# Patient Record
Sex: FEMALE | Race: WHITE | Employment: OTHER | URBAN - METROPOLITAN AREA
[De-identification: names, ages, dates, MRNs, and addresses within clinical notes are randomized per-mention and may not be internally consistent; named-entity substitution may affect disease eponyms.]

---

## 2017-01-01 ENCOUNTER — HOSPITAL ENCOUNTER (OUTPATIENT)
Facility: CLINIC | Age: 77
Setting detail: SPECIMEN
Discharge: HOME | End: 2017-11-30
Attending: INTERNAL MEDICINE | Admitting: INTERNAL MEDICINE
Payer: MEDICARE

## 2017-01-01 ENCOUNTER — INFUSION THERAPY VISIT (OUTPATIENT)
Dept: INFUSION THERAPY | Facility: CLINIC | Age: 77
End: 2017-01-01
Attending: INTERNAL MEDICINE
Payer: MEDICARE

## 2017-01-01 ENCOUNTER — ONCOLOGY VISIT (OUTPATIENT)
Dept: ONCOLOGY | Facility: CLINIC | Age: 77
End: 2017-01-01
Attending: INTERNAL MEDICINE
Payer: MEDICARE

## 2017-01-01 ENCOUNTER — ONCOLOGY VISIT (OUTPATIENT)
Dept: ONCOLOGY | Facility: CLINIC | Age: 77
End: 2017-01-01
Attending: INTERNAL MEDICINE
Payer: COMMERCIAL

## 2017-01-01 ENCOUNTER — ASSISTED LIVING VISIT (OUTPATIENT)
Dept: GERIATRICS | Facility: CLINIC | Age: 77
End: 2017-01-01
Payer: COMMERCIAL

## 2017-01-01 ENCOUNTER — TELEPHONE (OUTPATIENT)
Dept: FAMILY MEDICINE | Facility: CLINIC | Age: 77
End: 2017-01-01

## 2017-01-01 ENCOUNTER — HOSPITAL ENCOUNTER (OUTPATIENT)
Facility: CLINIC | Age: 77
Setting detail: SPECIMEN
Discharge: HOME | End: 2017-11-02
Attending: INTERNAL MEDICINE | Admitting: INTERNAL MEDICINE
Payer: MEDICARE

## 2017-01-01 ENCOUNTER — TELEPHONE (OUTPATIENT)
Dept: GERIATRICS | Facility: CLINIC | Age: 77
End: 2017-01-01

## 2017-01-01 ENCOUNTER — HOSPITAL ENCOUNTER (OUTPATIENT)
Dept: CT IMAGING | Facility: CLINIC | Age: 77
End: 2017-09-13
Attending: INTERNAL MEDICINE | Admitting: INTERNAL MEDICINE
Payer: MEDICARE

## 2017-01-01 ENCOUNTER — OFFICE VISIT (OUTPATIENT)
Dept: FAMILY MEDICINE | Facility: CLINIC | Age: 77
End: 2017-01-01
Payer: COMMERCIAL

## 2017-01-01 ENCOUNTER — HOSPITAL ENCOUNTER (OUTPATIENT)
Facility: CLINIC | Age: 77
Setting detail: SPECIMEN
Discharge: HOME | End: 2017-12-14
Attending: INTERNAL MEDICINE | Admitting: INTERNAL MEDICINE
Payer: MEDICARE

## 2017-01-01 ENCOUNTER — HOSPITAL ENCOUNTER (OUTPATIENT)
Facility: CLINIC | Age: 77
Setting detail: SPECIMEN
Discharge: HOME | End: 2017-10-05
Attending: INTERNAL MEDICINE | Admitting: INTERNAL MEDICINE
Payer: MEDICARE

## 2017-01-01 ENCOUNTER — ALLIED HEALTH/NURSE VISIT (OUTPATIENT)
Dept: ONCOLOGY | Facility: CLINIC | Age: 77
End: 2017-01-01

## 2017-01-01 ENCOUNTER — APPOINTMENT (OUTPATIENT)
Dept: GENERAL RADIOLOGY | Facility: CLINIC | Age: 77
End: 2017-01-01
Attending: INTERNAL MEDICINE
Payer: MEDICARE

## 2017-01-01 ENCOUNTER — CARE COORDINATION (OUTPATIENT)
Dept: CARE COORDINATION | Facility: CLINIC | Age: 77
End: 2017-01-01

## 2017-01-01 ENCOUNTER — TELEPHONE (OUTPATIENT)
Dept: ONCOLOGY | Facility: CLINIC | Age: 77
End: 2017-01-01

## 2017-01-01 ENCOUNTER — HOSPITAL ENCOUNTER (OUTPATIENT)
Facility: CLINIC | Age: 77
Discharge: HOME | End: 2017-09-13
Attending: INTERNAL MEDICINE | Admitting: INTERNAL MEDICINE
Payer: MEDICARE

## 2017-01-01 ENCOUNTER — HOSPITAL ENCOUNTER (OUTPATIENT)
Dept: GENERAL RADIOLOGY | Facility: CLINIC | Age: 77
Discharge: HOME | End: 2017-10-21
Attending: INTERNAL MEDICINE | Admitting: INTERNAL MEDICINE
Payer: MEDICARE

## 2017-01-01 ENCOUNTER — HOSPITAL ENCOUNTER (OUTPATIENT)
Facility: CLINIC | Age: 77
Setting detail: OBSERVATION
Discharge: HOME | End: 2017-12-03
Attending: EMERGENCY MEDICINE | Admitting: INTERNAL MEDICINE
Payer: MEDICARE

## 2017-01-01 ENCOUNTER — NURSE TRIAGE (OUTPATIENT)
Dept: NURSING | Facility: CLINIC | Age: 77
End: 2017-01-01

## 2017-01-01 ENCOUNTER — HOSPITAL ENCOUNTER (OUTPATIENT)
Facility: CLINIC | Age: 77
Setting detail: SPECIMEN
Discharge: HOME | End: 2017-10-19
Attending: INTERNAL MEDICINE | Admitting: INTERNAL MEDICINE
Payer: MEDICARE

## 2017-01-01 ENCOUNTER — HOSPITAL ENCOUNTER (OUTPATIENT)
Dept: CT IMAGING | Facility: CLINIC | Age: 77
End: 2017-12-05
Attending: INTERNAL MEDICINE | Admitting: INTERNAL MEDICINE
Payer: MEDICARE

## 2017-01-01 ENCOUNTER — MEDICAL CORRESPONDENCE (OUTPATIENT)
Dept: HEALTH INFORMATION MANAGEMENT | Facility: CLINIC | Age: 77
End: 2017-01-01

## 2017-01-01 ENCOUNTER — HOSPITAL ENCOUNTER (OUTPATIENT)
Facility: CLINIC | Age: 77
Setting detail: SPECIMEN
Discharge: HOME | End: 2017-09-21
Attending: INTERNAL MEDICINE | Admitting: INTERNAL MEDICINE
Payer: MEDICARE

## 2017-01-01 ENCOUNTER — DOCUMENTATION ONLY (OUTPATIENT)
Dept: OTHER | Facility: CLINIC | Age: 77
End: 2017-01-01

## 2017-01-01 ENCOUNTER — HOSPITAL ENCOUNTER (OUTPATIENT)
Facility: CLINIC | Age: 77
Discharge: HOME | End: 2017-12-05
Attending: INTERNAL MEDICINE | Admitting: INTERNAL MEDICINE
Payer: MEDICARE

## 2017-01-01 ENCOUNTER — DISCHARGE SUMMARY NURSING HOME (OUTPATIENT)
Dept: GERIATRICS | Facility: CLINIC | Age: 77
End: 2017-01-01
Payer: COMMERCIAL

## 2017-01-01 ENCOUNTER — HOSPITAL ENCOUNTER (OUTPATIENT)
Facility: CLINIC | Age: 77
Setting detail: SPECIMEN
Discharge: HOME | End: 2017-11-16
Attending: INTERNAL MEDICINE | Admitting: INTERNAL MEDICINE
Payer: MEDICARE

## 2017-01-01 ENCOUNTER — HOSPITAL ENCOUNTER (OUTPATIENT)
Facility: CLINIC | Age: 77
Setting detail: SPECIMEN
Discharge: HOME | End: 2017-10-02
Attending: INTERNAL MEDICINE | Admitting: INTERNAL MEDICINE
Payer: MEDICARE

## 2017-01-01 ENCOUNTER — APPOINTMENT (OUTPATIENT)
Dept: CT IMAGING | Facility: CLINIC | Age: 77
End: 2017-01-01
Attending: EMERGENCY MEDICINE
Payer: MEDICARE

## 2017-01-01 VITALS
TEMPERATURE: 97.8 F | SYSTOLIC BLOOD PRESSURE: 154 MMHG | RESPIRATION RATE: 18 BRPM | TEMPERATURE: 97.7 F | HEART RATE: 76 BPM | BODY MASS INDEX: 21.07 KG/M2 | HEIGHT: 64 IN | OXYGEN SATURATION: 96 % | BODY MASS INDEX: 20.93 KG/M2 | WEIGHT: 122.8 LBS | DIASTOLIC BLOOD PRESSURE: 88 MMHG | SYSTOLIC BLOOD PRESSURE: 150 MMHG | WEIGHT: 122.6 LBS | HEART RATE: 68 BPM | RESPIRATION RATE: 22 BRPM | DIASTOLIC BLOOD PRESSURE: 92 MMHG

## 2017-01-01 VITALS
TEMPERATURE: 97 F | RESPIRATION RATE: 16 BRPM | SYSTOLIC BLOOD PRESSURE: 158 MMHG | WEIGHT: 120.6 LBS | BODY MASS INDEX: 19.38 KG/M2 | OXYGEN SATURATION: 99 % | DIASTOLIC BLOOD PRESSURE: 85 MMHG | HEIGHT: 66 IN

## 2017-01-01 VITALS
BODY MASS INDEX: 20.46 KG/M2 | TEMPERATURE: 98 F | SYSTOLIC BLOOD PRESSURE: 149 MMHG | HEART RATE: 71 BPM | WEIGHT: 119.2 LBS | RESPIRATION RATE: 18 BRPM | OXYGEN SATURATION: 95 % | DIASTOLIC BLOOD PRESSURE: 90 MMHG

## 2017-01-01 VITALS
WEIGHT: 119.8 LBS | RESPIRATION RATE: 20 BRPM | SYSTOLIC BLOOD PRESSURE: 122 MMHG | DIASTOLIC BLOOD PRESSURE: 107 MMHG | TEMPERATURE: 98.1 F | OXYGEN SATURATION: 98 % | BODY MASS INDEX: 20.56 KG/M2 | HEART RATE: 75 BPM

## 2017-01-01 VITALS
HEART RATE: 63 BPM | RESPIRATION RATE: 18 BRPM | SYSTOLIC BLOOD PRESSURE: 159 MMHG | DIASTOLIC BLOOD PRESSURE: 93 MMHG | TEMPERATURE: 98.3 F

## 2017-01-01 VITALS
TEMPERATURE: 98 F | DIASTOLIC BLOOD PRESSURE: 90 MMHG | HEART RATE: 71 BPM | OXYGEN SATURATION: 95 % | RESPIRATION RATE: 18 BRPM | HEIGHT: 64 IN | BODY MASS INDEX: 20.35 KG/M2 | SYSTOLIC BLOOD PRESSURE: 149 MMHG | WEIGHT: 119.2 LBS

## 2017-01-01 VITALS
DIASTOLIC BLOOD PRESSURE: 102 MMHG | TEMPERATURE: 97.4 F | RESPIRATION RATE: 16 BRPM | HEART RATE: 67 BPM | SYSTOLIC BLOOD PRESSURE: 174 MMHG

## 2017-01-01 VITALS
RESPIRATION RATE: 20 BRPM | HEART RATE: 74 BPM | OXYGEN SATURATION: 96 % | TEMPERATURE: 96.8 F | BODY MASS INDEX: 20.15 KG/M2 | DIASTOLIC BLOOD PRESSURE: 75 MMHG | WEIGHT: 117.4 LBS | SYSTOLIC BLOOD PRESSURE: 137 MMHG

## 2017-01-01 VITALS — WEIGHT: 121.8 LBS | BODY MASS INDEX: 19.66 KG/M2

## 2017-01-01 VITALS
HEART RATE: 77 BPM | WEIGHT: 126.1 LBS | TEMPERATURE: 98.6 F | DIASTOLIC BLOOD PRESSURE: 73 MMHG | BODY MASS INDEX: 21.53 KG/M2 | RESPIRATION RATE: 16 BRPM | SYSTOLIC BLOOD PRESSURE: 126 MMHG | HEIGHT: 64 IN

## 2017-01-01 VITALS
WEIGHT: 118.2 LBS | SYSTOLIC BLOOD PRESSURE: 131 MMHG | HEART RATE: 73 BPM | DIASTOLIC BLOOD PRESSURE: 79 MMHG | BODY MASS INDEX: 20.28 KG/M2 | TEMPERATURE: 98.5 F | OXYGEN SATURATION: 98 %

## 2017-01-01 VITALS
DIASTOLIC BLOOD PRESSURE: 75 MMHG | SYSTOLIC BLOOD PRESSURE: 137 MMHG | TEMPERATURE: 96.8 F | HEART RATE: 74 BPM | OXYGEN SATURATION: 96 % | BODY MASS INDEX: 20.62 KG/M2 | RESPIRATION RATE: 20 BRPM | WEIGHT: 120.12 LBS

## 2017-01-01 VITALS
RESPIRATION RATE: 20 BRPM | DIASTOLIC BLOOD PRESSURE: 92 MMHG | BODY MASS INDEX: 20.94 KG/M2 | SYSTOLIC BLOOD PRESSURE: 157 MMHG | WEIGHT: 122 LBS | HEART RATE: 67 BPM

## 2017-01-01 VITALS
HEART RATE: 66 BPM | BODY MASS INDEX: 20.92 KG/M2 | OXYGEN SATURATION: 95 % | DIASTOLIC BLOOD PRESSURE: 83 MMHG | RESPIRATION RATE: 18 BRPM | HEIGHT: 64 IN | TEMPERATURE: 97.6 F | WEIGHT: 122.5 LBS | SYSTOLIC BLOOD PRESSURE: 142 MMHG

## 2017-01-01 VITALS
HEART RATE: 70 BPM | SYSTOLIC BLOOD PRESSURE: 151 MMHG | TEMPERATURE: 97.8 F | RESPIRATION RATE: 14 BRPM | DIASTOLIC BLOOD PRESSURE: 98 MMHG

## 2017-01-01 VITALS
SYSTOLIC BLOOD PRESSURE: 152 MMHG | HEART RATE: 66 BPM | TEMPERATURE: 98.2 F | RESPIRATION RATE: 12 BRPM | DIASTOLIC BLOOD PRESSURE: 78 MMHG

## 2017-01-01 VITALS
DIASTOLIC BLOOD PRESSURE: 89 MMHG | OXYGEN SATURATION: 97 % | SYSTOLIC BLOOD PRESSURE: 150 MMHG | RESPIRATION RATE: 20 BRPM | HEART RATE: 73 BPM | TEMPERATURE: 98 F

## 2017-01-01 VITALS
TEMPERATURE: 98.6 F | SYSTOLIC BLOOD PRESSURE: 126 MMHG | DIASTOLIC BLOOD PRESSURE: 73 MMHG | RESPIRATION RATE: 16 BRPM | HEART RATE: 77 BPM

## 2017-01-01 VITALS
SYSTOLIC BLOOD PRESSURE: 168 MMHG | HEART RATE: 70 BPM | RESPIRATION RATE: 16 BRPM | DIASTOLIC BLOOD PRESSURE: 105 MMHG | TEMPERATURE: 97.4 F

## 2017-01-01 VITALS
DIASTOLIC BLOOD PRESSURE: 73 MMHG | SYSTOLIC BLOOD PRESSURE: 129 MMHG | OXYGEN SATURATION: 96 % | HEART RATE: 73 BPM | BODY MASS INDEX: 19.66 KG/M2 | RESPIRATION RATE: 18 BRPM | TEMPERATURE: 98.3 F | WEIGHT: 121.8 LBS

## 2017-01-01 VITALS
TEMPERATURE: 97.6 F | SYSTOLIC BLOOD PRESSURE: 158 MMHG | DIASTOLIC BLOOD PRESSURE: 92 MMHG | RESPIRATION RATE: 16 BRPM | HEART RATE: 75 BPM

## 2017-01-01 VITALS
WEIGHT: 123 LBS | DIASTOLIC BLOOD PRESSURE: 84 MMHG | HEART RATE: 70 BPM | OXYGEN SATURATION: 96 % | BODY MASS INDEX: 21 KG/M2 | HEIGHT: 64 IN | SYSTOLIC BLOOD PRESSURE: 169 MMHG | TEMPERATURE: 97.4 F

## 2017-01-01 VITALS
TEMPERATURE: 97.7 F | SYSTOLIC BLOOD PRESSURE: 129 MMHG | BODY MASS INDEX: 20.15 KG/M2 | DIASTOLIC BLOOD PRESSURE: 72 MMHG | WEIGHT: 117.4 LBS | RESPIRATION RATE: 16 BRPM

## 2017-01-01 VITALS
DIASTOLIC BLOOD PRESSURE: 87 MMHG | BODY MASS INDEX: 20.94 KG/M2 | OXYGEN SATURATION: 97 % | WEIGHT: 122 LBS | SYSTOLIC BLOOD PRESSURE: 146 MMHG | HEART RATE: 70 BPM | TEMPERATURE: 97.6 F

## 2017-01-01 VITALS
DIASTOLIC BLOOD PRESSURE: 78 MMHG | TEMPERATURE: 98.2 F | OXYGEN SATURATION: 98 % | HEART RATE: 66 BPM | RESPIRATION RATE: 12 BRPM | SYSTOLIC BLOOD PRESSURE: 152 MMHG

## 2017-01-01 DIAGNOSIS — C18.9 ADENOCARCINOMA OF COLON (H): Primary | ICD-10-CM

## 2017-01-01 DIAGNOSIS — Z90.49 S/P COLECTOMY: ICD-10-CM

## 2017-01-01 DIAGNOSIS — I10 HYPERTENSION, UNSPECIFIED TYPE: ICD-10-CM

## 2017-01-01 DIAGNOSIS — Z95.828 PORT-A-CATH IN PLACE: Primary | ICD-10-CM

## 2017-01-01 DIAGNOSIS — R19.7 DIARRHEA, UNSPECIFIED TYPE: ICD-10-CM

## 2017-01-01 DIAGNOSIS — C18.9 ADENOCARCINOMA OF COLON (H): ICD-10-CM

## 2017-01-01 DIAGNOSIS — Z71.89 ACP (ADVANCE CARE PLANNING): Chronic | ICD-10-CM

## 2017-01-01 DIAGNOSIS — D50.0 IRON DEFICIENCY ANEMIA DUE TO CHRONIC BLOOD LOSS: ICD-10-CM

## 2017-01-01 DIAGNOSIS — M15.0 PRIMARY OSTEOARTHRITIS INVOLVING MULTIPLE JOINTS: Primary | ICD-10-CM

## 2017-01-01 DIAGNOSIS — E03.9 HYPOTHYROIDISM, UNSPECIFIED TYPE: ICD-10-CM

## 2017-01-01 DIAGNOSIS — R80.9 PROTEINURIA, UNSPECIFIED TYPE: ICD-10-CM

## 2017-01-01 DIAGNOSIS — Z53.9 DIAGNOSIS NOT YET DEFINED: Primary | ICD-10-CM

## 2017-01-01 DIAGNOSIS — G14 POSTPOLIO SYNDROME (H): ICD-10-CM

## 2017-01-01 DIAGNOSIS — M15.0 PRIMARY OSTEOARTHRITIS INVOLVING MULTIPLE JOINTS: ICD-10-CM

## 2017-01-01 DIAGNOSIS — D50.9 IRON DEFICIENCY ANEMIA, UNSPECIFIED IRON DEFICIENCY ANEMIA TYPE: ICD-10-CM

## 2017-01-01 DIAGNOSIS — F29 PSYCHOSIS, UNSPECIFIED PSYCHOSIS TYPE (H): ICD-10-CM

## 2017-01-01 DIAGNOSIS — Z90.49 S/P RIGHT COLECTOMY: ICD-10-CM

## 2017-01-01 DIAGNOSIS — H40.1130 PRIMARY OPEN ANGLE GLAUCOMA OF BOTH EYES, UNSPECIFIED GLAUCOMA STAGE: ICD-10-CM

## 2017-01-01 DIAGNOSIS — Z85.038 HISTORY OF COLON CANCER: ICD-10-CM

## 2017-01-01 DIAGNOSIS — Z71.89 ADVANCE CARE PLANNING: ICD-10-CM

## 2017-01-01 DIAGNOSIS — Z95.828 PORT-A-CATH IN PLACE: ICD-10-CM

## 2017-01-01 DIAGNOSIS — E03.9 HYPOTHYROIDISM, UNSPECIFIED TYPE: Primary | ICD-10-CM

## 2017-01-01 DIAGNOSIS — M17.0 PRIMARY OSTEOARTHRITIS OF BOTH KNEES: ICD-10-CM

## 2017-01-01 DIAGNOSIS — H40.9 GLAUCOMA, UNSPECIFIED GLAUCOMA TYPE, UNSPECIFIED LATERALITY: ICD-10-CM

## 2017-01-01 DIAGNOSIS — R41.3 MEMORY LOSS: ICD-10-CM

## 2017-01-01 DIAGNOSIS — H40.9 UNSPECIFIED GLAUCOMA: Primary | ICD-10-CM

## 2017-01-01 DIAGNOSIS — K90.9 DIARRHEA DUE TO MALABSORPTION: ICD-10-CM

## 2017-01-01 DIAGNOSIS — R51.9 ACUTE INTRACTABLE HEADACHE, UNSPECIFIED HEADACHE TYPE: ICD-10-CM

## 2017-01-01 DIAGNOSIS — Z00.00 HEALTH CARE MAINTENANCE: ICD-10-CM

## 2017-01-01 DIAGNOSIS — R19.7 DIARRHEA DUE TO MALABSORPTION: ICD-10-CM

## 2017-01-01 DIAGNOSIS — R19.7 DIARRHEA: Primary | ICD-10-CM

## 2017-01-01 DIAGNOSIS — M47.26 OSTEOARTHRITIS OF SPINE WITH RADICULOPATHY, LUMBAR REGION: ICD-10-CM

## 2017-01-01 DIAGNOSIS — Z71.9 COUNSELING NOS(V65.40): Primary | ICD-10-CM

## 2017-01-01 DIAGNOSIS — C18.9 COLON CANCER (H): Primary | ICD-10-CM

## 2017-01-01 DIAGNOSIS — R80.9 PROTEINURIA, UNSPECIFIED TYPE: Primary | ICD-10-CM

## 2017-01-01 DIAGNOSIS — Z71.89 ACP (ADVANCE CARE PLANNING): Primary | Chronic | ICD-10-CM

## 2017-01-01 DIAGNOSIS — M25.20: ICD-10-CM

## 2017-01-01 DIAGNOSIS — G89.29 OTHER CHRONIC PAIN: Primary | ICD-10-CM

## 2017-01-01 LAB
ALBUMIN SERPL-MCNC: 3.6 G/DL (ref 3.4–5)
ALBUMIN UR-MCNC: 10 MG/DL
ALBUMIN UR-MCNC: 10 MG/DL
ALBUMIN UR-MCNC: 100 MG/DL
ALBUMIN UR-MCNC: 100 MG/DL
ALBUMIN UR-MCNC: 30 MG/DL
ALP SERPL-CCNC: 92 U/L (ref 40–150)
ALT SERPL W P-5'-P-CCNC: 46 U/L (ref 0–50)
ANION GAP SERPL CALCULATED.3IONS-SCNC: 5 MMOL/L (ref 3–14)
ANION GAP SERPL CALCULATED.3IONS-SCNC: 7 MMOL/L (ref 3–14)
ANION GAP SERPL CALCULATED.3IONS-SCNC: 7 MMOL/L (ref 3–14)
APPEARANCE UR: CLEAR
AST SERPL W P-5'-P-CCNC: 36 U/L (ref 0–45)
BACTERIA #/AREA URNS HPF: ABNORMAL /HPF
BASOPHILS # BLD AUTO: 0 10E9/L (ref 0–0.2)
BASOPHILS NFR BLD AUTO: 0.3 %
BASOPHILS NFR BLD AUTO: 0.4 %
BASOPHILS NFR BLD AUTO: 0.5 %
BASOPHILS NFR BLD AUTO: 0.5 %
BILIRUB SERPL-MCNC: 0.4 MG/DL (ref 0.2–1.3)
BILIRUB SERPL-MCNC: 0.5 MG/DL (ref 0.2–1.3)
BILIRUB SERPL-MCNC: 0.6 MG/DL (ref 0.2–1.3)
BILIRUB UR QL STRIP: NEGATIVE
BUN SERPL-MCNC: 14 MG/DL (ref 7–30)
BUN SERPL-MCNC: 15 MG/DL (ref 7–30)
BUN SERPL-MCNC: 24 MG/DL (ref 7–30)
CALCIUM SERPL-MCNC: 8.8 MG/DL (ref 8.5–10.1)
CALCIUM SERPL-MCNC: 8.8 MG/DL (ref 8.5–10.1)
CALCIUM SERPL-MCNC: 9.5 MG/DL (ref 8.5–10.1)
CHLORIDE SERPL-SCNC: 101 MMOL/L (ref 94–109)
CHLORIDE SERPL-SCNC: 105 MMOL/L (ref 94–109)
CHLORIDE SERPL-SCNC: 106 MMOL/L (ref 94–109)
CO2 SERPL-SCNC: 27 MMOL/L (ref 20–32)
CO2 SERPL-SCNC: 28 MMOL/L (ref 20–32)
CO2 SERPL-SCNC: 30 MMOL/L (ref 20–32)
COLLECT DURATION TIME UR: 24 H
COLOR UR AUTO: ABNORMAL
CREAT 24H UR-MRATE: 0.83 G/(24.H) (ref 0.8–1.8)
CREAT SERPL-MCNC: 0.65 MG/DL (ref 0.52–1.04)
CREAT SERPL-MCNC: 0.71 MG/DL (ref 0.52–1.04)
CREAT SERPL-MCNC: 0.92 MG/DL (ref 0.52–1.04)
CREAT UR-MCNC: 49 MG/DL
DIFFERENTIAL METHOD BLD: ABNORMAL
EOSINOPHIL # BLD AUTO: 0 10E9/L (ref 0–0.7)
EOSINOPHIL # BLD AUTO: 0.1 10E9/L (ref 0–0.7)
EOSINOPHIL NFR BLD AUTO: 0.3 %
EOSINOPHIL NFR BLD AUTO: 0.4 %
EOSINOPHIL NFR BLD AUTO: 0.6 %
EOSINOPHIL NFR BLD AUTO: 0.6 %
EOSINOPHIL NFR BLD AUTO: 0.8 %
EOSINOPHIL NFR BLD AUTO: 0.9 %
EOSINOPHIL NFR BLD AUTO: 1.1 %
EOSINOPHIL NFR BLD AUTO: 1.5 %
EOSINOPHIL NFR BLD AUTO: 1.5 %
ERYTHROCYTE [DISTWIDTH] IN BLOOD BY AUTOMATED COUNT: 15.4 % (ref 10–15)
ERYTHROCYTE [DISTWIDTH] IN BLOOD BY AUTOMATED COUNT: 15.6 % (ref 10–15)
ERYTHROCYTE [DISTWIDTH] IN BLOOD BY AUTOMATED COUNT: 15.7 % (ref 10–15)
ERYTHROCYTE [DISTWIDTH] IN BLOOD BY AUTOMATED COUNT: 16.1 % (ref 10–15)
ERYTHROCYTE [DISTWIDTH] IN BLOOD BY AUTOMATED COUNT: 16.6 % (ref 10–15)
ERYTHROCYTE [DISTWIDTH] IN BLOOD BY AUTOMATED COUNT: 16.9 % (ref 10–15)
ERYTHROCYTE [DISTWIDTH] IN BLOOD BY AUTOMATED COUNT: 18.5 % (ref 10–15)
ERYTHROCYTE [DISTWIDTH] IN BLOOD BY AUTOMATED COUNT: 18.7 % (ref 10–15)
ERYTHROCYTE [DISTWIDTH] IN BLOOD BY AUTOMATED COUNT: 19.3 % (ref 10–15)
GFR SERPL CREATININE-BSD FRML MDRD: 59 ML/MIN/1.7M2
GFR SERPL CREATININE-BSD FRML MDRD: 80 ML/MIN/1.7M2
GFR SERPL CREATININE-BSD FRML MDRD: 88 ML/MIN/1.7M2
GLUCOSE SERPL-MCNC: 93 MG/DL (ref 70–99)
GLUCOSE SERPL-MCNC: 97 MG/DL (ref 70–99)
GLUCOSE SERPL-MCNC: 97 MG/DL (ref 70–99)
GLUCOSE UR STRIP-MCNC: NEGATIVE MG/DL
HCT VFR BLD AUTO: 33.5 % (ref 35–47)
HCT VFR BLD AUTO: 35 % (ref 35–47)
HCT VFR BLD AUTO: 35.8 % (ref 35–47)
HCT VFR BLD AUTO: 38 % (ref 35–47)
HCT VFR BLD AUTO: 38.1 % (ref 35–47)
HCT VFR BLD AUTO: 39 % (ref 35–47)
HCT VFR BLD AUTO: 39.2 % (ref 35–47)
HCT VFR BLD AUTO: 39.2 % (ref 35–47)
HCT VFR BLD AUTO: 40.9 % (ref 35–47)
HGB BLD-MCNC: 10.6 G/DL (ref 11.7–15.7)
HGB BLD-MCNC: 11 G/DL (ref 11.7–15.7)
HGB BLD-MCNC: 11.4 G/DL (ref 11.7–15.7)
HGB BLD-MCNC: 12 G/DL (ref 11.7–15.7)
HGB BLD-MCNC: 12.1 G/DL (ref 11.7–15.7)
HGB BLD-MCNC: 12.3 G/DL (ref 11.7–15.7)
HGB BLD-MCNC: 12.6 G/DL (ref 11.7–15.7)
HGB BLD-MCNC: 12.7 G/DL (ref 11.7–15.7)
HGB BLD-MCNC: 13.4 G/DL (ref 11.7–15.7)
HGB UR QL STRIP: NEGATIVE
IMM GRANULOCYTES # BLD: 0 10E9/L (ref 0–0.4)
IMM GRANULOCYTES NFR BLD: 0 %
IMM GRANULOCYTES NFR BLD: 0.1 %
IMM GRANULOCYTES NFR BLD: 0.1 %
IMM GRANULOCYTES NFR BLD: 0.2 %
IMM GRANULOCYTES NFR BLD: 0.2 %
IMM GRANULOCYTES NFR BLD: 0.3 %
IMM GRANULOCYTES NFR BLD: 0.4 %
INTERPRETATION ECG - MUSE: NORMAL
KETONES UR STRIP-MCNC: NEGATIVE MG/DL
LEUKOCYTE ESTERASE UR QL STRIP: NEGATIVE
LYMPHOCYTES # BLD AUTO: 1.3 10E9/L (ref 0.8–5.3)
LYMPHOCYTES # BLD AUTO: 1.8 10E9/L (ref 0.8–5.3)
LYMPHOCYTES # BLD AUTO: 2 10E9/L (ref 0.8–5.3)
LYMPHOCYTES # BLD AUTO: 2.1 10E9/L (ref 0.8–5.3)
LYMPHOCYTES # BLD AUTO: 2.3 10E9/L (ref 0.8–5.3)
LYMPHOCYTES # BLD AUTO: 2.4 10E9/L (ref 0.8–5.3)
LYMPHOCYTES # BLD AUTO: 2.4 10E9/L (ref 0.8–5.3)
LYMPHOCYTES # BLD AUTO: 2.5 10E9/L (ref 0.8–5.3)
LYMPHOCYTES # BLD AUTO: 3.3 10E9/L (ref 0.8–5.3)
LYMPHOCYTES NFR BLD AUTO: 26.2 %
LYMPHOCYTES NFR BLD AUTO: 27.9 %
LYMPHOCYTES NFR BLD AUTO: 28.8 %
LYMPHOCYTES NFR BLD AUTO: 29.1 %
LYMPHOCYTES NFR BLD AUTO: 30.5 %
LYMPHOCYTES NFR BLD AUTO: 32.9 %
LYMPHOCYTES NFR BLD AUTO: 33.5 %
LYMPHOCYTES NFR BLD AUTO: 33.9 %
LYMPHOCYTES NFR BLD AUTO: 46 %
MCH RBC QN AUTO: 28.4 PG (ref 26.5–33)
MCH RBC QN AUTO: 28.8 PG (ref 26.5–33)
MCH RBC QN AUTO: 29.5 PG (ref 26.5–33)
MCH RBC QN AUTO: 29.8 PG (ref 26.5–33)
MCH RBC QN AUTO: 29.8 PG (ref 26.5–33)
MCH RBC QN AUTO: 30 PG (ref 26.5–33)
MCH RBC QN AUTO: 30.3 PG (ref 26.5–33)
MCH RBC QN AUTO: 30.8 PG (ref 26.5–33)
MCH RBC QN AUTO: 31 PG (ref 26.5–33)
MCHC RBC AUTO-ENTMCNC: 31.4 G/DL (ref 31.5–36.5)
MCHC RBC AUTO-ENTMCNC: 31.4 G/DL (ref 31.5–36.5)
MCHC RBC AUTO-ENTMCNC: 31.5 G/DL (ref 31.5–36.5)
MCHC RBC AUTO-ENTMCNC: 31.6 G/DL (ref 31.5–36.5)
MCHC RBC AUTO-ENTMCNC: 31.8 G/DL (ref 31.5–36.5)
MCHC RBC AUTO-ENTMCNC: 31.8 G/DL (ref 31.5–36.5)
MCHC RBC AUTO-ENTMCNC: 32.1 G/DL (ref 31.5–36.5)
MCHC RBC AUTO-ENTMCNC: 32.6 G/DL (ref 31.5–36.5)
MCHC RBC AUTO-ENTMCNC: 32.8 G/DL (ref 31.5–36.5)
MCV RBC AUTO: 90 FL (ref 78–100)
MCV RBC AUTO: 92 FL (ref 78–100)
MCV RBC AUTO: 93 FL (ref 78–100)
MCV RBC AUTO: 93 FL (ref 78–100)
MCV RBC AUTO: 94 FL (ref 78–100)
MCV RBC AUTO: 95 FL (ref 78–100)
MCV RBC AUTO: 96 FL (ref 78–100)
MONOCYTES # BLD AUTO: 0.4 10E9/L (ref 0–1.3)
MONOCYTES # BLD AUTO: 0.5 10E9/L (ref 0–1.3)
MONOCYTES # BLD AUTO: 0.5 10E9/L (ref 0–1.3)
MONOCYTES # BLD AUTO: 0.6 10E9/L (ref 0–1.3)
MONOCYTES # BLD AUTO: 0.8 10E9/L (ref 0–1.3)
MONOCYTES NFR BLD AUTO: 14 %
MONOCYTES NFR BLD AUTO: 5.6 %
MONOCYTES NFR BLD AUTO: 5.9 %
MONOCYTES NFR BLD AUTO: 6.4 %
MONOCYTES NFR BLD AUTO: 6.6 %
MONOCYTES NFR BLD AUTO: 7 %
MONOCYTES NFR BLD AUTO: 7.9 %
MONOCYTES NFR BLD AUTO: 8.3 %
MONOCYTES NFR BLD AUTO: 8.8 %
NEUTROPHILS # BLD AUTO: 2.1 10E9/L (ref 1.6–8.3)
NEUTROPHILS # BLD AUTO: 2.3 10E9/L (ref 1.6–8.3)
NEUTROPHILS # BLD AUTO: 4 10E9/L (ref 1.6–8.3)
NEUTROPHILS # BLD AUTO: 4.2 10E9/L (ref 1.6–8.3)
NEUTROPHILS # BLD AUTO: 4.2 10E9/L (ref 1.6–8.3)
NEUTROPHILS # BLD AUTO: 4.5 10E9/L (ref 1.6–8.3)
NEUTROPHILS # BLD AUTO: 4.8 10E9/L (ref 1.6–8.3)
NEUTROPHILS # BLD AUTO: 4.9 10E9/L (ref 1.6–8.3)
NEUTROPHILS # BLD AUTO: 7.1 10E9/L (ref 1.6–8.3)
NEUTROPHILS NFR BLD AUTO: 43.8 %
NEUTROPHILS NFR BLD AUTO: 51.2 %
NEUTROPHILS NFR BLD AUTO: 57.3 %
NEUTROPHILS NFR BLD AUTO: 59.5 %
NEUTROPHILS NFR BLD AUTO: 61.9 %
NEUTROPHILS NFR BLD AUTO: 62.5 %
NEUTROPHILS NFR BLD AUTO: 63.1 %
NEUTROPHILS NFR BLD AUTO: 64 %
NEUTROPHILS NFR BLD AUTO: 64.4 %
NITRATE UR QL: NEGATIVE
NRBC # BLD AUTO: 0 10*3/UL
NRBC BLD AUTO-RTO: 0 /100
NT-PROBNP SERPL-MCNC: 816 PG/ML (ref 0–1800)
PH UR STRIP: 6.5 PH (ref 5–7)
PLATELET # BLD AUTO: 110 10E9/L (ref 150–450)
PLATELET # BLD AUTO: 113 10E9/L (ref 150–450)
PLATELET # BLD AUTO: 117 10E9/L (ref 150–450)
PLATELET # BLD AUTO: 150 10E9/L (ref 150–450)
PLATELET # BLD AUTO: 155 10E9/L (ref 150–450)
PLATELET # BLD AUTO: 94 10E9/L (ref 150–450)
PLATELET # BLD AUTO: 96 10E9/L (ref 150–450)
POTASSIUM SERPL-SCNC: 3.5 MMOL/L (ref 3.4–5.3)
POTASSIUM SERPL-SCNC: 3.8 MMOL/L (ref 3.4–5.3)
POTASSIUM SERPL-SCNC: 4.7 MMOL/L (ref 3.4–5.3)
PROT 24H UR-MRATE: 0.44 G/(24.H) (ref 0.04–0.23)
PROT SERPL-MCNC: 6.7 G/DL (ref 6.8–8.8)
PROT UR-MCNC: 0.26 G/L
PROT/CREAT 24H UR: 0.54 G/G CR (ref 0–0.2)
RBC # BLD AUTO: 3.73 10E12/L (ref 3.8–5.2)
RBC # BLD AUTO: 3.82 10E12/L (ref 3.8–5.2)
RBC # BLD AUTO: 3.87 10E12/L (ref 3.8–5.2)
RBC # BLD AUTO: 3.99 10E12/L (ref 3.8–5.2)
RBC # BLD AUTO: 4.03 10E12/L (ref 3.8–5.2)
RBC # BLD AUTO: 4.1 10E12/L (ref 3.8–5.2)
RBC # BLD AUTO: 4.1 10E12/L (ref 3.8–5.2)
RBC # BLD AUTO: 4.23 10E12/L (ref 3.8–5.2)
RBC # BLD AUTO: 4.35 10E12/L (ref 3.8–5.2)
RBC #/AREA URNS AUTO: 0 /HPF (ref 0–2)
SODIUM SERPL-SCNC: 138 MMOL/L (ref 133–144)
SODIUM SERPL-SCNC: 138 MMOL/L (ref 133–144)
SODIUM SERPL-SCNC: 140 MMOL/L (ref 133–144)
SOURCE: ABNORMAL
SP GR UR STRIP: 1 (ref 1–1.03)
SPECIMEN VOL UR: 1700 ML
TROPONIN I SERPL-MCNC: <0.015 UG/L (ref 0–0.04)
UROBILINOGEN UR STRIP-MCNC: NORMAL MG/DL (ref 0–2)
WBC # BLD AUTO: 11.2 10E9/L (ref 4–11)
WBC # BLD AUTO: 4 10E9/L (ref 4–11)
WBC # BLD AUTO: 5.2 10E9/L (ref 4–11)
WBC # BLD AUTO: 6.6 10E9/L (ref 4–11)
WBC # BLD AUTO: 6.8 10E9/L (ref 4–11)
WBC # BLD AUTO: 6.9 10E9/L (ref 4–11)
WBC # BLD AUTO: 7.5 10E9/L (ref 4–11)
WBC # BLD AUTO: 7.5 10E9/L (ref 4–11)
WBC # BLD AUTO: 7.9 10E9/L (ref 4–11)
WBC #/AREA URNS AUTO: 1 /HPF (ref 0–2)

## 2017-01-01 PROCEDURE — 85025 COMPLETE CBC W/AUTO DIFF WBC: CPT | Performed by: INTERNAL MEDICINE

## 2017-01-01 PROCEDURE — 99214 OFFICE O/P EST MOD 30 MIN: CPT | Performed by: INTERNAL MEDICINE

## 2017-01-01 PROCEDURE — 25000125 ZZHC RX 250: Performed by: INTERNAL MEDICINE

## 2017-01-01 PROCEDURE — 40000863 ZZH STATISTIC RADIOLOGY XRAY, US, CT, MAR, NM

## 2017-01-01 PROCEDURE — 84156 ASSAY OF PROTEIN URINE: CPT | Performed by: NURSE PRACTITIONER

## 2017-01-01 PROCEDURE — 74177 CT ABD & PELVIS W/CONTRAST: CPT

## 2017-01-01 PROCEDURE — 81003 URINALYSIS AUTO W/O SCOPE: CPT | Performed by: INTERNAL MEDICINE

## 2017-01-01 PROCEDURE — 96413 CHEMO IV INFUSION 1 HR: CPT

## 2017-01-01 PROCEDURE — 96360 HYDRATION IV INFUSION INIT: CPT

## 2017-01-01 PROCEDURE — 82247 BILIRUBIN TOTAL: CPT | Performed by: INTERNAL MEDICINE

## 2017-01-01 PROCEDURE — A9270 NON-COVERED ITEM OR SERVICE: HCPCS | Mod: GY | Performed by: EMERGENCY MEDICINE

## 2017-01-01 PROCEDURE — 25000128 H RX IP 250 OP 636: Performed by: RADIOLOGY

## 2017-01-01 PROCEDURE — 80048 BASIC METABOLIC PNL TOTAL CA: CPT | Performed by: INTERNAL MEDICINE

## 2017-01-01 PROCEDURE — 96361 HYDRATE IV INFUSION ADD-ON: CPT

## 2017-01-01 PROCEDURE — 70450 CT HEAD/BRAIN W/O DYE: CPT

## 2017-01-01 PROCEDURE — 72100 X-RAY EXAM L-S SPINE 2/3 VWS: CPT

## 2017-01-01 PROCEDURE — 25000128 H RX IP 250 OP 636: Performed by: INTERNAL MEDICINE

## 2017-01-01 PROCEDURE — 96375 TX/PRO/DX INJ NEW DRUG ADDON: CPT

## 2017-01-01 PROCEDURE — 99212 OFFICE O/P EST SF 10 MIN: CPT

## 2017-01-01 PROCEDURE — G0180 MD CERTIFICATION HHA PATIENT: HCPCS | Performed by: INTERNAL MEDICINE

## 2017-01-01 PROCEDURE — 99214 OFFICE O/P EST MOD 30 MIN: CPT | Performed by: NURSE PRACTITIONER

## 2017-01-01 PROCEDURE — 96416 CHEMO PROLONG INFUSE W/PUMP: CPT

## 2017-01-01 PROCEDURE — 93005 ELECTROCARDIOGRAM TRACING: CPT

## 2017-01-01 PROCEDURE — 40000893 ZZH STATISTIC PT IP EVAL DEFER: Performed by: PHYSICAL THERAPIST

## 2017-01-01 PROCEDURE — 80053 COMPREHEN METABOLIC PANEL: CPT | Performed by: INTERNAL MEDICINE

## 2017-01-01 PROCEDURE — 99207 ZZC APP CREDIT; MD BILLING SHARED VISIT: CPT | Performed by: PHYSICIAN ASSISTANT

## 2017-01-01 PROCEDURE — 96367 TX/PROPH/DG ADDL SEQ IV INF: CPT

## 2017-01-01 PROCEDURE — 99285 EMERGENCY DEPT VISIT HI MDM: CPT | Mod: 25

## 2017-01-01 PROCEDURE — 40000809 ZZH STATISTIC NO DOCUMENTATION TO SUPPORT CHARGE

## 2017-01-01 PROCEDURE — 71260 CT THORAX DX C+: CPT

## 2017-01-01 PROCEDURE — 25000132 ZZH RX MED GY IP 250 OP 250 PS 637: Mod: GY | Performed by: INTERNAL MEDICINE

## 2017-01-01 PROCEDURE — 71020 XR CHEST 2 VW: CPT

## 2017-01-01 PROCEDURE — 83880 ASSAY OF NATRIURETIC PEPTIDE: CPT | Performed by: EMERGENCY MEDICINE

## 2017-01-01 PROCEDURE — 25000128 H RX IP 250 OP 636: Mod: JW | Performed by: INTERNAL MEDICINE

## 2017-01-01 PROCEDURE — 99211 OFF/OP EST MAY X REQ PHY/QHP: CPT

## 2017-01-01 PROCEDURE — G0378 HOSPITAL OBSERVATION PER HR: HCPCS

## 2017-01-01 PROCEDURE — 25000128 H RX IP 250 OP 636: Performed by: NURSE PRACTITIONER

## 2017-01-01 PROCEDURE — 80048 BASIC METABOLIC PNL TOTAL CA: CPT | Performed by: EMERGENCY MEDICINE

## 2017-01-01 PROCEDURE — 85025 COMPLETE CBC W/AUTO DIFF WBC: CPT | Performed by: EMERGENCY MEDICINE

## 2017-01-01 PROCEDURE — 25000128 H RX IP 250 OP 636: Performed by: EMERGENCY MEDICINE

## 2017-01-01 PROCEDURE — 81050 URINALYSIS VOLUME MEASURE: CPT | Performed by: NURSE PRACTITIONER

## 2017-01-01 PROCEDURE — 99207 ZZC CDG-CODE CATEGORY CHANGED: CPT | Performed by: INTERNAL MEDICINE

## 2017-01-01 PROCEDURE — 81001 URINALYSIS AUTO W/SCOPE: CPT | Performed by: EMERGENCY MEDICINE

## 2017-01-01 PROCEDURE — 25000132 ZZH RX MED GY IP 250 OP 250 PS 637: Mod: GY | Performed by: EMERGENCY MEDICINE

## 2017-01-01 PROCEDURE — 84484 ASSAY OF TROPONIN QUANT: CPT | Performed by: EMERGENCY MEDICINE

## 2017-01-01 PROCEDURE — 99211 OFF/OP EST MAY X REQ PHY/QHP: CPT | Mod: 25

## 2017-01-01 PROCEDURE — 99236 HOSP IP/OBS SAME DATE HI 85: CPT | Performed by: INTERNAL MEDICINE

## 2017-01-01 PROCEDURE — 96374 THER/PROPH/DIAG INJ IV PUSH: CPT

## 2017-01-01 PROCEDURE — A9270 NON-COVERED ITEM OR SERVICE: HCPCS | Mod: GY | Performed by: INTERNAL MEDICINE

## 2017-01-01 RX ORDER — LIDOCAINE 40 MG/G
CREAM TOPICAL
Status: DISCONTINUED | OUTPATIENT
Start: 2017-01-01 | End: 2017-01-01 | Stop reason: HOSPADM

## 2017-01-01 RX ORDER — LORAZEPAM 2 MG/ML
0.5 INJECTION INTRAMUSCULAR EVERY 4 HOURS PRN
Status: CANCELLED
Start: 2018-01-01

## 2017-01-01 RX ORDER — MEPERIDINE HYDROCHLORIDE 50 MG/ML
25 INJECTION INTRAMUSCULAR; INTRAVENOUS; SUBCUTANEOUS EVERY 30 MIN PRN
Status: CANCELLED | OUTPATIENT
Start: 2017-01-01

## 2017-01-01 RX ORDER — DIPHENHYDRAMINE HYDROCHLORIDE 50 MG/ML
50 INJECTION INTRAMUSCULAR; INTRAVENOUS
Status: CANCELLED
Start: 2017-01-01

## 2017-01-01 RX ORDER — EPINEPHRINE 1 MG/ML
0.3 INJECTION, SOLUTION INTRAMUSCULAR; SUBCUTANEOUS EVERY 5 MIN PRN
Status: CANCELLED | OUTPATIENT
Start: 2017-01-01

## 2017-01-01 RX ORDER — POLYETHYLENE GLYCOL 3350 17 G/17G
17 POWDER, FOR SOLUTION ORAL DAILY PRN
Status: DISCONTINUED | OUTPATIENT
Start: 2017-01-01 | End: 2017-01-01 | Stop reason: HOSPADM

## 2017-01-01 RX ORDER — HEPARIN SODIUM (PORCINE) LOCK FLUSH IV SOLN 100 UNIT/ML 100 UNIT/ML
500 SOLUTION INTRAVENOUS EVERY 8 HOURS
Status: CANCELLED
Start: 2017-01-01

## 2017-01-01 RX ORDER — HEPARIN SODIUM (PORCINE) LOCK FLUSH IV SOLN 100 UNIT/ML 100 UNIT/ML
500 SOLUTION INTRAVENOUS EVERY 8 HOURS
Status: DISCONTINUED | OUTPATIENT
Start: 2017-01-01 | End: 2017-01-01 | Stop reason: HOSPADM

## 2017-01-01 RX ORDER — EPINEPHRINE 1 MG/ML
0.3 INJECTION INTRAMUSCULAR; INTRAVENOUS; SUBCUTANEOUS EVERY 5 MIN PRN
Status: CANCELLED | OUTPATIENT
Start: 2017-01-01

## 2017-01-01 RX ORDER — METHYLPREDNISOLONE SODIUM SUCCINATE 125 MG/2ML
125 INJECTION, POWDER, LYOPHILIZED, FOR SOLUTION INTRAMUSCULAR; INTRAVENOUS
Status: CANCELLED
Start: 2017-01-01

## 2017-01-01 RX ORDER — PROCHLORPERAZINE MALEATE 5 MG
5 TABLET ORAL EVERY 6 HOURS PRN
Status: DISCONTINUED | OUTPATIENT
Start: 2017-01-01 | End: 2017-01-01 | Stop reason: HOSPADM

## 2017-01-01 RX ORDER — METHYLPREDNISOLONE SODIUM SUCCINATE 125 MG/2ML
125 INJECTION, POWDER, LYOPHILIZED, FOR SOLUTION INTRAMUSCULAR; INTRAVENOUS
Status: CANCELLED
Start: 2018-01-01

## 2017-01-01 RX ORDER — FERROUS SULFATE 325(65) MG
325 TABLET ORAL
COMMUNITY
End: 2018-01-01

## 2017-01-01 RX ORDER — SODIUM CHLORIDE 9 MG/ML
1000 INJECTION, SOLUTION INTRAVENOUS CONTINUOUS PRN
Status: CANCELLED
Start: 2017-01-01

## 2017-01-01 RX ORDER — IOPAMIDOL 755 MG/ML
61 INJECTION, SOLUTION INTRAVASCULAR ONCE
Status: COMPLETED | OUTPATIENT
Start: 2017-01-01 | End: 2017-01-01

## 2017-01-01 RX ORDER — IOPAMIDOL 755 MG/ML
58 INJECTION, SOLUTION INTRAVASCULAR ONCE
Status: COMPLETED | OUTPATIENT
Start: 2017-01-01 | End: 2017-01-01

## 2017-01-01 RX ORDER — SODIUM CHLORIDE 9 MG/ML
1000 INJECTION, SOLUTION INTRAVENOUS CONTINUOUS PRN
Status: CANCELLED
Start: 2018-01-01

## 2017-01-01 RX ORDER — EPINEPHRINE 0.3 MG/.3ML
0.3 INJECTION SUBCUTANEOUS EVERY 5 MIN PRN
Status: CANCELLED | OUTPATIENT
Start: 2017-01-01

## 2017-01-01 RX ORDER — ALBUTEROL SULFATE 90 UG/1
1-2 AEROSOL, METERED RESPIRATORY (INHALATION)
Status: CANCELLED
Start: 2017-01-01

## 2017-01-01 RX ORDER — SODIUM CHLORIDE 9 MG/ML
INJECTION, SOLUTION INTRAVENOUS CONTINUOUS
Status: DISCONTINUED | OUTPATIENT
Start: 2017-01-01 | End: 2017-01-01 | Stop reason: HOSPADM

## 2017-01-01 RX ORDER — KETOROLAC TROMETHAMINE 30 MG/ML
15 INJECTION, SOLUTION INTRAMUSCULAR; INTRAVENOUS ONCE
Status: COMPLETED | OUTPATIENT
Start: 2017-01-01 | End: 2017-01-01

## 2017-01-01 RX ORDER — MEPERIDINE HYDROCHLORIDE 25 MG/ML
25 INJECTION INTRAMUSCULAR; INTRAVENOUS; SUBCUTANEOUS EVERY 30 MIN PRN
Status: CANCELLED | OUTPATIENT
Start: 2018-01-01

## 2017-01-01 RX ORDER — AMOXICILLIN 250 MG
2 CAPSULE ORAL 2 TIMES DAILY PRN
Status: DISCONTINUED | OUTPATIENT
Start: 2017-01-01 | End: 2017-01-01 | Stop reason: HOSPADM

## 2017-01-01 RX ORDER — LORAZEPAM 2 MG/ML
0.5 INJECTION INTRAMUSCULAR EVERY 4 HOURS PRN
Status: CANCELLED
Start: 2017-01-01

## 2017-01-01 RX ORDER — ALBUTEROL SULFATE 90 UG/1
1-2 AEROSOL, METERED RESPIRATORY (INHALATION)
Status: CANCELLED
Start: 2018-01-01

## 2017-01-01 RX ORDER — NALOXONE HYDROCHLORIDE 0.4 MG/ML
.1-.4 INJECTION, SOLUTION INTRAMUSCULAR; INTRAVENOUS; SUBCUTANEOUS
Status: DISCONTINUED | OUTPATIENT
Start: 2017-01-01 | End: 2017-01-01 | Stop reason: HOSPADM

## 2017-01-01 RX ORDER — LORAZEPAM 0.5 MG/1
0.5 TABLET ORAL EVERY 4 HOURS PRN
Qty: 30 TABLET | Refills: 2 | Status: SHIPPED | OUTPATIENT
Start: 2017-01-01 | End: 2017-01-01

## 2017-01-01 RX ORDER — ALBUTEROL SULFATE 0.83 MG/ML
2.5 SOLUTION RESPIRATORY (INHALATION)
Status: CANCELLED | OUTPATIENT
Start: 2018-01-01

## 2017-01-01 RX ORDER — AMOXICILLIN 250 MG
1 CAPSULE ORAL 2 TIMES DAILY PRN
Status: DISCONTINUED | OUTPATIENT
Start: 2017-01-01 | End: 2017-01-01 | Stop reason: HOSPADM

## 2017-01-01 RX ORDER — LOPERAMIDE HCL 2 MG
CAPSULE ORAL
Qty: 155 CAPSULE | Refills: 3 | Status: SHIPPED | OUTPATIENT
Start: 2017-01-01 | End: 2017-01-01

## 2017-01-01 RX ORDER — DIPHENHYDRAMINE HYDROCHLORIDE 50 MG/ML
50 INJECTION INTRAMUSCULAR; INTRAVENOUS
Status: CANCELLED
Start: 2018-01-01

## 2017-01-01 RX ORDER — LORAZEPAM 0.5 MG/1
0.5 TABLET ORAL EVERY 4 HOURS PRN
Status: DISCONTINUED | OUTPATIENT
Start: 2017-01-01 | End: 2017-01-01 | Stop reason: HOSPADM

## 2017-01-01 RX ORDER — ALBUTEROL SULFATE 0.83 MG/ML
2.5 SOLUTION RESPIRATORY (INHALATION)
Status: CANCELLED | OUTPATIENT
Start: 2017-01-01

## 2017-01-01 RX ORDER — HYDROMORPHONE HYDROCHLORIDE 1 MG/ML
0.2 INJECTION, SOLUTION INTRAMUSCULAR; INTRAVENOUS; SUBCUTANEOUS
Status: DISCONTINUED | OUTPATIENT
Start: 2017-01-01 | End: 2017-01-01 | Stop reason: HOSPADM

## 2017-01-01 RX ORDER — METOCLOPRAMIDE HYDROCHLORIDE 5 MG/ML
10 INJECTION INTRAMUSCULAR; INTRAVENOUS ONCE
Status: COMPLETED | OUTPATIENT
Start: 2017-01-01 | End: 2017-01-01

## 2017-01-01 RX ORDER — ACETAMINOPHEN 325 MG/1
650 TABLET ORAL EVERY 4 HOURS PRN
Status: DISCONTINUED | OUTPATIENT
Start: 2017-01-01 | End: 2017-01-01 | Stop reason: HOSPADM

## 2017-01-01 RX ORDER — MAGNESIUM CARB/ALUMINUM HYDROX 105-160MG
148 TABLET,CHEWABLE ORAL
Status: DISCONTINUED | OUTPATIENT
Start: 2017-01-01 | End: 2017-01-01 | Stop reason: HOSPADM

## 2017-01-01 RX ORDER — EPINEPHRINE 0.3 MG/.3ML
0.3 INJECTION SUBCUTANEOUS EVERY 5 MIN PRN
Status: CANCELLED | OUTPATIENT
Start: 2018-01-01

## 2017-01-01 RX ORDER — LOPERAMIDE HCL 2 MG
CAPSULE ORAL
Qty: 155 CAPSULE | Refills: 3 | Status: SHIPPED | OUTPATIENT
Start: 2017-01-01 | End: 2018-01-01

## 2017-01-01 RX ORDER — ACETAMINOPHEN 650 MG/1
650 SUPPOSITORY RECTAL EVERY 4 HOURS PRN
Status: DISCONTINUED | OUTPATIENT
Start: 2017-01-01 | End: 2017-01-01 | Stop reason: HOSPADM

## 2017-01-01 RX ORDER — HEPARIN SODIUM,PORCINE 10 UNIT/ML
5-10 VIAL (ML) INTRAVENOUS
Status: DISCONTINUED | OUTPATIENT
Start: 2017-01-01 | End: 2017-01-01 | Stop reason: HOSPADM

## 2017-01-01 RX ORDER — LABETALOL HYDROCHLORIDE 5 MG/ML
20 INJECTION, SOLUTION INTRAVENOUS ONCE
Status: COMPLETED | OUTPATIENT
Start: 2017-01-01 | End: 2017-01-01

## 2017-01-01 RX ORDER — ACETAMINOPHEN 325 MG/1
650 TABLET ORAL ONCE
Status: COMPLETED | OUTPATIENT
Start: 2017-01-01 | End: 2017-01-01

## 2017-01-01 RX ORDER — HEPARIN SODIUM,PORCINE 10 UNIT/ML
5-10 VIAL (ML) INTRAVENOUS EVERY 24 HOURS
Status: DISCONTINUED | OUTPATIENT
Start: 2017-01-01 | End: 2017-01-01 | Stop reason: HOSPADM

## 2017-01-01 RX ORDER — TRAVOPROST OPHTHALMIC SOLUTION 0.04 MG/ML
1 SOLUTION OPHTHALMIC AT BEDTIME
Status: DISCONTINUED | OUTPATIENT
Start: 2017-01-01 | End: 2017-01-01 | Stop reason: HOSPADM

## 2017-01-01 RX ORDER — LABETALOL HYDROCHLORIDE 5 MG/ML
10 INJECTION, SOLUTION INTRAVENOUS
Status: DISCONTINUED | OUTPATIENT
Start: 2017-01-01 | End: 2017-01-01 | Stop reason: HOSPADM

## 2017-01-01 RX ORDER — HYDRALAZINE HYDROCHLORIDE 20 MG/ML
10 INJECTION INTRAMUSCULAR; INTRAVENOUS EVERY 4 HOURS PRN
Status: DISCONTINUED | OUTPATIENT
Start: 2017-01-01 | End: 2017-01-01 | Stop reason: HOSPADM

## 2017-01-01 RX ORDER — EPINEPHRINE 1 MG/ML
0.3 INJECTION, SOLUTION INTRAMUSCULAR; SUBCUTANEOUS EVERY 5 MIN PRN
Status: CANCELLED | OUTPATIENT
Start: 2018-01-01

## 2017-01-01 RX ORDER — ONDANSETRON 4 MG/1
4 TABLET, ORALLY DISINTEGRATING ORAL EVERY 6 HOURS PRN
Status: DISCONTINUED | OUTPATIENT
Start: 2017-01-01 | End: 2017-01-01 | Stop reason: HOSPADM

## 2017-01-01 RX ORDER — HYDROCODONE BITARTRATE AND ACETAMINOPHEN 5; 325 MG/1; MG/1
1 TABLET ORAL EVERY 6 HOURS PRN
Qty: 30 TABLET | Refills: 0 | Status: ON HOLD | OUTPATIENT
Start: 2017-01-01 | End: 2018-01-01

## 2017-01-01 RX ORDER — SUMATRIPTAN 25 MG/1
25 TABLET, FILM COATED ORAL EVERY 12 HOURS PRN
Status: DISCONTINUED | OUTPATIENT
Start: 2017-01-01 | End: 2017-01-01 | Stop reason: HOSPADM

## 2017-01-01 RX ORDER — HEPARIN SODIUM (PORCINE) LOCK FLUSH IV SOLN 100 UNIT/ML 100 UNIT/ML
5 SOLUTION INTRAVENOUS
Status: DISCONTINUED | OUTPATIENT
Start: 2017-01-01 | End: 2017-01-01 | Stop reason: HOSPADM

## 2017-01-01 RX ORDER — PROCHLORPERAZINE 25 MG
12.5 SUPPOSITORY, RECTAL RECTAL EVERY 12 HOURS PRN
Status: DISCONTINUED | OUTPATIENT
Start: 2017-01-01 | End: 2017-01-01 | Stop reason: HOSPADM

## 2017-01-01 RX ORDER — DIPHENHYDRAMINE HYDROCHLORIDE 50 MG/ML
25 INJECTION INTRAMUSCULAR; INTRAVENOUS ONCE
Status: COMPLETED | OUTPATIENT
Start: 2017-01-01 | End: 2017-01-01

## 2017-01-01 RX ORDER — PROCHLORPERAZINE MALEATE 10 MG
10 TABLET ORAL EVERY 6 HOURS PRN
Qty: 30 TABLET | Refills: 2 | Status: SHIPPED | OUTPATIENT
Start: 2017-01-01 | End: 2017-01-01

## 2017-01-01 RX ORDER — ONDANSETRON 2 MG/ML
4 INJECTION INTRAMUSCULAR; INTRAVENOUS EVERY 6 HOURS PRN
Status: DISCONTINUED | OUTPATIENT
Start: 2017-01-01 | End: 2017-01-01 | Stop reason: HOSPADM

## 2017-01-01 RX ORDER — LEVOTHYROXINE SODIUM 75 UG/1
75 TABLET ORAL DAILY
Status: DISCONTINUED | OUTPATIENT
Start: 2017-01-01 | End: 2017-01-01 | Stop reason: HOSPADM

## 2017-01-01 RX ORDER — HYDROCODONE BITARTRATE AND ACETAMINOPHEN 5; 325 MG/1; MG/1
1-2 TABLET ORAL EVERY 4 HOURS PRN
Status: DISCONTINUED | OUTPATIENT
Start: 2017-01-01 | End: 2017-01-01 | Stop reason: HOSPADM

## 2017-01-01 RX ORDER — VIT E ACET/GLY/DIMETH/WATER
LOTION (ML) TOPICAL 2 TIMES DAILY
Status: DISCONTINUED | OUTPATIENT
Start: 2017-01-01 | End: 2017-01-01 | Stop reason: CLARIF

## 2017-01-01 RX ORDER — TRAVOPROST OPHTHALMIC SOLUTION 0.04 MG/ML
1 SOLUTION OPHTHALMIC AT BEDTIME
Qty: 5 ML | Refills: 98 | Status: SHIPPED | OUTPATIENT
Start: 2017-01-01 | End: 2018-01-01

## 2017-01-01 RX ORDER — TRAVOPROST OPHTHALMIC SOLUTION 0.04 MG/ML
1 SOLUTION OPHTHALMIC AT BEDTIME
Qty: 5 ML | Refills: 98 | Status: SHIPPED | OUTPATIENT
Start: 2017-01-01 | End: 2017-01-01

## 2017-01-01 RX ADMIN — SODIUM CHLORIDE 60 ML: 9 INJECTION, SOLUTION INTRAVENOUS at 10:21

## 2017-01-01 RX ADMIN — BEVACIZUMAB 270 MG: 100 INJECTION, SOLUTION INTRAVENOUS at 12:51

## 2017-01-01 RX ADMIN — SODIUM CHLORIDE, PRESERVATIVE FREE 500 UNITS: 5 INJECTION INTRAVENOUS at 12:44

## 2017-01-01 RX ADMIN — SODIUM CHLORIDE 57 ML: 9 INJECTION, SOLUTION INTRAVENOUS at 09:38

## 2017-01-01 RX ADMIN — DEXAMETHASONE SODIUM PHOSPHATE 12 MG: 10 INJECTION, SOLUTION INTRAMUSCULAR; INTRAVENOUS at 12:11

## 2017-01-01 RX ADMIN — BEVACIZUMAB 270 MG: 100 INJECTION, SOLUTION INTRAVENOUS at 12:25

## 2017-01-01 RX ADMIN — METOCLOPRAMIDE 10 MG: 5 INJECTION, SOLUTION INTRAMUSCULAR; INTRAVENOUS at 20:57

## 2017-01-01 RX ADMIN — SODIUM CHLORIDE 250 ML: 9 INJECTION, SOLUTION INTRAVENOUS at 12:03

## 2017-01-01 RX ADMIN — DEXAMETHASONE SODIUM PHOSPHATE 12 MG: 10 INJECTION, SOLUTION INTRAMUSCULAR; INTRAVENOUS at 12:06

## 2017-01-01 RX ADMIN — SODIUM CHLORIDE, PRESERVATIVE FREE 500 UNITS: 5 INJECTION INTRAVENOUS at 12:21

## 2017-01-01 RX ADMIN — DEXAMETHASONE SODIUM PHOSPHATE 12 MG: 10 INJECTION, SOLUTION INTRAMUSCULAR; INTRAVENOUS at 12:40

## 2017-01-01 RX ADMIN — SODIUM CHLORIDE, PRESERVATIVE FREE 500 UNITS: 5 INJECTION INTRAVENOUS at 12:50

## 2017-01-01 RX ADMIN — ACETAMINOPHEN 650 MG: 325 TABLET, FILM COATED ORAL at 20:56

## 2017-01-01 RX ADMIN — SODIUM CHLORIDE 1000 ML: 9 INJECTION, SOLUTION INTRAVENOUS at 11:04

## 2017-01-01 RX ADMIN — IOPAMIDOL 61 ML: 755 INJECTION, SOLUTION INTRAVENOUS at 10:21

## 2017-01-01 RX ADMIN — KETOROLAC TROMETHAMINE: 30 INJECTION, SOLUTION INTRAMUSCULAR at 23:19

## 2017-01-01 RX ADMIN — SODIUM CHLORIDE 250 ML: 9 INJECTION, SOLUTION INTRAVENOUS at 11:55

## 2017-01-01 RX ADMIN — BEVACIZUMAB 270 MG: 100 INJECTION, SOLUTION INTRAVENOUS at 13:42

## 2017-01-01 RX ADMIN — B-COMPLEX W/ C & FOLIC ACID TAB 1 TABLET: TAB at 10:06

## 2017-01-01 RX ADMIN — DEXAMETHASONE SODIUM PHOSPHATE 12 MG: 10 INJECTION, SOLUTION INTRAMUSCULAR; INTRAVENOUS at 11:58

## 2017-01-01 RX ADMIN — DEXTROSE AND SODIUM CHLORIDE: 5; 900 INJECTION, SOLUTION INTRAVENOUS at 00:51

## 2017-01-01 RX ADMIN — SODIUM CHLORIDE 500 ML: 9 INJECTION, SOLUTION INTRAVENOUS at 20:54

## 2017-01-01 RX ADMIN — BEVACIZUMAB 270 MG: 400 INJECTION, SOLUTION INTRAVENOUS at 12:18

## 2017-01-01 RX ADMIN — HYDROCODONE BITARTRATE AND ACETAMINOPHEN 1 TABLET: 5; 325 TABLET ORAL at 10:06

## 2017-01-01 RX ADMIN — BEVACIZUMAB 270 MG: 100 INJECTION, SOLUTION INTRAVENOUS at 13:27

## 2017-01-01 RX ADMIN — SODIUM CHLORIDE, PRESERVATIVE FREE 500 UNITS: 5 INJECTION INTRAVENOUS at 11:36

## 2017-01-01 RX ADMIN — SODIUM CHLORIDE: 9 INJECTION, SOLUTION INTRAVENOUS at 13:19

## 2017-01-01 RX ADMIN — SODIUM CHLORIDE, PRESERVATIVE FREE 5 ML: 5 INJECTION INTRAVENOUS at 10:39

## 2017-01-01 RX ADMIN — SODIUM CHLORIDE, PRESERVATIVE FREE 500 UNITS: 5 INJECTION INTRAVENOUS at 13:17

## 2017-01-01 RX ADMIN — LABETALOL HYDROCHLORIDE 20 MG: 5 INJECTION, SOLUTION INTRAVENOUS at 23:20

## 2017-01-01 RX ADMIN — SODIUM CHLORIDE, PRESERVATIVE FREE 500 UNITS: 5 INJECTION INTRAVENOUS at 08:56

## 2017-01-01 RX ADMIN — BEVACIZUMAB 270 MG: 100 INJECTION, SOLUTION INTRAVENOUS at 12:35

## 2017-01-01 RX ADMIN — LEVOTHYROXINE SODIUM 75 MCG: 75 TABLET ORAL at 10:06

## 2017-01-01 RX ADMIN — SODIUM CHLORIDE: 9 INJECTION, SOLUTION INTRAVENOUS at 10:53

## 2017-01-01 RX ADMIN — SODIUM CHLORIDE 250 ML: 9 INJECTION, SOLUTION INTRAVENOUS at 12:41

## 2017-01-01 RX ADMIN — BEVACIZUMAB 300 MG: 100 INJECTION, SOLUTION INTRAVENOUS at 12:15

## 2017-01-01 RX ADMIN — SODIUM CHLORIDE, PRESERVATIVE FREE 500 UNITS: 5 INJECTION INTRAVENOUS at 12:42

## 2017-01-01 RX ADMIN — SODIUM CHLORIDE, PRESERVATIVE FREE 5 ML: 5 INJECTION INTRAVENOUS at 09:40

## 2017-01-01 RX ADMIN — DEXAMETHASONE SODIUM PHOSPHATE 12 MG: 10 INJECTION, SOLUTION INTRAMUSCULAR; INTRAVENOUS at 13:04

## 2017-01-01 RX ADMIN — SODIUM CHLORIDE 250 ML: 9 INJECTION, SOLUTION INTRAVENOUS at 12:05

## 2017-01-01 RX ADMIN — DEXAMETHASONE SODIUM PHOSPHATE 12 MG: 10 INJECTION, SOLUTION INTRAMUSCULAR; INTRAVENOUS at 13:19

## 2017-01-01 RX ADMIN — SODIUM CHLORIDE, PRESERVATIVE FREE 500 UNITS: 5 INJECTION INTRAVENOUS at 11:51

## 2017-01-01 RX ADMIN — DIPHENHYDRAMINE HYDROCHLORIDE 25 MG: 50 INJECTION, SOLUTION INTRAMUSCULAR; INTRAVENOUS at 20:57

## 2017-01-01 RX ADMIN — IOPAMIDOL 58 ML: 755 INJECTION, SOLUTION INTRAVENOUS at 09:38

## 2017-01-01 ASSESSMENT — PAIN SCALES - GENERAL
PAINLEVEL: NO PAIN (0)
PAINLEVEL: NO PAIN (0)
PAINLEVEL: MODERATE PAIN (4)
PAINLEVEL: MILD PAIN (2)
PAINLEVEL: NO PAIN (0)
PAINLEVEL: NO PAIN (1)
PAINLEVEL: NO PAIN (0)
PAINLEVEL: NO PAIN (0)
PAINLEVEL: MILD PAIN (2)
PAINLEVEL: NO PAIN (0)
PAINLEVEL: NO PAIN (0)
PAINLEVEL: MILD PAIN (3)
PAINLEVEL: NO PAIN (0)
PAINLEVEL: NO PAIN (0)

## 2017-01-01 ASSESSMENT — ENCOUNTER SYMPTOMS
FOCAL WEAKNESS: 0
HEADACHES: 0
SHORTNESS OF BREATH: 0
COUGH: 0
PALPITATIONS: 0
HEADACHES: 1
DIZZINESS: 0
BLOOD IN STOOL: 0
ABDOMINAL PAIN: 0
APPETITE CHANGE: 1
VOMITING: 0
DIARRHEA: 1
NAUSEA: 1
WEIGHT LOSS: 0
ABDOMINAL PAIN: 0
SENSORY CHANGE: 0
FALLS: 0

## 2017-02-15 ENCOUNTER — HOSPITAL ENCOUNTER (INPATIENT)
Facility: CLINIC | Age: 77
LOS: 22 days | Discharge: NURSING, REHAB OR LONG-TERM CARE | DRG: 329 | End: 2017-03-09
Attending: EMERGENCY MEDICINE | Admitting: INTERNAL MEDICINE
Payer: MEDICARE

## 2017-02-15 ENCOUNTER — APPOINTMENT (OUTPATIENT)
Dept: CT IMAGING | Facility: CLINIC | Age: 77
DRG: 329 | End: 2017-02-15
Attending: INTERNAL MEDICINE
Payer: MEDICARE

## 2017-02-15 ENCOUNTER — APPOINTMENT (OUTPATIENT)
Dept: GENERAL RADIOLOGY | Facility: CLINIC | Age: 77
DRG: 329 | End: 2017-02-15
Attending: EMERGENCY MEDICINE
Payer: MEDICARE

## 2017-02-15 ENCOUNTER — APPOINTMENT (OUTPATIENT)
Dept: CT IMAGING | Facility: CLINIC | Age: 77
DRG: 329 | End: 2017-02-15
Attending: EMERGENCY MEDICINE
Payer: MEDICARE

## 2017-02-15 DIAGNOSIS — N39.0 URINARY TRACT INFECTION WITHOUT HEMATURIA, SITE UNSPECIFIED: ICD-10-CM

## 2017-02-15 DIAGNOSIS — C18.2 MALIGNANT NEOPLASM OF RIGHT COLON (H): ICD-10-CM

## 2017-02-15 DIAGNOSIS — D64.9 ANEMIA, UNSPECIFIED TYPE: ICD-10-CM

## 2017-02-15 DIAGNOSIS — D72.829 LEUKOCYTOSIS, UNSPECIFIED TYPE: ICD-10-CM

## 2017-02-15 DIAGNOSIS — F22 PARANOIA (H): ICD-10-CM

## 2017-02-15 DIAGNOSIS — R19.04 LEFT LOWER QUADRANT ABDOMINAL MASS: Primary | ICD-10-CM

## 2017-02-15 DIAGNOSIS — D75.839 THROMBOCYTOSIS: ICD-10-CM

## 2017-02-15 DIAGNOSIS — E86.0 DEHYDRATION: ICD-10-CM

## 2017-02-15 DIAGNOSIS — M47.26 OSTEOARTHRITIS OF SPINE WITH RADICULOPATHY, LUMBAR REGION: ICD-10-CM

## 2017-02-15 DIAGNOSIS — R41.82 ALTERED MENTAL STATUS, UNSPECIFIED ALTERED MENTAL STATUS TYPE: ICD-10-CM

## 2017-02-15 DIAGNOSIS — Z15.89 MLH1 GENE MUTATION: ICD-10-CM

## 2017-02-15 DIAGNOSIS — R19.03 RIGHT LOWER QUADRANT ABDOMINAL MASS: ICD-10-CM

## 2017-02-15 PROBLEM — F29 PSYCHOSIS (H): Status: ACTIVE | Noted: 2017-02-15

## 2017-02-15 LAB
ALBUMIN SERPL-MCNC: 2.6 G/DL (ref 3.4–5)
ALBUMIN UR-MCNC: 10 MG/DL
ALP SERPL-CCNC: 117 U/L (ref 40–150)
ALT SERPL W P-5'-P-CCNC: 61 U/L (ref 0–50)
ANION GAP SERPL CALCULATED.3IONS-SCNC: 15 MMOL/L (ref 3–14)
APPEARANCE UR: CLEAR
AST SERPL W P-5'-P-CCNC: 56 U/L (ref 0–45)
BACTERIA #/AREA URNS HPF: ABNORMAL /HPF
BASOPHILS # BLD AUTO: 0 10E9/L (ref 0–0.2)
BASOPHILS NFR BLD AUTO: 0.1 %
BILIRUB SERPL-MCNC: 0.5 MG/DL (ref 0.2–1.3)
BILIRUB UR QL STRIP: NEGATIVE
BUN SERPL-MCNC: 41 MG/DL (ref 7–30)
CALCIUM SERPL-MCNC: 8.9 MG/DL (ref 8.5–10.1)
CHLORIDE SERPL-SCNC: 105 MMOL/L (ref 94–109)
CK SERPL-CCNC: 177 U/L (ref 30–225)
CO2 SERPL-SCNC: 22 MMOL/L (ref 20–32)
COLOR UR AUTO: YELLOW
COPATH REPORT: NORMAL
CREAT SERPL-MCNC: 0.88 MG/DL (ref 0.52–1.04)
DIFFERENTIAL METHOD BLD: ABNORMAL
ELLIPTOCYTES BLD QL SMEAR: SLIGHT
EOSINOPHIL # BLD AUTO: 0 10E9/L (ref 0–0.7)
EOSINOPHIL NFR BLD AUTO: 0 %
ERYTHROCYTE [DISTWIDTH] IN BLOOD BY AUTOMATED COUNT: 20.1 % (ref 10–15)
FERRITIN SERPL-MCNC: 82 NG/ML (ref 8–252)
FOLATE SERPL-MCNC: 15.6 NG/ML
GFR SERPL CREATININE-BSD FRML MDRD: 63 ML/MIN/1.7M2
GLUCOSE SERPL-MCNC: 106 MG/DL (ref 70–99)
GLUCOSE UR STRIP-MCNC: NEGATIVE MG/DL
GRAM STN SPEC: ABNORMAL
HCT VFR BLD AUTO: 27 % (ref 35–47)
HGB BLD-MCNC: 7.4 G/DL (ref 11.7–15.7)
HGB UR QL STRIP: NEGATIVE
HYPOCHROMIA BLD QL: PRESENT
IMM GRANULOCYTES # BLD: 0.2 10E9/L (ref 0–0.4)
IMM GRANULOCYTES NFR BLD: 0.7 %
IRON SATN MFR SERPL: 5 % (ref 15–46)
IRON SERPL-MCNC: 11 UG/DL (ref 35–180)
KETONES UR STRIP-MCNC: 10 MG/DL
LEUKOCYTE ESTERASE UR QL STRIP: NEGATIVE
LYMPHOCYTES # BLD AUTO: 1.1 10E9/L (ref 0.8–5.3)
LYMPHOCYTES NFR BLD AUTO: 4.9 %
MAGNESIUM SERPL-MCNC: 3.1 MG/DL (ref 1.6–2.3)
MCH RBC QN AUTO: 17.2 PG (ref 26.5–33)
MCHC RBC AUTO-ENTMCNC: 27.4 G/DL (ref 31.5–36.5)
MCV RBC AUTO: 63 FL (ref 78–100)
MICRO REPORT STATUS: ABNORMAL
MONOCYTES # BLD AUTO: 1 10E9/L (ref 0–1.3)
MONOCYTES NFR BLD AUTO: 4.5 %
MUCOUS THREADS #/AREA URNS LPF: PRESENT /LPF
NEUTROPHILS # BLD AUTO: 19.8 10E9/L (ref 1.6–8.3)
NEUTROPHILS NFR BLD AUTO: 89.8 %
NITRATE UR QL: POSITIVE
NRBC # BLD AUTO: 0 10*3/UL
NRBC BLD AUTO-RTO: 0 /100
PH UR STRIP: 5 PH (ref 5–7)
PHOSPHATE SERPL-MCNC: 4.1 MG/DL (ref 2.5–4.5)
PLATELET # BLD AUTO: 1204 10E9/L (ref 150–450)
POTASSIUM SERPL-SCNC: 3.2 MMOL/L (ref 3.4–5.3)
PROT SERPL-MCNC: 7.6 G/DL (ref 6.8–8.8)
RBC # BLD AUTO: 4.29 10E12/L (ref 3.8–5.2)
RBC #/AREA URNS AUTO: 0 /HPF (ref 0–2)
RBC INCLUSIONS BLD: SLIGHT
RETICS # AUTO: 66 10E9/L (ref 25–95)
RETICS/RBC NFR AUTO: 1.9 % (ref 0.5–2)
SODIUM SERPL-SCNC: 142 MMOL/L (ref 133–144)
SP GR UR STRIP: 1.01 (ref 1–1.03)
SPECIMEN SOURCE: ABNORMAL
T4 FREE SERPL-MCNC: 1.23 NG/DL (ref 0.76–1.46)
TIBC SERPL-MCNC: 232 UG/DL (ref 240–430)
TSH SERPL DL<=0.005 MIU/L-ACNC: 4.05 MU/L (ref 0.4–4)
URN SPEC COLLECT METH UR: ABNORMAL
UROBILINOGEN UR STRIP-MCNC: NORMAL MG/DL (ref 0–2)
VIT B12 SERPL-MCNC: 1567 PG/ML (ref 193–986)
WBC # BLD AUTO: 22 10E9/L (ref 4–11)
WBC #/AREA URNS AUTO: <1 /HPF (ref 0–2)

## 2017-02-15 PROCEDURE — 40000863 ZZH STATISTIC RADIOLOGY XRAY, US, CT, MAR, NM

## 2017-02-15 PROCEDURE — 84100 ASSAY OF PHOSPHORUS: CPT | Performed by: EMERGENCY MEDICINE

## 2017-02-15 PROCEDURE — 73502 X-RAY EXAM HIP UNI 2-3 VIEWS: CPT

## 2017-02-15 PROCEDURE — A9270 NON-COVERED ITEM OR SERVICE: HCPCS | Mod: GY | Performed by: INTERNAL MEDICINE

## 2017-02-15 PROCEDURE — 25000132 ZZH RX MED GY IP 250 OP 250 PS 637: Mod: GY | Performed by: PSYCHIATRY & NEUROLOGY

## 2017-02-15 PROCEDURE — 71020 XR CHEST 2 VW: CPT

## 2017-02-15 PROCEDURE — 87186 SC STD MICRODIL/AGAR DIL: CPT | Performed by: EMERGENCY MEDICINE

## 2017-02-15 PROCEDURE — 25000128 H RX IP 250 OP 636: Performed by: EMERGENCY MEDICINE

## 2017-02-15 PROCEDURE — 99223 1ST HOSP IP/OBS HIGH 75: CPT | Mod: AI | Performed by: INTERNAL MEDICINE

## 2017-02-15 PROCEDURE — 87205 SMEAR GRAM STAIN: CPT | Performed by: INTERNAL MEDICINE

## 2017-02-15 PROCEDURE — 85025 COMPLETE CBC W/AUTO DIFF WBC: CPT | Performed by: EMERGENCY MEDICINE

## 2017-02-15 PROCEDURE — 0K9N30Z DRAINAGE OF RIGHT HIP MUSCLE WITH DRAINAGE DEVICE, PERCUTANEOUS APPROACH: ICD-10-PCS | Performed by: RADIOLOGY

## 2017-02-15 PROCEDURE — 87181 SC STD AGAR DILUTION PER AGT: CPT | Performed by: INTERNAL MEDICINE

## 2017-02-15 PROCEDURE — 83550 IRON BINDING TEST: CPT | Performed by: INTERNAL MEDICINE

## 2017-02-15 PROCEDURE — 99285 EMERGENCY DEPT VISIT HI MDM: CPT | Mod: 25

## 2017-02-15 PROCEDURE — 25000128 H RX IP 250 OP 636: Performed by: INTERNAL MEDICINE

## 2017-02-15 PROCEDURE — A9270 NON-COVERED ITEM OR SERVICE: HCPCS | Mod: GY | Performed by: PSYCHIATRY & NEUROLOGY

## 2017-02-15 PROCEDURE — 82668 ASSAY OF ERYTHROPOIETIN: CPT | Performed by: INTERNAL MEDICINE

## 2017-02-15 PROCEDURE — 99221 1ST HOSP IP/OBS SF/LOW 40: CPT | Performed by: PSYCHIATRY & NEUROLOGY

## 2017-02-15 PROCEDURE — 25000132 ZZH RX MED GY IP 250 OP 250 PS 637: Mod: GY | Performed by: EMERGENCY MEDICINE

## 2017-02-15 PROCEDURE — 84443 ASSAY THYROID STIM HORMONE: CPT | Performed by: EMERGENCY MEDICINE

## 2017-02-15 PROCEDURE — 87077 CULTURE AEROBIC IDENTIFY: CPT | Performed by: INTERNAL MEDICINE

## 2017-02-15 PROCEDURE — 36415 COLL VENOUS BLD VENIPUNCTURE: CPT | Performed by: INTERNAL MEDICINE

## 2017-02-15 PROCEDURE — 82550 ASSAY OF CK (CPK): CPT | Performed by: EMERGENCY MEDICINE

## 2017-02-15 PROCEDURE — 85045 AUTOMATED RETICULOCYTE COUNT: CPT | Performed by: INTERNAL MEDICINE

## 2017-02-15 PROCEDURE — 70450 CT HEAD/BRAIN W/O DYE: CPT

## 2017-02-15 PROCEDURE — A9270 NON-COVERED ITEM OR SERVICE: HCPCS | Mod: GY | Performed by: EMERGENCY MEDICINE

## 2017-02-15 PROCEDURE — 82607 VITAMIN B-12: CPT | Performed by: INTERNAL MEDICINE

## 2017-02-15 PROCEDURE — 82728 ASSAY OF FERRITIN: CPT | Performed by: INTERNAL MEDICINE

## 2017-02-15 PROCEDURE — 25000128 H RX IP 250 OP 636: Performed by: RADIOLOGY

## 2017-02-15 PROCEDURE — 82746 ASSAY OF FOLIC ACID SERUM: CPT | Performed by: INTERNAL MEDICINE

## 2017-02-15 PROCEDURE — 99152 MOD SED SAME PHYS/QHP 5/>YRS: CPT

## 2017-02-15 PROCEDURE — 87070 CULTURE OTHR SPECIMN AEROBIC: CPT | Performed by: INTERNAL MEDICINE

## 2017-02-15 PROCEDURE — 80053 COMPREHEN METABOLIC PANEL: CPT | Performed by: EMERGENCY MEDICINE

## 2017-02-15 PROCEDURE — 87088 URINE BACTERIA CULTURE: CPT | Performed by: EMERGENCY MEDICINE

## 2017-02-15 PROCEDURE — 96375 TX/PRO/DX INJ NEW DRUG ADDON: CPT

## 2017-02-15 PROCEDURE — 87086 URINE CULTURE/COLONY COUNT: CPT | Performed by: EMERGENCY MEDICINE

## 2017-02-15 PROCEDURE — 83735 ASSAY OF MAGNESIUM: CPT | Performed by: EMERGENCY MEDICINE

## 2017-02-15 PROCEDURE — 99153 MOD SED SAME PHYS/QHP EA: CPT

## 2017-02-15 PROCEDURE — 83540 ASSAY OF IRON: CPT | Performed by: INTERNAL MEDICINE

## 2017-02-15 PROCEDURE — 25500064 ZZH RX 255 OP 636: Performed by: EMERGENCY MEDICINE

## 2017-02-15 PROCEDURE — 25000132 ZZH RX MED GY IP 250 OP 250 PS 637: Mod: GY | Performed by: INTERNAL MEDICINE

## 2017-02-15 PROCEDURE — 25000125 ZZHC RX 250: Performed by: RADIOLOGY

## 2017-02-15 PROCEDURE — 25000125 ZZHC RX 250: Performed by: EMERGENCY MEDICINE

## 2017-02-15 PROCEDURE — 12000000 ZZH R&B MED SURG/OB

## 2017-02-15 PROCEDURE — 25000125 ZZHC RX 250: Performed by: INTERNAL MEDICINE

## 2017-02-15 PROCEDURE — 96365 THER/PROPH/DIAG IV INF INIT: CPT

## 2017-02-15 PROCEDURE — 49406 IMAGE CATH FLUID PERI/RETRO: CPT

## 2017-02-15 PROCEDURE — 74177 CT ABD & PELVIS W/CONTRAST: CPT

## 2017-02-15 PROCEDURE — 84439 ASSAY OF FREE THYROXINE: CPT | Performed by: EMERGENCY MEDICINE

## 2017-02-15 PROCEDURE — 85060 BLOOD SMEAR INTERPRETATION: CPT | Performed by: INTERNAL MEDICINE

## 2017-02-15 PROCEDURE — 96361 HYDRATE IV INFUSION ADD-ON: CPT

## 2017-02-15 PROCEDURE — 81001 URINALYSIS AUTO W/SCOPE: CPT | Performed by: EMERGENCY MEDICINE

## 2017-02-15 PROCEDURE — 40000847 ZZHCL STATISTIC MORPHOLOGY W/INTERP HISTOLOGY TC 85060: Performed by: INTERNAL MEDICINE

## 2017-02-15 PROCEDURE — 87186 SC STD MICRODIL/AGAR DIL: CPT | Performed by: INTERNAL MEDICINE

## 2017-02-15 RX ORDER — NALOXONE HYDROCHLORIDE 0.4 MG/ML
.1-.4 INJECTION, SOLUTION INTRAMUSCULAR; INTRAVENOUS; SUBCUTANEOUS
Status: DISCONTINUED | OUTPATIENT
Start: 2017-02-15 | End: 2017-02-15

## 2017-02-15 RX ORDER — IOPAMIDOL 755 MG/ML
50 INJECTION, SOLUTION INTRAVASCULAR ONCE
Status: COMPLETED | OUTPATIENT
Start: 2017-02-15 | End: 2017-02-15

## 2017-02-15 RX ORDER — LIDOCAINE HYDROCHLORIDE 10 MG/ML
10 INJECTION, SOLUTION EPIDURAL; INFILTRATION; INTRACAUDAL; PERINEURAL ONCE
Status: COMPLETED | OUTPATIENT
Start: 2017-02-15 | End: 2017-02-15

## 2017-02-15 RX ORDER — OLANZAPINE 5 MG/1
5 TABLET, ORALLY DISINTEGRATING ORAL AT BEDTIME
Status: DISCONTINUED | OUTPATIENT
Start: 2017-02-15 | End: 2017-03-09 | Stop reason: HOSPADM

## 2017-02-15 RX ORDER — ONDANSETRON 2 MG/ML
4 INJECTION INTRAMUSCULAR; INTRAVENOUS EVERY 6 HOURS PRN
Status: DISCONTINUED | OUTPATIENT
Start: 2017-02-15 | End: 2017-03-09 | Stop reason: HOSPADM

## 2017-02-15 RX ORDER — PROCHLORPERAZINE MALEATE 5 MG
5 TABLET ORAL EVERY 6 HOURS PRN
Status: DISCONTINUED | OUTPATIENT
Start: 2017-02-15 | End: 2017-02-23

## 2017-02-15 RX ORDER — SODIUM CHLORIDE 9 MG/ML
1000 INJECTION, SOLUTION INTRAVENOUS CONTINUOUS
Status: DISCONTINUED | OUTPATIENT
Start: 2017-02-15 | End: 2017-02-15

## 2017-02-15 RX ORDER — CEFTRIAXONE 1 G/1
1 INJECTION, POWDER, FOR SOLUTION INTRAMUSCULAR; INTRAVENOUS EVERY 24 HOURS
Status: DISCONTINUED | OUTPATIENT
Start: 2017-02-15 | End: 2017-02-15

## 2017-02-15 RX ORDER — FENTANYL CITRATE 50 UG/ML
25-50 INJECTION, SOLUTION INTRAMUSCULAR; INTRAVENOUS EVERY 5 MIN PRN
Status: DISCONTINUED | OUTPATIENT
Start: 2017-02-15 | End: 2017-02-15

## 2017-02-15 RX ORDER — HYDROMORPHONE HYDROCHLORIDE 1 MG/ML
0.2 INJECTION, SOLUTION INTRAMUSCULAR; INTRAVENOUS; SUBCUTANEOUS
Status: DISCONTINUED | OUTPATIENT
Start: 2017-02-15 | End: 2017-03-09 | Stop reason: HOSPADM

## 2017-02-15 RX ORDER — FLUMAZENIL 0.1 MG/ML
0.2 INJECTION, SOLUTION INTRAVENOUS
Status: DISCONTINUED | OUTPATIENT
Start: 2017-02-15 | End: 2017-02-15

## 2017-02-15 RX ORDER — LIDOCAINE 40 MG/G
CREAM TOPICAL
Status: DISCONTINUED | OUTPATIENT
Start: 2017-02-15 | End: 2017-02-15

## 2017-02-15 RX ORDER — OXYCODONE HYDROCHLORIDE 5 MG/1
5 TABLET ORAL
Status: DISCONTINUED | OUTPATIENT
Start: 2017-02-15 | End: 2017-03-09 | Stop reason: HOSPADM

## 2017-02-15 RX ORDER — POTASSIUM CHLORIDE 29.8 MG/ML
20 INJECTION INTRAVENOUS
Status: DISCONTINUED | OUTPATIENT
Start: 2017-02-15 | End: 2017-03-09 | Stop reason: HOSPADM

## 2017-02-15 RX ORDER — ACETAMINOPHEN 650 MG/1
650 SUPPOSITORY RECTAL EVERY 4 HOURS PRN
Status: DISCONTINUED | OUTPATIENT
Start: 2017-02-15 | End: 2017-03-09 | Stop reason: HOSPADM

## 2017-02-15 RX ORDER — LIDOCAINE HYDROCHLORIDE 10 MG/ML
1-30 INJECTION, SOLUTION EPIDURAL; INFILTRATION; INTRACAUDAL; PERINEURAL
Status: DISCONTINUED | OUTPATIENT
Start: 2017-02-15 | End: 2017-02-15

## 2017-02-15 RX ORDER — ACETAMINOPHEN 325 MG/1
650 TABLET ORAL EVERY 4 HOURS PRN
Status: DISCONTINUED | OUTPATIENT
Start: 2017-02-15 | End: 2017-03-09 | Stop reason: HOSPADM

## 2017-02-15 RX ORDER — ONDANSETRON 4 MG/1
4 TABLET, ORALLY DISINTEGRATING ORAL EVERY 6 HOURS PRN
Status: DISCONTINUED | OUTPATIENT
Start: 2017-02-15 | End: 2017-03-09 | Stop reason: HOSPADM

## 2017-02-15 RX ORDER — MAGNESIUM SULFATE HEPTAHYDRATE 40 MG/ML
4 INJECTION, SOLUTION INTRAVENOUS EVERY 4 HOURS PRN
Status: DISCONTINUED | OUTPATIENT
Start: 2017-02-15 | End: 2017-03-09 | Stop reason: HOSPADM

## 2017-02-15 RX ORDER — PIPERACILLIN SODIUM, TAZOBACTAM SODIUM 3; .375 G/15ML; G/15ML
3.38 INJECTION, POWDER, LYOPHILIZED, FOR SOLUTION INTRAVENOUS EVERY 6 HOURS
Status: DISCONTINUED | OUTPATIENT
Start: 2017-02-15 | End: 2017-03-02

## 2017-02-15 RX ORDER — POTASSIUM CHLORIDE 1500 MG/1
40 TABLET, EXTENDED RELEASE ORAL ONCE
Status: COMPLETED | OUTPATIENT
Start: 2017-02-15 | End: 2017-02-15

## 2017-02-15 RX ORDER — POTASSIUM CHLORIDE 1500 MG/1
20-40 TABLET, EXTENDED RELEASE ORAL
Status: DISCONTINUED | OUTPATIENT
Start: 2017-02-15 | End: 2017-03-09 | Stop reason: HOSPADM

## 2017-02-15 RX ORDER — CYCLOBENZAPRINE HCL 10 MG
10 TABLET ORAL 3 TIMES DAILY
Status: DISCONTINUED | OUTPATIENT
Start: 2017-02-15 | End: 2017-03-09 | Stop reason: HOSPADM

## 2017-02-15 RX ORDER — LIDOCAINE 40 MG/G
CREAM TOPICAL
Status: DISCONTINUED | OUTPATIENT
Start: 2017-02-15 | End: 2017-03-02

## 2017-02-15 RX ORDER — CEFTRIAXONE 1 G/1
1 INJECTION, POWDER, FOR SOLUTION INTRAMUSCULAR; INTRAVENOUS EVERY 24 HOURS
Status: DISCONTINUED | OUTPATIENT
Start: 2017-02-16 | End: 2017-02-16

## 2017-02-15 RX ORDER — BISACODYL 10 MG
10 SUPPOSITORY, RECTAL RECTAL DAILY PRN
Status: DISCONTINUED | OUTPATIENT
Start: 2017-02-15 | End: 2017-02-23

## 2017-02-15 RX ORDER — AMOXICILLIN 250 MG
1-2 CAPSULE ORAL 2 TIMES DAILY PRN
Status: DISCONTINUED | OUTPATIENT
Start: 2017-02-15 | End: 2017-02-23

## 2017-02-15 RX ORDER — PROCHLORPERAZINE 25 MG
12.5 SUPPOSITORY, RECTAL RECTAL EVERY 12 HOURS PRN
Status: DISCONTINUED | OUTPATIENT
Start: 2017-02-15 | End: 2017-02-23

## 2017-02-15 RX ORDER — NALOXONE HYDROCHLORIDE 0.4 MG/ML
.1-.4 INJECTION, SOLUTION INTRAMUSCULAR; INTRAVENOUS; SUBCUTANEOUS
Status: DISCONTINUED | OUTPATIENT
Start: 2017-02-15 | End: 2017-03-09 | Stop reason: HOSPADM

## 2017-02-15 RX ORDER — POLYETHYLENE GLYCOL 3350 17 G/17G
17 POWDER, FOR SOLUTION ORAL DAILY PRN
Status: DISCONTINUED | OUTPATIENT
Start: 2017-02-15 | End: 2017-03-09 | Stop reason: HOSPADM

## 2017-02-15 RX ORDER — POTASSIUM CHLORIDE 7.45 MG/ML
10 INJECTION INTRAVENOUS
Status: DISCONTINUED | OUTPATIENT
Start: 2017-02-15 | End: 2017-03-09 | Stop reason: HOSPADM

## 2017-02-15 RX ORDER — POTASSIUM CHLORIDE 1.5 G/1.58G
20-40 POWDER, FOR SOLUTION ORAL
Status: DISCONTINUED | OUTPATIENT
Start: 2017-02-15 | End: 2017-03-09 | Stop reason: HOSPADM

## 2017-02-15 RX ADMIN — SODIUM CHLORIDE 1000 ML: 9 INJECTION, SOLUTION INTRAVENOUS at 02:39

## 2017-02-15 RX ADMIN — LIDOCAINE HYDROCHLORIDE 10 ML: 10 INJECTION, SOLUTION EPIDURAL; INFILTRATION; INTRACAUDAL; PERINEURAL at 16:45

## 2017-02-15 RX ADMIN — ACETAMINOPHEN 650 MG: 325 TABLET, FILM COATED ORAL at 17:43

## 2017-02-15 RX ADMIN — CYCLOBENZAPRINE HYDROCHLORIDE 10 MG: 10 TABLET, FILM COATED ORAL at 17:43

## 2017-02-15 RX ADMIN — OLANZAPINE 5 MG: 5 TABLET, ORALLY DISINTEGRATING ORAL at 22:57

## 2017-02-15 RX ADMIN — DEXTROSE AND SODIUM CHLORIDE: 5; 900 INJECTION, SOLUTION INTRAVENOUS at 20:53

## 2017-02-15 RX ADMIN — POTASSIUM CHLORIDE 40 MEQ: 1500 TABLET, EXTENDED RELEASE ORAL at 10:39

## 2017-02-15 RX ADMIN — POTASSIUM CHLORIDE 40 MEQ: 1500 TABLET, EXTENDED RELEASE ORAL at 03:26

## 2017-02-15 RX ADMIN — PIPERACILLIN SODIUM,TAZOBACTAM SODIUM 3.38 G: 3; .375 INJECTION, POWDER, FOR SOLUTION INTRAVENOUS at 06:15

## 2017-02-15 RX ADMIN — PIPERACILLIN SODIUM,TAZOBACTAM SODIUM 3.38 G: 3; .375 INJECTION, POWDER, FOR SOLUTION INTRAVENOUS at 22:51

## 2017-02-15 RX ADMIN — MIDAZOLAM HYDROCHLORIDE 1 MG: 1 INJECTION, SOLUTION INTRAMUSCULAR; INTRAVENOUS at 16:17

## 2017-02-15 RX ADMIN — PIPERACILLIN SODIUM,TAZOBACTAM SODIUM 3.38 G: 3; .375 INJECTION, POWDER, FOR SOLUTION INTRAVENOUS at 10:35

## 2017-02-15 RX ADMIN — SODIUM CHLORIDE 60 ML: 9 INJECTION, SOLUTION INTRAVENOUS at 05:00

## 2017-02-15 RX ADMIN — DEXTROSE AND SODIUM CHLORIDE: 5; 900 INJECTION, SOLUTION INTRAVENOUS at 05:40

## 2017-02-15 RX ADMIN — CYCLOBENZAPRINE HYDROCHLORIDE 10 MG: 10 TABLET, FILM COATED ORAL at 22:57

## 2017-02-15 RX ADMIN — FENTANYL CITRATE 50 MCG: 50 INJECTION, SOLUTION INTRAMUSCULAR; INTRAVENOUS at 16:17

## 2017-02-15 RX ADMIN — IOPAMIDOL 50 ML: 755 INJECTION, SOLUTION INTRAVENOUS at 05:00

## 2017-02-15 RX ADMIN — CEFTRIAXONE 1 G: 1 INJECTION, POWDER, FOR SOLUTION INTRAMUSCULAR; INTRAVENOUS at 03:34

## 2017-02-15 RX ADMIN — CYCLOBENZAPRINE HYDROCHLORIDE 10 MG: 10 TABLET, FILM COATED ORAL at 10:39

## 2017-02-15 RX ADMIN — PANTOPRAZOLE SODIUM 40 MG: 40 INJECTION, POWDER, FOR SOLUTION INTRAVENOUS at 03:31

## 2017-02-15 RX ADMIN — PIPERACILLIN SODIUM,TAZOBACTAM SODIUM 3.38 G: 3; .375 INJECTION, POWDER, FOR SOLUTION INTRAVENOUS at 17:37

## 2017-02-15 ASSESSMENT — ENCOUNTER SYMPTOMS
ACTIVITY CHANGE: 1
APPETITE CHANGE: 1

## 2017-02-15 NOTE — CONSULTS
[facility]  Colon and Rectal Surgery Consult Note  Name: Sherita Kenney    MRN: [id number]  YOB: 1940    Age: 76 year old  Date of admission: 2/15/2017  Primary care provider: Latanya Martinez     Requesting Physician:  Noble Lopez  Reason for consult:  Psoas abscess likely cecal mass           History of Present Illness:   Sherita Kenney is a 76 year old female who was brought to the ED after a her family could not get a hold of her for 6 days and welfare check was done where she was found crawling on the floor for fear of falling and breaking her hip. In the ED she was found to have psychosis, leukocytosis, thrombocytosis and a palatable RLQ mass. CT scan of the abdomen and pelvis showed a large right iliopsoas abscess which is likely arising from the adjacent right colon. There is also a mass-like wall thickening of the right colon worrisome for neoplasm. There is mild-moderate right hydronephrosis, sigmoid diverticulosis with some wall thickening and mild abdominal adenopathy.     The patient is unable to be give a clear history. She states that the mass has been there for at least 3 months. In other chart notes it sounds as if it might have been there for 3 years. The patient states that it will come and go with enlargement in size. She has noticed a change in her bowel habits. She is unable to tell me what a normal schedule of bowel movements is for her or what has changed. She denies blood in her stools, nausea or vomiting. She endorses some mucous in her stools. She had a colonoscopy in 2011 and a tubular adenoma in the ascending colon and a tubular adenoma in the sigmoid colon were removed. The patient states that she has 12 different doctors and her primary care and dentist have told her she needs a colonoscopy. She states that she's received three letters telling her she should use up her medical money to have the colonoscopy but thus far she's refused. The patient thinks  "that she has had an abdominal surgery before but is unable to remember what that surgery is or when it happened. The patient is adamant that she doesn't want her sister involved in her care.               Past Medical History:   History reviewed. No pertinent past medical history.          Past Surgical History:   History reviewed. No pertinent past surgical history.            Social History:     Social History   Substance Use Topics     Smoking status: Never Smoker     Smokeless tobacco: Not on file     Alcohol use No             Family History:   No family history on file.          Allergies:     Allergies   Allergen Reactions     Wool Fiber Unknown             Medications:       cyclobenzaprine  10 mg Oral TID     [START ON 2/16/2017] cefTRIAXone  1 g Intravenous Q24H     sodium chloride (PF)  3 mL Intracatheter Q8H     piperacillin-tazobactam  3.375 g Intravenous Q6H     [START ON 2/16/2017] pneumococcal vaccine  0.5 mL Intramuscular Prior to discharge     [START ON 2/16/2017] influenza Vac Split High-Dose  0.5 mL Intramuscular Prior to discharge             Review of Systems:   A comprehensive greater than 10 system review of systems was carried out.  Pertinent positives and negatives are noted above.  Otherwise negative for contributory info.            Physical Exam:   Blood pressure 104/57, pulse 90, temperature 98.2  F (36.8  C), temperature source Oral, resp. rate 16, height 1.676 m (5' 6\"), weight 52.8 kg (116 lb 6.5 oz), SpO2 100 %.    Intake/Output Summary (Last 24 hours) at 02/15/17 1124  Last data filed at 02/15/17 1030   Gross per 24 hour   Intake                0 ml   Output              750 ml   Net             -750 ml     Exam:  GENERAL: Awake alert and oriented to person, place and time.   LUNGS: Non labored breathing  ABD: Hard and tender mass palpated in the RLQ. All other quadrants soft, non tender, non distended. No peritoneal signs. Well healed supra-umbilical midline scar  EXTREMITIES: " warm without edema. Left arm flaccid paralysis.            Data:     Results for orders placed or performed during the hospital encounter of 02/15/17   XR Pelvis and Hip Right 1 View    Narrative    XR PELVIS AND HIP RIGHT 1 VIEW  2/15/2017 3:00 AM     INDICATION: Pain.    COMPARISON: None.      Impression    IMPRESSION: No acute fractures or dislocation. Moderate degenerative  changes right hip.    SOY BARGER MD   Head CT w/o contrast    Narrative    CT HEAD W/O CONTRAST  2/15/2017 3:08 AM     HISTORY: AMS.    TECHNIQUE: Axial images of the head and coronal reformations without  IV contrast material. Radiation dose for this scan was reduced using  automated exposure control, adjustment of the mA and/or kV according  to patient size, or iterative reconstruction technique.    COMPARISON: None.    FINDINGS: No intracranial hemorrhage, mass or mass effect. No acute  infarct identified. No shift of midline structures. The ventricles are  symmetric. Mild cortical atrophy. Visualized paranasal sinuses and  mastoid air cells are clear.      Impression    IMPRESSION: No acute intracranial findings.    SOY BARGER MD   Chest XR,  PA & LAT    Narrative    XR CHEST 2 VW  2/15/2017 3:00 AM     INDICATION: Weakness, elevated WBC.    COMPARISON: Chest CT 5/19/2004.      Impression    IMPRESSION: No infiltrates or other acute findings. Heart size within  normal limits. Tortuous thoracic aorta. Evidence of old granulomatous  infection.    SOY BARGER MD   CT Abdomen Pelvis w Contrast    Narrative    CT ABDOMEN PELVIS W CONTRAST  2/15/2017 5:01 AM     HISTORY: Right lower quadrant abdominal mass.    TECHNIQUE: Volumetric acquisition through abdomen and pelvis with IV  contrast. 50 mL Isovue-370. Radiation dose for this scan was reduced  using automated exposure control, adjustment of the mA and/or kV  according to patient size, or iterative reconstruction technique.    COMPARISON: None.    FINDINGS: Liver,  gallbladder, spleen, pancreas, adrenal glands, and  left kidney demonstrate no worrisome findings. Mild-moderate right  hydronephrosis to the level of the midureter.    Large gas and fluid collection with some rim enhancement in the right  lower quadrant abutting and involving the iliopsoas musculature and  also abutting the right hemicolon consistent with an abscess. This  measures approximately 8.2 x 9.3 x 12.8 cm. Just medial to this there  is an area of mass-like thickening involving what is likely a portion  of the ascending colon. This is seen, for example, on coronal image 11  where it measures approximately 7.7 cm craniocaudad. Findings  suspicious for neoplasm with associated perforation and abscess. An  inflammatory mass is considered less likely. There are a few mildly  prominent abdominal lymph nodes seen at the level of the midabdomen  adjacent to the aorta (series 3, image 43). One just anterior to the  aorta measures 1.2 x 1.7 cm.    Diverticulosis, most prominent in the sigmoid colon. A portion of the  sigmoid (series 3, image 59) appears slightly thick-walled without  adjacent inflammation. This may be muscular hypertrophy from  diverticular disease or minimal diverticulitis. Neoplasm at this site  is also not excluded. Advanced degenerative changes in the visualized  spine with scoliosis convex to the right in the upper lumbar spine.  Advanced degenerative changes right hip.      Impression    IMPRESSION:  1. Large right iliopsoas abscess. This likely arises from and  communicates with the adjacent right colon.  2. Adjacent mass-like wall thickening of what is likely a portion of  the right colon, worrisome for neoplasm. Perforated neoplasm is likely  the source for the abscess.  3. Mild-moderate right hydronephrosis likely secondary to the above  findings.  4. Sigmoid diverticulosis with a segment of sigmoid wall thickening.  No significant adjacent inflammation. This may be muscular  hypertrophy,  minimal diverticulitis or even neoplasm.  5. Mild abdominal adenopathy.    Findings discussed with the ER physician at 5:10 a.m.    SOY BARGER MD         Recent Labs  Lab 02/15/17  0146   WBC 22.0*   HGB 7.4*   HCT 27.0*   MCV 63*   PLT 1204*       Recent Labs  Lab 02/15/17  0146      POTASSIUM 3.2*   CHLORIDE 105   CO2 22   ANIONGAP 15*   *   BUN 41*   CR 0.88   GFRESTIMATED 63   GFRESTBLACK 76   SARITHA 8.9   MAG 3.1*   PHOS 4.1   PROTTOTAL 7.6   ALBUMIN 2.6*   BILITOTAL 0.5   ALKPHOS 117   AST 56*   ALT 61*     No results for input(s): INR in the last 168 hours.  No results for input(s): LACT in the last 168 hours.    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 The patient was started on IV zosyn and is currently afebrile and hemodynamically stable. She has an easily palpated right lower quadrant mass which is tender but no peritoneal signs at this time.     1. Reviewed the scans with Dr. Peter and would recommend IR drainage of the abscess first. Will connect with hospitalist about getting consent for this procedure given the patient's current psychosis. Might possibly need to connect with the patient's primary care physician about the patient and her baseline function status and family.   2. Continue IV abx  3. Await psychiatry evaluation. Hopefully with drainage of the abscess the patient's psychosis will resolve.   4. Will continue to follow along    Yessy Whalen PA-C  [facility]  [phone]

## 2017-02-15 NOTE — IP AVS SNAPSHOT
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 RD, MARE Registered Dietitian      AK1.3 2/15/2017  2:11 PM Rosa Brewster RD, MARE Registered Dietitian      AK1.1 2/15/2017  1:28 PM Rosa Brewster RD, MARE Registered Dietitian             Consults by Fercho Posada MD at 2/15/2017 12:04 PM     Author:  Fercho Posada MD Service:  Psychiatry Author Type:  Physician    Filed:  2/15/2017 12:04 PM Date of Service:  2/15/2017 12:04 PM Note Created:  2/15/2017 12:04 PM    Status:  Signed :  Fercho Posada MD (Physician)     Consult Orders:    1. Psychiatry IP Consult: psychosis; Consultant may enter orders: Yes; Patient to be seen: Routine; Call back #: mohan hospitalist [[id number]] ordered by Noble Lopez MD at 02/15/17 0445                Pt seen for initial psychiatric evaluation, please see my dictation for details and recommendations.[OA1.1]     Revision History        User Key Date/Time User Provider Type Action    > OA1.1 2/15/2017 12:04 PM Fercho Posada MD Physician Sign                      Progress Notes - Physician (Notes from 03/06/17 through 03/09/17)      Progress Notes by Yessy Whalen PA-C at 3/9/2017  7:45 AM     Author:  Yessy Whalen PA-C Service:  Colorectal Surgery Author Type:  Physician Assistant - GENA    Filed:  3/9/2017 11:54 AM Date of Service:  3/9/2017  7:45 AM Note Created:  3/9/2017  7:45 AM    Status:  Addendum :  Yessy Whalen PA-C (Physician Assistant - C)         COLON & RECTAL SURGERY  PROGRESS NOTE    March 9, 2017  Post-op Day # 8    SUBJECTIVE:[KB1.1]  The patient is doing well. She enjoyed a morning in the family lounge talking with her sister and friend. She states she is eating as best as she can. She finds that the ileostomy really smells. No pain. She states she has nothing to wear and is looking forward to going shopping with her mom today.[KB1.2]     OBJECTIVE:  Temp:  [97.5  F (36.4  C)-98.5  F (36.9  C)] (P) 98.1  F (36.7  C)  Pulse:  [94] 94  Heart Rate:  [84-91] (P) 87  Resp:  [16] (P) 16  BP: (110-156)/(60-85) (P) 119/78  SpO2:  [97 %-99 %] (P) 98 %    Intake/Output Summary (Last 24 hours) at 03/09/17 0745  Last data filed at 03/08/17 2300   Gross per 24 hour   Intake              725 ml   Output              425 ml   Net              300 ml[KB1.1]     Patient was seen in the [facility] this morning.[KB1.2]   GENERAL:  Awake, alert, no acute distress,   HEAD - Nomocephalic atraumatic  SCLERA anicteric  EXTREMITIES warm and well perfused        LABS:  Lab Results   Component Value Date    WBC 8.3 03/04/2017     Lab Results   Component Value Date    HGB 8.2 03/04/2017     Lab Results   Component Value Date    HCT 28.8 03/04/2017     Lab Results   Component Value Date     03/06/2017     Last Basic Metabolic Panel:  Lab Results   Component Value Date     03/06/2017      Lab Results   Component Value Date    POTASSIUM 4.1 03/06/2017     Lab Results   Component Value Date    CHLORIDE 106 03/06/2017     Lab  Results   Component Value Date    SARITHA 8.7 03/06/2017     Lab Results   Component Value Date    CO2 27 03/06/2017     Lab Results   Component Value Date    BUN 27 03/06/2017     Lab Results   Component Value Date    CR 0.58 03/06/2017     Lab Results   Component Value Date     03/06/2017       ASSESSMENT/PLAN:[KB1.1]POD#8 colonoscopy with biopsy and end ileostomy for adenomcarcinoma.   1. Low fiber with supplements  2. Lovenox for prophylaxis and will d/c with 30 days  3. Patient ready for d/c from surgery standpoint. Our orders have been placed. Will arrange surgery in the next few weeks with patient.[KB1.2]     Yessy Whalen PA-C  [facility]  Phone: [phone][KB1.1]       ADDENDUM:  Length of stay: 8 days  Indicate Y or N for the following:  UTI  No  C diff  No  PNA  No  SSI No  DVT No  PE  No  CVA No  MI No  Enterocutaneous fistula  No  Peripheral nerve injury  No  Abscess (not adjacent to anastomosis)  No  Leak No    Treated with:   Antibiotics N/A   Drain  N/A   Reoperation  N/A  Death within 30 days No  Reintubation  No  Reoperation  No   Procedure NA    FOR CANCER CASES:  FINAL DIAGNOSIS:   Cecum, mass, biopsy-   - Invasive poorly differentiated adenocarcinoma(Please see microscopic   description)    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[KB1.3]            Revision History        User Key Date/Time User Provider Type Action    > KB1.3 3/9/2017 11:54 AM Yessy Whalen PA-C Physician Assistant - C Addend     KB1.2 3/9/2017  8:00 AM Yessy Whalen PA-C Physician Assistant - C Sign     KB1.1 3/9/2017  7:45 AM Yessy Whalen PA-C Physician Assistant - C             Progress Notes by Janey Thomas LICSW at 3/9/2017  9:50 AM     Author:  Janey Thomas LICSW Service:  Social Work Author Type:      Filed:  3/9/2017 10:49 AM Date of Service:   "3/9/2017  9:50 AM Note Created:  3/9/2017  9:50 AM    Status:  Addendum :  Janey Thomas LICSW ()         SW:    I: NEVILLE following for discharge planning. Pt is medically appropriate for discharge today. NEVILLE spoke with admissions at [facility] and there is a double room that has become available for pt. SW update pt of this, she expressed that \"this is the best news she has heard\" and is excited for discharge. NEVILLE left message with pt maycol, Sherita, who has been assisting with placement. SW to also contact pt friend Dinora who is local and also involved.    P: SW to follow.[CL1.1]    ADDENDUM: NEVILLE faxed orders to [phone]. NEVILLE updated Dinora via voicemail of discharge location. NEVILLE also received call back from pt maycol to confirm message was received. NEVILLE contacted pt sister and brother-in-law, spoke to brother-in-law. Pt siblings plan to meet her over at [facility] and requested that wc transport be arranged. NEVILLE explained the fee for this service to which pt brother verbalized understanding. NEVILLE passed on pt brother's cell phone for admissions to contact once pt arrives. Pt to discharge to [facility] via [facility] wc transport at 1230.[CL1.2] [facility] has also been following and therefore left message for investigator Oly [phone] with update regarding discharge location.[CL1.3]    PAS-RR    D: Per DHS regulation, NEVILLE completed and submitted PAS-RR to [place] [employer] Direct Connect via the Senior LinkAge Line.  PAS-RR confirmation # is : [id number]    I: NEVILLE spoke with pt and they are aware a PAS-RR has been submitted.  NEVILLE reviewed with pt that they may be contacted for a follow up appointment within 10 days of hospital discharge if their SNF stay is < 30 days.  Contact information for [facility] LinkAge Line was also provided.    A: Pt verbalized understanding.    P: Further questions may be directed to [facility] [employer] Line at #[phone], option #4 for PAS-RR " "staff.[CL1.4]      MARAL Cruz, SHONDA  Daytime (8:00am-4:30pm): [phone]  After-Hours  Pager (4:30pm-11:30pm): [phone][CL1.1]        Revision History        User Key Date/Time User Provider Type Action    > CL1.4 3/9/2017 10:49 AM Janey Thomas LICSW  Addend     CL1.3 3/9/2017 10:38 AM Janey Thomas LICSW  Addend     CL1.2 3/9/2017 10:31 AM Janey Thomas LICSW  Addend     CL1.1 3/9/2017  9:52 AM Janey Thomas LICSW  Sign            Progress Notes by Lindsay Peter MD at 3/8/2017  6:48 PM     Author:  Lindsay Peter MD Service:  Colorectal Surgery Author Type:  Physician    Filed:  3/8/2017  6:49 PM Date of Service:  3/8/2017  6:48 PM Note Created:  3/8/2017  6:48 PM    Status:  Signed :  Lindsay Peter MD (Physician)         Colon and Rectal Surgery  No complaints. States that she is gradually eating more. Stoma functioning.  /70 (BP Location: Right leg)  Pulse 94  Temp 97.9  F (36.6  C) (Oral)  Resp 16  Ht 1.676 m (5' 6\")  Wt 52.3 kg (115 lb 4.8 oz)  SpO2 97%  BMI 18.61 kg/m2    Intake/Output Summary (Last 24 hours) at 03/08/17 1848  Last data filed at 03/08/17 1400   Gross per 24 hour   Intake              530 ml   Output              450 ml   Net               80 ml     Alert, oriented to person ? Date  No icterus  Abdomen: soft. Stoma healthy and functioning.   Legs: bilateral edema but slightly less.    Impression: Ready for discharge to TCU from surgery standpoint.  Will arrange follow up in the office to discuss and schedule surgery.     Lindsay Peter MD[AL1.1]     Revision History        User Key Date/Time User Provider Type Action    > AL1.1 3/8/2017  6:49 PM Lindsay Peter MD Physician Sign            Progress Notes by Janey Thomas LICSW at 3/8/2017  9:57 AM     Author:  Janey Thomas LICSW Service:  Social Work Author Type:      Filed:  3/8/2017  4:14 PM " Date of Service:  3/8/2017  9:57 AM Note Created:  3/8/2017  9:57 AM    Status:  Addendum :  Janey Thomas LICSW ()         SW:    I: NEVILLE following for discharge planning. Referrals out currently to [facility], [facility], and [facility]. NEVILLE contacted [facility] and [facility] to determine bed availability for today or tomorrow. [facility] continues to be a preference as well however per admissions they do not have bed availability. SW to update pt and niece as able.    P: SW to follow.[CL1.1]    ADDENDUM: [facility] would have a bed available for pt however it is a private room and therefore pt would be responsible for the private room fee of $36/day. [facility] assessing as well. NEVILLE met with pt to provide this update, unable to decide at this time whether she would be okay paying the private room fee. SW discussed that she could request to be moved to a double when one becomes available. Pt interested in seeing whether [facility] is an option prior to making a decision. NEVILLE talked with admissions coordinator who is currently reviewing.[CL1.2]    1615: NEVILLE updated pt maycol Magallon today regarding placement process. Pt and family okay with private room fee at [facility] if able to move into a double room when one becomes available. NEVILLE left message with [facility], hopeful for discharge tomorrow 3/9/17.[CL1.3]    MARAL Cruz LICSW  Daytime (8:00am-4:30pm): [phone]  After-Hours  Pager (4:30pm-11:30pm): [phone][CL1.1]        Revision History        User Key Date/Time User Provider Type Action    > CL1.3 3/8/2017  4:14 PM Janey Thomas LICSW  Addend     CL1.2 3/8/2017 10:55 AM Janey Thomas LICSW  Addend     CL1.1 3/8/2017  9:59 AM Janey Thomas LICSW  Sign            Progress Notes by Jaret Wylie MD at 3/8/2017  2:21 PM     Author:  Jaret Wylie MD Service:  Hospitalist Author Type:  Physician    Filed:  3/8/2017   2:31 PM Date of Service:  3/8/2017  2:21 PM Note Created:  3/8/2017  2:21 PM    Status:  Signed :  Jaret Wylie MD (Physician)         [facility]    Hospitalist Progress Note    Date of Service (when I saw the patient):[RJ1.1] 03/08/2017    Assessment & Plan[RJ1.2]   Sherita Kenney is a pleasant 76 year old woman with hypertension, dyslipidemia, postpolio syndrome, glaucoma and nephritis/nephropathy during childhood, who was admitted on 2/15/2017 with acute psychosis after a home welfare check. In addition, she was also found to have a right iliopsoas abscess for which she underwent IR drainage, as well as a cecal mass for which she underwent colonoscopy and ileostomy. The mass is found to be cancerous. She now awaits placement.     Acute Psychosis.  Ms. Kenney has no prior history of mental health diagnoses. She was found to be paranoid and agitated at home on the day of admission. Psychiatry was consulted and she was felt to have an unspecificed psychotic disorder. It was determined that she does not have the capacity to make her own decisions. Care coordinator has been assisting with efforts to establish a legal decision maker in her family (see Sintia Muñoz's note from 2/21 for further details): her niece Thalia (who is a family physician) and friend Dinora are now acting as her POA's.  -- She continues on Zyprexa 5mg HS      Right iliopsoas abscess, status post IR drainage: Resolved.   Abscess was noted on CT on admission. WBC initially 22. She was started on Zosyn and underwent drainage per IR on 2/15/17. Cultures grew enterobacter cloacae, ekinella corrodens and h parainfluenzae. She completed two weeks of Zosyn this stay. Drain subsequently removed. Leukocytosis has resolved.     Adenocarcinoma of the colon:  She developed abdominal distension on 2/23 and CT scan showed a distal small bowel obstruction. CEA elevated. Colorectal Surgery was consulted and eventually she  underwent colonoscopy and laparoscopic ilieostomy on 3/1. She was noted to have a large fungating mass in the cecum with fixation to the abdominal wall and 3 lesions on the surface of the right liver. Biopsy showed invasive poorly differentiated adenocarcinoma.  -- postoperative cares per surgery  -- pain control with Tylenol prn and heating pad, as she wants to avoid narcotics  -- TPN stopped 3/7; she continues low fiber diet  -- The plan is for definitive surgical intervention per colorectal surgery in the coming weeks  -- Oncology also consulted and she will follow up in clinic with Dr. Nieves to discuss plans for future treatment      Acute iron deficiency anemia, acute:   Likely secondary to colon mass. Hgb 5.6 on admission, and she was transfused 2U PRBCs on 2/16. Has also received IV iron infusion this stay per heme/onc.  -- hgb now remains stable around 8-9      Severe protein calorie malnutrition:   Secondary to underlying malignancy. Nutritionist consulted this stay. Had PICC line placed and began TPN on 2/24.   -- albumin remains low (2.2 per last check on 3/4)   -- nutritionist following and had been monitoring calorie counts  -- had been cycling TPN postop per surgery while awaiting consistent po intake. This is now stopped and it is recommended to continue supplements at this point     Hypothyroid:  She continues Synthroid 75mcg daily.      Postpolio syndrome:   Causing a flail left shoulder. Unable to use left arm/hand. Will use sling/brace for PT. Continues on Cyclobenzaprine 10mg TID.      Klebsiella UTI: Resolved.   Adequately treated with Zosyn this stay.     Episode of Unresponsiveness: Resolved.   Noted 2/23. RRT called and patient evaluated per house MD. Felt secondary to compazine, which has since been dc'd. 2/23/17. No recurrent episodes since.     DVT Prophylaxis: Enoxaprain (Lovenox) SQ  Code Status:[RJ1.1] Prior[RJ1.2]    Disposition: Expected discharge pending placement at  TCU.[RJ1.1]    Jaret Wylie[RJ1.2], MD[RJ1.1]    Interval History[RJ1.2]   No new complaints today; denies pain or nausea and tolerating food.    -Data reviewed today: I reviewed all new labs and imaging results over the last 24 hours. I personally reviewed no images or EKG's today.[RJ1.1]    Physical Exam   Temp: 98.5  F (36.9  C) Temp src: Oral BP: 110/60   Heart Rate: 88 Resp: 16 SpO2: 97 % O2 Device: None (Room air)    Vitals:    03/06/17 0722 03/07/17 0600 03/08/17 0609   Weight: 54.6 kg (120 lb 5.9 oz) 53.3 kg (117 lb 8.1 oz) 52.3 kg (115 lb 4.8 oz)[RJ1.2]     Vital Signs with Ranges[RJ1.1]  Temp:  [97.3  F (36.3  C)-98.5  F (36.9  C)] 98.5  F (36.9  C)  Heart Rate:  [84-98] 88  Resp:  [16] 16  BP: (102-140)/(56-97) 110/60  SpO2:  [97 %-100 %] 97 %  I/O last 3 completed shifts:  In: 180 [P.O.:180]  Out: 300 [Stool:300][RJ1.2]    Constitutional: Alert and oriented to person, place and time; no apparent distress  HEENT: normocephalic moist mucus membranes  Respiratory: lungs clear to auscultation bilaterally  Cardiovascular: regular S1 S2 no murmurs rubs or gallops  GI: abdomen soft non tender non distended bowel sounds positive; ostomy site c/d/i  Skin: no rash, good turgor  Musculoskeletal: no clubbing, cyanosis or edema  Neuro: EOMI; moves all four extremities  Psych: Appropriate affect, speech is occasionally tangential[RJ1.1]      Medications     - MEDICATION INSTRUCTIONS -       - MEDICATION INSTRUCTIONS -       IV fluid REPLACEMENT ONLY         insulin aspart  1-22 Units Subcutaneous TID AC     insulin aspart  1-16 Units Subcutaneous At Bedtime     enoxaparin  40 mg Subcutaneous Q24H     sodium chloride (PF)  10 mL Intracatheter Q8H     levothyroxine  75 mcg Oral Daily     cyclobenzaprine  10 mg Oral TID     OLANZapine zydis  5 mg Oral At Bedtime       Data     Recent Labs  Lab 03/06/17  1105 03/04/17  0625 03/03/17  0615 03/01/17  4996   WBC  --  8.3  --   --    HGB  --  8.2*  --  8.1*   MCV  --   79  --   --     169 147*  --    INR 0.97  --   --   --     141  --   --    POTASSIUM 4.1 4.3  --   --    CHLORIDE 106 106  --   --    CO2 27 28  --   --    BUN 27 24  --   --    CR 0.58 0.51*  --   --    ANIONGAP 7 7  --   --    SARITHA 8.7 8.3*  --   --    * 103*  --   --    ALBUMIN 2.5* 2.2*  --   --    PROTTOTAL 6.3*  --   --   --    BILITOTAL 0.3  --   --   --    ALKPHOS 114  --   --   --    ALT 19  --   --   --    AST 31  --   --   --        No results found for this or any previous visit (from the past 24 hour(s)).[RJ1.2]     Revision History        User Key Date/Time User Provider Type Action    > RJ1.2 3/8/2017  2:31 PM Jaret Wylie MD Physician Sign     RJ1.1 3/8/2017  2:21 PM Jaret Wylie MD Physician             Progress Notes by Yessy Whalen PA-C at 3/8/2017  7:30 AM     Author:  Yessy Whalen PA-C Service:  Colorectal Surgery Author Type:  Physician Assistant - C    Filed:  3/8/2017  7:33 AM Date of Service:  3/8/2017  7:30 AM Note Created:  3/8/2017  7:30 AM    Status:  Signed :  Yessy Whalen PA-C (Physician Assistant - C)         COLON & RECTAL SURGERY  PROGRESS NOTE    March 8, 2017  Post-op Day # 7    SUBJECTIVE:  The patient is doing well. No pain. Tolerating diet but sick of eating cherry yellow. Enjoying the ice. Having good output from ileostomy.     OBJECTIVE:  Temp:  [97.5  F (36.4  C)-98.2  F (36.8  C)] 97.8  F (36.6  C)  Heart Rate:  [91-98] 91  Resp:  [16] 16  BP: (113-140)/(62-97) 126/62  SpO2:  [98 %-100 %] 100 %    Intake/Output Summary (Last 24 hours) at 03/08/17 0730  Last data filed at 03/08/17 0607   Gross per 24 hour   Intake                0 ml   Output              300 ml   Net             -300 ml       GENERAL:  Awake, alert, no acute distress,   HEAD - Nomocephalic atraumatic  SCLERA anicteric  EXTREMITIES warm and well perfused  ABDOMEN:  Soft, appropriately tender, non-distended, ileostomy is pink and  viable. Thick brown stool and gas in bag.   INCISION:  C/d/i,     LABS:  Lab Results   Component Value Date    WBC 8.3 03/04/2017     Lab Results   Component Value Date    HGB 8.2 03/04/2017     Lab Results   Component Value Date    HCT 28.8 03/04/2017     Lab Results   Component Value Date     03/06/2017     Last Basic Metabolic Panel:  Lab Results   Component Value Date     03/06/2017      Lab Results   Component Value Date    POTASSIUM 4.1 03/06/2017     Lab Results   Component Value Date    CHLORIDE 106 03/06/2017     Lab Results   Component Value Date    SARITHA 8.7 03/06/2017     Lab Results   Component Value Date    CO2 27 03/06/2017     Lab Results   Component Value Date    BUN 27 03/06/2017     Lab Results   Component Value Date    CR 0.58 03/06/2017     Lab Results   Component Value Date     03/06/2017       ASSESSMENT/PLAN:POD#7 colonoscopy with biopsy and end ileostomy for poorly invasive adenocarcinoma. Patient getting adequate calories and TPN stopped yesterday.   1. Low fiber diet with supplements  2. Lovenox for prophylaxis and d/c with 30 days post op  3. Dispo: Okay to d/c to TCU once bed available. Hopefully today. Our orders have been placed. Plan for definitive surgery in several weeks with Dr. Peter. Our office will coordinate.     Yessy Whalen PA-C  [facility]  Phone: [phone][KB1.1]               Revision History        User Key Date/Time User Provider Type Action    > KB1.1 3/8/2017  7:33 AM Yessy Whalen PA-C Physician Assistant - C Sign            Progress Notes by Dinorah James DO at 3/7/2017  2:43 PM     Author:  Dinorah James DO Service:  Hospitalist Author Type:  Physician    Filed:  3/7/2017  3:06 PM Date of Service:  3/7/2017  2:43 PM Note Created:  3/7/2017  2:43 PM    Status:  Signed :  Dinorah James DO (Physician)         [facility]    Hospitalist Progress  Note    Assessment & Plan   Sherita Kenney is a 76 year old female with PMHx of hypertension, dyslipidemia, postpolio syndrome glaucoma and hx of nephritis/nephropathy during childhood who was admitted on 2/15/2017 with acute psychosis after a home welfare check. In addition, she was also found to have a right iliopsoas abscess for which she underwent IR drainage. Workup stay also revealed a cecal mass concerning for malignancy.    Acute Psychosis: Resolved.  No prior hx of mental health diagnoses. Was found to be paranoid and agitated at home on the day of admission. Has been seen by psych this stay, was felt to have an unspecificed psychotic disorder. They determined she does not have the capacity to make her own decisions. Care coordinator has been assisting with efforts to establish a legal decision maker in her family (see Sintia Muñoz's note from 2/21 for further details), her niece Thalia (who is a family physician) and friend Dinora are now acting as her POAs.  -- last seen by psych on 2/17  -- conts on Zyprexa 5mg HS     Right iliopsoas abscess, s/p IR drainage: Resolved.   Abscess was noted on CT on admission. WBC initially 22. Started on Zosyn and underwent drainage per IR on 2/15/17. Cultures grew enterobacter cloacae, ekinella corrodens and h parainfluenzae. Completed 2wk course of Zosyn this stay. Drain subsequently removed.    Cecal Mass, s/p colonoscopy and laparoscopic ileostomy on 3/1/17: POD #6  Developed abdominal distension on 2/23 and CT scan showed a distal small bowel obstruction. CEA elevated. Underwent colonoscopy and laparoscopic ilieostomy per colorectal surgery on 3/1. Noted to have a large fungating mass in the cecum with fixation to the abdominal wall and 3 lesions on the surface of the right liver. Biopsy showed invasive poorly differentiated adenocarcinoma.  -- postop cares per surgery  -- pain control with Tylenol prn and heating pad, patient wants to avoid narcotics  -- TPN dc'd 3/7,  cont low fiber diet  -- plan is for definitive surgical intervention per colorectal surgery in the coming weeks  -- also seen by oncology this stay, will follow up in clinic with Dr. Nieves to discuss plans for future treatment       Acute iron deficiency anemia:   Likely secondary to colon mass. Hgb 5.6 on admission, was transfused 2U PRBCs on 2/16. Has also received IV iron infusion this stay per heme/onc.  -- hgb now remains stable around 8-9     Severe protein calorie malnutrition:   Secondary to underlying malignancy. Nutritionist consulted this stay. Had PICC line placed and began TPN on 2/24.   -- albumin remains low (2.2 per last check on 3/4)   -- nutritionist following and had been monitoring calorie counts  -- had been cycling TPN postop per surgery while awaiting consistent po intake -> TPN dc'd 3/7, surgery recommended to continue supplements    Hypothyroid:  Chronic and stable on Synthroid 75mcg daily.      Postpolio syndrome:   Causing a flail left shoulder. Unable to use left arm/hand. Will use sling/brace for PT. Continues on Cyclobenzaprine 10mg TID.     Hypertension:   Not on any scheduled antihypertensive. BPs stable.    Klebsiella UTI: Resolved.   Adequately treated with Zosyn this stay.    Episode of Unresponsiveness: Resolved.   Noted 2/23. RRT called and patient evaluated per house MD. Felt secondary to compazine, which has since been dc'd. 2/23/17. No recurrent episodes since.    FEN: off IVFs, lytes stable, low fiber diet  DVT Prophylaxis: Lovenox, PCDs  Code Status: Full Code    Disposition: Colorectal surgery has cleared for discharge. Remains medically stable. Discharge to TCU in next 1-2d when bed found. SW following.     Dinorah James    Interval History[KW1.1]   Seen this afternoon. Conversing with friend at bedside. Feeling well. No complaints. Tolerating po - per calorie counts consuming 70% of energy needs and 90% of protein needs.[KW1.2]     -Data reviewed today: I  reviewed all new labs and imaging results over the last 24 hours. I personally reviewed no images or EKG's today.    Physical Exam   Temp: 97.6  F (36.4  C) Temp src: Oral BP: (!) 129/92 Pulse: 91 Heart Rate: 94 Resp: 16 SpO2: 98 % O2 Device: None (Room air)    Vitals:    03/05/17 0300 03/06/17 0722 03/07/17 0600   Weight: 54.3 kg (119 lb 11.4 oz) 54.6 kg (120 lb 5.9 oz) 53.3 kg (117 lb 8.1 oz)     Vital Signs with Ranges  Temp:  [97.6  F (36.4  C)-98.2  F (36.8  C)] 97.6  F (36.4  C)  Pulse:  [91-94] 91  Heart Rate:  [88-94] 94  Resp:  [16-18] 16  BP: ()/(65-92) 129/92  SpO2:  [97 %-100 %] 98 %  I/O last 3 completed shifts:  In: 2107 [P.O.:940]  Out: 355 [Urine:150; Stool:205]    Constitutional: Resting comfortably, alert and conversing appropriately, NAD  Respiratory: CTAB, no wheeze/rales/rhonchi  Cardiovascular: HRRR, no MGR  GI: S, NT, +BS, +stool[KW1.1]/gas[KW1.2] in ostomy  Skin/Integumen: warm/dry  Other: no LE edema    Medications     - MEDICATION INSTRUCTIONS -       - MEDICATION INSTRUCTIONS -       IV fluid REPLACEMENT ONLY         insulin aspart  1-12 Units Subcutaneous TID     enoxaparin  40 mg Subcutaneous Q24H     sodium chloride (PF)  10 mL Intracatheter Q8H     levothyroxine  75 mcg Oral Daily     cyclobenzaprine  10 mg Oral TID     OLANZapine zydis  5 mg Oral At Bedtime       Data     Recent Labs  Lab 03/06/17  1105 03/04/17  0625 03/03/17  0615 03/01/17  1727 03/01/17  0755   WBC  --  8.3  --   --   --    HGB  --  8.2*  --  8.1*  --    MCV  --  79  --   --   --     169 147*  --   --    INR 0.97  --   --   --   --     141  --   --   --    POTASSIUM 4.1 4.3  --   --  3.5   CHLORIDE 106 106  --   --   --    CO2 27 28  --   --   --    BUN 27 24  --   --   --    CR 0.58 0.51*  --   --  0.56   ANIONGAP 7 7  --   --   --    SARITHA 8.7 8.3*  --   --   --    * 103*  --   --   --    ALBUMIN 2.5* 2.2*  --   --   --    PROTTOTAL 6.3*  --   --   --   --    BILITOTAL 0.3  --   --   --    --    ALKPHOS 114  --   --   --   --    ALT 19  --   --   --   --    AST 31  --   --   --   --        No results found for this or any previous visit (from the past 24 hour(s)).[KW1.1]       Revision History        User Key Date/Time User Provider Type Action    > KW1.2 3/7/2017  3:06 PM Dinorah James,  Physician Sign     KW1.1 3/7/2017  2:43 PM Dinorah James,  Physician             Progress Notes by Janey Thomas LICSW at 3/7/2017 11:11 AM     Author:  Janey Thomas LICSW Service:  Social Work Author Type:      Filed:  3/7/2017  2:44 PM Date of Service:  3/7/2017 11:11 AM Note Created:  3/7/2017 11:11 AM    Status:  Addendum :  Janey Thomas LICSW ()         SW:    I: NEVILLE following for discharge planning. TPN will be discontinued today, anticipate pt will be ready for discharge in 1-2 days. [facility] however both facilities have limited bed availability. Per previous SW note, family agreeable to looking into [facility] if other facilities unable to accept. NEVILLE faxed information via DOD.    P: SW to follow.[CL1.1]    ADDENDUM: NEVILLE updated pt Sherita severino, on TCU placement. Pt maycol requested referral to [facility] [address] as well. NEVILLE requested that referral be sent through care management coordinator.[CL1.2]    MARAL Cruz LICSW  Daytime (8:00am-4:30pm): [phone]  After-Hours  Pager (4:30pm-11:30pm): [phone][CL1.1]        Revision History        User Key Date/Time User Provider Type Action    > CL1.2 3/7/2017  2:44 PM Janey Thomas LICSW  Addend     CL1.1 3/7/2017 11:13 AM Janey Thomas LICSW  Sign            Progress Notes by Silvia Marques at 3/7/2017  2:37 PM     Author:  Silvia Marques Service:  Spiritual Health Author Type:      Filed:  3/7/2017  2:40 PM Date of Service:  3/7/2017  2:37 PM Note Created:  3/7/2017  2:38 PM    Status:  Signed :  Shelli  "Silvia ()         [facility]  Progress Note  [facility]    Stopped in to follow up with pt.  Pt's sister was there and the two of them were having a good visit.  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  Pt thinks she will go to TCU to get stronger for a future surgery.  Let them know SH will continue to follow.       Silvia Marques  Chaplain Resident  Pager [phone][HM1.1]     Revision History        User Key Date/Time User Provider Type Action    > HM1.1 3/7/2017  2:40 PM Silvia Marques Sign            Progress Notes by Lindsay Peter MD at 3/7/2017  1:19 PM     Author:  Lindsay Peter MD Service:  Colorectal Surgery Author Type:  Physician    Filed:  3/7/2017  1:21 PM Date of Service:  3/7/2017  1:19 PM Note Created:  3/7/2017  1:19 PM    Status:  Signed :  Lindsay Peter MD (Physician)         Colon and Rectal Surgery  Patient states she feels good. Her appetite varies but she is trying to make herself eat.   BP (!) 129/92 (BP Location: Right arm)  Pulse 91  Temp 97.6  F (36.4  C) (Oral)  Resp 16  Ht 1.676 m (5' 6\")  Wt 53.3 kg (117 lb 8.1 oz)  SpO2 98%  BMI 18.97 kg/m2    Intake/Output Summary (Last 24 hours) at 03/07/17 1319  Last data filed at 03/07/17 0600   Gross per 24 hour   Intake             1627 ml   Output              155 ml   Net             1472 ml     Alert, in no acute distress.   No icterus  Ileostomy healthy and functioning. Incisions fine.     Calorie counts: 70% of calories and 90% of protein needs.     Albumin 2.5 and Prealbumin 17.     Impression: clinically stable and nutrition improving.   Agree with plan to stop TPN but continue supplements  Lovenox for prophylaxis  Increase activity.   TCU placement when available with plan for definitive surgery in several weeks.     Lindsay Peter MD[AL1.1]     Revision History        User Key Date/Time User Provider " Type Action    > AL1.1 3/7/2017  1:21 PM Lindsay Peter MD Physician Sign            Progress Notes by Suzanne Yuen RD, LD at 3/7/2017  9:00 AM     Author:  Suzanne Yuen RD, LD Service:  Nutrition Author Type:  Registered Dietitian    Filed:  3/7/2017 11:03 AM Date of Service:  3/7/2017  9:00 AM Note Created:  3/7/2017  9:00 AM    Status:  Signed :  Suzanne Yuen RD, LD (Registered Dietitian)         CLINICAL NUTRITION SERVICES - REASSESSMENT NOTE      Recommendations Ordered by Registered Dietitian (LU):[MH1.1]   D/C nocturnal TPN as pt meeting 70% energy and 990% protein needs via oral intake alone   D/C Calorie Count for now[MH1.2]   Future/Additional Recommendations:[MH1.1]   Recommend continue nutritional supplements between meals at TCU with small frequent feedings.[MH1.2]       EVALUATION OF PROGRESS TOWARD GOALS   Diet:  Low Fiber + Gelatein Plus TID with meals and 4 oz Ensure Plus BID btw meals    Nutrition Support:  Nocturnal TPN continues as below:    Nutrition Support Parenteral:  Type of Access: PICC  Parenteral Frequency:  12 hr nocturnal cycle (8 pm - 8 am)  Parenteral Regimen:  D15 AA5 with volume 1200 mL/day + Lipids 180 mL/day   Total Parenteral Provisions:  1212 kcals, 60 gm pro, 180 gm CHO    Intake/Tolerance:  Pt has been on Calorie Count for the past 3 days with improving intake.  CALORIE COUNT  Approximate Oral Intake for:   3/6   Calories:  2040  Protein:   126  Number of Meals Recorded:  3  Number of Snacks Recorded:  2    Summary:   Average oral intake over past 3 days:  1290 kcals (70% energy needs), 72 gm pro (91% protein needs).  Total (Noct TPN + avg intake):  2500 kcals (47 kcal/kg and 135% energy needs), 132 gm pro (2.5 gm/kg and 125% protein needs).    The D5 IVF at 30 mL/hr during the day was discontinued on 3/4.    BGM:  115, 125, 113 - acceptable.  Labs yesterday good.  Wt:  53.3 kg (up 0.5 kg since admit).    Pt took 940 mL oral yesterday and had 355  mL stool.  Note that oral intake was decreased for breakfast this morning.  Pt is tolerating both the Gelatein Plus (high protein jello) and Ensure Plus.  She likes our fish.      Dosing Weight  52.8 kg - admit wt      ASSESSED NUTRITION NEEDS:  (Modified energy needs higher)  Estimated Energy Needs: 5155-7094 kcals (35-40 Kcal/Kg)  Justification: repletion, underweight  Estimated Protein Needs:  grams protein (1.5-2 g pro/Kg)  Justification: Repletion     NEW FINDINGS:   Pathology reveals invasive poorly differentiated adenocarcinoma.  Plan TCU at discharge (without TPN).    Previous Goals (3/3):   Pt will transition from TPN --> oral intake in the next 3-4 days.   Evaluation: Not met, but in progress     Previous Nutrition Diagnosis (3/3):   Inadequate oral intake related to CL diet and early satiety as evidenced by continued PN reliance.   Evaluation: Improving      CURRENT NUTRITION DIAGNOSIS  Excessove parenteral nutrition infusion related to improved oral intake as evidenced by combination nocturnal TPN + avg oral intake meeting ~ 130% estimated needs.    INTERVENTIONS  Recommendations / Nutrition Prescription  Continue Low Fiber diet + supplements as above.[MH1.1]  D/C Calorie Count for now.[MH1.3]    D[MH1.2]/C nocturnal TPN[MH1.1].[MH1.3]    Recommend continue nutritional supplements between meals at TCU with small frequent feedings.[MH1.2]    Implementation[MH1.1]  Collaboration and Referral of Nutrition care - Discussed pt with Pharmacist and later with PA Yessy Fair, who was in agreement that we could D/C the nocturnal TPN today.   PN Schedule - Entered order in EPIC to D/C TPN today.[MH1.2]    Goals[MH1.1]  Pt will consume > 75% meals and supplements.[MH1.2]      MONITORING AND EVALUATION:[MH1.1]  Progress towards goals will be monitored and evaluated per protocol and Practice Guidelines[MH1.2]    Suzanne Yuen[MH1.4], RD, LD, CNSC[MH1.1]     Revision History        User Key Date/Time  User Provider Type Action    > MH1.2 3/7/2017 11:03 AM Suzanne Yuen, RD, LD Registered Dietitian Sign     MH1.3 3/7/2017 10:50 AM Suzanne Yuen, LU, LD Registered Dietitian      MH1.4 3/7/2017  9:01 AM Suzanne Yuen, RD, LD Registered Dietitian      MH1.1 3/7/2017  9:00 AM Suzanne Yuen, LU, LD Registered Dietitian             Progress Notes by Yessy Whalen PA-C at 3/7/2017  8:20 AM     Author:  Yessy Whalen PA-C Service:  Colorectal Surgery Author Type:  Physician Assistant - GENA    Filed:  3/7/2017  8:22 AM Date of Service:  3/7/2017  8:20 AM Note Created:  3/7/2017  8:20 AM    Status:  Signed :  Yessy Whalen PA-C (Physician Assistant - C)         COLON & RECTAL SURGERY  PROGRESS NOTE    March 7, 2017  Post-op Day # 6    SUBJECTIVE:  The patient didn't have very much of an appetite this morning. Trying to eat as much as she can. Minimal to no pain. Having good output from her ileostomy. Feeling like she can walk more each day.     OBJECTIVE:  Temp:  [97.6  F (36.4  C)-98  F (36.7  C)] 97.6  F (36.4  C)  Pulse:  [91-94] 91  Heart Rate:  [88-98] 88  Resp:  [16-18] 16  BP: ()/(65-87) 96/65  SpO2:  [97 %-100 %] 97 %    Intake/Output Summary (Last 24 hours) at 03/07/17 0820  Last data filed at 03/07/17 0600   Gross per 24 hour   Intake              940 ml   Output              305 ml   Net              635 ml       GENERAL:  Awake, alert, no acute distress,   HEAD - Nomocephalic atraumatic  SCLERA anicteric  EXTREMITIES warm and well perfused  ABDOMEN:  Soft, appropriately tender, non-distended, ileostomy is pink and viable. Gas and stool in bag.   INCISION:  C/d/i,     LABS:  Lab Results   Component Value Date    WBC 8.3 03/04/2017     Lab Results   Component Value Date    HGB 8.2 03/04/2017     Lab Results   Component Value Date    HCT 28.8 03/04/2017     Lab Results   Component Value Date     03/06/2017     Last Basic Metabolic Panel:  Lab Results    Component Value Date     03/06/2017      Lab Results   Component Value Date    POTASSIUM 4.1 03/06/2017     Lab Results   Component Value Date    CHLORIDE 106 03/06/2017     Lab Results   Component Value Date    SARITHA 8.7 03/06/2017     Lab Results   Component Value Date    CO2 27 03/06/2017     Lab Results   Component Value Date    BUN 27 03/06/2017     Lab Results   Component Value Date    CR 0.58 03/06/2017     Lab Results   Component Value Date     03/06/2017       ASSESSMENT/PLAN:POD#6 colonoscopy with biopsy and end ileostomy for poorly invasive adenocarcinoma  1. Low fiber diet and supplements  2. Await calorie counts from yesterday to help determine if can wean TPN. Continue cycled TPN for now  3. OOB, ambulate  4. WOC for education  5. Lovenox for prophylaxis  6. Dispo: Awaiting placement at [facility]    Yessy Whalen PA-C  [facility]  Phone: [phone][[email or web address]]               Revision History        User Key Date/Time User Provider Type Action    > KB1.1 3/7/2017  8:22 AM Yessy Whalen PA-C Physician Assistant - C Sign            Progress Notes by Susan Lindo MD at 3/6/2017  6:56 PM     Author:  Susan Linod MD Service:  Hem/Onc Author Type:  Physician    Filed:  3/6/2017  7:08 PM Date of Service:  3/6/2017  6:56 PM Note Created:  3/6/2017  6:56 PM    Status:  Signed :  Susan Lindo MD (Physician)         Sherita Kenney is a 76-year-old lady who was admitted with psychosis. Labs on admission revealed anemia, thrombocytosis and leukocytosis. CT of the abdomen and pelvis 02/15/2017 revealed a large right-sided iliopsoas abscess which was likely arising from and communicating with the adjacent right colon. There was adjacent mass-like wall thickening of the right colon, which was worrisome for neoplasm. On 02/15/2017, she had a CT-guided drain placed and 500 mL of purulent material was drained.     Patient had a colonoscopy and laparoscopic  "ileostomy in 03/01/2017.  Colonoscopy revealed a large fungating mass in the cecum with fixation per abdominal wall.  There were three diminutive lesion on surface of right liver.  Pathology reveals invasive poorly differentiated adenocarcinoma.    The patient is doing well postop. She is feeling better.      Assessment and plan:  76-year-old female with right colon cancer causing right iliopsoas abscess status post ileostomy.    -Discussed with the patient regarding colon cancer.  Explained to her that based on CT scan, there is no evidence of metastatic disease.  For colon cancer, she will need surgery.  Surgery will be scheduled by Dr. Peter.  Depending on final surgical pathology, we will discuss regarding the role of adjuvant treatment.  -We will see her in our clinic for follow-up.  We will sign off.  Please call us with any questions.     [BK1.1]       Revision History        User Key Date/Time User Provider Type Action    > BK1.1 3/6/2017  7:08 PM Susan Lindo MD Physician Sign            Progress Notes by Alexandria Orourke RN at 3/6/2017  2:41 PM     Author:  Alexandria Orourke RN Service:  [facility] Nurse Author Type:  Enterstomal Therapist    Filed:  3/6/2017  2:46 PM Date of Service:  3/6/2017  2:41 PM Note Created:  3/6/2017  2:41 PM    Status:  Signed :  Alexandria Orourke RN (Enterstomal Therapist)         [facility] Nurse Inpatient Ostomy Assessment      Initial Assessment of ostomy and needs for:       Data:   History:   Per MD note(s):   3-1-17   Lap ileostomy for Large fungating mass in cecum with fixation to abdominal wall    Type of ostomy: Lap ileostomy  Stoma assessment:   ? Stoma approx 1 1/2\", oval, red, moist, protrudes, lumen @ apex  Mucocutaneous Junction (MCJ): Intact with sutures  Peristomal skin:  Intact, no erythema  Abdominal assessment:distended and semi-frim    Output: moderate mushy green output[JP1.1]        I/O last 3 completed shifts:  In: 690 " [P.O.:690]  Out: 1100 [Urine:800; Stool:300[JP1.2]        Current pouching system: Vance NI convex with high output pouch  Pain: Rt upper posterior back and abd tenderness      Diet:       Active Diet Order   Low fiber    Labs:   Recent Labs   Lab Test  03/06/17   1105  03/04/17   0625   ALBUMIN  2.5*  2.2*   HGB   --   8.2*   INR  0.97   --    WBC   --   8.3               Intervention:     Patient's chart evaluated.      Assessments done today:  Stoma, pouching system readiness to learn    Prosthetic refitting: Hollster NI convex and high output pouch    Education: Reviewed surgery. Pt forgetful. Does not remember my visit from earlier in the morning.      Prepared for discharge by: Supplies @ bedside    Pt registered for ostomy supply samples? No:          Assessment:     Stoma assessment: viable, functioning    Learning needs/ comprehension: all ileostomy management. This will be a slow process secondary to confusion and LUE hemiparesis    Prosthetic refitting: Trial Vance New Image convex  with high output pouch         Plan:     Plan:   ? Current pouching system: Trial Vanec New Image convex with high output pouch. No leakage under barrier    Education:  ? Education completed: Surgery review  ? Is pt able to demonstrate: 1. how to empty their pouch?  No:   2. How to read a measurement on a graduated cylinder?  No:   3. How to write down correct I and O's?   No:   ? Educational needs: Ileostomy management, emptying, appliance change. Attempted to remind pt of appliance filling and need for emptying  ? Continue to prepare for discharge: No discharge preparations started,     o Supply company: TBD  o Do WOC Nurse recommend home care? No TCU   Face to face time: 30 minutes  WOC will F/U on Tuesday[JP1.1]     Revision History        User Key Date/Time User Provider Type Action    > JP1.2 3/6/2017  2:46 PM Alexandria Orourke RN Enterstomal Therapist Sign     JP1.1 3/6/2017  2:41 PM Alexandria Orourke, RN  Enterstomal Therapist             Progress Notes by Sherita Henning MD at 3/6/2017  8:28 AM     Author:  Sherita Henning MD Service:  Colorectal Surgery Author Type:  Physician    Filed:  3/6/2017 10:31 AM Date of Service:  3/6/2017  8:28 AM Note Created:  3/6/2017  8:28 AM    Status:  Addendum :  Sherita Henning MD (Physician)         COLON & RECTAL SURGERY  PROGRESS NOTE    March 6, 2017  Post-op Day # 5     SUBJECTIVE:  Patient is doing well. She states she was able to do her own bathroom cares without assistance. She is eating well without N/V. She enjoys getting out of bed to walk. She denies back pain and abdominal pain.    OBJECTIVE:  Temp:  [97.5  F (36.4  C)-98.3  F (36.8  C)] 97.8  F (36.6  C)  Pulse:  [95-98] 95  Heart Rate:  [92-96] 92  Resp:  [16-18] 16  BP: (102-149)/(45-82) 102/58  SpO2:  [97 %-98 %] 98 %    Intake/Output Summary (Last 24 hours) at 03/06/17 0828  Last data filed at 03/06/17 0822   Gross per 24 hour   Intake              960 ml   Output             1200 ml   Net             -240 ml       GENERAL:  Awake, alert, no acute distress, sitting up in bed  HEAD: Normocephalic atraumatic  SCLERA: Anicteric  EXTREMITIES: warm and well perfused  ABDOMEN:  Soft, non-tender, non-distended, stoma active with semi-solid stool output  INCISION:  C/d/i    LABS:  Lab Results   Component Value Date    WBC 8.3 03/04/2017     Lab Results   Component Value Date    HGB 8.2 03/04/2017     Lab Results   Component Value Date    HCT 28.8 03/04/2017     Lab Results   Component Value Date     03/04/2017     Last Basic Metabolic Panel:  Lab Results   Component Value Date     03/04/2017      Lab Results   Component Value Date    POTASSIUM 4.3 03/04/2017     Lab Results   Component Value Date    CHLORIDE 106 03/04/2017     Lab Results   Component Value Date    SARITHA 8.3 03/04/2017     Lab Results   Component Value Date    CO2 28 03/04/2017     Lab Results   Component Value Date    BUN 24  03/04/2017     Lab Results   Component Value Date    CR 0.51 03/04/2017     Lab Results   Component Value Date     03/04/2017       ASSESSMENT/PLAN: 76 year old woman POD#5 scope and biopsy, lap ileostomy[LB1.1] for invasive poorly differentiated adenocarcinoma[LB1.2]. Stable and doing well. Awaiting TCU placement.    1. Low fiber diet[LB1.1] with supplements. Continue calorie counts. Continue cycling TPN.[LB1.2]  2. Lovenox for prophylaxis.  3. OOB, ambulate.  4. Dispo: D/c to TCU pending placement.[LB1.1] Ideally, would like to continue TPN until Friday. If able to get enough calories, will stop TPN sooner. If placement due to TPN is problematic, can stop TPN as discussed with Dr. Peter.[LB1.2]      Yaa Talley PA-C  [facility]  Phone: [phone][LB1.1]      March 6, 2017  8:00 a.m.  Patient seen and examined. Doing well.  Has a good appetite and is eating more.  Plan:  - will need to d/c on 30 days of lovenox  - d/c TPN when dispo is finalized  - plan for d/c to TCU    Sherita Henning MD  [facility]  Pager:  [phone]  Phone: [phone]  Fax: [phone][SV1.1]       Revision History        User Key Date/Time User Provider Type Action    > SV1.1 3/6/2017 10:31 AM Sherita Henning MD Physician Addend     LB1.2 3/6/2017  8:59 AM Yaa Talley PA-C Physician Assistant Sign     LB1.1 3/6/2017  8:28 AM Yaa Talley PA-C Physician Assistant             Progress Notes by Janey Thomas LICSW at 3/6/2017 10:18 AM     Author:  Janey Thomas LICSW Service:  Social Work Author Type:      Filed:  3/6/2017 10:21 AM Date of Service:  3/6/2017 10:18 AM Note Created:  3/6/2017 10:18 AM    Status:  Signed :  Janey Thomas LICSW ()         SW:    I: SW following for discharge planning. Weekend SW met with pt maycol/GI [facility] to discuss TCU options. Pt and family both interested in facilities near  pt home in [place], [facility]. [place] [facility] may also be considered if other facilities unable to accept. SW faxed referrals via DOD. Per MD, would prefer to continue cyclic TPN through Friday 3/10 however if this becomes a barrier to discharge will discontinue.    P: SW to follow.    MARAL Cruz LICSW  Daytime (8:00am-4:30pm): [phone]  After-Hours SW Pager (4:30pm-11:30pm): [phone][CL1.1]        Revision History        User Key Date/Time User Provider Type Action    > CL1.1 3/6/2017 10:21 AM Janey Thomas LICSW  Sign            Progress Notes by Suzanne Yuen RD, LD at 3/6/2017 10:06 AM     Author:  Suzanne Yuen RD, LD Service:  Nutrition Author Type:  Registered Dietitian    Filed:  3/6/2017 10:13 AM Date of Service:  3/6/2017 10:06 AM Note Created:  3/6/2017 10:06 AM    Status:  Signed :  Suzanne Yuen RD, LD (Registered Dietitian)         CALORIE COUNT      Approximate Oral Intake for:    3/5  Calories:  1107  Protein:  60    Number of Meals Recorded:   3      Number of Snacks Recorded:   3     Cyclic TPN continues --> 1200 mL/day, 60 g AA/day, 180 g Dex/day + Lipid 250 mL daily = 1212 kcal (23 kcal/kg and 75% energy needs), 60 g protein (1.1 g/kg and 75% protein needs)  Total (TPN/Lipid + Oral)= 2319 kcal (44 kcal per kg), 120 g protein (2.3 g per kg)    Estimated Needs:   Calories: 0468-2133 kcal (30-35 kcal per kg) - Repletion, underweight  Protein:  grams (1.5-2 g per kg) - Repletion     Summary:   Labs today still pending.  Pt tolerating the Gelatein Plus and Ensure Plus supplements.  Yesterday intake was improved and she met ~ 70% estimated needs via oral intake alone.   I suspect that TPN can be discontinued soon if oral intake continues to improve.  Will continue same nocturnal TPN regimen as above for now.  Calorie Count summary pending 3/7.[MH1.1]      Suzanne Yuen[MH1.2], RD, LD, CNSC[MH1.1]        Revision History        User Key Date/Time User Provider Type Action    > MH1.2 3/6/2017 10:13 AM Suzanne Yuen RD, MARE Registered Dietitian Sign     MH1.1 3/6/2017 10:06 AM Suzanne Yuen RD, MARE Registered Dietitian             Progress Notes by Dinorah James DO at 3/6/2017  8:37 AM     Author:  Dinorah James DO Service:  Hospitalist Author Type:  Physician    Filed:  3/6/2017  8:50 AM Date of Service:  3/6/2017  8:37 AM Note Created:  3/6/2017  8:37 AM    Status:  Signed :  Dinorah James DO (Physician)         [facility]    Hospitalist Progress Note    Assessment & Plan   Sherita Kenney is a 76 year old female with PMHx of hypertension, dyslipidemia, postpolio syndrome glaucoma and hx of nephritis/nephropathy during childhood who was admitted on 2/15/2017 with acute psychosis after a home welfare check. In addition, she was also found to have a right iliopsoas abscess for which she underwent IR drainage. Workup stay also revealed a cecal mass concerning for malignancy.    Acute Psychosis: stable  No prior hx of mental health diagnoses. Was found to be paranoid and agitated at home on the day of admission. Has been seen by psych this stay, was felt to have an unspecificed psychotic disorder. They determined she does not have the capacity to make her own decisions. Care coordinator has been assisting with efforts to establish a legal decision maker in her family (see Sintia Muñoz's note from 2/21 for further details), her niece Thalia (who is a family physician) and friend Dinora are now acting as her POAs.  -- last seen by psych on 2/17  -- conts on Zyprexa 5mg HS     Right iliopsoas abscess, s/p IR drainage: Resolved.   Abscess was noted on CT on admission. WBC initially 22. Started on Zosyn and underwent drainage per IR on 2/15/17. Cultures grew enterobacter cloacae, ekinella corrodens and h parainfluenzae. Completed 2wk course of Zosyn this stay.  Drain subsequently removed.    Cecal Mass, s/p colonoscopy and laparoscopic ileostomy on 3/1/17: POD #5  Developed abdominal distension on 2/23 and CT scan showed a distal small bowel obstruction. CEA elevated. Underwent colonoscopy and laparoscopic ilieostomy per colorectal surgery on 3/1. Noted to have a large fungating mass in the cecum with fixation to the abdominal wall and 3 lesions on the surface of the right liver. Biopsy showed invasive poorly differentiated adenocarcinoma.  -- postop cares per surgery  -- pain control with Tylenol prn and heating pad, patient wants to avoid narcotics  -- cont low fiber diet and calorie counts  -- oncology following this stay -> will follow up in clinic with Dr. Nieves after discharge to discuss plans for future treatment       Acute iron deficiency anemia:   Likely secondary to colon mass. Hgb 5.6 on admission, was transfused 2U PRBCs on 2/16. Has also received IV iron infusion this stay per heme/onc.  -- hgb now remains stable around 8-9    Severe protein calorie malnutrition:   Secondary to underlying malignancy. Nutritionist consulted this stay. Had PICC line placed and began TPN on 2/24.   -- albumin remains low (2.2 per last check on 3/4)  -- cycling TPN postop per surgery while awaiting consistent po intake    Hypothyroid:  Chronic and stable on Synthroid 75mcg daily.      Postpolio syndrome:   Causing a flail left shoulder. Unable to use left arm/hand. Will use sling/brace for PT. Continues on Cyclobenzaprine 10mg TID.     Hypertension:   Not on any scheduled antihypertensive. BPs stable.    Klebsiella UTI: Resolved.   Adequately treated with Zosyn this stay.    Episode of Unresponsiveness: Resolved.   Noted 2/23. RRT called and patient evaluated per house MD. Felt secondary to compazine, which has since been dc'd. 2/23/17. No recurrent episodes since.    FEN: off IVFs, lytes stable, low fiber diet  DVT Prophylaxis: Lovenox, PCDs  Code Status: Full  Code    Disposition: Pending adequate po intake and ability to stop TPN. Await colorectal surgery recs. Remains stable from medicine standpoint. Will need TCU at discharge.    Dinorah Jenisemiguel James    Interval History   Seen this morning. Feeling okay. Sitting up in chair and just finished breakfast - ate about 50% of tray. Denies abd pain/n/v.     -Data reviewed today: I reviewed all new labs and imaging results over the last 24 hours. I personally reviewed no images or EKG's today.    Physical Exam   Temp: 97.8  F (36.6  C) Temp src: Oral BP: 102/58 Pulse: 95 Heart Rate: 92 Resp: 16 SpO2: 98 % O2 Device: None (Room air)    Vitals:    03/04/17 0427 03/05/17 0300 03/06/17 0722   Weight: 54.6 kg (120 lb 5.9 oz) 54.3 kg (119 lb 11.4 oz) 54.6 kg (120 lb 5.9 oz)     Vital Signs with Ranges  Temp:  [97.5  F (36.4  C)-98.3  F (36.8  C)] 97.8  F (36.6  C)  Pulse:  [95-98] 95  Heart Rate:  [92-96] 92  Resp:  [16-18] 16  BP: (102-149)/(45-82) 102/58  SpO2:  [97 %-98 %] 98 %  I/O last 3 completed shifts:  In: 690 [P.O.:690]  Out: 1100 [Urine:800; Stool:300]    Constitutional: Resting comfortably, alert and conversing appropriately, NAD  Respiratory: CTAB, no wheeze/rales/rhonchi  Cardiovascular: HRRR, no MGR  GI: S, NT, +BS, +stool in ostomy  Skin/Integumen: warm/dry  Other: no LE edema    Medications     parenteral nutrition - ADULT compounded formula CYCLE 110 mL/hr at 03/05/17 2009     - MEDICATION INSTRUCTIONS -       - MEDICATION INSTRUCTIONS -       IV fluid REPLACEMENT ONLY       IV infusion builder WITH additives Stopped (03/04/17 0800)       lipids  180 mL Intravenous Q24H     enoxaparin  40 mg Subcutaneous Q24H     sodium chloride (PF)  10 mL Intracatheter Q8H     insulin aspart  1-12 Units Subcutaneous Q4H     levothyroxine  75 mcg Oral Daily     cyclobenzaprine  10 mg Oral TID     OLANZapine zydis  5 mg Oral At Bedtime       Data     Recent Labs  Lab 03/04/17  0625 03/03/17  0615 03/01/17  1727 03/01/17  0751  03/01/17  0545 02/28/17  0925   WBC 8.3  --   --   --   --  8.3   HGB 8.2*  --  8.1*  --   --  8.9*   MCV 79  --   --   --   --  76*    147*  --   --   --  264     --   --   --   --  136   POTASSIUM 4.3  --   --  3.5 Canceled, Test credited Questionable specimenCALLED TO MICKEY ON 33 AT 0722 4.2   CHLORIDE 106  --   --   --   --  104   CO2 28  --   --   --   --  25   BUN 24  --   --   --   --  17   CR 0.51*  --   --  0.56 Canceled, Test credited Questionable specimenCALLED TO MICKEY ON 33 AT 0715CORRECTED ON 03/01 AT 0722: PREVIOUSLY REPORTED AS 0.57 0.52   ANIONGAP 7  --   --   --   --  7   SARITHA 8.3*  --   --   --   --  7.8*   *  --   --   --   --  303*   ALBUMIN 2.2*  --   --   --   --   --        No results found for this or any previous visit (from the past 24 hour(s)).[KW1.1]       Revision History        User Key Date/Time User Provider Type Action    > KW1.1 3/6/2017  8:50 AM Dinorah James DO Physician Sign            Progress Notes by Asha Metcalf, RN at 3/6/2017  8:02 AM     Author:  Heiden, Asha, RN Service:  Surgery Author Type:  Registered Nurse    Filed:  3/6/2017  8:03 AM Date of Service:  3/6/2017  8:02 AM Note Created:  3/6/2017  8:02 AM    Status:  Signed :  Asha Metcalf, RN (Registered Nurse)         Message left for PICC team. None of the three lumens draw, all lumens flush.[KH1.1]      Revision History        User Key Date/Time User Provider Type Action    > KH1.1 3/6/2017  8:03 AM Asha Metcalf, RN Registered Nurse Sign                  Procedure Notes     No notes of this type exist for this encounter.      Progress Notes - Therapies (Notes from 03/06/17 through 03/09/17)     No notes of this type exist for this encounter.

## 2017-02-15 NOTE — IP AVS SNAPSHOT
"[place] [address] [facility]: [phone]                                              INTERAGENCY TRANSFER FORM - LAB / IMAGING / EKG / EMG RESULTS   2/15/2017                    Hospital Admission Date: 2/15/2017  SEVERO SWANN   : 1940  Sex: Female        Attending Provider: Noble Lopez MD     Allergies:  Wool Fiber    Infection:  None   Service:  HOSPITALIST    Ht:  1.676 m (5' 6\")   Wt:  52.3 kg (115 lb 4.8 oz)   Admission Wt:  45.4 kg (100 lb)    BMI:  18.61 kg/m 2   BSA:  1.56 m 2            Patient PCP Information     Provider PCP Type    Latanya Martinez MD General         Lab Results - 3 Days      Glucose by meter [[id number]]  Resulted: 17 0846, Result status: Final result    Ordering provider: Noble Lopez MD  17 0840 Resulting lab: POINT OF CARE TEST, GLUCOSE    Specimen Information    Type Source Collected On     17 0840          Components       Value Reference Range Flag Lab   Glucose 91 70 - 99 mg/dL  170            Glucose by meter [[id number]] (Abnormal)  Resulted: 17, Result status: Final result    Ordering provider: Noble Lopez MD  17 2113 Resulting lab: POINT OF CARE TEST, GLUCOSE    Specimen Information    Type Source Collected On     17          Components       Value Reference Range Flag Lab   Glucose 103 70 - 99 mg/dL H 170            Glucose by meter [[id number]] (Abnormal)  Resulted: 17 1716, Result status: Final result    Ordering provider: Noble Lopez MD  17 1711 Resulting lab: POINT OF CARE TEST, GLUCOSE    Specimen Information    Type Source Collected On     17 1711          Components       Value Reference Range Flag Lab   Glucose 115 70 - 99 mg/dL H 170            Glucose by meter [[id number]]  Resulted: 17 1221, Result status: Final result    Ordering provider: Noble Lopez MD  17 1215 Resulting lab: POINT OF CARE TEST, GLUCOSE    Specimen " Information    Type Source Collected On     03/08/17 1215          Components       Value Reference Range Flag Lab   Glucose 79 70 - 99 mg/dL  170            Glucose by meter [[id number]] (Abnormal)  Resulted: 03/08/17 0221, Result status: Final result    Ordering provider: Noble Lopez MD  03/08/17 0218 Resulting lab: POINT OF CARE TEST, GLUCOSE    Specimen Information    Type Source Collected On     03/08/17 0218          Components       Value Reference Range Flag Lab   Glucose 108 70 - 99 mg/dL H 170            Glucose by meter [[id number]]  Resulted: 03/07/17 2210, Result status: Final result    Ordering provider: Noble Lopez MD  03/07/17 2204 Resulting lab: POINT OF CARE TEST, GLUCOSE    Specimen Information    Type Source Collected On     03/07/17 2204          Components       Value Reference Range Flag Lab   Glucose 92 70 - 99 mg/dL  170            MLH1 Promoter Methylation Tumor [[id number]]  Resulted: 03/07/17 1500, Result status: In process    Ordering provider: Dinora Melendrez MD  03/07/17 1449 Resulting lab: [facility]    Specimen Information    Type Source Collected On   Fixed Tissue  03/01/17 1931            Glucose by meter [[id number]] (Abnormal)  Resulted: 03/07/17 0656, Result status: Final result    Ordering provider: Noble Lopez MD  03/07/17 0652 Resulting lab: POINT OF CARE TEST, GLUCOSE    Specimen Information    Type Source Collected On     03/07/17 0652          Components       Value Reference Range Flag Lab   Glucose 113 70 - 99 mg/dL H 170            Glucose by meter [[id number]] (Abnormal)  Resulted: 03/07/17 0256, Result status: Final result    Ordering provider: Noble Lopez MD  03/07/17 0242 Resulting lab: POINT OF CARE TEST, GLUCOSE    Specimen Information    Type Source Collected On     03/07/17 0242          Components       Value Reference Range Flag Lab   Glucose 125 70 - 99 mg/dL H 170            Glucose by meter [[id number]] (Abnormal)  Resulted: 03/06/17  2131, Result status: Final result    Ordering provider: Noble Lopez MD  03/06/17 2124 Resulting lab: POINT OF CARE TEST, GLUCOSE    Specimen Information    Type Source Collected On     03/06/17 2124          Components       Value Reference Range Flag Lab   Glucose 115 70 - 99 mg/dL H 170            Glucose by meter [[id number]] (Abnormal)  Resulted: 03/06/17 1706, Result status: Final result    Ordering provider: Noble Lopez MD  03/06/17 1659 Resulting lab: POINT OF CARE TEST, GLUCOSE    Specimen Information    Type Source Collected On     03/06/17 1659          Components       Value Reference Range Flag Lab   Glucose 105 70 - 99 mg/dL H 170            Glucose by meter [[id number]] (Abnormal)  Resulted: 03/06/17 1213, Result status: Final result    Ordering provider: Noble Lopez MD  03/06/17 1203 Resulting lab: POINT OF CARE TEST, GLUCOSE    Specimen Information    Type Source Collected On     03/06/17 1203          Components       Value Reference Range Flag Lab   Glucose 106 70 - 99 mg/dL H 170            Platelet count [[id number]]  Resulted: 03/06/17 1146, Result status: Final result    Ordering provider: Dinorah James DO  03/06/17 1041 Resulting lab: [facility]    Specimen Information    Type Source Collected On   Blood  03/06/17 1105          Components       Value Reference Range Flag Lab   Platelet Count 234 150 - 450 10e9/L  FrStHsLb            Triglycerides [[id number]]  Resulted: 03/06/17 1132, Result status: Final result    Ordering provider: Dinorah James DO  03/06/17 1041 Resulting lab: [facility]    Specimen Information    Type Source Collected On   Blood  03/06/17 1105          Components       Value Reference Range Flag Lab   Triglycerides 100 <150 mg/dL  FrStHsLb            Magnesium [[id number]]  Resulted: 03/06/17 1132, Result status: Final result    Ordering provider: Dinorah James DO  03/06/17 1041 Resulting  lab: [facility]    Specimen Information    Type Source Collected On   Blood  03/06/17 1105          Components       Value Reference Range Flag Lab   Magnesium 2.2 1.6 - 2.3 mg/dL  FrStHsLb            Phosphorus [[id number]]  Resulted: 03/06/17 1132, Result status: Final result    Ordering provider: Dinorah James,   03/06/17 1041 Resulting lab: [facility]    Specimen Information    Type Source Collected On   Blood  03/06/17 1105          Components       Value Reference Range Flag Lab   Phosphorus 3.5 2.5 - 4.5 mg/dL  FrStHsLb            Prealbumin [[id number]]  Resulted: 03/06/17 1132, Result status: Final result    Ordering provider: Dinorah James DO  03/06/17 1041 Resulting lab: [facility]    Specimen Information    Type Source Collected On   Blood  03/06/17 1105          Components       Value Reference Range Flag Lab   Prealbumin 17 15 - 45 mg/dL  FrStHsLb            Comprehensive metabolic panel [[id number]] (Abnormal)  Resulted: 03/06/17 1132, Result status: Final result    Ordering provider: Dinorah James DO  03/06/17 1041 Resulting lab: [facility]    Specimen Information    Type Source Collected On   Blood  03/06/17 1105          Components       Value Reference Range Flag Lab   Sodium 140 133 - 144 mmol/L  FrStHsLb   Potassium 4.1 3.4 - 5.3 mmol/L  FrStHsLb   Chloride 106 94 - 109 mmol/L  FrStHsLb   Carbon Dioxide 27 20 - 32 mmol/L  FrStHsLb   Anion Gap 7 3 - 14 mmol/L  FrStHsLb   Glucose 104 70 - 99 mg/dL H FrStHsLb   Urea Nitrogen 27 7 - 30 mg/dL  FrStHsLb   Creatinine 0.58 0.52 - 1.04 mg/dL  FrStHsLb   GFR Estimate -- >60 mL/min/1.7m2  FrStHsLb   Result:         >90  Non  GFR Calc     GFR Estimate If Black -- >60 mL/min/1.7m2  FrStHsLb   Result:         >90   GFR Calc     Calcium 8.7 8.5 - 10.1 mg/dL  FrStHsLb   Result:     Bilirubin Total 0.3 0.2 - 1.3 mg/dL  FrStHsLb    Albumin 2.5 3.4 - 5.0 g/dL L FrStHsLb   Protein Total 6.3 6.8 - 8.8 g/dL L FrStHsLb   Alkaline Phosphatase 114 40 - 150 U/L  FrStHsLb   ALT 19 0 - 50 U/L  FrStHsLb   AST 31 0 - 45 U/L  FrStHsLb            INR [[id number]]  Resulted: 03/06/17 1129, Result status: Final result    Ordering provider: Dinorah James DO  03/06/17 1041 Resulting lab: [facility]    Specimen Information    Type Source Collected On   Blood  03/06/17 1105          Components       Value Reference Range Flag Lab   INR 0.97 0.86 - 1.14  FrStHsLb            Surgical pathology exam [[id number]]  Resulted: 03/06/17 1007, Result status: Edited Result - FINAL    Ordering provider: Giovanni Peter MD  03/01/17 1931 Resulting lab: [facility]    Specimen Information    Type Source Collected On   Biopsy Large Intestine, Cecum 03/01/17 1931          Components       Value Reference Range Flag Lab   Copath Report --      Result:         Patient Name: SEVERO SWANN  MR#: [id number]  Specimen #: [id number]  Collected: 3/1/2017  Received: 3/2/2017  Reported: 3/3/2017 14:15  Ordering Phy(s): GIOVANNI PETER  Additional Phy(s): DENISHA RICHARD    For improved result formatting, select 'View Enhanced Report Format'  under Linked Documents section.    ***ORIGINAL REPORT FOLLOWS ADDENDUM***    ADDENDUM    TO ORIGINAL REPORT  Status: Signed Out  Date Ordered:3/6/2017  Date Complete:3/6/2017  Date Reported:3/6/2017 10:05  Signed Out By: Dinora Melendrez M.D.    INTERPRETATION:    This addendum is performed to report the immunohistochemistry results  for DNA mismatch repair protein analysis to rule out possible Bone  syndrome  <<<<  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)  MLH1       MSH2        MSH6      PMS2  Absent     Present     Present     Absent    There is absence of expression of DNA mismatch repair (MMR) proteins  MLH1 and PMS2 in the tumor nuclei. MMR proteins exist as heterodimers.  MLH1 and PMS2 are par tners  "with MLH1 dominant. With loss of MLH1 there  is also loss of PMS2, which is not due to a mutation in the PMS2 gene.  PMS2 is lost without loss of MLH1 if there is a mutation in the PMS2  gene, but MLH1 loss is almost always accompanied by loss of PMS2. Most  cases of loss of MLH1 (90%) are due to somatic inactivation of MLH1 due  to promoter hypermethylation of the gene (sporadic MSI carcinoma) but  about 10% are due to germline mutation of the MLH1 gene (Bone  Syndrome). About 80% of sporadic MSI cases have an activating mutation  of the V600E codon of the BRAF gene.  To distinguish between promoter  hyperrmethylation (somatic event) and an inherited germline mutation,  additional tests can be performed. These tests include quantitative MLH1  hypermethylation analysis or testing for the presence of a V600E  mutation of the BRAF oncogene, which if present indicates sporadic MSI  carcinoma with MLH1 promoter hypermethylation and essentially rules out  Bone syndrome associated MS I carcinoma. Therefore, this specimen will  be submitted for hypermethylation analysis.  Further evaluation for the  patient and family including germline testing for MLH1 gene mutation  would be appropriate with this pattern of protein loss if there is no  evidence of hypermethylation of MLH1. Genetic counseling is recommended  with this result. For genetic counseling and genetic testing please  contact the [facility], Genetic Counselor Suzanne Ramirez at  [phone], [email or web address].  >>>>>    __________________________________________    ORIGINAL REPORT:    SPECIMEN(S):  Cecal mass biopsy    FINAL DIAGNOSIS:  Cecum, mass, biopsy-  - Invasive poorly differentiated adenocarcinoma(Please see microscopic  description)    Electronically signed out by:    Dinora Melendrez M.D.    CLINICAL HISTORY:   Colonic obstruction    GROSS:  The specimen is received in formalin with proper patient identification  labeled \"cecal mass " "biopsy\".  The specimen consists of five tan tissue   fragments ranging from 0.1-0.4 cm.  The specimen is entirely submitted  in one cassette. (Dictated by: Carlos Moreno 3/2/2017 08:17 AM)    MICROSCOPIC:  Specimen consists of multiple fragments of colorectal mucosa, 2 of them  show an associated invasive poorly differentiated adenocarcinoma.  The  biopsies are superficial precluding accurate depth of invasion  assessment.  Immunostains for DNA mismatch repair protein to rule out  Bone syndrome are ordered and the results will be reported as an  addendum.    CPT Codes:  A: 30679-PY1, 63567-TKC, 28022-WPO, 09604-JMF, 22208-LIX, SOH    TESTING LAB LOCATION:  [address]  [address]  [phone]    COLLECTION SITE:  Client: [facility]  Location: [facility] (S)              Glucose by meter [[id number]]  Resulted: 03/06/17 0838, Result status: Final result    Ordering provider: Noble Lopez MD  03/06/17 0824 Resulting lab: POINT OF CARE TEST, GLUCOSE    Specimen Information    Type Source Collected On     03/06/17 0824          Components       Value Reference Range Flag Lab   Glucose 78 70 - 99 mg/dL  170            Glucose by meter [[id number]] (Abnormal)  Resulted: 03/06/17 0430, Result status: Final result    Ordering provider: Noble Lopez MD  03/06/17 0425 Resulting lab: POINT OF CARE TEST, GLUCOSE    Specimen Information    Type Source Collected On     03/06/17 0425          Components       Value Reference Range Flag Lab   Glucose 112 70 - 99 mg/dL H 170            Glucose by meter [[id number]] (Abnormal)  Resulted: 03/06/17 0035, Result status: Final result    Ordering provider: Noble Lopez MD  03/06/17 0031 Resulting lab: POINT OF CARE TEST, GLUCOSE    Specimen Information    Type Source Collected On     03/06/17 0031          Components       Value Reference Range Flag Lab   Glucose 107 70 - 99 mg/dL H 170            Testing " "Performed By     Lab - Abbreviation Name Director Address Valid Date Range    14 - FrStHsLb [facility] Unknown [address] [place] [address] 05/08/15 1057 - Present    88 - Unknown [facility] Unknown Unknown 10/30/02 0000 - Present    170 - Unknown POINT OF CARE TEST, GLUCOSE Unknown Unknown 10/31/11 1114 - Present            Unresulted Labs (24h ago through future)    Start       Ordered    Unscheduled  Potassium  (Potassium Replacement - \"Standard\" - For K levels less than 3.4 mmol/L - UU,UR,UA,RH,SH,PH,WY )  CONDITIONAL (SPECIFY),   Routine     Comments:  Obtain Potassium Level for these conditions:  *IF no potassium result within 24 hours before initiation of order set, draw potassium level with next lab collect.    *2 HOURS AFTER last IV potassium replacement dose and 4 hours after an oral replacement dose.  *Next morning after potassium dose.     Repeat Potassium Replacement if necessary.    02/15/17 0533    Unscheduled  Magnesium  (Magnesium Replacement -  Adult - \"Standard\" - Replacement for all levels less than 1.6 mg/dL )  CONDITIONAL (SPECIFY),   Routine     Comments:  Obtain Magnesium Level for these conditions:  *IF no magnesium result within 24 hrs before initiation of order set, draw magnesium level with next lab collect.    *2 HOURS AFTER last magnesium replacement dose when magnesium replacement given for level less than 1.6   *Next morning after magnesium dose.     Repeat Magnesium Replacement if necessary.    02/15/17 0533    Unscheduled  Phosphorus  (POTASSIUM Phosphate - \"Standard\" - Replacement for levels less than or equal to 2.4 mg/dL )  CONDITIONAL (SPECIFY),   Routine     Comments:  Obtain Phosphorus Level for these conditions:  *IF no phosphorus result within 24 hrs before initiation of order set, draw phosphorus level with next lab collect.    *2 HOURS AFTER last phosphorus replacement dose for levels less than 2.0.  *Next morning after phosphorus dose.     Repeat " Phosphorus Replacement if necessary.    17 1311    Unscheduled  Blood gas blood with oxy  CONDITIONAL X 3,   Routine     Comments:  Release order if patient in respiratory distress and Notify Provider.    17 230    Unscheduled  Glucose  CONDITIONAL X 3,   Routine     Comments:  Release order if patient experiencing hyperglycemia or hypoglycemia symptoms and Notify Provider.    17 230    Unscheduled  Hemoglobin  CONDITIONAL X 3,   Routine     Comments:  Release order if patient experiencing active bleeding and/or hypotension and Notify Provider.    17 230         ECG/EMG Results      Echo Complete with Lumason [[id number]]  Resulted: 17 1321, Result status: Edited Result - FINAL    Ordering provider: Irma Ortiz MD  17 0939 Resulted by: Adam Arnett MD    Performed: 17 1332 - 17 1332 Resulting lab: RADIOLOGY RESULTS    Narrative:       [id number]  [id number]  [id number]  [id number]^TIFFANIE^IRMA^JUAN           [facility]  Echocardiography Laboratory  [address] [place] [address]        Name: SEVERO SWANN  MRN: [id number]  : 1940  Study Date: 2017 01:21 PM  Age: 76 yrs  Gender: Female  Patient Location: [facility]  Reason For Study: MI  Ordering Physician: IRMA ORTIZ  Referring Physician: Latanya Martinez  Performed By: Agapito Fernandez RDCS     BSA: 1.6 m2  Height: 66 in  Weight: 116 lb  HR: 76  BP: 129/89 mmHg  _____________________________________________________________________________  __        Procedure  Complete Portable Echo Adult. Contrast Lumason.  _____________________________________________________________________________  __        Interpretation Summary     The visual ejection fraction is estimated at 55-60%.  Left ventricular systolic function is normal.  The study was technically difficult.  _____________________________________________________________________________  __        Left Ventricle  The  left ventricle is normal in size. E by E prime ratio is between 8 and 15,  which is indeterminate for assessment of left ventricular filling pressures.  Left ventricular systolic function is normal. The visual ejection fraction is  estimated at 55-60%. The transmitral spectral Doppler flow pattern is normal  for age.     Right Ventricle  The right ventricle is normal in size and function.     Atria  The left atrium is borderline dilated. Right atrial size is normal. There is  no color Doppler evidence of an atrial shunt.     Mitral Valve  The mitral valve leaflets are mildly thickened. There is mild (1+) mitral  regurgitation.        Tricuspid Valve  There is trace tricuspid regurgitation. The right ventricular systolic  pressure is approximated at 22.3mmHg plus the right atrial pressure.     Aortic Valve  The aortic valve is trileaflet. There is trivial trileaflet aortic sclerosis.  No aortic regurgitation is present. No hemodynamically significant valvular  aortic stenosis.     Pulmonic Valve  There is trace pulmonic valvular regurgitation.     Vessels  The inferior vena cava is not dilated.     Pericardium  There is no pericardial effusion.        Rhythm  The rhythm was normal sinus.  _____________________________________________________________________________  __  MMode/2D Measurements & Calculations  IVSd: 1.0 cm     LVIDd: 3.8 cm  LVIDs: 2.8 cm  LVPWd: 0.79 cm  FS: 27.9 %  EDV(Teich): 63.5 ml  ESV(Teich): 28.8 ml  LV mass(C)d: 105.6 grams  Ao root diam: 2.9 cm  LA dimension: 3.5 cm  LA/Ao: 1.2  LA Volume (BP): 46.0 ml  LA Volume Index (BP): 28.9 ml/m2        Doppler Measurements & Calculations  MV E max tosin: 80.5 cm/sec  MV A max tosin: 100.0 cm/sec  MV E/A: 0.81     MV dec time: 0.16 sec  Lateral E/e': 8.2  Medial E/e': 11.1           _____________________________________________________________________________  __           Report approved by: Allen Roa 03/01/2017 02:15 PM       1    Type Source  Collected On     03/01/17 1321          View Image (below)              Encounter-Level Documents:     There are no encounter-level documents.      Order-Level Documents:     There are no order-level documents.

## 2017-02-15 NOTE — PLAN OF CARE
Problem: Goal Outcome Summary  Goal: Goal Outcome Summary  Outcome: No Change  Pt A&O x3 . Admitted at 0530 from ED, LUE hemiplegic and mild weakness with dorsi/plantar flexion, otherwise cms and neuros intact. VSS, denies pain, on regular diet . Continue to monitor.

## 2017-02-15 NOTE — PLAN OF CARE
Problem: Goal Outcome Summary  Goal: Goal Outcome Summary  OT: Order received, chart reviewed and per discussion with PT and RN, awaiting colorectal surgery consult. Will hold OT eval until POC determined. RN in agreement.       Addendum: Per discussion with PT, pt not appropriate for therapy intervention this date.

## 2017-02-15 NOTE — PROGRESS NOTES
1607 Pain right mid abdomen at 6. Consent signed by pt and dr monzon. Dr monzon assessed pt. Heart tones normal. Lungs clear anteriorly, right lower diminished.  1630 VSS. Pt tolerated drain placment very well. Pt was given 1 mg versed and 50 mcg fentanyl iv for sedation per dr monzon. Pt did need o2 and nasal cannula at 3l/m. VSS. Pt dozing on and off. Dr monzon removing large amts of tan foul smelling drainage from site with syringe. Site right mid side abdomen, CDI. 500cc removed per dr monzon. Stay put drsg in place and ara bulb.  1645 report called to [place] floor rn, pt back to 739 via cart with transport.

## 2017-02-15 NOTE — PLAN OF CARE
Problem: Goal Outcome Summary  Goal: Goal Outcome Summary  PT: PT orders received. Per chart, pt awaiting colorectal surgery consult. Will hold PT eval until POC determined. RN updated and in agreement.

## 2017-02-15 NOTE — PROGRESS NOTES
"Spoke with Dr Hamilton regarding situation. Per Dr Hamilton: \"No INR needed. Will plan on possible light sedation if needed. Pt unable to sign consent d/t mentation. Will do procedure as necessary comfort measure\".  "
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
## 2017-02-15 NOTE — IP AVS SNAPSHOT
` ` Patient Information     Patient Name Sex Sherita Pena ([id number]) Female 1940       Room Bed    3332 3332-02      Patient Demographics     Address Phone E-mail Address    [address]    [place] [address] [phone] (Home)  [phone] (Mobile) [email or web address]      Patient Ethnicity & Race     Ethnic Group Patient Race    American White      Emergency Contact(s)     Name Relation Home Work Mobile    Sherita uS- co POA Relative   [phone]    Dinora Garrett-co POA Friend   [phone]    Nat Mai Sister [phone]    Dr. Renny Monson Brother [phone]    Liz Friend [phone]        Documents on File        Status Date Received Description       Documents for the Patient    External Medication Information Consent Patient Refused () 11     Consent for Services - Hospital/Clinic Received () 11     Consent for Services - Hospital/Clinic Received () 13     Consent to Communicate Received 13 E-mail    [facility] Specified Other       Privacy Notice - [place] Received 16     Consent for Services - Hospital/Clinic Received () 14     Consent to Communicate Received 14 PHI    Patient ID Received 01/26/15     Consent for Services - Hospital/Clinic Received () 01/26/15     Consent for EHR Access Received 01/26/15     Waiver - Payment - Patient Received 02/02/15 occupational therapy waiver    Consent for Services/Privacy Notice - Hospital/Clinic Received () 16     Insurance Card Received 16 medicare    Insurance Card Received 16 health partners    Advance Directives and Living Will Not Received  Validation of AD 1997    Advance Directives and Living Will Received 17 Health Care Directive 1997    Advance Directives and Living Will Not Received  Validation of AD 1997    Advance Directives and Living Will Received  02/20/17 Health Care Directive 07-    Advance Directives and Living Will Not Received  Validation of AD  02/21/17    Advance Directives and Living Will Received 02/21/17 Health Care Directive  02/21/17    Power of  Not Received  Power of  07-    Privacy Notice - [place] Received (Deleted) 12/26/06     Insurance Card  (Deleted) 12/26/06     Waiver - Payment  (Deleted) 12/26/06     Patient ID Received (Deleted) 10/25/13     Insurance Card Received (Deleted) 12/21/11     Insurance Card Received (Deleted) 01/03/13     Insurance Card Received (Deleted) 01/03/13 Medicare    Consent for EHR Access  (Deleted) 03/13/13 Copied from existing Consent for services - C/HOD collected on 01/03/2013    Waiver - Payment Received (Deleted) 11/19/13     Insurance Card Received (Deleted) 06/26/14 HP    Insurance Card Received (Deleted) 01/26/15 hp    Insurance Card Received (Deleted) 01/26/15 medicare    Advance Directives and Living Will Received (Deleted) 02/20/17     Advance Directives and Living Will Received (Deleted) 02/20/17     Advance Directives and Living Will Received (Deleted) 02/20/17 Health Care Directive 07-    Advance Directives and Living Will Not Received (Deleted)  Validation of AD 07-    Advance Directives and Living Will Not Received (Deleted)         Documents for the Encounter    CMS IM for Patient Signature Received 02/17/17 Ltr not given    EMS/Ambulance Record  02/21/17 [place] FIRE DEPARTMENT    CE Auth Form (Scanned)  02/22/17 CARE EVERYWHERE-[facility]    Discharge Instructions  02/23/17 CONTRAST DISCHARGE INSTRUCTIONS    Assessment/Questionnaire  02/23/17 CONTRAST QUESTIONNAIRE    CMS IM for Patient Signature Received 02/23/17 1MM    CMS IM for Patient Signature Received 03/08/17 2MM    CE Point of Care Auth Received        Admission Information     Attending Provider Admitting Provider Admission Type Admission Date/Time    John, MD John Silverman Jason  MD [facility] 02/15/17  0109    Discharge Date Hospital Service Auth/Cert Status Service Area     Hospitalist Incomplete [facility] SERVICES    Unit Room/Bed Admission Status        33 SURG SPECIALTIES 3332/3332-02 Admission (Confirmed)       Admission     Complaint    Psychosis, RULE OUT COLONIC MASS, COLONIC OBSTRUCTION       Hospital Account     Name Acct ID Class Status Primary Coverage    Sherita Kenney [id number] Inpatient Open MEDICARE - MEDICARE FOR HB SUPPLEMENT            Guarantor Account (for Hospital Account #[id number])     Name Relation to Pt Service Area Active? Acct Type    Sherita Kenney Self FCS Yes Personal/Family    Address Phone          [address] [place] [address] [phone](H)              Coverage Information (for Hospital Account #[id number])     1. MEDICARE/MEDICARE FOR HB SUPPLEMENT     F/O Payor/Plan Precert #    MEDICARE/MEDICARE FOR HB SUPPLEMENT     Subscriber Subscriber #    Sherita Kenney [id number]    Address Phone    [employer]  [address]  [place] [address] [phone]          2. HEALTH PARTNERS/[employer]ERS FREEDOM PLAN     F/O Payor/Plan Precert #    HEALTH PARTNERS/[employer]ERS FREEDOM PLAN     Subscriber Subscriber #    Sherita Kenney [id number]    Address Phone    [address]  [place] [address] [phone]

## 2017-02-15 NOTE — PROGRESS NOTES
RADIOLOGY PROCEDURE NOTE  Patient name: Sherita Kenney  MRN: [id number]  : 1940    Pre-procedure diagnosis: Abdominal abscess  Post-procedure diagnosis: Same    Procedure Date/Time: February 15, 2017  4:44 PM  Procedure: CT guided abscess drainage  Estimated blood loss: None  Specimen(s) collected with description: Purulent fluid  The patient tolerated the procedure well with no immediate complications.  Significant findings:500 ml of purulent fluid removed. 10 fr APD left to VEDA bulb suction. Samples sent to lab for cultures and gram stain.    See imaging dictation for procedural details.    Provider name: Akhil Hamilton  Assistant(s):None

## 2017-02-15 NOTE — ED NOTES
Bed: ED03  Expected date:   Expected time:   Means of arrival:   Comments:  Louann 2 76/F Altered Mental Status

## 2017-02-15 NOTE — IP AVS SNAPSHOT
MRN:[id number]                      After Visit Summary   2/15/2017    Sherita Kenney    MRN: [id number]           Thank you!     Thank you for choosing [facility] for your care. Our goal is always to provide you with excellent care. Hearing back from our patients is one way we can continue to improve our services. Please take a few minutes to complete the written survey that you may receive in the mail after you visit with us. Thank you!        Patient Information     Date Of Birth          1940        About your hospital stay     You were admitted on:  February 15, 2017 You last received care in the:  [place] [address] [facility]    You were discharged on:  March 9, 2017        Reason for your hospital stay       The patient was in the hospital to manage psychosis and a new malignant right colon mass                  Who to Call     For medical emergencies, please call 911.  For non-urgent questions about your medical care, please call your primary care provider or clinic, [phone]  For questions related to your surgery, please call your surgery clinic        Attending Provider     Provider Specialty    Lito Taylor MD Emergency Medicine    Lopez, Noble Altman MD Internal Medicine       Primary Care Provider Office Phone # Fax #    Latanya Martinez [phone]       [facility] [employer] [facility] [address]  [facility] [address]        After Care Instructions     Activity - Up with nursing assistance           Advance Diet as Tolerated       Follow this diet upon discharge: Orders Placed This Encounter      Calorie Counts      Calorie Counts      Room Service      Snacks/Supplements Adult: Other - Please comment; Cherry Gelatein Plus; With Meals      Snacks/Supplements Adult: Ensure Plus (Adult); Between Meals      Calorie Counts      Low Fiber Diet            Fall precautions           General info for SNF       Length of Stay Estimate: Short  Term Care: Estimated # of Days <30  Condition at Discharge: Improving  Level of care:skilled   Rehabilitation Potential: Good  Admission H&P remains valid and up-to-date: Yes  Recent Chemotherapy: N/A  Use Nursing Home Standing Orders: Yes            Mantoux instructions       Give two-step Mantoux (PPD) Per Facility Policy Yes            Ostomy care       Standard Cares.  Type:  ileostomy            Wound care (specify)       Site:   Port sites  Instructions:  Keep clean and dry                  Follow-up Appointments     Follow Up and recommended labs and tests       Dr. Peter's office will coordinate definitive surgery in a couple of week. Call the office at [phone] if you develop fever, uncontrolled pain, bleeding, nausea, vomiting, or constipation.   No heavy lifting or straining over 15-20 lbs for 6 weeks. No Driving while taking narcotic pain medications                  Your next 10 appointments already scheduled     Apr 04, 2017 11:00 AM CDT   Return Visit with Susan Lindo MD   [facility] ([facility])    [facility]  [address] [facility] [address]  [facility] [address] [phone]              Additional Services     Occupational Therapy Adult Consult       Evaluate and treat as clinically indicated.    Reason:  Post op deconditioning            Physical Therapy Adult Consult       Evaluate and treat as clinically indicated.    Reason:  Post op deconditioning                  Further instructions from your care team       RUQ Existing New  Ileostomy     -Pt forgetful/confused and has not actively engaged in Ileostomy management  -Attempting to make her aware of when appliance needs emptying    1. Change appliance 1-2x/wk and as needed for any leakage   2. Empty pouch when 1/3 full.   3. Carry an extra pouching system with you at all times  4. Diet: Eat 6 small meals/day. Drink 8oz/fluid every hour during waking hours. Chew all food well.    5. Diet: Eat  "pasta, rice, potatoes, hot/dry cereal, toast, breads, pretzels, chips, crackers; yogurt, meat, fish, eggs, cheese, peanut butter, bananas to thicken the stool. Eat 3-4 servings of protien/day.  6. Avoid eating: Seeds, nuts, peels, raw vegetables, chinese vegetables, casings on meats, grape/prume juice, grapefruit, oranges, pineapple, mushrooms.   7. NO laxatives. NO timed released medications   8. May shower with appliance on. Blow dry (on cool setting) cloth backing and tape following bathing.   9. Change your appliance in the morning before breakfast.  10. Walk, no heavy lifting  11. Use an odor eliminator (Ex: Febreeze) in the room prior to emptying or changing appliance  12. Initially monitor your output to avoid dehydration.   Call your primary doctor if output exceeds 1500cc/24hrs.      Supplies: Vance New Image 2-pc  1. Barrier New Image: Flextend convex cut to fit, with tape         #21560 5/bx = 2-4 bx/month OR    2. Pouch New Image High output                                             #[id number] 10/bx = 1-2 bx/month OR  3. Pouch:Opaque drainable with lock n roll                                #[id number] 10/bx = 1-2bx/month  4  Optional if leakage Vance Adapt ring                              #3555 10/bx = 1bx/month          Procedure for Ileostomy Pouch Change      1.Barrier: draw and cut opening following pattern. Remove paper covering over barrier  2.  Optional if leakage: Open ring and stretch to approx size of opening in barrier. Apply around opening on sticky side of barrier. Press into place.   3. Fold back edges of paper that covers the tape to create a \"tab\". This assists with paper removal in Step #9. OK to snap pouch of choice on barrier at this step and apply as a 1-pc pouch  4. Set barrier aside.   5. Remove pouch from around stoma. Discard.   6. Cleanse skin around stoma with water. Pat dry.  7. Center stoma in barrier opening. Press barrier to skin.  8.Pull on \"tab\" to remove paper that " "covers the tape. Press tape to skin   9. Snap on pouch of choice  10. 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  EFFECTIVE NOW     Approved and electronically signed by:  Jaret Wylie MD             Admission Information     Date & Time Provider Department Dept. Phone    2/15/2017 Lopez, Noble Altman MD [place] [address] [facility] [phone]      Your Vitals Were     Blood Pressure Pulse Temperature Respirations Height Weight    120/66 (BP Location: Right arm) 94 97.9  F (36.6  C) (Oral) 16 1.676 m (5' 6\") 52.3 kg (115 lb 4.8 oz)    Pulse Oximetry BMI (Body Mass Index)                96% 18.61 kg/m2          [employer] Information     [employer] lets you send messages to your doctor, view your test results, renew your prescriptions, schedule appointments and more. To sign up, go to [email or web address] . Click on \"Log in\" on the left side of the screen, which will take you to the Welcome page. Then click on \"Sign up Now\" on the right side of the page.     You will be asked to enter the access code listed below, as well as some personal information. Please follow the directions to create your username and password.     Your access code is: [email or web address]  Expires: 2017 12:06 PM     Your access code will  in 90 days. If you need help or a new code, please call your [facility] clinic or [phone].        Care EveryWhere ID     This is your Care EveryWhere ID. This could be used by other organizations to access your [facility] medical records  [id number]           Review of your medicines      START taking  "       Dose / Directions    acetaminophen 325 MG tablet   Commonly known as:  TYLENOL   Used for:  Malignant neoplasm of right colon (H)        Dose:  650 mg   Take 2 tablets (650 mg) by mouth every 4 hours as needed for mild pain   Quantity:  100 tablet   Refills:  0       enoxaparin 40 MG/0.4ML injection   Commonly known as:  LOVENOX   Used for:  Malignant neoplasm of right colon (H)        Dose:  40 mg   Inject 0.4 mLs (40 mg) Subcutaneous every 24 hours   Quantity:  23 Syringe   Refills:  0       OLANZapine zydis 5 MG ODT tab   Commonly known as:  zyPREXA   Used for:  Paranoia (H)        Dose:  5 mg   Take 1 tablet (5 mg) by mouth At Bedtime   Refills:  0       oxyCODONE 5 MG IR tablet   Commonly known as:  ROXICODONE   Used for:  Malignant neoplasm of right colon (H)        Dose:  5 mg   Take 1 tablet (5 mg) by mouth every 3 hours as needed for moderate to severe pain   Refills:  0         CONTINUE these medicines which may have CHANGED, or have new prescriptions. If we are uncertain of the size of tablets/capsules you have at home, strength may be listed as something that might have changed.        Dose / Directions    cyclobenzaprine 10 MG tablet   Commonly known as:  FLEXERIL   This may have changed:    - when to take this  - reasons to take this   Used for:  Osteoarthritis of spine with radiculopathy, lumbar region        Dose:  10 mg   Take 1 tablet (10 mg) by mouth 3 times daily   Quantity:  42 tablet   Refills:  0         CONTINUE these medicines which have NOT CHANGED        Dose / Directions    Calcium-Magnesium 333-167 MG Tabs        Dose:  2 tablet   Take 2 tablets by mouth daily.   Refills:  0       ferrous gluconate 324 (38 FE) MG tablet   Commonly known as:  FERGON        Dose:  1 tablet   Take 1 tablet by mouth daily (with lunch).   Refills:  0       Glucosamine Sulfate--500 MG Tabs        Dose:  1500 mg   Take 1,500 mg by mouth daily.   Refills:  0       HAIR/SKIN/NAILS PO        Dose:  1  tablet   Take 1 tablet by mouth daily.   Refills:  0       levothyroxine 75 MCG tablet   Commonly known as:  SYNTHROID/LEVOTHROID        Dose:  75 mcg   Take 75 mcg by mouth daily.   Refills:  0       SUMATRIPTAN SUCCINATE PO        Dose:  25 mg   Take 25 mg by mouth every 8 hours as needed for migraine   Refills:  0       TRAVATAN Z 0.004 % ophthalmic solution   Generic drug:  travoprost (BAK Free)        Dose:  1 drop   Apply 1 drop to eye. In each eye Q   Refills:  0       vitamin B-Complex        Dose:  1 tablet   Take 1 tablet by mouth daily.   Refills:  0         STOP taking     ascorbic acid 1000 MG Tabs   Commonly known as:  vitamin C           BIOTIN 5000 5 MG Caps   Generic drug:  biotin           MESTINON 180 MG CR tablet   Generic drug:  pyridostigmine           methylphenidate 10 MG tablet   Commonly known as:  RITALIN           PRAVASTATIN SODIUM PO           PRENATAL 1 PO           verapamil HCl 120 MG Cp24           vitamin A 8000 UNITS Caps           Vitamin D-3 5000 UNITS Tabs                Where to get your medicines      Some of these will need a paper prescription and others can be bought over the counter. Ask your nurse if you have questions.     You don't need a prescription for these medications     acetaminophen 325 MG tablet    cyclobenzaprine 10 MG tablet    enoxaparin 40 MG/0.4ML injection    OLANZapine zydis 5 MG ODT tab         Information about where to get these medications is not yet available     ! Ask your nurse or doctor about these medications     oxyCODONE 5 MG IR tablet                Protect others around you: Learn how to safely use, store and throw away your medicines at www.disposemymeds.org.             Medication List: This is a list of all your medications and when to take them. Check marks below indicate your daily home schedule. Keep this list as a reference.      Medications           Morning Afternoon Evening Bedtime As Needed    acetaminophen 325 MG tablet    Commonly known as:  TYLENOL   Take 2 tablets (650 mg) by mouth every 4 hours as needed for mild pain   Last time this was given:  650 mg on 3/9/2017 10:50 AM                                   Calcium-Magnesium 333-167 MG Tabs   Take 2 tablets by mouth daily.                                   cyclobenzaprine 10 MG tablet   Commonly known as:  FLEXERIL   Take 1 tablet (10 mg) by mouth 3 times daily   Last time this was given:  10 mg on 3/9/2017  8:33 AM                                         enoxaparin 40 MG/0.4ML injection   Commonly known as:  LOVENOX   Inject 0.4 mLs (40 mg) Subcutaneous every 24 hours   Last time this was given:  40 mg on 3/9/2017  5:55 AM                                   ferrous gluconate 324 (38 FE) MG tablet   Commonly known as:  FERGON   Take 1 tablet by mouth daily (with lunch).                                   Glucosamine Sulfate--500 MG Tabs   Take 1,500 mg by mouth daily.                                   HAIR/SKIN/NAILS PO   Take 1 tablet by mouth daily.                                   levothyroxine 75 MCG tablet   Commonly known as:  SYNTHROID/LEVOTHROID   Take 75 mcg by mouth daily.   Last time this was given:  75 mcg on 3/9/2017  8:33 AM                                   OLANZapine zydis 5 MG ODT tab   Commonly known as:  zyPREXA   Take 1 tablet (5 mg) by mouth At Bedtime   Last time this was given:  5 mg on 3/8/2017  9:51 PM                                   oxyCODONE 5 MG IR tablet   Commonly known as:  ROXICODONE   Take 1 tablet (5 mg) by mouth every 3 hours as needed for moderate to severe pain   Last time this was given:  5 mg on 3/3/2017  1:22 PM                                   SUMATRIPTAN SUCCINATE PO   Take 25 mg by mouth every 8 hours as needed for migraine                                   TRAVATAN Z 0.004 % ophthalmic solution   Apply 1 drop to eye. In each eye QHS   Generic drug:  travoprost (BAK Free)                                   vitamin B-Complex    Take 1 tablet by mouth daily.

## 2017-02-15 NOTE — PLAN OF CARE
"Problem: Goal Outcome Summary  Goal: Goal Outcome Summary  PT: PT orders received, evaluation attempted. Pt providing subjective information, sister present but shaking head \"no\" to majority of information. Pt confused. Unable to discuss PLOF with sister. Initially agreeable for mobility, but after initiating bed mobility pt becoming frustrated, agitated and refusing to work with PT. Note that nursing has been able to get pt up with assist of 2 to commode. Will defer PT eval to tomorrow as appropriate. RN updated.      "
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
## 2017-02-15 NOTE — IP AVS SNAPSHOT
"[place] [address] [facility]: [phone]                                              INTERAGENCY TRANSFER FORM - PHYSICIAN ORDERS   2/15/2017                    Hospital Admission Date: 2/15/2017  SEVERO SWANN   : 1940  Sex: Female        Attending Provider: Noble Lopez MD     Allergies:  Wool Fiber    Infection:  None   Service:  HOSPITALIST    Ht:  1.676 m (5' 6\")   Wt:  52.3 kg (115 lb 4.8 oz)   Admission Wt:  45.4 kg (100 lb)    BMI:  18.61 kg/m 2   BSA:  1.56 m 2            Patient PCP Information     Provider PCP Type    Latanya Martinez MD General      ED Clinical Impression     Diagnosis Description Comment Added By Time Added    Altered mental status, unspecified altered mental status type [R41.82] Altered mental status, unspecified altered mental status type [R41.82]  Lito Taylor MD 2/15/2017  3:00 AM    Paranoia (H) [F22] Paranoia (H) [F22]  Lito Taylor MD 2/15/2017  3:00 AM    Urinary tract infection without hematuria, site unspecified [N39.0] Urinary tract infection without hematuria, site unspecified [N39.0]  Lito Taylor MD 2/15/2017  3:00 AM    Anemia, unspecified type [D64.9] Anemia, unspecified type [D64.9]  Lito Taylor MD 2/15/2017  3:01 AM    Thrombocytosis (H) [D47.3] Thrombocytosis (H) [D47.3]  Lito Taylor MD 2/15/2017  3:01 AM    Leukocytosis, unspecified type [D72.829] Leukocytosis, unspecified type [D72.829]  Lito Taylor MD 2/15/2017  3:01 AM    Dehydration [E86.0] Dehydration [E86.0]  Lito Taylor MD 2/15/2017  3:47 AM      Hospital Problems as of 3/9/2017              Priority Class Noted POA    Psychosis Medium  2/15/2017 Yes      Non-Hospital Problems as of 3/9/2017              Priority Class Noted    Medication monitoring encounter   2011    OA (osteoarthritis) of knee   1/3/2013    Pain in joint, lower leg   1/3/2013    Abnormal gait   1/3/2013    Lumbago   2013    Osteoarthritis of hip  "  6/26/2014    DJD (degenerative joint disease) of knee   6/26/2014    DJD (degenerative joint disease), lumbar   6/26/2014    Hip pain   1/26/2015    Pain in limb   1/28/2015    Hip pain, right Medium  2/23/2016      Code Status History     Date Active Date Inactive Code Status Order ID Comments User Context    3/8/2017  7:30 AM  Full Code [id number]  Yessy Whalen PA-C Outpatient    2/15/2017  5:33 AM 3/8/2017  7:30 AM Full Code [id number]  Noble Lopez MD Inpatient         Medication Review      START taking        Dose / Directions Comments    acetaminophen 325 MG tablet   Commonly known as:  TYLENOL   Used for:  Malignant neoplasm of right colon (H)        Dose:  650 mg   Take 2 tablets (650 mg) by mouth every 4 hours as needed for mild pain   Quantity:  100 tablet   Refills:  0        enoxaparin 40 MG/0.4ML injection   Commonly known as:  LOVENOX   Used for:  Malignant neoplasm of right colon (H)        Dose:  40 mg   Inject 0.4 mLs (40 mg) Subcutaneous every 24 hours   Quantity:  23 Syringe   Refills:  0        OLANZapine zydis 5 MG ODT tab   Commonly known as:  zyPREXA   Used for:  Paranoia (H)        Dose:  5 mg   Take 1 tablet (5 mg) by mouth At Bedtime   Refills:  0        oxyCODONE 5 MG IR tablet   Commonly known as:  ROXICODONE   Used for:  Malignant neoplasm of right colon (H)        Dose:  5 mg   Take 1 tablet (5 mg) by mouth every 3 hours as needed for moderate to severe pain   Refills:  0          CONTINUE these medications which may have CHANGED, or have new prescriptions. If we are uncertain of the size of tablets/capsules you have at home, strength may be listed as something that might have changed.        Dose / Directions Comments    cyclobenzaprine 10 MG tablet   Commonly known as:  FLEXERIL   This may have changed:    - when to take this  - reasons to take this   Used for:  Osteoarthritis of spine with radiculopathy, lumbar region        Dose:  10 mg   Take 1 tablet (10 mg) by  mouth 3 times daily   Quantity:  42 tablet   Refills:  0          CONTINUE these medications which have NOT CHANGED        Dose / Directions Comments    Calcium-Magnesium 333-167 MG Tabs        Dose:  2 tablet   Take 2 tablets by mouth daily.   Refills:  0        ferrous gluconate 324 (38 FE) MG tablet   Commonly known as:  FERGON        Dose:  1 tablet   Take 1 tablet by mouth daily (with lunch).   Refills:  0        Glucosamine Sulfate--500 MG Tabs        Dose:  1500 mg   Take 1,500 mg by mouth daily.   Refills:  0        HAIR/SKIN/NAILS PO        Dose:  1 tablet   Take 1 tablet by mouth daily.   Refills:  0        levothyroxine 75 MCG tablet   Commonly known as:  SYNTHROID/LEVOTHROID        Dose:  75 mcg   Take 75 mcg by mouth daily.   Refills:  0        SUMATRIPTAN SUCCINATE PO        Dose:  25 mg   Take 25 mg by mouth every 8 hours as needed for migraine   Refills:  0        TRAVATAN Z 0.004 % ophthalmic solution   Generic drug:  travoprost (BAK Free)        Dose:  1 drop   Apply 1 drop to eye. In each eye QHS   Refills:  0        vitamin B-Complex        Dose:  1 tablet   Take 1 tablet by mouth daily.   Refills:  0          STOP taking     ascorbic acid 1000 MG Tabs   Commonly known as:  vitamin C           BIOTIN 5000 5 MG Caps   Generic drug:  biotin           MESTINON 180 MG CR tablet   Generic drug:  pyridostigmine           methylphenidate 10 MG tablet   Commonly known as:  RITALIN           PRAVASTATIN SODIUM PO           PRENATAL 1 PO           verapamil HCl 120 MG Cp24           vitamin A 8000 UNITS Caps           Vitamin D-3 5000 UNITS Tabs                     Further instructions from your care team       RUQ Existing New  Ileostomy     -Pt forgetful/confused and has not actively engaged in Ileostomy management  -Attempting to make her aware of when appliance needs emptying    1. Change appliance 1-2x/wk and as needed for any leakage   2. Empty pouch when 1/3 full.   3. Carry an extra pouching  "system with you at all times  4. Diet: Eat 6 small meals/day. Drink 8oz/fluid every hour during waking hours. Chew all food well.    5. Diet: Eat pasta, rice, potatoes, hot/dry cereal, toast, breads, pretzels, chips, crackers; yogurt, meat, fish, eggs, cheese, peanut butter, bananas to thicken the stool. Eat 3-4 servings of protien/day.  6. Avoid eating: Seeds, nuts, peels, raw vegetables, chinese vegetables, casings on meats, grape/prume juice, grapefruit, oranges, pineapple, mushrooms.   7. NO laxatives. NO timed released medications   8. May shower with appliance on. Blow dry (on cool setting) cloth backing and tape following bathing.   9. Change your appliance in the morning before breakfast.  10. Walk, no heavy lifting  11. Use an odor eliminator (Ex: Febreeze) in the room prior to emptying or changing appliance  12. Initially monitor your output to avoid dehydration.   Call your primary doctor if output exceeds 1500cc/24hrs.      Supplies: Vance New Image 2-pc  1. Barrier New Image: Flextend convex cut to fit, with tape         #87252 5/bx = 2-4 bx/month OR    2. Pouch New Image High output                                             #[id number] 10/bx = 1-2 bx/month OR  3. Pouch:Opaque drainable with lock n roll                                #[id number] 10/bx = 1-2bx/month  4  Optional if leakage Vance Adapt ring                              #[id number] 10/bx = 1bx/month          Procedure for Ileostomy Pouch Change      1.Barrier: draw and cut opening following pattern. Remove paper covering over barrier  2.  Optional if leakage: Open ring and stretch to approx size of opening in barrier. Apply around opening on sticky side of barrier. Press into place.   3. Fold back edges of paper that covers the tape to create a \"tab\". This assists with paper removal in Step #9. OK to snap pouch of choice on barrier at this step and apply as a 1-pc pouch  4. Set barrier aside.   5. Remove pouch from around stoma. Discard.   6. " "Cleanse skin around stoma with water. Pat dry.  7. Center stoma in barrier opening. Press barrier to skin.  8.Pull on \"tab\" to remove paper that covers the tape. Press tape to skin   9. Snap on pouch of choice  10. Close spigot on high output or close lock n roll on drainable pouch    Contact: Felicia Orourke CWOCN for any ?/concerns                         Summary of Visit     Reason for your hospital stay       The patient was in the hospital to manage psychosis and a new malignant right colon mass             After Care     Activity - Up with nursing assistance           Advance Diet as Tolerated       Follow this diet upon discharge: Orders Placed This Encounter      Calorie Counts      Calorie Counts      Room Service      Snacks/Supplements Adult: Other - Please comment; Cherry Gelatein Plus; With Meals      Snacks/Supplements Adult: Ensure Plus (Adult); Between Meals      Calorie Counts      Low Fiber Diet       Fall precautions           General info for SNF       Length of Stay Estimate: Short Term Care: Estimated # of Days <30  Condition at Discharge: Improving  Level of care:skilled   Rehabilitation Potential: Good  Admission H&P remains valid and up-to-date: Yes  Recent Chemotherapy: N/A  Use Nursing Home Standing Orders: Yes       Mantoux instructions       Give two-step Mantoux (PPD) Per Facility Policy Yes       Ostomy care       Standard Cares.  Type:  ileostomy       Wound care (specify)       Site:   Port sites  Instructions:  Keep clean and dry             Referrals     Occupational Therapy Adult Consult       Evaluate and treat as clinically indicated.    Reason:  Post op deconditioning       Physical Therapy Adult Consult       Evaluate and treat as clinically indicated.    Reason:  Post op deconditioning             Your next 10 appointments already scheduled     Apr 04, 2017 11:00 AM CDT   Return Visit with Susan Lindo MD   [facility] ([facility])    [facility]  [address]  [facility] [address]   [phone]              Follow-Up Appointment Instructions     Future Labs/Procedures    Follow Up and recommended labs and tests     Comments:    Dr. Peter's office will coordinate definitive surgery in a couple of week. Call the office at [phone] if you develop fever, uncontrolled pain, bleeding, nausea, vomiting, or constipation.   No heavy lifting or straining over 15-20 lbs for 6 weeks. No Driving while taking narcotic pain medications      Follow-Up Appointment Instructions     Follow Up and recommended labs and tests       Dr. Peter's office will coordinate definitive surgery in a couple of week. Call the office at [phone] if you develop fever, uncontrolled pain, bleeding, nausea, vomiting, or constipation.   No heavy lifting or straining over 15-20 lbs for 6 weeks. No Driving while taking narcotic pain medications             Statement of Approval     Ordered          03/09/17 0950  I have reviewed and agree with all the recommendations and orders detailed in this document.  EFFECTIVE NOW     Approved and electronically signed by:  Jaret Wylie MD
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
## 2017-02-15 NOTE — IP AVS SNAPSHOT
` [place] [address] [facility]: [phone]            Medication Administration Report for Sherita Kenney as of 03/09/17 1223   Legend:    Given Hold Not Given Due Canceled Entry Other Actions    Time Time (Time) Time  Time-Action       Inactive    Active    Linked        Medications 03/03/17 03/04/17 03/05/17 03/06/17 03/07/17 03/08/17 03/09/17    acetaminophen (TYLENOL) Suppository 650 mg  Dose: 650 mg Freq: EVERY 4 HOURS PRN Route: RE  PRN Reason: mild pain  Start: 02/15/17 0533   Admin Instructions: Alternate ibuprofen (if ordered) with acetaminophen.  Maximum acetaminophen dose from all sources = 75 mg/kg/day not to exceed 4 grams/day.               acetaminophen (TYLENOL) tablet 650 mg  Dose: 650 mg Freq: EVERY 4 HOURS PRN Route: PO  PRN Reason: mild pain  Start: 02/15/17 0533   Admin Instructions: Alternate ibuprofen (if ordered) with acetaminophen.  Maximum acetaminophen dose from all sources = 75 mg/kg/day not to exceed 4 grams/day.     0528 (650 mg)-Given       1322 (650 mg)-Given        1623 (650 mg)-Given        0806 (650 mg)-Given       2122 (325 mg)-Given [C]        0814 (650 mg)-Given          1050 (650 mg)-Given           cyclobenzaprine (FLEXERIL) tablet 10 mg  Dose: 10 mg Freq: 3 TIMES DAILY Route: PO  Start: 02/15/17 0900    0949 (10 mg)-Given       1608 (10 mg)-Given       (2212)-Not Given        1004 (10 mg)-Given       1623 (10 mg)-Given       2241 (10 mg)-Given        0801 (10 mg)-Given       1757 (10 mg)-Given       2116 (10 mg)-Given        0814 (10 mg)-Given       1634 (10 mg)-Given       2120 (10 mg)-Given        0935 (10 mg)-Given       (1635)-Not Given       (2320)-Not Given        0813 (10 mg)-Given       (1841)-Not Given       2151 (10 mg)-Given        0833 (10 mg)-Given       [ ] 1600       [ ] 2200           dextrose 10 % 1,000 mL infusion  Freq: CONTINUOUS PRN Route: IV  PRN Comment:    Start: 02/24/17 1301   Admin Instructions: For Hypoglycemia Prevention  for patients on long-acting subcutaneous basal insulin (Glargine, Detemir, NPH) or continuous insulin infusion. Whenever nutrition support is held or interrupted:  1) Infuse IV D10W at nutrition support rate   2) Notify provider for further instructions               enoxaparin (LOVENOX) injection 40 mg  Dose: 40 mg Freq: EVERY 24 HOURS Route: SC  Start: 03/02/17 0600    0528 (40 mg)-Given        0607 (40 mg)-Given        0617 (40 mg)-Given [C]        0627 (40 mg)-Given        0543 (40 mg)-Given        0614 (40 mg)-Given        0555 (40 mg)-Given           glucose 40 % gel 15-30 g  Dose: 15-30 g Freq: EVERY 15 MIN PRN Route: PO  PRN Reason: low blood sugar  Start: 02/24/17 1307   Admin Instructions: Give 15 g for BG 51 to 69 mg/dL IF patient is conscious and able to swallow. Give 30 g for BG less than or equal to 50 mg/dL IF patient is conscious and able to swallow. Do NOT give glucose gel via enteral tube.  IF patient has enteral tube: give apple juice 120 mL (4 oz or 15 g of CHO) via enteral tube for BG 51 to 69 mg/dL.  Give apple juice 240 mL (8 oz or 30 g of CHO) via enteral tube for BG less than or equal to 50 mg/dL.              Or  dextrose 50 % injection 25-50 mL  Dose: 25-50 mL Freq: EVERY 15 MIN PRN Route: IV  PRN Reason: low blood sugar  Start: 02/24/17 1307   Admin Instructions: Use if have IV access, BG less than 70 mg/dL and meet dose criteria below:  Dose if conscious and alert (or disorientated) and NPO = 25 mL  Dose if unconscious / not alert = 50 mL  Vesicant.              Or  glucagon injection 1 mg  Dose: 1 mg Freq: EVERY 15 MIN PRN Route: SC  PRN Reason: low blood sugar  PRN Comment: May repeat x 1 only  Start: 02/24/17 1307   Admin Instructions: May give SQ or IM. IF BG less than or equal to 50 mg/dL and no IV access.  ONLY use glucagon IF patient has NO IV access AND is UNABLE to swallow.               HYDROmorphone (PF) (DILAUDID) injection 0.2 mg  Dose: 0.2 mg Freq: EVERY 2 HOURS PRN Route:  IV  PRN Reason: severe pain  Start: 02/15/17 0533   Admin Instructions: Hold while on PCA.               hypromellose-dextran (ARTIFICAL TEARS) ophthalmic solution 2-3 drop  Dose: 2-3 drop Freq: EVERY 1 HOUR PRN Route: OP  PRN Reason: dry eyes  Start: 02/25/17 1834   Admin Instructions: To affected eye(s)               ibuprofen (ADVIL/MOTRIN) tablet 400 mg  Dose: 400 mg Freq: EVERY 6 HOURS PRN Route: PO  PRN Reason: moderate pain  Start: 03/03/17 1710    1756 (400 mg)-Given                 insulin aspart (NovoLOG) inj (RAPID ACTING)  Dose: 1-16 Units Freq: AT BEDTIME Route: SC  Start: 03/07/17 2345   Admin Instructions: Correction Scale - VERY HIGH INSULIN RESISTANCE DOSING     Do Not give Bedtime Correction Insulin if BG less than 200.   For  - 209 give 1 units.   For  - 219 give 2 units.   For  - 229 give 3 units.   For  - 239 give 4 units.   For  - 249 give 5 units.   For  - 259 give 6 units.   For  - 269 give 7 units.   For  - 279 give 8 units.   For  - 289 give 9 units.   For  - 299 give 10 units.   For  - 309 give 11 units.   For  - 319 give 12 units.   For  - 329 give 13 units.   For  - 339 give 14 units.   For  - 349 give 15 units.   For BG greater than or equal to 350 give 16 units.  Notify provider if glucose greater than or equal to 350 mg/dL after administration.  If given at mealtime, must be administered 5 min before meal or immediately after.         (2340)-Not Given        (2151)-Not Given        [ ] 2200           insulin aspart (NovoLOG) inj (RAPID ACTING)  Dose: 1-22 Units Freq: 3 TIMES DAILY BEFORE MEALS Route: SC  Start: 03/08/17 0730   Admin Instructions: Correction Scale - VERY HIGH INSULIN RESISTANCE DOSING     Do Not give Correction Insulin if Pre-Meal BG less than 140.   For Pre-Meal  - 149 give 1 unit.   For Pre-Meal  - 159 give 2 units.   For Pre-Meal  - 169 give 3 units.   For  Pre-Meal  - 179 give 4 units.   For Pre-Meal  - 189 give 5 units.   For Pre-Meal  - 199 give 6 units.   For Pre-Meal  - 209 give 7 units.   For Pre-Meal  - 219 give 8 units.   For Pre-Meal  - 229 give 9 units.   For Pre-Meal  - 239 give 10 units.   For Pre-Meal  - 249 give 11 units.   For Pre-Meal  - 259 give 12 units.   For Pre-Meal  - 269 give 13 units.   For Pre-Meal  - 279 give 14 units.   For Pre-Meal  - 289 give 15 units.   For Pre-Meal  - 299 give 16 units.   For Pre-Meal  - 309 give 17 units.   For Pre-Meal  - 319 give 18 units.   For Pre-Meal  - 329 give 19 units.   For Pre-Meal  - 339 give 20 units.   For Pre-Meal  - 349 give 21 units.   For Pre-Meal BG greater than or equal 350 give 22 units.   To be given with prandial insulin, and based on pre-meal blood glucose.   Notify provider if glucose greater than or equal to 350 mg/dL after administration.  If given at mealtime, must be administered 5 min before meal or immediately after.          (1046)-Not Given       (1230)-Not Given       (1841)-Not Given        (1051)-Not Given [C]       [ ] 1200       [ ] 1700           levothyroxine (SYNTHROID/LEVOTHROID) tablet 75 mcg  Dose: 75 mcg Freq: DAILY Route: PO  Start: 02/17/17 0930    0949 (75 mcg)-Given        1004 (75 mcg)-Given        0800 (75 mcg)-Given        0814 (75 mcg)-Given        0923 (75 mcg)-Given        0813 (75 mcg)-Given        0833 (75 mcg)-Given           magnesium sulfate 4 g in 100 mL sterile water (premade)  Dose: 4 g Freq: EVERY 4 HOURS PRN Route: IV  PRN Reason: magnesium supplementation  Start: 02/15/17 0533   Admin Instructions: For serum Mg++ less than 1.6 mg/dL  Give 4 g and recheck magnesium level 2 hours after dose, and next AM.               May continue current IV fluids if patient has IV fluids infusing.  Freq: CONTINUOUS PRN Route: XX  Start: 03/01/17 2124              May  take regular AM medications except those listed below  Freq: CONTINUOUS PRN Route: XX  Start: 03/01/17 2124   Admin Instructions: May take regular AM medications except those listed below               morphine (PF) injection 1-2 mg  Dose: 1-2 mg Freq: EVERY 4 HOURS PRN Route: IV  PRN Reason: moderate to severe pain  Start: 03/01/17 2308              naloxone (NARCAN) injection 0.1-0.4 mg  Dose: 0.1-0.4 mg Freq: EVERY 2 MIN PRN Route: IV  PRN Reason: opioid reversal  Start: 02/15/17 0533   Admin Instructions: For respiratory rate LESS than or EQUAL to 8.  Partial reversal dose:  0.1 mg titrated q 2 minutes for Analgesia Side Effects Monitoring Sedation Level of 3 (frequently drowsy, arousable, drifts to sleep during conversation).Full reversal dose:  0.4 mg bolus for Analgesia Side Effects Monitoring Sedation Level of 4 (somnolent, minimal or no response to stimulation).               nitroglycerin (NITROSTAT) sublingual tablet 0.4 mg  Dose: 0.4 mg Freq: EVERY 5 MIN PRN Route: SL  PRN Reason: chest pain  Start: 03/01/17 2307   Admin Instructions: Maximum 3 doses in 15 minutes.  Notify provider if no relief after 3 doses.    Do NOT give nitroglycerin SLIF the patient has taken avanafil (STENDRA), sildenafil (VIAGRA) or (REVATIO) within the last 8 hours, vardenafil (LEVITRA) or (STAXYN) within the last 18 hours, tadalafil (CIALIS) or (ADCIRCA) within the last 36 hours. Inform provider if patient has taken one of these medications.  If patient is still having acute angina requiring treatment, an alternative treatment option may be used such as: IV beta-blocker [2.5 mg - 5 mg metoprolol (LOPRESSOR)] if ordered by a provider.               OLANZapine zydis (zyPREXA) ODT half-tab 2.5-5 mg  Dose: 2.5-5 mg Freq: EVERY 6 HOURS PRN Route: PO  PRN Reasons: agitation,aggression  Start: 02/15/17 1226   Admin Instructions: Combined IM and PO doses may significantly increase the risk of orthostatic hypotension at 30 mg per day or  higher.  With dry hands, peel back foil backing and gently remove tablet; do not push oral disintegrating tablet through foil backing; administer immediately on tongue and oral disintegrating tablet dissolves in seconds; then swallow with saliva; liquid not required.               OLANZapine zydis (zyPREXA) ODT tab 5 mg  Dose: 5 mg Freq: AT BEDTIME Route: PO  Start: 02/15/17 2200   Admin Instructions: Combined IM and PO doses may significantly increase the risk of orthostatic hypotension at 30 mg per day or higher.  With dry hands, peel back foil backing and gently remove tablet; do not push oral disintegrating tablet through foil backing; administer immediately on tongue and oral disintegrating tablet dissolves in seconds; then swallow with saliva; liquid not required.     (2212)-Not Given       2354 (5 mg)-Given        2241 (5 mg)-Given        2116 (5 mg)-Given        2120 (5 mg)-Given        (2320)-Not Given        2151 (5 mg)-Given        [ ] 2200           ondansetron (ZOFRAN-ODT) ODT tab 4 mg  Dose: 4 mg Freq: EVERY 6 HOURS PRN Route: PO  PRN Reason: nausea  Start: 02/15/17 0533   Admin Instructions: This is Step 1 of nausea and vomiting management.  If nausea not resolved in 15 minutes, go to Step 2 prochlorperazine (COMPAZINE). Do not push through foil backing. Peel back foil and gently remove. Place on tongue immediately. Administration with liquid unnecessary              Or  ondansetron (ZOFRAN) injection 4 mg  Dose: 4 mg Freq: EVERY 6 HOURS PRN Route: IV  PRN Reasons: nausea,vomiting  Start: 02/15/17 0533   Admin Instructions: This is Step 1 of nausea and vomiting management.  If nausea not resolved in 15 minutes, go to Step 2 prochlorperazine (COMPAZINE).               oxyCODONE (ROXICODONE) IR tablet 5 mg  Dose: 5 mg Freq: EVERY 3 HOURS PRN Route: PO  PRN Reason: moderate to severe pain  Start: 02/15/17 0533    1322 (5 mg)-Given                 polyethylene glycol (MIRALAX/GLYCOLAX) Packet 17 g  Dose:  17 g Freq: DAILY PRN Route: PO  PRN Reason: constipation  Start: 02/15/17 0533   Admin Instructions: Give in 8oz of  water, juice, or soda. Hold for loose stools.  This is the second step of a three step constipation treatment protocol.  1 Packet = 17 grams. Mixed prescribed dose in 8 ounces of water. Follow with 8 oz. of water.               potassium chloride (KLOR-CON) Packet 20-40 mEq  Dose: 20-40 mEq Freq: EVERY 2 HOURS PRN Route: ORAL OR FEED  PRN Reason: potassium supplementation  Start: 02/15/17 0533   Admin Instructions: Use if unable to tolerate tablets.  If Serum K+ 3.0-3.3, dose = 60 mEq po total dose (40 mEq x1 followed in 2 hours by 20 mEq x1). Recheck K+ level 4 hours after dose and the next AM.  If Serum K+ 2.5-2.9, dose = 80 mEq po total dose (40 mEq Q2H x2). Recheck K+ level 4 hours after dose and the next AM.  If Serum K+ less than 2.5, See IV order.  Dissolve packet contents in 4-8 ounces of cold water or juice.               potassium chloride 10 mEq in 100 mL intermittent infusion  Dose: 10 mEq Freq: EVERY 1 HOUR PRN Route: IV  PRN Reason: potassium supplementation  Start: 02/15/17 0533   Admin Instructions: Infuse via PERIPHERAL LINE or CENTRAL LINE. Use for central line replacement if patient weight less than 65 kg, if patient is on TPN with high potassium content or if unit does not stock 20 mEq bags.   If Serum K+ 3.0-3.3, dose = 10 mEq/hr x4 doses (40 mEq IV total dose). Recheck K+ level 2 hours after dose and the next AM.   If Serum K+ less than 3.0, dose = 10 mEq/hr x6 doses (60 mEq IV total dose). Recheck K+ level 2 hours after dose and the next AM.               potassium chloride 10 mEq in 100 mL intermittent infusion with 10 mg lidocaine  Dose: 10 mEq Freq: EVERY 1 HOUR PRN Route: IV  PRN Reason: potassium supplementation  Last Dose: 10 mEq (02/23/17 0517)  Start: 02/15/17 0533   Admin Instructions: Infuse via PERIPHERAL LINE. Use potassium with lidocaine for pain with peripheral  administration.  If Serum K+ 3.0-3.3, dose = 10 mEq/hr x4 doses (40 mEq IV total dose). Recheck K+ level 2 hours after dose and the next AM.  If Serum K+ less than 3.0, dose = 10 mEq/hr x6 doses (60 mEq IV total dose). Recheck K+ level 2 hours after dose and the next AM.               potassium chloride 20 mEq in 50 mL intermittent infusion  Dose: 20 mEq Freq: EVERY 2 HOURS PRN Route: IV  PRN Reason: potassium supplementation  Start: 02/15/17 0536   Admin Instructions: Infuse via CENTRAL LINE Only. May need EKG if less than 65 kg or on TPN - Max rate is 0.3 mEq/kg/hr for patients not on EKG monitoring.   If Serum K+ 3.0-3.3, dose = 20 mEq/hr x2 doses (40 mEq IV total dose). Recheck K+ level 2 hours after dose and the next AM.  If Serum K+ less than 3.0, dose = 20 mEq/hr x3 doses (60 mEq IV total dose). Recheck K+ level 2 hours after dose and the next AM.               potassium chloride SA (K-DUR/KLOR-CON M) CR tablet 20-40 mEq  Dose: 20-40 mEq Freq: EVERY 2 HOURS PRN Route: PO  PRN Reason: potassium supplementation  Start: 02/15/17 0533   Admin Instructions: Use if able to take PO.   If Serum K+ 3.0-3.3, dose = 60 mEq po total dose (40 mEq x1 followed in 2 hours by 20 mEq x1). Recheck K+ level 4 hours after dose and the next AM.  If Serum K+ 2.5-2.9, dose = 80 mEq po total dose (40 mEq Q2H x2). Recheck K+ level 4 hours after dose and the next AM.  If Serum K+ less than 2.5, See IV order.  DO NOT CRUSH               potassium phosphate 15 mmol in D5W 250 mL intermittent infusion  Dose: 15 mmol Freq: DAILY PRN Route: IV  PRN Reason: phosphorous supplementation  Last Dose: 0 mmol (02/26/17 1422)  Start: 02/24/17 1311   Admin Instructions: For serum phosphorus level 2-2.4  Do not infuse Phosphorus in the same line as TPN.   Give 15 mmol and recheck phosphorus level next AM.      0830 (15 mmol)-New Bag                potassium phosphate 20 mmol in D5W 250 mL intermittent infusion  Dose: 20 mmol Freq: EVERY 6 HOURS PRN  Route: IV  PRN Reason: phosphorous supplementation  Start: 02/24/17 1311   Admin Instructions: For serum phosphorus level 1.1-1.9  For CENTRAL Line ONLY  Do not infuse Phosphorus in the same line as TPN.   Give 20 mmol and recheck phosphorus level 2 hours after last dose and next AM.               potassium phosphate 20 mmol in D5W 500 mL intermittent infusion  Dose: 20 mmol Freq: EVERY 6 HOURS PRN Route: IV  PRN Reason: phosphorous supplementation  Last Dose: 20 mmol (02/24/17 1621)  Start: 02/24/17 1311   Admin Instructions: For serum phosphorus level 1.1-1.9  For Peripheral Line  Do not infuse Phosphorus in the same line as TPN.   Give 20 mmol and recheck phosphorus level 2 hours after last dose and next AM.               potassium phosphate 25 mmol in D5W 500 mL intermittent infusion  Dose: 25 mmol Freq: EVERY 8 HOURS PRN Route: IV  PRN Reason: phosphorous supplementation  Start: 02/24/17 1311   Admin Instructions: For serum phosphorus level less than 1.1  Do not infuse Phosphorus in the same line as TPN.   Give 25 mmol and recheck phosphorus level 2 hours after last dose and next AM.               sodium chloride (PF) 0.9% PF flush 10 mL  Dose: 10 mL Freq: EVERY 8 HOURS Route: IK  Start: 02/24/17 0915   Admin Instructions: And Q1H PRN, to lock CVC - Open Ended (Tunneled and Non-Tunneled) dormant lumen.     0528 (10 mL)-Given       (1611)-Not Given       (2130)-Not Given        (0522)-Not Given       1323 (10 mL)-Given       2040 (10 mL)-Given       2045 (10 mL)-Given [C]       2241 (10 mL)-Given        0800 (10 mL)-Given              2125 (10 mL)-Given        0629 (10 mL)-Given              2109 (10 mL)-Given        (0525)-Not Given       (1805)-Not Given       (2321)-Not Given        0613 (10 mL)-Given       1421 (10 mL)-Given       2151 (10 mL)-Given        0555 (10 mL)-Given       [ ] 1400       [ ] 2200           sodium chloride (PF) 0.9% PF flush 10-20 mL  Dose: 10-20 mL Freq: EVERY 1 HOUR PRN Route:  IK  PRN Reasons: line flush,post meds or blood draw  Start: 02/24/17 0907   Admin Instructions: to flush CVC - Open Ended (Tunneled and Non-Tunneled).  10 mL post IV meds; 20 mL post blood draw.               sodium chloride (PF) 0.9% PF flush 3 mL  Dose: 3 mL Freq: EVERY 1 HOUR PRN Route: IK  PRN Reason: line flush  PRN Comment: for peripheral IV flush post IV meds  Start: 02/15/17 0533             Completed Medications  Medications 03/03/17 03/04/17 03/05/17 03/06/17 03/07/17 03/08/17 03/09/17         Dose: 2 mg Freq: EVERY 2 HOURS Route: IV  Start: 03/06/17 1000   End: 03/06/17 1422   Admin Instructions: Use 10 mL syringe to draw up 2 ML into clotted catheter & allow to dwell for 30 mins, then DRAW BACK and discard contents to assess catheter function. If still occluded, allow mixture to dwell for an additional 90 mins, then DRAW BACK and discard contents to reassess catheter function.  May repeat dose if occlusion persists.  Contact MD if 2nd dose is unsuccessful. Only use a 10 ml syringe to administer the diluted solution into each lumen. For catheters with lumen volumes greater than the volume dispensed, request additional syringe(s) from pharmacy. To prevent inadvertent embolization of thrombus from the catheter, ALWAYS WITHDRAW tPA at the end of the dwell time before administering any solution through the catheter.        1330 (2 mg)-Given       1422 (2 mg)-Given             Discontinued Medications  Medications 03/03/17 03/04/17 03/05/17 03/06/17 03/07/17 03/08/17 03/09/17         Rate: 1-45 mL/hr Freq: CONTINUOUS Route: IV  Last Dose: Stopped (03/04/17 0800)  Start: 02/24/17 1345   End: 03/07/17 1101   Admin Instructions: Titrate rate so that combined IVF, TPN, lipids, antibiotics, and any electrolyte replacements do not exceed a total of 100 mL/hour.     0528 ( )-New Bag       2009 ( )-Rate/Dose Change        0800-Stopped       2044-Stopped [C]          1101-Med Discontinued           Dose: 1-12 Units  Freq: 3 TIMES DAILY Route: SC  Start: 03/06/17 1000   End: 03/07/17 2342   Admin Instructions: Correction Scale - HIGH INSULIN RESISTANCE DOSING     Do Not give Correction Insulin if BG less than 140  For  - 164 give 1 unit.  For  - 189 give 2 units.  For  - 214 give 3 units.  For  - 239 give 4 units.  For  - 264 give 5 units.  For  - 289 give 6 units.  For  - 314 give 7 units.  For  - 339 give 8 units  For  - 364 give 9 units  For  - 389 give 10 units  For  - 414 give 11 units  For BG greater than or equal to 415 give 12 units  Check blood glucose Q4H and administer based on blood glucose.  Notify provider if glucose greater than or equal to 350 mg/dL after administration of correction dose.  If given at mealtime, must be administered 5 min before meal or immediately after.        (1104)-Not Given       (2125)-Not Given        (0257)-Not Given       (0800)-Not Given       (2320)-Not Given       2342-Med Discontinued           Dose: 180 mL Freq: EVERY 24 HOURS Route: IV  Last Dose: 180 mL (03/06/17 2102)  Start: 03/03/17 2000   End: 03/07/17 1101   Admin Instructions: DISCARD REMAINDER<br><br>Administer through a 1.2 micron filter  Requires container change every 12 hours and tubing change every 24 hours.     2005 (180 mL)-New Bag        0800-Stopped       2041 (180 mL)-New Bag [C]        2008 (180 mL)-New Bag        2102 (180 mL)-New Bag        1101-Med Discontinued           Rate:  mL/hr Freq: CYCLE Route: CENTRAL LINE  Start: 03/06/17 2000   End: 03/07/17 1101   Admin Instructions: Infuse using a 0.22 micron filter.  Cycle TPN over 12 hours.  Infuse at 110 mL/h for 10 hours (6379-0603)  decrease to 65 mL/h for 1 hour (5192-8546)  decrease to 35 mL/h for the last hour (8925-1377), then stop TPN.    Nurse Instructions:  To discontinue TPN, decrease rate to   of current rate for 1 hour. May continue at   rate until bag runs out or for 24h.         2101 ( )-New Bag        1101-Med Discontinued           Rate:  mL/hr Freq: CYCLE Route: CENTRAL LINE  Start: 03/05/17 2000   End: 03/06/17 1959   Admin Instructions: Infuse using a 0.22 micron filter.  Cycle TPN over 12 hours.  Infuse at 110 mL/h for 10 hours (3545-0456)  decrease to 65 mL/h for 1 hour (7764-3196)  decrease to 35 mL/h for the last hour (2856-4480), then stop TPN.    Nurse Instructions:  To discontinue TPN, decrease rate to   of current rate for 1 hour. May continue at   rate until bag runs out or for 24h.       2009 ( )-New Bag        1959-Med Discontinued       Medications 03/03/17 03/04/17 03/05/17 03/06/17 03/07/17 03/08/17 03/09/17
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
## 2017-02-15 NOTE — PROGRESS NOTES
"Pt reporting she is missing her cell phone. Writer called ED, ED nurse reported cell phone was sent home with pt's sister. Writer notified pt according to ED RN, cell phone was sent home with her sister. Pt stated \"you need to send your highest authority to get my cell phone.\" Writer told pt he would try to contact her sister. There is no contact information for pt's sister in chart. Multiple attempts were made calling pt's cell phone number with no answer.     Writer entered pt's room and asked pt if she knew her sister's phone number. Pt responded by stating \"I have not used a cell phone in six years.\"    Writer was unable to get contact information to contact pt's sister.  "
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
## 2017-02-16 ENCOUNTER — APPOINTMENT (OUTPATIENT)
Dept: OCCUPATIONAL THERAPY | Facility: CLINIC | Age: 77
DRG: 329 | End: 2017-02-16
Attending: INTERNAL MEDICINE
Payer: MEDICARE

## 2017-02-16 LAB
ABO + RH BLD: NORMAL
ABO + RH BLD: NORMAL
ALBUMIN SERPL-MCNC: 1.9 G/DL (ref 3.4–5)
ALP SERPL-CCNC: 82 U/L (ref 40–150)
ALT SERPL W P-5'-P-CCNC: 39 U/L (ref 0–50)
ANION GAP SERPL CALCULATED.3IONS-SCNC: 9 MMOL/L (ref 3–14)
AST SERPL W P-5'-P-CCNC: 37 U/L (ref 0–45)
BASOPHILS # BLD AUTO: 0 10E9/L (ref 0–0.2)
BASOPHILS NFR BLD AUTO: 0.1 %
BILIRUB SERPL-MCNC: 0.4 MG/DL (ref 0.2–1.3)
BLD GP AB SCN SERPL QL: NORMAL
BLD PROD TYP BPU: NORMAL
BLD UNIT ID BPU: 0
BLD UNIT ID BPU: 0
BLOOD BANK CMNT PATIENT-IMP: NORMAL
BLOOD PRODUCT CODE: NORMAL
BLOOD PRODUCT CODE: NORMAL
BPU ID: NORMAL
BPU ID: NORMAL
BUN SERPL-MCNC: 22 MG/DL (ref 7–30)
CALCIUM SERPL-MCNC: 7.8 MG/DL (ref 8.5–10.1)
CEA SERPL-MCNC: 3.8 UG/L (ref 0–2.5)
CHLORIDE SERPL-SCNC: 114 MMOL/L (ref 94–109)
CO2 SERPL-SCNC: 22 MMOL/L (ref 20–32)
CREAT SERPL-MCNC: 0.76 MG/DL (ref 0.52–1.04)
DIFFERENTIAL METHOD BLD: ABNORMAL
DIFFERENTIAL METHOD BLD: NORMAL
EOSINOPHIL # BLD AUTO: 0.1 10E9/L (ref 0–0.7)
EOSINOPHIL NFR BLD AUTO: 0.6 %
EPO SERPL-ACNC: 48
ERYTHROCYTE [DISTWIDTH] IN BLOOD BY AUTOMATED COUNT: 20.1 % (ref 10–15)
ERYTHROCYTE [DISTWIDTH] IN BLOOD BY AUTOMATED COUNT: NORMAL % (ref 10–15)
GFR SERPL CREATININE-BSD FRML MDRD: 74 ML/MIN/1.7M2
GLUCOSE SERPL-MCNC: 103 MG/DL (ref 70–99)
HCT VFR BLD AUTO: 20.8 % (ref 35–47)
HCT VFR BLD AUTO: NORMAL % (ref 35–47)
HGB BLD-MCNC: 5.6 G/DL (ref 11.7–15.7)
HGB BLD-MCNC: 8.5 G/DL (ref 11.7–15.7)
HGB BLD-MCNC: NORMAL G/DL (ref 11.7–15.7)
HGB BLD-MCNC: NORMAL G/DL (ref 11.7–15.7)
IMM GRANULOCYTES # BLD: 0 10E9/L (ref 0–0.4)
IMM GRANULOCYTES NFR BLD: 0.4 %
INR PPP: 1.23 (ref 0.86–1.14)
LYMPHOCYTES # BLD AUTO: 1.4 10E9/L (ref 0.8–5.3)
LYMPHOCYTES NFR BLD AUTO: 12.9 %
MCH RBC QN AUTO: 17 PG (ref 26.5–33)
MCH RBC QN AUTO: NORMAL PG (ref 26.5–33)
MCHC RBC AUTO-ENTMCNC: 26.4 G/DL (ref 31.5–36.5)
MCHC RBC AUTO-ENTMCNC: NORMAL G/DL (ref 31.5–36.5)
MCV RBC AUTO: 64 FL (ref 78–100)
MCV RBC AUTO: NORMAL FL (ref 78–100)
MONOCYTES # BLD AUTO: 0.4 10E9/L (ref 0–1.3)
MONOCYTES NFR BLD AUTO: 3.7 %
NEUTROPHILS # BLD AUTO: 8.9 10E9/L (ref 1.6–8.3)
NEUTROPHILS NFR BLD AUTO: 82.3 %
NRBC # BLD AUTO: 0 10*3/UL
NRBC BLD AUTO-RTO: 0 /100
NUM BPU REQUESTED: 2
PLATELET # BLD AUTO: 790 10E9/L (ref 150–450)
PLATELET # BLD AUTO: NORMAL 10E9/L (ref 150–450)
POTASSIUM SERPL-SCNC: 3.7 MMOL/L (ref 3.4–5.3)
POTASSIUM SERPL-SCNC: 4.2 MMOL/L (ref 3.4–5.3)
PREALB SERPL IA-MCNC: 4 MG/DL (ref 15–45)
PROT SERPL-MCNC: 5.7 G/DL (ref 6.8–8.8)
RBC # BLD AUTO: 3.24 10E12/L (ref 3.8–5.2)
RBC # BLD AUTO: NORMAL 10E12/L (ref 3.8–5.2)
SODIUM SERPL-SCNC: 145 MMOL/L (ref 133–144)
SPECIMEN EXP DATE BLD: NORMAL
TRANSFUSION STATUS PATIENT QL: NORMAL
WBC # BLD AUTO: 10.8 10E9/L (ref 4–11)
WBC # BLD AUTO: NORMAL 10E9/L (ref 4–11)

## 2017-02-16 PROCEDURE — A9270 NON-COVERED ITEM OR SERVICE: HCPCS | Mod: GY | Performed by: INTERNAL MEDICINE

## 2017-02-16 PROCEDURE — 40000133 ZZH STATISTIC OT WARD VISIT

## 2017-02-16 PROCEDURE — 99223 1ST HOSP IP/OBS HIGH 75: CPT | Performed by: INTERNAL MEDICINE

## 2017-02-16 PROCEDURE — 86900 BLOOD TYPING SEROLOGIC ABO: CPT | Performed by: HOSPITALIST

## 2017-02-16 PROCEDURE — 97535 SELF CARE MNGMENT TRAINING: CPT | Mod: GO

## 2017-02-16 PROCEDURE — 86850 RBC ANTIBODY SCREEN: CPT | Performed by: HOSPITALIST

## 2017-02-16 PROCEDURE — 36415 COLL VENOUS BLD VENIPUNCTURE: CPT | Performed by: INTERNAL MEDICINE

## 2017-02-16 PROCEDURE — A9270 NON-COVERED ITEM OR SERVICE: HCPCS | Mod: GY | Performed by: PSYCHIATRY & NEUROLOGY

## 2017-02-16 PROCEDURE — 36415 COLL VENOUS BLD VENIPUNCTURE: CPT | Performed by: HOSPITALIST

## 2017-02-16 PROCEDURE — 25000128 H RX IP 250 OP 636: Performed by: INTERNAL MEDICINE

## 2017-02-16 PROCEDURE — 25000125 ZZHC RX 250: Performed by: INTERNAL MEDICINE

## 2017-02-16 PROCEDURE — 84132 ASSAY OF SERUM POTASSIUM: CPT | Performed by: HOSPITALIST

## 2017-02-16 PROCEDURE — 90732 PPSV23 VACC 2 YRS+ SUBQ/IM: CPT | Performed by: INTERNAL MEDICINE

## 2017-02-16 PROCEDURE — 25000132 ZZH RX MED GY IP 250 OP 250 PS 637: Mod: GY | Performed by: PSYCHIATRY & NEUROLOGY

## 2017-02-16 PROCEDURE — P9016 RBC LEUKOCYTES REDUCED: HCPCS | Performed by: HOSPITALIST

## 2017-02-16 PROCEDURE — 85610 PROTHROMBIN TIME: CPT | Performed by: INTERNAL MEDICINE

## 2017-02-16 PROCEDURE — 85025 COMPLETE CBC W/AUTO DIFF WBC: CPT | Performed by: INTERNAL MEDICINE

## 2017-02-16 PROCEDURE — 12000000 ZZH R&B MED SURG/OB

## 2017-02-16 PROCEDURE — 97165 OT EVAL LOW COMPLEX 30 MIN: CPT | Mod: GO

## 2017-02-16 PROCEDURE — 80053 COMPREHEN METABOLIC PANEL: CPT | Performed by: INTERNAL MEDICINE

## 2017-02-16 PROCEDURE — 86923 COMPATIBILITY TEST ELECTRIC: CPT | Performed by: HOSPITALIST

## 2017-02-16 PROCEDURE — 85018 HEMOGLOBIN: CPT | Performed by: HOSPITALIST

## 2017-02-16 PROCEDURE — 84134 ASSAY OF PREALBUMIN: CPT | Performed by: INTERNAL MEDICINE

## 2017-02-16 PROCEDURE — 25000132 ZZH RX MED GY IP 250 OP 250 PS 637: Mod: GY | Performed by: INTERNAL MEDICINE

## 2017-02-16 PROCEDURE — 82378 CARCINOEMBRYONIC ANTIGEN: CPT | Performed by: INTERNAL MEDICINE

## 2017-02-16 PROCEDURE — 99232 SBSQ HOSP IP/OBS MODERATE 35: CPT | Performed by: PSYCHIATRY & NEUROLOGY

## 2017-02-16 PROCEDURE — 90662 IIV NO PRSV INCREASED AG IM: CPT | Performed by: INTERNAL MEDICINE

## 2017-02-16 PROCEDURE — 99232 SBSQ HOSP IP/OBS MODERATE 35: CPT | Performed by: HOSPITALIST

## 2017-02-16 PROCEDURE — 86901 BLOOD TYPING SEROLOGIC RH(D): CPT | Performed by: HOSPITALIST

## 2017-02-16 RX ORDER — VANCOMYCIN HYDROCHLORIDE 1 G/200ML
1000 INJECTION, SOLUTION INTRAVENOUS EVERY 12 HOURS
Status: DISCONTINUED | OUTPATIENT
Start: 2017-02-16 | End: 2017-02-17

## 2017-02-16 RX ADMIN — CYCLOBENZAPRINE HYDROCHLORIDE 10 MG: 10 TABLET, FILM COATED ORAL at 16:48

## 2017-02-16 RX ADMIN — VANCOMYCIN HYDROCHLORIDE 1000 MG: 1 INJECTION, SOLUTION INTRAVENOUS at 14:17

## 2017-02-16 RX ADMIN — PIPERACILLIN SODIUM,TAZOBACTAM SODIUM 3.38 G: 3; .375 INJECTION, POWDER, FOR SOLUTION INTRAVENOUS at 22:22

## 2017-02-16 RX ADMIN — CEFTRIAXONE 1 G: 1 INJECTION, POWDER, FOR SOLUTION INTRAMUSCULAR; INTRAVENOUS at 02:14

## 2017-02-16 RX ADMIN — PIPERACILLIN SODIUM,TAZOBACTAM SODIUM 3.38 G: 3; .375 INJECTION, POWDER, FOR SOLUTION INTRAVENOUS at 16:48

## 2017-02-16 RX ADMIN — DEXTROSE AND SODIUM CHLORIDE: 5; 900 INJECTION, SOLUTION INTRAVENOUS at 05:06

## 2017-02-16 RX ADMIN — PIPERACILLIN SODIUM,TAZOBACTAM SODIUM 3.38 G: 3; .375 INJECTION, POWDER, FOR SOLUTION INTRAVENOUS at 05:06

## 2017-02-16 RX ADMIN — CYCLOBENZAPRINE HYDROCHLORIDE 10 MG: 10 TABLET, FILM COATED ORAL at 09:13

## 2017-02-16 RX ADMIN — PIPERACILLIN SODIUM,TAZOBACTAM SODIUM 3.38 G: 3; .375 INJECTION, POWDER, FOR SOLUTION INTRAVENOUS at 10:40

## 2017-02-16 RX ADMIN — PNEUMOCOCCAL VACCINE POLYVALENT 0.5 ML
25; 25; 25; 25; 25; 25; 25; 25; 25; 25; 25; 25; 25; 25; 25; 25; 25; 25; 25; 25; 25; 25; 25 INJECTION, SOLUTION INTRAMUSCULAR; SUBCUTANEOUS at 14:10

## 2017-02-16 RX ADMIN — INFLUENZA A VIRUS A/CALIFORNIA/7/2009 X-179A (H1N1) ANTIGEN (FORMALDEHYDE INACTIVATED), INFLUENZA A VIRUS A/HONG KONG/4801/2014 X-263B (H3N2) ANTIGEN (FORMALDEHYDE INACTIVATED), AND INFLUENZA B VIRUS B/BRISBANE/60/2008 ANTIGEN (FORMALDEHYDE INACTIVATED) 0.5 ML: 60; 60; 60 INJECTION, SUSPENSION INTRAMUSCULAR at 14:09

## 2017-02-16 RX ADMIN — ACETAMINOPHEN 650 MG: 325 TABLET, FILM COATED ORAL at 17:06

## 2017-02-16 ASSESSMENT — ACTIVITIES OF DAILY LIVING (ADL): PREVIOUS_RESPONSIBILITIES: MEAL PREP;MEDICATION MANAGEMENT;DRIVING

## 2017-02-16 NOTE — PLAN OF CARE
Problem: Goal Outcome Summary  Goal: Goal Outcome Summary  Outcome: Improving  Disoriented to place intermittently. Mood labile. Regular diet.  Abcess drained in IR this afternoon. C/o abdominal discomfort relieved with Tylenol. Dressing CDI, ENRIKE patent, ochoa/milky, foul smelling fluid drained. Orders to irrigate q8h. Up with assistx2, gait belt, walker. VSS, ex BP soft. Continue to monitor.

## 2017-02-16 NOTE — CONSULTS
[facility] PHYSICIANS  HEMATOLOGY ONCOLOGY    INPATIENT ONCOLOGY CONSULTATION      DATE OF SERVICE:  02/16/2017      REASON FOR CONSULTATION:  Right-sided abdominal abscess, concern for malignancy.      REQUESTING PHYSICIAN:  Eleno Servin MD      HISTORY OF PRESENT ILLNESS:  Sherita Kenney is a 76-year-old lady who was brought to the hospital with new onset psychosis.  On assessment in the hospital, she was found to have thrombocytosis, leukocytosis and right lower quadrant abdominal mass.  Further assessment included a CT of the abdomen and pelvis 02/15/2017, which showed a large right-sided iliopsoas abscess which was likely arising from and communicating with the adjacent right colon.  There was adjacent mass-like wall thickening of the right colon, which was worrisome for neoplasm.  There was mild right-sided hydronephrosis and diverticulosis noted as well.  The patient also had a CT of the head without contrast 02/15/2017 without any significant intracranial findings.      Lab work included a creatinine of 0.76.  Total bilirubin, alkaline phosphatase and ALT and AST are normal today.  Her ferritin level was 82, elevated erythropoietin level, iron level was 11 and iron saturation was 5.  She had elevated B12 level of 1567.      Today on 02/05/2017, she had a CT-guided drain placed and 500 mL of fecal material  removed from the area as well.  The patient has been on broad-spectrum antibiotics including vancomycin and Zosyn.  Cultures are in progress.      PAST MEDICAL HISTORY:  She had tubular adenomas on colonoscopy in 2011, history of polio, glaucoma.      MEDICATIONS:  Reviewed.      ALLERGIES:  Reviewed.      FAMILY HISTORY:  The patient is not available to provide family history due to psychosis.      SOCIAL HISTORY:  Never smoker and denied alcohol use as well.      REVIEW OF SYSTEMS:  A complete review of systems was performed and found to be negative other than pertinent positives mentioned  in history of present illness.      PHYSICAL EXAMINATION:   VITAL SIGNS:  Temperature is 98.1, heart rate 72, blood pressure 103/47, respirations 16.   GENERAL:  Lying on bed.   HEENT:  Oral mucosa normal.   NECK:  No supraclavicular or cervical lymphadenopathy.   ABDOMEN:  Tender in the right side.  There is a drain coming out of the abdomen.   NEUROLOGIC:  Alert, awake but appears to have delusions.   EXTREMITIES:  No dependent edema.   SKIN:  No rash.      Laboratory and imaging studies reviewed in history of present illness.      ASSESSMENT AND RECOMMENDATIONS:  This is a 76-year-old lady who presented with new onset psychosis, evidence of right-sided iliopsoas abscess which appears to be communicating with the adjacent right colon and a mass-like thickening of the colon wall raising concern for underlying malignancy.  Interventional Radiology has drained 500 mL of fecal fluid from this area and currently there is a drain in place.  The patient has been seen by Colorectal Surgery and current plan is to continue antibiotic treatment and the possibility of a colonoscopy versus surgery in this situation.      Based on findings on the imaging, a tissue diagnosis is required to establish a diagnosis of malignancy.  It appears that the least invasive approach would be a colonoscopy to assess the area and possible biopsy of colon wall thickening in this patient.  Otherwise, a percutaneous biopsy can be another option as well.  I have ordered a CEA level in this patient.      The patient has iron deficiency anemia and has required red cell transfusions during this hospitalization.  Please continue to monitor her hemoglobin and transfuse to keep her hemoglobin above 7 grams.  IV iron can also be a consideration during this hospitalization.      I appreciate the opportunity to participate in this patient's care.         MONTSERRAT SALEH MD             D: 02/16/2017 15:31   T: 02/16/2017 16:00   MT: KRISTEN      Name:     KYEZEKIELANTOINEEKATERINA  SEVERO   MRN:      [id number]        Account:       [id number]   :      1940           Consult Date:  2017      Document: [id number]       cc: Eleno Servin MD

## 2017-02-16 NOTE — CONSULTS
Note dictated [id number]    Tissue diagnosis would be needed, colonoscopy should be considered if feasible.  Transfuse to keep Hb above 7 grams. May consider IV iron.    Alma Nieves MD

## 2017-02-16 NOTE — PROGRESS NOTES
[facility]  Hospitalist Progress Note  Santosh Romero MD  02/16/2017    Assessment & Plan   Sherita Kenney is a 76 year old female who presents after being found by a welfare check with psychosis. Laboratory evaluation in the emergency department significant for thrombocytosis, leukocytosis, and physical examination concerning for a right lower quadrant abdominal mass.     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 Awake and oriented x3 this am. Bit talkative cooperative and pleasant..  -Follow psych recs     Right lower quadrant abdominal abscess, concern for underlying malignancy:  CT abdomen and pelvis shows massive right iliopsoas abscess displacing and abutting the cecum with associated right-sided hydronephrosis. Thought potentially secondary to perforation of colonic source. Colorectal surgery following and IR drained 500 cc of purulent material with drain in place. Preliminary culture showing GPC, GPR, GNRs. Afebrile.  - Cont zosyn.  - Add vanc.  - Follow cultures.  - Pain control.    Abdominal mass: suspicious for malignancy: Primary concern is for malignancy with associated weight loss, thrombocytosis, anemia, leukocytosis, and now psychosis.   - Cont hemonc for guidance.      Anemia: Suspect multifactorial including ABL, chronic illness, potential malignancy, as well as iron deficiency anemia from poor intake. Hemoglobin 7.4 in the ed--5.6 this am. Denies melena.  Denies sob/palpitaions/dizziness/cp.  -Monitor HGB and transfuse prn Transfuse 2 units     Severe protein calorie malnutrition in the setting of what appears to be a chronic undiagnosed illness: Patient with cachexia and significant weight loss, endorses poor intake, lower extremity edema with hypoalbuminemia.  -Nutrition consulted    UTI: culture showing GNR.  - Zosyn as above.     Hypertension:  -Prn hydralazine for now.     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  - Caution with contact (pain).     DVT Prophylaxis: Pneumatic compression devices at this time, pending surgical plan, initiate chemoprophylaxis when able given high risk for clotting in the setting of trauma psychosis and suspected malignancy     Code Status: Full Code     Disposition: per clinical course.    Interval History   Awake and oriented x3. C/o abd pain. No cp/sob/dizziness.    Physical Exam   Heart Rate: 75, Blood pressure 101/61, pulse 70, temperature 98.4  F (36.9  C), temperature source Oral, resp. rate 16, height 1.676 m (5' 6\"), weight 51.9 kg (114 lb 6.7 oz), SpO2 98 %.  Vitals:    02/15/17 0126 02/15/17 0639 02/16/17 0547   Weight: 45.4 kg (100 lb) 52.8 kg (116 lb 6.5 oz) 51.9 kg (114 lb 6.7 oz)     Vital Signs with Ranges  Temp:  [97.7  F (36.5  C)-98.6  F (37  C)] 98.4  F (36.9  C)  Pulse:  [70-93] 70  Heart Rate:  [74-98] 75  Resp:  [16-18] 16  BP: ()/(47-64) 101/61  FiO2 (%):  [3 %] 3 %  SpO2:  [96 %-100 %] 98 %  I/O's Last 24 hours  I/O last 3 completed shifts:  In: 2619 [P.O.:170; I.V.:2449]  Out: 1020 [Urine:900; Drains:120]    Constitutional: Confused,  psychotic  Respiratory: Clear to auscultation bilaterally, no crackles or wheezing  Cardiovascular: Regular rate and rhythm, normal S1 and S2, and no murmur noted  GI:  hypoactivel bowel sounds, soft,  distended, non-tender  Skin/Integumen: No rashes, no cyanosis, no edema  Other:      Medications   All medications were reviewed.    dextrose 5% and 0.9% NaCl 100 mL/hr at 02/16/17 0506       cyclobenzaprine  10 mg Oral TID     cefTRIAXone  1 g Intravenous Q24H     sodium chloride (PF)  3 mL Intracatheter Q8H     piperacillin-tazobactam  3.375 g Intravenous Q6H     pneumococcal vaccine  0.5 mL Intramuscular " Prior to discharge     influenza Vac Split High-Dose  0.5 mL Intramuscular Prior to discharge     OLANZapine zydis  5 mg Oral At Bedtime     sodium chloride (PF)  10 mL Irrigation Q8H        Data     Recent Labs  Lab 02/16/17  0215 02/15/17  0146   WBC  --  22.0*   HGB  --  7.4*   MCV  --  63*   PLT  --  1204*   NA  --  142   POTASSIUM 4.2 3.2*   CHLORIDE  --  105   CO2  --  22   BUN  --  41*   CR  --  0.88   ANIONGAP  --  15*   SARITHA  --  8.9   GLC  --  106*   ALBUMIN  --  2.6*   PROTTOTAL  --  7.6   BILITOTAL  --  0.5   ALKPHOS  --  117   ALT  --  61*   AST  --  56*       Recent Results (from the past 24 hour(s))   CT Abdomen Peritoneum Abscess Drainage    Narrative    CT ABDOMEN PERITONEUM ABSCESS DRAINAGE WITH CATHETER PLACEMENT   2/15/2017 4:43 PM     HISTORY: 76-year-old female with suspected right psoas abscess who  presents for drainage.    FINDINGS: Procedure and risks were explained to the patient and her  sister in detail and informed consent was obtained. Preliminary scans  were obtained through the abdomen without contrast.     Radiation dose for this scan was reduced using automated exposure  control, adjustment of the mA and/or kV according to patient size, or  iterative reconstruction technique.    The patient was prepped and draped and 1% Lidocaine was used as local  anesthetic. Under sterile CT guidance, a 19-gauge trocar needle was  inserted into the abscess and whitish-yellow purulent fluid was  aspirated. A sample was sent to lab for Gram stain and cultures. A  wire was inserted and the needle was removed. A tract was dilated and  a 10 Lao all-purpose drainage catheter was placed into the abscess.  A total of 500 mL of purulent fluid was aspirated. The cavity was then  irrigated with sterile saline. The catheter was sutured in place with  2-0 Ethilon and connected to Marek-Mcnally bulb suction. The patient  tolerated the procedure without complication.    Local anesthetic 10 mL of 1%  lidocaine    Contrast sedation 1 mg IV Versed, 50 mg IV fentanyl.    Sedation time 20 minutes.     The patient was monitored by radiology nursing staff under my  supervision and remained stable throughout the study.      Impression    IMPRESSION: CT-guided drainage of right lower quadrant abscess as  described above.    BETTIE WHITE MD

## 2017-02-16 NOTE — PROGRESS NOTES
Social Work Progress Note  Pt chart reviewed. Pt discussed in interdisciplinary rounds.     Intervention: [facility]  Oly ([phone]) visited pt today and informed sw that there is an open investigation with pt. Oly plans to fax sw a letter stating that there is an open case with pt in order for sw to provide her information. Sw later received a letter stating that there is an open case and Oly requested a release of information and medical records. Sw emailed the letter to Risk Management for approval. Letter also placed in chart.     Plan:  Anticipated Discharge Placement: TBD   Barriers: None identified at this time.   Follow-up plan: Sw to continue to follow.     MARAL Prado, LGSW  [phone]

## 2017-02-16 NOTE — CONSULTS
[facility] Psychiatric Consult Progress Note      Interval History:   Pt seen, care reviewed with treatment team. Staff report that the patient remains quite delusional, but otherwise pleasant, except when it pertains to her sister.  It is still unclear what her diagnosis is or if she has a specific malignancy with possible paraneoplastic effects.  On direct interview, the patient continues to report delusional themes, but this time reporting that her neighbor started prosecuting her  6 years ago. She did not want her sister involved in her care and declined to have the undersigned speak with her sister.  She states she does not want her sister making any decisions for her.  Instead, she gave the names of 2 people Sherita Aguilar whom she described as her niece and Dinora Garrett, whom she described as a former coworker and her friend. Concerning her Ritalin, the patient reports she was started on the medication for concentration by a Dr. Pride whom she reports, has now moved to [place]. She denies the presence of auditory or visual hallucinations.  She denies self-harm thoughts or plans.  Her sister insisted on talking to this writer after leaving the patient's room.  She indicated that she has  POA , although she has not provided such documentation.  She indicated that she has been involved in her sister's life for many years and could not understand why she is so hostile toward her.     Review of systems:   The Review of Systems is negative other than noted in the HPI     Medications:       vancomycin (VANCOCIN) IV  1,000 mg Intravenous Q12H     cyclobenzaprine  10 mg Oral TID     sodium chloride (PF)  3 mL Intracatheter Q8H     piperacillin-tazobactam  3.375 g Intravenous Q6H     OLANZapine zydis  5 mg Oral At Bedtime     sodium chloride (PF)  10 mL Irrigation Q8H     naloxone, lidocaine, lidocaine 4%, sodium chloride (PF), potassium chloride, potassium chloride, potassium chloride, potassium chloride with  lidocaine, potassium chloride, magnesium sulfate, acetaminophen, acetaminophen, oxyCODONE, senna-docusate, polyethylene glycol, bisacodyl, ondansetron **OR** ondansetron, prochlorperazine **OR** prochlorperazine **OR** prochlorperazine, HYDROmorphone, OLANZapine zydis      Mental Status Examination:     Appearance:  awake, alert, adequately groomed and appeared as age stated  Eye Contact:  good  Speech:  clear, coherent and normal prosody  Psychomotor Behavior:  no evidence of tardive dyskinesia, dystonia, or tics  Mood:  better  Affect:  mood congruent  Thought Process:  logical and linear no loose associations  Thought Content:  no evidence of suicidal ideation or homicidal ideation and persecutory delusions persist  Oriented to:  she gave the date as 2/16/2017 after she read it from the board  Attention Span and Concentration:  fair  Recent and Remote Memory:  limited  Fund of Knowledge: appropriate  Muscle Strength and Tone: flaccid  Gait and Station: NA  Insight:  limited  Judgment:  poor        Labs/vitals:     Recent Results (from the past 24 hour(s))   Abscess Culture Aerobic Bacterial    Collection Time: 02/15/17  4:20 PM   Result Value Ref Range    Specimen Description Pelvis Abscess CT PELVIS ABSCESS     Culture Micro (A)      Light growth Non lactose fermenting gram negative rods  Culture in progress      Micro Report Status Pending    Gram stain    Collection Time: 02/15/17  4:20 PM   Result Value Ref Range    Specimen Description Pelvis Abscess CT PELVIS ABSCESS     Gram Stain (A)      Many Gram positive cocci  Many Gram positive rods  Many Gram negative rods  Few PMNs seen      Micro Report Status FINAL 02/15/2017    Potassium    Collection Time: 02/16/17  2:15 AM   Result Value Ref Range    Potassium 4.2 3.4 - 5.3 mmol/L   Comprehensive metabolic panel    Collection Time: 02/16/17  8:15 AM   Result Value Ref Range    Sodium 145 (H) 133 - 144 mmol/L    Potassium 3.7 3.4 - 5.3 mmol/L    Chloride 114  (H) 94 - 109 mmol/L    Carbon Dioxide 22 20 - 32 mmol/L    Anion Gap 9 3 - 14 mmol/L    Glucose 103 (H) 70 - 99 mg/dL    Urea Nitrogen 22 7 - 30 mg/dL    Creatinine 0.76 0.52 - 1.04 mg/dL    GFR Estimate 74 >60 mL/min/1.7m2    GFR Estimate If Black >90   GFR Calc   >60 mL/min/1.7m2    Calcium 7.8 (L) 8.5 - 10.1 mg/dL    Bilirubin Total 0.4 0.2 - 1.3 mg/dL    Albumin 1.9 (L) 3.4 - 5.0 g/dL    Protein Total 5.7 (L) 6.8 - 8.8 g/dL    Alkaline Phosphatase 82 40 - 150 U/L    ALT 39 0 - 50 U/L    AST 37 0 - 45 U/L   CBC with platelets differential    Collection Time: 02/16/17  8:15 AM   Result Value Ref Range    WBC  4.0 - 11.0 10e9/L     Canceled, Test credited   Questionable specimen  CALLED TO FLOOR, FLOOR TO REORDER PER [place] [address] [facility]BS      RBC Count  3.8 - 5.2 10e12/L     Canceled, Test credited   Questionable specimen  CALLED TO FLOOR, FLOOR TO REORDER PER [place] [address] [facility]BS      Hemoglobin  11.7 - 15.7 g/dL     Canceled, Test credited   Questionable specimen  CALLED TO FLOOR, FLOOR TO REORDER PER [place] [address] [facility]BS      Hematocrit  35.0 - 47.0 %     Canceled, Test credited   Questionable specimen  CALLED TO FLOOR, FLOOR TO REORDER PER [place] [address] [facility]BS      MCV  78 - 100 fl     Canceled, Test credited   Questionable specimen  CALLED TO FLOOR, FLOOR TO REORDER PER [place] [address] [facility]BS      MCH  26.5 - 33.0 pg     Canceled, Test credited   Questionable specimen  CALLED TO FLOOR, FLOOR TO REORDER PER [place] [address] [facility]BS      MCHC  31.5 - 36.5 g/dL     Canceled, Test credited   Questionable specimen  CALLED TO FLOOR, FLOOR TO REORDER PER [place] [address] [facility]BS      RDW  10.0 - 15.0 %     Canceled, Test credited   Questionable specimen  CALLED TO FLOOR, FLOOR TO REORDER PER [place] [address] [facility]BS      Platelet Count  150 - 450 10e9/L     Canceled, Test credited   Questionable specimen  CALLED TO FLOOR, FLOOR TO REORDER PER VIC [place] 0900 [facility]BS      Diff Method       Canceled, Test credited   Questionable  specimen  CALLED TO FLOOR, FLOOR TO REORDER PER THALIA [place] 0900 [facility]BS     INR    Collection Time: 02/16/17  8:15 AM   Result Value Ref Range    INR 1.23 (H) 0.86 - 1.14   Hemoglobin    Collection Time: 02/16/17  9:56 AM   Result Value Ref Range    Hemoglobin  11.7 - 15.7 g/dL     Canceled, Test credited   Duplicate request  CBC ADDED  CORRECTED ON 02/16 AT 1125: PREVIOUSLY REPORTED AS 5.6 This result has been   called to CHINMAY FRIEDMAN by Thalia BRIGGS on 02 16 2017 at 1013, and has been read   back.     CBC with platelets differential    Collection Time: 02/16/17  9:56 AM   Result Value Ref Range    WBC 10.8 4.0 - 11.0 10e9/L    RBC Count 3.24 (L) 3.8 - 5.2 10e12/L    Hemoglobin 5.6 (LL) 11.7 - 15.7 g/dL    Hematocrit 20.8 (L) 35.0 - 47.0 %    MCV 64 (L) 78 - 100 fl    MCH 17.0 (L) 26.5 - 33.0 pg    MCHC 26.4 (L) 31.5 - 36.5 g/dL    RDW 20.1 (H) 10.0 - 15.0 %    Platelet Count 790 (H) 150 - 450 10e9/L    Diff Method Automated Method     % Neutrophils 82.3 %    % Lymphocytes 12.9 %    % Monocytes 3.7 %    % Eosinophils 0.6 %    % Basophils 0.1 %    % Immature Granulocytes 0.4 %    Nucleated RBCs 0 0 /100    Absolute Neutrophil 8.9 (H) 1.6 - 8.3 10e9/L    Absolute Lymphocytes 1.4 0.8 - 5.3 10e9/L    Absolute Monocytes 0.4 0.0 - 1.3 10e9/L    Absolute Eosinophils 0.1 0.0 - 0.7 10e9/L    Absolute Basophils 0.0 0.0 - 0.2 10e9/L    Abs Immature Granulocytes 0.0 0 - 0.4 10e9/L    Absolute Nucleated RBC 0.0    CEA    Collection Time: 02/16/17 10:35 AM   Result Value Ref Range    CEA 3.8 (H) 0 - 2.5 ug/L   ABO/Rh type and screen    Collection Time: 02/16/17 10:35 AM   Result Value Ref Range    Units Ordered 2     ABO A     RH(D)  Pos     Antibody Screen Neg     Test Valid Only At [facility]     Specimen Expires 02/19/2017     Crossmatch Red Blood Cells    Blood component    Collection Time: 02/16/17 10:35 AM   Result Value Ref Range    Unit Number [id number]     Blood Component Type Red Blood Cells Leukocyte  Reduced     Division Number 00     Status of Unit Released to care unit 02/16/2017 1256     Blood Product Code S3246A09     Unit Status ISS    Blood component    Collection Time: 02/16/17 10:35 AM   Result Value Ref Range    Unit Number [id number]     Blood Component Type Red Blood Cells Leukocyte Reduced     Division Number 00     Status of Unit Ready for patient 02/16/2017 1128     Blood Product Code W1715W14     Unit Status SANAZ    Prealbumin    Collection Time: 02/16/17 10:35 AM   Result Value Ref Range    Prealbumin 4 (L) 15 - 45 mg/dL     B/P: 99/59, T: 98, P: 87, R: 16    Impression:   Sherita Kenney is a 76-year-old woman with no prior history of psychosis who presents to the hospital via EMS following a welfare check and was found to have leukocytosis and thrombocytosis, weight loss, right lower quadrant abdominal mass, and CT evidence of massive right iliopsoas abscess. She is currently on IV antibiotics. We do not have MRI of her brain to rule out metastasis.      DIagnoses:     1. Psychotic disorder, unspecified.   2. Urinary tract infection without hematuria.   3. Right lower quadrant abdominal mass.   4. Microcytic anemia.   5. Severe protein calorie malnutrition.   6. Hypertension.   7. Hypothyroidism.   8. Thrombocytosis and neutrophilia.          Plan:   1. Written information given on medications. Side effects, risks, benefits reviewed.  2. Maintain current dose of Zyprexa.  3. We'll follow-up tomorrow.    TIME SPENT = 25 MINUTES    Attestation:  Patient has been seen and evaluated by me,  Fercho Posada MD

## 2017-02-16 NOTE — PLAN OF CARE
Problem: Goal Outcome Summary  Goal: Goal Outcome Summary  Outcome: No Change  VSS. Lethargic this shift, slept most of the shift. Disoriented to place & situation. CMS intact. ENRIKE patent, ochoa foul smelling output 40 cc. Regular diet. Up Ax2 to commode. Did not void from 4364-2134, bladder scanned for 176 cc. Oral cares completed. Denies pain. D/c pending at this time. Will continue to monitor.

## 2017-02-16 NOTE — PLAN OF CARE
Problem: Goal Outcome Summary  Goal: Goal Outcome Summary  Outcome: No Change  Abdominal abscess. PT, OT. A/Ox3, disoriented to situation, forgetful. LUE flacid paralysis d/t polio. LS clear. BSx4, BM this shift, voiding. Scattered bruising, dried skin, ENRIKE to drain abscess, irrigate Q8 hr. Pain controlled with tylenol. Up with 2 assist. Tolerating regular diet. VSS. Discharge pending.

## 2017-02-16 NOTE — PLAN OF CARE
Problem: Goal Outcome Summary  Goal: Goal Outcome Summary  OT: OT evaluation and treatment initiated. Patient admitted secondary to welfare check with psychosis, abdominal mass. Patient presents with flaccid Left upper extremity secondary to Polio.  Patient lives alone in a condominium. Patient inconsistent about PLOF, however reported ability to ambulate, dress self, and engage in some IADL tasks, while having difficulty keeping up with some IADL tasks (home management). Currently patient presents with tangential thought pattern requiring redirection. Patient requires moderate assist to go from supine to sit at edge of bed. Patient stood with assist of 2 and maximum assist. Verbal and tactile cues provided for safe transfer techniques including hand and feet placement.  Patient limited by cognition, pain safety awareness, and fatigue. Therapist to further assess cognition. OT recommends TCU upon discharge. Patient would like to return home, if returning home, recommend 24 hour supervision and assist for ADL/IADLs.

## 2017-02-16 NOTE — PROGRESS NOTES
[facility]  Hospitalist Progress Note  Eleno Servin MD  02/15/2017    Assessment & Plan   Sherita Kenney is a 76 year old female who presents after being found by a welfare check with psychosis. Laboratory evaluation in the emergency department significant for thrombocytosis, leukocytosis, and physical examination concerning for a right lower quadrant abdominal mass.     Psychosis: Given patient's age of 76 years without prior history of psychosis, leukocytosis and thrombocytosis, weight loss, right lower quadrant abdominal mass, primary suspicion for paraneoplastic syndrome.  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    -Psychiatry consulted and appreciate assistance, get urine tox screen(on methylphenidate), zyprexa.  -anticipate MRI brain for further evaluation of psychosis; holding on MRI brain pending surgical evaluation.     Right lower quadrant abdominal abscess, concern for underlying malignancy:  - Primary concern is for malignancy with associated weight loss, thrombocytosis, anemia, leukocytosis, and now psychosis.   -CT abdomen and pelvis shows massive right iliopsoas abscess displacing and abutting the cecum with associated right-sided hydronephrosis. Thought potentially secondary to perforation of colonic source. Colorectal surgery following and IR drained 500 cc of purulent material with drain in place.  -IV Zosyn q.6 hours      Microcytic anemia: Suspect multifactorial including chronic illness, potential malignancy, as well as iron deficiency anemia from poor intake. Hemoglobin 7.6 in the emergency department. Patient denies any blood loss, though blood loss anemia certainly a possibility in the setting of concern for malignancy.  -Erythropoietin, ferritin, iron, iron binding capacity, vitamin B12 and folate panel pending  -Trend CBC  -Peripheral morphology  "pending     Severe protein calorie malnutrition in the setting of what appears to be a chronic undiagnosed illness: Patient with cachexia and significant weight loss, endorses poor intake, lower extremity edema with hypoalbuminemia.  -Nutrition consulted  -Magnesium, phosphorus added on  -Potassium replacement protocol  -resume diet for now     Abnormal urinalysis: Patient with nitrates, though negative WBCs on catheterized urine sample.  -Received ceftriaxone in the emergency department, will continue at this time, though lower suspicion that leukocytosis is secondary to a urinary tract infection given consolation of other symptoms including psychosis  -Urine culture pending     Hypertension:  -Prior to admission verapamil held. Unclear what medications patient may or may not have been taking in the setting of psychosis     Hypothyroidism:   -Holding prior to admission levothyroxine 75  g daily     Thrombocytosis, neutrophilia: Readily explained by large iliopsoas abscess as noted above  -Peripheral blood morphology pending as above  -IV Zosyn     DVT Prophylaxis: Pneumatic compression devices at this time, pending surgical plan, initiate chemoprophylaxis when able given high risk for clotting in the setting of trauma psychosis and suspected malignancy     Code Status: Full Code     Disposition: Expected discharge in likely 3-5 days pending safe discharge plan in the setting of psychosis, more formal diagnosis of right lower quadrant abdominal mass     Interval History   - chart reviewed  - spoke with CRS  - sister who is POA at bedside  - spoke with IR Dr Hamilton  - patient continues to be confused, in deirlium state    -Data reviewed today: I reviewed all new labs and imaging over the last 24 hours. I personally reviewed no images or EKG's today.    Physical Exam   Heart Rate: 74, Blood pressure 100/56, pulse 84, temperature 97.7  F (36.5  C), temperature source Axillary, resp. rate 18, height 1.676 m (5' 6\"), " weight 52.8 kg (116 lb 6.5 oz), SpO2 97 %.  Vitals:    02/15/17 0126 02/15/17 0639   Weight: 45.4 kg (100 lb) 52.8 kg (116 lb 6.5 oz)     Vital Signs with Ranges  Temp:  [97.7  F (36.5  C)-98.6  F (37  C)] 97.7  F (36.5  C)  Pulse:  [75-93] 84  Heart Rate:  [] 74  Resp:  [16-18] 18  BP: ()/(47-85) 100/56  FiO2 (%):  [3 %] 3 %  SpO2:  [96 %-100 %] 97 %  I/O's Last 24 hours  I/O last 3 completed shifts:  In: 970 [P.O.:170; I.V.:800]  Out: 990 [Urine:900; Drains:90]    Constitutional: Confused,  psychotic  Respiratory: Clear to auscultation bilaterally, no crackles or wheezing  Cardiovascular: Regular rate and rhythm, normal S1 and S2, and no murmur noted  GI:  hypoactivel bowel sounds, soft,  distended, non-tender  Skin/Integumen: No rashes, no cyanosis, no edema  Other:      Medications   All medications were reviewed.    dextrose 5% and 0.9% NaCl 100 mL/hr at 02/15/17 2053       cyclobenzaprine  10 mg Oral TID     [START ON 2/16/2017] cefTRIAXone  1 g Intravenous Q24H     sodium chloride (PF)  3 mL Intracatheter Q8H     piperacillin-tazobactam  3.375 g Intravenous Q6H     [START ON 2/16/2017] pneumococcal vaccine  0.5 mL Intramuscular Prior to discharge     [START ON 2/16/2017] influenza Vac Split High-Dose  0.5 mL Intramuscular Prior to discharge     OLANZapine zydis  5 mg Oral At Bedtime     sodium chloride (PF)  10 mL Irrigation Q8H        Data     Recent Labs  Lab 02/15/17  0146   WBC 22.0*   HGB 7.4*   MCV 63*   PLT 1204*      POTASSIUM 3.2*   CHLORIDE 105   CO2 22   BUN 41*   CR 0.88   ANIONGAP 15*   SARITHA 8.9   *   ALBUMIN 2.6*   PROTTOTAL 7.6   BILITOTAL 0.5   ALKPHOS 117   ALT 61*   AST 56*       Recent Results (from the past 24 hour(s))   XR Pelvis and Hip Right 1 View    Narrative    XR PELVIS AND HIP RIGHT 1 VIEW  2/15/2017 3:00 AM     INDICATION: Pain.    COMPARISON: None.      Impression    IMPRESSION: No acute fractures or dislocation. Moderate degenerative  changes right  hip.    SOY BARGER MD   Chest XR,  PA & LAT    Narrative    XR CHEST 2 VW  2/15/2017 3:00 AM     INDICATION: Weakness, elevated WBC.    COMPARISON: Chest CT 5/19/2004.      Impression    IMPRESSION: No infiltrates or other acute findings. Heart size within  normal limits. Tortuous thoracic aorta. Evidence of old granulomatous  infection.    SOY BARGER MD   Head CT w/o contrast    Narrative    CT HEAD W/O CONTRAST  2/15/2017 3:08 AM     HISTORY: AMS.    TECHNIQUE: Axial images of the head and coronal reformations without  IV contrast material. Radiation dose for this scan was reduced using  automated exposure control, adjustment of the mA and/or kV according  to patient size, or iterative reconstruction technique.    COMPARISON: None.    FINDINGS: No intracranial hemorrhage, mass or mass effect. No acute  infarct identified. No shift of midline structures. The ventricles are  symmetric. Mild cortical atrophy. Visualized paranasal sinuses and  mastoid air cells are clear.      Impression    IMPRESSION: No acute intracranial findings.    SOY BARGER MD   CT Abdomen Pelvis w Contrast    Narrative    CT ABDOMEN PELVIS W CONTRAST  2/15/2017 5:01 AM     HISTORY: Right lower quadrant abdominal mass.    TECHNIQUE: Volumetric acquisition through abdomen and pelvis with IV  contrast. 50 mL Isovue-370. Radiation dose for this scan was reduced  using automated exposure control, adjustment of the mA and/or kV  according to patient size, or iterative reconstruction technique.    COMPARISON: None.    FINDINGS: Liver, gallbladder, spleen, pancreas, adrenal glands, and  left kidney demonstrate no worrisome findings. Mild-moderate right  hydronephrosis to the level of the midureter.    Large gas and fluid collection with some rim enhancement in the right  lower quadrant abutting and involving the iliopsoas musculature and  also abutting the right hemicolon consistent with an abscess. This  measures approximately  8.2 x 9.3 x 12.8 cm. Just medial to this there  is an area of mass-like thickening involving what is likely a portion  of the ascending colon. This is seen, for example, on coronal image 11  where it measures approximately 7.7 cm craniocaudad. Findings  suspicious for neoplasm with associated perforation and abscess. An  inflammatory mass is considered less likely. There are a few mildly  prominent abdominal lymph nodes seen at the level of the midabdomen  adjacent to the aorta (series 3, image 43). One just anterior to the  aorta measures 1.2 x 1.7 cm.    Diverticulosis, most prominent in the sigmoid colon. A portion of the  sigmoid (series 3, image 59) appears slightly thick-walled without  adjacent inflammation. This may be muscular hypertrophy from  diverticular disease or minimal diverticulitis. Neoplasm at this site  is also not excluded. Advanced degenerative changes in the visualized  spine with scoliosis convex to the right in the upper lumbar spine.  Advanced degenerative changes right hip.      Impression    IMPRESSION:  1. Large right iliopsoas abscess. This likely arises from and  communicates with the adjacent right colon.  2. Adjacent mass-like wall thickening of what is likely a portion of  the right colon, worrisome for neoplasm. Perforated neoplasm is likely  the source for the abscess.  3. Mild-moderate right hydronephrosis likely secondary to the above  findings.  4. Sigmoid diverticulosis with a segment of sigmoid wall thickening.  No significant adjacent inflammation. This may be muscular  hypertrophy, minimal diverticulitis or even neoplasm.  5. Mild abdominal adenopathy.    Findings discussed with the ER physician at 5:10 a.m.    SOY BARGER MD   CT Abdomen Peritoneum Abscess Drainage    Narrative    CT ABDOMEN PERITONEUM ABSCESS DRAINAGE WITH CATHETER PLACEMENT   2/15/2017 4:43 PM     HISTORY: 76-year-old female with suspected right psoas abscess who  presents for  drainage.    FINDINGS: Procedure and risks were explained to the patient and her  sister in detail and informed consent was obtained. Preliminary scans  were obtained through the abdomen without contrast.     Radiation dose for this scan was reduced using automated exposure  control, adjustment of the mA and/or kV according to patient size, or  iterative reconstruction technique.    The patient was prepped and draped and 1% Lidocaine was used as local  anesthetic. Under sterile CT guidance, a 19-gauge trocar needle was  inserted into the abscess and whitish-yellow purulent fluid was  aspirated. A sample was sent to lab for Gram stain and cultures. A  wire was inserted and the needle was removed. A tract was dilated and  a 10 Maltese all-purpose drainage catheter was placed into the abscess.  A total of 500 mL of purulent fluid was aspirated. The cavity was then  irrigated with sterile saline. The catheter was sutured in place with  2-0 Ethilon and connected to Marek-Mcnally bulb suction. The patient  tolerated the procedure without complication.    Local anesthetic 10 mL of 1% lidocaine    Contrast sedation 1 mg IV Versed, 50 mg IV fentanyl.    Sedation time 20 minutes.     The patient was monitored by radiology nursing staff under my  supervision and remained stable throughout the study.      Impression    IMPRESSION: CT-guided drainage of right lower quadrant abscess as  described above.       Eleno Servin MD  Pager [phone]

## 2017-02-16 NOTE — PROGRESS NOTES
02/16/17 1102   Quick Adds   Type of Visit Initial Occupational Therapy Evaluation   Living Environment   Lives With alone   Living Arrangements condominium  (On the 3rd floor, has elevator)   Home Accessibility bed and bath are not on the first floor   Number of Stairs to Enter Home 0   Number of Stairs Within Home 18   Transportation Available car;family or friend will provide   Self-Care   Dominant Hand right   Usual Activity Tolerance good   Current Activity Tolerance poor   Regular Exercise yes   Functional Level Prior   Ambulation 0-->independent   Transferring 0-->independent   Toileting 0-->independent   Bathing 0-->independent   Dressing 0-->independent   Eating 0-->independent   Fall history within last six months yes   Number of times patient has fallen within last six months (unclear, pt not able to identify number of falls)   Prior Functional Level Comment (Pt not consistent in reporting PLOF, needs further assess)   General Information   Onset of Illness/Injury or Date of Surgery - Date 02/15/17   Referring Physician Lopez   Patient/Family Goals Statement To go home   Additional Occupational Profile Info/Pertinent History of Current Problem Admitted secondary to welfare check with psychosis, abdominal absecess   Precautions/Limitations fall precautions   Cognitive Status Examination   Orientation place   Level of Consciousness alert   Able to Follow Commands WNL/WFL   Personal Safety (Cognitive) at risk behaviors demonstrated   Memory impaired   Attention Distractible during evaluation   Organization/Problem Solving Problem solving impaired   Executive Function Self awareness/monitoring impaired   Visual Perception   Visual Perception Wears glasses   Pain Assessment   Patient Currently in Pain Yes, see Vital Sign flowsheet   Range of Motion (ROM)   ROM Quick Adds Shoulder, Left;Wrist, Left;Elbow/Forearm, Left   ROM Comment flaccid L UE secondary to history of polio   Strength   Manual Muscle Testing  Quick Adds MMT: Scapular   Strength Comments RUE   Mobility   Bed Mobility Bed mobility skill: Rolling/Turning;Bed mobility skill: Scooting/Bridging;Bed mobility skill: Sit to supine;Bed mobility skill: Supine to sit;Bed mobility analysis   Bed Mobility Skill: Rolling/Turning   Level of [place] - Bed Mobility Skill Rolling Turning moderate assist (50% patients effort)   Physical Assist/Nonphysical Assist 2 persons   Bed Mobility Skill: Scooting/Bridging   Level of [place]: Scooting/Bridging maximum assist (25% patients effort)   Physical Assist/Nonphysical Assist: Scooting/Bridging 2 persons   Bed Mobility Skill: Sit to Supine   Level of [place]: Sit/Supine moderate assist (50% patients effort)   Physical Assist/Nonphysical Assist: Sit/Supine 2 persons   Bed Mobility Skill: Supine to Sit   Level of [place]: Supine/Sit moderate assist (50% patients effort)   Physical Assist/Nonphysical Assist: Supine/Sit 2 persons;verbal cues   Bed Mobility Analysis   Bed Mobility Limitations decreased ability to use arms for pushing/pulling;decreased ability to use legs for bridging/pushing  (pain)   Transfer Skills   Transfer Transfer Safety Analysis Bed/Chair;Transfer Skill: Stand to Sit;Transfer Safety Analysis Sit/Stand   Transfer Skill: Sit to Stand   Level of [place]: Sit/Stand maximum assist (25% patients effort)   Physical Assist/Nonphysical Assist: Sit/Stand 2 persons;verbal cues  (verbal cues for safety)   Transfer Safety Analysis Sit/Stand   Transfer Safety Concerns Noted: Sit/Stand losing balance backward   Impaired Transfers: Sit/Stand pain;impaired balance   Balance   Balance Quick Add Sit to stand balance   Instrumental Activities of Daily Living (IADL)   Previous Responsibilities meal prep;medication management;driving  (Difficulty keeping up with chores and laundry)   Activities of Daily Living Analysis   Impairments Contributing to Impaired Activities of Daily Living balance  "impaired;cognition impaired;flexibility decreased;pain;ROM decreased;strength decreased   General Therapy Interventions   Planned Therapy Interventions IADL retraining;ADL retraining;ROM;transfer training;cognition   Clinical Impression   Criteria for Skilled Therapeutic Interventions Met yes, treatment indicated   OT Diagnosis Decreased ADls, Functional transfers, cognition   Influenced by the following impairments cognition, Decreased ADLs, functional transfers, safety   Assessment of Occupational Performance 1-3 Performance Deficits   Identified Performance Deficits ADL, functional transfers, cognition   Clinical Decision Making (Complexity) Low complexity   Therapy Frequency daily   Predicted Duration of Therapy Intervention (days/wks) 4 days   Anticipated Discharge Disposition Transitional Care Facility   Risks and Benefits of Treatment have been explained. Yes   Patient, Family & other staff in agreement with plan of care Yes   [facility] TM \"6 Clicks\"   2016, Trustees of [facility], under license to [employer].  All rights reserved.   6 Clicks Short Forms Daily Activity Inpatient Short Form   [facility]  \"6 Clicks\" Daily Activity Inpatient Short Form   1. Putting on and taking off regular lower body clothing? 2 - A Lot   2. Bathing (including washing, rinsing, drying)? 2 - A Lot   3. Toileting, which includes using toilet, bedpan or urinal? 2 - A Lot   4. Putting on and taking off regular upper body clothing? 2 - A Lot   5. Taking care of personal grooming such as brushing teeth? 3 - A Little   6. Eating meals? 3 - A Little   Daily Activity Raw Score (Score out of 24.Lower scores equate to lower levels of function) 14   Total Evaluation Time   Total Evaluation Time (Minutes) 15     "

## 2017-02-16 NOTE — PROGRESS NOTES
Sherita Kenney is a 76 year old female with a pmh of post polio syndrome-left arm affected, hypertension and hypo thyroidism who was admitted to the hospital after being found on the floor in her apartment during a welfare check with psychosis. She had an elevated WBC, thrombocytosis, anemia and a suspected RLQ mass. CT scan was done which demonstrated a large (8 x 12 cm) right iliopsoas abscess which likely arises from and communicates with the adjacent right colon.There is an adjacent mass-like wall thickening of what is likely a portion of the right colon, worrisome for neoplasm. Perforated neoplasm is likely the source for the abscess. A CT guided drain placement was done 2/15 with 500 mL of feculent material removed and the area lavaged.     The patient is being seen in IR f/up today.     S: Feeling ok. I figured this out on my own a long time ok. I knew something was wrong. I tried to fix it with changing my diet and lost 70 lbs.     O: Temp:  [97.7  F (36.5  C)-98.6  F (37  C)] 98.4  F (36.9  C)  Pulse:  [70-93] 70  Heart Rate:  [74-98] 75  Resp:  [16-18] 16  BP: ()/(47-64) 101/61  FiO2 (%):  [3 %] 3 %  SpO2:  [96 %-100 %] 98 %    Labs, notes, imaging, IOs and VSs reviewed    ROUTINE ICU LABS (Last four results)  CMP  Recent Labs  Lab 02/16/17  0815 02/16/17  0215 02/15/17  0146   *  --  142   POTASSIUM 3.7 4.2 3.2*   CHLORIDE 114*  --  105   CO2 22  --  22   ANIONGAP 9  --  15*   *  --  106*   BUN 22  --  41*   CR 0.76  --  0.88   GFRESTIMATED 74  --  63   GFRESTBLACK >90African American GFR Calc  --  76   SARITHA 7.8*  --  8.9   MAG  --   --  3.1*   PHOS  --   --  4.1   PROTTOTAL 5.7*  --  7.6   ALBUMIN 1.9*  --  2.6*   BILITOTAL 0.4  --  0.5   ALKPHOS 82  --  117   AST 37  --  56*   ALT 39  --  61*     CBC  Recent Labs  Lab 02/16/17  0956 02/16/17  0815 02/15/17  0146   WBC 10.8 Canceled, Test credited Questionable specimenCALLED TO FLOOR, FLOOR TO REORDER PER VIC BARBOZA 0900 HBS 22.0*    RBC 3.24* Canceled, Test credited Questionable specimenCALLED TO FLOOR, FLOOR TO REORDER PER [facility] 0900 HBS 4.29   HGB 5.6*  Canceled, Test credited Duplicate requestCBC ADDEDCORRECTED ON 02/16 AT 1125: PREVIOUSLY REPORTED AS 5.6 This result has been called to CHINMAY FRIEDMAN by Thalia BRIGGS on 02 16 2017 at 1013, and has been read back. Canceled, Test credited Questionable specimenCALLED TO FLOOR, [facility] H[facility] 7.4*   HCT 20.8* Canceled, Test credited Questionable specimenCALLED TO FLOOR, FLOOR TO REORDER PER [facility] 0900 HBS 27.0*   MCV 64* Canceled, Test credited Questionable specimenCALLED TO FLOOR, FLOOR TO REORDER PER [facility] 09 HBS 63*   MCH 17.0* Canceled, Test credited Questionable specimenCALLED TO FLOOR, FLOOR TO REORDER PER [place] [address] HBS 17.2*   MCHC 26.4* Canceled, Test credited Questionable specimenCALLED TO FLOOR, FLOOR TO REORDER PER [place] [address] HBS 27.4*   RDW 20.1* Canceled, Test credited Questionable specimenCALLED TO FLOOR, FLOOR TO REORDER PER [facility] 09 HBS 20.1*   * Canceled, Test credited Questionable specimenCALLED TO FLOOR, FLOOR TO REORDER PER [facility] 0900 HBS 1204*     INR  Recent Labs  Lab 02/16/17  0815   INR 1.23*     Urine culture->100,000 colonies/mL Lactose fermenting gram negative rods     General: Al, overall appropriate woman in NAD. Interested in care and explanations. Cooperative.   RLQ stay fix dressing-CD&I, tan/pink material in ENRIKE bulb, feculent smelling. Drain flushed and aspirated without pain or leaking with 10 mL NS. Aspirated out ~ 15 mL more than flushed in.   Output 2/ mL at time of drainage + 90 mL more=590 mL; 2/16 110 mL so far    Gram stain-Many Gram positive cocci, Gram positive and Gram negative rods    A/P: Sherita eKnney is a 76 year old female with a pmh of post polio syndrome-left arm affected, hypertension and hypo thyroidism who was admitted to the hospital after being found on the floor in  her apartment during a welfare check with psychosis. She had an elevated WBC, thrombocytosis, anemia and a suspected RLQ mass. CT scan was done which demonstrated a large (8 x 12 cm) right iliopsoas abscess which likely arises from and communicates with the adjacent right colon.There is an adjacent mass-like wall thickening of what is likely a portion of the right colon, worrisome for neoplasm. Perforated neoplasm is likely the source for the abscess. A CT guided drain placement was done 2/15 with 500 mL of feculent material removed and the area lavaged.     Leukocytosis resolved with drainage and antibiotics. Afebrile. Drain intact with good outputs and working well.     Will follow. Reviewed above with IR staff Dr Hamilton and nothing new at this time.      Thanks [facility] Interventional Radiology CNP ([phone])

## 2017-02-16 NOTE — PROGRESS NOTES
COLON & RECTAL SURGERY  PROGRESS NOTE    February 16, 2017    SUBJECTIVE:  The patient denies abdominal pain, nausea or vomiting. Feels that the mass is decreased as it comes and goes in the past. She is having full bowel function.     OBJECTIVE:  Temp:  [97.7  F (36.5  C)-98.6  F (37  C)] 98.4  F (36.9  C)  Pulse:  [70-93] 70  Heart Rate:  [74-98] 75  Resp:  [16-18] 16  BP: ()/(47-64) 101/61  FiO2 (%):  [3 %] 3 %  SpO2:  [96 %-100 %] 98 %    Intake/Output Summary (Last 24 hours) at 02/16/17 1037  Last data filed at 02/16/17 0900   Gross per 24 hour   Intake             2719 ml   Output              270 ml   Net             2449 ml       GENERAL:  Awake, alert, no acute distress  HEAD - Nomocephalic atraumatic  SCLERA anicteric  EXTREMITIES warm and well perfused  ABDOMEN:  Soft, non tender, non distended.   INCISION:  ENRIKE with small amount of purulent drainage    LABS:  Lab Results   Component Value Date    WBC  02/16/2017     Canceled, Test credited   Questionable specimen  CALLED TO FLOOR, FLOOR TO REORDER PER VIC JABARI 0900 HBS       Lab Results   Component Value Date    HGB 5.6 02/16/2017     Lab Results   Component Value Date    HCT  02/16/2017     Canceled, Test credited   Questionable specimen  CALLED TO FLOOR, FLOOR TO REORDER PER VIC JABARI 0900 HBS       Lab Results   Component Value Date    PLT  02/16/2017     Canceled, Test credited   Questionable specimen  CALLED TO FLOOR, FLOOR TO REORDER PER VIC JABARI 0900 HBS       Last Basic Metabolic Panel:  Lab Results   Component Value Date     02/16/2017      Lab Results   Component Value Date    POTASSIUM 3.7 02/16/2017     Lab Results   Component Value Date    CHLORIDE 114 02/16/2017     Lab Results   Component Value Date    SARITHA 7.8 02/16/2017     Lab Results   Component Value Date    CO2 22 02/16/2017     Lab Results   Component Value Date    BUN 22 02/16/2017     Lab Results   Component Value Date    CR 0.76 02/16/2017     Lab Results   Component  Value Date     02/16/2017       ASSESSMENT/PLAN:76 year old woman who presents with psychosis and found to have large iliopsoas abscess and abdominal mass suspicious for malignancy. S/p IR drainage of 500cc purulent drainage. The patient is afebrile and vitally stable. Hgb this morning is low at 5.6. Rechecking labs this morning. Patient had bowel movement and no evidence of blood in stool or per rectum.   1. Continue ENRIKE drain and IV abx  2. No urgent surgical intervention indicated at this time  3. Patient appears to be still to have some level or memory loss/psychosis. Appreciate psychiatry input.   4. Await labs  5. Will discuss with Dr. Peter on next steps if that is a colonoscopy vs re-imaging vs surgery.     Yessy Whalen PA-C  [facility]  Phone: [phone]          2/16/17 @ 12:35pm    Patients recheck hgb was 5.6. Two units of PRBC ordered. Discussed with Dr. Peter and will check prealbumin and start calorie counts. Plan for follow up CT scan in 2-3 days with contrast through drain.     Yessy Whalen PA-C  [facility]  [phone]

## 2017-02-16 NOTE — PHARMACY-VANCOMYCIN DOSING SERVICE
Pharmacy Vancomycin Initial Note  Date of Service 2017  Patient's  1940  76 year old, female    Indication: Abscess    Current estimated CrCl = Estimated Creatinine Clearance: 52.4 mL/min (based on Cr of 0.76).    Creatinine for last 3 days  2/15/2017:  1:46 AM Creatinine 0.88 mg/dL  2017:  8:15 AM Creatinine 0.76 mg/dL    Recent Vancomycin Level(s) for last 3 days  No results found for requested labs within last 72 hours.      Vancomycin IV Administrations (past 72 hours)      No vancomycin orders with administrations in past 72 hours.                Nephrotoxins and other renal medications (Future)    Start     Dose/Rate Route Frequency Ordered Stop    17 1115  vancomycin (VANCOCIN) 1000 mg in dextrose 5% 200 mL PREMIX      1,000 mg Intravenous EVERY 12 HOURS 17 1104      02/15/17 0530  piperacillin-tazobactam (ZOSYN) 3.375 g vial to attach to  mL bag     Comments:  Pharmacy can adjust dose based on renal function.    3.375 g  over 1 Hours Intravenous EVERY 6 HOURS 02/15/17 0521            Contrast Orders - past 72 hours (72h ago through future)    Start     Dose/Rate Route Frequency Ordered Stop    02/15/17 0445  iopamidol (ISOVUE-370) solution 50 mL      50 mL Intravenous ONCE 02/15/17 0444 02/15/17 0500                Plan:  1.  Start vancomycin  1000 mg IV q12h.   2.  Goal Trough Level: 10-15 mg/L   3.  Pharmacy will check trough levels as appropriate in 1-3 Days.    4. Serum creatinine levels will be ordered daily for the first week of therapy and at least twice weekly for subsequent weeks.    5. [place] method utilized to dose vancomycin therapy: Method 1    Genesis Duque

## 2017-02-16 NOTE — PLAN OF CARE
Problem: Goal Outcome Summary  Goal: Goal Outcome Summary  PT: PT eval attempted. Pt currently transfusing. Not appropriate for OOB activity. Will defer PT eval to tomorrow as appropriate.

## 2017-02-17 ENCOUNTER — APPOINTMENT (OUTPATIENT)
Dept: PHYSICAL THERAPY | Facility: CLINIC | Age: 77
DRG: 329 | End: 2017-02-17
Attending: INTERNAL MEDICINE
Payer: MEDICARE

## 2017-02-17 ENCOUNTER — APPOINTMENT (OUTPATIENT)
Dept: OCCUPATIONAL THERAPY | Facility: CLINIC | Age: 77
DRG: 329 | End: 2017-02-17
Payer: MEDICARE

## 2017-02-17 LAB
ALBUMIN SERPL-MCNC: 1.7 G/DL (ref 3.4–5)
ALP SERPL-CCNC: 76 U/L (ref 40–150)
ALT SERPL W P-5'-P-CCNC: 31 U/L (ref 0–50)
ANION GAP SERPL CALCULATED.3IONS-SCNC: 11 MMOL/L (ref 3–14)
AST SERPL W P-5'-P-CCNC: 32 U/L (ref 0–45)
BACTERIA SPEC CULT: ABNORMAL
BASOPHILS # BLD AUTO: 0 10E9/L (ref 0–0.2)
BASOPHILS NFR BLD AUTO: 0.1 %
BILIRUB SERPL-MCNC: 0.4 MG/DL (ref 0.2–1.3)
BUN SERPL-MCNC: 14 MG/DL (ref 7–30)
CALCIUM SERPL-MCNC: 7.3 MG/DL (ref 8.5–10.1)
CHLORIDE SERPL-SCNC: 113 MMOL/L (ref 94–109)
CO2 SERPL-SCNC: 19 MMOL/L (ref 20–32)
CREAT SERPL-MCNC: 0.71 MG/DL (ref 0.52–1.04)
DIFFERENTIAL METHOD BLD: ABNORMAL
EOSINOPHIL # BLD AUTO: 0.1 10E9/L (ref 0–0.7)
EOSINOPHIL NFR BLD AUTO: 0.9 %
ERYTHROCYTE [DISTWIDTH] IN BLOOD BY AUTOMATED COUNT: 24.7 % (ref 10–15)
GFR SERPL CREATININE-BSD FRML MDRD: 80 ML/MIN/1.7M2
GLUCOSE SERPL-MCNC: 88 MG/DL (ref 70–99)
HCT VFR BLD AUTO: 28.3 % (ref 35–47)
HGB BLD-MCNC: 8.4 G/DL (ref 11.7–15.7)
HGB BLD-MCNC: 8.6 G/DL (ref 11.7–15.7)
IMM GRANULOCYTES # BLD: 0.1 10E9/L (ref 0–0.4)
IMM GRANULOCYTES NFR BLD: 0.6 %
LYMPHOCYTES # BLD AUTO: 1.9 10E9/L (ref 0.8–5.3)
LYMPHOCYTES NFR BLD AUTO: 19.6 %
Lab: ABNORMAL
MCH RBC QN AUTO: 21.1 PG (ref 26.5–33)
MCHC RBC AUTO-ENTMCNC: 29.7 G/DL (ref 31.5–36.5)
MCV RBC AUTO: 71 FL (ref 78–100)
MICRO REPORT STATUS: ABNORMAL
MICROORGANISM SPEC CULT: ABNORMAL
MONOCYTES # BLD AUTO: 0.3 10E9/L (ref 0–1.3)
MONOCYTES NFR BLD AUTO: 3.4 %
NEUTROPHILS # BLD AUTO: 7.2 10E9/L (ref 1.6–8.3)
NEUTROPHILS NFR BLD AUTO: 75.4 %
NRBC # BLD AUTO: 0 10*3/UL
NRBC BLD AUTO-RTO: 0 /100
PLATELET # BLD AUTO: 495 10E9/L (ref 150–450)
POTASSIUM SERPL-SCNC: 3.4 MMOL/L (ref 3.4–5.3)
PROT SERPL-MCNC: 5.2 G/DL (ref 6.8–8.8)
RBC # BLD AUTO: 3.99 10E12/L (ref 3.8–5.2)
SODIUM SERPL-SCNC: 143 MMOL/L (ref 133–144)
SPECIMEN SOURCE: ABNORMAL
WBC # BLD AUTO: 9.5 10E9/L (ref 4–11)

## 2017-02-17 PROCEDURE — 12000000 ZZH R&B MED SURG/OB

## 2017-02-17 PROCEDURE — A9270 NON-COVERED ITEM OR SERVICE: HCPCS | Mod: GY | Performed by: HOSPITALIST

## 2017-02-17 PROCEDURE — 80053 COMPREHEN METABOLIC PANEL: CPT | Performed by: HOSPITALIST

## 2017-02-17 PROCEDURE — 40000133 ZZH STATISTIC OT WARD VISIT: Performed by: OCCUPATIONAL THERAPY ASSISTANT

## 2017-02-17 PROCEDURE — 97535 SELF CARE MNGMENT TRAINING: CPT | Mod: GO | Performed by: OCCUPATIONAL THERAPY ASSISTANT

## 2017-02-17 PROCEDURE — 25000128 H RX IP 250 OP 636: Performed by: INTERNAL MEDICINE

## 2017-02-17 PROCEDURE — A9270 NON-COVERED ITEM OR SERVICE: HCPCS | Mod: GY | Performed by: PSYCHIATRY & NEUROLOGY

## 2017-02-17 PROCEDURE — 85025 COMPLETE CBC W/AUTO DIFF WBC: CPT | Performed by: HOSPITALIST

## 2017-02-17 PROCEDURE — 97116 GAIT TRAINING THERAPY: CPT | Mod: GP

## 2017-02-17 PROCEDURE — 85018 HEMOGLOBIN: CPT | Performed by: HOSPITALIST

## 2017-02-17 PROCEDURE — 97162 PT EVAL MOD COMPLEX 30 MIN: CPT | Mod: GP

## 2017-02-17 PROCEDURE — 40000193 ZZH STATISTIC PT WARD VISIT

## 2017-02-17 PROCEDURE — 25000132 ZZH RX MED GY IP 250 OP 250 PS 637: Mod: GY | Performed by: PSYCHIATRY & NEUROLOGY

## 2017-02-17 PROCEDURE — 25000132 ZZH RX MED GY IP 250 OP 250 PS 637: Mod: GY | Performed by: INTERNAL MEDICINE

## 2017-02-17 PROCEDURE — 25000125 ZZHC RX 250: Performed by: INTERNAL MEDICINE

## 2017-02-17 PROCEDURE — 97530 THERAPEUTIC ACTIVITIES: CPT | Mod: GP

## 2017-02-17 PROCEDURE — 36415 COLL VENOUS BLD VENIPUNCTURE: CPT | Performed by: HOSPITALIST

## 2017-02-17 PROCEDURE — 99232 SBSQ HOSP IP/OBS MODERATE 35: CPT | Performed by: HOSPITALIST

## 2017-02-17 PROCEDURE — 25000132 ZZH RX MED GY IP 250 OP 250 PS 637: Mod: GY | Performed by: HOSPITALIST

## 2017-02-17 PROCEDURE — A9270 NON-COVERED ITEM OR SERVICE: HCPCS | Mod: GY | Performed by: INTERNAL MEDICINE

## 2017-02-17 PROCEDURE — 99231 SBSQ HOSP IP/OBS SF/LOW 25: CPT | Performed by: PSYCHIATRY & NEUROLOGY

## 2017-02-17 RX ORDER — LEVOTHYROXINE SODIUM 75 UG/1
75 TABLET ORAL DAILY
Status: DISCONTINUED | OUTPATIENT
Start: 2017-02-17 | End: 2017-03-09 | Stop reason: HOSPADM

## 2017-02-17 RX ORDER — VANCOMYCIN HYDROCHLORIDE 1 G/200ML
1000 INJECTION, SOLUTION INTRAVENOUS EVERY 12 HOURS
Status: DISCONTINUED | OUTPATIENT
Start: 2017-02-17 | End: 2017-02-18

## 2017-02-17 RX ADMIN — PIPERACILLIN SODIUM,TAZOBACTAM SODIUM 3.38 G: 3; .375 INJECTION, POWDER, FOR SOLUTION INTRAVENOUS at 05:58

## 2017-02-17 RX ADMIN — PIPERACILLIN SODIUM,TAZOBACTAM SODIUM 3.38 G: 3; .375 INJECTION, POWDER, FOR SOLUTION INTRAVENOUS at 17:57

## 2017-02-17 RX ADMIN — VANCOMYCIN HYDROCHLORIDE 1000 MG: 1 INJECTION, SOLUTION INTRAVENOUS at 03:37

## 2017-02-17 RX ADMIN — OLANZAPINE 5 MG: 5 TABLET, ORALLY DISINTEGRATING ORAL at 21:57

## 2017-02-17 RX ADMIN — CYCLOBENZAPRINE HYDROCHLORIDE 10 MG: 10 TABLET, FILM COATED ORAL at 09:13

## 2017-02-17 RX ADMIN — LEVOTHYROXINE SODIUM 75 MCG: 75 TABLET ORAL at 10:35

## 2017-02-17 RX ADMIN — CYCLOBENZAPRINE HYDROCHLORIDE 10 MG: 10 TABLET, FILM COATED ORAL at 16:28

## 2017-02-17 RX ADMIN — VANCOMYCIN HYDROCHLORIDE 1000 MG: 1 INJECTION, SOLUTION INTRAVENOUS at 16:29

## 2017-02-17 RX ADMIN — PIPERACILLIN SODIUM,TAZOBACTAM SODIUM 3.38 G: 3; .375 INJECTION, POWDER, FOR SOLUTION INTRAVENOUS at 12:03

## 2017-02-17 RX ADMIN — DEXTROSE AND SODIUM CHLORIDE 100 ML/HR: 5; 900 INJECTION, SOLUTION INTRAVENOUS at 12:10

## 2017-02-17 RX ADMIN — CYCLOBENZAPRINE HYDROCHLORIDE 10 MG: 10 TABLET, FILM COATED ORAL at 21:52

## 2017-02-17 ASSESSMENT — VISUAL ACUITY
OU: GLASSES
OU: GLASSES

## 2017-02-17 NOTE — PLAN OF CARE
Problem: Goal Outcome Summary  Goal: Goal Outcome Summary  Outcome: No Change  Disoriented to place. Slightly more pleasant today, but mood still labile. LUE plegic-baseline. Up with 2, gait belt and walker. Clear liquid diet. ENRIKE patent, dressing CDI. Tylenol for abd discomfort. Hgb 5.7, 2 units PRBCs ordered, last unit currently infusing, recheck hgb when blood completed and q4h. VSS, BP improving. Pt does not want her sister, Nat, involved in plan of care. Nat states she is POA, but no documentation in chart. Notified Nat that hospital staff cannot give her any pt info. D/c pending.

## 2017-02-17 NOTE — PROGRESS NOTES
Sherita Kenney is a 76 year old female with a pmh of post polio syndrome-left arm affected, hypertension and hypo thyroidism who was admitted to the hospital after being found on the floor in her apartment during a welfare check with psychosis. She had an elevated WBC, thrombocytosis, anemia and a suspected RLQ mass. CT scan was done which demonstrated a large (8 x 12 cm) right iliopsoas abscess which likely arises from and communicates with the adjacent right colon.There is an adjacent mass-like wall thickening of what is likely a portion of the right colon, worrisome for neoplasm. Perforated neoplasm is likely the source for the abscess. A CT guided drain placement was done 2/15 with 500 mL of feculent material removed and the area lavaged.      The patient is being seen in IR f/up today.    S: I feel much better today. I have been eating and drinking some. I don't have pelvic pain like I did before.     O: Temp:  [97.2  F (36.2  C)-99.2  F (37.3  C)] 97.2  F (36.2  C)  Pulse:  [76-91] 91  Heart Rate:  [68-88] 68  Resp:  [16] 16  BP: ()/(42-76) 123/76  SpO2:  [93 %-99 %] 93 %    Labs, notes, imaging, IOs and VSs reviewed    ROUTINE ICU LABS (Last four results)  CMP  Recent Labs  Lab 02/17/17  0551 02/16/17  0815 02/16/17  0215 02/15/17  0146    145*  --  142   POTASSIUM 3.4 3.7 4.2 3.2*   CHLORIDE 113* 114*  --  105   CO2 19* 22  --  22   ANIONGAP 11 9  --  15*   GLC 88 103*  --  106*   BUN 14 22  --  41*   CR 0.71 0.76  --  0.88   GFRESTIMATED 80 74  --  63   GFRESTBLACK >90African American GFR Calc >90African American GFR Calc  --  76   SARITHA 7.3* 7.8*  --  8.9   MAG  --   --   --  3.1*   PHOS  --   --   --  4.1   PROTTOTAL 5.2* 5.7*  --  7.6   ALBUMIN 1.7* 1.9*  --  2.6*   BILITOTAL 0.4 0.4  --  0.5   ALKPHOS 76 82  --  117   AST 32 37  --  56*   ALT 31 39  --  61*     CBC  Recent Labs  Lab 02/17/17  0551 02/17/17  0215 02/16/17 2050 02/16/17  0956 02/16/17  0815 02/15/17  0146   WBC 9.5  --   --   "10.8 Canceled, Test credited Questionable specimenCALLED TO FLOOR, FLOOR TO REORDER PER [place] [address] HBS 22.0*   RBC 3.99  --   --  3.24* Canceled, Test credited Questionable specimenCALLED TO FLOOR, FLOOR TO REORDER PER [place] [address] HBS 4.29   HGB 8.4* 8.6* 8.5* 5.6*  Canceled, Test credited Duplicate requestCBC ADDEDCORRECTED ON 02/16 AT 1125: PREVIOUSLY REPORTED AS 5.6 This result has been called to CHINMAY FRIEDMAN by Thalia BRIGGS on 02 16 2017 at 1013, and has been read back. Canceled, Test credited Questionable specimenCALLED TO FLOOR, FLOOR TO REORDER PER [place] [address] HBS 7.4*   HCT 28.3*  --   --  20.8* Canceled, Test credited Questionable specimenCALLED TO FLOOR, FLOOR TO REORDER PER [place] [address] HBS 27.0*   MCV 71*  --   --  64* Canceled, Test credited Questionable specimenCALLED TO FLOOR, FLOOR TO REORDER PER [place] [address] HBS 63*   MCH 21.1*  --   --  17.0* Canceled, Test credited Questionable specimenCALLED TO FLOOR, FLOOR TO REORDER PER [place] [address] HBS 17.2*   MCHC 29.7*  --   --  26.4* Canceled, Test credited Questionable specimenCALLED TO FLOOR, FLOOR TO REORDER PER [place] [address] HBS 27.4*   RDW 24.7*  --   --  20.1* Canceled, Test credited Questionable specimenCALLED TO FLOOR, FLOOR TO REORDER PER [place] [address] HBS 20.1*   *  --   --  790* Canceled, Test credited Questionable specimenCALLED TO FLOOR, FLOOR TO REORDER PER [place] [address] HBS 1204*     INR  Recent Labs  Lab 02/16/17  0815   INR 1.23*     General: Alert, oriented at times, very pleasant woman in NAD. Sitting up in chair.   Near the end of the conversation she asked \" What was that noise? Did you hear that?\" I hadn't heard anything and told her so but mentioned that maybe she heard something in the uh. She replied that the lights went out and on when she heard noises and that it had been happening since admission.  Abdomen-RLQ drain dressing CD&I. Tan fluid in tubing, feculent smelling. ~ 180 mL fluid drained " yesterday. Flushed and aspirated drain with 10 mL NS without pain or leaking.     A/P: Sherita Kenney is a 76 year old female with a pmh of post polio syndrome-left arm affected, hypertension and hypo thyroidism who was admitted to the hospital after being found on the floor in her apartment during a welfare check with psychosis. She had an elevated WBC, thrombocytosis, anemia and a suspected RLQ mass. CT scan was done which demonstrated a large (8 x 12 cm) right iliopsoas abscess which likely arises from and communicates with the adjacent right colon.There is an adjacent mass-like wall thickening of what is likely a portion of the right colon, worrisome for neoplasm. Perforated neoplasm is likely the source for the abscess. A CT guided drain placement was done 2/15 with 500 mL of feculent material removed and the area lavaged.    Stable. Hgb 8.4 after 2 U PRBCs last night. WBC continues to decrease with continued drainage and antibiotics. It was explained to the patient how the drain works and that she will need it as long as the drainage is more than a couple teaspoons otherwise an abscess will occur again. CR ordered a CT with sinogram upcoming in the next few days.     Thanks Kami [facility] Interventional Radiology CNP ([phone])

## 2017-02-17 NOTE — PLAN OF CARE
Problem: Goal Outcome Summary  Goal: Goal Outcome Summary  Outcome: No Change  Alert, oriented times 3. LUE hemiparesis, history of polio. VSS. Drain in place r/t abdominal abscess. Regular diet, appetite fair. Up with assist of two, ambulates to bathroom. Denies pain. Continue to monitor.

## 2017-02-17 NOTE — PROGRESS NOTES
76 year old woman who presents with psychosis and found to have large iliopsoas abscess and abdominal mass suspicious for malignancy. S/p IR drainage of 500cc purulent drainage, on IV abs.   Colon rectal surgical team is also following - plan to repeat CT in 1-2 days.     When tissue diagnosis of malignancy is established, oncology service will revisit.

## 2017-02-17 NOTE — PLAN OF CARE
Problem: Goal Outcome Summary  Goal: Goal Outcome Summary  PT: PT orders received, initial eval completed and treatment initiated. Patient lives alone in a cono with elevator access. Previously independent with ADLs and mobility without a device. Pt admitted with psychosis, abdominal mass and iliopsoas abscess. Work-up pending. Pt currently is oriented to person and place (hospital but unable to name). She requires [place] for bed mobility, transfers and gait x 100' with a FWW. Her LUE is flaccid due to polio, but she was able to steer the walker with only occasional assist. Declined trial of HW or cane for gait. Will trial a L platform. Rec TCU.

## 2017-02-17 NOTE — PROVIDER NOTIFICATION
Page to on call colorectal surgery physician. Pt seen by oncology after colorectal today. Oncology recommends tissue biopsy. Wondering if we need orders for colonoscopy and prep. Awaiting call back/orders.   Per Dr. Henning, pt will need to be seen by Dr. Peter tomorrow who will make decision regarding biopsy/colonoscopy.

## 2017-02-17 NOTE — PROGRESS NOTES
COLON & RECTAL SURGERY  PROGRESS NOTE    February 17, 2017    SUBJECTIVE:  The patient is having a good morning. She ate this morning and had no nausea or vomiting. Minimal discomfort. Having full bowel function. She feels that people are over-helping her. She was drinking slimfast at home.     OBJECTIVE:  Temp:  [97.3  F (36.3  C)-99.2  F (37.3  C)] 97.8  F (36.6  C)  Pulse:  [76-87] 76  Heart Rate:  [68-88] 68  Resp:  [16] 16  BP: ()/(42-74) 111/64  SpO2:  [95 %-99 %] 98 %    Intake/Output Summary (Last 24 hours) at 02/17/17 0846  Last data filed at 02/17/17 0623   Gross per 24 hour   Intake              800 ml   Output              380 ml   Net              420 ml       GENERAL:  Awake, alert, no acute distress,   HEAD - Nomocephalic atraumatic  SCLERA anicteric  EXTREMITIES warm and well perfused  ABDOMEN:  Soft, non tender, slightly distended,   INCISION:  ENRIKE with purulent drainage    LABS:  Lab Results   Component Value Date    WBC 9.5 02/17/2017     Lab Results   Component Value Date    HGB 8.4 02/17/2017     Lab Results   Component Value Date    HCT 28.3 02/17/2017     Lab Results   Component Value Date     02/17/2017     Last Basic Metabolic Panel:  Lab Results   Component Value Date     02/17/2017      Lab Results   Component Value Date    POTASSIUM 3.4 02/17/2017     Lab Results   Component Value Date    CHLORIDE 113 02/17/2017     Lab Results   Component Value Date    SARITHA 7.3 02/17/2017     Lab Results   Component Value Date    CO2 19 02/17/2017     Lab Results   Component Value Date    BUN 14 02/17/2017     Lab Results   Component Value Date    CR 0.71 02/17/2017     Lab Results   Component Value Date    GLC 88 02/17/2017       ASSESSMENT/PLAN:76 year old woman who presents with psychosis and found to have large iliopsoas abscess and abdominal mass suspicious for malignancy. S/p IR drainage of 500cc purulent drainage. The patient is afebrile and vitally stable. S/p 2 units of PRBC  with an appropriate response. Hgb 8.4 this morning.   1. Regular diet with supplements.   2. Continue calories counts  3. Continue IV abx  4. Plan to repeat CT scan in 1-2 days with contrast through drain  5. No urgent surgery indicated. Need clarification about patient's competency to make decisions and status of power of .     Yessy Whalen PA-C  [facility]  Phone: [phone]

## 2017-02-17 NOTE — PROGRESS NOTES
"CLINICAL NUTRITION SERVICES - REASSESSMENT NOTE      Malnutrition:   Patient does not meet two of the above criteria necessary for diagnosing malnutrition         EVALUATION OF PROGRESS TOWARD GOALS   Diet:  Advanced to regular diet today; Ensure BID between meals.  Calorie counts ordered.  Intake:  Pt ate 100% of her omelet for breakfast and still has the banana, fruit, and banana bread at bedside.   \"I thought I was hungrier.\" She has had some sips of the Ensure.      Dosing Weight 52.8 kg - current     ASSESSED NUTRITION NEEDS:  Estimated Energy Needs: 3759-2744 kcals (30-35 Kcal/Kg)  Justification: repletion, underweight  Estimated Protein Needs:  grams protein (1.5-2 g pro/Kg)  Justification: Repletion    NEW FINDINGS:   Vitals:    02/15/17 0126 02/15/17 0639 02/16/17 0547   Weight: 45.4 kg (100 lb) 52.8 kg (116 lb 6.5 oz) 51.9 kg (114 lb 6.7 oz)     Was able to obtain diet hx from pt. She states she follows a regular diet at home, orders groceries from [employer].  She eats 2-4 meals/day, depends on her mood.   She drinks Slim Fast 1-2/day, not for wt loss but instead of Ensure/Boost.  Pt doesn't eat meat but does eat chicken, salmon or shrimp. She also eat hard cooked eggs, Greek yogurt and she drinks milk.  She used to go out to eat often with friends but hasn't been doing as much of that.  As for weight hx, she states her usual wt is 165#, has been losing wt over the past 15 months. If this is accurate, she has lost 49# (30%) > 1 year.    Previous Goals:   Pt to consume at least 50% of meals and supplements daily.  Evaluation: Not met    Previous Nutrition Diagnosis:   Inadequate oral intake related to reduced intakes 2/2 psychosis as evidenced by BMI 18.8 kg/m^2.    Evaluation: No change, updated below      MALNUTRITION  % Weight Loss:  > 20% in 1 year (severe malnutrition)  % Intake:  Decreased intake does not meet criteria for malnutrition   Subcutaneous Fat Loss:  None observed  Muscle Loss:  None " observed  Fluid Retention:  None noted    Malnutrition Diagnosis: Patient does not meet two of the above criteria necessary for diagnosing malnutrition       CURRENT NUTRITION DIAGNOSIS  Inadequate oral intake related to reduced intakes 2/2 psychosis as evidenced by wt loss and BMI 18.8 kg/m^2.     INTERVENTIONS  Recommendations / Nutrition Prescription  Continue regular diet and supplements    Implementation  Calorie counts per MD order 2/17-2/19  General/healthful diet - encouraged good intake    Goals  Pt will meet at least 2/3 of assessed needs orally    MONITORING AND EVALUATION:  Progress towards goals will be monitored and evaluated per protocol and Practice Guidelines    Eliza Perez RD  Pager [phone] (M-F)            [phone] ([facility] Hol)

## 2017-02-17 NOTE — PLAN OF CARE
Problem: Goal Outcome Summary  Goal: Goal Outcome Summary  Outcome: Improving  A&O x4 but seems forgetful.  ABD abscess drain ENRIKE w/ 75 mL tan purulent w/ pink tinged drainage, c/o minimal discomfort to ABD.  HG 5.6 yesterday, received 2 units PRBCs, HG 8.4 this AM. ONC to see, cont to monitor

## 2017-02-17 NOTE — PROGRESS NOTES
"Colon and Rectal Surgery  Patient feels better today. Reports less pain. States she is having trouble keeping food down but not sure if she has vomited. Unable to recall who she spoke to today or if she ate.   /59 (BP Location: Right arm)  Pulse 87  Temp 98.1  F (36.7  C) (Oral)  Resp 16  Ht 1.676 m (5' 6\")  Wt 51.9 kg (114 lb 6.7 oz)  SpO2 98%  BMI 18.47 kg/m2     Alert and has better color  Abdomen: softer, less tender. Drain with mixture of slightly bloody fluid and purulent fluid.    Intake/Output Summary (Last 24 hours) at 02/16/17 1920  Last data filed at 02/16/17 1615   Gross per 24 hour   Intake             2449 ml   Output              375 ml   Net             2074 ml     Results for orders placed or performed during the hospital encounter of 02/15/17 (from the past 24 hour(s))   Potassium   Result Value Ref Range    Potassium 4.2 3.4 - 5.3 mmol/L   Comprehensive metabolic panel   Result Value Ref Range    Sodium 145 (H) 133 - 144 mmol/L    Potassium 3.7 3.4 - 5.3 mmol/L    Chloride 114 (H) 94 - 109 mmol/L    Carbon Dioxide 22 20 - 32 mmol/L    Anion Gap 9 3 - 14 mmol/L    Glucose 103 (H) 70 - 99 mg/dL    Urea Nitrogen 22 7 - 30 mg/dL    Creatinine 0.76 0.52 - 1.04 mg/dL    GFR Estimate 74 >60 mL/min/1.7m2    GFR Estimate If Black >90   GFR Calc   >60 mL/min/1.7m2    Calcium 7.8 (L) 8.5 - 10.1 mg/dL    Bilirubin Total 0.4 0.2 - 1.3 mg/dL    Albumin 1.9 (L) 3.4 - 5.0 g/dL    Protein Total 5.7 (L) 6.8 - 8.8 g/dL    Alkaline Phosphatase 82 40 - 150 U/L    ALT 39 0 - 50 U/L    AST 37 0 - 45 U/L   CBC with platelets differential   Result Value Ref Range   INR   Result Value Ref Range    INR 1.23 (H) 0.86 - 1.14   Hematology & Oncology IP Consult: abd mass suspicious for malignancy; Consultant may enter orders: Yes; Patient to be seen: Routine - within 24 hours; Requested Clinic/Group: [facility]    Narrative    Alma Nieves MD     2/16/2017  3:34 PM  Note dictated [id number]    Tissue " diagnosis would be needed, colonoscopy should be   considered if feasible.  Transfuse to keep Hb above 7 grams. May consider IV iron.    Alma Nieves MD         Psychiatry IP Consult: f/u; Consultant may enter orders: Yes; Patient to be seen: Routine; Call back #: hospitalist    Fercho Moreno MD     2/16/2017  2:26 PM  Patient seen for follow-up psychiatric consultation. Please see   my note for details. Thanks.       Hemoglobin   Result Value Ref Range    Hemoglobin  11.7 - 15.7 g/dL     Canceled, Test credited   Duplicate request  CBC ADDED  CORRECTED ON 02/16 AT 1125: PREVIOUSLY REPORTED AS 5.6 This result has been   called to CHINMAY FRIEDMAN by Thalia BRIGGS on 02 16 2017 at 1013, and has been read   back.     CBC with platelets differential   Result Value Ref Range    WBC 10.8 4.0 - 11.0 10e9/L    RBC Count 3.24 (L) 3.8 - 5.2 10e12/L    Hemoglobin 5.6 (LL) 11.7 - 15.7 g/dL    Hematocrit 20.8 (L) 35.0 - 47.0 %    MCV 64 (L) 78 - 100 fl    MCH 17.0 (L) 26.5 - 33.0 pg    MCHC 26.4 (L) 31.5 - 36.5 g/dL    RDW 20.1 (H) 10.0 - 15.0 %    Platelet Count 790 (H) 150 - 450 10e9/L    Diff Method Automated Method     % Neutrophils 82.3 %    % Lymphocytes 12.9 %    % Monocytes 3.7 %    % Eosinophils 0.6 %    % Basophils 0.1 %    % Immature Granulocytes 0.4 %    Nucleated RBCs 0 0 /100    Absolute Neutrophil 8.9 (H) 1.6 - 8.3 10e9/L    Absolute Lymphocytes 1.4 0.8 - 5.3 10e9/L    Absolute Monocytes 0.4 0.0 - 1.3 10e9/L    Absolute Eosinophils 0.1 0.0 - 0.7 10e9/L    Absolute Basophils 0.0 0.0 - 0.2 10e9/L    Abs Immature Granulocytes 0.0 0 - 0.4 10e9/L    Absolute Nucleated RBC 0.0    CEA   Result Value Ref Range    CEA 3.8 (H) 0 - 2.5 ug/L   ABO/Rh type and screen   Result Value Ref Range    Units Ordered 2     ABO A     RH(D)  Pos     Antibody Screen Neg     Test Valid Only At [facility]     Specimen Expires 02/19/2017     Crossmatch Red Blood Cells    Blood component   Result Value  "Ref Range    Unit Number [id number]     Blood Component Type Red Blood Cells Leukocyte Reduced     Division Number 00     Status of Unit Released to care unit 02/16/2017 1256     Blood Product Code [id number]     Unit Status ISS    Blood component   Result Value Ref Range    Unit Number [id number]     Blood Component Type Red Blood Cells Leukocyte Reduced     Division Number 00     Status of Unit Released to care unit 02/16/2017 1652     Blood Product Code [id number]     Unit Status ISS    Prealbumin   Result Value Ref Range    Prealbumin 4 (L) 15 - 45 mg/dL     Impression: Patient with abscess associated with colonic mass; Mass likely colon cancer given elevated CEA. Abscess now drained. Output does not appear to be enteric but uncertain if there is a persistent communication. Also has severe protein malnutrition; anemia ? Related to malnutrition plus GI blood loss and new mental status changes. Unclear if patient able to make decisions for herself or whether her sister has power of . Uncertain status of \"open case\" at [facility].     Recommendation: Continue drainage and antibiotics. Repeat CT scan in 2-3 days with contrast through drain to determine status of abscess and existence of communication to the bowel. Based upon that information determine the best approach to making a tissue diagnosis-may be colonoscopy with biopsy but not clear at this point.     Advance her diet. Monitor calorie intake. if patient unable to take adequate nutrition will need either enteric or parenteral nutrition. Current nutritional state prohibitive for all but life saving surgery.     Anemia-undergoing transfusion. Recommendations as per hematology/medicine.     Mental status-both her mental/psychiatric status need clarification. ? MRI of the brain. If metastatic disease present, treatment plans would likely be modified. Regardless need clarification about her competency to make decisions and status of power of "  before considering surgical procedures.     Lindsay Peter MD

## 2017-02-17 NOTE — PROGRESS NOTES
[facility]  Hospitalist Progress Note  Santosh Romero MD  02/17/2017    Assessment & Plan   Sherita Kenney is a 76 year old female who presents after being found by a welfare check with psychosis. Laboratory evaluation in the emergency department significant for thrombocytosis, leukocytosis, and physical examination concerning for a right lower quadrant abdominal mass.     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 Awake and oriented x3 this am. Bit talkative cooperative and pleasant..  -Follow psych recs     Right lower quadrant abdominal abscess:  CT abdomen and pelvis shows massive right iliopsoas abscess displacing and abutting the cecum with associated right-sided hydronephrosis. Thought potentially secondary to perforation of colonic mass Colorectal surgery following and IR drained 500 cc of purulent material with drain in place. Preliminary culture showing GPC, GPR, GNRs. Afebrile.  - Cont zosyn/vanc pending final culture results.  - Repeat CT in am.  - Pain control.    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 Biopsy via colonoscopy needed. Pt has psych issues, I am not her decision making capacity level but she is oriented to 3 and willing to proceed. States she does not want her sister to make medical decisions for her.  - Plan per hemonc/colrectal.  - Psych could help us with decision capacity issues.      Anemia: Suspect multifactorial including ABL, chronic illness, potential malignancy, as well as iron deficiency anemia from poor intake. Hemoglobin 7.4 in the ed--5.6--trans--8.4 this am. Denies melena.  Denies sob/palpitaions/dizziness/cp. Transfused with 2 units of PRBC on 2/16.  -Monitor HGB and transfuse prn.    Psych issues:  - Follow psych recs.     Severe protein calorie malnutrition in the setting of what appears to be a chronic undiagnosed illness: Patient with cachexia and significant weight loss, endorses poor  "intake, lower extremity edema with hypoalbuminemia.  -Nutrition consulted    UTI: culture showing klebsiella.  - Zosyn as above.     Hypertension:  -Prn hydralazine for now.     Hypothyroidism:   -Resume PTA levothyroxine.      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  - Caution with contact (pain).     DVT Prophylaxis: SCDs for now.     Code Status: Full Code     Disposition: per clinical course.    Interval History   Awake and oriented x3. C/o abd pain but it is better controlled. No cp/sob/dizziness.    Physical Exam   Heart Rate: 68, Blood pressure 111/64, pulse 76, temperature 97.8  F (36.6  C), temperature source Oral, resp. rate 16, height 1.676 m (5' 6\"), weight 51.9 kg (114 lb 6.7 oz), SpO2 98 %.  Vitals:    02/15/17 0126 02/15/17 0639 02/16/17 0547   Weight: 45.4 kg (100 lb) 52.8 kg (116 lb 6.5 oz) 51.9 kg (114 lb 6.7 oz)     Vital Signs with Ranges  Temp:  [97.3  F (36.3  C)-99.2  F (37.3  C)] 97.8  F (36.6  C)  Pulse:  [76-87] 76  Heart Rate:  [68-88] 68  Resp:  [16] 16  BP: ()/(42-74) 111/64  SpO2:  [95 %-99 %] 98 %  I/O's Last 24 hours  I/O last 3 completed shifts:  In: 800 [P.O.:500]  Out: 380 [Urine:200; Drains:180]    Constitutional: Confused,  psychotic  Respiratory: Clear to auscultation bilaterally, no crackles or wheezing  Cardiovascular: Regular rate and rhythm, normal S1 and S2, and no murmur noted  GI:  RLQ tender mass.  Skin/Integumen: No rashes, no cyanosis, no edema  Other:      Medications   All medications were reviewed.    dextrose 5% and 0.9% NaCl 100 mL/hr at 02/16/17 0506       vancomycin (VANCOCIN) IV  1,000 mg Intravenous Q12H     cyclobenzaprine  10 mg Oral TID     sodium chloride (PF)  3 mL Intracatheter Q8H     piperacillin-tazobactam  3.375 g Intravenous Q6H     OLANZapine zydis  5 mg Oral At Bedtime     sodium chloride (PF)  10 mL Irrigation Q8H        Data     Recent Labs  Lab 02/17/17  0551 02/17/17  0215 02/16/17  2050 02/16/17  0956 02/16/17  0815 " 02/16/17  0215 02/15/17  0146   WBC 9.5  --   --  10.8 Canceled, Test credited Questionable specimenCALLED TO FLOOR, FLOOR TO REORDER PER [place] [address] HBS  --  22.0*   HGB 8.4* 8.6* 8.5* 5.6*  Canceled, Test credited Duplicate requestCBC ADDEDCORRECTED ON 02/16 AT 1125: PREVIOUSLY REPORTED AS 5.6 This result has been called to CHINMAY FRIEDMAN by Thalia BRIGGS on 02 16 2017 at 1013, and has been read back. Canceled, Test credited Questionable specimenCALLED TO FLOOR, FLOOR TO REORDER PER [place] [address] HBS  --  7.4*   MCV 71*  --   --  64* Canceled, Test credited Questionable specimenCALLED TO FLOOR, FLOOR TO REORDER PER [place] [address] HBS  --  63*   *  --   --  790* Canceled, Test credited Questionable specimenCALLED TO FLOOR, FLOOR TO REORDER PER [place] [address] HBS  --  1204*   INR  --   --   --   --  1.23*  --   --      --   --   --  145*  --  142   POTASSIUM 3.4  --   --   --  3.7 4.2 3.2*   CHLORIDE 113*  --   --   --  114*  --  105   CO2 19*  --   --   --  22  --  22   BUN 14  --   --   --  22  --  41*   CR 0.71  --   --   --  0.76  --  0.88   ANIONGAP 11  --   --   --  9  --  15*   SARITHA 7.3*  --   --   --  7.8*  --  8.9   GLC 88  --   --   --  103*  --  106*   ALBUMIN 1.7*  --   --   --  1.9*  --  2.6*   PROTTOTAL 5.2*  --   --   --  5.7*  --  7.6   BILITOTAL 0.4  --   --   --  0.4  --  0.5   ALKPHOS 76  --   --   --  82  --  117   ALT 31  --   --   --  39  --  61*   AST 32  --   --   --  37  --  56*       No results found for this or any previous visit (from the past 24 hour(s)).

## 2017-02-17 NOTE — PLAN OF CARE
Problem: Goal Outcome Summary  Goal: Goal Outcome Summary  Outcome: No Change  Patient Ax3, forgetful about situation.  Up with one assist, belt and walker, voids in bathroom adequate.  On calorie count, tolerated nutrition supplement and a piece of banana bread and some fruit.  Likes pills crushed with applesauce.  Lungs clear.  VSS.  Abdominal ENRIKE drain intact with purulent tan drainage 60 cc this shift, irrigate every 8 hours.  Neuros intact with exception of LUE hemiplegia r/t polio.

## 2017-02-17 NOTE — CONSULTS
"[facility] Psychiatric Consult Progress Note      Interval History:   Pt seen, care reviewed with treatment team. Staff report that the patient has been more lucid. A tissue diagnosis of her abdominal mass is pending. She recognizes this writer immediately and reports that she spoke with her niece yesterday. She tells me that her niece is a physician in [place] and she has agreed to be involved in assisting her with decision making. She shared that she cares about her sister, but she described her as a \"drama queen who wants to believe she did a lot for me in the past.\" She states that she was not very happy with how she handled end of life issues for their parents. She states she is getting stronger and eating better. She remains oriented to time, person and place but she is unable to give an account of why she is currently in the hospital.  She is cooperative but remains quite guarded even though she does not share much about her earlier delusional statements.   Review of systems:   The Review of Systems is negative other than noted in the HPI     Medications:       vancomycin (VANCOCIN) IV  1,000 mg Intravenous Q12H     levothyroxine  75 mcg Oral Daily     cyclobenzaprine  10 mg Oral TID     sodium chloride (PF)  3 mL Intracatheter Q8H     piperacillin-tazobactam  3.375 g Intravenous Q6H     OLANZapine zydis  5 mg Oral At Bedtime     sodium chloride (PF)  10 mL Irrigation Q8H     naloxone, lidocaine, lidocaine 4%, sodium chloride (PF), potassium chloride, potassium chloride, potassium chloride, potassium chloride with lidocaine, potassium chloride, magnesium sulfate, acetaminophen, acetaminophen, oxyCODONE, senna-docusate, polyethylene glycol, bisacodyl, ondansetron **OR** ondansetron, prochlorperazine **OR** prochlorperazine **OR** prochlorperazine, HYDROmorphone, OLANZapine zydis      Mental Status Examination:     Appearance:  awake, alert, adequately groomed and appeared as age stated  Eye Contact: "  good  Speech:  clear, coherent and normal prosody  Psychomotor Behavior:  no evidence of tardive dyskinesia, dystonia, or tics  Mood:  better  Affect:  mood congruent  Thought Process:  logical and linear no loose associations  Thought Content:  no evidence of suicidal ideation or homicidal ideation and persecutory delusions persist  Oriented to:  Alert and oriented to time place and person but not to her current situation.  Attention Span and Concentration:  fair  Recent and Remote Memory:  limited  Fund of Knowledge: appropriate  Muscle Strength and Tone: flaccid  Gait and Station: NA  Insight:  limited  Judgment:  poor        Labs/vitals:     Recent Results (from the past 24 hour(s))   Hemoglobin    Collection Time: 02/16/17  8:50 PM   Result Value Ref Range    Hemoglobin 8.5 (L) 11.7 - 15.7 g/dL   Hemoglobin    Collection Time: 02/17/17  2:15 AM   Result Value Ref Range    Hemoglobin 8.6 (L) 11.7 - 15.7 g/dL   CBC with platelets differential    Collection Time: 02/17/17  5:51 AM   Result Value Ref Range    WBC 9.5 4.0 - 11.0 10e9/L    RBC Count 3.99 3.8 - 5.2 10e12/L    Hemoglobin 8.4 (L) 11.7 - 15.7 g/dL    Hematocrit 28.3 (L) 35.0 - 47.0 %    MCV 71 (L) 78 - 100 fl    MCH 21.1 (L) 26.5 - 33.0 pg    MCHC 29.7 (L) 31.5 - 36.5 g/dL    RDW 24.7 (H) 10.0 - 15.0 %    Platelet Count 495 (H) 150 - 450 10e9/L    Diff Method Automated Method     % Neutrophils 75.4 %    % Lymphocytes 19.6 %    % Monocytes 3.4 %    % Eosinophils 0.9 %    % Basophils 0.1 %    % Immature Granulocytes 0.6 %    Nucleated RBCs 0 0 /100    Absolute Neutrophil 7.2 1.6 - 8.3 10e9/L    Absolute Lymphocytes 1.9 0.8 - 5.3 10e9/L    Absolute Monocytes 0.3 0.0 - 1.3 10e9/L    Absolute Eosinophils 0.1 0.0 - 0.7 10e9/L    Absolute Basophils 0.0 0.0 - 0.2 10e9/L    Abs Immature Granulocytes 0.1 0 - 0.4 10e9/L    Absolute Nucleated RBC 0.0    Comprehensive metabolic panel    Collection Time: 02/17/17  5:51 AM   Result Value Ref Range    Sodium 143 133 -  144 mmol/L    Potassium 3.4 3.4 - 5.3 mmol/L    Chloride 113 (H) 94 - 109 mmol/L    Carbon Dioxide 19 (L) 20 - 32 mmol/L    Anion Gap 11 3 - 14 mmol/L    Glucose 88 70 - 99 mg/dL    Urea Nitrogen 14 7 - 30 mg/dL    Creatinine 0.71 0.52 - 1.04 mg/dL    GFR Estimate 80 >60 mL/min/1.7m2    GFR Estimate If Black >90   GFR Calc   >60 mL/min/1.7m2    Calcium 7.3 (L) 8.5 - 10.1 mg/dL    Bilirubin Total 0.4 0.2 - 1.3 mg/dL    Albumin 1.7 (L) 3.4 - 5.0 g/dL    Protein Total 5.2 (L) 6.8 - 8.8 g/dL    Alkaline Phosphatase 76 40 - 150 U/L    ALT 31 0 - 50 U/L    AST 32 0 - 45 U/L     B/P: 99/59, T: 98, P: 87, R: 16    Impression:   Sherita Kenney is a 76-year-old woman with no prior history of psychosis who presents to the hospital via EMS following a welfare check and was found to have leukocytosis and thrombocytosis, weight loss, right lower quadrant abdominal mass, and CT evidence of massive right iliopsoas abscess. She is currently on IV antibiotics. We do not have MRI of her brain to rule out metastasis.      DIagnoses:     1. Psychotic disorder, unspecified.   2. Urinary tract infection without hematuria.   3. Right lower quadrant abdominal mass.   4. Microcytic anemia.   5. Severe protein calorie malnutrition.   6. Hypertension.   7. Hypothyroidism.   8. Thrombocytosis and neutrophilia.          Plan:   1. Written information given on medications. Side effects, risks, benefits reviewed.  2. Patient appears to be oriented to time, place and person but she is not able to discuss her clinical condition or her expectations regarding treatment.  3. She is therefore not deemed to have capacity to make healthcare decisions but aware enough to name her health care agent as indicated in my note yesterday. She will need an alternate decision maker.      Attestation:  Patient has been seen and evaluated by me,  Fercho Posada MD

## 2017-02-17 NOTE — PLAN OF CARE
Problem: Goal Outcome Summary  Goal: Goal Outcome Summary  OT: Per plan established by the Occupational Therapist, the recommended discharge location is TCU. Pt completed supine to sit EOB SBA, Garfield sit to stand, with Garfield of 2 pt amb without AD to/from bathroom, completed toilet transfer and clothing management with GARFIELD, Garfield for standing balance at sink hygiene.  Pt completed cognitive assessment SLUMS with score of 8/30 indicates severe cognitive deficits.

## 2017-02-17 NOTE — PROGRESS NOTES
Met w/ pt after informed by bedside RN that pt is adamant that sister Nat not be involved in her care and that she wants her niece who is an MD to be involved instead.    Spoke w/ both pt and her niece Sherita who was named after pt.  Papi lives in [facility].  Papi states that she has talked extensively to pt both yesterday and today and she is willing to be the point person for care questions.  Papi states that pt was appropriate during quite a bit of their first hour long conversation but then got off track and didn't make much sense at the end.  She realizes that pt is not completely clear but just wants pt's care team to know that pt may have some legitimate concerns about having her sister (niece's mother) decide her care.  Annieece states that pt and sister have had an antagonistic relationship for many years.  Nielly actually has some concerns that her mother may have some early dementia.  She states that her mother is adept at hiding this but that she has been experiencing mood instability and cognitive behaviors.  Nat is currently not speaking with her dtr. Updated niece on pt's care so far at pt's request.  Papi would like to come to [place] but she has 3 children and her  has trauma surgery call this weekend so she will try to come next weekend.  She is available by phone.  At this point pt's NOK is her sister so requested psych see again to determine competency for deciding health care agent.  Dr Romero has entered that order.  Spoke w/ pt again at length.  She discussed that she doesn't want her sister involved in her health care because she was very angry with how her sister handled their mother's care at end of life.  She states she wouldn't want to be treated the way her mother was treated. She stated that their mother eventually went to hospice which was better but she felt her mother's wishes were not followed for quite a while.  Pt was able to describe that her mother went to [facility]  "\"off [address]\" and that was much better.   Awaiting psych's decision and further medical w/u.  Entered maycol's info in EPIC. CM will continue to assist w/ dc planning. Updated SW and bedside RN.  "

## 2017-02-18 ENCOUNTER — APPOINTMENT (OUTPATIENT)
Dept: CT IMAGING | Facility: CLINIC | Age: 77
DRG: 329 | End: 2017-02-18
Attending: PHYSICIAN ASSISTANT
Payer: MEDICARE

## 2017-02-18 ENCOUNTER — APPOINTMENT (OUTPATIENT)
Dept: OCCUPATIONAL THERAPY | Facility: CLINIC | Age: 77
DRG: 329 | End: 2017-02-18
Payer: MEDICARE

## 2017-02-18 ENCOUNTER — APPOINTMENT (OUTPATIENT)
Dept: PHYSICAL THERAPY | Facility: CLINIC | Age: 77
DRG: 329 | End: 2017-02-18
Payer: MEDICARE

## 2017-02-18 LAB
ANION GAP SERPL CALCULATED.3IONS-SCNC: 8 MMOL/L (ref 3–14)
BASOPHILS # BLD AUTO: 0 10E9/L (ref 0–0.2)
BASOPHILS NFR BLD AUTO: 0.2 %
BUN SERPL-MCNC: 9 MG/DL (ref 7–30)
CALCIUM SERPL-MCNC: 7.9 MG/DL (ref 8.5–10.1)
CHLORIDE SERPL-SCNC: 115 MMOL/L (ref 94–109)
CO2 SERPL-SCNC: 24 MMOL/L (ref 20–32)
CREAT SERPL-MCNC: 0.7 MG/DL (ref 0.52–1.04)
DIFFERENTIAL METHOD BLD: ABNORMAL
EOSINOPHIL # BLD AUTO: 0.1 10E9/L (ref 0–0.7)
EOSINOPHIL NFR BLD AUTO: 1.5 %
ERYTHROCYTE [DISTWIDTH] IN BLOOD BY AUTOMATED COUNT: 25 % (ref 10–15)
GFR SERPL CREATININE-BSD FRML MDRD: 82 ML/MIN/1.7M2
GLUCOSE SERPL-MCNC: 87 MG/DL (ref 70–99)
HCT VFR BLD AUTO: 30.5 % (ref 35–47)
HGB BLD-MCNC: 8.8 G/DL (ref 11.7–15.7)
IMM GRANULOCYTES # BLD: 0.2 10E9/L (ref 0–0.4)
IMM GRANULOCYTES NFR BLD: 2 %
LYMPHOCYTES # BLD AUTO: 1.9 10E9/L (ref 0.8–5.3)
LYMPHOCYTES NFR BLD AUTO: 20.3 %
MCH RBC QN AUTO: 20.7 PG (ref 26.5–33)
MCHC RBC AUTO-ENTMCNC: 28.9 G/DL (ref 31.5–36.5)
MCV RBC AUTO: 72 FL (ref 78–100)
MONOCYTES # BLD AUTO: 0.5 10E9/L (ref 0–1.3)
MONOCYTES NFR BLD AUTO: 5 %
NEUTROPHILS # BLD AUTO: 6.8 10E9/L (ref 1.6–8.3)
NEUTROPHILS NFR BLD AUTO: 71 %
NRBC # BLD AUTO: 0 10*3/UL
NRBC BLD AUTO-RTO: 0 /100
PLATELET # BLD AUTO: 546 10E9/L (ref 150–450)
POTASSIUM SERPL-SCNC: 3.2 MMOL/L (ref 3.4–5.3)
POTASSIUM SERPL-SCNC: 4 MMOL/L (ref 3.4–5.3)
RBC # BLD AUTO: 4.25 10E12/L (ref 3.8–5.2)
SODIUM SERPL-SCNC: 147 MMOL/L (ref 133–144)
VANCOMYCIN SERPL-MCNC: 15.7 MG/L
WBC # BLD AUTO: 9.6 10E9/L (ref 4–11)

## 2017-02-18 PROCEDURE — 97110 THERAPEUTIC EXERCISES: CPT | Mod: GP

## 2017-02-18 PROCEDURE — 36415 COLL VENOUS BLD VENIPUNCTURE: CPT | Performed by: INTERNAL MEDICINE

## 2017-02-18 PROCEDURE — 97530 THERAPEUTIC ACTIVITIES: CPT | Mod: GP

## 2017-02-18 PROCEDURE — 97535 SELF CARE MNGMENT TRAINING: CPT | Mod: GO | Performed by: OCCUPATIONAL THERAPIST

## 2017-02-18 PROCEDURE — A9270 NON-COVERED ITEM OR SERVICE: HCPCS | Mod: GY | Performed by: HOSPITALIST

## 2017-02-18 PROCEDURE — 25000132 ZZH RX MED GY IP 250 OP 250 PS 637: Mod: GY | Performed by: HOSPITALIST

## 2017-02-18 PROCEDURE — A9270 NON-COVERED ITEM OR SERVICE: HCPCS | Mod: GY | Performed by: INTERNAL MEDICINE

## 2017-02-18 PROCEDURE — 99232 SBSQ HOSP IP/OBS MODERATE 35: CPT | Performed by: HOSPITALIST

## 2017-02-18 PROCEDURE — 97116 GAIT TRAINING THERAPY: CPT | Mod: GP

## 2017-02-18 PROCEDURE — 80202 ASSAY OF VANCOMYCIN: CPT | Performed by: INTERNAL MEDICINE

## 2017-02-18 PROCEDURE — 84132 ASSAY OF SERUM POTASSIUM: CPT | Performed by: HOSPITALIST

## 2017-02-18 PROCEDURE — 25000125 ZZHC RX 250: Performed by: INTERNAL MEDICINE

## 2017-02-18 PROCEDURE — 25000125 ZZHC RX 250: Performed by: PHYSICIAN ASSISTANT

## 2017-02-18 PROCEDURE — 12000000 ZZH R&B MED SURG/OB

## 2017-02-18 PROCEDURE — 74177 CT ABD & PELVIS W/CONTRAST: CPT

## 2017-02-18 PROCEDURE — 25500064 ZZH RX 255 OP 636: Performed by: PHYSICIAN ASSISTANT

## 2017-02-18 PROCEDURE — 40000193 ZZH STATISTIC PT WARD VISIT

## 2017-02-18 PROCEDURE — 25000132 ZZH RX MED GY IP 250 OP 250 PS 637: Mod: GY | Performed by: INTERNAL MEDICINE

## 2017-02-18 PROCEDURE — 25000128 H RX IP 250 OP 636: Performed by: INTERNAL MEDICINE

## 2017-02-18 PROCEDURE — 40000133 ZZH STATISTIC OT WARD VISIT: Performed by: OCCUPATIONAL THERAPIST

## 2017-02-18 PROCEDURE — 85025 COMPLETE CBC W/AUTO DIFF WBC: CPT | Performed by: HOSPITALIST

## 2017-02-18 PROCEDURE — 36415 COLL VENOUS BLD VENIPUNCTURE: CPT | Performed by: HOSPITALIST

## 2017-02-18 PROCEDURE — 80048 BASIC METABOLIC PNL TOTAL CA: CPT | Performed by: HOSPITALIST

## 2017-02-18 PROCEDURE — 25000132 ZZH RX MED GY IP 250 OP 250 PS 637: Mod: GY | Performed by: PSYCHIATRY & NEUROLOGY

## 2017-02-18 PROCEDURE — A9270 NON-COVERED ITEM OR SERVICE: HCPCS | Mod: GY | Performed by: PSYCHIATRY & NEUROLOGY

## 2017-02-18 RX ORDER — IOPAMIDOL 755 MG/ML
58 INJECTION, SOLUTION INTRAVASCULAR ONCE
Status: COMPLETED | OUTPATIENT
Start: 2017-02-18 | End: 2017-02-18

## 2017-02-18 RX ADMIN — PIPERACILLIN SODIUM,TAZOBACTAM SODIUM 3.38 G: 3; .375 INJECTION, POWDER, FOR SOLUTION INTRAVENOUS at 11:09

## 2017-02-18 RX ADMIN — VANCOMYCIN HYDROCHLORIDE 1000 MG: 1 INJECTION, SOLUTION INTRAVENOUS at 04:54

## 2017-02-18 RX ADMIN — SODIUM CHLORIDE 60 ML: 9 INJECTION, SOLUTION INTRAVENOUS at 09:44

## 2017-02-18 RX ADMIN — POTASSIUM CHLORIDE 20 MEQ: 1500 TABLET, EXTENDED RELEASE ORAL at 17:01

## 2017-02-18 RX ADMIN — CYCLOBENZAPRINE HYDROCHLORIDE 10 MG: 10 TABLET, FILM COATED ORAL at 09:03

## 2017-02-18 RX ADMIN — DEXTROSE AND SODIUM CHLORIDE: 5; 900 INJECTION, SOLUTION INTRAVENOUS at 04:10

## 2017-02-18 RX ADMIN — PIPERACILLIN SODIUM,TAZOBACTAM SODIUM 3.38 G: 3; .375 INJECTION, POWDER, FOR SOLUTION INTRAVENOUS at 00:25

## 2017-02-18 RX ADMIN — IOPAMIDOL 58 ML: 755 INJECTION, SOLUTION INTRAVENOUS at 09:44

## 2017-02-18 RX ADMIN — OLANZAPINE 5 MG: 5 TABLET, ORALLY DISINTEGRATING ORAL at 21:08

## 2017-02-18 RX ADMIN — CYCLOBENZAPRINE HYDROCHLORIDE 10 MG: 10 TABLET, FILM COATED ORAL at 17:01

## 2017-02-18 RX ADMIN — LEVOTHYROXINE SODIUM 75 MCG: 75 TABLET ORAL at 09:03

## 2017-02-18 RX ADMIN — PIPERACILLIN SODIUM,TAZOBACTAM SODIUM 3.38 G: 3; .375 INJECTION, POWDER, FOR SOLUTION INTRAVENOUS at 16:59

## 2017-02-18 RX ADMIN — PIPERACILLIN SODIUM,TAZOBACTAM SODIUM 3.38 G: 3; .375 INJECTION, POWDER, FOR SOLUTION INTRAVENOUS at 23:47

## 2017-02-18 RX ADMIN — PIPERACILLIN SODIUM,TAZOBACTAM SODIUM 3.38 G: 3; .375 INJECTION, POWDER, FOR SOLUTION INTRAVENOUS at 06:08

## 2017-02-18 RX ADMIN — DEXTROSE AND SODIUM CHLORIDE: 5; 900 INJECTION, SOLUTION INTRAVENOUS at 19:38

## 2017-02-18 RX ADMIN — POTASSIUM CHLORIDE 40 MEQ: 1.5 POWDER, FOR SOLUTION ORAL at 11:52

## 2017-02-18 RX ADMIN — Medication 2.5 MG: at 11:53

## 2017-02-18 ASSESSMENT — VISUAL ACUITY
OU: GLASSES

## 2017-02-18 NOTE — PROGRESS NOTES
CALORIE COUNT      Approximate Oral Intake for:    2/17  Calories: ~735 kcal  Protein: 18 g pro      Number of Meals Recorded:     1    Number of Snacks Recorded: 1      Estimated Needs:    Calories: 3637-7782 kcal/day (30-35 kcal/kg)  Protein:   g protein/day (1.5-2 kcal/kg)      Summary: Pt met 45% of energy needs, and 23% of her protein needs yesterday via PO. Calorie counts will continue through 2/19.    Rosa Brewster RD, LD

## 2017-02-18 NOTE — PLAN OF CARE
Problem: Goal Outcome Summary  Goal: Goal Outcome Summary  Outcome: Improving  Patient alert to self, place, forgetful of situation, easily redirected. BP soft, other VSS on RA. Up with assist x1 with pivot to bedside commode, voiding. Patient denies pain, LS clear, abdominal abcess dressing CDI. RLQ ENRIKE patent, draining tan purulent discharge, irrigate q8hr. IV Zosyn given x2, IV Vanco given x1.Tolerating regular diet, BS+. LUE flacid paralysis d/t hx of polio. Discharge plan in progress. Will continue to monitor.

## 2017-02-18 NOTE — PHARMACY-VANCOMYCIN DOSING SERVICE
Pharmacy Vancomycin Note  Date of Service 2017  Patient's  1940   76 year old, female    Indication: Abscess  Goal Trough Level: 10-15 mg/L  Day of Therapy: 2  Current Vancomycin regimen:  1000 mg IV q12h    Current estimated CrCl = Estimated Creatinine Clearance: 56.1 mL/min (based on Cr of 0.71).    Creatinine for last 3 days  2017:  8:15 AM Creatinine 0.76 mg/dL  2017:  5:51 AM Creatinine 0.71 mg/dL    Recent Vancomycin Levels (past 3 days)  2017:  3:20 AM Vancomycin Level 15.7 mg/L    Vancomycin IV Administrations (past 72 hours)                   vancomycin (VANCOCIN) 1000 mg in dextrose 5% 200 mL PREMIX (mg) 1,000 mg New Bag 17 1629    vancomycin (VANCOCIN) 1000 mg in dextrose 5% 200 mL PREMIX (mg) 1,000 mg New Bag 17 0337     1,000 mg New Bag 17 1417                Nephrotoxins and other renal medications (Future)    Start     Dose/Rate Route Frequency Ordered Stop    17 1600  vancomycin (VANCOCIN) 1000 mg in dextrose 5% 200 mL PREMIX      1,000 mg Intravenous EVERY 12 HOURS 17 0910      02/15/17 0530  piperacillin-tazobactam (ZOSYN) 3.375 g vial to attach to  mL bag     Comments:  Pharmacy can adjust dose based on renal function.    3.375 g  over 1 Hours Intravenous EVERY 6 HOURS 02/15/17 0521               Contrast Orders - past 72 hours (72h ago through future)    Start     Dose/Rate Route Frequency Ordered Stop    02/15/17 0445  iopamidol (ISOVUE-370) solution 50 mL      50 mL Intravenous ONCE 02/15/17 0444 02/15/17 0500          Interpretation of levels and current regimen:  Trough level is  15.7    Has serum creatinine changed > 50% in last 72 hours: No    Urine output:  good urine output    Renal Function: Stable    Plan:  1.  Continue Current Dose  2.  Pharmacy will check trough levels as appropriate in 1-3 Days.    3. Serum creatinine levels will be ordered daily for the first week of therapy and at least twice weekly for  subsequent weeks.      Dereck Broussard        .

## 2017-02-18 NOTE — PLAN OF CARE
Problem: Goal Outcome Summary  Goal: Goal Outcome Summary  Outcome: Therapy, progress toward functional goals as expected  PT- Per plan established by the Physical Therapist, according to functional mobility the discharge recommendation is TCU. Supine to sit with HOB elevated and SBA with use of bed rail. Sit to/from stand with FWW and CGA for balance. Amb 50 ft x 1 with FWW and CGA/min assist for balance and FWW navigation. Tolerates seated LE exs well. Pt returned supine with min assist for B LEs at end of session. All needs in reach and bed alarm on upon PT exit.

## 2017-02-18 NOTE — PLAN OF CARE
Problem: Goal Outcome Summary  Goal: Goal Outcome Summary  Outcome: No Change  Disoriented to place and situation, forgetful. Slight right facial droop, LUE hemiplegia (baseline), slow/deliberate finger-to-nose and heel-to-shin assesments otherwise Neuros intact. VSS. Regular diet with thin liquids. Likes meds crushed with applesauce. Up with one, gait belt, and walker. RLQ drain to bulb suction put out 45 mL purulent, tan, thin drainage. Denies pain. Plan to find power of  for decision making down the line, and to continue monitoring.

## 2017-02-18 NOTE — PROGRESS NOTES
[facility]  Hospitalist Progress Note  Santosh Romero MD  02/18/2017    Assessment & Plan   Sherita Kenney is a 76 year old female who presents after being found by a welfare check with psychosis. Laboratory evaluation in the emergency department significant for thrombocytosis, leukocytosis, and physical examination concerning for a right lower quadrant abdominal mass.     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 Awake and oriented x3 this am.  cooperative and pleasant..  -Follow psych recs     Right lower quadrant abdominal abscess:  CT abdomen and pelvis shows massive right iliopsoas abscess displacing and abutting the cecum with associated right-sided hydronephrosis. Thought potentially secondary to perforation of colonic mass Colorectal surgery following and IR drained 500 cc of purulent material with drain in place. Preliminary culture showing GPC, GPR, GNRs. Culture shows Enterobacter cloacae complex. Afebrile. Drain in place.  - Cont zosyn.  - D/C vanc.  - Repeat CT this am.  - Pain control.    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 Biopsy via colonoscopy needed. Pt has psych issues,  but she is oriented to 3 and willing to proceed. Per psych the pt has no decision making capacity.  - Plan per hemonc/colrectal.  - Need decision maker (has a sister).      Anemia: Suspect multifactorial including ABL, chronic illness, potential malignancy, as well as iron deficiency anemia from poor intake. Hemoglobin 7.4 in the ed--5.6--trans--8.4--8.8 this am. Denies melena.  Denies sob/palpitaions/dizziness/cp. Transfused with 2 units of PRBC on 2/16.  -Monitor HGB and transfuse prn.    Psych issues:  - Follow psych recs.     Severe protein calorie malnutrition in the setting of what appears to be a chronic undiagnosed illness: Patient with cachexia and significant weight loss, endorses poor intake, lower extremity edema with  "hypoalbuminemia.  -Nutrition consulted    UTI: culture showing klebsiella.  - Zosyn as above.     Hypertension:  -Prn hydralazine for now.     Hypothyroidism:   -Resumed PTA levothyroxine.      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  - Caution with contact (pain).     DVT Prophylaxis: SCDs for now.     Code Status: Full Code     Disposition: per clinical course.    Interval History   Awake and oriented x3. C/o abd pain but it is better controlled. No cp/sob/dizziness.    Physical Exam   Heart Rate: 78, Blood pressure 123/64, pulse 91, temperature 97.4  F (36.3  C), temperature source Oral, resp. rate 16, height 1.676 m (5' 6\"), weight 51.9 kg (114 lb 6.7 oz), SpO2 98 %.  Vitals:    02/15/17 0126 02/15/17 0639 02/16/17 0547   Weight: 45.4 kg (100 lb) 52.8 kg (116 lb 6.5 oz) 51.9 kg (114 lb 6.7 oz)     Vital Signs with Ranges  Temp:  [97.2  F (36.2  C)-98.1  F (36.7  C)] 97.4  F (36.3  C)  Pulse:  [91] 91  Heart Rate:  [72-78] 78  Resp:  [14-16] 16  BP: (101-123)/(60-78) 123/64  SpO2:  [93 %-99 %] 98 %  I/O's Last 24 hours  I/O last 3 completed shifts:  In: 1970 [P.O.:420; I.V.:1550]  Out: 105 [Drains:125]    Respiratory: Clear to auscultation bilaterally, no crackles or wheezing  Cardiovascular: Regular rate and rhythm, normal S1 and S2, and no murmur noted  GI:  RLQ tender mass. Drain in place.  Skin/Integumen: No rashes, no cyanosis, no edema  Other:      Medications   All medications were reviewed.    dextrose 5% and 0.9% NaCl 100 mL/hr at 02/18/17 0410       vancomycin (VANCOCIN) IV  1,000 mg Intravenous Q12H     levothyroxine  75 mcg Oral Daily     cyclobenzaprine  10 mg Oral TID     sodium chloride (PF)  3 mL Intracatheter Q8H     piperacillin-tazobactam  3.375 g Intravenous Q6H     OLANZapine zydis  5 mg Oral At Bedtime     sodium chloride (PF)  10 mL Irrigation Q8H        Data     Recent Labs  Lab 02/17/17  0551 02/17/17  0215 02/16/17  2050 02/16/17  0956 02/16/17  0815 02/16/17  0215 02/15/17  0146 "   WBC 9.5  --   --  10.8 Canceled, Test credited Questionable specimenCALLED TO FLOOR, FLOOR TO REORDER PER [place] [id number] [facility]  --  22.0*   HGB 8.4* 8.6* 8.5* 5.6*  Canceled, Test credited Duplicate requestCBC ADDEDCORRECTED ON 02/16 AT 1125: PREVIOUSLY REPORTED AS 5.6 This result has been called to CHINMAY FRIEDMAN by Thalia BRIGGS on 02 16 2017 at 1013, and has been read back. Canceled, Test credited Questionable specimenCALLED TO FLOOR, FLOOR TO REORDER PER [place] [id number] [facility]  --  7.4*   MCV 71*  --   --  64* Canceled, Test credited Questionable specimenCALLED TO FLOOR, FLOOR TO REORDER PER [place] [id number] [facility]  --  63*   *  --   --  790* Canceled, Test credited Questionable specimenCALLED TO FLOOR, FLOOR TO REORDER PER [place] [id number] [facility]  --  1204*   INR  --   --   --   --  1.23*  --   --      --   --   --  145*  --  142   POTASSIUM 3.4  --   --   --  3.7 4.2 3.2*   CHLORIDE 113*  --   --   --  114*  --  105   CO2 19*  --   --   --  22  --  22   BUN 14  --   --   --  22  --  41*   CR 0.71  --   --   --  0.76  --  0.88   ANIONGAP 11  --   --   --  9  --  15*   SARITHA 7.3*  --   --   --  7.8*  --  8.9   GLC 88  --   --   --  103*  --  106*   ALBUMIN 1.7*  --   --   --  1.9*  --  2.6*   PROTTOTAL 5.2*  --   --   --  5.7*  --  7.6   BILITOTAL 0.4  --   --   --  0.4  --  0.5   ALKPHOS 76  --   --   --  82  --  117   ALT 31  --   --   --  39  --  61*   AST 32  --   --   --  37  --  56*       No results found for this or any previous visit (from the past 24 hour(s)).

## 2017-02-18 NOTE — PROGRESS NOTES
Social Work Progress Note  Pt chart reviewed. Pt discussed in interdisciplinary rounds.     Intervention: Per Risk management, based on the letter provided by [facility] Adult Protection [facility] staff can provide information to [facility]. Unit Sw forwarded [facility]'s letter to [facility] yesterday to have them release pt's medical records to [facility].     Plan:  Anticipated Discharge Placement: TCU  Barriers: pending medical course  Follow-up plan: Sw to continue to follow.     MARAL Prado, [facility]  [phone]  1500

## 2017-02-18 NOTE — PROGRESS NOTES
COLON & RECTAL SURGERY PROGRESS NOTE    February 18, 2017    SUBJECTIVE:    Feeling ok  Some pain but seems well controlled  No NV  Tolerating diet  Passing gas and BM  No fever/chills    OBJECTIVE:  Temp:  [97.2  F (36.2  C)-98.1  F (36.7  C)] 97.7  F (36.5  C)  Pulse:  [91] 91  Heart Rate:  [72-78] 77  Resp:  [14-16] 16  BP: (101-123)/(60-78) 119/73  SpO2:  [93 %-99 %] 96 %    Intake/Output Summary (Last 24 hours) at 02/18/17 0923  Last data filed at 02/18/17 0449   Gross per 24 hour   Intake             1970 ml   Output              105 ml   Net             1865 ml       GENERAL:  Awake, alert, no acute distress.  ABDOMEN:  Soft, tender in RLQ, no guarding or rebound, non-distended, ENRIKE purulent output    LABS:  Lab Results   Component Value Date    WBC 9.5 02/17/2017     Lab Results   Component Value Date    HGB 8.4 02/17/2017     Lab Results   Component Value Date    HCT 28.3 02/17/2017     Lab Results   Component Value Date     02/17/2017     Last Basic Metabolic Panel:  Lab Results   Component Value Date     02/17/2017      Lab Results   Component Value Date    POTASSIUM 3.4 02/17/2017     Lab Results   Component Value Date    CHLORIDE 113 02/17/2017     Lab Results   Component Value Date    SARITHA 7.3 02/17/2017     Lab Results   Component Value Date    CO2 19 02/17/2017     Lab Results   Component Value Date    BUN 14 02/17/2017     Lab Results   Component Value Date    CR 0.71 02/17/2017     Lab Results   Component Value Date    GLC 88 02/17/2017       ASSESSMENT/PLAN:  76 year old woman who presents with psychosis and found to have large iliopsoas abscess and abdominal mass suspicious for malignancy. S/p IR drainage of 500cc purulent drainage. The patient is afebrile and vitally stable. S/p 2 units of PRBC with an appropriate response. Hgb 8.4 yesterday.   1. Regular diet with supplements.   2. Continue calories counts  3. Continue IV abx  4. Plan to repeat CT scan today with contrast through  drain  5. No urgent surgery indicated. Need clarification about patient's competency to make decisions and status of power of .     Will review with attending staff on call Dr. William Urrutia MD  Colon & Rectal Surgery Fellow  Pager [phone]

## 2017-02-18 NOTE — PROGRESS NOTES
"Colon and Rectal Surgery  Patient denies pain. States that she has been eating some without emesis. Passing flatus. Last BM was yesterday.   /78 (BP Location: Right arm)  Pulse 91  Temp 98.1  F (36.7  C) (Oral)  Resp 16  Ht 1.676 m (5' 6\")  Wt 51.9 kg (114 lb 6.7 oz)  SpO2 99%  BMI 18.47 kg/m2    Intake/Output Summary (Last 24 hours) at 02/17/17 1822  Last data filed at 02/17/17 1500   Gross per 24 hour   Intake             1220 ml   Output              125 ml   Net             1095 ml     Alert and sitting in a chairt  Abdomen: soft. ENRIKE with purulent drainage. Some tenderness right side but mild.       CBC RESULTS:   Recent Labs   Lab Test  02/17/17   0551   WBC  9.5   RBC  3.99   HGB  8.4*   HCT  28.3*   MCV  71*   MCH  21.1*   MCHC  29.7*   RDW  24.7*   PLT  495*       Impression: Appears to be improving.   Plan: CT with contrast through the drain tomorrow.  Hemoglobin improved after transfusion  Mental status-being evaluated by bettie. Patient is clear that she wants her niece involved but sometimes refers to her as her brother-in-law or daughter. Appreciate social work efforts to clarify social situation.   Nutrition: calorie counts pending.     Lindsay Peter MD    "

## 2017-02-18 NOTE — PLAN OF CARE
Problem: Goal Outcome Summary  Goal: Goal Outcome Summary  OT:Pt. overall min. A w/ safe transfers, room mobility, ADL's. Pt. ambulated to bathroom w/min. A/mod.vc's for safety;completed toileting/toilet transfer with overall min-mod. A;needed min. A for hand-washing hand/placing soap, due to impaired problem-solving ability. Pt. needed increased time for ambulation back to bed, confused, difficulty following simple commands, attempting to put R hand on L side of walker, assist to push walker initially. Pt. fatigued at end of session, sit-supine w/min. A for LE's. Pt. oriented to month, year, president, hospital(stated [facility]). Continue to rec. DC to TCU.

## 2017-02-18 NOTE — PLAN OF CARE
Problem: Goal Outcome Summary  Goal: Goal Outcome Summary  Outcome: No Change  Patient alert, disoriented to situation. VSS on RA. Up with assist x1 walker, gait belt, ambulating short distances, voiding adequate. Patient denies pain, abdominal abcess dressing CDI. RLQ ENRIKE patent, draining tan purulent discharge, 45 ml out, irrigate q8hr. IV Zosyn given, vanco scheduled. Zyprexa PRN given X1 for agitation, pt paranoid about sister being involved in cares, reassured pt that no information is being shared with sister. Tolerating regular diet, encourage PO, calorie count, BS+. LUE flacid paralysis d/t hx of polio. Discharge plan in progress, SW following. Will continue to monitor.

## 2017-02-19 ENCOUNTER — APPOINTMENT (OUTPATIENT)
Dept: OCCUPATIONAL THERAPY | Facility: CLINIC | Age: 77
DRG: 329 | End: 2017-02-19
Payer: MEDICARE

## 2017-02-19 ENCOUNTER — APPOINTMENT (OUTPATIENT)
Dept: PHYSICAL THERAPY | Facility: CLINIC | Age: 77
DRG: 329 | End: 2017-02-19
Payer: MEDICARE

## 2017-02-19 LAB
ANION GAP SERPL CALCULATED.3IONS-SCNC: 8 MMOL/L (ref 3–14)
BASOPHILS # BLD AUTO: 0 10E9/L (ref 0–0.2)
BASOPHILS NFR BLD AUTO: 0.3 %
BUN SERPL-MCNC: 6 MG/DL (ref 7–30)
CALCIUM SERPL-MCNC: 8.2 MG/DL (ref 8.5–10.1)
CHLORIDE SERPL-SCNC: 113 MMOL/L (ref 94–109)
CO2 SERPL-SCNC: 24 MMOL/L (ref 20–32)
CREAT SERPL-MCNC: 0.59 MG/DL (ref 0.52–1.04)
DIFFERENTIAL METHOD BLD: ABNORMAL
EOSINOPHIL # BLD AUTO: 0.2 10E9/L (ref 0–0.7)
EOSINOPHIL NFR BLD AUTO: 2 %
ERYTHROCYTE [DISTWIDTH] IN BLOOD BY AUTOMATED COUNT: 25.8 % (ref 10–15)
GFR SERPL CREATININE-BSD FRML MDRD: ABNORMAL ML/MIN/1.7M2
GLUCOSE SERPL-MCNC: 103 MG/DL (ref 70–99)
HCT VFR BLD AUTO: 31.7 % (ref 35–47)
HGB BLD-MCNC: 9.1 G/DL (ref 11.7–15.7)
IMM GRANULOCYTES # BLD: 0.3 10E9/L (ref 0–0.4)
IMM GRANULOCYTES NFR BLD: 2.8 %
LYMPHOCYTES # BLD AUTO: 2.1 10E9/L (ref 0.8–5.3)
LYMPHOCYTES NFR BLD AUTO: 20.1 %
MAGNESIUM SERPL-MCNC: 2 MG/DL (ref 1.6–2.3)
MCH RBC QN AUTO: 20.8 PG (ref 26.5–33)
MCHC RBC AUTO-ENTMCNC: 28.7 G/DL (ref 31.5–36.5)
MCV RBC AUTO: 73 FL (ref 78–100)
MONOCYTES # BLD AUTO: 0.4 10E9/L (ref 0–1.3)
MONOCYTES NFR BLD AUTO: 4 %
NEUTROPHILS # BLD AUTO: 7.4 10E9/L (ref 1.6–8.3)
NEUTROPHILS NFR BLD AUTO: 70.8 %
NRBC # BLD AUTO: 0 10*3/UL
NRBC BLD AUTO-RTO: 0 /100
PLATELET # BLD AUTO: 589 10E9/L (ref 150–450)
POTASSIUM SERPL-SCNC: 3.8 MMOL/L (ref 3.4–5.3)
RBC # BLD AUTO: 4.37 10E12/L (ref 3.8–5.2)
SODIUM SERPL-SCNC: 145 MMOL/L (ref 133–144)
WBC # BLD AUTO: 10.5 10E9/L (ref 4–11)

## 2017-02-19 PROCEDURE — 97535 SELF CARE MNGMENT TRAINING: CPT | Mod: GO | Performed by: OCCUPATIONAL THERAPIST

## 2017-02-19 PROCEDURE — 25000128 H RX IP 250 OP 636: Performed by: INTERNAL MEDICINE

## 2017-02-19 PROCEDURE — 97116 GAIT TRAINING THERAPY: CPT | Mod: GP | Performed by: PHYSICAL THERAPIST

## 2017-02-19 PROCEDURE — A9270 NON-COVERED ITEM OR SERVICE: HCPCS | Mod: GY | Performed by: INTERNAL MEDICINE

## 2017-02-19 PROCEDURE — 40000133 ZZH STATISTIC OT WARD VISIT: Performed by: OCCUPATIONAL THERAPIST

## 2017-02-19 PROCEDURE — 99232 SBSQ HOSP IP/OBS MODERATE 35: CPT | Performed by: HOSPITALIST

## 2017-02-19 PROCEDURE — A9270 NON-COVERED ITEM OR SERVICE: HCPCS | Mod: GY | Performed by: HOSPITALIST

## 2017-02-19 PROCEDURE — A9270 NON-COVERED ITEM OR SERVICE: HCPCS | Mod: GY | Performed by: PSYCHIATRY & NEUROLOGY

## 2017-02-19 PROCEDURE — 25000132 ZZH RX MED GY IP 250 OP 250 PS 637: Mod: GY | Performed by: HOSPITALIST

## 2017-02-19 PROCEDURE — 25000132 ZZH RX MED GY IP 250 OP 250 PS 637: Mod: GY | Performed by: INTERNAL MEDICINE

## 2017-02-19 PROCEDURE — 85025 COMPLETE CBC W/AUTO DIFF WBC: CPT | Performed by: HOSPITALIST

## 2017-02-19 PROCEDURE — 80048 BASIC METABOLIC PNL TOTAL CA: CPT | Performed by: HOSPITALIST

## 2017-02-19 PROCEDURE — 25000125 ZZHC RX 250: Performed by: INTERNAL MEDICINE

## 2017-02-19 PROCEDURE — 25000132 ZZH RX MED GY IP 250 OP 250 PS 637: Mod: GY | Performed by: PSYCHIATRY & NEUROLOGY

## 2017-02-19 PROCEDURE — 97530 THERAPEUTIC ACTIVITIES: CPT | Mod: GP | Performed by: PHYSICAL THERAPIST

## 2017-02-19 PROCEDURE — 40000193 ZZH STATISTIC PT WARD VISIT: Performed by: PHYSICAL THERAPIST

## 2017-02-19 PROCEDURE — 83735 ASSAY OF MAGNESIUM: CPT | Performed by: HOSPITALIST

## 2017-02-19 PROCEDURE — 36415 COLL VENOUS BLD VENIPUNCTURE: CPT | Performed by: HOSPITALIST

## 2017-02-19 PROCEDURE — 12000000 ZZH R&B MED SURG/OB

## 2017-02-19 RX ADMIN — LEVOTHYROXINE SODIUM 75 MCG: 75 TABLET ORAL at 09:07

## 2017-02-19 RX ADMIN — PIPERACILLIN SODIUM,TAZOBACTAM SODIUM 3.38 G: 3; .375 INJECTION, POWDER, FOR SOLUTION INTRAVENOUS at 18:43

## 2017-02-19 RX ADMIN — CYCLOBENZAPRINE HYDROCHLORIDE 10 MG: 10 TABLET, FILM COATED ORAL at 21:34

## 2017-02-19 RX ADMIN — PIPERACILLIN SODIUM,TAZOBACTAM SODIUM 3.38 G: 3; .375 INJECTION, POWDER, FOR SOLUTION INTRAVENOUS at 13:09

## 2017-02-19 RX ADMIN — CYCLOBENZAPRINE HYDROCHLORIDE 10 MG: 10 TABLET, FILM COATED ORAL at 09:08

## 2017-02-19 RX ADMIN — OLANZAPINE 5 MG: 5 TABLET, ORALLY DISINTEGRATING ORAL at 21:34

## 2017-02-19 RX ADMIN — PIPERACILLIN SODIUM,TAZOBACTAM SODIUM 3.38 G: 3; .375 INJECTION, POWDER, FOR SOLUTION INTRAVENOUS at 06:55

## 2017-02-19 RX ADMIN — DEXTROSE AND SODIUM CHLORIDE: 5; 900 INJECTION, SOLUTION INTRAVENOUS at 18:44

## 2017-02-19 RX ADMIN — DEXTROSE AND SODIUM CHLORIDE: 5; 900 INJECTION, SOLUTION INTRAVENOUS at 06:53

## 2017-02-19 RX ADMIN — CYCLOBENZAPRINE HYDROCHLORIDE 10 MG: 10 TABLET, FILM COATED ORAL at 16:32

## 2017-02-19 ASSESSMENT — VISUAL ACUITY
OU: NORMAL ACUITY;GLASSES

## 2017-02-19 NOTE — PLAN OF CARE
Problem: Goal Outcome Summary  Goal: Goal Outcome Summary  Outcome: No Change  A&O times 2-3. Disoriented to situation. Confused at times. LUE paralysis, due to hx of polio. VSS. Up with a ssist of one, voiding in bathroom. Denies pain. Drain in RUQ Abdomen in place. Tan drainage.Continue to monitor.

## 2017-02-19 NOTE — PLAN OF CARE
Problem: Goal Outcome Summary  Goal: Goal Outcome Summary  Outcome: Therapy, progress toward functional goals as expected  PT- patient min A with transfers/ gait of 150 ft with L platform WW, lives alone at home, recommend TCU at discharge.

## 2017-02-19 NOTE — PROGRESS NOTES
COLON & RECTAL SURGERY PROGRESS NOTE    February 19, 2017  SUBJECTIVE:    Feeling well  Some pain but seems well controlled  No NV  Tolerating diet, on calorie counts  Passing gas and BM  No fever/chills    OBJECTIVE:  Temp:  [97.3  F (36.3  C)-97.7  F (36.5  C)] 97.7  F (36.5  C)  Pulse:  [80-90] 89  Heart Rate:  [56] 56  Resp:  [16-18] 16  BP: (104-137)/(60-82) 137/82  SpO2:  [94 %-100 %] 98 %    Intake/Output Summary (Last 24 hours) at 02/19/17 1032  Last data filed at 02/19/17 0653   Gross per 24 hour   Intake             2558 ml   Output               90 ml   Net             2468 ml        GENERAL: Awake, alert, no acute distress.  ABDOMEN: Soft, tender in RLQ, no guarding or rebound, non-distended, ENRIKE purulent output     LABS:  Lab Results   Component Value Date    WBC 10.5 02/19/2017     Lab Results   Component Value Date    HGB 9.1 02/19/2017     Lab Results   Component Value Date    HCT 31.7 02/19/2017     Lab Results   Component Value Date     02/19/2017     Last Basic Metabolic Panel:  Lab Results   Component Value Date     02/19/2017      Lab Results   Component Value Date    POTASSIUM 3.8 02/19/2017     Lab Results   Component Value Date    CHLORIDE 113 02/19/2017     Lab Results   Component Value Date    SARITHA 8.2 02/19/2017     Lab Results   Component Value Date    CO2 24 02/19/2017     Lab Results   Component Value Date    BUN 6 02/19/2017     Lab Results   Component Value Date    CR 0.59 02/19/2017     Lab Results   Component Value Date     02/19/2017     CT (yesterday)- IMPRESSION:  1. Near complete resolution of the fluid collection right iliopsoas  region drained by the previously placed pigtail drainage catheter.  2. Cecal wall thickening or mass is again noted. No evidence of bowel  obstruction. No free intraperitoneal air.  3. Trace bilateral pleural effusions and evidence of old granulomatous  Disease.    ASSESSMENT/PLAN:  76 year old woman who presents with psychosis and  found to have large iliopsoas abscess and abdominal mass suspicious for malignancy. S/p IR drainage of 500cc purulent drainage. The patient is afebrile and vitally stable. S/p 2 units of PRBC with an appropriate response. Hgb stable.  1. Regular diet with supplements.   2. Continue calories counts  3. Continue IV abx  4. CT results reviewed- collection nearly resolved, but given drain output still 85cc/day of purulent fluid would leave in place for now until outputs decrease.  5. No urgent surgery indicated, will require further work up of mass including colonoscopy. Per psych patient does not appear to have decision making capacity, will need to clarify who the substitute decision maker/ power of  will be before moving forward..      Will review with attending staff on call Dr. William Urrutia MD  Colon & Rectal Surgery Fellow  Pager [phone]

## 2017-02-19 NOTE — PROGRESS NOTES
CALORIE COUNT      Approximate Oral Intake for:    2/18/2017  Calories:   1420 kcal  Protein:   69 g pro      Number of Meals Recorded:     1    Number of Snacks Recorded: 2      Estimated Needs:    Calories: 0690-7186 kcal/day (30-35 kcal/kg)  Protein:  g protein/day (1.5-2 kcal/kg)      Summary:   Per RN, pt likely drank 100% of two Ensure Plus, ate one piece of banana bread and a Caesar salad. Pt did have emesis (volume not recorded) after eating the salad however, so actual caloric intake may be less than recorded. Calorie counts continue through today, 2/19.    Rosa Brewster, RD, LD

## 2017-02-19 NOTE — PLAN OF CARE
Problem: Goal Outcome Summary  Goal: Goal Outcome Summary  Outcome: No Change  Alert to self. Confused. CMS intact except baseline LUE hemiplegia r/t polio. BS active, +flatus, large BM. ENRIKE patent, however, port for irrigation access cracked--call sent out for replacement piece. Ax1 GB & walker. Poor appetite. Agitated around 2100--improved after bedtime Zyprexa given.

## 2017-02-19 NOTE — PROGRESS NOTES
"Brother-in-law Renny called, stated patient called them at 0239 last night, the only words he could understand were \"I would like us to be on the same page\". Nat will call patient later this morning. Patient aware and ok with this. Patient visited with Nat and Renny this afternoon, and stated \"it was a good visit\".   "

## 2017-02-19 NOTE — PROGRESS NOTES
SW:    I/P:  Per RN pt's family would like to speak with SW.  SW met with sister, Nat, and brother-in-law, Renny regarding pt's current medical situation and long-term care options.  Sister was tearful and visibly sad about the current condition of her sister.  Pt's brother-in-law expressed anxiety and concern over the situation as well.  Per sister and brother-in-law the relationship has been strained for quite some time, however, they have continuously tried to restore relations and help/assist pt but pt typically refuses their involvement.  Sister/ALINA state that they are pt's POA (medical and financial) and pt signed these documents years ago when pt was of sound mind; they agreed to bring these documents to [facility] so as it update pt's records.  Sister/ALINA report that pt has no known children and is not  so they are also next-of-kin.  Their daughter, Sherita, is also involved with pt and pt responds more favorably to Sherita than to sister/ALINA.  SW explained that likely pt will transition into a TCU temporarily and then a long-term care option can be determined (they would prefer pt is placed near them in [place], [place]).  SW explained that TCU is covered by insurance but that LTC placements are often not and this will require pt to pay privately.  Family asked how to retrieve pt's financial information (pt refuses to provide this info) and SW stated that SW will ask SW lead on how to proceed forward.  Sister and ALIAN also asked to speak with the MD prior to pt receiving any additional medical interventions (colonoscopy) so as to ensure that it's necessary given pt's prior history with having painful procedures; SW updated RN.  Family believes that pt will be very resistant and upset with any placement recommendations, however, RN feels that pt will be agreeable because she knows that she needs additional assistance.    Update:  [place] spoke with [place] ([facility]) and she recommended that sister/ALINA bring POA  documents to pt's apartment complex to gain entry into pt's unit for needed financial information.  Lead SW also stated that [facility] will help family pursue guardianship/conservatorship if necessary.  SW updated family on this information.  SW also explained that pursuing a TCU will likely be the discharge plan from [facility] and then during the TCU stay family can develop pt's LTC plan (considering hiring PCA vs placement).  Family stated that they are feeling much better about the situation and are hopeful that all the pieces will come together.  Family is not comfortable gaining entry into pt's apartment without her consent but is considering it so as to ensure all financial obligations are being taken care of.  Family would like pt to go to [facility] as first choice (referral to be sent once d/c date confirmed).

## 2017-02-19 NOTE — PROGRESS NOTES
[facility]  Hospitalist Progress Note  Santosh Romero MD  02/19/2017    Assessment & Plan   Sherita Kenney is a 76 year old female who presents after being found by a welfare check with psychosis. Laboratory evaluation in the emergency department significant for thrombocytosis, leukocytosis, and physical examination concerning for a right lower quadrant abdominal mass.     Right lower quadrant abdominal abscess:  CT abdomen and pelvis shows massive right iliopsoas abscess displacing and abutting the cecum with associated right-sided hydronephrosis. Thought potentially secondary to perforation of colonic mass Colorectal surgery following and IR drained 500 cc of purulent material with drain in place. Culture shows Enterobacter cloacae complex, heamophilus parain, other GNR. Afebrile. Drain in place. Repeat CT showed near completeresolution of the fluid collection.   - Cont zosyn.  - Drain managed by colorectal  - Pain control.  - Follow final culture results.    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 Biopsy via colonoscopy needed. Pt has psych issues,  but she is oriented to 3 and willing to proceed. Per psych the pt has no decision making capacity.  - Plan per hemonc/colrectal.  - Need decision maker ([facility], SW assisting).      Anemia: Suspect multifactorial including ABL, chronic illness, potential malignancy, as well as iron deficiency anemia from poor intake. Hemoglobin 7.4 in the ed--5.6--trans--8.4--8.8 . Denies melena.  Denies sob/palpitaions/dizziness/cp. Transfused with 2 units of PRBC on 2/16.  -Monitor HGB and transfuse prn.    Psych issues: Awake and oriented x3 this am.  cooperative and pleasant. Talkative. Tangential at times.  - Follow psych recs.     Severe protein calorie malnutrition in the setting of what appears to be a chronic undiagnosed illness: Patient with cachexia and significant weight loss, endorses poor intake, lower extremity edema with  "hypoalbuminemia.  -Nutrition consulted    UTI: culture showing klebsiella.  - Zosyn as above.     Hypertension:  -Prn hydralazine for now.     Hypothyroidism:   -Resumed PTA levothyroxine.      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  - Caution with contact (pain).     DVT Prophylaxis: SCDs for now.     Code Status: Full Code     Disposition: per clinical course.    Interval History   Awake and oriented x3. No significant abd pain.  No cp/sob/dizziness.    Physical Exam   Heart Rate: 56, Blood pressure 137/82, pulse 89, temperature 97.7  F (36.5  C), temperature source Oral, resp. rate 16, height 1.676 m (5' 6\"), weight 51.9 kg (114 lb 6.7 oz), SpO2 98 %.  Vitals:    02/15/17 0126 02/15/17 0639 02/16/17 0547   Weight: 45.4 kg (100 lb) 52.8 kg (116 lb 6.5 oz) 51.9 kg (114 lb 6.7 oz)     Vital Signs with Ranges  Temp:  [97.3  F (36.3  C)-97.7  F (36.5  C)] 97.7  F (36.5  C)  Pulse:  [80-90] 89  Heart Rate:  [56] 56  Resp:  [16-18] 16  BP: (104-137)/(60-82) 137/82  SpO2:  [94 %-100 %] 98 %  I/O's Last 24 hours  I/O last 3 completed shifts:  In: 3278 [P.O.:840; I.V.:2438]  Out: 90 [Drains:90]    Respiratory: Clear to auscultation bilaterally, no crackles or wheezing  Cardiovascular: Regular rate and rhythm, normal S1 and S2, and no murmur noted  GI:  RLQ tender mass. Drain in place.  Skin/Integumen: No rashes, no cyanosis, no edema  Other:      Medications   All medications were reviewed.    dextrose 5% and 0.9% NaCl 100 mL/hr at 02/19/17 0653       levothyroxine  75 mcg Oral Daily     cyclobenzaprine  10 mg Oral TID     sodium chloride (PF)  3 mL Intracatheter Q8H     piperacillin-tazobactam  3.375 g Intravenous Q6H     OLANZapine zydis  5 mg Oral At Bedtime     sodium chloride (PF)  10 mL Irrigation Q8H        Data     Recent Labs  Lab 02/18/17 2029 02/18/17  0914 02/17/17  0551 02/17/17  0215  02/16/17  0956 02/16/17  0815   WBC  --  9.6 9.5  --   --  10.8 Canceled, Test credited Questionable specimenCALLED " TO FLOOR, FLOOR TO REORDER PER [place] [id number] [facility]   HGB  --  8.8* 8.4* 8.6*  < > 5.6*  Canceled, Test credited Duplicate requestCBC ADDEDCORRECTED ON 02/16 AT 1125: PREVIOUSLY REPORTED AS 5.6 This result has been called to CHINMAY FRIEDMAN by Thalia BRIGGS on 02 16 2017 at 1013, and has been read back. Canceled, Test credited Questionable specimenCALLED TO FLOOR, FLOOR TO REORDER PER [place] [id number] [facility]BS   MCV  --  72* 71*  --   --  64* Canceled, Test credited Questionable specimenCALLED TO FLOOR, FLOOR TO REORDER PER [place] [id number] [facility]   PLT  --  546* 495*  --   --  790* Canceled, Test credited Questionable specimenCALLED TO FLOOR, FLOOR TO REORDER PER [place] [id number] [facility]BS   INR  --   --   --   --   --   --  1.23*   NA  --  147* 143  --   --   --  145*   POTASSIUM 4.0 3.2* 3.4  --   --   --  3.7   CHLORIDE  --  115* 113*  --   --   --  114*   CO2  --  24 19*  --   --   --  22   BUN  --  9 14  --   --   --  22   CR  --  0.70 0.71  --   --   --  0.76   ANIONGAP  --  8 11  --   --   --  9   SARITHA  --  7.9* 7.3*  --   --   --  7.8*   GLC  --  87 88  --   --   --  103*   ALBUMIN  --   --  1.7*  --   --   --  1.9*   PROTTOTAL  --   --  5.2*  --   --   --  5.7*   BILITOTAL  --   --  0.4  --   --   --  0.4   ALKPHOS  --   --  76  --   --   --  82   ALT  --   --  31  --   --   --  39   AST  --   --  32  --   --   --  37   < > = values in this interval not displayed.    Recent Results (from the past 24 hour(s))   CT Abdomen Pelvis w Contrast    Narrative    CT ABDOMEN AND PELVIS WITH CONTRAST 2/18/2017 9:54 AM     HISTORY: Follow up for Interventional radiology drainage of iliopsoas  abscess.     COMPARISON: CT abdomen and pelvis 2/15/2017.    TECHNIQUE: Axial images from the lung bases to the symphysis are  performed with additional coronal reformatted images. 57 mL of Isovue  370 are given intravenously. Radiation dose for this scan was reduced  using automated exposure control, adjustment of the mA and/or kV  according to patient  size, or iterative reconstruction technique.    FINDINGS: Trace pleural effusions are present. Calcified granulomas in  the left perihilar region are noted. Mild atelectasis at the lung  bases.    Abdomen: The liver, spleen, gallbladder, pancreas, adrenal glands and  kidneys are unremarkable. No hydronephrosis or renal calculi. A few  calcified granulomas are noted in the spleen. No enlarged lymph nodes.  Aorta demonstrates calcified plaque. No evidence of aneurysm or  dissection. The bowel is normal in caliber without obstruction. A few  fluid-filled, dilated loops of small bowel are noted in the abdomen.  Thickening of the wall of the cecum is again suspected and the pigtail  drainage catheter in the region of the iliopsoas is again noted. There  is very little remaining free fluid about the tip of the catheter.    Pelvis: The bladder, uterus, ovaries and rectum are unremarkable. No  enlarged pelvic lymph nodes or free fluid. Bone window examination  demonstrates degenerative dextroscoliosis upper lumbar spine. Advanced  degenerative changes right hip are again noted.      Impression    IMPRESSION:  1. Near complete resolution of the fluid collection right iliopsoas  region drained by the previously placed pigtail drainage catheter.  2. Cecal wall thickening or mass is again noted. No evidence of bowel  obstruction. No free intraperitoneal air.  3. Trace bilateral pleural effusions and evidence of old granulomatous  disease.    OBI QUEVEDO MD

## 2017-02-19 NOTE — PLAN OF CARE
Problem: Goal Outcome Summary  Goal: Goal Outcome Summary  Outcome: Improving  A&O times 4, confusion noted. Calm and cooperative. Neuros intact, LUE paralysis. VSS. Regular diet, patient had one time normal stool and one time loose stool. Up with assist of one with GB and walker. Denies pain. Drain output 30 ml, tan, pink tinged. Sister Nat and brother-in law Renny visited with patient. Continue to monitor.

## 2017-02-20 ENCOUNTER — APPOINTMENT (OUTPATIENT)
Dept: OCCUPATIONAL THERAPY | Facility: CLINIC | Age: 77
DRG: 329 | End: 2017-02-20
Payer: MEDICARE

## 2017-02-20 ENCOUNTER — APPOINTMENT (OUTPATIENT)
Dept: CT IMAGING | Facility: CLINIC | Age: 77
DRG: 329 | End: 2017-02-20
Attending: PHYSICIAN ASSISTANT
Payer: MEDICARE

## 2017-02-20 ENCOUNTER — APPOINTMENT (OUTPATIENT)
Dept: PHYSICAL THERAPY | Facility: CLINIC | Age: 77
DRG: 329 | End: 2017-02-20
Payer: MEDICARE

## 2017-02-20 LAB
CREAT SERPL-MCNC: 0.66 MG/DL (ref 0.52–1.04)
ERYTHROCYTE [DISTWIDTH] IN BLOOD BY AUTOMATED COUNT: 26.5 % (ref 10–15)
GFR SERPL CREATININE-BSD FRML MDRD: 87 ML/MIN/1.7M2
HCT VFR BLD AUTO: 31.7 % (ref 35–47)
HGB BLD-MCNC: 9 G/DL (ref 11.7–15.7)
MCH RBC QN AUTO: 20.8 PG (ref 26.5–33)
MCHC RBC AUTO-ENTMCNC: 28.4 G/DL (ref 31.5–36.5)
MCV RBC AUTO: 73 FL (ref 78–100)
PLATELET # BLD AUTO: 467 10E9/L (ref 150–450)
RBC # BLD AUTO: 4.32 10E12/L (ref 3.8–5.2)
WBC # BLD AUTO: 10.8 10E9/L (ref 4–11)

## 2017-02-20 PROCEDURE — 25000132 ZZH RX MED GY IP 250 OP 250 PS 637: Mod: GY | Performed by: HOSPITALIST

## 2017-02-20 PROCEDURE — 97535 SELF CARE MNGMENT TRAINING: CPT | Mod: GO

## 2017-02-20 PROCEDURE — 12000000 ZZH R&B MED SURG/OB

## 2017-02-20 PROCEDURE — 85027 COMPLETE CBC AUTOMATED: CPT | Performed by: INTERNAL MEDICINE

## 2017-02-20 PROCEDURE — 99232 SBSQ HOSP IP/OBS MODERATE 35: CPT | Performed by: HOSPITALIST

## 2017-02-20 PROCEDURE — 40000133 ZZH STATISTIC OT WARD VISIT

## 2017-02-20 PROCEDURE — A9270 NON-COVERED ITEM OR SERVICE: HCPCS | Mod: GY | Performed by: HOSPITALIST

## 2017-02-20 PROCEDURE — 97116 GAIT TRAINING THERAPY: CPT | Mod: GP | Performed by: PHYSICAL THERAPY ASSISTANT

## 2017-02-20 PROCEDURE — 25000132 ZZH RX MED GY IP 250 OP 250 PS 637: Mod: GY | Performed by: PSYCHIATRY & NEUROLOGY

## 2017-02-20 PROCEDURE — 40000193 ZZH STATISTIC PT WARD VISIT: Performed by: PHYSICAL THERAPY ASSISTANT

## 2017-02-20 PROCEDURE — 25000125 ZZHC RX 250: Performed by: INTERNAL MEDICINE

## 2017-02-20 PROCEDURE — 20501 NJX SINUS TRACT DIAGNOSTIC: CPT

## 2017-02-20 PROCEDURE — 25000128 H RX IP 250 OP 636: Performed by: INTERNAL MEDICINE

## 2017-02-20 PROCEDURE — A9270 NON-COVERED ITEM OR SERVICE: HCPCS | Mod: GY | Performed by: PSYCHIATRY & NEUROLOGY

## 2017-02-20 PROCEDURE — 97530 THERAPEUTIC ACTIVITIES: CPT | Mod: GP | Performed by: PHYSICAL THERAPY ASSISTANT

## 2017-02-20 PROCEDURE — 36415 COLL VENOUS BLD VENIPUNCTURE: CPT | Performed by: INTERNAL MEDICINE

## 2017-02-20 PROCEDURE — 82565 ASSAY OF CREATININE: CPT | Performed by: INTERNAL MEDICINE

## 2017-02-20 PROCEDURE — 25000132 ZZH RX MED GY IP 250 OP 250 PS 637: Mod: GY | Performed by: INTERNAL MEDICINE

## 2017-02-20 PROCEDURE — 99231 SBSQ HOSP IP/OBS SF/LOW 25: CPT | Performed by: INTERNAL MEDICINE

## 2017-02-20 PROCEDURE — A9270 NON-COVERED ITEM OR SERVICE: HCPCS | Mod: GY | Performed by: INTERNAL MEDICINE

## 2017-02-20 RX ADMIN — DEXTROSE AND SODIUM CHLORIDE: 5; 900 INJECTION, SOLUTION INTRAVENOUS at 21:53

## 2017-02-20 RX ADMIN — CYCLOBENZAPRINE HYDROCHLORIDE 10 MG: 10 TABLET, FILM COATED ORAL at 21:47

## 2017-02-20 RX ADMIN — LEVOTHYROXINE SODIUM 75 MCG: 75 TABLET ORAL at 07:42

## 2017-02-20 RX ADMIN — PIPERACILLIN SODIUM,TAZOBACTAM SODIUM 3.38 G: 3; .375 INJECTION, POWDER, FOR SOLUTION INTRAVENOUS at 12:21

## 2017-02-20 RX ADMIN — PIPERACILLIN SODIUM,TAZOBACTAM SODIUM 3.38 G: 3; .375 INJECTION, POWDER, FOR SOLUTION INTRAVENOUS at 05:27

## 2017-02-20 RX ADMIN — OLANZAPINE 5 MG: 5 TABLET, ORALLY DISINTEGRATING ORAL at 21:52

## 2017-02-20 RX ADMIN — PIPERACILLIN SODIUM,TAZOBACTAM SODIUM 3.38 G: 3; .375 INJECTION, POWDER, FOR SOLUTION INTRAVENOUS at 00:37

## 2017-02-20 RX ADMIN — CYCLOBENZAPRINE HYDROCHLORIDE 10 MG: 10 TABLET, FILM COATED ORAL at 07:42

## 2017-02-20 RX ADMIN — DEXTROSE AND SODIUM CHLORIDE: 5; 900 INJECTION, SOLUTION INTRAVENOUS at 07:37

## 2017-02-20 RX ADMIN — CYCLOBENZAPRINE HYDROCHLORIDE 10 MG: 10 TABLET, FILM COATED ORAL at 18:20

## 2017-02-20 RX ADMIN — PIPERACILLIN SODIUM,TAZOBACTAM SODIUM 3.38 G: 3; .375 INJECTION, POWDER, FOR SOLUTION INTRAVENOUS at 18:21

## 2017-02-20 NOTE — PLAN OF CARE
Problem: Goal Outcome Summary  Goal: Goal Outcome Summary  OT: Patient MIN A to stand for dynamic standing ADLs for balance. Patient limited by confusion, and distractibility while learning to use new walker with platform to perform ADLs. Recommend discharge TCU.

## 2017-02-20 NOTE — PLAN OF CARE
Problem: Goal Outcome Summary  Goal: Goal Outcome Summary  PT- Per plan established by the Physical Therapist, according to functional mobility the discharge recommendation is TCU.   Pt performed sit to/from stand transfers with min-mod assist with cues for safety.  Pt performed gait training x 200 ft using platform wheeled walker and min assist for balance.  Pt lets go of walker at times with her right hand and states her wrist gets tired.  Needed a seated rest and a w/c ride back to her room.

## 2017-02-20 NOTE — PROGRESS NOTES
CLINICAL NUTRITION SERVICES - REASSESSMENT NOTE        EVALUATION OF PROGRESS TOWARD GOALS   Diet:  Regular. Ensure Plus BID between meals.    Intake:  Pt has been on a 3-day calorie count 2/17-2/19. Intake for 2/19 (final day): 1538 carolina, 60 gm pro.  Average daily intake: 1230 carolina (77% of needs), 73 gm pro (92% of needs). See previous notes; intake has improved each day.      Dosing Weight 52.8 kg - current      ASSESSED NUTRITION NEEDS:  Estimated Energy Needs: 7694-3510 kcals (30-35 Kcal/Kg)  Justification: repletion, underweight  Estimated Protein Needs:  grams protein (1.5-2 g pro/Kg)  Justification: Repletion      Previous Goals:   Pt will meet at least 2/3 of assessed needs orally   Evaluation: Met    Previous Nutrition Diagnosis:   Inadequate oral intake related to reduced intakes 2/2 psychosis as evidenced by wt loss and BMI 18.8 kg/m^2.    Evaluation: Improving      CURRENT NUTRITION DIAGNOSIS  No nutrition diagnosis identified at this time related      INTERVENTIONS  Recommendations / Nutrition Prescription  Continue regular diet and Ensure    Implementation  No interventions at this time    Goals  Pt will consume at least 75% of meals and supplements    MONITORING AND EVALUATION:  Progress towards goals will be monitored and evaluated per protocol and Practice Guidelines

## 2017-02-20 NOTE — PROGRESS NOTES
Sherita Kenney is a 76-year-old lady who was admitted with psychosis. Labs on admission revealed anemia, thrombocytosis and leukocytosis. CT of the abdomen and pelvis 02/15/2017 revealed a large right-sided iliopsoas abscess which was likely arising from and communicating with the adjacent right colon. There was adjacent mass-like wall thickening of the right colon, which was worrisome for neoplasm. CT of the head without contrast on 02/15/2017 did not reveal any significant intracranial findings. On 02/15/2017, she had a CT-guided drain placed and 500 mL of purulent material was drained. She also has UTI. Leukocytosis and thrombocytosis has resolved.  She has received blood transfusion.  Anemia is better.    Overall patient is feeling better.  She feels stronger.  No fever.  No chills.  Appetite is improving.  When I met with the patient, she was not confused.  She was alert and oriented.    Assessment and plan:  1. Right iliopsoas abscess with an adjacent masslike thickening of right colon.  This could be malignancy.  -The patient is being followed by colorectal surgeon.  Colonoscopy will be done once consent is obtained.  Because of psychosis, patient will not be able to give consent herself.  2.  Iron deficiency anemia. Better after transfusion.  -Patient needs GI workup.  EGD and colonoscopy should be done when patient is able to consent.  3. We will continue to follow the patient.

## 2017-02-20 NOTE — PROGRESS NOTES
SPIRITUAL HEALTH SERVICES  Progress Note  [facility]    Paged to pt's room to talk about HCD with pt, friend and care coordinator.  Pt is trying to decide who will be her health care agent.  Pt and friend want to talk.  SH will return later with short form of HCD.    I returned later with the short form.  Pt wanted niece to be health care agent.  Pt called niece and niece was reluctant to do health care agent by herself.  Pt agreed to ask her friend, Liz and if Liz agrees, nielly and Liz would be co health care agents.  I will try to reach Liz this afternoon.      Silvia Marques  Chaplain Resident  Pager [phone]

## 2017-02-20 NOTE — PROGRESS NOTES
Contacted Dr Avalos to clarify his evaluation of pt on Fri 2/17.  Consult was to see if pt was competent to name her niece Sherita as her health care agent as she wished.  To clarify, per Dr Posada's eval on Friday, pt wasn't competent enough to make health care decisions, but she was competent enough to name her health care agent to make medical decisions for her.  She was very clear about who she wanted to make those decisions.  Met w/ pt who confirmed that she still doesn't want her sister named as her decision-maker.  She wants her niece to be her health care agent.  Requested  come to get short form HCD filled out for medical POA only.    Met w/ pt again. Her friend Liz was in the room and pt wanted Liz included in the conversation. Liz used to work with pt and they are still good friends. After long discussion with pt, she is still adamant that she doesn't want her sister Nat to be her medical HCA but has concerns about Sherita being HCA as she lives out of state and has her own kids to take care of.  Pt had eventually decided that she did want to sign HCD w/ maycol Magallon as agent.  She called maycol Magallon and maycol now has concerns of her own about being HCA without being in the same state.  She is considering hiring a professional advocate to be her eyes and ears but hasn't done anything about that yet.  She was receptive to having one of pt's friends be her representative here and be co agent with that friend.  Liz had already left by this time so  will call Liz to talk about this possibility. Will await that decision and also contact Nat to see if she has any paperwork as she has stated in the past that she is POA but hasn't produced any paperwork to that effect.  Any new HCD would override old paperwork but unclear if there is any original paperwork or not. Updated SW. CM will continue to assist w/ dc planning.  Addendum: Liz states that she isn't local, she lives in [place].   "Liz suggested that pt has very good friend Dinora Garrett who is a  who is local and would be a better choice  Pt is evidently in favor of Dinora and Sherita being joint health care agents.    Nat and her husb arrived with paperwork confirming health care agent/financial POA is Nat and her  as secondary.  Living will is also included.  Copy given to [facility] to get scanned into chart.  Nat's  wasn't feeling well and left to sit in the car.  Nat was quite emotional while discussing pt.  She stated that she has helped pt throughout her life and feels that pt is \"bipolar\".  Discussed dilemma of HC agent with Nat.  Expressed concern that if pt, whether wrongly or not, didn't want Nat deciding her POC pt may not be compliant with decisions.  Nat agreed that might be the case.  Discussed the benefit of letting someone else make health care decisions so she could just be pt's sister and Nat stated that if pt wants Nat's dtr to be HCA, she would be agreeable to that.  Asked if Nat would consider being the \"local eyes and ears\" for her dtr so that her dtr felt better informed from a distance.  Nat states that her dtr has not been speaking to Nat for many months.  Sherita has said the opposite, that her mother refuses to speak with her currently.    Due to complicated family dynamics, plan to involve  again tomorrow to speak with pt's friend and  Dinora to see if she is even amenable to being co HCA with niece.  Also will check to see if niece Sherita is more amenable to being HCA now that Nat has \"given her blessing\".  If unable to work out new HCA, Nat and her  are default POA.  Updated unit clinician Neema Hall who is also on the Ethics committee who thinks this is reasonable.  CM will f/u tomorrow.  "

## 2017-02-20 NOTE — PLAN OF CARE
Problem: Goal Outcome Summary  Goal: Goal Outcome Summary  Outcome: No Change  [id number]  Pt. A to self, hallucinating this morning, pt. Seeing & speaking to statues in her room. NV: baseline LUE plegia. Denies pain, or n/v. ENRIKE  patent and intact. Up with Ax1 and walker occ. Impulsive easy to redirect. Up in chair, voiding in BR.

## 2017-02-20 NOTE — PLAN OF CARE
Problem: Goal Outcome Summary  Goal: Goal Outcome Summary  Outcome: No Change  Alert to self. Confused. CMS intact except baseline LUE hemiplegia r/t polio. BLE edema. BLE 4/5 strength. Generalized weakness. BS active, +flatus, large BM. ENRIKE patent. Ax1 GB & walker. Poor appetite.

## 2017-02-20 NOTE — PROGRESS NOTES
"COLON & RECTAL SURGERY  PROGRESS NOTE    February 20, 2017    SUBJECTIVE:  The patient states that she continues to have right sided pain and swelling that comes and goes. She was visited by friends this weekend who brought her flower and chocolate which she enjoys. She was confused about newpapers and completing them as well.     OBJECTIVE:  Temp:  [97.1  F (36.2  C)-98.2  F (36.8  C)] 98  F (36.7  C)  Pulse:  [] 83  Heart Rate:  [70-89] 89  Resp:  [16-18] 16  BP: (111-127)/(61-78) 125/71  SpO2:  [96 %-100 %] 98 %    Intake/Output Summary (Last 24 hours) at 02/20/17 0927  Last data filed at 02/20/17 0601   Gross per 24 hour   Intake             3307 ml   Output               65 ml   Net             3242 ml       GENERAL:  Awake, alert, no acute distress, Patient actively seeing other \"figures\" in the room who would speak to her  HEAD - Nomocephalic atraumatic  SCLERA anicteric  EXTREMITIES warm and well perfused  ABDOMEN:  Soft, appropriately tender, non-distended,   INCISION:  C/d/i, ENRIKE with purulent drainage    LABS:  Lab Results   Component Value Date    WBC 10.8 02/20/2017     Lab Results   Component Value Date    HGB 9.0 02/20/2017     Lab Results   Component Value Date    HCT 31.7 02/20/2017     Lab Results   Component Value Date     02/20/2017     Last Basic Metabolic Panel:  Lab Results   Component Value Date     02/19/2017      Lab Results   Component Value Date    POTASSIUM 3.8 02/19/2017     Lab Results   Component Value Date    CHLORIDE 113 02/19/2017     Lab Results   Component Value Date    SARITHA 8.2 02/19/2017     Lab Results   Component Value Date    CO2 24 02/19/2017     Lab Results   Component Value Date    BUN 6 02/19/2017     Lab Results   Component Value Date    CR 0.66 02/20/2017     Lab Results   Component Value Date     02/19/2017       ASSESSMENT/PLAN:76 year old woman who presents with psychosis and found to have large iliopsoas abscess and abdominal mass suspicious " for malignancy. S/p IR drainage of 500cc purulent drainage. Patient had CT overweekend without contrast through drain  1. Plan for repeat drain study with contrast through drain  2. Regular diet with supplements. Have restarted calorie counts and not getting enough per the past 3 days of calorie counts  3. Continue IV abx  4. Recommend TKO IVF as patient has b/l LUE edema  5. Continue to need more clarification on POA and who can make decisions for patient for further work up of mass with colonoscopy vs surgery. No indication for urgent surgery at this time.     Yessy Whalen PA-C  [facility]  Phone: [phone]        February 20, 2017  Plan as above.  Drain study ordered today to determine if the drain site communicates with the cecum/right colon. Await clarification on POA.  Sherita Henning MD  [facility]  Pager:  [phone]  Phone: [phone]  Fax: [phone]

## 2017-02-20 NOTE — PLAN OF CARE
Problem: Goal Outcome Summary  Goal: Goal Outcome Summary  OT:Pt. making good progress, overall SBA for supine-sit, needing assist for LE's for sit-supine;pt.ambulated in room w/ min. A, completed toileing w/SBA-CGA (for standing balance);pt. did have 1 LOB, needing min. A to correct;In order to assit w/ increased endurance for functional tasks, pt. ambulated approx. 150 feet in hallway, min. A, 1 short standing rest break required. Cont. To rec. TCU at discharge.

## 2017-02-20 NOTE — PROGRESS NOTES
[facility]  Hospitalist Progress Note  Santosh Romero MD  02/20/2017    Assessment & Plan   Sherita Kenney is a 76 year old female who presents after being found by a welfare check with psychosis. Laboratory evaluation in the emergency department significant for thrombocytosis, leukocytosis, and physical examination concerning for a right lower quadrant abdominal mass.     Right lower quadrant abdominal abscess:  CT abdomen and pelvis shows massive right iliopsoas abscess displacing and abutting the cecum with associated right-sided hydronephrosis. Thought potentially secondary to perforation of colonic mass Colorectal surgery following and IR drained 500 cc of purulent material with drain in place. Culture shows Enterobacter cloacae complex, heamophilus parain, other GNR  As well GPC in broth only. Afebrile. Drain in place. Repeat CT showed near completeresolution of the fluid collection. Afebrile. Normal wbc count.  - Cont zosyn (can add vanc if she becomes febrile or wbc count rises w/o other explanation).  - Drain managed by colorectal  - Pain control.  - Follow final culture results.    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 Biopsy via colonoscopy needed. Pt has psych issues,  but she is oriented to 3 and willing to proceed. Per psych the pt has no decision making capacity.  - Plan per hemonc/colrectal.  - Need decision maker ([facility], SW assisting).      Anemia: Suspect multifactorial including ABL, chronic illness, potential malignancy, as well as iron deficiency anemia from poor intake. Hemoglobin 7.4 in the ed--5.6--trans--8.4--8.8--9.1--9.0 this am . Denies melena.  Denies sob/palpitaions/dizziness/cp. Transfused with 2 units of PRBC on 2/16.  -Monitor HGB and transfuse prn.    Psych issues: Awake and oriented x3 this am.  cooperative and pleasant. Talkative. Tangential at times.  - Follow psych recs.     Severe protein calorie malnutrition in the setting of what appears  "to be a chronic undiagnosed illness: Patient with cachexia and significant weight loss, endorses poor intake, lower extremity edema with hypoalbuminemia.  -Nutrition consulted    UTI: culture showing klebsiella.  - Zosyn as above.     Hypertension:  -Prn hydralazine for now.     Hypothyroidism:   -Resumed PTA levothyroxine.      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  - Caution with contact (pain).     DVT Prophylaxis: SCDs for now.     Code Status: Full Code     Disposition: per clinical course. Still has drain in place. Working on decision maker for colonoscopy.     Interval History   Awake and oriented x3. No significant abd pain.  No cp/sob/dizziness.    Physical Exam   Heart Rate: 89, Blood pressure 125/71, pulse 83, temperature 98  F (36.7  C), temperature source Oral, resp. rate 16, height 1.676 m (5' 6\"), weight 51.9 kg (114 lb 6.7 oz), SpO2 98 %.  Vitals:    02/15/17 0126 02/15/17 0639 02/16/17 0547   Weight: 45.4 kg (100 lb) 52.8 kg (116 lb 6.5 oz) 51.9 kg (114 lb 6.7 oz)     Vital Signs with Ranges  Temp:  [97.1  F (36.2  C)-98.2  F (36.8  C)] 98  F (36.7  C)  Pulse:  [] 83  Heart Rate:  [70-89] 89  Resp:  [16-18] 16  BP: (111-127)/(61-78) 125/71  SpO2:  [96 %-100 %] 98 %  I/O's Last 24 hours  I/O last 3 completed shifts:  In: 2182 [P.O.:360; I.V.:1822]  Out: 65 [Drains:65]    Respiratory: Clear to auscultation bilaterally, no crackles or wheezing  Cardiovascular: Regular rate and rhythm, normal S1 and S2, and no murmur noted  GI:  RLQ tender mass. Drain in place.  Skin/Integumen: No rashes, no cyanosis, no edema  Other:      Medications   All medications were reviewed.    dextrose 5% and 0.9% NaCl 100 mL/hr at 02/20/17 0737       levothyroxine  75 mcg Oral Daily     cyclobenzaprine  10 mg Oral TID     sodium chloride (PF)  3 mL Intracatheter Q8H     piperacillin-tazobactam  3.375 g Intravenous Q6H     OLANZapine zydis  5 mg Oral At Bedtime     sodium chloride (PF)  10 mL Irrigation Q8H    "     Data     Recent Labs  Lab 02/19/17  0850 02/18/17 2029 02/18/17  0914 02/17/17  0551  02/16/17  0815   WBC 10.5  --  9.6 9.5  < > Canceled, Test credited Questionable specimenCALLED TO FLOOR, FLOOR TO REORDER PER [facility] 09 HBS   HGB 9.1*  --  8.8* 8.4*  < > Canceled, Test credited Questionable specimenCALLED TO FLOOR, FLOOR TO REORDER PER [facility] 09 HBS   MCV 73*  --  72* 71*  < > Canceled, Test credited Questionable specimenCALLED TO FLOOR, FLOOR TO REORDER PER [facility] 09 HBS   *  --  546* 495*  < > Canceled, Test credited Questionable specimenCALLED TO FLOOR, FLOOR TO REORDER PER [facility] 0900 HBS   INR  --   --   --   --   --  1.23*   *  --  147* 143  --  145*   POTASSIUM 3.8 4.0 3.2* 3.4  --  3.7   CHLORIDE 113*  --  115* 113*  --  114*   CO2 24  --  24 19*  --  22   BUN 6*  --  9 14  --  22   CR 0.59  --  0.70 0.71  --  0.76   ANIONGAP 8  --  8 11  --  9   SARITHA 8.2*  --  7.9* 7.3*  --  7.8*   *  --  87 88  --  103*   ALBUMIN  --   --   --  1.7*  --  1.9*   PROTTOTAL  --   --   --  5.2*  --  5.7*   BILITOTAL  --   --   --  0.4  --  0.4   ALKPHOS  --   --   --  76  --  82   ALT  --   --   --  31  --  39   AST  --   --   --  32  --  37   < > = values in this interval not displayed.    No results found for this or any previous visit (from the past 24 hour(s)).

## 2017-02-21 ENCOUNTER — APPOINTMENT (OUTPATIENT)
Dept: PHYSICAL THERAPY | Facility: CLINIC | Age: 77
DRG: 329 | End: 2017-02-21
Payer: MEDICARE

## 2017-02-21 ENCOUNTER — APPOINTMENT (OUTPATIENT)
Dept: OCCUPATIONAL THERAPY | Facility: CLINIC | Age: 77
DRG: 329 | End: 2017-02-21
Payer: MEDICARE

## 2017-02-21 LAB
BACTERIA SPEC CULT: ABNORMAL
BASOPHILS # BLD AUTO: 0 10E9/L (ref 0–0.2)
BASOPHILS NFR BLD AUTO: 0.2 %
DIFFERENTIAL METHOD BLD: ABNORMAL
EOSINOPHIL # BLD AUTO: 0.2 10E9/L (ref 0–0.7)
EOSINOPHIL NFR BLD AUTO: 2.3 %
ERYTHROCYTE [DISTWIDTH] IN BLOOD BY AUTOMATED COUNT: 27.3 % (ref 10–15)
HCT VFR BLD AUTO: 33.8 % (ref 35–47)
HGB BLD-MCNC: 9.7 G/DL (ref 11.7–15.7)
IMM GRANULOCYTES # BLD: 0.2 10E9/L (ref 0–0.4)
IMM GRANULOCYTES NFR BLD: 2.2 %
LYMPHOCYTES # BLD AUTO: 2.2 10E9/L (ref 0.8–5.3)
LYMPHOCYTES NFR BLD AUTO: 20.9 %
MCH RBC QN AUTO: 20.9 PG (ref 26.5–33)
MCHC RBC AUTO-ENTMCNC: 28.7 G/DL (ref 31.5–36.5)
MCV RBC AUTO: 73 FL (ref 78–100)
MICRO REPORT STATUS: ABNORMAL
MICROORGANISM SPEC CULT: ABNORMAL
MICROORGANISM SPEC CULT: ABNORMAL
MONOCYTES # BLD AUTO: 0.4 10E9/L (ref 0–1.3)
MONOCYTES NFR BLD AUTO: 3.7 %
NEUTROPHILS # BLD AUTO: 7.3 10E9/L (ref 1.6–8.3)
NEUTROPHILS NFR BLD AUTO: 70.7 %
NRBC # BLD AUTO: 0 10*3/UL
NRBC BLD AUTO-RTO: 0 /100
PLATELET # BLD AUTO: 518 10E9/L (ref 150–450)
RBC # BLD AUTO: 4.64 10E12/L (ref 3.8–5.2)
SPECIMEN SOURCE: ABNORMAL
WBC # BLD AUTO: 10.3 10E9/L (ref 4–11)

## 2017-02-21 PROCEDURE — 40000133 ZZH STATISTIC OT WARD VISIT

## 2017-02-21 PROCEDURE — 36415 COLL VENOUS BLD VENIPUNCTURE: CPT | Performed by: HOSPITALIST

## 2017-02-21 PROCEDURE — 25000128 H RX IP 250 OP 636: Performed by: INTERNAL MEDICINE

## 2017-02-21 PROCEDURE — 25000125 ZZHC RX 250: Performed by: INTERNAL MEDICINE

## 2017-02-21 PROCEDURE — 25000132 ZZH RX MED GY IP 250 OP 250 PS 637: Mod: GY | Performed by: HOSPITALIST

## 2017-02-21 PROCEDURE — 97535 SELF CARE MNGMENT TRAINING: CPT | Mod: GO

## 2017-02-21 PROCEDURE — 25000132 ZZH RX MED GY IP 250 OP 250 PS 637: Mod: GY | Performed by: PSYCHIATRY & NEUROLOGY

## 2017-02-21 PROCEDURE — A9270 NON-COVERED ITEM OR SERVICE: HCPCS | Mod: GY | Performed by: HOSPITALIST

## 2017-02-21 PROCEDURE — A9270 NON-COVERED ITEM OR SERVICE: HCPCS | Mod: GY | Performed by: INTERNAL MEDICINE

## 2017-02-21 PROCEDURE — 12000000 ZZH R&B MED SURG/OB

## 2017-02-21 PROCEDURE — 25000132 ZZH RX MED GY IP 250 OP 250 PS 637: Mod: GY | Performed by: INTERNAL MEDICINE

## 2017-02-21 PROCEDURE — 97530 THERAPEUTIC ACTIVITIES: CPT | Mod: GP | Performed by: PHYSICAL THERAPIST

## 2017-02-21 PROCEDURE — 99231 SBSQ HOSP IP/OBS SF/LOW 25: CPT | Performed by: INTERNAL MEDICINE

## 2017-02-21 PROCEDURE — 97530 THERAPEUTIC ACTIVITIES: CPT | Mod: GO

## 2017-02-21 PROCEDURE — 40000193 ZZH STATISTIC PT WARD VISIT: Performed by: PHYSICAL THERAPIST

## 2017-02-21 PROCEDURE — 99233 SBSQ HOSP IP/OBS HIGH 50: CPT | Performed by: INTERNAL MEDICINE

## 2017-02-21 PROCEDURE — 85025 COMPLETE CBC W/AUTO DIFF WBC: CPT | Performed by: HOSPITALIST

## 2017-02-21 PROCEDURE — A9270 NON-COVERED ITEM OR SERVICE: HCPCS | Mod: GY | Performed by: PSYCHIATRY & NEUROLOGY

## 2017-02-21 PROCEDURE — 97110 THERAPEUTIC EXERCISES: CPT | Mod: GP | Performed by: PHYSICAL THERAPIST

## 2017-02-21 PROCEDURE — 97116 GAIT TRAINING THERAPY: CPT | Mod: GP | Performed by: PHYSICAL THERAPIST

## 2017-02-21 RX ORDER — DIPHENHYDRAMINE HYDROCHLORIDE 50 MG/ML
50 INJECTION INTRAMUSCULAR; INTRAVENOUS
Status: DISCONTINUED | OUTPATIENT
Start: 2017-02-21 | End: 2017-02-22

## 2017-02-21 RX ORDER — METHYLPREDNISOLONE SODIUM SUCCINATE 125 MG/2ML
125 INJECTION, POWDER, LYOPHILIZED, FOR SOLUTION INTRAMUSCULAR; INTRAVENOUS
Status: DISCONTINUED | OUTPATIENT
Start: 2017-02-21 | End: 2017-02-22

## 2017-02-21 RX ADMIN — PIPERACILLIN SODIUM,TAZOBACTAM SODIUM 3.38 G: 3; .375 INJECTION, POWDER, FOR SOLUTION INTRAVENOUS at 06:12

## 2017-02-21 RX ADMIN — PIPERACILLIN SODIUM,TAZOBACTAM SODIUM 3.38 G: 3; .375 INJECTION, POWDER, FOR SOLUTION INTRAVENOUS at 01:34

## 2017-02-21 RX ADMIN — PIPERACILLIN SODIUM,TAZOBACTAM SODIUM 3.38 G: 3; .375 INJECTION, POWDER, FOR SOLUTION INTRAVENOUS at 18:43

## 2017-02-21 RX ADMIN — DEXTROSE AND SODIUM CHLORIDE: 5; 900 INJECTION, SOLUTION INTRAVENOUS at 10:23

## 2017-02-21 RX ADMIN — LEVOTHYROXINE SODIUM 75 MCG: 75 TABLET ORAL at 08:20

## 2017-02-21 RX ADMIN — PIPERACILLIN SODIUM,TAZOBACTAM SODIUM 3.38 G: 3; .375 INJECTION, POWDER, FOR SOLUTION INTRAVENOUS at 12:34

## 2017-02-21 RX ADMIN — IRON SUCROSE 300 MG: 20 INJECTION, SOLUTION INTRAVENOUS at 13:54

## 2017-02-21 RX ADMIN — OLANZAPINE 5 MG: 5 TABLET, ORALLY DISINTEGRATING ORAL at 23:57

## 2017-02-21 RX ADMIN — CYCLOBENZAPRINE HYDROCHLORIDE 10 MG: 10 TABLET, FILM COATED ORAL at 08:17

## 2017-02-21 RX ADMIN — CYCLOBENZAPRINE HYDROCHLORIDE 10 MG: 10 TABLET, FILM COATED ORAL at 16:28

## 2017-02-21 RX ADMIN — PIPERACILLIN SODIUM,TAZOBACTAM SODIUM 3.38 G: 3; .375 INJECTION, POWDER, FOR SOLUTION INTRAVENOUS at 23:57

## 2017-02-21 RX ADMIN — CYCLOBENZAPRINE HYDROCHLORIDE 10 MG: 10 TABLET, FILM COATED ORAL at 23:57

## 2017-02-21 NOTE — PLAN OF CARE
Problem: Goal Outcome Summary  Goal: Goal Outcome Summary  Outcome: No Change  Pt forgetful and confused through shift, anxious about surroundings and situation, reoriented.  VSS.  Up to bathroom w/ 1-2 asst, walker and GB.  ENRIKE w/ 30mL tanish pink fluid, site irrigated.  3+ edema to BLE.  Regular diet. Denies pain.  Plan for d/c pending, cont to monitor.

## 2017-02-21 NOTE — PLAN OF CARE
Problem: Goal Outcome Summary  Goal: Goal Outcome Summary  Outcome: No Change  Alert to self, hallucinates at times. LUE paralysis at baseline. LUE and BLE edema. Up with one, gait belt, walker. VSS. Regular diet, declined lunch. Dressing CDI, RLQ ENRIKE patent-tan/serous drng. Denies pain. Still trying to determine plan for POA so further testing can be completed. Continue to monitor.

## 2017-02-21 NOTE — PLAN OF CARE
Problem: Goal Outcome Summary  Goal: Goal Outcome Summary  OT: Attempted to see patient, however breakfast just arrived at time of appointment.

## 2017-02-21 NOTE — PROGRESS NOTES
"Multiple conversations with pt Sherita, maycol Justice and chaplain Vega. Pt wants maycol and her friend Dinora to be co-health care agents. She is still adamant that she doesn't want to have her sister Nat be her decision maker.  Yesterday Nat stated that if pt wants Nat's dtr to be her healthcare agent, then she will abide by that, although she stated it was very upsetting.    Spoke with Sherita Justice.  She has spoken with pt's good friend/ Dinora and they both feel they can work together to get pt what she needs and respect her wishes.  We spoke about colorectal surgery's recommendation for colonoscopy to determine dx.  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  She states that she understands the risks/benefits of the colonoscopy and wants to proceed with that once HCD is signed.  She said that Dinora will be this afternoon to sign the consent.  Maycol states that this has caused an upheaval between herself and her parents as they were so upset that their dtr would \"side\" with pt.  Maycol intends to reach out to her parents to try to mend the rift as they haven't spoken in a few days.   Spoke w/ chaplain Lopez who will assist pt with signing HCD.  New HCD is now signed and notarized.  Contacted pt's sister Nat to update her on HCD.  She states that she and her  have been so upset that it has affected her 's health.  They have decided that they are going to support their dtr and pt's friend Dinora being POAs.  Spoke w/ Aren and he agreed with that.  Nat knows and really likes Dinora and feels that they will take good care of Sherita.   Emphasized to them that this is for healthcare agent only and that they are still listed as pt's financial POA.  They understand.  Spoke with pt and updated her on conversation with sister. She appreciated that. She was asking about her wallet as she \"has to pay bills\". Asked evening charge to discuss " with pt and friend Dinora to see if there are bills at home to pay. Updated Dr Cruz and charge called colorectal PA to request colonoscopy orders. Hopefully pt will be compliant with colonoscopy prep as she does not like to take in a lot of PO.  HCD copy sent down to medical records to be scanned into chart.  CM will continue to assist w/ dc planning.

## 2017-02-21 NOTE — PROVIDER NOTIFICATION
Spoke with  Yessy CORADO colorectal and let her know we have a POA for this pt and can move forward with colonoscopy.

## 2017-02-21 NOTE — PLAN OF CARE
Problem: Goal Outcome Summary  Goal: Goal Outcome Summary  Outcome: Improving  [id number]: Disoriented to situation and place. Intermittent confusion, illogical speak at times. VSS. LUE hemiplegia (baseline), BLE 4/5, +2 edema. Generalized weakness. regular diet, good appetite this shift. Up with assist of 1, GB, walker. Denies pain. Drain intact, 80cc output total. Plan to determine POA and get consent for colonoscopy and EGD.

## 2017-02-21 NOTE — PROGRESS NOTES
COLON & RECTAL SURGERY  PROGRESS NOTE    February 21, 2017    SUBJECTIVE:  The patient states she's having a good morning. She is eating well. She had a bowel movement that was loose because she was a part of a special trial. Has stress from figuring out who will make the medical decision for her.     OBJECTIVE:  Temp:  [97.3  F (36.3  C)-98.1  F (36.7  C)] 97.7  F (36.5  C)  Pulse:  [98] 98  Heart Rate:  [89-90] 89  Resp:  [16-18] 18  BP: (110-137)/(66-85) 134/80  SpO2:  [97 %-98 %] 97 %    Intake/Output Summary (Last 24 hours) at 02/21/17 0827  Last data filed at 02/21/17 0817   Gross per 24 hour   Intake             1250 ml   Output              125 ml   Net             1125 ml       GENERAL:  Awake, alert, no acute distress,   HEAD - Nomocephalic atraumatic  SCLERA anicteric  EXTREMITIES warm and well perfused  ABDOMEN:  Soft, appropriately tender, non-distended,   INCISION:  C/d/i, ENRIKE with serous output    LABS:  Lab Results   Component Value Date    WBC 10.8 02/20/2017     Lab Results   Component Value Date    HGB 9.0 02/20/2017     Lab Results   Component Value Date    HCT 31.7 02/20/2017     Lab Results   Component Value Date     02/20/2017     Last Basic Metabolic Panel:  Lab Results   Component Value Date     02/19/2017      Lab Results   Component Value Date    POTASSIUM 3.8 02/19/2017     Lab Results   Component Value Date    CHLORIDE 113 02/19/2017     Lab Results   Component Value Date    SARITHA 8.2 02/19/2017     Lab Results   Component Value Date    CO2 24 02/19/2017     Lab Results   Component Value Date    BUN 6 02/19/2017     Lab Results   Component Value Date    CR 0.66 02/20/2017     Lab Results   Component Value Date     02/19/2017       ASSESSMENT/PLAN:76 year old woman who presents with psychosis and found to have large iliopsoas abscess and abdominal mass suspicious for malignancy. S/p IR drainage of 500cc purulent drainage. Drain study yesterday doesn't show communication  with colon. Family and friends still figuring out who will be making decision for her  1. Regular diet with supplements. Continue calorie counts  2. No urgent surgical intervention. Patient's nutrition status will need to be improved before this is possible  3. Continue ENRIKE drain   4. Await decision on who will be making the medical decision for the patient. Once this is figured out then should have colonoscopy for diagnosis.   5. Will continue to follow along    Yessy Whalen PA-C  [facility]  Phone: [phone]

## 2017-02-21 NOTE — PLAN OF CARE
Problem: Goal Outcome Summary  Goal: Goal Outcome Summary  OT: Patient CG A to sit <> stand, ambulate in room, and perform ADLs. Patient distractable during session. Patient requires MIN A to manipulate LE on platform/using velcro to secure. Limited by cognition/safety during ADLs while using walker.  Recommend discharge TCU.

## 2017-02-21 NOTE — PLAN OF CARE
Problem: Goal Outcome Summary  Goal: Goal Outcome Summary  Outcome: Improving  VSS. Disoriented to place, situation. ENRIKE patent to suction, with 5 cc of tan/thin output, irrigated. Regular diet. Up Ax1/walker/GB. Voiding in BR. Loose stool x1. Denies pain. Oral care completed. D/c pending. Will continue to monitor.

## 2017-02-21 NOTE — PROGRESS NOTES
Sherita Kenney is a 76-year-old lady who was admitted with psychosis. Labs on admission revealed anemia, thrombocytosis and leukocytosis. CT of the abdomen and pelvis 02/15/2017 revealed a large right-sided iliopsoas abscess which was likely arising from and communicating with the adjacent right colon. There was adjacent mass-like wall thickening of the right colon, which was worrisome for neoplasm. On 02/15/2017, she had a CT-guided drain placed and 500 mL of purulent material was drained. She also has UTI. Leukocytosis and thrombocytosis has resolved. She has received blood transfusion. Anemia is better.     She is feeling better. She feels stronger. No fever. No chills. Appetite is improving.     Labs: Reviewed.     Assessment and plan:  1. Right iliopsoas abscess with an adjacent masslike thickening of right colon. This is suspicious for malignancy.  -The patient is being followed by colorectal surgeon. Colonoscopy will be done once consent is obtained from POA.     2. Iron deficiency anemia.   -Patient needs GI workup. EGD and colonoscopy should be done when consent is obtained.  -Will give her IV venofer. Because of abdominal symptoms, will avoid oral iron.

## 2017-02-21 NOTE — PROGRESS NOTES
[facility]    Hospitalist Progress Note    Date of Service (when I saw the patient): 02/21/2017    Assessment & Plan   Sherita Kenney is a 76 year old female who was admitted on 2/15/2017.     She was found by a welfare check with psychosis. Laboratory evaluation in the emergency department significant for thrombocytosis, leukocytosis, and physical examination concerning for a right lower quadrant abdominal mass.  The mass has been drained by IR and she is followed by colorectal surgery      Right lower quadrant abdominal abscess:h  CT abdomen and pelvis shows massive right iliopsoas abscess displacing and abutting the cecum with associated right-sided hydronephrosis. Potentially a colon mass is perforated   Colorectal surgery following and IR drained 500 cc of purulent material with drain in place. Culture shows Enterobacter cloacae complex, heamophilus parain, other GNR As well GPC in broth only. Afebrile. Drain in place. Repeat CT showed near completeresolution of the fluid collection. Her WBC count is normal and she is afebrile  She remains pleasantly confused    - Cont zosyn (can add vanc if she becomes febrile or wbc count rises w/o other explanation).  Drain is in place     Colonic mass: likely Malignancy given also elevated CEA. Finally we have the consent by POA for colonoscopy today and will schedule through colorectal surgery      Anemia:  Lab Results   Component Value Date    HGB 9.7 02/21/2017     We might have to use IV iron  Psych issues: Awake and oriented x3 this am. cooperative and pleasant. Talkative. Tangential at times.  - Follow psych recs.  She is on Zyprexa      Severe protein calorie malnutrition in the setting of what appears to be a chronic undiagnosed illness: Patient with cachexia and significant weight loss, endorses poor intake, lower extremity edema with hypoalbuminemia.  -Nutrition consulted     UTI: culture showing klebsiella.  - Zosyn as  above.      Hypertension:  -Prn hydralazine for now.    Hypothyroidism:  This is being treated    Code Status: Full Code      Angelica Cruz    Interval History   Patient is pleasantly confused and does not answer questions directly  She denies any complaints    -Data reviewed today: I reviewed all new labs and imaging results over the last 24 hours.Physical Exam   Temp: 97.5  F (36.4  C) Temp src: Oral BP: 120/70 Pulse: 98 Heart Rate: 84 Resp: 16 SpO2: 97 % O2 Device: None (Room air)    Vitals:    02/15/17 0639 02/16/17 0547 02/21/17 0520   Weight: 52.8 kg (116 lb 6.5 oz) 51.9 kg (114 lb 6.7 oz) 57.3 kg (126 lb 5.2 oz)     Vital Signs with Ranges  Temp:  [97.3  F (36.3  C)-98.1  F (36.7  C)] 97.5  F (36.4  C)  Pulse:  [98] 98  Heart Rate:  [84-89] 84  Resp:  [16-18] 16  BP: (110-137)/(66-85) 120/70  SpO2:  [97 %-98 %] 97 %  I/O last 3 completed shifts:  In: 1450 [P.O.:650; I.V.:800]  Out: 135 [Drains:135]    Constitutional: Alert and oriented  Buttock tangential  Respiratory: No rales  Cardiovascular: Normal S1 and S2  GI: Soft nontender  Skin/Integumen: No focal rash  Other:      Medications     dextrose 5% and 0.9% NaCl 100 mL/hr at 02/21/17 1023       levothyroxine  75 mcg Oral Daily     cyclobenzaprine  10 mg Oral TID     sodium chloride (PF)  3 mL Intracatheter Q8H     piperacillin-tazobactam  3.375 g Intravenous Q6H     OLANZapine zydis  5 mg Oral At Bedtime     sodium chloride (PF)  10 mL Irrigation Q8H       Data     Recent Labs  Lab 02/21/17  0805 02/20/17  0829 02/19/17  0850 02/18/17 2029 02/18/17  0914 02/17/17  0551  02/16/17  0815   WBC 10.3 10.8 10.5  --  9.6 9.5  < > Canceled, Test credited Questionable specimenCALLED TO FLOOR, FLOOR TO REORDER PER [facility] 0900 HBS   HGB 9.7* 9.0* 9.1*  --  8.8* 8.4*  < > Canceled, Test credited Questionable specimenCALLED TO FLOOR, FLOOR TO REORDER PER [facility] 0900 HBS   MCV 73* 73* 73*  --  72* 71*  < > Canceled, Test credited Questionable specimenCALLED TO  FLOOR, FLOOR TO REORDER PER [facility] 0900 HBS   * 467* 589*  --  546* 495*  < > Canceled, Test credited Questionable specimenCALLED TO FLOOR, FLOOR TO REORDER PER [facility] 0900 HBS   INR  --   --   --   --   --   --   --  1.23*   NA  --   --  145*  --  147* 143  --  145*   POTASSIUM  --   --  3.8 4.0 3.2* 3.4  --  3.7   CHLORIDE  --   --  113*  --  115* 113*  --  114*   CO2  --   --  24  --  24 19*  --  22   BUN  --   --  6*  --  9 14  --  22   CR  --  0.66 0.59  --  0.70 0.71  --  0.76   ANIONGAP  --   --  8  --  8 11  --  9   SARITHA  --   --  8.2*  --  7.9* 7.3*  --  7.8*   GLC  --   --  103*  --  87 88  --  103*   ALBUMIN  --   --   --   --   --  1.7*  --  1.9*   PROTTOTAL  --   --   --   --   --  5.2*  --  5.7*   BILITOTAL  --   --   --   --   --  0.4  --  0.4   ALKPHOS  --   --   --   --   --  76  --  82   ALT  --   --   --   --   --  31  --  39   AST  --   --   --   --   --  32  --  37   < > = values in this interval not displayed.    Recent Results (from the past 24 hour(s))   CT Sinogram Injection    Narrative    CT ABDOMEN/PELVIS WITHOUT CONTRAST 2/20/2017 4:05 PM    HISTORY: Drain study    TECHNIQUE: Scans were obtained of the lower abdomen and upper pelvis  without IV contrast. Radiation dose for this scan was reduced using  automated exposure control, adjustment of the mA and/or kV according  to patient size, or iterative reconstruction technique. Approximately  60 cc of diluted contrast was injected into the indwelling drainage  tube in the right abdomen.    COMPARISON: None.    FINDINGS: Drainage tube identified in the right lower quadrant on the  preinjection scan. No residual fluid identified in this region.  Following injection, the contrast entered the previously drained  abscess cavity. There is no evidence for contrast entering the colon  or small bowel. Remainder of the scan is unremarkable.      Impression    IMPRESSION:   1. Drainage tube in place without evidence of communication with  colon  or small bowel.  2. Remainder of the study is unremarkable.    NORA SAM MD

## 2017-02-22 ENCOUNTER — APPOINTMENT (OUTPATIENT)
Dept: OCCUPATIONAL THERAPY | Facility: CLINIC | Age: 77
DRG: 329 | End: 2017-02-22
Payer: MEDICARE

## 2017-02-22 LAB
ERYTHROCYTE [DISTWIDTH] IN BLOOD BY AUTOMATED COUNT: 27.3 % (ref 10–15)
HCT VFR BLD AUTO: 31.6 % (ref 35–47)
HGB BLD-MCNC: 9.2 G/DL (ref 11.7–15.7)
MCH RBC QN AUTO: 21.2 PG (ref 26.5–33)
MCHC RBC AUTO-ENTMCNC: 29.1 G/DL (ref 31.5–36.5)
MCV RBC AUTO: 73 FL (ref 78–100)
PLATELET # BLD AUTO: 435 10E9/L (ref 150–450)
RBC # BLD AUTO: 4.34 10E12/L (ref 3.8–5.2)
WBC # BLD AUTO: 9.9 10E9/L (ref 4–11)

## 2017-02-22 PROCEDURE — 99232 SBSQ HOSP IP/OBS MODERATE 35: CPT | Performed by: HOSPITALIST

## 2017-02-22 PROCEDURE — 97535 SELF CARE MNGMENT TRAINING: CPT | Mod: GO

## 2017-02-22 PROCEDURE — A9270 NON-COVERED ITEM OR SERVICE: HCPCS | Mod: GY | Performed by: PSYCHIATRY & NEUROLOGY

## 2017-02-22 PROCEDURE — 25000128 H RX IP 250 OP 636: Performed by: INTERNAL MEDICINE

## 2017-02-22 PROCEDURE — A9270 NON-COVERED ITEM OR SERVICE: HCPCS | Mod: GY | Performed by: INTERNAL MEDICINE

## 2017-02-22 PROCEDURE — A9270 NON-COVERED ITEM OR SERVICE: HCPCS | Mod: GY | Performed by: HOSPITALIST

## 2017-02-22 PROCEDURE — 25000132 ZZH RX MED GY IP 250 OP 250 PS 637: Mod: GY | Performed by: INTERNAL MEDICINE

## 2017-02-22 PROCEDURE — 25000125 ZZHC RX 250: Performed by: INTERNAL MEDICINE

## 2017-02-22 PROCEDURE — 12000000 ZZH R&B MED SURG/OB

## 2017-02-22 PROCEDURE — A9270 NON-COVERED ITEM OR SERVICE: HCPCS | Mod: GY | Performed by: PHYSICIAN ASSISTANT

## 2017-02-22 PROCEDURE — 40000133 ZZH STATISTIC OT WARD VISIT

## 2017-02-22 PROCEDURE — 85027 COMPLETE CBC AUTOMATED: CPT | Performed by: INTERNAL MEDICINE

## 2017-02-22 PROCEDURE — 25000132 ZZH RX MED GY IP 250 OP 250 PS 637: Mod: GY | Performed by: HOSPITALIST

## 2017-02-22 PROCEDURE — 36415 COLL VENOUS BLD VENIPUNCTURE: CPT | Performed by: INTERNAL MEDICINE

## 2017-02-22 PROCEDURE — 97530 THERAPEUTIC ACTIVITIES: CPT | Mod: GO

## 2017-02-22 PROCEDURE — 25000132 ZZH RX MED GY IP 250 OP 250 PS 637: Mod: GY | Performed by: PSYCHIATRY & NEUROLOGY

## 2017-02-22 PROCEDURE — 25000132 ZZH RX MED GY IP 250 OP 250 PS 637: Mod: GY | Performed by: PHYSICIAN ASSISTANT

## 2017-02-22 RX ADMIN — ONDANSETRON 4 MG: 2 SOLUTION INTRAMUSCULAR; INTRAVENOUS at 19:21

## 2017-02-22 RX ADMIN — PIPERACILLIN SODIUM,TAZOBACTAM SODIUM 3.38 G: 3; .375 INJECTION, POWDER, FOR SOLUTION INTRAVENOUS at 05:54

## 2017-02-22 RX ADMIN — PIPERACILLIN SODIUM,TAZOBACTAM SODIUM 3.38 G: 3; .375 INJECTION, POWDER, FOR SOLUTION INTRAVENOUS at 17:58

## 2017-02-22 RX ADMIN — ONDANSETRON 4 MG: 4 TABLET, ORALLY DISINTEGRATING ORAL at 12:43

## 2017-02-22 RX ADMIN — POLYETHYLENE GLYCOL-3350 AND ELECTROLYTES 4000 ML: 236; 6.74; 5.86; 2.97; 22.74 POWDER, FOR SOLUTION ORAL at 14:58

## 2017-02-22 RX ADMIN — OLANZAPINE 5 MG: 5 TABLET, ORALLY DISINTEGRATING ORAL at 23:14

## 2017-02-22 RX ADMIN — CYCLOBENZAPRINE HYDROCHLORIDE 10 MG: 10 TABLET, FILM COATED ORAL at 15:40

## 2017-02-22 RX ADMIN — CYCLOBENZAPRINE HYDROCHLORIDE 10 MG: 10 TABLET, FILM COATED ORAL at 08:40

## 2017-02-22 RX ADMIN — LEVOTHYROXINE SODIUM 75 MCG: 75 TABLET ORAL at 08:40

## 2017-02-22 RX ADMIN — PIPERACILLIN SODIUM,TAZOBACTAM SODIUM 3.38 G: 3; .375 INJECTION, POWDER, FOR SOLUTION INTRAVENOUS at 12:49

## 2017-02-22 RX ADMIN — CYCLOBENZAPRINE HYDROCHLORIDE 10 MG: 10 TABLET, FILM COATED ORAL at 23:07

## 2017-02-22 NOTE — PROGRESS NOTES
"Colon and Rectal Surgery  Patient seen earlier in the day. She denies significant abdominal pain; said she had been eating too much.  BP (P) 124/76 (BP Location: Right arm)  Pulse 98  Temp (P) 97.7  F (36.5  C) (Oral)  Resp (P) 16  Ht 1.676 m (5' 6\")  Wt 57.3 kg (126 lb 5.2 oz)  SpO2 (P) 98%  BMI 20.39 kg/m2    Intake/Output Summary (Last 24 hours) at 02/21/17 1816  Last data filed at 02/21/17 1437   Gross per 24 hour   Intake             1450 ml   Output               50 ml   Net             1400 ml     Alert and sitting in a chair. At times oriented to place; at other points not.  Abdomen: soft. ENRIKE with some drainage.     Sinogram revealed no communication with the bowel.     Impression: Stable for colonoscopy. We were notified that the power of  issue was resolved.   She seems stable enough for colonoscopy so will schedule. Her nutritional status would need to improve before any surgical intervention. From that standpoint, it is important that she is in an environment that monitors her oral intake.   Plan: Colonoscopy-likely Thursday.   Will order prep once procedure is scheduled.     Lindsay Peter MD  "

## 2017-02-22 NOTE — PLAN OF CARE
Problem: Goal Outcome Summary  Goal: Goal Outcome Summary  Outcome: No Change  Alert to self, confused. LUE paralysis at baseline. LUE and BLE edema. Up with one, gait belt, walker. VSS. Regular diet, poor appetite, at one bite of dinner. Dressing CDI, RLQ ENRIKE patent-tan/serous drng. Denies pain. POA plan in place-see  note. Per Dr. Peter's note, probable colonoscopy Thursday. Continue to monitor.

## 2017-02-22 NOTE — SIGNIFICANT EVENT
House MD note. Re: Patient fall.    76 year old female was sitting in a bedside chair with the bed alarm in place. When the alarm sounded her nurses rushed into the room and found her kneeling in front of her chair on both knees. She is admittedly weak and states she gently fell forwards onto her knees. No LOC. She did not hit her head. Exam reveals no evidence of significant injury to her knees. No crepitance. No swelling or erythema or tenderness. Normal ROM bilaterally without pain or discomfort. No xrays indicated in my opinion. Continue fall precautions and observation. She is working with physical therapy regarding her mobility issues.    Vel Lopez MD  2/22/2017  11:06 AM

## 2017-02-22 NOTE — PROVIDER NOTIFICATION
Text page to on call hospitalist, wondering if pt can be saline locked d/t edema. Awaiting call back/orders.

## 2017-02-22 NOTE — PROGRESS NOTES
Social Work Progress Note  Pt chart reviewed. Pt discussed in interdisciplinary rounds.     Intervention:  Pt's [facility]  Oly ([phone]) visited pt today. Sw and CC provided Oly with a quick update. Pt is scheduled to have an colonoscopy tomorrow.     Plan:  Anticipated Discharge Placement: TCU recommended.  Barriers: None identified at this time.   Follow-up plan: Sw to continue to follow.      MARAL Prado, [facility]  [phone]

## 2017-02-22 NOTE — PROVIDER NOTIFICATION
"Paged Dr. Wylie, \"BLE weaker. diffulty lifting them up when sitting up in chair. intermittent numbness in toes. BLE edema +2-3\"    ADDENDUM: Per Dr. Wylie will wait until tomorrow after colonoscopy to start lasix   "

## 2017-02-22 NOTE — PROGRESS NOTES
"D: Received call from pt's sister Nat, who stated she is concerned pt's clinical status updates are not coming from pt's niece, Sherita (Vinh), who is one of the newer HC Directive Decision Makers.  Sister Nat indicates that the newer HC Directive was instituted \"Illegally\", sister said she at this point wishes to get [facility] involved with sister Nat's concerns.  Nat indicates she has had to explain the HCD concerns to multiple staff at [facility].  She and her  have contacted a local  and sister is con't to question the validity of current HCD on file.   Per chart review HCD, sister's original copied provided to 7th floor staff earlierthis week and recently signed HCD are scanned and on filed by HIM.  Current/ Active HCD at initial review appears in order and appropriate. Sister Nat is requesting to speak w/ Hosp Administration.  Care Coord Contacted Pt Relations.  Pt Relations plans to reach out to sister Nat today.     Pt is up in a chair at present.  Pt's sister was concerned, pt may have not hung up the phone when sister called earlier.  Sister ? If phone was still in pt's hands as we were speaking.  Pt however, had friend visiting at this time.    Libby Bray Care Transitions Nurse,  RN, BSN, [facility]  Cell Phone # [phone]        "

## 2017-02-22 NOTE — PROGRESS NOTES
[facility]    Hospitalist Progress Note    Date of Service (when I saw the patient): 02/22/2017    Assessment & Plan   Ms. Kenney is a pleasant 76 year old woman with hypertension and dyslipidemia, as well as glaucoma and childhood kidney injury who was admitted 2/15/2017 for acute psychosis and right iliopsoas abscess. She underwent IR guided drainage on the day of admission. Underlying colon cancer is suspected and colonoscopy is scheduled for tomorrow.    1) Acute psychosis: Ms. Kenney reportedly has not suffered from psychosis in the past. She was found at home in a paranoid and agitated state and brought in. Psychiatry was consulted and she is found to have an unspecified psychotic disorder. Her course here has been complicated by efforts to establish a legal decision maker from amongst her family; please see Sintia Rene's progress note from 2/21 which details that it has been agreed that her niece and friend will act together in this role (rather than her sister). Ms. Kenney herself clearly does not have the capacity to make her own medical decisions at this time.    -- Zyprexa started and continued    2) Right iliopsoas abscess: Noted on admission CT. Colorectal Surgery and Oncology consulted. She was started on Zosyn and underwent urgent IR guided drainage, with cultures from that revealing a polymicrobial infection. She had associated thrombocytosis and neutrophilia at admission. Her CEA is also elevated, raising suspicion for colon cancer.    -- We continue Zosyn and continue her drain    -- Repeat CBC with Differential    -- Her POA has agreed to colonoscopy tomorrow; she will undergo prep today    3) Acute iron deficiency anemia: HGB 5.6 on admission. She was transfused 2 units on 2/16 and is now getting IV iron as directed by Oncology.    4) Suspected Klebsiella UTI: As per urine culture results. Likely covered by Zosyn as above.    5) Severe protein calorie malnutrition:  "Nutrition consulted    6) Hypothyroid: She continues Synthroid    7) Postpolio syndrome: Causing a flail left shoulder. She continues Cyclobenzaprine    8) Hypertension: She gets prn medication if needed for this.    DVT Prophylaxis: Pneumatic Compression Devices  Code Status: Full Code    Disposition: Expected discharge in several days.    Jaret Wylie MD    Interval History   Calm and pleasant but tells me she wants to go home. Slipped out of the bed earlier, denies pain or head strike. When asked if she has belly pain she says \"I don;t know.\" She does add that she knows she has to have a special diet today for her surgery tomorrow and that this scares her. She has no other acute complaints today.     -Data reviewed today: I reviewed all new labs and imaging results over the last 24 hours. I personally reviewed no images or EKG's today.    Physical Exam   Temp: 97.7  F (36.5  C) Temp src: Oral BP: 103/63 Pulse: 104 Heart Rate: 90 Resp: 18 SpO2: 96 % O2 Device: None (Room air)    Vitals:    02/15/17 0639 02/16/17 0547 02/21/17 0520   Weight: 52.8 kg (116 lb 6.5 oz) 51.9 kg (114 lb 6.7 oz) 57.3 kg (126 lb 5.2 oz)     Vital Signs with Ranges  Temp:  [97.4  F (36.3  C)-97.9  F (36.6  C)] 97.7  F (36.5  C)  Pulse:  [102-104] 104  Heart Rate:  [90-98] 90  Resp:  [16-18] 18  BP: (103-129)/(58-79) 103/63  SpO2:  [96 %-98 %] 96 %  I/O last 3 completed shifts:  In: 700 [P.O.:700]  Out: 25 [Drains:25]    Constitutional: Alert and oriented to person; no apparent distress  HEENT: normocephalic moist mucus membranes  Respiratory: lungs clear to auscultation bilaterally  Cardiovascular: regular S1 S2 no murmurs rubs or gallops  GI: abdomen soft mild diffusely tender non distended bowel sounds positive though faint  Skin: no rash, good turgor  Musculoskeletal: bilateral LE edema  Neuro: EOMI; moves all four extremities  Psych: Appropriate affect, but speech is tangential and insight seems poor      Medications        " polyethylene glycol  4,000 mL Oral or NG Tube Once     levothyroxine  75 mcg Oral Daily     cyclobenzaprine  10 mg Oral TID     sodium chloride (PF)  3 mL Intracatheter Q8H     piperacillin-tazobactam  3.375 g Intravenous Q6H     OLANZapine zydis  5 mg Oral At Bedtime     sodium chloride (PF)  10 mL Irrigation Q8H       Data     Recent Labs  Lab 02/22/17  0756 02/21/17  0805 02/20/17  0829 02/19/17  0850 02/18/17 2029 02/18/17  0914 02/17/17  0551  02/16/17  0815   WBC 9.9 10.3 10.8 10.5  --  9.6 9.5  < > Canceled, Test credited Questionable specimenCALLED TO FLOOR, FLOOR TO REORDER PER [facility] 09 HBS   HGB 9.2* 9.7* 9.0* 9.1*  --  8.8* 8.4*  < > Canceled, Test credited Questionable specimenCALLED TO FLOOR, FLOOR TO REORDER PER [place] [address] HBS   MCV 73* 73* 73* 73*  --  72* 71*  < > Canceled, Test credited Questionable specimenCALLED TO FLOOR, FLOOR TO REORDER PER [place] [address] HBS    518* 467* 589*  --  546* 495*  < > Canceled, Test credited Questionable specimenCALLED TO FLOOR, FLOOR TO REORDER PER [facility] 0900 HBS   INR  --   --   --   --   --   --   --   --  1.23*   NA  --   --   --  145*  --  147* 143  --  145*   POTASSIUM  --   --   --  3.8 4.0 3.2* 3.4  --  3.7   CHLORIDE  --   --   --  113*  --  115* 113*  --  114*   CO2  --   --   --  24  --  24 19*  --  22   BUN  --   --   --  6*  --  9 14  --  22   CR  --   --  0.66 0.59  --  0.70 0.71  --  0.76   ANIONGAP  --   --   --  8  --  8 11  --  9   SARITHA  --   --   --  8.2*  --  7.9* 7.3*  --  7.8*   GLC  --   --   --  103*  --  87 88  --  103*   ALBUMIN  --   --   --   --   --   --  1.7*  --  1.9*   PROTTOTAL  --   --   --   --   --   --  5.2*  --  5.7*   BILITOTAL  --   --   --   --   --   --  0.4  --  0.4   ALKPHOS  --   --   --   --   --   --  76  --  82   ALT  --   --   --   --   --   --  31  --  39   AST  --   --   --   --   --   --  32  --  37   < > = values in this interval not displayed.    No results found for this or any previous visit  (from the past 24 hour(s)).

## 2017-02-22 NOTE — PROGRESS NOTES
"Colon and Rectal Surgery  Patient says she feels well overall but notes that the \"abscess\" fluctuates. She reports planning to go to have her nails done this morning.   /58 (BP Location: Right arm)  Pulse 98  Temp 97.4  F (36.3  C) (Oral)  Resp 18  Ht 1.676 m (5' 6\")  Wt 57.3 kg (126 lb 5.2 oz)  SpO2 96%  BMI 20.39 kg/m2    Intake/Output Summary (Last 24 hours) at 02/22/17 0920  Last data filed at 02/22/17 0700   Gross per 24 hour   Intake              500 ml   Output               35 ml   Net              465 ml     Alert, oriented to person, oriented to place intermittently.   No icterus  Abdomen: soft. ENRIKE 25 cc over past 24 hours  No focal tenderness  Legs: bilateral edema     Results for orders placed or performed during the hospital encounter of 02/15/17 (from the past 24 hour(s))   CBC with platelets   Result Value Ref Range    WBC 9.9 4.0 - 11.0 10e9/L    RBC Count 4.34 3.8 - 5.2 10e12/L    Hemoglobin 9.2 (L) 11.7 - 15.7 g/dL    Hematocrit 31.6 (L) 35.0 - 47.0 %    MCV 73 (L) 78 - 100 fl    MCH 21.2 (L) 26.5 - 33.0 pg    MCHC 29.1 (L) 31.5 - 36.5 g/dL    RDW 27.3 (H) 10.0 - 15.0 %    Platelet Count 435 150 - 450 10e9/L     Impression: Stable. Edema likely secondary to low protein and hemoglobin.   Power of  issue resolved.   Abscess: low volume drainage from drain. Would prefer to leave in place until colonoscopy performed.   Probable mass on CT: with POA issues resolved, will plan for a colonoscopy tomorrow. Discussed with patient including the need for a prep. She seems to understand and says she has had a colonoscopy in the past. Will get consent from appropriate people.     Lindsay Peter MD  "

## 2017-02-22 NOTE — PLAN OF CARE
Problem: Goal Outcome Summary  Goal: Goal Outcome Summary  OT: Patient MIN A to stand, ambulate in room and complete dressing task. Patient very tangential and confused throughout needed many verbal cues to stay on task. Reports hallucinations today of people in her room watching her with their eyes and cannot focus on task. Patient has difficulty managing L UE in walker platform throughout activity and needs many verbal cues and hand over hand help to velcro strap and walker safety. Recommend discharge TCU.

## 2017-02-22 NOTE — PLAN OF CARE
Problem: Goal Outcome Summary  Goal: Goal Outcome Summary  PT: Attempted PM PT session; pt declining despite encouragement and education. Pt requests PT return tomorrow.

## 2017-02-22 NOTE — DISCHARGE INSTRUCTIONS
RUQ Existing New  Ileostomy     -Pt forgetful/confused and has not actively engaged in Ileostomy management  -Attempting to make her aware of when appliance needs emptying    1. Change appliance 1-2x/wk and as needed for any leakage   2. Empty pouch when 1/3 full.   3. Carry an extra pouching system with you at all times  4. Diet: Eat 6 small meals/day. Drink 8oz/fluid every hour during waking hours. Chew all food well.    5. Diet: Eat pasta, rice, potatoes, hot/dry cereal, toast, breads, pretzels, chips, crackers; yogurt, meat, fish, eggs, cheese, peanut butter, bananas to thicken the stool. Eat 3-4 servings of protien/day.  6. Avoid eating: Seeds, nuts, peels, raw vegetables, chinese vegetables, casings on meats, grape/prume juice, grapefruit, oranges, pineapple, mushrooms.   7. NO laxatives. NO timed released medications   8. May shower with appliance on. Blow dry (on cool setting) cloth backing and tape following bathing.   9. Change your appliance in the morning before breakfast.  10. Walk, no heavy lifting  11. Use an odor eliminator (Ex: Febreeze) in the room prior to emptying or changing appliance  12. Initially monitor your output to avoid dehydration.   Call your primary doctor if output exceeds 1500cc/24hrs.      Supplies: [place] New Image 2-pc  1. Barrier New Image: Flextend convex cut to fit, with tape         #[id number] 5/bx = 2-4 bx/month OR    2. Pouch New Image High output                                             #[id number] 10/bx = 1-2 bx/month OR  3. Pouch:Opaque drainable with lock n roll                                #[id number] 10/bx = 1-2bx/month  4  Optional if leakage [place] Adapt ring                              #[id number] 10/bx = 1bx/month          Procedure for Ileostomy Pouch Change      1.Barrier: draw and cut opening following pattern. Remove paper covering over barrier  2.  Optional if leakage: Open ring and stretch to approx size of opening in barrier. Apply around opening on sticky side of  "barrier. Press into place.   3. Fold back edges of paper that covers the tape to create a \"tab\". This assists with paper removal in Step #9. OK to snap pouch of choice on barrier at this step and apply as a 1-pc pouch  4. Set barrier aside.   5. Remove pouch from around stoma. Discard.   6. Cleanse skin around stoma with water. Pat dry.  7. Center stoma in barrier opening. Press barrier to skin.  8.Pull on \"tab\" to remove paper that covers the tape. Press tape to skin   9. Snap on pouch of choice  10. Close spigot on high output or close lock n roll on drainable pouch    Contact: Felicia Oorurke CWOCN for any ?/concerns                       "

## 2017-02-22 NOTE — PLAN OF CARE
"Problem: Goal Outcome Summary  Goal: Goal Outcome Summary  OT: Attempted to see patient however her breakfast just arrived. Patient stated she is \"starving\" however she wants to work with therapy later.       "

## 2017-02-22 NOTE — PLAN OF CARE
Problem: Goal Outcome Summary  Goal: Goal Outcome Summary  Outcome: Improving  Pt confused through shift, reoriented. VSS. Up to bathroom w/ 1 asst, walker and GB. ENRIKE w/ 15mL tan serous fluid, site irrigated. 3+ edema to BLE. Regular diet. Denies pain. Plan for d/c pending, colonoscopy tomorrow, cont to monitor.

## 2017-02-22 NOTE — PROGRESS NOTES
"Patient had unwitnessed fall from chair. Chair alarm on and working. Yellow fall band in place. Writer responded promptly to alarm, however pt found on floor on knees. Patient states she \"melted off the chair\". Did not hit her head or have a loss of consciousness. Patient assisted off of floor back to chair with 2 assist. VSS. Evaluated by house MD, no injuries assessed. Patient assisted back into bed with 2 assist, gaitbelt and walker. Alarms activated.   "

## 2017-02-23 ENCOUNTER — APPOINTMENT (OUTPATIENT)
Dept: CT IMAGING | Facility: CLINIC | Age: 77
DRG: 329 | End: 2017-02-23
Attending: INTERNAL MEDICINE
Payer: MEDICARE

## 2017-02-23 LAB
ANION GAP SERPL CALCULATED.3IONS-SCNC: 9 MMOL/L (ref 3–14)
BASE EXCESS BLDA CALC-SCNC: 1.9 MMOL/L
BUN SERPL-MCNC: 7 MG/DL (ref 7–30)
CALCIUM SERPL-MCNC: 8.7 MG/DL (ref 8.5–10.1)
CHLORIDE SERPL-SCNC: 109 MMOL/L (ref 94–109)
CO2 SERPL-SCNC: 27 MMOL/L (ref 20–32)
CREAT SERPL-MCNC: 0.7 MG/DL (ref 0.52–1.04)
ERYTHROCYTE [DISTWIDTH] IN BLOOD BY AUTOMATED COUNT: 27.8 % (ref 10–15)
GFR SERPL CREATININE-BSD FRML MDRD: 81 ML/MIN/1.7M2
GLUCOSE BLDC GLUCOMTR-MCNC: 114 MG/DL (ref 70–99)
GLUCOSE SERPL-MCNC: 125 MG/DL (ref 70–99)
HCO3 BLD-SCNC: 27 MMOL/L (ref 21–28)
HCT VFR BLD AUTO: 33.1 % (ref 35–47)
HGB BLD-MCNC: 8.8 G/DL (ref 11.7–15.7)
HGB BLD-MCNC: 9.3 G/DL (ref 11.7–15.7)
HGB BLD-MCNC: 9.7 G/DL (ref 11.7–15.7)
INR PPP: 1.04 (ref 0.86–1.14)
LACTATE BLD-SCNC: 1.2 MMOL/L (ref 0.7–2.1)
MCH RBC QN AUTO: 21.4 PG (ref 26.5–33)
MCHC RBC AUTO-ENTMCNC: 29.3 G/DL (ref 31.5–36.5)
MCV RBC AUTO: 73 FL (ref 78–100)
OXYHGB MFR BLD: 99 % (ref 92–100)
PCO2 BLD: 41 MM HG (ref 35–45)
PH BLD: 7.42 PH (ref 7.35–7.45)
PLATELET # BLD AUTO: 421 10E9/L (ref 150–450)
PO2 BLD: 124 MM HG (ref 80–105)
POTASSIUM SERPL-SCNC: 3.2 MMOL/L (ref 3.4–5.3)
POTASSIUM SERPL-SCNC: 3.4 MMOL/L (ref 3.4–5.3)
RBC # BLD AUTO: 4.54 10E12/L (ref 3.8–5.2)
SODIUM SERPL-SCNC: 145 MMOL/L (ref 133–144)
WBC # BLD AUTO: 18.1 10E9/L (ref 4–11)

## 2017-02-23 PROCEDURE — 12000000 ZZH R&B MED SURG/OB

## 2017-02-23 PROCEDURE — 40000275 ZZH STATISTIC RCP TIME EA 10 MIN

## 2017-02-23 PROCEDURE — 85018 HEMOGLOBIN: CPT | Performed by: INTERNAL MEDICINE

## 2017-02-23 PROCEDURE — A9270 NON-COVERED ITEM OR SERVICE: HCPCS | Mod: GY | Performed by: PSYCHIATRY & NEUROLOGY

## 2017-02-23 PROCEDURE — 85018 HEMOGLOBIN: CPT | Performed by: PHYSICIAN ASSISTANT

## 2017-02-23 PROCEDURE — 25000125 ZZHC RX 250: Performed by: INTERNAL MEDICINE

## 2017-02-23 PROCEDURE — 36415 COLL VENOUS BLD VENIPUNCTURE: CPT | Performed by: PHYSICIAN ASSISTANT

## 2017-02-23 PROCEDURE — 84132 ASSAY OF SERUM POTASSIUM: CPT | Performed by: INTERNAL MEDICINE

## 2017-02-23 PROCEDURE — 40000914 ZZH STATISTIC SITTER, DAY HOURS

## 2017-02-23 PROCEDURE — 85027 COMPLETE CBC AUTOMATED: CPT | Performed by: INTERNAL MEDICINE

## 2017-02-23 PROCEDURE — 36600 WITHDRAWAL OF ARTERIAL BLOOD: CPT

## 2017-02-23 PROCEDURE — 80048 BASIC METABOLIC PNL TOTAL CA: CPT | Performed by: INTERNAL MEDICINE

## 2017-02-23 PROCEDURE — 99233 SBSQ HOSP IP/OBS HIGH 50: CPT | Performed by: INTERNAL MEDICINE

## 2017-02-23 PROCEDURE — 00000146 ZZHCL STATISTIC GLUCOSE BY METER IP

## 2017-02-23 PROCEDURE — 36415 COLL VENOUS BLD VENIPUNCTURE: CPT | Performed by: INTERNAL MEDICINE

## 2017-02-23 PROCEDURE — 82805 BLOOD GASES W/O2 SATURATION: CPT | Performed by: FAMILY MEDICINE

## 2017-02-23 PROCEDURE — 74177 CT ABD & PELVIS W/CONTRAST: CPT

## 2017-02-23 PROCEDURE — 99207 ZZC APP CREDIT; MD BILLING SHARED VISIT: CPT | Performed by: INTERNAL MEDICINE

## 2017-02-23 PROCEDURE — 83605 ASSAY OF LACTIC ACID: CPT | Performed by: INTERNAL MEDICINE

## 2017-02-23 PROCEDURE — 25500064 ZZH RX 255 OP 636: Performed by: INTERNAL MEDICINE

## 2017-02-23 PROCEDURE — 85610 PROTHROMBIN TIME: CPT | Performed by: INTERNAL MEDICINE

## 2017-02-23 PROCEDURE — 25000128 H RX IP 250 OP 636: Performed by: INTERNAL MEDICINE

## 2017-02-23 PROCEDURE — 25000132 ZZH RX MED GY IP 250 OP 250 PS 637: Mod: GY | Performed by: PSYCHIATRY & NEUROLOGY

## 2017-02-23 RX ORDER — SODIUM CHLORIDE AND POTASSIUM CHLORIDE 150; 450 MG/100ML; MG/100ML
INJECTION, SOLUTION INTRAVENOUS CONTINUOUS
Status: DISCONTINUED | OUTPATIENT
Start: 2017-02-23 | End: 2017-02-24

## 2017-02-23 RX ORDER — IOPAMIDOL 755 MG/ML
63 INJECTION, SOLUTION INTRAVASCULAR ONCE
Status: COMPLETED | OUTPATIENT
Start: 2017-02-23 | End: 2017-02-23

## 2017-02-23 RX ADMIN — POTASSIUM CHLORIDE 10 MEQ: 14.9 INJECTION, SOLUTION, CONCENTRATE PARENTERAL at 04:05

## 2017-02-23 RX ADMIN — POTASSIUM CHLORIDE 10 MEQ: 14.9 INJECTION, SOLUTION, CONCENTRATE PARENTERAL at 05:17

## 2017-02-23 RX ADMIN — PIPERACILLIN SODIUM,TAZOBACTAM SODIUM 3.38 G: 3; .375 INJECTION, POWDER, FOR SOLUTION INTRAVENOUS at 18:42

## 2017-02-23 RX ADMIN — PIPERACILLIN SODIUM,TAZOBACTAM SODIUM 3.38 G: 3; .375 INJECTION, POWDER, FOR SOLUTION INTRAVENOUS at 00:38

## 2017-02-23 RX ADMIN — IOPAMIDOL 63 ML: 755 INJECTION, SOLUTION INTRAVENOUS at 02:56

## 2017-02-23 RX ADMIN — SODIUM CHLORIDE 60 ML: 9 INJECTION, SOLUTION INTRAVENOUS at 02:56

## 2017-02-23 RX ADMIN — POTASSIUM CHLORIDE AND SODIUM CHLORIDE: 450; 150 INJECTION, SOLUTION INTRAVENOUS at 09:24

## 2017-02-23 RX ADMIN — PROCHLORPERAZINE EDISYLATE 5 MG: 5 INJECTION INTRAMUSCULAR; INTRAVENOUS at 01:42

## 2017-02-23 RX ADMIN — OLANZAPINE 5 MG: 5 TABLET, ORALLY DISINTEGRATING ORAL at 22:57

## 2017-02-23 RX ADMIN — ONDANSETRON 4 MG: 2 SOLUTION INTRAMUSCULAR; INTRAVENOUS at 00:30

## 2017-02-23 RX ADMIN — PIPERACILLIN SODIUM,TAZOBACTAM SODIUM 3.38 G: 3; .375 INJECTION, POWDER, FOR SOLUTION INTRAVENOUS at 12:06

## 2017-02-23 RX ADMIN — POTASSIUM CHLORIDE AND SODIUM CHLORIDE: 450; 150 INJECTION, SOLUTION INTRAVENOUS at 22:57

## 2017-02-23 RX ADMIN — PIPERACILLIN SODIUM,TAZOBACTAM SODIUM 3.38 G: 3; .375 INJECTION, POWDER, FOR SOLUTION INTRAVENOUS at 06:40

## 2017-02-23 NOTE — PROGRESS NOTES
Spoke with Dinora,Patient's friend. She requested the name of the  who saw Sherita regarding HCD. i gave her name. She wants to keep on top of the people involved in Sherita's care. Dinora had no medical questions. She is anxious to see how her medical care progresses.

## 2017-02-23 NOTE — PROVIDER NOTIFICATION
Paged Dr. Baez regarding if pt needs NG tube.     Orders: Continue to monitor patient, will treat with fluids for now. If pt begins to become uncomfortable, pt may need NG. Will hold off on NG for now.

## 2017-02-23 NOTE — PLAN OF CARE
Problem: Goal Outcome Summary  Goal: Goal Outcome Summary  Outcome: No Change  Alert to self. Confused. Visual hallucinations. BLE weakness & +3 edema. Intermittent numbness in toes. ENRIKE patent. Voiding adequately. BS active, +flatus, +BM. Colonoscopy tomorrow.

## 2017-02-23 NOTE — PROVIDER NOTIFICATION
"Paged Yessy, \"Pt became very agitated & pulled out NG. Output since placed is about 700cc. Abd still distended--softer then this AM. No nausea. BS hypoactive. Verifying you want it replaced. \"    ADDENDUM: Per Yessy replace NG.  "

## 2017-02-23 NOTE — PROGRESS NOTES
COLON & RECTAL SURGERY  PROGRESS NOTE    February 23, 2017    SUBJECTIVE:  The patient was unresponsive and had RRT called early this morning. Patient is awake but lethargic this morning. Actively vomiting and abdomen distended.     OBJECTIVE:  Temp:  [97.2  F (36.2  C)-97.7  F (36.5  C)] 97.2  F (36.2  C)  Pulse:  [] 99  Heart Rate:  [] 102  Resp:  [16-22] 22  BP: (103-131)/(63-90) 126/89  SpO2:  [94 %-100 %] 100 %    Intake/Output Summary (Last 24 hours) at 02/23/17 0800  Last data filed at 02/22/17 2100   Gross per 24 hour   Intake              480 ml   Output              100 ml   Net              380 ml       GENERAL:  Awake, arousable     HEAD - Nomocephalic atraumatic  SCLERA anicteric  EXTREMITIES warm and well perfused  ABDOMEN:  Distended, no peritoneal signs      LABS:  Lab Results   Component Value Date    WBC 18.1 02/23/2017     Lab Results   Component Value Date    HGB 9.7 02/23/2017     Lab Results   Component Value Date    HCT 33.1 02/23/2017     Lab Results   Component Value Date     02/23/2017     Last Basic Metabolic Panel:  Lab Results   Component Value Date     02/23/2017      Lab Results   Component Value Date    POTASSIUM 3.2 02/23/2017     Lab Results   Component Value Date    CHLORIDE 109 02/23/2017     Lab Results   Component Value Date    SARITHA 8.7 02/23/2017     Lab Results   Component Value Date    CO2 27 02/23/2017     Lab Results   Component Value Date    BUN 7 02/23/2017     Lab Results   Component Value Date    CR 0.70 02/23/2017     Lab Results   Component Value Date     02/23/2017       ASSESSMENT/PLAN:76 year old woman who presents with psychosis and found to have large iliopsoas abscess and abdominal mass suspicious for malignancy. S/p IR drainage of 500cc purulent drainage. No with SBO found on CT last night with active nausea and vomiting. Abdomen is distended  1. Cancel colonoscopy  2. Place NG tube for decompression  3. IV PRN pain meds. Limit  narcotics  4. Will come back and reassess abdomen later this morning.     Yessy Whalen PA-C  [facility]  Phone: [phone]

## 2017-02-23 NOTE — PROGRESS NOTES
Melia Ash, Patient , reports conversation with pt's niece, Sherita.  Sherita indicates she is certain she wants to be her aunts health care agent and stated that her aunt was very clear in expressing her wishes.  In regards to releasing information to Nat (pt's sister and Sherita's mother) - Sherita stated she will need to speak to her aunt and follow her wishes.  As of right now she does not feel comfortable releasing any information to her mother Nat based on what the patient expressed recently.

## 2017-02-23 NOTE — PROGRESS NOTES
CRS    11:53am     The patient's abdomen is significantly less distended from this morning. Soft and no peritoneal signs. NG tube replaced and putting out bilious output. Patient is awake and alert. Continue NG tube for decompression. If not able to remove NG tube in 24 hours then need to consider starting TPN.     Yessy Whalen PA-C  [facility]  [phone]

## 2017-02-23 NOTE — PROGRESS NOTES
IR note:  Follow up on patient abscess drain. Stayfix dressing has come off, replaced dressing.  Site is dry and without infection, drain sutured in place.  Flushed with 5cc sterile NS to check patency, flushes easily with return of yellow drainage.  Pt cooperative.  Tamara Herndon RN  IR nurse clinician  [phone]

## 2017-02-23 NOTE — PROVIDER NOTIFICATION
Hospitalist paged. Pt's stomach firm, tender to touch. Pt experiencing nausea as well. Awaiting call-back.

## 2017-02-23 NOTE — PROGRESS NOTES
Arrived in room pt attempting to get out of bed to use bathroom. NG already removed. Pt became very agitated unable to redirect. Pt grabbed ENRIKE tubing & was biting it. Staff able to get pt back into bed safely. Pt calmed & now laying in bed comfortably.

## 2017-02-23 NOTE — PROGRESS NOTES
[facility]    Hospitalist Progress Note    Assessment & Plan   Sherita Kenney is a 76 year old female who was admitted on 2/15/2017.   Ms. Kenney is a pleasant 76 year old woman with hypertension and dyslipidemia, as well as glaucoma and childhood kidney injury who was admitted 2/15/2017 for acute psychosis and right iliopsoas abscess. She underwent IR guided drainage on the day of admission. Underlying colon cancer is suspected and colonoscopy was  scheduled for 2/23      Acute psychosis: Ms. Kenney reportedly has not suffered from psychosis in the past. She was found at home in a paranoid and agitated state and brought in. Psychiatry was consulted and she is found to have an unspecified psychotic disorder. Her course here has been complicated by efforts to establish a legal decision maker from amongst her family; please see Sintia Rene's progress note from 2/21 which details that it has been agreed that her niece and friend will act together in this role (rather than her sister). Ms. Kenney herself clearly does not have the capacity to make her own medical decisions at this time.  -- Zyprexa started and continued     2Right iliopsoas abscess: Noted on admission CT. Colorectal Surgery and Oncology consulted. She was started on Zosyn and underwent urgent IR guided drainage, with cultures from that revealing a polymicrobial infection. She had associated thrombocytosis and neutrophilia at admission. Her CEA is also elevated, raising suspicion for colon cancer.  -- continue Zosyn and continue her drain  -- Repeat CBC with Differential  -- Her POA has agreed to colonoscopy which was supposed to be today, however CR has postponed due to RRT this am.  - pt did  undergo prep and will be NPO  -CR following, possibly they can do tomorrow  -abcess culture with mod Enterobacter cloacae, mod Eikenella corrodens and light growth H parainfluenzae      Episode of Unresponsiveness am 2/23/17  -RRT called  and evaluated by house physician  -Dr. Raygoza thought episode most likely related to compazine given and agree with his evaluation  -compazine has been discontinued  -pt awakened and has had no further episodes    SBO  -abd very distended   -NG ordered by CR  -will get KUB in am    Hypernatremia  -sodium today 145  -iv 0.45 saline with 20 Kcl at 100 which was started at 0345  -will recheck in am   -no change in iv fluids for now     Acute iron deficiency anemia: HGB 5.6 on admission. She was transfused 2 units on 2/16 and is now getting IV iron as directed by Oncology.      Klebsiella UTI: As per urine culture results. Likely covered by Zosyn as above.     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 She continues Cyclobenzaprine     Hypertension: She gets prn medication if needed for this.     DVT Prophylaxis: Pneumatic Compression Devices  Code Status: Full Code       Jenise Jo MD    Interval History   RRT this am which was probably due to compazine.  Colonoscopy postponed  Keep npo   Diagnosed with small bowel obstruction this am      -Data reviewed today: I reviewed all new labs and imaging results over the last 24 hours. I personally reviewed no images or EKG's today.    Physical Exam   Temp: 97.4  F (36.3  C) Temp src: Axillary BP: 131/90 Pulse: 99 Heart Rate: 98 Resp: 16 SpO2: 100 % O2 Device: Nasal cannula Oxygen Delivery: 2 LPM  Vitals:    02/16/17 0547 02/21/17 0520 02/23/17 0557   Weight: 51.9 kg (114 lb 6.7 oz) 57.3 kg (126 lb 5.2 oz) 60.3 kg (132 lb 15 oz)     Vital Signs with Ranges  Temp:  [97.3  F (36.3  C)-97.7  F (36.5  C)] 97.4  F (36.3  C)  Pulse:  [] 99  Heart Rate:  [] 98  Resp:  [16-18] 16  BP: (103-131)/(63-90) 131/90  SpO2:  [94 %-100 %] 100 %  I/O last 3 completed shifts:  In: 480 [P.O.:480]  Out: 115 [Emesis/NG output:100; Drains:25]    Constitutional: sleepy  but able to answer questions  Respiratory: clear to auscultation, no wheezing or rhonchi   Cardiovascular: regular rate and rhythm without murmer or rub   GI:abd  Distended and hard  Skin/Integumen: no rashes   Other:        Medications     0.45% sodium chloride + KCl 20 mEq/L         sodium chloride (PF)  3 mL Intracatheter Q8H     levothyroxine  75 mcg Oral Daily     cyclobenzaprine  10 mg Oral TID     sodium chloride (PF)  3 mL Intracatheter Q8H     piperacillin-tazobactam  3.375 g Intravenous Q6H     OLANZapine zydis  5 mg Oral At Bedtime     sodium chloride (PF)  10 mL Irrigation Q8H       Data     Recent Labs  Lab 02/23/17  0155 02/22/17  0756 02/21/17  0805 02/20/17  0829 02/19/17  0850 02/18/17 2029 02/18/17  0914 02/17/17  0551  02/16/17  0815   WBC 18.1* 9.9 10.3 10.8 10.5  --  9.6 9.5  < > Canceled, Test credited Questionable specimenCALLED TO FLOOR, FLOOR TO REORDER PER [place] [id number] [facility]BS   HGB 9.7* 9.2* 9.7* 9.0* 9.1*  --  8.8* 8.4*  < > Canceled, Test credited Questionable specimenCALLED TO FLOOR, FLOOR TO REORDER PER [place] [address] [facility]   MCV 73* 73* 73* 73* 73*  --  72* 71*  < > Canceled, Test credited Questionable specimenCALLED TO FLOOR, FLOOR TO REORDER PER [place] [address] [facility]    435 518* 467* 589*  --  546* 495*  < > Canceled, Test credited Questionable specimenCALLED TO FLOOR, FLOOR TO REORDER PER [place] [address] [facility]   INR 1.04  --   --   --   --   --   --   --   --  1.23*   *  --   --   --  145*  --  147* 143  --  145*   POTASSIUM 3.2*  --   --   --  3.8 4.0 3.2* 3.4  --  3.7   CHLORIDE 109  --   --   --  113*  --  115* 113*  --  114*   CO2 27  --   --   --  24  --  24 19*  --  22   BUN 7  --   --   --  6*  --  9 14  --  22   CR 0.70  --   --  0.66 0.59  --  0.70 0.71  --  0.76   ANIONGAP 9  --   --   --  8  --  8 11  --  9   SARITHA 8.7  --   --   --  8.2*  --  7.9* 7.3*  --  7.8*   *  --   --   --  103*  --  87 88  --  103*   ALBUMIN  --   --   --   --   --   --   --  1.7*  --   1.9*   PROTTOTAL  --   --   --   --   --   --   --  5.2*  --  5.7*   BILITOTAL  --   --   --   --   --   --   --  0.4  --  0.4   ALKPHOS  --   --   --   --   --   --   --  76  --  82   ALT  --   --   --   --   --   --   --  31  --  39   AST  --   --   --   --   --   --   --  32  --  37   < > = values in this interval not displayed.    Recent Results (from the past 24 hour(s))   CT Abdomen Pelvis w Contrast    Narrative    CT ABDOMEN AND PELVIS WITH CONTRAST  2/23/2017 2:59 AM     HISTORY: Abdominal pain.    COMPARISON: 2/20/2017.    TECHNIQUE: Following the uneventful administration of 63 mL Isovue-370  intravenous contrast, helical sections were acquired from the top of  the diaphragm through the pubic symphysis. Coronal reconstructions  were generated. Radiation dose for this scan was reduced using  automated exposure control, adjustment of the mA and/or kV according  to the patient's size, or iterative reconstruction technique.    FINDINGS:     Abdomen: The liver is unremarkable. A few punctate calcifications in  the spleen likely relate to prior granulomatous infection. The  pancreas, adrenal glands and kidneys are unremarkable. The gallbladder  is mildly distended. The stomach is moderately distended with fluid.  No enlarged lymph nodes in the upper abdomen. A small amount of  perihepatic free fluid. Atherosclerotic calcification in the abdominal  aorta.    Scan through the lower chest is significant for a few calcified left  hilar lymph nodes that are likely related to prior granulomatous  infection and coronary artery calcification.    Pelvis: An 8.2 x 7.4 x 5.3 cm heterogeneously enhancing mass is  present in the mid right hemiabdomen (series 2 image 53), likely  involving the terminal ileum and cecum. There is diffuse moderate  dilatation of a fluid-filled small bowel. The colon is relatively  decompressed. A few colonic diverticula are present in the colon  without evidence of diverticulitis. No  pneumatosis or free  intraperitoneal gas. The uterus is present. A few enlarged periaortic  lymph nodes and mesenteric lymph nodes in the right hemipelvis are  again present. A percutaneous pigtail catheter is present entering the  anterolateral aspect of the mid right hemiabdomen and distal pigtail  in the iliac fossa. No fluid is present about the catheter.      Impression    IMPRESSION:   1. 8 cm solid mass in the ileocecal region, highly suggestive of  neoplasm, unchanged. A few enlarged lymph nodes in the abdomen are  highly suggestive of metastases.  2. Diffuse moderate dilatation of a fluid-filled small bowel and  stomach, consistent with a distal small bowel obstruction and likely  due to the aforementioned mass.  3. A small amount of perihepatic free fluid.  4. A percutaneous drainage catheter is present in the right  hemiabdomen. No fluid is present about the distal tip of the catheter.    BEVERLY GÓMEZ MD

## 2017-02-23 NOTE — CODE/RAPID RESPONSE
"House MD note. Re: Unresponsiveness.    76 year old female presented to [facility] ED on 2-15 with acute psychosis and RLQ mass. Found to have a massive right iliopsoas abscess with suspicion for underlying right colon cancer. She has been seen by psychiatry and is on daily Zyprexa. A short while ago the patient was noted to be unresponsive, even to painful stimuli. A RRT was called. On exam the patient had flaccid extremities, shallow respirations, mid point pupils 5 mm bilaterally, reactive to light. Her blood glucose was 114. Her oxygen saturations on room air were 99%. Her BP was 124/74. Her HR was 95. Her nurse reports the patient was somewhat agitated during the night. She had a repeat abdominal CT scan last night showing a distal SBO. She has not received any narcotics or sedatives in the recent several hours. She has been vomiting and did receive Compazine 5 mg's IV at 01:42 hours. She received Zyprexa 5 mg's at 23:14 hours and Flexeril at 23:07 hours. Her nurse reports that the patient has slept very little in the past few days.    Exam: She is comatose, with no withdrawal to painful stimuli of her extremities.  /89 (BP Location: Right arm)  Pulse 99  Temp 97.2  F (36.2  C) (Axillary)  Resp 22  Ht 1.676 m (5' 6\")  Wt 60.3 kg (132 lb 15 oz)  SpO2 100%  BMI 21.46 kg/m2  HEENT: P[place], pupils 5 mm bilaterally, reactive to light stimuli.  Chest: CTA  Cor: RRR  Abd: Firm lower abdominal mass previously noted.      Recent Labs  Lab 02/23/17  0710   PH 7.42   PCO2 41   PO2 124*   HCO3 27       Lab Results   Component Value Date    WBC 18.1 02/23/2017     Lab Results   Component Value Date    RBC 4.54 02/23/2017     Lab Results   Component Value Date    HGB 9.7 02/23/2017     Lab Results   Component Value Date    HCT 33.1 02/23/2017     Lab Results   Component Value Date     02/23/2017     Last Basic Metabolic Panel:  Lab Results   Component Value Date     02/23/2017      Lab Results   Component Value " Date    POTASSIUM 3.2 02/23/2017     Lab Results   Component Value Date    CHLORIDE 109 02/23/2017     Lab Results   Component Value Date    SARITHA 8.7 02/23/2017     Lab Results   Component Value Date    CO2 27 02/23/2017     Lab Results   Component Value Date    BUN 7 02/23/2017     Lab Results   Component Value Date    CR 0.70 02/23/2017     Lab Results   Component Value Date     02/23/2017         Assessment: CNS depression, multifactorial etiology. Profound sleep deprivation, possible adverse effects of Compazine. Psychosis, ? Dementia, Zyprexa.    Plan: The patient was orally suctioned and finally woke up. Looking around the room and now moving all extremities. Still non verbal. She had a head CT scan on admission which was wnl for her age. Discussed with Dr. Jo, Hospitalist service. Will discontinue compazine. Will hold off for now obtaining a repeat head CT scan. Doubt ICH or CVI. Will defer further orders and treatment plan to Dr. Jo.    Vel Lopez MD  2/23/2017  8:11 AM

## 2017-02-23 NOTE — PROGRESS NOTES
"This nurse was called to start an IV. Upon entering pt. was awake. She c/o nausea and of \"not feeling very well.\" This nurse put the call light on to get help to boost her and turn her on her side. She was turned to her side (left on her side propped with pillows) and then vomited 100 cc clear fluid that smelled very acidy or sour. Assessed her abd and was very rounded and firm from the belly button down. Abd also very tender to light touch. When asked the pt. how bad the pain is in her abd she stated an 8.8 out 10 before vomiting then down to a 4. Bowel sounds present but faint on the right and on the left higher pitched. Informed primary nurse-zofran was given. Reading the MD's notes from 2-22 abd was soft and no mention of distention.Charge nurse put a call out to the hospitalist.   "

## 2017-02-23 NOTE — PLAN OF CARE
Problem: Goal Outcome Summary  Goal: Goal Outcome Summary  PT: Attempted PM PT session; pt sleeping soundly. Per OT note, RN requests to let pt sleep.

## 2017-02-23 NOTE — PROVIDER NOTIFICATION
"Paged Dr. Jo. \"Pt has had 2BMs they are dark burgundy/brown in color--1 soft & 1 water. Has been this color for a couple days. Any concerns?\"    ADDENDUM: Per Dr. Jo will order a CBC around 1800.   "

## 2017-02-23 NOTE — PROGRESS NOTES
HOSPITALIST CROSS COVER    CT scan significant for distal SBO. Labs notable for hypernatremia and hypokalemia.    Plan, start 1/2 NS with 20 mEq KCl at 100 cc/hr. Keep NPO for distal SBO.     LIA VARGAS MD

## 2017-02-23 NOTE — PROGRESS NOTES
Chart check.  Events of last 24 hour noted.  No active oncology problem.  We will see the patient after colonoscopy.

## 2017-02-23 NOTE — PLAN OF CARE
Problem: Goal Outcome Summary  Goal: Goal Outcome Summary  OT: Attempted to see patient, however patient sleeping and RN requesting to let her sleep.

## 2017-02-23 NOTE — PLAN OF CARE
"Problem: Goal Outcome Summary  Goal: Goal Outcome Summary  Outcome: Change based on patient need/priority Date Met:  02/23/17  Restless, agitated, confused though the night. Could not keep down colonoscopy prep, very small brown BM x1 and smear. Very little sleep d/t N/V through this shift, medicated w/ IV zofran and compazine, unsuccessful.  ENRIKE to RLQ serous tan output, ABD rounded, distended and firm.  Emesis sometimes brownish liquid, sometimes presents w/ food particles.  Pt sometimes reports pain, most discomfort in throat from vomiting reported.  VSS.  House doc paged d/t increased emesis and ABD firmness, CT and labs obtained, also paged about NG insertion, would hold off unless pt in uncomfortable.  Pt sleeping once back from CT so NG held.  Begin IV K replacement. This RN in to hang ABX around 0630 where pt was waking up, very agitated and trying to force herself out of bed believing she \"just got into town and has only slept for two hours on the plane\".  Attempted to reorient, pt still aggressive, vocal and mobile. Was somewhat settled w/ RNs at bedside and had large emesis, pt then went unresponsive even to painful stimuli, RRT paged.  VSS, see RRT notes.  Flaccid and unresponsive, awoke for several minutes after oral suctioning, ext all still flaccid and pt became unresponsive again. Plan for labs, poss CT head, NG placement, cont to monitor.      "

## 2017-02-23 NOTE — PROVIDER NOTIFICATION
"Paged Dr. Jo, \"Will be ordering a potassium redraw. Morning labs were d/c'd by Dr. Villarreal since she had them by 0200. Do you want them reordered for this afternoon? Thanks\"  "

## 2017-02-23 NOTE — PROGRESS NOTES
"Colon and Rectal Surgery  Events of past few hours noted. Now with NG tube. Complains of fatigue, mild nausea and abdominal discomfort.  /89 (BP Location: Right arm)  Pulse 99  Temp 97.2  F (36.2  C) (Axillary)  Resp 22  Ht 1.676 m (5' 6\")  Wt 60.3 kg (132 lb 15 oz)  SpO2 100%  BMI 21.46 kg/m2    Intake/Output Summary (Last 24 hours) at 02/23/17 0923  Last data filed at 02/23/17 0810   Gross per 24 hour   Intake                0 ml   Output             1100 ml   Net            -1100 ml     Chronically ill appearing  No icterus  Abdomen: Mild distension. No focal tenderness.ENRIKE drain small amount of output.  Legs: bilateral edema.      Labs and imaging below:    Impression: Patient with distal SBO likely related to the tumor; unable to tolerate the prep. Symptomatically better with NG tube.   Plan: Cancel colonoscopy. No need for urgent surgery.  Continue NG tube and iv hydration.   Start TPN once acute fluctuations settled.  Monitor situation. If obstruction does not improve may need diversion. Given overall health and particularly her nutritional status would not recommend definitive surgery at present. If it improves would continue with nutritional support until overall status improved. Situation discussed with patient's niece who understands and agrees with the plan. I did ask if POAs and patient want to continue with aggressive care. Niece is uncertain since she has not been able to have a discussion with the patient given her illness and mental status. She plans that either she or other POA will try to have that discussion.     Lindsay Peter.   Results for orders placed or performed during the hospital encounter of 02/15/17 (from the past 24 hour(s))   CBC with platelets   Result Value Ref Range    WBC 18.1 (H) 4.0 - 11.0 10e9/L    RBC Count 4.54 3.8 - 5.2 10e12/L    Hemoglobin 9.7 (L) 11.7 - 15.7 g/dL    Hematocrit 33.1 (L) 35.0 - 47.0 %    MCV 73 (L) 78 - 100 fl    MCH 21.4 (L) 26.5 - 33.0 pg    MCHC 29.3 " (L) 31.5 - 36.5 g/dL    RDW 27.8 (H) 10.0 - 15.0 %    Platelet Count 421 150 - 450 10e9/L   Basic metabolic panel   Result Value Ref Range    Sodium 145 (H) 133 - 144 mmol/L    Potassium 3.2 (L) 3.4 - 5.3 mmol/L    Chloride 109 94 - 109 mmol/L    Carbon Dioxide 27 20 - 32 mmol/L    Anion Gap 9 3 - 14 mmol/L    Glucose 125 (H) 70 - 99 mg/dL    Urea Nitrogen 7 7 - 30 mg/dL    Creatinine 0.70 0.52 - 1.04 mg/dL    GFR Estimate 81 >60 mL/min/1.7m2    GFR Estimate If Black >90   GFR Calc   >60 mL/min/1.7m2    Calcium 8.7 8.5 - 10.1 mg/dL   INR   Result Value Ref Range    INR 1.04 0.86 - 1.14   Lactic acid whole blood   Result Value Ref Range    Lactic Acid 1.2 0.7 - 2.1 mmol/L   CT Abdomen Pelvis w Contrast    Narrative    CT ABDOMEN AND PELVIS WITH CONTRAST  2/23/2017 2:59 AM     HISTORY: Abdominal pain.    COMPARISON: 2/20/2017.    TECHNIQUE: Following the uneventful administration of 63 mL Isovue-370  intravenous contrast, helical sections were acquired from the top of  the diaphragm through the pubic symphysis. Coronal reconstructions  were generated. Radiation dose for this scan was reduced using  automated exposure control, adjustment of the mA and/or kV according  to the patient's size, or iterative reconstruction technique.    FINDINGS:     Abdomen: The liver is unremarkable. A few punctate calcifications in  the spleen likely relate to prior granulomatous infection. The  pancreas, adrenal glands and kidneys are unremarkable. The gallbladder  is mildly distended. The stomach is moderately distended with fluid.  No enlarged lymph nodes in the upper abdomen. A small amount of  perihepatic free fluid. Atherosclerotic calcification in the abdominal  aorta.    Scan through the lower chest is significant for a few calcified left  hilar lymph nodes that are likely related to prior granulomatous  infection and coronary artery calcification.    Pelvis: An 8.2 x 7.4 x 5.3 cm heterogeneously enhancing mass  is  present in the mid right hemiabdomen (series 2 image 53), likely  involving the terminal ileum and cecum. There is diffuse moderate  dilatation of a fluid-filled small bowel. The colon is relatively  decompressed. A few colonic diverticula are present in the colon  without evidence of diverticulitis. No pneumatosis or free  intraperitoneal gas. The uterus is present. A few enlarged periaortic  lymph nodes and mesenteric lymph nodes in the right hemipelvis are  again present. A percutaneous pigtail catheter is present entering the  anterolateral aspect of the mid right hemiabdomen and distal pigtail  in the iliac fossa. No fluid is present about the catheter.      Impression    IMPRESSION:   1. 8 cm solid mass in the ileocecal region, highly suggestive of  neoplasm, unchanged. A few enlarged lymph nodes in the abdomen are  highly suggestive of metastases.  2. Diffuse moderate dilatation of a fluid-filled small bowel and  stomach, consistent with a distal small bowel obstruction and likely  due to the aforementioned mass.  3. A small amount of perihepatic free fluid.  4. A percutaneous drainage catheter is present in the right  hemiabdomen. No fluid is present about the distal tip of the catheter.    BEVERLY GÓMEZ MD   Glucose by meter   Result Value Ref Range    Glucose 114 (H) 70 - 99 mg/dL   Blood gas arterial and oxyhgb   Result Value Ref Range    pH Arterial 7.42 7.35 - 7.45 pH    pCO2 Arterial 41 35 - 45 mm Hg    pO2 Arterial 124 (H) 80 - 105 mm Hg    Bicarbonate Arterial 27 21 - 28 mmol/L    Oxyhemoglobin Arterial 99 92 - 100 %    Base Excess Art 1.9 mmol/L

## 2017-02-23 NOTE — PROGRESS NOTES
HOSPITALIST CROSS COVER NOTE    Asked to evaluate patient due to woresening abdominal pain. Chart, meds, labs reviewed.     Patient seen and evaluated.    Feels pain is unchanged but has been vomiting including during exam.    GI: abd firm, guarded, no definite rebound. Drain in place  SKIN: no jaundice  NEURO: no focal deficits.   PSYCH: agitated.     A/P:    1. ?acute abdomen?   ---recommend obtain CT abd pelvis. Check CBC, INR, BMP,Lactic acid now.  Orders placed.    LIA VARGAS MD

## 2017-02-24 ENCOUNTER — APPOINTMENT (OUTPATIENT)
Dept: GENERAL RADIOLOGY | Facility: CLINIC | Age: 77
DRG: 329 | End: 2017-02-24
Attending: INTERNAL MEDICINE
Payer: MEDICARE

## 2017-02-24 ENCOUNTER — APPOINTMENT (OUTPATIENT)
Dept: PHYSICAL THERAPY | Facility: CLINIC | Age: 77
DRG: 329 | End: 2017-02-24
Payer: MEDICARE

## 2017-02-24 ENCOUNTER — APPOINTMENT (OUTPATIENT)
Dept: OCCUPATIONAL THERAPY | Facility: CLINIC | Age: 77
DRG: 329 | End: 2017-02-24
Payer: MEDICARE

## 2017-02-24 LAB
ANION GAP SERPL CALCULATED.3IONS-SCNC: 10 MMOL/L (ref 3–14)
BUN SERPL-MCNC: 6 MG/DL (ref 7–30)
CALCIUM SERPL-MCNC: 7.8 MG/DL (ref 8.5–10.1)
CHLORIDE SERPL-SCNC: 112 MMOL/L (ref 94–109)
CO2 SERPL-SCNC: 25 MMOL/L (ref 20–32)
CREAT SERPL-MCNC: 0.67 MG/DL (ref 0.52–1.04)
ERYTHROCYTE [DISTWIDTH] IN BLOOD BY AUTOMATED COUNT: 28.7 % (ref 10–15)
GFR SERPL CREATININE-BSD FRML MDRD: 85 ML/MIN/1.7M2
GLUCOSE BLDC GLUCOMTR-MCNC: 97 MG/DL (ref 70–99)
GLUCOSE SERPL-MCNC: 75 MG/DL (ref 70–99)
HCT VFR BLD AUTO: 32.3 % (ref 35–47)
HGB BLD-MCNC: 8.8 G/DL (ref 11.7–15.7)
HGB BLD-MCNC: 9.3 G/DL (ref 11.7–15.7)
MAGNESIUM SERPL-MCNC: 2.1 MG/DL (ref 1.6–2.3)
MCH RBC QN AUTO: 21.5 PG (ref 26.5–33)
MCHC RBC AUTO-ENTMCNC: 28.8 G/DL (ref 31.5–36.5)
MCV RBC AUTO: 75 FL (ref 78–100)
PHOSPHATE SERPL-MCNC: 1.9 MG/DL (ref 2.5–4.5)
PLATELET # BLD AUTO: 429 10E9/L (ref 150–450)
POTASSIUM SERPL-SCNC: 3.6 MMOL/L (ref 3.4–5.3)
RBC # BLD AUTO: 4.32 10E12/L (ref 3.8–5.2)
SODIUM SERPL-SCNC: 147 MMOL/L (ref 133–144)
WBC # BLD AUTO: 18.8 10E9/L (ref 4–11)

## 2017-02-24 PROCEDURE — 97535 SELF CARE MNGMENT TRAINING: CPT | Mod: GO | Performed by: OCCUPATIONAL THERAPY ASSISTANT

## 2017-02-24 PROCEDURE — 27210219 ZZH KIT SHRLOCK 5FR DBL LUM PWR P

## 2017-02-24 PROCEDURE — 99233 SBSQ HOSP IP/OBS HIGH 50: CPT | Performed by: INTERNAL MEDICINE

## 2017-02-24 PROCEDURE — 00000146 ZZHCL STATISTIC GLUCOSE BY METER IP

## 2017-02-24 PROCEDURE — 84100 ASSAY OF PHOSPHORUS: CPT | Performed by: INTERNAL MEDICINE

## 2017-02-24 PROCEDURE — 36415 COLL VENOUS BLD VENIPUNCTURE: CPT | Performed by: PHYSICIAN ASSISTANT

## 2017-02-24 PROCEDURE — 80048 BASIC METABOLIC PNL TOTAL CA: CPT | Performed by: INTERNAL MEDICINE

## 2017-02-24 PROCEDURE — 85018 HEMOGLOBIN: CPT | Performed by: PHYSICIAN ASSISTANT

## 2017-02-24 PROCEDURE — 40000257 ZZH STATISTIC CONSULT NO CHARGE VASC ACCESS

## 2017-02-24 PROCEDURE — 97116 GAIT TRAINING THERAPY: CPT | Mod: GP

## 2017-02-24 PROCEDURE — 40000133 ZZH STATISTIC OT WARD VISIT: Performed by: OCCUPATIONAL THERAPY ASSISTANT

## 2017-02-24 PROCEDURE — 25000128 H RX IP 250 OP 636: Performed by: INTERNAL MEDICINE

## 2017-02-24 PROCEDURE — 25000132 ZZH RX MED GY IP 250 OP 250 PS 637: Mod: GY | Performed by: PSYCHIATRY & NEUROLOGY

## 2017-02-24 PROCEDURE — 12000000 ZZH R&B MED SURG/OB

## 2017-02-24 PROCEDURE — 83735 ASSAY OF MAGNESIUM: CPT | Performed by: INTERNAL MEDICINE

## 2017-02-24 PROCEDURE — 3E0436Z INTRODUCTION OF NUTRITIONAL SUBSTANCE INTO CENTRAL VEIN, PERCUTANEOUS APPROACH: ICD-10-PCS | Performed by: INTERNAL MEDICINE

## 2017-02-24 PROCEDURE — 36569 INSJ PICC 5 YR+ W/O IMAGING: CPT

## 2017-02-24 PROCEDURE — 97530 THERAPEUTIC ACTIVITIES: CPT | Mod: GO | Performed by: OCCUPATIONAL THERAPY ASSISTANT

## 2017-02-24 PROCEDURE — 74020 XR ABDOMEN 2 VW: CPT

## 2017-02-24 PROCEDURE — A9270 NON-COVERED ITEM OR SERVICE: HCPCS | Mod: GY | Performed by: PSYCHIATRY & NEUROLOGY

## 2017-02-24 PROCEDURE — 85027 COMPLETE CBC AUTOMATED: CPT | Performed by: INTERNAL MEDICINE

## 2017-02-24 PROCEDURE — 25000125 ZZHC RX 250: Performed by: PHYSICIAN ASSISTANT

## 2017-02-24 PROCEDURE — 40000193 ZZH STATISTIC PT WARD VISIT

## 2017-02-24 RX ORDER — DEXTROSE, SODIUM CHLORIDE, AND POTASSIUM CHLORIDE 5; .2; .15 G/100ML; G/100ML; G/100ML
INJECTION INTRAVENOUS CONTINUOUS
Status: DISCONTINUED | OUTPATIENT
Start: 2017-02-24 | End: 2017-02-24

## 2017-02-24 RX ORDER — DEXTROSE MONOHYDRATE 25 G/50ML
25-50 INJECTION, SOLUTION INTRAVENOUS
Status: DISCONTINUED | OUTPATIENT
Start: 2017-02-24 | End: 2017-03-09 | Stop reason: HOSPADM

## 2017-02-24 RX ORDER — NICOTINE POLACRILEX 4 MG
15-30 LOZENGE BUCCAL
Status: DISCONTINUED | OUTPATIENT
Start: 2017-02-24 | End: 2017-03-09 | Stop reason: HOSPADM

## 2017-02-24 RX ADMIN — POTASSIUM PHOSPHATE, MONOBASIC AND POTASSIUM PHOSPHATE, DIBASIC 20 MMOL: 224; 236 INJECTION, SOLUTION INTRAVENOUS at 16:21

## 2017-02-24 RX ADMIN — POTASSIUM CHLORIDE: 149 INJECTION, SOLUTION, CONCENTRATE INTRAVENOUS at 21:10

## 2017-02-24 RX ADMIN — PIPERACILLIN SODIUM,TAZOBACTAM SODIUM 3.38 G: 3; .375 INJECTION, POWDER, FOR SOLUTION INTRAVENOUS at 14:16

## 2017-02-24 RX ADMIN — ASCORBIC ACID, VITAMIN A PALMITATE, CHOLECALCIFEROL, THIAMINE HYDROCHLORIDE, RIBOFLAVIN-5 PHOSPHATE SODIUM, PYRIDOXINE HYDROCHLORIDE, NIACINAMIDE, DEXPANTHENOL, ALPHA-TOCOPHEROL ACETATE, VITAMIN K1, FOLIC ACID, BIOTIN, CYANOCOBALAMIN: 200; 3300; 200; 6; 3.6; 6; 40; 15; 10; 150; 600; 60; 5 INJECTION, SOLUTION INTRAVENOUS at 21:07

## 2017-02-24 RX ADMIN — OLANZAPINE 5 MG: 5 TABLET, ORALLY DISINTEGRATING ORAL at 21:42

## 2017-02-24 RX ADMIN — PIPERACILLIN SODIUM,TAZOBACTAM SODIUM 3.38 G: 3; .375 INJECTION, POWDER, FOR SOLUTION INTRAVENOUS at 05:35

## 2017-02-24 RX ADMIN — PIPERACILLIN SODIUM,TAZOBACTAM SODIUM 3.38 G: 3; .375 INJECTION, POWDER, FOR SOLUTION INTRAVENOUS at 18:49

## 2017-02-24 RX ADMIN — I.V. FAT EMULSION 250 ML: 20 EMULSION INTRAVENOUS at 21:05

## 2017-02-24 RX ADMIN — PIPERACILLIN SODIUM,TAZOBACTAM SODIUM 3.38 G: 3; .375 INJECTION, POWDER, FOR SOLUTION INTRAVENOUS at 00:49

## 2017-02-24 RX ADMIN — LIDOCAINE HYDROCHLORIDE 3 ML: 10 INJECTION, SOLUTION EPIDURAL; INFILTRATION; INTRACAUDAL; PERINEURAL at 11:15

## 2017-02-24 NOTE — CONSULTS
CLINICAL NUTRITION SERVICES - REASSESSMENT NOTE      Recommendations Ordered by Registered Dietitian (RD): Step #1:  D15 AA5 at 40 mL/hr + Lipid 250 mL daily to provide:    1182 kcal (22 kcal per kg), 48 g protein (0.9 g per kg), 144 g CHO (GIR 1.9), 42% fat   Decrease D5 IVF to 60 mL/hr= 72 g CHO, 245 kcal   Total (TPN + D5 IVF)= 1427 kcal (27 kcal per kg), 216 g CHO (GIR 2.8)    After 24 hours if K, Mg, and Phos levels are acceptable, increase TPN as follows -->  Step #2:  D15 AA5 at 55 mL/hr + Lipid 250 mL daily to provide:  1437 kcal (27 kcal per kg), 66 g protein (1.3 g per kg), 198 g CHO (GIR 2.6), 35% fat   Decrease D5 IVF to 45 mL/hr= 54 g CHO, 184 kcal   Total (TPN + D5 IVF)= 1621 kcal (31 kcal per kg), 252 g CHO (GIR 3.3)    After 24 hours if K, Mg, and Phos levels are acceptable, increase TPN as follows -->  Step #3:  D15 AA5 at 70 mL/hr + Lipid 250 mL daily to provide:  1693 kcal (32 kcal per kg), 84 g protein (1.6 g per kg), 252 g CHO (GIR 3.3), 30% fat  Decrease D5 IVF to 30 mL/hr= 36 g CHO, 122 kcal   Total (TPN + D5 IVF)= 1815 kcal (34 kcal per kg), 288 g CHO (GIR 3.8)       EVALUATION OF PROGRESS TOWARD GOALS   Diet:  NPO  Nutrition Support:  Plan to start TPN tonight at 8:00 pm   Intake:  Patient was on a calorie count from 2/17-2/19 and was consuming an average of 77% energy and 92% protein needs.  However, since this time intake has been inconsistent (per flowsheets).  Noted that she refused two meals on 2/21 but then ate 100% of her meal 2/22.  Now NPO due to SBO.    Dosing Weight 52.8 kg - current      ASSESSED NUTRITION NEEDS:  Estimated Energy Needs: 6855-9921 kcals (30-35 Kcal/Kg)  Justification: repletion, underweight  Estimated Protein Needs:  grams protein (1.5-2 g pro/Kg)  Justification: Repletion      NEW FINDINGS:   2/23:  Abdominal CT = 1.8 cm solid mass in ileocecal region, highly suggestive of neoplasm (unchanged).  Enlarged lymph notes in abdomen are highly suggestive of  mets.  Also with dilatation of fluid-filled SB and stomach, consistent with SBO.    CRC following due to need for potential surgery in future.  NG tube in, 1975 mL out yesterday.    D5 IVF at 100 mL/hr = 120 g CHO (GIR 1.6), 408 kcal     Previous Goals (2/20):   Pt will consume at least 75% of meals and supplements  Evaluation: Not met    Previous Nutrition Diagnosis (2/20):   No nutrition diagnosis identified at this time related   Evaluation: Declining        CURRENT NUTRITION DIAGNOSIS  Inadequate protein-energy intake related to NPO, TPN to start tonight as evidenced by meeting 0% needs    INTERVENTIONS  Recommendations / Nutrition Prescription  Begin TPN in 3-step advancement due to refeeding risk (large amount of weight loss over the last year as indicated in the RD note on 2/17.    Step #1:  D15 AA5 at 40 mL/hr + Lipid 250 mL daily to provide:    1182 kcal (22 kcal per kg), 48 g protein (0.9 g per kg), 144 g CHO (GIR 1.9), 42% fat   Decrease D5 IVF to 60 mL/hr= 72 g CHO, 245 kcal   Total (TPN + D5 IVF)= 1427 kcal (27 kcal per kg), 216 g CHO (GIR 2.8)    After 24 hours if K, Mg, and Phos levels are acceptable, increase TPN as follows -->  Step #2:  D15 AA5 at 55 mL/hr + Lipid 250 mL daily to provide:  1437 kcal (27 kcal per kg), 66 g protein (1.3 g per kg), 198 g CHO (GIR 2.6), 35% fat   Decrease D5 IVF to 45 mL/hr= 54 g CHO, 184 kcal   Total (TPN + D5 IVF)= 1621 kcal (31 kcal per kg), 252 g CHO (GIR 3.3)    After 24 hours if K, Mg, and Phos levels are acceptable, increase TPN as follows -->  Step #3:  D15 AA5 at 70 mL/hr + Lipid 250 mL daily to provide:  1693 kcal (32 kcal per kg), 84 g protein (1.6 g per kg), 252 g CHO (GIR 3.3), 30% fat  Decrease D5 IVF to 30 mL/hr= 36 g CHO, 122 kcal   Total (TPN + D5 IVF)= 1815 kcal (34 kcal per kg), 288 g CHO (GIR 3.8)    Implementation  PN Composition, PN Schedule - Above TPN orders written   Collaboration and Referral of Nutrition care - Discussed TPN start with  Pharmacist     Goals  Patient will tolerate TPN initiation without any signs or symptoms of refeeding syndrome   TPN/Lipid will meet % needs while patient NPO      MONITORING AND EVALUATION:  Progress towards goals will be monitored and evaluated per protocol and Practice Guidelines    Brittaney Julien RD, LD, CNSC   Clinical Dietitian - [facility]

## 2017-02-24 NOTE — PROGRESS NOTES
[facility]    Hospitalist Progress Note    Assessment & Plan      Sherita Kenney is a 76 year old female who was admitted on 2/15/2017.   Ms. Kenney is a pleasant 76 year old woman with hypertension and dyslipidemia, as well as glaucoma and childhood kidney injury who was admitted 2/15/2017 for acute psychosis and right iliopsoas abscess. She underwent IR guided drainage on the day of admission. Underlying colon cancer is suspected and colonoscopy was  scheduled for 2/23      Acute psychosis: Ms. Kenney reportedly has not suffered from psychosis in the past. She was found at home in a paranoid and agitated state and brought in. Psychiatry was consulted and she is found to have an unspecified psychotic disorder. Her course here has been complicated by efforts to establish a legal decision maker from amongst her family; please see Sintia Rene's progress note from 2/21 which details that it has been agreed that her niece and friend will act together in this role (rather than her sister). Ms. Kenney herself clearly does not have the capacity to make her own medical decisions at this time.  -- Zyprexa started and continued  --mental status much improved today compared with yesterday      Right iliopsoas abscess: Noted on admission CT. Colorectal Surgery and Oncology consulted. She was started on Zosyn and underwent urgent IR guided drainage, with cultures from that revealing a polymicrobial infection. She had associated thrombocytosis and neutrophilia at admission. Her CEA is also elevated, raising suspicion for colon cancer.  -- continue Zosyn and continue her drain  -- Repeat CBC with Differential  -- Her POA has agreed to colonoscopy which was supposed to be 2/23, however CR has postponed due to RRT that am  - pt did  undergo prep and will be NPO  -abcess culture with mod Enterobacter cloacae, mod Eikenella corrodens and light growth H parainfluenzae        Episode of Unresponsiveness am  2/23/17  -RRT called and evaluated by house physician  -Dr. Raygoza thought episode most likely related to compazine given and agree with his evaluation  -compazine has been discontinued  -pt awakened and has had no further episodes  -today back to alert and interactive     SBO  -abd very distended on 2/23, and had distal SBP on abd CT  -NG ordered by CR and placed 2/23  -today markedly improved with abd soft and NT.  Pos bowel sounds  -KUB pending, if looks ok CR is ok with trial of NG clamping     Hypernatremia  -sodium today 147  -iv 0.45 saline with 20 Kcl at 100 which was started at 0345  -will recheck in am   -change iv fluids to 0.2 NS plus 20 KCL     Acute iron deficiency anemia: HGB 5.6 on admission. She was transfused 2 units on 2/16 and is now getting IV iron as directed by Oncology.      Klebsiella UTI: As per urine culture results. Likely covered by Zosyn as above.      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 She continues Cyclobenzaprine      Hypertension: She gets prn medication if needed for this.      DVT Prophylaxis: Pneumatic Compression Devices  Code Status: Full Code        Jenise Jo MD    Interval History   Much improved mental status today compared with yesterday,     -Data reviewed today: I reviewed all new labs and imaging results over the last 24 hours. I personally reviewed no images or EKG's today.    Physical Exam   Temp: 98.5  F (36.9  C) Temp src: Oral BP: 100/55   Heart Rate: 80 Resp: 16 SpO2: 99 % O2 Device: None (Room air) Oxygen Delivery: 2 LPM  Vitals:    02/21/17 0520 02/23/17 0557 02/24/17 0548   Weight: 57.3 kg (126 lb 5.2 oz) 60.3 kg (132 lb 15 oz) 58.8 kg (129 lb 10.1 oz)     Vital Signs with Ranges  Temp:  [97.5  F (36.4  C)-98.5  F (36.9  C)] 98.5  F (36.9  C)  Heart Rate:  [] 80  Resp:  [16-18]  16  BP: ()/(55-77) 100/55  SpO2:  [92 %-99 %] 99 %  I/O last 3 completed shifts:  In: 1178 [I.V.:1178]  Out: 2045 [Emesis/NG output:1975; Drains:80]    Constitutional: alert   Respiratory: clear to auscultation, no wheezing or rhonchi   Cardiovascular: regular rate and rhythm without murmer or rub   GI:positive bowel sounds, non-tender   Skin/Integumen: no rashes    Other:        Medications     0.45% sodium chloride + KCl 20 mEq/L 100 mL/hr at 02/23/17 2257       sodium chloride (PF)  3 mL Intracatheter Q8H     levothyroxine  75 mcg Oral Daily     cyclobenzaprine  10 mg Oral TID     sodium chloride (PF)  3 mL Intracatheter Q8H     piperacillin-tazobactam  3.375 g Intravenous Q6H     OLANZapine zydis  5 mg Oral At Bedtime     sodium chloride (PF)  10 mL Irrigation Q8H       Data     Recent Labs  Lab 02/24/17  0215 02/23/17  2146 02/23/17  1843 02/23/17  1342 02/23/17  0155 02/22/17  0756 02/21/17  0805 02/20/17  0829 02/19/17  0850  02/18/17  0914   WBC  --   --   --   --  18.1* 9.9 10.3 10.8 10.5  --  9.6   HGB 8.8* 8.8* 9.3*  --  9.7* 9.2* 9.7* 9.0* 9.1*  --  8.8*   MCV  --   --   --   --  73* 73* 73* 73* 73*  --  72*   PLT  --   --   --   --  421 435 518* 467* 589*  --  546*   INR  --   --   --   --  1.04  --   --   --   --   --   --    NA  --   --   --   --  145*  --   --   --  145*  --  147*   POTASSIUM  --   --   --  3.4 3.2*  --   --   --  3.8  < > 3.2*   CHLORIDE  --   --   --   --  109  --   --   --  113*  --  115*   CO2  --   --   --   --  27  --   --   --  24  --  24   BUN  --   --   --   --  7  --   --   --  6*  --  9   CR  --   --   --   --  0.70  --   --  0.66 0.59  --  0.70   ANIONGAP  --   --   --   --  9  --   --   --  8  --  8   SARITHA  --   --   --   --  8.7  --   --   --  8.2*  --  7.9*   GLC  --   --   --   --  125*  --   --   --  103*  --  87   < > = values in this interval not displayed.    No results found for this or any previous visit (from the past 24 hour(s)).

## 2017-02-24 NOTE — PROGRESS NOTES
CRS    Abdominal xray done but no official read yet. Reviewed images with Dr. Peter and patient has gas in her colon but continues to have some air fluid levels in the small intestines. Continue NG tube.     Yessy Whalen PA-C  [facility]  [phone]

## 2017-02-24 NOTE — PROGRESS NOTES
COLON & RECTAL SURGERY  PROGRESS NOTE    February 24, 2017    SUBJECTIVE:  The patient is feeling good this morning. She states her mouth is dry and would like a few ice chips. No abdominal pain and doesn't feel bloated.     OBJECTIVE:  Temp:  [97.5  F (36.4  C)-98.5  F (36.9  C)] 98.5  F (36.9  C)  Heart Rate:  [] 80  Resp:  [16-18] 16  BP: ()/(55-77) 100/55  SpO2:  [92 %-99 %] 99 %    Intake/Output Summary (Last 24 hours) at 02/24/17 0833  Last data filed at 02/24/17 0700   Gross per 24 hour   Intake             1983 ml   Output             1145 ml   Net              838 ml       GENERAL:  Awake, alert, no acute distress,   HEAD - Nomocephalic atraumatic  SCLERA anicteric  EXTREMITIES warm and well perfused  ABDOMEN:  Soft, non tender, non-distended,   INCISION:  ENRIKE with small amount of serous output    LABS:  Lab Results   Component Value Date    WBC 18.1 02/23/2017     Lab Results   Component Value Date    HGB 8.8 02/24/2017     Lab Results   Component Value Date    HCT 33.1 02/23/2017     Lab Results   Component Value Date     02/23/2017     Last Basic Metabolic Panel:  Lab Results   Component Value Date     02/23/2017      Lab Results   Component Value Date    POTASSIUM 3.4 02/23/2017     Lab Results   Component Value Date    CHLORIDE 109 02/23/2017     Lab Results   Component Value Date    SARITHA 8.7 02/23/2017     Lab Results   Component Value Date    CO2 27 02/23/2017     Lab Results   Component Value Date    BUN 7 02/23/2017     Lab Results   Component Value Date    CR 0.70 02/23/2017     Lab Results   Component Value Date     02/23/2017       ASSESSMENT/PLAN:76 year old woman who presents with psychosis and found to have large iliopsoas abscess and abdominal mass suspicious for malignancy. S/p IR drainage of 500cc purulent drainage. Found to have SBO on CT yesterday with period of unresponsiveness. Had NG tube placed for decompression, abdomen significantly decreased and  patient back to baseline alert and orientated.   1. Check AXR  2. Okay for small ice chips for comfort but measure intake to get a better idea of NG output. Will order hurricane spray  3. If AXR looks okay then will consider NG clamping trial  4. Continue ENRIKE drain       Yessy Whalen PA-C  [facility]  Phone: [phone]

## 2017-02-24 NOTE — CARE CONFERENCE
Care conference with Dr Jo and GI Garrett, this care coordinator present.  Dr Peter colorectal surg and pt's niece GI Justice participated by phone.   Decision made to proceed with TPN over weekend, ileostomy diversion early next week by Dr Peter and dc to TCU after that.  Plan is to let pt recover from first surgery and eat orally until she is medically stronger and better nourished for larger bowel resection and hopeful ileostomy take down at the same time.  Pt indicates she is ready for surgery. Good spirits. Dinora has actually already been talking to their friends about TCUs in the area when pt is ready for dc. They may tour [facility].  Explained that pt would likely be here several days after surgery to ensure her ileostomy is working well.    In talking with pt earlier in the day with Dinora present, pt states that she would like her sister Nat updated if she asks as long as Nat is not making any decisions.  Discussed this in conference call and clauselly Magallon is glad that pt wants that and is agreeable to it.  Updated bedside RN and SW. CM will continue to assist w/ dc planning.

## 2017-02-24 NOTE — PROGRESS NOTES
Spoke with Melia from patient relations.  According to Melia's conversation with patient's niece (Thalia Reinoso) patient's sister Suzanne is allowed to visit but should not be given any medical information until niece speaks with the patient.

## 2017-02-24 NOTE — PROGRESS NOTES
"Colon and Rectal Surgery  Patient denies pain but does complain of dry mouth. Had several loose, bloody stools during the night. Likely clearing the prep. Talking about a party tonight that she plans to attend but also expresses a desire to speak with her niece and expresses awareness that her niece has POA  /55 (BP Location: Left arm)  Pulse 99  Temp 98.5  F (36.9  C) (Oral)  Resp 16  Ht 1.676 m (5' 6\")  Wt 58.8 kg (129 lb 10.1 oz)  SpO2 99%  BMI 20.92 kg/m2    Intake/Output Summary (Last 24 hours) at 02/24/17 0900  Last data filed at 02/24/17 0700   Gross per 24 hour   Intake             1983 ml   Output             1145 ml   Net              838 ml   But not all shifts recorded    Weight is down today  Alert, NG in place, appears comfortable.  No icterus  Abdomen: less distension. No tenderness. ENRIKE in place  Legs: bilateral edema.    Impression: Apparent resolving SBO. Difficult situation given partially obstructing lesion, nutritional status, mental status and other health issues.   Unclear if psychosis is treatable. I did speak with Dr. Martinez yesterday who has known the patient for several years. Dr. Martinez states that in November at the time of her annual physical, the patient's mental status was normal.     Treatment options include  1. diverting ileostomy, placement for patient to recover her nutritional status and stamina, then consider definitive surgery.   2. Placement to recover nutritional status (likely with TPN) and plan for definitive surgery  3. Hospice care.  Her niece is aware of these options.     Plan: Agree with plan to check x-ray and if normal, start NG clamping trial.   Start TPN. Doubt patient will be able to tolerate sufficient oral intake to restore nutritional status given mass in right colon.   Continue with PT   Continue prophylaxis.  Clarify whether mental status could potentially improve.    Lindsay Peter MD    "

## 2017-02-24 NOTE — PROGRESS NOTES
Order for picc line for TPN received this morning.  Called patients POA Sherita Gutierres and obtained consent via telephone for picc line insertion for TPN.  Patient and POA in agreement with plan to place picc line for TPN.  Picc placed without difficulty and patient tolerated procedure well.

## 2017-02-24 NOTE — PLAN OF CARE
Problem: Goal Outcome Summary  Goal: Goal Outcome Summary  Outcome: Declining  Pt is now alert. Opening eye spontaneously. Pleasantly confused except 1 episode of agitation this morning. Alert to self. Visual hallucinations. BLE moderate edema. BS hypoactive/faint, d/t pts confusion unsure if pt is passing flatus. Had 3 BMs--1 soft & 2 looser ones. Abdomen distended, firm but improving significantly since this morning. NG patent. Sitter at bedside. Ax2 GB & walker. NPO.

## 2017-02-24 NOTE — PLAN OF CARE
Problem: Goal Outcome Summary  Goal: Goal Outcome Summary  OT: Per plan established by the Occupational Therapist, the recommended discharge location is TCU. Pt completed supine to sit EOB Garfield, Garfield sit to stand, with platform walker and Garfield pt amb to/from bathroom, toilet transfer Garfield, mod A clothing management, Garfield standing at sink for hygiene task Pt trial on RA with OT destat to 85=86%, cues needed to complete PLB

## 2017-02-24 NOTE — PLAN OF CARE
Problem: Goal Outcome Summary  Goal: Goal Outcome Summary  Outcome: No Change  A&Mekhi self only. Confused. Illogical speech at times. LUE hemiplegia from polio history. Edema noted on LUE and BLE. Abd distended with some discomfort. NG tube in place, 100cc output. Abd drain irrigated, 30 cc output. NPO. Up with A1 to commode. Loose stools. Tele NSR.

## 2017-02-24 NOTE — PROGRESS NOTES
Asked by co-POJOURDAN Garrett to arrange telephone conference call for care conference w/ MDs and other POJOURDAN Justice in [place].  They originally asked for 1400 time frame but Dr Peter will not be available by phone until 1530. Contacted Dinora again.  She thinks that will work and will double check with Sherita Justice.  Discussed whether pt should be involved in the care conference.  Dinora and Sherita Justice are hoping that pt can provide some insight into how she wants to proceed.  Discussed that multiple MDs including psychiatry and staff do not feel pt is able to weigh risks and benefits of any treatment plan.  She may be able to say in general whether she wants aggressive care but again, she may not be able to fully appreciate the consequences of that decision.  Pt has very circular or tangential conversations. Dinora and Sherita Justice may get more information from MDs if they are free to ask questions without pt present and then talk to pt separately later.  They will discuss more and let CC know how to set up conference.   Contacted Dr Jo to request attendance and she will be here.  Updated bedside RN. DOMENICA will continue to assist w/ dc planning.  Addendum:  Spoke w/ Dinora.  1530 will work out and will do conference call outside of pt room.

## 2017-02-24 NOTE — PROVIDER NOTIFICATION
Page to MD on call, pt with large volume liquid maroon BM x2 - ABD slightly firm. NG still LIS. Order for STAT Hgb rec'd. Plan to update oncoming hospitalist with results. Plan for recheck hgb at 0200.

## 2017-02-24 NOTE — PLAN OF CARE
Problem: Goal Outcome Summary  Goal: Goal Outcome Summary  Outcome: No Change  Pt is confused, illogical, arouse to voice. Bed rest. NG tube intact to L nostril. NPO. Edema to bilateral lower extremities and L arm. Abdominal distended. 3 loose stools. Hgb check 8.8. New order for recheck at 2 am.NG patent. Sitter at bedside.

## 2017-02-24 NOTE — PLAN OF CARE
Problem: Goal Outcome Summary  Goal: Goal Outcome Summary  Outcome: Improving  A&O, forgetful, has tangental thinking at times. CMS intact. Bowel sounds present, abdomen soft and nondistended. VSS. 100 mL NG suction this shift. Phosphorus level 1.9, replacement ordered from pharmacy. ENRIKE drain flushed and draining adequately. Up with walker, belt, assist of 1. Denies pain. Care conference scheduled for 3:30 to discuss direction of pt care.

## 2017-02-24 NOTE — PLAN OF CARE
Problem: Goal Outcome Summary  Goal: Goal Outcome Summary  Outcome: Therapy, progress toward functional goals as expected  PT- Per plan established by the Physical Therapist, according to functional mobility the discharge recommendation is TCU. Pt working with OT upon PT arrival, sitting EOB. Sit to/from stand with FWW and CGA, cues for safe hand placement and securing L UE onto platform FWW. Amb 100 ft x 1 with FWW and CGA. Pt tolerates well, following cues/commands >75% of the time. Returned to EOB with lab present. Sit to supine with CGA/min assist and cues for repositioning. All needs in reach and bed alarm on upon PT exit.

## 2017-02-25 ENCOUNTER — APPOINTMENT (OUTPATIENT)
Dept: PHYSICAL THERAPY | Facility: CLINIC | Age: 77
DRG: 329 | End: 2017-02-25
Payer: MEDICARE

## 2017-02-25 ENCOUNTER — APPOINTMENT (OUTPATIENT)
Dept: OCCUPATIONAL THERAPY | Facility: CLINIC | Age: 77
DRG: 329 | End: 2017-02-25
Payer: MEDICARE

## 2017-02-25 LAB
ALBUMIN SERPL-MCNC: 1.9 G/DL (ref 3.4–5)
ALP SERPL-CCNC: 58 U/L (ref 40–150)
ALT SERPL W P-5'-P-CCNC: 14 U/L (ref 0–50)
ANION GAP SERPL CALCULATED.3IONS-SCNC: 6 MMOL/L (ref 3–14)
AST SERPL W P-5'-P-CCNC: 21 U/L (ref 0–45)
BILIRUB SERPL-MCNC: 0.2 MG/DL (ref 0.2–1.3)
BUN SERPL-MCNC: 6 MG/DL (ref 7–30)
CALCIUM SERPL-MCNC: 7.7 MG/DL (ref 8.5–10.1)
CHLORIDE SERPL-SCNC: 110 MMOL/L (ref 94–109)
CO2 SERPL-SCNC: 29 MMOL/L (ref 20–32)
CREAT SERPL-MCNC: 0.59 MG/DL (ref 0.52–1.04)
ERYTHROCYTE [DISTWIDTH] IN BLOOD BY AUTOMATED COUNT: 29.3 % (ref 10–15)
GFR SERPL CREATININE-BSD FRML MDRD: ABNORMAL ML/MIN/1.7M2
GLUCOSE BLDC GLUCOMTR-MCNC: 101 MG/DL (ref 70–99)
GLUCOSE BLDC GLUCOMTR-MCNC: 105 MG/DL (ref 70–99)
GLUCOSE BLDC GLUCOMTR-MCNC: 112 MG/DL (ref 70–99)
GLUCOSE BLDC GLUCOMTR-MCNC: 93 MG/DL (ref 70–99)
GLUCOSE BLDC GLUCOMTR-MCNC: 95 MG/DL (ref 70–99)
GLUCOSE BLDC GLUCOMTR-MCNC: 98 MG/DL (ref 70–99)
GLUCOSE SERPL-MCNC: 102 MG/DL (ref 70–99)
HCT VFR BLD AUTO: 26.9 % (ref 35–47)
HGB BLD-MCNC: 7.7 G/DL (ref 11.7–15.7)
INR PPP: 1.06 (ref 0.86–1.14)
MAGNESIUM SERPL-MCNC: 1.9 MG/DL (ref 1.6–2.3)
MCH RBC QN AUTO: 21.4 PG (ref 26.5–33)
MCHC RBC AUTO-ENTMCNC: 28.6 G/DL (ref 31.5–36.5)
MCV RBC AUTO: 75 FL (ref 78–100)
PHOSPHATE SERPL-MCNC: 2.1 MG/DL (ref 2.5–4.5)
PLATELET # BLD AUTO: 290 10E9/L (ref 150–450)
POTASSIUM SERPL-SCNC: 3.4 MMOL/L (ref 3.4–5.3)
PREALB SERPL IA-MCNC: 11 MG/DL (ref 15–45)
PROT SERPL-MCNC: 4.7 G/DL (ref 6.8–8.8)
RBC # BLD AUTO: 3.6 10E12/L (ref 3.8–5.2)
SODIUM SERPL-SCNC: 145 MMOL/L (ref 133–144)
TRIGL SERPL-MCNC: 108 MG/DL
WBC # BLD AUTO: 8.8 10E9/L (ref 4–11)

## 2017-02-25 PROCEDURE — 84478 ASSAY OF TRIGLYCERIDES: CPT | Performed by: INTERNAL MEDICINE

## 2017-02-25 PROCEDURE — 25000132 ZZH RX MED GY IP 250 OP 250 PS 637: Mod: GY | Performed by: PSYCHIATRY & NEUROLOGY

## 2017-02-25 PROCEDURE — 85610 PROTHROMBIN TIME: CPT | Performed by: INTERNAL MEDICINE

## 2017-02-25 PROCEDURE — 84134 ASSAY OF PREALBUMIN: CPT | Performed by: INTERNAL MEDICINE

## 2017-02-25 PROCEDURE — 97535 SELF CARE MNGMENT TRAINING: CPT | Mod: GO

## 2017-02-25 PROCEDURE — A9270 NON-COVERED ITEM OR SERVICE: HCPCS | Mod: GY | Performed by: PSYCHIATRY & NEUROLOGY

## 2017-02-25 PROCEDURE — 80053 COMPREHEN METABOLIC PANEL: CPT | Performed by: INTERNAL MEDICINE

## 2017-02-25 PROCEDURE — 25000125 ZZHC RX 250: Performed by: PHYSICIAN ASSISTANT

## 2017-02-25 PROCEDURE — 40000193 ZZH STATISTIC PT WARD VISIT

## 2017-02-25 PROCEDURE — 40000239 ZZH STATISTIC VAT ROUNDS

## 2017-02-25 PROCEDURE — 40000133 ZZH STATISTIC OT WARD VISIT

## 2017-02-25 PROCEDURE — 99233 SBSQ HOSP IP/OBS HIGH 50: CPT | Performed by: INTERNAL MEDICINE

## 2017-02-25 PROCEDURE — 97110 THERAPEUTIC EXERCISES: CPT | Mod: GP

## 2017-02-25 PROCEDURE — 12000000 ZZH R&B MED SURG/OB

## 2017-02-25 PROCEDURE — 85027 COMPLETE CBC AUTOMATED: CPT | Performed by: INTERNAL MEDICINE

## 2017-02-25 PROCEDURE — 00000146 ZZHCL STATISTIC GLUCOSE BY METER IP

## 2017-02-25 PROCEDURE — 97530 THERAPEUTIC ACTIVITIES: CPT | Mod: GP

## 2017-02-25 PROCEDURE — 84100 ASSAY OF PHOSPHORUS: CPT | Performed by: INTERNAL MEDICINE

## 2017-02-25 PROCEDURE — 25000128 H RX IP 250 OP 636: Performed by: INTERNAL MEDICINE

## 2017-02-25 PROCEDURE — 97116 GAIT TRAINING THERAPY: CPT | Mod: GP

## 2017-02-25 PROCEDURE — 25000132 ZZH RX MED GY IP 250 OP 250 PS 637: Mod: GY | Performed by: PHYSICIAN ASSISTANT

## 2017-02-25 PROCEDURE — 25000125 ZZHC RX 250

## 2017-02-25 PROCEDURE — 97530 THERAPEUTIC ACTIVITIES: CPT | Mod: GO

## 2017-02-25 PROCEDURE — 83735 ASSAY OF MAGNESIUM: CPT | Performed by: INTERNAL MEDICINE

## 2017-02-25 RX ADMIN — I.V. FAT EMULSION 250 ML: 20 EMULSION INTRAVENOUS at 21:06

## 2017-02-25 RX ADMIN — PIPERACILLIN SODIUM,TAZOBACTAM SODIUM 3.38 G: 3; .375 INJECTION, POWDER, FOR SOLUTION INTRAVENOUS at 13:36

## 2017-02-25 RX ADMIN — PIPERACILLIN SODIUM,TAZOBACTAM SODIUM 3.38 G: 3; .375 INJECTION, POWDER, FOR SOLUTION INTRAVENOUS at 01:14

## 2017-02-25 RX ADMIN — OLANZAPINE 5 MG: 5 TABLET, ORALLY DISINTEGRATING ORAL at 21:08

## 2017-02-25 RX ADMIN — BENZOCAINE 1 ML: 220 SPRAY, METERED PERIODONTAL at 16:22

## 2017-02-25 RX ADMIN — ASCORBIC ACID, VITAMIN A PALMITATE, CHOLECALCIFEROL, THIAMINE HYDROCHLORIDE, RIBOFLAVIN-5 PHOSPHATE SODIUM, PYRIDOXINE HYDROCHLORIDE, NIACINAMIDE, DEXPANTHENOL, ALPHA-TOCOPHEROL ACETATE, VITAMIN K1, FOLIC ACID, BIOTIN, CYANOCOBALAMIN: 200; 3300; 200; 6; 3.6; 6; 40; 15; 10; 150; 600; 60; 5 INJECTION, SOLUTION INTRAVENOUS at 21:12

## 2017-02-25 RX ADMIN — PIPERACILLIN SODIUM,TAZOBACTAM SODIUM 3.38 G: 3; .375 INJECTION, POWDER, FOR SOLUTION INTRAVENOUS at 19:25

## 2017-02-25 RX ADMIN — PIPERACILLIN SODIUM,TAZOBACTAM SODIUM 3.38 G: 3; .375 INJECTION, POWDER, FOR SOLUTION INTRAVENOUS at 06:22

## 2017-02-25 RX ADMIN — POTASSIUM PHOSPHATE, MONOBASIC AND POTASSIUM PHOSPHATE, DIBASIC 15 MMOL: 224; 236 INJECTION, SOLUTION INTRAVENOUS at 13:21

## 2017-02-25 RX ADMIN — BENZOCAINE 0.5 ML: 220 SPRAY, METERED PERIODONTAL at 06:44

## 2017-02-25 NOTE — PROGRESS NOTES
[facility]    Hospitalist Progress Note    Assessment & Plan   Sherita Kenney is a 76 year old female who was admitted on 2/15/2017.       Sherita Kenney is a 76 year old female who was admitted on 2/15/2017.   Ms. Kenney is a pleasant 76 year old woman with hypertension and dyslipidemia, as well as glaucoma and childhood kidney injury who was admitted 2/15/2017 for acute psychosis and right iliopsoas abscess. She underwent IR guided drainage on the day of admission. Underlying colon cancer is suspected and colonoscopy was scheduled for 2/23      Acute psychosis: Ms. Kenney reportedly has not suffered from psychosis in the past. She was found at home in a paranoid and agitated state and brought in. Psychiatry was consulted and she is found to have an unspecified psychotic disorder. Her course here has been complicated by efforts to establish a legal decision maker from amongst her family; please see Sintia Rene's progress note from 2/21 which details that it has been agreed that her niece and friend will act together in this role (rather than her sister).  -- Zyprexa started and continued  --mental status remarkably better today        Right iliopsoas abscess: Noted on admission CT. Colorectal Surgery and Oncology consulted. She was started on Zosyn and underwent urgent IR guided drainage, with cultures from that revealing a polymicrobial infection. She had associated thrombocytosis and neutrophilia at admission. Her CEA is also elevated, raising suspicion for colon cancer.  -- continue Zosyn and continue her drain  -- Repeat CBC with Differential  -- Her POA has agreed to colonoscopy which was supposed to be 2/23, however CR has postponed due to RRT that am  -abcess culture with mod Enterobacter cloacae, mod Eikenella corrodens and light growth H parainfluenzae          Episode of Unresponsiveness am 2/23/17  -RRT called and evaluated by house physician  -Dr. Raygoza thought episode most  likely related to compazine given and agree with his evaluation  -compazine has been discontinued  -pt awakened and has had no further episodes  -today back to alert and interactive      SBO  -abd very distended on 2/23, and had distal SBP on abd CT  -NG ordered by CR and placed 2/23  -today markedly improved with abd soft and NT. Pos bowel sounds  -CR is ok with clamping for activity  -most likely SBO from tumor  -CR plans surgery next week      Hypernatremia  -sodium today 1454  -TPN started and CR would like total fluids to 100 cc/hour    Acute iron deficiency anemia: HGB 5.6 on admission. She was transfused 2 units on 2/16 and is now getting IV iron as directed by Oncology.      Klebsiella UTI: As per urine culture results. Likely covered by Zosyn as above.      Severe protein calorie malnutrition: Nutrition consulted  -PICC line to be placed 2/24 and TPN started 2/24    Hypothyroid: She continues Synthroid      Postpolio syndrome: Causing a flail left shoulder. She continues Cyclobenzaprine  -she is going to use a wrist brace and sling for PT  -she is unable to use her left arm and left hand at all      Hypertension: She gets prn medication if needed for this.      DVT Prophylaxis: Pneumatic Compression Devices  Code Status: Full Code         Jenise Jo MD    Interval History   Remarkably better   About 40 min spent with pt and sister to work out a way to protect her hand during pt    -Data reviewed today: I reviewed all new labs and imaging results over the last 24 hours. I personally reviewed no images or EKG's today.    Physical Exam   Temp: 98.3  F (36.8  C) Temp src: Oral BP: 132/84 Pulse: 78 Heart Rate: 73 Resp: 16 SpO2: 97 % O2 Device: None (Room air)    Vitals:    02/23/17 0557 02/24/17 0548 02/25/17 0535   Weight: 60.3 kg (132 lb 15 oz) 58.8 kg (129 lb 10.1 oz) 58.5 kg (128 lb 15.5 oz)     Vital Signs with Ranges  Temp:  [97.3  F (36.3  C)-98.5  F (36.9  C)] 98.3  F (36.8  C)  Pulse:   [73-80] 78  Heart Rate:  [73-85] 73  Resp:  [16] 16  BP: (111-132)/(63-86) 132/84  SpO2:  [94 %-97 %] 97 %  I/O last 3 completed shifts:  In: 2502 [I.V.:2000]  Out: 955 [Urine:450; Emesis/NG output:480; Drains:45]    Constitutional: alert   Respiratory: clear to auscultation, no wheezing or rhonchi   Cardiovascular: regular rate and rhythm without murmer or rub   GI:positive bowel sounds, non-tender   Skin/Integumen: no rashes    Other:        Medications     parenteral nutrition - Clinimix E       IV fluid REPLACEMENT ONLY       parenteral nutrition - Clinimix E 40 mL/hr at 02/24/17 2107     IV infusion builder WITH additives Stopped (02/25/17 1319)       sodium chloride (PF)  10 mL Intracatheter Q8H     lipids  250 mL Intravenous Q24H     insulin aspart  1-12 Units Subcutaneous Q4H     sodium chloride (PF)  3 mL Intracatheter Q8H     levothyroxine  75 mcg Oral Daily     cyclobenzaprine  10 mg Oral TID     piperacillin-tazobactam  3.375 g Intravenous Q6H     OLANZapine zydis  5 mg Oral At Bedtime     sodium chloride (PF)  10 mL Irrigation Q8H       Data     Recent Labs  Lab 02/25/17  0635 02/24/17  0920 02/24/17  0215  02/23/17  1342 02/23/17  0155   WBC 8.8 18.8*  --   --   --  18.1*   HGB 7.7* 9.3* 8.8*  < >  --  9.7*   MCV 75* 75*  --   --   --  73*    429  --   --   --  421   INR 1.06  --   --   --   --  1.04   * 147*  --   --   --  145*   POTASSIUM 3.4 3.6  --   --  3.4 3.2*   CHLORIDE 110* 112*  --   --   --  109   CO2 29 25  --   --   --  27   BUN 6* 6*  --   --   --  7   CR 0.59 0.67  --   --   --  0.70   ANIONGAP 6 10  --   --   --  9   SARITHA 7.7* 7.8*  --   --   --  8.7   * 75  --   --   --  125*   ALBUMIN 1.9*  --   --   --   --   --    PROTTOTAL 4.7*  --   --   --   --   --    BILITOTAL 0.2  --   --   --   --   --    ALKPHOS 58  --   --   --   --   --    ALT 14  --   --   --   --   --    AST 21  --   --   --   --   --    < > = values in this interval not displayed.    No results found  for this or any previous visit (from the past 24 hour(s)).

## 2017-02-25 NOTE — PLAN OF CARE
Problem: Goal Outcome Summary  Goal: Goal Outcome Summary  Outcome: No Change  VSS. No pain. Abdominal incision site with ENRIKE, tan sanguinous drainage, dressing CDI. Audible, active BS. NG to low intermittent suction with green/brown bile output. Disoriented to time and situation, forgetful. No movement in LUE. +2 edema LUE, +3 edema BLE. TPN to start this evening, see notes from today's care conference.

## 2017-02-25 NOTE — PLAN OF CARE
Problem: Goal Outcome Summary  Goal: Goal Outcome Summary  Outcome: Improving  Pt a/ox4 all shift but unsure of date without visual cue of calendar, intermittently forgetful. VSS. On tele in SR. CMS with 2+ LUE and 3+ BLE edema and  1+ BLE pulses. ABD soft and non-tender but still with large episodes of maroon diarrhea. NG to LIS, intact. ABD drain CDI with ENRIKE x1 in place, irrigates well. TPN initiated at 2000. Denies pain. L arm flaccid at baseline. BG w/o need for coverage. Up with SBA, GB and wk to BSC. Occasionally tearful regarding severity of illness and current hospitalization, support given. Plan for OR 3/1.

## 2017-02-25 NOTE — PLAN OF CARE
Problem: Goal Outcome Summary  Goal: Goal Outcome Summary  Outcome: Therapy, progress toward functional goals as expected  PT- Per plan established by the Physical Therapist, according to functional mobility the discharge recommendation is TCU. Sit to/from stand with hemiwalker and CGA with cues for safety. Amb 50 ft x 1 with hemiwalker and CGA. Step by step cues for sequencing and technique but pt fairly steady on her feet. C/o fatigue and increased L hip pain with activity. Tolerates seated B LE exs minimally due to reports again of L hip pain and heaviness due to B LE edema. Pt returned supine with min assist for LEs and cues for repositioning at end of session. RN present to get pt situated with all of her needs.

## 2017-02-25 NOTE — PROGRESS NOTES
Colon and Rectal Surgery Progress Note          Assessment and Plan:   77 yo F with partially obstructing ileocecal mass    Cont ng for partial obstruction  Monitor hgb - does not seem to be actively bleeding.  Drop in hgb likely due nutrition and fluid shifts  Plan for ileostomy Wednesday unless she has an acute decompinsation    Lane Guzmán MD  Colorectal Surgery  [phone] (office)  [phone] (pager)  [email or web address]             Interval History:   No major complaints except some crampy abdominal pain              Physical Exam:   Vitals were reviewed  Patient Vitals for the past 24 hrs:   BP Temp Temp src Pulse Heart Rate Resp SpO2 Weight   02/25/17 0751 127/86 97.6  F (36.4  C) Oral 73 - 16 95 % -   02/25/17 0535 - - - - - - - 58.5 kg (128 lb 15.5 oz)   02/25/17 0430 111/67 98  F (36.7  C) Oral - 73 16 97 % -   02/25/17 0100 121/78 97.3  F (36.3  C) Axillary - 73 16 94 % -   02/24/17 2100 128/73 97.8  F (36.6  C) Oral 80 - 16 94 % -   02/24/17 1653 114/63 98.5  F (36.9  C) Oral - 85 16 97 % -   02/24/17 1126 130/62 98.7  F (37.1  C) Oral - 85 16 99 % -         Intake/Output Summary (Last 24 hours) at 02/25/17 0912  Last data filed at 02/25/17 0833   Gross per 24 hour   Intake             1797 ml   Output              955 ml   Net              842 ml       Head - Nomocephalic atraumatic  Sclera anicteric  Extremities warm and well perfused  Lungs - breathing non labored, \  Abdomen - soft, mildly distended, non tender  NG output dark.  Drain output         Data:   All laboratory and imaging data in the past 24 hours reviewed    CBC  Lab Results   Component Value Date    WBC 8.8 02/25/2017    WBC 18.8 (H) 02/24/2017    WBC 18.1 (H) 02/23/2017    HGB 7.7 (L) 02/25/2017    HGB 9.3 (L) 02/24/2017    HGB 8.8 (L) 02/24/2017    HCT 26.9 (L) 02/25/2017    HCT 32.3 (L) 02/24/2017    HCT 33.1 (L) 02/23/2017     02/25/2017     02/24/2017     02/23/2017       BMP  Recent Labs   Lab Test   02/25/17   0635  02/24/17   0920   NA  145*  147*   POTASSIUM  3.4  3.6   CHLORIDE  110*  112*   CO2  29  25   ANIONGAP  6  10   GLC  102*  75   BUN  6*  6*   CR  0.59  0.67   SARITHA  7.7*  7.8*       Liver Function Studies -   Recent Labs   Lab Test  02/25/17   0635   PROTTOTAL  4.7*   ALBUMIN  1.9*   BILITOTAL  0.2   ALKPHOS  58   AST  21   ALT  14                      Medications:     Current Facility-Administered Medications Ordered in Epic   Medication Dose Route Frequency Last Rate Last Dose     benzocaine (HURRICAINE/TOPEX) 20 % spray 0.5-1 mL  1-2 spray Mouth/Throat Q3H PRN   0.5 mL at 02/25/17 0644     sodium chloride (PF) 0.9% PF flush 10-20 mL  10-20 mL Intracatheter Q1H PRN   10 mL at 02/25/17 0221     sodium chloride (PF) 0.9% PF flush 10 mL  10 mL Intracatheter Q8H   10 mL at 02/25/17 0114     lipids (INTRALIPID) 20 % infusion 250 mL  250 mL Intravenous Q24H 20.8 mL/hr at 02/24/17 2105 250 mL at 02/24/17 2105     dextrose 10 % 1,000 mL infusion   Intravenous Continuous PRN         parenteral nutrition - Clinimix E   CENTRAL LINE IV Continuous 40 mL/hr at 02/24/17 2107       glucose 40 % gel 15-30 g  15-30 g Oral Q15 Min PRN        Or     dextrose 50 % injection 25-50 mL  25-50 mL Intravenous Q15 Min PRN        Or     glucagon injection 1 mg  1 mg Subcutaneous Q15 Min PRN         insulin aspart (NovoLOG) inj (RAPID ACTING)  1-12 Units Subcutaneous Q4H   1 Units at 02/24/17 2146     potassium phosphate 15 mmol in D5W 250 mL intermittent infusion  15 mmol Intravenous Daily PRN         potassium phosphate 20 mmol in D5W 500 mL intermittent infusion  20 mmol Intravenous Q6H  mL/hr at 02/24/17 1621 20 mmol at 02/24/17 1621     potassium phosphate 20 mmol in D5W 250 mL intermittent infusion  20 mmol Intravenous Q6H PRN         potassium phosphate 25 mmol in D5W 500 mL intermittent infusion  25 mmol Intravenous Q8H PRN         dextrose 5% and 0.2% NaCl 1,000 mL with potassium chloride 20 mEq/L infusion    Intravenous Continuous 60 mL/hr at 02/24/17 2110       sodium chloride (PF) 0.9% PF flush 3 mL  3 mL Intracatheter Q1H PRN         sodium chloride (PF) 0.9% PF flush 3 mL  3 mL Intracatheter Q8H   3 mL at 02/25/17 0634     ranitidine (ZANTAC) injection 50 mg  50 mg Intravenous Once PRN         levothyroxine (SYNTHROID/LEVOTHROID) tablet 75 mcg  75 mcg Oral Daily   75 mcg at 02/22/17 0840     cyclobenzaprine (FLEXERIL) tablet 10 mg  10 mg Oral TID   10 mg at 02/22/17 2307     naloxone (NARCAN) injection 0.1-0.4 mg  0.1-0.4 mg Intravenous Q2 Min PRN         lidocaine 1 % 1 mL  1 mL Other Q1H PRN         lidocaine (LMX4) cream   Topical Q1H PRN         sodium chloride (PF) 0.9% PF flush 3 mL  3 mL Intracatheter Q1H PRN         potassium chloride SA (K-DUR/KLOR-CON M) CR tablet 20-40 mEq  20-40 mEq Oral Q2H PRN   20 mEq at 02/18/17 1701     potassium chloride (KLOR-CON) Packet 20-40 mEq  20-40 mEq Oral or Feeding Tube Q2H PRN   40 mEq at 02/18/17 1152     potassium chloride 10 mEq in 100 mL intermittent infusion  10 mEq Intravenous Q1H PRN         potassium chloride 10 mEq in 100 mL intermittent infusion with 10 mg lidocaine  10 mEq Intravenous Q1H  mL/hr at 02/23/17 0517 10 mEq at 02/23/17 0517     potassium chloride 20 mEq in 50 mL intermittent infusion  20 mEq Intravenous Q2H PRN         magnesium sulfate 4 g in 100 mL sterile water (premade)  4 g Intravenous Q4H PRN         acetaminophen (TYLENOL) tablet 650 mg  650 mg Oral Q4H PRN   650 mg at 02/16/17 1706     acetaminophen (TYLENOL) Suppository 650 mg  650 mg Rectal Q4H PRN         oxyCODONE (ROXICODONE) IR tablet 5 mg  5 mg Oral Q3H PRN         polyethylene glycol (MIRALAX/GLYCOLAX) Packet 17 g  17 g Oral Daily PRN         ondansetron (ZOFRAN-ODT) ODT tab 4 mg  4 mg Oral Q6H PRN   4 mg at 02/22/17 1243    Or     ondansetron (ZOFRAN) injection 4 mg  4 mg Intravenous Q6H PRN   4 mg at 02/23/17 0030     HYDROmorphone (PF) (DILAUDID) injection 0.2 mg  0.2 mg  Intravenous Q2H PRN         piperacillin-tazobactam (ZOSYN) 3.375 g vial to attach to  mL bag  3.375 g Intravenous Q6H 100 mL/hr at 02/24/17 0535 3.375 g at 02/25/17 0622     OLANZapine zydis (zyPREXA) ODT tab 5 mg  5 mg Oral At Bedtime   5 mg at 02/24/17 2142     OLANZapine zydis (zyPREXA) ODT half-tab 2.5-5 mg  2.5-5 mg Oral Q6H PRN   2.5 mg at 02/18/17 1153     sodium chloride (PF) 0.9% PF flush 10 mL  10 mL Irrigation Q8H   10 mL at 02/25/17 0453     No current [facility]-ordered outpatient prescriptions on file.

## 2017-02-26 ENCOUNTER — APPOINTMENT (OUTPATIENT)
Dept: PHYSICAL THERAPY | Facility: CLINIC | Age: 77
DRG: 329 | End: 2017-02-26
Payer: MEDICARE

## 2017-02-26 ENCOUNTER — APPOINTMENT (OUTPATIENT)
Dept: OCCUPATIONAL THERAPY | Facility: CLINIC | Age: 77
DRG: 329 | End: 2017-02-26
Payer: MEDICARE

## 2017-02-26 LAB
ANION GAP SERPL CALCULATED.3IONS-SCNC: 7 MMOL/L (ref 3–14)
BUN SERPL-MCNC: 9 MG/DL (ref 7–30)
CALCIUM SERPL-MCNC: 7.8 MG/DL (ref 8.5–10.1)
CHLORIDE SERPL-SCNC: 108 MMOL/L (ref 94–109)
CO2 SERPL-SCNC: 28 MMOL/L (ref 20–32)
CREAT SERPL-MCNC: 0.68 MG/DL (ref 0.52–1.04)
ERYTHROCYTE [DISTWIDTH] IN BLOOD BY AUTOMATED COUNT: 29.7 % (ref 10–15)
GFR SERPL CREATININE-BSD FRML MDRD: 84 ML/MIN/1.7M2
GLUCOSE BLDC GLUCOMTR-MCNC: 104 MG/DL (ref 70–99)
GLUCOSE BLDC GLUCOMTR-MCNC: 105 MG/DL (ref 70–99)
GLUCOSE BLDC GLUCOMTR-MCNC: 82 MG/DL (ref 70–99)
GLUCOSE BLDC GLUCOMTR-MCNC: 95 MG/DL (ref 70–99)
GLUCOSE BLDC GLUCOMTR-MCNC: 97 MG/DL (ref 70–99)
GLUCOSE BLDC GLUCOMTR-MCNC: 99 MG/DL (ref 70–99)
GLUCOSE BLDC GLUCOMTR-MCNC: 99 MG/DL (ref 70–99)
GLUCOSE SERPL-MCNC: 95 MG/DL (ref 70–99)
HCT VFR BLD AUTO: 27.8 % (ref 35–47)
HGB BLD-MCNC: 8 G/DL (ref 11.7–15.7)
MAGNESIUM SERPL-MCNC: 2.1 MG/DL (ref 1.6–2.3)
MCH RBC QN AUTO: 21.6 PG (ref 26.5–33)
MCHC RBC AUTO-ENTMCNC: 28.8 G/DL (ref 31.5–36.5)
MCV RBC AUTO: 75 FL (ref 78–100)
PHOSPHATE SERPL-MCNC: 2.3 MG/DL (ref 2.5–4.5)
PLATELET # BLD AUTO: 259 10E9/L (ref 150–450)
POTASSIUM SERPL-SCNC: 3.5 MMOL/L (ref 3.4–5.3)
RBC # BLD AUTO: 3.71 10E12/L (ref 3.8–5.2)
SODIUM SERPL-SCNC: 143 MMOL/L (ref 133–144)
WBC # BLD AUTO: 7.5 10E9/L (ref 4–11)

## 2017-02-26 PROCEDURE — 25000125 ZZHC RX 250: Performed by: INTERNAL MEDICINE

## 2017-02-26 PROCEDURE — 97116 GAIT TRAINING THERAPY: CPT | Mod: GP

## 2017-02-26 PROCEDURE — 97110 THERAPEUTIC EXERCISES: CPT | Mod: GP

## 2017-02-26 PROCEDURE — 25000128 H RX IP 250 OP 636: Performed by: INTERNAL MEDICINE

## 2017-02-26 PROCEDURE — 84100 ASSAY OF PHOSPHORUS: CPT | Performed by: INTERNAL MEDICINE

## 2017-02-26 PROCEDURE — 25000125 ZZHC RX 250: Performed by: PHYSICIAN ASSISTANT

## 2017-02-26 PROCEDURE — 12000000 ZZH R&B MED SURG/OB

## 2017-02-26 PROCEDURE — 00000146 ZZHCL STATISTIC GLUCOSE BY METER IP

## 2017-02-26 PROCEDURE — 97535 SELF CARE MNGMENT TRAINING: CPT | Mod: GO

## 2017-02-26 PROCEDURE — 40000239 ZZH STATISTIC VAT ROUNDS

## 2017-02-26 PROCEDURE — 97530 THERAPEUTIC ACTIVITIES: CPT | Mod: GO

## 2017-02-26 PROCEDURE — 80048 BASIC METABOLIC PNL TOTAL CA: CPT | Performed by: INTERNAL MEDICINE

## 2017-02-26 PROCEDURE — 97530 THERAPEUTIC ACTIVITIES: CPT | Mod: GP

## 2017-02-26 PROCEDURE — 40000193 ZZH STATISTIC PT WARD VISIT

## 2017-02-26 PROCEDURE — 25000125 ZZHC RX 250

## 2017-02-26 PROCEDURE — 40000133 ZZH STATISTIC OT WARD VISIT

## 2017-02-26 PROCEDURE — 25000132 ZZH RX MED GY IP 250 OP 250 PS 637: Mod: GY | Performed by: PSYCHIATRY & NEUROLOGY

## 2017-02-26 PROCEDURE — A9270 NON-COVERED ITEM OR SERVICE: HCPCS | Mod: GY | Performed by: PSYCHIATRY & NEUROLOGY

## 2017-02-26 PROCEDURE — 83735 ASSAY OF MAGNESIUM: CPT | Performed by: INTERNAL MEDICINE

## 2017-02-26 PROCEDURE — 85027 COMPLETE CBC AUTOMATED: CPT | Performed by: INTERNAL MEDICINE

## 2017-02-26 PROCEDURE — 99232 SBSQ HOSP IP/OBS MODERATE 35: CPT | Performed by: INTERNAL MEDICINE

## 2017-02-26 RX ADMIN — PIPERACILLIN SODIUM,TAZOBACTAM SODIUM 3.38 G: 3; .375 INJECTION, POWDER, FOR SOLUTION INTRAVENOUS at 20:17

## 2017-02-26 RX ADMIN — POTASSIUM PHOSPHATE, MONOBASIC AND POTASSIUM PHOSPHATE, DIBASIC 15 MMOL: 224; 236 INJECTION, SOLUTION INTRAVENOUS at 09:40

## 2017-02-26 RX ADMIN — I.V. FAT EMULSION 250 ML: 20 EMULSION INTRAVENOUS at 20:16

## 2017-02-26 RX ADMIN — PIPERACILLIN SODIUM,TAZOBACTAM SODIUM 3.38 G: 3; .375 INJECTION, POWDER, FOR SOLUTION INTRAVENOUS at 01:48

## 2017-02-26 RX ADMIN — PIPERACILLIN SODIUM,TAZOBACTAM SODIUM 3.38 G: 3; .375 INJECTION, POWDER, FOR SOLUTION INTRAVENOUS at 07:57

## 2017-02-26 RX ADMIN — ASCORBIC ACID, VITAMIN A PALMITATE, CHOLECALCIFEROL, THIAMINE HYDROCHLORIDE, RIBOFLAVIN-5 PHOSPHATE SODIUM, PYRIDOXINE HYDROCHLORIDE, NIACINAMIDE, DEXPANTHENOL, ALPHA-TOCOPHEROL ACETATE, VITAMIN K1, FOLIC ACID, BIOTIN, CYANOCOBALAMIN: 200; 3300; 200; 6; 3.6; 6; 40; 15; 10; 150; 600; 60; 5 INJECTION, SOLUTION INTRAVENOUS at 20:15

## 2017-02-26 RX ADMIN — Medication 2.5 MG: at 02:19

## 2017-02-26 RX ADMIN — PIPERACILLIN SODIUM,TAZOBACTAM SODIUM 3.38 G: 3; .375 INJECTION, POWDER, FOR SOLUTION INTRAVENOUS at 14:16

## 2017-02-26 RX ADMIN — OLANZAPINE 5 MG: 5 TABLET, ORALLY DISINTEGRATING ORAL at 22:10

## 2017-02-26 RX ADMIN — POTASSIUM CHLORIDE: 149 INJECTION, SOLUTION, CONCENTRATE INTRAVENOUS at 06:25

## 2017-02-26 NOTE — PROGRESS NOTES
[facility]    Hospitalist Progress Note    Assessment & Plan   Sherita Kenney is a 76 year old female who was admitted on 2/15/2017.     Sherita Kenney is a 76 year old female who was admitted on 2/15/2017.   Ms. Kenney is a pleasant 76 year old woman with hypertension and dyslipidemia, as well as glaucoma and childhood kidney injury who was admitted 2/15/2017 for acute psychosis and right iliopsoas abscess. She underwent IR guided drainage on the day of admission. Underlying colon cancer is suspected and colonoscopy was scheduled for 2/23, but unable to be completed due to RRT      Acute psychosis: Ms. Kenney reportedly has not suffered from psychosis in the past. She was found at home in a paranoid and agitated state and brought in. Psychiatry was consulted and she is found to have an unspecified psychotic disorder. Her course here has been complicated by efforts to establish a legal decision maker from amongst her family; please see Sintia Rene's progress note from 2/21 which details that it has been agreed that her niece and friend will act together in this role (rather than her sister).  -- Zyprexa started and continued  --mental status a little confused today, but still much improved over several days ago         Right iliopsoas abscess: Noted on admission CT. Colorectal Surgery and Oncology consulted. She was started on Zosyn and underwent urgent IR guided drainage, with cultures from that revealing a polymicrobial infection. She had associated thrombocytosis and neutrophilia at admission. Her CEA is also elevated, raising suspicion for colon cancer.  -- continue Zosyn and continue her drain  -- Repeat CBC with Differential  -- Her POA has agreed to colonoscopy which was supposed to be 2/23, however CR has postponed due to RRT that am  -abcess culture with mod Enterobacter cloacae, mod Eikenella corrodens and light growth H parainfluenzae          Episode of Unresponsiveness am  2/23/17  -RRT called and evaluated by house physician  -Dr. Raygoza thought episode most likely related to compazine given and agree with his evaluation  -compazine has been discontinued  -pt awakened and has had no further episodes  -today back to alert and interactive      Partially obstruction due to  ileocecal mass   -abd very distended on 2/23, and had distal SBP on abd CT  -NG ordered by CR and placed 2/23  -CR is ok with clamping for activity  -most likely this is caused by the ileocecal mass  -CR plans surgery next week, probably Wed per CR note      Hypernatremia  -TPN started and CR would like total fluids to 100 cc/hour  -sodium normalized today to 143     Acute iron deficiency anemia: HGB 5.6 on admission. She was transfused 2 units on 2/16 and is now getting IV iron as directed by Oncology.      Klebsiella UTI: As per urine culture results. Likely covered by Zosyn as above.      Severe protein calorie malnutrition: Nutrition consulted  -PICC line to be placed 2/24 and TPN started 2/24    Hypothyroid: She continues Synthroid      Postpolio syndrome: Causing a flail left shoulder. She continues Cyclobenzaprine  -she is going to use a wrist brace and sling for PT  -she is unable to use her left arm and left hand at all      Hypertension: She gets prn medication if needed for this.      DVT Prophylaxis: Pneumatic Compression Devices  Code Status: Full Code    Jenise Jo MD    Interval History   A little confused today and seems tired also    -Data reviewed today: I reviewed all new labs and imaging results over the last 24 hours. I personally reviewed no images or EKG's today.    Physical Exam   Temp: 97.4  F (36.3  C) Temp src: Oral BP: 124/70   Heart Rate: 73 Resp: 16 SpO2: 98 % O2 Device: None (Room air)    Vitals:    02/24/17 0548 02/25/17 0535 02/26/17 0631   Weight: 58.8 kg (129 lb 10.1 oz) 58.5 kg (128 lb 15.5 oz) 57.8 kg (127 lb 6.8 oz)     Vital Signs with Ranges  Temp:  [97.4  F (36.3   C)-98.4  F (36.9  C)] 97.4  F (36.3  C)  Heart Rate:  [73-77] 73  Resp:  [16-18] 16  BP: (117-138)/(62-80) 124/70  SpO2:  [97 %-100 %] 98 %  I/O last 3 completed shifts:  In: 1659.5 [I.V.:1007]  Out: 735 [Urine:350; Emesis/NG output:325; Drains:55; Other:5]    Constitutional: alert   Respiratory: clear to auscultation, no wheezing or rhonchi   Cardiovascular: regular rate and rhythm without murmer or rub   GI:positive bowel sounds, non-tender   Skin/Integumen: no rashes    Other:        Medications     parenteral nutrition - Clinimix E       parenteral nutrition - Clinimix E 55 mL/hr at 02/25/17 2112     IV fluid REPLACEMENT ONLY       IV infusion builder WITH additives Stopped (02/26/17 0759)       sodium chloride (PF)  10 mL Intracatheter Q8H     lipids  250 mL Intravenous Q24H     insulin aspart  1-12 Units Subcutaneous Q4H     levothyroxine  75 mcg Oral Daily     cyclobenzaprine  10 mg Oral TID     piperacillin-tazobactam  3.375 g Intravenous Q6H     OLANZapine zydis  5 mg Oral At Bedtime     sodium chloride (PF)  10 mL Irrigation Q8H       Data     Recent Labs  Lab 02/26/17  0636 02/25/17  0635 02/24/17  0920  02/23/17  0155   WBC 7.5 8.8 18.8*  --  18.1*   HGB 8.0* 7.7* 9.3*  < > 9.7*   MCV 75* 75* 75*  --  73*    290 429  --  421   INR  --  1.06  --   --  1.04    145* 147*  --  145*   POTASSIUM 3.5 3.4 3.6  < > 3.2*   CHLORIDE 108 110* 112*  --  109   CO2 28 29 25  --  27   BUN 9 6* 6*  --  7   CR 0.68 0.59 0.67  --  0.70   ANIONGAP 7 6 10  --  9   SARITHA 7.8* 7.7* 7.8*  --  8.7   GLC 95 102* 75  --  125*   ALBUMIN  --  1.9*  --   --   --    PROTTOTAL  --  4.7*  --   --   --    BILITOTAL  --  0.2  --   --   --    ALKPHOS  --  58  --   --   --    ALT  --  14  --   --   --    AST  --  21  --   --   --    < > = values in this interval not displayed.    No results found for this or any previous visit (from the past 24 hour(s)).

## 2017-02-26 NOTE — PLAN OF CARE
Problem: Goal Outcome Summary  Goal: Goal Outcome Summary  Outcome: Improving  VSS. Denied pain to RN other than mild sore throat, reported pain to family but declined tylenol and dilaudid when offered. Using benzocaine throat spray for sore throat. L wrist splint and L arm sling in place for ambulation, up with 1, belt, and hemiwalker to BR. NG tube to low intermittent suction, 25 mL output on day shift. NPO with ice chips for comfort. Increased edema in LUE and BLE, total fluids limited to 100 mL/hr between TPN, lipids, antibiotics, electrolyte replacement, and IVF. Abdominal surgical site with ENRIKE, output of 10 mL cloudy tan fluid. Phosphorous replaced.

## 2017-02-26 NOTE — PROGRESS NOTES
Colon and Rectal Surgery Progress Note          Assessment and Plan:   75 yo F with partially obstructing ileocecal mass     Ng clamping trial    Plan for ileostomy Wednesday unless she has an acute decompinsation       Lane Guzmán MD  Colorectal Surgery  [phone] (office)  [phone] (pager)  [email or web address]             Interval History:   Feeling good.   Passing gas and stool              Physical Exam:   Vitals were reviewed  Patient Vitals for the past 24 hrs:   BP Temp Temp src Pulse Heart Rate Resp SpO2 Weight   02/26/17 0748 127/79 98.4  F (36.9  C) Oral - 76 16 97 % -   02/26/17 0631 - - - - - - - 57.8 kg (127 lb 6.8 oz)   02/26/17 0500 123/80 98.3  F (36.8  C) Oral - 73 16 98 % -   02/26/17 0100 127/62 97.4  F (36.3  C) Oral - 77 16 100 % -   02/25/17 2043 122/76 97.5  F (36.4  C) Oral - 73 18 97 % -   02/25/17 1700 138/79 97.6  F (36.4  C) Oral - 74 18 98 % -   02/25/17 1657 117/73 98.2  F (36.8  C) Oral - 75 18 - -   02/25/17 1209 132/84 98.3  F (36.8  C) Oral 78 - 16 97 % -         Intake/Output Summary (Last 24 hours) at 02/26/17 0856  Last data filed at 02/26/17 0700   Gross per 24 hour   Intake           1759.1 ml   Output              735 ml   Net           1024.1 ml       Head - Nomocephalic atraumatic  Sclera anicteric  Extremities warm and well perfused  Lungs - breathing non labored,   Abdomen - soft, non tender, drain in palce             Data:   All laboratory and imaging data in the past 24 hours reviewed    CBC  Lab Results   Component Value Date    WBC 7.5 02/26/2017    WBC 8.8 02/25/2017    WBC 18.8 (H) 02/24/2017    HGB 8.0 (L) 02/26/2017    HGB 7.7 (L) 02/25/2017    HGB 9.3 (L) 02/24/2017    HCT 27.8 (L) 02/26/2017    HCT 26.9 (L) 02/25/2017    HCT 32.3 (L) 02/24/2017     02/26/2017     02/25/2017     02/24/2017       BMP  Recent Labs   Lab Test  02/26/17   0636  02/25/17   0635   NA  143  145*   POTASSIUM  3.5  3.4   CHLORIDE  108  110*   CO2  28  29    ANIONGAP  7  6   GLC  95  102*   BUN  9  6*   CR  0.68  0.59   SARITHA  7.8*  7.7*       Liver Function Studies -   Recent Labs   Lab Test  02/25/17   0635   PROTTOTAL  4.7*   ALBUMIN  1.9*   BILITOTAL  0.2   ALKPHOS  58   AST  21   ALT  14                      Medications:     Current Facility-Administered Medications Ordered in Epic   Medication Dose Route Frequency Last Rate Last Dose     parenteral nutrition - Clinimix E   CENTRAL LINE IV Continuous 55 mL/hr at 02/25/17 2112       hypromellose-dextran (ARTIFICAL TEARS) ophthalmic solution 2-3 drop  2-3 drop Ophthalmic Q1H PRN         benzocaine (HURRICAINE/TOPEX) 20 % spray 0.5-1 mL  1-2 spray Mouth/Throat Q3H PRN   1 mL at 02/25/17 1622     sodium chloride (PF) 0.9% PF flush 10-20 mL  10-20 mL Intracatheter Q1H PRN   20 mL at 02/26/17 0624     sodium chloride (PF) 0.9% PF flush 10 mL  10 mL Intracatheter Q8H   10 mL at 02/26/17 0149     lipids (INTRALIPID) 20 % infusion 250 mL  250 mL Intravenous Q24H 20.8 mL/hr at 02/25/17 2106 250 mL at 02/25/17 2106     dextrose 10 % 1,000 mL infusion   Intravenous Continuous PRN         glucose 40 % gel 15-30 g  15-30 g Oral Q15 Min PRN        Or     dextrose 50 % injection 25-50 mL  25-50 mL Intravenous Q15 Min PRN        Or     glucagon injection 1 mg  1 mg Subcutaneous Q15 Min PRN         insulin aspart (NovoLOG) inj (RAPID ACTING)  1-12 Units Subcutaneous Q4H   1 Units at 02/24/17 2146     potassium phosphate 15 mmol in D5W 250 mL intermittent infusion  15 mmol Intravenous Daily PRN 0 mL/hr at 02/25/17 1335 15 mmol at 02/25/17 1525     potassium phosphate 20 mmol in D5W 500 mL intermittent infusion  20 mmol Intravenous Q6H  mL/hr at 02/24/17 1621 20 mmol at 02/24/17 1621     potassium phosphate 20 mmol in D5W 250 mL intermittent infusion  20 mmol Intravenous Q6H PRN         potassium phosphate 25 mmol in D5W 500 mL intermittent infusion  25 mmol Intravenous Q8H PRN         dextrose 5% and 0.2% NaCl 1,000 mL with  potassium chloride 20 mEq/L infusion   Intravenous Continuous   Stopped at 02/26/17 0759     sodium chloride (PF) 0.9% PF flush 3 mL  3 mL Intracatheter Q1H PRN         ranitidine (ZANTAC) injection 50 mg  50 mg Intravenous Once PRN         levothyroxine (SYNTHROID/LEVOTHROID) tablet 75 mcg  75 mcg Oral Daily   75 mcg at 02/22/17 0840     cyclobenzaprine (FLEXERIL) tablet 10 mg  10 mg Oral TID   10 mg at 02/22/17 2307     naloxone (NARCAN) injection 0.1-0.4 mg  0.1-0.4 mg Intravenous Q2 Min PRN         lidocaine 1 % 1 mL  1 mL Other Q1H PRN         lidocaine (LMX4) cream   Topical Q1H PRN         sodium chloride (PF) 0.9% PF flush 3 mL  3 mL Intracatheter Q1H PRN         potassium chloride SA (K-DUR/KLOR-CON M) CR tablet 20-40 mEq  20-40 mEq Oral Q2H PRN   20 mEq at 02/18/17 1701     potassium chloride (KLOR-CON) Packet 20-40 mEq  20-40 mEq Oral or Feeding Tube Q2H PRN   40 mEq at 02/18/17 1152     potassium chloride 10 mEq in 100 mL intermittent infusion  10 mEq Intravenous Q1H PRN         potassium chloride 10 mEq in 100 mL intermittent infusion with 10 mg lidocaine  10 mEq Intravenous Q1H  mL/hr at 02/23/17 0517 10 mEq at 02/23/17 0517     potassium chloride 20 mEq in 50 mL intermittent infusion  20 mEq Intravenous Q2H PRN         magnesium sulfate 4 g in 100 mL sterile water (premade)  4 g Intravenous Q4H PRN         acetaminophen (TYLENOL) tablet 650 mg  650 mg Oral Q4H PRN   650 mg at 02/16/17 1706     acetaminophen (TYLENOL) Suppository 650 mg  650 mg Rectal Q4H PRN         oxyCODONE (ROXICODONE) IR tablet 5 mg  5 mg Oral Q3H PRN         polyethylene glycol (MIRALAX/GLYCOLAX) Packet 17 g  17 g Oral Daily PRN         ondansetron (ZOFRAN-ODT) ODT tab 4 mg  4 mg Oral Q6H PRN   4 mg at 02/22/17 1243    Or     ondansetron (ZOFRAN) injection 4 mg  4 mg Intravenous Q6H PRN   4 mg at 02/23/17 0030     HYDROmorphone (PF) (DILAUDID) injection 0.2 mg  0.2 mg Intravenous Q2H PRN         piperacillin-tazobactam  (ZOSYN) 3.375 g vial to attach to  mL bag  3.375 g Intravenous Q6H 100 mL/hr at 02/24/17 0535 3.375 g at 02/26/17 0757     OLANZapine zydis (zyPREXA) ODT tab 5 mg  5 mg Oral At Bedtime   5 mg at 02/25/17 2108     OLANZapine zydis (zyPREXA) ODT half-tab 2.5-5 mg  2.5-5 mg Oral Q6H PRN   2.5 mg at 02/26/17 0219     sodium chloride (PF) 0.9% PF flush 10 mL  10 mL Irrigation Q8H   10 mL at 02/26/17 0329     No current Epic-ordered outpatient prescriptions on file.

## 2017-02-26 NOTE — PLAN OF CARE
Problem: Goal Outcome Summary  Goal: Goal Outcome Summary  OT: Increased confusion noted today resulting in need for frequent reorientation throughout session. Pt required min A for toilet transfer, including LE dressing mgmt due to impaired balance and safety Pt stood at sink with min A to wash hands. Pt adamantly declined to use hemiwalker throughout session. Pt required mod A for repositioning and L sling mgmt. Continue to recommend TCU upon discharge.

## 2017-02-26 NOTE — PLAN OF CARE
Problem: Goal Outcome Summary  Goal: Goal Outcome Summary  Outcome: Therapy, progress towards functional goals is fair  PT- Per plan established by the Physical Therapist, according to functional mobility the discharge recommendation is TCU. Pt noted to be more confused today.Sit to/from stand with hemiwalker and CGA with cues for safety. Amb 15 ft x 1 with hemiwalker and CGA. Step by step cues for sequencing and technique but pt unable to follow and instead carries hemiwalker in R UE during amb. Therapist removed hemiwalker and then pt continued to amb another 60 ft x 1 without AD and CGA. Pt demonstrates decreased balance, WBOS and weakness while ambulating without AD. C/o fatigue and increased L hip pain with activity. Tolerates seated B LE exs moderately again due to reports again of L hip pain and heaviness due to B LE edema. Pt remained sitting up in chair at end of session with all needs in reach and chair alarm on.

## 2017-02-27 ENCOUNTER — APPOINTMENT (OUTPATIENT)
Dept: PHYSICAL THERAPY | Facility: CLINIC | Age: 77
DRG: 329 | End: 2017-02-27
Payer: MEDICARE

## 2017-02-27 ENCOUNTER — APPOINTMENT (OUTPATIENT)
Dept: OCCUPATIONAL THERAPY | Facility: CLINIC | Age: 77
DRG: 329 | End: 2017-02-27
Payer: MEDICARE

## 2017-02-27 LAB
ALBUMIN SERPL-MCNC: 2.1 G/DL (ref 3.4–5)
ALP SERPL-CCNC: 65 U/L (ref 40–150)
ALT SERPL W P-5'-P-CCNC: 18 U/L (ref 0–50)
ANION GAP SERPL CALCULATED.3IONS-SCNC: 5 MMOL/L (ref 3–14)
AST SERPL W P-5'-P-CCNC: 29 U/L (ref 0–45)
BILIRUB SERPL-MCNC: 0.2 MG/DL (ref 0.2–1.3)
BUN SERPL-MCNC: 14 MG/DL (ref 7–30)
CALCIUM SERPL-MCNC: 8.1 MG/DL (ref 8.5–10.1)
CHLORIDE SERPL-SCNC: 109 MMOL/L (ref 94–109)
CO2 SERPL-SCNC: 29 MMOL/L (ref 20–32)
CREAT SERPL-MCNC: 0.54 MG/DL (ref 0.52–1.04)
ERYTHROCYTE [DISTWIDTH] IN BLOOD BY AUTOMATED COUNT: 30.7 % (ref 10–15)
GFR SERPL CREATININE-BSD FRML MDRD: ABNORMAL ML/MIN/1.7M2
GLUCOSE BLDC GLUCOMTR-MCNC: 104 MG/DL (ref 70–99)
GLUCOSE BLDC GLUCOMTR-MCNC: 105 MG/DL (ref 70–99)
GLUCOSE BLDC GLUCOMTR-MCNC: 107 MG/DL (ref 70–99)
GLUCOSE BLDC GLUCOMTR-MCNC: 111 MG/DL (ref 70–99)
GLUCOSE BLDC GLUCOMTR-MCNC: 84 MG/DL (ref 70–99)
GLUCOSE BLDC GLUCOMTR-MCNC: 95 MG/DL (ref 70–99)
GLUCOSE SERPL-MCNC: 95 MG/DL (ref 70–99)
HCT VFR BLD AUTO: 33.4 % (ref 35–47)
HGB BLD-MCNC: 9.7 G/DL (ref 11.7–15.7)
INR PPP: 1.01 (ref 0.86–1.14)
MAGNESIUM SERPL-MCNC: 2.2 MG/DL (ref 1.6–2.3)
MCH RBC QN AUTO: 22 PG (ref 26.5–33)
MCHC RBC AUTO-ENTMCNC: 29 G/DL (ref 31.5–36.5)
MCV RBC AUTO: 76 FL (ref 78–100)
PHOSPHATE SERPL-MCNC: 2.4 MG/DL (ref 2.5–4.5)
PLATELET # BLD AUTO: 298 10E9/L (ref 150–450)
POTASSIUM SERPL-SCNC: 3.5 MMOL/L (ref 3.4–5.3)
PREALB SERPL IA-MCNC: 13 MG/DL (ref 15–45)
PROT SERPL-MCNC: 5.1 G/DL (ref 6.8–8.8)
RBC # BLD AUTO: 4.41 10E12/L (ref 3.8–5.2)
SODIUM SERPL-SCNC: 143 MMOL/L (ref 133–144)
TRIGL SERPL-MCNC: 143 MG/DL
WBC # BLD AUTO: 9.6 10E9/L (ref 4–11)

## 2017-02-27 PROCEDURE — 25000125 ZZHC RX 250: Performed by: PHYSICIAN ASSISTANT

## 2017-02-27 PROCEDURE — A9270 NON-COVERED ITEM OR SERVICE: HCPCS | Mod: GY | Performed by: INTERNAL MEDICINE

## 2017-02-27 PROCEDURE — 99233 SBSQ HOSP IP/OBS HIGH 50: CPT | Performed by: INTERNAL MEDICINE

## 2017-02-27 PROCEDURE — 36415 COLL VENOUS BLD VENIPUNCTURE: CPT | Performed by: PHYSICIAN ASSISTANT

## 2017-02-27 PROCEDURE — 97530 THERAPEUTIC ACTIVITIES: CPT | Mod: GP

## 2017-02-27 PROCEDURE — 25000125 ZZHC RX 250: Performed by: INTERNAL MEDICINE

## 2017-02-27 PROCEDURE — A9270 NON-COVERED ITEM OR SERVICE: HCPCS | Mod: GY | Performed by: PSYCHIATRY & NEUROLOGY

## 2017-02-27 PROCEDURE — 12000007 ZZH R&B INTERMEDIATE

## 2017-02-27 PROCEDURE — 84134 ASSAY OF PREALBUMIN: CPT | Performed by: INTERNAL MEDICINE

## 2017-02-27 PROCEDURE — 97535 SELF CARE MNGMENT TRAINING: CPT | Mod: GO

## 2017-02-27 PROCEDURE — 83735 ASSAY OF MAGNESIUM: CPT | Performed by: INTERNAL MEDICINE

## 2017-02-27 PROCEDURE — 97116 GAIT TRAINING THERAPY: CPT | Mod: GP

## 2017-02-27 PROCEDURE — 84100 ASSAY OF PHOSPHORUS: CPT | Performed by: INTERNAL MEDICINE

## 2017-02-27 PROCEDURE — 80053 COMPREHEN METABOLIC PANEL: CPT | Performed by: INTERNAL MEDICINE

## 2017-02-27 PROCEDURE — 40000193 ZZH STATISTIC PT WARD VISIT

## 2017-02-27 PROCEDURE — 97530 THERAPEUTIC ACTIVITIES: CPT | Mod: GO

## 2017-02-27 PROCEDURE — 25000132 ZZH RX MED GY IP 250 OP 250 PS 637: Mod: GY | Performed by: PSYCHIATRY & NEUROLOGY

## 2017-02-27 PROCEDURE — 25000128 H RX IP 250 OP 636: Performed by: INTERNAL MEDICINE

## 2017-02-27 PROCEDURE — 85610 PROTHROMBIN TIME: CPT | Performed by: INTERNAL MEDICINE

## 2017-02-27 PROCEDURE — 40000133 ZZH STATISTIC OT WARD VISIT

## 2017-02-27 PROCEDURE — 84478 ASSAY OF TRIGLYCERIDES: CPT | Performed by: INTERNAL MEDICINE

## 2017-02-27 PROCEDURE — 85027 COMPLETE CBC AUTOMATED: CPT | Performed by: PHYSICIAN ASSISTANT

## 2017-02-27 PROCEDURE — 80053 COMPREHEN METABOLIC PANEL: CPT | Performed by: PHYSICIAN ASSISTANT

## 2017-02-27 PROCEDURE — 25000125 ZZHC RX 250

## 2017-02-27 PROCEDURE — 97110 THERAPEUTIC EXERCISES: CPT | Mod: GP

## 2017-02-27 PROCEDURE — 25000132 ZZH RX MED GY IP 250 OP 250 PS 637: Mod: GY | Performed by: INTERNAL MEDICINE

## 2017-02-27 PROCEDURE — 00000146 ZZHCL STATISTIC GLUCOSE BY METER IP

## 2017-02-27 RX ADMIN — PIPERACILLIN SODIUM,TAZOBACTAM SODIUM 3.38 G: 3; .375 INJECTION, POWDER, FOR SOLUTION INTRAVENOUS at 13:09

## 2017-02-27 RX ADMIN — OLANZAPINE 5 MG: 5 TABLET, ORALLY DISINTEGRATING ORAL at 21:39

## 2017-02-27 RX ADMIN — PIPERACILLIN SODIUM,TAZOBACTAM SODIUM 3.38 G: 3; .375 INJECTION, POWDER, FOR SOLUTION INTRAVENOUS at 02:31

## 2017-02-27 RX ADMIN — PIPERACILLIN SODIUM,TAZOBACTAM SODIUM 3.38 G: 3; .375 INJECTION, POWDER, FOR SOLUTION INTRAVENOUS at 18:57

## 2017-02-27 RX ADMIN — PIPERACILLIN SODIUM,TAZOBACTAM SODIUM 3.38 G: 3; .375 INJECTION, POWDER, FOR SOLUTION INTRAVENOUS at 06:53

## 2017-02-27 RX ADMIN — I.V. FAT EMULSION 250 ML: 20 EMULSION INTRAVENOUS at 21:38

## 2017-02-27 RX ADMIN — ASCORBIC ACID, VITAMIN A PALMITATE, CHOLECALCIFEROL, THIAMINE HYDROCHLORIDE, RIBOFLAVIN-5 PHOSPHATE SODIUM, PYRIDOXINE HYDROCHLORIDE, NIACINAMIDE, DEXPANTHENOL, ALPHA-TOCOPHEROL ACETATE, VITAMIN K1, FOLIC ACID, BIOTIN, CYANOCOBALAMIN: 200; 3300; 200; 6; 3.6; 6; 40; 15; 10; 150; 600; 60; 5 INJECTION, SOLUTION INTRAVENOUS at 23:48

## 2017-02-27 NOTE — PROGRESS NOTES
[facility]    Hospitalist Progress Note    Assessment & Plan   Sherita Kenney is a 76 year old female who was admitted on 2/15/2017.     Sherita Kenney is a 76 year old female who was admitted on 2/15/2017.   Ms. Kenney is a pleasant 76 year old woman with hypertension and dyslipidemia, as well as glaucoma and childhood kidney injury who was admitted 2/15/2017 for acute psychosis and right iliopsoas abscess. She underwent IR guided drainage on the day of admission. Underlying colon cancer is suspected and colonoscopy was scheduled for 2/23, but unable to be completed due to RRT      Acute psychosis: Ms. Kenney reportedly has not suffered from psychosis in the past. She was found at home in a paranoid and agitated state and brought in. Psychiatry was consulted and she is found to have an unspecified psychotic disorder. Her course here has been complicated by efforts to establish a legal decision maker from amongst her family; please see Sintia Rene's progress note from 2/21 which details that it has been agreed that her niece and friend will act together in this role (rather than her sister).  -- Zyprexa started and continued  --mental status a little confused today, but still much improved over several days ago          Right iliopsoas abscess: Noted on admission CT. Colorectal Surgery and Oncology consulted. She was started on Zosyn and underwent urgent IR guided drainage, with cultures from that revealing a polymicrobial infection. She had associated thrombocytosis and neutrophilia at admission. Her CEA is also elevated, raising suspicion for colon cancer.  -- continue Zosyn and continue her drain  -- Repeat CBC with Differential  -- Her POA has agreed to colonoscopy which was supposed to be 2/23, however CR has postponed due to RRT that am  -abcess culture with mod Enterobacter cloacae, mod Eikenella corrodens and light growth H parainfluenzae          Episode of Unresponsiveness am  2/23/17  -RRT called and evaluated by house physician  -Dr. Raygoza thought episode most likely related to compazine given and agree with his evaluation  -compazine has been discontinued  -pt awakened and has had no further episodes      Partially obstruction due to ileocecal mass   -abd very distended on 2/23, and had distal SBP on abd CT  -NG ordered by CR and placed 2/23 and discontinued by CR 2/26  -most likely this is caused by the ileocecal mass  -CR plans surgery this week, probably Wed per CR note  -  Plan is to  have a staged procedure, with iliostomy this week and at a later date definitive surgery for mass resection    Hypernatremia  -TPN started and CR would like total fluids to 100 cc/hour  -sodium normalized to 143      Acute iron deficiency anemia: HGB 5.6 on admission. She was transfused 2 units on 2/16 and has received  IV iron as directed by Oncology.      Klebsiella UTI: As per urine culture results.  covered by Zosyn as above.      Severe protein calorie malnutrition: Nutrition consulted  -PICC line to be placed 2/24 and TPN started 2/24    Hypothyroid: She continues Synthroid      Postpolio syndrome: Causing a flail left shoulder. She continues Cyclobenzaprine  -she is going to use a wrist brace and sling for PT  -she is unable to use her left arm and left hand at all      Hypertension: She gets prn medication if needed for this.      DVT Prophylaxis: Pneumatic Compression Devices  Code Status: Full Code     Jenise Jo MD    Interval History   No complaints today.  She is alert and interactive although nurses report that she can be a little confused.    -Data reviewed today: I reviewed all new labs and imaging results over the last 24 hours. I personally reviewed no images or EKG's today.    Physical Exam   Temp: 97.5  F (36.4  C) Temp src: Oral BP: 157/87   Heart Rate: 79 Resp: 16 SpO2: 95 % O2 Device: None (Room air)    Vitals:    02/25/17 0535 02/26/17 0631 02/27/17 0624   Weight:  58.5 kg (128 lb 15.5 oz) 57.8 kg (127 lb 6.8 oz) 56.1 kg (123 lb 10.9 oz)     Vital Signs with Ranges  Temp:  [97.1  F (36.2  C)-97.7  F (36.5  C)] 97.5  F (36.4  C)  Heart Rate:  [71-83] 79  Resp:  [16] 16  BP: (124-157)/(70-90) 157/87  SpO2:  [95 %-99 %] 95 %  I/O last 3 completed shifts:  In: 717.5 [I.V.:500]  Out: 884 [Urine:851; Drains:35]    Constitutional: alert   Respiratory: clear to auscultation, no wheezing or rhonchi   Cardiovascular: regular rate and rhythm without murmer or rub   GI:positive bowel sounds, non-tender   Skin/Integumen: no rashes    Other:        Medications     parenteral nutrition - Clinimix E 70 mL/hr at 02/26/17 2015     IV fluid REPLACEMENT ONLY       IV infusion builder WITH additives 25 mL/hr at 02/27/17 0909       sodium chloride (PF)  10 mL Intracatheter Q8H     lipids  250 mL Intravenous Q24H     insulin aspart  1-12 Units Subcutaneous Q4H     levothyroxine  75 mcg Oral Daily     cyclobenzaprine  10 mg Oral TID     piperacillin-tazobactam  3.375 g Intravenous Q6H     OLANZapine zydis  5 mg Oral At Bedtime     sodium chloride (PF)  10 mL Irrigation Q8H       Data     Recent Labs  Lab 02/27/17  0754 02/27/17  0700 02/26/17  0636 02/25/17  0635  02/23/17  0155   WBC 9.6  --  7.5 8.8  < > 18.1*   HGB 9.7*  --  8.0* 7.7*  < > 9.7*   MCV 76*  --  75* 75*  < > 73*     --  259 290  < > 421   INR  --  1.01  --  1.06  --  1.04   NA  --  143 143 145*  < > 145*   POTASSIUM  --  3.5 3.5 3.4  < > 3.2*   CHLORIDE  --  109 108 110*  < > 109   CO2  --  29 28 29  < > 27   BUN  --  14 9 6*  < > 7   CR  --  0.54 0.68 0.59  < > 0.70   ANIONGAP  --  5 7 6  < > 9   SARITHA  --  8.1* 7.8* 7.7*  < > 8.7   GLC  --  95 95 102*  < > 125*   ALBUMIN  --  2.1*  --  1.9*  --   --    PROTTOTAL  --  5.1*  --  4.7*  --   --    BILITOTAL  --  0.2  --  0.2  --   --    ALKPHOS  --  65  --  58  --   --    ALT  --  18  --  14  --   --    AST  --  29  --  21  --   --    < > = values in this interval not  displayed.    No results found for this or any previous visit (from the past 24 hour(s)).

## 2017-02-27 NOTE — PLAN OF CARE
Problem: Goal Outcome Summary  Goal: Goal Outcome Summary  PT: POC/goals reviewed and updated. Goal target date extended due to continued hospitalization. Supine to sit with SBA. Sit to/from stand with CGA. Ambulates 25 feet and 100 feet with IV pole and CGA.  Patient needs frequent cues for safety throughout session. Very forgetful but motivated to get stronger. Pt currently limited by LE weakness, decreased activity tolerance an decreased safety awareness. Continue to recommend TCU on discharge.

## 2017-02-27 NOTE — PLAN OF CARE
Problem: Individualization  Goal: Patient Preferences  Outcome: No Change  Pt tx from sta. 73 at 1400.  A/o x 4. Pt walked to the chair at bedside with an asst of 1.  LS clear, +BS, States passing flatus.  ENRIKE drain patent with tan drainage.  Pt has moderate amt of edema in LE  and left UE.  Picc to the right arm patent.  Denies pain.  Pt has a scab on left hand and scab on right top of foot.  Will cont to monitor.

## 2017-02-27 NOTE — PROGRESS NOTES
COLON & RECTAL SURGERY  PROGRESS NOTE    February 27, 2017    SUBJECTIVE:  The patient is having a good morning thus far. She is happy the NG tube was removed. She continues to feel a little bloated. Is passing gas and having bowel movements. Denies nausea or vomiting. Alert and asking questions about the plans going forward.     OBJECTIVE:  Temp:  [97.1  F (36.2  C)-97.7  F (36.5  C)] 97.5  F (36.4  C)  Heart Rate:  [71-83] 79  Resp:  [16] 16  BP: (124-157)/(70-90) 157/87  SpO2:  [95 %-99 %] 95 %    Intake/Output Summary (Last 24 hours) at 02/27/17 0835  Last data filed at 02/27/17 0800   Gross per 24 hour   Intake            517.9 ml   Output              884 ml   Net           -366.1 ml       GENERAL:  Awake, alert, no acute distress,   HEAD - Nomocephalic atraumatic  SCLERA anicteric  EXTREMITIES warm and well perfused  ABDOMEN:  Soft, appropriately tender, slightly-distended,   INCISION:  ENRIKE with serous output    LABS:  Lab Results   Component Value Date    WBC 7.5 02/26/2017     Lab Results   Component Value Date    HGB 8.0 02/26/2017     Lab Results   Component Value Date    HCT 27.8 02/26/2017     Lab Results   Component Value Date     02/26/2017     Last Basic Metabolic Panel:  Lab Results   Component Value Date     02/27/2017      Lab Results   Component Value Date    POTASSIUM 3.5 02/27/2017     Lab Results   Component Value Date    CHLORIDE 109 02/27/2017     Lab Results   Component Value Date    SARITHA 8.1 02/27/2017     Lab Results   Component Value Date    CO2 29 02/27/2017     Lab Results   Component Value Date    BUN 14 02/27/2017     Lab Results   Component Value Date    CR 0.54 02/27/2017     Lab Results   Component Value Date    GLC 95 02/27/2017       ASSESSMENT/PLAN:75 yo F with partially obstructing ileocecal mass, s/p IR drainage of iliopsoas abscess  1. Plan for ileostomy with Dr. Peter on Wednesday  2. Continue NPO with ice chips for comfort. Continue TPN  3. Continue ENRIKE  drain    Yessy Whalen PA-C  [facility]  Phone: [phone]

## 2017-02-27 NOTE — PLAN OF CARE
Problem: Goal Outcome Summary  Goal: Goal Outcome Summary  Outcome: No Change  A&O times 4, confused at times. LE hemiparesis, hx of polio. VSS. Tele NSR. NOP, TPN infusing at goal rate of 70 ml/hr. Up with assist of one, voiding adequately in bathroom. ENRIKE drain RUQ abdomen, irrigated as ordered. Plan for ileostomy on Wednesday. Patient transferred to station 33, report given to RN.

## 2017-02-27 NOTE — PLAN OF CARE
Problem: Goal Outcome Summary  Goal: Goal Outcome Summary  Outcome: No Change  Increased confusion during the day, asking if she is in an experiment and whether she had stayed at RNs house the previous night. Oriented x4 this evening. Up to chair and BR with 1, belt, and hemiwalker; sling and wrist immobilizer available for pt to use to protect her hemiplegic LUE. NPO with ice chips, tolerated NG tube clamping for 4 hours with 100 mL residual and an additional NG tube clamping for 3 hours with 22 mL residual. NG tube removed per orders. Abdominal ENRIKE with 10 mL thin tan output, flushed per POC. BG checks q4 hours, no SSI coverage required. Plan for surgery on 3/1/16.

## 2017-02-27 NOTE — PROGRESS NOTES
"Informed by bedside RN that pt's sister Nat wanted to talk with CC this morning.  Went directly to pt's room but Nat had already left without speaking to anyone.  Informed this afternoon that Nat had called and wanted to set up a care conference and that she didn't feel that pt was getting the proper care.  Attempted to call Nat but no answer.  Pt has transferred to gen surg floor today in anticipation of surg Wed 3/1. Happened to meet w/ Nat and her  Aren as they were going to station 33 to see pt.  Gave them update that pt would have ileostomy surgery Wed.  They knew about surgery from [facility] but didn't feel they had a good explanation of what kind of surgery.  Explained that pt would be allowed to eat after bowel function return from ileostomy and then go to rehab.  She would have a bowel resection down the road when she was sufficiently stable and hopefully had a better nutritional status.  They were not aware that pt was unable to have her colonoscopy or whether or not a bx had been done yet.  Updated them that she had had neither.  Nat expressed concern that the updates from [facility] were insufficient because \"[facility]ora is not a doctor.\"  Inquired whether they had tried to call their dtr Sherita (who is an MD) for update and they have not as they are still not speaking.  Gently explained that medical staff would not be having care conferences with them as they are not the POAs but that they can get updates from staff per pt, [facility] and Sherita. They feel that they have the plan now and are ok with how things are proceeding.  Explained that this CC would not be involved in pt's care anymore but that the CM staff on station 33 would be available.  Left message for station [address] CC to call for update.   Addendum:  Updated [facility] of above.  She is evidently in the hospital now and has met station 33 care coordinator.    "

## 2017-02-27 NOTE — PLAN OF CARE
Problem: Goal Outcome Summary  Goal: Goal Outcome Summary  Outcome: Improving  A&Ox4, intermittent confustion. CMS: baseline LUE hemiplegic and generalized weakness. NPO with ice chips. Voiding adequately. 2 BM on nights. VSS. Abdominal ENRIKE 25cc with two 10ml flushes. Up with A1 and hemiwalker. Arm sling and wrist immobilizer at bedside for LUE when transferring. Deneis pain. Plan for surgery 3/1/2016, will continue to monitor.

## 2017-02-27 NOTE — PLAN OF CARE
Problem: Goal Outcome Summary  Goal: Goal Outcome Summary  OT: Patient completes sit <> stand, ambulating in room with IV pole to simulate cane, upper body dressing, and toilet transfer with CG A. Patient needs extra time for sequencing and VC to problem solve tasks and will continue to benefit from OT to increase activity tolerance, and improve safety while completing ADLS. Recommend discharge TCU.

## 2017-02-27 NOTE — PROGRESS NOTES
CLINICAL NUTRITION SERVICES - REASSESSMENT NOTE        EVALUATION OF PROGRESS TOWARD GOALS   Diet:  NPO    Nutrition Support:  The TPN was started on 2/24 at 40 mL/hr, increased on 2/25 to 55 mL/hr, and increased on 2/26 to goal 70 mL/hr as below:    Nutrition Support Parenteral:  Type of Access:  PICC  Parenteral Frequency:  Continuous  Parenteral Regimen:  D15 AA5 at 70 mL/hr + Lipids 250 mL daily  Total Parenteral Provisions:  1693 kcals (32 kcal/kg), 84 gm pro (1.6 gm/kg), 252 gm CHO, 30% fat.    D5 IVF running at 25 mL/hr = 30 gm CHO, 102 kcals.  Total (TPN/Lipid + D5 IVF):  1795 kcals (34 kcal/kg)    Intake/Tolerance:  Refeeding labs were as follows (2/25-2/27)  K 3.4 --> 3.5 --> 3.5 (NL) - acceptable  Phos 2.1 (L) --> 2.3 (L) --> 2.4 (L) - acceptable  Mg 1.9 --> 2.1 --> 2.2 (NS) - acceptable    Accuchecks:  97, 82, 95, 104, 99, 104, 95 - acceptable  Na 143 (NL)   - acceptable.  Wt:  56.1 kg (up 3.3 kg since admit).  Pt with varying levels of edema (+1-3) in extremities.      Dosing Weight 52.8 kg - admit wt      ASSESSED NUTRITION NEEDS:  Estimated Energy Needs: 0689-4176 kcals (30-35 Kcal/Kg)  Justification: repletion, underweight  Estimated Protein Needs:  grams protein (1.5-2 g pro/Kg)  Justification: Repletion      NEW FINDINGS:   2/26:  NG removed  2/27:  Pt less bloated, having + BMs.  Plan ileostomy on 3/1.    Previous Goals (2/24):   Patient will tolerate TPN initiation without any signs or symptoms of refeeding syndrome   Evaluation: Met      TPN/Lipid will meet % needs while patient NPO   Evaluation: Met    Previous Nutrition Diagnosis (2/24):   Inadequate protein-energy intake related to NPO, TPN to start tonight as evidenced by meeting 0% needs   Evaluation: Completed      CURRENT NUTRITION DIAGNOSIS  No nutrition diagnosis identified at this time     INTERVENTIONS  Recommendations / Nutrition Prescription  Continue TPN as above.    Implementation  Collaboration and Referral of  Nutrition care - Discussed pt with Pharmacist, who will continue the same TPN regimen.    Goals  TPN/Lipid + D5 IVF will meet % estimated needs while NPO.      MONITORING AND EVALUATION:  Progress towards goals will be monitored and evaluated per protocol and Practice Guidelines    Suzanne Yeun, RD, LD, CNSC

## 2017-02-28 ENCOUNTER — APPOINTMENT (OUTPATIENT)
Dept: OCCUPATIONAL THERAPY | Facility: CLINIC | Age: 77
DRG: 329 | End: 2017-02-28
Payer: MEDICARE

## 2017-02-28 ENCOUNTER — APPOINTMENT (OUTPATIENT)
Dept: PHYSICAL THERAPY | Facility: CLINIC | Age: 77
DRG: 329 | End: 2017-02-28
Payer: MEDICARE

## 2017-02-28 LAB
ANION GAP SERPL CALCULATED.3IONS-SCNC: 7 MMOL/L (ref 3–14)
BUN SERPL-MCNC: 17 MG/DL (ref 7–30)
CALCIUM SERPL-MCNC: 7.8 MG/DL (ref 8.5–10.1)
CHLORIDE SERPL-SCNC: 104 MMOL/L (ref 94–109)
CO2 SERPL-SCNC: 25 MMOL/L (ref 20–32)
CREAT SERPL-MCNC: 0.52 MG/DL (ref 0.52–1.04)
ERYTHROCYTE [DISTWIDTH] IN BLOOD BY AUTOMATED COUNT: 31.3 % (ref 10–15)
GFR SERPL CREATININE-BSD FRML MDRD: ABNORMAL ML/MIN/1.7M2
GLUCOSE BLDC GLUCOMTR-MCNC: 101 MG/DL (ref 70–99)
GLUCOSE BLDC GLUCOMTR-MCNC: 105 MG/DL (ref 70–99)
GLUCOSE BLDC GLUCOMTR-MCNC: 106 MG/DL (ref 70–99)
GLUCOSE BLDC GLUCOMTR-MCNC: 108 MG/DL (ref 70–99)
GLUCOSE BLDC GLUCOMTR-MCNC: 93 MG/DL (ref 70–99)
GLUCOSE BLDC GLUCOMTR-MCNC: 99 MG/DL (ref 70–99)
GLUCOSE SERPL-MCNC: 303 MG/DL (ref 70–99)
HCT VFR BLD AUTO: 30.7 % (ref 35–47)
HGB BLD-MCNC: 8.9 G/DL (ref 11.7–15.7)
MAGNESIUM SERPL-MCNC: 2.3 MG/DL (ref 1.6–2.3)
MCH RBC QN AUTO: 22 PG (ref 26.5–33)
MCHC RBC AUTO-ENTMCNC: 29 G/DL (ref 31.5–36.5)
MCV RBC AUTO: 76 FL (ref 78–100)
PHOSPHATE SERPL-MCNC: 2.9 MG/DL (ref 2.5–4.5)
PLATELET # BLD AUTO: 264 10E9/L (ref 150–450)
POTASSIUM SERPL-SCNC: 4.2 MMOL/L (ref 3.4–5.3)
RBC # BLD AUTO: 4.04 10E12/L (ref 3.8–5.2)
SODIUM SERPL-SCNC: 136 MMOL/L (ref 133–144)
WBC # BLD AUTO: 8.3 10E9/L (ref 4–11)

## 2017-02-28 PROCEDURE — 97116 GAIT TRAINING THERAPY: CPT | Mod: GP | Performed by: PHYSICAL THERAPIST

## 2017-02-28 PROCEDURE — 12000007 ZZH R&B INTERMEDIATE

## 2017-02-28 PROCEDURE — 97530 THERAPEUTIC ACTIVITIES: CPT | Mod: GO | Performed by: OCCUPATIONAL THERAPY ASSISTANT

## 2017-02-28 PROCEDURE — 40000901 ZZH STATISTIC WOC PT EDUCATION, 0-15 MIN

## 2017-02-28 PROCEDURE — 84100 ASSAY OF PHOSPHORUS: CPT | Performed by: INTERNAL MEDICINE

## 2017-02-28 PROCEDURE — 25000132 ZZH RX MED GY IP 250 OP 250 PS 637: Mod: GY | Performed by: HOSPITALIST

## 2017-02-28 PROCEDURE — 25000125 ZZHC RX 250: Performed by: PHYSICIAN ASSISTANT

## 2017-02-28 PROCEDURE — A9270 NON-COVERED ITEM OR SERVICE: HCPCS | Mod: GY | Performed by: HOSPITALIST

## 2017-02-28 PROCEDURE — 25000125 ZZHC RX 250: Performed by: INTERNAL MEDICINE

## 2017-02-28 PROCEDURE — 25000132 ZZH RX MED GY IP 250 OP 250 PS 637: Mod: GY | Performed by: PSYCHIATRY & NEUROLOGY

## 2017-02-28 PROCEDURE — 40000133 ZZH STATISTIC OT WARD VISIT: Performed by: OCCUPATIONAL THERAPY ASSISTANT

## 2017-02-28 PROCEDURE — 83735 ASSAY OF MAGNESIUM: CPT | Performed by: INTERNAL MEDICINE

## 2017-02-28 PROCEDURE — 40000239 ZZH STATISTIC VAT ROUNDS

## 2017-02-28 PROCEDURE — 25000128 H RX IP 250 OP 636: Performed by: INTERNAL MEDICINE

## 2017-02-28 PROCEDURE — A9270 NON-COVERED ITEM OR SERVICE: HCPCS | Mod: GY | Performed by: PSYCHIATRY & NEUROLOGY

## 2017-02-28 PROCEDURE — 85027 COMPLETE CBC AUTOMATED: CPT | Performed by: INTERNAL MEDICINE

## 2017-02-28 PROCEDURE — 40000193 ZZH STATISTIC PT WARD VISIT: Performed by: PHYSICAL THERAPIST

## 2017-02-28 PROCEDURE — 97535 SELF CARE MNGMENT TRAINING: CPT | Mod: GO | Performed by: OCCUPATIONAL THERAPY ASSISTANT

## 2017-02-28 PROCEDURE — 80048 BASIC METABOLIC PNL TOTAL CA: CPT | Performed by: INTERNAL MEDICINE

## 2017-02-28 PROCEDURE — 00000146 ZZHCL STATISTIC GLUCOSE BY METER IP

## 2017-02-28 PROCEDURE — 99233 SBSQ HOSP IP/OBS HIGH 50: CPT | Performed by: INTERNAL MEDICINE

## 2017-02-28 PROCEDURE — 97530 THERAPEUTIC ACTIVITIES: CPT | Mod: GP | Performed by: PHYSICAL THERAPIST

## 2017-02-28 RX ADMIN — PIPERACILLIN SODIUM,TAZOBACTAM SODIUM 3.38 G: 3; .375 INJECTION, POWDER, FOR SOLUTION INTRAVENOUS at 09:14

## 2017-02-28 RX ADMIN — LEVOTHYROXINE SODIUM 75 MCG: 75 TABLET ORAL at 09:14

## 2017-02-28 RX ADMIN — ALTEPLASE 2 MG: 2.2 INJECTION, POWDER, LYOPHILIZED, FOR SOLUTION INTRAVENOUS at 09:42

## 2017-02-28 RX ADMIN — PIPERACILLIN SODIUM,TAZOBACTAM SODIUM 3.38 G: 3; .375 INJECTION, POWDER, FOR SOLUTION INTRAVENOUS at 03:24

## 2017-02-28 RX ADMIN — I.V. FAT EMULSION 250 ML: 20 EMULSION INTRAVENOUS at 20:54

## 2017-02-28 RX ADMIN — OLANZAPINE 5 MG: 5 TABLET, ORALLY DISINTEGRATING ORAL at 20:53

## 2017-02-28 RX ADMIN — PIPERACILLIN SODIUM,TAZOBACTAM SODIUM 3.38 G: 3; .375 INJECTION, POWDER, FOR SOLUTION INTRAVENOUS at 20:53

## 2017-02-28 RX ADMIN — PIPERACILLIN SODIUM,TAZOBACTAM SODIUM 3.38 G: 3; .375 INJECTION, POWDER, FOR SOLUTION INTRAVENOUS at 16:42

## 2017-02-28 RX ADMIN — POTASSIUM CHLORIDE: 149 INJECTION, SOLUTION, CONCENTRATE INTRAVENOUS at 13:34

## 2017-02-28 NOTE — PLAN OF CARE
Problem: Goal Outcome Summary  Goal: Goal Outcome Summary  Outcome: No Change  VSS, Confused. Telephone consent for surgery obtained by niece and friend who share POA- 2 nurses verified consent is in the front of the chart. Lung sounds diminished. Bowel sounds active, passing gas. ENRIKE drain- flushed- yellow drainage. Denies pain. TPN 70 ml/hr. PICC line occluded- alteplase given.NPO with meds, and ice chips. Surgery scheduled for 5:30pm wednesday. Ambulates the hallway with PT assist of 1.

## 2017-02-28 NOTE — PLAN OF CARE
Problem: Goal Outcome Summary  Goal: Goal Outcome Summary  PT: Pt participated in transfers and gait, ambulated 300' with no AD, CGA at gait belt and Min A for balance at times. Requested a chair at 250' secondary to fatigue. Completes sit<>stand with supervision, up in chair, all needs in reach, chair alarm on upon PT exit. Recommend TCU at discharge.

## 2017-02-28 NOTE — PLAN OF CARE
Problem: Goal Outcome Summary  Goal: Goal Outcome Summary  Outcome: No Change  VSS, LS dim, +BS.  Confused this shift, easily reoriented.  Denies pain, voiding adequately, reports passing flatus, passing sm amts dark flakey stool.  Minimal output from ENRIKE, yellow/tan output.  Up ax1. Unable to draw labs from PICC line this am.

## 2017-02-28 NOTE — PROGRESS NOTES
[facility]    Hospitalist Progress Note    Date of Service (when I saw the patient): 02/28/2017    Assessment & Plan   Sherita Kenney is a 76 year old female who was admitted on 2/15/2017. Admitted with acute psychosis.     Summary:  Acute psychosis, better  Ileocecal mass, due for surgery tomorrow  Polymycrobial psoas abscess, drained and being treated  History of iron deficiency anemia, malnutrition, Klebsiella UTI and Hypernatremia, all better  Hypertension, treated  What I did today:  -History and exam    -    -      DVT Prophylaxis: Pneumatic Compression Devices  Code Status: Full Code    Disposition: Expected discharge in 6-7 days once surgery has healed.    Renny Ortiz MD    Interval History   Ms. Kenney is feeling ok. No abdominal pain.  Not eating other than ice chips. Breathing is ok, no chest pain, nausea or vomiting.  Voiding ok.  Notes edema in legs particularly, no orthopnea or pnd     -Data reviewed today: I reviewed all new labs and imaging results over the last 24 hours. I personally reviewed no images or EKG's today.    Physical Exam   Temp: 97.8  F (36.6  C) Temp src: Oral BP: 135/79 Pulse: 75 Heart Rate: 77 Resp: 16 SpO2: 100 % O2 Device: None (Room air)    Vitals:    02/25/17 0535 02/26/17 0631 02/27/17 0624   Weight: 58.5 kg (128 lb 15.5 oz) 57.8 kg (127 lb 6.8 oz) 56.1 kg (123 lb 10.9 oz)     Vital Signs with Ranges  Temp:  [97.4  F (36.3  C)-98  F (36.7  C)] 97.8  F (36.6  C)  Pulse:  [75] 75  Heart Rate:  [74-80] 77  Resp:  [14-16] 16  BP: (113-150)/() 135/79  SpO2:  [98 %-100 %] 100 %  I/O last 3 completed shifts:  In: 960 [I.V.:240]  Out: 40 [Drains:40]    Constitutional: Alert, cooperative  Respiratory: Lungs clear, a few dry rales at the bases, most clear with deep breathing  Cardiovascular: regular rhythm, normal S1 and S2, no S3 or murmurs. Rhythm strips all show NSR and she has no history of arrythmia  GI: some bowel sounds, not tedner  Skin/Integumen:  legs and hands edematous  Other:      Medications     parenteral nutrition - Clinimix E 70 mL/hr at 02/27/17 2348     IV fluid REPLACEMENT ONLY       IV infusion builder WITH additives 30 mL/hr at 02/28/17 1334       sodium chloride (PF)  10 mL Intracatheter Q8H     lipids  250 mL Intravenous Q24H     insulin aspart  1-12 Units Subcutaneous Q4H     levothyroxine  75 mcg Oral Daily     cyclobenzaprine  10 mg Oral TID     piperacillin-tazobactam  3.375 g Intravenous Q6H     OLANZapine zydis  5 mg Oral At Bedtime     sodium chloride (PF)  10 mL Irrigation Q8H       Data     Recent Labs  Lab 02/28/17  0925 02/27/17  0754 02/27/17  0700 02/26/17  0636 02/25/17  0635  02/23/17  0155   WBC 8.3 9.6  --  7.5 8.8  < > 18.1*   HGB 8.9* 9.7*  --  8.0* 7.7*  < > 9.7*   MCV 76* 76*  --  75* 75*  < > 73*    298  --  259 290  < > 421   INR  --   --  1.01  --  1.06  --  1.04     --  143 143 145*  < > 145*   POTASSIUM 4.2  --  3.5 3.5 3.4  < > 3.2*   CHLORIDE 104  --  109 108 110*  < > 109   CO2 25  --  29 28 29  < > 27   BUN 17  --  14 9 6*  < > 7   CR 0.52  --  0.54 0.68 0.59  < > 0.70   ANIONGAP 7  --  5 7 6  < > 9   SARITHA 7.8*  --  8.1* 7.8* 7.7*  < > 8.7   *  --  95 95 102*  < > 125*   ALBUMIN  --   --  2.1*  --  1.9*  --   --    PROTTOTAL  --   --  5.1*  --  4.7*  --   --    BILITOTAL  --   --  0.2  --  0.2  --   --    ALKPHOS  --   --  65  --  58  --   --    ALT  --   --  18  --  14  --   --    AST  --   --  29  --  21  --   --    < > = values in this interval not displayed.    Imaging:  No results found for this or any previous visit (from the past 24 hour(s)).

## 2017-02-28 NOTE — PROGRESS NOTES
"Colon and Rectal Surgery  Patient is more alert today. States she feels optimistic and eager to eat.   /79 (BP Location: Right arm)  Pulse 75  Temp 97.8  F (36.6  C) (Oral)  Resp 16  Ht 1.676 m (5' 6\")  Wt 56.1 kg (123 lb 10.9 oz)  SpO2 100%  BMI 19.96 kg/m2    Intake/Output Summary (Last 24 hours) at 02/28/17 1629  Last data filed at 02/28/17 1431   Gross per 24 hour   Intake              960 ml   Output               40 ml   Net              920 ml     Alert   No icterus  Abdomen: soft. ENRIKE with minimal drainage. No focal tenderness.    Impression: stable  Discussed plan for surgery -laparoscopic, possible open ileostomy and possible colonoscopy. Explained that the colonoscopy may not be possible since we cannot prep her. Discussed rationale that with stoma she would be able to eat and hopefully improve her nutrition and conditioning prior to resection. She expressed understanding and willingness to proceed.Situation has been discussed via phone with both POAs who agree with the plan.    Lindsay Peter MD  "

## 2017-02-28 NOTE — PROGRESS NOTES
Receive phone consent from Dinora Garrett (Friend- Co POA) and Thalia Gutierres ( Niece- Co POA) Ok for Laparoscopic possible open Ileostomy d/t obstructing colon mass.

## 2017-02-28 NOTE — PROGRESS NOTES
COLON & RECTAL SURGERY  PROGRESS NOTE    February 28, 2017    SUBJECTIVE:  The patient is doing well. Looking forward to being able to eat something again. Awaiting surgery.     OBJECTIVE:  Temp:  [97.4  F (36.3  C)-98.2  F (36.8  C)] 97.4  F (36.3  C)  Pulse:  [75-80] 75  Heart Rate:  [74-85] 79  Resp:  [16] 16  BP: (113-150)/() 113/63  SpO2:  [98 %-100 %] 99 %    Intake/Output Summary (Last 24 hours) at 02/28/17 0834  Last data filed at 02/28/17 0600   Gross per 24 hour   Intake             3729 ml   Output               20 ml   Net             3709 ml       GENERAL:  Awake, alert, no acute distress,   HEAD - Nomocephalic atraumatic  SCLERA anicteric  EXTREMITIES warm and well perfused  ABDOMEN:  Soft, non tender, non-distended,   INCISION:  C/d/i, ENRIKE with serous output    LABS:  Lab Results   Component Value Date    WBC 9.6 02/27/2017     Lab Results   Component Value Date    HGB 9.7 02/27/2017     Lab Results   Component Value Date    HCT 33.4 02/27/2017     Lab Results   Component Value Date     02/27/2017     Last Basic Metabolic Panel:  Lab Results   Component Value Date     02/27/2017      Lab Results   Component Value Date    POTASSIUM 3.5 02/27/2017     Lab Results   Component Value Date    CHLORIDE 109 02/27/2017     Lab Results   Component Value Date    SARITHA 8.1 02/27/2017     Lab Results   Component Value Date    CO2 29 02/27/2017     Lab Results   Component Value Date    BUN 14 02/27/2017     Lab Results   Component Value Date    CR 0.54 02/27/2017     Lab Results   Component Value Date    GLC 95 02/27/2017       ASSESSMENT/PLAN:75 yo F with partially obstructing ileocecal mass, s/p IR drainage of iliopsoas abscess  1. Plan for ileostomy with Dr. Peter tomorrow evening  2. Continue TPN and NPO  3. OOB, ambulate  4. Continue ENRIKE drain    Yessy Whalen PA-C  [facility]  Phone: [phone]

## 2017-02-28 NOTE — PLAN OF CARE
Problem: Goal Outcome Summary  Goal: Goal Outcome Summary  OT: Per plan established by the Occupational Therapist, the recommended discharge location is TCU. Pt completes sit to stands CGA, amb with pushing IV pole to/from bathroom SBA, toilet transfer CGA, SBA clothing management, SBA standing at sink ADLS.  Pt stated she was feeling much better today

## 2017-03-01 ENCOUNTER — SURGERY (OUTPATIENT)
Age: 77
End: 2017-03-01

## 2017-03-01 ENCOUNTER — ANESTHESIA EVENT (OUTPATIENT)
Dept: SURGERY | Facility: CLINIC | Age: 77
DRG: 329 | End: 2017-03-01
Payer: MEDICARE

## 2017-03-01 ENCOUNTER — APPOINTMENT (OUTPATIENT)
Dept: CARDIOLOGY | Facility: CLINIC | Age: 77
DRG: 329 | End: 2017-03-01
Attending: INTERNAL MEDICINE
Payer: MEDICARE

## 2017-03-01 ENCOUNTER — ANESTHESIA (OUTPATIENT)
Dept: SURGERY | Facility: CLINIC | Age: 77
DRG: 329 | End: 2017-03-01
Payer: MEDICARE

## 2017-03-01 LAB
CREAT SERPL-MCNC: 0.56 MG/DL (ref 0.52–1.04)
CREAT SERPL-MCNC: NORMAL MG/DL (ref 0.52–1.04)
GFR SERPL CREATININE-BSD FRML MDRD: NORMAL ML/MIN/1.7M2
GFR SERPL CREATININE-BSD FRML MDRD: NORMAL ML/MIN/1.7M2
GLUCOSE BLDC GLUCOMTR-MCNC: 100 MG/DL (ref 70–99)
GLUCOSE BLDC GLUCOMTR-MCNC: 102 MG/DL (ref 70–99)
GLUCOSE BLDC GLUCOMTR-MCNC: 117 MG/DL (ref 70–99)
GLUCOSE BLDC GLUCOMTR-MCNC: 151 MG/DL (ref 70–99)
GLUCOSE BLDC GLUCOMTR-MCNC: 169 MG/DL (ref 70–99)
GLUCOSE BLDC GLUCOMTR-MCNC: 89 MG/DL (ref 70–99)
GLUCOSE BLDC GLUCOMTR-MCNC: 94 MG/DL (ref 70–99)
HGB BLD-MCNC: 8.1 G/DL (ref 11.7–15.7)
POTASSIUM SERPL-SCNC: 3.5 MMOL/L (ref 3.4–5.3)
POTASSIUM SERPL-SCNC: NORMAL MMOL/L (ref 3.4–5.3)

## 2017-03-01 PROCEDURE — 25800025 ZZH RX 258: Performed by: ANESTHESIOLOGY

## 2017-03-01 PROCEDURE — 25000132 ZZH RX MED GY IP 250 OP 250 PS 637: Mod: GY | Performed by: INTERNAL MEDICINE

## 2017-03-01 PROCEDURE — 71000014 ZZH RECOVERY PHASE 1 LEVEL 2 FIRST HR: Performed by: COLON & RECTAL SURGERY

## 2017-03-01 PROCEDURE — 25000132 ZZH RX MED GY IP 250 OP 250 PS 637: Mod: GY | Performed by: ANESTHESIOLOGY

## 2017-03-01 PROCEDURE — 25000128 H RX IP 250 OP 636: Performed by: INTERNAL MEDICINE

## 2017-03-01 PROCEDURE — 25000125 ZZHC RX 250: Performed by: PHYSICIAN ASSISTANT

## 2017-03-01 PROCEDURE — 94660 CPAP INITIATION&MGMT: CPT

## 2017-03-01 PROCEDURE — 84132 ASSAY OF SERUM POTASSIUM: CPT | Performed by: INTERNAL MEDICINE

## 2017-03-01 PROCEDURE — 88341 IMHCHEM/IMCYTCHM EA ADD ANTB: CPT | Performed by: COLON & RECTAL SURGERY

## 2017-03-01 PROCEDURE — 93005 ELECTROCARDIOGRAM TRACING: CPT

## 2017-03-01 PROCEDURE — 40000239 ZZH STATISTIC VAT ROUNDS

## 2017-03-01 PROCEDURE — 25000128 H RX IP 250 OP 636: Performed by: NURSE ANESTHETIST, CERTIFIED REGISTERED

## 2017-03-01 PROCEDURE — 00000159 ZZHCL STATISTIC H-SEND OUTS PREP: Performed by: COLON & RECTAL SURGERY

## 2017-03-01 PROCEDURE — 82565 ASSAY OF CREATININE: CPT | Performed by: INTERNAL MEDICINE

## 2017-03-01 PROCEDURE — 25000125 ZZHC RX 250: Performed by: NURSE ANESTHETIST, CERTIFIED REGISTERED

## 2017-03-01 PROCEDURE — C9399 UNCLASSIFIED DRUGS OR BIOLOG: HCPCS | Performed by: NURSE ANESTHETIST, CERTIFIED REGISTERED

## 2017-03-01 PROCEDURE — 25000132 ZZH RX MED GY IP 250 OP 250 PS 637: Mod: GY | Performed by: HOSPITALIST

## 2017-03-01 PROCEDURE — 25000125 ZZHC RX 250: Performed by: COLON & RECTAL SURGERY

## 2017-03-01 PROCEDURE — 25000566 ZZH SEVOFLURANE, EA 15 MIN: Performed by: COLON & RECTAL SURGERY

## 2017-03-01 PROCEDURE — 40000264 ECHO COMPLETE WITH LUMASON

## 2017-03-01 PROCEDURE — 37000008 ZZH ANESTHESIA TECHNICAL FEE, 1ST 30 MIN: Performed by: COLON & RECTAL SURGERY

## 2017-03-01 PROCEDURE — 36000093 ZZH SURGERY LEVEL 4 1ST 30 MIN: Performed by: COLON & RECTAL SURGERY

## 2017-03-01 PROCEDURE — S0074 INJECTION, CEFOTETAN DISODIU: HCPCS | Performed by: PHYSICIAN ASSISTANT

## 2017-03-01 PROCEDURE — 81288 MLH1 GENE: CPT | Performed by: PATHOLOGY

## 2017-03-01 PROCEDURE — 93010 ELECTROCARDIOGRAM REPORT: CPT | Performed by: INTERNAL MEDICINE

## 2017-03-01 PROCEDURE — 0DBF8ZX EXCISION OF RIGHT LARGE INTESTINE, VIA NATURAL OR ARTIFICIAL OPENING ENDOSCOPIC, DIAGNOSTIC: ICD-10-PCS | Performed by: COLON & RECTAL SURGERY

## 2017-03-01 PROCEDURE — 27210339 ZZH CIRCUIT HUMIDITY W/CPAP BIP

## 2017-03-01 PROCEDURE — 88342 IMHCHEM/IMCYTCHM 1ST ANTB: CPT | Performed by: COLON & RECTAL SURGERY

## 2017-03-01 PROCEDURE — 25500064 ZZH RX 255 OP 636: Performed by: INTERNAL MEDICINE

## 2017-03-01 PROCEDURE — 21400002 ZZH R&B CCU CICU CRITICAL

## 2017-03-01 PROCEDURE — 85018 HEMOGLOBIN: CPT | Performed by: ANESTHESIOLOGY

## 2017-03-01 PROCEDURE — 88342 IMHCHEM/IMCYTCHM 1ST ANTB: CPT | Mod: 26,59 | Performed by: COLON & RECTAL SURGERY

## 2017-03-01 PROCEDURE — 37000009 ZZH ANESTHESIA TECHNICAL FEE, EACH ADDTL 15 MIN: Performed by: COLON & RECTAL SURGERY

## 2017-03-01 PROCEDURE — 36000063 ZZH SURGERY LEVEL 4 EA 15 ADDTL MIN: Performed by: COLON & RECTAL SURGERY

## 2017-03-01 PROCEDURE — 88305 TISSUE EXAM BY PATHOLOGIST: CPT | Performed by: COLON & RECTAL SURGERY

## 2017-03-01 PROCEDURE — A9270 NON-COVERED ITEM OR SERVICE: HCPCS | Mod: GY | Performed by: INTERNAL MEDICINE

## 2017-03-01 PROCEDURE — 25000125 ZZHC RX 250

## 2017-03-01 PROCEDURE — 93306 TTE W/DOPPLER COMPLETE: CPT | Mod: 26 | Performed by: INTERNAL MEDICINE

## 2017-03-01 PROCEDURE — 40000275 ZZH STATISTIC RCP TIME EA 10 MIN

## 2017-03-01 PROCEDURE — 0D1B4Z4 BYPASS ILEUM TO CUTANEOUS, PERCUTANEOUS ENDOSCOPIC APPROACH: ICD-10-PCS | Performed by: COLON & RECTAL SURGERY

## 2017-03-01 PROCEDURE — 99233 SBSQ HOSP IP/OBS HIGH 50: CPT | Performed by: INTERNAL MEDICINE

## 2017-03-01 PROCEDURE — 88341 IMHCHEM/IMCYTCHM EA ADD ANTB: CPT | Mod: 26,59 | Performed by: COLON & RECTAL SURGERY

## 2017-03-01 PROCEDURE — 27210794 ZZH OR GENERAL SUPPLY STERILE: Performed by: COLON & RECTAL SURGERY

## 2017-03-01 PROCEDURE — A9270 NON-COVERED ITEM OR SERVICE: HCPCS | Mod: GY | Performed by: ANESTHESIOLOGY

## 2017-03-01 PROCEDURE — 40000257 ZZH STATISTIC CONSULT NO CHARGE VASC ACCESS

## 2017-03-01 PROCEDURE — 00000146 ZZHCL STATISTIC GLUCOSE BY METER IP

## 2017-03-01 PROCEDURE — 88305 TISSUE EXAM BY PATHOLOGIST: CPT | Mod: 26 | Performed by: COLON & RECTAL SURGERY

## 2017-03-01 PROCEDURE — 40000169 ZZH STATISTIC PRE-PROCEDURE ASSESSMENT I: Performed by: COLON & RECTAL SURGERY

## 2017-03-01 PROCEDURE — A9270 NON-COVERED ITEM OR SERVICE: HCPCS | Mod: GY | Performed by: HOSPITALIST

## 2017-03-01 PROCEDURE — 71000015 ZZH RECOVERY PHASE 1 LEVEL 2 EA ADDTL HR: Performed by: COLON & RECTAL SURGERY

## 2017-03-01 RX ORDER — CEFOTETAN DISODIUM 1 G/10ML
1 INJECTION, POWDER, FOR SOLUTION INTRAMUSCULAR; INTRAVENOUS
Status: COMPLETED | OUTPATIENT
Start: 2017-03-01 | End: 2017-03-01

## 2017-03-01 RX ORDER — FENTANYL CITRATE 50 UG/ML
INJECTION, SOLUTION INTRAMUSCULAR; INTRAVENOUS PRN
Status: DISCONTINUED | OUTPATIENT
Start: 2017-03-01 | End: 2017-03-01

## 2017-03-01 RX ORDER — NITROGLYCERIN 0.4 MG/1
0.4 TABLET SUBLINGUAL EVERY 5 MIN PRN
Status: DISCONTINUED | OUTPATIENT
Start: 2017-03-01 | End: 2017-03-09 | Stop reason: HOSPADM

## 2017-03-01 RX ORDER — ACETAMINOPHEN 325 MG/1
975 TABLET ORAL ONCE
Status: DISCONTINUED | OUTPATIENT
Start: 2017-03-01 | End: 2017-03-05

## 2017-03-01 RX ORDER — MORPHINE SULFATE 2 MG/ML
1-2 INJECTION, SOLUTION INTRAMUSCULAR; INTRAVENOUS EVERY 4 HOURS PRN
Status: DISCONTINUED | OUTPATIENT
Start: 2017-03-01 | End: 2017-03-09 | Stop reason: HOSPADM

## 2017-03-01 RX ORDER — BUPIVACAINE HYDROCHLORIDE AND EPINEPHRINE 2.5; 5 MG/ML; UG/ML
INJECTION, SOLUTION INFILTRATION; PERINEURAL PRN
Status: DISCONTINUED | OUTPATIENT
Start: 2017-03-01 | End: 2017-03-01 | Stop reason: HOSPADM

## 2017-03-01 RX ORDER — FENTANYL CITRATE 50 UG/ML
50-100 INJECTION, SOLUTION INTRAMUSCULAR; INTRAVENOUS
Status: DISCONTINUED | OUTPATIENT
Start: 2017-03-01 | End: 2017-03-02

## 2017-03-01 RX ORDER — ONDANSETRON 2 MG/ML
4 INJECTION INTRAMUSCULAR; INTRAVENOUS EVERY 30 MIN PRN
Status: DISCONTINUED | OUTPATIENT
Start: 2017-03-01 | End: 2017-03-01 | Stop reason: HOSPADM

## 2017-03-01 RX ORDER — ONDANSETRON 2 MG/ML
4 INJECTION INTRAMUSCULAR; INTRAVENOUS EVERY 6 HOURS PRN
Status: DISCONTINUED | OUTPATIENT
Start: 2017-03-01 | End: 2017-03-01

## 2017-03-01 RX ORDER — NEOSTIGMINE METHYLSULFATE 1 MG/ML
VIAL (ML) INJECTION PRN
Status: DISCONTINUED | OUTPATIENT
Start: 2017-03-01 | End: 2017-03-01

## 2017-03-01 RX ORDER — GLYCOPYRROLATE 0.2 MG/ML
INJECTION, SOLUTION INTRAMUSCULAR; INTRAVENOUS PRN
Status: DISCONTINUED | OUTPATIENT
Start: 2017-03-01 | End: 2017-03-01

## 2017-03-01 RX ORDER — MEPERIDINE HYDROCHLORIDE 25 MG/ML
12.5 INJECTION INTRAMUSCULAR; INTRAVENOUS; SUBCUTANEOUS EVERY 5 MIN PRN
Status: DISCONTINUED | OUTPATIENT
Start: 2017-03-01 | End: 2017-03-01 | Stop reason: HOSPADM

## 2017-03-01 RX ORDER — SODIUM CHLORIDE, SODIUM LACTATE, POTASSIUM CHLORIDE, CALCIUM CHLORIDE 600; 310; 30; 20 MG/100ML; MG/100ML; MG/100ML; MG/100ML
INJECTION, SOLUTION INTRAVENOUS CONTINUOUS
Status: DISCONTINUED | OUTPATIENT
Start: 2017-03-01 | End: 2017-03-02

## 2017-03-01 RX ORDER — DEXAMETHASONE SODIUM PHOSPHATE 4 MG/ML
4 INJECTION, SOLUTION INTRA-ARTICULAR; INTRALESIONAL; INTRAMUSCULAR; INTRAVENOUS; SOFT TISSUE ONCE
Status: DISCONTINUED | OUTPATIENT
Start: 2017-03-01 | End: 2017-03-01 | Stop reason: HOSPADM

## 2017-03-01 RX ORDER — ONDANSETRON 4 MG/1
4 TABLET, ORALLY DISINTEGRATING ORAL EVERY 30 MIN PRN
Status: DISCONTINUED | OUTPATIENT
Start: 2017-03-01 | End: 2017-03-01 | Stop reason: HOSPADM

## 2017-03-01 RX ORDER — LIDOCAINE 40 MG/G
CREAM TOPICAL
Status: DISCONTINUED | OUTPATIENT
Start: 2017-03-01 | End: 2017-03-01

## 2017-03-01 RX ORDER — DEXAMETHASONE SODIUM PHOSPHATE 4 MG/ML
INJECTION, SOLUTION INTRA-ARTICULAR; INTRALESIONAL; INTRAMUSCULAR; INTRAVENOUS; SOFT TISSUE PRN
Status: DISCONTINUED | OUTPATIENT
Start: 2017-03-01 | End: 2017-03-01

## 2017-03-01 RX ORDER — FENTANYL CITRATE 50 UG/ML
25-50 INJECTION, SOLUTION INTRAMUSCULAR; INTRAVENOUS
Status: DISCONTINUED | OUTPATIENT
Start: 2017-03-01 | End: 2017-03-01 | Stop reason: HOSPADM

## 2017-03-01 RX ORDER — PROPOFOL 10 MG/ML
INJECTION, EMULSION INTRAVENOUS PRN
Status: DISCONTINUED | OUTPATIENT
Start: 2017-03-01 | End: 2017-03-01

## 2017-03-01 RX ORDER — CEFOTETAN DISODIUM 1 G/10ML
1 INJECTION, POWDER, FOR SOLUTION INTRAMUSCULAR; INTRAVENOUS SEE ADMIN INSTRUCTIONS
Status: DISCONTINUED | OUTPATIENT
Start: 2017-03-01 | End: 2017-03-02

## 2017-03-01 RX ORDER — ONDANSETRON 2 MG/ML
INJECTION INTRAMUSCULAR; INTRAVENOUS PRN
Status: DISCONTINUED | OUTPATIENT
Start: 2017-03-01 | End: 2017-03-01

## 2017-03-01 RX ORDER — HYDROMORPHONE HYDROCHLORIDE 1 MG/ML
.3-.5 INJECTION, SOLUTION INTRAMUSCULAR; INTRAVENOUS; SUBCUTANEOUS EVERY 5 MIN PRN
Status: DISCONTINUED | OUTPATIENT
Start: 2017-03-01 | End: 2017-03-01 | Stop reason: HOSPADM

## 2017-03-01 RX ORDER — SODIUM CHLORIDE, SODIUM LACTATE, POTASSIUM CHLORIDE, CALCIUM CHLORIDE 600; 310; 30; 20 MG/100ML; MG/100ML; MG/100ML; MG/100ML
INJECTION, SOLUTION INTRAVENOUS CONTINUOUS
Status: DISCONTINUED | OUTPATIENT
Start: 2017-03-01 | End: 2017-03-01 | Stop reason: HOSPADM

## 2017-03-01 RX ADMIN — SUGAMMADEX 100 MG: 100 INJECTION, SOLUTION INTRAVENOUS at 21:28

## 2017-03-01 RX ADMIN — BUPIVACAINE HYDROCHLORIDE AND EPINEPHRINE BITARTRATE 10 ML: 2.5; .005 INJECTION, SOLUTION INFILTRATION; PERINEURAL at 20:39

## 2017-03-01 RX ADMIN — SULFUR HEXAFLUORIDE 5 ML: KIT at 13:33

## 2017-03-01 RX ADMIN — PIPERACILLIN SODIUM,TAZOBACTAM SODIUM 3.38 G: 3; .375 INJECTION, POWDER, FOR SOLUTION INTRAVENOUS at 09:02

## 2017-03-01 RX ADMIN — PIPERACILLIN SODIUM,TAZOBACTAM SODIUM 3.38 G: 3; .375 INJECTION, POWDER, FOR SOLUTION INTRAVENOUS at 03:45

## 2017-03-01 RX ADMIN — LEVOTHYROXINE SODIUM 75 MCG: 75 TABLET ORAL at 09:02

## 2017-03-01 RX ADMIN — SODIUM CHLORIDE, POTASSIUM CHLORIDE, SODIUM LACTATE AND CALCIUM CHLORIDE: 600; 310; 30; 20 INJECTION, SOLUTION INTRAVENOUS at 17:14

## 2017-03-01 RX ADMIN — ONDANSETRON 4 MG: 2 INJECTION INTRAMUSCULAR; INTRAVENOUS at 20:02

## 2017-03-01 RX ADMIN — FENTANYL CITRATE 50 MCG: 50 INJECTION, SOLUTION INTRAMUSCULAR; INTRAVENOUS at 18:13

## 2017-03-01 RX ADMIN — PIPERACILLIN SODIUM,TAZOBACTAM SODIUM 3.38 G: 3; .375 INJECTION, POWDER, FOR SOLUTION INTRAVENOUS at 14:58

## 2017-03-01 RX ADMIN — RACEPINEPHRINE HYDROCHLORIDE 0.5 ML: 11.25 SOLUTION RESPIRATORY (INHALATION) at 21:27

## 2017-03-01 RX ADMIN — CYCLOBENZAPRINE HYDROCHLORIDE 10 MG: 10 TABLET, FILM COATED ORAL at 09:02

## 2017-03-01 RX ADMIN — PROPOFOL 70 MG: 10 INJECTION, EMULSION INTRAVENOUS at 18:13

## 2017-03-01 RX ADMIN — FENTANYL CITRATE 50 MCG: 50 INJECTION, SOLUTION INTRAMUSCULAR; INTRAVENOUS at 18:44

## 2017-03-01 RX ADMIN — CEFOTETAN DISODIUM 1 G: 1 INJECTION, POWDER, FOR SOLUTION INTRAMUSCULAR; INTRAVENOUS at 18:36

## 2017-03-01 RX ADMIN — ASCORBIC ACID, VITAMIN A PALMITATE, CHOLECALCIFEROL, THIAMINE HYDROCHLORIDE, RIBOFLAVIN-5 PHOSPHATE SODIUM, PYRIDOXINE HYDROCHLORIDE, NIACINAMIDE, DEXPANTHENOL, ALPHA-TOCOPHEROL ACETATE, VITAMIN K1, FOLIC ACID, BIOTIN, CYANOCOBALAMIN: 200; 3300; 200; 6; 3.6; 6; 40; 15; 10; 150; 600; 60; 5 INJECTION, SOLUTION INTRAVENOUS at 03:45

## 2017-03-01 RX ADMIN — GLYCOPYRROLATE 0.8 MG: 0.2 INJECTION, SOLUTION INTRAMUSCULAR; INTRAVENOUS at 20:33

## 2017-03-01 RX ADMIN — NEOSTIGMINE METHYLSULFATE 4 MG: 1 INJECTION INTRAMUSCULAR; INTRAVENOUS; SUBCUTANEOUS at 20:33

## 2017-03-01 RX ADMIN — ROCURONIUM BROMIDE 20 MG: 10 INJECTION INTRAVENOUS at 18:58

## 2017-03-01 RX ADMIN — ROCURONIUM BROMIDE 20 MG: 10 INJECTION INTRAVENOUS at 18:42

## 2017-03-01 RX ADMIN — ROCURONIUM BROMIDE 30 MG: 10 INJECTION INTRAVENOUS at 18:13

## 2017-03-01 RX ADMIN — DEXAMETHASONE SODIUM PHOSPHATE 4 MG: 4 INJECTION, SOLUTION INTRAMUSCULAR; INTRAVENOUS at 21:26

## 2017-03-01 ASSESSMENT — ENCOUNTER SYMPTOMS
SEIZURES: 0
ORTHOPNEA: 0

## 2017-03-01 ASSESSMENT — COPD QUESTIONNAIRES: COPD: 0

## 2017-03-01 ASSESSMENT — LIFESTYLE VARIABLES: TOBACCO_USE: 1

## 2017-03-01 NOTE — PROGRESS NOTES
"[facility] Nurse Ostomy Marking and Education         Data:   History:    Hx of ileal cecal mass. Pt with hx polio and has no use of Lt arm and hand    Pt referred by Dr. Peter  Who is present for marking and education: Patient and POA  Type of ostomy surgery:  Ileal cecal resection and loop ileostomy  Abdomen:  When standing abd is rounded and semi-soft. When lying abd wall is flat with skin wrinkles. Pt has deep crease that transverses abd wall just above level of umbilicus.  IR drain intact            Intervention:     Patient's chart evaluated.      Assessments done today:  Pt observed lying, sitting and standing.     Education: Reviewed upcoming surgery, stoma construction,post-op expectations, general Ilesotomy cares/concerns including:,diet, bathing, odor, output,  activity, appliances, expected output, dehydration, food blockage         And emptying    Patient marked with \"x\" using surgical marker and scratch method then allowed to dry 3 minutes and sealed with 3M No Sting Skin Prep. Pt marked apex of  RLQ abd bulge that allows for adequate visualization and surrounding pouching surface       Assessment:       Learning needs/ comprehension: Ready to proceed with OR         Plan:     Plan:   ? Surgery scheduled for: Tomorrow on 3-1-17 with Dr Lindsay Peter    Will plan to follow patient post operatively.       EDU stat only    "

## 2017-03-01 NOTE — PROGRESS NOTES
Patient's sister Nat called regarding her concerns about not getting general information updates. I had called and confirmed with our patient relations department the POA's and after chart review see that Nat may have updates. I spoke to patient and Dinora that Nat had called asking about updates and that per chart review see that is ok. Dinora and patient confirm Nat and her  may have general updates. I informed patient and Dinora that I would give Nat the code 1048 to ease calling in for information. I called Nat back and explained I spoke to patient and Dinora and provided her with the code 1048 to help in her obtaining updates on her sister.

## 2017-03-01 NOTE — PROGRESS NOTES
[facility]    Hospitalist Progress Note    Date of Service (when I saw the patient): 03/01/2017    Assessment & Plan   Sherita Kenney is a 76 year old female who was admitted on 2/15/2017. Admitted with acute psychosis    Summary:  Acute psychosis, resolved  Ileocecal mass, due to have surgery today  No history of coronary disease or previous MI.  Normal Exercise tolerance before admission. History of treated hypertension and former smoker. ECG of today shows lack of R waves in V1 and V2 which is different than the ECG from Dr. Martinez's office of 5/11/2011    What I did today:  History and exam  I as able to get the old ECG from Dr. Martinez's office  I spoke to the Anesthesiologist and will order an Echocardiogram    -    -      DVT Prophylaxis: Pneumatic Compression Devices  Code Status: Full Code    Disposition: Expected discharge after recovered from surgery. .    Renny Ortiz MD    Interval History   Ms. Kenney is feeling well. Slight right sided abdominal pain. No history of a MI or angina. The two weeks before admission, she was incapacitated, but before then she could do her house work and walk and climb stairs. She is much better than the two weeks before admission now and can walk the halls with slight difficulty.  No chest,or head pain.  Breathing is ok. No nausea or vomiting. She is anxious to have her surgery    -Data reviewed today: I reviewed all new labs and imaging results over the last 24 hours. I personally reviewed.the ECG of today and of 5/11/2011 and the lack of R waves in V1 and V2 is new.  Please see the echocardiogram report, no signs of an anterior MI as per the ECG.     Physical Exam   Temp: 98.1  F (36.7  C) Temp src: Oral BP: 123/71 Pulse: 75 Heart Rate: 74 Resp: 16 SpO2: 100 % O2 Device: None (Room air)    Vitals:    02/26/17 0631 02/27/17 0624 03/01/17 0630   Weight: 57.8 kg (127 lb 6.8 oz) 56.1 kg (123 lb 10.9 oz) 52.8 kg (116 lb 6.5 oz)     Vital Signs with  Ranges  Temp:  [97.1  F (36.2  C)-98.1  F (36.7  C)] 98.1  F (36.7  C)  Pulse:  [75] 75  Heart Rate:  [71-77] 74  Resp:  [16] 16  BP: (105-135)/(50-79) 123/71  SpO2:  [95 %-100 %] 100 %  I/O last 3 completed shifts:  In: 987 [I.V.:987]  Out: 1090 [Urine:1050; Drains:40]    Constitutional: Alert, cooperative  Respiratory: Lungs have some coarsening of breath sounds posteriorly, no rales, no wheezing  Cardiovascular: regular rhythm, normal S1 and S2, no S3 or murmurs. Legs are puffy, but nothing pits  GI: bowel sounds are present, tender in the right abdomen  Skin/Integumen: no rashes  Other:      Medications     lactated ringers       parenteral nutrition - Clinimix E 70 mL/hr at 03/01/17 0345     IV fluid REPLACEMENT ONLY       IV infusion builder WITH additives 10 mL/hr at 03/01/17 0900       sodium chloride (PF)  10 mL Intracatheter Q8H     lipids  250 mL Intravenous Q24H     insulin aspart  1-12 Units Subcutaneous Q4H     levothyroxine  75 mcg Oral Daily     cyclobenzaprine  10 mg Oral TID     piperacillin-tazobactam  3.375 g Intravenous Q6H     OLANZapine zydis  5 mg Oral At Bedtime     sodium chloride (PF)  10 mL Irrigation Q8H       Data     Recent Labs  Lab 03/01/17  0755 03/01/17  0545 02/28/17  0925 02/27/17  0754 02/27/17  0700 02/26/17  0636 02/25/17  0635  02/23/17  0155   WBC  --   --  8.3 9.6  --  7.5 8.8  < > 18.1*   HGB  --   --  8.9* 9.7*  --  8.0* 7.7*  < > 9.7*   MCV  --   --  76* 76*  --  75* 75*  < > 73*   PLT  --   --  264 298  --  259 290  < > 421   INR  --   --   --   --  1.01  --  1.06  --  1.04   NA  --   --  136  --  143 143 145*  < > 145*   POTASSIUM 3.5 Canceled, Test credited Questionable specimenCALLED TO [facility] ON [facility] AT 0722 4.2  --  3.5 3.5 3.4  < > 3.2*   CHLORIDE  --   --  104  --  109 108 110*  < > 109   CO2  --   --  25  --  29 28 29  < > 27   BUN  --   --  17  --  14 9 6*  < > 7   CR 0.56 Canceled, Test credited Questionable specimenCALLED TO [facility] ON 33 AT 0715CORRECTED ON 03/01  AT 0722: PREVIOUSLY REPORTED AS 0.57 0.52  --  0.54 0.68 0.59  < > 0.70   ANIONGAP  --   --  7  --  5 7 6  < > 9   SARITHA  --   --  7.8*  --  8.1* 7.8* 7.7*  < > 8.7   GLC  --   --  303*  --  95 95 102*  < > 125*   ALBUMIN  --   --   --   --  2.1*  --  1.9*  --   --    PROTTOTAL  --   --   --   --  5.1*  --  4.7*  --   --    BILITOTAL  --   --   --   --  0.2  --  0.2  --   --    ALKPHOS  --   --   --   --  65  --  58  --   --    ALT  --   --   --   --  18  --  14  --   --    AST  --   --   --   --  29  --  21  --   --    < > = values in this interval not displayed.    Imaging:  No results found for this or any previous visit (from the past 24 hour(s)).

## 2017-03-01 NOTE — PROVIDER NOTIFICATION
JOHN Whalen PA-C @ 16:48 on 3/1/17--No need to taper TPN off for surgery.  Keep TPN running as ordered.  Rozina Gusman [facility]

## 2017-03-01 NOTE — PLAN OF CARE
Problem: Goal Outcome Summary  Goal: Goal Outcome Summary  OT: Attempted to see patient however per conversation with RN, patient just about ready to leave for surgery.

## 2017-03-01 NOTE — PLAN OF CARE
Problem: Goal Outcome Summary  Goal: Goal Outcome Summary  Outcome: Improving  A&O. LUE extremity hemiparesis r/t history of polio. Bowel sounds hypoactive. Pt NPO, TPN running. VSS. ENRIKE drain flushed. Up with assist of 1. C/o hip and coccyx pain, decreased with repositioning in bed. Echocardiogram completed r/t provider noting irregular heartbeat. Pt in shower in preparation for surgery. Plan to prepare for surgery, anticipated at 5:30 PM.

## 2017-03-01 NOTE — ANESTHESIA PREPROCEDURE EVALUATION
Procedure: Procedure(s):  LAPAROSCOPIC ILEOSTOMY  ILEOSTOMY  COLONOSCOPY  Preop diagnosis: COLONIC OBSTRUCTION     Allergies   Allergen Reactions     Wool Fiber Unknown     History reviewed. No pertinent past medical history.  History reviewed. No pertinent past surgical history.  Prior to Admission medications    Medication Sig Start Date End Date Taking? Authorizing Provider   PRAVASTATIN SODIUM PO Take 20 mg by mouth daily   Yes Unknown, Entered By History   SUMATRIPTAN SUCCINATE PO Take 25 mg by mouth every 8 hours as needed for migraine   Yes Unknown, Entered By History   Biotin (BIOTIN 5000) 5 MG CAPS Take 1 capsule by mouth daily.    Reported, Patient   Multiple Vitamins-Minerals (HAIR/SKIN/NAILS PO) Take 1 tablet by mouth daily.    Reported, Patient   Prenatal MV-Min-Fe Fum-FA-DHA (PRENATAL 1 PO) Take 1 tablet by mouth daily.    Reported, Patient   pyridostigmine (MESTINON) 180 MG CR tablet Take 180 mg by mouth daily.    Reported, Patient   cyclobenzaprine (FLEXERIL) 10 MG tablet Take 10 mg by mouth 3 times daily as needed     Reported, Patient   Glucosamine Sulfate--500 MG TABS Take 1,500 mg by mouth daily.    Reported, Patient   Verapamil HCl 120 MG CP24 Take 1 capsule by mouth daily.    Reported, Patient   Travoprost (TRAVATAN Z) 0.004 % SOLN Apply 1 drop to eye. In each eye QHS    Reported, Patient   Vitamin A 8000 UNIT CAPS Take 1 capsule by mouth daily.    Reported, Patient   methylphenidate (RITALIN) 10 MG tablet Take 20 mg by mouth 2 times daily     Reported, Patient   levothyroxine (SYNTHROID, LEVOTHROID) 75 MCG tablet Take 75 mcg by mouth daily.    Reported, Patient   Cholecalciferol (VITAMIN D-3) 5000 UNIT TABS Take 1 tablet by mouth daily.    Reported, Patient   Calcium-Magnesium 333-167 MG TABS Take 2 tablets by mouth daily.    Reported, Patient   ferrous gluconate (FERGON) 324 (38 FE) MG tablet Take 1 tablet by mouth daily (with lunch).    Reported, Patient   ascorbic acid (VITAMIN C)  1000 MG TABS Take 1,000 mg by mouth daily.    Reported, Patient   B-Complex TABS Take 1 tablet by mouth daily.    Reported, Patient     Current Facility-Administered Medications Ordered in Epic   Medication Dose Route Frequency Last Rate Last Dose     cefoTEtan (CEFOTAN) 1 g vial to attach to  ml bag  1 g Intravenous Pre-Op/Pre-procedure x 1 dose         cefoTEtan (CEFOTAN) 1 g vial to attach to  ml bag  1 g Intravenous See Admin Instructions         acetaminophen (TYLENOL) tablet 975 mg  975 mg Oral Once         lidocaine 1 % 1 mL  1 mL Other Q1H PRN         sodium chloride (PF) 0.9% PF flush 3 mL  3 mL Intracatheter Q1H PRN         lactated ringers infusion   Intravenous Continuous         lidocaine 1 % 1 mL  1 mL Other Q1H PRN         sodium chloride (PF) 0.9% PF flush 3 mL  3 mL Intracatheter Q8H         lactated ringers infusion   Intravenous Continuous         parenteral nutrition - Clinimix E   CENTRAL LINE IV Continuous 70 mL/hr at 03/01/17 1610       [Auto Hold] hypromellose-dextran (ARTIFICAL TEARS) ophthalmic solution 2-3 drop  2-3 drop Ophthalmic Q1H PRN         [Auto Hold] sodium chloride (PF) 0.9% PF flush 10-20 mL  10-20 mL Intracatheter Q1H PRN   20 mL at 02/26/17 0624     [Auto Hold] sodium chloride (PF) 0.9% PF flush 10 mL  10 mL Intracatheter Q8H   10 mL at 02/28/17 2054     [Auto Hold] lipids (INTRALIPID) 20 % infusion 250 mL  250 mL Intravenous Q24H 20.8 mL/hr at 03/01/17 0900 250 mL at 03/01/17 0900     dextrose 10 % 1,000 mL infusion   Intravenous Continuous PRN         [Auto Hold] glucose 40 % gel 15-30 g  15-30 g Oral Q15 Min PRN        Or     [Auto Hold] dextrose 50 % injection 25-50 mL  25-50 mL Intravenous Q15 Min PRN        Or     [Auto Hold] glucagon injection 1 mg  1 mg Subcutaneous Q15 Min PRN         [Auto Hold] insulin aspart (NovoLOG) inj (RAPID ACTING)  1-12 Units Subcutaneous Q4H   1 Units at 02/24/17 2146     [Auto Hold] potassium phosphate 15 mmol in D5W 250 mL  intermittent infusion  15 mmol Intravenous Daily PRN 0 mL/hr at 02/26/17 1422 15 mmol at 02/26/17 1630     [Auto Hold] potassium phosphate 20 mmol in D5W 500 mL intermittent infusion  20 mmol Intravenous Q6H  mL/hr at 02/24/17 1621 20 mmol at 02/24/17 1621     [Auto Hold] potassium phosphate 20 mmol in D5W 250 mL intermittent infusion  20 mmol Intravenous Q6H PRN         [Auto Hold] potassium phosphate 25 mmol in D5W 500 mL intermittent infusion  25 mmol Intravenous Q8H PRN         dextrose 5% and 0.2% NaCl 1,000 mL with potassium chloride 20 mEq/L infusion   Intravenous Continuous 10 mL/hr at 03/01/17 1400       sodium chloride (PF) 0.9% PF flush 3 mL  3 mL Intracatheter Q1H PRN         [Auto Hold] levothyroxine (SYNTHROID/LEVOTHROID) tablet 75 mcg  75 mcg Oral Daily   75 mcg at 03/01/17 0902     [Auto Hold] cyclobenzaprine (FLEXERIL) tablet 10 mg  10 mg Oral TID   10 mg at 03/01/17 0902     [Auto Hold] naloxone (NARCAN) injection 0.1-0.4 mg  0.1-0.4 mg Intravenous Q2 Min PRN         [Auto Hold] lidocaine 1 % 1 mL  1 mL Other Q1H PRN         [Auto Hold] lidocaine (LMX4) cream   Topical Q1H PRN         [Auto Hold] sodium chloride (PF) 0.9% PF flush 3 mL  3 mL Intracatheter Q1H PRN         [Auto Hold] potassium chloride SA (K-DUR/KLOR-CON M) CR tablet 20-40 mEq  20-40 mEq Oral Q2H PRN   20 mEq at 02/18/17 1701     [Auto Hold] potassium chloride (KLOR-CON) Packet 20-40 mEq  20-40 mEq Oral or Feeding Tube Q2H PRN   40 mEq at 02/18/17 1152     [Auto Hold] potassium chloride 10 mEq in 100 mL intermittent infusion  10 mEq Intravenous Q1H PRN         [Auto Hold] potassium chloride 10 mEq in 100 mL intermittent infusion with 10 mg lidocaine  10 mEq Intravenous Q1H  mL/hr at 02/23/17 0517 10 mEq at 02/23/17 0517     [Auto Hold] potassium chloride 20 mEq in 50 mL intermittent infusion  20 mEq Intravenous Q2H PRN         [Auto Hold] magnesium sulfate 4 g in 100 mL sterile water (premade)  4 g Intravenous Q4H PRN          [Auto Hold] acetaminophen (TYLENOL) tablet 650 mg  650 mg Oral Q4H PRN   650 mg at 02/16/17 1706     [Auto Hold] acetaminophen (TYLENOL) Suppository 650 mg  650 mg Rectal Q4H PRN         [Auto Hold] oxyCODONE (ROXICODONE) IR tablet 5 mg  5 mg Oral Q3H PRN         [Auto Hold] polyethylene glycol (MIRALAX/GLYCOLAX) Packet 17 g  17 g Oral Daily PRN         [Auto Hold] ondansetron (ZOFRAN-ODT) ODT tab 4 mg  4 mg Oral Q6H PRN   4 mg at 02/22/17 1243    Or     [Auto Hold] ondansetron (ZOFRAN) injection 4 mg  4 mg Intravenous Q6H PRN   4 mg at 02/23/17 0030     [Auto Hold] HYDROmorphone (PF) (DILAUDID) injection 0.2 mg  0.2 mg Intravenous Q2H PRN         [Auto Hold] piperacillin-tazobactam (ZOSYN) 3.375 g vial to attach to  mL bag  3.375 g Intravenous Q6H 100 mL/hr at 02/28/17 0914 3.375 g at 03/01/17 1458     [Auto Hold] OLANZapine zydis (zyPREXA) ODT tab 5 mg  5 mg Oral At Bedtime   5 mg at 02/28/17 2053     [Auto Hold] OLANZapine zydis (zyPREXA) ODT half-tab 2.5-5 mg  2.5-5 mg Oral Q6H PRN   2.5 mg at 02/26/17 0219     sodium chloride (PF) 0.9% PF flush 10 mL  10 mL Irrigation Q8H   10 mL at 03/01/17 1152     No current Epic-ordered outpatient prescriptions on file.     Wt Readings from Last 1 Encounters:   03/01/17 52.8 kg (116 lb 6.5 oz)     Temp Readings from Last 1 Encounters:   03/01/17 36.3  C (97.3  F) (Oral)     BP Readings from Last 6 Encounters:   03/01/17 134/77   12/21/11 128/72     Pulse Readings from Last 4 Encounters:   03/01/17 80     Resp Readings from Last 1 Encounters:   03/01/17 16     SpO2 Readings from Last 1 Encounters:   03/01/17 99%     Recent Labs   Lab Test  03/01/17   0755  03/01/17   0545  02/28/17   0925  02/27/17   0700   NA   --    --   136  143   POTASSIUM  3.5  Canceled, Test credited   Questionable specimen  CALLED TO MICKEY ON 33 AT 0722    4.2  3.5   CHLORIDE   --    --   104  109   CO2   --    --   25  29   ANIONGAP   --    --   7  5   GLC   --    --   303*  95   BUN    --    --   17  14   CR  0.56  Canceled, Test credited   Questionable specimen  CALLED TO MICKEY ON 33 AT 0715  CORRECTED ON 03/01 AT 0722: PREVIOUSLY REPORTED AS 0.57    0.52  0.54   SARITHA   --    --   7.8*  8.1*     Recent Labs   Lab Test  02/28/17   0925  02/27/17   0754   WBC  8.3  9.6   HGB  8.9*  9.7*   PLT  264  298     Recent Labs   Lab Test  02/27/17   0700  02/25/17   0635   INR  1.01  1.06      RECENT LABS:   ECG: NSR, slow RWP (? Septal infarct - negative echo), no acute abnormalities     ECHO:   Left Ventricle  The left ventricle is normal in size. E by E prime ratio is between 8 and 15,  which is indeterminate for assessment of left ventricular filling pressures.  Left ventricular systolic function is normal. The visual ejection fraction is  estimated at 55-60%. The transmitral spectral Doppler flow pattern is normal  for age.     Right Ventricle  The right ventricle is normal in size and function.     Atria  The left atrium is borderline dilated. Right atrial size is normal. There is  no color Doppler evidence of an atrial shunt.     Mitral Valve  The mitral valve leaflets are mildly thickened. There is mild (1+) mitral  regurgitation.        Tricuspid Valve  There is trace tricuspid regurgitation. The right ventricular systolic  pressure is approximated at 22.3mmHg plus the right atrial pressure.     Aortic Valve  The aortic valve is trileaflet. There is trivial trileaflet aortic sclerosis.  No aortic regurgitation is present. No hemodynamically significant valvular  aortic stenosis.     Pulmonic Valve  There is trace pulmonic valvular regurgitation.     Vessels  The inferior vena cava is not dilated.     Pericardium  There is no pericardial effusion.    CXR:      Anesthesia Evaluation     . Pt has had prior anesthetic. Type: General      ROS/MED HX    ENT/Pulmonary:     (+)tobacco use, Past use , . .   (-) asthma, COPD, sleep apnea and recent URI   Neurologic:     (+)delerium resolved Yes, other neuro  Post-Polio Syndrome - chronic LUE weakness; takes Mestinon for this (denies hx of Myasthenia Gravis)    (-) seizures and CVA   Cardiovascular:     (+) ----. : . . . :. . Previous cardiac testing date:results:Stress Testdate:2007 results:Negative for ischemia   date: results: date: results:         (-) angina, hypertension, CAD, orthopnea/PND, syncope and angina   METS/Exercise Tolerance:  >4 METS   Hematologic:     (+) Anemia (HGB 5.6 on admission), History of Transfusion -      Musculoskeletal:         GI/Hepatic:     (+) GERD (only with certain fods; denies any currently) Other, Other GI/Hepatic Suspected Colon Cancer - Ileocecal Mass     (-) liver disease   Renal/Genitourinary:      (-) renal disease   Endo:      (-) Type II DM, thyroid disease and chronic steroid usage   Psychiatric:         Infectious Disease:   (+) Other Infectious Disease Recent Right-sided Psoas muscle abscess - s/p IR-guided drainage      Malignancy:         Other:               Physical Exam      Airway   Mallampati: I  TM distance: >3 FB  Neck ROM: full    Dental   (+) caps    Cardiovascular   Rhythm and rate: regular and normal  (-) no murmur    Pulmonary    breath sounds clear to auscultation(-) no wheezes                    Anesthesia Plan      History & Physical Review  History and physical reviewed and following examination; no interval change.    ASA Status:  3 .    NPO Status:  > 8 hours    Plan for General and ETT with Propofol and Intravenous induction. Maintenance will be Balanced.    PONV prophylaxis:  Ondansetron (or other 5HT-3)  Additional equipment: Videolaryngoscope No Versed  No Succinylcholine  6.5 ETT       Postoperative Care  Postoperative pain management:  Multi-modal analgesia.      Consents  Anesthetic plan, risks, benefits and alternatives discussed with:  Patient..

## 2017-03-01 NOTE — PLAN OF CARE
Problem: Goal Outcome Summary  Goal: Goal Outcome Summary  Outcome: No Change  Intermittent confusion & impulsiveness, however redirectable and pleasant. VSS, afebrile. TPN/lipids via R picc. RLQ site unchanged, flushed. Voiding adequately. Soft/loose stools. Planned OR 3/1 mayo for laparoscopic, possible open ileostomy.

## 2017-03-01 NOTE — PROGRESS NOTES
COLON & RECTAL SURGERY  PROGRESS NOTE    March 1, 2017    SUBJECTIVE:  The patient is doing well. Anticipating surgery. Met with WOC yesterday for marking. Has no questions about surgery at this time.     OBJECTIVE:  Temp:  [97.1  F (36.2  C)-97.8  F (36.6  C)] 97.1  F (36.2  C)  Heart Rate:  [71-77] 71  Resp:  [16] 16  BP: (105-135)/(50-79) 121/69  SpO2:  [95 %-100 %] 95 %    Intake/Output Summary (Last 24 hours) at 03/01/17 0724  Last data filed at 03/01/17 0630   Gross per 24 hour   Intake              987 ml   Output             1090 ml   Net             -103 ml       GENERAL:  Awake, alert, no acute distress,   HEAD - Nomocephalic atraumatic  SCLERA anicteric  EXTREMITIES warm and well perfused  ABDOMEN:  Soft, non tender, non-distended, Ileostomy marking in place  INCISION:  C/d/i, ENRIKE with serous output    LABS:  Lab Results   Component Value Date    WBC 8.3 02/28/2017     Lab Results   Component Value Date    HGB 8.9 02/28/2017     Lab Results   Component Value Date    HCT 30.7 02/28/2017     Lab Results   Component Value Date     02/28/2017     Last Basic Metabolic Panel:  Lab Results   Component Value Date     02/28/2017      Lab Results   Component Value Date    POTASSIUM  03/01/2017     Canceled, Test credited   Questionable specimen  CALLED TO MICKEY ON 33 AT 0722       Lab Results   Component Value Date    CHLORIDE 104 02/28/2017     Lab Results   Component Value Date    SARITHA 7.8 02/28/2017     Lab Results   Component Value Date    CO2 25 02/28/2017     Lab Results   Component Value Date    BUN 17 02/28/2017     Lab Results   Component Value Date    CR  03/01/2017     Canceled, Test credited   Questionable specimen  CALLED TO MICKEY ON 33 AT 0715  CORRECTED ON 03/01 AT 0722: PREVIOUSLY REPORTED AS 0.57       Lab Results   Component Value Date     02/28/2017       ASSESSMENT/PLAN:75 yo F with partially obstructing ileocecal mass, s/p IR drainage of iliopsoas abscess  1. NPO, TPN  2. Plan  for surgery for lap possible open ileostomy with Dr. Peter today at 5:30pm  3. Continue ENRIKE drain for now      Yessy Whalen PA-C  [facility]  Phone: [phone]

## 2017-03-01 NOTE — PLAN OF CARE
Problem: Goal Outcome Summary  Goal: Goal Outcome Summary  PT: Pt leaving for OR this afternoon.

## 2017-03-02 ENCOUNTER — CARE COORDINATION (OUTPATIENT)
Dept: CASE MANAGEMENT | Facility: CLINIC | Age: 77
End: 2017-03-02

## 2017-03-02 ENCOUNTER — APPOINTMENT (OUTPATIENT)
Dept: OCCUPATIONAL THERAPY | Facility: CLINIC | Age: 77
DRG: 329 | End: 2017-03-02
Payer: MEDICARE

## 2017-03-02 ENCOUNTER — APPOINTMENT (OUTPATIENT)
Dept: PHYSICAL THERAPY | Facility: CLINIC | Age: 77
DRG: 329 | End: 2017-03-02
Payer: MEDICARE

## 2017-03-02 LAB
GLUCOSE BLDC GLUCOMTR-MCNC: 100 MG/DL (ref 70–99)
GLUCOSE BLDC GLUCOMTR-MCNC: 104 MG/DL (ref 70–99)
GLUCOSE BLDC GLUCOMTR-MCNC: 121 MG/DL (ref 70–99)
GLUCOSE BLDC GLUCOMTR-MCNC: 141 MG/DL (ref 70–99)
GLUCOSE BLDC GLUCOMTR-MCNC: 75 MG/DL (ref 70–99)
GLUCOSE BLDC GLUCOMTR-MCNC: 80 MG/DL (ref 70–99)
GLUCOSE BLDC GLUCOMTR-MCNC: 83 MG/DL (ref 70–99)

## 2017-03-02 PROCEDURE — 40000193 ZZH STATISTIC PT WARD VISIT: Performed by: PHYSICAL THERAPIST

## 2017-03-02 PROCEDURE — 21400002 ZZH R&B CCU CICU CRITICAL

## 2017-03-02 PROCEDURE — 40000239 ZZH STATISTIC VAT ROUNDS

## 2017-03-02 PROCEDURE — 97140 MANUAL THERAPY 1/> REGIONS: CPT | Mod: GP | Performed by: PHYSICAL THERAPIST

## 2017-03-02 PROCEDURE — 25000132 ZZH RX MED GY IP 250 OP 250 PS 637: Mod: GY | Performed by: PSYCHIATRY & NEUROLOGY

## 2017-03-02 PROCEDURE — 25000128 H RX IP 250 OP 636: Performed by: SURGERY

## 2017-03-02 PROCEDURE — 99233 SBSQ HOSP IP/OBS HIGH 50: CPT | Performed by: INTERNAL MEDICINE

## 2017-03-02 PROCEDURE — 25000125 ZZHC RX 250: Performed by: PHYSICIAN ASSISTANT

## 2017-03-02 PROCEDURE — 40000133 ZZH STATISTIC OT WARD VISIT

## 2017-03-02 PROCEDURE — 25000132 ZZH RX MED GY IP 250 OP 250 PS 637: Mod: GY | Performed by: INTERNAL MEDICINE

## 2017-03-02 PROCEDURE — 25000125 ZZHC RX 250

## 2017-03-02 PROCEDURE — 97116 GAIT TRAINING THERAPY: CPT | Mod: GP | Performed by: PHYSICAL THERAPIST

## 2017-03-02 PROCEDURE — 25000128 H RX IP 250 OP 636: Performed by: INTERNAL MEDICINE

## 2017-03-02 PROCEDURE — A9270 NON-COVERED ITEM OR SERVICE: HCPCS | Mod: GY | Performed by: INTERNAL MEDICINE

## 2017-03-02 PROCEDURE — 99212 OFFICE O/P EST SF 10 MIN: CPT

## 2017-03-02 PROCEDURE — A9270 NON-COVERED ITEM OR SERVICE: HCPCS | Mod: GY | Performed by: PSYCHIATRY & NEUROLOGY

## 2017-03-02 PROCEDURE — 40000884 ZZH STATISTIC STEP DOWN HRS NIGHT

## 2017-03-02 PROCEDURE — 00000146 ZZHCL STATISTIC GLUCOSE BY METER IP

## 2017-03-02 PROCEDURE — 97535 SELF CARE MNGMENT TRAINING: CPT | Mod: GO

## 2017-03-02 PROCEDURE — 97530 THERAPEUTIC ACTIVITIES: CPT | Mod: GP | Performed by: PHYSICAL THERAPIST

## 2017-03-02 PROCEDURE — 25000131 ZZH RX MED GY IP 250 OP 636 PS 637: Mod: GY | Performed by: PHYSICIAN ASSISTANT

## 2017-03-02 RX ADMIN — ENOXAPARIN SODIUM 40 MG: 40 INJECTION SUBCUTANEOUS at 06:18

## 2017-03-02 RX ADMIN — OXYCODONE HYDROCHLORIDE 5 MG: 5 TABLET ORAL at 20:27

## 2017-03-02 RX ADMIN — ACETAMINOPHEN 650 MG: 325 TABLET, FILM COATED ORAL at 12:24

## 2017-03-02 RX ADMIN — INSULIN ASPART 1 UNITS: 100 INJECTION, SOLUTION INTRAVENOUS; SUBCUTANEOUS at 00:35

## 2017-03-02 RX ADMIN — ASCORBIC ACID, VITAMIN A PALMITATE, CHOLECALCIFEROL, THIAMINE HYDROCHLORIDE, RIBOFLAVIN-5 PHOSPHATE SODIUM, PYRIDOXINE HYDROCHLORIDE, NIACINAMIDE, DEXPANTHENOL, ALPHA-TOCOPHEROL ACETATE, VITAMIN K1, FOLIC ACID, BIOTIN, CYANOCOBALAMIN: 200; 3300; 200; 6; 3.6; 6; 40; 15; 10; 150; 600; 60; 5 INJECTION, SOLUTION INTRAVENOUS at 10:25

## 2017-03-02 RX ADMIN — OLANZAPINE 5 MG: 5 TABLET, ORALLY DISINTEGRATING ORAL at 21:52

## 2017-03-02 RX ADMIN — I.V. FAT EMULSION 250 ML: 20 EMULSION INTRAVENOUS at 20:30

## 2017-03-02 RX ADMIN — CYCLOBENZAPRINE HYDROCHLORIDE 10 MG: 10 TABLET, FILM COATED ORAL at 16:21

## 2017-03-02 RX ADMIN — CYCLOBENZAPRINE HYDROCHLORIDE 10 MG: 10 TABLET, FILM COATED ORAL at 21:52

## 2017-03-02 RX ADMIN — PIPERACILLIN SODIUM,TAZOBACTAM SODIUM 3.38 G: 3; .375 INJECTION, POWDER, FOR SOLUTION INTRAVENOUS at 00:25

## 2017-03-02 RX ADMIN — I.V. FAT EMULSION 250 ML: 20 EMULSION INTRAVENOUS at 00:25

## 2017-03-02 RX ADMIN — PIPERACILLIN SODIUM,TAZOBACTAM SODIUM 3.38 G: 3; .375 INJECTION, POWDER, FOR SOLUTION INTRAVENOUS at 06:18

## 2017-03-02 ASSESSMENT — PAIN DESCRIPTION - DESCRIPTORS: DESCRIPTORS: ACHING

## 2017-03-02 NOTE — ANESTHESIA POSTPROCEDURE EVALUATION
Patient: Sherita Kenney    Procedure(s):  LAPAROSCOPIC ILEOSTOMY,  COLONOSCOPY AND BIOPSY - Wound Class: II-Clean Contaminated   - Wound Class: II-Clean Contaminated   - Wound Class: II-Clean Contaminated    Diagnosis:COLONIC OBSTRUCTION   Diagnosis Additional Information: No value filed.    Anesthesia Type:  General, ETT    Note:  Anesthesia Post Evaluation    Patient location during evaluation: PACU  Patient participation: Able to fully participate in evaluation  Level of consciousness: responsive to verbal stimuli and sleepy but conscious  Pain management: adequate  Airway patency: patent  Cardiovascular status: acceptable  Respiratory status: acceptable  Hydration status: acceptable  PONV: none     Anesthetic complications: None    Comments: Sherita was extubated after fully meeting extubation criteria.  Then, either on transport to the PACU or in recovery, had some airway obstruction and was only somewhat responsive.  When I came down to see the patient within minutes of being called, I found her responsive by squeezing my hand at my request as she was being assisted with an AMBU bag by the PACU staff and the CRNAs.  When allowed to try on her own, she obstructed without the oral airway.  We gave decadron IV (in the event of airway edema from repeated attempts at placing an OG/NG), had respiratory come and bring a BIPAP machine, and also gave her an epi neb via the BIPAP machine as well.  We also gave 2 mg/kg of Sugammadex in case of any residual paralysis.  When we hooked up the circuit to ETCO2 monitoring, her CO2 was in the high 60's.  Within minutes, her CO2 was in the 40's and she woke up appropriately, talking, breathing freely, and smiling and carrying on a conversation.  Still slightly confused (her baseline), but otherwise doing well.  We watched her for some time afterwards and she was stable on NC O2 without any signs of airway compromise.  The most likely reason, obviously, for her event was an  elevated CO2 and likely cerebral acidosis.  Still, given her age and medical problems, we arranged for her to be on IMC for closer monitoring.  The RNs communicated to those taking care of Sherita on IMC to watch for any possible airway obstruction or compromise, and that she could (very unlikely) have some airway swelling as well.  Prior to transfer, she was smiling, happy, and breathing freely.          Last vitals:  Vitals:    03/01/17 2130 03/01/17 2140 03/01/17 2150   BP: 138/85 128/75 106/71   Pulse:      Resp: 25 12 18   Temp:      SpO2: 100% 100% 100%         Electronically Signed By: Noble Issa MD  March 1, 2017  10:18 PM

## 2017-03-02 NOTE — BRIEF OP NOTE
[facility]    Brief Operative Note    Pre-operative diagnosis: COLONIC OBSTRUCTION   Post-operative diagnosis Same  Procedure: Procedure(s):  LAPAROSCOPIC ILEOSTOMY,  COLONOSCOPY AND BIOPSY - Wound Class: II-Clean Contaminated   - Wound Class: II-Clean Contaminated   - Wound Class: II-Clean Contaminated  Surgeon: Surgeon(s) and Role:     * Lindsay Peter MD - Primary  Anesthesia: General   Estimated blood loss: Less than 10 ml  Drains:  Pre-existing percutaneous drain removed.  Specimens:   ID Type Source Tests Collected by Time Destination   A : CECAL MASS Biopsy Large Intestine, Cecum SURGICAL PATHOLOGY EXAM Lindsay Peter MD 3/1/2017  7:31 PM      Findings:   Large fungating mass in cecum with fixation to abdominal wall. Three diminutive lesions on surface of right liver. .  Complications: None.  Implants: None.          Condition on discharge from OR: Satisfactory    Tyrell Rueda MD   [facility]   [phone].        ADDENDUM:    PATIENT DATA  Indicate Y or N:  Home O2 No  Hemodialysis  No  Transplant patient  No  Cirrhosis  No  Steroids in last 30 days  No  Immunomodulators in last 30 days  No  Anticoagulation at time of surgery  No   List medication   Prior abdominal surgery  Yes  Pelvic irradiation  No    Albumin within 30 days if known    Hgb within 30 days if known    Hemoglobin   Date Value Ref Range Status   03/01/2017 8.1 (L) 11.7 - 15.7 g/dL Final   ]  Cr within 30 days if known   Creatinine   Date Value Ref Range Status   03/01/2017 0.56 0.52 - 1.04 mg/dL Final   ]  Body mass index is 18.79 kg/(m^2).      OR DATA  Emergent  No   <24 hours  No   <1 week  No  Bowel Prep No  Antibiotics  Yes  DVT prophylaxis    Heparin  Yes   SCD  Yes   None  No  Drain  No  ASA (1,2,3,4) 3  OR time (min) 105  Stents  No  Transfuse >/= 2U  No  Anastomosis   Stapled  No   Handsewn  No  Leak Test    Positive  No   Negative  No   Not done  Yes    FOR  CANCER ALSO COMPLETE:  Preoperative treatment (Y or N)   Chemo  No   Radiation No   Diversion  No    CEA 3.8  Metastatic disease at time of operation  No       Route (Have a good day)

## 2017-03-02 NOTE — PLAN OF CARE
Problem: Goal Outcome Summary  Goal: Goal Outcome Summary  PT: Pt requires min A for supine > sit, cues for techniques to adhere to abdominal precautions s/p ileostomy. Pt requires close SBA-CGA for safety with sit <> stand transfers. Ambulated x 170 ft with CGA at gait belt, progressed from 1 UE support on to no UE support or AD. Requires min A periodically for lateral balance checks and cross over stepping. Reports mild to moderate fatigue following however VSS on RA.  Recommend: TCU to progress safety and indep with transfers and gait as pt currently below baseline of indep mobility and lives alone

## 2017-03-02 NOTE — PROGRESS NOTES
COLON & RECTAL SURGERY  PROGRESS NOTE    March 2, 2017  Post-op Day # 1    SUBJECTIVE:  The patient is doing well. Denies nausea or vomiting. Minimal pain.     OBJECTIVE:  Temp:  [97  F (36.1  C)-97.7  F (36.5  C)] 97  F (36.1  C)  Pulse:  [80] 80  Heart Rate:  [69-93] 78  Resp:  [8-25] 19  BP: (106-165)/() 128/75  FiO2 (%):  [40 %] 40 %  SpO2:  [94 %-100 %] 100 %    Intake/Output Summary (Last 24 hours) at 03/02/17 0835  Last data filed at 03/02/17 0620   Gross per 24 hour   Intake             2617 ml   Output             2435 ml   Net              182 ml       GENERAL:  Awake, alert, no acute distress,   HEAD - Nomocephalic atraumatic  SCLERA anicteric  EXTREMITIES warm and well perfused  ABDOMEN:  Soft, appropriately tender, non-distended, ileostomy is pink and edematous.   INCISION:  C/d/i,     LABS:  Lab Results   Component Value Date    WBC 8.3 02/28/2017     Lab Results   Component Value Date    HGB 8.1 03/01/2017     Lab Results   Component Value Date    HCT 30.7 02/28/2017     Lab Results   Component Value Date     02/28/2017     Last Basic Metabolic Panel:  Lab Results   Component Value Date     02/28/2017      Lab Results   Component Value Date    POTASSIUM 3.5 03/01/2017     Lab Results   Component Value Date    CHLORIDE 104 02/28/2017     Lab Results   Component Value Date    SARITHA 7.8 02/28/2017     Lab Results   Component Value Date    CO2 25 02/28/2017     Lab Results   Component Value Date    BUN 17 02/28/2017     Lab Results   Component Value Date    CR 0.56 03/01/2017     Lab Results   Component Value Date     02/28/2017       ASSESSMENT/PLAN:POD#1 colonoscopy with biopsy and lap ileostomy  1. Clear liquids  2. Continue TPN  3. IV PRN pain meds  4. Continue IVF  5. Await biopsy results  6. Lovenox for prophylaxis  7. WOC for education  8. Await return of bowel function.     Yessy Whalen PA-C  [facility]  Phone: [phone]            [facility] Attending Note    Patient seen and examined independently.  Agree with above assessment and plan.  tolerating clears, pain controlled. Good UOP.   Still confused a indicating she thought her stoma was a radish.  Stoma pink and productive of thin green and some gas.  Plan   As above, clears, stoma care and teaching.     Sherita Torrez MD  [facility]  Office Phone # [phone]

## 2017-03-02 NOTE — PROGRESS NOTES
"[facility] Nurse Inpatient Ostomy Assessment      Initial Assessment of ostomy and needs for:       Data:   History:   Per MD note(s):   3-1-17   Lap ileostomy for Large fungating mass in cecum with fixation to abdominal wall    Type of ostomy: Lap ileostomy  Stoma assessment:   ? Stoma approx 1 3/8\", rounded, red, moist, protrudes, lumen @ apex  Mucocutaneous Junction (MCJ): not assessed, barrier intact  Peristomal skin:  Not assessed, barrier intact  Abdominal assessment:distended and semi-frim    Output: small amt of liquid brown stool. No flatus noted    I/O last 3 completed shifts:  In: 2561 [P.O.:800; I.V.:1020]  Out: 2525 [Urine:2525]    Current pouching system: Gilbert Premier flat cut to fit with clamp. Intact, no evidence of leakage  Pain: Rt upper posterior back and abd tenderness  Oral pain meds taken    Diet:       Active Diet Order      Clear Liquid Diet    Labs:   Recent Labs   Lab Test  03/01/17   1727  02/28/17   0925   02/27/17   0700   ALBUMIN   --    --    --   2.1*   HGB  8.1*  8.9*   < >   --    INR   --    --    --   1.01   WBC   --   8.3   < >   --     < > = values in this interval not displayed.           Intervention:     Patient's chart evaluated.      Assessments done today:  Stoma, pouching system readiness to learn    Education: Reviewed surgery. Pt distracted and somewhat irritable. Not ready for teaching    Prepared for discharge by: No discharge preparations started    Pt registered for ostomy supply samples? No:          Assessment:     Stoma assessment: viable, await for return of GI function    Learning needs/ comprehension: all ileostomy management    Prosthetic refitting: Need to determine best appliance and emptying method for pt in light of LUE hemiparesis         Plan:     Plan:   ? Current pouching system: Tomorrow will trial Gilbert NI flat with high output pouch    Education:  ? Education completed: Surgery review  ? Is pt able to demonstrate: 1. " how to empty their pouch?  No:   2. How to read a measurement on a graduated cylinder?  No:   3. How to write down correct I and O's?   No:   ? Educational needs: Ileostomy management, emptying, appliance change  ? Continue to prepare for discharge: No discharge preparations started,     o Supply company: [employer]  o Do [facility] Nurse recommend home care? Possible TCU     Face to face time: 15 minutes

## 2017-03-02 NOTE — PLAN OF CARE
Problem: Goal Outcome Summary  Goal: Goal Outcome Summary  OT: Patient MIN A to don LE dressing with AE due to pain at this time. Patient currently limited by pain, decreased activity tolerance, and safety awareness. Recommend: TCU upon discharge.

## 2017-03-02 NOTE — PROGRESS NOTES
[facility]    Hospitalist Progress Note    Date of Service (when I saw the patient): 03/02/2017    Assessment & Plan   Sherita Kenney is a 76 year old female who was admitted on 2/15/2017. She was admitted with psychosis and was found to have a partial bowel obstruction due to an ileocecal mass. She has surgery last evening    Summary:  Acute Psychosis, but I suspect she is not back to baseline as her baseline was very high  Ileocecal mass, pathology pending, S/P surgery as above  Polymicrobial psoas abscess  Hypertension, trated  History of iron deficiency anemia (would be almost expected given the colon cancer)  History of Klebsiella UTI and Hypernatremia, better      What I did today:  History and exam and spoke to her sister and brother in law  -    -    -      DVT Prophylaxis: Pneumatic Compression Devices  Code Status: Full Code    Disposition: Expected discharge in 3-4 days once she is recovered from the surgery..    Renny Ortiz MD    Interval History    She has left sided abdominal pain that she attributed to therapy yesterday (it is most likely due to the surgery yesterday). Breathing ok.  No chest pain or headache.  Taking clear liquids, no vomiting or nausea.  Legs ok, still puffy    -Data reviewed today: I reviewed all new labs and imaging results over the last 24 hours. I personally reviewed no images or EKG's today.    Physical Exam   Temp: 98  F (36.7  C) Temp src: Oral BP: 129/69 Pulse: 87 Heart Rate: 89 Resp: 16 SpO2: 97 % O2 Device: Nasal cannula Oxygen Delivery: 2 LPM  Vitals:    02/27/17 0624 03/01/17 0630 03/02/17 0437   Weight: 56.1 kg (123 lb 10.9 oz) 52.8 kg (116 lb 6.5 oz) 54.8 kg (120 lb 13 oz)     Vital Signs with Ranges  Temp:  [97  F (36.1  C)-98  F (36.7  C)] 98  F (36.7  C)  Pulse:  [80-87] 87  Heart Rate:  [69-93] 89  Resp:  [8-26] 16  BP: (106-165)/() 129/69  FiO2 (%):  [40 %] 40 %  SpO2:  [97 %-100 %] 97 %  I/O last 3 completed shifts:  In: 2112  [P.O.:600; I.V.:1020]  Out: 2525 [Urine:2525]    Constitutional: Alert, cooperative, a bit more disconnect mentally than what I would expect given her high level of baseline functioning  Respiratory: A few dry rales at her right base, left lung is clear  Cardiovascular: regular rhythm, normal S1 and S2, no murmurs or gallops. Legs still edematous, but don't pit.  GI: ostomy in the right abdomen, looks healthy, bowel sounds present. Tender on the right  Skin/Integumen: no rash  Other:      Medications     - MEDICATION INSTRUCTIONS -       - MEDICATION INSTRUCTIONS -       parenteral nutrition - Clinimix E 70 mL/hr at 03/02/17 1025     IV fluid REPLACEMENT ONLY       IV infusion builder WITH additives 10 mL/hr (03/01/17 1804)       acetaminophen  975 mg Oral Once     enoxaparin  40 mg Subcutaneous Q24H     sodium chloride (PF)  10 mL Intracatheter Q8H     lipids  250 mL Intravenous Q24H     insulin aspart  1-12 Units Subcutaneous Q4H     levothyroxine  75 mcg Oral Daily     cyclobenzaprine  10 mg Oral TID     OLANZapine zydis  5 mg Oral At Bedtime       Data     Recent Labs  Lab 03/01/17  1727 03/01/17  0755 03/01/17  0545 02/28/17  0925 02/27/17  0754 02/27/17  0700 02/26/17  0636 02/25/17  0635   WBC  --   --   --  8.3 9.6  --  7.5 8.8   HGB 8.1*  --   --  8.9* 9.7*  --  8.0* 7.7*   MCV  --   --   --  76* 76*  --  75* 75*   PLT  --   --   --  264 298  --  259 290   INR  --   --   --   --   --  1.01  --  1.06   NA  --   --   --  136  --  143 143 145*   POTASSIUM  --  3.5 Canceled, Test credited Questionable specimenCALLED TO MICKEY ON 33 AT 0722 4.2  --  3.5 3.5 3.4   CHLORIDE  --   --   --  104  --  109 108 110*   CO2  --   --   --  25  --  29 28 29   BUN  --   --   --  17  --  14 9 6*   CR  --  0.56 Canceled, Test credited Questionable specimenCALLED TO MICKEY ON 33 AT 0715CORRECTED ON 03/01 AT 0722: PREVIOUSLY REPORTED AS 0.57 0.52  --  0.54 0.68 0.59   ANIONGAP  --   --   --  7  --  5 7 6   SARITHA  --   --   --  7.8*   --  8.1* 7.8* 7.7*   GLC  --   --   --  303*  --  95 95 102*   ALBUMIN  --   --   --   --   --  2.1*  --  1.9*   PROTTOTAL  --   --   --   --   --  5.1*  --  4.7*   BILITOTAL  --   --   --   --   --  0.2  --  0.2   ALKPHOS  --   --   --   --   --  65  --  58   ALT  --   --   --   --   --  18  --  14   AST  --   --   --   --   --  29  --  21       Imaging:  No results found for this or any previous visit (from the past 24 hour(s)).

## 2017-03-02 NOTE — PLAN OF CARE
Problem: Goal Outcome Summary  Goal: Goal Outcome Summary  Outcome: No Change  VSS, Alert to person, quite confused during noc.  Pulled off oximetry, PCDs, BP cuff.  Cont reminded to leave equipment in place.  Urine output good per caballero.  Abd lap site intact with dermabond.  Ileostomy putting out small amt dark red liquid.  TPN and IVF infusing as ordered.  Blood sugars covered per SSI.

## 2017-03-02 NOTE — PROGRESS NOTES
"CRNA at bedside. Pt \"guppy\" breathing upon arrival to PACU. Dr. Issa notified immediately and at bedside. Pt assisted with ambu bag. RT at bedside, as well. Pt will continue to be monitored and assessed.   "

## 2017-03-02 NOTE — PLAN OF CARE
Problem: Goal Outcome Summary  Goal: Goal Outcome Summary  Outcome: Improving  No gas no Nausea tolerating clears. ostomy has liquid brown fluid 50cc amt. Off IMC. Up with   One assist denies SOB sats on room air 92-95. Denies abdominal pain. cvo of L shoulder pain. PT here to assist with walking.

## 2017-03-02 NOTE — ANESTHESIA CARE TRANSFER NOTE
Patient: Sherita Kenney    Procedure(s):  LAPAROSCOPIC ILEOSTOMY,  COLONOSCOPY AND BIOPSY - Wound Class: II-Clean Contaminated   - Wound Class: II-Clean Contaminated   - Wound Class: II-Clean Contaminated    Diagnosis: COLONIC OBSTRUCTION   Diagnosis Additional Information: No value filed.    Anesthesia Type:   General, ETT     Note:  Airway :Face Mask  Patient transferred to:PACU  Comments: Pt to PACU on O2 via mask, airway patent. VSS.  After 3 minutes in the PACU, pt appeared to be obstructing, oral airway placed, Dr. Issa called.  Upon auscultation pt was not exchanging much air.  CRNA began assisting pt with ambu bag.  VSS the entire time.  Dr. Issa and RT at bedside, bipap started.  Decadron 4 mg and Suggamadex 100 mg given per Dr. Issa.  Pt became more responsive after 10 minutes, etCO2 declining to high 40s.  Pt awake and following commands, tidal volumes adequate.  Dr. Issa at bedside entire time.  Report given to RN, RN comfortable with CRNA leaving.        Vitals: (Last set prior to Anesthesia Care Transfer)    CRNA VITALS  3/1/2017 2020 - 3/1/2017 2120      3/1/2017             NIBP: 143/88    NIBP Mean: 108                Electronically Signed By: WILLY Chun CRNA  March 1, 2017  9:37 PM

## 2017-03-03 ENCOUNTER — APPOINTMENT (OUTPATIENT)
Dept: PHYSICAL THERAPY | Facility: CLINIC | Age: 77
DRG: 329 | End: 2017-03-03
Payer: MEDICARE

## 2017-03-03 LAB
GLUCOSE BLDC GLUCOMTR-MCNC: 108 MG/DL (ref 70–99)
GLUCOSE BLDC GLUCOMTR-MCNC: 83 MG/DL (ref 70–99)
GLUCOSE BLDC GLUCOMTR-MCNC: 87 MG/DL (ref 70–99)
GLUCOSE BLDC GLUCOMTR-MCNC: 88 MG/DL (ref 70–99)
GLUCOSE BLDC GLUCOMTR-MCNC: 88 MG/DL (ref 70–99)
GLUCOSE BLDC GLUCOMTR-MCNC: 96 MG/DL (ref 70–99)
PLATELET # BLD AUTO: 147 10E9/L (ref 150–450)

## 2017-03-03 PROCEDURE — A9270 NON-COVERED ITEM OR SERVICE: HCPCS | Mod: GY | Performed by: COLON & RECTAL SURGERY

## 2017-03-03 PROCEDURE — 97116 GAIT TRAINING THERAPY: CPT | Mod: GP | Performed by: PHYSICAL THERAPIST

## 2017-03-03 PROCEDURE — A9270 NON-COVERED ITEM OR SERVICE: HCPCS | Mod: GY | Performed by: INTERNAL MEDICINE

## 2017-03-03 PROCEDURE — 40000239 ZZH STATISTIC VAT ROUNDS

## 2017-03-03 PROCEDURE — 25000128 H RX IP 250 OP 636: Performed by: SURGERY

## 2017-03-03 PROCEDURE — 99232 SBSQ HOSP IP/OBS MODERATE 35: CPT | Performed by: INTERNAL MEDICINE

## 2017-03-03 PROCEDURE — 85049 AUTOMATED PLATELET COUNT: CPT | Performed by: INTERNAL MEDICINE

## 2017-03-03 PROCEDURE — 25000132 ZZH RX MED GY IP 250 OP 250 PS 637: Mod: GY | Performed by: PSYCHIATRY & NEUROLOGY

## 2017-03-03 PROCEDURE — 25000125 ZZHC RX 250: Performed by: INTERNAL MEDICINE

## 2017-03-03 PROCEDURE — 00000146 ZZHCL STATISTIC GLUCOSE BY METER IP

## 2017-03-03 PROCEDURE — 25000132 ZZH RX MED GY IP 250 OP 250 PS 637: Mod: GY | Performed by: INTERNAL MEDICINE

## 2017-03-03 PROCEDURE — 97530 THERAPEUTIC ACTIVITIES: CPT | Mod: GP | Performed by: PHYSICAL THERAPIST

## 2017-03-03 PROCEDURE — 25000132 ZZH RX MED GY IP 250 OP 250 PS 637: Mod: GY | Performed by: HOSPITALIST

## 2017-03-03 PROCEDURE — 99213 OFFICE O/P EST LOW 20 MIN: CPT

## 2017-03-03 PROCEDURE — 40000193 ZZH STATISTIC PT WARD VISIT: Performed by: PHYSICAL THERAPIST

## 2017-03-03 PROCEDURE — 12000007 ZZH R&B INTERMEDIATE

## 2017-03-03 PROCEDURE — 25000132 ZZH RX MED GY IP 250 OP 250 PS 637: Mod: GY | Performed by: COLON & RECTAL SURGERY

## 2017-03-03 PROCEDURE — A9270 NON-COVERED ITEM OR SERVICE: HCPCS | Mod: GY | Performed by: PSYCHIATRY & NEUROLOGY

## 2017-03-03 PROCEDURE — A9270 NON-COVERED ITEM OR SERVICE: HCPCS | Mod: GY | Performed by: HOSPITALIST

## 2017-03-03 PROCEDURE — 97110 THERAPEUTIC EXERCISES: CPT | Mod: GP | Performed by: PHYSICAL THERAPIST

## 2017-03-03 RX ORDER — IBUPROFEN 400 MG/1
400 TABLET, FILM COATED ORAL EVERY 6 HOURS PRN
Status: DISCONTINUED | OUTPATIENT
Start: 2017-03-03 | End: 2017-03-09 | Stop reason: HOSPADM

## 2017-03-03 RX ADMIN — I.V. FAT EMULSION 180 ML: 20 EMULSION INTRAVENOUS at 20:05

## 2017-03-03 RX ADMIN — LEVOTHYROXINE SODIUM 75 MCG: 75 TABLET ORAL at 09:49

## 2017-03-03 RX ADMIN — IBUPROFEN 400 MG: 400 TABLET ORAL at 17:56

## 2017-03-03 RX ADMIN — ENOXAPARIN SODIUM 40 MG: 40 INJECTION SUBCUTANEOUS at 05:28

## 2017-03-03 RX ADMIN — POTASSIUM CHLORIDE: 2 INJECTION, SOLUTION, CONCENTRATE INTRAVENOUS at 20:06

## 2017-03-03 RX ADMIN — OLANZAPINE 5 MG: 5 TABLET, ORALLY DISINTEGRATING ORAL at 23:54

## 2017-03-03 RX ADMIN — OXYCODONE HYDROCHLORIDE 5 MG: 5 TABLET ORAL at 13:22

## 2017-03-03 RX ADMIN — ACETAMINOPHEN 650 MG: 325 TABLET, FILM COATED ORAL at 13:22

## 2017-03-03 RX ADMIN — ACETAMINOPHEN 650 MG: 325 TABLET, FILM COATED ORAL at 05:28

## 2017-03-03 RX ADMIN — CYCLOBENZAPRINE HYDROCHLORIDE 10 MG: 10 TABLET, FILM COATED ORAL at 16:08

## 2017-03-03 RX ADMIN — POTASSIUM CHLORIDE: 149 INJECTION, SOLUTION, CONCENTRATE INTRAVENOUS at 05:28

## 2017-03-03 RX ADMIN — CYCLOBENZAPRINE HYDROCHLORIDE 10 MG: 10 TABLET, FILM COATED ORAL at 09:49

## 2017-03-03 NOTE — PROGRESS NOTES
Arrived to station 33 from PACU at about 2240. VSS, on 2L O2. Denies pain. LS clear. Tele NSR. Lap site CDI. Ileostomy intact with scant SS drainage.

## 2017-03-03 NOTE — PROGRESS NOTES
COLON & RECTAL SURGERY  PROGRESS NOTE    March 3, 2017  Post-op Day # 2    SUBJECTIVE:  The patient is doing well. Tolerating clears. Minimal pain that she states goes away with reassurance.     OBJECTIVE:  Temp:  [97.7  F (36.5  C)-98.5  F (36.9  C)] 97.7  F (36.5  C)  Pulse:  [79-87] 87  Heart Rate:  [72-94] 88  Resp:  [16] 16  BP: (115-129)/(56-76) 122/62  SpO2:  [94 %-96 %] 95 %    Intake/Output Summary (Last 24 hours) at 03/03/17 1132  Last data filed at 03/03/17 1000   Gross per 24 hour   Intake           2897.2 ml   Output             1805 ml   Net           1092.2 ml       GENERAL:  Awake, alert, no acute distress,   HEAD - Nomocephalic atraumatic  SCLERA anicteric  EXTREMITIES warm and well perfused  ABDOMEN:  Soft, appropriately tender, non-distended, ileostomy is pink and viable. Gas and liquid stool in the bag.   INCISION:  C/d/i,     LABS:  Lab Results   Component Value Date    WBC 8.3 02/28/2017     Lab Results   Component Value Date    HGB 8.1 03/01/2017     Lab Results   Component Value Date    HCT 30.7 02/28/2017     Lab Results   Component Value Date     03/03/2017     Last Basic Metabolic Panel:  Lab Results   Component Value Date     02/28/2017      Lab Results   Component Value Date    POTASSIUM 3.5 03/01/2017     Lab Results   Component Value Date    CHLORIDE 104 02/28/2017     Lab Results   Component Value Date    SARITHA 7.8 02/28/2017     Lab Results   Component Value Date    CO2 25 02/28/2017     Lab Results   Component Value Date    BUN 17 02/28/2017     Lab Results   Component Value Date    CR 0.56 03/01/2017     Lab Results   Component Value Date     02/28/2017       ASSESSMENT/PLAN:POD#2 colonoscopy with biopsy, lap ileostomy.   1. Fulls and advance to low fiber as needed  2. Await pathology from biopsy  3. Continue PO and IV PRN pain meds  4. OOB, ambulate  5. Will discuss cycling TPN with Dr. Rome Whalen, PA-C  [facility]  Phone:  [phone]

## 2017-03-03 NOTE — PLAN OF CARE
Problem: Goal Outcome Summary  Goal: Goal Outcome Summary  Outcome: Improving  Pt A/O, but forgetful. Passing gas. No N/V. Small output from ileostomy. Dressing CDI. Up with assist of 1. Ambulated halls. IS to 1000. PRN oxycodone and tylenol for pain. Tolerating CL diet.  D/C pending.

## 2017-03-03 NOTE — CONSULTS
BRIEF NUTRITION NOTE:    Received consult to change to cycle TPN.  A full Nutrition Reassessment was completed earlier today.  See note for details.    NEW FINDINGS:  Diet has been upgraded to FL today.    INTERVENTIONS:  - Parenteral Nutrition/IV Fluids - Modify schedule as follows:  Taper off current TPN from 4-6 pm.  At 8 pm, begin 12 hr nocturnal cycle TPN (with less volume) 1200 mL/day, 60 gm amino acid/day, 180 gm dextrose/day = 1212 kcals (23 kcal/kg and 75% energy needs), 60 gm pro (1.1 gm/kg and 75% pro needs).  Hold maintenance IVF (D5 at 30 mL/hr) while nocturnal TPN running.  - Medical food supplement therapy - Modified supplement in EPIC from Ensure Clear --> Ensure Plus (4 oz BID btw meals).  - Calorie Counts - Ordered to begin Calorie Count today (x 3 days).    Suzanne Yuen, RD, LD, CNSC

## 2017-03-03 NOTE — PROGRESS NOTES
[facility]  Progress Note  [facility]    Pt was worrying about how she would handle her ileostomy bag when we started our visit.  Pt has a very positive attitude but still worries.  She has a sense that she might not be able to go home and that is another worry.  Pt and I did a short prayer together.  I will continue to follow.       Silvia Marques  Chaplain Resident  Pager [phone]

## 2017-03-03 NOTE — PROGRESS NOTES
"RUQ Existing  New  Ileostomy        1. Change appliance 1-2x/wk and as needed for any leakage   2. Empty pouch when 1/3 full.   3. Carry an extra pouching system with you at all times  4. Diet: Eat 6 small meals/day. Drink 8oz/fluid every hour during waking hours. Chew all food well.    5. Diet: Eat pasta, rice, potatoes, hot/dry cereal, toast, breads, pretzels, chips, crackers; yogurt, meat, fish, eggs, cheese, peanut butter, bananas to thicken the stool. Eat 3-4 servings of protien/day.  6. Avoid eating: Seeds, nuts, peels, raw vegetables, chinese vegetables, casings on meats, grape/prume juice, grapefruit, oranges, pineapple, mushrooms.   7. NO laxatives. NO timed released medications   8. May shower with appliance on. Blow dry (on cool setting) cloth backing and tape following bathing.   9. Change your appliance in the morning before breakfast.  10. Walk, no heavy lifting  11. Use an odor eliminator (Ex: Febreeze) in the room prior to emptying or changing appliance  12. Initially monitor your output to avoid dehydration.   Call your primary doctor if output exceeds 1500cc/24hrs.      Supplies: Vance New Image 2-pc  1. Barrier New Image: Flextend convex cut to fit, with tape              #[id number] 5/bx = 2-4 bx/month OR    2. Pouch New Image High output                                                  #[id number] 10/bx = 1-2 bx/month OR  3. Pouch:Opaque drainable with lock n roll                                     #[id number] 10/bx = 1-2bx/month  4  Optional if leakage Vance Adapt ring                                    #[id number] 10/bx = 1bx/month         Procedure for Ileostomy Pouch Change     1.Barrier: draw and cut opening following pattern. Remove paper covering over barrier  2.  Optional if leakage: Open ring and stretch to approx size of opening in barrier. Apply around opening on sticky side of barrier. Press into place.   3. Fold back edges of paper that covers the tape to create a \"tab\". This assists with paper " "removal in Step #9. OK to snap pouch of choice on barrier at this step and apply as a 1-pc pouch  4. Set barrier aside.   5. Remove pouch from around stoma. Discard.   6. Cleanse skin around stoma with water. Pat dry.  7. Center stoma in barrier opening. Press barrier to skin.  8.Pull on \"tab\" to remove paper that covers the tape. Press tape to skin   9. Snap on pouch of choice  10. Close spigot on high output or close lock n roll on drainable pouch                        "

## 2017-03-03 NOTE — PROGRESS NOTES
CLINICAL NUTRITION SERVICES - REASSESSMENT NOTE      RECOMMENDATIONS FOR MD/PROVIDER TO ORDER:   Recommend taper TPN as diet advances and oral intake improves.   Recommendations Ordered by Registered Dietitian (RD):   Supplements:  Cherry Gelatein Plus TID with meals.  4 oz Berry Ensure Clear BID btw meals.   Future/Additional Recommendations:   When diet advanced beyond CL, would change supplement to 4 oz Ensure Plus BID btw meals (Chocolate - Butter Pecan).   Malnutrition:  Severe malnutrition  In Context of:  Chronic illness or disease       EVALUATION OF PROGRESS TOWARD GOALS   Diet:  CL    Nutrition Support:  TPN continues at goal as below:    Nutrition Support Parenteral:  Type of Access:  PICC  Parenteral Frequency: Continuous  Parenteral Regimen:  D15 AA5 at 70 mL/hr + Lipids 250 mL daily  Total Parenteral Provisions:  1693 kcals (32 kcal/kg), 84 gm pro (1.6 gm/kg), 252 gm CHO, 30% fat.     D5 IVF running at 30 mL/hr = 36 gm CHO, 122 kcals.  Total (TPN/Lipid + D5 IVF): 1815 kcals (34 kcal/kg)    Intake/Tolerance (oral):    Pt tolerating CL diet well.  She states she loves jello.  She took 1420 mL oral yesterday.  Stool:  225 mL.    Intake/Tolerance (TPN):  No labs today.  BGM:   (one low BG noted).  Wt:  53.9 kg (up 1.1 kg from admit).  Pt with +1-2 edema in extremities.      Dosing Weight 52.8 kg - admit wt      ASSESSED NUTRITION NEEDS:  Estimated Energy Needs: 0603-5963 kcals (30-35 Kcal/Kg)  Justification: repletion, underweight  Estimated Protein Needs:  grams protein (1.5-2 g pro/Kg)  Justification: Repletion       NEW FINDINGS:     PHYSICAL FINDINGS  Observed  Muscle Wasting - clavicle, temporal  Subcutaneous fat loss - orbital, arms  Obtained from Chart/Interdisciplinary Team  Edema - +1-2 extemities  Cachexia - Severe calorie-protein malnutrition per MD notes    Per surgery, await results of biopsy and await return of bowel function.      Previous Goals (2/27):   TPN/Lipid + D5 IVF will  meet % estimated needs while NPO.   Evaluation: Met, but now starting to take po.    Previous Nutrition Diagnosis (2/27):   No nutrition diagnosis identified at this time   Evaluation:  Modified as below.      MALNUTRITION  % Weight Loss:  > 20% in 1 year (severe malnutrition)  % Intake:  No decreased intake noted per diet hx on admit, but MD noted poor oral intake PTA  Subcutaneous Fat Loss:  Orbital region mild depletion and Upper arm region mild depletion  Muscle Loss:  Temporal region mild depletion and Clavicle bone region severe depletion  Fluid Retention:  Mild - extremities    Malnutrition Diagnosis: Severe malnutrition  In Context of:  Chronic illness or disease    CURRENT NUTRITION DIAGNOSIS  Inadequate oral intake related to CL diet and early satiety as evidenced by continued PN reliance.    INTERVENTIONS  Recommendations / Nutrition Prescription:  Continue TPN/Lipids as above, but recommend taper TPN as diet advances and oral intake improves.    Diet:  CL - ADAT per surgery  Supplements:  Cherry Gelatein Plus TID with meals.  4 oz Berry Ensure Clear BID btw meals.    - When diet advanced beyond CL, would change supplement to 4 oz Ensure Plus BID btw meals (Chocolate - Butter Pecan).    Implementation  Collaboration and Referral of Nutrition care - Discussed pt with Pharmacist who will continue the TPN for now.  Medical Food Supplement - Ordered above supplements in EPIC.    Goals  Pt will transition from TPN --> oral intake in the next 3-4 days.      MONITORING AND EVALUATION:  Progress towards goals will be monitored and evaluated per protocol and Practice Guidelines    Suzanne Yuen, RD, LD, [facility]

## 2017-03-03 NOTE — PROGRESS NOTES
[facility]    Hospitalist Progress Note    Date of Service (when I saw the patient): 03/03/2017    Assessment & Plan   Sherita Kenney is a 76 year old female who was admitted on 2/15/2017. She present with psychosis and a downward course for 2 weeks before admission. 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  Non-pitting edema felt to be due to malnutrition    What I did today:  History and exam.  Met with the family  Ordered a renal panel, magnesium level and cbc for tomorrow  Diet advancement is per the surgical team. She is getting TPN by vein.  -    -    -      DVT Prophylaxis: Pneumatic Compression Devices  Code Status: Full Code    Disposition: Expected discharge when surgically recovered    Renny Ortiz MD    Interval History   Ms. Kenney is doing better each day. Minimal pain on the right abdominal side. No headaches, chest pain, dyspnea, nausea or vomiting. Ostomy is making noise. Voiding ok.   Legs still puffy, mouth ok    -Data reviewed today: I reviewed all new labs and imaging results over the last 24 hours. I personally reviewed no images or EKG's today.    Physical Exam   Temp: 97.7  F (36.5  C) Temp src: Oral BP: 122/62 Pulse: 87 Heart Rate: 88 Resp: 16 SpO2: 95 % O2 Device: None (Room air)    Vitals:    03/01/17 0630 03/02/17 0437 03/03/17 0427   Weight: 52.8 kg (116 lb 6.5 oz) 54.8 kg (120 lb 13 oz) 53.9 kg (118 lb 13.3 oz)     Vital Signs with Ranges  Temp:  [97.7  F (36.5  C)-98.5  F (36.9  C)] 97.7  F (36.5  C)  Pulse:  [79-87] 87  Heart Rate:  [72-94] 88  Resp:  [16-26] 16  BP: (115-129)/(56-87) 122/62  SpO2:  [94 %-97 %] 95 %  I/O  last 3 completed shifts:  In: 3257.2 [P.O.:1420; I.V.:333]  Out: 1455 [Urine:1230; Stool:225]    Constitutional: Alert, cooperative, witty  Respiratory: Clear to A&P  Cardiovascular: Regular rhythm, normal S1 and S2, no S3 or S4, no murmurs. Non-pitting edema  GI: bowel sounds are present, minimal discomfort on palpation, ostomy bag has a good amount of gas  Skin/Integumen: no rashes  Other:      Medications     - MEDICATION INSTRUCTIONS -       - MEDICATION INSTRUCTIONS -       parenteral nutrition - Clinimix E 70 mL/hr at 03/02/17 1025     IV fluid REPLACEMENT ONLY       IV infusion builder WITH additives 30 mL/hr at 03/03/17 0528       acetaminophen  975 mg Oral Once     enoxaparin  40 mg Subcutaneous Q24H     sodium chloride (PF)  10 mL Intracatheter Q8H     lipids  250 mL Intravenous Q24H     insulin aspart  1-12 Units Subcutaneous Q4H     levothyroxine  75 mcg Oral Daily     cyclobenzaprine  10 mg Oral TID     OLANZapine zydis  5 mg Oral At Bedtime       Data     Recent Labs  Lab 03/03/17  0615 03/01/17  1727 03/01/17  0755 03/01/17  0545 02/28/17  0925 02/27/17  0754 02/27/17  0700 02/26/17  0636 02/25/17  0635   WBC  --   --   --   --  8.3 9.6  --  7.5 8.8   HGB  --  8.1*  --   --  8.9* 9.7*  --  8.0* 7.7*   MCV  --   --   --   --  76* 76*  --  75* 75*   *  --   --   --  264 298  --  259 290   INR  --   --   --   --   --   --  1.01  --  1.06   NA  --   --   --   --  136  --  143 143 145*   POTASSIUM  --   --  3.5 Canceled, Test credited Questionable specimenCALLED TO MICKEY ON 33 AT 0722 4.2  --  3.5 3.5 3.4   CHLORIDE  --   --   --   --  104  --  109 108 110*   CO2  --   --   --   --  25  --  29 28 29   BUN  --   --   --   --  17  --  14 9 6*   CR  --   --  0.56 Canceled, Test credited Questionable specimenCALLED TO MICKEY ON 33 AT 0715CORRECTED ON 03/01 AT 0722: PREVIOUSLY REPORTED AS 0.57 0.52  --  0.54 0.68 0.59   ANIONGAP  --   --   --   --  7  --  5 7 6   SARITHA  --   --   --   --  7.8*  --  8.1*  7.8* 7.7*   GLC  --   --   --   --  303*  --  95 95 102*   ALBUMIN  --   --   --   --   --   --  2.1*  --  1.9*   PROTTOTAL  --   --   --   --   --   --  5.1*  --  4.7*   BILITOTAL  --   --   --   --   --   --  0.2  --  0.2   ALKPHOS  --   --   --   --   --   --  65  --  58   ALT  --   --   --   --   --   --  18  --  14   AST  --   --   --   --   --   --  29  --  21       Imaging:  No results found for this or any previous visit (from the past 24 hour(s)).

## 2017-03-03 NOTE — PROGRESS NOTES
SW:    I: NEVILLE following for discharge planning. NEVILLE met with pt briefly for a visit this afternoon to introduce self/role and explain the discharge planning process. SW allowed space for pt to process her hospital stay and the need for an ileostomy, pt discussed that this has been difficult for her to adjust to however open to education. NEVILLE discussed that this writer would be contact her POA Dinora to discuss options for TCU. NEVILLE left message for Dinora [phone] requesting return call in order to obtain choices for placement. Pt will remain on cycle TPN throughout weekend and potentially into next week per colorectal surgery.    P: SW to follow. Pt will need TCU at discharge, likely here through weekend.    ADDENDUM: NEVILLE spoke with Dinora co-POJOURDAN. NEVILLE discussed insurance coverage, average length of stay, referral process, and general expectations for TCU. Per Dinora, pt other POA, maycol Magallon, is flying into town tomorrow 3/4/17 only for the day and would like to speak with SW while at [facility]. NEVILLE provided phone number for SW and also suggested that maycol ask for SW when here. NEVILLE left two copies on pt chart of the TCU list for both POAs to review. NEVILLE requested that POAs decide upon 3-5 facilities (if not more) to ensure that we can attempt placement at a preferred facility. Dinora verbalized understanding.    MARAL Cruz, LICSW  Daytime (8:00am-4:30pm): [phone]  After-Hours SW Pager (4:30pm-11:30pm): [phone]

## 2017-03-03 NOTE — PROGRESS NOTES
"Colon and Rectal Surgery  Patient denies abdominal pain but does complain of upper back pain. Denies nausea or emesis but mentions limited appetite.   /65  Pulse 87  Temp 98.1  F (36.7  C) (Oral)  Resp 16  Ht 1.676 m (5' 6\")  Wt 53.9 kg (118 lb 13.3 oz)  SpO2 99%  BMI 19.18 kg/m2    Intake/Output Summary (Last 24 hours) at 03/03/17 1713  Last data filed at 03/03/17 1431   Gross per 24 hour   Intake           2538.2 ml   Output             1305 ml   Net           1233.2 ml     Alert, no acute distress  Abdomen: soft., incisions fine. Stoma healthy and functioning.     Results for orders placed or performed during the hospital encounter of 02/15/17 (from the past 24 hour(s))   Glucose by meter   Result Value Ref Range    Glucose 83 70 - 99 mg/dL   Glucose by meter   Result Value Ref Range    Glucose 96 70 - 99 mg/dL   Glucose by meter   Result Value Ref Range    Glucose 87 70 - 99 mg/dL   Platelet count   Result Value Ref Range    Platelet Count 147 (L) 150 - 450 10e9/L   Glucose by meter   Result Value Ref Range    Glucose 108 (H) 70 - 99 mg/dL   Glucose by meter   Result Value Ref Range    Glucose 83 70 - 99 mg/dL   Glucose by meter   Result Value Ref Range    Glucose 88 70 - 99 mg/dL       Impression: Stable. Patient expressed concern about receiving narcotics, stated she did not want to be tempted into making poor choices she had made in the past. (Writer not certain to what she referring)  Agree with advancing diet and cycling TPN. Depending upon calorie counts may be able to stop TPN in 4-5 days.   Lovenox for prophylaxis.  Add ibuprofen and heating pad for upper back pain.   Lindsay Peter MD    "

## 2017-03-03 NOTE — PROGRESS NOTES
"[facility] Nurse Inpatient Ostomy Assessment      Initial Assessment of ostomy and needs for:       Data:   History:   Per MD note(s):   3-1-17   Lap ileostomy for Large fungating mass in cecum with fixation to abdominal wall    Type of ostomy: Lap ileostomy  Stoma assessment:   ? Stoma approx 1 1/2\", oval, red, moist, protrudes, lumen @ apex  Mucocutaneous Junction (MCJ): Intact with sutures  Peristomal skin:  Intact, no erythema  Abdominal assessment:distended and semi-frim    Output: small amt of liquid brown stool. Flatus noted        I/O last 3 completed shifts:  In: 2738.2 [P.O.:1040; I.V.:333]  Out: 1305 [Urine:1080; Stool:225]        Current pouching system: Vance Premier flat cut to fit with clamp. Intact, Leakage noted  Pain: Rt upper posterior back and abd tenderness  Oral pain meds taken    Diet:       Active Diet Order   full liquids    Labs:   Recent Labs   Lab Test  03/01/17   1727  02/28/17   0925   02/27/17   0700   ALBUMIN   --    --    --   2.1*   HGB  8.1*  8.9*   < >   --    INR   --    --    --   1.01   WBC   --   8.3   < >   --     < > = values in this interval not displayed.           Intervention:     Patient's chart evaluated.      Assessments done today:  Stoma, pouching system readiness to learn    Prosthetic refitting: Altaf NI convex and high output pouch    Education: Reviewed surgery. Pt forgetful and slightly confused. Unable to use Glenshaw Lock n roll closure or cap on high output for emptying.      Prepared for discharge by: Supplies @ bedside    Pt registered for ostomy supply samples? No:          Assessment:     Stoma assessment: viable, GI function slowly resuming    Learning needs/ comprehension: all ileostomy management. This will be a slow process secondary to confusion and LUE hemiparesis    Prosthetic refitting: Trial Vance New Image convex  with high output pouch         Plan:     Plan:   ? Current pouching system: Trial Glenshaw New " Image convex with high output pouch    Education:  ? Education completed: Surgery review  ? Is pt able to demonstrate: 1. how to empty their pouch?  No:   2. How to read a measurement on a graduated cylinder?  No:   3. How to write down correct I and O's?   No:   ? Educational needs: Ileostomy management, emptying, appliance change  ? Continue to prepare for discharge: No discharge preparations started,     o Supply company: TBD  o Do WOC Nurse recommend home care? No TC   Face to face time: 45 minutes  WOC will F/U on Monday

## 2017-03-03 NOTE — PROGRESS NOTES
Oncology chart check:    Awaiting pathology report from biopsy.  Our team will follow-up with patient when results available.

## 2017-03-03 NOTE — PLAN OF CARE
Problem: Individualization  Goal: Patient Preferences  Outcome: No Change  Vss, a-febrile, c/o left shoulder discomfort tylenol and oxycodone helping, tolerating full liquid diet, pt on TPN, will be weaned off this evening at 6 pm and start norctunal  cycle, up to the bathroom with assist of one, ileostomy appliance intact, putting out small liquid stool.

## 2017-03-03 NOTE — PROGRESS NOTES
SPIRITUAL HEALTH SERVICES  Progress Note  [facility]    Stopped in hallway by patient's sister Nat and her  Renny.  They state that the patient has had one surgery and will probably have another.  They know that she may need extensive rehab and asked if anyone had visited from .  I assured them that we had talked with her and would be continuing to visit.   Plan: Unit  will continue to visit for spiritual support.     Jessica Wsieman  Chaplain Resident  Pager [phone]

## 2017-03-04 ENCOUNTER — APPOINTMENT (OUTPATIENT)
Dept: OCCUPATIONAL THERAPY | Facility: CLINIC | Age: 77
DRG: 329 | End: 2017-03-04
Payer: MEDICARE

## 2017-03-04 ENCOUNTER — APPOINTMENT (OUTPATIENT)
Dept: PHYSICAL THERAPY | Facility: CLINIC | Age: 77
DRG: 329 | End: 2017-03-04
Payer: MEDICARE

## 2017-03-04 LAB
ALBUMIN SERPL-MCNC: 2.2 G/DL (ref 3.4–5)
ANION GAP SERPL CALCULATED.3IONS-SCNC: 7 MMOL/L (ref 3–14)
BASOPHILS # BLD AUTO: 0 10E9/L (ref 0–0.2)
BASOPHILS NFR BLD AUTO: 0.5 %
BUN SERPL-MCNC: 24 MG/DL (ref 7–30)
CALCIUM SERPL-MCNC: 8.3 MG/DL (ref 8.5–10.1)
CHLORIDE SERPL-SCNC: 106 MMOL/L (ref 94–109)
CO2 SERPL-SCNC: 28 MMOL/L (ref 20–32)
CREAT SERPL-MCNC: 0.51 MG/DL (ref 0.52–1.04)
DIFFERENTIAL METHOD BLD: ABNORMAL
EOSINOPHIL # BLD AUTO: 0.2 10E9/L (ref 0–0.7)
EOSINOPHIL NFR BLD AUTO: 2.5 %
ERYTHROCYTE [DISTWIDTH] IN BLOOD BY AUTOMATED COUNT: 32.1 % (ref 10–15)
GFR SERPL CREATININE-BSD FRML MDRD: ABNORMAL ML/MIN/1.7M2
GLUCOSE BLDC GLUCOMTR-MCNC: 101 MG/DL (ref 70–99)
GLUCOSE BLDC GLUCOMTR-MCNC: 118 MG/DL (ref 70–99)
GLUCOSE BLDC GLUCOMTR-MCNC: 124 MG/DL (ref 70–99)
GLUCOSE BLDC GLUCOMTR-MCNC: 89 MG/DL (ref 70–99)
GLUCOSE BLDC GLUCOMTR-MCNC: 90 MG/DL (ref 70–99)
GLUCOSE SERPL-MCNC: 103 MG/DL (ref 70–99)
HCT VFR BLD AUTO: 28.8 % (ref 35–47)
HGB BLD-MCNC: 8.2 G/DL (ref 11.7–15.7)
IMM GRANULOCYTES # BLD: 0 10E9/L (ref 0–0.4)
IMM GRANULOCYTES NFR BLD: 0.2 %
INTERPRETATION ECG - MUSE: NORMAL
LYMPHOCYTES # BLD AUTO: 2.3 10E9/L (ref 0.8–5.3)
LYMPHOCYTES NFR BLD AUTO: 27.8 %
MAGNESIUM SERPL-MCNC: 2.3 MG/DL (ref 1.6–2.3)
MCH RBC QN AUTO: 22.5 PG (ref 26.5–33)
MCHC RBC AUTO-ENTMCNC: 28.5 G/DL (ref 31.5–36.5)
MCV RBC AUTO: 79 FL (ref 78–100)
MONOCYTES # BLD AUTO: 0.8 10E9/L (ref 0–1.3)
MONOCYTES NFR BLD AUTO: 9.1 %
NEUTROPHILS # BLD AUTO: 5 10E9/L (ref 1.6–8.3)
NEUTROPHILS NFR BLD AUTO: 59.9 %
NRBC # BLD AUTO: 0 10*3/UL
NRBC BLD AUTO-RTO: 0 /100
PHOSPHATE SERPL-MCNC: 2.2 MG/DL (ref 2.5–4.5)
PLATELET # BLD AUTO: 169 10E9/L (ref 150–450)
POTASSIUM SERPL-SCNC: 4.3 MMOL/L (ref 3.4–5.3)
RBC # BLD AUTO: 3.64 10E12/L (ref 3.8–5.2)
SODIUM SERPL-SCNC: 141 MMOL/L (ref 133–144)
WBC # BLD AUTO: 8.3 10E9/L (ref 4–11)

## 2017-03-04 PROCEDURE — 99232 SBSQ HOSP IP/OBS MODERATE 35: CPT | Performed by: INTERNAL MEDICINE

## 2017-03-04 PROCEDURE — A9270 NON-COVERED ITEM OR SERVICE: HCPCS | Mod: GY | Performed by: INTERNAL MEDICINE

## 2017-03-04 PROCEDURE — A9270 NON-COVERED ITEM OR SERVICE: HCPCS | Mod: GY | Performed by: HOSPITALIST

## 2017-03-04 PROCEDURE — 00000146 ZZHCL STATISTIC GLUCOSE BY METER IP

## 2017-03-04 PROCEDURE — 25000125 ZZHC RX 250

## 2017-03-04 PROCEDURE — 40000133 ZZH STATISTIC OT WARD VISIT

## 2017-03-04 PROCEDURE — 80069 RENAL FUNCTION PANEL: CPT | Performed by: INTERNAL MEDICINE

## 2017-03-04 PROCEDURE — 25000128 H RX IP 250 OP 636: Performed by: SURGERY

## 2017-03-04 PROCEDURE — 97535 SELF CARE MNGMENT TRAINING: CPT | Mod: GO

## 2017-03-04 PROCEDURE — 12000007 ZZH R&B INTERMEDIATE

## 2017-03-04 PROCEDURE — 25000132 ZZH RX MED GY IP 250 OP 250 PS 637: Mod: GY | Performed by: INTERNAL MEDICINE

## 2017-03-04 PROCEDURE — A9270 NON-COVERED ITEM OR SERVICE: HCPCS | Mod: GY | Performed by: PSYCHIATRY & NEUROLOGY

## 2017-03-04 PROCEDURE — 97530 THERAPEUTIC ACTIVITIES: CPT | Mod: GP | Performed by: PHYSICAL THERAPIST

## 2017-03-04 PROCEDURE — 83735 ASSAY OF MAGNESIUM: CPT | Performed by: INTERNAL MEDICINE

## 2017-03-04 PROCEDURE — 25000125 ZZHC RX 250: Performed by: PHYSICIAN ASSISTANT

## 2017-03-04 PROCEDURE — 97116 GAIT TRAINING THERAPY: CPT | Mod: GP | Performed by: PHYSICAL THERAPIST

## 2017-03-04 PROCEDURE — 99233 SBSQ HOSP IP/OBS HIGH 50: CPT | Performed by: INTERNAL MEDICINE

## 2017-03-04 PROCEDURE — 25000132 ZZH RX MED GY IP 250 OP 250 PS 637: Mod: GY | Performed by: HOSPITALIST

## 2017-03-04 PROCEDURE — 25000132 ZZH RX MED GY IP 250 OP 250 PS 637: Mod: GY | Performed by: PSYCHIATRY & NEUROLOGY

## 2017-03-04 PROCEDURE — 85025 COMPLETE CBC W/AUTO DIFF WBC: CPT | Performed by: INTERNAL MEDICINE

## 2017-03-04 PROCEDURE — 40000193 ZZH STATISTIC PT WARD VISIT: Performed by: PHYSICAL THERAPIST

## 2017-03-04 PROCEDURE — 25000125 ZZHC RX 250: Performed by: INTERNAL MEDICINE

## 2017-03-04 RX ADMIN — ACETAMINOPHEN 650 MG: 325 TABLET, FILM COATED ORAL at 16:23

## 2017-03-04 RX ADMIN — CYCLOBENZAPRINE HYDROCHLORIDE 10 MG: 10 TABLET, FILM COATED ORAL at 16:23

## 2017-03-04 RX ADMIN — OLANZAPINE 5 MG: 5 TABLET, ORALLY DISINTEGRATING ORAL at 22:41

## 2017-03-04 RX ADMIN — CYCLOBENZAPRINE HYDROCHLORIDE 10 MG: 10 TABLET, FILM COATED ORAL at 10:04

## 2017-03-04 RX ADMIN — POTASSIUM PHOSPHATE, MONOBASIC AND POTASSIUM PHOSPHATE, DIBASIC 15 MMOL: 224; 236 INJECTION, SOLUTION INTRAVENOUS at 08:30

## 2017-03-04 RX ADMIN — POTASSIUM CHLORIDE: 2 INJECTION, SOLUTION, CONCENTRATE INTRAVENOUS at 20:39

## 2017-03-04 RX ADMIN — CYCLOBENZAPRINE HYDROCHLORIDE 10 MG: 10 TABLET, FILM COATED ORAL at 22:41

## 2017-03-04 RX ADMIN — ENOXAPARIN SODIUM 40 MG: 40 INJECTION SUBCUTANEOUS at 06:07

## 2017-03-04 RX ADMIN — LEVOTHYROXINE SODIUM 75 MCG: 75 TABLET ORAL at 10:04

## 2017-03-04 RX ADMIN — I.V. FAT EMULSION 180 ML: 20 EMULSION INTRAVENOUS at 20:41

## 2017-03-04 NOTE — PLAN OF CARE
Problem: Goal Outcome Summary  Goal: Goal Outcome Summary  PT: Needs CGA and vc for exercise, bed mobility, transfers, and gait training, 150' without AD, but supporting her L hand with her R.  Patient requests she be fitted with L arm sling for relief of her L shoulder pain.  Nursing notified.  Recommend disch to TCU for continue skilled PT, OT, and Nursing.

## 2017-03-04 NOTE — PROGRESS NOTES
SWS  D:  Nielly Sherita here visiting pt from [place] until tomorrow and was requesting to speak with SW about TCU placement for pt.  Nielly Magallon is POA.  I:  SW met with pt and niece.  Pt indicates preference for TCU to be near [place] where she lives.  Pt has been to [facility] and [facility] in the past, and liked those facilities and would consider going back there again.  SW explained to pt and niece [facility] in [place] offers private rooms at no extra charge- niece will go tour there tonight and leave message about [facility] for Sunday SW.  P:  SWS will continue to follow pt for TCU placement.

## 2017-03-04 NOTE — PLAN OF CARE
Problem: Goal Outcome Summary  Goal: Goal Outcome Summary  Outcome: Improving  AX2, VSS denies pain. Up with SBA x 1, left sided weakness baseline. Incision D/I, ostomy output watery brown. TPN infusing to PICC.

## 2017-03-04 NOTE — PROGRESS NOTES
[facility]    Hospitalist Progress Note    Assessment & Plan   Sherita Kenney is a 76 year old female with PMHx of hypertension, dyslipidemia, postpolio syndrome glaucoma and hx of nephritis/nephropathy during childhood who was admitted on 2/15/2017 with acute psychosis after a home welfare check. In addition, she was also found to have a right iliopsoas abscess for which she underwent IR drainage. Workup stay also revealed a cecal mass concerning for malignancy.    Acute Psychosis: stable  No prior hx of mental health diagnoses. Was found to be paranoid and agitated at home on the day of admission. Has been seen by psych this stay, was felt to have an unspecificed psychotic disorder. They determined she does not have the capacity to make her own decisions. Care coordinator has been assisting with efforts to establish a legal decision maker in her family (see Sintia Muñoz's note from 2/21 for further details), her niece Thalia (who is a family physician) and friend Dinora are now acting as her POAs.  -- last seen by psych on 2/17  -- conts on Zyprexa 5mg HS    Right iliopsoas abscess, s/p IR drainage: Resolved.   Abscess was noted on CT on admission. WBC initially 22. Started on Zosyn and underwent drainage per IR on 2/15/17. Cultures grew enterobacter cloacae, ekinella corrodens and h parainfluenzae. Completed 2wk course of Zosyn this stay. Drain subsequently removed.    Cecal Mass, s/p colonoscopy and laparoscopic ileostomy on 3/1: POD #3  Developed abdominal distension on 2/23 and CT scan showed a distal small bowel obstruction. CEA elevated. Underwent colonoscopy and laparoscopic ilieostomy per colorectal surgery on 3/1. Noted to have a large fungating mass in the cecum with fixation to the abdominal wall and 3 lesions on the surface of the right liver. Biopsy showed invasive poorly differentiated adenocarcinoma.  -- postop cares per surgery  -- pain control with Tylenol prn and heating pad,  patient wants to avoid narcotics  -- cont low fiber diet  -- oncology following      Acute iron deficiency anemia:   Likely secondary to colon mass. Hgb 5.6 on admission, was transfused 2U PRBCs on 2/16. Has also received IV iron infusion this stay per heme/onc    Severe protein calorie malnutrition:   Secondary to underlying malignancy. Nutritionist consulted this stay. Had PICC line placed and began TPN on 2/24.   -- albumin remains low (2.2 today)  -- cycling TPN postop per surgery while awaiting consistent po intake    Hypothyroid:  Chronic and stable on Synthroid 75mcg daily.      Postpolio syndrome:   Causing a flail left shoulder. Unable to use left arm/hand. Will use sling/brace for PT. Continues on Cyclobenzaprine 10mg TID.     Hypertension:   Not on any scheduled antihypertensive. BPs stable.    Klebsiella UTI: Resolved.   Adequately treated with Zosyn this stay.    Episode of Unresponsiveness: Resolved.   Noted 2/23. RRT called and patient evaluated per house MD. Felt secondary to compazine, which has since been dc'd. 2/23/17. No recurrent episodes since.    FEN: off IVFs, lytes stable, low fiber diet  DVT Prophylaxis: Lovenox, PCDs  Code Status: Full Code    Disposition: Per surgery recommendations. Likely few more days still. Will need TCU at discharge.    Dinorah James    Interval History   Seen this afternoon. Resting comfortably. Complains of some twinging abdominal pain near ostomy that comes/goes. Has had good output per ostomy. Advanced to low fiber diet - ate 25% of lunch. Has been up to chair most of day and ambulating. Denies cp/sob/cough. Niece (co-POA) at bedside this afternoon. All questions answered.     -Data reviewed today: I reviewed all new labs and imaging results over the last 24 hours. I personally reviewed no images or EKG's today.    Physical Exam   Temp: 98.3  F (36.8  C) Temp src: Oral BP: 163/70   Heart Rate: 63 Resp: 16 SpO2: 93 % O2 Device: None (Room air)    Vitals:     03/02/17 0437 03/03/17 0427 03/04/17 0427   Weight: 54.8 kg (120 lb 13 oz) 53.9 kg (118 lb 13.3 oz) 54.6 kg (120 lb 5.9 oz)     Vital Signs with Ranges  Temp:  [97.6  F (36.4  C)-98.5  F (36.9  C)] 98.3  F (36.8  C)  Heart Rate:  [63-98] 63  Resp:  [16] 16  BP: ()/(52-70) 163/70  SpO2:  [92 %-100 %] 93 %  I/O last 3 completed shifts:  In: 2710 [P.O.:810; I.V.:428]  Out: 525 [Urine:350; Stool:175]    Constitutional: Resting comfortably, alert and conversing appropriately, NAD  Respiratory: CTAB, no wheeze/rales/rhonchi  Cardiovascular: HRRR, no MGR  GI: S, NT, +BS, +liquid brown stool in ostomy  Skin/Integumen: warm/dry  Other:  trace bilateral LE edema    Medications     parenteral nutrition - ADULT compounded formula CYCLE       parenteral nutrition - ADULT compounded formula CYCLE 110 mL/hr at 03/03/17 2006     - MEDICATION INSTRUCTIONS -       - MEDICATION INSTRUCTIONS -       IV fluid REPLACEMENT ONLY       IV infusion builder WITH additives 10 mL/hr at 03/03/17 2009       lipids  180 mL Intravenous Q24H     acetaminophen  975 mg Oral Once     enoxaparin  40 mg Subcutaneous Q24H     sodium chloride (PF)  10 mL Intracatheter Q8H     insulin aspart  1-12 Units Subcutaneous Q4H     levothyroxine  75 mcg Oral Daily     cyclobenzaprine  10 mg Oral TID     OLANZapine zydis  5 mg Oral At Bedtime       Data     Recent Labs  Lab 03/04/17  0625 03/03/17  0615 03/01/17  1727 03/01/17  0755 03/01/17  0545 02/28/17  0925 02/27/17  0754 02/27/17  0700   WBC 8.3  --   --   --   --  8.3 9.6  --    HGB 8.2*  --  8.1*  --   --  8.9* 9.7*  --    MCV 79  --   --   --   --  76* 76*  --     147*  --   --   --  264 298  --    INR  --   --   --   --   --   --   --  1.01     --   --   --   --  136  --  143   POTASSIUM 4.3  --   --  3.5 Canceled, Test credited Questionable specimenCALLED TO MICKEY ON 33 AT 0722 4.2  --  3.5   CHLORIDE 106  --   --   --   --  104  --  109   CO2 28  --   --   --   --  25  --  29   BUN  24  --   --   --   --  17  --  14   CR 0.51*  --   --  0.56 Canceled, Test credited Questionable specimenCALLED TO MICKEY ON 33 AT 0715CORRECTED ON 03/01 AT 0722: PREVIOUSLY REPORTED AS 0.57 0.52  --  0.54   ANIONGAP 7  --   --   --   --  7  --  5   SARITHA 8.3*  --   --   --   --  7.8*  --  8.1*   *  --   --   --   --  303*  --  95   ALBUMIN 2.2*  --   --   --   --   --   --  2.1*   PROTTOTAL  --   --   --   --   --   --   --  5.1*   BILITOTAL  --   --   --   --   --   --   --  0.2   ALKPHOS  --   --   --   --   --   --   --  65   ALT  --   --   --   --   --   --   --  18   AST  --   --   --   --   --   --   --  29       No results found for this or any previous visit (from the past 24 hour(s)).

## 2017-03-04 NOTE — PROGRESS NOTES
[facility]    Hematology/Oncology Follow-up Note      Today's Date: 03/04/17  Date of Admission:  2/15/2017  Reason for Consult: adenocarcinoma of the colon      ASSESSMENT/PLAN:  Sherita Kenney is a 76 year old female who was found to have a right iliopsoas abscess and cecal mass; she underwent lap ileostomy and biopsy, which revealed invasive poorly differentiated adenocarcinoma.  There does not appear to be imaging evidence of metastatic disease on CT scans.  The op note notes 3 diminutive lesions on the surface of the right liver.  I discussed the biopsy results and imaging with the patient and her niece.  Patient's main goal currently is to rehabilitate and increase functional status and nutrition, before further treatment can be offered.  Patient has plans for discharge to TCU, and hopefully increase oral intake and get off of TPN.  Follow-up with colorectal surgery.  She should have oncology follow-up for her with Dr. Nieves in clinic after discharge.          INTERIM HISTORY: Sherita was seen at bedside today.  Her niece, Sherita, who is also GI, was also at bedside.  She says that she feels okay, starting to eat solid foods today, but still cycling TPN.  She notes feeling spasms around the stomy site at times.         MEDICATIONS:  Current Facility-Administered Medications   Medication     parenteral nutrition - ADULT compounded formula CYCLE     lipids (INTRALIPID) 20 % infusion 180 mL     parenteral nutrition - ADULT compounded formula CYCLE     ibuprofen (ADVIL/MOTRIN) tablet 400 mg     May continue current IV fluids if patient has IV fluids infusing.     May take regular AM medications except those listed below     acetaminophen (TYLENOL) tablet 975 mg     enoxaparin (LOVENOX) injection 40 mg     morphine (PF) injection 1-2 mg     nitroglycerin (NITROSTAT) sublingual tablet 0.4 mg     hypromellose-dextran (ARTIFICAL TEARS) ophthalmic solution 2-3 drop     sodium chloride (PF) 0.9%  "PF flush 10-20 mL     sodium chloride (PF) 0.9% PF flush 10 mL     dextrose 10 % 1,000 mL infusion     glucose 40 % gel 15-30 g    Or     dextrose 50 % injection 25-50 mL    Or     glucagon injection 1 mg     insulin aspart (NovoLOG) inj (RAPID ACTING)     potassium phosphate 15 mmol in D5W 250 mL intermittent infusion     potassium phosphate 20 mmol in D5W 500 mL intermittent infusion     potassium phosphate 20 mmol in D5W 250 mL intermittent infusion     potassium phosphate 25 mmol in D5W 500 mL intermittent infusion     dextrose 5% and 0.2% NaCl 1,000 mL with potassium chloride 20 mEq/L infusion     levothyroxine (SYNTHROID/LEVOTHROID) tablet 75 mcg     cyclobenzaprine (FLEXERIL) tablet 10 mg     naloxone (NARCAN) injection 0.1-0.4 mg     sodium chloride (PF) 0.9% PF flush 3 mL     potassium chloride SA (K-DUR/KLOR-CON M) CR tablet 20-40 mEq     potassium chloride (KLOR-CON) Packet 20-40 mEq     potassium chloride 10 mEq in 100 mL intermittent infusion     potassium chloride 10 mEq in 100 mL intermittent infusion with 10 mg lidocaine     potassium chloride 20 mEq in 50 mL intermittent infusion     magnesium sulfate 4 g in 100 mL sterile water (premade)     acetaminophen (TYLENOL) tablet 650 mg     acetaminophen (TYLENOL) Suppository 650 mg     oxyCODONE (ROXICODONE) IR tablet 5 mg     polyethylene glycol (MIRALAX/GLYCOLAX) Packet 17 g     ondansetron (ZOFRAN-ODT) ODT tab 4 mg    Or     ondansetron (ZOFRAN) injection 4 mg     HYDROmorphone (PF) (DILAUDID) injection 0.2 mg     OLANZapine zydis (zyPREXA) ODT tab 5 mg     OLANZapine zydis (zyPREXA) ODT half-tab 2.5-5 mg           ALLERGIES:  Allergies   Allergen Reactions     Wool Fiber Unknown         PHYSICAL EXAM:  Vital signs:  Temp: 98.3  F (36.8  C) Temp src: Oral BP: 163/70   Heart Rate: 63 Resp: 16 SpO2: 93 % O2 Device: None (Room air) Oxygen Delivery: 2 LPM Height: 167.6 cm (5' 6\") Weight: 54.6 kg (120 lb 5.9 oz) (standing wt)  Estimated body mass index is " "19.43 kg/(m^2) as calculated from the following:    Height as of this encounter: 1.676 m (5' 6\").    Weight as of this encounter: 54.6 kg (120 lb 5.9 oz).    GENERAL/CONSTITUTIONAL: No acute distress. Niece at bedside.  EYES: No scleral icterus.  NEUROLOGIC: Alert, oriented, answers questions appropriately.  INTEGUMENTARY: No jaundice.      LABS:  CBC RESULTS:   Recent Labs   Lab Test  03/04/17   0625   WBC  8.3   RBC  3.64*   HGB  8.2*   HCT  28.8*   MCV  79   MCH  22.5*   MCHC  28.5*   RDW  32.1*   PLT  169       Recent Labs   Lab Test  03/04/17   0625  03/01/17   0755   02/28/17   0925   NA  141   --    --   136   POTASSIUM  4.3  3.5   < >  4.2   CHLORIDE  106   --    --   104   CO2  28   --    --   25   ANIONGAP  7   --    --   7   GLC  103*   --    --   303*   BUN  24   --    --   17   CR  0.51*  0.56   < >  0.52   SARITHA  8.3*   --    --   7.8*    < > = values in this interval not displayed.           Yessy Barahona MD  Hematology/Oncology  [facility]    "

## 2017-03-04 NOTE — PLAN OF CARE
Problem: Goal Outcome Summary  Goal: Goal Outcome Summary  OT: Pt eager to participate in therapy.  Pt competed toilet transfer, LE dressing and walking in hallway. Recommend TCU at discharge.

## 2017-03-04 NOTE — PROGRESS NOTES
COLON & RECTAL SURGERY PROGRESS NOTE    March 4, 2017  Post-op Day # 3    SUBJECTIVE:    Doing well  Minimal pain  No fever/chills  No NV but does not like the food, not much appetite, tolerating oral intake (on FLD)    OBJECTIVE:  Temp:  [97.6  F (36.4  C)-98.5  F (36.9  C)] 98.2  F (36.8  C)  Heart Rate:  [80-98] 98  Resp:  [16] 16  BP: ()/(52-67) 129/55  SpO2:  [92 %-100 %] 92 %    Intake/Output Summary (Last 24 hours) at 03/04/17 1045  Last data filed at 03/04/17 0600   Gross per 24 hour   Intake             2710 ml   Output              175 ml   Net             2535 ml       GENERAL:  Awake, alert, no acute distress.  ABDOMEN:  Soft, minimally tender, non-distended, Incisions c/d/i. Stoma pink/active with stool    LABS:  Lab Results   Component Value Date    WBC 8.3 03/04/2017     Lab Results   Component Value Date    HGB 8.2 03/04/2017     Lab Results   Component Value Date    HCT 28.8 03/04/2017     Lab Results   Component Value Date     03/04/2017     Last Basic Metabolic Panel:  Lab Results   Component Value Date     03/04/2017      Lab Results   Component Value Date    POTASSIUM 4.3 03/04/2017     Lab Results   Component Value Date    CHLORIDE 106 03/04/2017     Lab Results   Component Value Date    SARITHA 8.3 03/04/2017     Lab Results   Component Value Date    CO2 28 03/04/2017     Lab Results   Component Value Date    BUN 24 03/04/2017     Lab Results   Component Value Date    CR 0.51 03/04/2017     Lab Results   Component Value Date     03/04/2017       ASSESSMENT/PLAN:  POD 3 scope and biopsy, lap ileostomy  Doing well, stable.   Low fiber diet, and cycling TPN. Depending upon calorie counts may be able to stop TPN in next few days  Lovenox for prophylaxis.  Continue with buprofen and heating pad for upper back pain.     Will review with attending staff on call Dr. Ollie Urrutia MD  Colon & Rectal Surgery Fellow

## 2017-03-04 NOTE — PLAN OF CARE
Problem: Goal Outcome Summary  Goal: Goal Outcome Summary  Outcome: Improving  A/Ox3, AVSS, BP on right lower arm. Left arm flaccid from hx polio. tolerating small portion of low fiber. Pain controlled with flexeril and tylenol. Hot pack and arm sling for comfort. ileostomy has moderate liquid brown output. Tele SR. Cyclic TPN on at NOC. Will continue carb counts and BG checks. Encourage ambulation. BS active. Voiding adequate.

## 2017-03-05 ENCOUNTER — APPOINTMENT (OUTPATIENT)
Dept: OCCUPATIONAL THERAPY | Facility: CLINIC | Age: 77
DRG: 329 | End: 2017-03-05
Payer: MEDICARE

## 2017-03-05 ENCOUNTER — APPOINTMENT (OUTPATIENT)
Dept: PHYSICAL THERAPY | Facility: CLINIC | Age: 77
DRG: 329 | End: 2017-03-05
Payer: MEDICARE

## 2017-03-05 LAB
GLUCOSE BLDC GLUCOMTR-MCNC: 109 MG/DL (ref 70–99)
GLUCOSE BLDC GLUCOMTR-MCNC: 121 MG/DL (ref 70–99)
GLUCOSE BLDC GLUCOMTR-MCNC: 124 MG/DL (ref 70–99)
GLUCOSE BLDC GLUCOMTR-MCNC: 92 MG/DL (ref 70–99)
GLUCOSE BLDC GLUCOMTR-MCNC: 98 MG/DL (ref 70–99)
PHOSPHATE SERPL-MCNC: 3.4 MG/DL (ref 2.5–4.5)

## 2017-03-05 PROCEDURE — A9270 NON-COVERED ITEM OR SERVICE: HCPCS | Mod: GY | Performed by: HOSPITALIST

## 2017-03-05 PROCEDURE — 25000132 ZZH RX MED GY IP 250 OP 250 PS 637: Mod: GY | Performed by: PSYCHIATRY & NEUROLOGY

## 2017-03-05 PROCEDURE — 97116 GAIT TRAINING THERAPY: CPT | Mod: GP | Performed by: PHYSICAL THERAPIST

## 2017-03-05 PROCEDURE — 40000193 ZZH STATISTIC PT WARD VISIT: Performed by: PHYSICAL THERAPIST

## 2017-03-05 PROCEDURE — 12000007 ZZH R&B INTERMEDIATE

## 2017-03-05 PROCEDURE — 40000239 ZZH STATISTIC VAT ROUNDS

## 2017-03-05 PROCEDURE — A9270 NON-COVERED ITEM OR SERVICE: HCPCS | Mod: GY | Performed by: PSYCHIATRY & NEUROLOGY

## 2017-03-05 PROCEDURE — A9270 NON-COVERED ITEM OR SERVICE: HCPCS | Mod: GY | Performed by: INTERNAL MEDICINE

## 2017-03-05 PROCEDURE — 25000125 ZZHC RX 250: Performed by: INTERNAL MEDICINE

## 2017-03-05 PROCEDURE — 84100 ASSAY OF PHOSPHORUS: CPT | Performed by: INTERNAL MEDICINE

## 2017-03-05 PROCEDURE — 97110 THERAPEUTIC EXERCISES: CPT | Mod: GP | Performed by: PHYSICAL THERAPIST

## 2017-03-05 PROCEDURE — 25000132 ZZH RX MED GY IP 250 OP 250 PS 637: Mod: GY | Performed by: HOSPITALIST

## 2017-03-05 PROCEDURE — 40000133 ZZH STATISTIC OT WARD VISIT

## 2017-03-05 PROCEDURE — 25000132 ZZH RX MED GY IP 250 OP 250 PS 637: Mod: GY | Performed by: INTERNAL MEDICINE

## 2017-03-05 PROCEDURE — 25000125 ZZHC RX 250

## 2017-03-05 PROCEDURE — 97530 THERAPEUTIC ACTIVITIES: CPT | Mod: GO

## 2017-03-05 PROCEDURE — 25000128 H RX IP 250 OP 636: Performed by: SURGERY

## 2017-03-05 PROCEDURE — 99232 SBSQ HOSP IP/OBS MODERATE 35: CPT | Performed by: INTERNAL MEDICINE

## 2017-03-05 PROCEDURE — 00000146 ZZHCL STATISTIC GLUCOSE BY METER IP

## 2017-03-05 PROCEDURE — 97535 SELF CARE MNGMENT TRAINING: CPT | Mod: GO

## 2017-03-05 RX ADMIN — OLANZAPINE 5 MG: 5 TABLET, ORALLY DISINTEGRATING ORAL at 21:16

## 2017-03-05 RX ADMIN — ACETAMINOPHEN 325 MG: 325 TABLET, FILM COATED ORAL at 21:22

## 2017-03-05 RX ADMIN — CYCLOBENZAPRINE HYDROCHLORIDE 10 MG: 10 TABLET, FILM COATED ORAL at 21:16

## 2017-03-05 RX ADMIN — ACETAMINOPHEN 650 MG: 325 TABLET, FILM COATED ORAL at 08:06

## 2017-03-05 RX ADMIN — POTASSIUM CHLORIDE: 2 INJECTION, SOLUTION, CONCENTRATE INTRAVENOUS at 08:00

## 2017-03-05 RX ADMIN — I.V. FAT EMULSION 180 ML: 20 EMULSION INTRAVENOUS at 20:08

## 2017-03-05 RX ADMIN — LEVOTHYROXINE SODIUM 75 MCG: 75 TABLET ORAL at 08:00

## 2017-03-05 RX ADMIN — CYCLOBENZAPRINE HYDROCHLORIDE 10 MG: 10 TABLET, FILM COATED ORAL at 17:57

## 2017-03-05 RX ADMIN — CYCLOBENZAPRINE HYDROCHLORIDE 10 MG: 10 TABLET, FILM COATED ORAL at 08:01

## 2017-03-05 RX ADMIN — POTASSIUM CHLORIDE: 2 INJECTION, SOLUTION, CONCENTRATE INTRAVENOUS at 20:09

## 2017-03-05 RX ADMIN — ENOXAPARIN SODIUM 40 MG: 40 INJECTION SUBCUTANEOUS at 06:17

## 2017-03-05 NOTE — PROGRESS NOTES
COLON & RECTAL SURGERY PROGRESS NOTE    March 5, 2017  Post-op Day #4     SUBJECTIVE:   Doing well  Minimal pain  No fever/chills  Eating more with better appetite, tolerating LFD     OBJECTIVE:  Temp:  [97.5  F (36.4  C)-98.4  F (36.9  C)] 97.5  F (36.4  C)  Pulse:  [82-95] 95  Heart Rate:  [94] 94  Resp:  [16-18] 18  BP: (102-118)/(45-72) 102/45  SpO2:  [97 %-99 %] 98 %    Intake/Output Summary (Last 24 hours) at 03/05/17 1210  Last data filed at 03/05/17 0930   Gross per 24 hour   Intake             2164 ml   Output             2000 ml   Net              164 ml       GENERAL:  Awake, alert, no acute distress.  ABDOMEN:  Soft, NT, non-distended, Incisions c/d/i.stoma active    LABS:  Lab Results   Component Value Date    WBC 8.3 03/04/2017     Lab Results   Component Value Date    HGB 8.2 03/04/2017     Lab Results   Component Value Date    HCT 28.8 03/04/2017     Lab Results   Component Value Date     03/04/2017     Last Basic Metabolic Panel:  Lab Results   Component Value Date     03/04/2017      Lab Results   Component Value Date    POTASSIUM 4.3 03/04/2017     Lab Results   Component Value Date    CHLORIDE 106 03/04/2017     Lab Results   Component Value Date    SARITHA 8.3 03/04/2017     Lab Results   Component Value Date    CO2 28 03/04/2017     Lab Results   Component Value Date    BUN 24 03/04/2017     Lab Results   Component Value Date    CR 0.51 03/04/2017     Lab Results   Component Value Date     03/04/2017       ASSESSMENT/PLAN:  POD 4 scope and biopsy, lap ileostomy  Doing well, stable.   Low fiber diet, and cycling TPN. Depending upon calorie counts may be able to stop TPN soon  Lovenox for prophylaxis.  Continue with buprofen and heating pad for upper back pain.   Anticipate d/c to TCU soon     Reviewed with attending staff on call Dr. Ollie Urrutia MD  Colon [facility] Rectal Surgery Fellow

## 2017-03-05 NOTE — PLAN OF CARE
Problem: Goal Outcome Summary  Goal: Goal Outcome Summary  OT: Patient SBA sit <> stand using IV pole on R for support. Patient CGA <> BR with reported fatigue with ambulation. Patient stood at sink for ADL routine with SBA >CGA. Patient reporting B shoulder pain with RN aware. Continues to be limited in ADL I by impaired balance, cognitive safety and decreased ADL tolerance. Recommend: TCU at discharge.

## 2017-03-05 NOTE — PLAN OF CARE
Problem: Goal Outcome Summary  Goal: Goal Outcome Summary  Vss, RA.  Pt is alert and oriented, forgetful at times.  TPN and lipids infusing overnight.  UO adequate.  Up with 1.  Stoma pink, protruding. Stool and flatus present.  LS clear. BS present.  Mild edema present in bilateral feet.  Tele-NSR.  LUE flaccid-baseline.  Splint ordered.

## 2017-03-05 NOTE — PROGRESS NOTES
CALORIE COUNT      Approximate Oral Intake for:    3/4/17  Calories:  728 kcal  Protein:  30 grams       Number of Meals Recorded:     3    Number of Snacks Recorded:  2      Estimated Needs:    Calories:  8326-5712 kcal (30-35 kcal per kg) - Repletion, underweight  Protein:   grams (1.5-2 g per kg) - Repletion       Summary:   Cyclic TPN continues --> 1200 mL/day, 60 g AA/day, 180 g Dex/day + Lipid 250 mL daily = 1212 kcal (23 kcal/kg and 75% energy needs), 60 g protein (1.1 g/kg and 75% protein needs)  Total (TPN/Lipid + Oral)= 1940 kcal (37 kcal per kg), 90 g protein (1.7 g per kg)    Brittaney Julien RD, LD, CNSC   Clinical Dietitian - [facility]

## 2017-03-05 NOTE — PROGRESS NOTES
[place]  D: Pt.'s niece Sherita left a vm after visiting [facility]. She stated that at this time [facility] would not be their first preference as the facility is in the middle of some management changes and she does not feel this would be the best fit for her aunt, however if the other facilities are not an option they will reconsider it.  P:   will continue follow to assist with d/c planning.

## 2017-03-05 NOTE — PROGRESS NOTES
[facility]    Hospitalist Progress Note    Assessment & Plan   Sherita Kenney is a 76 year old female with PMHx of hypertension, dyslipidemia, postpolio syndrome glaucoma and hx of nephritis/nephropathy during childhood who was admitted on 2/15/2017 with acute psychosis after a home welfare check. In addition, she was also found to have a right iliopsoas abscess for which she underwent IR drainage. Workup stay also revealed a cecal mass concerning for malignancy.    Acute Psychosis: stable  No prior hx of mental health diagnoses. Was found to be paranoid and agitated at home on the day of admission. Has been seen by psych this stay, was felt to have an unspecificed psychotic disorder. They determined she does not have the capacity to make her own decisions. Care coordinator has been assisting with efforts to establish a legal decision maker in her family (see Sintia Muñoz's note from 2/21 for further details), her niece Thalia (who is a family physician) and friend Dinora are now acting as her POAs.  -- last seen by psych on 2/17  -- conts on Zyprexa 5mg HS     Right iliopsoas abscess, s/p IR drainage: Resolved.   Abscess was noted on CT on admission. WBC initially 22. Started on Zosyn and underwent drainage per IR on 2/15/17. Cultures grew enterobacter cloacae, ekinella corrodens and h parainfluenzae. Completed 2wk course of Zosyn this stay. Drain subsequently removed.    Cecal Mass, s/p colonoscopy and laparoscopic ileostomy on 3/1: POD #4  Developed abdominal distension on 2/23 and CT scan showed a distal small bowel obstruction. CEA elevated. Underwent colonoscopy and laparoscopic ilieostomy per colorectal surgery on 3/1. Noted to have a large fungating mass in the cecum with fixation to the abdominal wall and 3 lesions on the surface of the right liver. Biopsy showed invasive poorly differentiated adenocarcinoma.  -- postop cares per surgery  -- pain control with Tylenol prn and heating pad,  patient wants to avoid narcotics  -- cont low fiber diet and calorie counts  -- oncology following this stay -> will follow up in clinic with Dr. Nieves to discuss plans for future treatment       Acute iron deficiency anemia:   Likely secondary to colon mass. Hgb 5.6 on admission, was transfused 2U PRBCs on 2/16. Has also received IV iron infusion this stay per heme/onc.  -- hgb now remains stable around 8-9    Severe protein calorie malnutrition:   Secondary to underlying malignancy. Nutritionist consulted this stay. Had PICC line placed and began TPN on 2/24.   -- albumin remains low (2.2 per last check on 3/4)  -- cycling TPN postop per surgery while awaiting consistent po intake    Hypothyroid:  Chronic and stable on Synthroid 75mcg daily.      Postpolio syndrome:   Causing a flail left shoulder. Unable to use left arm/hand. Will use sling/brace for PT. Continues on Cyclobenzaprine 10mg TID.     Hypertension:   Not on any scheduled antihypertensive. BPs stable.    Klebsiella UTI: Resolved.   Adequately treated with Zosyn this stay.    Episode of Unresponsiveness: Resolved.   Noted 2/23. RRT called and patient evaluated per house MD. Felt secondary to compazine, which has since been dc'd. 2/23/17. No recurrent episodes since.    FEN: off IVFs, lytes stable, low fiber diet  DVT Prophylaxis: Lovenox, PCDs  Code Status: Full Code    Disposition: Per surgery recommendations. Likely few more days still to ensure adequate po intake. Will need TCU at discharge.    Dinorah James    Interval History   Seen this morning. Feeling well. Ate good amount off breakfast tray. Seen when working with therapy, doing well ambulating around room. Complains of some right wrist pain. No cp/sob/cough, abd pain/n/v.     -Data reviewed today: I reviewed all new labs and imaging results over the last 24 hours. I personally reviewed no images or EKG's today.    Physical Exam   Temp: 98  F (36.7  C) Temp src: Oral BP: 116/72 Pulse:  82 Heart Rate: 94 Resp: 18 SpO2: 99 % O2 Device: None (Room air)    Vitals:    03/03/17 0427 03/04/17 0427 03/05/17 0300   Weight: 53.9 kg (118 lb 13.3 oz) 54.6 kg (120 lb 5.9 oz) 54.3 kg (119 lb 11.4 oz)     Vital Signs with Ranges  Temp:  [97.9  F (36.6  C)-98.4  F (36.9  C)] 98  F (36.7  C)  Pulse:  [82-91] 82  Heart Rate:  [63-94] 94  Resp:  [16-18] 18  BP: (107-163)/(62-72) 116/72  SpO2:  [93 %-99 %] 99 %  I/O last 3 completed shifts:  In: 2898 [P.O.:1120; I.V.:330]  Out: 2250 [Urine:1800; Stool:450]    Constitutional: Resting comfortably, alert and conversing appropriately, NAD  Respiratory: CTAB, no wheeze/rales/rhonchi  Cardiovascular: HRRR, no MGR  GI: S, NT, +BS, +liquid brown stool in ostomy  Skin/Integumen: warm/dry  Other: no LE edema    Medications     parenteral nutrition - ADULT compounded formula CYCLE 35 mL/hr at 03/05/17 0800     - MEDICATION INSTRUCTIONS -       - MEDICATION INSTRUCTIONS -       IV fluid REPLACEMENT ONLY       IV infusion builder WITH additives Stopped (03/04/17 0800)       lipids  180 mL Intravenous Q24H     acetaminophen  975 mg Oral Once     enoxaparin  40 mg Subcutaneous Q24H     sodium chloride (PF)  10 mL Intracatheter Q8H     insulin aspart  1-12 Units Subcutaneous Q4H     levothyroxine  75 mcg Oral Daily     cyclobenzaprine  10 mg Oral TID     OLANZapine zydis  5 mg Oral At Bedtime       Data     Recent Labs  Lab 03/04/17  0625 03/03/17  0615 03/01/17  1727 03/01/17  0755 03/01/17  0545 02/28/17  0925 02/27/17  0754  02/27/17  0700   WBC 8.3  --   --   --   --  8.3 9.6  --   --    HGB 8.2*  --  8.1*  --   --  8.9* 9.7*  < >  --    MCV 79  --   --   --   --  76* 76*  --   --     147*  --   --   --  264 298  < >  --    INR  --   --   --   --   --   --   --   --  1.01     --   --   --   --  136  --   --  143   POTASSIUM 4.3  --   --  3.5 Canceled, Test credited Questionable specimenCALLED TO MICKEY ON 33 AT 0722 4.2  --   --  3.5   CHLORIDE 106  --   --   --   --   104  --   --  109   CO2 28  --   --   --   --  25  --   --  29   BUN 24  --   --   --   --  17  --   --  14   CR 0.51*  --   --  0.56 Canceled, Test credited Questionable specimenCALLED TO MICKEY ON 33 AT 0715CORRECTED ON 03/01 AT 0722: PREVIOUSLY REPORTED AS 0.57 0.52  --   --  0.54   ANIONGAP 7  --   --   --   --  7  --   --  5   SARITHA 8.3*  --   --   --   --  7.8*  --   --  8.1*   *  --   --   --   --  303*  --   --  95   ALBUMIN 2.2*  --   --   --   --   --   --   --  2.1*   PROTTOTAL  --   --   --   --   --   --   --   --  5.1*   BILITOTAL  --   --   --   --   --   --   --   --  0.2   ALKPHOS  --   --   --   --   --   --   --   --  65   ALT  --   --   --   --   --   --   --   --  18   AST  --   --   --   --   --   --   --   --  29   < > = values in this interval not displayed.    No results found for this or any previous visit (from the past 24 hour(s)).

## 2017-03-05 NOTE — PLAN OF CARE
Problem: Goal Outcome Summary  Goal: Goal Outcome Summary  Outcome: Improving  Minimal pain. More forgetful as the evening progressed. Up with 1.

## 2017-03-05 NOTE — PLAN OF CARE
Problem: Goal Outcome Summary  Goal: Goal Outcome Summary  PT: Patient c/o 4/10 L shoulder and abdominal inscissional pain.  Needs CGA and vc for bed mobility, transfers, and gait with patient supporting her L hand and arm with her R one, and walking 126'.  Nursing reminded that patient would likely benefit from application of supportive arm sling to her L shoulder.  Continue to recommend disch to TCU.

## 2017-03-05 NOTE — PLAN OF CARE
Problem: Goal Outcome Summary  Goal: Goal Outcome Summary  Outcome: Improving  A/O but forgetful at times, AVSS, tolerating low fiber diet, A-1 with ambulation. Lap sites CDI/JUVENAL. ileostomy has moderate liquid brown stool. Stoma pink and protruding. Left arm flaccid from hx polio. Pain controlled with tylenol. Arm sling prn for comfort. Cyclic TPN at NOC. BS active. LS clear.

## 2017-03-06 ENCOUNTER — APPOINTMENT (OUTPATIENT)
Dept: OCCUPATIONAL THERAPY | Facility: CLINIC | Age: 77
DRG: 329 | End: 2017-03-06
Payer: MEDICARE

## 2017-03-06 ENCOUNTER — APPOINTMENT (OUTPATIENT)
Dept: PHYSICAL THERAPY | Facility: CLINIC | Age: 77
DRG: 329 | End: 2017-03-06
Payer: MEDICARE

## 2017-03-06 LAB
ALBUMIN SERPL-MCNC: 2.5 G/DL (ref 3.4–5)
ALP SERPL-CCNC: 114 U/L (ref 40–150)
ALT SERPL W P-5'-P-CCNC: 19 U/L (ref 0–50)
ANION GAP SERPL CALCULATED.3IONS-SCNC: 7 MMOL/L (ref 3–14)
AST SERPL W P-5'-P-CCNC: 31 U/L (ref 0–45)
BILIRUB SERPL-MCNC: 0.3 MG/DL (ref 0.2–1.3)
BUN SERPL-MCNC: 27 MG/DL (ref 7–30)
CALCIUM SERPL-MCNC: 8.7 MG/DL (ref 8.5–10.1)
CHLORIDE SERPL-SCNC: 106 MMOL/L (ref 94–109)
CO2 SERPL-SCNC: 27 MMOL/L (ref 20–32)
COPATH REPORT: NORMAL
CREAT SERPL-MCNC: 0.58 MG/DL (ref 0.52–1.04)
GFR SERPL CREATININE-BSD FRML MDRD: ABNORMAL ML/MIN/1.7M2
GLUCOSE BLDC GLUCOMTR-MCNC: 105 MG/DL (ref 70–99)
GLUCOSE BLDC GLUCOMTR-MCNC: 106 MG/DL (ref 70–99)
GLUCOSE BLDC GLUCOMTR-MCNC: 107 MG/DL (ref 70–99)
GLUCOSE BLDC GLUCOMTR-MCNC: 112 MG/DL (ref 70–99)
GLUCOSE BLDC GLUCOMTR-MCNC: 115 MG/DL (ref 70–99)
GLUCOSE BLDC GLUCOMTR-MCNC: 78 MG/DL (ref 70–99)
GLUCOSE SERPL-MCNC: 104 MG/DL (ref 70–99)
INR PPP: 0.97 (ref 0.86–1.14)
MAGNESIUM SERPL-MCNC: 2.2 MG/DL (ref 1.6–2.3)
PHOSPHATE SERPL-MCNC: 3.5 MG/DL (ref 2.5–4.5)
PLATELET # BLD AUTO: 234 10E9/L (ref 150–450)
POTASSIUM SERPL-SCNC: 4.1 MMOL/L (ref 3.4–5.3)
PREALB SERPL IA-MCNC: 17 MG/DL (ref 15–45)
PROT SERPL-MCNC: 6.3 G/DL (ref 6.8–8.8)
SODIUM SERPL-SCNC: 140 MMOL/L (ref 133–144)
TRIGL SERPL-MCNC: 100 MG/DL

## 2017-03-06 PROCEDURE — A9270 NON-COVERED ITEM OR SERVICE: HCPCS | Mod: GY | Performed by: INTERNAL MEDICINE

## 2017-03-06 PROCEDURE — 97535 SELF CARE MNGMENT TRAINING: CPT | Mod: GO

## 2017-03-06 PROCEDURE — 84134 ASSAY OF PREALBUMIN: CPT | Performed by: INTERNAL MEDICINE

## 2017-03-06 PROCEDURE — 12000007 ZZH R&B INTERMEDIATE

## 2017-03-06 PROCEDURE — 83735 ASSAY OF MAGNESIUM: CPT | Performed by: INTERNAL MEDICINE

## 2017-03-06 PROCEDURE — 25000125 ZZHC RX 250: Performed by: INTERNAL MEDICINE

## 2017-03-06 PROCEDURE — 25000128 H RX IP 250 OP 636: Performed by: SURGERY

## 2017-03-06 PROCEDURE — 99231 SBSQ HOSP IP/OBS SF/LOW 25: CPT | Performed by: INTERNAL MEDICINE

## 2017-03-06 PROCEDURE — 40000257 ZZH STATISTIC CONSULT NO CHARGE VASC ACCESS

## 2017-03-06 PROCEDURE — 40000133 ZZH STATISTIC OT WARD VISIT

## 2017-03-06 PROCEDURE — 00000146 ZZHCL STATISTIC GLUCOSE BY METER IP

## 2017-03-06 PROCEDURE — 84100 ASSAY OF PHOSPHORUS: CPT | Performed by: INTERNAL MEDICINE

## 2017-03-06 PROCEDURE — 25000128 H RX IP 250 OP 636: Performed by: INTERNAL MEDICINE

## 2017-03-06 PROCEDURE — 40000193 ZZH STATISTIC PT WARD VISIT

## 2017-03-06 PROCEDURE — 97110 THERAPEUTIC EXERCISES: CPT | Mod: GP

## 2017-03-06 PROCEDURE — 80053 COMPREHEN METABOLIC PANEL: CPT | Performed by: INTERNAL MEDICINE

## 2017-03-06 PROCEDURE — 36593 DECLOT VASCULAR DEVICE: CPT

## 2017-03-06 PROCEDURE — 25000132 ZZH RX MED GY IP 250 OP 250 PS 637: Mod: GY | Performed by: HOSPITALIST

## 2017-03-06 PROCEDURE — 84478 ASSAY OF TRIGLYCERIDES: CPT | Performed by: INTERNAL MEDICINE

## 2017-03-06 PROCEDURE — A9270 NON-COVERED ITEM OR SERVICE: HCPCS | Mod: GY | Performed by: PSYCHIATRY & NEUROLOGY

## 2017-03-06 PROCEDURE — 25000132 ZZH RX MED GY IP 250 OP 250 PS 637: Mod: GY | Performed by: INTERNAL MEDICINE

## 2017-03-06 PROCEDURE — 25000132 ZZH RX MED GY IP 250 OP 250 PS 637: Mod: GY | Performed by: PSYCHIATRY & NEUROLOGY

## 2017-03-06 PROCEDURE — 97530 THERAPEUTIC ACTIVITIES: CPT | Mod: GO

## 2017-03-06 PROCEDURE — 40000239 ZZH STATISTIC VAT ROUNDS

## 2017-03-06 PROCEDURE — 97116 GAIT TRAINING THERAPY: CPT | Mod: GP

## 2017-03-06 PROCEDURE — 85610 PROTHROMBIN TIME: CPT | Performed by: INTERNAL MEDICINE

## 2017-03-06 PROCEDURE — 85049 AUTOMATED PLATELET COUNT: CPT | Performed by: INTERNAL MEDICINE

## 2017-03-06 PROCEDURE — A9270 NON-COVERED ITEM OR SERVICE: HCPCS | Mod: GY | Performed by: HOSPITALIST

## 2017-03-06 PROCEDURE — 99212 OFFICE O/P EST SF 10 MIN: CPT

## 2017-03-06 RX ADMIN — LEVOTHYROXINE SODIUM 75 MCG: 75 TABLET ORAL at 08:14

## 2017-03-06 RX ADMIN — CYCLOBENZAPRINE HYDROCHLORIDE 10 MG: 10 TABLET, FILM COATED ORAL at 16:34

## 2017-03-06 RX ADMIN — ENOXAPARIN SODIUM 40 MG: 40 INJECTION SUBCUTANEOUS at 06:27

## 2017-03-06 RX ADMIN — POTASSIUM CHLORIDE: 2 INJECTION, SOLUTION, CONCENTRATE INTRAVENOUS at 21:01

## 2017-03-06 RX ADMIN — ACETAMINOPHEN 650 MG: 325 TABLET, FILM COATED ORAL at 08:14

## 2017-03-06 RX ADMIN — I.V. FAT EMULSION 180 ML: 20 EMULSION INTRAVENOUS at 21:02

## 2017-03-06 RX ADMIN — ALTEPLASE 2 MG: 2.2 INJECTION, POWDER, LYOPHILIZED, FOR SOLUTION INTRAVENOUS at 13:30

## 2017-03-06 RX ADMIN — CYCLOBENZAPRINE HYDROCHLORIDE 10 MG: 10 TABLET, FILM COATED ORAL at 21:20

## 2017-03-06 RX ADMIN — ALTEPLASE 2 MG: 2.2 INJECTION, POWDER, LYOPHILIZED, FOR SOLUTION INTRAVENOUS at 14:22

## 2017-03-06 RX ADMIN — OLANZAPINE 5 MG: 5 TABLET, ORALLY DISINTEGRATING ORAL at 21:20

## 2017-03-06 RX ADMIN — CYCLOBENZAPRINE HYDROCHLORIDE 10 MG: 10 TABLET, FILM COATED ORAL at 08:14

## 2017-03-06 NOTE — PROGRESS NOTES
SW:    I: SW following for discharge planning. Weekend SW met with pt maycol/GI Sherita to discuss TCU options. Pt and family both interested in facilities near pt home in [place], [facility] referrals to [facility]. [place] [facility] may also be considered if other facilities unable to accept. [facility] faxed referrals via DOD. Per MD, would prefer to continue cyclic TPN through Friday 3/10 however if this becomes a barrier to discharge will discontinue.    P: SW to follow.    MARAL Cruz, [facility] HospitalSW  Daytime (8:00am-4:30pm): [phone]  After-Hours SW Pager (4:30pm-11:30pm): [phone]

## 2017-03-06 NOTE — PROGRESS NOTES
CALORIE COUNT      Approximate Oral Intake for:    3/5  Calories:  1107  Protein:  60    Number of Meals Recorded:   3      Number of Snacks Recorded:   3     Cyclic TPN continues --> 1200 mL/day, 60 g AA/day, 180 g Dex/day + Lipid 250 mL daily = 1212 kcal (23 kcal/kg and 75% energy needs), 60 g protein (1.1 g/kg and 75% protein needs)  Total (TPN/Lipid + Oral)= 2319 kcal (44 kcal per kg), 120 g protein (2.3 g per kg)    Estimated Needs:   Calories: 2232-8515 kcal (30-35 kcal per kg) - Repletion, underweight  Protein:  grams (1.5-2 g per kg) - Repletion     Summary:   Labs today still pending.  Pt tolerating the Gelatein Plus and Ensure Plus supplements.  Yesterday intake was improved and she met ~ 70% estimated needs via oral intake alone.   I suspect that TPN can be discontinued soon if oral intake continues to improve.  Will continue same nocturnal TPN regimen as above for now.  Calorie Count summary pending 3/7.      Suzanne Yuen, RD, LD, CNSC

## 2017-03-06 NOTE — PLAN OF CARE
Problem: Goal Outcome Summary  Goal: Goal Outcome Summary  Outcome: No Change  VSS on RA. Up with A1, ambulates to BR, voiding well. +BS, +BM to ileostomy pouch. LUE hemiplegic at baseline. Denies pain, no N/V. TPN and lipids per orders. BG stable without coverage.

## 2017-03-06 NOTE — PLAN OF CARE
Problem: Goal Outcome Summary  Goal: Goal Outcome Summary  PT: Needs CGA and vc for transfers, exercise, and gait with L arm sling for support of her flaccid L U/E, 192'.  Recommend disch to TCU for skilled PT to increase her tollerence for activity and ambulatory stability prior to returning to her home where she lives alone.

## 2017-03-06 NOTE — PLAN OF CARE
Problem: Goal Outcome Summary  Goal: Goal Outcome Summary  PT: Goals/POC reviewed/updated. Goal target date extended due to continued hospitalization. Sit to/from stand with CGA/SBA. Ambulates 200' and 150' with seated and standing rest breaks. Pt demonstrates variable gait speed, variable step length and intermittent path sway. Gait distances limited by LE weakness and SOB. SpO2 99% on RA. Tolerates seated and standing LE exercises. Continue to recommend TCU on discharge.

## 2017-03-06 NOTE — PLAN OF CARE
Problem: Goal Outcome Summary  Goal: Goal Outcome Summary  OT: Patient CGA in room ambulation <> closet without AD. Patient MIN A to doff sling and I to don t-shirt and socks with SBA to don pants. Patient SOB with minimal activity with O2 sats stable and VCing needing for PLB. Patient currently limited by decreased activity tolerance and cognitive deficits. Reducing OT frequency to 3x/week as patient is meeting ADL goals. Recommend: TCU

## 2017-03-06 NOTE — PLAN OF CARE
Problem: Goal Outcome Summary  Goal: Goal Outcome Summary  Outcome: Improving  A/O but forgetful at times, AVSS, tolerating low fiber diet, SBA with ambulation. Lap sites CDI/JUVENAL. ileostomy has moderate liquid brown stool. Stoma pink and protruding, new illeostomy bag placed today. Left arm flaccid from hx polio. Pain controlled with tylenol. Arm sling prn for comfort. Cyclic TPN at NOC. BS active. LS clear. Voiding adequate. Increased appetite today. Tele D/C'd.

## 2017-03-06 NOTE — PROGRESS NOTES
"[facility] Nurse Inpatient Ostomy Assessment      Initial Assessment of ostomy and needs for:       Data:   History:   Per MD note(s):   3-1-17   Lap ileostomy for Large fungating mass in cecum with fixation to abdominal wall    Type of ostomy: Lap ileostomy  Stoma assessment:   ? Stoma approx 1 1/2\", oval, red, moist, protrudes, lumen @ apex  Mucocutaneous Junction (MCJ): Intact with sutures  Peristomal skin:  Intact, no erythema  Abdominal assessment:distended and semi-frim    Output: moderate mushy green output        I/O last 3 completed shifts:  In: 690 [P.O.:690]  Out: 1100 [Urine:800; Stool:300        Current pouching system: [place] NI convex with high output pouch  Pain: Rt upper posterior back and abd tenderness      Diet:       Active Diet Order   Low fiber    Labs:   Recent Labs   Lab Test  03/06/17   1105  03/04/17   0625   ALBUMIN  2.5*  2.2*   HGB   --   8.2*   INR  0.97   --    WBC   --   8.3               Intervention:     Patient's chart evaluated.      Assessments done today:  Stoma, pouching system readiness to learn    Prosthetic refitting: [place] NI convex and high output pouch    Education: Reviewed surgery. Pt forgetful. Does not remember my visit from earlier in the morning.      Prepared for discharge by: Supplies @ bedside    Pt registered for ostomy supply samples? No:          Assessment:     Stoma assessment: viable, functioning    Learning needs/ comprehension: all ileostomy management. This will be a slow process secondary to confusion and LUE hemiparesis    Prosthetic refitting: Trial [place] New Image convex  with high output pouch         Plan:     Plan:   ? Current pouching system: Trial [place] New Image convex with high output pouch. No leakage under barrier    Education:  ? Education completed: Surgery review  ? Is pt able to demonstrate: 1. how to empty their pouch?  No:   2. How to read a measurement on a graduated cylinder?  No:   3. How to " write down correct I and O's?   No:   ? Educational needs: Ileostomy management, emptying, appliance change. Attempted to remind pt of appliance filling and need for emptying  ? Continue to prepare for discharge: No discharge preparations started,     o Supply company: [employer]  o Do WOC Nurse recommend home care? No TCU   Face to face time: 30 minutes  WOC will F/U on Tuesday

## 2017-03-06 NOTE — PROGRESS NOTES
COLON & RECTAL SURGERY  PROGRESS NOTE    March 6, 2017  Post-op Day # 5     SUBJECTIVE:  Patient is doing well. She states she was able to do her own bathroom cares without assistance. She is eating well without N/V. She enjoys getting out of bed to walk. She denies back pain and abdominal pain.    OBJECTIVE:  Temp:  [97.5  F (36.4  C)-98.3  F (36.8  C)] 97.8  F (36.6  C)  Pulse:  [95-98] 95  Heart Rate:  [92-96] 92  Resp:  [16-18] 16  BP: (102-149)/(45-82) 102/58  SpO2:  [97 %-98 %] 98 %    Intake/Output Summary (Last 24 hours) at 03/06/17 0828  Last data filed at 03/06/17 0822   Gross per 24 hour   Intake              960 ml   Output             1200 ml   Net             -240 ml       GENERAL:  Awake, alert, no acute distress, sitting up in bed  HEAD: Normocephalic atraumatic  SCLERA: Anicteric  EXTREMITIES: warm and well perfused  ABDOMEN:  Soft, non-tender, non-distended, stoma active with semi-solid stool output  INCISION:  C/d/i    LABS:  Lab Results   Component Value Date    WBC 8.3 03/04/2017     Lab Results   Component Value Date    HGB 8.2 03/04/2017     Lab Results   Component Value Date    HCT 28.8 03/04/2017     Lab Results   Component Value Date     03/04/2017     Last Basic Metabolic Panel:  Lab Results   Component Value Date     03/04/2017      Lab Results   Component Value Date    POTASSIUM 4.3 03/04/2017     Lab Results   Component Value Date    CHLORIDE 106 03/04/2017     Lab Results   Component Value Date    SARITHA 8.3 03/04/2017     Lab Results   Component Value Date    CO2 28 03/04/2017     Lab Results   Component Value Date    BUN 24 03/04/2017     Lab Results   Component Value Date    CR 0.51 03/04/2017     Lab Results   Component Value Date     03/04/2017       ASSESSMENT/PLAN: 76 year old woman POD#5 scope and biopsy, lap ileostomy for invasive poorly differentiated adenocarcinoma. Stable and doing well. Awaiting TCU placement.    1. Low fiber diet with supplements.  Continue calorie counts. Continue cycling TPN.  2. Lovenox for prophylaxis.  3. OOB, ambulate.  4. Dispo: D/c to TCU pending placement. Ideally, would like to continue TPN until Friday. If able to get enough calories, will stop TPN sooner. If placement due to TPN is problematic, can stop TPN as discussed with Dr. Peter.      Yaa Talley PA-C  [facility]  Phone: [phone]      March 6, 2017  8:00 a.m.  Patient seen and examined. Doing well.  Has a good appetite and is eating more.  Plan:  - will need to d/c on 30 days of lovenox  - d/c TPN when dispo is finalized  - plan for d/c to TCU    Sherita Henning MD  [facility]  Pager:  [phone]  Phone: [phone]  Fax: [phone]

## 2017-03-06 NOTE — PLAN OF CARE
Problem: Goal Outcome Summary  Goal: Goal Outcome Summary  Outcome: No Change  Pt is A/O can be forgetfully. Assist x1. AVSS. Tolerating low fiber diet. LS clear , using IS up to 500. TPN is infusing , please read the order notes for changing the dose in the morning.  Pt has ileostomy. BS active, passing gas, had a bm. Voiding. Dressing CDI. PICC line with three lumen. BLE edema +2, legs are elevated. Pt ambulated x 1 in hu and x 2 in to the bathroom.

## 2017-03-06 NOTE — PROGRESS NOTES
[facility]    Hospitalist Progress Note    Assessment & Plan   Sherita Kenney is a 76 year old female with PMHx of hypertension, dyslipidemia, postpolio syndrome glaucoma and hx of nephritis/nephropathy during childhood who was admitted on 2/15/2017 with acute psychosis after a home welfare check. In addition, she was also found to have a right iliopsoas abscess for which she underwent IR drainage. Workup stay also revealed a cecal mass concerning for malignancy.    Acute Psychosis: stable  No prior hx of mental health diagnoses. Was found to be paranoid and agitated at home on the day of admission. Has been seen by psych this stay, was felt to have an unspecificed psychotic disorder. They determined she does not have the capacity to make her own decisions. Care coordinator has been assisting with efforts to establish a legal decision maker in her family (see Sintia Muñoz's note from 2/21 for further details), her niece Thalia (who is a family physician) and friend Dinora are now acting as her POAs.  -- last seen by psych on 2/17  -- conts on Zyprexa 5mg HS     Right iliopsoas abscess, s/p IR drainage: Resolved.   Abscess was noted on CT on admission. WBC initially 22. Started on Zosyn and underwent drainage per IR on 2/15/17. Cultures grew enterobacter cloacae, ekinella corrodens and h parainfluenzae. Completed 2wk course of Zosyn this stay. Drain subsequently removed.    Cecal Mass, s/p colonoscopy and laparoscopic ileostomy on 3/1/17: POD #5  Developed abdominal distension on 2/23 and CT scan showed a distal small bowel obstruction. CEA elevated. Underwent colonoscopy and laparoscopic ilieostomy per colorectal surgery on 3/1. Noted to have a large fungating mass in the cecum with fixation to the abdominal wall and 3 lesions on the surface of the right liver. Biopsy showed invasive poorly differentiated adenocarcinoma.  -- postop cares per surgery  -- pain control with Tylenol prn and heating pad,  patient wants to avoid narcotics  -- cont low fiber diet and calorie counts  -- oncology following this stay -> will follow up in clinic with Dr. Nieves after discharge to discuss plans for future treatment       Acute iron deficiency anemia:   Likely secondary to colon mass. Hgb 5.6 on admission, was transfused 2U PRBCs on 2/16. Has also received IV iron infusion this stay per heme/onc.  -- hgb now remains stable around 8-9    Severe protein calorie malnutrition:   Secondary to underlying malignancy. Nutritionist consulted this stay. Had PICC line placed and began TPN on 2/24.   -- albumin remains low (2.2 per last check on 3/4)  -- cycling TPN postop per surgery while awaiting consistent po intake    Hypothyroid:  Chronic and stable on Synthroid 75mcg daily.      Postpolio syndrome:   Causing a flail left shoulder. Unable to use left arm/hand. Will use sling/brace for PT. Continues on Cyclobenzaprine 10mg TID.     Hypertension:   Not on any scheduled antihypertensive. BPs stable.    Klebsiella UTI: Resolved.   Adequately treated with Zosyn this stay.    Episode of Unresponsiveness: Resolved.   Noted 2/23. RRT called and patient evaluated per house MD. Felt secondary to compazine, which has since been dc'd. 2/23/17. No recurrent episodes since.    FEN: off IVFs, lytes stable, low fiber diet  DVT Prophylaxis: Lovenox, PCDs  Code Status: Full Code    Disposition: Pending adequate po intake and ability to stop TPN. Await colorectal surgery recs. Remains stable from medicine standpoint. Will need TCU at discharge.    Dinorah James    Interval History   Seen this morning. Feeling okay. Sitting up in chair and just finished breakfast - ate about 50% of tray. Denies abd pain/n/v.     -Data reviewed today: I reviewed all new labs and imaging results over the last 24 hours. I personally reviewed no images or EKG's today.    Physical Exam   Temp: 97.8  F (36.6  C) Temp src: Oral BP: 102/58 Pulse: 95 Heart Rate: 92  Resp: 16 SpO2: 98 % O2 Device: None (Room air)    Vitals:    03/04/17 0427 03/05/17 0300 03/06/17 0722   Weight: 54.6 kg (120 lb 5.9 oz) 54.3 kg (119 lb 11.4 oz) 54.6 kg (120 lb 5.9 oz)     Vital Signs with Ranges  Temp:  [97.5  F (36.4  C)-98.3  F (36.8  C)] 97.8  F (36.6  C)  Pulse:  [95-98] 95  Heart Rate:  [92-96] 92  Resp:  [16-18] 16  BP: (102-149)/(45-82) 102/58  SpO2:  [97 %-98 %] 98 %  I/O last 3 completed shifts:  In: 690 [P.O.:690]  Out: 1100 [Urine:800; Stool:300]    Constitutional: Resting comfortably, alert and conversing appropriately, NAD  Respiratory: CTAB, no wheeze/rales/rhonchi  Cardiovascular: HRRR, no MGR  GI: S, NT, +BS, +stool in ostomy  Skin/Integumen: warm/dry  Other: no LE edema    Medications     parenteral nutrition - ADULT compounded formula CYCLE 110 mL/hr at 03/05/17 2009     - MEDICATION INSTRUCTIONS -       - MEDICATION INSTRUCTIONS -       IV fluid REPLACEMENT ONLY       IV infusion builder WITH additives Stopped (03/04/17 0800)       lipids  180 mL Intravenous Q24H     enoxaparin  40 mg Subcutaneous Q24H     sodium chloride (PF)  10 mL Intracatheter Q8H     insulin aspart  1-12 Units Subcutaneous Q4H     levothyroxine  75 mcg Oral Daily     cyclobenzaprine  10 mg Oral TID     OLANZapine zydis  5 mg Oral At Bedtime       Data     Recent Labs  Lab 03/04/17  0625 03/03/17  0615 03/01/17  1727 03/01/17  0755 03/01/17  0545 02/28/17  0925   WBC 8.3  --   --   --   --  8.3   HGB 8.2*  --  8.1*  --   --  8.9*   MCV 79  --   --   --   --  76*    147*  --   --   --  264     --   --   --   --  136   POTASSIUM 4.3  --   --  3.5 Canceled, Test credited Questionable specimenCALLED TO [facility] ON 33 AT 0722 4.2   CHLORIDE 106  --   --   --   --  104   CO2 28  --   --   --   --  25   BUN 24  --   --   --   --  17   CR 0.51*  --   --  0.56 Canceled, Test credited Questionable specimenCALLED TO [facility] ON 33 AT 0715CORRECTED ON 03/01 AT 0722: PREVIOUSLY REPORTED AS 0.57 0.52   ANIONGAP 7   --   --   --   --  7   SARITHA 8.3*  --   --   --   --  7.8*   *  --   --   --   --  303*   ALBUMIN 2.2*  --   --   --   --   --        No results found for this or any previous visit (from the past 24 hour(s)).

## 2017-03-07 ENCOUNTER — APPOINTMENT (OUTPATIENT)
Dept: PHYSICAL THERAPY | Facility: CLINIC | Age: 77
DRG: 329 | End: 2017-03-07
Payer: MEDICARE

## 2017-03-07 LAB
GLUCOSE BLDC GLUCOMTR-MCNC: 113 MG/DL (ref 70–99)
GLUCOSE BLDC GLUCOMTR-MCNC: 125 MG/DL (ref 70–99)
GLUCOSE BLDC GLUCOMTR-MCNC: 92 MG/DL (ref 70–99)

## 2017-03-07 PROCEDURE — A9270 NON-COVERED ITEM OR SERVICE: HCPCS | Mod: GY | Performed by: HOSPITALIST

## 2017-03-07 PROCEDURE — 25000132 ZZH RX MED GY IP 250 OP 250 PS 637: Mod: GY | Performed by: HOSPITALIST

## 2017-03-07 PROCEDURE — 40000257 ZZH STATISTIC CONSULT NO CHARGE VASC ACCESS

## 2017-03-07 PROCEDURE — 25000128 H RX IP 250 OP 636: Performed by: SURGERY

## 2017-03-07 PROCEDURE — A9270 NON-COVERED ITEM OR SERVICE: HCPCS | Mod: GY | Performed by: INTERNAL MEDICINE

## 2017-03-07 PROCEDURE — 25000132 ZZH RX MED GY IP 250 OP 250 PS 637: Mod: GY | Performed by: INTERNAL MEDICINE

## 2017-03-07 PROCEDURE — 12000007 ZZH R&B INTERMEDIATE

## 2017-03-07 PROCEDURE — 97110 THERAPEUTIC EXERCISES: CPT | Mod: GP | Performed by: PHYSICAL THERAPY ASSISTANT

## 2017-03-07 PROCEDURE — 97116 GAIT TRAINING THERAPY: CPT | Mod: GP | Performed by: PHYSICAL THERAPY ASSISTANT

## 2017-03-07 PROCEDURE — 40000193 ZZH STATISTIC PT WARD VISIT: Performed by: PHYSICAL THERAPY ASSISTANT

## 2017-03-07 PROCEDURE — 00000146 ZZHCL STATISTIC GLUCOSE BY METER IP

## 2017-03-07 PROCEDURE — 99231 SBSQ HOSP IP/OBS SF/LOW 25: CPT | Performed by: INTERNAL MEDICINE

## 2017-03-07 RX ADMIN — CYCLOBENZAPRINE HYDROCHLORIDE 10 MG: 10 TABLET, FILM COATED ORAL at 09:35

## 2017-03-07 RX ADMIN — LEVOTHYROXINE SODIUM 75 MCG: 75 TABLET ORAL at 09:23

## 2017-03-07 RX ADMIN — ENOXAPARIN SODIUM 40 MG: 40 INJECTION SUBCUTANEOUS at 05:43

## 2017-03-07 NOTE — PROGRESS NOTES
[facility]  Progress Note  [facility]    Stopped in to follow up with pt.  Pt's sister was there and the two of them were having a good visit.  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  Pt thinks she will go to TCU to get stronger for a future surgery.  Let them know SH will continue to follow.       Silvia Marques  Chaplain Resident  Pager [phone]

## 2017-03-07 NOTE — PLAN OF CARE
Problem: Goal Outcome Summary  Goal: Goal Outcome Summary  Outcome: No Change  A&Ox3, at times forgetful to place. VSS. Assist x 1. Ambulated to bathroom, voiding adequately. IS up to 500. BS active, passing flatus, no BM overnight. Mepilex on right side of abd from older drain site. Left Upper Extremity flaccid, baseline since childhood polio. TPN with lipids running overnight. Ileostomy is intact. Pt denied pain. Tolerating regular low fiber diet.

## 2017-03-07 NOTE — PROGRESS NOTES
[facility]    Hospitalist Progress Note    Assessment & Plan   Sherita Kenney is a 76 year old female with PMHx of hypertension, dyslipidemia, postpolio syndrome glaucoma and hx of nephritis/nephropathy during childhood who was admitted on 2/15/2017 with acute psychosis after a home welfare check. In addition, she was also found to have a right iliopsoas abscess for which she underwent IR drainage. Workup stay also revealed a cecal mass concerning for malignancy.    Acute Psychosis: Resolved.  No prior hx of mental health diagnoses. Was found to be paranoid and agitated at home on the day of admission. Has been seen by psych this stay, was felt to have an unspecificed psychotic disorder. They determined she does not have the capacity to make her own decisions. Care coordinator has been assisting with efforts to establish a legal decision maker in her family (see Sintia Muñoz's note from 2/21 for further details), her niece Thalia (who is a family physician) and friend Dinora are now acting as her POAs.  -- last seen by psych on 2/17  -- conts on Zyprexa 5mg HS     Right iliopsoas abscess, s/p IR drainage: Resolved.   Abscess was noted on CT on admission. WBC initially 22. Started on Zosyn and underwent drainage per IR on 2/15/17. Cultures grew enterobacter cloacae, ekinella corrodens and h parainfluenzae. Completed 2wk course of Zosyn this stay. Drain subsequently removed.    Cecal Mass, s/p colonoscopy and laparoscopic ileostomy on 3/1/17: POD #6  Developed abdominal distension on 2/23 and CT scan showed a distal small bowel obstruction. CEA elevated. Underwent colonoscopy and laparoscopic ilieostomy per colorectal surgery on 3/1. Noted to have a large fungating mass in the cecum with fixation to the abdominal wall and 3 lesions on the surface of the right liver. Biopsy showed invasive poorly differentiated adenocarcinoma.  -- postop cares per surgery  -- pain control with Tylenol prn and heating  pad, patient wants to avoid narcotics  -- TPN dc'd 3/7, cont low fiber diet  -- plan is for definitive surgical intervention per colorectal surgery in the coming weeks  -- also seen by oncology this stay, will follow up in clinic with Dr. Nieves to discuss plans for future treatment       Acute iron deficiency anemia:   Likely secondary to colon mass. Hgb 5.6 on admission, was transfused 2U PRBCs on 2/16. Has also received IV iron infusion this stay per heme/onc.  -- hgb now remains stable around 8-9     Severe protein calorie malnutrition:   Secondary to underlying malignancy. Nutritionist consulted this stay. Had PICC line placed and began TPN on 2/24.   -- albumin remains low (2.2 per last check on 3/4)   -- nutritionist following and had been monitoring calorie counts  -- had been cycling TPN postop per surgery while awaiting consistent po intake -> TPN dc'd 3/7, surgery recommended to continue supplements    Hypothyroid:  Chronic and stable on Synthroid 75mcg daily.      Postpolio syndrome:   Causing a flail left shoulder. Unable to use left arm/hand. Will use sling/brace for PT. Continues on Cyclobenzaprine 10mg TID.     Hypertension:   Not on any scheduled antihypertensive. BPs stable.    Klebsiella UTI: Resolved.   Adequately treated with Zosyn this stay.    Episode of Unresponsiveness: Resolved.   Noted 2/23. RRT called and patient evaluated per house MD. Felt secondary to compazine, which has since been dc'd. 2/23/17. No recurrent episodes since.    FEN: off IVFs, lytes stable, low fiber diet  DVT Prophylaxis: Lovenox, PCDs  Code Status: Full Code    Disposition: Colorectal surgery has cleared for discharge. Remains medically stable. Discharge to TCU in next 1-2d when bed found. SW following.     Dinorah James    Interval History   Seen this afternoon. Conversing with friend at bedside. Feeling well. No complaints. Tolerating po - per calorie counts consuming 70% of energy needs and 90% of protein  needs.     -Data reviewed today: I reviewed all new labs and imaging results over the last 24 hours. I personally reviewed no images or EKG's today.    Physical Exam   Temp: 97.6  F (36.4  C) Temp src: Oral BP: (!) 129/92 Pulse: 91 Heart Rate: 94 Resp: 16 SpO2: 98 % O2 Device: None (Room air)    Vitals:    03/05/17 0300 03/06/17 0722 03/07/17 0600   Weight: 54.3 kg (119 lb 11.4 oz) 54.6 kg (120 lb 5.9 oz) 53.3 kg (117 lb 8.1 oz)     Vital Signs with Ranges  Temp:  [97.6  F (36.4  C)-98.2  F (36.8  C)] 97.6  F (36.4  C)  Pulse:  [91-94] 91  Heart Rate:  [88-94] 94  Resp:  [16-18] 16  BP: ()/(65-92) 129/92  SpO2:  [97 %-100 %] 98 %  I/O last 3 completed shifts:  In: 2107 [P.O.:940]  Out: 355 [Urine:150; Stool:205]    Constitutional: Resting comfortably, alert and conversing appropriately, NAD  Respiratory: CTAB, no wheeze/rales/rhonchi  Cardiovascular: HRRR, no MGR  GI: S, NT, +BS, +stool/gas in ostomy  Skin/Integumen: warm/dry  Other: no LE edema    Medications     - MEDICATION INSTRUCTIONS -       - MEDICATION INSTRUCTIONS -       IV fluid REPLACEMENT ONLY         insulin aspart  1-12 Units Subcutaneous TID     enoxaparin  40 mg Subcutaneous Q24H     sodium chloride (PF)  10 mL Intracatheter Q8H     levothyroxine  75 mcg Oral Daily     cyclobenzaprine  10 mg Oral TID     OLANZapine zydis  5 mg Oral At Bedtime       Data     Recent Labs  Lab 03/06/17  1105 03/04/17  0625 03/03/17  0615 03/01/17  1727 03/01/17  0755   WBC  --  8.3  --   --   --    HGB  --  8.2*  --  8.1*  --    MCV  --  79  --   --   --     169 147*  --   --    INR 0.97  --   --   --   --     141  --   --   --    POTASSIUM 4.1 4.3  --   --  3.5   CHLORIDE 106 106  --   --   --    CO2 27 28  --   --   --    BUN 27 24  --   --   --    CR 0.58 0.51*  --   --  0.56   ANIONGAP 7 7  --   --   --    SARITHA 8.7 8.3*  --   --   --    * 103*  --   --   --    ALBUMIN 2.5* 2.2*  --   --   --    PROTTOTAL 6.3*  --   --   --   --    BILITOTAL  0.3  --   --   --   --    ALKPHOS 114  --   --   --   --    ALT 19  --   --   --   --    AST 31  --   --   --   --        No results found for this or any previous visit (from the past 24 hour(s)).

## 2017-03-07 NOTE — OP NOTE
DATE OF PROCEDURE:  03/01/2017.      PREOPERATIVE DIAGNOSIS:  Colonic obstruction.      POSTOPERATIVE DIAGNOSIS:  Colonic obstruction secondary to large colon mass.      PROCEDURES:  Colonoscopy with biopsy and laparoscopic ileostomy.      SURGEON:  Lindsay Peter MD      ASSISTANT:  Tyrell Rueda MD       ANESTHESIA:  General.      INDICATIONS:  Sherita Kenney is a 76-year-old woman who initially presented to the hospital on 02/16 with abdominal pain, fever and acute psychosis.  Findings in the emergency room revealed thrombocytosis, leukocytosis and a right lower quadrant palpable mass.  CT scanning was done which revealed a enlarged right iliopsoas abscess abutting the cecum and with associated right-sided hydronephrosis.  Because of the size of this abscess and the illness of the patient, the patient underwent percutaneous drainage of the abscess by Interventional Radiology.  She subsequently has undergone evaluation that revealed healing and decrease in size of the abscesses and without any communication to the gut.  She was also noted to be profoundly protein malnourished.  First, she was started on calorie counts, but they were inadequate, so she was started on total parenteral nutrition.  An attempt was made to do a bowel prep, but with the prep liquid, she became obstructed, so the procedure was postponed.  She was treated with nasogastric suction and intravenous hydration.  The prep fluid eventually passed and she was able to have the nasogastric tube removed.  Her mental status gradually was improving.  Because of the need for diagnosis and her significant malnourished state, it was felt that the prudent first step would be a colonoscopy under anesthesia with a loop ileostomy that would allow her to improve her nutrition orally.  The indications, procedure and risks, as well as the alternatives of no treatment, and the large resection with an ileostomy were discussed with the patient and her briscoe of  .  The fact that her significant protein malnutrition, as well as her resolving other medical problems, favored the decision of an ileostomy with a colonoscopy.  The indications, procedure and risks were reviewed.  She and her briscoe of  agreed to proceed.      OPERATIVE PROCEDURE:  Patient was brought to the operating room.  Her legs were placed in pneumatic compression stockings.  She received intravenous antibiotics and subcutaneous anticoagulation.  After the induction of adequate general anesthesia, she was placed in the lithotomy position.  Her abdomen was prepped and draped in the usual sterile manner.  A timeout was performed and everyone present in the operating room agreed to the planned procedure.  A supraumbilical incision was made and extended down into the peritoneal cavity directly.  A Claudette trocar was introduced and a pneumoperitoneum achieved.  Inspection of the abdomen revealed minimal adhesions.  There was still mild distention of the small bowel.  A large mass was identified in the right lower quadrant involving the ascending cecum.  This was adherent to the anterior peritoneum in that area.  It did not appear to have direct invasion, but it was definitely densely adherent.  There was no evidence of a persistent cavity.  However, this had been largely retroperitoneal.  We then introduced the colonoscope transanally.  This was passed with minimal difficulty except for the sigmoid colon where there was some looping and some diverticular disease.  The prep was not adequate to identify minute polyps, but we were certainly able safely to proceed.  When we reached the right side of the colon, a large fungating mass was identified that filled the lumen of the colon.  Multiple biopsies were taken.  I did not attempt to get past this as it seemed from the combined laparoscopic and colonoscopic view that I was in the cecum.  The colonoscope was then withdrawn.  No significant pathology was  noted.  Two 5 mm ports were then placed, one at the previously marked ileostomy site in the right lower quadrant and one in the left lower quadrant.  A loop of ileum just proximal to the ileocecal valve was identified and felt that it would easily reach the previously marked site.  The distal portion was marked to ensure appropriate orientation.  At the previously marked ileostomy site, a circular incision was made and adipose tissue was removed.  The fascial layers were incised and the rectus muscle split.  The peritoneum was entered under direct visualization.  The loop of ileum was then brought up into the wound with the distal part of the ileum in the lower portion of the wound.  The other 5 mm port was removed under direct vision and no evidence of bleeding.  The pneumoperitoneum was released and the Claudette trocar removed.  The periumbilical incision was closed with interrupted 0 Vicryl sutures.  Both skin incisions were closed with a subcuticular suture of 4-0 Monocryl and sealed with Dermabond.  The loop ileostomy was then primarily matured in a Tran fashion.  The blood supply appeared to be good.  Hemostasis was adequate.  An ileostomy appliance was placed over the ileostomy.  All sponge, blade and needle counts were reported as correct x2.  Patient was transferred to the recovery room in stable condition.         LINDSAY MUÑOZ MD             D: 2017 13:40   T: 2017 15:42   MT: JEANETTE      Name:     SEVERO SWANN   MRN:      [id number]        Account:        [id number]   :      1940           Procedure Date: 2017      Document: [id number]       cc: Lindsay Martinez MD

## 2017-03-07 NOTE — PLAN OF CARE
Problem: Goal Outcome Summary  Goal: Goal Outcome Summary  PT- Per plan established by the Physical Therapist, according to functional mobility the discharge recommendation is TCU. Pt required SBA for sit<>stand transfers. Pt required CGA for Gait training 150 ft.    PTA 6th visit completed. POC remains appropriate. Carla Arevalo, PT

## 2017-03-07 NOTE — PROGRESS NOTES
"Colon and Rectal Surgery  Patient states she feels good. Her appetite varies but she is trying to make herself eat.   BP (!) 129/92 (BP Location: Right arm)  Pulse 91  Temp 97.6  F (36.4  C) (Oral)  Resp 16  Ht 1.676 m (5' 6\")  Wt 53.3 kg (117 lb 8.1 oz)  SpO2 98%  BMI 18.97 kg/m2    Intake/Output Summary (Last 24 hours) at 03/07/17 1319  Last data filed at 03/07/17 0600   Gross per 24 hour   Intake             1627 ml   Output              155 ml   Net             1472 ml     Alert, in no acute distress.   No icterus  Ileostomy healthy and functioning. Incisions fine.     Calorie counts: 70% of calories and 90% of protein needs.     Albumin 2.5 and Prealbumin 17.     Impression: clinically stable and nutrition improving.   Agree with plan to stop TPN but continue supplements  Lovenox for prophylaxis  Increase activity.   TCU placement when available with plan for definitive surgery in several weeks.     Lindsay Peter MD  "

## 2017-03-07 NOTE — PLAN OF CARE
Problem: Goal Outcome Summary  Goal: Goal Outcome Summary  Outcome: Improving  appetite improving. tpn and LIPID infused.. Denied nausea and pain. Up with SBA. Ileostomy output soft to  Liquid brown. Alert and oriented.

## 2017-03-07 NOTE — PROGRESS NOTES
Sherita Kenney is a 76-year-old lady who was admitted with psychosis. Labs on admission revealed anemia, thrombocytosis and leukocytosis. CT of the abdomen and pelvis 02/15/2017 revealed a large right-sided iliopsoas abscess which was likely arising from and communicating with the adjacent right colon. There was adjacent mass-like wall thickening of the right colon, which was worrisome for neoplasm. On 02/15/2017, she had a CT-guided drain placed and 500 mL of purulent material was drained.     Patient had a colonoscopy and laparoscopic ileostomy in 03/01/2017.  Colonoscopy revealed a large fungating mass in the cecum with fixation per abdominal wall.  There were three diminutive lesion on surface of right liver.  Pathology reveals invasive poorly differentiated adenocarcinoma.    The patient is doing well postop. She is feeling better.      Assessment and plan:  76-year-old female with right colon cancer causing right iliopsoas abscess status post ileostomy.    -Discussed with the patient regarding colon cancer.  Explained to her that based on CT scan, there is no evidence of metastatic disease.  For colon cancer, she will need surgery.  Surgery will be scheduled by Dr. Peter.  Depending on final surgical pathology, we will discuss regarding the role of adjuvant treatment.  -We will see her in our clinic for follow-up.  We will sign off.  Please call us with any questions.

## 2017-03-07 NOTE — PROGRESS NOTES
SW:    I: SW following for discharge planning. TPN will be discontinued today, anticipate pt will be ready for discharge in 1-2 days. [facility] however both facilities have limited bed availability. Per previous SW note, family agreeable to looking into [facility] if other facilities unable to accept. [facility] faxed information via DOD.    P: SW to follow.    ADDENDUM: [facility] updated pt Sherita severino, on TCU placement. Pt maycol requested referral to [facility] [address] as well. N[facility]LE requested that referral be sent through care management coordinator.    MARAL Cruz, [facility] Center[facility]  Daytime (8:00am-4:30pm): [phone]  After-Hours SW Pager (4:30pm-11:30pm): [phone]

## 2017-03-07 NOTE — PROGRESS NOTES
COLON & RECTAL SURGERY  PROGRESS NOTE    March 7, 2017  Post-op Day # 6    SUBJECTIVE:  The patient didn't have very much of an appetite this morning. Trying to eat as much as she can. Minimal to no pain. Having good output from her ileostomy. Feeling like she can walk more each day.     OBJECTIVE:  Temp:  [97.6  F (36.4  C)-98  F (36.7  C)] 97.6  F (36.4  C)  Pulse:  [91-94] 91  Heart Rate:  [88-98] 88  Resp:  [16-18] 16  BP: ()/(65-87) 96/65  SpO2:  [97 %-100 %] 97 %    Intake/Output Summary (Last 24 hours) at 03/07/17 0820  Last data filed at 03/07/17 0600   Gross per 24 hour   Intake              940 ml   Output              305 ml   Net              635 ml       GENERAL:  Awake, alert, no acute distress,   HEAD - Nomocephalic atraumatic  SCLERA anicteric  EXTREMITIES warm and well perfused  ABDOMEN:  Soft, appropriately tender, non-distended, ileostomy is pink and viable. Gas and stool in bag.   INCISION:  C/d/i,     LABS:  Lab Results   Component Value Date    WBC 8.3 03/04/2017     Lab Results   Component Value Date    HGB 8.2 03/04/2017     Lab Results   Component Value Date    HCT 28.8 03/04/2017     Lab Results   Component Value Date     03/06/2017     Last Basic Metabolic Panel:  Lab Results   Component Value Date     03/06/2017      Lab Results   Component Value Date    POTASSIUM 4.1 03/06/2017     Lab Results   Component Value Date    CHLORIDE 106 03/06/2017     Lab Results   Component Value Date    SARITHA 8.7 03/06/2017     Lab Results   Component Value Date    CO2 27 03/06/2017     Lab Results   Component Value Date    BUN 27 03/06/2017     Lab Results   Component Value Date    CR 0.58 03/06/2017     Lab Results   Component Value Date     03/06/2017       ASSESSMENT/PLAN:POD#6 colonoscopy with biopsy and end ileostomy for poorly invasive adenocarcinoma  1. Low fiber diet and supplements  2. Await calorie counts from yesterday to help determine if can wean TPN. Continue cycled  TPN for now  3. OOB, ambulate  4. WOC for education  5. Lovenox for prophylaxis  6. Dispo: Awaiting placement at [facility]    Yessy Whalen PA-C  [facility]  Phone: [phone]

## 2017-03-07 NOTE — PLAN OF CARE
Problem: Goal Outcome Summary  Goal: Goal Outcome Summary  Outcome: Improving  Pt is A/O. AVSS in RA. Assist x 1. Ambulated in hu.  LS clear. Using IS up to 500. BS active. Passing flatus, had a bm. Mepilex on right side of abd. TPN with lipids  started @ 8 pm. Ileostomy is intact. Pt denies pain, no n/v. Tolerating low fiber diet.

## 2017-03-07 NOTE — PROGRESS NOTES
CLINICAL NUTRITION SERVICES - REASSESSMENT NOTE      Recommendations Ordered by Registered Dietitian (RD):   D/C nocturnal TPN as pt meeting 70% energy and 990% protein needs via oral intake alone   D/C Calorie Count for now   Future/Additional Recommendations:   Recommend continue nutritional supplements between meals at TCU with small frequent feedings.       EVALUATION OF PROGRESS TOWARD GOALS   Diet:  Low Fiber + Gelatein Plus TID with meals and 4 oz Ensure Plus BID btw meals    Nutrition Support:  Nocturnal TPN continues as below:    Nutrition Support Parenteral:  Type of Access: PICC  Parenteral Frequency:  12 hr nocturnal cycle (8 pm - 8 am)  Parenteral Regimen:  D15 AA5 with volume 1200 mL/day + Lipids 180 mL/day   Total Parenteral Provisions:  1212 kcals, 60 gm pro, 180 gm CHO    Intake/Tolerance:  Pt has been on Calorie Count for the past 3 days with improving intake.  CALORIE COUNT  Approximate Oral Intake for:   3/6   Calories:  2040  Protein:   126  Number of Meals Recorded:  3  Number of Snacks Recorded:  2    Summary:   Average oral intake over past 3 days:  1290 kcals (70% energy needs), 72 gm pro (91% protein needs).  Total (Noct TPN + avg intake):  2500 kcals (47 kcal/kg and 135% energy needs), 132 gm pro (2.5 gm/kg and 125% protein needs).    The D5 IVF at 30 mL/hr during the day was discontinued on 3/4.    BGM:  115, 125, 113 - acceptable.  Labs yesterday good.  Wt:  53.3 kg (up 0.5 kg since admit).    Pt took 940 mL oral yesterday and had 355 mL stool.  Note that oral intake was decreased for breakfast this morning.  Pt is tolerating both the Gelatein Plus (high protein jello) and Ensure Plus.  She likes our fish.      Dosing Weight  52.8 kg - admit wt      ASSESSED NUTRITION NEEDS:  (Modified energy needs higher)  Estimated Energy Needs: 3052-7328 kcals (35-40 Kcal/Kg)  Justification: repletion, underweight  Estimated Protein Needs:  grams protein (1.5-2 g pro/Kg)  Justification:  Repletion     NEW FINDINGS:   Pathology reveals invasive poorly differentiated adenocarcinoma.  Plan TCU at discharge (without TPN).    Previous Goals (3/3):   Pt will transition from TPN --> oral intake in the next 3-4 days.   Evaluation: Not met, but in progress     Previous Nutrition Diagnosis (3/3):   Inadequate oral intake related to CL diet and early satiety as evidenced by continued PN reliance.   Evaluation: Improving      CURRENT NUTRITION DIAGNOSIS  Excessove parenteral nutrition infusion related to improved oral intake as evidenced by combination nocturnal TPN + avg oral intake meeting ~ 130% estimated needs.    INTERVENTIONS  Recommendations / Nutrition Prescription  Continue Low Fiber diet + supplements as above.  D/C Calorie Count for now.    D/C nocturnal TPN.    Recommend continue nutritional supplements between meals at TCU with small frequent feedings.    Implementation  Collaboration and Referral of Nutrition care - Discussed pt with Pharmacist and later with MAK Fair, who was in agreement that we could D/C the nocturnal TPN today.   PN Schedule - Entered order in EPIC to D/C TPN today.    Goals  Pt will consume > 75% meals and supplements.      MONITORING AND EVALUATION:  Progress towards goals will be monitored and evaluated per protocol and Practice Guidelines    Suzanne Yuen, RD, LD, CNSC

## 2017-03-08 ENCOUNTER — APPOINTMENT (OUTPATIENT)
Dept: PHYSICAL THERAPY | Facility: CLINIC | Age: 77
DRG: 329 | End: 2017-03-08
Payer: MEDICARE

## 2017-03-08 ENCOUNTER — APPOINTMENT (OUTPATIENT)
Dept: OCCUPATIONAL THERAPY | Facility: CLINIC | Age: 77
DRG: 329 | End: 2017-03-08
Payer: MEDICARE

## 2017-03-08 LAB
GLUCOSE BLDC GLUCOMTR-MCNC: 103 MG/DL (ref 70–99)
GLUCOSE BLDC GLUCOMTR-MCNC: 108 MG/DL (ref 70–99)
GLUCOSE BLDC GLUCOMTR-MCNC: 115 MG/DL (ref 70–99)
GLUCOSE BLDC GLUCOMTR-MCNC: 79 MG/DL (ref 70–99)

## 2017-03-08 PROCEDURE — 25000128 H RX IP 250 OP 636: Performed by: SURGERY

## 2017-03-08 PROCEDURE — 40000239 ZZH STATISTIC VAT ROUNDS

## 2017-03-08 PROCEDURE — 97116 GAIT TRAINING THERAPY: CPT | Mod: GP | Performed by: PHYSICAL THERAPY ASSISTANT

## 2017-03-08 PROCEDURE — 00000146 ZZHCL STATISTIC GLUCOSE BY METER IP

## 2017-03-08 PROCEDURE — A9270 NON-COVERED ITEM OR SERVICE: HCPCS | Mod: GY | Performed by: INTERNAL MEDICINE

## 2017-03-08 PROCEDURE — 97530 THERAPEUTIC ACTIVITIES: CPT | Mod: GO | Performed by: OCCUPATIONAL THERAPY ASSISTANT

## 2017-03-08 PROCEDURE — 97110 THERAPEUTIC EXERCISES: CPT | Mod: GP | Performed by: PHYSICAL THERAPY ASSISTANT

## 2017-03-08 PROCEDURE — 25000132 ZZH RX MED GY IP 250 OP 250 PS 637: Mod: GY | Performed by: PSYCHIATRY & NEUROLOGY

## 2017-03-08 PROCEDURE — 25000132 ZZH RX MED GY IP 250 OP 250 PS 637: Mod: GY | Performed by: INTERNAL MEDICINE

## 2017-03-08 PROCEDURE — 40000193 ZZH STATISTIC PT WARD VISIT: Performed by: PHYSICAL THERAPY ASSISTANT

## 2017-03-08 PROCEDURE — 25000132 ZZH RX MED GY IP 250 OP 250 PS 637: Mod: GY | Performed by: HOSPITALIST

## 2017-03-08 PROCEDURE — A9270 NON-COVERED ITEM OR SERVICE: HCPCS | Mod: GY | Performed by: HOSPITALIST

## 2017-03-08 PROCEDURE — 12000007 ZZH R&B INTERMEDIATE

## 2017-03-08 PROCEDURE — A9270 NON-COVERED ITEM OR SERVICE: HCPCS | Mod: GY | Performed by: PSYCHIATRY & NEUROLOGY

## 2017-03-08 PROCEDURE — 99232 SBSQ HOSP IP/OBS MODERATE 35: CPT | Performed by: HOSPITALIST

## 2017-03-08 PROCEDURE — 40000133 ZZH STATISTIC OT WARD VISIT: Performed by: OCCUPATIONAL THERAPY ASSISTANT

## 2017-03-08 PROCEDURE — 97535 SELF CARE MNGMENT TRAINING: CPT | Mod: GO | Performed by: OCCUPATIONAL THERAPY ASSISTANT

## 2017-03-08 RX ORDER — OXYCODONE HYDROCHLORIDE 5 MG/1
5 TABLET ORAL
Refills: 0 | Status: SHIPPED | DISCHARGE
Start: 2017-03-08 | End: 2017-03-29

## 2017-03-08 RX ORDER — ACETAMINOPHEN 325 MG/1
650 TABLET ORAL EVERY 4 HOURS PRN
Qty: 100 TABLET | Status: ON HOLD | DISCHARGE
Start: 2017-03-08 | End: 2017-04-13

## 2017-03-08 RX ADMIN — CYCLOBENZAPRINE HYDROCHLORIDE 10 MG: 10 TABLET, FILM COATED ORAL at 21:51

## 2017-03-08 RX ADMIN — OLANZAPINE 5 MG: 5 TABLET, ORALLY DISINTEGRATING ORAL at 21:51

## 2017-03-08 RX ADMIN — LEVOTHYROXINE SODIUM 75 MCG: 75 TABLET ORAL at 08:13

## 2017-03-08 RX ADMIN — ENOXAPARIN SODIUM 40 MG: 40 INJECTION SUBCUTANEOUS at 06:14

## 2017-03-08 RX ADMIN — CYCLOBENZAPRINE HYDROCHLORIDE 10 MG: 10 TABLET, FILM COATED ORAL at 08:13

## 2017-03-08 NOTE — PLAN OF CARE
Problem: Goal Outcome Summary  Goal: Goal Outcome Summary  OT: Per plan established by the Occupational Therapist, the recommended discharge location is TCU. Pt completes overall transfer and mobility with SBA, pt unsteady when distracted, during head turns has LOB requiring Garfield to correct.  Pt completed toilet transfer SBA, standing at sink SBA.

## 2017-03-08 NOTE — PROGRESS NOTES
COLON & RECTAL SURGERY  PROGRESS NOTE    March 8, 2017  Post-op Day # 7    SUBJECTIVE:  The patient is doing well. No pain. Tolerating diet but sick of eating cherry yellow. Enjoying the ice. Having good output from ileostomy.     OBJECTIVE:  Temp:  [97.5  F (36.4  C)-98.2  F (36.8  C)] 97.8  F (36.6  C)  Heart Rate:  [91-98] 91  Resp:  [16] 16  BP: (113-140)/(62-97) 126/62  SpO2:  [98 %-100 %] 100 %    Intake/Output Summary (Last 24 hours) at 03/08/17 0730  Last data filed at 03/08/17 0607   Gross per 24 hour   Intake                0 ml   Output              300 ml   Net             -300 ml       GENERAL:  Awake, alert, no acute distress,   HEAD - Nomocephalic atraumatic  SCLERA anicteric  EXTREMITIES warm and well perfused  ABDOMEN:  Soft, appropriately tender, non-distended, ileostomy is pink and viable. Thick brown stool and gas in bag.   INCISION:  C/d/i,     LABS:  Lab Results   Component Value Date    WBC 8.3 03/04/2017     Lab Results   Component Value Date    HGB 8.2 03/04/2017     Lab Results   Component Value Date    HCT 28.8 03/04/2017     Lab Results   Component Value Date     03/06/2017     Last Basic Metabolic Panel:  Lab Results   Component Value Date     03/06/2017      Lab Results   Component Value Date    POTASSIUM 4.1 03/06/2017     Lab Results   Component Value Date    CHLORIDE 106 03/06/2017     Lab Results   Component Value Date    SARITHA 8.7 03/06/2017     Lab Results   Component Value Date    CO2 27 03/06/2017     Lab Results   Component Value Date    BUN 27 03/06/2017     Lab Results   Component Value Date    CR 0.58 03/06/2017     Lab Results   Component Value Date     03/06/2017       ASSESSMENT/PLAN:POD#7 colonoscopy with biopsy and end ileostomy for poorly invasive adenocarcinoma. Patient getting adequate calories and TPN stopped yesterday.   1. Low fiber diet with supplements  2. Lovenox for prophylaxis and d/c with 30 days post op  3. Dispo: Okay to d/c to TCU once  bed available. Hopefully today. Our orders have been placed. Plan for definitive surgery in several weeks with Dr. Peter. Our office will coordinate.     Yessy Whalen PA-C  [facility]  Phone: [phone]

## 2017-03-08 NOTE — PLAN OF CARE
Problem: Goal Outcome Summary  Goal: Goal Outcome Summary  PT- Per plan established by the Physical Therapist, according to functional mobility the discharge recommendation is TCU. Pt required Garfield for bed mobility. Pt required SBA for sit <> stand transfers. Pt required CGA during gait training 200 ft.

## 2017-03-08 NOTE — PLAN OF CARE
Problem: Goal Outcome Summary  Goal: Goal Outcome Summary  Outcome: Improving  Alert, disoriented to situation, forgetful, SBA, liquid bandage WDL, BS, flatus, ostomy WDL brown soft/creamy stool, Foam dressing on right abd CDI, abd concave, denies pain. D/c today or soon pending TCU placement.

## 2017-03-08 NOTE — PLAN OF CARE
Problem: Goal Outcome Summary  Goal: Goal Outcome Summary  Outcome: Improving  Patient is alert and oriented x4, afebrile, and vital signs are stable.  Stand-by assist. Ileostomy intact; stoma is red, protruding and WNL. PICC patent and saline-locked.  2+ edema in lower extremities.  Patient denies any pain and declined pain medication.  She is tolerating the low-fiber diet. Patient had a few visitors this evening. -KP/student nurse

## 2017-03-08 NOTE — PROGRESS NOTES
SW:    I: NEVILLE following for discharge planning. Referrals out currently to [facility], [facility], and [facility]. NEVILLE contacted [facility] and [facility] to determine bed availability for today or tomorrow. [facility] continues to be a preference as well however per admissions they do not have bed availability. SW to update pt and niece as able.    P: SW to follow.    ADDENDUM: [facility] would have a bed available for pt however it is a private room and therefore pt would be responsible for the private room fee of $36/day. [facility] assessing as well. NEVILLE met with pt to provide this update, unable to decide at this time whether she would be okay paying the private room fee. SW discussed that she could request to be moved to a double when one becomes available. Pt interested in seeing whether [facility] is an option prior to making a decision. NEVILLE talked with admissions coordinator who is currently reviewing.    1615: NEVILLE updated pt maycol Sherita today regarding placement process. Pt and family okay with private room fee at [facility] if able to move into a double room when one becomes available. NEVILLE left message with [facility], hopeful for discharge tomorrow 3/9/17.    MARAL Cruz, [facility] Psychiatric CenterSW  Daytime (8:00am-4:30pm): [phone]  After-Hours NEVILLE Pager (4:30pm-11:30pm): [phone]

## 2017-03-08 NOTE — PROGRESS NOTES
[facility]    Hospitalist Progress Note    Date of Service (when I saw the patient): 03/08/2017    Assessment & Plan   Sherita Kenney is a pleasant 76 year old woman with hypertension, dyslipidemia, postpolio syndrome, glaucoma and nephritis/nephropathy during childhood, who was admitted on 2/15/2017 with acute psychosis after a home welfare check. In addition, she was also found to have a right iliopsoas abscess for which she underwent IR drainage, as well as a cecal mass for which she underwent colonoscopy and ileostomy. The mass is found to be cancerous. She now awaits placement.     Acute Psychosis.  Ms. Kenney has no prior history of mental health diagnoses. She was found to be paranoid and agitated at home on the day of admission. Psychiatry was consulted and she was felt to have an unspecificed psychotic disorder. It was determined that she does not have the capacity to make her own decisions. Care coordinator has been assisting with efforts to establish a legal decision maker in her family (see Sintia Muñoz's note from 2/21 for further details): her niece Thalia (who is a family physician) and friend Dinora are now acting as her POA's.  -- She continues on Zyprexa 5mg HS      Right iliopsoas abscess, status post IR drainage: Resolved.   Abscess was noted on CT on admission. WBC initially 22. She was started on Zosyn and underwent drainage per IR on 2/15/17. Cultures grew enterobacter cloacae, ekinella corrodens and h parainfluenzae. She completed two weeks of Zosyn this stay. Drain subsequently removed. Leukocytosis has resolved.     Adenocarcinoma of the colon:  She developed abdominal distension on 2/23 and CT scan showed a distal small bowel obstruction. CEA elevated. Colorectal Surgery was consulted and eventually she underwent colonoscopy and laparoscopic ilieostomy on 3/1. She was noted to have a large fungating mass in the cecum with fixation to the abdominal wall and 3 lesions on the  surface of the right liver. Biopsy showed invasive poorly differentiated adenocarcinoma.  -- postoperative cares per surgery  -- pain control with Tylenol prn and heating pad, as she wants to avoid narcotics  -- TPN stopped 3/7; she continues low fiber diet  -- The plan is for definitive surgical intervention per colorectal surgery in the coming weeks  -- Oncology also consulted and she will follow up in clinic with Dr. Nieves to discuss plans for future treatment      Acute iron deficiency anemia, acute:   Likely secondary to colon mass. Hgb 5.6 on admission, and she was transfused 2U PRBCs on 2/16. Has also received IV iron infusion this stay per heme/onc.  -- hgb now remains stable around 8-9      Severe protein calorie malnutrition:   Secondary to underlying malignancy. Nutritionist consulted this stay. Had PICC line placed and began TPN on 2/24.   -- albumin remains low (2.2 per last check on 3/4)   -- nutritionist following and had been monitoring calorie counts  -- had been cycling TPN postop per surgery while awaiting consistent po intake. This is now stopped and it is recommended to continue supplements at this point     Hypothyroid:  She continues Synthroid 75mcg daily.      Postpolio syndrome:   Causing a flail left shoulder. Unable to use left arm/hand. Will use sling/brace for PT. Continues on Cyclobenzaprine 10mg TID.      Klebsiella UTI: Resolved.   Adequately treated with Zosyn this stay.     Episode of Unresponsiveness: Resolved.   Noted 2/23. RRT called and patient evaluated per house MD. Felt secondary to compazine, which has since been dc'd. 2/23/17. No recurrent episodes since.     DVT Prophylaxis: Enoxaprain (Lovenox) SQ  Code Status: Prior    Disposition: Expected discharge pending placement at TCU.    Jaret Wylie MD    Interval History   No new complaints today; denies pain or nausea and tolerating food.    -Data reviewed today: I reviewed all new labs and imaging results over the  last 24 hours. I personally reviewed no images or EKG's today.    Physical Exam   Temp: 98.5  F (36.9  C) Temp src: Oral BP: 110/60   Heart Rate: 88 Resp: 16 SpO2: 97 % O2 Device: None (Room air)    Vitals:    03/06/17 0722 03/07/17 0600 03/08/17 0609   Weight: 54.6 kg (120 lb 5.9 oz) 53.3 kg (117 lb 8.1 oz) 52.3 kg (115 lb 4.8 oz)     Vital Signs with Ranges  Temp:  [97.3  F (36.3  C)-98.5  F (36.9  C)] 98.5  F (36.9  C)  Heart Rate:  [84-98] 88  Resp:  [16] 16  BP: (102-140)/(56-97) 110/60  SpO2:  [97 %-100 %] 97 %  I/O last 3 completed shifts:  In: 180 [P.O.:180]  Out: 300 [Stool:300]    Constitutional: Alert and oriented to person, place and time; no apparent distress  HEENT: normocephalic moist mucus membranes  Respiratory: lungs clear to auscultation bilaterally  Cardiovascular: regular S1 S2 no murmurs rubs or gallops  GI: abdomen soft non tender non distended bowel sounds positive; ostomy site c/d/i  Skin: no rash, good turgor  Musculoskeletal: no clubbing, cyanosis or edema  Neuro: EOMI; moves all four extremities  Psych: Appropriate affect, speech is occasionally tangential      Medications     - MEDICATION INSTRUCTIONS -       - MEDICATION INSTRUCTIONS -       IV fluid REPLACEMENT ONLY         insulin aspart  1-22 Units Subcutaneous TID AC     insulin aspart  1-16 Units Subcutaneous At Bedtime     enoxaparin  40 mg Subcutaneous Q24H     sodium chloride (PF)  10 mL Intracatheter Q8H     levothyroxine  75 mcg Oral Daily     cyclobenzaprine  10 mg Oral TID     OLANZapine zydis  5 mg Oral At Bedtime       Data     Recent Labs  Lab 03/06/17  1105 03/04/17  0625 03/03/17  0615 03/01/17  1727   WBC  --  8.3  --   --    HGB  --  8.2*  --  8.1*   MCV  --  79  --   --     169 147*  --    INR 0.97  --   --   --     141  --   --    POTASSIUM 4.1 4.3  --   --    CHLORIDE 106 106  --   --    CO2 27 28  --   --    BUN 27 24  --   --    CR 0.58 0.51*  --   --    ANIONGAP 7 7  --   --    SARITHA 8.7 8.3*  --   --     * 103*  --   --    ALBUMIN 2.5* 2.2*  --   --    PROTTOTAL 6.3*  --   --   --    BILITOTAL 0.3  --   --   --    ALKPHOS 114  --   --   --    ALT 19  --   --   --    AST 31  --   --   --        No results found for this or any previous visit (from the past 24 hour(s)).

## 2017-03-08 NOTE — PLAN OF CARE
Problem: Goal Outcome Summary  Goal: Goal Outcome Summary  Outcome: No Change  Pt in good spirits, forgetful and fuzzy on date orientation, vss, steady, left hemiplegia noted, ileostomy appliance intact and working well, voiding, PICC intact, denies pain.

## 2017-03-09 VITALS
SYSTOLIC BLOOD PRESSURE: 120 MMHG | HEIGHT: 66 IN | OXYGEN SATURATION: 96 % | DIASTOLIC BLOOD PRESSURE: 66 MMHG | HEART RATE: 94 BPM | TEMPERATURE: 97.9 F | WEIGHT: 115.3 LBS | BODY MASS INDEX: 18.53 KG/M2 | RESPIRATION RATE: 16 BRPM

## 2017-03-09 LAB
GLUCOSE BLDC GLUCOMTR-MCNC: 91 MG/DL (ref 70–99)
GLUCOSE BLDC GLUCOMTR-MCNC: 92 MG/DL (ref 70–99)

## 2017-03-09 PROCEDURE — 00000146 ZZHCL STATISTIC GLUCOSE BY METER IP

## 2017-03-09 PROCEDURE — 99239 HOSP IP/OBS DSCHRG MGMT >30: CPT | Performed by: HOSPITALIST

## 2017-03-09 PROCEDURE — A9270 NON-COVERED ITEM OR SERVICE: HCPCS | Mod: GY | Performed by: HOSPITALIST

## 2017-03-09 PROCEDURE — 25000128 H RX IP 250 OP 636: Performed by: SURGERY

## 2017-03-09 PROCEDURE — 25000132 ZZH RX MED GY IP 250 OP 250 PS 637: Mod: GY | Performed by: HOSPITALIST

## 2017-03-09 PROCEDURE — 25000132 ZZH RX MED GY IP 250 OP 250 PS 637: Mod: GY | Performed by: INTERNAL MEDICINE

## 2017-03-09 PROCEDURE — A9270 NON-COVERED ITEM OR SERVICE: HCPCS | Mod: GY | Performed by: INTERNAL MEDICINE

## 2017-03-09 RX ORDER — CYCLOBENZAPRINE HCL 10 MG
10 TABLET ORAL 3 TIMES DAILY
Qty: 42 TABLET | DISCHARGE
Start: 2017-03-09 | End: 2017-03-30 | Stop reason: ALTCHOICE

## 2017-03-09 RX ORDER — OLANZAPINE 5 MG/1
5 TABLET, ORALLY DISINTEGRATING ORAL AT BEDTIME
DISCHARGE
Start: 2017-03-09 | End: 2017-05-09

## 2017-03-09 RX ADMIN — CYCLOBENZAPRINE HYDROCHLORIDE 10 MG: 10 TABLET, FILM COATED ORAL at 08:33

## 2017-03-09 RX ADMIN — ACETAMINOPHEN 650 MG: 325 TABLET, FILM COATED ORAL at 10:50

## 2017-03-09 RX ADMIN — LEVOTHYROXINE SODIUM 75 MCG: 75 TABLET ORAL at 08:33

## 2017-03-09 RX ADMIN — ENOXAPARIN SODIUM 40 MG: 40 INJECTION SUBCUTANEOUS at 05:55

## 2017-03-09 NOTE — PLAN OF CARE
Problem: Goal Outcome Summary  Goal: Goal Outcome Summary  Outcome: Improving  A&Ox2, forgetful, up with SBA and GB, ambulating hallways. Illeostomy intact, stoma protruding/pink with good output. BLE edema, legs elevated. Plans for TCU when bed available, SW following, continue to monitor.

## 2017-03-09 NOTE — PLAN OF CARE
Problem: Goal Outcome Summary  Goal: Goal Outcome Summary  Occupational Therapy Discharge Summary     Reason for therapy discharge:    Discharged to transitional care facility.     Progress towards therapy goal(s). See goals on Care Plan in [facility] electronic health record for goal details.  Goals not met.  Barriers to achieving goals:   discharge from facility.     Therapy recommendation(s):    Continued therapy is recommended.  Rationale/Recommendations:  To maximize I in ADL..

## 2017-03-09 NOTE — PROGRESS NOTES
COLON & RECTAL SURGERY  PROGRESS NOTE    March 9, 2017  Post-op Day # 8    SUBJECTIVE:  The patient is doing well. She enjoyed a morning in the family lounge talking with her sister and friend. She states she is eating as best as she can. She finds that the ileostomy really smells. No pain. She states she has nothing to wear and is looking forward to going shopping with her mom today.     OBJECTIVE:  Temp:  [97.5  F (36.4  C)-98.5  F (36.9  C)] (P) 98.1  F (36.7  C)  Pulse:  [94] 94  Heart Rate:  [84-91] (P) 87  Resp:  [16] (P) 16  BP: (110-156)/(60-85) (P) 119/78  SpO2:  [97 %-99 %] (P) 98 %    Intake/Output Summary (Last 24 hours) at 03/09/17 0745  Last data filed at 03/08/17 2300   Gross per 24 hour   Intake              725 ml   Output              425 ml   Net              300 ml     Patient was seen in the [facility] this morning.   GENERAL:  Awake, alert, no acute distress,   HEAD - Nomocephalic atraumatic  SCLERA anicteric  EXTREMITIES warm and well perfused        LABS:  Lab Results   Component Value Date    WBC 8.3 03/04/2017     Lab Results   Component Value Date    HGB 8.2 03/04/2017     Lab Results   Component Value Date    HCT 28.8 03/04/2017     Lab Results   Component Value Date     03/06/2017     Last Basic Metabolic Panel:  Lab Results   Component Value Date     03/06/2017      Lab Results   Component Value Date    POTASSIUM 4.1 03/06/2017     Lab Results   Component Value Date    CHLORIDE 106 03/06/2017     Lab Results   Component Value Date    SARITHA 8.7 03/06/2017     Lab Results   Component Value Date    CO2 27 03/06/2017     Lab Results   Component Value Date    BUN 27 03/06/2017     Lab Results   Component Value Date    CR 0.58 03/06/2017     Lab Results   Component Value Date     03/06/2017       ASSESSMENT/PLAN:POD#8 colonoscopy with biopsy and end ileostomy for adenomcarcinoma.   1. Low fiber with supplements  2. Lovenox for prophylaxis and will d/c with 30 days  3.  Patient ready for d/c from surgery standpoint. Our orders have been placed. Will arrange surgery in the next few weeks with patient.     Yessy Whalen PA-C  [facility]  Phone: [phone]       ADDENDUM:  Length of stay: 8 days  Indicate Y or N for the following:  UTI  No  C diff  No  PNA  No  SSI No  DVT No  PE  No  CVA No  MI No  Enterocutaneous fistula  No  Peripheral nerve injury  No  Abscess (not adjacent to anastomosis)  No  Leak No    Treated with:   Antibiotics N/A   Drain  N/A   Reoperation  N/A  Death within 30 days No  Reintubation  No  Reoperation  No   Procedure NA    FOR CANCER CASES:  FINAL DIAGNOSIS:   Cecum, mass, biopsy-   - Invasive poorly differentiated adenocarcinoma(Please see microscopic   description)    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

## 2017-03-09 NOTE — DISCHARGE SUMMARY
[facility]    Discharge Summary  Hospitalist    Date of Admission:  2/15/2017  Date of Discharge:  3/9/2017  Discharging Provider: Jaret Wylie MD  Date of Service (when I saw the patient): 03/09/17    Discharge Diagnoses   Acute Psychosis  Right iliopsoas abscess, status post IR drainage  Adenocarcinoma of the colon, status post colonoscopy and laparoscopic ilieostomy     History of Present Illness   Sherita Kenney is a pleasant 76 year old woman with hypertension, dyslipidemia, postpolio syndrome, glaucoma and nephritis/nephropathy during childhood, who was admitted on 2/15/2017 with acute psychosis after a home welfare check (please see her H and P for complete details of her initial presentation). In addition, she was also found to have a right iliopsoas abscess for which she underwent IR drainage, as well as a cecal mass for which she underwent colonoscopy and ileostomy. The mass is found to be cancerous as below.      Hospital Course   Sherita Kenney was admitted on 2/15/2017.  The following problems were addressed during her hospitalization:    Acute Psychosis.  Ms. Kenney has no prior history of mental health diagnoses. She was found to be paranoid and agitated at home on the day of admission. Psychiatry was consulted and she was felt to have an unspecificed psychotic disorder. It was determined that she does not have the capacity to make her own decisions. Care coordinator has been assisting with efforts to establish a legal decision maker in her family (see Sintia Muñoz's note from 2/21 for further details): her niece Thalia (who is a family physician) and friend Dinora are now acting as her POA's.  -- She continues on Zyprexa 5mg HS as per Psychiatry consultation recommendations      Right iliopsoas abscess, status post IR drainage: Resolved.   Abscess was noted on CT on admission. WBC initially 22. She was started on Zosyn and underwent drainage per IR on 2/15/17. Cultures  grew enterobacter cloacae, ekinella corrodens and h parainfluenzae. She completed two weeks of Zosyn during this stay. Drain subsequently was removed. Leukocytosis has resolved.      Adenocarcinoma of the colon:  She developed abdominal distension on 2/23 and CT scan showed a distal small bowel obstruction. CEA was elevated. Colorectal Surgery was consulted and eventually she underwent colonoscopy and laparoscopic ilieostomy on 3/1. She was noted to have a large fungating mass in the cecum with fixation to the abdominal wall and 3 lesions on the surface of the right liver. Biopsy showed invasive poorly differentiated adenocarcinoma.  -- Postoperative management of her ostomy is as per Colorectal Surgery. The plan is for definitive surgical intervention per colorectal surgery in the coming weeks; she will follow up as directed with them as an outpatient  -- pain control mainly achieved with Tylenol as needed  -- TPN was used post-operatively and stopped 3/7; she continues low fiber diet  -- Oncology was also consulted and she will follow up in clinic with Dr. Nieves to discuss plans for future treatment      Acute iron deficiency anemia, acute:   Likely secondary to colon mass. Hgb 5.6 on admission, and she was transfused 2U PRBCs on 2/16. She has also received IV iron infusion this stay per Oncology guidance.  -- HGB now remains stable around 8-9 and there are currently no signs of active bleeding.      Severe protein calorie malnutrition:   Secondary to underlying malignancy. Nutritionist consulted. She had PICC line placed and TPN was given from 2/24 through 3/7. Her diet has now been advanced.       Hypothyroid:  She continues Synthroid 75mcg daily.      Postpolio syndrome:   Causing a flail left shoulder. She is unable to use her left upper extremity. She will use a sling/brace for PT. She continues on Cyclobenzaprine 10mg TID.       Klebsiella UTI: Resolved.   Adequately treated with Zosyn this stay.      Jaret  "Sb Wylie MD    Pending Results   These results will be followed up by Oncology    Unresulted Labs Ordered in the Past 30 Days of this Admission     Date and Time Order Name Status Description    3/7/2017 1449 MLH1 Promoter Methylation Tumor In process           Code Status   Full Code       Primary Care Physician   Latanya Martinez    Blood pressure (P) 119/78, pulse 94, temperature (P) 98.1  F (36.7  C), temperature source (P) Oral, resp. rate (P) 16, height 1.676 m (5' 6\"), weight 52.3 kg (115 lb 4.8 oz), SpO2 (P) 98 %.    Constitutional: Alert and oriented to person, place and time; no apparent distress  HEENT: normocephalic moist mucus membranes  Respiratory: lungs clear to auscultation bilaterally  Cardiovascular: regular S1 S2 no murmurs rubs or gallops  GI: abdomen soft non tender non distended bowel sounds positive; ostomy site c/d/i  Skin: no rash, good turgor  Musculoskeletal: no clubbing, cyanosis or edema  Neuro: EOMI; moves all four extremities  Psych: Appropriate affect, speech is occasionally tangential       Discharge Disposition   Discharged to short-term care facility  Condition at discharge: Good    Consultations This Hospital Stay   PSYCHIATRY IP CONSULT  NUTRITION SERVICES ADULT IP CONSULT  PHYSICAL THERAPY ADULT IP CONSULT  OCCUPATIONAL THERAPY ADULT IP CONSULT  COLORECTAL SURGERY IP CONSULT  HEMATOLOGY & ONCOLOGY IP CONSULT  PSYCHIATRY IP CONSULT  PHARMACY TO DOSE VANCO  PSYCHIATRY IP CONSULT  COLORECTAL SURGERY IP CONSULT  SURGERY GENERAL IP CONSULT  VASCULAR ACCESS ADULT IP CONSULT  PHARMACY/NUTRITION TO START AND MANAGE TPN  PHARMACY/NUTRITION TO START AND MANAGE TPN  PHARMACY IP CONSULT  PHARMACY IP CONSULT  PHARMACY IP CONSULT  PHARMACY IP CONSULT  WOUND OSTOMY CONTINENCE NURSE  IP CONSULT  PHARMACY IP CONSULT  PHARMACY IP CONSULT  PHARMACY IP CONSULT  PHYSICAL THERAPY ADULT IP CONSULT  OCCUPATIONAL THERAPY ADULT IP CONSULT    Time Spent on this Encounter   I, Jaret Wylie, " personally saw the patient today and spent greater than 30 minutes discharging this patient.    Discharge Orders     General info for SNF   Length of Stay Estimate: Short Term Care: Estimated # of Days <30  Condition at Discharge: Improving  Level of care:skilled   Rehabilitation Potential: Good  Admission H&P remains valid and up-to-date: Yes  Recent Chemotherapy: N/A  Use Nursing Home Standing Orders: Yes     Mantoux instructions   Give two-step Mantoux (PPD) Per Facility Policy Yes     Reason for your hospital stay   The patient was in the hospital to manage psychosis and a new malignant right colon mass     Ostomy care   Standard Cares.  Type:  ileostomy     Wound care (specify)   Site:   Port sites  Instructions:  Keep clean and dry     Activity - Up with nursing assistance     Follow Up and recommended labs and tests   Dr. Peter's office will coordinate definitive surgery in a couple of week. Call the office at [phone] if you develop fever, uncontrolled pain, bleeding, nausea, vomiting, or constipation.   No heavy lifting or straining over 15-20 lbs for 6 weeks. No Driving while taking narcotic pain medications     Full Code     Physical Therapy Adult Consult   Evaluate and treat as clinically indicated.    Reason:  Post op deconditioning     Occupational Therapy Adult Consult   Evaluate and treat as clinically indicated.    Reason:  Post op deconditioning     Fall precautions     Advance Diet as Tolerated   Follow this diet upon discharge: Orders Placed This Encounter     Calorie Counts     Calorie Counts     Room Service     Snacks/Supplements Adult: Other - Please comment; Cherry Gelatein Plus; With Meals     Snacks/Supplements Adult: Ensure Plus (Adult); Between Meals     Calorie Counts     Low Fiber Diet       Discharge Medications   Current Discharge Medication List      START taking these medications    Details   OLANZapine zydis (ZYPREXA) 5 MG ODT tab Take 1 tablet (5 mg) by mouth At Bedtime     Associated Diagnoses: Paranoia (H)      oxyCODONE (ROXICODONE) 5 MG IR tablet Take 1 tablet (5 mg) by mouth every 3 hours as needed for moderate to severe pain  Refills: 0    Associated Diagnoses: Malignant neoplasm of right colon (H)      acetaminophen (TYLENOL) 325 MG tablet Take 2 tablets (650 mg) by mouth every 4 hours as needed for mild pain  Qty: 100 tablet    Associated Diagnoses: Malignant neoplasm of right colon (H)      enoxaparin (LOVENOX) 40 MG/0.4ML injection Inject 0.4 mLs (40 mg) Subcutaneous every 24 hours  Qty: 23 Syringe, Refills: 0    Associated Diagnoses: Malignant neoplasm of right colon (H)         CONTINUE these medications which have CHANGED    Details   cyclobenzaprine (FLEXERIL) 10 MG tablet Take 1 tablet (10 mg) by mouth 3 times daily  Qty: 42 tablet    Associated Diagnoses: Osteoarthritis of spine with radiculopathy, lumbar region         CONTINUE these medications which have NOT CHANGED    Details   SUMATRIPTAN SUCCINATE PO Take 25 mg by mouth every 8 hours as needed for migraine      Multiple Vitamins-Minerals (HAIR/SKIN/NAILS PO) Take 1 tablet by mouth daily.      Glucosamine Sulfate--500 MG TABS Take 1,500 mg by mouth daily.      Travoprost (TRAVATAN Z) 0.004 % SOLN Apply 1 drop to eye. In each eye QHS      levothyroxine (SYNTHROID, LEVOTHROID) 75 MCG tablet Take 75 mcg by mouth daily.      Calcium-Magnesium 333-167 MG TABS Take 2 tablets by mouth daily.      ferrous gluconate (FERGON) 324 (38 FE) MG tablet Take 1 tablet by mouth daily (with lunch).      B-Complex TABS Take 1 tablet by mouth daily.         STOP taking these medications       PRAVASTATIN SODIUM PO Comments:   Reason for Stopping:         Biotin (BIOTIN 5000) 5 MG CAPS Comments:   Reason for Stopping:         Prenatal MV-Min-Fe Fum-FA-DHA (PRENATAL 1 PO) Comments:   Reason for Stopping:         pyridostigmine (MESTINON) 180 MG CR tablet Comments:   Reason for Stopping:         Verapamil HCl 120 MG CP24 Comments:    Reason for Stopping:         Vitamin A 8000 UNIT CAPS Comments:   Reason for Stopping:         methylphenidate (RITALIN) 10 MG tablet Comments:   Reason for Stopping:         Cholecalciferol (VITAMIN D-3) 5000 UNIT TABS Comments:   Reason for Stopping:         ascorbic acid (VITAMIN C) 1000 MG TABS Comments:   Reason for Stopping:             Allergies   Allergies   Allergen Reactions     Wool Fiber Unknown     Data   Most Recent 3 CBC's:  Recent Labs   Lab Test  03/06/17   1105  03/04/17   0625  03/03/17   0615  03/01/17   1727  02/28/17   0925  02/27/17   0754   WBC   --   8.3   --    --   8.3  9.6   HGB   --   8.2*   --   8.1*  8.9*  9.7*   MCV   --   79   --    --   76*  76*   PLT  234  169  147*   --   264  298      Most Recent 3 BMP's:  Recent Labs   Lab Test  03/06/17   1105  03/04/17   0625  03/01/17   0755   02/28/17   0925   NA  140  141   --    --   136   POTASSIUM  4.1  4.3  3.5   < >  4.2   CHLORIDE  106  106   --    --   104   CO2  27  28   --    --   25   BUN  27  24   --    --   17   CR  0.58  0.51*  0.56   < >  0.52   ANIONGAP  7  7   --    --   7   SARITHA  8.7  8.3*   --    --   7.8*   GLC  104*  103*   --    --   303*    < > = values in this interval not displayed.     Most Recent 2 LFT's:  Recent Labs   Lab Test  03/06/17   1105  02/27/17   0700   AST  31  29   ALT  19  18   ALKPHOS  114  65   BILITOTAL  0.3  0.2     Most Recent INR's and Anticoagulation Dosing History:  Anticoagulation Dose History     Recent Dosing and Labs Latest Ref Rng & Units 2/16/2017 2/23/2017 2/25/2017 2/27/2017 3/6/2017    INR 0.86 - 1.14 1.23(H) 1.04 1.06 1.01 0.97        Most Recent 3 Troponin's:No lab results found.  Most Recent Cholesterol Panel:  Recent Labs   Lab Test  03/06/17   1105   TRIG  100     Most Recent 6 Bacteria Isolates From Any Culture (See EPIC Reports for Culture Details):  Recent Labs   Lab Test  02/15/17   1620  02/15/17   0218  09/07/10   1349   CULT  Moderate growth Enterobacter cloacae  complex  Moderate growth Eikenella corrodens  Light growth Haemophilus parainfluenzae Susceptibility testing not routinely done  On day 2, isolated in broth only: Mixed anaerobes present No further   identification  *  >100,000 colonies/mL Klebsiella pneumoniae*  No Acid fast bacilli isolated after 7 weeks incubation  No anaerobes isolated  Culture negative after 4 weeks  No growth     Most Recent TSH, T4 and A1c Labs:  Recent Labs   Lab Test  02/15/17   0146   TSH  4.05*   T4  1.23     Results for orders placed or performed during the hospital encounter of 02/15/17   XR Pelvis and Hip Right 1 View    Narrative    XR PELVIS AND HIP RIGHT 1 VIEW  2/15/2017 3:00 AM     INDICATION: Pain.    COMPARISON: None.      Impression    IMPRESSION: No acute fractures or dislocation. Moderate degenerative  changes right hip.    SOY BARGER MD   Head CT w/o contrast    Narrative    CT HEAD W/O CONTRAST  2/15/2017 3:08 AM     HISTORY: AMS.    TECHNIQUE: Axial images of the head and coronal reformations without  IV contrast material. Radiation dose for this scan was reduced using  automated exposure control, adjustment of the mA and/or kV according  to patient size, or iterative reconstruction technique.    COMPARISON: None.    FINDINGS: No intracranial hemorrhage, mass or mass effect. No acute  infarct identified. No shift of midline structures. The ventricles are  symmetric. Mild cortical atrophy. Visualized paranasal sinuses and  mastoid air cells are clear.      Impression    IMPRESSION: No acute intracranial findings.    SOY BARGER MD   Chest XR,  PA & LAT    Narrative    XR CHEST 2 VW  2/15/2017 3:00 AM     INDICATION: Weakness, elevated WBC.    COMPARISON: Chest CT 5/19/2004.      Impression    IMPRESSION: No infiltrates or other acute findings. Heart size within  normal limits. Tortuous thoracic aorta. Evidence of old granulomatous  infection.    SOY BARGER MD   CT Abdomen Pelvis w Contrast     Narrative    CT ABDOMEN PELVIS W CONTRAST  2/15/2017 5:01 AM     HISTORY: Right lower quadrant abdominal mass.    TECHNIQUE: Volumetric acquisition through abdomen and pelvis with IV  contrast. 50 mL Isovue-370. Radiation dose for this scan was reduced  using automated exposure control, adjustment of the mA and/or kV  according to patient size, or iterative reconstruction technique.    COMPARISON: None.    FINDINGS: Liver, gallbladder, spleen, pancreas, adrenal glands, and  left kidney demonstrate no worrisome findings. Mild-moderate right  hydronephrosis to the level of the midureter.    Large gas and fluid collection with some rim enhancement in the right  lower quadrant abutting and involving the iliopsoas musculature and  also abutting the right hemicolon consistent with an abscess. This  measures approximately 8.2 x 9.3 x 12.8 cm. Just medial to this there  is an area of mass-like thickening involving what is likely a portion  of the ascending colon. This is seen, for example, on coronal image 11  where it measures approximately 7.7 cm craniocaudad. Findings  suspicious for neoplasm with associated perforation and abscess. An  inflammatory mass is considered less likely. There are a few mildly  prominent abdominal lymph nodes seen at the level of the midabdomen  adjacent to the aorta (series 3, image 43). One just anterior to the  aorta measures 1.2 x 1.7 cm.    Diverticulosis, most prominent in the sigmoid colon. A portion of the  sigmoid (series 3, image 59) appears slightly thick-walled without  adjacent inflammation. This may be muscular hypertrophy from  diverticular disease or minimal diverticulitis. Neoplasm at this site  is also not excluded. Advanced degenerative changes in the visualized  spine with scoliosis convex to the right in the upper lumbar spine.  Advanced degenerative changes right hip.      Impression    IMPRESSION:  1. Large right iliopsoas abscess. This likely arises from  and  communicates with the adjacent right colon.  2. Adjacent mass-like wall thickening of what is likely a portion of  the right colon, worrisome for neoplasm. Perforated neoplasm is likely  the source for the abscess.  3. Mild-moderate right hydronephrosis likely secondary to the above  findings.  4. Sigmoid diverticulosis with a segment of sigmoid wall thickening.  No significant adjacent inflammation. This may be muscular  hypertrophy, minimal diverticulitis or even neoplasm.  5. Mild abdominal adenopathy.    Findings discussed with the ER physician at 5:10 a.m.    SOY BARGER MD   CT Abdomen Peritoneum Abscess Drainage    Narrative    CT ABDOMEN PERITONEUM ABSCESS DRAINAGE WITH CATHETER PLACEMENT   2/15/2017 4:43 PM     HISTORY: 76-year-old female with suspected right psoas abscess who  presents for drainage.    FINDINGS: Procedure and risks were explained to the patient and her  sister in detail and informed consent was obtained. Preliminary scans  were obtained through the abdomen without contrast.     Radiation dose for this scan was reduced using automated exposure  control, adjustment of the mA and/or kV according to patient size, or  iterative reconstruction technique.    The patient was prepped and draped and 1% Lidocaine was used as local  anesthetic. Under sterile CT guidance, a 19-gauge trocar needle was  inserted into the abscess and whitish-yellow purulent fluid was  aspirated. A sample was sent to lab for Gram stain and cultures. A  wire was inserted and the needle was removed. A tract was dilated and  a 10 Persian all-purpose drainage catheter was placed into the abscess.  A total of 500 mL of purulent fluid was aspirated. The cavity was then  irrigated with sterile saline. The catheter was sutured in place with  2-0 Ethilon and connected to Marek-Mcnally bulb suction. The patient  tolerated the procedure without complication.    Local anesthetic 10 mL of 1% lidocaine    Contrast sedation 1  mg IV Versed, 50 mg IV fentanyl.    Sedation time 20 minutes.     The patient was monitored by radiology nursing staff under my  supervision and remained stable throughout the study.      Impression    IMPRESSION: CT-guided drainage of right lower quadrant abscess as  described above.    BETTIE WHITE MD   CT Abdomen Pelvis w Contrast    Narrative    CT ABDOMEN AND PELVIS WITH CONTRAST 2/18/2017 9:54 AM     HISTORY: Follow up for Interventional radiology drainage of iliopsoas  abscess.     COMPARISON: CT abdomen and pelvis 2/15/2017.    TECHNIQUE: Axial images from the lung bases to the symphysis are  performed with additional coronal reformatted images. 57 mL of Isovue  370 are given intravenously. Radiation dose for this scan was reduced  using automated exposure control, adjustment of the mA and/or kV  according to patient size, or iterative reconstruction technique.    FINDINGS: Trace pleural effusions are present. Calcified granulomas in  the left perihilar region are noted. Mild atelectasis at the lung  bases.    Abdomen: The liver, spleen, gallbladder, pancreas, adrenal glands and  kidneys are unremarkable. No hydronephrosis or renal calculi. A few  calcified granulomas are noted in the spleen. No enlarged lymph nodes.  Aorta demonstrates calcified plaque. No evidence of aneurysm or  dissection. The bowel is normal in caliber without obstruction. A few  fluid-filled, dilated loops of small bowel are noted in the abdomen.  Thickening of the wall of the cecum is again suspected and the pigtail  drainage catheter in the region of the iliopsoas is again noted. There  is very little remaining free fluid about the tip of the catheter.    Pelvis: The bladder, uterus, ovaries and rectum are unremarkable. No  enlarged pelvic lymph nodes or free fluid. Bone window examination  demonstrates degenerative dextroscoliosis upper lumbar spine. Advanced  degenerative changes right hip are again noted.      Impression     IMPRESSION:  1. Near complete resolution of the fluid collection right iliopsoas  region drained by the previously placed pigtail drainage catheter.  2. Cecal wall thickening or mass is again noted. No evidence of bowel  obstruction. No free intraperitoneal air.  3. Trace bilateral pleural effusions and evidence of old granulomatous  disease.    OBI QUEVEDO MD   CT Sinogram Injection    Narrative    CT ABDOMEN/PELVIS WITHOUT CONTRAST 2/20/2017 4:05 PM    HISTORY: Drain study    TECHNIQUE: Scans were obtained of the lower abdomen and upper pelvis  without IV contrast. Radiation dose for this scan was reduced using  automated exposure control, adjustment of the mA and/or kV according  to patient size, or iterative reconstruction technique. Approximately  60 cc of diluted contrast was injected into the indwelling drainage  tube in the right abdomen.    COMPARISON: None.    FINDINGS: Drainage tube identified in the right lower quadrant on the  preinjection scan. No residual fluid identified in this region.  Following injection, the contrast entered the previously drained  abscess cavity. There is no evidence for contrast entering the colon  or small bowel. Remainder of the scan is unremarkable.      Impression    IMPRESSION:   1. Drainage tube in place without evidence of communication with colon  or small bowel.  2. Remainder of the study is unremarkable.    NORA SAM MD   CT Abdomen Pelvis w Contrast    Narrative    CT ABDOMEN AND PELVIS WITH CONTRAST  2/23/2017 2:59 AM     HISTORY: Abdominal pain.    COMPARISON: 2/20/2017.    TECHNIQUE: Following the uneventful administration of 63 mL Isovue-370  intravenous contrast, helical sections were acquired from the top of  the diaphragm through the pubic symphysis. Coronal reconstructions  were generated. Radiation dose for this scan was reduced using  automated exposure control, adjustment of the mA and/or kV according  to the patient's size, or iterative  reconstruction technique.    FINDINGS:     Abdomen: The liver is unremarkable. A few punctate calcifications in  the spleen likely relate to prior granulomatous infection. The  pancreas, adrenal glands and kidneys are unremarkable. The gallbladder  is mildly distended. The stomach is moderately distended with fluid.  No enlarged lymph nodes in the upper abdomen. A small amount of  perihepatic free fluid. Atherosclerotic calcification in the abdominal  aorta.    Scan through the lower chest is significant for a few calcified left  hilar lymph nodes that are likely related to prior granulomatous  infection and coronary artery calcification.    Pelvis: An 8.2 x 7.4 x 5.3 cm heterogeneously enhancing mass is  present in the mid right hemiabdomen (series 2 image 53), likely  involving the terminal ileum and cecum. There is diffuse moderate  dilatation of a fluid-filled small bowel. The colon is relatively  decompressed. A few colonic diverticula are present in the colon  without evidence of diverticulitis. No pneumatosis or free  intraperitoneal gas. The uterus is present. A few enlarged periaortic  lymph nodes and mesenteric lymph nodes in the right hemipelvis are  again present. A percutaneous pigtail catheter is present entering the  anterolateral aspect of the mid right hemiabdomen and distal pigtail  in the iliac fossa. No fluid is present about the catheter.      Impression    IMPRESSION:   1. 8 cm solid mass in the ileocecal region, highly suggestive of  neoplasm, unchanged. A few enlarged lymph nodes in the abdomen are  highly suggestive of metastases.  2. Diffuse moderate dilatation of a fluid-filled small bowel and  stomach, consistent with a distal small bowel obstruction and likely  due to the aforementioned mass.  3. A small amount of perihepatic free fluid.  4. A percutaneous drainage catheter is present in the right  hemiabdomen. No fluid is present about the distal tip of the catheter.    BEVERLY GÓMEZ  MD   XR Abdomen 2 Views    Narrative    ABDOMEN TWO VIEWS February 24, 2017 12:15 PM     HISTORY: Small bowel obstruction.    COMPARISON: CT 2/23/2017.    FINDINGS: Multiple loops of moderately distended small bowel in the  central abdomen suggests small bowel obstruction. No free air in the  lateral decubitus view. Surgical drain right lower quadrant.  Degenerative changes lumbar spine.      Impression    IMPRESSION: Small bowel obstruction. No free intraperitoneal air.    ETRESA MORALES MD

## 2017-03-09 NOTE — PLAN OF CARE
Problem: Goal Outcome Summary  Goal: Goal Outcome Summary  PT: Pt to discharge to TCU prior to PT appt. PT goals not met.

## 2017-03-09 NOTE — PLAN OF CARE
Problem: Goal Outcome Summary  Goal: Goal Outcome Summary  Outcome: Improving  VSS. Disoriented to time, restless and forgetful. Independent, walked to family lounge. Ileostomy intact, stoma pink and protruding with adequate o/p. Slept through the night. Denies pain. Plan to d/c to TCU, make POA aware when this happens.

## 2017-03-09 NOTE — PROGRESS NOTES
"Colon and Rectal Surgery  No complaints. States that she is gradually eating more. Stoma functioning.  /70 (BP Location: Right leg)  Pulse 94  Temp 97.9  F (36.6  C) (Oral)  Resp 16  Ht 1.676 m (5' 6\")  Wt 52.3 kg (115 lb 4.8 oz)  SpO2 97%  BMI 18.61 kg/m2    Intake/Output Summary (Last 24 hours) at 03/08/17 1848  Last data filed at 03/08/17 1400   Gross per 24 hour   Intake              530 ml   Output              450 ml   Net               80 ml     Alert, oriented to person ? Date  No icterus  Abdomen: soft. Stoma healthy and functioning.   Legs: bilateral edema but slightly less.    Impression: Ready for discharge to TCU from surgery standpoint.  Will arrange follow up in the office to discuss and schedule surgery.     Lindsay Peter MD  "
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
## 2017-03-09 NOTE — PROGRESS NOTES
"SW:    I: NEVILLE following for discharge planning. Pt is medically appropriate for discharge today. NEVILLE spoke with admissions at [facility] and there is a double room that has become available for pt. SW update pt of this, she expressed that \"this is the best news she has heard\" and is excited for discharge. NEVILLE left message with pt maycol, Sherita, who has been assisting with placement. SW to also contact pt friend Dinora who is local and also involved.    P: NEVILLE to follow.    ADDENDUM: NEVILLE faxed orders to [phone]. NEVILLE updated Dinora via voicemail of discharge location. NEVILLE also received call back from pt maycol to confirm message was received. NEVILLE contacted pt sister and brother-in-law, spoke to brother-in-law. Pt siblings plan to meet her over at [facility] and requested that wc transport be arranged. NEVILLE explained the fee for this service to which pt brother verbalized understanding. NEVILLE passed on pt brother's cell phone for admissions to contact once pt arrives. [place] [facility] transport at 1230. [facility]PS has also been following and therefore left message for Oly, investigator [phone] with update regarding discharge location.    PAS-RR    D: Per DHS regulation, NEVILLE completed and submitted PAS-RR to [place] [employer]ging Direct Connect via the Senior LinkAge Line.  PAS-RR confirmation # is : [id number]    I: NEVILLE spoke with pt and they are aware a PAS-RR has been submitted.  NEVILLE reviewed with pt that they may be contacted for a follow up appointment within 10 days of hospital discharge if their SNF stay is < 30 days.  Contact information for Senior LinkAge Line was also provided.    A: Pt verbalized understanding.    P: Further questions may be directed to Senior LinkAge Line at #[phone], option #4 for PAS-RR staff.      MARAL Cruz, LICSW  Daytime (8:00am-4:30pm): [phone]  After-Hours NEVILLE Pager (4:30pm-11:30pm): [phone]     "

## 2017-03-10 ENCOUNTER — CARE COORDINATION (OUTPATIENT)
Dept: CASE MANAGEMENT | Facility: CLINIC | Age: 77
End: 2017-03-10

## 2017-03-10 ENCOUNTER — NURSING HOME VISIT (OUTPATIENT)
Dept: GERIATRICS | Facility: CLINIC | Age: 77
End: 2017-03-10
Payer: COMMERCIAL

## 2017-03-10 VITALS
RESPIRATION RATE: 18 BRPM | DIASTOLIC BLOOD PRESSURE: 63 MMHG | BODY MASS INDEX: 19.29 KG/M2 | SYSTOLIC BLOOD PRESSURE: 101 MMHG | WEIGHT: 120 LBS | OXYGEN SATURATION: 96 % | HEART RATE: 83 BPM | HEIGHT: 66 IN | TEMPERATURE: 98.6 F

## 2017-03-10 DIAGNOSIS — Z93.2 S/P ILEOSTOMY (H): ICD-10-CM

## 2017-03-10 DIAGNOSIS — K68.12 ILIOPSOAS ABSCESS ON RIGHT (H): ICD-10-CM

## 2017-03-10 DIAGNOSIS — C18.9 ADENOCARCINOMA OF COLON (H): Primary | ICD-10-CM

## 2017-03-10 DIAGNOSIS — F29 PSYCHOSIS, UNSPECIFIED PSYCHOSIS TYPE (H): ICD-10-CM

## 2017-03-10 DIAGNOSIS — D50.8 OTHER IRON DEFICIENCY ANEMIA: ICD-10-CM

## 2017-03-10 DIAGNOSIS — G14 POSTPOLIO SYNDROME (H): ICD-10-CM

## 2017-03-10 DIAGNOSIS — E43 SEVERE PROTEIN-CALORIE MALNUTRITION (H): ICD-10-CM

## 2017-03-10 DIAGNOSIS — R53.81 PHYSICAL DECONDITIONING: ICD-10-CM

## 2017-03-10 DIAGNOSIS — Z98.890 S/P COLONOSCOPY: ICD-10-CM

## 2017-03-10 PROCEDURE — 99310 SBSQ NF CARE HIGH MDM 45: CPT | Performed by: NURSE PRACTITIONER

## 2017-03-10 PROCEDURE — 99207 ZZC CDG-CORRECTLY CODED, REVIEWED AND AGREE: CPT | Performed by: NURSE PRACTITIONER

## 2017-03-12 PROBLEM — R53.81 PHYSICAL DECONDITIONING: Status: ACTIVE | Noted: 2017-03-12

## 2017-03-12 PROBLEM — Z93.2 S/P ILEOSTOMY (H): Status: ACTIVE | Noted: 2017-03-12

## 2017-03-12 PROBLEM — D50.9 IRON DEFICIENCY ANEMIA: Status: ACTIVE | Noted: 2017-03-12

## 2017-03-12 PROBLEM — E43 SEVERE PROTEIN-CALORIE MALNUTRITION (H): Status: ACTIVE | Noted: 2017-03-12

## 2017-03-12 PROBLEM — G14 POSTPOLIO SYNDROME (H): Status: ACTIVE | Noted: 2017-03-12

## 2017-03-12 PROBLEM — K68.12 ILIOPSOAS ABSCESS ON RIGHT (H): Status: ACTIVE | Noted: 2017-03-12

## 2017-03-12 PROBLEM — Z98.890 S/P COLONOSCOPY: Status: ACTIVE | Noted: 2017-03-12

## 2017-03-12 PROBLEM — I10 BENIGN ESSENTIAL HYPERTENSION: Status: ACTIVE | Noted: 2017-03-12

## 2017-03-12 PROBLEM — C18.9 ADENOCARCINOMA OF COLON (H): Status: ACTIVE | Noted: 2017-03-12

## 2017-03-13 ENCOUNTER — HOSPITAL LABORATORY (OUTPATIENT)
Dept: OTHER | Facility: CLINIC | Age: 77
End: 2017-03-13

## 2017-03-13 LAB
ANION GAP SERPL CALCULATED.3IONS-SCNC: 8 MMOL/L (ref 3–14)
BUN SERPL-MCNC: 12 MG/DL (ref 7–30)
CALCIUM SERPL-MCNC: 8.8 MG/DL (ref 8.5–10.1)
CHLORIDE SERPL-SCNC: 107 MMOL/L (ref 94–109)
CO2 SERPL-SCNC: 27 MMOL/L (ref 20–32)
CREAT SERPL-MCNC: 0.72 MG/DL (ref 0.52–1.04)
ERYTHROCYTE [DISTWIDTH] IN BLOOD BY AUTOMATED COUNT: ABNORMAL % (ref 10–15)
GFR SERPL CREATININE-BSD FRML MDRD: 79 ML/MIN/1.7M2
GLUCOSE SERPL-MCNC: 73 MG/DL (ref 70–99)
HCT VFR BLD AUTO: 29.3 % (ref 35–47)
HGB BLD-MCNC: 8.4 G/DL (ref 11.7–15.7)
MCH RBC QN AUTO: 23.4 PG (ref 26.5–33)
MCHC RBC AUTO-ENTMCNC: 28.7 G/DL (ref 31.5–36.5)
MCV RBC AUTO: 82 FL (ref 78–100)
PLATELET # BLD AUTO: 334 10E9/L (ref 150–450)
POTASSIUM SERPL-SCNC: 4.1 MMOL/L (ref 3.4–5.3)
RBC # BLD AUTO: 3.59 10E12/L (ref 3.8–5.2)
SODIUM SERPL-SCNC: 142 MMOL/L (ref 133–144)
WBC # BLD AUTO: 5.9 10E9/L (ref 4–11)

## 2017-03-14 ENCOUNTER — NURSING HOME VISIT (OUTPATIENT)
Dept: GERIATRICS | Facility: CLINIC | Age: 77
End: 2017-03-14
Payer: COMMERCIAL

## 2017-03-14 VITALS
WEIGHT: 112.8 LBS | HEART RATE: 87 BPM | RESPIRATION RATE: 18 BRPM | TEMPERATURE: 97.6 F | OXYGEN SATURATION: 100 % | BODY MASS INDEX: 18.13 KG/M2 | SYSTOLIC BLOOD PRESSURE: 114 MMHG | DIASTOLIC BLOOD PRESSURE: 66 MMHG | HEIGHT: 66 IN

## 2017-03-14 DIAGNOSIS — Z93.2 S/P ILEOSTOMY (H): ICD-10-CM

## 2017-03-14 DIAGNOSIS — E43 SEVERE PROTEIN-CALORIE MALNUTRITION (H): ICD-10-CM

## 2017-03-14 DIAGNOSIS — C18.9 ADENOCARCINOMA OF COLON (H): Primary | ICD-10-CM

## 2017-03-14 DIAGNOSIS — R41.89 COGNITIVE IMPAIRMENT: ICD-10-CM

## 2017-03-14 DIAGNOSIS — R53.81 PHYSICAL DECONDITIONING: ICD-10-CM

## 2017-03-14 DIAGNOSIS — F29 PSYCHOSIS, UNSPECIFIED PSYCHOSIS TYPE (H): ICD-10-CM

## 2017-03-14 DIAGNOSIS — Z98.890 STATUS POST INCISION AND DRAINAGE: ICD-10-CM

## 2017-03-14 DIAGNOSIS — D50.8 OTHER IRON DEFICIENCY ANEMIA: ICD-10-CM

## 2017-03-14 PROCEDURE — 99309 SBSQ NF CARE MODERATE MDM 30: CPT | Performed by: NURSE PRACTITIONER

## 2017-03-14 RX ORDER — GLUCOSAM/CHON-MSM1/C/MANG/BOSW 750-644 MG
2 TABLET ORAL DAILY
COMMUNITY
End: 2017-03-22

## 2017-03-14 NOTE — PROGRESS NOTES
[place] [facility]    Chief Complaint   Patient presents with     Nursing Home Acute       HPI:    Sherita Kenney is a 76 year old  (1940), who is being seen today for an episodic care visit at [facility]. Today's concern is:  {FGS DX:737877}    ALLERGIES: Wool fiber  Past Medical, Surgical, Family and Social History reviewed and updated in EPIC.    Current Outpatient Prescriptions   Medication Sig Dispense Refill     Misc Natural Products (OSTEO BI-FLEX ADV TRIPLE ST) TABS Take 2 tablets by mouth daily       Nutritional Supplements (ENSURE PLUS HN) LIQD Take 8 oz by mouth 2 times daily       OLANZapine zydis (ZYPREXA) 5 MG ODT tab Take 1 tablet (5 mg) by mouth At Bedtime       cyclobenzaprine (FLEXERIL) 10 MG tablet Take 1 tablet (10 mg) by mouth 3 times daily 42 tablet      oxyCODONE (ROXICODONE) 5 MG IR tablet Take 1 tablet (5 mg) by mouth every 3 hours as needed for moderate to severe pain  0     acetaminophen (TYLENOL) 325 MG tablet Take 2 tablets (650 mg) by mouth every 4 hours as needed for mild pain 100 tablet      enoxaparin (LOVENOX) 40 MG/0.4ML injection Inject 0.4 mLs (40 mg) Subcutaneous every 24 hours 23 Syringe 0     SUMATRIPTAN SUCCINATE PO Take 25 mg by mouth every 8 hours as needed for migraine       Multiple Vitamins-Minerals (HAIR/SKIN/NAILS PO) Take 1 tablet by mouth daily.       Glucosamine Sulfate--500 MG TABS Take 1,500 mg by mouth daily.       Travoprost (TRAVATAN Z) 0.004 % SOLN Apply 1 drop to eye. In each eye QHS       levothyroxine (SYNTHROID, LEVOTHROID) 75 MCG tablet Take 75 mcg by mouth daily.       Calcium-Magnesium 333-167 MG TABS Take 2 tablets by mouth daily.       ferrous gluconate (FERGON) 324 (38 FE) MG tablet Take 1 tablet by mouth daily (with lunch).       B-Complex TABS Take 1 tablet by mouth daily.       Medications reviewed:  Medications reconciled to facility chart and changes were made to reflect current medications as identified as  "above med list. Below are the changes that were made:   Medications stopped since last EPIC medication reconciliation:   There are no discontinued medications.    Medications started since last Psychiatric medication reconciliation:  Orders Placed This Encounter   Medications     Misc Natural Products (OSTEO BI-FLEX ADV TRIPLE ST) TABS     Sig: Take 2 tablets by mouth daily     Nutritional Supplements (ENSURE PLUS HN) LIQD     Sig: Take 8 oz by mouth 2 times daily     ***    REVIEW OF SYSTEMS:  {ROS FGS:818119}    Physical Exam:  /66  Pulse 87  Temp 97.6  F (36.4  C)  Resp 18  Ht 5' 6\" (1.676 m)  Wt 112 lb 12.8 oz (51.2 kg)  SpO2 100%  BMI 18.21 kg/m2  GENERAL APPEARANCE:  {senior living GENERAL APPEARANCE:517933}  ENT:  {senior living ENT PE:896291}  EYES:  {senior living EYES PE :982131}  NECK:  {NURSING HOME NECK PE:506034}  RESP:  {NURSING HOME RESP PE:904952}  CV:  {senior living CV PE:005237}  ABDOMEN:  {NURSING HOME GI PE:453073}  M/S:   {NURSING HOME M/S PE:497244}  SKIN:  {NURSING HOME SKIN PE:282346}  NEURO:   {senior living NEURO PE:789589}  PSYCH:  {senior living PSYCH PE:073410}    Recent Labs:    CBC RESULTS:   Recent Labs   Lab Test  03/13/17   0525  03/06/17   1105  03/04/17   0625   WBC  5.9   --   8.3   RBC  3.59*   --   3.64*   HGB  8.4*   --   8.2*   HCT  29.3*   --   28.8*   MCV  82   --   79   MCH  23.4*   --   22.5*   MCHC  28.7*   --   28.5*   RDW  Dimorphic population - unable to calculate   --   32.1*   PLT  334  234  169       Last Basic Metabolic Panel:  Recent Labs   Lab Test  03/13/17   0525  03/06/17   1105   NA  142  140   POTASSIUM  4.1  4.1   CHLORIDE  107  106   SARITHA  8.8  8.7   CO2  27  27   BUN  12  27   CR  0.72  0.58   GLC  73  104*       Liver Function Studies -   Recent Labs   Lab Test  03/06/17   1105  03/04/17   0625  02/27/17   0700   PROTTOTAL  6.3*   --   5.1*   ALBUMIN  2.5*  2.2*  2.1*   BILITOTAL  0.3   --   0.2   ALKPHOS  114   --   65   AST  31   --   29   ALT  19   --   18 "       TSH   Date Value Ref Range Status   02/15/2017 4.05 (H) 0.40 - 4.00 mU/L Final         Assessment/Plan:  {[facility] DX:432694}    Orders:  ***    Time***    Electronically signed by  WILLY Brewer CNP

## 2017-03-15 NOTE — PROGRESS NOTES
[place] [facility]    Chief Complaint   Patient presents with     Nursing Home Acute       HPI:    Sherita Kenney is a 76 year old  (1940), who is being seen today for an episodic care visit at [facility].     Today's concern is:    Adenocarcinoma of colon (H)  S/P ileostomy (H)  S/p I&D of abdominal abscess  Severe protein-calorie malnutrition (H)  Noted to have a RLQ abdominal mass upon hospital admission 2/15. CT abdomen positive for abscess. Started on zosyn x 2 weeks and s/p drainage of abscess in IR on 2/15. Abdominal drain since removed. Patient had abdominal distention on 2/23, CT positive for small bowel obstruction. Colorectal surgery consulted. S/p colonoscopy and lap ileostomy 3/1. CT also positive for large mass in cecum with fixation to abdominal wall and lesions on surface of the right liver. Was diagnosed with adenocarcinoma of colon. Had severe protein-calorie malnutrition during hospital stay. Was started on TPN post-operatively 2/24, which was dc'd on 3/7. Requires calorie counts, low fiber diet, and requires strict diet d/t recent s/p ileostomy. Dietary following, states patient is consuming 2707-9469 calories per day. During patient exam, denies pain to abdomen. Has tylenol prn and oxycodone prn available if needed. Also taking lovenox qd for DVT prophylaxis. Ileostomy intact with adequate output. Admits to improvement in appetite. Dietary following. Oncology following, patient to f/u as directed with Dr. Nieves. Colorectal surgery following, patient to f/u with Dr. Peter on 3/29.      Other iron deficiency anemia  Hgb was 5.6 on 2/15 r/t colon mass. Transfused with 2 units of PRBCs on 2/16, along with IV iron infusion. Recent Hgb baseline 8-9. Last Hgb was 8.4 on 3/13. Currently taking ferrous gluconate 324mg qd. No active sx of bleeding. Denies fatigue, SOB.     Psychosis, unspecified psychosis type  Cognitive Impairment  Physical deconditioning  No prior h/o  mental health diagnoses prior to hospital admission. Recently found to be paranoid and agitated at home PTA. Thalia is patient's legal decision maker (patient's niece), and Dinora (friend) are both patient's POA. Psychiatry was consulted during hospital stay, currently taking zyprexa 5mg qHS. RN staff report when patient first arrived to TCU, was anxious about being here. Patient thought she was in a clinical trial. During patient exam, patient concerned that people will steal from her.  PTA was living alone and was independent with activities and ADLs. Patient is actively participating in therapy sessions.           ALLERGIES: Wool fiber  Past Medical, Surgical, Family and Social History reviewed and updated in [facility].    Current Outpatient Prescriptions   Medication Sig Dispense Refill     Misc Natural Products (OSTEO BI-FLEX ADV TRIPLE ST) TABS Take 2 tablets by mouth daily       Nutritional Supplements (ENSURE PLUS HN) LIQD Take 8 oz by mouth 2 times daily       OLANZapine zydis (ZYPREXA) 5 MG ODT tab Take 1 tablet (5 mg) by mouth At Bedtime       cyclobenzaprine (FLEXERIL) 10 MG tablet Take 1 tablet (10 mg) by mouth 3 times daily 42 tablet      oxyCODONE (ROXICODONE) 5 MG IR tablet Take 1 tablet (5 mg) by mouth every 3 hours as needed for moderate to severe pain  0     acetaminophen (TYLENOL) 325 MG tablet Take 2 tablets (650 mg) by mouth every 4 hours as needed for mild pain 100 tablet      enoxaparin (LOVENOX) 40 MG/0.4ML injection Inject 0.4 mLs (40 mg) Subcutaneous every 24 hours 23 Syringe 0     SUMATRIPTAN SUCCINATE PO Take 25 mg by mouth every 8 hours as needed for migraine       Multiple Vitamins-Minerals (HAIR/SKIN/NAILS PO) Take 1 tablet by mouth daily.       Glucosamine Sulfate--500 MG TABS Take 1,500 mg by mouth daily.       Travoprost (TRAVATAN Z) 0.004 % SOLN Apply 1 drop to eye. In each eye QHS       levothyroxine (SYNTHROID, LEVOTHROID) 75 MCG tablet Take 75 mcg by mouth daily.        "Calcium-Magnesium 333-167 MG TABS Take 2 tablets by mouth daily.       ferrous gluconate (FERGON) 324 (38 FE) MG tablet Take 1 tablet by mouth daily (with lunch).       B-Complex TABS Take 1 tablet by mouth daily.       Medications reviewed:  Medications reconciled to facility chart and changes were made to reflect current medications as identified as above med list. Below are the changes that were made:   Medications stopped since last EPIC medication reconciliation:   There are no discontinued medications.    Medications started since last [facility] medication reconciliation:  Orders Placed This Encounter   Medications     Misc Natural Products (OSTEO BI-FLEX ADV TRIPLE ST) TABS     Sig: Take 2 tablets by mouth daily     Nutritional Supplements (ENSURE PLUS HN) LIQD     Sig: Take 8 oz by mouth 2 times daily         REVIEW OF SYSTEMS:  4 point ROS including Respiratory, CV, GI and , other than that noted in the HPI,  is negative      Physical Exam:  /66  Pulse 87  Temp 97.6  F (36.4  C)  Resp 18  Ht 5' 6\" (1.676 m)  Wt 112 lb 12.8 oz (51.2 kg)  SpO2 100%  BMI 18.21 kg/m2  GENERAL APPEARANCE:  Alert, in no distress, oriented, thin, anxious, cooperative  RESP: Respiratory effort and palpation of chest normal, lungs clear to auscultation , no respiratory distress  CV: Palpation and auscultation of heart done , regular rate and rhythm, no murmur, rub, or gallop, no edema, +2 pedal pulses  ABDOMEN: Normal bowel sounds, soft, nontender, no hepatosplenomegaly or other masses, no guarding or rebound. Ileostomy intact, adequate output.   M/S: No movement to LUE d/t h/o polio. Active ROM in all other extremities. Strong hand grasp to right hand. Gait and station normal.    SKIN: Inspection of skin and subcutaneous tissue baseline, Palpation of skin and subcutaneous tissue baseline. RLQ lab site: incision covered with Allevyn dressing, no drainage noted, wound healed, no signs of infection - dressing removed.  NEURO: " Cranial nerves 2-12 are normal tested and grossly at patient's baseline, Examination of sensation by touch normal  PSYCH: Oriented X 3, insight and judgement impaired, memory impaired, cognitive testing: SLUMS 15/30.       Recent Labs:    CBC RESULTS:   Recent Labs   Lab Test  03/13/17   0525  03/06/17   1105  03/04/17   0625   WBC  5.9   --   8.3   RBC  3.59*   --   3.64*   HGB  8.4*   --   8.2*   HCT  29.3*   --   28.8*   MCV  82   --   79   MCH  23.4*   --   22.5*   MCHC  28.7*   --   28.5*   RDW  Dimorphic population - unable to calculate   --   32.1*   PLT  334  234  169       Last Basic Metabolic Panel:  Recent Labs   Lab Test  03/13/17   0525  03/06/17   1105   NA  142  140   POTASSIUM  4.1  4.1   CHLORIDE  107  106   SARITHA  8.8  8.7   CO2  27  27   BUN  12  27   CR  0.72  0.58   GLC  73  104*       Liver Function Studies -   Recent Labs   Lab Test  03/06/17   1105  03/04/17   0625  02/27/17   0700   PROTTOTAL  6.3*   --   5.1*   ALBUMIN  2.5*  2.2*  2.1*   BILITOTAL  0.3   --   0.2   ALKPHOS  114   --   65   AST  31   --   29   ALT  19   --   18       TSH   Date Value Ref Range Status   02/15/2017 4.05 (H) 0.40 - 4.00 mU/L Final         Assessment/Plan:    (C18.9) Adenocarcinoma of colon (H)  (primary encounter diagnosis)  (Z93.2) S/P ileostomy (H)  (Z98.890) Status post incision and drainage  (E43) Severe protein-calorie malnutrition (H)  Comment: New diagnosis of adenocarcinoma of colon. S/p ileostomy. Appetite improving.   Plan: Dietary to assist with meal planning regarding low fiber diet, 6 small meals and dietary restrictions. RN to assist with ileostomy teaching regarding daily cares, weekly bag changes. Call Dr. Peter's office for update regarding appts and surgery date, along with additional orders for pre-op. Continue to monitor output. Dietary following.     (D50.8) Other iron deficiency anemia  Comment: Hgb stable, no active sx of bleeding. Low Hgb r/t adenocarcinoma of colon, s/p abscess drainage,  s/p ileostomy.   Plan: Continue ferrous gluconate. Monitor CBC.       (F29) Psychosis, unspecified psychosis type  (R41.89) Cognitive impairment  (R53.81) Physical deconditioning  Comment: New diagnosis of unspecified psychosis. Cognitive impairment r/t underlying dementia vs psychosis. SLUMS 15/30.   Plan: House psychology following. RN to monitor for changes in mood or behaviors, redirect patient as needed. Encourage active participation in therapy session to increase strength and promote independence in activities and ADLs.          UPDATE:  Change Flexeril to TID PRN.   Check CBC & BMP 2/20.   Per family request, referral to Neuropsychology d/t newly diagnosed psychosis and cognitive impairments.           Time 25 minutes    Electronically signed by  WILLY Brewer CNP

## 2017-03-16 ENCOUNTER — TELEPHONE (OUTPATIENT)
Dept: CASE MANAGEMENT | Facility: CLINIC | Age: 77
End: 2017-03-16

## 2017-03-16 ENCOUNTER — NURSING HOME VISIT (OUTPATIENT)
Dept: GERIATRICS | Facility: CLINIC | Age: 77
End: 2017-03-16
Payer: COMMERCIAL

## 2017-03-16 VITALS
WEIGHT: 115.4 LBS | DIASTOLIC BLOOD PRESSURE: 67 MMHG | HEIGHT: 66 IN | TEMPERATURE: 98.3 F | HEART RATE: 70 BPM | OXYGEN SATURATION: 92 % | BODY MASS INDEX: 18.54 KG/M2 | SYSTOLIC BLOOD PRESSURE: 109 MMHG | RESPIRATION RATE: 18 BRPM

## 2017-03-16 DIAGNOSIS — M19.041 PRIMARY OSTEOARTHRITIS OF RIGHT HAND: ICD-10-CM

## 2017-03-16 DIAGNOSIS — Z93.2 S/P ILEOSTOMY (H): Primary | ICD-10-CM

## 2017-03-16 DIAGNOSIS — R41.89 COGNITIVE IMPAIRMENT: ICD-10-CM

## 2017-03-16 DIAGNOSIS — F29 PSYCHOSIS, UNSPECIFIED PSYCHOSIS TYPE (H): ICD-10-CM

## 2017-03-16 PROCEDURE — 99309 SBSQ NF CARE MODERATE MDM 30: CPT | Performed by: NURSE PRACTITIONER

## 2017-03-16 NOTE — TELEPHONE ENCOUNTER
Phone message received from co - POJOURDAN Garrett. Dinora asked if patient had a diagnosis of delirium while hospitalized. She also asked if there were TCU's they should be planning for in relation to patient's cognition. Dinora relayed that the SLUMS assessment at [facility] had improved to 15. I encouraged Dinora to ask for a care conference at [facility] to understand the therapy recommendations so she and patient's niece Thalia can adequately plan for the return to hospital for more surgery and then likely TCU placement. Dinora provided with the names of 2 facilities that have memory care TCU [facility] and [facility] and explained that patient would have to meet the criteria to be in a memory care TCU. Dinora was going to investigate at [facility] and possible place patient name on list to pre-register.

## 2017-03-16 NOTE — PROGRESS NOTES
[place] [facility]    Chief Complaint   Patient presents with     Nursing Home Acute       HPI:    Sherita Kenney is a 76 year old  (1940), who is being seen today for an episodic care visit at [facility].     Today's concern is:    S/p ileostomy (H)  Primary osteoarthritis of right hand  H/o chronic pain to right hand r/t osteoarthritis and overuse of right hand. 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 Has been seen by outpatient OT in the past for hand therapy. Stated hand splint/gaurd was recommended to her, but patient declined at that time. Since placement of ileostomy, education regarding self cares/bag changes have been difficulty d/t limitation of one hand. Patient states her hand becomes painful d/t arthritis with overuse. Thalia, patient's niece, states plan with oncology is unclear of how long she will require the ileostomy. States more details of the plan of care will be known at her next appt with Oncology on 3/29 prior to surgery on 4/13. Plan is for patient to have open right colectomy on 4/13.      Psychosis, unspecified psychosis type  Cognitive impairment  During hospital stay, [facility] 8/30 on 2/17. Was seen by Psychiatry inpatient, was diagnosed with acute psychosis. Patient w/o history of mental illness in the past or diagnosed with dementia. Since at [facility], [facility] 15/30. Patient upset that family & friends are making decisions without her, tearful during patient visit. House psychologist following, stated patient thought she was at TCU for a clinical trial. During patient exam, had mild sx of paranoia. Patient was fixated that people were going to steal her belongings, and verbalized frustration with a few staff/family members. Oriented to self and place. No reports of agitation or behavioral disturbances by RN staff.           ALLERGIES: Wool fiber  Past Medical, Surgical, Family and Social History reviewed and updated in EPIC.    Current  Outpatient Prescriptions   Medication Sig Dispense Refill     [facility] Natural Products (OSTEO BI-FLEX ADV TRIPLE ST) TABS Take 2 tablets by mouth daily       Nutritional Supplements (ENSURE PLUS HN) LIQD Take 8 oz by mouth 2 times daily       OLANZapine zydis (ZYPREXA) 5 MG ODT tab Take 1 tablet (5 mg) by mouth At Bedtime       cyclobenzaprine (FLEXERIL) 10 MG tablet Take 1 tablet (10 mg) by mouth 3 times daily 42 tablet      oxyCODONE (ROXICODONE) 5 MG IR tablet Take 1 tablet (5 mg) by mouth every 3 hours as needed for moderate to severe pain  0     acetaminophen (TYLENOL) 325 MG tablet Take 2 tablets (650 mg) by mouth every 4 hours as needed for mild pain 100 tablet      enoxaparin (LOVENOX) 40 MG/0.4ML injection Inject 0.4 mLs (40 mg) Subcutaneous every 24 hours 23 Syringe 0     SUMATRIPTAN SUCCINATE PO Take 25 mg by mouth every 8 hours as needed for migraine       Multiple Vitamins-Minerals (HAIR/SKIN/NAILS PO) Take 1 tablet by mouth daily.       Glucosamine Sulfate--500 MG TABS Take 1,500 mg by mouth daily.       Travoprost (TRAVATAN Z) 0.004 % SOLN Apply 1 drop to eye. In each eye QHS       levothyroxine (SYNTHROID, LEVOTHROID) 75 MCG tablet Take 75 mcg by mouth daily.       Calcium-Magnesium 333-167 MG TABS Take 2 tablets by mouth daily.       ferrous gluconate (FERGON) 324 (38 FE) MG tablet Take 1 tablet by mouth daily (with lunch).       B-Complex TABS Take 1 tablet by mouth daily.       Medications reviewed:  Medications reconciled to facility chart and changes were made to reflect current medications as identified as above med list. Below are the changes that were made:   Medications stopped since last EPIC medication reconciliation:   There are no discontinued medications.    Medications started since last [facility] medication reconciliation:  No orders of the defined types were placed in this encounter.        REVIEW OF SYSTEMS:  4 point ROS including Respiratory, CV, GI and , other than that noted in  "the HPI,  is negative      Physical Exam:  /67  Pulse 70  Temp 98.3  F (36.8  C)  Resp 18  Ht 5' 6\" (1.676 m)  Wt 115 lb 6.4 oz (52.3 kg)  SpO2 92%  BMI 18.63 kg/m2  GENERAL APPEARANCE: Alert, in no distress, oriented, thin, anxious, cooperative  RESP: Respiratory effort and palpation of chest normal, lungs clear to auscultation , no respiratory distress  CV: Palpation and auscultation of heart done , regular rate and rhythm, no murmur, rub, or gallop, no edema, +2 pedal pulses  ABDOMEN: Normal bowel sounds, soft, nontender, no hepatosplenomegaly or other masses, no guarding or rebound. Ileostomy intact, adequate output.   M/S: No movement to LUE d/t h/o polio. Active ROM in all other extremities. Strong hand grasp to right hand. Gait and station normal.    SKIN: Inspection of skin and subcutaneous tissue baseline, Palpation of skin and subcutaneous tissue baseline. Arthritic changes noted in right hand.  NEURO: Cranial nerves 2-12 are normal tested and grossly at patient's baseline, Examination of sensation by touch normal  PSYCH: Oriented X self and place, insight and judgement impaired, memory impaired, cognitive testing: SLUMS 15/30.      Recent Labs:    CBC RESULTS:   Recent Labs   Lab Test  03/13/17   0525  03/06/17   1105  03/04/17   0625   WBC  5.9   --   8.3   RBC  3.59*   --   3.64*   HGB  8.4*   --   8.2*   HCT  29.3*   --   28.8*   MCV  82   --   79   MCH  23.4*   --   22.5*   MCHC  28.7*   --   28.5*   RDW  Dimorphic population - unable to calculate   --   32.1*   PLT  334  234  169       Last Basic Metabolic Panel:  Recent Labs   Lab Test  03/13/17   0525  03/06/17   1105   NA  142  140   POTASSIUM  4.1  4.1   CHLORIDE  107  106   SARITHA  8.8  8.7   CO2  27  27   BUN  12  27   CR  0.72  0.58   GLC  73  104*       Liver Function Studies -   Recent Labs   Lab Test  03/06/17   1105  03/04/17   0625  02/27/17   0700   PROTTOTAL  6.3*   --   5.1*   ALBUMIN  2.5*  2.2*  2.1*   BILITOTAL  0.3   --   0.2 "   ALKPHOS  114   --   65   AST  31   --   29   ALT  19   --   18       TSH   Date Value Ref Range Status   02/15/2017 4.05 (H) 0.40 - 4.00 mU/L Final           Assessment/Plan:    (Z93.2) S/P ileostomy (H)  (primary encounter diagnosis)  (M19.041) Primary osteoarthritis of right hand  Comment: S/p ileostomy intact. Right hand pain r/t osteoarthritis and overuse.   Plan: OT evaluate and treat right hand r/t overuse and arthritic changes. Add voltaren gel TID for hand pain. Consider referral to hand specialist if no relief from therapy interventions and voltaren gel use. RN staff to update me with patient's progress with ileostomy cares on 3/17.   - If patient unable to perform ileostomy self cares independently, patient will requires assistance from family members or will recommend temporary placement options until ileostomy is reversed. SW following for discharge planning.     (F29) Psychosis, unspecified psychosis type  (R41.89) Cognitive impairment  Comment: No h/o prior mental illness PTA. New diagnosis of unspecified psychosis, continues to have sx of mild paranoia. No visual or auditory hallucinations. SLUMS 15/30, cognitives status prior to hospitalization is unknown. No reports of dementia or cognitive impairment noted PTA. Cognitive impairment could be r/t recent diagnosis of colon cancer vs abdominal abscess vs underlying dementia exacerbated by delirium during hospital stay.   Plan: Referral to Neuropsychology for complete evaluation of patient's cognitive status and ability to make medical decisions. Coordinate appt with Dinora FERRARA). Consider repeat SLUMS next week.             Time 25 minutes    Electronically signed by  WILLY Brewer CNP

## 2017-03-18 ENCOUNTER — NURSING HOME VISIT (OUTPATIENT)
Dept: GERIATRICS | Facility: CLINIC | Age: 77
End: 2017-03-18
Payer: COMMERCIAL

## 2017-03-18 DIAGNOSIS — Z93.2 S/P ILEOSTOMY (H): ICD-10-CM

## 2017-03-18 DIAGNOSIS — F29 PSYCHOSIS, UNSPECIFIED PSYCHOSIS TYPE (H): ICD-10-CM

## 2017-03-18 DIAGNOSIS — E43 SEVERE PROTEIN-CALORIE MALNUTRITION (H): ICD-10-CM

## 2017-03-18 DIAGNOSIS — C18.9 ADENOCARCINOMA OF COLON (H): Primary | ICD-10-CM

## 2017-03-18 DIAGNOSIS — D50.8 OTHER IRON DEFICIENCY ANEMIA: ICD-10-CM

## 2017-03-18 PROCEDURE — 99306 1ST NF CARE HIGH MDM 50: CPT | Performed by: INTERNAL MEDICINE

## 2017-03-18 PROCEDURE — 99207 ZZC CDG-CORRECTLY CODED, REVIEWED AND AGREE: CPT | Performed by: INTERNAL MEDICINE

## 2017-03-19 PROBLEM — M19.041 PRIMARY OSTEOARTHRITIS OF RIGHT HAND: Status: ACTIVE | Noted: 2017-03-19

## 2017-03-19 NOTE — PROGRESS NOTES
[place] [facility]  PRIMARY CARE PROVIDER AND CLINIC:  Latanya Martinez [facility] [address] / [place]*      Pt seen by Dr Zapata on 3/18/17 for an initial TCU visit    HPI:    Sherita Kenney is a 76 year old  (1940),admitted to the [facility] from [facility].  Hospital stay 2/15/17 through 3/9/17.      Admitted to this facility for  rehab, medical management and nursing care.       Hospital course reviewed by me, is as per the hospital d/c summary and NP note.    Pt was initially admitted to the hospital for the evaluation of psychosis, inability to care for self at her home.     Patient was sent to [facility] ED and found to have thrombocytosis, leukocytosis, and concern for RLQ abdominal mass.    CT abdomen positive for abscess. Started on zosyn x 2 weeks and underwent drainage of a R iliopsoas abscess in IR on 2/15.  Subsequent  CT positive for small bowel obstruction and large cecal mass. On 3/1/17, Pt underwent a  colonoscopy and lap ileostomy 3/1, with findings of adenocarcinoma of the cecum with fixation to the abd wall and 3 lesions on the liver.    Was started on TPN post-operatively 2/24, which was dc'd on 3/7.     Pt will be following up with Dr Nieves of oncology and colorectal surgery for consideration of definitive surgery.    Concurrent medical issues included malnutrition, Fe deficiency anemia, paranoia (unclear chronicity of psychosis)    Pt today states she feels well  States appetite is voracious  She denies abd pain, nausea, chest pain, SOB, fevers, cough      CODE STATUS/ADVANCE DIRECTIVES DISCUSSION:   CPR/Full code   Patient's living condition: lives alone    ALLERGIES:Wool fiber  PAST MEDICAL HISTORY:  has no past medical history on file.  PAST SURGICAL HISTORY:  has a past surgical history that includes Laparoscopic Ileostomy (N/A, 3/1/2017); Ileostomy (N/A, 3/1/2017); and Colonoscopy (N/A, 3/1/2017).  FAMILY  HISTORY: family history is not on file.  SOCIAL HISTORY:  reports that she has never smoked. She does not have any smokeless tobacco history on file. She reports that she does not drink alcohol or use illicit drugs.    Post Discharge Medication Reconciliation Status: discharge medications reconciled and changed, per note/orders (see AVS).  Current Outpatient Prescriptions   Medication Sig Dispense Refill     Misc Natural Products (OSTEO BI-FLEX ADV TRIPLE ST) TABS Take 2 tablets by mouth daily       Nutritional Supplements (ENSURE PLUS HN) LIQD Take 8 oz by mouth 2 times daily       OLANZapine zydis (ZYPREXA) 5 MG ODT tab Take 1 tablet (5 mg) by mouth At Bedtime       cyclobenzaprine (FLEXERIL) 10 MG tablet Take 1 tablet (10 mg) by mouth 3 times daily 42 tablet      oxyCODONE (ROXICODONE) 5 MG IR tablet Take 1 tablet (5 mg) by mouth every 3 hours as needed for moderate to severe pain  0     acetaminophen (TYLENOL) 325 MG tablet Take 2 tablets (650 mg) by mouth every 4 hours as needed for mild pain 100 tablet      enoxaparin (LOVENOX) 40 MG/0.4ML injection Inject 0.4 mLs (40 mg) Subcutaneous every 24 hours 23 Syringe 0     SUMATRIPTAN SUCCINATE PO Take 25 mg by mouth every 8 hours as needed for migraine       Multiple Vitamins-Minerals (HAIR/SKIN/NAILS PO) Take 1 tablet by mouth daily.       Glucosamine Sulfate--500 MG TABS Take 1,500 mg by mouth daily.       Travoprost (TRAVATAN Z) 0.004 % SOLN Apply 1 drop to eye. In each eye QHS       levothyroxine (SYNTHROID, LEVOTHROID) 75 MCG tablet Take 75 mcg by mouth daily.       Calcium-Magnesium 333-167 MG TABS Take 2 tablets by mouth daily.       ferrous gluconate (FERGON) 324 (38 FE) MG tablet Take 1 tablet by mouth daily (with lunch).       B-Complex TABS Take 1 tablet by mouth daily.         ROS:  10 point ROS neg except as noted above.      Exam:    GENERAL APPEARANCE:  Alert, in no distress, lying in bed, pale appearing  ENT:  Mouth and posterior oropharynx  normal, moist mucous membranes, normal hearing acuity  EYES:  EOM, conjunctivae, lids, pupils and irises normal  NECK:  No adenopathy,masses or thyromegaly, no adenopathy  RESP: Lungs clear  CV:  RRR no M  ABDOMEN:  Soft, non-distended, non-tender.  Ileostomy in place. R LQ incision intact  M/S: L UE paresis (chronic)  No LE edema   NEURO:  Cranial nerves 2-12 are normal tested and grossly at patient's baseline, Examination of sensation by touch normal  PSYCH:  Oriented X 3, pleasant, affect blunted, not overtly psychotic      Lab/Diagnostic data:  CBC RESULTS:   Recent Labs   Lab Test  03/06/17   1105  03/04/17   0625   03/01/17   1727  02/28/17   0925   WBC   --   8.3   --    --   8.3   RBC   --   3.64*   --    --   4.04   HGB   --   8.2*   --   8.1*  8.9*   HCT   --   28.8*   --    --   30.7*   MCV   --   79   --    --   76*   MCH   --   22.5*   --    --   22.0*   MCHC   --   28.5*   --    --   29.0*   RDW   --   32.1*   --    --   31.3*   PLT  234  169   < >   --   264    < > = values in this interval not displayed.       Last Basic Metabolic Panel:  Recent Labs   Lab Test  03/06/17   1105  03/04/17   0625   NA  140  141   POTASSIUM  4.1  4.3   CHLORIDE  106  106   SARITHA  8.7  8.3*   CO2  27  28   BUN  27  24   CR  0.58  0.51*   GLC  104*  103*       Liver Function Studies -   Recent Labs   Lab Test  03/06/17   1105  03/04/17   0625  02/27/17   0700   PROTTOTAL  6.3*   --   5.1*   ALBUMIN  2.5*  2.2*  2.1*   BILITOTAL  0.3   --   0.2   ALKPHOS  114   --   65   AST  31   --   29   ALT  19   --   18       TSH   Date Value Ref Range Status   02/15/2017 4.05 (H) 0.40 - 4.00 mU/L Final             ASSESSMENT/PLAN:    (C18.9) Adenocarcinoma of colon (H)  (primary encounter diagnosis)  (Z93.2) S/P ileostomy (H)  (K68.12) Iliopsoas abscess on right (H), s/p drainage  (E43) Severe protein-calorie malnutrition (H)    Pt appears to be doing well  Plan monitor nutritional status; low fiber diet. Gen surgery and oncology  f/u    (D50.8) Other iron deficiency anemia  Comment: Hgb 8.4 on 3/14/17  Plan monitor Hgb, continue oral fe    (G14) Postpolio syndrome  Comment: Chronic loss of movement to LUE  Plan: Offer sling during day.     (F29) Psychosis, unspecified psychosis type  Chronicity unclear  Mental status stable since admission to TCU on current medications  Plan continue zyprexa, monitor mental status, cognitive assessment      Joe Zapata MD

## 2017-03-20 ENCOUNTER — HOSPITAL LABORATORY (OUTPATIENT)
Dept: OTHER | Facility: CLINIC | Age: 77
End: 2017-03-20

## 2017-03-20 LAB
ANION GAP SERPL CALCULATED.3IONS-SCNC: 9 MMOL/L (ref 3–14)
BUN SERPL-MCNC: 11 MG/DL (ref 7–30)
CALCIUM SERPL-MCNC: 8.7 MG/DL (ref 8.5–10.1)
CHLORIDE SERPL-SCNC: 107 MMOL/L (ref 94–109)
CO2 SERPL-SCNC: 27 MMOL/L (ref 20–32)
CREAT SERPL-MCNC: 0.69 MG/DL (ref 0.52–1.04)
ERYTHROCYTE [DISTWIDTH] IN BLOOD BY AUTOMATED COUNT: ABNORMAL % (ref 10–15)
GFR SERPL CREATININE-BSD FRML MDRD: 83 ML/MIN/1.7M2
GLUCOSE SERPL-MCNC: 75 MG/DL (ref 70–99)
HBA1C MFR BLD: NORMAL % (ref 4.3–6)
HCT VFR BLD AUTO: 31.1 % (ref 35–47)
HGB BLD-MCNC: 8.8 G/DL (ref 11.7–15.7)
INR PPP: 0.98 (ref 0.86–1.14)
MCH RBC QN AUTO: 23.7 PG (ref 26.5–33)
MCHC RBC AUTO-ENTMCNC: 28.3 G/DL (ref 31.5–36.5)
MCV RBC AUTO: 84 FL (ref 78–100)
PLATELET # BLD AUTO: 323 10E9/L (ref 150–450)
POTASSIUM SERPL-SCNC: 3.9 MMOL/L (ref 3.4–5.3)
RBC # BLD AUTO: 3.72 10E12/L (ref 3.8–5.2)
SODIUM SERPL-SCNC: 143 MMOL/L (ref 133–144)
WBC # BLD AUTO: 6.4 10E9/L (ref 4–11)

## 2017-03-21 LAB — COPATH REPORT: NORMAL

## 2017-03-22 VITALS
OXYGEN SATURATION: 99 % | HEART RATE: 95 BPM | BODY MASS INDEX: 18.24 KG/M2 | SYSTOLIC BLOOD PRESSURE: 125 MMHG | RESPIRATION RATE: 16 BRPM | TEMPERATURE: 98.1 F | HEIGHT: 66 IN | DIASTOLIC BLOOD PRESSURE: 74 MMHG | WEIGHT: 113.5 LBS

## 2017-03-22 NOTE — PROGRESS NOTES
[facility]    Chief Complaint   Patient presents with     Nursing Home Acute       HPI:    Sherita Kenney is a 76 year old  (1940), who is being seen today for an episodic care visit at [facility]. Today's concerns are:     S/p ileostomy (H)  Adenocarcinoma of colon (H)  Iliopsoas abscess on right (H)  Severe protein-calorie malnutrition (H)  S/P colonoscopy  Other iron deficiency anemia  Postpolio syndrome  Primary osteoarthritis of right hand  Abdominal mass found during recent ED visit leading to colonoscopy during which they discovered adenocarcinoma.  Iliopsoas abscess drained at that time as well. H/o chronic pain to right hand r/t osteoarthritis and overuse of right hand. 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 Has been seen by outpatient OT in the past for hand therapy. Since placement of ileostomy, education regarding self cares/bag changes have been difficult d/t limitation of one hand. She appears to understand how to do it and why, but expresses concern about emptying and changing the appliance. Patient states her hand becomes painful d/t arthritis with overuse. Participating with therapy for strengthening.   --Unclear of how long she will require the ileostomy. States more details of the plan of care will be known at her next appt with Oncology on 3/29 prior to surgery on 4/13. Plan is for patient to have open right colectomy on 4/13. Weight ranging from 112-120 pounds since admission. Patient reports appetite OK, no abdominal pain. Bag needs frequent emptying and she plans on going to a setting where she can have help with management of ostomy. She is aware that a preop is needed prior to 4/13. Recent BP: /57-80 and P: . Taking ensure, prostat supplements.   Wt Readings from Last 2 Encounters:   03/22/17 113 lb 8 oz (51.5 kg)   03/16/17 115 lb 6.4 oz (52.3 kg)   On iron supplements and Hgb stable.   Lab Results   Component Value  Date    HGB 8.8 03/20/2017    HGB 8.4 03/13/2017        Psychosis, unspecified psychosis type  Cognitive impairment  During hospital stay, [facility] 8/30 on 2/17. Was seen by Psychiatry inpatient, was diagnosed with acute psychosis. Patient w/o history of mental illness in the past or diagnosed with dementia. Since at [facility], [facility] 15/30.  House psychologist following, stated patient thought she was at [facility] for a clinical trial.   --Oriented to self only. No reports of agitation or behavioral disturbances by RN staff. Pleasant and well preserved social graces.      Physical deconditioning  Participating in therapies. Up and about, no falls.     ALLERGIES: Wool fiber  Past Medical, Surgical, Family and Social History reviewed and updated in [facility].    Current Outpatient Prescriptions   Medication Sig Dispense Refill     diclofenac (VOLTAREN) 1 % GEL topical gel Place 2 g onto the skin 3 times daily as needed for moderate pain Apply to R hand       Nutritional Supplements (ENSURE PLUS HN) LIQD Take 8 oz by mouth 2 times daily       OLANZapine zydis (ZYPREXA) 5 MG ODT tab Take 1 tablet (5 mg) by mouth At Bedtime       cyclobenzaprine (FLEXERIL) 10 MG tablet Take 1 tablet (10 mg) by mouth 3 times daily 42 tablet      oxyCODONE (ROXICODONE) 5 MG IR tablet Take 1 tablet (5 mg) by mouth every 3 hours as needed for moderate to severe pain  0     acetaminophen (TYLENOL) 325 MG tablet Take 2 tablets (650 mg) by mouth every 4 hours as needed for mild pain 100 tablet      enoxaparin (LOVENOX) 40 MG/0.4ML injection Inject 0.4 mLs (40 mg) Subcutaneous every 24 hours 23 Syringe 0     SUMATRIPTAN SUCCINATE PO Take 25 mg by mouth every 8 hours as needed for migraine       Multiple Vitamins-Minerals (HAIR/SKIN/NAILS PO) Take 1 tablet by mouth daily.       Glucosamine Sulfate--500 MG TABS Take 1,500 mg by mouth daily.       Travoprost (TRAVATAN Z) 0.004 % SOLN Apply 1 drop to eye. In each eye QHS       levothyroxine (SYNTHROID,  "LEVOTHROID) 75 MCG tablet Take 75 mcg by mouth daily.       Calcium-Magnesium 333-167 MG TABS Take 2 tablets by mouth daily.       ferrous gluconate (FERGON) 324 (38 FE) MG tablet Take 1 tablet by mouth daily (with lunch).       B-Complex TABS Take 1 tablet by mouth daily.       Medications reviewed:  Medications reconciled to facility chart and changes were made to reflect current medications as identified as above med list. Below are the changes that were made:   Medications stopped since last EPIC medication reconciliation:   Medications Discontinued During This Encounter   Medication Reason     Misc Natural Products (OSTEO BI-FLEX ADV TRIPLE ST) TABS        Medications started since last [facility] medication reconciliation:  Orders Placed This Encounter   Medications     diclofenac (VOLTAREN) 1 % GEL topical gel     Sig: Place 2 g onto the skin 3 times daily as needed for moderate pain Apply to R hand       REVIEW OF SYSTEMS:  10 point ROS including Respiratory, CV, GI and , other than that noted in the HPI,  is negative. No pain. Frequent liquid stool output via ostomy.     Physical Exam:  /74  Pulse 95  Temp 98.1  F (36.7  C)  Resp 16  Ht 5' 6\" (1.676 m)  Wt 113 lb 8 oz (51.5 kg)  SpO2 99%  BMI 18.32 kg/m2  GENERAL APPEARANCE:  Alert, in no distress, appears healthy, thin, cooperative, pleasant  RESP:  respiratory effort and palpation of chest normal, lungs clear to auscultation , no respiratory distress and on room air.   CV:  Palpation and auscultation of heart done , regular rate and rhythm, no murmur, rub, or gallop, no edema, +2 pedal pulses  ABDOMEN:  normal bowel sounds, soft, nontender, no hepatosplenomegaly or other masses  SKIN:  Stoma site is beefy red, protruding without any redness or irritation.  MS: left arm flaccid. Rest of extremities ROM WNL.   NEURO:   Examination of sensation by touch normal. No facial asymmetry. Speech clear.   PSYCH:  oriented to self only, pleasant and " cooperative.       Wt Readings from Last 5 Encounters:   03/22/17 113 lb 8 oz (51.5 kg)   03/16/17 115 lb 6.4 oz (52.3 kg)   03/14/17 112 lb 12.8 oz (51.2 kg)   03/09/17 120 lb (54.4 kg)   03/08/17 115 lb 4.8 oz (52.3 kg)       Recent Labs:    CBC RESULTS:   Recent Labs   Lab Test  03/20/17   0500  03/13/17   0525   WBC  6.4  5.9   RBC  3.72*  3.59*   HGB  8.8*  8.4*   HCT  31.1*  29.3*   MCV  84  82   MCH  23.7*  23.4*   MCHC  28.3*  28.7*   RDW  Dimorphic population - unable to calculate  Dimorphic population - unable to calculate   PLT  323  334       Last Basic Metabolic Panel:  Recent Labs   Lab Test  03/20/17   0500  03/13/17   0525   NA  143  142   POTASSIUM  3.9  4.1   CHLORIDE  107  107   SARITHA  8.7  8.8   CO2  27  27   BUN  11  12   CR  0.69  0.72   GLC  75  73       Liver Function Studies -   Recent Labs   Lab Test  03/06/17   1105  03/04/17   0625  02/27/17   0700   PROTTOTAL  6.3*   --   5.1*   ALBUMIN  2.5*  2.2*  2.1*   BILITOTAL  0.3   --   0.2   ALKPHOS  114   --   65   AST  31   --   29   ALT  19   --   18       TSH   Date Value Ref Range Status   02/15/2017 4.05 (H) 0.40 - 4.00 mU/L Final       Lab Results   Component Value Date    A1C Canceled, Test credited   Below Assay Range   03/20/2017     Assessment/Plan:    S/p ileostomy (H)  Adenocarcinoma of colon (H)  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  Participating with therapy for strengthening. Unclear of how long she will require the ileostomy. More details of the plan of care will be known at her next appt with Oncology on 3/29 prior to surgery on 4/13. Plan is for patient to have open right colectomy on 4/13. Continue to encourage resident to participate in cares. No clear DC plan at this time but will need a prior prior to surgery and patient is aware.      Psychosis, unspecified  psychosis type  Resolved. Monitor.     Cognitive impairment  Newly noted. Since at [facility], [facility] 15/30.  House psychologist following. Requires assistance for safety and cares. Facility will recommend appropriate setting for further care.     Physical deconditioning  Progressing. Continue with therapies as indicated by therapy staff.    Orders:  No new orders. F/U for progress next week.     Libby Solis, JESSICA student    This patient was seen along with a nurse practitioner student, Libby Solis. The histories and reviews of systems were obtained by student and confirmed by myself. All objective, assessments and plans were completed by myself.   Tamra Mott    Total time spent with patient visit was 35 min including patient visit, review of past records and discussion of patient status and POC with staff. Greater than 50% of total time spent with counseling and coordinating care.      Electronically signed by  WILLY Agarwal CNP

## 2017-03-23 ENCOUNTER — NURSING HOME VISIT (OUTPATIENT)
Dept: GERIATRICS | Facility: CLINIC | Age: 77
End: 2017-03-23
Payer: COMMERCIAL

## 2017-03-23 DIAGNOSIS — R41.89 COGNITIVE IMPAIRMENT: ICD-10-CM

## 2017-03-23 DIAGNOSIS — F29 PSYCHOSIS, UNSPECIFIED PSYCHOSIS TYPE (H): ICD-10-CM

## 2017-03-23 DIAGNOSIS — C18.9 ADENOCARCINOMA OF COLON (H): Primary | ICD-10-CM

## 2017-03-23 DIAGNOSIS — E43 SEVERE PROTEIN-CALORIE MALNUTRITION (H): ICD-10-CM

## 2017-03-23 DIAGNOSIS — Z93.2 S/P ILEOSTOMY (H): ICD-10-CM

## 2017-03-23 DIAGNOSIS — G14 POSTPOLIO SYNDROME (H): ICD-10-CM

## 2017-03-23 DIAGNOSIS — R53.81 PHYSICAL DECONDITIONING: ICD-10-CM

## 2017-03-23 DIAGNOSIS — D50.8 OTHER IRON DEFICIENCY ANEMIA: ICD-10-CM

## 2017-03-23 DIAGNOSIS — K68.12 ILIOPSOAS ABSCESS ON RIGHT (H): ICD-10-CM

## 2017-03-23 DIAGNOSIS — Z98.890 S/P COLONOSCOPY: ICD-10-CM

## 2017-03-23 PROCEDURE — 99310 SBSQ NF CARE HIGH MDM 45: CPT | Performed by: NURSE PRACTITIONER

## 2017-03-23 PROCEDURE — 99207 ZZC CDG-CORRECTLY CODED, REVIEWED AND AGREE: CPT | Performed by: NURSE PRACTITIONER

## 2017-03-23 NOTE — MR AVS SNAPSHOT
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)        Severe protein-calorie malnutrition (H)        S/P colonoscopy        Other iron deficiency anemia        Postpolio syndrome        Psychosis, unspecified psychosis type        Cognitive impairment        Physical deconditioning           Follow-ups after your visit        Your next 10 appointments already scheduled     Apr 04, 2017 11:00 AM CDT   Return Visit with Susan Lindo MD   [facility] ([facility])    [facility]  [address]  [facility] [id number] [phone]            Apr 13, 2017   Procedure with Lindsay Peter MD   [facility] (--)    [address]  [facility] [id number] [phone]              Who to contact     If you have questions or need follow up information about today's clinic visit or your schedule please contact GERIATRICS TRANSITIONAL CARE directly at [phone].  Normal or non-critical lab and imaging results will be communicated to you by MyChart, letter or phone within 4 business days after the clinic has received the results. If you do not hear from us within 7 days, please contact the clinic through MyChart or phone. If you have a critical or abnormal lab result, we will notify you by phone as soon as possible.  Submit refill requests through 24h00 or call your pharmacy and they will forward the refill request to us. Please allow 3 business days for your refill to be completed.          Additional Information About Your Visit        MyChart Information     [facility]  "lets you send messages to your doctor, view your test results, renew your prescriptions, schedule appointments and more. To sign up, go to [email or web address] . Click on \"Log in\" on the left side of the screen, which will take you to the Welcome page. Then click on \"Sign up Now\" on the right side of the page.     You will be asked to enter the access code listed below, as well as some personal information. Please follow the directions to create your username and password.     Your access code is: [email or web address]  Expires: 2017  1:06 PM     Your access code will  in 90 days. If you need help or a new code, please call your [facility] clinic or [phone].        Care EveryWhere ID     This is your Care EveryWhere ID. This could be used by other organizations to access your [facility] medical records  [id number]        Your Vitals Were     Pulse Temperature Respirations Height Pulse Oximetry BMI (Body Mass Index)    95 98.1  F (36.7  C) 16 5' 6\" (1.676 m) 99% 18.32 kg/m2       Blood Pressure from Last 3 Encounters:   17 125/74   17 109/67   17 114/66    Weight from Last 3 Encounters:   17 113 lb 8 oz (51.5 kg)   17 115 lb 6.4 oz (52.3 kg)   17 112 lb 12.8 oz (51.2 kg)              Today, you had the following     No orders found for display         Today's Medication Changes          These changes are accurate as of: 3/23/17 11:31 AM.  If you have any questions, ask your nurse or doctor.               Stop taking these medicines if you haven't already. Please contact your care team if you have questions.     OSTEO BI-FLEX ADV TRIPLE ST Tabs   Stopped by:  Tamra Mott APRN CNP                    Primary Care Provider Office Phone # Fax #    Latanya Martinez [phone]       [facility] [address]  [facility] [address]        Thank you!     Thank you for choosing GERIATRICS TRANSITIONAL CARE  for your care. Our goal is always " to provide you with excellent care. Hearing back from our patients is one way we can continue to improve our services. Please take a few minutes to complete the written survey that you may receive in the mail after your visit with us. Thank you!             Your Updated Medication List - Protect others around you: Learn how to safely use, store and throw away your medicines at www.disposemymeds.org.          This list is accurate as of: 3/23/17 11:31 AM.  Always use your most recent med list.                   Brand Name Dispense Instructions for use    acetaminophen 325 MG tablet    TYLENOL    100 tablet    Take 2 tablets (650 mg) by mouth every 4 hours as needed for mild pain       Calcium-Magnesium 333-167 MG Tabs      Take 2 tablets by mouth daily.       cyclobenzaprine 10 MG tablet    FLEXERIL    42 tablet    Take 1 tablet (10 mg) by mouth 3 times daily       diclofenac 1 % Gel topical gel    VOLTAREN     Place 2 g onto the skin 3 times daily as needed for moderate pain Apply to R hand       enoxaparin 40 MG/0.4ML injection    LOVENOX    23 Syringe    Inject 0.4 mLs (40 mg) Subcutaneous every 24 hours       ENSURE PLUS HN Liqd      Take 8 oz by mouth 2 times daily       ferrous gluconate 324 (38 FE) MG tablet    FERGON     Take 1 tablet by mouth daily (with lunch).       Glucosamine Sulfate--500 MG Tabs      Take 1,500 mg by mouth daily.       HAIR/SKIN/NAILS PO      Take 1 tablet by mouth daily.       levothyroxine 75 MCG tablet    SYNTHROID/LEVOTHROID     Take 75 mcg by mouth daily.       OLANZapine zydis 5 MG ODT tab    zyPREXA     Take 1 tablet (5 mg) by mouth At Bedtime       oxyCODONE 5 MG IR tablet    ROXICODONE     Take 1 tablet (5 mg) by mouth every 3 hours as needed for moderate to severe pain       SUMATRIPTAN SUCCINATE PO      Take 25 mg by mouth every 8 hours as needed for migraine       TRAVATAN Z 0.004 % ophthalmic solution   Generic drug:  travoprost (BAK Free)      Apply 1 drop to eye.  In each eye QHS       vitamin B-Complex      Take 1 tablet by mouth daily.

## 2017-03-28 ENCOUNTER — NURSING HOME VISIT (OUTPATIENT)
Dept: GERIATRICS | Facility: CLINIC | Age: 77
End: 2017-03-28
Payer: COMMERCIAL

## 2017-03-28 VITALS
BODY MASS INDEX: 18.39 KG/M2 | OXYGEN SATURATION: 97 % | SYSTOLIC BLOOD PRESSURE: 103 MMHG | WEIGHT: 114.4 LBS | TEMPERATURE: 98.3 F | DIASTOLIC BLOOD PRESSURE: 66 MMHG | HEART RATE: 92 BPM | RESPIRATION RATE: 16 BRPM | HEIGHT: 66 IN

## 2017-03-28 DIAGNOSIS — R53.81 PHYSICAL DECONDITIONING: ICD-10-CM

## 2017-03-28 DIAGNOSIS — D50.8 OTHER IRON DEFICIENCY ANEMIA: ICD-10-CM

## 2017-03-28 DIAGNOSIS — Z98.890 STATUS POST INCISION AND DRAINAGE: ICD-10-CM

## 2017-03-28 DIAGNOSIS — R41.89 COGNITIVE IMPAIRMENT: ICD-10-CM

## 2017-03-28 DIAGNOSIS — F29 PSYCHOSIS, UNSPECIFIED PSYCHOSIS TYPE (H): ICD-10-CM

## 2017-03-28 DIAGNOSIS — Z93.2 S/P ILEOSTOMY (H): ICD-10-CM

## 2017-03-28 DIAGNOSIS — G14 POSTPOLIO SYNDROME (H): ICD-10-CM

## 2017-03-28 DIAGNOSIS — E43 SEVERE PROTEIN-CALORIE MALNUTRITION (H): ICD-10-CM

## 2017-03-28 DIAGNOSIS — C18.9 ADENOCARCINOMA OF COLON (H): Primary | ICD-10-CM

## 2017-03-28 DIAGNOSIS — M19.041 PRIMARY OSTEOARTHRITIS OF RIGHT HAND: ICD-10-CM

## 2017-03-28 PROCEDURE — 99207 ZZC CDG-UP CODE -MED NECESSITY: CPT | Performed by: NURSE PRACTITIONER

## 2017-03-28 PROCEDURE — 99310 SBSQ NF CARE HIGH MDM 45: CPT | Performed by: NURSE PRACTITIONER

## 2017-03-28 NOTE — PROGRESS NOTES
[facility]    Chief Complaint   Patient presents with     Nursing Home Acute     Pre-Op Exam       HPI:    Sherita Kenney is a 76 year old  (1940), who is being seen today for an episodic care visit at [facility].     Today's concern is:    Adenocarcinoma of colon (H)  S/P ileostomy (H)  S/p I&D of abdominal abscess  Severe protein-calorie malnutrition (H)  Patient was found disoriented with acute psychosis at home, was transferred to [facility] on 2/15. Noted to have a RLQ abdominal mass upon hospital admission 2/15. CT abdomen positive for right iliopsoas abscess. Started on zosyn x 2 weeks and s/p drainage of abscess in IR on 2/15. Abdominal drain since removed. Patient had abdominal distention on 2/23, CT positive for small bowel obstruction. Colorectal surgery consulted. S/p colonoscopy and lap ileostomy 3/1. CT also positive for large mass in cecum with fixation to abdominal wall and lesions on surface of the right liver. Was diagnosed with adenocarcinoma of colon. Had severe protein-calorie malnutrition during hospital stay. Was started on TPN post-operatively 2/24, which was dc'd on 3/7. Requires calorie counts, low fiber diet, and requires strict diet d/t recent s/p ileostomy. Dietary following, states patient is consuming 1635-3651 calories per day. During patient exam, denies pain to abdomen. Has tylenol prn available. Also taking lovenox qd for DVT prophylaxis. Ileostomy intact with adequate output. Admits to improvement in appetite. Dietary following. Oncology following, patient to f/u as directed with Dr. Nieves. Colorectal surgery following, patient to f/u with Dr. Peter on 3/31 to discuss open right colectomy planned for 4/13.     UPDATE 3/29: Patient started to pass clots per rectum 3/29. No stool noted per RN. Concern for GI bleed vs colon cancer. Ileostomy intact with adequate output. Was sent to [facility] ED for further evaluation. Colon & Rectal Surgery team was  consulted during ED visit. Anoscopy completed in ED, no source of active bleeding, found bright red blood in rectal vault with no identified rectal masses. Patient remained stable w/o active sx of bleeding and was cleared to be discharged back to TCU on 3/29. Since patient has been back to TCU, no additional reports of bleeding. On 3/30, RN reports new finding regarding color changes near stoma. During patient exam, appears to be dusky/grey coloring of surrounding tissue near stoma, concerning for necrosis. Updated MAK Mon from [facility] Rect[facility] Surgery Clinic and discussed findings. Plan for patient to be seen by Dr. Peter 3/31 and RN staff to continue to monitor and will update if stoma changes to dusky color. Currently, appearance of stoma is healthy with pink tissue and ileostomy is producing adequate output.       Other iron deficiency anemia  Hgb was 5.6 on 2/15 r/t colon mass. Transfused with 2 units of PRBCs on 2/16, along with IV iron infusion. Recent Hgb baseline 8-9. Last Hgb was 8.8 on 3/20. Currently taking ferrous gluconate 324mg qd. No active sx of bleeding. Denies fatigue, SOB.      Psychosis, unspecified psychosis type  Cognitive Impairment  Physical deconditioning  [facility] Was seen by Psychiatry inpatient, was diagnosed with acute unspecified psychosis. Patient w/o history of mental illness in the past or diagnosed with dementia. Since at TCU, [facility] 19/30. Oriented to self and place. Patient is pleasantly confused, has poor short term memory. No reports of agitation or behavioral disturbances by RN staff. Referral to Neuropsychologist d/t cognitive impairment and diagnosis of unspecified psychosis per request of patient's POA (Danay). PTA patient was living alone. Patient is unable to return home at this time d/t limited ability to care for ileostomy by self d/t postpolio syndrome affecting left extremity and arthritis impairing abilities of right hand.  SW following for discharge planning, currently has LTC application.       Postpolio syndrome  Primary osteoarthritis of right hand  H/o chronic pain to right hand r/t osteoarthritis and overuse of right hand. 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 Has been seen by outpatient OT in the past for hand therapy. Stated hand splint/gaurd was recommended to her, but patient declined at that time. Since at TCU, patient states pain in right hand flares with over-use. Recently states pain is controlled with tylenol.            ALLERGIES: Wool fiber  Past Medical, Surgical, Family and Social History reviewed and updated in [facility].    Current Outpatient Prescriptions   Medication Sig Dispense Refill     diclofenac (VOLTAREN) 1 % GEL topical gel Place 2 g onto the skin 3 times daily as needed for moderate pain Apply to R hand       Nutritional Supplements (ENSURE PLUS HN) LIQD Take 8 oz by mouth 2 times daily       OLANZapine zydis (ZYPREXA) 5 MG ODT tab Take 1 tablet (5 mg) by mouth At Bedtime       cyclobenzaprine (FLEXERIL) 10 MG tablet Take 1 tablet (10 mg) by mouth 3 times daily 42 tablet      oxyCODONE (ROXICODONE) 5 MG IR tablet Take 1 tablet (5 mg) by mouth every 3 hours as needed for moderate to severe pain  0     acetaminophen (TYLENOL) 325 MG tablet Take 2 tablets (650 mg) by mouth every 4 hours as needed for mild pain 100 tablet      enoxaparin (LOVENOX) 40 MG/0.4ML injection Inject 0.4 mLs (40 mg) Subcutaneous every 24 hours 23 Syringe 0     SUMATRIPTAN SUCCINATE PO Take 25 mg by mouth every 8 hours as needed for migraine       Multiple Vitamins-Minerals (HAIR/SKIN/NAILS PO) Take 1 tablet by mouth daily.       Glucosamine Sulfate--500 MG TABS Take 1,500 mg by mouth daily.       Travoprost (TRAVATAN Z) 0.004 % SOLN Apply 1 drop to eye. In each eye QHS       levothyroxine (SYNTHROID, LEVOTHROID) 75 MCG tablet Take 75 mcg by mouth daily.       Calcium-Magnesium 333-167 MG TABS Take 2  "tablets by mouth daily.       ferrous gluconate (FERGON) 324 (38 FE) MG tablet Take 1 tablet by mouth daily (with lunch).       B-Complex TABS Take 1 tablet by mouth daily.       Medications reviewed:  Medications reconciled to facility chart and changes were made to reflect current medications as identified as above med list. Below are the changes that were made:   Medications stopped since last EPIC medication reconciliation:   There are no discontinued medications.    Medications started since last [facility] medication reconciliation:  No orders of the defined types were placed in this encounter.        REVIEW OF SYSTEMS:  4 point ROS including Respiratory, CV, GI and , other than that noted in the HPI,  is negative    Physical Exam:  /66  Pulse 92  Temp 98.3  F (36.8  C)  Resp 16  Ht 5' 6\" (1.676 m)  Wt 114 lb 6.4 oz (51.9 kg)  SpO2 97%  BMI 18.46 kg/m2  GENERAL APPEARANCE: Alert, in no distress, oriented, thin, anxious, cooperative  Neck: Neck supple. No adenopathy. Thyroid symmetric, normal size,, Carotids without bruits.  ENT: ENT exam normal, no neck nodes or sinus tenderness  RESP: Respiratory effort and palpation of chest normal, lungs clear to auscultation , no respiratory distress  CV: Palpation and auscultation of heart done , regular rate and rhythm, no murmur, rub, or gallop, no edema, +2 pedal pulses  ABDOMEN: Normal bowel sounds, soft, nontender, no hepatosplenomegaly or other masses, no guarding or rebound. Ileostomy intact, adequate output.   M/S: No movement to LUE d/t h/o polio. Active ROM in all other extremities. Strong hand grasp to right hand. Gait and station normal.    SKIN: Inspection of skin and subcutaneous tissue baseline, Palpation of skin and subcutaneous tissue baseline. Arthritic changes noted in right hand.  NEURO: Cranial nerves 2-12 are normal tested and grossly at patient's baseline, Examination of sensation by touch normal  PSYCH: Oriented X self and place, insight " and judgement impaired, memory impaired, cognitive testing: [facility] 15/30.      Recent Labs:    CBC RESULTS:   Recent Labs   Lab Test  03/20/17   0500  03/13/17   0525   WBC  6.4  5.9   RBC  3.72*  3.59*   HGB  8.8*  8.4*   HCT  31.1*  29.3*   MCV  84  82   MCH  23.7*  23.4*   MCHC  28.3*  28.7*   RDW  Dimorphic population - unable to calculate  Dimorphic population - unable to calculate   PLT  323  334       Last Basic Metabolic Panel:  Recent Labs   Lab Test  03/20/17   0500  03/13/17   0525   NA  143  142   POTASSIUM  3.9  4.1   CHLORIDE  107  107   SARITHA  8.7  8.8   CO2  27  27   BUN  11  12   CR  0.69  0.72   GLC  75  73       Liver Function Studies -   Recent Labs   Lab Test  03/06/17   1105  03/04/17   0625  02/27/17   0700   PROTTOTAL  6.3*   --   5.1*   ALBUMIN  2.5*  2.2*  2.1*   BILITOTAL  0.3   --   0.2   ALKPHOS  114   --   65   AST  31   --   29   ALT  19   --   18       TSH   Date Value Ref Range Status   02/15/2017 4.05 (H) 0.40 - 4.00 mU/L Final       Assessment/Plan:    {FGS DX:642014}    HgbA1C% 3/29.           Time 25 minutes    Electronically signed by  WILLY Brewer CNP

## 2017-03-28 NOTE — PROGRESS NOTES
[facility]  [facility] [address]    PRE-OP EVALUATION:    Today's date: 2017    Sherita Kenney (: 1940) presents for pre-operative evaluation assessment as requested by Dr. Peter.  Pt requires evaluation and anesthesia risk assessment prior to undergoing surgery/procedure for treatment of colon cancer.  Proposed procedure: open right colectomy on     Patient seen today at [facility] TCU location.    Date of Surgery/ Procedure: 17  Time of Surgery/ Procedure: 1:45pm  Hospital/Surgical Facility: [facility]  Primary Physician: Dr. Peter  Type of Anesthesia Anticipated: General  History of anesthesia complications: NONE  History of  abnormal bleeding: NONE   History of blood transfusions: YES.  No complications.  Patient has a Health Care Directive or Living Will:  YES, completed 17      PREOP QUESTIONNAIRE  ====================  1- NO - Do you ever have any pain or discomfort in your chest?  2- NO - Have you ever had a severe pain across the front of your chest lasting for half an hour or more?  3- NO - Do you have swelling in your feet or ankles at times?  4- NO - Are you troubled by shortness of breath when: walking on the level/ up a slight hill/ at night?  5- NO - Does your chest ever sound wheezy or whistling?  6- NO - Do you currently have a cold, bronchitis or other respiratory infection?  7- NO - Have you had a cold, bronchitis or other respiratory infection within the last 2 weeks?  8- NO - Do you usually have a cough?  9- NO - Do you sometimes get pains in the calves of your legs when you walk?  10-NO - Do you or anyone in your family have previous history of blood clots?  11-NO - Do you or does anyone in your family have serious bleeding problem such as prolonged bleeding following surgeries or cuts?  12-NO - Have you ever had problems with anemia or been told to take iron pills?  13-NO - Have you had any abnormal blood loss  such as black, tarry or bloody stools, or abnormal vaginal bleeding?  14-NO - Have you or any of your relatives ever had problems with anesthesia?  15-NO - Do you snore or stop breathing at night?  16-NO - Do you have any prosthetic heart valves or joints?  17-NO - Is there any chance that you may be pregnant?      HPI:   Sherita Kenney is a 76 year old (1940), who is being seen today for an episodic care visit at [facility] for pre-op exam.      Today's concern is:     Adenocarcinoma of colon (H)  S/P ileostomy (H)  S/p I&D of abdominal abscess  Severe protein-calorie malnutrition (H)  Patient was found disoriented with acute psychosis at home, was transferred to [facility] on 2/15. Noted to have a RLQ abdominal mass upon hospital admission 2/15. CT abdomen positive for right iliopsoas abscess. Started on zosyn x 2 weeks and s/p drainage of abscess in IR on 2/15. Abdominal drain since removed. Patient had abdominal distention on 2/23, CT positive for small bowel obstruction. Colorectal surgery consulted. S/p colonoscopy and lap ileostomy 3/1. CT also positive for large mass in cecum with fixation to abdominal wall and lesions on surface of the right liver. Was diagnosed with adenocarcinoma of colon. Had severe protein-calorie malnutrition during hospital stay. Was started on TPN post-operatively 2/24, which was dc'd on 3/7. Requires calorie counts, low fiber diet, and requires strict diet d/t recent s/p ileostomy. Dietary following, states patient is consuming [id number] calories per day. During patient exam, denies pain to abdomen. Has tylenol prn available. Also taking lovenox qd for DVT prophylaxis. Ileostomy intact with adequate output. Admits to improvement in appetite. Dietary following. Oncology following, patient to f/u as directed with Dr. Nieves. Colorectal surgery following, patient to f/u with Dr. Peter on 3/31 to discuss open right colectomy planned for 4/13.      UPDATE 3/29: Patient  started to pass clots per rectum 3/29. No stool noted per RN. Concern for GI bleed vs colon cancer. Ileostomy intact with adequate output. Was sent to [facility] ED for further evaluation. Colon & Rectal Surgery team was consulted during ED visit. Anoscopy completed in ED, no source of active bleeding, found bright red blood in rectal vault with no identified rectal masses. Patient remained stable w/o active sx of bleeding and was cleared to be discharged back to TCU on 3/29. Since patient has been back to TCU, no additional reports of bleeding. On 3/30, RN reports new finding regarding color changes near stoma. During patient exam, appears to be dusky/gray coloring of surrounding tissue near stoma, concerning for necrosis. Updated MAK Mon from [facility] and discussed findings. Plan for patient to be seen by Dr. Peter 3/31 and RN staff to continue to monitor and will update if stoma changes to dusky color. Currently, appearance of stoma is healthy with pink tissue and ileostomy is producing adequate output.       Other iron deficiency anemia  Hgb was 5.6 on 2/15 r/t colon mass. Transfused with 2 units of PRBCs on 2/16, along with IV iron infusion. Recent Hgb baseline 8-9. Last Hgb was 8.8 on 3/20. Currently taking ferrous gluconate 324mg qd. No active sx of bleeding. Denies fatigue, SOB.       Psychosis, unspecified psychosis type  Cognitive Impairment  Physical deconditioning  During hospital stay, [facility] 8/30 on 2/17. Was seen by Psychiatry inpatient, was diagnosed with acute unspecified psychosis. Patient w/o history of mental illness in the past or diagnosed with dementia. Since at TCU, [facility] 19/30. Oriented to self and place. Patient is pleasantly confused, has poor short term memory. No reports of agitation or behavioral disturbances by RN staff. Referral to Neuropsychologist d/t cognitive impairment and diagnosis of unspecified psychosis per request of patient's POA (Danay). PTA  patient was living alone. Patient is unable to return home at this time d/t limited ability to care for ileostomy by self d/t postpolio syndrome affecting left extremity and arthritis impairing abilities of right hand. SW following for discharge planning, currently has LTC application.       Postpolio syndrome  Primary osteoarthritis of right hand  H/o chronic pain to right hand r/t osteoarthritis and overuse of right hand. 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 Has been seen by outpatient OT in the past for hand therapy. Stated hand splint/gaurd was recommended to her, but patient declined at that time. Since at TCU, patient states pain in right hand flares with over-use. Recently states pain is controlled with tylenol.              Patient Active Problem List    Diagnosis Date Noted     Primary osteoarthritis of right hand 03/19/2017     Priority: Medium     Cognitive impairment 03/16/2017     Priority: Medium     Iliopsoas abscess on right (H) 03/12/2017     Priority: Medium     Adenocarcinoma of colon (H) 03/12/2017     Priority: Medium     Status post incision and drainage 03/12/2017     Priority: Medium     S/P ileostomy (H) 03/12/2017     Priority: Medium     Iron deficiency anemia 03/12/2017     Priority: Medium     Postpolio syndrome 03/12/2017     Priority: Medium     Severe protein-calorie malnutrition (H) 03/12/2017     Priority: Medium     Physical deconditioning 03/12/2017     Priority: Medium     Psychosis 02/15/2017     Priority: Medium     Hip pain, right 02/23/2016     Priority: Medium     Flail joint 02/16/2012     Priority: Medium     Arthralgia of upper arm 02/16/2012     Priority: Medium     Generalized osteoarthritis 07/07/2005     Priority: Medium     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 yes         DJD (degenerative joint disease) of knee 06/26/2014     DJD (degenerative joint disease), lumbar 06/26/2014     Lumbago 02/19/2013     OA (osteoarthritis) of knee 01/03/2013     Pain in joint, lower leg 01/03/2013     Abnormal gait 01/03/2013     Medication monitoring encounter 12/22/2011       Past Medical History:   Diagnosis Date     Cancer (H)        Past Surgical History:   Procedure Laterality Date     COLONOSCOPY N/A 3/1/2017    Procedure: COLONOSCOPY;  Surgeon: Lindsay Peter MD;  Location: [facility] OR     ILEOSTOMY N/A 3/1/2017    Procedure: ILEOSTOMY;  Surgeon: Lindsay Peter MD;  Location: [facility] OR     LAPAROSCOPIC ILEOSTOMY N/A 3/1/2017    Procedure: LAPAROSCOPIC ILEOSTOMY;  Surgeon: Lindsay Peter MD;  Location:  OR       No family history on file.    Social History   Substance Use Topics     Smoking status: Never Smoker     Smokeless tobacco: Not on file     Alcohol use No          Allergies   Allergen Reactions     Wool Fiber Unknown     Current Outpatient Prescriptions   Medication Sig Dispense Refill     Cyclobenzaprine HCl (FLEXERIL PO) Take 10 mg by mouth 3 times daily as needed for muscle spasms       diclofenac (VOLTAREN) 1 % GEL topical gel Place 2 g onto the skin 3 times daily as needed for moderate pain Apply to R hand       Nutritional Supplements (ENSURE PLUS HN) LIQD Take 8 oz by mouth 2 times daily       OLANZapine zydis (ZYPREXA) 5 MG ODT tab Take 1 tablet (5 mg) by mouth At Bedtime       acetaminophen (TYLENOL) 325 MG tablet Take 2 tablets (650 mg) by mouth every 4 hours as needed for mild pain 100 tablet      enoxaparin (LOVENOX) 40 MG/0.4ML injection Inject 0.4 mLs (40 mg) Subcutaneous every 24 hours 23 Syringe 0     SUMATRIPTAN SUCCINATE PO Take 25 mg by mouth every 8 hours as needed for migraine       Travoprost (TRAVATAN Z) 0.004 % SOLN Apply 1 drop to  "eye. In each eye QHS       levothyroxine (SYNTHROID, LEVOTHROID) 75 MCG tablet Take 75 mcg by mouth daily.       Calcium-Magnesium 333-167 MG TABS Take 2 tablets by mouth daily.       ferrous gluconate (FERGON) 324 (38 FE) MG tablet Take 1 tablet by mouth daily (with lunch).       B-Complex TABS Take 1 tablet by mouth daily.       Amino Acids-Protein Hydrolys (PRO-STAT AWC PO) Take 1 oz by mouth 2 times daily           REVIEW OF SYSTEMS:  10 point ROS of systems including Constitutional, Eyes, Respiratory, Cardiovascular, Gastroenterology, Genitourinary, Integumentary, Muscularskeletal, Psychiatric were all negative except for pertinent positives noted in my HPI.      EXAM:  /66  Pulse 92  Temp 98.3  F (36.8  C)  Resp 16  Ht 5' 6\" (1.676 m)  Wt 114 lb 6.4 oz (51.9 kg)  SpO2 97%  BMI 18.46 kg/m2  GENERAL APPEARANCE: healthy, alert, no distress and cooperative  EYES: Eyes grossly normal to inspection, PERRL and conjunctivae and sclerae normal  HENT: ear canals and TM's normal and nose and mouth without ulcers or lesions  NECK: no adenopathy, no asymmetry, masses, or scars and thyroid normal to palpation  RESP: lungs clear to auscultation - no rales, rhonchi or wheezes  CV: regular rates and rhythm, normal S1 S2, no S3 or S4 and no murmur, click or rub  ABDOMEN: Normal bowel sounds, soft, nontender, no hepatosplenomegaly or other masses, no guarding or rebound. Ileostomy intact, adequate output. Ileostomy: Stoma pink, surrounding tissue dusky/gray, areas of excoriation noted.   M/S: No movement to LUE d/t h/o polio. Active ROM in all other extremities. Strong hand grasp to right hand. Gait and station normal.   SKIN: Inspection of skin and subcutaneous tissue baseline, Palpation of skin and subcutaneous tissue baseline. Arthritic changes noted in right hand.  NEURO: Normal strength and tone, sensory exam grossly normal, mentation intact, speech normal and cranial nerves 2-12 intact  PSYCH: Oriented X self " and place, insight and judgement impaired, memory impaired, cognitive testing: SLUMS 15/30.  MENTAL STATUS EXAM:  Appearance/Behavior: No apparent distress, Neatly groomed and Appears stated age  Speech: Normal  Mood/Affect: normal affect  Insight: Fair      DIAGNOSTICS:   Preop Testing   EKG: Not indicated due to non-vascular surgery and last ekg on 3/1/17 (within 30 days for CAD history or last year for cardiac risk factors)  Hemoglobin A1C    Recent Labs:   CBC RESULTS:        Recent Labs   Lab Test 03/20/17  0500 03/13/17  0525   WBC 6.4 5.9   RBC 3.72* 3.59*   HGB 8.8* 8.4*   HCT 31.1* 29.3*   MCV 84 82   MCH 23.7* 23.4*   MCHC 28.3* 28.7*   RDW Dimorphic population - unable to calculate Dimorphic population - unable to calculate    334         Last Basic Metabolic Panel:       Recent Labs   Lab Test 03/20/17  0500 03/13/17  0525    142   POTASSIUM 3.9 4.1   CHLORIDE 107 107   SARITHA 8.7 8.8   CO2 27 27   BUN 11 12   CR 0.69 0.72   GLC 75 73         Liver Function Studies -         Recent Labs   Lab Test 03/06/17  1105 03/04/17  0625 02/27/17  0700   PROTTOTAL 6.3* --  5.1*   ALBUMIN 2.5* 2.2* 2.1*   BILITOTAL 0.3 --  0.2   ALKPHOS 114 --  65   AST 31 --  29   ALT 19 --  18               TSH   Date Value Ref Range Status   02/15/2017 4.05 (H) 0.40 - 4.00 mU/L Final         Ref. Range 3/29/2017 05:00   Hemoglobin A1C Latest Ref Range: 4.3 - 6.0 % 4.4         IMPRESSION:  Reason for surgery/procedure: Colon cancer  Diagnosis/reason for consult: Colon cancer    The proposed surgical procedure is considered INTERMEDIATE risk.    For above listed surgery and anesthesia:   Patient is at MODERATE risk for surgery/procedure and perioperative/procedure complications.        RECOMMENDATIONS:    --Approval given to proceed with proposed procedure, without further diagnostic evaluation  --Patient is currently taking    Anticoagulant or Antiplatelet Medication Use  Lovenox qd              Signed Electronically by:  WILLY Brewer CNP

## 2017-03-29 ENCOUNTER — HOSPITAL LABORATORY (OUTPATIENT)
Dept: OTHER | Facility: CLINIC | Age: 77
End: 2017-03-29

## 2017-03-29 ENCOUNTER — HOSPITAL ENCOUNTER (EMERGENCY)
Facility: CLINIC | Age: 77
Discharge: HOME | End: 2017-03-29
Attending: EMERGENCY MEDICINE | Admitting: EMERGENCY MEDICINE
Payer: MEDICARE

## 2017-03-29 ENCOUNTER — NURSING HOME VISIT (OUTPATIENT)
Dept: GERIATRICS | Facility: CLINIC | Age: 77
End: 2017-03-29
Payer: COMMERCIAL

## 2017-03-29 VITALS
OXYGEN SATURATION: 96 % | TEMPERATURE: 98.4 F | SYSTOLIC BLOOD PRESSURE: 114 MMHG | BODY MASS INDEX: 18.39 KG/M2 | DIASTOLIC BLOOD PRESSURE: 74 MMHG | RESPIRATION RATE: 16 BRPM | HEIGHT: 66 IN | HEART RATE: 83 BPM | WEIGHT: 114.4 LBS

## 2017-03-29 VITALS
RESPIRATION RATE: 16 BRPM | SYSTOLIC BLOOD PRESSURE: 104 MMHG | WEIGHT: 114 LBS | HEART RATE: 90 BPM | TEMPERATURE: 98.5 F | BODY MASS INDEX: 17.89 KG/M2 | DIASTOLIC BLOOD PRESSURE: 67 MMHG | OXYGEN SATURATION: 96 % | HEIGHT: 67 IN

## 2017-03-29 DIAGNOSIS — Z93.2 S/P ILEOSTOMY (H): ICD-10-CM

## 2017-03-29 DIAGNOSIS — K92.1 HEMATOCHEZIA: ICD-10-CM

## 2017-03-29 DIAGNOSIS — K62.5 RECTAL BLEEDING: ICD-10-CM

## 2017-03-29 DIAGNOSIS — C18.9 ADENOCARCINOMA OF COLON (H): Primary | ICD-10-CM

## 2017-03-29 DIAGNOSIS — Z98.890 STATUS POST INCISION AND DRAINAGE: ICD-10-CM

## 2017-03-29 LAB
ABO + RH BLD: NORMAL
ABO + RH BLD: NORMAL
ALBUMIN SERPL-MCNC: 3 G/DL (ref 3.4–5)
ALBUMIN UR-MCNC: NEGATIVE MG/DL
ALP SERPL-CCNC: 107 U/L (ref 40–150)
ALT SERPL W P-5'-P-CCNC: 19 U/L (ref 0–50)
ANION GAP SERPL CALCULATED.3IONS-SCNC: 9 MMOL/L (ref 3–14)
APPEARANCE UR: CLEAR
APTT PPP: 30 SEC (ref 22–37)
AST SERPL W P-5'-P-CCNC: 25 U/L (ref 0–45)
BACTERIA #/AREA URNS HPF: ABNORMAL /HPF
BASOPHILS # BLD AUTO: 0 10E9/L (ref 0–0.2)
BASOPHILS NFR BLD AUTO: 0.5 %
BILIRUB SERPL-MCNC: 0.2 MG/DL (ref 0.2–1.3)
BILIRUB UR QL STRIP: NEGATIVE
BLD GP AB SCN SERPL QL: NORMAL
BLOOD BANK CMNT PATIENT-IMP: NORMAL
BUN SERPL-MCNC: 19 MG/DL (ref 7–30)
CALCIUM SERPL-MCNC: 8.9 MG/DL (ref 8.5–10.1)
CHLORIDE SERPL-SCNC: 104 MMOL/L (ref 94–109)
CO2 SERPL-SCNC: 27 MMOL/L (ref 20–32)
COLOR UR AUTO: ABNORMAL
CREAT SERPL-MCNC: 0.64 MG/DL (ref 0.52–1.04)
DIFFERENTIAL METHOD BLD: ABNORMAL
EOSINOPHIL # BLD AUTO: 0.2 10E9/L (ref 0–0.7)
EOSINOPHIL NFR BLD AUTO: 2 %
ERYTHROCYTE [DISTWIDTH] IN BLOOD BY AUTOMATED COUNT: 24.6 % (ref 10–15)
GFR SERPL CREATININE-BSD FRML MDRD: ABNORMAL ML/MIN/1.7M2
GLUCOSE SERPL-MCNC: 92 MG/DL (ref 70–99)
GLUCOSE UR STRIP-MCNC: NEGATIVE MG/DL
HBA1C MFR BLD: 4.4 % (ref 4.3–6)
HCT VFR BLD AUTO: 34.6 % (ref 35–47)
HGB BLD-MCNC: 10.2 G/DL (ref 11.7–15.7)
HGB UR QL STRIP: NEGATIVE
IMM GRANULOCYTES # BLD: 0 10E9/L (ref 0–0.4)
IMM GRANULOCYTES NFR BLD: 0.3 %
INR PPP: 0.9 (ref 0.86–1.14)
KETONES UR STRIP-MCNC: NEGATIVE MG/DL
LEUKOCYTE ESTERASE UR QL STRIP: NEGATIVE
LYMPHOCYTES # BLD AUTO: 1.7 10E9/L (ref 0.8–5.3)
LYMPHOCYTES NFR BLD AUTO: 21.1 %
MCH RBC QN AUTO: 25.1 PG (ref 26.5–33)
MCHC RBC AUTO-ENTMCNC: 29.5 G/DL (ref 31.5–36.5)
MCV RBC AUTO: 85 FL (ref 78–100)
MONOCYTES # BLD AUTO: 0.7 10E9/L (ref 0–1.3)
MONOCYTES NFR BLD AUTO: 8.5 %
MUCOUS THREADS #/AREA URNS LPF: PRESENT /LPF
NEUTROPHILS # BLD AUTO: 5.3 10E9/L (ref 1.6–8.3)
NEUTROPHILS NFR BLD AUTO: 67.6 %
NITRATE UR QL: NEGATIVE
NRBC # BLD AUTO: 0 10*3/UL
NRBC BLD AUTO-RTO: 0 /100
PH UR STRIP: 6.5 PH (ref 5–7)
PLATELET # BLD AUTO: 219 10E9/L (ref 150–450)
POTASSIUM SERPL-SCNC: 4.1 MMOL/L (ref 3.4–5.3)
PROT SERPL-MCNC: 6.4 G/DL (ref 6.8–8.8)
RBC # BLD AUTO: 4.06 10E12/L (ref 3.8–5.2)
RBC #/AREA URNS AUTO: <1 /HPF (ref 0–2)
SODIUM SERPL-SCNC: 140 MMOL/L (ref 133–144)
SP GR UR STRIP: 1 (ref 1–1.03)
SPECIMEN EXP DATE BLD: NORMAL
SQUAMOUS #/AREA URNS AUTO: <1 /HPF (ref 0–1)
URN SPEC COLLECT METH UR: ABNORMAL
UROBILINOGEN UR STRIP-MCNC: NORMAL MG/DL (ref 0–2)
WBC # BLD AUTO: 7.9 10E9/L (ref 4–11)
WBC #/AREA URNS AUTO: 1 /HPF (ref 0–2)

## 2017-03-29 PROCEDURE — 99309 SBSQ NF CARE MODERATE MDM 30: CPT | Performed by: NURSE PRACTITIONER

## 2017-03-29 PROCEDURE — 25000128 H RX IP 250 OP 636: Performed by: EMERGENCY MEDICINE

## 2017-03-29 PROCEDURE — 86901 BLOOD TYPING SEROLOGIC RH(D): CPT | Performed by: EMERGENCY MEDICINE

## 2017-03-29 PROCEDURE — 85610 PROTHROMBIN TIME: CPT | Performed by: EMERGENCY MEDICINE

## 2017-03-29 PROCEDURE — 99283 EMERGENCY DEPT VISIT LOW MDM: CPT | Mod: 25

## 2017-03-29 PROCEDURE — 81001 URINALYSIS AUTO W/SCOPE: CPT | Performed by: EMERGENCY MEDICINE

## 2017-03-29 PROCEDURE — 86850 RBC ANTIBODY SCREEN: CPT | Performed by: EMERGENCY MEDICINE

## 2017-03-29 PROCEDURE — 96361 HYDRATE IV INFUSION ADD-ON: CPT | Mod: 59

## 2017-03-29 PROCEDURE — 85730 THROMBOPLASTIN TIME PARTIAL: CPT | Performed by: EMERGENCY MEDICINE

## 2017-03-29 PROCEDURE — 80053 COMPREHEN METABOLIC PANEL: CPT | Performed by: EMERGENCY MEDICINE

## 2017-03-29 PROCEDURE — 46600 DIAGNOSTIC ANOSCOPY SPX: CPT

## 2017-03-29 PROCEDURE — 85025 COMPLETE CBC W/AUTO DIFF WBC: CPT | Performed by: EMERGENCY MEDICINE

## 2017-03-29 PROCEDURE — 96360 HYDRATION IV INFUSION INIT: CPT | Mod: 59

## 2017-03-29 PROCEDURE — 86900 BLOOD TYPING SEROLOGIC ABO: CPT | Performed by: EMERGENCY MEDICINE

## 2017-03-29 RX ORDER — SODIUM CHLORIDE 9 MG/ML
1000 INJECTION, SOLUTION INTRAVENOUS CONTINUOUS
Status: DISCONTINUED | OUTPATIENT
Start: 2017-03-29 | End: 2017-03-29 | Stop reason: HOSPADM

## 2017-03-29 RX ADMIN — SODIUM CHLORIDE 1000 ML: 9 INJECTION, SOLUTION INTRAVENOUS at 11:31

## 2017-03-29 ASSESSMENT — ENCOUNTER SYMPTOMS
ABDOMINAL PAIN: 0
VOMITING: 0
FEVER: 0
BLOOD IN STOOL: 1
CHILLS: 0
NAUSEA: 0
DIARRHEA: 0

## 2017-03-29 NOTE — ED AVS SNAPSHOT
Emergency Department    [address] [facility] [address]    Phone:  [phone]    Fax:  [phone]                                       Sherita Kenney   MRN: [id number]    Department:   Emergency Department   Date of Visit:  3/29/2017           Patient Information     Date Of Birth          1940        Your diagnoses for this visit were:     Hematochezia        You were seen by Rosendo Appiah MD.      Follow-up Information     Follow up with Lindsay Peter MD In 2 days.    Specialty:  Colon and Rectal Surgery    Contact information:    [facility]  [address] [id number] [phone]          Follow up with  Emergency Department.    Specialty:  EMERGENCY MEDICINE    Why:  As needed, If symptoms worsen    Contact information:    [address] [facility] [id number] [phone]        Discharge Instructions         Evaluating and Treating Rectal Bleeding  To find the site and cause of your bleeding, you will have a physical exam. You will be asked about your health history. Tests may be done to help confirm the diagnosis and plan your treatment.     As part of your evaluation, a flexible sigmoidoscopy or colonoscopy may be done. They may add an upper endoscopy if the stools are darker.    Tests you may have  Any of these procedures may be done:    Stool sample. A small amount of your stool will be checked for blood.    Sigmoidoscopy. This test examines your rectum and sigmoid colon using a lighted tube. Most often, sedating (relaxing) medication is not needed.    Colonoscopy. This test looks at your rectum and entire colon. You may be given medication through an IV line to help you relax.    Barium enema. An X-ray test is done to view your colon. A chalky liquid containing barium is passed through the rectum and into the colon. This liquid enhances the X-ray images taken of your colon.    Upper endoscopy. This test  checks your esophagus, stomach, and upper small intestine. It is done in cases of rectal bleeding with other symptoms like low blood pressure  and rapid heartbeat. This test may also be done if your stools are dark black and tarry.  Your treatment plan  Your treatment depends on the cause of your rectal bleeding. Your doctor will make a plan that s right for you. Sometimes, rectal bleeding stops on its own. If it does, be sure to see your doctor to check that the problem wasn t serious.  What you can do  Follow all your doctor s instructions. Keep working with your doctor after your treatment. Make and keep your follow-up visits. If you have more rectal bleeding, call your doctor. It may be a sign of the same or another health problem.     5716-8010 [employer]. [address] [place] [address]. All rights reserved. This information is not intended as a substitute for professional medical care. Always follow your healthcare professional's instructions.          Future Appointments        Provider Department Dept Phone Center    4/4/2017 11:00 AM Susan Lindo MD [facility] [phone] [facility]      24 Hour Appointment Hotline       To make an appointment at any [facility], call [phone] ([phone]). If you don't have a family doctor or clinic, we will help you find one. [facility] are conveniently located to serve the needs of you and your family.             Review of your medicines      Our records show that you are taking the medicines listed below. If these are incorrect, please call your family doctor or clinic.        Dose / Directions Last dose taken    acetaminophen 325 MG tablet   Commonly known as:  TYLENOL   Dose:  650 mg   Quantity:  100 tablet        Take 2 tablets (650 mg) by mouth every 4 hours as needed for mild pain   Refills:  0        Calcium-Magnesium 333-167 MG Tabs   Dose:  2 tablet        Take 2 tablets by mouth daily.   Refills:   0        cyclobenzaprine 10 MG tablet   Commonly known as:  FLEXERIL   Dose:  10 mg   Quantity:  42 tablet        Take 1 tablet (10 mg) by mouth 3 times daily   Refills:  0        diclofenac 1 % Gel topical gel   Commonly known as:  VOLTAREN   Dose:  2 g        Place 2 g onto the skin 3 times daily as needed for moderate pain Apply to R hand   Refills:  0        enoxaparin 40 MG/0.4ML injection   Commonly known as:  LOVENOX   Dose:  40 mg   Quantity:  23 Syringe        Inject 0.4 mLs (40 mg) Subcutaneous every 24 hours   Refills:  0        ENSURE PLUS HN Liqd   Dose:  8 oz        Take 8 oz by mouth 2 times daily   Refills:  0        ferrous gluconate 324 (38 FE) MG tablet   Commonly known as:  FERGON   Dose:  1 tablet        Take 1 tablet by mouth daily (with lunch).   Refills:  0        GLUCOSAMINE-MSM PO   Dose:  2 tablet        Take 2 tablets by mouth daily Glucosamine -625mg   Refills:  0        HAIR/SKIN/NAILS PO   Dose:  1 tablet        Take 1 tablet by mouth daily.   Refills:  0        levothyroxine 75 MCG tablet   Commonly known as:  SYNTHROID/LEVOTHROID   Dose:  75 mcg        Take 75 mcg by mouth daily.   Refills:  0        OLANZapine zydis 5 MG ODT tab   Commonly known as:  zyPREXA   Dose:  5 mg        Take 1 tablet (5 mg) by mouth At Bedtime   Refills:  0        oxyCODONE 5 MG IR tablet   Commonly known as:  ROXICODONE   Dose:  5 mg        Take 1 tablet (5 mg) by mouth every 3 hours as needed for moderate to severe pain   Refills:  0        SUMATRIPTAN SUCCINATE PO   Dose:  25 mg        Take 25 mg by mouth every 8 hours as needed for migraine   Refills:  0        TRAVATAN Z 0.004 % ophthalmic solution   Dose:  1 drop   Generic drug:  travoprost (BAK Free)        Apply 1 drop to eye. In each eye [facility]   Refills:  0        vitamin B-Complex   Dose:  1 tablet        Take 1 tablet by mouth daily.   Refills:  0                Procedures and tests performed during your visit     ABO/Rh type and screen     CBC with platelets differential    Cardiac Continuous Monitoring    Comprehensive metabolic panel    INR    Partial thromboplastin time    Peripheral IV catheter    UA with Microscopic      Orders Needing Specimen Collection     Ordered          03/29/17 1109  Occult blood stool - STAT, Prio: STAT, Needs to be Collected     Scheduled Task Status   03/29/17 1110 Collect Occult blood stool Open   Order Class:  PCU Collect                  Pending Results     No orders found from 3/27/2017 to 3/30/2017.            Pending Culture Results     No orders found from 3/27/2017 to 3/30/2017.             Test Results from your hospital stay     3/29/2017 11:33 AM - Interface, Flexilab Results      Component Results     Component Value Ref Range & Units Status    WBC 7.9 4.0 - 11.0 10e9/L Final    RBC Count 4.06 3.8 - 5.2 10e12/L Final    Hemoglobin 10.2 (L) 11.7 - 15.7 g/dL Final    Hematocrit 34.6 (L) 35.0 - 47.0 % Final    MCV 85 78 - 100 fl Final    MCH 25.1 (L) 26.5 - 33.0 pg Final    MCHC 29.5 (L) 31.5 - 36.5 g/dL Final    RDW 24.6 (H) 10.0 - 15.0 % Final    Platelet Count 219 150 - 450 10e9/L Final    Diff Method Automated Method  Final    % Neutrophils 67.6 % Final    % Lymphocytes 21.1 % Final    % Monocytes 8.5 % Final    % Eosinophils 2.0 % Final    % Basophils 0.5 % Final    % Immature Granulocytes 0.3 % Final    Nucleated RBCs 0 0 /100 Final    Absolute Neutrophil 5.3 1.6 - 8.3 10e9/L Final    Absolute Lymphocytes 1.7 0.8 - 5.3 10e9/L Final    Absolute Monocytes 0.7 0.0 - 1.3 10e9/L Final    Absolute Eosinophils 0.2 0.0 - 0.7 10e9/L Final    Absolute Basophils 0.0 0.0 - 0.2 10e9/L Final    Abs Immature Granulocytes 0.0 0 - 0.4 10e9/L Final    Absolute Nucleated RBC 0.0  Final         3/29/2017 11:41 AM - Interface, Flexilab Results      Component Results     Component Value Ref Range & Units Status    INR 0.90 0.86 - 1.14 Final         3/29/2017 11:41 AM - Interface, Flexilab Results      Component Results      Component Value Ref Range & Units Status    PTT 30 22 - 37 sec Final         3/29/2017 11:54 AM - Interface, Flexilab Results      Component Results     Component Value Ref Range & Units Status    Sodium 140 133 - 144 mmol/L Final    Potassium 4.1 3.4 - 5.3 mmol/L Final    Chloride 104 94 - 109 mmol/L Final    Carbon Dioxide 27 20 - 32 mmol/L Final    Anion Gap 9 3 - 14 mmol/L Final    Glucose 92 70 - 99 mg/dL Final    Urea Nitrogen 19 7 - 30 mg/dL Final    Creatinine 0.64 0.52 - 1.04 mg/dL Final    GFR Estimate >90  Non  GFR Calc   >60 mL/min/1.7m2 Final    GFR Estimate If Black >90   GFR Calc   >60 mL/min/1.7m2 Final    Calcium 8.9 8.5 - 10.1 mg/dL Final    Bilirubin Total 0.2 0.2 - 1.3 mg/dL Final    Albumin 3.0 (L) 3.4 - 5.0 g/dL Final    Protein Total 6.4 (L) 6.8 - 8.8 g/dL Final    Alkaline Phosphatase 107 40 - 150 U/L Final    ALT 19 0 - 50 U/L Final    AST 25 0 - 45 U/L Final         3/29/2017 12:20 PM - Interface, Flexilab Results      Component Results     Component Value Ref Range & Units Status    Color Urine Light Yellow  Final    Appearance Urine Clear  Final    Glucose Urine Negative NEG mg/dL Final    Bilirubin Urine Negative NEG Final    Ketones Urine Negative NEG mg/dL Final    Specific Gravity Urine 1.005 1.003 - 1.035 Final    Blood Urine Negative NEG Final    pH Urine 6.5 5.0 - 7.0 pH Final    Protein Albumin Urine Negative NEG mg/dL Final    Urobilinogen mg/dL Normal 0.0 - 2.0 mg/dL Final    Nitrite Urine Negative NEG Final    Leukocyte Esterase Urine Negative NEG Final    Source Midstream Urine  Final    WBC Urine 1 0 - 2 /HPF Final    RBC Urine <1 0 - 2 /HPF Final    Bacteria Urine Few (A) NEG /HPF Final    Squamous Epithelial /HPF Urine <1 0 - 1 /HPF Final    Mucous Urine Present (A) NEG /LPF Final         3/29/2017 12:12 PM - Interface, Flexilab Results      Component Results     Component    ABO    A    RH(D)     Pos    Antibody Screen    Neg    Test Valid  Only At    [facility]    Specimen Expires    04/01/2017                Clinical Quality Measure: Blood Pressure Screening     Your blood pressure was checked while you were in the emergency department today. The last reading we obtained was  BP: 104/67 . Please read the guidelines below about what these numbers mean and what you should do about them.  If your systolic blood pressure (the top number) is less than 120 and your diastolic blood pressure (the bottom number) is less than 80, then your blood pressure is normal. There is nothing more that you need to do about it.  If your systolic blood pressure (the top number) is 120-139 or your diastolic blood pressure (the bottom number) is 80-89, your blood pressure may be higher than it should be. You should have your blood pressure rechecked within a year by a primary care provider.  If your systolic blood pressure (the top number) is 140 or greater or your diastolic blood pressure (the bottom number) is 90 or greater, you may have high blood pressure. High blood pressure is treatable, but if left untreated over time it can put you at risk for heart attack, stroke, or kidney failure. You should have your blood pressure rechecked by a primary care provider within the next 4 weeks.  If your provider in the emergency department today gave you specific instructions to follow-up with your doctor or provider even sooner than that, you should follow that instruction and not wait for up to 4 weeks for your follow-up visit.        Thank you for choosing [facility]       Thank you for choosing [facility] for your care. Our goal is always to provide you with excellent care. Hearing back from our patients is one way we can continue to improve our services. Please take a few minutes to complete the written survey that you may receive in the mail after you visit with us. Thank you!        [employer] Information     [employer] lets you send messages to your doctor, view your  "test results, renew your prescriptions, schedule appointments and more. To sign up, go to [email or web address] . Click on \"Log in\" on the left side of the screen, which will take you to the Welcome page. Then click on \"Sign up Now\" on the right side of the page.     You will be asked to enter the access code listed below, as well as some personal information. Please follow the directions to create your username and password.     Your access code is: [email or web address]  Expires: 2017  1:06 PM     Your access code will  in 90 days. If you need help or a new code, please call your [facility] clinic or [phone].        Care EveryWhere ID     This is your Care EveryWhere ID. This could be used by other organizations to access your [facility] medical records  [id number]        After Visit Summary       This is your record. Keep this with you and show to your community pharmacist(s) and doctor(s) at your next visit.                  "

## 2017-03-29 NOTE — ED AVS SNAPSHOT
Emergency Department    [address] [place] [address]    Phone:  [phone]    Fax:  [phone]                                       Sherita Kenney   MRN: [id number]    Department:   Emergency Department   Date of Visit:  3/29/2017           After Visit Summary Signature Page     I have received my discharge instructions, and my questions have been answered. I have discussed any challenges I see with this plan with the nurse or doctor.    ..........................................................................................................................................  Patient/Patient Representative Signature      ..........................................................................................................................................  Patient Representative Print Name and Relationship to Patient    ..................................................               ................................................  Date                                            Time    ..........................................................................................................................................  Reviewed by Signature/Title    ...................................................              ..............................................  Date                                                            Time

## 2017-03-29 NOTE — ED NOTES
Bed: ED14  Expected date:   Expected time:   Means of arrival:   Comments:  pat Acuna6 - GI bleed eta 1010

## 2017-03-29 NOTE — PROGRESS NOTES
[facility]    Chief Complaint   Patient presents with     RECHECK     Abnormal Bleeding Problem     passing clots per rectum       HPI:    Sherita Kenney is a 76 year old  (1940), who is being seen today for an episodic care visit at [facility].     Today's concern is:    Adenocarcinoma of colon (H)  S/P ileostomy (H)  S/p I&D of abdominal abscess  Rectal bleeding  Patient was found disoriented with acute psychosis at home, was transferred to [facility] on 2/15. Noted to have a RLQ abdominal mass upon hospital admission 2/15. CT abdomen positive for right iliopsoas abscess. Started on zosyn x 2 weeks and s/p drainage of abscess in IR on 2/15. Abdominal drain since removed. Patient had abdominal distention on 2/23, CT positive for small bowel obstruction. Colorectal surgery consulted. S/p colonoscopy and lap ileostomy 3/1. CT also positive for large mass in cecum with fixation to abdominal wall and lesions on surface of the right liver. Was diagnosed with adenocarcinoma of colon. Had severe protein-calorie malnutrition during hospital stay. Was started on TPN post-operatively 2/24, which was dc'd on 3/7. Requires calorie counts, low fiber diet, and requires strict diet d/t recent s/p ileostomy. Dietary following, states patient is consuming 8955-7841 calories per day. During patient exam, denies pain to abdomen. Has tylenol prn available. Also taking lovenox qd for DVT prophylaxis. Ileostomy intact with adequate output. Admits to improvement in appetite. Dietary following. Oncology following, patient to f/u as directed with Dr. Nieves. Colorectal surgery following, patient to f/u with Dr. Peter on 3/31 to discuss open right colectomy planned for 4/13.     Per nursing staff, patient passing clots per rectum. RN assisted patient to bathroom and witnessed golf ball sized clot, was bright red. No stool noted per nursing staff. Ileostomy intact, with adequate output. Patient admits to  feeling like she had to have a bowel movement when she passed clots. S/p ileostomy 3/1. Patient also admits to right-sided back pain, states had back pain for several days but didn't think to mention it to RN staff until last night when pain increased to 7/10. Patient took tylenol with relief. Today, rates pain 2-3/10 at rest and 6/10 with activity. VS: T99.4, HR 97, /65, O2 96%. Denies having a bowel movement, additional clots per rectum, abdominal pain or discomfort, denies dizziness, fatigue, or urge to have a bowel movement.           ALLERGIES: Wool fiber  Past Medical, Surgical, Family and Social History reviewed and updated in [employer].    Current Outpatient Prescriptions   Medication Sig Dispense Refill     diclofenac (VOLTAREN) 1 % GEL topical gel Place 2 g onto the skin 3 times daily as needed for moderate pain Apply to R hand       Nutritional Supplements (ENSURE PLUS HN) LIQD Take 8 oz by mouth 2 times daily       OLANZapine zydis (ZYPREXA) 5 MG ODT tab Take 1 tablet (5 mg) by mouth At Bedtime       cyclobenzaprine (FLEXERIL) 10 MG tablet Take 1 tablet (10 mg) by mouth 3 times daily 42 tablet      acetaminophen (TYLENOL) 325 MG tablet Take 2 tablets (650 mg) by mouth every 4 hours as needed for mild pain 100 tablet      enoxaparin (LOVENOX) 40 MG/0.4ML injection Inject 0.4 mLs (40 mg) Subcutaneous every 24 hours 23 Syringe 0     SUMATRIPTAN SUCCINATE PO Take 25 mg by mouth every 8 hours as needed for migraine       Multiple Vitamins-Minerals (HAIR/SKIN/NAILS PO) Take 1 tablet by mouth daily.       Travoprost (TRAVATAN Z) 0.004 % SOLN Apply 1 drop to eye. In each eye QHS       levothyroxine (SYNTHROID, LEVOTHROID) 75 MCG tablet Take 75 mcg by mouth daily.       Calcium-Magnesium 333-167 MG TABS Take 2 tablets by mouth daily.       ferrous gluconate (FERGON) 324 (38 FE) MG tablet Take 1 tablet by mouth daily (with lunch).       B-Complex TABS Take 1 tablet by mouth daily.       Medications  "reviewed:  Medications reconciled to facility chart and changes were made to reflect current medications as identified as above med list. Below are the changes that were made:   Medications stopped since last EPIC medication reconciliation:   Medications Discontinued During This Encounter   Medication Reason     Glucosamine Sulfate--500 MG TABS        Medications started since last [facility] medication reconciliation:  Orders Placed This Encounter   Medications     Glucosamine HCl-MSM (GLUCOSAMINE-MSM PO)     Sig: Take 2 tablets by mouth daily Glucosamine -625mg             REVIEW OF SYSTEMS:  4 point ROS including Respiratory, CV, GI and , other than that noted in the HPI,  is negative      Physical Exam:  /74  Pulse 83  Temp 98.4  F (36.9  C)  Resp 16  Ht 5' 6\" (1.676 m)  Wt 114 lb 6.4 oz (51.9 kg)  SpO2 96%  BMI 18.46 kg/m2  GENERAL APPEARANCE: Alert, in no distress, oriented, thin, anxious, cooperative  RESP: Respiratory effort and palpation of chest normal, lungs clear to auscultation , no respiratory distress  CV: Palpation and auscultation of heart done , regular rate and rhythm, no murmur, rub, or gallop, no edema, +2 pedal pulses  ABDOMEN: Normal bowel sounds, soft, nontender, no hepatosplenomegaly or other masses, no guarding or rebound. Ileostomy intact, adequate output. No active bleeding per rectum. Bright red blood noted per nursing.   M/S: No movement to LUE d/t h/o polio. Active ROM in all other extremities. Strong hand grasp to right hand. Gait and station normal.    SKIN: Inspection of skin and subcutaneous tissue baseline, Palpation of skin and subcutaneous tissue baseline. Arthritic changes noted in right hand.  NEURO: Cranial nerves 2-12 are normal tested and grossly at patient's baseline, Examination of sensation by touch normal  PSYCH: Oriented X self and place, insight and judgement impaired, memory impaired, cognitive testing: SLUMS 19/30.      Recent Labs:    CBC " RESULTS:   Recent Labs   Lab Test  03/20/17   0500  03/13/17   0525   WBC  6.4  5.9   RBC  3.72*  3.59*   HGB  8.8*  8.4*   HCT  31.1*  29.3*   MCV  84  82   MCH  23.7*  23.4*   MCHC  28.3*  28.7*   RDW  Dimorphic population - unable to calculate  Dimorphic population - unable to calculate   PLT  323  334       Last Basic Metabolic Panel:  Recent Labs   Lab Test  03/20/17   0500  03/13/17   0525   NA  143  142   POTASSIUM  3.9  4.1   CHLORIDE  107  107   SARITHA  8.7  8.8   CO2  27  27   BUN  11  12   CR  0.69  0.72   GLC  75  73       Liver Function Studies -   Recent Labs   Lab Test  03/06/17   1105  03/04/17   0625  02/27/17   0700   PROTTOTAL  6.3*   --   5.1*   ALBUMIN  2.5*  2.2*  2.1*   BILITOTAL  0.3   --   0.2   ALKPHOS  114   --   65   AST  31   --   29   ALT  19   --   18       TSH   Date Value Ref Range Status   02/15/2017 4.05 (H) 0.40 - 4.00 mU/L Final           Assessment/Plan:    (C18.9) Adenocarcinoma of colon (H)  (primary encounter diagnosis)  (Z93.2) S/P ileostomy (H)  (Z98.890) Status post incision and drainage  (K62.5) Rectal bleeding  Comment: Acute onset of passing clots per rectum. Concern for GI bleed vs r/t colon cancer.  Plan: Send to [facility] ED for further evaluation, concern for GI bleed vs s/p ileostomy vs colon cancer. Hold medication and lovenox until evaluated at ED. Updated Dinora and Sherita THAPA regarding patient's status and plan to be evaluated at [facility] ED.             Time 25 minutes    Electronically signed by  WILLY Brewer CNP

## 2017-03-29 NOTE — CONSULTS
[facility]  Colon and Rectal Surgery Consult Note  Name: Sherita Kenney    MRN: [id number]  YOB: 1940    Age: 76 year old  Date of admission: 3/29/2017  Primary care provider: Latanya Martinez     Requesting Physician:  Rosendo Appiah MD  Reason for consult:  Rectal bleeding           History of Present Illness:   Sherita Kenney is a 76 year old female, seen at the request of Dr. Rosendo Appiah, who presents with rectal bleeding. She was recently hospitalized at [facility] from 2/15-3/9/17 for acute psychosis. During her hospitalization, a right iliopsoas abscess was noted on CT.  This resolved with Zosyn and IR drainage.  She developed a small bowel obstruction and eventually underwent colonoscopy and laparoscopic ileostomy for a cecal mass on 3/1/17 with Dr. Peter. Pathology revealed poorly differentiated adenocarcinoma. She was discharged to a short-term care facility, where she is presently staying.    Ms. Kenney reports that she had the urge to have a bowel movement this morning and passed a very small, brown stool.  She also noted streaks of bright red blood with small clots in the toilet water.  This was the only episode of bleeding or stool per rectum that the patient has experienced post-surgery.  She has not had additional bleeding since this morning.  She has been tolerating her diet.  Her ileostomy has been functioning and she receives assistance from her care providers with emptying her appliance.  She denies fever, chills, weakness, abdominal pain or rectal pain.        Colonoscopy History:  3/1/17 - fungating cecal mass; pathology: poorly differentiated adenocarcinoma            Past Medical History:     Past Medical History:   Diagnosis Date     Cancer (H)              Past Surgical History:     Past Surgical History:   Procedure Laterality Date     COLONOSCOPY N/A 3/1/2017    Procedure: COLONOSCOPY;  Surgeon: Lindsay Peter MD;  Location:  OR  "    ILEOSTOMY N/A 3/1/2017    Procedure: ILEOSTOMY;  Surgeon: Lindsay Peter MD;  Location:  OR     LAPAROSCOPIC ILEOSTOMY N/A 3/1/2017    Procedure: LAPAROSCOPIC ILEOSTOMY;  Surgeon: Lindsay Peter MD;  Location:  OR               Social History:     Social History   Substance Use Topics     Smoking status: Never Smoker     Smokeless tobacco: Not on file     Alcohol use No             Family History:   No family history on file.          Allergies:     Allergies   Allergen Reactions     Wool Fiber Unknown             Medications:       sodium chloride (PF)  3 mL Intracatheter Q8H             Review of Systems:   A comprehensive greater than 10 system review of systems was carried out.  Pertinent positives and negatives are noted above.  Otherwise negative for contributory info.            Physical Exam:     Blood pressure 105/65, pulse 90, temperature 98.5  F (36.9  C), temperature source Oral, resp. rate 16, height 1.702 m (5' 7\"), weight 51.7 kg (114 lb), SpO2 94 %.  No intake or output data in the 24 hours ending 03/29/17 1325  Exam:  General - Awake alert and oriented, appears stated age. Resting in bed.  Pulm - Non-labored breathing with normal respiratory effort  Abd - Soft, non-tender, non-distended. Ileostomy is pink and viable with semi-thick brown output.   Rectal - No evidence of external hemorrhoids, fissure or fistula. Skin does not appear erythematous or irritated. No evidence of bleeding externally. DAVID did not reveal any palpable masses or bleeding.  Neuro - CN II-XII grossly intact  Musculoskeletal - extremeties with no clubbing, cyanosis or edema  Psych - responsive, alert, cooperative; oriented x3; appropriate mood and affect  External/skin - inspection reveals no rashes, lesions or ulcers, normal coloring             Data Reviewed:   No results found for this or any previous visit (from the past 48 hour(s)).      Recent Labs  Lab 03/29/17  1100   WBC 7.9   HGB 10.2*   HCT 34.6* "   MCV 85          Recent Labs  Lab 03/29/17  1100      POTASSIUM 4.1   CHLORIDE 104   CO2 27   ANIONGAP 9   GLC 92   BUN 19   CR 0.64   GFRESTIMATED >90Non  GFR Calc   GFRESTBLACK >90African American GFR Calc   SARITHA 8.9       Recent Labs  Lab 03/29/17  1100   INR 0.90       Recent Labs  Lab 03/29/17  1130   COLOR Light Yellow   APPEARANCE Clear   URINEGLC Negative   URINEBILI Negative   URINEKETONE Negative   SG 1.005   UBLD Negative   URINEPH 6.5   PROTEIN Negative   NITRITE Negative   LEUKEST Negative   RBCU <1   WBCU 1         Assessment and Plan:   Sherita Kenney is a 76 year old female recently to [facility] from 2/15-3/9/17. During her admission, she developed a SBO and underwent colonoscopy and laparoscopic ileostomy for a cecal mass on 3/1/17 with Dr. Peter.  She has been recovering as expected at her care facility until this morning when she passed a small brown stool per rectum with streaks of bright red blood in the toilet. This was the only episode of bleeding. VSS, afebrile. Hgb 10.2 (prev 8.8 on 3/20/17). Ileostomy pink and viable with semi-thick brown output. Denies fever, chills, abdominal or rectal pain.      Plan:  1. Patient is OK to d/c to her care facility from [facility] perspective.  She has a follow-up appointment with Dr. Peter on 3/31/17 and should keep this appointment.  2. Surgery: None indicated at this time.  3. Diet: Continue with low fiber diet.  4. IV Fluids: Not indicated.  5. Antibiotics:  Not indicated.  6. Medications:  No changes.  7. I&O s:  Monitor ileostomy output at care facility.  8. Labs:   - Reviewed: Yes  - Ordered: None   9. Imaging:   - Ordered:  None  10. Activity:  Ambulate as able. No lifting greater than 15-20 pounds.  11. DVT prophylaxis: Finish course of Lovenox.  12. This plan has been discussed with Dr. Peter.    Patient specific identified risk factors considered as part of today s evaluation include: N/A    Additional history obtained from  chart review.  Time spent on consultation: 30 minutes, greater than 50% spent on counseling and/or coordination of care.          Yaa Talley PA-C  [facility]  [phone]

## 2017-03-29 NOTE — ED PROVIDER NOTES
History     Chief Complaint:  Blood in stool    HPI   Sherita Kenney is a 76 year old female with a history of colon cancer who presents via EMS for evaluation of blood in stool. The patient was hospitalized from 2/15-3/9 at [facility] for acute psychosis. Psychiatry felt that this was an unspecified psychotic disorder. During her hospitalization a right iliopsoas abscess was noted on CT. This resolved with Zosyn and drainage per IR. During her hospitalization patient also underwent colonoscopy which noted a large fungating mass in the cecum with fixation to the abdominal wall and 3 lesions on the surface of the right liver. Biopsy showed adenocarcinoma. Patient also underwent ileostomy at that time. Patient has follow up appointments with Dr. Peter of colorectal surgery and Dr. Nieves of oncology. Patient was discharged to nursing facility.     This morning the patient had a bowel movement which was initially brown but she then noted bright red clots, prompting her visit to the ED. She has no ongoing bleeding. The patient denies any fevers, nausea, vomiting or any other symptoms.     Allergies:  No known drug allergies.      Medications:    Glucosamine HCl-MSM (GLUCOSAMINE-MSM PO)  diclofenac (VOLTAREN) 1 % GEL topical gel  Nutritional Supplements (ENSURE PLUS HN) LIQD  OLANZapine zydis (ZYPREXA) 5 MG ODT tab  cyclobenzaprine (FLEXERIL) 10 MG tablet  oxyCODONE (ROXICODONE) 5 MG IR tablet  acetaminophen (TYLENOL) 325 MG tablet  enoxaparin (LOVENOX) 40 MG/0.4ML injection  SUMATRIPTAN SUCCINATE PO  Multiple Vitamins-Minerals (HAIR/SKIN/NAILS PO)  Travoprost (TRAVATAN Z) 0.004 % SOLN  levothyroxine (SYNTHROID, LEVOTHROID) 75 MCG tablet  Calcium-Magnesium 333-167 MG TABS  ferrous gluconate (FERGON) 324 (38 FE) MG tablet  B-Complex TABS    Past Medical History:    Colon cancer  Psychosis  Malnutrition   Osteoarthritis  Polio syndrome      Past Surgical History:    Ileostomy     Family History:    No family  "history on file.    Social History:  Marital Status:  Single   Smoking status: Never smoker  Alcohol use: No   Presents to the ED with her sister     Review of Systems   Constitutional: Negative for chills and fever.   Gastrointestinal: Positive for blood in stool. Negative for abdominal pain, diarrhea, nausea and vomiting.   All other systems reviewed and are negative.      Physical Exam     Patient Vitals for the past 24 hrs:   BP Temp Temp src Pulse Heart Rate Resp SpO2 Height Weight   03/29/17 1357 - - - - - 16 - - -   03/29/17 1337 104/67 - - - 89 - 96 % - -   03/29/17 1336 - - - - - - 95 % - -   03/29/17 1335 104/67 - - - - - - - -   03/29/17 1100 105/65 - - - - - - - -   03/29/17 1031 - - - 90 - - 94 % - -   03/29/17 1030 115/68 - - - - - 95 % - -   03/29/17 1024 - 98.5  F (36.9  C) Oral - - - - - -   03/29/17 1022 (!) 114/91 - - - - 16 - 1.702 m (5' 7\") 51.7 kg (114 lb)       Physical Exam  General: Alert, interactive in mild distress  Head:  Scalp is atraumatic  Eyes:  The pupils are equal, round, and reactive to light    EOM's intact    No scleral icterus  ENT:      Nose:  The external nose is normal  Ears:  External ears are normal  Mouth/Throat: The oropharynx is normal    Mucus membranes are moist      Neck:  Normal range of motion.      There is no rigidity.    Trachea is in the midline         CV:  Regular rate and rhythm    No murmur   Resp:  Breath sounds are clear bilaterally    Non-labored, no retractions or accessory muscle use      GI:  Abdomen is soft, no distension, no tenderness.  Ostomy in right abdomen.  Rectal: Per anoscopy exam; No focal source of bleeding. Bright red blood in rectal vault. No obviously identified rectal mass.     Skin:  Warm and dry, No rash or lesions noted.  Psych:  Awake. Alert.  Normal affect.      Appropriate interactions.    Emergency Department Course     Laboratory:  CBC:  WBC 7.9, HGB 10.2 (L),    CMP: Albumin 3.0 (L), protein total 6.4 (L), otherwise WNL " (Creatinine 0.64)  INR: 0.90  PTT: 30  Type and screen: A positive    UA: Clear light yellow urine, few bacteria, mucous present, otherwise WNL     Procedures:    PROCEDURE NOTE: Anoscopy  Indications: Bleeding from rectum, evaluate for hemorrhoids, fissures, proctitis  Consent: Verbal  Description of Procedure:  The patient was placed in the lateral decubitus position.  The anoscope was introduced and the introducer removed.  The scope was slowly withdrawn.  The findings included no focal source of bleeding. Bright red blood in rectal vault. No obviously identified rectal masses.  The procedure was terminated. The patient tolerated the procedure well, there were no complications.      Interventions:  Normal Saline, 1 liter, IV bolus      Emergency Department Course:  The patient arrived in the emergency department via EMS.   Nursing notes and vitals reviewed.  (1032) I performed an exam of the patient as documented above.     Blood was drawn from the patient. This was sent for laboratory testing, findings above.   Urine sample was obtained and sent for laboratory analysis, findings above.     (1248) I consulted with Dr. River, on call for colorectal surgery.    I performed am anoscopy as documented above.     (1320) I spoke with Yaa Glasgow, the PA on call for colorectal surgery, after she evaluated the patient and spoke with Dr. Peter.     (1335) Findings and plan explained to the patient. Patient discharged home with instructions regarding supportive care, medications, and reasons to return. The importance of close follow-up was reviewed.      Impression & Plan      Medical Decision Making:  Sherita Kenney is a 76 year old female who was seen and evaluated. She was also evaluated by the PA from colorectal surgery. Her HGB is improved from prior. She is hemodynamically stable. Anoscopy just shows a small amount of blood in the rectal vault but no active bleeding. She had no further episodes of  hematochezia here. After consultation with colorectal surgery they feel comfortable with her discharge to home. They are going to see the patient in the office in 2 days. Patient was in agreement with this plan as well. Otherwise labs are unremarkable and patient was subsequently discharged to home.     Diagnosis:    ICD-10-CM   1. Hematochezia K92.1       Disposition:  Patient is discharged to home.       Jacky Lewis  3/29/2017    EMERGENCY DEPARTMENT    IJacky, linsey serving as a scribe on 3/29/2017 at 10:32 AM to personally document services performed by Dr. Appiah based on my observations and the provider's statements to me.       Rosendo Appiah MD  03/29/17 1590

## 2017-03-29 NOTE — DISCHARGE INSTRUCTIONS
Evaluating and Treating Rectal Bleeding  To find the site and cause of your bleeding, you will have a physical exam. You will be asked about your health history. Tests may be done to help confirm the diagnosis and plan your treatment.     As part of your evaluation, a flexible sigmoidoscopy or colonoscopy may be done. They may add an upper endoscopy if the stools are darker.    Tests you may have  Any of these procedures may be done:    Stool sample. A small amount of your stool will be checked for blood.    Sigmoidoscopy. This test examines your rectum and sigmoid colon using a lighted tube. Most often, sedating (relaxing) medication is not needed.    Colonoscopy. This test looks at your rectum and entire colon. You may be given medication through an IV line to help you relax.    Barium enema. An X-ray test is done to view your colon. A chalky liquid containing barium is passed through the rectum and into the colon. This liquid enhances the X-ray images taken of your colon.    Upper endoscopy. This test checks your esophagus, stomach, and upper small intestine. It is done in cases of rectal bleeding with other symptoms like low blood pressure  and rapid heartbeat. This test may also be done if your stools are dark black and tarry.  Your treatment plan  Your treatment depends on the cause of your rectal bleeding. Your doctor will make a plan that s right for you. Sometimes, rectal bleeding stops on its own. If it does, be sure to see your doctor to check that the problem wasn t serious.  What you can do  Follow all your doctor s instructions. Keep working with your doctor after your treatment. Make and keep your follow-up visits. If you have more rectal bleeding, call your doctor. It may be a sign of the same or another health problem.     [id number] [employer]. [address] [place] [address], [place] [address]. All rights reserved. This information is not intended as a substitute for professional medical  care. Always follow your healthcare professional's instructions.

## 2017-03-30 ENCOUNTER — NURSING HOME VISIT (OUTPATIENT)
Dept: GERIATRICS | Facility: CLINIC | Age: 77
End: 2017-03-30
Payer: COMMERCIAL

## 2017-03-30 ENCOUNTER — HOSPITAL LABORATORY (OUTPATIENT)
Dept: OTHER | Facility: CLINIC | Age: 77
End: 2017-03-30

## 2017-03-30 VITALS
SYSTOLIC BLOOD PRESSURE: 97 MMHG | HEIGHT: 66 IN | RESPIRATION RATE: 16 BRPM | WEIGHT: 114.4 LBS | TEMPERATURE: 97.9 F | DIASTOLIC BLOOD PRESSURE: 63 MMHG | BODY MASS INDEX: 18.39 KG/M2 | HEART RATE: 90 BPM | OXYGEN SATURATION: 99 %

## 2017-03-30 DIAGNOSIS — K62.5 RECTAL BLEEDING: ICD-10-CM

## 2017-03-30 DIAGNOSIS — Z98.890 STATUS POST INCISION AND DRAINAGE: ICD-10-CM

## 2017-03-30 DIAGNOSIS — D50.8 OTHER IRON DEFICIENCY ANEMIA: ICD-10-CM

## 2017-03-30 DIAGNOSIS — Z93.2 S/P ILEOSTOMY (H): ICD-10-CM

## 2017-03-30 DIAGNOSIS — C18.9 ADENOCARCINOMA OF COLON (H): Primary | ICD-10-CM

## 2017-03-30 LAB
ERYTHROCYTE [DISTWIDTH] IN BLOOD BY AUTOMATED COUNT: 24.6 % (ref 10–15)
HCT VFR BLD AUTO: 31.5 % (ref 35–47)
HGB BLD-MCNC: 9.1 G/DL (ref 11.7–15.7)
MCH RBC QN AUTO: 24.7 PG (ref 26.5–33)
MCHC RBC AUTO-ENTMCNC: 28.9 G/DL (ref 31.5–36.5)
MCV RBC AUTO: 85 FL (ref 78–100)
PLATELET # BLD AUTO: 221 10E9/L (ref 150–450)
RBC # BLD AUTO: 3.69 10E12/L (ref 3.8–5.2)
WBC # BLD AUTO: 7 10E9/L (ref 4–11)

## 2017-03-30 PROCEDURE — 99309 SBSQ NF CARE MODERATE MDM 30: CPT | Performed by: NURSE PRACTITIONER

## 2017-03-30 PROCEDURE — 99207 ZZC CDG-UP CODE -MED NECESSITY: CPT | Performed by: NURSE PRACTITIONER

## 2017-03-30 NOTE — PROGRESS NOTES
[facility]    Chief Complaint   Patient presents with     Nursing Home Acute       HPI:    Sherita Kenney is a 76 year old  (1940), who is being seen today for an episodic care visit at [facility].     Today's concern is:    Adenocarcinoma of colon (H)  S/P ileostomy (H)  S/p I&D of abdominal abscess  Rectal bleeding  Anemia  Patient was found disoriented with acute psychosis at home, was transferred to [facility] on 2/15. Noted to have a RLQ abdominal mass upon hospital admission 2/15. CT abdomen positive for right iliopsoas abscess. Started on zosyn x 2 weeks and s/p drainage of abscess in IR on 2/15. Abdominal drain since removed. Patient had abdominal distention on 2/23, CT positive for small bowel obstruction. Colorectal surgery consulted. S/p colonoscopy and lap ileostomy 3/1. CT also positive for large mass in cecum with fixation to abdominal wall and lesions on surface of the right liver. Was diagnosed with adenocarcinoma of colon. Had severe protein-calorie malnutrition during hospital stay. Was started on TPN post-operatively 2/24, which was dc'd on 3/7. Requires calorie counts, low fiber diet, and requires strict diet d/t recent s/p ileostomy. Dietary following, states patient is consuming [id number] calories per day. During patient exam, denies pain to abdomen. Has tylenol prn available. Also taking lovenox qd for DVT prophylaxis. Anemia r/t surgery and iron deficiency, currently taking ferrous gluconate 324mg qd. Hgb range since at U: 8.1-10.2. Last Hgb was 10.2 on 3/29. Ileostomy intact with adequate output. Admits to improvement in appetite. Dietary following. Oncology following, patient to f/u as directed with Dr. Nieves. Colorectal surgery following, patient to f/u with Dr. Peter on 3/31 to discuss open right colectomy planned for 4/13.       Patient started to pass clots per rectum 3/29. No stool noted per RN. Concern for GI bleed vs colon cancer. Ileostomy intact  "with adequate output. Was sent to [facility] ED for further evaluation. Colon & Rectal Surgery team was consulted during ED visit. Anoscopy completed in ED, no source of active bleeding, found bright red blood in rectal vault with no identified rectal masses. Patient remained stable w/o active sx of bleeding and was cleared to be discharged back to TCU on 3/29. Since patient has been back to TCU, no additional reports of bleeding. Today, RN reports new finding regarding color changes near stoma. During patient exam, appears to be dusky/gray coloring of surrounding tissue near stoma, concerning for necrosis. Updated MAK Mon from [facility] and discussed findings. Plan for patient to be seen by Dr. Peter 3/31 and RN staff to continue to monitor and will update if stoma changes to dusky color. Currently, appearance of stoma is healthy with pink tissue and ileostomy is producing adequate output.         REVIEW OF SYSTEMS:  4 point ROS including Respiratory, CV, GI and , other than that noted in the HPI,  is negative      BP 97/63  Pulse 90  Temp 97.9  F (36.6  C)  Resp 16  Ht 5' 6\" (1.676 m)  Wt 114 lb 6.4 oz (51.9 kg)  SpO2 99%  BMI 18.46 kg/m2  GENERAL APPEARANCE: Alert, in no distress, oriented, thin, anxious, cooperative  RESP: Respiratory effort and palpation of chest normal, lungs clear to auscultation , no respiratory distress  CV: Palpation and auscultation of heart done , regular rate and rhythm, no murmur, rub, or gallop, no edema, +2 pedal pulses  ABDOMEN: Normal bowel sounds, soft, nontender, no hepatosplenomegaly or other masses, no guarding or rebound. Ileostomy intact, adequate output. No active bleeding per rectum. Bright red blood noted per nursing.   M/S: No movement to LUE d/t h/o polio. Active ROM in all other extremities. Strong hand grasp to right hand. Gait and station normal.   SKIN: Inspection of skin and subcutaneous tissue baseline, Palpation of skin and subcutaneous " tissue baseline. Arthritic changes noted in right hand.  NEURO: Cranial nerves 2-12 are normal tested and grossly at patient's baseline, Examination of sensation by touch normal  PSYCH: Oriented X self and place, insight and judgement impaired, memory impaired, cognitive testing: SLUMS 19/30.        ASSESSMENT/PLAN:    (C18.9) Adenocarcinoma of colon (H)  (primary encounter diagnosis)  (Z93.2) S/P ileostomy (H)  (Z98.890) Status post incision and drainage  (K62.5) Rectal bleeding  (D50.8) Other iron deficiency anemia  Comment: No reports of rectal bleeding since 3/29. Ileostomy intact with adequate output, stoma pink. Hgb stable.   Plan: Updated MAK Mon with colorectal surgery regarding new finding of grey/blue coloring surrounding stoma. Stoma appearance appears healthy, pink tissue. Discussed treatment plan, RN staff will continue to monitor and will update colorectal surgery team if changes occur. Patient has appt with Dr. Peter 3/31 at 9am.   Recheck Hgb 4/3.         Time 25 minutes    WILLY Brewer CNP

## 2017-03-31 ENCOUNTER — TRANSFERRED RECORDS (OUTPATIENT)
Dept: HEALTH INFORMATION MANAGEMENT | Facility: CLINIC | Age: 77
End: 2017-03-31

## 2017-04-01 PROBLEM — K62.5 RECTAL BLEEDING: Status: ACTIVE | Noted: 2017-04-01

## 2017-04-02 ENCOUNTER — TELEPHONE (OUTPATIENT)
Dept: GERIATRICS | Facility: CLINIC | Age: 77
End: 2017-04-02

## 2017-04-03 ENCOUNTER — HOSPITAL LABORATORY (OUTPATIENT)
Dept: OTHER | Facility: CLINIC | Age: 77
End: 2017-04-03

## 2017-04-03 LAB
ANION GAP SERPL CALCULATED.3IONS-SCNC: 7 MMOL/L (ref 3–14)
BUN SERPL-MCNC: 18 MG/DL (ref 7–30)
CALCIUM SERPL-MCNC: 8.6 MG/DL (ref 8.5–10.1)
CHLORIDE SERPL-SCNC: 107 MMOL/L (ref 94–109)
CO2 SERPL-SCNC: 27 MMOL/L (ref 20–32)
CREAT SERPL-MCNC: 0.7 MG/DL (ref 0.52–1.04)
ERYTHROCYTE [DISTWIDTH] IN BLOOD BY AUTOMATED COUNT: 22.6 % (ref 10–15)
GFR SERPL CREATININE-BSD FRML MDRD: 81 ML/MIN/1.7M2
GLUCOSE SERPL-MCNC: 79 MG/DL (ref 70–99)
HBA1C MFR BLD: 4.3 % (ref 4.3–6)
HCT VFR BLD AUTO: 33.1 % (ref 35–47)
HGB BLD-MCNC: 9.8 G/DL (ref 11.7–15.7)
MCH RBC QN AUTO: 25.4 PG (ref 26.5–33)
MCHC RBC AUTO-ENTMCNC: 29.6 G/DL (ref 31.5–36.5)
MCV RBC AUTO: 86 FL (ref 78–100)
PLATELET # BLD AUTO: 222 10E9/L (ref 150–450)
POTASSIUM SERPL-SCNC: 4 MMOL/L (ref 3.4–5.3)
RBC # BLD AUTO: 3.86 10E12/L (ref 3.8–5.2)
SODIUM SERPL-SCNC: 141 MMOL/L (ref 133–144)
T4 SERPL-MCNC: 10.9 UG/DL (ref 4.5–13.9)
TSH SERPL DL<=0.05 MIU/L-ACNC: 2.3 MU/L (ref 0.4–4)
WBC # BLD AUTO: 6.1 10E9/L (ref 4–11)

## 2017-04-04 ENCOUNTER — NURSING HOME VISIT (OUTPATIENT)
Dept: GERIATRICS | Facility: CLINIC | Age: 77
End: 2017-04-04
Payer: COMMERCIAL

## 2017-04-04 ENCOUNTER — ONCOLOGY VISIT (OUTPATIENT)
Dept: ONCOLOGY | Facility: CLINIC | Age: 77
End: 2017-04-04
Attending: INTERNAL MEDICINE
Payer: COMMERCIAL

## 2017-04-04 ENCOUNTER — HOSPITAL ENCOUNTER (OUTPATIENT)
Dept: WOUND CARE | Facility: CLINIC | Age: 77
Discharge: HOME | End: 2017-04-04
Attending: COLON & RECTAL SURGERY | Admitting: COLON & RECTAL SURGERY
Payer: MEDICARE

## 2017-04-04 VITALS
HEIGHT: 64 IN | RESPIRATION RATE: 16 BRPM | WEIGHT: 116.2 LBS | TEMPERATURE: 97.6 F | BODY MASS INDEX: 19.84 KG/M2 | DIASTOLIC BLOOD PRESSURE: 76 MMHG | HEART RATE: 86 BPM | SYSTOLIC BLOOD PRESSURE: 118 MMHG | OXYGEN SATURATION: 96 %

## 2017-04-04 VITALS
TEMPERATURE: 97.6 F | DIASTOLIC BLOOD PRESSURE: 64 MMHG | SYSTOLIC BLOOD PRESSURE: 122 MMHG | HEART RATE: 77 BPM | WEIGHT: 114.8 LBS | OXYGEN SATURATION: 99 % | BODY MASS INDEX: 18.45 KG/M2 | HEIGHT: 66 IN | RESPIRATION RATE: 16 BRPM

## 2017-04-04 DIAGNOSIS — C18.9 ADENOCARCINOMA OF COLON (H): Primary | ICD-10-CM

## 2017-04-04 DIAGNOSIS — R41.89 COGNITIVE IMPAIRMENT: ICD-10-CM

## 2017-04-04 DIAGNOSIS — R53.81 PHYSICAL DECONDITIONING: ICD-10-CM

## 2017-04-04 DIAGNOSIS — E43 SEVERE PROTEIN-CALORIE MALNUTRITION (H): ICD-10-CM

## 2017-04-04 DIAGNOSIS — Z93.2 S/P ILEOSTOMY (H): ICD-10-CM

## 2017-04-04 DIAGNOSIS — Z98.890 STATUS POST INCISION AND DRAINAGE: ICD-10-CM

## 2017-04-04 DIAGNOSIS — D50.8 OTHER IRON DEFICIENCY ANEMIA: ICD-10-CM

## 2017-04-04 PROCEDURE — 99211 OFF/OP EST MAY X REQ PHY/QHP: CPT

## 2017-04-04 PROCEDURE — 99207 ZZC CDG-CORRECTLY CODED, REVIEWED AND AGREE: CPT | Performed by: NURSE PRACTITIONER

## 2017-04-04 PROCEDURE — 99214 OFFICE O/P EST MOD 30 MIN: CPT | Performed by: INTERNAL MEDICINE

## 2017-04-04 PROCEDURE — 99309 SBSQ NF CARE MODERATE MDM 30: CPT | Performed by: NURSE PRACTITIONER

## 2017-04-04 PROCEDURE — 40000901 ZZH STATISTIC WOC PT EDUCATION, 0-15 MIN

## 2017-04-04 ASSESSMENT — PAIN SCALES - GENERAL: PAINLEVEL: MODERATE PAIN (5)

## 2017-04-04 NOTE — PROGRESS NOTES
[place] [facility]    Chief Complaint   Patient presents with     Nursing Home Acute       HPI:    Sherita Kenney is a 76 year old  (1940), who is being seen today for an episodic care visit at [facility] (formerly [facility]3].     Today's concern is:    Adenocarcinoma of colon (H)  S/P ileostomy (H)  S/p I&D of abdominal abscess  Severe protein-calorie malnutrition (H)  Patient was found disoriented with acute psychosis at home, was transferred to [facility] on 2/15. Noted to have a RLQ abdominal mass upon hospital admission 2/15. CT abdomen positive for right iliopsoas abscess. Started on zosyn x 2 weeks and s/p drainage of abscess in IR on 2/15. Abdominal drain since removed. Patient had abdominal distention on 2/23, CT positive for small bowel obstruction. Colorectal surgery consulted. S/p colonoscopy and lap ileostomy 3/1. CT also positive for large mass in cecum with fixation to abdominal wall and lesions on surface of the right liver. Was diagnosed with adenocarcinoma of colon. Had severe protein-calorie malnutrition during hospital stay. Was started on TPN post-operatively 2/24, which was dc'd on 3/7. Last albumin was 3.0 and protein total was 6.4 on 3/29. Requires calorie counts, low fiber diet, and requires strict diet d/t recent s/p ileostomy. Dietary following, states patient is consuming 1577-1989 calories per day. During patient exam, denies pain to abdomen. Has tylenol prn available. Also taking lovenox qd for DVT prophylaxis. Ileostomy intact with adequate output. Admits to improvement in appetite. Dietary following.      Patient started to pass clots per rectum 3/29. No stool noted per RN. Was sent to [facility] ED for further evaluation. Colon & Rectal Surgery team was consulted during ED visit. Anoscopy completed in ED, no source of active bleeding, found bright red blood in rectal vault with no identified rectal masses. Patient remained stable w/o active sx of bleeding and was cleared to be  discharged back to TCU on 3/29. Per [place]-rectal surgery team aware, rectal bleeding is to be expected d/t colon cancer and recommend monitoring. During patient exam, ileostomy intact with adequate output. Patient reports ability to perform daily self cares for ileostomy appliance, continues to need assistance with appliance changes. Patient reports passing additional clots over the weekend. No sx of active bleeding. Oncology following, patient to f/u with Dr. Nieves 4/4. Also has an appt with ostomy RN 4/4 for appliance recommendations. Colorectal surgery following, patient has planned open right colectomy with Dr. Peter on 4/13.       Other iron deficiency anemia  Hgb was 5.6 on 2/15 r/t colon mass. Transfused with 2 units of PRBCs on 2/16, along with IV iron infusion. Recent Hgb baseline 8-9. Last Hgb was 9.8 on 4/3. Currently taking ferrous gluconate 324mg qd. No active sx of bleeding. Denies fatigue, SOB.       Cognitive Impairment  Physical deconditioning  During hospital stay, [facility] 8/30 on 2/17. Was seen by Psychiatry inpatient, was diagnosed with acute unspecified psychosis. Patient w/o history of mental illness in the past or diagnosed with dementia. Since at TCU, SLUMS 19/30. Oriented to self and place. Patient is pleasantly confused, has poor short term memory. No reports of agitation or behavioral disturbances by RN staff. No active psychosis. Referral to Neuropsychologist d/t cognitive impairment and diagnosis of unspecified psychosis per request of patient's POA (Danay). PTA patient was living alone. Patient is unable to return home at this time d/t limited ability to care for ileostomy by self d/t postpolio syndrome affecting left extremity and arthritis impairing abilities of right hand. SW following for discharge planning.        ALLERGIES: Wool fiber  Past Medical, Surgical, Family and Social History reviewed and updated in [facility].    Current Outpatient Prescriptions   Medication Sig Dispense  "Refill     Amino Acids-Protein Hydrolys (PRO-STAT AWC PO) Take 1 oz by mouth 2 times daily       Cyclobenzaprine HCl (FLEXERIL PO) Take 10 mg by mouth 3 times daily as needed for muscle spasms       diclofenac (VOLTAREN) 1 % GEL topical gel Place 2 g onto the skin 3 times daily as needed for moderate pain Apply to R hand       Nutritional Supplements (ENSURE PLUS HN) LIQD Take 8 oz by mouth 2 times daily       OLANZapine zydis (ZYPREXA) 5 MG ODT tab Take 1 tablet (5 mg) by mouth At Bedtime       acetaminophen (TYLENOL) 325 MG tablet Take 2 tablets (650 mg) by mouth every 4 hours as needed for mild pain 100 tablet      enoxaparin (LOVENOX) 40 MG/0.4ML injection Inject 0.4 mLs (40 mg) Subcutaneous every 24 hours 23 Syringe 0     SUMATRIPTAN SUCCINATE PO Take 25 mg by mouth every 8 hours as needed for migraine       Travoprost (TRAVATAN Z) 0.004 % SOLN Apply 1 drop to eye. In each eye QHS       levothyroxine (SYNTHROID, LEVOTHROID) 75 MCG tablet Take 75 mcg by mouth daily.       Calcium-Magnesium 333-167 MG TABS Take 2 tablets by mouth daily.       ferrous gluconate (FERGON) 324 (38 FE) MG tablet Take 1 tablet by mouth daily (with lunch).       B-Complex TABS Take 1 tablet by mouth daily.       Medications reviewed:  Medications reconciled to facility chart and changes were made to reflect current medications as identified as above med list. Below are the changes that were made:   Medications stopped since last EPIC medication reconciliation:   There are no discontinued medications.    Medications started since last [facility] medication reconciliation:  No orders of the defined types were placed in this encounter.        REVIEW OF SYSTEMS:  4 point ROS including Respiratory, CV, GI and , other than that noted in the HPI,  is negative      Physical Exam:  /64  Pulse 77  Temp 97.6  F (36.4  C)  Resp 16  Ht 5' 6\" (1.676 m)  Wt 114 lb 12.8 oz (52.1 kg)  SpO2 99%  BMI 18.53 kg/m2  GENERAL APPEARANCE: Alert, in no " distress, oriented, thin, anxious, cooperative  RESP: Respiratory effort and palpation of chest normal, lungs clear to auscultation , no respiratory distress  CV: Palpation and auscultation of heart done , regular rate and rhythm, no murmur, rub, or gallop, no edema, +2 pedal pulses  ABDOMEN: Normal bowel sounds, soft, nontender, no hepatosplenomegaly or other masses, no guarding or rebound. Ileostomy intact, adequate output. No active bleeding per rectum.   M/S: No movement to LUE d/t h/o polio. Active ROM in all other extremities. Strong hand grasp to right hand. Gait and station normal.   SKIN: Inspection of skin and subcutaneous tissue baseline, Palpation of skin and subcutaneous tissue baseline. Arthritic changes noted in right hand.  NEURO: Cranial nerves 2-12 are normal tested and grossly at patient's baseline, Examination of sensation by touch normal  PSYCH: Oriented X self and place, insight and judgement impaired, memory impaired, cognitive testing: SLUMS 19/30.      Recent Labs:    CBC RESULTS:   Recent Labs   Lab Test  04/03/17   0500  03/30/17   0500   WBC  6.1  7.0   RBC  3.86  3.69*   HGB  9.8*  9.1*   HCT  33.1*  31.5*   MCV  86  85   MCH  25.4*  24.7*   MCHC  29.6*  28.9*   RDW  22.6*  24.6*   PLT  222  221       Last Basic Metabolic Panel:  Recent Labs   Lab Test  04/03/17   0500  03/29/17   1100   NA  141  140   POTASSIUM  4.0  4.1   CHLORIDE  107  104   SARITHA  8.6  8.9   CO2  27  27   BUN  18  19   CR  0.70  0.64   GLC  79  92       Liver Function Studies -   Recent Labs   Lab Test  03/29/17   1100  03/06/17   1105   PROTTOTAL  6.4*  6.3*   ALBUMIN  3.0*  2.5*   BILITOTAL  0.2  0.3   ALKPHOS  107  114   AST  25  31   ALT  19  19       TSH   Date Value Ref Range Status   04/03/2017 2.30 0.40 - 4.00 mU/L Final   02/15/2017 4.05 (H) 0.40 - 4.00 mU/L Final     Lab Results   Component Value Date    A1C 4.3 04/03/2017    A1C 4.4 03/29/2017         Assessment/Plan:    (C18.9) Adenocarcinoma of colon (H)   (primary encounter diagnosis)  (Z93.2) S/P ileostomy (H)  (Z98.890) Status post incision and drainage  (E43) Severe protein-calorie malnutrition (H)  Comment: Continues to have intermittent rectal bleeding. No blood noted per ileostomy. Ileostomy intact with adequate output.   Plan: F/u with Ostomy RN for appliance recommendations 4/4. Oncology following, f/u appt 4/4. Open right colectomy surgery planned with Dr. Peter on 4/13. Monitor for additional occurrences of rectal bleeding.     (D50.8) Other iron deficiency anemia  Comment: Hgb stable with additional reports of intermittent rectal bleeding.  Plan: Monitor CBC.     (R41.89) Cognitive impairment  (R53.81) Physical deconditioning  Comment: Ongiong  Plan: Encourage independence with ileostomy cares. Schedule appt with Neuropsychologist. SW following for discharge planning.           Time 25 minutes    Electronically signed by  WILLY Brewer CNP

## 2017-04-04 NOTE — PROGRESS NOTES
"[facility] Nurse Outpatient Ostomy Assessment      Initial Assessment of ostomy and needs for:       Data:   History:   Per MD note(s):   3-1-17   Lap ileostomy for Large fungating mass in cecum with fixation to abdominal wall      4-4-17: pt referred by Dr. Peter for prosthetic refitting.  Pt and medical POA present. Pt residing @ [facility].  Scheduled for exploratory surgery on 4-13-17 and hoping for reversal of ileostomy.               Leakage of pouching system x 1. Pt is not emptying appliance but not changing appliance secondary to LUE hemiparesis.                 Appliance system: Trenton New Image convex with lock no roll drainable pouch    Type of ostomy: Lap ileostomy  Stoma assessment:   ? Stoma decrease to  1 1/4\", rounded, red, moist, protrudes, lumen @ apex  Mucocutaneous Junction (MCJ): Intact with sutures  Peristomal skin:  Intact, with small amt of macertions  Abdominal assessment: rounded and soft    Output: moderate mushy green output                Current pouching system: Trenton NI convex (57mm) cut to fit convex with drainable lock n roll pouch      Diet:       Active Diet Order   Low fiber    Labs:   Recent Labs   Lab Test  04/03/17   0500   03/29/17   1100   ALBUMIN   --    --   3.0*   HGB  9.8*   < >  10.2*   INR   --    --   0.90   WBC  6.1   < >  7.9   A1C  4.3   --    --     < > = values in this interval not displayed.               Intervention:     Patient's chart evaluated.      Assessments done today:  Stoma, pouching system     Prosthetic refitting: [place] Premier 1 1/4 convex with drainable lock n roll pouch    Education: Reviewed surgery. Pt forgetful.      Prepared for discharge by: Samples with patient         Assessment:     Stoma assessment: viable, functioning    Prosthetic refitting: needs continued use of Trenton Premier 1 1/4\" convex with lock n roll drainable pouch           Plan:     Plan:   ? Current pouching system: Trial Vance Premier 1 " "1/4\" convex with lock n roll drainable pouch.  ?  prepare for discharge: Review discharge instructions and samples with client.         Face to face time: 15 minutes  WOC will F/U post op on 4-14-17  "

## 2017-04-04 NOTE — MR AVS SNAPSHOT
"              After Visit Summary   4/4/2017    Sherita Kenney    MRN: [id number]           Patient Information     Date Of Birth          1940        Visit Information        Provider Department      4/4/2017 11:00 AM Susan Lindo MD [facility]        Care Instructions    FU in mid May'17.        Follow-ups after your visit        Your next 10 appointments already scheduled     Apr 13, 2017   Procedure with Lindsay Peter MD   [facility] (--)    [address]  [facility] [id number] [phone]            May 15, 2017  2:00 PM CDT   Return Visit with Susan Lindo MD   [facility] ([facility])    [facility]  [address]  [facility] [id number] [phone]              Who to contact     If you have questions or need follow up information about today's clinic visit or your schedule please contact [facility] directly at [phone].  Normal or non-critical lab and imaging results will be communicated to you by [employer], letter or phone within 4 business days after the clinic has received the results. If you do not hear from us within 7 days, please contact the clinic through [employer] or phone. If you have a critical or abnormal lab result, we will notify you by phone as soon as possible.  Submit refill requests through [employer] or call your pharmacy and they will forward the refill request to us. Please allow 3 business days for your refill to be completed.          Additional Information About Your Visit        [employer] Information     [employer] lets you send messages to your doctor, view your test results, renew your prescriptions, schedule appointments and more. To sign up, go to [email or web address] . Click on \"Log in\" on the left side of the screen, which will take you to the Welcome page. Then click on \"Sign up Now\" on the right side of the page.     You will be asked " "to enter the access code listed below, as well as some personal information. Please follow the directions to create your username and password.     Your access code is: [email or web address]  Expires: 2017  1:06 PM     Your access code will  in 90 days. If you need help or a new code, please call your [facility] or [phone].        Care EveryWhere ID     This is your Care EveryWhere ID. This could be used by other organizations to access your [place] medical records  [id number]        Your Vitals Were     Pulse Temperature Respirations Height Pulse Oximetry BMI (Body Mass Index)    86 97.6  F (36.4  C) (Oral) 16 1.626 m (5' 4\") 96% 19.95 kg/m2       Blood Pressure from Last 3 Encounters:   17 118/76   17 122/64   17 97/63    Weight from Last 3 Encounters:   17 52.7 kg (116 lb 3.2 oz)   17 52.1 kg (114 lb 12.8 oz)   17 51.9 kg (114 lb 6.4 oz)              Today, you had the following     No orders found for display       Primary Care Provider Office Phone # Fax #    Latanya Martinez [phone]       [facility] [address]  [facility] [address]        Thank you!     Thank you for choosing [facility]  for your care. Our goal is always to provide you with excellent care. Hearing back from our patients is one way we can continue to improve our services. Please take a few minutes to complete the written survey that you may receive in the mail after your visit with us. Thank you!             Your Updated Medication List - Protect others around you: Learn how to safely use, store and throw away your medicines at www.disposemymeds.org.          This list is accurate as of: 17 11:33 AM.  Always use your most recent med list.                   Brand Name Dispense Instructions for use    acetaminophen 325 MG tablet    TYLENOL    100 tablet    Take 2 tablets (650 mg) by mouth every 4 hours as needed for mild pain       " Calcium-Magnesium 333-167 MG Tabs      Take 2 tablets by mouth daily.       diclofenac 1 % Gel topical gel    VOLTAREN     Place 2 g onto the skin 3 times daily as needed for moderate pain Apply to R hand       enoxaparin 40 MG/0.4ML injection    LOVENOX    23 Syringe    Inject 0.4 mLs (40 mg) Subcutaneous every 24 hours       ENSURE PLUS HN Liqd      Take 8 oz by mouth 2 times daily       ferrous gluconate 324 (38 FE) MG tablet    FERGON     Take 1 tablet by mouth daily (with lunch).       FLEXERIL PO      Take 10 mg by mouth 3 times daily as needed for muscle spasms       levothyroxine 75 MCG tablet    SYNTHROID/LEVOTHROID     Take 75 mcg by mouth daily.       OLANZapine zydis 5 MG ODT tab    zyPREXA     Take 1 tablet (5 mg) by mouth At Bedtime       PRO-STAT AWC PO      Take 1 oz by mouth 2 times daily       SUMATRIPTAN SUCCINATE PO      Take 25 mg by mouth every 8 hours as needed for migraine       TRAVATAN Z 0.004 % ophthalmic solution   Generic drug:  travoprost (BAK Free)      Apply 1 drop to eye. In each eye QHS       vitamin B-Complex      Take 1 tablet by mouth daily.

## 2017-04-04 NOTE — PROGRESS NOTES
"Sherita Kenney is a 76 year old female who presents for:  Chief Complaint   Patient presents with     Oncology Clinic Visit     Adenocarcinoma of colon         Initial Vitals:  /76 (BP Location: Right arm, Patient Position: Chair, Cuff Size: Adult Regular)  Pulse 86  Temp 97.6  F (36.4  C) (Oral)  Resp 16  Ht 1.626 m (5' 4\")  Wt 52.7 kg (116 lb 3.2 oz)  SpO2 96%  BMI 19.95 kg/m2 Estimated body mass index is 19.95 kg/(m^2) as calculated from the following:    Height as of this encounter: 1.626 m (5' 4\").    Weight as of this encounter: 52.7 kg (116 lb 3.2 oz).. Body surface area is 1.54 meters squared. BP completed using cuff size: regular  Moderate Pain (5) No LMP recorded. Patient is not currently having periods (Reason: Perimenopausal). Allergies and medications reviewed.     Medications: Medication refills not needed today.  Pharmacy name entered into [employer]: [facility] [address] [place] [address] [address]    Comments: Follow-Up Adenocarcinoma of Colon      5 minutes for nursing intake (face to face time)   Piero Salinas MA      DISCHARGE PLAN:    Follow up in may 2017 ( 1 month)  Escorted to  to arrange appoinement    Next appointments: See patient instruction section  Departure Mode: Ambulatory  Accompanied by: Self  1 minutes for nursing discharge (face to face time)   Di Mahmood RN      "

## 2017-04-05 NOTE — PROGRESS NOTES
ONCOLOGY HISTORY:  Ms. Sherita Kenney is a female with colon cancer.   1.  The patient was brought to the ER on 02/15/2017 with altered mental status.    -Labs revealed leukocytosis and thrombocytosis.   -CT brain on 02/15/2017 did not reveal any acute intracranial pathology.   -CT of the abdomen and pelvis on 02/15/2017 revealed large right iliopsoas abscess.  This likely arises from and communicates with adjacent right colon.  Adjacent mass-like wall thickening of portion of right colon.  Mild to moderate right hydronephrosis.    -CT-guided drainage of abscess was done.   2.  CT of the abdomen and pelvis on 02/23/2017 revealed an 8 cm solid mass in iliosacral region.  A few enlarged lymph nodes in the abdomen.  There was diffuse moderate dilatation of fluid-filled small bowel in the stomach consistent with distal small small-bowel obstruction.   3.  The patient was taken to OR on 03/01/2017.  Colonoscopy and laparoscopic ileostomy was done.  A large mass was identified in the right lower quadrant involving the ascending cecum.  This was adherent to the anterior peritoneum.  Colonoscopy revealed a large fungating mass in the right side of the colon.  Pathology revealed invasive poorly differentiated adenocarcinoma.  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   7.  CEA on 02/16/2017 was 3.8.      SUBJECTIVE:  Ms. Kenney is a 76-year-old female who was seen in the hospital for colon cancer.  The patient had presented with psychosis.  She was found to have right iliopsoas abscess. Drainage was done.  The patient later had ileostomy and colonoscopy.  She has a large mass in the right colon.  Biopsy reveals malignancy.  The patient is currently in a nursing home.  She is getting better.  She is scheduled for right hemicolectomy next week.      The patient says that she is feeling better.  She is not having abdominal pain.  No nausea or vomiting.  Appetite is good.  No fever or chills.  Overall, she feels  better.      PHYSICAL EXAMINATION:   GENERAL:  She is alert.   VITAL SIGNS:  Reviewed.   Rest of the systems not examined.      LABORATORY DATA:  Reviewed.      ASSESSMENT:   1.  A 76-year-old female with newly diagnosed right colon cancer.   2.  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      PLAN:   1.  I had a long discussion with the patient regarding colon cancer. I told her that based on the scan there is no evidence of distant metastatic disease.  The patient is scheduled for surgery next week.     I discussed regarding the role of adjuvant treatment.  I told her that adjuvant treatment is given to reduce the risk of recurrence.  More than likely she will need chemotherapy because she has a high-risk disease.  She had abscess secondary to malignancy.  Final decision regarding chemotherapy will be made after surgical path is available.     Given her history of psychosis, chemotherapy will be a challenge.  We will have to discuss all this with the patient and family/power of .     2.  She had a few questions, which were all answered.  I will see her about a month after her surgery.      Total time spent 30 minutes, most of the time was spent in counseling.         OMER MAKI MD             D: 2017 18:28   T: 2017 04:30   MT: mayi      Name:     SEVERO SWANN   MRN:      [id number]        Account:      [id number]   :      1940           Visit Date:   2017      Document: [id number]

## 2017-04-06 ENCOUNTER — CARE COORDINATION (OUTPATIENT)
Dept: CASE MANAGEMENT | Facility: CLINIC | Age: 77
End: 2017-04-06

## 2017-04-10 ENCOUNTER — NURSING HOME VISIT (OUTPATIENT)
Dept: GERIATRICS | Facility: CLINIC | Age: 77
End: 2017-04-10
Payer: COMMERCIAL

## 2017-04-10 DIAGNOSIS — R53.81 PHYSICAL DECONDITIONING: ICD-10-CM

## 2017-04-10 DIAGNOSIS — J06.9 URI WITH COUGH AND CONGESTION: ICD-10-CM

## 2017-04-10 DIAGNOSIS — D50.8 OTHER IRON DEFICIENCY ANEMIA: ICD-10-CM

## 2017-04-10 DIAGNOSIS — R41.89 COGNITIVE IMPAIRMENT: ICD-10-CM

## 2017-04-10 DIAGNOSIS — Z93.2 S/P ILEOSTOMY (H): ICD-10-CM

## 2017-04-10 DIAGNOSIS — C18.9 ADENOCARCINOMA OF COLON (H): Primary | ICD-10-CM

## 2017-04-10 PROCEDURE — 99309 SBSQ NF CARE MODERATE MDM 30: CPT | Performed by: NURSE PRACTITIONER

## 2017-04-10 PROCEDURE — 99207 ZZC CDG-CORRECTLY CODED, REVIEWED AND AGREE: CPT | Performed by: NURSE PRACTITIONER

## 2017-04-10 NOTE — PROGRESS NOTES
[place] [facility]    Chief Complaint   Patient presents with     Nursing Home Acute       HPI:    Sherita Kenney is a 76 year old  (1940), who is being seen today for an episodic care visit at [facility].     Today's concern is:    Adenocarcinoma of colon (H)  S/P ileostomy (H)  Patient was found disoriented with acute psychosis at home, was transferred to [facility] on 2/15. Noted to have a RLQ abdominal mass upon hospital admission 2/15. CT abdomen positive for right iliopsoas abscess. Started on zosyn x 2 weeks and s/p drainage of abscess in IR on 2/15. Abdominal drain since removed. Patient had abdominal distention on 2/23, CT positive for small bowel obstruction. Colorectal surgery consulted. S/p colonoscopy and lap ileostomy 3/1. CT also positive for large mass in cecum with fixation to abdominal wall and lesions on surface of the right liver. Was diagnosed with adenocarcinoma of colon. Had severe protein-calorie malnutrition during hospital stay. Was started on TPN post-operatively 2/24, which was dc'd on 3/7. Last albumin was 3.0 and protein total was 6.4 on 3/29. Requires calorie counts, low fiber diet, and requires strict diet d/t recent s/p ileostomy. Dietary following, states patient is consuming 1007-4781 calories per day.       Patient started to pass clots per rectum 3/29. No stool noted per RN. Was sent to [facility] ED for further evaluation. Colon & Rectal Surgery team was consulted during ED visit. Anoscopy completed in ED, no source of active bleeding, found bright red blood in rectal vault with no identified rectal masses. Patient remained stable w/o active sx of bleeding and was cleared to be discharged back to TCU on 3/29. Per [place]-rectal surgery team, rectal bleeding is to be expected d/t colon cancer and recommend monitoring. During patient exam, ileostomy intact with adequate output. Patient reports ability to perform daily self cares for ileostomy appliance, continues  "to need assistance with appliance changes. Patient reports passing additional clots intermittently over the weekend. No sx of active bleeding and no blood noted in ileostomy. Denies pain to abdomen. Has tylenol prn available. Also taking lovenox qd for DVT prophylaxis. Admits to improvement in appetite. Oncology following, patient to f/u with Dr. Nieves as directed. Colorectal surgery following, patient has planned open right colectomy with Dr. Peter on 4/13.       URI with cough and congestion  Admits to cough and congestion over the past few days and feeling \"run down.\" States cough is non-productive. Has low grade temp, states temp in room was 79F and couldn't sleep last night because the room was too hot. Denies SOB, wheezing.     Other iron deficiency anemia  Hgb was 5.6 on 2/15 r/t colon mass. Transfused with 2 units of PRBCs on 2/16, along with IV iron infusion. Recent Hgb baseline 8-9. Last Hgb was 9.8 on 4/3. Currently taking ferrous gluconate 324mg qd. No active sx of bleeding. Denies fatigue, SOB.       Cognitive Impairment  Physical deconditioning  During hospital stay, [facility] 8/30 on 2/17. Was seen by Psychiatry inpatient, was diagnosed with acute unspecified psychosis. Patient w/o history of mental illness in the past or diagnosed with dementia. Since at [facility], [facility] 19/30. Oriented to self and place. Patient is pleasantly confused, has poor short term memory. No reports of agitation or behavioral disturbances by RN staff. No active psychosis. Referral to Neuropsychologist d/t cognitive impairment and diagnosis of unspecified psychosis per request of patient's POA (Danay). PTA patient was living alone. Patient is unable to return home at this time d/t limited ability to care for ileostomy by self d/t postpolio syndrome affecting left extremity and arthritis impairing abilities of right hand. SW following for discharge planning, plan to for patient to stay at TCU until surgery 4/13 at " "[facility].        ALLERGIES: Wool fiber  Past Medical, Surgical, Family and Social History reviewed and updated in [facility].    No current outpatient prescriptions on file.     Medications reviewed:  Medications reconciled to facility chart and changes were made to reflect current medications as identified as above med list. Below are the changes that were made:   Medications stopped since last EPIC medication reconciliation:   There are no discontinued medications.    Medications started since last [facility] medication reconciliation:  No orders of the defined types were placed in this encounter.        REVIEW OF SYSTEMS:  4 point ROS including Respiratory, CV, GI and , other than that noted in the HPI,  is negative      Physical Exam:  BP 98/58  Pulse 83  Temp 98.8  F (37.1  C)  Resp 18  Ht 5' 6\" (1.676 m)  Wt 114 lb 1.6 oz (51.8 kg)  SpO2 94%  BMI 18.42 kg/m2  GENERAL APPEARANCE: Alert, in no distress, oriented, thin, anxious, cooperative  RESP: Respiratory effort and palpation of chest normal, lungs clear to auscultation, diminished at bases, no respiratory distress  CV: Palpation and auscultation of heart done , regular rate and rhythm, no murmur, rub, or gallop, no edema, +2 pedal pulses  ABDOMEN: Normal bowel sounds, soft, nontender, no hepatosplenomegaly or other masses, no guarding or rebound. Ileostomy intact, adequate output. No active bleeding per rectum.   M/S: No movement to LUE d/t h/o polio. Active ROM in all other extremities. Strong hand grasp to right hand. Gait and station normal.   SKIN: Inspection of skin and subcutaneous tissue baseline, Palpation of skin and subcutaneous tissue baseline. Arthritic changes noted in right hand.  NEURO: Cranial nerves 2-12 are normal tested and grossly at patient's baseline, Examination of sensation by touch normal  PSYCH: Oriented X self and place, insight and judgement impaired, memory impaired, cognitive testing: SLUMS 19/30.      Recent Labs:    CBC RESULTS: "   Recent Labs   Lab Test  04/11/17   0500  04/03/17   0500   WBC  5.1  6.1   RBC  3.95  3.86   HGB  10.0*  9.8*   HCT  33.6*  33.1*   MCV  85  86   MCH  25.3*  25.4*   MCHC  29.8*  29.6*   RDW  19.7*  22.6*   PLT  205  222       Last Basic Metabolic Panel:  Recent Labs   Lab Test  04/11/17   0500  04/03/17   0500   NA  139  141   POTASSIUM  3.8  4.0   CHLORIDE  103  107   SARITHA  8.5  8.6   CO2  28  27   BUN  17  18   CR  0.70  0.70   GLC  82  79       Liver Function Studies -   Recent Labs   Lab Test  03/29/17   1100  03/06/17   1105   PROTTOTAL  6.4*  6.3*   ALBUMIN  3.0*  2.5*   BILITOTAL  0.2  0.3   ALKPHOS  107  114   AST  25  31   ALT  19  19       TSH   Date Value Ref Range Status   04/03/2017 2.30 0.40 - 4.00 mU/L Final   02/15/2017 4.05 (H) 0.40 - 4.00 mU/L Final     Lab Results   Component Value Date    A1C 4.3 04/03/2017    A1C 4.4 03/29/2017           Assessment/Plan:    (C18.9) Adenocarcinoma of colon (H)  (primary encounter diagnosis)  (Z93.2) S/P ileostomy (H)  Comment: Ileostomy intact with adequate output. Pain controlled with tylenol prn.   Plan: Colorectal surgery following, patient has planned surgery 4/13 with Dr. Alamo for open right colectomy. Pre-op orders received by RN staff. Hold lovenox 24 hrs prior to surgery. Diet pre-orders received.     (J06.9) URI with cough and congestion  Comment: New onset over the weekend.   Plan: Check CBC & BMP 4/11. CXR AP/L 4/10, results pending. Mucinex 600mg ER BID. Cough drops q2hrs prn. Duoneb q4hrs prn. Monitor vital signs q6hrs.     UPDATE: CXR negative, copy faxed to colorectal surgery office.     (D50.8) Other iron deficiency anemia  Comment: Hgb improving  Plan: Monitor CBC    (R41.89) Cognitive impairment  (R53.81) Physical deconditioning  Comment: Ongoing.   Plan: Encourage independence with ileostomy cares and appliances changes. Encourage independence with activity and ADLs. SW following for discharge planning.          Time 25  minutes    Electronically signed by  WILLY Brewer CNP

## 2017-04-11 ENCOUNTER — HOSPITAL LABORATORY (OUTPATIENT)
Dept: OTHER | Facility: CLINIC | Age: 77
End: 2017-04-11

## 2017-04-11 VITALS
OXYGEN SATURATION: 94 % | SYSTOLIC BLOOD PRESSURE: 98 MMHG | DIASTOLIC BLOOD PRESSURE: 58 MMHG | HEIGHT: 66 IN | BODY MASS INDEX: 18.34 KG/M2 | WEIGHT: 114.1 LBS | RESPIRATION RATE: 18 BRPM | HEART RATE: 83 BPM | TEMPERATURE: 98.8 F

## 2017-04-11 LAB
ANION GAP SERPL CALCULATED.3IONS-SCNC: 8 MMOL/L (ref 3–14)
BUN SERPL-MCNC: 17 MG/DL (ref 7–30)
CALCIUM SERPL-MCNC: 8.5 MG/DL (ref 8.5–10.1)
CHLORIDE SERPL-SCNC: 103 MMOL/L (ref 94–109)
CO2 SERPL-SCNC: 28 MMOL/L (ref 20–32)
CREAT SERPL-MCNC: 0.7 MG/DL (ref 0.52–1.04)
ERYTHROCYTE [DISTWIDTH] IN BLOOD BY AUTOMATED COUNT: 19.7 % (ref 10–15)
GFR SERPL CREATININE-BSD FRML MDRD: 81 ML/MIN/1.7M2
GLUCOSE SERPL-MCNC: 82 MG/DL (ref 70–99)
HCT VFR BLD AUTO: 33.6 % (ref 35–47)
HGB BLD-MCNC: 10 G/DL (ref 11.7–15.7)
MCH RBC QN AUTO: 25.3 PG (ref 26.5–33)
MCHC RBC AUTO-ENTMCNC: 29.8 G/DL (ref 31.5–36.5)
MCV RBC AUTO: 85 FL (ref 78–100)
PLATELET # BLD AUTO: 205 10E9/L (ref 150–450)
POTASSIUM SERPL-SCNC: 3.8 MMOL/L (ref 3.4–5.3)
RBC # BLD AUTO: 3.95 10E12/L (ref 3.8–5.2)
SODIUM SERPL-SCNC: 139 MMOL/L (ref 133–144)
WBC # BLD AUTO: 5.1 10E9/L (ref 4–11)

## 2017-04-11 RX ORDER — GUAIFENESIN 600 MG/1
600 TABLET, EXTENDED RELEASE ORAL 2 TIMES DAILY
Status: ON HOLD | COMMUNITY
End: 2017-04-13

## 2017-04-11 RX ORDER — IPRATROPIUM BROMIDE AND ALBUTEROL SULFATE 2.5; .5 MG/3ML; MG/3ML
1 SOLUTION RESPIRATORY (INHALATION) EVERY 4 HOURS PRN
COMMUNITY
End: 2017-05-03

## 2017-04-11 NOTE — H&P (VIEW-ONLY)
[place] [facility]  [facility] [address]    PRE-OP EVALUATION:    Today's date: 2017    Sherita Kenney (: 1940) presents for pre-operative evaluation assessment as requested by Dr. Peter.  Pt requires evaluation and anesthesia risk assessment prior to undergoing surgery/procedure for treatment of colon cancer.  Proposed procedure: open right colectomy on     Patient seen today at [facility] TCU location.    Date of Surgery/ Procedure: 17  Time of Surgery/ Procedure: 1:45pm  Hospital/Surgical Facility: [facility]  Primary Physician: Dr. Peter  Type of Anesthesia Anticipated: General  History of anesthesia complications: NONE  History of  abnormal bleeding: NONE   History of blood transfusions: YES.  No complications.  Patient has a Health Care Directive or Living Will:  YES, completed 17      PREOP QUESTIONNAIRE  ====================  1- NO - Do you ever have any pain or discomfort in your chest?  2- NO - Have you ever had a severe pain across the front of your chest lasting for half an hour or more?  3- NO - Do you have swelling in your feet or ankles at times?  4- NO - Are you troubled by shortness of breath when: walking on the level/ up a slight hill/ at night?  5- NO - Does your chest ever sound wheezy or whistling?  6- NO - Do you currently have a cold, bronchitis or other respiratory infection?  7- NO - Have you had a cold, bronchitis or other respiratory infection within the last 2 weeks?  8- NO - Do you usually have a cough?  9- NO - Do you sometimes get pains in the calves of your legs when you walk?  10-NO - Do you or anyone in your family have previous history of blood clots?  11-NO - Do you or does anyone in your family have serious bleeding problem such as prolonged bleeding following surgeries or cuts?  12-NO - Have you ever had problems with anemia or been told to take iron pills?  13-NO - Have you had any abnormal blood loss  such as black, tarry or bloody stools, or abnormal vaginal bleeding?  14-NO - Have you or any of your relatives ever had problems with anesthesia?  15-NO - Do you snore or stop breathing at night?  16-NO - Do you have any prosthetic heart valves or joints?  17-NO - Is there any chance that you may be pregnant?      HPI:   Sherita Kenney is a 76 year old (1940), who is being seen today for an episodic care visit at [facility] for pre-op exam.      Today's concern is:     Adenocarcinoma of colon (H)  S/P ileostomy (H)  S/p I&D of abdominal abscess  Severe protein-calorie malnutrition (H)  Patient was found disoriented with acute psychosis at home, was transferred to [facility] on 2/15. Noted to have a RLQ abdominal mass upon hospital admission 2/15. CT abdomen positive for right iliopsoas abscess. Started on zosyn x 2 weeks and s/p drainage of abscess in IR on 2/15. Abdominal drain since removed. Patient had abdominal distention on 2/23, CT positive for small bowel obstruction. Colorectal surgery consulted. S/p colonoscopy and lap ileostomy 3/1. CT also positive for large mass in cecum with fixation to abdominal wall and lesions on surface of the right liver. Was diagnosed with adenocarcinoma of colon. Had severe protein-calorie malnutrition during hospital stay. Was started on TPN post-operatively 2/24, which was dc'd on 3/7. Requires calorie counts, low fiber diet, and requires strict diet d/t recent s/p ileostomy. Dietary following, states patient is consuming 8817-6422 calories per day. During patient exam, denies pain to abdomen. Has tylenol prn available. Also taking lovenox qd for DVT prophylaxis. Ileostomy intact with adequate output. Admits to improvement in appetite. Dietary following. Oncology following, patient to f/u as directed with Dr. Nieves. Colorectal surgery following, patient to f/u with Dr. Peter on 3/31 to discuss open right colectomy planned for 4/13.      UPDATE 3/29: Patient  started to pass clots per rectum 3/29. No stool noted per RN. Concern for GI bleed vs colon cancer. Ileostomy intact with adequate output. Was sent to [facility] ED for further evaluation. Colon & Rectal Surgery team was consulted during ED visit. Anoscopy completed in ED, no source of active bleeding, found bright red blood in rectal vault with no identified rectal masses. Patient remained stable w/o active sx of bleeding and was cleared to be discharged back to TCU on 3/29. Since patient has been back to TCU, no additional reports of bleeding. On 3/30, RN reports new finding regarding color changes near stoma. During patient exam, appears to be dusky/gray coloring of surrounding tissue near stoma, concerning for necrosis. Updated MAK Mon from [facility] and discussed findings. Plan for patient to be seen by Dr. Peter 3/31 and RN staff to continue to monitor and will update if stoma changes to dusky color. Currently, appearance of stoma is healthy with pink tissue and ileostomy is producing adequate output.       Other iron deficiency anemia  Hgb was 5.6 on 2/15 r/t colon mass. Transfused with 2 units of PRBCs on 2/16, along with IV iron infusion. Recent Hgb baseline 8-9. Last Hgb was 8.8 on 3/20. Currently taking ferrous gluconate 324mg qd. No active sx of bleeding. Denies fatigue, SOB.       Psychosis, unspecified psychosis type  Cognitive Impairment  Physical deconditioning  During hospital stay, [facility] 8/30 on 2/17. Was seen by Psychiatry inpatient, was diagnosed with acute unspecified psychosis. Patient w/o history of mental illness in the past or diagnosed with dementia. Since at TCU, [facility] 19/30. Oriented to self and place. Patient is pleasantly confused, has poor short term memory. No reports of agitation or behavioral disturbances by RN staff. Referral to Neuropsychologist d/t cognitive impairment and diagnosis of unspecified psychosis per request of patient's POA (Danay). PTA  patient was living alone. Patient is unable to return home at this time d/t limited ability to care for ileostomy by self d/t postpolio syndrome affecting left extremity and arthritis impairing abilities of right hand. SW following for discharge planning, currently has LTC application.       Postpolio syndrome  Primary osteoarthritis of right hand  H/o chronic pain to right hand r/t osteoarthritis and overuse of right hand. 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 Has been seen by outpatient OT in the past for hand therapy. Stated hand splint/gaurd was recommended to her, but patient declined at that time. Since at TCU, patient states pain in right hand flares with over-use. Recently states pain is controlled with tylenol.              Patient Active Problem List    Diagnosis Date Noted     Primary osteoarthritis of right hand 03/19/2017     Priority: Medium     Cognitive impairment 03/16/2017     Priority: Medium     Iliopsoas abscess on right (H) 03/12/2017     Priority: Medium     Adenocarcinoma of colon (H) 03/12/2017     Priority: Medium     Status post incision and drainage 03/12/2017     Priority: Medium     S/P ileostomy (H) 03/12/2017     Priority: Medium     Iron deficiency anemia 03/12/2017     Priority: Medium     Postpolio syndrome 03/12/2017     Priority: Medium     Severe protein-calorie malnutrition (H) 03/12/2017     Priority: Medium     Physical deconditioning 03/12/2017     Priority: Medium     Psychosis 02/15/2017     Priority: Medium     Hip pain, right 02/23/2016     Priority: Medium     Flail joint 02/16/2012     Priority: Medium     Arthralgia of upper arm 02/16/2012     Priority: Medium     Generalized osteoarthritis 07/07/2005     Priority: Medium     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 yes         DJD (degenerative joint disease) of knee 06/26/2014     DJD (degenerative joint disease), lumbar 06/26/2014     Lumbago 02/19/2013     OA (osteoarthritis) of knee 01/03/2013     Pain in joint, lower leg 01/03/2013     Abnormal gait 01/03/2013     Medication monitoring encounter 12/22/2011       Past Medical History:   Diagnosis Date     Cancer (H)        Past Surgical History:   Procedure Laterality Date     COLONOSCOPY N/A 3/1/2017    Procedure: COLONOSCOPY;  Surgeon: Lindsay Peter MD;  Location: [facility] OR     ILEOSTOMY N/A 3/1/2017    Procedure: ILEOSTOMY;  Surgeon: Lindsay Peter MD;  Location: [facility] OR     LAPAROSCOPIC ILEOSTOMY N/A 3/1/2017    Procedure: LAPAROSCOPIC ILEOSTOMY;  Surgeon: Lindsay Peter MD;  Location:  OR       No family history on file.    Social History   Substance Use Topics     Smoking status: Never Smoker     Smokeless tobacco: Not on file     Alcohol use No          Allergies   Allergen Reactions     Wool Fiber Unknown     Current Outpatient Prescriptions   Medication Sig Dispense Refill     Cyclobenzaprine HCl (FLEXERIL PO) Take 10 mg by mouth 3 times daily as needed for muscle spasms       diclofenac (VOLTAREN) 1 % GEL topical gel Place 2 g onto the skin 3 times daily as needed for moderate pain Apply to R hand       Nutritional Supplements (ENSURE PLUS HN) LIQD Take 8 oz by mouth 2 times daily       OLANZapine zydis (ZYPREXA) 5 MG ODT tab Take 1 tablet (5 mg) by mouth At Bedtime       acetaminophen (TYLENOL) 325 MG tablet Take 2 tablets (650 mg) by mouth every 4 hours as needed for mild pain 100 tablet      enoxaparin (LOVENOX) 40 MG/0.4ML injection Inject 0.4 mLs (40 mg) Subcutaneous every 24 hours 23 Syringe 0     SUMATRIPTAN SUCCINATE PO Take 25 mg by mouth every 8 hours as needed for migraine       Travoprost (TRAVATAN Z) 0.004 % SOLN Apply 1 drop to  "eye. In each eye QHS       levothyroxine (SYNTHROID, LEVOTHROID) 75 MCG tablet Take 75 mcg by mouth daily.       Calcium-Magnesium 333-167 MG TABS Take 2 tablets by mouth daily.       ferrous gluconate (FERGON) 324 (38 FE) MG tablet Take 1 tablet by mouth daily (with lunch).       B-Complex TABS Take 1 tablet by mouth daily.       Amino Acids-Protein Hydrolys (PRO-STAT AWC PO) Take 1 oz by mouth 2 times daily           REVIEW OF SYSTEMS:  10 point ROS of systems including Constitutional, Eyes, Respiratory, Cardiovascular, Gastroenterology, Genitourinary, Integumentary, Muscularskeletal, Psychiatric were all negative except for pertinent positives noted in my HPI.      EXAM:  /66  Pulse 92  Temp 98.3  F (36.8  C)  Resp 16  Ht 5' 6\" (1.676 m)  Wt 114 lb 6.4 oz (51.9 kg)  SpO2 97%  BMI 18.46 kg/m2  GENERAL APPEARANCE: healthy, alert, no distress and cooperative  EYES: Eyes grossly normal to inspection, PERRL and conjunctivae and sclerae normal  HENT: ear canals and TM's normal and nose and mouth without ulcers or lesions  NECK: no adenopathy, no asymmetry, masses, or scars and thyroid normal to palpation  RESP: lungs clear to auscultation - no rales, rhonchi or wheezes  CV: regular rates and rhythm, normal S1 S2, no S3 or S4 and no murmur, click or rub  ABDOMEN: Normal bowel sounds, soft, nontender, no hepatosplenomegaly or other masses, no guarding or rebound. Ileostomy intact, adequate output. Ileostomy: Stoma pink, surrounding tissue dusky/gray, areas of excoriation noted.   M/S: No movement to LUE d/t h/o polio. Active ROM in all other extremities. Strong hand grasp to right hand. Gait and station normal.   SKIN: Inspection of skin and subcutaneous tissue baseline, Palpation of skin and subcutaneous tissue baseline. Arthritic changes noted in right hand.  NEURO: Normal strength and tone, sensory exam grossly normal, mentation intact, speech normal and cranial nerves 2-12 intact  PSYCH: Oriented X self " and place, insight and judgement impaired, memory impaired, cognitive testing: SLUMS 15/30.  MENTAL STATUS EXAM:  Appearance/Behavior: No apparent distress, Neatly groomed and Appears stated age  Speech: Normal  Mood/Affect: normal affect  Insight: Fair      DIAGNOSTICS:   Preop Testing   EKG: Not indicated due to non-vascular surgery and last ekg on 3/1/17 (within 30 days for CAD history or last year for cardiac risk factors)  Hemoglobin A1C    Recent Labs:   CBC RESULTS:        Recent Labs   Lab Test 03/20/17  0500 03/13/17  0525   WBC 6.4 5.9   RBC 3.72* 3.59*   HGB 8.8* 8.4*   HCT 31.1* 29.3*   MCV 84 82   MCH 23.7* 23.4*   MCHC 28.3* 28.7*   RDW Dimorphic population - unable to calculate Dimorphic population - unable to calculate    334         Last Basic Metabolic Panel:       Recent Labs   Lab Test 03/20/17  0500 03/13/17  0525    142   POTASSIUM 3.9 4.1   CHLORIDE 107 107   SARITHA 8.7 8.8   CO2 27 27   BUN 11 12   CR 0.69 0.72   GLC 75 73         Liver Function Studies -         Recent Labs   Lab Test 03/06/17  1105 03/04/17  0625 02/27/17  0700   PROTTOTAL 6.3* --  5.1*   ALBUMIN 2.5* 2.2* 2.1*   BILITOTAL 0.3 --  0.2   ALKPHOS 114 --  65   AST 31 --  29   ALT 19 --  18               TSH   Date Value Ref Range Status   02/15/2017 4.05 (H) 0.40 - 4.00 mU/L Final         Ref. Range 3/29/2017 05:00   Hemoglobin A1C Latest Ref Range: 4.3 - 6.0 % 4.4         IMPRESSION:  Reason for surgery/procedure: Colon cancer  Diagnosis/reason for consult: Colon cancer    The proposed surgical procedure is considered INTERMEDIATE risk.    For above listed surgery and anesthesia:   Patient is at MODERATE risk for surgery/procedure and perioperative/procedure complications.        RECOMMENDATIONS:    --Approval given to proceed with proposed procedure, without further diagnostic evaluation  --Patient is currently taking    Anticoagulant or Antiplatelet Medication Use  Lovenox qd              Signed Electronically by:  WILLY Brewer CNP

## 2017-04-13 ENCOUNTER — ANESTHESIA (OUTPATIENT)
Dept: SURGERY | Facility: CLINIC | Age: 77
DRG: 329 | End: 2017-04-13
Payer: MEDICARE

## 2017-04-13 ENCOUNTER — ANESTHESIA EVENT (OUTPATIENT)
Dept: SURGERY | Facility: CLINIC | Age: 77
DRG: 329 | End: 2017-04-13
Payer: MEDICARE

## 2017-04-13 ENCOUNTER — HOSPITAL ENCOUNTER (INPATIENT)
Facility: CLINIC | Age: 77
LOS: 7 days | Discharge: NURSING, REHAB OR LONG-TERM CARE | DRG: 329 | End: 2017-04-20
Attending: COLON & RECTAL SURGERY | Admitting: COLON & RECTAL SURGERY
Payer: MEDICARE

## 2017-04-13 ENCOUNTER — APPOINTMENT (OUTPATIENT)
Dept: GENERAL RADIOLOGY | Facility: CLINIC | Age: 77
DRG: 329 | End: 2017-04-13
Attending: INTERNAL MEDICINE
Payer: MEDICARE

## 2017-04-13 DIAGNOSIS — C18.2 MALIGNANT NEOPLASM OF ASCENDING COLON (H): Primary | ICD-10-CM

## 2017-04-13 DIAGNOSIS — Z15.89 MLH1 GENE MUTATION: ICD-10-CM

## 2017-04-13 PROBLEM — R06.89 RESPIRATORY INSUFFICIENCY: Status: ACTIVE | Noted: 2017-04-13

## 2017-04-13 LAB
ABO + RH BLD: NORMAL
ABO + RH BLD: NORMAL
BLD GP AB SCN SERPL QL: NORMAL
BLOOD BANK CMNT PATIENT-IMP: NORMAL
CREAT SERPL-MCNC: 0.72 MG/DL (ref 0.52–1.04)
ERYTHROCYTE [DISTWIDTH] IN BLOOD BY AUTOMATED COUNT: 18.9 % (ref 10–15)
GFR SERPL CREATININE-BSD FRML MDRD: 78 ML/MIN/1.7M2
GLUCOSE BLDC GLUCOMTR-MCNC: 182 MG/DL (ref 70–99)
HBA1C MFR BLD: 4.7 % (ref 4.3–6)
HCT VFR BLD AUTO: 31.3 % (ref 35–47)
HGB BLD-MCNC: 12 G/DL (ref 11.7–15.7)
HGB BLD-MCNC: 9.4 G/DL (ref 11.7–15.7)
INR PPP: 0.97 (ref 0.86–1.14)
MCH RBC QN AUTO: 25.8 PG (ref 26.5–33)
MCHC RBC AUTO-ENTMCNC: 30 G/DL (ref 31.5–36.5)
MCV RBC AUTO: 86 FL (ref 78–100)
PLATELET # BLD AUTO: 208 10E9/L (ref 150–450)
POTASSIUM SERPL-SCNC: 4.3 MMOL/L (ref 3.4–5.3)
RBC # BLD AUTO: 3.65 10E12/L (ref 3.8–5.2)
SPECIMEN EXP DATE BLD: NORMAL
WBC # BLD AUTO: 9.6 10E9/L (ref 4–11)

## 2017-04-13 PROCEDURE — 0DBB0ZZ EXCISION OF ILEUM, OPEN APPROACH: ICD-10-PCS | Performed by: COLON & RECTAL SURGERY

## 2017-04-13 PROCEDURE — 25000132 ZZH RX MED GY IP 250 OP 250 PS 637: Mod: GY | Performed by: COLON & RECTAL SURGERY

## 2017-04-13 PROCEDURE — 88341 IMHCHEM/IMCYTCHM EA ADD ANTB: CPT | Mod: 26 | Performed by: COLON & RECTAL SURGERY

## 2017-04-13 PROCEDURE — 85027 COMPLETE CBC AUTOMATED: CPT | Performed by: SURGERY

## 2017-04-13 PROCEDURE — 25000125 ZZHC RX 250: Performed by: NURSE ANESTHETIST, CERTIFIED REGISTERED

## 2017-04-13 PROCEDURE — 71000012 ZZH RECOVERY PHASE 1 LEVEL 1 FIRST HR: Performed by: COLON & RECTAL SURGERY

## 2017-04-13 PROCEDURE — 40000275 ZZH STATISTIC RCP TIME EA 10 MIN

## 2017-04-13 PROCEDURE — 36415 COLL VENOUS BLD VENIPUNCTURE: CPT | Performed by: COLON & RECTAL SURGERY

## 2017-04-13 PROCEDURE — 71010 XR CHEST PORT 1 VW: CPT

## 2017-04-13 PROCEDURE — 27210794 ZZH OR GENERAL SUPPLY STERILE: Performed by: COLON & RECTAL SURGERY

## 2017-04-13 PROCEDURE — 36000093 ZZH SURGERY LEVEL 4 1ST 30 MIN: Performed by: COLON & RECTAL SURGERY

## 2017-04-13 PROCEDURE — 88341 IMHCHEM/IMCYTCHM EA ADD ANTB: CPT | Performed by: COLON & RECTAL SURGERY

## 2017-04-13 PROCEDURE — P9041 ALBUMIN (HUMAN),5%, 50ML: HCPCS | Performed by: NURSE ANESTHETIST, CERTIFIED REGISTERED

## 2017-04-13 PROCEDURE — 00000159 ZZHCL STATISTIC H-SEND OUTS PREP: Performed by: COLON & RECTAL SURGERY

## 2017-04-13 PROCEDURE — 25800025 ZZH RX 258: Performed by: ANESTHESIOLOGY

## 2017-04-13 PROCEDURE — 82565 ASSAY OF CREATININE: CPT | Performed by: COLON & RECTAL SURGERY

## 2017-04-13 PROCEDURE — 25000125 ZZHC RX 250: Performed by: ANESTHESIOLOGY

## 2017-04-13 PROCEDURE — 0JN80ZZ RELEASE ABDOMEN SUBCUTANEOUS TISSUE AND FASCIA, OPEN APPROACH: ICD-10-PCS | Performed by: COLON & RECTAL SURGERY

## 2017-04-13 PROCEDURE — 40000940 XR CHEST PORT 1 VW

## 2017-04-13 PROCEDURE — 88305 TISSUE EXAM BY PATHOLOGIST: CPT | Mod: 26 | Performed by: COLON & RECTAL SURGERY

## 2017-04-13 PROCEDURE — 88309 TISSUE EXAM BY PATHOLOGIST: CPT | Mod: 26 | Performed by: COLON & RECTAL SURGERY

## 2017-04-13 PROCEDURE — 37000009 ZZH ANESTHESIA TECHNICAL FEE, EACH ADDTL 15 MIN: Performed by: COLON & RECTAL SURGERY

## 2017-04-13 PROCEDURE — 0TN60ZZ RELEASE RIGHT URETER, OPEN APPROACH: ICD-10-PCS | Performed by: UROLOGY

## 2017-04-13 PROCEDURE — 25000566 ZZH SEVOFLURANE, EA 15 MIN: Performed by: COLON & RECTAL SURGERY

## 2017-04-13 PROCEDURE — 85018 HEMOGLOBIN: CPT | Performed by: COLON & RECTAL SURGERY

## 2017-04-13 PROCEDURE — 88342 IMHCHEM/IMCYTCHM 1ST ANTB: CPT | Performed by: COLON & RECTAL SURGERY

## 2017-04-13 PROCEDURE — 81288 MLH1 GENE: CPT | Performed by: PATHOLOGY

## 2017-04-13 PROCEDURE — 86900 BLOOD TYPING SEROLOGIC ABO: CPT | Performed by: COLON & RECTAL SURGERY

## 2017-04-13 PROCEDURE — 83036 HEMOGLOBIN GLYCOSYLATED A1C: CPT | Performed by: COLON & RECTAL SURGERY

## 2017-04-13 PROCEDURE — 25000128 H RX IP 250 OP 636: Performed by: COLON & RECTAL SURGERY

## 2017-04-13 PROCEDURE — 84132 ASSAY OF SERUM POTASSIUM: CPT | Performed by: COLON & RECTAL SURGERY

## 2017-04-13 PROCEDURE — 25000125 ZZHC RX 250: Performed by: COLON & RECTAL SURGERY

## 2017-04-13 PROCEDURE — 85610 PROTHROMBIN TIME: CPT | Performed by: COLON & RECTAL SURGERY

## 2017-04-13 PROCEDURE — 0FN40ZZ RELEASE GALLBLADDER, OPEN APPROACH: ICD-10-PCS | Performed by: COLON & RECTAL SURGERY

## 2017-04-13 PROCEDURE — 25000128 H RX IP 250 OP 636: Performed by: ANESTHESIOLOGY

## 2017-04-13 PROCEDURE — 25000128 H RX IP 250 OP 636: Performed by: NURSE ANESTHETIST, CERTIFIED REGISTERED

## 2017-04-13 PROCEDURE — 36415 COLL VENOUS BLD VENIPUNCTURE: CPT | Performed by: SURGERY

## 2017-04-13 PROCEDURE — 88342 IMHCHEM/IMCYTCHM 1ST ANTB: CPT | Mod: 26 | Performed by: COLON & RECTAL SURGERY

## 2017-04-13 PROCEDURE — 0DTF0ZZ RESECTION OF RIGHT LARGE INTESTINE, OPEN APPROACH: ICD-10-PCS | Performed by: COLON & RECTAL SURGERY

## 2017-04-13 PROCEDURE — S0074 INJECTION, CEFOTETAN DISODIU: HCPCS | Performed by: COLON & RECTAL SURGERY

## 2017-04-13 PROCEDURE — 88304 TISSUE EXAM BY PATHOLOGIST: CPT | Mod: 26 | Performed by: COLON & RECTAL SURGERY

## 2017-04-13 PROCEDURE — 86901 BLOOD TYPING SEROLOGIC RH(D): CPT | Performed by: COLON & RECTAL SURGERY

## 2017-04-13 PROCEDURE — 40000170 ZZH STATISTIC PRE-PROCEDURE ASSESSMENT II: Performed by: COLON & RECTAL SURGERY

## 2017-04-13 PROCEDURE — 36000063 ZZH SURGERY LEVEL 4 EA 15 ADDTL MIN: Performed by: COLON & RECTAL SURGERY

## 2017-04-13 PROCEDURE — 86850 RBC ANTIBODY SCREEN: CPT | Performed by: COLON & RECTAL SURGERY

## 2017-04-13 PROCEDURE — 27210995 ZZH RX 272: Performed by: COLON & RECTAL SURGERY

## 2017-04-13 PROCEDURE — 94002 VENT MGMT INPAT INIT DAY: CPT

## 2017-04-13 PROCEDURE — 00000146 ZZHCL STATISTIC GLUCOSE BY METER IP

## 2017-04-13 PROCEDURE — 20000003 ZZH R&B ICU

## 2017-04-13 PROCEDURE — 88309 TISSUE EXAM BY PATHOLOGIST: CPT | Performed by: COLON & RECTAL SURGERY

## 2017-04-13 PROCEDURE — 40000008 ZZH STATISTIC AIRWAY CARE

## 2017-04-13 PROCEDURE — A9270 NON-COVERED ITEM OR SERVICE: HCPCS | Mod: GY | Performed by: COLON & RECTAL SURGERY

## 2017-04-13 PROCEDURE — 71000013 ZZH RECOVERY PHASE 1 LEVEL 1 EA ADDTL HR: Performed by: COLON & RECTAL SURGERY

## 2017-04-13 PROCEDURE — 37000008 ZZH ANESTHESIA TECHNICAL FEE, 1ST 30 MIN: Performed by: COLON & RECTAL SURGERY

## 2017-04-13 PROCEDURE — 0DBV0ZZ EXCISION OF MESENTERY, OPEN APPROACH: ICD-10-PCS | Performed by: COLON & RECTAL SURGERY

## 2017-04-13 PROCEDURE — 88304 TISSUE EXAM BY PATHOLOGIST: CPT | Performed by: COLON & RECTAL SURGERY

## 2017-04-13 PROCEDURE — 88305 TISSUE EXAM BY PATHOLOGIST: CPT | Performed by: COLON & RECTAL SURGERY

## 2017-04-13 RX ORDER — VECURONIUM BROMIDE 1 MG/ML
INJECTION, POWDER, LYOPHILIZED, FOR SOLUTION INTRAVENOUS PRN
Status: DISCONTINUED | OUTPATIENT
Start: 2017-04-13 | End: 2017-04-13

## 2017-04-13 RX ORDER — DEXAMETHASONE SODIUM PHOSPHATE 4 MG/ML
INJECTION, SOLUTION INTRA-ARTICULAR; INTRALESIONAL; INTRAMUSCULAR; INTRAVENOUS; SOFT TISSUE PRN
Status: DISCONTINUED | OUTPATIENT
Start: 2017-04-13 | End: 2017-04-13

## 2017-04-13 RX ORDER — CHLORHEXIDINE GLUCONATE ORAL RINSE 1.2 MG/ML
15 SOLUTION DENTAL EVERY 12 HOURS
Status: DISCONTINUED | OUTPATIENT
Start: 2017-04-13 | End: 2017-04-14

## 2017-04-13 RX ORDER — PROPOFOL 10 MG/ML
5-75 INJECTION, EMULSION INTRAVENOUS CONTINUOUS
Status: DISCONTINUED | OUTPATIENT
Start: 2017-04-13 | End: 2017-04-13

## 2017-04-13 RX ORDER — NALOXONE HYDROCHLORIDE 0.4 MG/ML
.1-.4 INJECTION, SOLUTION INTRAMUSCULAR; INTRAVENOUS; SUBCUTANEOUS
Status: DISCONTINUED | OUTPATIENT
Start: 2017-04-13 | End: 2017-04-20 | Stop reason: HOSPADM

## 2017-04-13 RX ORDER — FENTANYL CITRATE 50 UG/ML
25-50 INJECTION, SOLUTION INTRAMUSCULAR; INTRAVENOUS
Status: DISCONTINUED | OUTPATIENT
Start: 2017-04-13 | End: 2017-04-13 | Stop reason: HOSPADM

## 2017-04-13 RX ORDER — HEPARIN SODIUM 5000 [USP'U]/.5ML
5000 INJECTION, SOLUTION INTRAVENOUS; SUBCUTANEOUS
Status: COMPLETED | OUTPATIENT
Start: 2017-04-13 | End: 2017-04-13

## 2017-04-13 RX ORDER — NEOSTIGMINE METHYLSULFATE 1 MG/ML
VIAL (ML) INJECTION PRN
Status: DISCONTINUED | OUTPATIENT
Start: 2017-04-13 | End: 2017-04-13

## 2017-04-13 RX ORDER — ALVIMOPAN 12 MG/1
12 CAPSULE ORAL 2 TIMES DAILY
Status: DISCONTINUED | OUTPATIENT
Start: 2017-04-14 | End: 2017-04-13

## 2017-04-13 RX ORDER — PROPOFOL 10 MG/ML
10-20 INJECTION, EMULSION INTRAVENOUS EVERY 30 MIN PRN
Status: DISCONTINUED | OUTPATIENT
Start: 2017-04-13 | End: 2017-04-14

## 2017-04-13 RX ORDER — LIDOCAINE HYDROCHLORIDE 20 MG/ML
INJECTION, SOLUTION INFILTRATION; PERINEURAL PRN
Status: DISCONTINUED | OUTPATIENT
Start: 2017-04-13 | End: 2017-04-13

## 2017-04-13 RX ORDER — MAGNESIUM HYDROXIDE 1200 MG/15ML
LIQUID ORAL PRN
Status: DISCONTINUED | OUTPATIENT
Start: 2017-04-13 | End: 2017-04-13 | Stop reason: HOSPADM

## 2017-04-13 RX ORDER — ONDANSETRON 4 MG/1
4 TABLET, ORALLY DISINTEGRATING ORAL EVERY 6 HOURS PRN
Status: DISCONTINUED | OUTPATIENT
Start: 2017-04-13 | End: 2017-04-20 | Stop reason: HOSPADM

## 2017-04-13 RX ORDER — CEFOTETAN DISODIUM 1 G/10ML
1 INJECTION, POWDER, FOR SOLUTION INTRAMUSCULAR; INTRAVENOUS
Status: COMPLETED | OUTPATIENT
Start: 2017-04-13 | End: 2017-04-13

## 2017-04-13 RX ORDER — NALOXONE HYDROCHLORIDE 0.4 MG/ML
.1-.4 INJECTION, SOLUTION INTRAMUSCULAR; INTRAVENOUS; SUBCUTANEOUS
Status: DISCONTINUED | OUTPATIENT
Start: 2017-04-13 | End: 2017-04-13

## 2017-04-13 RX ORDER — HYDROMORPHONE HYDROCHLORIDE 1 MG/ML
.3-.5 INJECTION, SOLUTION INTRAMUSCULAR; INTRAVENOUS; SUBCUTANEOUS
Status: DISCONTINUED | OUTPATIENT
Start: 2017-04-13 | End: 2017-04-16

## 2017-04-13 RX ORDER — ACETAMINOPHEN 325 MG/1
975 TABLET ORAL ONCE
Status: COMPLETED | OUTPATIENT
Start: 2017-04-13 | End: 2017-04-13

## 2017-04-13 RX ORDER — ONDANSETRON 2 MG/ML
4 INJECTION INTRAMUSCULAR; INTRAVENOUS EVERY 30 MIN PRN
Status: DISCONTINUED | OUTPATIENT
Start: 2017-04-13 | End: 2017-04-13 | Stop reason: HOSPADM

## 2017-04-13 RX ORDER — PROPOFOL 10 MG/ML
INJECTION, EMULSION INTRAVENOUS CONTINUOUS PRN
Status: DISCONTINUED | OUTPATIENT
Start: 2017-04-13 | End: 2017-04-13

## 2017-04-13 RX ORDER — HYDROMORPHONE HYDROCHLORIDE 1 MG/ML
.3-.5 INJECTION, SOLUTION INTRAMUSCULAR; INTRAVENOUS; SUBCUTANEOUS EVERY 5 MIN PRN
Status: DISCONTINUED | OUTPATIENT
Start: 2017-04-13 | End: 2017-04-13 | Stop reason: HOSPADM

## 2017-04-13 RX ORDER — SODIUM CHLORIDE 9 MG/ML
INJECTION, SOLUTION INTRAVENOUS CONTINUOUS PRN
Status: DISCONTINUED | OUTPATIENT
Start: 2017-04-13 | End: 2017-04-13

## 2017-04-13 RX ORDER — ONDANSETRON 2 MG/ML
INJECTION INTRAMUSCULAR; INTRAVENOUS PRN
Status: DISCONTINUED | OUTPATIENT
Start: 2017-04-13 | End: 2017-04-13

## 2017-04-13 RX ORDER — ONDANSETRON 4 MG/1
4 TABLET, ORALLY DISINTEGRATING ORAL EVERY 30 MIN PRN
Status: DISCONTINUED | OUTPATIENT
Start: 2017-04-13 | End: 2017-04-13 | Stop reason: HOSPADM

## 2017-04-13 RX ORDER — PROPOFOL 10 MG/ML
INJECTION, EMULSION INTRAVENOUS PRN
Status: DISCONTINUED | OUTPATIENT
Start: 2017-04-13 | End: 2017-04-13

## 2017-04-13 RX ORDER — EPHEDRINE SULFATE 50 MG/ML
INJECTION, SOLUTION INTRAMUSCULAR; INTRAVENOUS; SUBCUTANEOUS PRN
Status: DISCONTINUED | OUTPATIENT
Start: 2017-04-13 | End: 2017-04-13

## 2017-04-13 RX ORDER — FENTANYL CITRATE 50 UG/ML
INJECTION, SOLUTION INTRAMUSCULAR; INTRAVENOUS PRN
Status: DISCONTINUED | OUTPATIENT
Start: 2017-04-13 | End: 2017-04-13

## 2017-04-13 RX ORDER — SODIUM CHLORIDE, SODIUM LACTATE, POTASSIUM CHLORIDE, CALCIUM CHLORIDE 600; 310; 30; 20 MG/100ML; MG/100ML; MG/100ML; MG/100ML
INJECTION, SOLUTION INTRAVENOUS CONTINUOUS
Status: DISCONTINUED | OUTPATIENT
Start: 2017-04-13 | End: 2017-04-13 | Stop reason: HOSPADM

## 2017-04-13 RX ORDER — CEFOTETAN DISODIUM 1 G/10ML
1 INJECTION, POWDER, FOR SOLUTION INTRAMUSCULAR; INTRAVENOUS SEE ADMIN INSTRUCTIONS
Status: DISCONTINUED | OUTPATIENT
Start: 2017-04-13 | End: 2017-04-13 | Stop reason: HOSPADM

## 2017-04-13 RX ORDER — ONDANSETRON 2 MG/ML
4 INJECTION INTRAMUSCULAR; INTRAVENOUS EVERY 6 HOURS PRN
Status: DISCONTINUED | OUTPATIENT
Start: 2017-04-13 | End: 2017-04-20 | Stop reason: HOSPADM

## 2017-04-13 RX ORDER — GLYCOPYRROLATE 0.2 MG/ML
INJECTION, SOLUTION INTRAMUSCULAR; INTRAVENOUS PRN
Status: DISCONTINUED | OUTPATIENT
Start: 2017-04-13 | End: 2017-04-13

## 2017-04-13 RX ORDER — ALBUMIN, HUMAN INJ 5% 5 %
SOLUTION INTRAVENOUS CONTINUOUS PRN
Status: DISCONTINUED | OUTPATIENT
Start: 2017-04-13 | End: 2017-04-13

## 2017-04-13 RX ORDER — PROPOFOL 10 MG/ML
5-75 INJECTION, EMULSION INTRAVENOUS CONTINUOUS
Status: DISCONTINUED | OUTPATIENT
Start: 2017-04-13 | End: 2017-04-14

## 2017-04-13 RX ADMIN — ALBUMIN (HUMAN): 12.5 SOLUTION INTRAVENOUS at 15:15

## 2017-04-13 RX ADMIN — HEPARIN SODIUM 5000 UNITS: 10000 INJECTION, SOLUTION INTRAVENOUS; SUBCUTANEOUS at 15:00

## 2017-04-13 RX ADMIN — PHENYLEPHRINE HYDROCHLORIDE 100 MCG: 10 INJECTION, SOLUTION INTRAMUSCULAR; INTRAVENOUS; SUBCUTANEOUS at 15:02

## 2017-04-13 RX ADMIN — PHENYLEPHRINE HYDROCHLORIDE 100 MCG: 10 INJECTION, SOLUTION INTRAMUSCULAR; INTRAVENOUS; SUBCUTANEOUS at 14:48

## 2017-04-13 RX ADMIN — ROCURONIUM BROMIDE 50 MG: 10 INJECTION INTRAVENOUS at 14:43

## 2017-04-13 RX ADMIN — MIDAZOLAM HYDROCHLORIDE 0.5 MG: 1 INJECTION, SOLUTION INTRAMUSCULAR; INTRAVENOUS at 14:36

## 2017-04-13 RX ADMIN — VECURONIUM BROMIDE 2 MG: 1 INJECTION, POWDER, LYOPHILIZED, FOR SOLUTION INTRAVENOUS at 16:30

## 2017-04-13 RX ADMIN — LIDOCAINE HYDROCHLORIDE 40 MG: 20 INJECTION, SOLUTION INFILTRATION; PERINEURAL at 14:43

## 2017-04-13 RX ADMIN — FENTANYL CITRATE 50 MCG: 50 INJECTION, SOLUTION INTRAMUSCULAR; INTRAVENOUS at 15:23

## 2017-04-13 RX ADMIN — VECURONIUM BROMIDE 2 MG: 1 INJECTION, POWDER, LYOPHILIZED, FOR SOLUTION INTRAVENOUS at 15:05

## 2017-04-13 RX ADMIN — PHENYLEPHRINE HYDROCHLORIDE 100 MCG: 10 INJECTION, SOLUTION INTRAMUSCULAR; INTRAVENOUS; SUBCUTANEOUS at 15:52

## 2017-04-13 RX ADMIN — Medication 5 MG: at 15:52

## 2017-04-13 RX ADMIN — ONDANSETRON 4 MG: 2 INJECTION INTRAMUSCULAR; INTRAVENOUS at 17:54

## 2017-04-13 RX ADMIN — PROPOFOL 20 MCG/KG/MIN: 10 INJECTION, EMULSION INTRAVENOUS at 20:05

## 2017-04-13 RX ADMIN — Medication 5 MG: at 15:50

## 2017-04-13 RX ADMIN — PROPOFOL 20 MCG/KG/MIN: 10 INJECTION, EMULSION INTRAVENOUS at 14:50

## 2017-04-13 RX ADMIN — NEOSTIGMINE METHYLSULFATE 3 MG: 1 INJECTION INTRAMUSCULAR; INTRAVENOUS; SUBCUTANEOUS at 18:17

## 2017-04-13 RX ADMIN — PHENYLEPHRINE HYDROCHLORIDE 0.25 MCG/KG/MIN: 10 INJECTION, SOLUTION INTRAMUSCULAR; INTRAVENOUS; SUBCUTANEOUS at 15:04

## 2017-04-13 RX ADMIN — HYDROMORPHONE HYDROCHLORIDE 0.5 MG: 1 INJECTION, SOLUTION INTRAMUSCULAR; INTRAVENOUS; SUBCUTANEOUS at 19:26

## 2017-04-13 RX ADMIN — SODIUM CHLORIDE, POTASSIUM CHLORIDE, SODIUM LACTATE AND CALCIUM CHLORIDE: 600; 310; 30; 20 INJECTION, SOLUTION INTRAVENOUS at 16:38

## 2017-04-13 RX ADMIN — PHENYLEPHRINE HYDROCHLORIDE 100 MCG: 10 INJECTION, SOLUTION INTRAMUSCULAR; INTRAVENOUS; SUBCUTANEOUS at 14:55

## 2017-04-13 RX ADMIN — ALBUMIN (HUMAN): 12.5 SOLUTION INTRAVENOUS at 14:52

## 2017-04-13 RX ADMIN — CEFOTETAN DISODIUM 1 G: 1 INJECTION, POWDER, FOR SOLUTION INTRAMUSCULAR; INTRAVENOUS at 15:08

## 2017-04-13 RX ADMIN — FENTANYL CITRATE 50 MCG: 50 INJECTION, SOLUTION INTRAMUSCULAR; INTRAVENOUS at 15:09

## 2017-04-13 RX ADMIN — SODIUM CHLORIDE: 9 INJECTION, SOLUTION INTRAVENOUS at 16:32

## 2017-04-13 RX ADMIN — PHENYLEPHRINE HYDROCHLORIDE 100 MCG: 10 INJECTION, SOLUTION INTRAMUSCULAR; INTRAVENOUS; SUBCUTANEOUS at 15:00

## 2017-04-13 RX ADMIN — PHENYLEPHRINE HYDROCHLORIDE 100 MCG: 10 INJECTION, SOLUTION INTRAMUSCULAR; INTRAVENOUS; SUBCUTANEOUS at 15:46

## 2017-04-13 RX ADMIN — SODIUM CHLORIDE, POTASSIUM CHLORIDE, SODIUM LACTATE AND CALCIUM CHLORIDE: 600; 310; 30; 20 INJECTION, SOLUTION INTRAVENOUS at 12:56

## 2017-04-13 RX ADMIN — ACETAMINOPHEN 975 MG: 325 TABLET, FILM COATED ORAL at 13:04

## 2017-04-13 RX ADMIN — PHENYLEPHRINE HYDROCHLORIDE 100 MCG: 10 INJECTION, SOLUTION INTRAMUSCULAR; INTRAVENOUS; SUBCUTANEOUS at 15:43

## 2017-04-13 RX ADMIN — PHENYLEPHRINE HYDROCHLORIDE 100 MCG: 10 INJECTION, SOLUTION INTRAMUSCULAR; INTRAVENOUS; SUBCUTANEOUS at 15:50

## 2017-04-13 RX ADMIN — PHENYLEPHRINE HYDROCHLORIDE 100 MCG: 10 INJECTION, SOLUTION INTRAMUSCULAR; INTRAVENOUS; SUBCUTANEOUS at 15:48

## 2017-04-13 RX ADMIN — Medication 5 MG: at 16:28

## 2017-04-13 RX ADMIN — PHENYLEPHRINE HYDROCHLORIDE 200 MCG: 10 INJECTION, SOLUTION INTRAMUSCULAR; INTRAVENOUS; SUBCUTANEOUS at 15:45

## 2017-04-13 RX ADMIN — GLYCOPYRROLATE 0.2 MG: 0.2 INJECTION, SOLUTION INTRAMUSCULAR; INTRAVENOUS at 18:27

## 2017-04-13 RX ADMIN — DEXAMETHASONE SODIUM PHOSPHATE 4 MG: 4 INJECTION, SOLUTION INTRA-ARTICULAR; INTRALESIONAL; INTRAMUSCULAR; INTRAVENOUS; SOFT TISSUE at 15:28

## 2017-04-13 RX ADMIN — MIDAZOLAM HYDROCHLORIDE 0.5 MG: 1 INJECTION, SOLUTION INTRAMUSCULAR; INTRAVENOUS at 14:28

## 2017-04-13 RX ADMIN — FENTANYL CITRATE 100 MCG: 50 INJECTION, SOLUTION INTRAMUSCULAR; INTRAVENOUS at 14:43

## 2017-04-13 RX ADMIN — VECURONIUM BROMIDE 2 MG: 1 INJECTION, POWDER, LYOPHILIZED, FOR SOLUTION INTRAVENOUS at 17:10

## 2017-04-13 RX ADMIN — Medication 5 MG: at 15:48

## 2017-04-13 RX ADMIN — FENTANYL CITRATE 50 MCG: 50 INJECTION, SOLUTION INTRAMUSCULAR; INTRAVENOUS at 15:27

## 2017-04-13 RX ADMIN — PHENYLEPHRINE HYDROCHLORIDE 100 MCG: 10 INJECTION, SOLUTION INTRAMUSCULAR; INTRAVENOUS; SUBCUTANEOUS at 15:40

## 2017-04-13 RX ADMIN — VECURONIUM BROMIDE 3 MG: 1 INJECTION, POWDER, LYOPHILIZED, FOR SOLUTION INTRAVENOUS at 17:22

## 2017-04-13 RX ADMIN — PHENYLEPHRINE HYDROCHLORIDE 100 MCG: 10 INJECTION, SOLUTION INTRAMUSCULAR; INTRAVENOUS; SUBCUTANEOUS at 14:51

## 2017-04-13 RX ADMIN — PROPOFOL 80 MG: 10 INJECTION, EMULSION INTRAVENOUS at 14:43

## 2017-04-13 RX ADMIN — SODIUM CHLORIDE: 9 INJECTION, SOLUTION INTRAVENOUS at 14:50

## 2017-04-13 RX ADMIN — PHENYLEPHRINE HYDROCHLORIDE 100 MCG: 10 INJECTION, SOLUTION INTRAMUSCULAR; INTRAVENOUS; SUBCUTANEOUS at 16:28

## 2017-04-13 RX ADMIN — GLYCOPYRROLATE 0.4 MG: 0.2 INJECTION, SOLUTION INTRAMUSCULAR; INTRAVENOUS at 18:17

## 2017-04-13 RX ADMIN — NEOSTIGMINE METHYLSULFATE 1 MG: 1 INJECTION INTRAMUSCULAR; INTRAVENOUS; SUBCUTANEOUS at 18:27

## 2017-04-13 RX ADMIN — VECURONIUM BROMIDE 1 MG: 1 INJECTION, POWDER, LYOPHILIZED, FOR SOLUTION INTRAVENOUS at 15:30

## 2017-04-13 RX ADMIN — VECURONIUM BROMIDE 2 MG: 1 INJECTION, POWDER, LYOPHILIZED, FOR SOLUTION INTRAVENOUS at 15:19

## 2017-04-13 ASSESSMENT — ENCOUNTER SYMPTOMS
SEIZURES: 0
DYSRHYTHMIAS: 0

## 2017-04-13 ASSESSMENT — COPD QUESTIONNAIRES
CAT_SEVERITY: MILD
COPD: 1

## 2017-04-13 ASSESSMENT — LIFESTYLE VARIABLES: TOBACCO_USE: 1

## 2017-04-13 NOTE — CONSULTS
UROLOGY CONSULTATION       It is a great pleasure to be asked to see Severo Kenney 76-year-old lady by Dr. Lindsay Peter in the operating room.  In summary she is undergoing surgery for a right-sided hemicolectomy and I have been asked to come to the operating room to assess the situation as they have not been able to identify the right ureter.  Apparently the patient had a pericolic abscess initially which has created considerable loss of normal tissue planes.  She actually had an iliopsoas abscess on the right side associated with right-sided hydronephrosis.      I actually stepped into the operating room and I could see that there was some seepage into the wound and it was not quite clear which of the structures that were being addressed was in fact the ureter.  After reviewing the records and discussing the situation therefore with the surgeons, I decided should scrub into the case.         PHOENIX MERCEDES MD             D: 2017 17:01   T: 2017 18:11   MT: [id number]      Name:     SEVERO KENNEY   MRN:      [id number]        Account:       [id number]   :      1940           Consult Date:  2017      Document: [id number]

## 2017-04-13 NOTE — ANESTHESIA PREPROCEDURE EVALUATION
Procedure: Procedure(s):  COLECTOMY RIGHT  Preop diagnosis: COLON CANCER    Allergies   Allergen Reactions     Wool Fiber Unknown     Past Medical History:   Diagnosis Date     Abnormal gait      Adenocarcinoma of colon (H)      Arthralgia of upper arm      Arthritis      Arthritis of right hand      Cancer (H)      Cancer of right colon (H)      Cognitive impairment      COPD (chronic obstructive pulmonary disease) (H)      DJD (degenerative joint disease)     knee and lumbar     Fibromatoses of muscle, ligament, and fascia      Flail joint      Generalized OA      Iliopsoas abscess on right (H)      Iron deficiency anemia      Late effects of poliomyelitis      Lumbago      Osteoarthritis of hip      Pain in limb      Postpolio syndrome      Primary osteoarthritis of right hand      Psychosis      Right-sided back pain      Scoliosis      Thyroid disease      Past Surgical History:   Procedure Laterality Date     COLONOSCOPY N/A 3/1/2017    Procedure: COLONOSCOPY;  Surgeon: Lindsay Peter MD;  Location: [facility] OR     I & D abdominal abscess       ILEOSTOMY N/A 3/1/2017    Procedure: ILEOSTOMY;  Surgeon: Lindsay Peter MD;  Location:  OR     ILEOSTOMY       LAPAROSCOPIC ILEOSTOMY N/A 3/1/2017    Procedure: LAPAROSCOPIC ILEOSTOMY;  Surgeon: Lindsay Peter MD;  Location: [facility] OR     severe protein-calorie malnutrition       SOFT TISSUE SURGERY       Prior to Admission medications    Medication Sig Start Date End Date Taking? Authorizing Provider   ipratropium - albuterol 0.5 mg/2.5 mg/3 mL (DUONEB) 0.5-2.5 (3) MG/3ML neb solution Take 1 vial by nebulization every 4 hours as needed for shortness of breath / dyspnea or wheezing    Reported, Patient   guaiFENesin (MUCINEX) 600 MG 12 hr tablet Take 600 mg by mouth 2 times daily    Reported, Patient   Amino Acids-Protein Hydrolys (PRO-STAT AWC PO) Take 1 oz by mouth 2 times daily    Reported, Patient   Cyclobenzaprine HCl (FLEXERIL PO) Take 10 mg by mouth  3 times daily as needed for muscle spasms    Reported, Patient   diclofenac (VOLTAREN) 1 % GEL topical gel Place 2 g onto the skin 3 times daily as needed for moderate pain Apply to R hand    Reported, Patient   Nutritional Supplements (ENSURE PLUS HN) LIQD Take 8 oz by mouth 2 times daily    Reported, Patient   OLANZapine zydis (ZYPREXA) 5 MG ODT tab Take 1 tablet (5 mg) by mouth At Bedtime 3/9/17   Jaret Wylie MD   acetaminophen (TYLENOL) 325 MG tablet Take 2 tablets (650 mg) by mouth every 4 hours as needed for mild pain 3/8/17   Yessy Whalen PA-C   enoxaparin (LOVENOX) 40 MG/0.4ML injection Inject 0.4 mLs (40 mg) Subcutaneous every 24 hours 3/8/17   Yessy Whalen PA-C   SUMATRIPTAN SUCCINATE PO Take 25 mg by mouth every 8 hours as needed for migraine    Unknown, Entered By History   Travoprost (TRAVATAN Z) 0.004 % SOLN Apply 1 drop to eye. In each eye QHS    Reported, Patient   levothyroxine (SYNTHROID, LEVOTHROID) 75 MCG tablet Take 75 mcg by mouth daily.    Reported, Patient   Calcium-Magnesium 333-167 MG TABS Take 2 tablets by mouth daily.    Reported, Patient   ferrous gluconate (FERGON) 324 (38 FE) MG tablet Take 1 tablet by mouth daily (with lunch).    Reported, Patient   B-Complex TABS Take 1 tablet by mouth daily.    Reported, Patient     No current Epic-ordered facility-administered medications on file.      Current Outpatient Prescriptions Ordered in Epic   Medication     ipratropium - albuterol 0.5 mg/2.5 mg/3 mL (DUONEB) 0.5-2.5 (3) MG/3ML neb solution     guaiFENesin (MUCINEX) 600 MG 12 hr tablet     Amino Acids-Protein Hydrolys (PRO-STAT AWC PO)     Cyclobenzaprine HCl (FLEXERIL PO)     diclofenac (VOLTAREN) 1 % GEL topical gel     Nutritional Supplements (ENSURE PLUS HN) LIQD     OLANZapine zydis (ZYPREXA) 5 MG ODT tab     acetaminophen (TYLENOL) 325 MG tablet     enoxaparin (LOVENOX) 40 MG/0.4ML injection     SUMATRIPTAN SUCCINATE PO     Travoprost (TRAVATAN Z) 0.004  % SOLN     levothyroxine (SYNTHROID, LEVOTHROID) 75 MCG tablet     Calcium-Magnesium 333-167 MG TABS     ferrous gluconate (FERGON) 324 (38 FE) MG tablet     B-Complex TABS     Wt Readings from Last 1 Encounters:   04/11/17 51.8 kg (114 lb 1.6 oz)     Temp Readings from Last 1 Encounters:   04/11/17 37.1  C (98.8  F)     BP Readings from Last 6 Encounters:   04/11/17 98/58   04/04/17 118/76   04/04/17 122/64   03/30/17 97/63   03/29/17 104/67   03/29/17 114/74     Pulse Readings from Last 4 Encounters:   04/11/17 83   04/04/17 86   04/04/17 77   03/30/17 90     Resp Readings from Last 1 Encounters:   04/11/17 18     SpO2 Readings from Last 1 Encounters:   04/11/17 94%     Recent Labs   Lab Test  04/11/17   0500  04/03/17   0500   NA  139  141   POTASSIUM  3.8  4.0   CHLORIDE  103  107   CO2  28  27   ANIONGAP  8  7   GLC  82  79   BUN  17  18   CR  0.70  0.70   SARITHA  8.5  8.6     Recent Labs   Lab Test  04/11/17   0500  04/03/17   0500   WBC  5.1  6.1   HGB  10.0*  9.8*   PLT  205  222     Recent Labs   Lab Test  03/29/17   1100  03/20/17   0500   INR  0.90  0.98     ECG: NSR, no st or twave abnormalities.   ECHO: Left Ventricle  The left ventricle is normal in size. E by E prime ratio is between 8 and 15,  which is indeterminate for assessment of left ventricular filling pressures.  Left ventricular systolic function is normal. The visual ejection fraction is  estimated at 55-60%. The transmitral spectral Doppler flow pattern is normal  for age.     Right Ventricle  The right ventricle is normal in size and function.     Atria  The left atrium is borderline dilated. Right atrial size is normal. There is  no color Doppler evidence of an atrial shunt.     Mitral Valve  The mitral valve leaflets are mildly thickened. There is mild (1+) mitral  regurgitation.        Tricuspid Valve  There is trace tricuspid regurgitation. The right ventricular systolic  pressure is approximated at 22.3mmHg plus the right atrial  pressure.     Aortic Valve  The aortic valve is trileaflet. There is trivial trileaflet aortic sclerosis.  No aortic regurgitation is present. No hemodynamically significant valvular  aortic stenosis.     Pulmonic Valve  There is trace pulmonic valvular regurgitation.     Vessels  The inferior vena cava is not dilated.     Pericardium  There is no pericardial effusion.        Rhythm  The rhythm was normal sinus.        Anesthesia Evaluation     . Pt has had prior anesthetic. Type: General    No history of anesthetic complications          ROS/MED HX    ENT/Pulmonary:     (+)tobacco use, Past use mild COPD, , recent URI unresolved Dry chronic cough over past two weeks. Non-productive, no recent fevers: . .   (-) sleep apnea   Neurologic:     (+)migraines, other neuro polio - affecting left arm, low back pain   (-) seizures, CVA and Dementia   Cardiovascular:        (-) hypertension, CAD, MARIANO, arrhythmias, valvular problems/murmurs and dyslipidemia   METS/Exercise Tolerance:  4 - Raking leaves, gardening   Hematologic:     (+) Anemia, -     (-) history of blood clots and other hematologic disorder   Musculoskeletal:   (+) arthritis, , , -       GI/Hepatic:        (-) GERD and liver disease   Renal/Genitourinary:      (-) renal disease   Endo:     (+) thyroid problem hypothyroidism, .   (-) Type I DM and Type II DM   Psychiatric:         Infectious Disease:        (-) Recent Fever   Malignancy:   (+) Malignancy History of GI  GI CA Active status post,         Other:                     Physical Exam  Normal systems: cardiovascular, pulmonary and dental    Airway   Mallampati: I  TM distance: >3 FB  Neck ROM: full    Dental   (+) caps    Cardiovascular   Rhythm and rate: regular and normal  (-) no murmur    Pulmonary    breath sounds clear to auscultation                    Anesthesia Plan      History & Physical Review      ASA Status:  3 .    NPO Status:  > 8 hours    Plan for General and ETT with Propofol induction.  Maintenance will be Balanced.    PONV prophylaxis:  Ondansetron (or other 5HT-3) and Dexamethasone or Solumedrol  Additional equipment: 2nd IV Patient consented for post-op TAP blocks for post-op pain control  Precedex infusion      Postoperative Care  Postoperative pain management:  IV analgesics and Oral pain medications.      Consents  Anesthetic plan, risks, benefits and alternatives discussed with:  Patient..                          .

## 2017-04-13 NOTE — IP AVS SNAPSHOT
"` `           [place] [address] ONCOLOGY: [phone]                                              INTERAGENCY TRANSFER FORM - NURSING   2017                    Hospital Admission Date: 2017  SEVERO SWANN   : 1940  Sex: Female        Attending Provider: Lindsay Peter MD     Allergies:  Wool Fiber    Infection:  None   Service:  COLORECTAL S    Ht:  1.676 m (5' 6\")   Wt:  50.8 kg (111 lb 15.9 oz)   Admission Wt:  59 kg (130 lb)    BMI:  18.08 kg/m 2   BSA:  1.54 m 2            Patient PCP Information     Provider PCP Type    Latanya Martinez MD General      Current Code Status     Date Active Code Status Order ID Comments User Context       Prior      Code Status History     Date Active Date Inactive Code Status Order ID Comments User Context    2017  3:59 PM  Full Code [id number]  Yessy Whalen PA-C Outpatient    2017  1:26 AM 2017  3:59 PM Full Code [id number]  Hernando Crockett MD Inpatient    2017  9:27 PM 2017  1:26 AM Full Code [id number]  Dao Delacruz MD Inpatient    3/8/2017  7:30 AM 2017  9:27 PM Full Code [id number]  Yessy Whalen PA-C Outpatient    2/15/2017  5:33 AM 3/8/2017  7:30 AM Full Code [id number]  Noble Lopez MD Inpatient      Advance Directives        Does patient have a scanned Advance Directive/ACP document in EPIC?           No        Hospital Problems as of 2017              Priority Class Noted POA    Respiratory insufficiency Medium  2017 Yes    Colon cancer (H) Medium  2017 Yes      Non-Hospital Problems as of 2017              Priority Class Noted    Generalized osteoarthritis Medium  2005    Late effects of poliomyelitis Medium  2005    Plantar fascial fibromatosis Medium  2005    Scoliosis Medium  2005    Medication monitoring encounter   2011    Flail joint Medium  2012    Arthralgia of upper arm Medium  2012    OA (osteoarthritis) of " knee   1/3/2013    Pain in joint, lower leg   1/3/2013    Abnormal gait   1/3/2013    Lumbago   2/19/2013    Osteoarthritis of hip   6/26/2014    DJD (degenerative joint disease) of knee   6/26/2014    DJD (degenerative joint disease), lumbar   6/26/2014    Hip pain   1/26/2015    Pain in limb   1/28/2015    Hip pain, right Medium  2/23/2016    Psychosis Medium  2/15/2017    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  3/12/2017    Adenocarcinoma of colon (H) Medium  3/12/2017    Status post incision and drainage Medium  3/12/2017    S/P ileostomy (H) Medium  3/12/2017    Iron deficiency anemia Medium  3/12/2017    Postpolio syndrome Medium  3/12/2017    Severe protein-calorie malnutrition (H) Medium  3/12/2017    Physical deconditioning Medium  3/12/2017    Cognitive impairment Medium  3/16/2017    Primary osteoarthritis of right hand Medium  3/19/2017    Rectal bleeding Medium  4/1/2017    Acute nasopharyngitis Medium  4/14/2017    URI with cough and congestion Medium  4/14/2017      Immunizations     Name Date      Influenza (High Dose) 3 valent vaccine 02/16/17     Pneumococcal 23 valent 02/16/17          END      ASSESSMENT     Discharge Profile Flowsheet     EXPECTED DISCHARGE     COMMUNICATION ASSESSMENT      Expected Discharge Date  04/20/17 (tcu) 04/19/17 1347   Patient's communication style  spoken language (English or Bilingual) 04/13/17 1224    DISCHARGE NEEDS ASSESSMENT     FINAL RESOURCES      Concerns To Be Addressed  discharge planning concerns 04/17/17 1226   Resources List  -- (na) 04/06/17 1051    Concerns Comments  -- (na) 04/06/17 1051   Other Resources  -- (na) 04/06/17 1051    Patient/family verbalizes understanding of discharge plan recommendations?  Yes 04/17/17 1226   PAS Number  -- (na) 04/06/17 1051    Medical Team notified of plan?  yes 04/17/17 1226   Senior Linkage Line Referral Placed  -- (na) 04/06/17 1051    Equipment Currently Used at Home  none 04/17/17 0957   F/U Appointment Brochure  "Provided  -- (na) 04/06/17 1051    Transportation Available  car 04/17/17 0957   Referrals Placed  -- (na) 04/06/17 1051    Current Discharge Risk  physical impairment 04/17/17 1226   SKIN      # of Referrals Placed by CTS  Communication hand-offs to next level of Care Providers (PCP) 04/20/17 1011   Inspection  Full 04/20/17 0827    GASTROINTESTINAL (ADULT,PEDIATRIC,OB)     Procedural focused assessment (identify areas inspected)   Abdomen;Hip, left;Hip, right;Knee, left;Knee, right;Ankle, left;Ankle, right;Heel, left;Heel, right;Foot, left;Foot, right 04/13/17 1918    GI WDL  ex 04/20/17 0827   Skin WDL  ex 04/20/17 0827    Abdominal Appearance  distended;rounded 04/20/17 0827   Skin Moisture  dry 04/20/17 0827    All Quadrants Bowel Sounds  audible and active in all quadrants 04/20/17 0827   Skin Integrity  incision(s);bruise(s) 04/20/17 0827    Last Bowel Movement  04/19/17 04/19/17 2236   Skin areas NOT inspected  Coccyx;Buttock, right;Buttock, left;Hip, right;Hip, left 04/19/17 0121    GI Signs/Symptoms  diarrhea 04/19/17 2236   SAFETY      Passing flatus  yes 04/20/17 0119   Safety WDL  WDL 04/20/17 1105                 Assessment WDL (Within Defined Limits) Definitions           Safety WDL     Effective: 09/28/15    Row Information: <b>WDL Definition:</b> Bed in low position, wheels locked; call light in reach; upper side rails up x 2; ID band on<br> <font color=\"gray\"><i>Item=AS safety wdl>>List=AS safety wdl>>Version=F14</i></font>      Skin WDL     Effective: 09/28/15    Row Information: <b>WDL Definition:</b> Warm; dry; intact; elastic; without discoloration; pressure points without redness<br> <font color=\"gray\"><i>Item=AS skin wdl>>List=AS skin wdl>>Version=F14</i></font>      Vitals     Vital Signs Flowsheet     COMMENTS     Side Effects Monitoring: Respiratory Quality  R 04/20/17 0817    Comments  extubated 04/14/17 1309   Side Effects Monitoring: Respiratory Depth  N 04/20/17 0817    VITAL SIGNS     " "Side Effects Monitoring: Sedation Level  1 04/20/17 0817    Temp  96.4  F (35.8  C) 04/20/17 0815   HEIGHT AND WEIGHT      Temp src  Oral 04/20/17 0815   Height  1.676 m (5' 6\") 04/13/17 1221    Resp  16 04/20/17 0815   Height Method  Stated 03/29/17 1022    Pulse  94 04/18/17 0736   Weight  50.8 kg (111 lb 15.9 oz) 04/20/17 0707    Heart Rate  90 04/20/17 0815   BSA (Calculated - sq m)  1.66 04/13/17 1221    Pulse/Heart Rate Source  Monitor 04/20/17 0815   BMI (Calculated)  21.03 04/13/17 1221    BP  131/75 04/20/17 0815   POSITIONING      BP Location  Right arm 04/20/17 0815   Body Position  independently positioning 04/20/17 0827    OXYGEN THERAPY     Head of Bed (HOB)  HOB at 30 degrees 04/20/17 0827    SpO2  92 % 04/20/17 0815   Positioning/Transfer Devices  pillows;in use 04/19/17 0121    O2 Device  None (Room air) 04/20/17 0815   Chair  Upright in chair 04/20/17 0827    FiO2 (%)  50 % 04/14/17 1604   DAILY CARE      Oxygen Delivery  2 LPM 04/17/17 0133   Activity Type  activity adjusted per tolerance 04/20/17 0827    PAIN/COMFORT     Activity Level of Assistance  assistance, stand-by 04/20/17 0827    Patient Currently in Pain  yes 04/20/17 0817   ECG      Preferred Pain Scale  number (Numeric Rating Pain Scale) 04/20/17 0817   ECG Rhythm  Normal sinus rhythm 04/16/17 1607    Patient's Stated Pain Goal  4 04/19/17 0806   Ectopy  None 04/15/17 1743    0-10 Pain Scale  2 04/20/17 0817   Lead Monitored  Lead II;V 1 04/15/17 1743    Word Pain Scale  2 04/14/17 2315   RESPIRATORY MONITORING      Pain Location  Abdomen 04/20/17 0817   Respiratory Monitoring (EtCO2)  45 mmHg 04/13/17 2048    Pain Descriptors  Dull 04/19/17 2225   EKG MONITORING      Pain Intervention(s)  Declines 04/20/17 0817   Cardiac Regularity  Regular 03/29/17 1031    Response to Interventions  Decrease in pain 04/19/17 3615   SIRISHA COMA SCALE      CRITICAL-CARE PAIN OBSERVATION TOOL (CPOT)     Best Eye Response  4-->(E4) spontaneous 04/20/17 " 0827    Facial Expression  0 04/14/17 1223   Best Motor Response  6-->(M6) obeys commands 04/20/17 0827    Body Movements  0 04/14/17 1223   Best Verbal Response  5-->(V5) oriented 04/20/17 0827    Compliance w/ventilator (intubated patients)  0 04/14/17 1223   [place] Coma Scale Score  15 04/20/17 0827    Vocalization (extubated patients)  Intubated 04/14/17 1223   POINT OF CARE TESTING      Muscle Tension  0 04/14/17 1223   Puncture Site  fingertip 04/15/17 1148    Total  0 04/14/17 1223   Bedside Glucose (mg/dl )   89 mg/dl 04/15/17 1208    ANALGESIA SIDE EFFECTS MONITORING                   Patient Lines/Drains/Airways Status    Active LINES/DRAINS/AIRWAYS     Name: Placement date: Placement time: Site: Days: Last dressing change:    Ileostomy 03/01/17 2018   RUQ   49     Peripheral IV 04/16/17 Right Lower forearm 04/16/17   1750   Lower forearm   3     Incision/Surgical Site 03/01/17 Abdomen 03/01/17   2042    49     Incision/Surgical Site 04/13/17 Abdomen 04/13/17   1800    6     Incision/Surgical Site 04/13/17 Abdomen 04/13/17   1807    6             Patient Lines/Drains/Airways Status    Active PICC/CVC     None            Intake/Output Detail Report     Date Intake       Output   Net    Shift P.O. I.V. IV Piggyback Colloid Total Urine Blood Total       Noc 04/18/17 2300 - 04/19/17 0659 -- -- -- -- -- 550 -- 550 -550    Day 04/19/17 0700 - 04/19/17 1459 440 -- -- -- 440 925 -- 925 -485    Arleen 04/19/17 1500 - 04/19/17 2259 150 -- -- -- 150 -- -- -- 150    Noc 04/19/17 2300 - 04/20/17 0659 -- -- -- -- -- -- -- -- 0    Day 04/20/17 0700 - 04/20/17 1459 120 -- -- -- 120 -- -- -- 120      Last Void/BM       Most Recent Value    Urine Occurrence 1 at 04/20/2017 0827    Stool Occurrence 1 at 04/20/2017 0437      Case Management/Discharge Planning     Case Management/Discharge Planning Flowsheet     REFERRAL INFORMATION     Reaction To Health Status  accepting 04/17/17 1226    Admission Type  inpatient  04/17/17 1226   Understanding Of Condition And Treatment  adequate understanding of medical condition 04/17/17 1226    Arrived From  home or self-care 04/17/17 1226   EXPECTED DISCHARGE      # of Referrals Placed by CTS  Communication hand-offs to next level of Care Providers (PCP) 04/20/17 1011   Expected Discharge Date  04/20/17 (tcu) 04/19/17 1347    Reason For Consult  discharge planning 04/17/17 1226   ASSESSMENT/CONCERNS TO BE ADDRESSED      Record Reviewed  patient profile 04/17/17 1226   Concerns To Be Addressed  discharge planning concerns 04/17/17 1226     Assigned to Case  RONN Salgado 04/17/17 1226   Concerns Comments  -- (na) 04/06/17 1051    [facility] 02/17/17 1245   DISCHARGE PLANNING      Primary Care MD Name  Ltaanya Martinez MD 02/17/17 1245   Patient/family verbalizes understanding of discharge plan recommendations?  Yes 04/17/17 1226    LIVING ENVIRONMENT     Medical Team notified of plan?  yes 04/17/17 1226    Lives With  alone 04/17/17 1226   Transportation Available  car 04/17/17 0957    Living Arrangements  condominium 04/17/17 1226   Current Discharge Risk  physical impairment 04/17/17 1226    Provides Primary Care For  no one, unable/limited ability to care for self 04/17/17 1226   FINAL RESOURCES      Quality Of Family Relationships  supportive;involved 04/17/17 1226   Equipment Currently Used at Home  none 04/17/17 0957    Able to Return to Prior Living Arrangements  no 04/17/17 1226   Resources List  -- (na) 04/06/17 1051    HOME SAFETY     Other Resources  -- (na) 04/06/17 1051    Patient Feels Safe Living in Home?  yes 04/17/17 1226   PAS Number  -- (na) 04/06/17 1051    ASSESSMENT OF FAMILY/SOCIAL SUPPORT     Senior Linkage Line Referral Placed  -- (na) 04/06/17 1051    Who is your support system?  -- (POA's) 04/17/17 1226   F/U Appointment Brochure Provided  -- (na) 04/06/17 1051    Description of Support System  Supportive;Involved  04/17/17 1226   Referrals Placed  -- (na) 04/06/17 1051    Support Assessment  Adequate family and caregiver support 04/17/17 1226   ABUSE RISK SCREEN      Quality of Family Relationships  supportive;involved 04/17/17 1226   QUESTION TO PATIENT:  Has a member of your family or a partner(now or in the past) intimidated, hurt, manipulated, or controlled you in any way?  no 04/13/17 1214    COPING/STRESS     QUESTION TO PATIENT: Do you feel safe going back to the place where you are living?  yes 04/13/17 1214    Major Change/Loss/Stressor  hospitalization 04/17/17 1226   OBSERVATION: Is there reason to believe there has been maltreatment of a vulnerable adult (ie. Physical/Sexual/Emotional abuse, self neglect, lack of adequate food, shelter, medical care, or financial exploitation)?  no 04/13/17 1214    Patient Personal Strengths  able to adapt 04/17/17 1226   (R) MENTAL HEALTH SUICIDE RISK      Sources Of Support  other family members;friend(s) 04/17/17 1226   Are you depressed or being treated for depression?  No 04/13/17 1300

## 2017-04-13 NOTE — IP AVS SNAPSHOT
` ` Patient Information     Patient Name Sex Sherita Pena ([id number]) Female 1940       Room Bed    9931 8831-69      Patient Demographics     Address    [address] [place] [id number]      Patient Ethnicity & Race     Ethnic Group Patient Race    American White      Emergency Contact(s)     Name Relation Home Work Mobile    Sherita Su- co POA Relative   [phone]    Dinora Garrett-co POA Friend   [phone]    Nat Oneill Sister [phone]    Dr. Renny Monson Brother [phone]    Liz Friend [phone]        Documents on File        Status Date Received Description       Documents for the Patient    External Medication Information Consent Patient Refused () 11     Consent for Services - Hospital/Clinic Received () 11     Consent for Services - Hospital/Clinic Received () 13     Consent to Communicate Received 13 E-mail    [facility] Specified Other       Privacy Notice - [place] Received 16     Consent for Services - Hospital/Clinic Received () 14     Consent to Communicate Received 14 PHI    Patient ID Received 01/26/15     Consent for Services - Hospital/Clinic Received () 01/26/15     Consent for EHR Access Received 01/26/15     Waiver - Payment - Patient Received 02/02/15 occupational therapy waiver    Consent for Services/Privacy Notice - Hospital/Clinic Received () 16     Insurance Card Received 16 medicare    Insurance Card Received 16 health [facility]    Advance Directives and Living Will Not Received  Validation of AD 1997    Advance Directives and Living Will Received 17 Health Care Directive 1997    Advance Directives and Living Will Not Received  Validation of AD 1997    Advance Directives and Living Will Received 17 Health Care Directive 1997    Advance Directives and Living Will Not Received   Validation of AD  02/21/17    Advance Directives and Living Will Received 02/21/17 Health Care Directive  02/21/17    Power of  Not Received  Power of  07-    HIM ALFONZO Authorization  03/13/17 [place] [facility]    Advance Directives and Living Will Received 03/15/17 POLST 03/10/2017    Consent for Services/Privacy Notice - Hospital/Clinic Received 03/29/17     Insurance Card Received 04/13/17     Patient ID Received 04/13/17     Privacy Notice - [place] Received (Deleted) 12/26/06     Insurance Card  (Deleted) 12/26/06     Waiver - Payment  (Deleted) 12/26/06     Patient ID Received (Deleted) 10/25/13     Insurance Card Received (Deleted) 12/21/11     Insurance Card Received (Deleted) 01/03/13     Insurance Card Received (Deleted) 01/03/13 Medicare    Consent for EHR Access  (Deleted) 03/13/13 Copied from existing Consent for services - C/HOD collected on 01/03/2013    Waiver - Payment Received (Deleted) 11/19/13     Insurance Card Received (Deleted) 06/26/14 [facility]    Insurance Card Received (Deleted) 01/26/15 hp    Insurance Card Received (Deleted) 01/26/15 medicare    Advance Directives and Living Will Received (Deleted) 02/20/17     Advance Directives and Living Will Received (Deleted) 02/20/17     Advance Directives and Living Will Received (Deleted) 02/20/17 Health Care Directive 07-    Advance Directives and Living Will Not Received (Deleted)  Validation of AD 07-    Advance Directives and Living Will Not Received (Deleted)         Documents for the Encounter    CMS IM for Patient Signature Received 04/19/17 1MM      Admission Information     Attending Provider Admitting Provider Admission Type Admission Date/Time    Lindsay Peter MD Lowry, Ann Christine, MD Elective 04/13/17  1038    Discharge Date Hospital Service Auth/Cert Status Service Area     Colorectal Surgery Incomplete [facility] SERVICES    Unit Room/Bed Admission Status       [facility] ONCOLOGY  8831/8831-02 Admission (Confirmed)       Admission     Complaint    COLON CANCER, Respiratory insufficiency, Colon cancer (H)      Hospital Account     Name Acct ID Class Status Primary Coverage    Sherita Kenney [id number] Inpatient Open MEDICARE - MEDICARE FOR HB SUPPLEMENT            Guarantor Account (for Hospital Account #[id number])     Name Relation to Pt Service Area Active? Acct Type    Sherita Kenney Self FCS Yes Personal/Family    Address Phone          [address] [place] [address] [phone](H)              Coverage Information (for Hospital Account #[id number])     1. MEDICARE/MEDICARE FOR HB SUPPLEMENT     F/O Payor/Plan Precert #    MEDICARE/MEDICARE FOR HB SUPPLEMENT     Subscriber Subscriber #    Sherita Kenney [id number]    Address Phone    [employer]  [address]  [place] [address] [phone]          2. HEALTH PARTNERS/[employer]ED[employer] PLAN     F/O Payor/Plan Precert #    HE[employer] PARTNERS/[employer]ED[employer]AN     Subscriber Subscriber #    Sherita Kenney [id number]    Address Phone    [address]  [place] [address] [phone]

## 2017-04-13 NOTE — IP AVS SNAPSHOT
` `     [place] [address] ONCOLOGY: [phone]                 INTERAGENCY TRANSFER FORM - NOTES (H&P, Discharge Summary, Consults, Procedures, Therapies)   2017                    Hospital Admission Date: 2017  SHERITA MARTINEZ KYGAL   : 1940  Sex: Female        Patient PCP Information     Provider PCP Type    Latanya Martinez MD General         History & Physicals      Interval H&P Note by Janessa Lane at 2017  9:53 AM     Author:  Janessa Lane Service:  (none) Author Type:  [facility]    Filed:  2017  9:53 AM Date of Service:  2017  9:53 AM Note Created:  2017  9:53 AM    Related:  Original note: H&P (View-Only) by Amaris Wasserman APRN CNP filed at 3/30/2017 10:21 PM Status:  Signed :  Janessa Lane ([facility])         This note is for the purpose of making the H & P performed in clinic within the last 30 days available in the hospital surgical encounter.[SS1.1]       Revision History        User Key Date/Time User Provider Type Action    > SS1.1 2017  9:53 AM Janessa Lane Sign            H&P (View-Only) by Amaris Wasserman APRN CNP at 3/28/2017 11:00 AM     Author:  Amaris Wasserman APRN CNP Service:  (none) Author Type:  Nurse Practitioner    Filed:  3/30/2017 10:21 PM Date of Service:  3/28/2017 11:00 AM Note Created:  2017  9:53 AM    Status:  Signed :  Amaris Wasserman APRN CNP (Nurse Practitioner)         [facility]  [facility] [id number]    PRE-OP EVALUATION:    Today's date: 2017    Sherita MARTINEZ Kygal (: 1940) presents for pre-operative evaluation assessment as requested by [PB1.1] [place][PB1.2].  Pt requires evaluation and anesthesia risk assessment prior to undergoing surgery/procedure for treatment of[PB1.1] colon cancer[PB1.2].  Proposed procedure:[PB1.1] open right colectomy on [PB1.2]    Patient seen today at [facility] location.    Date of Surgery/  Procedure:[PB1.1] 4/13/17[PB1.2]  Time of Surgery/ Procedure:[PB1.1] 1:45pm[PB1.2]  Hospital/Surgical Facility:[PB1.1] [facility][PB1.2]  Primary Physician:[PB1.1] Dr. Peter[PB1.2]  Type of Anesthesia Anticipated: General  History of anesthesia complications:[PB1.1] NONE[PB1.2]  History of  abnormal bleeding:[PB1.1] NONE[PB1.2]   History of blood transfusions: YES.  No complications.  Patient has a Health Care Directive or Living Will:[PB1.1]  YES, completed 2/21/17[PB1.2]      PREOP QUESTIONNAIRE  ====================  1- NO - Do you ever have any pain or discomfort in your chest?  2- NO - Have you ever had a severe pain across the front of your chest lasting for half an hour or more?  3- NO - Do you have swelling in your feet or ankles at times?  4- NO - Are you troubled by shortness of breath when: walking on the level/ up a slight hill/ at night?  5- NO - Does your chest ever sound wheezy or whistling?  6- NO - Do you currently have a cold, bronchitis or other respiratory infection?  7- NO - Have you had a cold, bronchitis or other respiratory infection within the last 2 weeks?  8- NO - Do you usually have a cough?  9- NO - Do you sometimes get pains in the calves of your legs when you walk?  10-NO - Do you or anyone in your family have previous history of blood clots?  11-NO - Do you or does anyone in your family have serious bleeding problem such as prolonged bleeding following surgeries or cuts?  12-NO - Have you ever had problems with anemia or been told to take iron pills?  13-NO - Have you had any abnormal blood loss such as black, tarry or bloody stools, or abnormal vaginal bleeding?  14-NO - Have you or any of your relatives ever had problems with anesthesia?  15-NO - Do you snore or stop breathing at night?  16-NO - Do you have any prosthetic heart valves or joints?  17-NO - Is there any chance that you may be pregnant?[PB1.1]      HPI:   Sherita Kenney is a 76 year old (1940),  who is being seen today for an episodic care visit at [facility] for pre-op exam.      Today's concern is:     Adenocarcinoma of colon (H)  S/P ileostomy (H)  S/p I&D of abdominal abscess  Severe protein-calorie malnutrition (H)  Patient was found disoriented with acute psychosis at home, was transferred to [facility] on 2/15. Noted to have a RLQ abdominal mass upon hospital admission 2/15. CT abdomen positive for right iliopsoas abscess. Started on zosyn x 2 weeks and s/p drainage of abscess in IR on 2/15. Abdominal drain since removed. Patient had abdominal distention on 2/23, CT positive for small bowel obstruction. Colorectal surgery consulted. S/p colonoscopy and lap ileostomy 3/1. CT also positive for large mass in cecum with fixation to abdominal wall and lesions on surface of the right liver. Was diagnosed with adenocarcinoma of colon. Had severe protein-calorie malnutrition during hospital stay. Was started on TPN post-operatively 2/24, which was dc'd on 3/7. Requires calorie counts, low fiber diet, and requires strict diet d/t recent s/p ileostomy. Dietary following, states patient is consuming 7130-7207 calories per day. During patient exam, denies pain to abdomen. Has tylenol prn available. Also taking lovenox qd for DVT prophylaxis. Ileostomy intact with adequate output. Admits to improvement in appetite. Dietary following. Oncology following, patient to f/u as directed with Dr. Nieves. Colorectal surgery following, patient to f/u with Dr. Peter on 3/31 to discuss open right colectomy planned for 4/13.      UPDATE 3/29: Patient started to pass clots per rectum 3/29. No stool noted per RN. Concern for GI bleed vs colon cancer. Ileostomy intact with adequate output. Was sent to [facility] ED for further evaluation. Colon & Rectal Surgery team was consulted during ED visit. Anoscopy completed in ED, no source of active bleeding, found bright red blood in rectal vault with no identified rectal masses.  Patient remained stable w/o active sx of bleeding and was cleared to be discharged back to TCU on 3/29. Since patient has been back to TCU, no additional reports of bleeding. On 3/30, RN reports new finding regarding color changes near stoma. During patient exam, appears to be dusky/gr[PB1.3]a[PB1.2]y coloring of surrounding tissue near stoma, concerning for necrosis. Updated MAK Mon from [facility] and discussed findings. Plan for patient to be seen by Dr. Peter 3/31 and RN staff to continue to monitor and will update if stoma changes to dusky color. Currently, appearance of stoma is healthy with pink tissue and ileostomy is producing adequate output.       Other iron deficiency anemia  Hgb was 5.6 on 2/15 r/t colon mass. Transfused with 2 units of PRBCs on 2/16, along with IV iron infusion. Recent Hgb baseline 8-9. Last Hgb was 8.8 on 3/20. Currently taking ferrous gluconate 324mg qd. No active sx of bleeding. Denies fatigue, SOB.       Psychosis, unspecified psychosis type  Cognitive Impairment  Physical deconditioning  During hospital stay, [facility] 8/30 on 2/17. Was seen by Psychiatry inpatient, was diagnosed with acute unspecified psychosis. Patient w/o history of mental illness in the past or diagnosed with dementia. Since at TCU, [facility] 19/30. Oriented to self and place. Patient is pleasantly confused, has poor short term memory. No reports of agitation or behavioral disturbances by RN staff. Referral to Neuropsychologist d/t cognitive impairment and diagnosis of unspecified psychosis per request of patient's POA (Danay). PTA patient was living alone. Patient is unable to return home at this time d/t limited ability to care for ileostomy by self d/t postpolio syndrome affecting left extremity and arthritis impairing abilities of right hand. SW following for discharge planning, currently has LTC application.       Postpolio syndrome  Primary osteoarthritis of right hand  H/o  chronic pain to right hand r/t osteoarthritis and overuse of right hand. 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 Has been seen by outpatient OT in the past for hand therapy. Stated hand splint/gaurd was recommended to her, but patient declined at that time. Since at TCU, patient states pain in right hand flares with over-use. Recently states pain is controlled with tylenol.          [PB1.3]    Patient Active Problem List    Diagnosis Date Noted     Primary osteoarthritis of right hand 03/19/2017     Priority: Medium     Cognitive impairment 03/16/2017     Priority: Medium     Iliopsoas abscess on right (H) 03/12/2017     Priority: Medium     Adenocarcinoma of colon (H) 03/12/2017     Priority: Medium     Status post incision and drainage 03/12/2017     Priority: Medium     S/P ileostomy (H) 03/12/2017     Priority: Medium     Iron deficiency anemia 03/12/2017     Priority: Medium     Postpolio syndrome 03/12/2017     Priority: Medium     Severe protein-calorie malnutrition (H) 03/12/2017     Priority: Medium     Physical deconditioning 03/12/2017     Priority: Medium     Psychosis 02/15/2017     Priority: Medium     Hip pain, right 02/23/2016     Priority: Medium     Flail joint 02/16/2012     Priority: Medium     Arthralgia of upper arm 02/16/2012     Priority: Medium     Generalized osteoarthritis 07/07/2005     Priority: Medium     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 yes         DJD (degenerative joint disease) of knee 06/26/2014     DJD (degenerative joint disease), lumbar 06/26/2014     Lumbago 02/19/2013     OA (osteoarthritis) of knee 01/03/2013     Pain in joint, lower leg 01/03/2013     Abnormal gait  01/03/2013     Medication monitoring encounter 12/22/2011       Past Medical History:   Diagnosis Date     Cancer (H)        Past Surgical History:   Procedure Laterality Date     COLONOSCOPY N/A 3/1/2017    Procedure: COLONOSCOPY;  Surgeon: Lindsay Peter MD;  Location: [facility] OR     ILEOSTOMY N/A 3/1/2017    Procedure: ILEOSTOMY;  Surgeon: Lindsay Peter MD;  Location: [facility] OR     LAPAROSCOPIC ILEOSTOMY N/A 3/1/2017    Procedure: LAPAROSCOPIC ILEOSTOMY;  Surgeon: Lindsay Peter MD;  Location: [facility] OR       No family history on file.    Social History   Substance Use Topics     Smoking status: Never Smoker     Smokeless tobacco: Not on file     Alcohol use No          Allergies   Allergen Reactions     Wool Fiber Unknown     Current Outpatient Prescriptions   Medication Sig Dispense Refill     Cyclobenzaprine HCl (FLEXERIL PO) Take 10 mg by mouth 3 times daily as needed for muscle spasms       diclofenac (VOLTAREN) 1 % GEL topical gel Place 2 g onto the skin 3 times daily as needed for moderate pain Apply to R hand       Nutritional Supplements (ENSURE PLUS HN) LIQD Take 8 oz by mouth 2 times daily       OLANZapine zydis (ZYPREXA) 5 MG ODT tab Take 1 tablet (5 mg) by mouth At Bedtime       acetaminophen (TYLENOL) 325 MG tablet Take 2 tablets (650 mg) by mouth every 4 hours as needed for mild pain 100 tablet      enoxaparin (LOVENOX) 40 MG/0.4ML injection Inject 0.4 mLs (40 mg) Subcutaneous every 24 hours 23 Syringe 0     SUMATRIPTAN SUCCINATE PO Take 25 mg by mouth every 8 hours as needed for migraine       Travoprost (TRAVATAN Z) 0.004 % SOLN Apply 1 drop to eye. In each eye QHS       levothyroxine (SYNTHROID, LEVOTHROID) 75 MCG tablet Take 75 mcg by mouth daily.       Calcium-Magnesium 333-167 MG TABS Take 2 tablets by mouth daily.       ferrous gluconate (FERGON) 324 (38 FE) MG tablet Take 1 tablet by mouth daily (with lunch).       B-Complex TABS Take 1 tablet by mouth daily.       Amino  "Acids-Protein Hydrolys (PRO-STAT AWC PO) Take 1 oz by mouth 2 times daily[PB1.4]           REVIEW OF SYSTEMS:  10 point ROS of systems including Constitutional, Eyes, Respiratory, Cardiovascular, Gastroenterology, Genitourinary, Integumentary, Muscularskeletal, Psychiatric were all negative except for pertinent positives noted in my HPI.      EXAM:[PB1.1]  /66  Pulse 92  Temp 98.3  F (36.8  C)  Resp 16  Ht 5' 6\" (1.676 m)  Wt 114 lb 6.4 oz (51.9 kg)  SpO2 97%  BMI 18.46 kg/m2  GENERAL APPEARANCE:[PB1.3] healthy, alert, no distress and cooperative[PB1.5]  EYES:[PB1.3] Eyes grossly normal to inspection, PERRL and conjunctivae and sclerae normal[PB1.5]  HENT:[PB1.3] ear canals and TM's normal and nose and mouth without ulcers or lesions[PB1.5]  NECK:[PB1.3] no adenopathy, no asymmetry, masses, or scars and thyroid normal to palpation[PB1.5]  RESP:[PB1.3] lungs clear to auscultation - no rales, rhonchi or wheezes[PB1.5]  CV:[PB1.3] regular rates and rhythm, normal S1 S2, no S3 or S4 and no murmur, click or rub  ABDOMEN: Normal bowel sounds, soft, nontender, no hepatosplenomegaly or other masses, no guarding or rebound. Ileostomy intact, adequate output.[PB1.5] Ileostomy: Stoma pink, surrounding tissue dusky/gray, areas of excoriation noted.[PB1.2]   M/S: No movement to LUE d/t h/o polio. Active ROM in all other extremities. Strong hand grasp to right hand. Gait and station normal.   SKIN: Inspection of skin and subcutaneous tissue baseline, Palpation of skin and subcutaneous tissue baseline. Arthritic changes noted in right hand.[PB1.5]  NEURO:[PB1.3] Normal strength and tone, sensory exam grossly normal, mentation intact, speech normal and cranial nerves 2-12 intact  PSYCH: Oriented X self and place, insight and judgement impaired, memory impaired, cognitive testing: SLUMS 15/30.[PB1.5]  MENTAL STATUS EXAM:  Appearance/Behavior:[PB1.3] No apparent distress, Neatly groomed and Appears stated " age[PB1.5]  Speech:[PB1.3] Normal[PB1.5]  Mood/Affect:[PB1.3] normal affect[PB1.5]  Insight:[PB1.3] Fair[PB1.5]      DIAGNOSTICS:   Preop Testing   EKG: Not indicated due to non-vascular surgery and last ekg on[PB1.1] 3/1/17[PB1.5] (within 30 days for CAD history or last year for cardiac risk factors)  Hemoglobin A1C[PB1.1]    Recent Labs:   CBC RESULTS:        Recent Labs   Lab Test 03/20/17  0500 03/13/17  0525   WBC 6.4 5.9   RBC 3.72* 3.59*   HGB 8.8* 8.4*   HCT 31.1* 29.3*   MCV 84 82   MCH 23.7* 23.4*   MCHC 28.3* 28.7*   RDW Dimorphic population - unable to calculate Dimorphic population - unable to calculate    334         Last Basic Metabolic Panel:       Recent Labs   Lab Test 03/20/17  0500 03/13/17  0525    142   POTASSIUM 3.9 4.1   CHLORIDE 107 107   SARITHA 8.7 8.8   CO2 27 27   BUN 11 12   CR 0.69 0.72   GLC 75 73         Liver Function Studies -         Recent Labs   Lab Test 03/06/17  1105 03/04/17  0625 02/27/17  0700   PROTTOTAL 6.3* --  5.1*   ALBUMIN 2.5* 2.2* 2.1*   BILITOTAL 0.3 --  0.2   ALKPHOS 114 --  65   AST 31 --  29   ALT 19 --  18               TSH   Date Value Ref Range Status   02/15/2017 4.05 (H) 0.40 - 4.00 mU/L Final         Ref. Range 3/29/2017 05:00   Hemoglobin A1C Latest Ref Range: 4.3 - 6.0 % 4.4[PB1.5]         IMPRESSION:  Reason for surgery/procedure:[PB1.1] Colon cancer[PB1.2]  Diagnosis/reason for consult:[PB1.1] Colon cancer[PB1.2]    The proposed surgical procedure is considered INTERMEDIATE risk.    For above listed surgery and anesthesia:   Patient is at[PB1.1] MODERATE[PB1.2] risk for surgery/procedure and perioperative/procedure complications.        RECOMMENDATIONS:[PB1.1]    --Approval given to proceed with proposed procedure, without further diagnostic evaluation  --Patient is currently taking    Anticoagulant or Antiplatelet Medication Use  Lovenox qd[PB1.2]              Signed Electronically by:[PB1.1] WILLY Brewer CNP[PB1.4]        "Revision History        User Key Date/Time User Provider Type Action    > PB1.4 4/11/2017  9:53 AM Amaris Wasserman APRN CNP Nurse Practitioner Sign     PB1.2 3/30/2017 10:06 PM Amaris Wasserman APRN CNP Nurse Practitioner      PB1.5 3/30/2017  9:58 PM Amaris Wasserman, APRN CNP Nurse Practitioner      PB1.3 3/30/2017  8:28 PM Amaris Wasserman APRN CNP Nurse Practitioner      PB1.1 3/28/2017 12:06 PM Amaris Wasserman APRN CNP Nurse Practitioner                   Discharge Summaries     No notes of this type exist for this encounter.         Consult Notes      Consults by Rosa Brewster RD, LD at 4/14/2017 11:59 AM     Author:  Rosa Brewster RD, LD Service:  Nutrition Author Type:  Registered Dietitian    Filed:  4/14/2017 11:59 AM Date of Service:  4/14/2017 11:59 AM Note Created:  4/14/2017 11:35 AM    Status:  Signed :  Rosa Brewster RD, LD (Registered Dietitian)     Consult Orders:    1. Nutrition Services Adult IP Consult [[id number]] ordered by Hernando Crockett MD at 04/13/17 1830                CLINICAL NUTRITION SERVICES  -  ASSESSMENT NOTE    Recommendations Ordered by Registered Dietitian (RD):   Advance diet as medically appropriate once extubated   Malnutrition: Pt does not meet criteria for diagnosing malnutrition at this time.      REASON FOR ASSESSMENT  Sherita Kenney is a 76 year old female seen by Registered Dietitian for Admission Nutrition Risk Screen - unintentional loss of 10# or more in the past 2 months (in the last 3-4 months) and Provider Order - Nutrition Education - Low residue diet teaching.     NUTRITION HISTORY  - Information obtained from EMR and POA - Dinora Garrett  - Pt had been admitted to [facility] Feb 2017-March 2017. RD following for TPN 2/2 SBO. TPN was d/c'd prior to admission with calorie counts meeting 70% energy and 99% protein needs.     Dinora reports that at [facility] \"appetite was good when the food was good\". She did not like the food " "at the TCU, so they took her out to eat often. She was following a low fiber diet for her ileostomy with good tolerance. Dinora also reports a gradual weight gain over the past few months.     CURRENT NUTRITION ORDERS  Diet Order:     NPO     Current Intake/Tolerance:  N/A      PHYSICAL FINDINGS  Observed  No nutrition-related physical findings observed  Obtained from Chart/Interdisciplinary Team  S/p right colectomy     ANTHROPOMETRICS  Height: 5' 6\"  Weight: 115 lbs 8.34 oz (52.4 kg)   Body mass index is 18.65 kg/(m^2).  Weight Status:  Normal BMI  IBW: 59 kg  % IBW: 90%  Weight History: weights over the past month have been stable.[AK1.1]   Wt Readings from Last 10 Encounters:   04/14/17 52.4 kg (115 lb 8.3 oz)   04/11/17 51.8 kg (114 lb 1.6 oz)   04/04/17 52.7 kg (116 lb 3.2 oz)   04/04/17 52.1 kg (114 lb 12.8 oz)   03/30/17 51.9 kg (114 lb 6.4 oz)   03/29/17 51.7 kg (114 lb)   03/29/17 51.9 kg (114 lb 6.4 oz)   03/28/17 51.9 kg (114 lb 6.4 oz)   03/22/17 51.5 kg (113 lb 8 oz)   03/16/17 52.3 kg (115 lb 6.4 oz)[AK1.2]       LABS  Labs reviewed    MEDICATIONS  Medications reviewed  Propofol weaning - likely extubation today    Dosing Weight 52.4 kg - current    ASSESSED NUTRITION NEEDS (PER APPROVED PRACTICE GUIDELINES):  Estimated Energy Needs: 0941-9151 kcals (30-35 Kcal/Kg)  Justification: repletion  Estimated Protein Needs:  grams protein (1.2-1.5 g pro/Kg)  Justification: Repletion  Estimated Fluid Needs: (1 mL/Kcal)  Justification: per provider pending fluid status    MALNUTRITION:  % Weight Loss:  None noted  % Intake:  Decreased intake does not meet criteria for malnutrition   Subcutaneous Fat Loss:  None observed  Muscle Loss:  None observed  Fluid Retention:  None noted    Malnutrition Diagnosis: Patient does not meet two of the above criteria necessary for diagnosing malnutrition    NUTRITION DIAGNOSIS:  Inadequate oral intake related to inability for PO 2/2 intubation as evidenced by NPO diet " order    NUTRITION INTERVENTIONS  Recommendations / Nutrition Prescription  Advance diet as medically appropriate once extubated  Supplements with diet advancement      Implementation  Assessed learning needs and learning preference    Nutrition Education (Content):  a) Discussed: low fiber diet recommendation and rationale    Nutrition Education (Application):  a) Dinora/pt decline education for now, as they had already been following at [facility].     Patient verbalizes understanding of diet    Anticipate good compliance    Diet Education - refer to Education Flowsheet  Collaboration and Referral of Nutrition care: pt discussed during interdisciplinary rounds.      Nutrition Goals  Pt to advance to diet in the next 24-48 hours.       MONITORING AND EVALUATION:  Progress towards goals will be monitored and evaluated per protocol and Practice Guidelines      Rosa Brewster RD, LD[AK1.1]             Revision History        User Key Date/Time User Provider Type Action    > AK1.2 4/14/2017 11:59 AM Rosa Brewster RD, LD Registered Dietitian Sign     AK1.1 4/14/2017 11:35 AM Rosa Brewster RD, LD Registered Dietitian             Consults signed by Jaycob Mari MD at 4/14/2017  9:22 AM      Author:  Jaycob Mari MD Service:  Urology Author Type:  Physician    Filed:  4/14/2017  9:22 AM Date of Service:  4/13/2017  5:01 PM Note Created:  4/13/2017  6:12 PM    Status:  Signed :  Jaycob Mari MD (Physician)         UROLOGY CONSULTATION       It is a great pleasure to be asked to see Sherita Kenney 76-year-old lady by Dr. Lindsay Peter in the operating room.  In summary she is undergoing surgery for a right-sided hemicolectomy and I have been asked to come to the operating room to assess the situation as they have not been able to identify the right ureter.  Apparently the patient had a pericolic abscess initially which has created considerable loss of normal tissue planes.  She actually had an  iliopsoas abscess on the right side associated with right-sided hydronephrosis.      I actually stepped into the operating room and I could see that there was some seepage into the wound and it was not quite clear which of the structures that were being addressed was in fact the ureter.  After reviewing the records and discussing the situation therefore with the surgeons, I decided should scrub into the case.         PHOENIX MARI MD             D: 2017 17:01   T: 2017 18:11   MT: [id number]      Name:     SHERITA SWANN   MRN:      [id number]        Account:       [id number]   :      1940           Consult Date:  2017      Document: [id number]    [JH1.1]   Revision History        User Key Date/Time User Provider Type Action    > JH1.1 2017  9:22 AM Phoenix Mari MD Physician Sign            Consults by Yaa Talley PA-C at 3/29/2017  1:24 PM     Author:  Yaa Talley PA-C Service:  Colorectal Surgery Author Type:  Physician Assistant    Filed:  3/29/2017  1:47 PM Date of Service:  3/29/2017  1:24 PM Note Created:  3/29/2017  1:24 PM    Status:  Attested :  Yaa Talley PA-C (Physician Assistant)    Cosigner:  Lindsay Peter MD at 3/31/2017  9:05 AM        Attestation signed by Lindsay Peter MD at 3/31/2017  9:05 AM        Physician Attestation   I, Lindsay Peter, have reviewed and discussed with the advanced practice provider their history, physical and plan for Sherita Swann. I did not participate in a shared visit by interviewing or examining the patient and this should be billed as an advanced practice provider only visit.    Lindsay Peter  Date of Service (when I saw the patient): I did not personally see this patient today.                               [facility]  Colon and Rectal Surgery Consult Note  Name: Sherita Swann    MRN: [id number]  YOB: 1940    Age: 76  year old  Date of admission: 3/29/2017  Primary care provider: Latanya Martinez     Requesting Physician:  Rosendo Appiah MD  Reason for consult:  Rectal bleeding           History of Present Illness:   Sherita Kenney is a 76 year old female, seen at the request of Dr. Rosendo Appiah, who presents with rectal bleeding. She was recently hospitalized at [facility] from 2/15-3/9/17 for acute psychosis. During her hospitalization, a right iliopsoas abscess was noted on CT.  This resolved with Zosyn and IR drainage.  She developed a small bowel obstruction and eventually underwent colonoscopy and laparoscopic ileostomy for a cecal mass on 3/1/17 with Dr. Peter. Pathology revealed poorly differentiated adenocarcinoma. She was discharged to a short-term care facility, where she is presently staying.    Ms. Kenney reports that she had the urge to have a bowel movement this morning and passed a very small, brown stool.  She also noted streaks of bright red blood with small clots in the toilet water.  This was the only episode of bleeding or stool per rectum that the patient has experienced post-surgery.  She has not had additional bleeding since this morning.  She has been tolerating her diet.  Her ileostomy has been functioning and she receives assistance from her care providers with emptying her appliance.  She denies fever, chills, weakness, abdominal pain or rectal pain.        Colonoscopy History:  3/1/17 - fungating cecal mass; pathology: poorly differentiated adenocarcinoma            Past Medical History:[LB1.1]     Past Medical History:   Diagnosis Date     Cancer (H)[LB1.2]              Past Surgical History:[LB1.1]     Past Surgical History:   Procedure Laterality Date     COLONOSCOPY N/A 3/1/2017    Procedure: COLONOSCOPY;  Surgeon: Lindsay Peter MD;  Location: [facility] OR     ILEOSTOMY N/A 3/1/2017    Procedure: ILEOSTOMY;  Surgeon: Lindsay Peter MD;  Location:  OR     LAPAROSCOPIC  "ILEOSTOMY N/A 3/1/2017    Procedure: LAPAROSCOPIC ILEOSTOMY;  Surgeon: Lindsay Peter MD;  Location:  OR[LB1.2]               Social History:[LB1.1]     Social History   Substance Use Topics     Smoking status: Never Smoker     Smokeless tobacco: Not on file     Alcohol use No[LB1.2]             Family History:[LB1.1]   No family history on file.[LB1.2]          Allergies:[LB1.1]     Allergies   Allergen Reactions     Wool Fiber Unknown[LB1.2]             Medications:[LB1.1]       sodium chloride (PF)  3 mL Intracatheter Q8H[LB1.2]             Review of Systems:   A comprehensive greater than 10 system review of systems was carried out.  Pertinent positives and negatives are noted above.  Otherwise negative for contributory info.            Physical Exam:[LB1.1]     Blood pressure 105/65, pulse 90, temperature 98.5  F (36.9  C), temperature source Oral, resp. rate 16, height 1.702 m (5' 7\"), weight 51.7 kg (114 lb), SpO2 94 %.[LB1.2]  No intake or output data in the 24 hours ending 03/29/17 1325  Exam:  General - Awake alert and oriented, appears stated age. Resting in bed.  Pulm - Non-labored breathing with normal respiratory effort  Abd - Soft, non-tender, non-distended. Ileostomy is pink and viable with semi-thick brown output.   Rectal - No evidence of external hemorrhoids, fissure or fistula. Skin does not appear erythematous or irritated. No evidence of bleeding externally. DAVID did not reveal any palpable masses or bleeding.  Neuro - CN II-XII grossly intact  Musculoskeletal - extremeties with no clubbing, cyanosis or edema  Psych - responsive, alert, cooperative; oriented x3; appropriate mood and affect  External/skin - inspection reveals no rashes, lesions or ulcers, normal coloring             Data Reviewed:[LB1.1]   No results found for this or any previous visit (from the past 48 hour(s)).      Recent Labs  Lab 03/29/17  1100   WBC 7.9   HGB 10.2*   HCT 34.6*   MCV 85          Recent " Labs  Lab 03/29/17  1100      POTASSIUM 4.1   CHLORIDE 104   CO2 27   ANIONGAP 9   GLC 92   BUN 19   CR 0.64   GFRESTIMATED >90Non  GFR Calc   GFRESTBLACK >90African American GFR Calc   SARITHA 8.9       Recent Labs  Lab 03/29/17  1100   INR 0.90       Recent Labs  Lab 03/29/17  1130   COLOR Light Yellow   APPEARANCE Clear   URINEGLC Negative   URINEBILI Negative   URINEKETONE Negative   SG 1.005   UBLD Negative   URINEPH 6.5   PROTEIN Negative   NITRITE Negative   LEUKEST Negative   RBCU <1   WBCU 1[LB1.3]         Assessment and Plan:[LB1.1]   Sherita Kenney[LB1.3] is a 76 year old female recently to [facility] from 2/15-3/9/17. During her admission, she developed a SBO and underwent colonoscopy and laparoscopic ileostomy for a cecal mass on 3/1/17 with Dr. Peter.  She has been recovering as expected at her care facility until this morning when she passed a small brown stool per rectum with streaks of bright red blood in the toilet. This was the only episode of bleeding. VSS, afebrile. Hgb 10.2 (prev 8.8 on 3/20/17). Ileostomy pink and viable with semi-thick brown output. Denies fever, chills, abdominal or rectal pain.      Plan:  1. Patient is OK to d/c to her care facility from [facility] perspective.  She has a follow-up appointment with Dr. Peetr on 3/31/17 and should keep this appointment.  2. Surgery: None indicated at this time.  3. Diet: Continue with low fiber diet.  4. IV Fluids: Not indicated.  5. Antibiotics:  Not indicated.  6. Medications:  No changes.  7. I&O s:  Monitor ileostomy output at care facility.  8. Labs:   - Reviewed: Yes  - Ordered: None   9. Imaging:   - Ordered:  None  10. Activity:  Ambulate as able. No lifting greater than 15-20 pounds.  11. DVT prophylaxis: Finish course of Lovenox.  12. This plan has been discussed with Dr. Peter.    Patient specific identified risk factors considered as part of today s evaluation include: N/A    Additional history obtained from chart review.   Time spent on consultation: 30 minutes, greater than 50% spent on counseling and/or coordination of care.          Yaa Talley PA-C  [facility]  [phone][LB1.1]       Revision History        User Key Date/Time User Provider Type Action    > LB1.3 3/29/2017  1:47 PM Yaa Talley PA-C Physician Assistant Sign     LB1.2 3/29/2017  1:25 PM Yaa Talley PA-C Physician Assistant      LB1.1 3/29/2017  1:24 PM Yaa Talley PA-C Physician Assistant                      Progress Notes - Physician (Notes from 04/17/17 through 04/20/17)      Progress Notes by Montse Moffett LSW at 4/20/2017 10:41 AM     Author:  Montse Moffett LSW Service:  (none) Author Type:      Filed:  4/20/2017 10:56 AM Date of Service:  4/20/2017 10:41 AM Note Created:  4/20/2017 10:41 AM    Status:  Signed :  Montse Moffett LSW ()         NEVILLE  I: NEVILLE called [facility] and updated Kasey that patient is ready for d/c today. NEVILLE sent orders via [facility] and sent e-mail. NEVILLE will call Niece and discuss transportation needs.  NEVILLE spoke with Niece and she feels a w/c ride is best. NEVILLE discussed fees with patient's niece, she was okay with this. NEVILLE arranged ride for 1200. NEVILLE will update niece and facility.    P: NEVILLE will continue to follow and assist as needed.    RONN Durham   *[id number][SM1.1]       Revision History        User Key Date/Time User Provider Type Action    > SM1.1 4/20/2017 10:56 AM Montse Moffett LSW  Sign            Progress Notes by Lindsay Peter MD at 4/20/2017  8:08 AM     Author:  Lindsay Peter MD Service:  Colorectal Surgery Author Type:  Physician    Filed:  4/20/2017  9:35 AM Date of Service:  4/20/2017  8:08 AM Note Created:  4/20/2017  8:08 AM    Status:  Addendum :  Lindsay Peter MD (Physician)         COLON & RECTAL SURGERY PROGRESS NOTE    April 20, 2017  Post-op Day #  6      SUBJECTIVE:   No acute events  Tolerating solid diet  No NV  Passing flatus, loose stools. Cdiff negative yesterday  No fever/chills  Pain well controlled    OBJECTIVE:  Temp:  [97.7  F (36.5  C)-98  F (36.7  C)] 98  F (36.7  C)  Heart Rate:  [] 85  Resp:  [16-18] 16  BP: (124-148)/(62-79) 144/68  SpO2:  [95 %] 95 %    Intake/Output Summary (Last 24 hours) at 04/20/17 0808  Last data filed at 04/19/17 2113   Gross per 24 hour   Intake              590 ml   Output              925 ml   Net             -335 ml       GENERAL: Awake, alert, no acute distress,   HEAD - Nomocephalic atraumatic  SCLERA anicteric  EXTREMITIES warm and well perfused  ABDOMEN: Soft, appropriately tender, mildly-distended  INCISION: C/d/i, stoma site clean/packed       LABS:  Lab Results   Component Value Date    WBC 6.4 04/20/2017     Lab Results   Component Value Date    HGB 8.3 04/20/2017     Lab Results   Component Value Date    HCT 27.5 04/20/2017     Lab Results   Component Value Date     04/20/2017     Last Basic Metabolic Panel:  Lab Results   Component Value Date     04/20/2017      Lab Results   Component Value Date    POTASSIUM 3.6 04/20/2017     Lab Results   Component Value Date    CHLORIDE 107 04/20/2017     Lab Results   Component Value Date    SARITHA 8.5 04/20/2017     Lab Results   Component Value Date    CO2 29 04/20/2017     Lab Results   Component Value Date    BUN 7 04/20/2017     Lab Results   Component Value Date    CR 0.50 04/20/2017     Lab Results   Component Value Date     04/20/2017       ASSESSMENT/PLAN:  POD#6 Open ileostomy takedown and right colectomy  1. Continuing with low fiber diet.   2. PO PRN pain meds  3. OOB, ambulate  4. Lovenox for prophylaxis  5. Dressing changes to takedown site  6. PT, OT.   7. Anticipate discharge to TCU later today provided diarrhea getting better.      Reviewed with staff Dr. Rome Urrutia MD  Colon & Rectal Surgery Fellow  Pager  [phone][DL1.1]    Agree with above. Suspect that loose stool is related to resection of ileum and right colon; anticipate improvement with increase in diet. If not may need imodium. However would observe for now.   She is ready for transfer when bed available.   Should have Lovenox for total of 30 days post-op.[AL1.1]      Revision History        User Key Date/Time User Provider Type Action    > AL1.1 4/20/2017  9:35 AM Lindsay Peter MD Physician Addend     DL1.1 4/20/2017  8:10 AM Keila Urrutia MD Fellow Sign            Progress Notes by Susan Lindo MD at 4/19/2017  4:22 PM     Author:  Susan Lindo MD Service:  Hem/Onc Author Type:  Physician    Filed:  4/19/2017  4:23 PM Date of Service:  4/19/2017  4:22 PM Note Created:  4/19/2017  4:22 PM    Status:  Signed :  Susan Lindo MD (Physician)         Social visit. Patient recovering from right colectomy. Will see her in clinic to discuss about adjuvant chemotherapy.[BK1.1]     Revision History        User Key Date/Time User Provider Type Action    > BK1.1 4/19/2017  4:23 PM Susan Lindo MD Physician Sign            Progress Notes by Montse Moffett LSW at 4/19/2017 10:42 AM     Author:  Montse Moffett LSW Service:  (none) Author Type:      Filed:  4/19/2017 10:57 AM Date of Service:  4/19/2017 10:42 AM Note Created:  4/19/2017 10:42 AM    Status:  Addendum :  Montse Moffett LSW ()         NEVILLE  I: NEVILLE spoke with patient's sister to update her on information. NEVILLE will follow up with [facility] and plan for patient to d/c 1-2 per MD.    P: NEVILLE will continue to follow and assist as needed.[SM1.1]    ADDENDUM  I: NEVILLE spoke with patient's niece to update her. Patient's niece stated that she would be okay with a on site assessment with [facility]. SW awaits a call back from niece to get final determination of where they want patient to d/c to.[SM1.2]     RONN Durham    *[id number][SM1.1]       Revision History        User Key Date/Time User Provider Type Action    > SM1.2 4/19/2017 10:57 AM Montse Moffett LSW  Addend     SM1.1 4/19/2017 10:45 AM Montse Moffett LSW  Sign            Progress Notes by Keila Urrutia MD at 4/19/2017  9:28 AM     Author:  Keila Urrutia MD Service:  Colorectal Surgery Author Type:  Fellow    Filed:  4/19/2017  9:30 AM Date of Service:  4/19/2017  9:28 AM Note Created:  4/19/2017  9:28 AM    Status:  Signed :  Keila Urrutia MD (Fellow)         COLON & RECTAL SURGERY PROGRESS NOTE    April 19, 2017  Post-op Day # 5     SUBJECTIVE:   No acute events  Tolerating small amount of low fiber diet, poor appetite  No NV  Passing flatus, no stool  No fever/chills  Pain well controlled    OBJECTIVE:  Temp:  [98.2  F (36.8  C)-98.9  F (37.2  C)] 98.3  F (36.8  C)  Heart Rate:  [] 88  Resp:  [16-17] 17  BP: (120-148)/(61-79) 137/61  SpO2:  [89 %-93 %] 93 %    Intake/Output Summary (Last 24 hours) at 04/19/17 0928  Last data filed at 04/19/17 0850   Gross per 24 hour   Intake              240 ml   Output              850 ml   Net             -610 ml       GENERAL: Awake, alert, no acute distress,   HEAD - Nomocephalic atraumatic  SCLERA anicteric  EXTREMITIES warm and well perfused  ABDOMEN: Soft, appropriately tender, mildly-distended  INCISION: C/d/i, stoma site clean/packed       LABS:  Lab Results   Component Value Date    WBC 7.3 04/19/2017     Lab Results   Component Value Date    HGB 8.5 04/19/2017     Lab Results   Component Value Date    HCT 28.0 04/19/2017     Lab Results   Component Value Date     04/19/2017     Last Basic Metabolic Panel:  Lab Results   Component Value Date     04/19/2017      Lab Results   Component Value Date    POTASSIUM 3.7 04/19/2017     Lab Results   Component Value Date    CHLORIDE 106 04/19/2017     Lab Results   Component Value Date    SARITHA 8.5 04/19/2017     Lab Results   Component  Value Date    CO2 28 04/19/2017     Lab Results   Component Value Date    BUN 9 04/19/2017     Lab Results   Component Value Date    CR 0.45 04/19/2017     Lab Results   Component Value Date     04/19/2017       ASSESSMENT/PLAN:  POD#6 Open ileostomy takedown and right colectomy  1. Continuing with low fiber diet. Encouraged patient to go slow if feeling bloated  2. PO PRN pain meds  3. OOB, ambulate  4. Lovenox for prophylaxis  5. Dressing changes to takedown site  6. PT, OT. Anticipate discharge to rehab in 1-2 days once eating well and no longer bloated.     Reviewed with staff Dr. Rome Urrutia MD  Colon & Rectal Surgery Fellow  Pager [phone][DL1.1]         Revision History        User Key Date/Time User Provider Type Action    > DL1.1 4/19/2017  9:30 AM Keila Urrutia MD Fellow Sign            Progress Notes by Montse Moffett LSW at 4/18/2017  4:37 PM     Author:  Montse Moffett LSW Service:  (none) Author Type:      Filed:  4/18/2017  4:39 PM Date of Service:  4/18/2017  4:37 PM Note Created:  4/18/2017  4:37 PM    Status:  Signed :  Montse Moffett LSW ()         NEVILLE  I: NEVILLE spoke with patient's friend and POA who wanted an update on patient's d/c plan. NEVILLE updated her that [facility] would like to follow one more day to see if patient can tolerate diet better. NEVILLE will follow up with patient and POA (Dinora) to give more information in AM.    P: NEVILLE will continue to follow and assist as needed.    RONN Durham   *[id number][SM1.1]       Revision History        User Key Date/Time User Provider Type Action    > SM1.1 4/18/2017  4:39 PM Montse Moffett LSW  Sign            Progress Notes by Eliza Perez RD, LD at 4/18/2017  3:16 PM     Author:  Eliza Perez RD, LD Service:  Nutrition Author Type:  Registered Dietitian    Filed:  4/18/2017  3:21 PM Date of Service:  4/18/2017  3:16 PM Note Created:  4/18/2017  3:16 PM     Status:  Signed :  Eliza Perez RD, LD (Registered Dietitian)         CLINICAL NUTRITION SERVICES - REASSESSMENT NOTE        EVALUATION OF PROGRESS TOWARD GOALS   Diet:  Low fiber. Sending deirdre shakes BID between meals.  Intake:  Pt states her appetite still isn't that great, eating ~50% of meals.  She likes the shakes but if she doesn't get to them right away they get warm and she doesn't drink them.    Previous Goals:   Pt to advance to diet in the next 24-48 hours.   Evaluation: Met    Previous Nutrition Diagnosis:   Inadequate oral intake related to inability for PO 2/2 intubation as evidenced by NPO diet order  Evaluation: Improving        INTERVENTIONS  Recommendations / Nutrition Prescription  Continue current diet and supplements    Implementation  Composition of Meals and Snacks - encouraged good intake, especially protein foods  Medical Food Supplement - will decrease supplements to 4 oz servings, continue to send BID    Goals  Pt will consume at least 50% of meals and supplements      MONITORING AND EVALUATION:  Progress towards goals will be monitored and evaluated per protocol and Practice Guidelines    Eliza Perez RD  Pager [phone] (M-F)            [phone] (W/E & Hol)[CM1.1]             Revision History        User Key Date/Time User Provider Type Action    > CM1.1 4/18/2017  3:21 PM Eliza Perez RD, LD Registered Dietitian Sign            Progress Notes by Montse Moffett LSW at 4/18/2017 12:05 PM     Author:  Montse Moffett LSW Service:  (none) Author Type:      Filed:  4/18/2017  2:34 PM Date of Service:  4/18/2017 12:05 PM Note Created:  4/18/2017 12:05 PM    Status:  Addendum :  Montse Moffett LSW ()         NEVILLE  I: NEVILLE was updated by patient's POA that patient would also like a referral sent to [place]. NEVILLE sent referral via [facility] and will update family. At this time patient has bed at [facility].    P: NEVILLE will continue to follow and assist  as needed.[SM1.1]      ADDENDUM  I: SW was updated that [facility] cannot accept patient due to her looking more LTC appropriate.[SM1.2]  RONN Durham   *[id number][SM1.1]       Revision History        User Key Date/Time User Provider Type Action    > SM1.2 4/18/2017  2:34 PM Montse Moffett LSW  Addend     SM1.1 4/18/2017 12:06 PM Montse Moffett LSW  Sign            Progress Notes by Lindsay Peter MD at 4/18/2017  7:33 AM     Author:  Lindsay Peter MD Service:  Colorectal Surgery Author Type:  Physician    Filed:  4/18/2017 10:25 AM Date of Service:  4/18/2017  7:33 AM Note Created:  4/18/2017  7:33 AM    Status:  Addendum :  Lindsay Peter MD (Physician)         COLON & RECTAL SURGERY PROGRESS NOTE    April 18, 2017  Post-op Day # 5    SUBJECTIVE:    No acute events  Tolerating small amount of fulls, no appetite  No NV  Passing a tiny bit of flatus, small stool yesterday am  No fever/chills    OBJECTIVE:  Temp:  [97.4  F (36.3  C)-98.8  F (37.1  C)] 97.4  F (36.3  C)  Pulse:  [93-99] 93  Resp:  [16-18] 18  BP: (121-134)/(56-59) 121/56  SpO2:  [92 %] 92 %  No intake or output data in the 24 hours ending 04/18/17 0733    GENERAL: Awake, alert, no acute distress,   HEAD - Nomocephalic atraumatic  SCLERA anicteric  EXTREMITIES warm and well perfused  ABDOMEN: Soft, appropriately tender, minimally-distended  INCISION: C/d/i, stoma site clean/packed    LABS:  Lab Results   Component Value Date    WBC 7.3 04/18/2017     Lab Results   Component Value Date    HGB 8.8 04/18/2017     Lab Results   Component Value Date    HCT 29.1 04/18/2017     Lab Results   Component Value Date     04/18/2017     Last Basic Metabolic Panel:  Lab Results   Component Value Date     04/18/2017      Lab Results   Component Value Date    POTASSIUM 3.4 04/18/2017     Lab Results   Component Value Date    CHLORIDE 108 04/18/2017     Lab Results   Component Value Date     "SARITHA 8.1 04/18/2017     Lab Results   Component Value Date    CO2 28 04/18/2017     Lab Results   Component Value Date    BUN 12 04/18/2017     Lab Results   Component Value Date    CR 0.56 04/18/2017     Lab Results   Component Value Date    GLC 92 04/18/2017       ASSESSMENT/PLAN:  POD#5 Open ileostomy takedown and right colectomy  1. Continuing with fulls this am, if tolerating well and still passing flatus can consider low fiber later today  2. PO PRN pain meds  3. OOB, ambulate  4. Lovenox for prophylaxis  5. Dressing changes to takedown site  6. PT, OT. Appreciate their input for d/c planning     Will review with staff Dr. Rome Urrutia MD  Colon & Rectal Surgery Fellow  Pager [phone][DL1.1]    Agree with above. Is passing more flatus now. Requesting more food.[AL1.1]  /74 (BP Location: Right arm)  Pulse 94  Temp 97.6  F (36.4  C) (Oral)  Resp 16  Ht 1.676 m (5' 6\")  Wt 52.6 kg (115 lb 15.4 oz)  SpO2 90%  BMI 18.72 kg/m2  No intake or output data in the 24 hours ending 04/18/17 1025[AL1.2]  Alert and appears comfortable.  Abdomen: soft, less distended. Incision clean and dry    Progressing.   Will advance diet.     Lindsay Peter MD[AL1.1]     Revision History        User Key Date/Time User Provider Type Action    > AL1.2 4/18/2017 10:25 AM Lindsay Peter MD Physician Addend     AL1.1 4/18/2017 10:24 AM Lindsay Peter MD Physician      DL1.1 4/18/2017  7:35 AM Keila Urrutia MD Fellow Sign            Progress Notes by Lindsay Peter MD at 4/17/2017  3:41 PM     Author:  Lindsay Peter MD Service:  Colorectal Surgery Author Type:  Physician    Filed:  4/17/2017  3:58 PM Date of Service:  4/17/2017  3:41 PM Note Created:  4/17/2017  3:41 PM    Status:  Signed :  Lindsay Peter MD (Physician)         Colon and Rectal Surgery  POD #4  Patient complains of abdominal \"tightness\". She is passing stool but denies flatus. No nausea. Patient reports rare " "ambulation.   /52 (BP Location: Right arm)  Temp 98.1  F (36.7  C) (Oral)  Resp 16  Ht 1.676 m (5' 6\")  Wt 53.9 kg (118 lb 13.3 oz)  SpO2 95%  BMI 19.18 kg/m2[AL1.1]    Intake/Output Summary (Last 24 hours) at 04/17/17 1542  Last data filed at 04/17/17 0656   Gross per 24 hour   Intake             6465 ml   Output                0 ml   Net             6465 ml[AL1.2]     Alert and complains of abdominal tightness. Very productive sounding cough.    No icterus  Abdomen: moderate distension. Incision clean and dry. Did not examine ileostomy wound.   Legs: mild edema.[AL1.1]       Recent Labs  Lab 04/17/17  0530 04/16/17  1605 04/16/17  0455 04/15/17  0540   WBC 9.0  --  8.3 7.5   HGB 8.7* 9.5* 7.7* 7.5*   MCV 85  --  85 85     --  224 190   INR 1.06  --  1.14 1.21*     --  139 140   POTASSIUM 3.8 3.6 3.2* 3.7   CHLORIDE 108  --  105 108   CO2 26  --  26 25   BUN 8  --  6* 8   CR 0.50*  --  0.55 0.50*   ANIONGAP 7  --  8 7   SARITHA 8.1*  --  7.6* 7.7*   *  --  94 99   ALBUMIN 2.1*  --  2.1* 2.3*   PROTTOTAL 5.2*  --  4.8* 4.9*   BILITOTAL 0.3  --  0.4 0.3   ALKPHOS 79  --  70 64   ALT 13  --  13 12   AST 18  --  23 26[AL1.3]     Impression: Slow return of GI function. Discomfort appears to be from distension. Cough.   Plan: change back to full liquids until passing flatus.   Add Toradol and simethicone for discomfort.   Incentive spirometry  Ambulate at least four times daily.     Lindsay Peter MD[AL1.1]     Revision History        User Key Date/Time User Provider Type Action    > AL1.3 4/17/2017  3:58 PM Lindsay Peter MD Physician Sign     AL1.2 4/17/2017  3:42 PM Lindsay Peter MD Physician      AL1.1 4/17/2017  3:41 PM Lindsay Peter MD Physician             Progress Notes by Montse Moffett LSW at 4/17/2017 12:30 PM     Author:  Montse Moffett LSW Service:  (none) Author Type:      Filed:  4/17/2017  3:53 PM Date of Service:  4/17/2017 12:30 PM " Note Created:  4/17/2017 12:30 PM    Status:  Addendum :  Montse Moffett LSW ()         Care Transition Initial Assessment -   Reason For Consult: discharge planning  Met with: PATIENT,FRIEND[SM1.1]  Active Problems:    Respiratory insufficiency    Colon cancer (H)[SM1.2]         DATA  Lives With: alone  Living Arrangements: condominium  Description of Support System: Supportive, Involved  Who is your support system?:  (POA's)  Support Assessment: Adequate family and caregiver support.   Identified issues/concerns regarding health management: needs TCU   Patient feels that they have adequate support @ home?  YES   Quality Of Family Relationships: supportive, involved  Transportation Available: car    ASSESSMENT  Cognitive Status: Alert and oriented but forgetful  Concerns to be addressed: Patient is a 76 year old female who was admitted to the hospital for respiratory insufficiency. Prior to hospitalization patient was at [facility] where she was private paying. Patient and POA (Friend)would like a referral sent to [facility] and [facility]. SW sent referral and will follow up with patient and POA once assessment is complete.     PLAN  Financial costs for the patient includes   Patient given options and choices for discharge to TCU  Patient/family is agreeable to the plan?  YES  Patient Goals and Preferences: [facility] and [facility]  Patient anticipates discharging to:  TCU[SM1.1]      ADDENDUM  I:  was updated that [facility] and they can accept patient at d/c. [facility] only has a private room available which is an additional $36. Patient would like to accept bed and is okay with the fees associated with private room.[SM1.3]    RONN Durham   *[id number][SM1.1]           Revision History        User Key Date/Time User Provider Type Action    > SM1.3 4/17/2017  3:53 PM Montse Moffett LSW  Addend     SM1.2 4/17/2017 12:48 PM Montse Moffett LSW  Sign     SM1.1  4/17/2017 12:30 PM Montse Moffett, RONN              Progress Notes by Silvia Marques at 4/17/2017  2:55 PM     Author:  Silvia Marques Service:  Spiritual Health Author Type:      Filed:  4/17/2017  2:58 PM Date of Service:  4/17/2017  2:55 PM Note Created:  4/17/2017  2:55 PM    Status:  Signed :  Silvia Marques ()         SPIRITUAL HEALTH SERVICES  Progress Note  [facility]    Ran into pt's sister by elevator.  Sahra asked me to help her with flowers from her car for pt.  Pt was working with swallow test when we got to room.  Pt couldn't talk much without coughing so pt's sister and I did most of talking.  Will continue to follow pt while she is here.      Silvia Hightower Resident  Pager [phone][HM1.1]     Revision History        User Key Date/Time User Provider Type Action    > HM1.1 4/17/2017  2:58 PM Silvia Marques Sign            Progress Notes by Yessy Mayorga PT at 4/17/2017 10:31 AM     Author:  Yessy Mayorga PT Service:  (none) Author Type:  Physical Therapist    Filed:  4/17/2017 10:31 AM Date of Service:  4/17/2017 10:31 AM Note Created:  4/17/2017 10:31 AM    Status:  Signed :  Yessy Mayorga PT (Physical Therapist)          04/17/17 0953   Quick Adds   Type of Visit Initial PT Evaluation   Living Environment   Lives With alone   Living Arrangements condominium   Home Accessibility tub/shower is not walk in   Number of Stairs to Enter Home 0   Transportation Available car   Living Environment Comment condo with elevator. no grab bars on shower   Self-Care   Usual Activity Tolerance good   Current Activity Tolerance fair   Regular Exercise yes   Activity/Exercise Type walking   Exercise Amount/Frequency 3-5 times/wk   Equipment Currently Used at Home none   Activity/Exercise/Self-Care Comment Pt performed all ADLs independently but would call a friend when she showered to let the her know that she got in and out ok. Friend was  not close by   Functional Level Prior   Ambulation 0-->independent   Transferring 0-->independent   Toileting 0-->independent   Bathing 0-->independent   Dressing 0-->independent   Eating 0-->independent   Communication 0-->understands/communicates without difficulty   Swallowing 0-->swallows foods/liquids without difficulty   Cognition 0 - no cognition issues reported   Fall history within last six months yes   Number of times patient has fallen within last six months 2   Prior Functional Level Comment Pt has help come once a week for laundry, vacuuming.   General Information   Onset of Illness/Injury or Date of Surgery - Date 04/16/17   Referring Physician Dr. Peter   Patient/Family Goals Statement Pt originally did not want an AD but then agreed that she should if needs it.   Pertinent History of Current Problem (include personal factors and/or comorbidities that impact the POC) Pt is a 76 year old female admitted for colon CA. 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   Precautions/Limitations fall precautions;abdominal precautions;other (see comments)  (abdominal binder)   Weight-Bearing Status - LLE full weight-bearing   Weight-Bearing Status - RLE full weight-bearing   General Observations Pt alert in supine, agreeable to get up.   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 LE WFL   Strength   Strength Comments Pt has no activee strenght left UE. LE is grossly 3+/5   Bed Mobility  "  Bed Mobility Comments Garfield to roll, modA to sit EOB. modA to transfer sit to supine.   Transfer Skills   Transfer Comments sit to stand: Garfield, bed to commode: Garfield   Gait   Gait Comments Pt took a couple of steps from bed to commode and from commode to bed. Pt sidestepped along bed with Garfield to assess some dynamic mobility   Balance   Balance Comments Pt will require support.   Sensory Examination   Sensory Perception no deficits were identified   Muscle Tone   Muscle Tone Comments decrease tone in left UE due to post polio   General Therapy Interventions   Planned Therapy Interventions balance training;bed mobility training;gait training;strengthening;transfer training;progressive activity/exercise   Clinical Impression   Criteria for Skilled Therapeutic Intervention yes, treatment indicated   PT Diagnosis pain with mobility   Influenced by the following impairments Decreased functional mobility due to pain, decreased balance, decreased strength, abdominal precautions.   Functional limitations due to impairments increased assist for bed mobility, transfers and gait compared to baseline   Clinical Presentation Evolving/Changing   Clinical Presentation Rationale Pt has been having decline in comfort bathing alone, pt has had 2 falls in past 6 months, pt lives alone    Clinical Decision Making (Complexity) Moderate complexity   Therapy Frequency` daily   Predicted Duration of Therapy Intervention (days/wks) 5 days   Anticipated Equipment Needs at Discharge bernabe walker   Anticipated Discharge Disposition Transitional Care Facility   Risk & Benefits of therapy have been explained Yes   Patient, Family & other staff in agreement with plan of care Yes   [facility] [employer]-PAC TM \"6 Clicks\"   2016, Trustees of [facility], under license to [employer].  All rights reserved.   6 Clicks Short Forms Basic Mobility Inpatient Short Form   [facility] AM-PAC  \"6 Clicks\" V.2 Basic Mobility Inpatient Short Form "   1. Turning from your back to your side while in a flat bed without using bedrails? 3 - A Little   2. Moving from lying on your back to sitting on the side of a flat bed without using bedrails? 2 - A Lot   3. Moving to and from a bed to a chair (including a wheelchair)? 3 - A Little   4. Standing up from a chair using your arms (e.g., wheelchair, or bedside chair)? 3 - A Little   5. To walk in hospital room? 2 - A Lot   6. Climbing 3-5 steps with a railing? 2 - A Lot   Basic Mobility Raw Score (Score out of 24.Lower scores equate to lower levels of function) 15   Total Evaluation Time   Total Evaluation Time (Minutes) 20[KC1.1]        Revision History        User Key Date/Time User Provider Type Action    > KC1.1 4/17/2017 10:31 AM Yessy Mayorga PT Physical Therapist Sign            Progress Notes by Yessy Whalen PA-C at 4/17/2017  8:09 AM     Author:  Yessy Whalen PA-C Service:  Colorectal Surgery Author Type:  Physician Assistant - C    Filed:  4/17/2017  8:12 AM Date of Service:  4/17/2017  8:09 AM Note Created:  4/17/2017  8:09 AM    Status:  Signed :  Yessy Whalen PA-C (Physician Assistant - C)         COLON & RECTAL SURGERY  PROGRESS NOTE    April 17, 2017  Post-op Day # 4    SUBJECTIVE:  The patient is doing well. Pain controlled. Tolerating diet without nausea or vomiting. Voiding well. Having full bowel function. States she had a good bowel movement this morning and didn't see any blood.     OBJECTIVE:  Temp:  [97.3  F (36.3  C)-99.1  F (37.3  C)] 97.3  F (36.3  C)  Heart Rate:  [] 91  Resp:  [17-30] 20  BP: (117-138)/() 117/52  SpO2:  [92 %-99 %] 95 %    Intake/Output Summary (Last 24 hours) at 04/17/17 0809  Last data filed at 04/17/17 0656   Gross per 24 hour   Intake             7645 ml   Output              300 ml   Net             7345 ml       GENERAL:  Awake, alert, no acute distress,   HEAD - Nomocephalic atraumatic  SCLERA  anicteric  EXTREMITIES warm and well perfused  ABDOMEN:  Soft, appropriately tender, non-distended,   INCISION:  C/d/i,     LABS:  Lab Results   Component Value Date    WBC 9.0 04/17/2017     Lab Results   Component Value Date    HGB 8.7 04/17/2017     Lab Results   Component Value Date    HCT 29.5 04/17/2017     Lab Results   Component Value Date     04/17/2017     Last Basic Metabolic Panel:  Lab Results   Component Value Date     04/17/2017      Lab Results   Component Value Date    POTASSIUM 3.8 04/17/2017     Lab Results   Component Value Date    CHLORIDE 108 04/17/2017     Lab Results   Component Value Date    SARITHA 8.1 04/17/2017     Lab Results   Component Value Date    CO2 26 04/17/2017     Lab Results   Component Value Date    BUN 8 04/17/2017     Lab Results   Component Value Date    CR 0.50 04/17/2017     Lab Results   Component Value Date     04/17/2017       ASSESSMENT/PLAN:POD#4 Open ileostomy takedown and right colectomy  1. Low residue diet  2. D/c PCA and PO PRN pain meds  3. D/c IVF, saline lock  4. OOB, ambulate  5. Lovenox for prophylaxis  6. Dressing changes to takedown site  7. PT, OT. Appreciate their input for d/c planning home vs TCU     Yessy Whalen PA-C  [facility]  Phone: [phone][KB1.1]               Revision History        User Key Date/Time User Provider Type Action    > KB1.1 4/17/2017  8:12 AM Yessy Whalen PA-C Physician Assistant - C Sign                  Procedure Notes     No notes of this type exist for this encounter.         Progress Notes - Therapies (Notes from 04/17/17 through 04/20/17)      Progress Notes by Yessy Mayorga PT at 4/17/2017 10:31 AM     Author:  Yessy Mayorga PT Service:  (none) Author Type:  Physical Therapist    Filed:  4/17/2017 10:31 AM Date of Service:  4/17/2017 10:31 AM Note Created:  4/17/2017 10:31 AM    Status:  Signed :  Yessy Mayorga PT (Physical Therapist)           04/17/17 0953   Quick Adds   Type of Visit Initial PT Evaluation   Living Environment   Lives With alone   Living Arrangements condominium   Home Accessibility tub/shower is not walk in   Number of Stairs to Enter Home 0   Transportation Available car   Living Environment Comment condo with elevator. no grab bars on shower   Self-Care   Usual Activity Tolerance good   Current Activity Tolerance fair   Regular Exercise yes   Activity/Exercise Type walking   Exercise Amount/Frequency 3-5 times/wk   Equipment Currently Used at Home none   Activity/Exercise/Self-Care Comment Pt performed all ADLs independently but would call a friend when she showered to let the her know that she got in and out ok. Friend was not close by   Functional Level Prior   Ambulation 0-->independent   Transferring 0-->independent   Toileting 0-->independent   Bathing 0-->independent   Dressing 0-->independent   Eating 0-->independent   Communication 0-->understands/communicates without difficulty   Swallowing 0-->swallows foods/liquids without difficulty   Cognition 0 - no cognition issues reported   Fall history within last six months yes   Number of times patient has fallen within last six months 2   Prior Functional Level Comment Pt has help come once a week for laundry, vacuuming.   General Information   Onset of Illness/Injury or Date of Surgery - Date 04/16/17   Referring Physician Dr. Peter   Patient/Family Goals Statement Pt originally did not want an AD but then agreed that she should if needs it.   Pertinent History of Current Problem (include personal factors and/or comorbidities that impact the POC) Pt is a 76 year old female admitted for colon CA. 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   Precautions/Limitations fall precautions;abdominal precautions;other (see comments)  (abdominal binder)   Weight-Bearing Status - LLE full weight-bearing   Weight-Bearing Status - RLE full  weight-bearing   General Observations Pt alert in supine, agreeable to get up.   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 LE WFL   Strength   Strength Comments Pt has no activee strenght left UE. LE is grossly 3+/5   Bed Mobility   Bed Mobility Comments Garfield to roll, modA to sit EOB. modA to transfer sit to supine.   Transfer Skills   Transfer Comments sit to stand: Garfield, bed to commode: Garfield   Gait   Gait Comments Pt took a couple of steps from bed to commode and from commode to bed. Pt sidestepped along bed with Garfield to assess some dynamic mobility   Balance   Balance Comments Pt will require support.   Sensory Examination   Sensory Perception no deficits were identified   Muscle Tone   Muscle Tone Comments decrease tone in left UE due to post polio   General Therapy Interventions   Planned Therapy Interventions balance training;bed mobility training;gait training;strengthening;transfer training;progressive activity/exercise   Clinical Impression   Criteria for Skilled Therapeutic Intervention yes, treatment indicated   PT Diagnosis pain with mobility   Influenced by the following impairments Decreased functional mobility due to pain, decreased balance, decreased strength, abdominal precautions.   Functional limitations due to impairments increased assist for bed mobility, transfers and gait compared to baseline   Clinical Presentation Evolving/Changing   Clinical Presentation Rationale Pt has been having decline in comfort bathing  "alone, pt has had 2 falls in past 6 months, pt lives alone    Clinical Decision Making (Complexity) Moderate complexity   Therapy Frequency` daily   Predicted Duration of Therapy Intervention (days/wks) 5 days   Anticipated Equipment Needs at Discharge bernabe walker   Anticipated Discharge Disposition Transitional Care Facility   Risk & Benefits of therapy have been explained Yes   Patient, Family & other staff in agreement with plan of care Yes   [facility]-PAC TM \"6 Clicks\"   2016, Trustees of [facility], under license to [employer].  All rights reserved.   6 Clicks Short Forms Basic Mobility Inpatient Short Form   [facility] AM-PAC  \"6 Clicks\" V.2 Basic Mobility Inpatient Short Form   1. Turning from your back to your side while in a flat bed without using bedrails? 3 - A Little   2. Moving from lying on your back to sitting on the side of a flat bed without using bedrails? 2 - A Lot   3. Moving to and from a bed to a chair (including a wheelchair)? 3 - A Little   4. Standing up from a chair using your arms (e.g., wheelchair, or bedside chair)? 3 - A Little   5. To walk in hospital room? 2 - A Lot   6. Climbing 3-5 steps with a railing? 2 - A Lot   Basic Mobility Raw Score (Score out of 24.Lower scores equate to lower levels of function) 15   Total Evaluation Time   Total Evaluation Time (Minutes) 20[KC1.1]        Revision History        User Key Date/Time User Provider Type Action    > KC1.1 4/17/2017 10:31 AM Yessy Mayorga, PT Physical Therapist Sign            "

## 2017-04-13 NOTE — ANESTHESIA CARE TRANSFER NOTE
Patient: Sherita Kenney    Procedure(s):  OPEN RIGHT COLECTOMY, TAKEDOWN OF ILEOSTOMY - Wound Class: I-Clean  EXPLORATION OF RIGHT URETEROLYSIS - Wound Class: II-Clean Contaminated   - Wound Class: II-Clean Contaminated    Diagnosis: COLON CANCER  Diagnosis Additional Information: No value filed.    Anesthesia Type:   General, ETT     Note:  Airway :ETT  Patient transferred to:PACU  Comments: Patient transported to PACU with O2 via ETT/Ambu. Connected to vent upon arrival to PACU. Vent settings : SIMV-PC, RR 14, PEEP 5. VSS. Report to RN.      Vitals: (Last set prior to Anesthesia Care Transfer)    CRNA VITALS  4/13/2017 1823 - 4/13/2017 1858      4/13/2017             Pulse: 82    Ht Rate: 81    SpO2: 100 %    Resp Rate (observed): 12                Electronically Signed By: WILLY Alcantara CRNA  April 13, 2017  6:58 PM

## 2017-04-13 NOTE — IP AVS SNAPSHOT
MRN:[id number]                      After Visit Summary   4/13/2017    Sherita Kenney    MRN: [id number]           Thank you!     Thank you for choosing [facility] for your care. Our goal is always to provide you with excellent care. Hearing back from our patients is one way we can continue to improve our services. Please take a few minutes to complete the written survey that you may receive in the mail after you visit with us. Thank you!        Patient Information     Date Of Birth          1940        Designated Caregiver       Most Recent Value    Caregiver    Will someone help with your care after discharge? yes    Name of designated caregiver Dinora    Phone number of caregiver [phone]    Caregiver address [address] [place]       About your hospital stay     You were admitted on:  April 13, 2017 You last received care in the:  [place] [address] [facility]    You were discharged on:  April 20, 2017        Reason for your hospital stay       The patient was in the hospital to have surgery for colon cancer.                  Who to Call     For medical emergencies, please call 911.  For non-urgent questions about your medical care, please call your primary care provider or clinic, [phone]  For questions related to your surgery, please call your surgery clinic        Attending Provider     Provider Specialty    Dao Delacruz MD Internal Medicine    Lindsay Peter MD Colon and Rectal Surgery       Primary Care Provider Office Phone # Fax #    Latanya Martinez [phone]       [facility] [employer] [facility] [address] [facility] [address] [facility] [address]  [facility] [address]        After Care Instructions     Activity - Up with nursing assistance           Advance Diet as Tolerated       Follow this diet upon discharge: Low residue            Fall precautions           General info for SNF       Length of Stay Estimate: Short Term Care: Estimated # of Days  <30  Condition at Discharge: Improving  Level of care:skilled   Rehabilitation Potential: Good  Admission H&P remains valid and up-to-date: Yes  Recent Chemotherapy: N/A  Use Nursing Home Standing Orders: Yes            Intake and output       Every shift            Mantoux instructions       Give two-step Mantoux (PPD) Per Facility Policy Yes            Wound care (specify)       Site:   Ileostomy takedown site  Instructions:  Moist to dry gauze loosely pack into the wound. Changed BID.                  Follow-up Appointments     Follow Up and recommended labs and tests       Follow up in the office in 3-4 weeks with Dr. Peter or at your already scheduled post op visit. Call [phone] to schedule your appointment. Call the office at [phone] if you develop fever, uncontrolled pain, bleeding, nausea, vomiting, or constipation.                  Your next 10 appointments already scheduled     May 15, 2017  2:00 PM CDT   Return Visit with Susan Lindo MD   [facility] ([facility])    [facility]  [address] [facility] [address]  [facility] [address]   [phone]            May 23, 2017  8:00 AM CDT   (Arrive by 7:45 AM)   New Patient Visit with Lisa Man, PhD [facility] ([facility])    [address] [facility] [address] [facility] [address]  [address] [facility] [address] [phone]              Additional Services     Occupational Therapy Adult Consult       Evaluate and treat as clinically indicated.    Reason:  Post op deconditioning            Physical Therapy Adult Consult       Evaluate and treat as clinically indicated.    Reason:  Post op deconditioning                  Pending Results     No orders found from 4/11/2017 to 4/14/2017.            Statement of Approval     Ordered          04/20/17 0806  I have reviewed and agree with all the recommendations and orders detailed in this document.  EFFECTIVE NOW     Approved and  "electronically signed by:  Yaa Talley PA-C             Admission Information     Date & Time Provider Department Dept. Phone    2017 Lindsay Peter MD [place] [address] Oncology [phone]      Your Vitals Were     Blood Pressure Pulse Temperature Respirations Height Weight    131/75 (BP Location: Right arm) 94 96.4  F (35.8  C) (Oral) 16 1.676 m (5' 6\") 50.8 kg (111 lb 15.9 oz)    Pulse Oximetry BMI (Body Mass Index)                92% 18.08 kg/m2          [employer] Information     [employer] lets you send messages to your doctor, view your test results, renew your prescriptions, schedule appointments and more. To sign up, go to [email or web address] . Click on \"Log in\" on the left side of the screen, which will take you to the Welcome page. Then click on \"Sign up Now\" on the right side of the page.     You will be asked to enter the access code listed below, as well as some personal information. Please follow the directions to create your username and password.     Your access code is: [email or web address]  Expires: 2017  1:06 PM     Your access code will  in 90 days. If you need help or a new code, please call your [facility] clinic or [phone].        Care EveryWhere ID     This is your Care EveryWhere ID. This could be used by other organizations to access your [facility] medical records  [id number]           Review of your medicines      START taking        Dose / Directions    HYDROcodone-acetaminophen 5-325 MG per tablet   Commonly known as:  NORCO        Dose:  1-2 tablet   Take 1-2 tablets by mouth every 6 hours as needed for moderate to severe pain   Quantity:  20 tablet   Refills:  0         CONTINUE these medicines which have NOT CHANGED        Dose / Directions    CALCIUM-MAGNESIUM-ZINC PO        Dose:  2 tablet   Take 2 tablets by mouth daily   Refills:  0       diclofenac 1 % Gel topical gel   Commonly known as:  VOLTAREN        Dose:  2 g   Place 2 g onto the skin 3 " times daily as needed for moderate pain Apply to R hand   Refills:  0       enoxaparin 40 MG/0.4ML injection   Commonly known as:  LOVENOX        Dose:  40 mg   Inject 0.4 mLs (40 mg) Subcutaneous every 24 hours   Quantity:  24 Syringe   Refills:  0       ferrous gluconate 324 (38 FE) MG tablet   Commonly known as:  FERGON        Dose:  1 tablet   Take 1 tablet by mouth daily (with lunch).   Refills:  0       FLEXERIL PO        Dose:  10 mg   Take 10 mg by mouth 3 times daily as needed for muscle spasms   Refills:  0       ipratropium - albuterol 0.5 mg/2.5 mg/3 mL 0.5-2.5 (3) MG/3ML neb solution   Commonly known as:  DUONEB        Dose:  1 vial   Take 1 vial by nebulization every 4 hours as needed for shortness of breath / dyspnea or wheezing   Refills:  0       LEVOTHYROXINE SODIUM PO        Dose:  75 mcg   Take 75 mcg by mouth daily   Refills:  0       MUCINEX PO        Dose:  1200 mg   Take 1,200 mg by mouth 2 times daily (takes 2 x 600mg tablet = 1200mg dose)   Refills:  0       OLANZapine zydis 5 MG ODT tab   Commonly known as:  zyPREXA   Used for:  Paranoia (H)        Dose:  5 mg   Take 1 tablet (5 mg) by mouth At Bedtime   Refills:  0       SUMATRIPTAN SUCCINATE PO        Dose:  25 mg   Take 25 mg by mouth every 8 hours as needed for migraine   Refills:  0       TRAVATAN Z 0.004 % ophthalmic solution   Generic drug:  travoprost (BAK Free)        Dose:  1 drop   Place 1 drop into both eyes At Bedtime   Refills:  0       TYLENOL PO        Dose:  650 mg   Take 650 mg by mouth every 4 hours as needed for mild pain or fever   Refills:  0       vitamin B-Complex        Dose:  1 tablet   Take 1 tablet by mouth daily.   Refills:  0            Where to get your medicines      Some of these will need a paper prescription and others can be bought over the counter. Ask your nurse if you have questions.     You don't need a prescription for these medications     enoxaparin 40 MG/0.4ML injection         Information about  where to get these medications is not yet available     ! Ask your nurse or doctor about these medications     HYDROcodone-acetaminophen 5-325 MG per tablet                Protect others around you: Learn how to safely use, store and throw away your medicines at www.disposemymeds.org.             Medication List: This is a list of all your medications and when to take them. Check marks below indicate your daily home schedule. Keep this list as a reference.      Medications           Morning Afternoon Evening Bedtime As Needed    CALCIUM-MAGNESIUM-ZINC PO   Take 2 tablets by mouth daily                                diclofenac 1 % Gel topical gel   Commonly known as:  VOLTAREN   Place 2 g onto the skin 3 times daily as needed for moderate pain Apply to R hand                                enoxaparin 40 MG/0.4ML injection   Commonly known as:  LOVENOX   Inject 0.4 mLs (40 mg) Subcutaneous every 24 hours   Last time this was given:  40 mg on 4/19/2017 11:56 PM                                ferrous gluconate 324 (38 FE) MG tablet   Commonly known as:  FERGON   Take 1 tablet by mouth daily (with lunch).                                FLEXERIL PO   Take 10 mg by mouth 3 times daily as needed for muscle spasms                                HYDROcodone-acetaminophen 5-325 MG per tablet   Commonly known as:  NORCO   Take 1-2 tablets by mouth every 6 hours as needed for moderate to severe pain   Last time this was given:  2 tablets on 4/18/2017  3:13 PM                                ipratropium - albuterol 0.5 mg/2.5 mg/3 mL 0.5-2.5 (3) MG/3ML neb solution   Commonly known as:  DUONEB   Take 1 vial by nebulization every 4 hours as needed for shortness of breath / dyspnea or wheezing                                LEVOTHYROXINE SODIUM PO   Take 75 mcg by mouth daily   Last time this was given:  75 mcg on 4/20/2017  6:05 AM                                MUCINEX PO   Take 1,200 mg by mouth 2 times daily (takes 2 x 600mg  tablet = 1200mg dose)                                OLANZapine zydis 5 MG ODT tab   Commonly known as:  zyPREXA   Take 1 tablet (5 mg) by mouth At Bedtime   Last time this was given:  5 mg on 4/19/2017  9:08 PM                                SUMATRIPTAN SUCCINATE PO   Take 25 mg by mouth every 8 hours as needed for migraine                                TRAVATAN Z 0.004 % ophthalmic solution   Place 1 drop into both eyes At Bedtime   Last time this was given:  1 drop on 4/19/2017  9:08 PM   Generic drug:  travoprost (BAK Free)                                TYLENOL PO   Take 650 mg by mouth every 4 hours as needed for mild pain or fever   Last time this was given:  650 mg on 4/19/2017  2:27 PM                                vitamin B-Complex   Take 1 tablet by mouth daily.

## 2017-04-13 NOTE — IP AVS SNAPSHOT
"[place] [address] ONCOLOGY: [phone]                                              INTERAGENCY TRANSFER FORM - PHYSICIAN ORDERS   2017                    Hospital Admission Date: 2017  SEVERO SWANN   : 1940  Sex: Female        Attending Provider: Lindsay Peter MD     Allergies:  Wool Fiber    Infection:  None   Service:  COLORECTAL S    Ht:  1.676 m (5' 6\")   Wt:  50.8 kg (111 lb 15.9 oz)   Admission Wt:  59 kg (130 lb)    BMI:  18.08 kg/m 2   BSA:  1.54 m 2            Patient PCP Information     Provider PCP Type    Latanya Martinez MD General      ED Clinical Impression     Diagnosis Description Comment Added By Time Added    Malignant neoplasm of ascending colon (H) [C18.2] Malignant neoplasm of ascending colon (H) [C18.2]  Yessy Whalen PA-C 2017  3:56 PM      Hospital Problems as of 2017              Priority Class Noted POA    Respiratory insufficiency Medium  2017 Yes    Colon cancer (H) Medium  2017 Yes      Non-Hospital Problems as of 2017              Priority Class Noted    Generalized osteoarthritis Medium  2005    Late effects of poliomyelitis Medium  2005    Plantar fascial fibromatosis Medium  2005    Scoliosis Medium  2005    Medication monitoring encounter   2011    Flail joint Medium  2012    Arthralgia of upper arm Medium  2012    OA (osteoarthritis) of knee   1/3/2013    Pain in joint, lower leg   1/3/2013    Abnormal gait   1/3/2013    Lumbago   2013    Osteoarthritis of hip   2014    DJD (degenerative joint disease) of knee   2014    DJD (degenerative joint disease), lumbar   2014    Hip pain   2015    Pain in limb   2015    Hip pain, right Medium  2016    Psychosis Medium  2/15/2017    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  3/12/2017    Adenocarcinoma of colon (H) Medium  3/12/2017    Status post incision and drainage Medium  3/12/2017    S/P ileostomy " (H) Medium  3/12/2017    Iron deficiency anemia Medium  3/12/2017    Postpolio syndrome Medium  3/12/2017    Severe protein-calorie malnutrition (H) Medium  3/12/2017    Physical deconditioning Medium  3/12/2017    Cognitive impairment Medium  3/16/2017    Primary osteoarthritis of right hand Medium  3/19/2017    Rectal bleeding Medium  4/1/2017    Acute nasopharyngitis Medium  4/14/2017    URI with cough and congestion Medium  4/14/2017      Code Status History     Date Active Date Inactive Code Status Order ID Comments User Context    4/18/2017  3:59 PM  Full Code [id number]  Yessy Whalen PA-C Outpatient    4/14/2017  1:26 AM 4/18/2017  3:59 PM Full Code [id number]  Hernando Crockett MD Inpatient    4/13/2017  9:27 PM 4/14/2017  1:26 AM Full Code [id number]  Dao Delacruz MD Inpatient    3/8/2017  7:30 AM 4/13/2017  9:27 PM Full Code [id number]  Yessy Whalen PA-C Outpatient    2/15/2017  5:33 AM 3/8/2017  7:30 AM Full Code [id number]  Noble Lopez MD Inpatient         Medication Review      START taking        Dose / Directions Comments    HYDROcodone-acetaminophen 5-325 MG per tablet   Commonly known as:  NORCO        Dose:  1-2 tablet   Take 1-2 tablets by mouth every 6 hours as needed for moderate to severe pain   Quantity:  20 tablet   Refills:  0          CONTINUE these medications which have NOT CHANGED        Dose / Directions Comments    CALCIUM-MAGNESIUM-ZINC PO        Dose:  2 tablet   Take 2 tablets by mouth daily   Refills:  0        diclofenac 1 % Gel topical gel   Commonly known as:  VOLTAREN        Dose:  2 g   Place 2 g onto the skin 3 times daily as needed for moderate pain Apply to R hand   Refills:  0        enoxaparin 40 MG/0.4ML injection   Commonly known as:  LOVENOX        Dose:  40 mg   Inject 0.4 mLs (40 mg) Subcutaneous every 24 hours   Quantity:  24 Syringe   Refills:  0        ferrous gluconate 324 (38 FE) MG tablet   Commonly known as:  FERGON         Dose:  1 tablet   Take 1 tablet by mouth daily (with lunch).   Refills:  0        FLEXERIL PO        Dose:  10 mg   Take 10 mg by mouth 3 times daily as needed for muscle spasms   Refills:  0        ipratropium - albuterol 0.5 mg/2.5 mg/3 mL 0.5-2.5 (3) MG/3ML neb solution   Commonly known as:  DUONEB        Dose:  1 vial   Take 1 vial by nebulization every 4 hours as needed for shortness of breath / dyspnea or wheezing   Refills:  0        LEVOTHYROXINE SODIUM PO        Dose:  75 mcg   Take 75 mcg by mouth daily   Refills:  0        MUCINEX PO        Dose:  1200 mg   Take 1,200 mg by mouth 2 times daily (takes 2 x 600mg tablet = 1200mg dose)   Refills:  0        OLANZapine zydis 5 MG ODT tab   Commonly known as:  zyPREXA   Used for:  Paranoia (H)        Dose:  5 mg   Take 1 tablet (5 mg) by mouth At Bedtime   Refills:  0        SUMATRIPTAN SUCCINATE PO        Dose:  25 mg   Take 25 mg by mouth every 8 hours as needed for migraine   Refills:  0        TRAVATAN Z 0.004 % ophthalmic solution   Generic drug:  travoprost (BAK Free)        Dose:  1 drop   Place 1 drop into both eyes At Bedtime   Refills:  0        TYLENOL PO        Dose:  650 mg   Take 650 mg by mouth every 4 hours as needed for mild pain or fever   Refills:  0        vitamin B-Complex        Dose:  1 tablet   Take 1 tablet by mouth daily.   Refills:  0                Summary of Visit     Reason for your hospital stay       The patient was in the hospital to have surgery for colon cancer.             After Care     Activity - Up with nursing assistance           Advance Diet as Tolerated       Follow this diet upon discharge: Low residue       Fall precautions           General info for SNF       Length of Stay Estimate: Short Term Care: Estimated # of Days <30  Condition at Discharge: Improving  Level of care:skilled   Rehabilitation Potential: Good  Admission H&P remains valid and up-to-date: Yes  Recent Chemotherapy: N/A  Use Nursing Home Standing  Orders: Yes       Intake and output       Every shift       Mantoux instructions       Give two-step Mantoux (PPD) Per Facility Policy Yes       Wound care (specify)       Site:   Ileostomy takedown site  Instructions:  Moist to dry gauze loosely pack into the wound. Changed BID.             Referrals     Occupational Therapy Adult Consult       Evaluate and treat as clinically indicated.    Reason:  Post op deconditioning       Physical Therapy Adult Consult       Evaluate and treat as clinically indicated.    Reason:  Post op deconditioning             Your next 10 appointments already scheduled     May 15, 2017  2:00 PM CDT   Return Visit with Susan Lindo MD   [facility] ([facility])    [facility]  [address]  [facility] [address]   [phone]            May 23, 2017  8:00 AM CDT   (Arrive by 7:45 AM)   New Patient Visit with Lisa Man, PhD [facility] ([facility])    [address] [place] [address]  3rd [facility] [address] [phone]              Follow-Up Appointment Instructions     Future Labs/Procedures    Follow Up and recommended labs and tests     Comments:    Follow up in the office in 3-4 weeks with Dr. Peter or at your already scheduled post op visit. Call [phone] to schedule your appointment. Call the office at [phone] if you develop fever, uncontrolled pain, bleeding, nausea, vomiting, or constipation.      Follow-Up Appointment Instructions     Follow Up and recommended labs and tests       Follow up in the office in 3-4 weeks with Dr. Peter or at your already scheduled post op visit. Call [phone] to schedule your appointment. Call the office at [phone] if you develop fever, uncontrolled pain, bleeding, nausea, vomiting, or constipation.             Statement of Approval     Ordered          04/20/17 0806  I have reviewed and agree with all the  recommendations and orders detailed in this document.  EFFECTIVE NOW     Approved and electronically signed by:  Yaa Talley PA-C

## 2017-04-13 NOTE — IP AVS SNAPSHOT
"          [place] [address] ONCOLOGY: [phone]                                              INTERAGENCY TRANSFER FORM - LAB / IMAGING / EKG / EMG RESULTS   2017                    Hospital Admission Date: 2017  SEVERO SWANN   : 1940  Sex: Female        Attending Provider: Lindsay Peter MD     Allergies:  Wool Fiber    Infection:  None   Service:  COLORECTAL S    Ht:  1.676 m (5' 6\")   Wt:  50.8 kg (111 lb 15.9 oz)   Admission Wt:  59 kg (130 lb)    BMI:  18.08 kg/m 2   BSA:  1.54 m 2            Patient PCP Information     Provider PCP Type    Latanya Martinez MD General         Lab Results - 3 Days      Comprehensive metabolic panel [[id number]] (Abnormal)  Resulted: 17 0717, Result status: Final result    Ordering provider: Dao Delacruz MD  17 0000 Resulting lab: [facility]    Specimen Information    Type Source Collected On   Blood  17 0650          Components       Value Reference Range Flag Lab   Sodium 143 133 - 144 mmol/L  FrStHsLb   Potassium 3.6 3.4 - 5.3 mmol/L  FrStHsLb   Chloride 107 94 - 109 mmol/L  FrStHsLb   Carbon Dioxide 29 20 - 32 mmol/L  FrStHsLb   Anion Gap 7 3 - 14 mmol/L  FrStHsLb   Glucose 102 70 - 99 mg/dL H FrStHsLb   Urea Nitrogen 7 7 - 30 mg/dL  FrStHsLb   Creatinine 0.50 0.52 - 1.04 mg/dL L FrStHsLb   GFR Estimate -- >60 mL/min/1.7m2  FrStHsLb   Result:         >90  Non  GFR Calc     GFR Estimate If Black -- >60 mL/min/1.7m2  FrStHsLb   Result:         >90   GFR Calc     Calcium 8.5 8.5 - 10.1 mg/dL  FrStHsLb   Result:     Bilirubin Total 0.3 0.2 - 1.3 mg/dL  FrStHsLb   Albumin 2.4 3.4 - 5.0 g/dL L FrStHsLb   Protein Total 5.7 6.8 - 8.8 g/dL L FrStHsLb   Alkaline Phosphatase 102 40 - 150 U/L  FrStHsLb   ALT 14 0 - 50 U/L  FrStHsLb   AST 17 0 - 45 U/L  FrStHsLb            INR [[id number]]  Resulted: 17 0710, Result status: Final result    Ordering provider: Jayme, " Dao Beckett MD  04/20/17 0000 Resulting lab: [facility]    Specimen Information    Type Source Collected On   Blood  04/20/17 0650          Components       Value Reference Range Flag Lab   INR 0.98 0.86 - 1.14  FrStHsLb            CBC with platelets [[id number]] (Abnormal)  Resulted: 04/20/17 0700, Result status: Final result    Ordering provider: Dao Delacruz MD  04/20/17 0000 Resulting lab: [facility]    Specimen Information    Type Source Collected On   Blood  04/20/17 0650          Components       Value Reference Range Flag Lab   WBC 6.4 4.0 - 11.0 10e9/L  FrStHsLb   RBC Count 3.28 3.8 - 5.2 10e12/L L FrStHsLb   Hemoglobin 8.3 11.7 - 15.7 g/dL L FrStHsLb   Hematocrit 27.5 35.0 - 47.0 % L FrStHsLb   MCV 84 78 - 100 fl  FrStHsLb   MCH 25.3 26.5 - 33.0 pg L FrStHsLb   MCHC 30.2 31.5 - 36.5 g/dL L FrStHsLb   RDW 17.4 10.0 - 15.0 % H FrStHsLb   Platelet Count 388 150 - 450 10e9/L  FrStHsLb            Clostridium difficile toxin B PCR [[id number]]  Resulted: 04/19/17 2300, Result status: Final result    Ordering provider: Lindsay Peter MD  04/19/17 1945 Resulting lab: [facility]    Specimen Information    Type Source Collected On   Stool  04/19/17 1745          Components       Value Reference Range Flag Lab   Specimen Description Feces   FrStHsLb   C Diff Toxin B PCR -- NEG  75   Result:         Negative  Negative: Clostridium difficile target DNA sequences NOT detected, presumed   negative for Clostridium difficile toxin B or the number of bacteria present   may be below the limit of detection for the test.   FDA approved assay performed using [employer] GeneXpert real-time PCR.   A negative result does not exclude actual disease due to Clostridium difficile   and may be due to improper collection, handling and storage of the specimen or   the number of organisms in the specimen is below the detection limit of the   assay.               Paraneoplastic Antibodies with Reflex [[id number]]  Resulted: 04/19/17 1648, Result status: Final result    Ordering provider: Dao Delacruz MD  04/14/17 1124 Resulting lab: [facility]    Specimen Information    Type Source Collected On   Blood  04/14/17 1333          Components       Value Reference Range Flag Lab   Purkinje Cell Neuronal Nuclear IgG Screen --   FrStHsLb   Result:         None Detected  Reference range: None Detected  (Note)  PAULO-1, PAULO-2 or PCA-1 antibodies not detected,  confirmatory testing for Hu (PAULO-1), Ri (PAULO-2) or Yo  (PCA-1) IgG antibodies will not be performed.  INTERPRETIVE INFORMATION: Purkinje Cell/Neuronal Nuclear  IgG Scrn  Test developed and characteristics determined by [facility]. See Compliance Statement D: [employer]/  Performed by [facility],  [address] [place] [id number] [phone]  [email or web address], Dinesh Davenport MD, Lab. Director              Comprehensive metabolic panel [[id number]] (Abnormal)  Resulted: 04/19/17 0718, Result status: Final result    Ordering provider: Dao Delacruz MD  04/19/17 0001 Resulting lab: [facility]    Specimen Information    Type Source Collected On   Blood  04/19/17 0645          Components       Value Reference Range Flag Lab   Sodium 142 133 - 144 mmol/L  FrStHsLb   Potassium 3.7 3.4 - 5.3 mmol/L  FrStHsLb   Chloride 106 94 - 109 mmol/L  FrStHsLb   Carbon Dioxide 28 20 - 32 mmol/L  FrStHsLb   Anion Gap 8 3 - 14 mmol/L  FrStHsLb   Glucose 107 70 - 99 mg/dL H FrStHsLb   Urea Nitrogen 9 7 - 30 mg/dL  FrStHsLb   Creatinine 0.45 0.52 - 1.04 mg/dL L FrStHsLb   GFR Estimate -- >60 mL/min/1.7m2  FrStHsLb   Result:         >90  Non  GFR Calc     GFR Estimate If Black -- >60 mL/min/1.7m2  FrStHsLb   Result:         >90   GFR Calc     Calcium 8.5 8.5 - 10.1 mg/dL  FrStHsLb   Result:     Bilirubin Total 0.2 0.2 - 1.3 mg/dL  FrStHsLb    Albumin 2.5 3.4 - 5.0 g/dL L FrStHsLb   Protein Total 5.8 6.8 - 8.8 g/dL L FrStHsLb   Alkaline Phosphatase 109 40 - 150 U/L  FrStHsLb   ALT 14 0 - 50 U/L  FrStHsLb   AST 20 0 - 45 U/L  FrStHsLb            INR [[id number]]  Resulted: 04/19/17 0717, Result status: Final result    Ordering provider: Dao Delacruz MD  04/19/17 0001 Resulting lab: [facility]    Specimen Information    Type Source Collected On   Blood  04/19/17 0645          Components       Value Reference Range Flag Lab   INR 0.95 0.86 - 1.14  FrStHsLb            CBC with platelets [[id number]] (Abnormal)  Resulted: 04/19/17 0703, Result status: Final result    Ordering provider: Dao Delacruz MD  04/19/17 0001 Resulting lab: [facility]    Specimen Information    Type Source Collected On   Blood  04/19/17 0645          Components       Value Reference Range Flag Lab   WBC 7.3 4.0 - 11.0 10e9/L  FrStHsLb   RBC Count 3.34 3.8 - 5.2 10e12/L L FrStHsLb   Hemoglobin 8.5 11.7 - 15.7 g/dL L FrStHsLb   Hematocrit 28.0 35.0 - 47.0 % L FrStHsLb   MCV 84 78 - 100 fl  FrStHsLb   MCH 25.4 26.5 - 33.0 pg L FrStHsLb   MCHC 30.4 31.5 - 36.5 g/dL L FrStHsLb   RDW 17.4 10.0 - 15.0 % H FrStHsLb   Platelet Count 404 150 - 450 10e9/L  FrStHsLb            Creatinine [[id number]]  Resulted: 04/19/17 0056, Result status: Final result    Ordering provider: Dao Delacruz MD  04/18/17 1800 Resulting lab: [facility]    Specimen Information    Type Source Collected On   Blood  04/19/17 0029          Components       Value Reference Range Flag Lab   Creatinine 0.58 0.52 - 1.04 mg/dL  FrStHsLb   GFR Estimate -- >60 mL/min/1.7m2  FrStHsLb   Result:         >90  Non  GFR Calc     GFR Estimate If Black -- >60 mL/min/1.7m2  FrStHsLb   Result:         >90   GFR Calc              Surgical pathology exam [[id number]]  Resulted: 04/18/17 1448, Result status: Edited Result -  FINAL    Ordering provider: Giovanni Peter MD  04/13/17 1703 Resulting lab: [facility]    Specimen Information    Type Source Collected On   Tissue Other 04/13/17 1702   Comment:  PHONE : *[phone]   Tissue Colon 04/13/17 1721   Comment:  PHONE *[phone]   Tissue Other 04/13/17 1736   Comment:  PHONE :*[phone]          Components       Value Reference Range Flag Lab   Copath Report --      Result:         Patient Name: SEVERO SWANN  MR#: [id number]  Specimen #: [id number]  Collected: 4/13/2017  Received: 4/14/2017  Reported: 4/17/2017 13:26  Ordering Phy(s): GIOVANNI PETER  Additional Phy(s): DENISHA RICHARD    For improved result formatting, select 'View Enhanced Report Format'  under Linked Documents section.    ***ORIGINAL REPORT FOLLOWS ADDENDUM***    ADDENDUM    TO ORIGINAL REPORT  Status: Signed Out  Date Ordered:4/18/2017  Date Complete:4/18/2017  Date Reported:4/18/2017 14:47  Signed Out By: Joe Jeronimo M.D.    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 MMR proteins exist as heterodimers.  MLH1 and PMS2 are partners with MLH1 dominant. With loss of MLH1 there  is also loss of PMS2, which is not due to a mutation in the PMS2 gene.  PMS2 is lost without  loss of MLH1 if there is a mutation in the PMS2  gene, but MLH1 loss is almost always accompanied by loss of PMS2.    Most cases of loss of MLH1 (90%) are due to somatic inactivation of MLH1  due to promoter hypermethylation of the gene (sporadic MSI carcinoma)  but about 10% are due to germline mutation of the MLH1 gene (Bone  Syndrome).    About 80% of sporadic MSI cases have an activating mutation of the V600E  codon of the BRAF gene.    To distinguish between promoter hyperrmethylation (somatic event) and an  inherited germline  mutation, additional tests can be performed. These  tests include quantitative MLH1 hypermethylation analysis or testing for  the presence of a V600E mutation of the BRAF oncogene, which if present  indicates sporadic MSI carcinoma with MLH1 promoter hypermethylation and  essentially rules out Bone syndrome associated MSI carcinoma.  Therefore, this specimen will be submitted for hypermethylation  analysis.    Further evaluation for the patient and family  including germline testing  for MLH1 gene mutation would be appropriate with this pattern of protein  loss if there is no evidence of hypermethylation of MLH1. Genetic  counseling is recommended with this result. For genetic counseling and  genetic testing please contact the [facility], Genetic  Counselor Suzanne Ramirez at [phone], [email or web address].  >>>>>    COMMENTS:   The sections are submitted for hypermethylation testing.    __________________________________________    ORIGINAL REPORT:    SPECIMEN(S):  A: Artery, tissue from ileocolic  B: Colon, right  C: Ileostomy    FINAL DIAGNOSIS:  A: Tissue from ileocolic artery  - Poorly differentiated adenocarcinoma    B: Right hemicolectomy  - Poorly differentiated colonic adenocarcinoma  - Tumor invasion through the colon wall to a resection margin (anterior  abdominal wall per operative report)  - Proximal, distal and mesenteric pedicle resection margins clear of  tumor  - 15 of 24 lymph nodes positive for metastati c adenocarcinoma  - See separate report for mismatch repair protein study  - See synoptic diagnoses below    C: Ileostomy  - Segments of small bowel with focal mesenteric lymphovascular space  invasion by poorly differentiated adenocarcinoma    Synoptic diagnoses :         COLON AND RECTUM: Resection       Specimen: Terminal ileum, Cecum and Ascending colon       Procedure:    Right hemicolectomy       Primary Tumor Site:   Cecum         Macroscopic Tumor Perforation:   Not  "identified    Tumor       Histologic Type:   Adenocarcinoma (not otherwise characterized)       Histologic Grade:    High-grade (poorly differentiated to  undifferentiated)    Extent       Tumor Size:   Greatest dimension: 9.6 cm            Additional dimensions: 7.2 x 6.3 cm       Microscopic Tumor Extension:   Tumor directly invades adjacent  structure (abdominal wall)    Margins       Proximal Margin:   Uninvolved by invasive carcinoma.  No adenoma or  intraepithelial neoplasia / dysplasia identified       Distal  Margin:   Uninvolved by invasive carcinoma.  No adenoma or  intraepithelial neoplasia / dysplasia identified       Mesenteric Margin:   Uninvolved by invasive carcinoma    Accessory Findings       Lymph-Vascular Invasion:   Present       Perineural Invasion:   Not identified       Tumor Deposits (discontinuous extramural extension):   Not  identified       Type of Polyp in Which Invasive Carcinoma Arose:   None identified    Pathologic Staging (pTNM)       Primary Tumor (pT):    pT4b: Tumor directly invades or is adherent  to other organs or structures       Regional Lymph Nodes (pN):   pN2b:  Metastasis in 7 or more  regional lymph nodes            Number of Lymph Nodes Examined: 24            Number of Lymph Nodes Involved: 15       Distant Metastasis (pM):    Not applicable    Additional Pathologic Findings:   None identified  Ancillary Studies:    Mismatch repair study pending    Electronically signed out by:    Joe Jeronimo M.D.    GROSS:  A. The specimen is received in for [place] with proper patient  identification labeled \"tissue from ileocolonic artery\".  The specimen  consists of multiple tan friable tissue fragments measuring 1.0 x 0.4 x  0.2 cm in aggregate.  The specimen is entirely submitted in one  cassette.    B. The specimen is received in formalin with the patient's name and  proper identification labeled \"right colon \".  The specimen consists of  had an inflamed portion of right " "colon (12.6 cm in length x 6.5 cm in  diameter) with portion of terminal ileum(13.2 cm in length x 3.6 cm in  diameter) and additional segment of small bowel extending from the  terminal ileum measuring 14.2 cm in length x 2.6 cm in diameter  mesenteric fat.  On section there is a circumferential cecal tumor (9.6  x 7.2 x 6.3 cm) that invades through the muscularis propria and extends  to the retroperitoneal soft tissue inked margin.  The retroperitoneal  soft tissue margin is inked black and the closest mesenteric margin is  inked blue.  The tumor is 11.6 cm from the  distal colon resection  margin, 4.6 cm and the attached terminal ileum resection margin and 18.8  cm from the unattached presumable proximal resection margin.  No  definitive appendix is identified grossly.  The closest soft tissue  mesenteric margin is 3.1 cm away. Within the mesenteric fat are multiple  grossly positive lymph nodes.  The largest grossly positive lymph node  measures up to 3.6 cm.  Representative sections are submitted.    Cassette 1-distal colon resection margin  Cassette 2-attached proximal bowel resection margin  Cassette 3-unattached presumable proximal bowel resection margin  Cassette 4-6-tumor extending to inked retroperitoneal soft tissue margin  Cassettes 7-closest mesenteric soft tissue margin  Cassettes 8-tumor with colon  Cassette 9-tumor with terminal ileum  Cassettes 10 and higher-lymph nodes    C. The specimen is received in formalin with the patient's name and  proper identification labeled \"ileostomy \".  The specimen consists of  two segments of bowel  measuring 4.3 cm in length and 5.3 cm in length  adherent together converging into one ileostomy opening with exposed  mucosa.  The proximal surgical resection margins are grossly viable and  stapled shut.  Representative sections are submitted in two cassettes.    Cassette 1-longitudinal section of ileostomy opening  Cassettes 2-stapled resection margins (Dictated by: " Carlos Moreno  4/14/2017 03:08 PM)    MICROSCOPIC:  A. The sections show multiple irregular fragments of partially necrotic  poorly differentiated adenocarcinoma of the type seen in part B.    B. Poorly differentiated adenocarcinoma invades through the full  thickness of the colon wall including to the inked circumferential  resection margin in blocks B5 and 6.  Widespread lymphovascular space  invasion is present.  The proximal small bowel margin and distal colon  mucosal margins are clear of tumor.  The closest mesenteric resection  margin in block B7 is clear of tumor.  No evidence of a neoplastic polyp  is seen.  Th e invasive margins of the tumor show a budding pattern.  A  scant amount of mucin production is seen focally.    15 of 24 lymph nodes show metastatic adenocarcinoma.    C. Benign small bowel mucosa present.  A focus of partially necrotic  adenocarcinoma seen within the mesentery and a large vascular space  suggesting a vain.    CPT Codes:  A: 91700-UI6  B: 35753-BF8, 78733-GKA, 61313-SBG, 66815-ICD, 33212-BSE  C: 21167-AG2    TESTING LAB LOCATION:  [address]  [address] [phone]    COLLECTION SITE:  Client: [facility]  Location: [facility] (S)              Comprehensive metabolic panel [[id number]] (Abnormal)  Resulted: 04/18/17 0720, Result status: Final result    Ordering provider: Dao Delacruz MD  04/18/17 0001 Resulting lab: [facility]    Specimen Information    Type Source Collected On   Blood  04/18/17 0655          Components       Value Reference Range Flag Lab   Sodium 142 133 - 144 mmol/L  FrStHsLb   Potassium 3.4 3.4 - 5.3 mmol/L  FrStHsLb   Chloride 108 94 - 109 mmol/L  FrStHsLb   Carbon Dioxide 28 20 - 32 mmol/L  FrStHsLb   Anion Gap 6 3 - 14 mmol/L  FrStHsLb   Glucose 92 70 - 99 mg/dL  FrStHsLb   Urea Nitrogen 12 7 - 30 mg/dL  FrStHsLb   Creatinine 0.56 0.52 - 1.04 mg/dL  FrStHsLb   GFR  Estimate -- >60 mL/min/1.7m2  FrStHsLb   Result:         >90  Non  GFR Calc     GFR Estimate If Black -- >60 mL/min/1.7m2  FrStHsLb   Result:         >90   GFR Calc     Calcium 8.1 8.5 - 10.1 mg/dL L FrStHsLb   Result:     Bilirubin Total 0.2 0.2 - 1.3 mg/dL  FrStHsLb   Albumin 2.2 3.4 - 5.0 g/dL L FrStHsLb   Protein Total 5.4 6.8 - 8.8 g/dL L FrStHsLb   Alkaline Phosphatase 91 40 - 150 U/L  FrStHsLb   ALT 12 0 - 50 U/L  FrStHsLb   AST 19 0 - 45 U/L  FrStHsLb            Phosphorus [[id number]]  Resulted: 04/18/17 0720, Result status: Final result    Ordering provider: Lindsay Peter MD  04/18/17 0001 Resulting lab: [facility]    Specimen Information    Type Source Collected On   Blood  04/18/17 0655          Components       Value Reference Range Flag Lab   Phosphorus 3.0 2.5 - 4.5 mg/dL  FrStHsLb            INR [[id number]]  Resulted: 04/18/17 0715, Result status: Final result    Ordering provider: Dao Delacruz MD  04/18/17 0001 Resulting lab: [facility]    Specimen Information    Type Source Collected On   Blood  04/18/17 0655          Components       Value Reference Range Flag Lab   INR 0.97 0.86 - 1.14  FrStHsLb            CBC with platelets [[id number]] (Abnormal)  Resulted: 04/18/17 0705, Result status: Final result    Ordering provider: Dao Delacruz MD  04/18/17 0001 Resulting lab: [facility]    Specimen Information    Type Source Collected On   Blood  04/18/17 0655          Components       Value Reference Range Flag Lab   WBC 7.3 4.0 - 11.0 10e9/L  FrStHsLb   RBC Count 3.42 3.8 - 5.2 10e12/L L FrStHsLb   Hemoglobin 8.8 11.7 - 15.7 g/dL L FrStHsLb   Hematocrit 29.1 35.0 - 47.0 % L FrStHsLb   MCV 85 78 - 100 fl  FrStHsLb   MCH 25.7 26.5 - 33.0 pg L FrStHsLb   MCHC 30.2 31.5 - 36.5 g/dL L FrStHsLb   RDW 17.5 10.0 - 15.0 % H FrStHsLb   Platelet Count 340 150 - 450 10e9/L  FrStHsLb             Phosphorus [[id number]] (Abnormal)  Resulted: 04/17/17 0752, Result status: Final result    Ordering provider: Barbra Mathews MD  04/17/17 0530 Resulting lab: [facility]    Specimen Information    Type Source Collected On     04/17/17 0530          Components       Value Reference Range Flag Lab   Phosphorus 2.1 2.5 - 4.5 mg/dL L FrStHsLb            Comprehensive metabolic panel [[id number]] (Abnormal)  Resulted: 04/17/17 0706, Result status: Final result    Ordering provider: Dao Delacruz MD  04/17/17 0000 Resulting lab: [facility]    Specimen Information    Type Source Collected On   Blood  04/17/17 0530          Components       Value Reference Range Flag Lab   Sodium 141 133 - 144 mmol/L  FrStHsLb   Potassium 3.8 3.4 - 5.3 mmol/L  FrStHsLb   Chloride 108 94 - 109 mmol/L  FrStHsLb   Carbon Dioxide 26 20 - 32 mmol/L  FrStHsLb   Anion Gap 7 3 - 14 mmol/L  FrStHsLb   Glucose 101 70 - 99 mg/dL H FrStHsLb   Urea Nitrogen 8 7 - 30 mg/dL  FrStHsLb   Creatinine 0.50 0.52 - 1.04 mg/dL L FrStHsLb   GFR Estimate -- >60 mL/min/1.7m2  FrStHsLb   Result:         >90  Non  GFR Calc     GFR Estimate If Black -- >60 mL/min/1.7m2  FrStHsLb   Result:         >90   GFR Calc     Calcium 8.1 8.5 - 10.1 mg/dL L FrStHsLb   Result:     Bilirubin Total 0.3 0.2 - 1.3 mg/dL  FrStHsLb   Albumin 2.1 3.4 - 5.0 g/dL L FrStHsLb   Protein Total 5.2 6.8 - 8.8 g/dL L FrStHsLb   Alkaline Phosphatase 79 40 - 150 U/L  FrStHsLb   ALT 13 0 - 50 U/L  FrStHsLb   AST 18 0 - 45 U/L  FrStHsLb            INR [[id number]]  Resulted: 04/17/17 0659, Result status: Final result    Ordering provider: Dao Delacruz MD  04/17/17 0000 Resulting lab: [facility]    Specimen Information    Type Source Collected On   Blood  04/17/17 0530          Components       Value Reference Range Flag Lab   INR 1.06 0.86 - 1.14  FrStHsLb            CBC with platelets  [[id number]] (Abnormal)  Resulted: 04/17/17 0649, Result status: Final result    Ordering provider: Dao Delacruz MD  04/17/17 0000 Resulting lab: [facility]    Specimen Information    Type Source Collected On   Blood  04/17/17 0530          Components       Value Reference Range Flag Lab   WBC 9.0 4.0 - 11.0 10e9/L  FrStHsLb   RBC Count 3.46 3.8 - 5.2 10e12/L L FrStHsLb   Hemoglobin 8.7 11.7 - 15.7 g/dL L FrStHsLb   Hematocrit 29.5 35.0 - 47.0 % L FrStHsLb   MCV 85 78 - 100 fl  FrStHsLb   MCH 25.1 26.5 - 33.0 pg L FrStHsLb   MCHC 29.5 31.5 - 36.5 g/dL L FrStHsLb   RDW 17.7 10.0 - 15.0 % H FrStHsLb   Platelet Count 341 150 - 450 10e9/L  FrStHsLb            Testing Performed By     Lab - Abbreviation Name Director Address Valid Date Range    14 - FrStHsLb [facility] Unknown [address] [place] [address] 05/08/15 1057 - Present    75 - Unknown [facility] Unknown [address] [facility] [place] [address] 01/15/15 1019 - Present    88 - Unknown [facility] Unknown Unknown 10/30/02 0000 - Present            Unresulted Labs (24h ago through future)    Start       Ordered    04/17/17 0000  Platelet count  (enoxaparin (LOVENOX) (Weight  kg with CrCl greater than 30 mL/min is prechecked))  EVERY THREE DAYS,   Routine     Comments:  Repeat every 3 days while on VTE prophylaxis. If no result is listed, this lab has not been done the past 365 days. LATEST LAB RESULT: Platelet Count (10e9/L)       Date                     Value                 04/11/2017               205              ----------      04/14/17 0126    04/16/17 0600  Platelet count  (Pharmacological Prophylaxis - enoxaparin (LOVENOX) *Use only if creatinine clearance is greater than 30 mL/min)  EVERY THREE DAYS,   Routine     Comments:  Repeat every 3 days while on VTE prophylaxis.  Notify provider and hold enoxaparin if platelet count falls by 50% of baseline. If no result is listed, this  "lab has not been done the past 365 days. LATEST LAB RESULT: Platelet Count (10e9/L)       Date                     Value                 04/13/2017               208              ----------    04/13/17 2127 04/16/17 0000  Creatinine  (Pharmacological Prophylaxis - enoxaparin (LOVENOX) *Use only if creatinine clearance is greater than 30 mL/min)  EVERY THREE DAYS,   Routine     Comments:  Repeat every 3 days while on VTE prophylaxis.    04/13/17 2127 04/14/17 0600  CBC with platelets  DAILY,   Routine     Comments:  Last Lab Result: Hemoglobin (g/dL)       Date                     Value                 04/13/2017               9.4 (L)          ----------    04/13/17 2127 04/14/17 0600  Comprehensive metabolic panel  DAILY,   Routine      04/13/17 2127 04/14/17 0600  INR  DAILY,   Routine      04/13/17 2127    Unscheduled  Phosphorus  (or    SODIUM Phosphate - \"High\" - Replacement for all levels less than 2.8 mg/dL)  CONDITIONAL (SPECIFY),   Routine     Comments:  Obtain Phosphorus Level for these conditions:  *IF no phosphorus result within 24 hrs before initiation of order set, draw phosphorus level with next lab collect.    *2 HOURS AFTER last phosphorus replacement dose when phosphorus replacement given for level less than 2.0  *Next morning after phosphorus dose.     Repeat Phosphorus Replacement if necessary.    04/15/17 1606      Encounter-Level Documents:     There are no encounter-level documents.      Order-Level Documents:     There are no order-level documents.      "

## 2017-04-13 NOTE — LETTER
"Transition Communication Hand-off for Care Transitions to Next Level of Care Provider    Name: Sherita Kenney  MRN #: [id number]  Primary Care Provider: Latanya Martinez     Primary Clinic: [place] [id number] [facility] [address]  [facility] [address]     Reason for Hospitalization:  COLON CANCER  Respiratory insufficiency  Colon cancer (H)  Admit Date/Time: 4/13/2017 10:38 AM  Discharge Date: ***  Payor Source: Payor: [employer] / Plan: [employer]AN / Product Type: HMO /     Readmission Assessment Measure (LUCY) Risk Score/category: ***    Plan of Care Goals/Milestone Events:   Patient Concerns: ***   Patient Goals:   Short-term ***   Long-term ***   Medical Goals   Short-term ***   Long-term ***         Reason for Communication Hand-off Referral: {CCREASONCMCTNHANDOFFREFERRALCTS:87883}    Discharge Plan:  Discharge Plan:       Most Recent Value    Concerns To Be Addressed discharge planning concerns           Concern for non-adherence with plan of care:   Y/N ***  Discharge Needs Assessment:  Needs       Most Recent Value    Equipment Currently Used at Home none    Transportation Available car    Current Discharge Risk physical impairment    # of Referrals Placed by CTS Communication hand-offs to next level of Care Providers [PCP]          Already enrolled in Tele-monitoring program and name of program:  ***  Follow-up specialty is recommended: {YES / NO:29897::\"Yes\"}    Follow-up plan:  Future Appointments  Date Time Provider Department Center   4/20/2017 2:30 PM Joselin Dyer PT [facility]   5/15/2017 2:00 PM Susan Lindo MD [facility]   5/23/2017 8:00 AM Lisa Man, PhD [facility]       Any outstanding tests or procedures:        Referrals     Future Labs/Procedures    Occupational Therapy Adult Consult     Comments:    Evaluate and treat as clinically indicated.    Reason:  Post op deconditioning    Physical Therapy Adult Consult     Comments: "    Evaluate and treat as clinically indicated.    Reason:  Post op deconditioning            Key Recommendations:      Edna Sweeney    AVS/Discharge Summary is the source of truth; this is a helpful guide for improved communication of patient story

## 2017-04-13 NOTE — BRIEF OP NOTE
[facility]    Brief Operative Note    Pre-operative diagnosis: COLON CANCER  Post-operative diagnosis * No post-op diagnosis entered *  Procedure: Procedure(s):  OPEN RIGHT COLECTOMY, TAKEDOWN OF ILEOSTOMY - Wound Class: I-Clean  EXPLORATION OF RIGHT URETEROLYSIS - Wound Class: II-Clean Contaminated   - Wound Class: II-Clean Contaminated  Surgeon: Surgeon(s) and Role:  Panel 1:     * Lindsay Peter MD - Primary     * Kadie Pandya PA-C - Assisting     * Jaycob Mari MD    Panel 2:     * Jaycob Mari MD - Primary  Anesthesia: General   Estimated blood loss: 300 mL  Drains: None  Specimens:   ID Type Source Tests Collected by Time Destination   A : TISSUE FROM ILEOCOLIC ARTERY Tissue Other SURGICAL PATHOLOGY EXAM Lindsay Peter MD 4/13/2017  5:02 PM    B : RIGHT COLON Tissue Colon SURGICAL PATHOLOGY EXAM Lindsay Peter MD 4/13/2017  5:21 PM    C : ILEOSTOMY Tissue Other SURGICAL PATHOLOGY EXAM Lindsay Peter MD 4/13/2017  5:36 PM      Findings:   large cecal/ascending colon malignancy with invasion into the retroperitoneum and lateral abdominal sidewall, prominent mesenteric (large and small bowel) lymphadenopathy, prominent vasculature feeding right colonic malignancy, identification of the ureter in its normal anatomic position, right retroperitoneal chronic psoas abscess cavity.  Complications: None.  Implants: None.      Condition on discharge from OR: Satisfactory    Hernando Crockett MD   [facility]   [phone].        ADDENDUM:    PATIENT DATA  Indicate Y or N:  Home O2 No  Hemodialysis  No  Transplant patient  No  Cirrhosis  No  Steroids in last 30 days  No  Immunomodulators in last 30 days  No  Anticoagulation at time of surgery  No   List medication n/a  Prior abdominal surgery  Yes  Pelvic irradiation  No    Albumin within 30 days if known n/a   Hgb within 30 days if known    Hemoglobin   Date Value Ref  Range Status   04/13/2017 12.0 11.7 - 15.7 g/dL Final   ]  Cr within 30 days if known    Creatinine   Date Value Ref Range Status   04/13/2017 0.72 0.52 - 1.04 mg/dL Final   ]  Body mass index is 20.98 kg/(m^2).      OR DATA  Emergent  No   <24 hours  No   <1 week  No  Bowel Prep No  Antibiotics  Yes  DVT prophylaxis    Heparin  Yes   SCD  Yes   None  No  Drain  No  ASA (1,2,3,4) 3  OR time (min) 184  Stents  No  Transfuse >/= 2U  No  Anastomosis   Stapled  Yes   Handsewn  No  Leak Test    Positive  No   Negative  No   Not done  Yes    FOR CANCER ALSO COMPLETE:  Preoperative treatment (Y or N)   Chemo  No   Radiation No   Diversion  Yes  If rectal cancer  Not for rectal cancer  Preoperative Stage   CT  Yes   MRI No   US  No   Clinical  Yes   Unknown  Yes   T stage (1,2,3,4) n/a   N stage (0,1,2) n/a   M stage (0,1) 0  CEA 3.8  Metastatic disease at time of operation  No

## 2017-04-13 NOTE — PROGRESS NOTES
Admission medication history interview status for the 4/13/2017  admission is complete. See EPIC admission navigator for prior to admission medications     Medication history source reliability:Good    Medication history interview source(s):[facility]    Medication history resources (including written lists, pill bottles, clinic record):MAR from [facility]    Primary pharmacy.[facility]    Additional medication history information not noted on PTA med list :None    Time spent in this activity: 45 minutes    Prior to Admission medications    Medication Sig Last Dose Taking? Auth Provider   Acetaminophen (TYLENOL PO) Take 650 mg by mouth every 4 hours as needed for mild pain or fever 4/12/2017 at 1442 Yes Reported, Patient   CALCIUM-MAGNESIUM-ZINC PO Take 2 tablets by mouth daily 4/13/2017 at 0800 Yes Reported, Patient   GuaiFENesin (MUCINEX PO) Take 1,200 mg by mouth 2 times daily (takes 2 x 600mg tablet = 1200mg dose) 4/13/2017 at 0800 Yes Reported, Patient   LEVOTHYROXINE SODIUM PO Take 75 mcg by mouth daily 4/13/2017 at 0800 Yes Reported, Patient   ipratropium - albuterol 0.5 mg/2.5 mg/3 mL (DUONEB) 0.5-2.5 (3) MG/3ML neb solution Take 1 vial by nebulization every 4 hours as needed for shortness of breath / dyspnea or wheezing 4/13/2017 at 0800 Yes Reported, Patient   Cyclobenzaprine HCl (FLEXERIL PO) Take 10 mg by mouth 3 times daily as needed for muscle spasms 4/10/2017 at 2210 Yes Reported, Patient   diclofenac (VOLTAREN) 1 % GEL topical gel Place 2 g onto the skin 3 times daily as needed for moderate pain Apply to R hand 4/6/2017 at prn Yes Reported, Patient   OLANZapine zydis (ZYPREXA) 5 MG ODT tab Take 1 tablet (5 mg) by mouth At Bedtime 4/12/2017 at 2000 Yes Jaret Wylie MD   enoxaparin (LOVENOX) 40 MG/0.4ML injection Inject 0.4 mLs (40 mg) Subcutaneous every 24 hours 4/11/2017 at 0745 Yes Yessy Whalen PA-C   SUMATRIPTAN SUCCINATE PO Take 25 mg by mouth  every 8 hours as needed for migraine more than a week at prn Yes Unknown, Entered By History   Travoprost (TRAVATAN Z) 0.004 % SOLN Place 1 drop into both eyes At Bedtime  4/12/2017 at 2000 Yes Reported, Patient   ferrous gluconate (FERGON) 324 (38 FE) MG tablet Take 1 tablet by mouth daily (with lunch). 4/12/2017 at 1200 Yes Reported, Patient   B-Complex TABS Take 1 tablet by mouth daily. 4/13/2017 at 0800 Yes Reported, Patient

## 2017-04-14 PROBLEM — C18.9 COLON CANCER (H): Status: ACTIVE | Noted: 2017-04-14

## 2017-04-14 PROBLEM — J00 ACUTE NASOPHARYNGITIS: Status: ACTIVE | Noted: 2017-04-14

## 2017-04-14 PROBLEM — J06.9 URI WITH COUGH AND CONGESTION: Status: ACTIVE | Noted: 2017-04-14

## 2017-04-14 LAB
ALBUMIN SERPL-MCNC: 2.6 G/DL (ref 3.4–5)
ALP SERPL-CCNC: 74 U/L (ref 40–150)
ALT SERPL W P-5'-P-CCNC: 14 U/L (ref 0–50)
ANION GAP SERPL CALCULATED.3IONS-SCNC: 9 MMOL/L (ref 3–14)
AST SERPL W P-5'-P-CCNC: 27 U/L (ref 0–45)
BILIRUB SERPL-MCNC: 0.2 MG/DL (ref 0.2–1.3)
BUN SERPL-MCNC: 19 MG/DL (ref 7–30)
CALCIUM SERPL-MCNC: 7.4 MG/DL (ref 8.5–10.1)
CHLORIDE SERPL-SCNC: 108 MMOL/L (ref 94–109)
CO2 SERPL-SCNC: 22 MMOL/L (ref 20–32)
CREAT SERPL-MCNC: 0.7 MG/DL (ref 0.52–1.04)
ERYTHROCYTE [DISTWIDTH] IN BLOOD BY AUTOMATED COUNT: 18.5 % (ref 10–15)
GFR SERPL CREATININE-BSD FRML MDRD: 81 ML/MIN/1.7M2
GLUCOSE BLDC GLUCOMTR-MCNC: 105 MG/DL (ref 70–99)
GLUCOSE BLDC GLUCOMTR-MCNC: 111 MG/DL (ref 70–99)
GLUCOSE BLDC GLUCOMTR-MCNC: 112 MG/DL (ref 70–99)
GLUCOSE BLDC GLUCOMTR-MCNC: 96 MG/DL (ref 70–99)
GLUCOSE SERPL-MCNC: 149 MG/DL (ref 70–99)
HCT VFR BLD AUTO: 29.5 % (ref 35–47)
HGB BLD-MCNC: 8.8 G/DL (ref 11.7–15.7)
INR PPP: 0.97 (ref 0.86–1.14)
MCH RBC QN AUTO: 25.4 PG (ref 26.5–33)
MCHC RBC AUTO-ENTMCNC: 29.8 G/DL (ref 31.5–36.5)
MCV RBC AUTO: 85 FL (ref 78–100)
PLATELET # BLD AUTO: 166 10E9/L (ref 150–450)
POTASSIUM SERPL-SCNC: 4.4 MMOL/L (ref 3.4–5.3)
PROT SERPL-MCNC: 5.4 G/DL (ref 6.8–8.8)
RBC # BLD AUTO: 3.46 10E12/L (ref 3.8–5.2)
SODIUM SERPL-SCNC: 139 MMOL/L (ref 133–144)
WBC # BLD AUTO: 8.8 10E9/L (ref 4–11)

## 2017-04-14 PROCEDURE — 94003 VENT MGMT INPAT SUBQ DAY: CPT

## 2017-04-14 PROCEDURE — 36415 COLL VENOUS BLD VENIPUNCTURE: CPT | Performed by: INTERNAL MEDICINE

## 2017-04-14 PROCEDURE — 99207 ZZC NO BILLABLE SERVICE THIS VISIT: CPT | Performed by: INTERNAL MEDICINE

## 2017-04-14 PROCEDURE — 20000003 ZZH R&B ICU

## 2017-04-14 PROCEDURE — 00000146 ZZHCL STATISTIC GLUCOSE BY METER IP

## 2017-04-14 PROCEDURE — 40000275 ZZH STATISTIC RCP TIME EA 10 MIN

## 2017-04-14 PROCEDURE — 86255 FLUORESCENT ANTIBODY SCREEN: CPT | Performed by: INTERNAL MEDICINE

## 2017-04-14 PROCEDURE — 85027 COMPLETE CBC AUTOMATED: CPT | Performed by: INTERNAL MEDICINE

## 2017-04-14 PROCEDURE — 25000128 H RX IP 250 OP 636: Performed by: SURGERY

## 2017-04-14 PROCEDURE — 25000125 ZZHC RX 250: Performed by: INTERNAL MEDICINE

## 2017-04-14 PROCEDURE — 85610 PROTHROMBIN TIME: CPT | Performed by: INTERNAL MEDICINE

## 2017-04-14 PROCEDURE — 25000125 ZZHC RX 250: Performed by: SURGERY

## 2017-04-14 PROCEDURE — 25000132 ZZH RX MED GY IP 250 OP 250 PS 637: Mod: GY | Performed by: INTERNAL MEDICINE

## 2017-04-14 PROCEDURE — 25000128 H RX IP 250 OP 636: Performed by: INTERNAL MEDICINE

## 2017-04-14 PROCEDURE — 25000132 ZZH RX MED GY IP 250 OP 250 PS 637: Mod: GY | Performed by: SURGERY

## 2017-04-14 PROCEDURE — A9270 NON-COVERED ITEM OR SERVICE: HCPCS | Mod: GY | Performed by: INTERNAL MEDICINE

## 2017-04-14 PROCEDURE — 25800025 ZZH RX 258: Performed by: SURGERY

## 2017-04-14 PROCEDURE — 80053 COMPREHEN METABOLIC PANEL: CPT | Performed by: INTERNAL MEDICINE

## 2017-04-14 PROCEDURE — A9270 NON-COVERED ITEM OR SERVICE: HCPCS | Mod: GY | Performed by: SURGERY

## 2017-04-14 PROCEDURE — 40000008 ZZH STATISTIC AIRWAY CARE

## 2017-04-14 RX ORDER — IPRATROPIUM BROMIDE AND ALBUTEROL SULFATE 2.5; .5 MG/3ML; MG/3ML
1 SOLUTION RESPIRATORY (INHALATION) EVERY 4 HOURS PRN
Status: DISCONTINUED | OUTPATIENT
Start: 2017-04-14 | End: 2017-04-20 | Stop reason: HOSPADM

## 2017-04-14 RX ORDER — LEVOTHYROXINE SODIUM 75 UG/1
75 TABLET ORAL DAILY
Status: DISCONTINUED | OUTPATIENT
Start: 2017-04-14 | End: 2017-04-14

## 2017-04-14 RX ORDER — SUMATRIPTAN 25 MG/1
25 TABLET, FILM COATED ORAL EVERY 8 HOURS PRN
Status: DISCONTINUED | OUTPATIENT
Start: 2017-04-14 | End: 2017-04-20 | Stop reason: HOSPADM

## 2017-04-14 RX ORDER — LIDOCAINE 40 MG/G
CREAM TOPICAL
Status: DISCONTINUED | OUTPATIENT
Start: 2017-04-14 | End: 2017-04-20 | Stop reason: HOSPADM

## 2017-04-14 RX ORDER — NALOXONE HYDROCHLORIDE 0.4 MG/ML
.1-.4 INJECTION, SOLUTION INTRAMUSCULAR; INTRAVENOUS; SUBCUTANEOUS
Status: DISCONTINUED | OUTPATIENT
Start: 2017-04-14 | End: 2017-04-14

## 2017-04-14 RX ORDER — ONDANSETRON 2 MG/ML
4 INJECTION INTRAMUSCULAR; INTRAVENOUS EVERY 6 HOURS PRN
Status: DISCONTINUED | OUTPATIENT
Start: 2017-04-14 | End: 2017-04-14

## 2017-04-14 RX ORDER — LEVOTHYROXINE SODIUM ANHYDROUS 100 UG/5ML
37.5 INJECTION, POWDER, LYOPHILIZED, FOR SOLUTION INTRAVENOUS DAILY
Status: DISCONTINUED | OUTPATIENT
Start: 2017-04-16 | End: 2017-04-16

## 2017-04-14 RX ORDER — CIPROFLOXACIN 2 MG/ML
400 INJECTION, SOLUTION INTRAVENOUS EVERY 12 HOURS
Status: COMPLETED | OUTPATIENT
Start: 2017-04-14 | End: 2017-04-14

## 2017-04-14 RX ORDER — ACETAMINOPHEN 10 MG/ML
1000 INJECTION, SOLUTION INTRAVENOUS EVERY 6 HOURS
Status: DISCONTINUED | OUTPATIENT
Start: 2017-04-14 | End: 2017-04-16

## 2017-04-14 RX ORDER — TRAVOPROST OPHTHALMIC SOLUTION 0.04 MG/ML
1 SOLUTION OPHTHALMIC AT BEDTIME
Status: DISCONTINUED | OUTPATIENT
Start: 2017-04-14 | End: 2017-04-20 | Stop reason: HOSPADM

## 2017-04-14 RX ORDER — OLANZAPINE 5 MG/1
5 TABLET, ORALLY DISINTEGRATING ORAL AT BEDTIME
Status: DISCONTINUED | OUTPATIENT
Start: 2017-04-14 | End: 2017-04-20 | Stop reason: HOSPADM

## 2017-04-14 RX ORDER — SODIUM CHLORIDE, SODIUM LACTATE, POTASSIUM CHLORIDE, CALCIUM CHLORIDE 600; 310; 30; 20 MG/100ML; MG/100ML; MG/100ML; MG/100ML
INJECTION, SOLUTION INTRAVENOUS CONTINUOUS
Status: DISCONTINUED | OUTPATIENT
Start: 2017-04-14 | End: 2017-04-16

## 2017-04-14 RX ADMIN — TRAVOPROST 1 DROP: 0.04 SOLUTION/ DROPS OPHTHALMIC at 02:42

## 2017-04-14 RX ADMIN — ACETAMINOPHEN 1000 MG: 10 INJECTION, SOLUTION INTRAVENOUS at 03:48

## 2017-04-14 RX ADMIN — PROPOFOL 35 MCG/KG/MIN: 10 INJECTION, EMULSION INTRAVENOUS at 10:48

## 2017-04-14 RX ADMIN — ACETAMINOPHEN 1000 MG: 10 INJECTION, SOLUTION INTRAVENOUS at 21:24

## 2017-04-14 RX ADMIN — ACETAMINOPHEN 1000 MG: 10 INJECTION, SOLUTION INTRAVENOUS at 09:05

## 2017-04-14 RX ADMIN — METRONIDAZOLE 500 MG: 500 INJECTION, SOLUTION INTRAVENOUS at 20:13

## 2017-04-14 RX ADMIN — METRONIDAZOLE 500 MG: 500 INJECTION, SOLUTION INTRAVENOUS at 03:58

## 2017-04-14 RX ADMIN — CIPROFLOXACIN 400 MG: 2 INJECTION, SOLUTION INTRAVENOUS at 14:41

## 2017-04-14 RX ADMIN — CIPROFLOXACIN 400 MG: 2 INJECTION, SOLUTION INTRAVENOUS at 02:32

## 2017-04-14 RX ADMIN — PANTOPRAZOLE SODIUM 40 MG: 40 INJECTION, POWDER, FOR SOLUTION INTRAVENOUS at 09:05

## 2017-04-14 RX ADMIN — CHLORHEXIDINE GLUCONATE 15 ML: 1.2 RINSE ORAL at 07:23

## 2017-04-14 RX ADMIN — HYDROMORPHONE HYDROCHLORIDE 0.5 MG: 1 INJECTION, SOLUTION INTRAMUSCULAR; INTRAVENOUS; SUBCUTANEOUS at 19:09

## 2017-04-14 RX ADMIN — HYDROMORPHONE HYDROCHLORIDE 0.5 MG: 1 INJECTION, SOLUTION INTRAMUSCULAR; INTRAVENOUS; SUBCUTANEOUS at 12:26

## 2017-04-14 RX ADMIN — CHLORHEXIDINE GLUCONATE 15 ML: 1.2 RINSE ORAL at 00:18

## 2017-04-14 RX ADMIN — METRONIDAZOLE 500 MG: 500 INJECTION, SOLUTION INTRAVENOUS at 12:14

## 2017-04-14 RX ADMIN — ACETAMINOPHEN 1000 MG: 10 INJECTION, SOLUTION INTRAVENOUS at 14:43

## 2017-04-14 RX ADMIN — TRAVOPROST 1 DROP: 0.04 SOLUTION/ DROPS OPHTHALMIC at 21:49

## 2017-04-14 RX ADMIN — OLANZAPINE 5 MG: 5 TABLET, ORALLY DISINTEGRATING ORAL at 21:24

## 2017-04-14 RX ADMIN — PROPOFOL 35 MCG/KG/MIN: 10 INJECTION, EMULSION INTRAVENOUS at 03:49

## 2017-04-14 RX ADMIN — SODIUM CHLORIDE, POTASSIUM CHLORIDE, SODIUM LACTATE AND CALCIUM CHLORIDE: 600; 310; 30; 20 INJECTION, SOLUTION INTRAVENOUS at 12:14

## 2017-04-14 RX ADMIN — PROPOFOL 30 MCG/KG/MIN: 10 INJECTION, EMULSION INTRAVENOUS at 00:16

## 2017-04-14 RX ADMIN — SODIUM CHLORIDE, POTASSIUM CHLORIDE, SODIUM LACTATE AND CALCIUM CHLORIDE: 600; 310; 30; 20 INJECTION, SOLUTION INTRAVENOUS at 02:32

## 2017-04-14 NOTE — PLAN OF CARE
Problem: Goal Outcome Summary  Goal: Goal Outcome Summary  Outcome: Improving  Patient to ICU from PACU d/t inability to wean off ventilator. Stable on current ventilator settings, frequently overbreathing at 18 bpm. Hemodynamically stable. Sedated on 35 mcg/kg/min Propofol. Urine output sluggish upon transfer to ICU, but picking up towards morning. BS faint. Abdominal incision intact. Likely to wean sedation and attempt extubation today.

## 2017-04-14 NOTE — PHARMACY
Alvimopan (Entereg) Pharmacy Consult    Pharmacy has been consulted to review this patient and determine if they are an appropriate candidate for post-operative use of alvimopan.    Current approved uses for alvimopan include:  laproscopic, open, or converted partial bowel resection.    The following criteria must be met to be a post-operative candidate for alvimopan:    Must have received pre-operative dose of alvimopan    Surgery performed was an open, laproscopic, or converted partial bowel resection with primary anastomisis.    Patient must not have been on chronic narcotics before surgery  o Alvimopan contraindicated if patient has received therapeutic doses of opioids for >7 consecutive days before surgery  o Alvimopan not recommended if patient has received >3 opioid doses in the last 7 days before surgery (increase the risk of patient developing significant GI side effects)    No history of myocardial infarction    No bowel obstruction present (or recent surgery for bowel obstruction)    No End-stage renal disease present    No Severe hepatic impairment present    Patient did not have a pancreatic or gastric anastomosis    Ordering provider has received alvimopan educational materials    This patient does not meet the criteria for appropriate use of alvimopan.    This patient has the following criteria present, which would not make them an appropriate candidate for alvimopan:   1) Did not receive pre-op and 2) pt is intubated, and capsule may not be crushed or opened.  No way of oral access at this point.   The provider has been informed about this information.  Note: Alvimopan is a hard gelatin capsule which may not be crushed or opened    Thank you,  Polly Stanley, JetD

## 2017-04-14 NOTE — PROGRESS NOTES
[facility] ICU RESPIRATORY NOTE  Date of Admission:04/13/17  Date of Intubation (most recent):04/13/17  Reason for Mechanical Ventilation:post procedure  Number of Days on Mechanical Ventilation: 2  Reason for No Pressure Support Trial: resting for the night  Ventilation Mode: CMV/AC  FiO2 (%): 50 %  Rate Set (breaths/minute): 12 breaths/min  Tidal Volume Set (mL): 400 mL  PEEP (cm H2O): 5 cmH2O  Oxygen Concentration (%): 50 %  Peak Inspiratory Pressure (cm H2O) (Drager Zenobia): 14  Resp: 19      Significant Events Today: none during this shift.    ABG Results: no ABG    ETT appearance on chest x-ray: good position    Plan:  RT will assess for daily PS trial in the morning.  4/14/2017  Latanya Harris

## 2017-04-14 NOTE — PROGRESS NOTES
Patient extubated at 1218 after sedation weaned to off and tolerated vent wean. Aerosol mask 50 %, then to nasal canula 4 LPM. Patient alert and oriented x 4, calm and cooperative. Vital signs stable. Patient's POA and one friend at bedside for visit and update. Pain management plan effective.

## 2017-04-14 NOTE — PROGRESS NOTES
Patient accepted from PACU due to inability to extubate as she wasn't reliably breathing adequately at a rate that would prevent respiratory acidosis. The cause is likely simply due to postanesthetic effect. We will keep her sedated overnight with plan to give sedation holiday and spontaneous breathing trial in the AM.    Operative course uneventful for colectomy due to colon cancer. Previously had an ileostomy in February. Of note, she has had an ill defined psychosis since that hospitalization. She had been living independently prior to February. 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 due to overuse as she can't really use L UE    Temp: 97.2  F (36.2  C)   BP: 125/76   Heart Rate: 84 Resp: 15 SpO2: 100 % O2 Device: Mechanical Ventilator      Lab Results   Component Value Date    WBC 9.6 04/13/2017    HGB 9.4 (L) 04/13/2017    HCT 31.3 (L) 04/13/2017     04/13/2017     04/11/2017    POTASSIUM 4.3 04/13/2017    CHLORIDE 103 04/11/2017    CO2 28 04/11/2017    BUN 17 04/11/2017    CR 0.72 04/13/2017    GLC 82 04/11/2017    AST 25 03/29/2017    ALT 19 03/29/2017    ALKPHOS 107 03/29/2017    BILITOTAL 0.2 03/29/2017    INR 0.97 04/13/2017     EXAM:  Constitutional: sedated, resting comfortably   Cardiovascular: No edema or JVD., negative findings: regular rate and rhythm  Respiratory: negative, Percussion normal. Good diaphragmatic excursion. Lungs clear, negative findings: no chest deformities noted, lungs clear to auscultation  Abdomen: negative findings: non distended, positive findings: dressing clean and dry  Extremities: warm, well perfused

## 2017-04-14 NOTE — OR NURSING
Dr Figueredo made decision to keep pt intubated and transfer to ICU as pt was failing when Vent settings were weaned.  Pt's friend Dinora called and updated on status and plan.

## 2017-04-14 NOTE — PROGRESS NOTES
"Colon and Rectal Surgery   POD #1  Patient complains of mild pain. Extubated earlier today. Alert and oriented. Denies nausea  /63  Temp 98.3  F (36.8  C) (Oral)  Resp 22  Ht 1.676 m (5' 6\")  Wt 52.4 kg (115 lb 8.3 oz)  SpO2 100%  BMI 18.65 kg/m2    Intake/Output Summary (Last 24 hours) at 04/14/17 1759  Last data filed at 04/14/17 1700   Gross per 24 hour   Intake          2670.21 ml   Output             1015 ml   Net          1655.21 ml     Alert and oriented.  No icterus  Abdomen: soft. Dressings dry.   Legs: mild edema.     CBC RESULTS:   Recent Labs   Lab Test  04/14/17   0500   WBC  8.8   RBC  3.46*   HGB  8.8*   HCT  29.5*   MCV  85   MCH  25.4*   MCHC  29.8*   RDW  18.5*   PLT  166     Impression: Stable.  Discussed surgical findings and operative procedure.   Plan: Ice chips  Swallowing evaluation.   Monitor urine output and hemoglobin. No evidence of ongoing bleeding.   Wound care.   Ambulation.     Lindsay Peter MD  "

## 2017-04-14 NOTE — PROGRESS NOTES
Hospitalist consult received for postop medical management. Patient remains intubated post-procedure. In ICU. Intensivist assisting with management. Have deferred consult for now, please let us know once patient is extubated and we will be happy to follow along with you.      Dinorah James,   Internal Medicine - Hospitalist  4/14/2017  1:41 AM

## 2017-04-14 NOTE — PROGRESS NOTES
SPIRITUAL HEALTH SERVICES  Spiritual Assessment Progress Note  [facility] ICU   met with Dinorajovan Garrett, one of pt's friends who also is pt's HC POA.  Dinora processed the complexity of pt's health issues with added stress and complexity in pt's family dynamics.  Pt's niece (who I believe is an MD) shares the HC POA role with Dinora.  Nieces  is a trauma doctor.  Pt's sister Nat is involved as well and is pt's financial POA.  The dynamics between these family members has been tense - over power and roles.    Pt has many teacher friends are attentive to pt and supportive.      Pt is intubated so I am unable to interact with her at this time.        will continue to be available for support as needed.                                                                                                                                                  Heather Clement M.Div., [facility]  Staff    Pager [phone]

## 2017-04-14 NOTE — ANESTHESIA POSTPROCEDURE EVALUATION
Patient: Sherita Kenney    Procedure(s):  OPEN RIGHT COLECTOMY, TAKEDOWN OF ILEOSTOMY - Wound Class: I-Clean  EXPLORATION OF RIGHT URETEROLYSIS - Wound Class: II-Clean Contaminated   - Wound Class: II-Clean Contaminated    Diagnosis:COLON CANCER  Diagnosis Additional Information: No value filed.    Anesthesia Type:  General, ETT    Note:  Anesthesia Post Evaluation    Patient location during evaluation: PACU  Patient participation: Other/plan (See Comments)  Level of consciousness: awake  Pain management: adequate  Airway patency: Intubated and sedated.  Cardiovascular status: acceptable  Respiratory status: intubated and ventilator  Hydration status: stable  PONV: none     Anesthetic complications: None    Comments: Patient taken to PACU intubated and placed on a wakeup vent.  She had four twitches and was reversed.  In PACU, she remained very weak.  She would not over-breath the vent on low setting.  Given her acute psychoses, post-polio syndrome and that I did not get a chance to observe her pre-op, the decision was made to leave her intubated and take her to the ICU to give her more time to declare herself for possible extubation.      Jaycob Figueredo MD        Last vitals:  Vitals:    04/13/17 2105 04/13/17 2130 04/13/17 2200   BP:  (!) 81/65 101/65   Resp:  14 16   Temp:      SpO2: 100% 100% 100%         Electronically Signed By: Jaycob Figueredo MD  April 13, 2017  11:38 PM

## 2017-04-14 NOTE — OP NOTE
DATE OF SERVICE:  2017.      PROCEDURES:  Exploration of abdominal wound and operative site, identification of ureter and ureterolysis of right ureter.      INDICATIONS:  Please see my preoperative consultation.      SURGEONS:  Phoenix Mari MD and Lindsay Peter MD      DESCRIPTION OF PROCEDURE:  Sherita Swann, with the abdomen already opened, with the ascending colon dissected cephalad, I examined the area posterior to the colon, which was in fact to the retroperitoneum.  Careful identification of what may have been the ureter failed to reveal, however, any evidence of serpiginous movements.  I therefore using the Metzenbaum scissors was able to open the tissue overlying the psoas muscle and get right into extra retroperitoneal space and dissect up alongside the structure we had identified as possibly being the ureter.  Once I started dissecting the ureter cephalad and mobilizing this structure, then we began to identify serpiginous movements.  At that point, I was satisfied that we had satisfactorily identified the ureter and mobilized it and had been able to determine that it was behaving as a ureter should.  For that reason, I informed the surgeon, Dr. Peter, that I did not think we needed to do any further dissection and that we should not need to put a stent up unless she ran into any further problems during the case.  At that point, I withdrew from the procedure well and the remainder of the surgery will be dictated by Dr. Lindsay Peter.         PHOENIX MARI MD             D: 2017 17:04   T: 2017 09:15   MT: TS      Name:     SHERITA SWANN   MRN:      [id number]        Account:        [id number]   :      1940           Procedure Date: 2017      Document: [id number]       cc: Lindsay Mari MD

## 2017-04-14 NOTE — CONSULTS
"CLINICAL NUTRITION SERVICES  -  ASSESSMENT NOTE    Recommendations Ordered by Registered Dietitian (RD):   Advance diet as medically appropriate once extubated   Malnutrition: Pt does not meet criteria for diagnosing malnutrition at this time.      REASON FOR ASSESSMENT  Sherita Kenney is a 76 year old female seen by Registered Dietitian for Admission Nutrition Risk Screen - unintentional loss of 10# or more in the past 2 months (in the last 3-4 months) and Provider Order - Nutrition Education - Low residue diet teaching.     NUTRITION HISTORY  - Information obtained from EMR and POA - Dinora Garrett  - Pt had been admitted to [facility] Feb 2017-March 2017. RD following for TPN 2/2 SBO. TPN was d/c'd prior to admission with calorie counts meeting 70% energy and 99% protein needs.     Dinora reports that at [facility] \"appetite was good when the food was good\". She did not like the food at the TCU, so they took her out to eat often. She was following a low fiber diet for her ileostomy with good tolerance. Dinora also reports a gradual weight gain over the past few months.     CURRENT NUTRITION ORDERS  Diet Order:     NPO     Current Intake/Tolerance:  N/A      PHYSICAL FINDINGS  Observed  No nutrition-related physical findings observed  Obtained from Chart/Interdisciplinary Team  S/p right colectomy     ANTHROPOMETRICS  Height: 5' 6\"  Weight: 115 lbs 8.34 oz (52.4 kg)   Body mass index is 18.65 kg/(m^2).  Weight Status:  Normal BMI  IBW: 59 kg  % IBW: 90%  Weight History: weights over the past month have been stable.   Wt Readings from Last 10 Encounters:   04/14/17 52.4 kg (115 lb 8.3 oz)   04/11/17 51.8 kg (114 lb 1.6 oz)   04/04/17 52.7 kg (116 lb 3.2 oz)   04/04/17 52.1 kg (114 lb 12.8 oz)   03/30/17 51.9 kg (114 lb 6.4 oz)   03/29/17 51.7 kg (114 lb)   03/29/17 51.9 kg (114 lb 6.4 oz)   03/28/17 51.9 kg (114 lb 6.4 oz)   03/22/17 51.5 kg (113 lb 8 oz)   03/16/17 52.3 kg (115 lb 6.4 oz)       LABS  Labs " reviewed    MEDICATIONS  Medications reviewed  Propofol weaning - likely extubation today    Dosing Weight 52.4 kg - current    ASSESSED NUTRITION NEEDS (PER APPROVED PRACTICE GUIDELINES):  Estimated Energy Needs: 5464-2257 kcals (30-35 Kcal/Kg)  Justification: repletion  Estimated Protein Needs:  grams protein (1.2-1.5 g pro/Kg)  Justification: Repletion  Estimated Fluid Needs: (1 mL/Kcal)  Justification: per provider pending fluid status    MALNUTRITION:  % Weight Loss:  None noted  % Intake:  Decreased intake does not meet criteria for malnutrition   Subcutaneous Fat Loss:  None observed  Muscle Loss:  None observed  Fluid Retention:  None noted    Malnutrition Diagnosis: Patient does not meet two of the above criteria necessary for diagnosing malnutrition    NUTRITION DIAGNOSIS:  Inadequate oral intake related to inability for PO 2/2 intubation as evidenced by NPO diet order    NUTRITION INTERVENTIONS  Recommendations / Nutrition Prescription  Advance diet as medically appropriate once extubated  Supplements with diet advancement      Implementation  Assessed learning needs and learning preference    Nutrition Education (Content):  a) Discussed: low fiber diet recommendation and rationale    Nutrition Education (Application):  a) Dinora/pt decline education for now, as they had already been following at [facility].     Patient verbalizes understanding of diet    Anticipate good compliance    Diet Education - refer to Education Flowsheet  Collaboration and Referral of Nutrition care: pt discussed during interdisciplinary rounds.      Nutrition Goals  Pt to advance to diet in the next 24-48 hours.       MONITORING AND EVALUATION:  Progress towards goals will be monitored and evaluated per protocol and Practice Guidelines      Rosa Brewster, RD, LD

## 2017-04-15 ENCOUNTER — APPOINTMENT (OUTPATIENT)
Dept: SPEECH THERAPY | Facility: CLINIC | Age: 77
DRG: 329 | End: 2017-04-15
Attending: COLON & RECTAL SURGERY
Payer: MEDICARE

## 2017-04-15 LAB
ALBUMIN SERPL-MCNC: 2.3 G/DL (ref 3.4–5)
ALP SERPL-CCNC: 64 U/L (ref 40–150)
ALT SERPL W P-5'-P-CCNC: 12 U/L (ref 0–50)
ANION GAP SERPL CALCULATED.3IONS-SCNC: 7 MMOL/L (ref 3–14)
AST SERPL W P-5'-P-CCNC: 26 U/L (ref 0–45)
BILIRUB SERPL-MCNC: 0.3 MG/DL (ref 0.2–1.3)
BUN SERPL-MCNC: 8 MG/DL (ref 7–30)
CALCIUM SERPL-MCNC: 7.7 MG/DL (ref 8.5–10.1)
CHLORIDE SERPL-SCNC: 108 MMOL/L (ref 94–109)
CO2 SERPL-SCNC: 25 MMOL/L (ref 20–32)
CREAT SERPL-MCNC: 0.5 MG/DL (ref 0.52–1.04)
ERYTHROCYTE [DISTWIDTH] IN BLOOD BY AUTOMATED COUNT: 17.8 % (ref 10–15)
GFR SERPL CREATININE-BSD FRML MDRD: ABNORMAL ML/MIN/1.7M2
GLUCOSE BLDC GLUCOMTR-MCNC: 110 MG/DL (ref 70–99)
GLUCOSE BLDC GLUCOMTR-MCNC: 89 MG/DL (ref 70–99)
GLUCOSE BLDC GLUCOMTR-MCNC: 90 MG/DL (ref 70–99)
GLUCOSE BLDC GLUCOMTR-MCNC: 92 MG/DL (ref 70–99)
GLUCOSE SERPL-MCNC: 99 MG/DL (ref 70–99)
HCT VFR BLD AUTO: 24.8 % (ref 35–47)
HGB BLD-MCNC: 7.5 G/DL (ref 11.7–15.7)
INR PPP: 1.21 (ref 0.86–1.14)
MAGNESIUM SERPL-MCNC: 2 MG/DL (ref 1.6–2.3)
MCH RBC QN AUTO: 25.8 PG (ref 26.5–33)
MCHC RBC AUTO-ENTMCNC: 30.2 G/DL (ref 31.5–36.5)
MCV RBC AUTO: 85 FL (ref 78–100)
MRSA DNA SPEC QL NAA+PROBE: NORMAL
PHOSPHATE SERPL-MCNC: 1.7 MG/DL (ref 2.5–4.5)
PLATELET # BLD AUTO: 190 10E9/L (ref 150–450)
POTASSIUM SERPL-SCNC: 3.7 MMOL/L (ref 3.4–5.3)
PROT SERPL-MCNC: 4.9 G/DL (ref 6.8–8.8)
RBC # BLD AUTO: 2.91 10E12/L (ref 3.8–5.2)
SODIUM SERPL-SCNC: 140 MMOL/L (ref 133–144)
SPECIMEN SOURCE: NORMAL
WBC # BLD AUTO: 7.5 10E9/L (ref 4–11)

## 2017-04-15 PROCEDURE — 92610 EVALUATE SWALLOWING FUNCTION: CPT | Mod: GN | Performed by: SPEECH-LANGUAGE PATHOLOGIST

## 2017-04-15 PROCEDURE — 36415 COLL VENOUS BLD VENIPUNCTURE: CPT | Performed by: INTERNAL MEDICINE

## 2017-04-15 PROCEDURE — 87641 MR-STAPH DNA AMP PROBE: CPT | Performed by: INTERNAL MEDICINE

## 2017-04-15 PROCEDURE — A9270 NON-COVERED ITEM OR SERVICE: HCPCS | Mod: GY | Performed by: SURGERY

## 2017-04-15 PROCEDURE — 25000128 H RX IP 250 OP 636: Performed by: SURGERY

## 2017-04-15 PROCEDURE — 80053 COMPREHEN METABOLIC PANEL: CPT | Performed by: INTERNAL MEDICINE

## 2017-04-15 PROCEDURE — 85610 PROTHROMBIN TIME: CPT | Performed by: INTERNAL MEDICINE

## 2017-04-15 PROCEDURE — 20000003 ZZH R&B ICU

## 2017-04-15 PROCEDURE — 83735 ASSAY OF MAGNESIUM: CPT | Performed by: INTERNAL MEDICINE

## 2017-04-15 PROCEDURE — 25800025 ZZH RX 258: Performed by: SURGERY

## 2017-04-15 PROCEDURE — 25000125 ZZHC RX 250: Performed by: SURGERY

## 2017-04-15 PROCEDURE — 25000132 ZZH RX MED GY IP 250 OP 250 PS 637: Mod: GY | Performed by: SURGERY

## 2017-04-15 PROCEDURE — 25000128 H RX IP 250 OP 636: Performed by: INTERNAL MEDICINE

## 2017-04-15 PROCEDURE — 25000125 ZZHC RX 250: Performed by: INTERNAL MEDICINE

## 2017-04-15 PROCEDURE — 84100 ASSAY OF PHOSPHORUS: CPT | Performed by: INTERNAL MEDICINE

## 2017-04-15 PROCEDURE — 00000146 ZZHCL STATISTIC GLUCOSE BY METER IP

## 2017-04-15 PROCEDURE — 40000225 ZZH STATISTIC SLP WARD VISIT: Performed by: SPEECH-LANGUAGE PATHOLOGIST

## 2017-04-15 PROCEDURE — 87640 STAPH A DNA AMP PROBE: CPT | Performed by: INTERNAL MEDICINE

## 2017-04-15 PROCEDURE — 85027 COMPLETE CBC AUTOMATED: CPT | Performed by: INTERNAL MEDICINE

## 2017-04-15 RX ADMIN — TRAVOPROST 1 DROP: 0.04 SOLUTION/ DROPS OPHTHALMIC at 21:38

## 2017-04-15 RX ADMIN — ENOXAPARIN SODIUM 40 MG: 40 INJECTION SUBCUTANEOUS at 00:16

## 2017-04-15 RX ADMIN — SODIUM PHOSPHATE, MONOBASIC, MONOHYDRATE 20 MMOL: 276; 142 INJECTION, SOLUTION INTRAVENOUS at 18:23

## 2017-04-15 RX ADMIN — OLANZAPINE 5 MG: 5 TABLET, ORALLY DISINTEGRATING ORAL at 21:37

## 2017-04-15 RX ADMIN — HYDROMORPHONE HYDROCHLORIDE 0.2 MG: 10 INJECTION, SOLUTION INTRAMUSCULAR; INTRAVENOUS; SUBCUTANEOUS at 16:38

## 2017-04-15 RX ADMIN — ACETAMINOPHEN 1000 MG: 10 INJECTION, SOLUTION INTRAVENOUS at 03:08

## 2017-04-15 RX ADMIN — ACETAMINOPHEN 1000 MG: 10 INJECTION, SOLUTION INTRAVENOUS at 14:51

## 2017-04-15 RX ADMIN — PANTOPRAZOLE SODIUM 40 MG: 40 INJECTION, POWDER, FOR SOLUTION INTRAVENOUS at 08:43

## 2017-04-15 RX ADMIN — SODIUM CHLORIDE, POTASSIUM CHLORIDE, SODIUM LACTATE AND CALCIUM CHLORIDE: 600; 310; 30; 20 INJECTION, SOLUTION INTRAVENOUS at 00:16

## 2017-04-15 RX ADMIN — SODIUM CHLORIDE, POTASSIUM CHLORIDE, SODIUM LACTATE AND CALCIUM CHLORIDE: 600; 310; 30; 20 INJECTION, SOLUTION INTRAVENOUS at 10:23

## 2017-04-15 RX ADMIN — HYDROMORPHONE HYDROCHLORIDE 0.5 MG: 1 INJECTION, SOLUTION INTRAMUSCULAR; INTRAVENOUS; SUBCUTANEOUS at 13:45

## 2017-04-15 RX ADMIN — ACETAMINOPHEN 1000 MG: 10 INJECTION, SOLUTION INTRAVENOUS at 08:44

## 2017-04-15 RX ADMIN — ACETAMINOPHEN 1000 MG: 10 INJECTION, SOLUTION INTRAVENOUS at 21:37

## 2017-04-15 NOTE — PROGRESS NOTES
COLON & RECTAL SURGERY PROGRESS NOTE    April 15, 2017  Post-op Day # 2    SUBJECTIVE:  No acute events overnight.  Tolerating a . No nausea, vomiting or emesis.       OBJECTIVE:  Temp:  [97.5  F (36.4  C)-98.7  F (37.1  C)] 98.4  F (36.9  C)  Heart Rate:  [] 95  Resp:  [14-31] 23  BP: ()/() 135/67  FiO2 (%):  [50 %] 50 %  SpO2:  [94 %-100 %] 96 %    Intake/Output Summary (Last 24 hours) at 04/15/17 1245  Last data filed at 04/15/17 1200   Gross per 24 hour   Intake             2900 ml   Output             1510 ml   Net             1390 ml       GENERAL:  Awake, alert, no acute distress.  ABDOMEN:  Soft, NT, non-distended, Incisions c/d/i.    LABS:  Lab Results   Component Value Date    WBC 7.5 04/15/2017     Lab Results   Component Value Date    HGB 7.5 04/15/2017     Lab Results   Component Value Date    HCT 24.8 04/15/2017     Lab Results   Component Value Date     04/15/2017     Last Basic Metabolic Panel:  Lab Results   Component Value Date     04/15/2017      Lab Results   Component Value Date    POTASSIUM 3.7 04/15/2017     Lab Results   Component Value Date    CHLORIDE 108 04/15/2017     Lab Results   Component Value Date    SARITHA 7.7 04/15/2017     Lab Results   Component Value Date    CO2 25 04/15/2017     Lab Results   Component Value Date    BUN 8 04/15/2017     Lab Results   Component Value Date    CR 0.50 04/15/2017     Lab Results   Component Value Date    GLC 99 04/15/2017       ASSESSMENT/PLAN:  76 year old female s/p ostomy reversal overall recovering well. PAssed swallow exam. Advanced to clears. Would await further return of bowel function  BID dressing changes.  Out of bed to chair   Lovenox for dvt ppx. Will need 30 days of lovenox at discharge.  Path pending  Barbra Mathews MD  Colon And Rectal Surgery Fellow  [phone]    4/15/2017  12:53 PM      Reviewed with attending staff on call Dr. Waterman

## 2017-04-15 NOTE — PROGRESS NOTES
Patient slightly confused this morning, became very alert and oriented throughout this shift. Vital signs stable. Incision clean, dry and intact. Pain management plan effective. Up to chair, tolerating well.  Patient's sister at bedside for visit.

## 2017-04-15 NOTE — PROGRESS NOTES
04/15/17 1114   General Information   Onset Date 04/13/17   Start of Care Date 04/15/17   Referring Physician Lindsay Peter   Patient Profile Review/OT: Additional Occupational Profile Info See Profile for full history and prior level of function   Patient/Family Goals Statement No specific goal was stated.  Patient agreed to POC goal.   Swallowing Evaluation Bedside swallow evaluation   Behaviorial Observations Alert   Mode of current nutrition NPO   Respiratory Status O2 Supply   Type of O2 supply Nasal cannula  (2L - stable)   Comments Per MD note: 4/13 open right hemicolectomy for adenocarcinoma with DLI takedown, extubated 4/14.  No pain with swallows.  Patient has been tolerating ice chips without signs of aspiration per report.   Clinical Swallow Evaluation   Oral Musculature generally intact  (min asymmetry)   Dentition present and adequate  (missing a couple teeth)   Laryngeal Function Cough;Throat clear;Swallow;Voicing initiated;Dry swallow palpated  (min decreased elevation, hoarse voice)   Clinical Swallow Eval: Thin Liquid Texture Trial   Mode of Presentation, Thin Liquids cup   Volume of Liquid or Food Presented sips x 10   Oral Phase of Swallow WFL   Pharyngeal Phase of Swallow (min decreased elevation)   Diagnostic Statement double swallows at times, no overt signs of aspiration after swallows   Swallow Eval: Clinical Impressions   Skilled Criteria for Therapy Intervention Skilled criteria met.  Treatment indicated.   Functional Assessment Scale (FAS) 5   Treatment Diagnosis mild oral-pharyngeal dysphagia   Diet texture recommendations Clear liquid   Recommended Feeding/Eating Techniques (see below)   Therapy Frequency 5 times/wk   Predicted Duration of Therapy Intervention (days/wks) 1 week   Anticipated Discharge Disposition (to be determined)   Risks and Benefits of Treatment have been explained. Yes   Patient, family and/or staff in agreement with Plan of Care Yes   Clinical Impression Comments  Patient presents with mild oral-pharyngeal dysphagia at bedside.  Minimal decreased laryngeal elevation and need for double swallows were noted with clear liquid trials.  No overt signs of aspiration were observed after swallows.  Recommend a clear liquid diet with the following precautions: sit up at 90 degrees, no straws, small sips.  Hold po if aspiration signs are observed/respiratory status declines.  Plan to provide swallow Tx to insure diet tolerance, train strategies, and trial puree to solids if ok per GI MD.     Total Evaluation Time   Total Evaluation Time (Minutes) 15

## 2017-04-15 NOTE — PLAN OF CARE
Problem: Goal Outcome Summary  Goal: Goal Outcome Summary     SLP - A bedside swallow evaluation was completed this am.  Patient presents with mild oral-pharyngeal dysphagia at bedside. Minimal decreased laryngeal elevation and need for double swallows were noted with clear liquid trials. No overt signs of aspiration were observed after swallows. Recommend a clear liquid diet with the following precautions: sit up at 90 degrees, no straws, small sips. Hold po if aspiration signs are observed/respiratory status declines. Plan to provide swallow Tx to insure diet tolerance, train strategies, and trial puree to solids if ok per GI MD.  Full discharge needs to be determined.

## 2017-04-15 NOTE — PROGRESS NOTES
[facility] Intensive Care Progress Note    Date of Service (when I saw the patient): 04/14/2017     Assessment & Plan   Sherita Kenney is a 76 year old female who was admitted on 4/13/2017. She was intubated overnight postoperatively as she was too sedated from her anesthesia to be safely extubated in PACU.    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 Has had an ill defined psychosis for two months, which might possibly be a paraneoplastic phenomenon  -follow neuro status, paraneoplastic panel sent.    Cardiovascular:  Stable: no issues    Pulmonary:        Extubated: no pulmonary issues. No O2 needs    Ventilation Mode: PS  FiO2 (%): 50 %  Rate Set (breaths/minute): 12 breaths/min  Tidal Volume Set (mL): 400 mL  PEEP (cm H2O): 5 cmH2O  Pressure Support (cm H2O): 5 cmH2O  Oxygen Concentration (%): 40 %  Peak Inspiratory Pressure (cm H2O) (Drager Zenobia): 14  Resp: 23     Now extubated    Renal  No issues: avoid nephrotoxic agents, maintain adequate urine output    ID:         No issues: received perioperative antibiotics.   -    GI  S/p hemicolectomy: management per colorectal surgery      Nutrition  NPO: wait for return of bowel sounds  before starting clear liquids  -    Endocrine:  History of thyroiditis, hypothyroid: On IV replacement  -    Heme/Onc:  Colon cancer: s/p ileostomy and colectomy  -further management per oncology    DVT Prophylaxis: Enoxaprain (Lovenox) SQ  GI Prophylaxis: PPI    Restraints: Restraints for medical healing needed: NO    Family update by me today: Yes    Dao Delacruz    Time Spent on this Encounter   Billing:  I spent 45 minutes bedside and on the inpatient unit today managing the critical care of Sherita Kenney in relation to the issues listed in this note.    Main Plans for Today   Extubate    Interval History   No issues overnight    Physical Exam   Temp: 98.7  F (37.1  C) Temp src: Oral Temp  Min: 97.2  F (36.2  C)  Max: 98.7  F (37.1  C) BP: 114/63   Heart Rate: 95 Resp: 23 SpO2: 100 % O2 Device: Nasal cannula Oxygen Delivery: 2 LPM  Vitals:    04/13/17 1219 04/14/17 0000   Weight: 59 kg (130 lb) 52.4 kg (115 lb 8.3 oz)     I/O last 3 completed shifts:  In: 4670.21 [I.V.:4170.21]  Out: 1145 [Urine:845; Blood:300]    Neurologic: Awake, cooperative  Cardiovascular: RRR, no murmur  Respiratory: Lungs clear  GI: abdomen soft, dressing dry  Skin/Extremities: well perfused  Lines: No erythema or discharge at entry site for No invasive lines  Current lines are required for patient management    Medications     lactated ringers 100 mL/hr at 04/14/17 1214       sodium chloride (PF)  3 mL Intracatheter Q8H     [START ON 4/15/2017] enoxaparin  40 mg Subcutaneous Q24H     metroNIDAZOLE  500 mg Intravenous Q8H     HYDROmorphone   Intravenous PCA     acetaminophen  1,000 mg Intravenous Q6H     pantoprazole  40 mg Intravenous Q24H     OLANZapine zydis  5 mg Oral At Bedtime     travoprost (FARHAT Free)  1 drop Both Eyes At Bedtime     [START ON 4/16/2017] levothyroxine  37.5 mcg Intravenous Daily       Data     Recent Labs  Lab 04/14/17  0500 04/13/17  1910 04/13/17  1227 04/11/17  0500   WBC 8.8 9.6  --  5.1   HGB  8.8* 9.4* 12.0 10.0*   MCV 85 86  --  85    208  --  205   INR 0.97  --  0.97  --      --   --  139   POTASSIUM 4.4  --  4.3 3.8   CHLORIDE 108  --   --  103   CO2 22  --   --  28   BUN 19  --   --  17   CR 0.70  --  0.72 0.70   ANIONGAP 9  --   --  8   SARITHA 7.4*  --   --  8.5   *  --   --  82   ALBUMIN 2.6*  --   --   --    PROTTOTAL 5.4*  --   --   --    BILITOTAL 0.2  --   --   --    ALKPHOS 74  --   --   --    ALT 14  --   --   --    AST 27  --   --   --      Recent Results (from the past 24 hour(s))   XR Chest Port 1 View    Narrative    CHEST PORTABLE ONE VIEW  4/13/2017 9:53 PM     HISTORY: Endotracheal tube positioning.    COMPARISON: 2/15/2017.      Impression    IMPRESSION: Interval placement of an endotracheal tube with its tip 3  cm above the ayde. Interval development of minimal platelike  atelectasis in the lung bases bilaterally. Otherwise no active  cardiopulmonary disease or change since the prior exam.       ROBIN ALFARO MD

## 2017-04-15 NOTE — PROVIDER NOTIFICATION
Paged colo rectal resident for transfer orders, awaiting call back. Discussed with ICU MD, awaiting confirmation with surgery if patient can transfer.

## 2017-04-15 NOTE — PLAN OF CARE
Problem: Goal Outcome Summary  Goal: Goal Outcome Summary  Outcome: Therapy, progress toward functional goals as expected  Neuro: Alert and oriented x4, calm and cooperative and pleasant, does seen mildly forgetful at times.   Resp: Stable on NC  CV: Stable  GI: Tolerating clear liquid  : Good UO  Pain controlled after Dilaudid PCA started. Incision c/d/i. Phosphorus replaced. Ok to transfer from ICU MD standpoint, awaiting orders from surgeon.

## 2017-04-16 ENCOUNTER — APPOINTMENT (OUTPATIENT)
Dept: SPEECH THERAPY | Facility: CLINIC | Age: 77
DRG: 329 | End: 2017-04-16
Attending: COLON & RECTAL SURGERY
Payer: MEDICARE

## 2017-04-16 LAB
ALBUMIN SERPL-MCNC: 2.1 G/DL (ref 3.4–5)
ALP SERPL-CCNC: 70 U/L (ref 40–150)
ALT SERPL W P-5'-P-CCNC: 13 U/L (ref 0–50)
ANION GAP SERPL CALCULATED.3IONS-SCNC: 8 MMOL/L (ref 3–14)
AST SERPL W P-5'-P-CCNC: 23 U/L (ref 0–45)
BILIRUB SERPL-MCNC: 0.4 MG/DL (ref 0.2–1.3)
BUN SERPL-MCNC: 6 MG/DL (ref 7–30)
CALCIUM SERPL-MCNC: 7.6 MG/DL (ref 8.5–10.1)
CHLORIDE SERPL-SCNC: 105 MMOL/L (ref 94–109)
CO2 SERPL-SCNC: 26 MMOL/L (ref 20–32)
CREAT SERPL-MCNC: 0.55 MG/DL (ref 0.52–1.04)
ERYTHROCYTE [DISTWIDTH] IN BLOOD BY AUTOMATED COUNT: 17.5 % (ref 10–15)
GFR SERPL CREATININE-BSD FRML MDRD: ABNORMAL ML/MIN/1.7M2
GLUCOSE SERPL-MCNC: 94 MG/DL (ref 70–99)
HCT VFR BLD AUTO: 25 % (ref 35–47)
HGB BLD-MCNC: 7.7 G/DL (ref 11.7–15.7)
HGB BLD-MCNC: 9.5 G/DL (ref 11.7–15.7)
INR PPP: 1.14 (ref 0.86–1.14)
MCH RBC QN AUTO: 26.1 PG (ref 26.5–33)
MCHC RBC AUTO-ENTMCNC: 30.8 G/DL (ref 31.5–36.5)
MCV RBC AUTO: 85 FL (ref 78–100)
PHOSPHATE SERPL-MCNC: 2.3 MG/DL (ref 2.5–4.5)
PLATELET # BLD AUTO: 224 10E9/L (ref 150–450)
POTASSIUM SERPL-SCNC: 3.2 MMOL/L (ref 3.4–5.3)
POTASSIUM SERPL-SCNC: 3.6 MMOL/L (ref 3.4–5.3)
PROT SERPL-MCNC: 4.8 G/DL (ref 6.8–8.8)
RBC # BLD AUTO: 2.95 10E12/L (ref 3.8–5.2)
SODIUM SERPL-SCNC: 139 MMOL/L (ref 133–144)
WBC # BLD AUTO: 8.3 10E9/L (ref 4–11)

## 2017-04-16 PROCEDURE — A9270 NON-COVERED ITEM OR SERVICE: HCPCS | Mod: GY | Performed by: SURGERY

## 2017-04-16 PROCEDURE — 25000125 ZZHC RX 250: Performed by: SURGERY

## 2017-04-16 PROCEDURE — 80053 COMPREHEN METABOLIC PANEL: CPT | Performed by: INTERNAL MEDICINE

## 2017-04-16 PROCEDURE — 36415 COLL VENOUS BLD VENIPUNCTURE: CPT | Performed by: SURGERY

## 2017-04-16 PROCEDURE — 92526 ORAL FUNCTION THERAPY: CPT | Mod: GN | Performed by: SPEECH-LANGUAGE PATHOLOGIST

## 2017-04-16 PROCEDURE — A9270 NON-COVERED ITEM OR SERVICE: HCPCS | Mod: GY | Performed by: COLON & RECTAL SURGERY

## 2017-04-16 PROCEDURE — 12000000 ZZH R&B MED SURG/OB

## 2017-04-16 PROCEDURE — 84132 ASSAY OF SERUM POTASSIUM: CPT | Performed by: SURGERY

## 2017-04-16 PROCEDURE — 25000125 ZZHC RX 250: Performed by: INTERNAL MEDICINE

## 2017-04-16 PROCEDURE — 25000128 H RX IP 250 OP 636: Performed by: SURGERY

## 2017-04-16 PROCEDURE — 25000132 ZZH RX MED GY IP 250 OP 250 PS 637: Mod: GY | Performed by: SURGERY

## 2017-04-16 PROCEDURE — 85018 HEMOGLOBIN: CPT | Performed by: SURGERY

## 2017-04-16 PROCEDURE — 85610 PROTHROMBIN TIME: CPT | Performed by: INTERNAL MEDICINE

## 2017-04-16 PROCEDURE — 85027 COMPLETE CBC AUTOMATED: CPT | Performed by: INTERNAL MEDICINE

## 2017-04-16 PROCEDURE — 25000132 ZZH RX MED GY IP 250 OP 250 PS 637: Mod: GY | Performed by: COLON & RECTAL SURGERY

## 2017-04-16 PROCEDURE — 25800025 ZZH RX 258: Performed by: SURGERY

## 2017-04-16 PROCEDURE — 25000125 ZZHC RX 250: Performed by: PHYSICIAN ASSISTANT

## 2017-04-16 PROCEDURE — 84100 ASSAY OF PHOSPHORUS: CPT | Performed by: INTERNAL MEDICINE

## 2017-04-16 PROCEDURE — 36415 COLL VENOUS BLD VENIPUNCTURE: CPT | Performed by: INTERNAL MEDICINE

## 2017-04-16 PROCEDURE — 40000225 ZZH STATISTIC SLP WARD VISIT: Performed by: SPEECH-LANGUAGE PATHOLOGIST

## 2017-04-16 RX ORDER — SODIUM CHLORIDE, SODIUM LACTATE, POTASSIUM CHLORIDE, CALCIUM CHLORIDE 600; 310; 30; 20 MG/100ML; MG/100ML; MG/100ML; MG/100ML
INJECTION, SOLUTION INTRAVENOUS CONTINUOUS
Status: DISCONTINUED | OUTPATIENT
Start: 2017-04-16 | End: 2017-04-17

## 2017-04-16 RX ORDER — POTASSIUM CHLORIDE 1.5 G/1.58G
40 POWDER, FOR SOLUTION ORAL ONCE
Status: COMPLETED | OUTPATIENT
Start: 2017-04-16 | End: 2017-04-16

## 2017-04-16 RX ORDER — ACETAMINOPHEN 500 MG
1000 TABLET ORAL EVERY 6 HOURS
Status: DISCONTINUED | OUTPATIENT
Start: 2017-04-16 | End: 2017-04-17

## 2017-04-16 RX ORDER — POTASSIUM CHLORIDE 1.5 G/1.58G
20 POWDER, FOR SOLUTION ORAL ONCE
Status: COMPLETED | OUTPATIENT
Start: 2017-04-16 | End: 2017-04-16

## 2017-04-16 RX ORDER — PANTOPRAZOLE SODIUM 40 MG/1
40 TABLET, DELAYED RELEASE ORAL
Status: DISCONTINUED | OUTPATIENT
Start: 2017-04-17 | End: 2017-04-16

## 2017-04-16 RX ORDER — LEVOTHYROXINE SODIUM 75 UG/1
75 TABLET ORAL
Status: DISCONTINUED | OUTPATIENT
Start: 2017-04-17 | End: 2017-04-20 | Stop reason: HOSPADM

## 2017-04-16 RX ADMIN — POTASSIUM CHLORIDE 40 MEQ: 1.5 POWDER, FOR SOLUTION ORAL at 12:23

## 2017-04-16 RX ADMIN — ACETAMINOPHEN 1000 MG: 500 TABLET, FILM COATED ORAL at 15:47

## 2017-04-16 RX ADMIN — ACETAMINOPHEN 1000 MG: 500 TABLET, FILM COATED ORAL at 10:43

## 2017-04-16 RX ADMIN — OLANZAPINE 5 MG: 5 TABLET, ORALLY DISINTEGRATING ORAL at 21:11

## 2017-04-16 RX ADMIN — ACETAMINOPHEN 1000 MG: 10 INJECTION, SOLUTION INTRAVENOUS at 02:21

## 2017-04-16 RX ADMIN — POTASSIUM CHLORIDE 20 MEQ: 1.5 POWDER, FOR SOLUTION ORAL at 14:05

## 2017-04-16 RX ADMIN — SODIUM CHLORIDE, POTASSIUM CHLORIDE, SODIUM LACTATE AND CALCIUM CHLORIDE: 600; 310; 30; 20 INJECTION, SOLUTION INTRAVENOUS at 02:06

## 2017-04-16 RX ADMIN — PANTOPRAZOLE SODIUM 40 MG: 40 INJECTION, POWDER, FOR SOLUTION INTRAVENOUS at 08:47

## 2017-04-16 RX ADMIN — LEVOTHYROXINE SODIUM ANHYDROUS 37.5 MCG: 100 INJECTION, POWDER, LYOPHILIZED, FOR SOLUTION INTRAVENOUS at 08:53

## 2017-04-16 RX ADMIN — ACETAMINOPHEN 1000 MG: 500 TABLET, FILM COATED ORAL at 22:21

## 2017-04-16 RX ADMIN — SODIUM PHOSPHATE, MONOBASIC, MONOHYDRATE 15 MMOL: 276; 142 INJECTION, SOLUTION INTRAVENOUS at 06:27

## 2017-04-16 RX ADMIN — ENOXAPARIN SODIUM 40 MG: 40 INJECTION SUBCUTANEOUS at 00:15

## 2017-04-16 RX ADMIN — TRAVOPROST 1 DROP: 0.04 SOLUTION/ DROPS OPHTHALMIC at 21:11

## 2017-04-16 NOTE — PLAN OF CARE
Problem: Goal Outcome Summary  Goal: Goal Outcome Summary  Outcome: No Change  Pt stable overnight. Pt slept better and seems to be only forgetful. VSS. PCA started for increased pain, pt tolerating well. Phosphorus replaced. Will continue to monitor.

## 2017-04-16 NOTE — PLAN OF CARE
"Problem: Goal Outcome Summary  Goal: Goal Outcome Summary  Outcome: No Change  4/13: admitted for right colectomy and ileostomy takedown; respiratory failure in PACU requiring ICU bed for ventilator overnight. On Propofol @ 35 mcg/kg/min. LR @ 100. Low urine output, picking up towards morning.         Reason for Admission: COLON CANCER  Respiratory insufficiency  Colon cancer (H)  Pertinent Medical History: arthritis, COPD, hypothyroid (on Synthroid), post polio syndrome (affecting LUE), found down at home in Feb 2017, developed psychosis and found colon CA on that admit. 3/1/17 ileostomy  Plans for DC: Awaiting surgeons approval. Likely TCU.     DO NOT REMOVE ANY FIELDS BELOW - TYPE N/A OR WNL IF NO INFO TO ADD  Procedure/POD: POD#3  Neuro/Psych/Sleep: Sundowners 4/15 nights, sl forgetful at times. Psychosis in past.   CV/Tele/Abnormal VS:  /82  Temp 97.8  F (36.6  C)  Resp 22  Ht 1.676 m (5' 6\")  Wt 53.9 kg (118 lb 13.3 oz)  SpO2 94%  BMI 19.18 kg/m2  Resp: Congested cough, Nonproductive.  LS coarse bilaterally.  2L o2, sats in 90's.  Much diff with IS (may be due to post-polio)   Skin/Wound: Abdominal incision from take-down, dressing CDI  /GI/Diet:  Voided after UCO today.  Mod liquid stool x 2, danita brownish/red hue.  IV: LR at 50cc/hr.  Dilaudid PCA used sparingly.    Pain: abdominal pain with movement, dil pca effective  Chemo/Radiation:   Abnormal Labs/Glucose: Phosphorus and K replaced 4/16.   Activity/Safety: Up to BSC with 2.  Consults: CR  Education:                      "

## 2017-04-16 NOTE — PROGRESS NOTES
COLON & RECTAL SURGERY PROGRESS NOTE    April 16, 2017  Post-op Day # 3    SUBJECTIVE:  No acute events overnight.Tolerating a full liquid diet. No nausea or emesis. No flatus yet or a bowel movement. Has a productive cough.    OBJECTIVE:  Temp:  [97.4  F (36.3  C)-98.5  F (36.9  C)] 97.4  F (36.3  C)  Heart Rate:  [] 94  Resp:  [15-32] 30  BP: (101-146)/(60-82) 132/79  SpO2:  [91 %-99 %] 95 %    Intake/Output Summary (Last 24 hours) at 04/16/17 1202  Last data filed at 04/16/17 1200   Gross per 24 hour   Intake             2440 ml   Output             1490 ml   Net              950 ml       GENERAL:  Awake, alert, no acute distress.  ABDOMEN:  Soft, NT, non-distended, Incisions c/d/i. Ostomy site without evidence of infection.     LABS:  Lab Results   Component Value Date    WBC 8.3 04/16/2017     Lab Results   Component Value Date    HGB 7.7 04/16/2017     Lab Results   Component Value Date    HCT 25.0 04/16/2017     Lab Results   Component Value Date     04/16/2017     Last Basic Metabolic Panel:  Lab Results   Component Value Date     04/16/2017      Lab Results   Component Value Date    POTASSIUM 3.2 04/16/2017     Lab Results   Component Value Date    CHLORIDE 105 04/16/2017     Lab Results   Component Value Date    SARITHA 7.6 04/16/2017     Lab Results   Component Value Date    CO2 26 04/16/2017     Lab Results   Component Value Date    BUN 6 04/16/2017     Lab Results   Component Value Date    CR 0.55 04/16/2017     Lab Results   Component Value Date    GLC 94 04/16/2017       ASSESSMENT/PLAN:  76 year old female s/p ileostomy reversal recovering as expected  Transfer to [facility].   Replace potassium. Recheck electrolytes in the morning.  Awaiting return of bowel function. Would not advance beyond fulls at this time.  BID dressing changes  Encourage IS and coughing.  Binder for comfort.  lovenox for dvt ppx  PT/OT/SW for placement    Reviewed with attending staff on call Dr. Levin

## 2017-04-16 NOTE — PROGRESS NOTES
Patient alert and oriented x 4, vital signs stable. Up to chair, tolerating well. Appetite poor, spoke to Dietitian who added shake suppliments. Transferred to station 88, report called to RN. All belongings with patient.

## 2017-04-16 NOTE — PLAN OF CARE
Problem: Goal Outcome Summary  Goal: Goal Outcome Summary     SLP - Swallow Tx was provided this am.  Patient complained of general discomfort, but no specific pain during the session.  Patient was reluctant to take po trials despite encouragement.  Patient tolerated small amounts of thin liquid and pudding trials presented by SLP.  Slight swallow delay was observed across trials.  Patient demonstrated increased oral phase duration during pudding trials.  No overt signs of aspiration were observed after swallows.  Patient declined solid trials.  Recommend a diet upgrade to a full liquid diet with safe swallow strategies/precautions (sit up at 90 degrees, no straw, small bites/sips, alternate textures) if ok per GI MD.  Hold po if aspiration signs are observed/respiratory status declines.  Plan to continue Tx to insure diet tolerance, train strategies, and trial further diet upgrades as indicated.  Full discharge needs to be determined.

## 2017-04-17 ENCOUNTER — APPOINTMENT (OUTPATIENT)
Dept: PHYSICAL THERAPY | Facility: CLINIC | Age: 77
DRG: 329 | End: 2017-04-17
Attending: COLON & RECTAL SURGERY
Payer: MEDICARE

## 2017-04-17 ENCOUNTER — APPOINTMENT (OUTPATIENT)
Dept: SPEECH THERAPY | Facility: CLINIC | Age: 77
DRG: 329 | End: 2017-04-17
Attending: COLON & RECTAL SURGERY
Payer: MEDICARE

## 2017-04-17 LAB
ALBUMIN SERPL-MCNC: 2.1 G/DL (ref 3.4–5)
ALP SERPL-CCNC: 79 U/L (ref 40–150)
ALT SERPL W P-5'-P-CCNC: 13 U/L (ref 0–50)
ANION GAP SERPL CALCULATED.3IONS-SCNC: 7 MMOL/L (ref 3–14)
AST SERPL W P-5'-P-CCNC: 18 U/L (ref 0–45)
BILIRUB SERPL-MCNC: 0.3 MG/DL (ref 0.2–1.3)
BUN SERPL-MCNC: 8 MG/DL (ref 7–30)
CALCIUM SERPL-MCNC: 8.1 MG/DL (ref 8.5–10.1)
CHLORIDE SERPL-SCNC: 108 MMOL/L (ref 94–109)
CO2 SERPL-SCNC: 26 MMOL/L (ref 20–32)
CREAT SERPL-MCNC: 0.5 MG/DL (ref 0.52–1.04)
ERYTHROCYTE [DISTWIDTH] IN BLOOD BY AUTOMATED COUNT: 17.7 % (ref 10–15)
GFR SERPL CREATININE-BSD FRML MDRD: ABNORMAL ML/MIN/1.7M2
GLUCOSE SERPL-MCNC: 101 MG/DL (ref 70–99)
HCT VFR BLD AUTO: 29.5 % (ref 35–47)
HGB BLD-MCNC: 8.7 G/DL (ref 11.7–15.7)
INR PPP: 1.06 (ref 0.86–1.14)
MCH RBC QN AUTO: 25.1 PG (ref 26.5–33)
MCHC RBC AUTO-ENTMCNC: 29.5 G/DL (ref 31.5–36.5)
MCV RBC AUTO: 85 FL (ref 78–100)
PHOSPHATE SERPL-MCNC: 2.1 MG/DL (ref 2.5–4.5)
PLATELET # BLD AUTO: 341 10E9/L (ref 150–450)
POTASSIUM SERPL-SCNC: 3.8 MMOL/L (ref 3.4–5.3)
PROT SERPL-MCNC: 5.2 G/DL (ref 6.8–8.8)
RBC # BLD AUTO: 3.46 10E12/L (ref 3.8–5.2)
SODIUM SERPL-SCNC: 141 MMOL/L (ref 133–144)
WBC # BLD AUTO: 9 10E9/L (ref 4–11)

## 2017-04-17 PROCEDURE — 85027 COMPLETE CBC AUTOMATED: CPT | Performed by: SURGERY

## 2017-04-17 PROCEDURE — 25000132 ZZH RX MED GY IP 250 OP 250 PS 637: Mod: GY | Performed by: COLON & RECTAL SURGERY

## 2017-04-17 PROCEDURE — 40000193 ZZH STATISTIC PT WARD VISIT: Performed by: PHYSICAL THERAPIST

## 2017-04-17 PROCEDURE — 12000000 ZZH R&B MED SURG/OB

## 2017-04-17 PROCEDURE — 25000132 ZZH RX MED GY IP 250 OP 250 PS 637: Mod: GY | Performed by: SURGERY

## 2017-04-17 PROCEDURE — 40000894 ZZH STATISTIC OT IP EVAL DEFER

## 2017-04-17 PROCEDURE — 84100 ASSAY OF PHOSPHORUS: CPT | Performed by: COLON & RECTAL SURGERY

## 2017-04-17 PROCEDURE — 85610 PROTHROMBIN TIME: CPT | Performed by: SURGERY

## 2017-04-17 PROCEDURE — 80053 COMPREHEN METABOLIC PANEL: CPT | Performed by: SURGERY

## 2017-04-17 PROCEDURE — 97162 PT EVAL MOD COMPLEX 30 MIN: CPT | Mod: GP | Performed by: PHYSICAL THERAPIST

## 2017-04-17 PROCEDURE — 92526 ORAL FUNCTION THERAPY: CPT | Mod: GN | Performed by: SPEECH-LANGUAGE PATHOLOGIST

## 2017-04-17 PROCEDURE — A9270 NON-COVERED ITEM OR SERVICE: HCPCS | Mod: GY | Performed by: SURGERY

## 2017-04-17 PROCEDURE — A9270 NON-COVERED ITEM OR SERVICE: HCPCS | Mod: GY | Performed by: COLON & RECTAL SURGERY

## 2017-04-17 PROCEDURE — 25000128 H RX IP 250 OP 636: Performed by: SURGERY

## 2017-04-17 PROCEDURE — 97530 THERAPEUTIC ACTIVITIES: CPT | Mod: GP | Performed by: PHYSICAL THERAPIST

## 2017-04-17 PROCEDURE — 84100 ASSAY OF PHOSPHORUS: CPT | Performed by: SURGERY

## 2017-04-17 PROCEDURE — A9270 NON-COVERED ITEM OR SERVICE: HCPCS | Mod: GY | Performed by: PHYSICIAN ASSISTANT

## 2017-04-17 PROCEDURE — 25000125 ZZHC RX 250: Performed by: INTERNAL MEDICINE

## 2017-04-17 PROCEDURE — 36415 COLL VENOUS BLD VENIPUNCTURE: CPT | Performed by: SURGERY

## 2017-04-17 PROCEDURE — 25000132 ZZH RX MED GY IP 250 OP 250 PS 637: Mod: GY | Performed by: PHYSICIAN ASSISTANT

## 2017-04-17 PROCEDURE — 85018 HEMOGLOBIN: CPT | Performed by: SURGERY

## 2017-04-17 PROCEDURE — 40000225 ZZH STATISTIC SLP WARD VISIT: Performed by: SPEECH-LANGUAGE PATHOLOGIST

## 2017-04-17 PROCEDURE — 25000128 H RX IP 250 OP 636: Performed by: COLON & RECTAL SURGERY

## 2017-04-17 PROCEDURE — 25800025 ZZH RX 258: Performed by: SURGERY

## 2017-04-17 RX ORDER — HYDROCODONE BITARTRATE AND ACETAMINOPHEN 5; 325 MG/1; MG/1
1-2 TABLET ORAL EVERY 6 HOURS PRN
Status: DISCONTINUED | OUTPATIENT
Start: 2017-04-17 | End: 2017-04-20 | Stop reason: HOSPADM

## 2017-04-17 RX ORDER — ACETAMINOPHEN 325 MG/1
650 TABLET ORAL EVERY 4 HOURS PRN
Status: DISCONTINUED | OUTPATIENT
Start: 2017-04-17 | End: 2017-04-20 | Stop reason: HOSPADM

## 2017-04-17 RX ORDER — KETOROLAC TROMETHAMINE 15 MG/ML
15 INJECTION, SOLUTION INTRAMUSCULAR; INTRAVENOUS EVERY 6 HOURS PRN
Status: DISCONTINUED | OUTPATIENT
Start: 2017-04-17 | End: 2017-04-20 | Stop reason: HOSPADM

## 2017-04-17 RX ORDER — SIMETHICONE 80 MG
80 TABLET,CHEWABLE ORAL EVERY 6 HOURS PRN
Status: DISCONTINUED | OUTPATIENT
Start: 2017-04-17 | End: 2017-04-20 | Stop reason: HOSPADM

## 2017-04-17 RX ADMIN — OLANZAPINE 5 MG: 5 TABLET, ORALLY DISINTEGRATING ORAL at 21:03

## 2017-04-17 RX ADMIN — ENOXAPARIN SODIUM 40 MG: 40 INJECTION SUBCUTANEOUS at 00:00

## 2017-04-17 RX ADMIN — KETOROLAC TROMETHAMINE 15 MG: 15 INJECTION, SOLUTION INTRAMUSCULAR; INTRAVENOUS at 17:21

## 2017-04-17 RX ADMIN — LEVOTHYROXINE SODIUM 75 MCG: 75 TABLET ORAL at 06:46

## 2017-04-17 RX ADMIN — HYDROMORPHONE HYDROCHLORIDE: 10 INJECTION, SOLUTION INTRAMUSCULAR; INTRAVENOUS; SUBCUTANEOUS at 03:16

## 2017-04-17 RX ADMIN — HYDROCODONE BITARTRATE AND ACETAMINOPHEN 1 TABLET: 5; 325 TABLET ORAL at 09:38

## 2017-04-17 RX ADMIN — HYDROCODONE BITARTRATE AND ACETAMINOPHEN 2 TABLET: 5; 325 TABLET ORAL at 21:08

## 2017-04-17 RX ADMIN — SODIUM CHLORIDE, POTASSIUM CHLORIDE, SODIUM LACTATE AND CALCIUM CHLORIDE: 600; 310; 30; 20 INJECTION, SOLUTION INTRAVENOUS at 00:30

## 2017-04-17 RX ADMIN — ACETAMINOPHEN 1000 MG: 500 TABLET, FILM COATED ORAL at 03:13

## 2017-04-17 RX ADMIN — SODIUM PHOSPHATE, MONOBASIC, MONOHYDRATE 15 MMOL: 276; 142 INJECTION, SOLUTION INTRAVENOUS at 08:56

## 2017-04-17 RX ADMIN — HYDROCODONE BITARTRATE AND ACETAMINOPHEN 1 TABLET: 5; 325 TABLET ORAL at 13:47

## 2017-04-17 RX ADMIN — TRAVOPROST 1 DROP: 0.04 SOLUTION/ DROPS OPHTHALMIC at 21:02

## 2017-04-17 NOTE — PLAN OF CARE
Problem: Discharge Planning  Goal: Discharge Planning (Adult, OB, Behavioral, Peds)  Patient's goal is to d/c to [facility] via w/c at 1200 on 4/20

## 2017-04-17 NOTE — PROGRESS NOTES
"Colon and Rectal Surgery  POD #4  Patient complains of abdominal \"tightness\". She is passing stool but denies flatus. No nausea. Patient reports rare ambulation.   /52 (BP Location: Right arm)  Temp 98.1  F (36.7  C) (Oral)  Resp 16  Ht 1.676 m (5' 6\")  Wt 53.9 kg (118 lb 13.3 oz)  SpO2 95%  BMI 19.18 kg/m2    Intake/Output Summary (Last 24 hours) at 04/17/17 1542  Last data filed at 04/17/17 0656   Gross per 24 hour   Intake             6465 ml   Output                0 ml   Net             6465 ml     Alert and complains of abdominal tightness. Very productive sounding cough.    No icterus  Abdomen: moderate distension. Incision clean and dry. Did not examine ileostomy wound.   Legs: mild edema.       Recent Labs  Lab 04/17/17  0530 04/16/17  1605 04/16/17  0455 04/15/17  0540   WBC 9.0  --  8.3 7.5   HGB 8.7* 9.5* 7.7* 7.5*   MCV 85  --  85 85     --  224 190   INR 1.06  --  1.14 1.21*     --  139 140   POTASSIUM 3.8 3.6 3.2* 3.7   CHLORIDE 108  --  105 108   CO2 26  --  26 25   BUN 8  --  6* 8   CR 0.50*  --  0.55 0.50*   ANIONGAP 7  --  8 7   SARITHA 8.1*  --  7.6* 7.7*   *  --  94 99   ALBUMIN 2.1*  --  2.1* 2.3*   PROTTOTAL 5.2*  --  4.8* 4.9*   BILITOTAL 0.3  --  0.4 0.3   ALKPHOS 79  --  70 64   ALT 13  --  13 12   AST 18  --  23 26     Impression: Slow return of GI function. Discomfort appears to be from distension. Cough.   Plan: change back to full liquids until passing flatus.   Add Toradol and simethicone for discomfort.   Incentive spirometry  Ambulate at least four times daily.     Lindsay Peter MD  "

## 2017-04-17 NOTE — PLAN OF CARE
Problem: Goal Outcome Summary  Goal: Goal Outcome Summary  PT: Order received, chart reviewed, evaluation completed and treatment initiated. PT is a 76 year old female admitted with  Adenocarcinoma CA. The pt had been living alone in her condo when she had psychosis episode as well as diagnosis of ilipsoas abscess. The pt was recovering at TCU when she was then readmitted for colon CA surgery. The pt was independent at [facility] with no AD but has history of 2 falls in past 6 months. The pt has post polio and no active movement of left UE at baseline. The pt was independent without AD, but was less comfortable using step over tub for bathing and started to call friend when she was in and out of shower for safety. (Friend was not close to help). The pt has cleaning and laundry help once a week and an otherwise accessible home.Currently the pt requires modA for abdominal precaution bed mobility, [place] for transfers to commode, other then a couple steps has had too much pain and fatigue to ambulate other then steps during transfers. Given pt's decline in independence from baseline and need for assist, recommend pt go to TCU.

## 2017-04-17 NOTE — PROGRESS NOTES
04/17/17 0953   Quick Adds   Type of Visit Initial PT Evaluation   Living Environment   Lives With alone   Living Arrangements condominium   Home Accessibility tub/shower is not walk in   Number of Stairs to Enter Home 0   Transportation Available car   Living Environment Comment condo with elevator. no grab bars on shower   Self-Care   Usual Activity Tolerance good   Current Activity Tolerance fair   Regular Exercise yes   Activity/Exercise Type walking   Exercise Amount/Frequency 3-5 times/wk   Equipment Currently Used at Home none   Activity/Exercise/Self-Care Comment Pt performed all ADLs independently but would call a friend when she showered to let the her know that she got in and out ok. Friend was not close by   Functional Level Prior   Ambulation 0-->independent   Transferring 0-->independent   Toileting 0-->independent   Bathing 0-->independent   Dressing 0-->independent   Eating 0-->independent   Communication 0-->understands/communicates without difficulty   Swallowing 0-->swallows foods/liquids without difficulty   Cognition 0 - no cognition issues reported   Fall history within last six months yes   Number of times patient has fallen within last six months 2   Prior Functional Level Comment Pt has help come once a week for laundry, vacuuming.   General Information   Onset of Illness/Injury or Date of Surgery - Date 04/16/17   Referring Physician Dr. Peter   Patient/Family Goals Statement Pt originally did not want an AD but then agreed that she should if needs it.   Pertinent History of Current Problem (include personal factors and/or comorbidities that impact the POC) Pt is a 76 year old female admitted for colon CA. 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   Precautions/Limitations fall precautions;abdominal precautions;other (see comments)  (abdominal binder)   Weight-Bearing Status - LLE full weight-bearing   Weight-Bearing Status - RLE full  weight-bearing   General Observations Pt alert in supine, agreeable to get up.   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 LE WFL   Strength   Strength Comments Pt has no activee strenght left UE. LE is grossly 3+/5   Bed Mobility   Bed Mobility Comments Garfield to roll, modA to sit EOB. modA to transfer sit to supine.   Transfer Skills   Transfer Comments sit to stand: Garfield, bed to commode: Garfield   Gait   Gait Comments Pt took a couple of steps from bed to commode and from commode to bed. Pt sidestepped along bed with Garfield to assess some dynamic mobility   Balance   Balance Comments Pt will require support.   Sensory Examination   Sensory Perception no deficits were identified   Muscle Tone   Muscle Tone Comments decrease tone in left UE due to post polio   General Therapy Interventions   Planned Therapy Interventions balance training;bed mobility training;gait training;strengthening;transfer training;progressive activity/exercise   Clinical Impression   Criteria for Skilled Therapeutic Intervention yes, treatment indicated   PT Diagnosis pain with mobility   Influenced by the following impairments Decreased functional mobility due to pain, decreased balance, decreased strength, abdominal precautions.   Functional limitations due to impairments increased assist for bed mobility, transfers and gait compared to baseline   Clinical Presentation Evolving/Changing   Clinical Presentation Rationale Pt has been having decline in comfort bathing  "alone, pt has had 2 falls in past 6 months, pt lives alone    Clinical Decision Making (Complexity) Moderate complexity   Therapy Frequency` daily   Predicted Duration of Therapy Intervention (days/wks) 5 days   Anticipated Equipment Needs at Discharge bernabe walker   Anticipated Discharge Disposition Transitional Care Facility   Risk & Benefits of therapy have been explained Yes   Patient, Family & other staff in agreement with plan of care Yes   [facility] TM \"6 Clicks\"   2016, Trustees of [facility], under license to [employer].  All rights reserved.   6 Clicks Short Forms Basic Mobility Inpatient Short Form   [facility] AM-PAC  \"6 Clicks\" V.2 Basic Mobility Inpatient Short Form   1. Turning from your back to your side while in a flat bed without using bedrails? 3 - A Little   2. Moving from lying on your back to sitting on the side of a flat bed without using bedrails? 2 - A Lot   3. Moving to and from a bed to a chair (including a wheelchair)? 3 - A Little   4. Standing up from a chair using your arms (e.g., wheelchair, or bedside chair)? 3 - A Little   5. To walk in hospital room? 2 - A Lot   6. Climbing 3-5 steps with a railing? 2 - A Lot   Basic Mobility Raw Score (Score out of 24.Lower scores equate to lower levels of function) 15   Total Evaluation Time   Total Evaluation Time (Minutes) 20     "

## 2017-04-17 NOTE — OP NOTE
DATE OF PROCEDURE:  04/13/2017      PREOPERATIVE DIAGNOSIS:  Right colon cancer.      POSTOPERATIVE DIAGNOSIS:  Right colon cancer.      PROCEDURE:  Open right colectomy with takedown of ileostomy and primary anastomosis.  Intraoperative consult and ureterolysis by Jaycob Mari MD.      SURGEON:  Lindsay Peter MD      ASSISTANT:  Hernando Crockett PA-C      ASSISTANT:  Kadie Pandya PA-C      CO-SURGEON:  Jaycob Mari MD      FINDINGS:  There was a very large cecal mass involving the cecum and the ascending colon with apparent adhesions/invasion to the anterior abdominal wall and the retroperitoneum.  There were large lymph node along the ileocolic and distal ileal vessels.  No evidence of distant metastasis.      INDICATIONS FOR PROCEDURE:  Sherita Kenney is a 76-year-old woman who was admitted initially in February with a left lower quadrant mass and acute psychosis.  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  Because of her new onset of psychosis and other medical problems, the decision was made to drain the psoas abscess with Interventional Radiology, which was performed successfully.  Her pain and septic symptoms improved.  An attempt was made to prep her for a colonoscopy, but she became obstructed.  Given her overall medical condition including severe protein malnutrition, the decision was made for her to undergo a laparoscopic ileostomy to allow her to eat and improve her medical status prior to definitive resection.  The patient underwent that procedure and did recover relatively uneventfully from that.  She was a rehab facility for a period of time and then was discharged to home.  Her protein levels have returned to normal.  The indications, procedure and risks for definitive resection of the right colon mass with possible takedown of her ileostomy and primary anastomosis were discussed with the patient, her sister and two briscoe of .  The nature of the procedure,  the fact that it would need to be open because of the size of the mass, as well as the risks of bleeding, wound infection, intra-abdominal infection, anastomotic leak, and other medical complications were reviewed.  They understood and wished to proceed.      PROCEDURE:  After a clear liquid diet, the patient was brought to the operating room.  She received intravenous antibiotics, subcutaneous anticoagulation, and her legs were placed in pneumatic compression stockings.  After the induction of adequate general anesthesia, her abdomen was prepped and draped in the usual sterile manner.  A timeout was performed and everyone present in the operating room agreed to the planned procedure.  A lower midline incision was made and extended down into the peritoneal cavity without incident.  Exploration of the abdomen revealed normal small bowel to the level of the loop ileostomy, normal stomach, liver and duodenum, although there were some adhesions in the right upper quadrant.  There was also a large mass in the cecum and ascending colon with significant adenopathy around the vascular supply.  Because the ileostomy interfered with visualization, the mesentery of that loop was divided and ligated with 2-0 Vicryl ties.  Both the distal and proximal ends were then divided with two firings of the NY stapler.  With this, we were able to better visualize the right colon.  There were clear adhesions/invasion of the anterior abdominal wall from the mass.  An elliptical incision was made through the peritoneum around that area of adhesions, and the dissection continued down through the preperitoneal fat into the abdominal wall musculature.  We stayed in that plane to mobilize that portion of the colon.  When we reached the retroperitoneum, there was 2-3 cm of relatively normal retroperitoneal lining.  This was incised and the right colon began to be mobilized.  However, we then encountered very dense fibrotic reaction which  appeared to be related to the previous psoas abscess.  This fibrosis was dissected off the psoas muscle, and we did divide the right ovarian vasculature after ligating it with 2-0 Vicryl ties.  We then continued the dissection medially and worked to identify the ureter.  There was a structure that appeared to be the right ureter, but we could not visualize spiculation, so we were concerned that we had not adequately identified it.  We then called for a Urology consult.  In the meantime, the omentum was adherent the proximal transverse colon was divided and ligated with 2-0 Vicryl ties.  The hepatic flexure was then taken down with the cautery.  Initially, there were some adhesions of the gallbladder which were taken down, and then secondarily the hepatic colic ligaments were divided.  Care was taken to carefully identify and protect the duodenum.  Dr. Mari then entered the operating room.  With further ureterolysis, he was able to confirm that the structure previously visualized was the right ureter.  We continued then to mobilize the remaining adherent portion of the right colon, carefully protecting the right ureter.  When the terminal ileum and right colon had been fully mobilized, attention was then turned to the vascular pedicle.  There were large lymph nodes surrounding the ileocolic artery.  We continued this dissection up close the origin of the artery.  At that point, there was a place that I felt we could safely ligate it.  The pedicle was isolated and clamped and divided.  The proximal end was suture ligated with 0 Vicryl suture and tied with a 2-0 Vicryl tie.  The distal end was ligated with a 2-0 Vicryl tie.  There was also significant adenopathy along the first branches to the terminal ileum.  Determination was made to resect the mesentery proximal to that, so just proximal to the area of the adenopathy, the mesentery was divided and ligated with 2-0 Vicryl ties.  The mesentery to the transverse  colon was then divided and ligated with 2-0 Vicryl ties.  An enterotomy and colotomy were made at the chosen point for resection.  A linear 75 mm NY stapler was introduced, closed and fired, forming a side-to-side anastomosis.  Firing of the TA stapler resected the specimen and closed the transverse portion of the anastomosis.  The transverse portion was then oversewn at any staple junction with interrupted 4-0 silk sutures.  Hemostasis was felt to be adequate.  The lumen was clearly patent and the blood supply was good.  The retroperitoneum was then irrigated with sterile water and subsequently with saline.  Hemostasis was felt to be adequate.  The residual portion of the loop ileostomy was then mobilized from the skin, subcutaneous tissue and fascial layers.  This was also sent for pathology.  The wound was irrigated with saline.  Hemostasis was felt to be adequate.  The fascia in the previous ileostomy wound was closed with interrupted #1 PDS suture in a figure-of-eight manner.  The skin was approximated in a pursestring manner with 2-0 Vicryl suture.  The abdominal wall was then closed with a continuous #1 PDS suture.  The midline incision was closed with a subcuticular suture of 4-0 Monocryl and sealed with Dermabond.  All sponge, blade and needle counts were reported as correct x2.  The patient was transferred to the recovery room in stable condition, but still intubated because of some weakness in her respiratory effort.         GIOVANNI MUÑOZ MD             D: 2017 16:28   T: 2017 06:08   MT: [id number]      Name:     SEVERO SWANN   MRN:      [id number]        Account:        [id number]   :      1940           Procedure Date: 2017      Document: [id number]       cc: Susan Martinez MD

## 2017-04-17 NOTE — PLAN OF CARE
Problem: Goal Outcome Summary  Goal: Goal Outcome Summary  Outcome: No Change  Plans for DC: Awaiting surgeons approval. Likely TCU.     Procedure/POD: POD4 ileostomy take down  Neuro/Psych/Sleep: A/O, forgetful.  Pt became slightly agitated overnight.   CV/Tele/Abnormal VS:  VSS on 2L NC  Resp: LS diminished with crackles.  Weak, congested NP cough.  Skin/Wound: Abdominal incision JUVENAL, old ostomy site dressing CDI  /GI/Diet:  Watery BM x2  IV: LR at 50cc/hr.  Dilaudid PCA used sparingly.    Pain: Abdominal pain with movement  Activity/Safety: Pivots with A1 to BSC  Consults: Colorectal surgery  Education: IS, importance of repositioning, PCA, splinting abdomen with pillow

## 2017-04-17 NOTE — PLAN OF CARE
Problem: Goal Outcome Summary  Goal: Goal Outcome Summary        SLP - Pt seen for swallow Tx in pm. Pt alert and pleasant with baseline cough. PO trials presented with thin liquids by cup and a saltine. Mild oral residue was noted on posterior hard palate. Residue cleared with thin liquid rinse. Pt exhibited productive cough x1 following PO trials and not linked to food/liquids. Pt educated regarding swallow precautions and POC. Plan to ensure diet tolerance and train strategies. Recommend continue with low fiber regular diet and thin liquids with the following precautions: upright for all intake, no straws, small sips/bites, and alternate textures. Discharge to TCU with short term SLP swallowing Tx to ensure diet tolerance.

## 2017-04-17 NOTE — PROGRESS NOTES
SPIRITUAL HE[facility] SERVICES  Progress Note  [facility]    Ran into pt's sister by elevator.  Sahra asked me to help her with flowers from her car for pt.  Pt was working with swallow test when we got to room.  Pt couldn't talk much without coughing so pt's sister and I did most of talking.  Will continue to follow pt while she is here.      Silvia Marques  Chaplain Resident  Pager [phone]

## 2017-04-17 NOTE — PROGRESS NOTES
Care Transition Initial Assessment -   Reason For Consult: discharge planning  Met with: PATIENT,FRIEND  Active Problems:    Respiratory insufficiency    Colon cancer (H)         DATA  Lives With: alone  Living Arrangements: condominium  Description of Support System: Supportive, Involved  Who is your support system?:  (POA's)  Support Assessment: Adequate family and caregiver support.   Identified issues/concerns regarding health management: needs TCU   Patient feels that they have adequate support @ home?  YES   Quality Of Family Relationships: supportive, involved  Transportation Available: car    ASSESSMENT  Cognitive Status: Alert and oriented but forgetful  Concerns to be addressed: Patient is a 76 year old female who was admitted to the hospital for respiratory insufficiency. Prior to hospitalization patient was at [facility] where she was private paying. Patient and POA (Friend)would like a referral sent to [facility] and [facility]. SW sent referral and will follow up with patient and POA once assessment is complete.     PLAN  Financial costs for the patient includes   Patient given options and choices for discharge to TCU  Patient/family is agreeable to the plan?  YES  Patient Goals and Preferences: [facility] and [facility]  Patient anticipates discharging to:  TCU      ADDENDUM  I: NEVILLE was updated that [facility] and they can accept patient at d/c. [facility] only has a private room available which is an additional $36. Patient would like to accept bed and is okay with the fees associated with private room.    Montse Moffett, RONN   *[phone]

## 2017-04-17 NOTE — PLAN OF CARE
Problem: Goal Outcome Summary  Goal: Goal Outcome Summary  OT: Order received, chart reviewed and per chart review and discussion with PT and SW, pt is planning to return to TCU. Will defer skilled OT eval to next level of care. OT order completed.

## 2017-04-17 NOTE — PLAN OF CARE
Problem: Goal Outcome Summary  Goal: Goal Outcome Summary  Outcome: No Change  Reason for Admission: COLON CANCER  Respiratory insufficiency  Colon cancer (H)  Pertinent Medical History: arthritis, COPD, hypothyroid (on Synthroid), post polio syndrome (affecting LUE), found down at home in Feb 2017, developed psychosis and found colon CA on that admit. 3/1/17 ileostomy     Plans for DC: Awaiting surgeons approval. Likely TCU.     DO NOT REMOVE ANY FIELDS BELOW - TYPE N/A OR WNL IF NO INFO TO ADD  Procedure/POD: POD#4  Neuro/Psych/Sleep: A&Ox4. Can be forgetful at times.  Pleasant mood throughout the shift.  CV/Tele/Abnormal VS:  VSS.  Resp: LS diminished with crackles. Weaned from oxygen and O2 Sat maintained@93%. Is not currently on O2.  Skin/Wound: Abdominal incision from take-down, dressing CDI  /GI/Diet:  Watery BM x2  IV: IV discontinued and saline locked.  Dilaudid PCA discontinued this morning.      Pain: Abdominal pain with movement. Given Norco x 1@0938.  Did not control pain and given Norco x 1@1340. Recommend giving Norco x 2 to control pain.  Chemo/Radiation: N/A  Abnormal Labs/Glucose: Phosphorus 2.1. IV Phosphorus replaced per order if less than 2.8.  Activity/Safety: Pivots with A1 to BSC. Encouraged walking, but patient c/o abdominal pain.  Consults: Colorectal surgery  Education: Pain

## 2017-04-18 ENCOUNTER — APPOINTMENT (OUTPATIENT)
Dept: SPEECH THERAPY | Facility: CLINIC | Age: 77
DRG: 329 | End: 2017-04-18
Attending: COLON & RECTAL SURGERY
Payer: MEDICARE

## 2017-04-18 ENCOUNTER — APPOINTMENT (OUTPATIENT)
Dept: PHYSICAL THERAPY | Facility: CLINIC | Age: 77
DRG: 329 | End: 2017-04-18
Attending: COLON & RECTAL SURGERY
Payer: MEDICARE

## 2017-04-18 LAB
ALBUMIN SERPL-MCNC: 2.2 G/DL (ref 3.4–5)
ALP SERPL-CCNC: 91 U/L (ref 40–150)
ALT SERPL W P-5'-P-CCNC: 12 U/L (ref 0–50)
ANION GAP SERPL CALCULATED.3IONS-SCNC: 6 MMOL/L (ref 3–14)
AST SERPL W P-5'-P-CCNC: 19 U/L (ref 0–45)
BILIRUB SERPL-MCNC: 0.2 MG/DL (ref 0.2–1.3)
BUN SERPL-MCNC: 12 MG/DL (ref 7–30)
CALCIUM SERPL-MCNC: 8.1 MG/DL (ref 8.5–10.1)
CHLORIDE SERPL-SCNC: 108 MMOL/L (ref 94–109)
CO2 SERPL-SCNC: 28 MMOL/L (ref 20–32)
COPATH REPORT: NORMAL
CREAT SERPL-MCNC: 0.56 MG/DL (ref 0.52–1.04)
ERYTHROCYTE [DISTWIDTH] IN BLOOD BY AUTOMATED COUNT: 17.5 % (ref 10–15)
GFR SERPL CREATININE-BSD FRML MDRD: ABNORMAL ML/MIN/1.7M2
GLUCOSE SERPL-MCNC: 92 MG/DL (ref 70–99)
HCT VFR BLD AUTO: 29.1 % (ref 35–47)
HGB BLD-MCNC: 8.8 G/DL (ref 11.7–15.7)
INR PPP: 0.97 (ref 0.86–1.14)
MCH RBC QN AUTO: 25.7 PG (ref 26.5–33)
MCHC RBC AUTO-ENTMCNC: 30.2 G/DL (ref 31.5–36.5)
MCV RBC AUTO: 85 FL (ref 78–100)
PHOSPHATE SERPL-MCNC: 3 MG/DL (ref 2.5–4.5)
PLATELET # BLD AUTO: 340 10E9/L (ref 150–450)
POTASSIUM SERPL-SCNC: 3.4 MMOL/L (ref 3.4–5.3)
PROT SERPL-MCNC: 5.4 G/DL (ref 6.8–8.8)
RBC # BLD AUTO: 3.42 10E12/L (ref 3.8–5.2)
SODIUM SERPL-SCNC: 142 MMOL/L (ref 133–144)
WBC # BLD AUTO: 7.3 10E9/L (ref 4–11)

## 2017-04-18 PROCEDURE — 25000132 ZZH RX MED GY IP 250 OP 250 PS 637: Mod: GY | Performed by: PHYSICIAN ASSISTANT

## 2017-04-18 PROCEDURE — 25000128 H RX IP 250 OP 636: Performed by: SURGERY

## 2017-04-18 PROCEDURE — 40000225 ZZH STATISTIC SLP WARD VISIT

## 2017-04-18 PROCEDURE — 97116 GAIT TRAINING THERAPY: CPT | Mod: GP

## 2017-04-18 PROCEDURE — 84100 ASSAY OF PHOSPHORUS: CPT | Performed by: SURGERY

## 2017-04-18 PROCEDURE — 36415 COLL VENOUS BLD VENIPUNCTURE: CPT | Performed by: SURGERY

## 2017-04-18 PROCEDURE — 12000000 ZZH R&B MED SURG/OB

## 2017-04-18 PROCEDURE — 85610 PROTHROMBIN TIME: CPT | Performed by: SURGERY

## 2017-04-18 PROCEDURE — 80053 COMPREHEN METABOLIC PANEL: CPT | Performed by: SURGERY

## 2017-04-18 PROCEDURE — 25000132 ZZH RX MED GY IP 250 OP 250 PS 637: Mod: GY | Performed by: COLON & RECTAL SURGERY

## 2017-04-18 PROCEDURE — A9270 NON-COVERED ITEM OR SERVICE: HCPCS | Mod: GY | Performed by: COLON & RECTAL SURGERY

## 2017-04-18 PROCEDURE — 97110 THERAPEUTIC EXERCISES: CPT | Mod: GP

## 2017-04-18 PROCEDURE — 40000193 ZZH STATISTIC PT WARD VISIT

## 2017-04-18 PROCEDURE — 25000132 ZZH RX MED GY IP 250 OP 250 PS 637: Mod: GY | Performed by: SURGERY

## 2017-04-18 PROCEDURE — 85027 COMPLETE CBC AUTOMATED: CPT | Performed by: SURGERY

## 2017-04-18 PROCEDURE — A9270 NON-COVERED ITEM OR SERVICE: HCPCS | Mod: GY | Performed by: SURGERY

## 2017-04-18 PROCEDURE — 92526 ORAL FUNCTION THERAPY: CPT | Mod: GN

## 2017-04-18 PROCEDURE — A9270 NON-COVERED ITEM OR SERVICE: HCPCS | Mod: GY | Performed by: PHYSICIAN ASSISTANT

## 2017-04-18 RX ADMIN — TRAVOPROST 1 DROP: 0.04 SOLUTION/ DROPS OPHTHALMIC at 21:02

## 2017-04-18 RX ADMIN — LEVOTHYROXINE SODIUM 75 MCG: 75 TABLET ORAL at 09:10

## 2017-04-18 RX ADMIN — HYDROCODONE BITARTRATE AND ACETAMINOPHEN 2 TABLET: 5; 325 TABLET ORAL at 09:10

## 2017-04-18 RX ADMIN — OLANZAPINE 5 MG: 5 TABLET, ORALLY DISINTEGRATING ORAL at 21:02

## 2017-04-18 RX ADMIN — HYDROCODONE BITARTRATE AND ACETAMINOPHEN 2 TABLET: 5; 325 TABLET ORAL at 15:13

## 2017-04-18 RX ADMIN — ENOXAPARIN SODIUM 40 MG: 40 INJECTION SUBCUTANEOUS at 01:00

## 2017-04-18 NOTE — DOWNTIME EVENT NOTE
The EMR was down [facility] 2020 4/17/17 - 0500 4/185/17.    Pamela Aguilar and Nancy Sequeira was responsible for completing the paper charting during this time period.     The following information was re-entered into the system by Lavinia Díaz: MAR    The following information will remain in the paper chart: flowsheet data    Lavinia Díaz  4/18/2017

## 2017-04-18 NOTE — PLAN OF CARE
"Problem: Goal Outcome Summary  Goal: Goal Outcome Summary  Outcome: Improving     4/13: admitted for right colectomy and ileostomy takedown; respiratory failure in PACU requiring ICU bed for ventilator overnight.      Reason for Admission: COLON CANCER  Respiratory insufficiency  Colon cancer (H)  Pertinent Medical History: arthritis, COPD, hypothyroid (on Synthroid), post polio syndrome (affecting LUE), found down at home in Feb 2017, developed psychosis and found colon CA on that admit. 3/1/17 ileostomy     Plans for DC: TCU at dc, probable Wednesday 4/19 if tolerating low fiber diet     DO NOT REMOVE ANY FIELDS BELOW - TYPE N/A OR WNL IF NO INFO TO ADD  Procedure/POD: POD#5  Neuro/Psych/Sleep: A/O, mild forgetfullness    CV/Tele/Abnormal VS: /74 (BP Location: Right arm)  Pulse 94  Temp 97.6  F (36.4  C) (Oral)  Resp 16  Ht 1.676 m (5' 6\")  Wt 52.6 kg (115 lb 15.4 oz)  SpO2 90%  BMI 18.72 kg/m2   Resp:   Weak, congested NP cough. IS encouraged, LS dim  Skin/Wound: Abdominal incision dressing changed, old ostomy site packed with saline moistened gauze, midline intact  /GI/Diet:  BS active, passing gas, 2 small loose BM's, walked to BR. Still feels distended. Tolerated low fiber lunch  IV: left PIV SL   Pain: Abdominal pain; PRN Toradol, Noco, and Simethicone available. Pt c/o pain only with movement, rates 6, 2 Norco effective this Am. Available q 6 hr, request another dose when due this afternoon  Chemo/Radiation: N/A  Abnormal Labs/Glucose: N/A  Activity/Safety: up with 1 and gait belt, bernabe-walker available which she declines d/t arthritis discomfort in right hand. Ambulated in hu around nurses station twice today. Up in chair  Consults: Colorectal surgery primary  Education: IS, importance of repositioning, splinting abdomen with pillow, pain medication and nonpharm interventions for pain      "

## 2017-04-18 NOTE — PROGRESS NOTES
COLON & RECTAL SURGERY PROGRESS NOTE    April 18, 2017  Post-op Day # 5    SUBJECTIVE:    No acute events  Tolerating small amount of fulls, no appetite  No NV  Passing a tiny bit of flatus, small stool yesterday am  No fever/chills    OBJECTIVE:  Temp:  [97.4  F (36.3  C)-98.8  F (37.1  C)] 97.4  F (36.3  C)  Pulse:  [93-99] 93  Resp:  [16-18] 18  BP: (121-134)/(56-59) 121/56  SpO2:  [92 %] 92 %  No intake or output data in the 24 hours ending 04/18/17 0733    GENERAL: Awake, alert, no acute distress,   HEAD - Nomocephalic atraumatic  SCLERA anicteric  EXTREMITIES warm and well perfused  ABDOMEN: Soft, appropriately tender, minimally-distended  INCISION: C/d/i, stoma site clean/packed    LABS:  Lab Results   Component Value Date    WBC 7.3 04/18/2017     Lab Results   Component Value Date    HGB 8.8 04/18/2017     Lab Results   Component Value Date    HCT 29.1 04/18/2017     Lab Results   Component Value Date     04/18/2017     Last Basic Metabolic Panel:  Lab Results   Component Value Date     04/18/2017      Lab Results   Component Value Date    POTASSIUM 3.4 04/18/2017     Lab Results   Component Value Date    CHLORIDE 108 04/18/2017     Lab Results   Component Value Date    SARITHA 8.1 04/18/2017     Lab Results   Component Value Date    CO2 28 04/18/2017     Lab Results   Component Value Date    BUN 12 04/18/2017     Lab Results   Component Value Date    CR 0.56 04/18/2017     Lab Results   Component Value Date    GLC 92 04/18/2017       ASSESSMENT/PLAN:  POD#5 Open ileostomy takedown and right colectomy  1. Continuing with fulls this am, if tolerating well and still passing flatus can consider low fiber later today  2. PO PRN pain meds  3. OOB, ambulate  4. Lovenox for prophylaxis  5. Dressing changes to takedown site  6. PT, OT. Appreciate their input for d/c planning     Will review with staff Dr. Rome Urrutia MD  Colon & Rectal Surgery Fellow  Pager [phone]    Agree with above. Is  "passing more flatus now. Requesting more food.  /74 (BP Location: Right arm)  Pulse 94  Temp 97.6  F (36.4  C) (Oral)  Resp 16  Ht 1.676 m (5' 6\")  Wt 52.6 kg (115 lb 15.4 oz)  SpO2 90%  BMI 18.72 kg/m2  No intake or output data in the 24 hours ending 04/18/17 1025  Alert and appears comfortable.  Abdomen: soft, less distended. Incision clean and dry    Progressing.   Will advance diet.     Lindsay Peter MD  "

## 2017-04-18 NOTE — PLAN OF CARE
Problem: Goal Outcome Summary  Goal: Goal Outcome Summary  SLP: Pt seen for meal follow up to ensure tolerance of regular diet and thin liquids. Pt sitting up in a chair eating breakfast. No overt s/s of aspiration noted. Reviewed safe swallow strategies. Pt verbalized understanding of all skilled teaching. Recommend: Continue Regular diet and thin liquids. D/C from SLP services at this time.      Speech Language Therapy Discharge Summary     Reason for therapy discharge:    All goals and outcomes met, no further needs identified.     Progress towards therapy goal(s). See goals on Care Plan in [facility] electronic health record for goal details.  Goals met     Therapy recommendation(s):    Continue home exercise program.

## 2017-04-18 NOTE — PLAN OF CARE
"Problem: Goal Outcome Summary  Goal: Goal Outcome Summary  Outcome: No Change  Plans for DC: Awaiting surgeons approval. Likely TCU.     DO NOT REMOVE ANY FIELDS BELOW - TYPE N/A OR WNL IF NO INFO TO ADD  Procedure/POD: POD#5  Neuro/Psych/Sleep: A/O, forgetful.    CV/Tele/Abnormal VS:  VSS   /56 (BP Location: Right arm)  Pulse 93  Temp 97.4  F (36.3  C) (Oral)  Resp 18  Ht 1.676 m (5' 6\")  Wt 53.9 kg (118 lb 13.3 oz)  SpO2 92%  BMI 19.18 kg/m2     Resp:   Weak, congested NP cough.  Skin/Wound: Abdominal incision from take-down, dressing CDI  /GI/Diet:  BS active, passing gas   IV: left PIV SL   Pain: Abdominal pain; PRN Toradol, Noco, and Simethicone available. Pt c/o pain 6/10 but denied medication   Chemo/Radiation: N/A  Abnormal Labs/Glucose: N/A  Activity/Safety: up with 1- ambulates in hu  Consults: Colorectal surgery  Education: IS, importance of repositioning, splinting abdomen with pillow, pain medication and nonpharm interventions for pain      "

## 2017-04-18 NOTE — PLAN OF CARE
Problem: Goal Outcome Summary  Goal: Goal Outcome Summary  Physical Therapy: Pt making good progress with physical therapy sessions. This date, patient requires CGA for sit to/from stand transfers. Pt demonstrates ability to ambulate up to 100' with intermittent HHA (declines use of AD) with CGA. Pt fatigues with ambulation, requiring several standing rest breaks, although VSS with ambulation. Pt issued written copy of seated LE strengthening HEP. Recommendations: Pt will benefit from continued therapy to further address impairments and increase functional independence. TCU at discharge.

## 2017-04-18 NOTE — PROGRESS NOTES
NEVILLE  I: NEVILLE spoke with patient's friend and POA who wanted an update on patient's d/c plan. NEVILLE updated her that [facility] [place] would like to follow one more day to see if patient can tolerate diet better. SW will follow up with patient and POA (Dinora) to give more information in AM.    P: SW will continue to follow and assist as needed.    Montse Moffett, RONN   *[phone]

## 2017-04-18 NOTE — PROGRESS NOTES
CLINICAL NUTRITION SERVICES - REASSESSMENT NOTE        EVALUATION OF PROGRESS TOWARD GOALS   Diet:  Low fiber. Sending deirdre shakes BID between meals.  Intake:  Pt states her appetite still isn't that great, eating ~50% of meals.  She likes the shakes but if she doesn't get to them right away they get warm and she doesn't drink them.    Previous Goals:   Pt to advance to diet in the next 24-48 hours.   Evaluation: Met    Previous Nutrition Diagnosis:   Inadequate oral intake related to inability for PO 2/2 intubation as evidenced by NPO diet order  Evaluation: Improving        INTERVENTIONS  Recommendations / Nutrition Prescription  Continue current diet and supplements    Implementation  Composition of Meals and Snacks - encouraged good intake, especially protein foods  Medical Food Supplement - will decrease supplements to 4 oz servings, continue to send BID    Goals  Pt will consume at least 50% of meals and supplements      MONITORING AND EVALUATION:  Progress towards goals will be monitored and evaluated per protocol and Practice Guidelines    Eliza Perez RD  Pager [phone] (M-F)            [phone] ([facility] Hol)

## 2017-04-18 NOTE — PROGRESS NOTES
NEVILLE  I: NEVILLE was updated by patient's POA that patient would also like a referral sent to [facility]. NEVILLE sent referral via DOD and will update family. At this time patient has bed at [facility].    P: NEVILLE will continue to follow and assist as needed.      ADDENDUM  I: NEVILLE was updated that [facility] cannot accept patient due to her looking more LTC appropriate.  Montse Moffett, LSW   *[phone]

## 2017-04-19 ENCOUNTER — APPOINTMENT (OUTPATIENT)
Dept: PHYSICAL THERAPY | Facility: CLINIC | Age: 77
DRG: 329 | End: 2017-04-19
Attending: COLON & RECTAL SURGERY
Payer: MEDICARE

## 2017-04-19 LAB
ALBUMIN SERPL-MCNC: 2.5 G/DL (ref 3.4–5)
ALP SERPL-CCNC: 109 U/L (ref 40–150)
ALT SERPL W P-5'-P-CCNC: 14 U/L (ref 0–50)
ANION GAP SERPL CALCULATED.3IONS-SCNC: 8 MMOL/L (ref 3–14)
AST SERPL W P-5'-P-CCNC: 20 U/L (ref 0–45)
BILIRUB SERPL-MCNC: 0.2 MG/DL (ref 0.2–1.3)
BUN SERPL-MCNC: 9 MG/DL (ref 7–30)
C DIFF TOX B STL QL: NORMAL
CALCIUM SERPL-MCNC: 8.5 MG/DL (ref 8.5–10.1)
CHLORIDE SERPL-SCNC: 106 MMOL/L (ref 94–109)
CO2 SERPL-SCNC: 28 MMOL/L (ref 20–32)
CREAT SERPL-MCNC: 0.45 MG/DL (ref 0.52–1.04)
CREAT SERPL-MCNC: 0.58 MG/DL (ref 0.52–1.04)
ERYTHROCYTE [DISTWIDTH] IN BLOOD BY AUTOMATED COUNT: 17.4 % (ref 10–15)
GFR SERPL CREATININE-BSD FRML MDRD: ABNORMAL ML/MIN/1.7M2
GFR SERPL CREATININE-BSD FRML MDRD: NORMAL ML/MIN/1.7M2
GLUCOSE SERPL-MCNC: 107 MG/DL (ref 70–99)
HCT VFR BLD AUTO: 28 % (ref 35–47)
HGB BLD-MCNC: 8.5 G/DL (ref 11.7–15.7)
INR PPP: 0.95 (ref 0.86–1.14)
MCH RBC QN AUTO: 25.4 PG (ref 26.5–33)
MCHC RBC AUTO-ENTMCNC: 30.4 G/DL (ref 31.5–36.5)
MCV RBC AUTO: 84 FL (ref 78–100)
PARANEOPLASTIC AB SER QL IF: NORMAL
PLATELET # BLD AUTO: 404 10E9/L (ref 150–450)
POTASSIUM SERPL-SCNC: 3.7 MMOL/L (ref 3.4–5.3)
PROT SERPL-MCNC: 5.8 G/DL (ref 6.8–8.8)
RBC # BLD AUTO: 3.34 10E12/L (ref 3.8–5.2)
SODIUM SERPL-SCNC: 142 MMOL/L (ref 133–144)
SPECIMEN SOURCE: NORMAL
WBC # BLD AUTO: 7.3 10E9/L (ref 4–11)

## 2017-04-19 PROCEDURE — 82565 ASSAY OF CREATININE: CPT | Performed by: SURGERY

## 2017-04-19 PROCEDURE — 97530 THERAPEUTIC ACTIVITIES: CPT | Mod: GP

## 2017-04-19 PROCEDURE — 25000132 ZZH RX MED GY IP 250 OP 250 PS 637: Mod: GY | Performed by: COLON & RECTAL SURGERY

## 2017-04-19 PROCEDURE — A9270 NON-COVERED ITEM OR SERVICE: HCPCS | Mod: GY | Performed by: PHYSICIAN ASSISTANT

## 2017-04-19 PROCEDURE — 25000132 ZZH RX MED GY IP 250 OP 250 PS 637: Mod: GY | Performed by: PHYSICIAN ASSISTANT

## 2017-04-19 PROCEDURE — 80053 COMPREHEN METABOLIC PANEL: CPT | Performed by: SURGERY

## 2017-04-19 PROCEDURE — 85610 PROTHROMBIN TIME: CPT | Performed by: SURGERY

## 2017-04-19 PROCEDURE — A9270 NON-COVERED ITEM OR SERVICE: HCPCS | Mod: GY | Performed by: SURGERY

## 2017-04-19 PROCEDURE — 85027 COMPLETE CBC AUTOMATED: CPT | Performed by: SURGERY

## 2017-04-19 PROCEDURE — 97116 GAIT TRAINING THERAPY: CPT | Mod: GP

## 2017-04-19 PROCEDURE — 25000128 H RX IP 250 OP 636: Performed by: SURGERY

## 2017-04-19 PROCEDURE — 25000132 ZZH RX MED GY IP 250 OP 250 PS 637: Mod: GY | Performed by: SURGERY

## 2017-04-19 PROCEDURE — 12000000 ZZH R&B MED SURG/OB

## 2017-04-19 PROCEDURE — 87493 C DIFF AMPLIFIED PROBE: CPT | Performed by: COLON & RECTAL SURGERY

## 2017-04-19 PROCEDURE — 97110 THERAPEUTIC EXERCISES: CPT | Mod: GP

## 2017-04-19 PROCEDURE — 36415 COLL VENOUS BLD VENIPUNCTURE: CPT | Performed by: SURGERY

## 2017-04-19 PROCEDURE — A9270 NON-COVERED ITEM OR SERVICE: HCPCS | Mod: GY | Performed by: COLON & RECTAL SURGERY

## 2017-04-19 PROCEDURE — 40000193 ZZH STATISTIC PT WARD VISIT

## 2017-04-19 RX ADMIN — ENOXAPARIN SODIUM 40 MG: 40 INJECTION SUBCUTANEOUS at 00:53

## 2017-04-19 RX ADMIN — ACETAMINOPHEN 650 MG: 325 TABLET, FILM COATED ORAL at 14:27

## 2017-04-19 RX ADMIN — ENOXAPARIN SODIUM 40 MG: 40 INJECTION SUBCUTANEOUS at 23:56

## 2017-04-19 RX ADMIN — SIMETHICONE CHEW TAB 80 MG 80 MG: 80 TABLET ORAL at 10:38

## 2017-04-19 RX ADMIN — SIMETHICONE CHEW TAB 80 MG 80 MG: 80 TABLET ORAL at 17:40

## 2017-04-19 RX ADMIN — OLANZAPINE 5 MG: 5 TABLET, ORALLY DISINTEGRATING ORAL at 21:08

## 2017-04-19 RX ADMIN — TRAVOPROST 1 DROP: 0.04 SOLUTION/ DROPS OPHTHALMIC at 21:08

## 2017-04-19 RX ADMIN — LEVOTHYROXINE SODIUM 75 MCG: 75 TABLET ORAL at 08:10

## 2017-04-19 RX ADMIN — ACETAMINOPHEN 650 MG: 325 TABLET, FILM COATED ORAL at 05:27

## 2017-04-19 NOTE — PROGRESS NOTES
COLON & RECTAL SURGERY PROGRESS NOTE    April 19, 2017  Post-op Day # 5     SUBJECTIVE:   No acute events  Tolerating small amount of low fiber diet, poor appetite  No NV  Passing flatus, no stool  No fever/chills  Pain well controlled    OBJECTIVE:  Temp:  [98.2  F (36.8  C)-98.9  F (37.2  C)] 98.3  F (36.8  C)  Heart Rate:  [] 88  Resp:  [16-17] 17  BP: (120-148)/(61-79) 137/61  SpO2:  [89 %-93 %] 93 %    Intake/Output Summary (Last 24 hours) at 04/19/17 0928  Last data filed at 04/19/17 0850   Gross per 24 hour   Intake              240 ml   Output              850 ml   Net             -610 ml       GENERAL: Awake, alert, no acute distress,   HEAD - Nomocephalic atraumatic  SCLERA anicteric  EXTREMITIES warm and well perfused  ABDOMEN: Soft, appropriately tender, mildly-distended  INCISION: C/d/i, stoma site clean/packed       LABS:  Lab Results   Component Value Date    WBC 7.3 04/19/2017     Lab Results   Component Value Date    HGB 8.5 04/19/2017     Lab Results   Component Value Date    HCT 28.0 04/19/2017     Lab Results   Component Value Date     04/19/2017     Last Basic Metabolic Panel:  Lab Results   Component Value Date     04/19/2017      Lab Results   Component Value Date    POTASSIUM 3.7 04/19/2017     Lab Results   Component Value Date    CHLORIDE 106 04/19/2017     Lab Results   Component Value Date    SARITHA 8.5 04/19/2017     Lab Results   Component Value Date    CO2 28 04/19/2017     Lab Results   Component Value Date    BUN 9 04/19/2017     Lab Results   Component Value Date    CR 0.45 04/19/2017     Lab Results   Component Value Date     04/19/2017       ASSESSMENT/PLAN:  POD#6 Open ileostomy takedown and right colectomy  1. Continuing with low fiber diet. Encouraged patient to go slow if feeling bloated  2. PO PRN pain meds  3. OOB, ambulate  4. Lovenox for prophylaxis  5. Dressing changes to takedown site  6. PT, OT. Anticipate discharge to rehab in 1-2 days once  eating well and no longer bloated.     Reviewed with staff Dr. Rome Urrutia MD  Colon & Rectal Surgery Fellow  Pager [phone]

## 2017-04-19 NOTE — PLAN OF CARE
"Problem: Goal Outcome Summary  Goal: Goal Outcome Summary  Outcome: Improving  Plans for DC:[facility] available; probably d/c Wednesday 4/19     DO NOT REMOVE ANY FIELDS BELOW - TYPE N/A OR WNL IF NO INFO TO ADD  Procedure/POD: POD#6  Neuro/Psych/Sleep: A/O, mild forgetfullness    CV/Tele/Abnormal VS: /67 (BP Location: Right arm)  Pulse 94  Temp 98.9  F (37.2  C) (Oral)  Resp 16  Ht 1.676 m (5' 6\")  Wt 52.6 kg (115 lb 15.4 oz)  SpO2 92%  BMI 18.72 kg/m2   Resp:   Weak, congested NP cough. IS encouraged, LS dim  Skin/Wound: Abdominal incision dressing changed, old ostomy site packed with saline moistened gauze, midline intact  /GI/Diet:  BS active, passing gas, walked to BR. Still feels distended. Tolerating low fiber  IV: Right PIV SL   Pain: Abdominal pain; PRN Toradol, Noco, and Simethicone available. C/o pain 8/10 Tylenol and heat pack given.   Chemo/Radiation: N/A  Abnormal Labs/Glucose: N/A  Activity/Safety: up with 1 and gait belt, bernabe-walker available which she declines d/t arthritis discomfort in right hand.   Consults: Colorectal surgery primary  Education: IS, importance of repositioning, splinting abdomen with pillow, pain medication and nonpharm interventions for pain      "

## 2017-04-19 NOTE — PLAN OF CARE
Problem: Goal Outcome Summary  Goal: Goal Outcome Summary  Outcome: Improving  A/ox4, forgetful. Up with one, gait belt. Low fiber diet. C/o abd fullness/discomfort, decreased with simethicone. Abd dressing CDI. Passing flatus, + loose BM today. Plan to d/c in 1-2 days to TCU when cleared by colorectal surgery.    [id number]  Still c/o abd fullness/discomfort, warm packs, tylenol and simethicone in use. Poor appetite. Frequent loose stools-MD aware. Plan to get c-diff sample. D/c pending.

## 2017-04-19 NOTE — PROVIDER NOTIFICATION
"Text page to Dr. Urrutia from colorectal, \"FYI loose/watery stools charted 4/16, 4/17 and today. Is this to be expected? Also, pt wondering if she can use warm packs to abd. Thanks.\"  Call back from MD, Send c-diff sample. Ok to use warm or cold pack on abd incision site.   "

## 2017-04-19 NOTE — PROGRESS NOTES
Social visit. Patient recovering from right colectomy. Will see her in clinic to discuss about adjuvant chemotherapy.

## 2017-04-19 NOTE — PROGRESS NOTES
NEVILLE  I: NEVILLE spoke with patient's sister to update her on information. NEVILLE will follow up with [facility] [place] and plan for patient to d/c 1-2 per MD.    P: NEVILLE will continue to follow and assist as needed.    ADDENDUM  I: NEVILLE spoke with patient's niece to update her. Patient's niece stated that she would be okay with a on site assessment with [facility] [place]. NEVILLE awaits a call back from niece to get final determination of where they want patient to d/c to.     Montse Moffett, [facility] [place]   *[phone]

## 2017-04-19 NOTE — PLAN OF CARE
Problem: Goal Outcome Summary  Goal: Goal Outcome Summary  PT: Pt is pleasant and cooperative, motivated to participate with PT. Pt currently is independent with bed mobility, requires SBA for transfers and intermittent hand hold assist for gait x 200'. Pt also completed seated and standing LE exercises, some standing balance exercises. Pt did have an episode of bowel incontinence with modA for clothing management and darline-care needed. Rec TCU.

## 2017-04-19 NOTE — PLAN OF CARE
"Problem: Goal Outcome Summary  Goal: Goal Outcome Summary  Outcome: Improving  Plans for DC:[facility]; probably d/c Wednesday 4/19     DO NOT REMOVE ANY FIELDS BELOW - TYPE N/A OR WNL IF NO INFO TO ADD  Procedure/POD: POD#5  Neuro/Psych/Sleep: A/O, mild forgetfullness    CV/Tele/Abnormal VS: /79 (BP Location: Right arm)  Pulse 94  Temp 98.2  F (36.8  C) (Oral)  Resp 16  Ht 1.676 m (5' 6\")  Wt 52.6 kg (115 lb 15.4 oz)  SpO2 92%  BMI 18.72 kg/m2   Resp:   Weak, congested NP cough. IS encouraged, LS dim  Skin/Wound: Abdominal incision dressing changed, old ostomy site packed with saline moistened gauze, midline intact  /GI/Diet:  BS active, passing gas, 2 small loose BM's, walked to BR. Still feels distended. Tolerating low fiber  IV: Right PIV SL   Pain: Abdominal pain; PRN Toradol, Noco, and Simethicone available. Pt c/o pain only with movement, rates 6, 2 Norco effective.  Chemo/Radiation: N/A  Abnormal Labs/Glucose: N/A  Activity/Safety: up with 1 and gait belt, bernabe-walker available which she declines d/t arthritis discomfort in right hand. Ambulating in [place];. Up in chair  Consults: Colorectal surgery primary  Education: IS, importance of repositioning, splinting abdomen with pillow, pain medication and nonpharm interventions for pain         "

## 2017-04-20 ENCOUNTER — CARE COORDINATION (OUTPATIENT)
Dept: CASE MANAGEMENT | Facility: CLINIC | Age: 77
End: 2017-04-20

## 2017-04-20 VITALS
OXYGEN SATURATION: 92 % | HEART RATE: 94 BPM | RESPIRATION RATE: 16 BRPM | WEIGHT: 111.99 LBS | HEIGHT: 66 IN | DIASTOLIC BLOOD PRESSURE: 75 MMHG | SYSTOLIC BLOOD PRESSURE: 131 MMHG | TEMPERATURE: 96.4 F | BODY MASS INDEX: 18 KG/M2

## 2017-04-20 LAB
ALBUMIN SERPL-MCNC: 2.4 G/DL (ref 3.4–5)
ALP SERPL-CCNC: 102 U/L (ref 40–150)
ALT SERPL W P-5'-P-CCNC: 14 U/L (ref 0–50)
ANION GAP SERPL CALCULATED.3IONS-SCNC: 7 MMOL/L (ref 3–14)
AST SERPL W P-5'-P-CCNC: 17 U/L (ref 0–45)
BILIRUB SERPL-MCNC: 0.3 MG/DL (ref 0.2–1.3)
BUN SERPL-MCNC: 7 MG/DL (ref 7–30)
CALCIUM SERPL-MCNC: 8.5 MG/DL (ref 8.5–10.1)
CHLORIDE SERPL-SCNC: 107 MMOL/L (ref 94–109)
CO2 SERPL-SCNC: 29 MMOL/L (ref 20–32)
CREAT SERPL-MCNC: 0.5 MG/DL (ref 0.52–1.04)
ERYTHROCYTE [DISTWIDTH] IN BLOOD BY AUTOMATED COUNT: 17.4 % (ref 10–15)
GFR SERPL CREATININE-BSD FRML MDRD: ABNORMAL ML/MIN/1.7M2
GLUCOSE SERPL-MCNC: 102 MG/DL (ref 70–99)
HCT VFR BLD AUTO: 27.5 % (ref 35–47)
HGB BLD-MCNC: 8.3 G/DL (ref 11.7–15.7)
INR PPP: 0.98 (ref 0.86–1.14)
MCH RBC QN AUTO: 25.3 PG (ref 26.5–33)
MCHC RBC AUTO-ENTMCNC: 30.2 G/DL (ref 31.5–36.5)
MCV RBC AUTO: 84 FL (ref 78–100)
PLATELET # BLD AUTO: 388 10E9/L (ref 150–450)
POTASSIUM SERPL-SCNC: 3.6 MMOL/L (ref 3.4–5.3)
PROT SERPL-MCNC: 5.7 G/DL (ref 6.8–8.8)
RBC # BLD AUTO: 3.28 10E12/L (ref 3.8–5.2)
SODIUM SERPL-SCNC: 143 MMOL/L (ref 133–144)
WBC # BLD AUTO: 6.4 10E9/L (ref 4–11)

## 2017-04-20 PROCEDURE — 36415 COLL VENOUS BLD VENIPUNCTURE: CPT | Performed by: SURGERY

## 2017-04-20 PROCEDURE — 80053 COMPREHEN METABOLIC PANEL: CPT | Performed by: SURGERY

## 2017-04-20 PROCEDURE — 25000132 ZZH RX MED GY IP 250 OP 250 PS 637: Mod: GY | Performed by: COLON & RECTAL SURGERY

## 2017-04-20 PROCEDURE — A9270 NON-COVERED ITEM OR SERVICE: HCPCS | Mod: GY | Performed by: COLON & RECTAL SURGERY

## 2017-04-20 PROCEDURE — 85027 COMPLETE CBC AUTOMATED: CPT | Performed by: SURGERY

## 2017-04-20 PROCEDURE — 85610 PROTHROMBIN TIME: CPT | Performed by: SURGERY

## 2017-04-20 RX ORDER — HYDROCODONE BITARTRATE AND ACETAMINOPHEN 5; 325 MG/1; MG/1
1-2 TABLET ORAL EVERY 6 HOURS PRN
Qty: 20 TABLET | Refills: 0 | Status: SHIPPED | DISCHARGE
Start: 2017-04-20 | End: 2017-05-03 | Stop reason: DRUGHIGH

## 2017-04-20 RX ADMIN — LEVOTHYROXINE SODIUM 75 MCG: 75 TABLET ORAL at 06:05

## 2017-04-20 NOTE — PLAN OF CARE
Problem: Goal Outcome Summary  Goal: Goal Outcome Summary  Physical Therapy Discharge Summary     Reason for therapy discharge:    Discharged to transitional care facility.     Progress towards therapy goal(s). See goals on Care Plan in [facility] electronic health record for goal details.  Goals partially met.  Barriers to achieving goals:   discharge from facility.     Therapy recommendation(s):    Continued therapy is recommended.  Rationale/Recommendations:  Recommend continued PT at TCU to progress mobility.

## 2017-04-20 NOTE — PLAN OF CARE
Problem: Goal Outcome Summary  Goal: Goal Outcome Summary  Outcome: No Change  Patient A&O. Forgetful. VSS. Denied pain overnight. Abdominal dressing c/d/i. Low fiber diet. R PIV SL.  Loose stool c-diff negative.Up with A1. TCU at discharge. Will continue to monitor.

## 2017-04-20 NOTE — PLAN OF CARE
Problem: Goal Outcome Summary  Goal: Goal Outcome Summary  Outcome: Adequate for Discharge Date Met:  04/20/17  VSS. RA. A&Ox3, forgetful. Pain minimal, declined pain meds. Diminished LS, non-prod cough. Distended abd, 1 loose/soft BM. Incision WDL, new packing in old stoma site, dressing changed. Up SBA. Denies bloating, ate 75% breakfast. Low fiber diet. D/c to [facility] at noon by wc.

## 2017-04-20 NOTE — DISCHARGE SUMMARY
[facility] Discharge Summary      Sherita Kenney MRN# [id number]   Age: 76 year old YOB: 1940     Date of Admission:  4/13/2017  Date of Discharge::  4/20/2017 12:15 PM  Admitting Physician:  Lindsay Peter MD  Discharge Physician:  Lindsay Peter MD     PCP:  Latanya Martinez    Disposition: Patient discharged from [facility] to TCU in stable condition.        Primary Diagnosis:   Colon cancer with metastasis to the lymph nodes            Discharge Medications:   Discharge Medication List as of 4/20/2017 11:15 AM      START taking these medications    Details   HYDROcodone-acetaminophen (NORCO) 5-325 MG per tablet Take 1-2 tablets by mouth every 6 hours as needed for moderate to severe pain, Disp-20 tablet, R-0, Transitional         CONTINUE these medications which have CHANGED    Details   enoxaparin (LOVENOX) 40 MG/0.4ML injection Inject 0.4 mLs (40 mg) Subcutaneous every 24 hours, Disp-24 Syringe, R-0, Transitional         CONTINUE these medications which have NOT CHANGED    Details   Acetaminophen (TYLENOL PO) Take 650 mg by mouth every 4 hours as needed for mild pain or fever, Historical      CALCIUM-MAGNESIUM-ZINC PO Take 2 tablets by mouth daily, Historical      GuaiFENesin (MUCINEX PO) Take 1,200 mg by mouth 2 times daily (takes 2 x 600mg tablet = 1200mg dose), Historical      LEVOTHYROXINE SODIUM PO Take 75 mcg by mouth daily, Historical      ipratropium - albuterol 0.5 mg/2.5 mg/3 mL (DUONEB) 0.5-2.5 (3) MG/3ML neb solution Take 1 vial by nebulization every 4 hours as needed for shortness of breath / dyspnea or wheezing, Historical      Cyclobenzaprine HCl (FLEXERIL PO) Take 10 mg by mouth 3 times daily as needed for muscle spasms, Historical      diclofenac (VOLTAREN) 1 % GEL topical gel Place 2 g onto the skin 3 times daily as needed for moderate pain Apply to R handHistorical      OLANZapine zydis (ZYPREXA) 5 MG ODT tab Take 1 tablet (5 mg) by mouth  At Bedtime, Transitional      SUMATRIPTAN SUCCINATE PO Take 25 mg by mouth every 8 hours as needed for migraine, Historical      Travoprost (TRAVATAN Z) 0.004 % SOLN Place 1 drop into both eyes At Bedtime , Historical      ferrous gluconate (FERGON) 324 (38 FE) MG tablet Take 1 tablet by mouth daily (with lunch)., Historical      B-Complex TABS Take 1 tablet by mouth daily., Historical                    Follow Up, Special Instructions:   Discharge diet: Low residue   Discharge activity: No lifting, driving, or strenuous exercise for 6 week(s)   Discharge follow-up: Follow up with Dr. Peter in 3-4 weeks   Wound care: Keep wound clean and dry              Procedures:   Procedure(s): Open right colectomy, takedown of ileostomy, exploration of right ureterolysis.                 Consultations:   ICU, nutrition, hem/onc, PT/OT          Brief Hospital Summary:   Patient is a 76 year old woman.  she underwent open right colectomy with takedown of ileostomy on 4/13/17 by Dr Peter.   There were no immediate complications during this procedure.    Please refer to the full operative summary for details.  The patient's hospital course was unremarkable. She was left intubated initially after surgery given her weak breathing and history of post polio syndrome. She was able to extubated on POD#1. She was transferred to the floor on POD#3. Her diet was advanced and she had return of bowel function. She did have diarrhea and a C diff stool culture was checked. This came back negative. She recovered as anticipated and experienced no post-operative complications.         Attestation:  I have reviewed today's vital signs, notes, medications, labs and imaging.    Yessy Whalen PA-C  [facility]          ADDENDUM:  Length of stay: 6 days  Indicate Y or N for the following:  UTI  No  C diff  No  PNA  No  SSI No  DVT No  PE  No  CVA No  MI No  Enterocutaneous fistula  No  Peripheral nerve injury  No  Abscess (not  adjacent to anastomosis)  No  Leak No    Treated with:   Antibiotics N/A   Drain  N/A   Reoperation  N/A  Death within 30 days No  Reintubation  No  Reoperation  No   Procedure NA    FOR CANCER CASES:  T stage: 4  N stage: 2   Total number of nodes: 24   Total positive: 15  M stage:   R:   TME grade, if known (1,2,3):   MSI (pos, neg): pos

## 2017-04-20 NOTE — PROGRESS NOTES
NEVILLE  I: NEVILLE called [facility] and updated Kasey that patient is ready for d/c today. NEVILLE sent orders via DOD and sent e-mail. NEVILLE will call Niece and discuss transportation needs.  NEVILLE spoke with Niece and she feels a w/c ride is best. NEVILLE discussed fees with patient's niece, she was okay with this. NEVILLE arranged ride for 1200. NEVILLE will update niece and facility.    P: SW will continue to follow and assist as needed.    PAS-RR    D: Per DHS regulation, NEVILLE completed and submitted PAS-RR to [place] [employer] Direct Connect via the Senior LinkAge Line.  PAS-RR confirmation # is :[id number]      I: NEVILLE spoke with judi and they are aware a PAS-RR has been submitted.  NEVILLE reviewed with judi  that they may be contacted for a follow up appointment within 10 days of hospital discharge if their SNF stay is < 30 days.  Contact information for [facility] LinkAge Line was also provided.    A: judi verbalized understanding.    P: Further questions may be directed to [facility] LinkAge Line at #[phone], option #4 for PAS-RR staff.      Montse Moffett, RONN   *[phone]

## 2017-04-20 NOTE — PROGRESS NOTES
COLON & RECTAL SURGERY PROGRESS NOTE    April 20, 2017  Post-op Day # 6      SUBJECTIVE:   No acute events  Tolerating solid diet  No NV  Passing flatus, loose stools. Cdiff negative yesterday  No fever/chills  Pain well controlled    OBJECTIVE:  Temp:  [97.7  F (36.5  C)-98  F (36.7  C)] 98  F (36.7  C)  Heart Rate:  [] 85  Resp:  [16-18] 16  BP: (124-148)/(62-79) 144/68  SpO2:  [95 %] 95 %    Intake/Output Summary (Last 24 hours) at 04/20/17 0808  Last data filed at 04/19/17 2113   Gross per 24 hour   Intake              590 ml   Output              925 ml   Net             -335 ml       GENERAL: Awake, alert, no acute distress,   HEAD - Nomocephalic atraumatic  SCLERA anicteric  EXTREMITIES warm and well perfused  ABDOMEN: Soft, appropriately tender, mildly-distended  INCISION: C/d/i, stoma site clean/packed       LABS:  Lab Results   Component Value Date    WBC 6.4 04/20/2017     Lab Results   Component Value Date    HGB 8.3 04/20/2017     Lab Results   Component Value Date    HCT 27.5 04/20/2017     Lab Results   Component Value Date     04/20/2017     Last Basic Metabolic Panel:  Lab Results   Component Value Date     04/20/2017      Lab Results   Component Value Date    POTASSIUM 3.6 04/20/2017     Lab Results   Component Value Date    CHLORIDE 107 04/20/2017     Lab Results   Component Value Date    SARITHA 8.5 04/20/2017     Lab Results   Component Value Date    CO2 29 04/20/2017     Lab Results   Component Value Date    BUN 7 04/20/2017     Lab Results   Component Value Date    CR 0.50 04/20/2017     Lab Results   Component Value Date     04/20/2017       ASSESSMENT/PLAN:  POD#6 Open ileostomy takedown and right colectomy  1. Continuing with low fiber diet.   2. PO PRN pain meds  3. OOB, ambulate  4. Lovenox for prophylaxis  5. Dressing changes to takedown site  6. PT, OT.   7. Anticipate discharge to TCU later today provided diarrhea getting better.      Reviewed with staff   Rome Urrutia MD  Colon & Rectal Surgery Fellow  Pager [phone]    Agree with above. Suspect that loose stool is related to resection of ileum and right colon; anticipate improvement with increase in diet. If not may need imodium. However would observe for now.   She is ready for transfer when bed available.   Should have Lovenox for total of 30 days post-op.

## 2017-04-20 NOTE — PLAN OF CARE
"Problem: Goal Outcome Summary  Goal: Goal Outcome Summary  Outcome: No Change     Plans for DC:[facility]; 1 to 2 days, when tolerating diet and decreased bloating      DO NOT REMOVE ANY FIELDS BELOW - TYPE N/A OR WNL IF NO INFO TO ADD  Procedure/POD: POD#6  Neuro/Psych/Sleep: A/O, mild forgetfullness    CV/Tele/Abnormal VS: /62 (BP Location: Right arm)  Pulse 94  Temp 97.7  F (36.5  C) (Oral)  Resp 16  Ht 1.676 m (5' 6\")  Wt 54.5 kg (120 lb 2.4 oz)  SpO2 95%  BMI 19.39 kg/m2  Resp:   Weak, congested NP cough. IS encouraged, LS dim  Skin/Wound: Abdominal incision dressing changed this AM, old ostomy site packed with saline moistened gauze, midline intact  /GI/Diet:  BS active, passing gas, walked to BR. Still feels distended. Tolerating low fiber  IV: Right PIV SL   Pain: Abdominal pain; PRN Toradol, Noco, and Simethicone available. C/o pain 8/10 Tylenol and heat pack given, pain is managed   Chemo/Radiation: N/A  Abnormal Labs/Glucose: N/A  Activity/Safety: up with 1 and gait belt, bernabe-walker available which she declines d/t arthritis discomfort in right hand.   Consults: Colorectal surgery primary  Education: IS, importance of repositioning, splinting abdomen with pillow, pain medication and nonpharm interventions for pain             "

## 2017-04-21 ENCOUNTER — NURSING HOME VISIT (OUTPATIENT)
Dept: GERIATRICS | Facility: CLINIC | Age: 77
End: 2017-04-21
Payer: COMMERCIAL

## 2017-04-21 ENCOUNTER — CARE COORDINATION (OUTPATIENT)
Dept: CASE MANAGEMENT | Facility: CLINIC | Age: 77
End: 2017-04-21

## 2017-04-21 VITALS
HEART RATE: 86 BPM | SYSTOLIC BLOOD PRESSURE: 93 MMHG | BODY MASS INDEX: 18.34 KG/M2 | OXYGEN SATURATION: 96 % | TEMPERATURE: 98.3 F | HEIGHT: 66 IN | RESPIRATION RATE: 18 BRPM | WEIGHT: 114.1 LBS | DIASTOLIC BLOOD PRESSURE: 59 MMHG

## 2017-04-21 DIAGNOSIS — R41.89 COGNITIVE IMPAIRMENT: ICD-10-CM

## 2017-04-21 DIAGNOSIS — J00 ACUTE NASOPHARYNGITIS: ICD-10-CM

## 2017-04-21 DIAGNOSIS — G14 POSTPOLIO SYNDROME (H): ICD-10-CM

## 2017-04-21 DIAGNOSIS — R53.81 PHYSICAL DECONDITIONING: ICD-10-CM

## 2017-04-21 DIAGNOSIS — Z90.49 S/P RIGHT COLECTOMY: ICD-10-CM

## 2017-04-21 DIAGNOSIS — C18.2 MALIGNANT NEOPLASM OF ASCENDING COLON (H): Primary | ICD-10-CM

## 2017-04-21 DIAGNOSIS — D50.8 OTHER IRON DEFICIENCY ANEMIA: ICD-10-CM

## 2017-04-21 PROCEDURE — 99207 ZZC CDG-CORRECTLY CODED, REVIEWED AND AGREE: CPT | Performed by: NURSE PRACTITIONER

## 2017-04-21 PROCEDURE — 99310 SBSQ NF CARE HIGH MDM 45: CPT | Performed by: NURSE PRACTITIONER

## 2017-04-21 NOTE — PROGRESS NOTES
[place] [facility]  PRIMARY CARE PROVIDER AND CLINIC:  Latanya Martinez [facility] [address] [facility] [address] / [place]  Chief Complaint   Patient presents with     Hospital F/U       HPI:    Sherita Kenney is a 76 year old  (1940),admitted [facility] the [facility] from [facility].  Hospital stay 4/13/17 through 4/20/17.  Admitted to this facility for  rehab, medical management and nursing care.     Current issues are:        Malignant neoplasm of ascending colon (H)  S/p right colectomy  Patient was found disoriented with acute psychosis at home, was transferred to [facility] on 2/15. Noted to have a RLQ abdominal mass upon hospital admission 2/15. CT abdomen positive for right iliopsoas abscess. Started on zosyn x 2 weeks and s/p drainage of abscess in IR on 2/15. Patient had abdominal distention on 2/23, CT positive for small bowel obstruction. Colorectal surgery consulted. S/p colonoscopy and lap ileostomy 3/1. CT also positive for large mass in cecum with fixation to abdominal wall and lesions on surface of the right liver. Was diagnosed with adenocarcinoma of colon. Patient was at [facility] 3/10-4/13 for rehabilitation and assistance with ileostomy cares. Patient then discharged to [facility] on 4/13 for planned surgery. S/p open right colectomy with takedown of ileostomy by Dr. Peter. Noted to have diarrhea post-op, C diff negative. Patient admits to mild pain at rest, states pain increases to 8/10 with activity. Has norco available prn for pain medications. Also taking lovenox qd for DVT prophylaxis, to complete total of 30 days of lovenox therapy. Oncology following, patient to f/u with Dr. Lindo on 5/15. Colorectal surgery following, patient to f/u with Dr. Peter on 5/24.     Other iron deficiency anemia  Hgb baseline 8-9. Last Hgb was 8.3 on 4/20. Currently taking ferrous gluconate 324mg qd. No active sx of bleeding. Denies fatigue, SOB.       Acute  nasopharyngitis  Admits to [facility] over the past few weeks. Has mucinex available TID. Afebrile. Denies SOB, wheezing, feeling feverish, night sweats, chills.     Postpolio syndrome  H/o polio during childhood with residual effects of loss of movement to left upper extremity. Uses a sling/brace at times. Currently taking flexeril TID. Admits to sensation to LUE.     Cognitive Impairment  Physical deconditioning  During hospital stay, SLUMS 8/30 on 2/17. Was seen by Psychiatry inpatient, was diagnosed with acute unspecified psychosis. Patient w/o history of mental illness in the past or diagnosed with dementia. Oriented to self and place. Patient is pleasantly confused, has poor short term memory. No reports of agitation or behavioral disturbances by RN staff. No active psychosis. Referral to Neuropsychologist d/t cognitive impairment and diagnosis of unspecified psychosis per request of patient's POA (Danay). Patient to f/u with Neuropsychologist on 5/23. PTA patient was living alone. Patient is actively participating in therapy sessions. SW following for discharge planning.        CODE STATUS/ADVANCE DIRECTIVES DISCUSSION:   CPR/Full code   Patient's living condition: lives alone    ALLERGIES:Wool fiber  PAST MEDICAL HISTORY:  has a past medical history of Abnormal gait; Adenocarcinoma of colon (H); Arthralgia of upper arm; Arthritis; Arthritis of right hand; Cancer (H); Cancer of right colon (H); Cognitive impairment; COPD (chronic obstructive pulmonary disease) (H); DJD (degenerative joint disease); Fibromatoses of muscle, ligament, and fascia; Flail joint; Generalized OA; 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 She also has no past medical history of Cerebral infarction (H); Congestive  heart failure (H); Depressive disorder; Diabetes (H); History of blood transfusion; Hypertension; or Uncomplicated asthma.  PAST SURGICAL HISTORY:  has a past surgical history that includes Laparoscopic Ileostomy (N/A, 3/1/2017); Ileostomy (N/A, 3/1/2017); Soft tissue surgery; Colonoscopy (N/A, 3/1/2017); I & D abdominal abscess; Ileostomy; severe protein-calorie malnutrition; Colectomy right (N/A, 4/13/2017); Laparoscopic ureterolysis (4/13/2017); and Takedown ileostomy (N/A, 4/13/2017).  FAMILY HISTORY: family history includes Breast Cancer in her maternal grandmother.  SOCIAL HISTORY:  reports that she quit smoking about 31 years ago. Her smoking use included Cigarettes. She has a 22.50 pack-year smoking history. She does not have any smokeless tobacco history on file. She reports that she does not drink alcohol or use illicit drugs.    Post Discharge Medication Reconciliation Status: discharge medications reconciled and changed, per note/orders (see AVS).  Current Outpatient Prescriptions   Medication Sig Dispense Refill     HYDROcodone-acetaminophen (NORCO) 5-325 MG per tablet Take 1-2 tablets by mouth every 6 hours as needed for moderate to severe pain 20 tablet 0     enoxaparin (LOVENOX) 40 MG/0.4ML injection Inject 0.4 mLs (40 mg) Subcutaneous every 24 hours 24 Syringe 0     Acetaminophen (TYLENOL PO) Take 650 mg by mouth every 4 hours as needed for mild pain or fever       CALCIUM-MAGNESIUM-ZINC PO Take 2 tablets by mouth daily       GuaiFENesin (MUCINEX PO) Take 1,200 mg by mouth 2 times daily (takes 2 x 600mg tablet = 1200mg dose)       LEVOTHYROXINE SODIUM PO Take 75 mcg by mouth daily       ipratropium - albuterol 0.5 mg/2.5 mg/3 mL (DUONEB) 0.5-2.5 (3) MG/3ML neb solution Take 1 vial by nebulization every 4 hours as needed for shortness of breath / dyspnea or wheezing       Cyclobenzaprine HCl (FLEXERIL PO) Take 10 mg by mouth 3 times daily as needed for muscle spasms       diclofenac (VOLTAREN) 1 %  "GEL topical gel Place 2 g onto the skin 3 times daily as needed for moderate pain Apply to R hand       OLANZapine zydis (ZYPREXA) 5 MG ODT tab Take 1 tablet (5 mg) by mouth At Bedtime       SUMATRIPTAN SUCCINATE PO Take 25 mg by mouth every 8 hours as needed for migraine       Travoprost (TRAVATAN Z) 0.004 % SOLN Place 1 drop into both eyes At Bedtime        ferrous gluconate (FERGON) 324 (38 FE) MG tablet Take 1 tablet by mouth daily (with lunch).       B-Complex TABS Take 1 tablet by mouth daily.           ROS:  10 point ROS of systems including Constitutional, Eyes, Respiratory, Cardiovascular, Gastroenterology, Genitourinary, Integumentary, Muscularskeletal, Psychiatric were all negative except for pertinent positives noted in my HPI.      Exam:  BP 93/59  Pulse 86  Temp 98.3  F (36.8  C)  Resp 18  Ht 5' 6\" (1.676 m)  Wt 114 lb 1.6 oz (51.8 kg)  SpO2 96%  BMI 18.42 kg/m2  GENERAL APPEARANCE:  Alert, in no distress, oriented, cooperative  ENT:  Mouth and posterior oropharynx normal, moist mucous membranes, normal hearing acuity  EYES:  EOM, conjunctivae, lids, pupils and irises normal  NECK:  No adenopathy,masses or thyromegaly  RESP:  respiratory effort and palpation of chest normal, lungs clear to auscultation, no respiratory distress, diminished breath sounds at bases  CV:  Palpation and auscultation of heart done , regular rate and rhythm, no murmur, rub, or gallop, +2 pedal pulses, nonpitting edema  ABDOMEN:  normal bowel sounds, soft, nontender, no hepatosplenomegaly or other masses, no guarding or rebound  M/S: No movement to LUE d/t h/o polio. Active ROM in all other extremities. Strong hand grasp to right hand. Gait and station normal. Digits and nails normal.   SKIN: Inspection of skin and subcutaneous tissue baseline, Palpation of skin and subcutaneous tissue baseline. Arthritic changes noted in right hand. Midline abdominal incision: dressing CDI, incision JUVENAL, appears to be healing well w/o " sx of infection. Ileostomy closure site: small s/s drainage, appears to be healing well w/o sx of infection.  NEURO: Cranial nerves 2-12 are normal tested and grossly at patient's baseline, Examination of sensation by touch normal  PSYCH: Oriented X self and place, insight and judgement impaired, memory impaired, cognitive testing: SLUMS 19/30.      Lab/Diagnostic data:  CBC RESULTS:   Recent Labs   Lab Test  04/20/17   0650  04/19/17   0645   WBC  6.4  7.3   RBC  3.28*  3.34*   HGB  8.3*  8.5*   HCT  27.5*  28.0*   MCV  84  84   MCH  25.3*  25.4*   MCHC  30.2*  30.4*   RDW  17.4*  17.4*   PLT  388  404       Last Basic Metabolic Panel:  Recent Labs   Lab Test  04/20/17   0650  04/19/17   0645   NA  143  142   POTASSIUM  3.6  3.7   CHLORIDE  107  106   SARITHA  8.5  8.5   CO2  29  28   BUN  7  9   CR  0.50*  0.45*   GLC  102*  107*       Liver Function Studies -   Recent Labs   Lab Test  04/20/17   0650  04/19/17   0645   PROTTOTAL  5.7*  5.8*   ALBUMIN  2.4*  2.5*   BILITOTAL  0.3  0.2   ALKPHOS  102  109   AST  17  20   ALT  14  14       TSH   Date Value Ref Range Status   04/03/2017 2.30 0.40 - 4.00 mU/L Final   02/15/2017 4.05 (H) 0.40 - 4.00 mU/L Final     Lab Results   Component Value Date    A1C 4.7 04/13/2017    A1C 4.3 04/03/2017               ASSESSMENT/PLAN:    (C18.2) Malignant neoplasm of ascending colon (H)  (primary encounter diagnosis)  (Z90.49) S/P right colectomy  Comment: Abdominal incision appears to be healing well w/o sx of infection.   Plan: Check CBC & BMP 4/24. Abd dressing to be changed BID, please make sure to date and initial each dressing change. Monitor I & O q shift. Weight three times/week. Continue lovenox for total of 30 days. Encourage fluids. Dietary following. Oncology following, f/u on 5/15. Colorectal surgery following, patient to f/u on 5/24.     (D50.8) Other iron deficiency anemia  Comment: Acute blood loss r/t recent surgery. No active sx of bleeding.   Plan: Monitor CBC.      (J00) Acute nasopharyngitis  Comment: Ongoing  Plan: Add duoneb TID, continue q4hrs prn for SOB and wheezing. Add guaifenesin 100mg/5mL, take 20mL TID and qHS for cough and congestion x 4 days. Ok to self administer nebs.     (G14) Postpolio syndrome  Comment: Chronic loss of movement to LUE  Plan: Offer sling during day    (R41.89) Cognitive impairment  (R53.81) Physical deconditioning  Comment: Ongoing  Plan: Encourage active participation in therapy session to increase strength and promote independence in activities and ADLs. Repeat cognitive testing to be done in therapy.               Information reviewed:  Medications, vital signs, orders, nursing notes, problem list, hospital information. Total time spent with patient visit was 35 min including patient visit, review of past records and phone call to patient contact. Greater than 50% of total time spent with counseling and coordinating care.      Electronically signed by:  WILLY Brewer CNP

## 2017-04-24 ENCOUNTER — HOSPITAL LABORATORY (OUTPATIENT)
Dept: OTHER | Facility: CLINIC | Age: 77
End: 2017-04-24

## 2017-04-24 PROBLEM — Z90.49 S/P RIGHT COLECTOMY: Status: ACTIVE | Noted: 2017-03-12

## 2017-04-24 LAB
ANION GAP SERPL CALCULATED.3IONS-SCNC: 9 MMOL/L (ref 3–14)
BUN SERPL-MCNC: 13 MG/DL (ref 7–30)
CALCIUM SERPL-MCNC: 8.8 MG/DL (ref 8.5–10.1)
CHLORIDE SERPL-SCNC: 108 MMOL/L (ref 94–109)
CO2 SERPL-SCNC: 26 MMOL/L (ref 20–32)
CREAT SERPL-MCNC: 0.46 MG/DL (ref 0.52–1.04)
ERYTHROCYTE [DISTWIDTH] IN BLOOD BY AUTOMATED COUNT: 16.9 % (ref 10–15)
GFR SERPL CREATININE-BSD FRML MDRD: ABNORMAL ML/MIN/1.7M2
GLUCOSE SERPL-MCNC: 70 MG/DL (ref 70–99)
HCT VFR BLD AUTO: 28 % (ref 35–47)
HGB BLD-MCNC: 8.1 G/DL (ref 11.7–15.7)
MCH RBC QN AUTO: 24.7 PG (ref 26.5–33)
MCHC RBC AUTO-ENTMCNC: 28.9 G/DL (ref 31.5–36.5)
MCV RBC AUTO: 85 FL (ref 78–100)
PLATELET # BLD AUTO: 486 10E9/L (ref 150–450)
POTASSIUM SERPL-SCNC: 3.5 MMOL/L (ref 3.4–5.3)
RBC # BLD AUTO: 3.28 10E12/L (ref 3.8–5.2)
SODIUM SERPL-SCNC: 143 MMOL/L (ref 133–144)
WBC # BLD AUTO: 8.7 10E9/L (ref 4–11)

## 2017-04-25 ENCOUNTER — NURSING HOME VISIT (OUTPATIENT)
Dept: GERIATRICS | Facility: CLINIC | Age: 77
End: 2017-04-25
Payer: COMMERCIAL

## 2017-04-25 VITALS
HEART RATE: 88 BPM | OXYGEN SATURATION: 96 % | SYSTOLIC BLOOD PRESSURE: 113 MMHG | RESPIRATION RATE: 20 BRPM | DIASTOLIC BLOOD PRESSURE: 71 MMHG | WEIGHT: 114.1 LBS | TEMPERATURE: 99.5 F | HEIGHT: 66 IN | BODY MASS INDEX: 18.34 KG/M2

## 2017-04-25 DIAGNOSIS — Z90.49 S/P RIGHT COLECTOMY: ICD-10-CM

## 2017-04-25 DIAGNOSIS — R53.81 PHYSICAL DECONDITIONING: ICD-10-CM

## 2017-04-25 DIAGNOSIS — J00 ACUTE NASOPHARYNGITIS: ICD-10-CM

## 2017-04-25 DIAGNOSIS — R41.89 COGNITIVE IMPAIRMENT: ICD-10-CM

## 2017-04-25 DIAGNOSIS — D50.8 OTHER IRON DEFICIENCY ANEMIA: ICD-10-CM

## 2017-04-25 DIAGNOSIS — C18.2 MALIGNANT NEOPLASM OF ASCENDING COLON (H): Primary | ICD-10-CM

## 2017-04-25 PROCEDURE — 99309 SBSQ NF CARE MODERATE MDM 30: CPT | Performed by: NURSE PRACTITIONER

## 2017-04-25 RX ORDER — FERROUS SULFATE 325(65) MG
325 TABLET ORAL 2 TIMES DAILY
COMMUNITY
End: 2017-06-13

## 2017-04-25 RX ORDER — VIT E ACET/GLY/DIMETH/WATER
LOTION (ML) TOPICAL 2 TIMES DAILY
COMMUNITY
End: 2017-06-29

## 2017-04-25 NOTE — PROGRESS NOTES
[place] [facility]    Chief Complaint   Patient presents with     Nursing Home Acute       HPI:    Sherita Kenney is a 76 year old  (1940), who is being seen today for an episodic care visit at [facility].     Today's concern is:    Malignant neoplasm of ascending colon (H)  S/p right colectomy  S/p open right colectomy with takedown of ileostomy by Dr. Peter on 4/13. Noted to have diarrhea post-op, C diff negative. During exam, continues to have loose stools at times. States stools are becoming more formed. Patient admits to mild pain at rest, states pain increases to 5/10 with activity. Currently taking tylenol TID and has norco available prn for pain medications. Also taking lovenox qd for DVT prophylaxis, to complete total of 30 days of lovenox therapy. Oncology following, patient to f/u with Dr. Lindo on 5/15. Colorectal surgery following, patient to f/u with Dr. Peter on 5/24.      Other iron deficiency anemia  Hgb baseline 8-9. Last Hgb was 8.1 on 4/24. Currently taking ferrous gluconate 324mg bid. No active sx of bleeding. Denies fatigue, SOB.       Acute nasopharyngitis  Patient had cough and congestion x 2 weeks. Has been taking duonebs, and mucinex. During exam, states cough and congestion has resolved. Afebrile. Denies SOB, wheezing, feeling feverish, night sweats, chills.      Cognitive Impairment  Physical deconditioning  Referral to Neuropsychologist d/t cognitive impairment. Patient to f/u with Neuropsychologist on 5/23. PTA was living alone in a condo. Patient is actively participating in therapy sessions. SW following for discharge planning.            ALLERGIES: Wool fiber  Past Medical, Surgical, Family and Social History reviewed and updated in the Shelf.    Current Outpatient Prescriptions   Medication Sig Dispense Refill     ferrous sulfate (IRON) 325 (65 FE) MG tablet Take 325 mg by mouth 2 times daily       CETAPHIL (CETAPHIL) lotion Apply topically 2 times daily        HYDROcodone-acetaminophen (NORCO) 5-325 MG per tablet Take 1-2 tablets by mouth every 6 hours as needed for moderate to severe pain 20 tablet 0     enoxaparin (LOVENOX) 40 MG/0.4ML injection Inject 0.4 mLs (40 mg) Subcutaneous every 24 hours 24 Syringe 0     Acetaminophen (TYLENOL PO) Take 650 mg by mouth 3 times daily        CALCIUM-MAGNESIUM-ZINC PO Take 2 tablets by mouth daily       GuaiFENesin (MUCINEX PO) Take 1,200 mg by mouth 2 times daily (takes 2 x 600mg tablet = 1200mg dose)       LEVOTHYROXINE SODIUM PO Take 75 mcg by mouth daily       ipratropium - albuterol 0.5 mg/2.5 mg/3 mL (DUONEB) 0.5-2.5 (3) MG/3ML neb solution Take 1 vial by nebulization every 4 hours as needed for shortness of breath / dyspnea or wheezing       Cyclobenzaprine HCl (FLEXERIL PO) Take 10 mg by mouth 3 times daily as needed for muscle spasms       diclofenac (VOLTAREN) 1 % GEL topical gel Place 2 g onto the skin 3 times daily as needed for moderate pain Apply to R hand       OLANZapine zydis (ZYPREXA) 5 MG ODT tab Take 1 tablet (5 mg) by mouth At Bedtime       SUMATRIPTAN SUCCINATE PO Take 25 mg by mouth every 8 hours as needed for migraine       Travoprost (TRAVATAN Z) 0.004 % SOLN Place 1 drop into both eyes At Bedtime        B-Complex TABS Take 1 tablet by mouth daily.       Medications reviewed:  Medications reconciled to facility chart and changes were made to reflect current medications as identified as above med list. Below are the changes that were made:   Medications stopped since last EPIC medication reconciliation:   Medications Discontinued During This Encounter   Medication Reason     ferrous gluconate (FERGON) 324 (38 FE) MG tablet Therapy completed       Medications started since last [facility] medication reconciliation:  Orders Placed This Encounter   Medications     ferrous sulfate (IRON) 325 (65 FE) MG tablet     Sig: Take 325 mg by mouth 2 times daily           REVIEW OF SYSTEMS:  4 point ROS including  "Respiratory, CV, GI and , other than that noted in the HPI,  is negative      Physical Exam:  /71  Pulse 88  Temp 99.5  F (37.5  C)  Resp 20  Ht 5' 6\" (1.676 m)  Wt 114 lb 1.6 oz (51.8 kg)  SpO2 96%  BMI 18.42 kg/m2  GENERAL APPEARANCE: Alert, in no distress, oriented, thin, anxious, cooperative  RESP: Respiratory effort and palpation of chest normal, lungs clear to auscultation, diminished at bases, no respiratory distress  CV: Palpation and auscultation of heart done , regular rate and rhythm, no murmur, rub, or gallop, +2 pedal pulses, nonpitting edema  ABDOMEN: normal bowel sounds, soft, nontender, no hepatosplenomegaly or other masses, no guarding or rebound  M/S: No movement to LUE d/t h/o polio. Active ROM in all other extremities. Strong hand grasp to right hand. Gait and station normal.   SKIN: Inspection of skin and subcutaneous tissue baseline, Palpation of skin and subcutaneous tissue baseline. Arthritic changes noted in right hand. Midline abdominal incision: dressing CDI, incision JUVENAL, appears to be healing well w/o sx of infection. Ileostomy closure site: small s/s drainage, appears to be healing well w/o sx of infection.  NEURO: Cranial nerves 2-12 are normal tested and grossly at patient's baseline, Examination of sensation by touch normal  PSYCH: Oriented X self and place, insight and judgement impaired, memory impaired, cognitive testing: SLUMS 19/30 (repeat SLUMS to be done in therapy).      Recent Labs:    CBC RESULTS:   Recent Labs   Lab Test  04/24/17   0500  04/20/17   0650   WBC  8.7  6.4   RBC  3.28*  3.28*   HGB  8.1*  8.3*   HCT  28.0*  27.5*   MCV  85  84   MCH  24.7*  25.3*   MCHC  28.9*  30.2*   RDW  16.9*  17.4*   PLT  486*  388       Last Basic Metabolic Panel:  Recent Labs   Lab Test  04/24/17   0500  04/20/17   0650   NA  143  143   POTASSIUM  3.5  3.6   CHLORIDE  108  107   SARITHA  8.8  8.5   CO2  26  29   BUN  13  7   CR  0.46*  0.50*   GLC  70  102*       Liver " Function Studies -   Recent Labs   Lab Test  04/20/17   0650  04/19/17   0645   PROTTOTAL  5.7*  5.8*   ALBUMIN  2.4*  2.5*   BILITOTAL  0.3  0.2   ALKPHOS  102  109   AST  17  20   ALT  14  14       TSH   Date Value Ref Range Status   04/03/2017 2.30 0.40 - 4.00 mU/L Final   02/15/2017 4.05 (H) 0.40 - 4.00 mU/L Final     Lab Results   Component Value Date    A1C 4.7 04/13/2017    A1C 4.3 04/03/2017             Assessment/Plan:    (C18.2) Malignant neoplasm of ascending colon (H)  (primary encounter diagnosis)  (Z90.49) S/P right colectomy  Comment: Abdominal incision appears to be healing well w/o sx of infection.  Plan: Check BMP & Hgb 4/27. Change acetaminophen to 650mg TID for pain. Continue with daily dressing changes. Oncology following, f/u on 5/15. Colorectal surgery following, patient to f/u 5/24.     (D50.8) Other iron deficiency anemia  Comment: Acute blood loss r/t recent surgery. No active sx of bleeding.   Plan: Continue plan of care. Monitor Hgb.     (J00) Acute nasopharyngitis  Comment: Resolved  Plan: Discontinue scheduled duonebs. Discontinue mucinex. Add cetaphil, apply to dry skin bid.     (R41.89) Cognitive impairment  (R53.81) Physical deconditioning  Comment: Ongoing  Plan: Encourage active participation in therapy session to increase strength and promote independence in activities and ADLs. Repeat cognitive testing to be done in therapy. Neuropsychologist appt on 5/23. SW following for discharge planning.           Time 25 minutes    Electronically signed by  WILLY Brewer CNP

## 2017-04-26 LAB — COPATH REPORT: NORMAL

## 2017-04-27 ENCOUNTER — HOSPITAL LABORATORY (OUTPATIENT)
Dept: OTHER | Facility: CLINIC | Age: 77
End: 2017-04-27

## 2017-04-27 ENCOUNTER — NURSING HOME VISIT (OUTPATIENT)
Dept: GERIATRICS | Facility: CLINIC | Age: 77
End: 2017-04-27
Payer: COMMERCIAL

## 2017-04-27 DIAGNOSIS — R41.89 COGNITIVE IMPAIRMENT: ICD-10-CM

## 2017-04-27 DIAGNOSIS — C18.2 MALIGNANT NEOPLASM OF ASCENDING COLON (H): Primary | ICD-10-CM

## 2017-04-27 DIAGNOSIS — D50.8 OTHER IRON DEFICIENCY ANEMIA: ICD-10-CM

## 2017-04-27 LAB
ANION GAP SERPL CALCULATED.3IONS-SCNC: 6 MMOL/L (ref 3–14)
BUN SERPL-MCNC: 22 MG/DL (ref 7–30)
CALCIUM SERPL-MCNC: 8.5 MG/DL (ref 8.5–10.1)
CHLORIDE SERPL-SCNC: 108 MMOL/L (ref 94–109)
CO2 SERPL-SCNC: 28 MMOL/L (ref 20–32)
CREAT SERPL-MCNC: 0.6 MG/DL (ref 0.52–1.04)
GFR SERPL CREATININE-BSD FRML MDRD: NORMAL ML/MIN/1.7M2
GLUCOSE SERPL-MCNC: 84 MG/DL (ref 70–99)
HGB BLD-MCNC: 7.9 G/DL (ref 11.7–15.7)
POTASSIUM SERPL-SCNC: 3.8 MMOL/L (ref 3.4–5.3)
SODIUM SERPL-SCNC: 142 MMOL/L (ref 133–144)

## 2017-04-27 PROCEDURE — 99207 ZZC CDG-CORRECTLY CODED, REVIEWED AND AGREE: CPT | Performed by: INTERNAL MEDICINE

## 2017-04-27 PROCEDURE — 99309 SBSQ NF CARE MODERATE MDM 30: CPT | Performed by: INTERNAL MEDICINE

## 2017-04-27 NOTE — MR AVS SNAPSHOT
After Visit Summary   4/27/2017    Sherita Kenney    MRN: [id number]           Patient Information     Date Of Birth          1940        Visit Information        Provider Department      4/27/2017 4:01 PM Joe Zapata MD [facility]        Today's Diagnoses     Malignant neoplasm of ascending colon (H)    -  1    Other iron deficiency anemia        Cognitive impairment           Follow-ups after your visit        Your next 10 appointments already scheduled     May 15, 2017  2:00 PM CDT   Return Visit with Suasn Lindo MD   [facility] ([facility])    [facility]  [address]  [facility] [address]   [phone]            May 23, 2017  8:00 AM CDT   (Arrive by 7:45 AM)   New Patient Visit with Lisa Man, PhD [facility] ([facility])    [address] [facility]  [address] [place] [address] [phone]              Who to contact     If you have questions or need follow up information about today's clinic visit or your schedule please contact G[facility]AT[facility] directly at [phone].  Normal or non-critical lab and imaging results will be communicated to you by MyChart, letter or phone within 4 business days after the clinic has received the results. If you do not hear from us within 7 days, please contact the clinic through MyChart or phone. If you have a critical or abnormal lab result, we will notify you by phone as soon as possible.  Submit refill requests through [employer] or call your pharmacy and they will forward the refill request to us. Please allow 3 business days for your refill to be completed.          Additional Information About Your Visit        MyChart Information     [employer] lets you send messages to your doctor, view your test results, renew your prescriptions, schedule appointments and more. To sign up, go to  [email or web address] . Click on \"Log in\" on the left side of the screen, which will take you to the Welcome page. Then click on \"Sign up Now\" on the right side of the page.     You will be asked to enter the access code listed below, as well as some personal information. Please follow the directions to create your username and password.     Your access code is: [email or web address]  Expires: 2017  1:06 PM     Your access code will  in 90 days. If you need help or a new code, please call your [place] clinic or [phone].        Care EveryWhere ID     This is your Care EveryWhere ID. This could be used by other organizations to access your [place] medical records  [id number]         Blood Pressure from Last 3 Encounters:   17 113/71   17 93/59   17 131/75    Weight from Last 3 Encounters:   17 114 lb 1.6 oz (51.8 kg)   17 114 lb 1.6 oz (51.8 kg)   17 111 lb 15.9 oz (50.8 kg)              Today, you had the following     No orders found for display       Primary Care Provider Office Phone # Fax #    Latanya Martinez [phone]       [facility] [address] [address]  [facility] [address]        Thank you!     Thank you for choosing G[facility]IATR[facility] TRANSITIONAL [facility]  for your care. Our goal is always to provide you with excellent care. Hearing back from our patients is one way we can continue to improve our services. Please take a few minutes to complete the written survey that you may receive in the mail after your visit with us. Thank you!             Your Updated Medication List - Protect others around you: Learn how to safely use, store and throw away your medicines at www.disposemymeds.org.          This list is accurate as of: 17 11:59 PM.  Always use your most recent med list.                   Brand Name Dispense Instructions for use    CALCIUM-MAGNESIUM-ZINC PO      Take 2 tablets by mouth daily       CETAPHIL lotion      Apply " topically 2 times daily       diclofenac 1 % Gel topical gel    VOLTAREN     Place 2 g onto the skin 3 times daily as needed for moderate pain Apply to R hand       enoxaparin 40 MG/0.4ML injection    LOVENOX    24 Syringe    Inject 0.4 mLs (40 mg) Subcutaneous every 24 hours       ferrous sulfate 325 (65 FE) MG tablet    IRON     Take 325 mg by mouth 2 times daily       FLEXERIL PO      Take 10 mg by mouth 3 times daily as needed for muscle spasms       HYDROcodone-acetaminophen 5-325 MG per tablet    NORCO    20 tablet    Take 1-2 tablets by mouth every 6 hours as needed for moderate to severe pain       ipratropium - albuterol 0.5 mg/2.5 mg/3 mL 0.5-2.5 (3) MG/3ML neb solution    DUONEB     Take 1 vial by nebulization every 4 hours as needed for shortness of breath / dyspnea or wheezing       LEVOTHYROXINE SODIUM PO      Take 75 mcg by mouth daily       MUCINEX PO      Take 1,200 mg by mouth 2 times daily (takes 2 x 600mg tablet = 1200mg dose)       OLANZapine zydis 5 MG ODT tab    zyPREXA     Take 1 tablet (5 mg) by mouth At Bedtime       SUMATRIPTAN SUCCINATE PO      Take 25 mg by mouth every 8 hours as needed for migraine       TRAVATAN Z 0.004 % ophthalmic solution   Generic drug:  travoprost (BAK Free)      Place 1 drop into both eyes At Bedtime       TYLENOL PO      Take 650 mg by mouth 3 times daily       vitamin B-Complex      Take 1 tablet by mouth daily.

## 2017-04-28 ENCOUNTER — HOSPITAL LABORATORY (OUTPATIENT)
Dept: OTHER | Facility: CLINIC | Age: 77
End: 2017-04-28

## 2017-04-28 LAB
C DIFF TOX B STL QL: ABNORMAL
SPECIMEN SOURCE: ABNORMAL

## 2017-04-28 NOTE — PROGRESS NOTES
[place] [facility]  PRIMARY CARE PROVIDER AND CLINIC:  Latanya Martinez [facility] [address] [facility] [facility] [address] / [place]      Pt was seen by Dr Zapata on 4/271/7 for a hospital f/u visit    Pt known to me from previous TCU stay    HPI:    Sherita Kenney is a 76 year old  (1940),admitted to the [facility] from [facility].  Hospital stay 4/13/17 through 4/20/17.      Admitted to this facility for  rehab, medical management and nursing care.       Pt has known cecal carcinoma, underwent planned open right colectomy with takedown of ileostomy by Dr. Peter.  Post op course generally unremarkable. Hgb 8.3 prior to hospital discharge    Pt reports feeling well  Abd pain is controlled  Stools are occ loose  Appetite is fair  She has an occasional cough, denies chest pain, SOB, dizziness      CODE STATUS/ADVANCE DIRECTIVES DISCUSSION:   CPR/Full code   Patient's living condition: lives alone    ALLERGIES:Wool fiber  PAST MEDICAL HISTORY:  has a past medical history of Abnormal gait; Adenocarcinoma of colon (H); Arthralgia of upper arm; Arthritis; Arthritis of right hand; Cancer (H); Cancer of right colon (H); Cognitive impairment; COPD (chronic obstructive pulmonary disease) (H); DJD (degenerative joint disease); Fibromatoses of muscle, ligament, and fascia; Flail joint; Generalized OA; 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 She also has no past medical history of Cerebral infarction (H); Congestive heart failure (H); Depressive disorder; Diabetes (H); History of blood transfusion; Hypertension; or Uncomplicated asthma.  PAST SURGICAL HISTORY:  has a past surgical history that includes Laparoscopic Ileostomy (N/A, 3/1/2017); Ileostomy (N/A,  3/1/2017); Soft tissue surgery; Colonoscopy (N/A, 3/1/2017); I & D abdominal abscess; Ileostomy; severe protein-calorie malnutrition; Colectomy right (N/A, 4/13/2017); Laparoscopic ureterolysis (4/13/2017); and Takedown ileostomy (N/A, 4/13/2017).  FAMILY HISTORY: family history includes Breast Cancer in her maternal grandmother.  SOCIAL HISTORY:  reports that she quit smoking about 31 years ago. Her smoking use included Cigarettes. She has a 22.50 pack-year smoking history. She does not have any smokeless tobacco history on file. She reports that she does not drink alcohol or use illicit drugs.    Post Discharge Medication Reconciliation Status: discharge medications reconciled and changed, per note/orders (see AVS).  Current Outpatient Prescriptions   Medication Sig Dispense Refill     ferrous sulfate (IRON) 325 (65 FE) MG tablet Take 325 mg by mouth 2 times daily       CETAPHIL (CETAPHIL) lotion Apply topically 2 times daily       HYDROcodone-acetaminophen (NORCO) 5-325 MG per tablet Take 1-2 tablets by mouth every 6 hours as needed for moderate to severe pain 20 tablet 0     enoxaparin (LOVENOX) 40 MG/0.4ML injection Inject 0.4 mLs (40 mg) Subcutaneous every 24 hours 24 Syringe 0     Acetaminophen (TYLENOL PO) Take 650 mg by mouth 3 times daily        CALCIUM-MAGNESIUM-ZINC PO Take 2 tablets by mouth daily       GuaiFENesin (MUCINEX PO) Take 1,200 mg by mouth 2 times daily (takes 2 x 600mg tablet = 1200mg dose)       LEVOTHYROXINE SODIUM PO Take 75 mcg by mouth daily       ipratropium - albuterol 0.5 mg/2.5 mg/3 mL (DUONEB) 0.5-2.5 (3) MG/3ML neb solution Take 1 vial by nebulization every 4 hours as needed for shortness of breath / dyspnea or wheezing       Cyclobenzaprine HCl (FLEXERIL PO) Take 10 mg by mouth 3 times daily as needed for muscle spasms       diclofenac (VOLTAREN) 1 % GEL topical gel Place 2 g onto the skin 3 times daily as needed for moderate pain Apply to R hand       OLANZapine zydis (ZYPREXA)  5 MG ODT tab Take 1 tablet (5 mg) by mouth At Bedtime       SUMATRIPTAN SUCCINATE PO Take 25 mg by mouth every 8 hours as needed for migraine       Travoprost (TRAVATAN Z) 0.004 % SOLN Place 1 drop into both eyes At Bedtime        B-Complex TABS Take 1 tablet by mouth daily.           ROS:  10 point ROS neg except as noted above.        Exam:    GENERAL APPEARANCE:  Alert, in no distress, oriented, cooperative, pale appearing  ENT:  Mouth and posterior oropharynx normal, moist mucous membranes, normal hearing acuity  EYES:  EOM, conjunctivae, lids, pupils and irises normal  RESP:  Lungs clear  CV: RRR no m  ABDOMEN:  Soft, non-distended.  Abd incision was not examined by me  M/S L UE weakness.  Trace LE edema B  NEURO: Cranial nerves 2-12 are normal tested and grossly at patient's baseline, Examination of sensation by touch normal  PSYCH: Oriented X self and place, insight and judgement impaired, memory impaired.        Lab/Diagnostic data:  CBC RESULTS:   Recent Labs   Lab Test  04/20/17   0650  04/19/17   0645   WBC  6.4  7.3   RBC  3.28*  3.34*   HGB  8.3*  8.5*   HCT  27.5*  28.0*   MCV  84  84   MCH  25.3*  25.4*   MCHC  30.2*  30.4*   RDW  17.4*  17.4*   PLT  388  404       Last Basic Metabolic Panel:  Recent Labs   Lab Test  04/20/17   0650  04/19/17   0645   NA  143  142   POTASSIUM  3.6  3.7   CHLORIDE  107  106   SARITHA  8.5  8.5   CO2  29  28   BUN  7  9   CR  0.50*  0.45*   GLC  102*  107*       Liver Function Studies -   Recent Labs   Lab Test  04/20/17   0650  04/19/17   0645   PROTTOTAL  5.7*  5.8*   ALBUMIN  2.4*  2.5*   BILITOTAL  0.3  0.2   ALKPHOS  102  109   AST  17  20   ALT  14  14       TSH   Date Value Ref Range Status   04/03/2017 2.30 0.40 - 4.00 mU/L Final   02/15/2017 4.05 (H) 0.40 - 4.00 mU/L Final     Lab Results   Component Value Date    A1C 4.7 04/13/2017    A1C 4.3 04/03/2017               ASSESSMENT/PLAN:    (C18.2) Malignant neoplasm of ascending colon (H)  (primary encounter  diagnosis)  (Z90.49) S/P right colectomy  Comment: Pt is doing well post op  Plan monitor bowel status, Hgb, incision.  Colorectal surgery and oncology f/u.PT/OT    (D50.8) Other iron deficiency anemia  Comment: Acute blood loss r/t recent surgery. No active sx of bleeding.   Plan: Monitor CBC.     (R41.89) Cognitive impairment  (R53.81) Physical deconditioning  Comment: Ongoing  Plan: OT eval and tx.  Disposition unclear.        Joe Zapata MD

## 2017-05-01 ENCOUNTER — HOSPITAL LABORATORY (OUTPATIENT)
Dept: OTHER | Facility: CLINIC | Age: 77
End: 2017-05-01

## 2017-05-01 LAB
ANION GAP SERPL CALCULATED.3IONS-SCNC: 7 MMOL/L (ref 3–14)
BUN SERPL-MCNC: 22 MG/DL (ref 7–30)
CALCIUM SERPL-MCNC: 8.6 MG/DL (ref 8.5–10.1)
CHLORIDE SERPL-SCNC: 108 MMOL/L (ref 94–109)
CO2 SERPL-SCNC: 26 MMOL/L (ref 20–32)
CREAT SERPL-MCNC: 0.63 MG/DL (ref 0.52–1.04)
ERYTHROCYTE [DISTWIDTH] IN BLOOD BY AUTOMATED COUNT: 17.1 % (ref 10–15)
GFR SERPL CREATININE-BSD FRML MDRD: NORMAL ML/MIN/1.7M2
GLUCOSE SERPL-MCNC: 78 MG/DL (ref 70–99)
HCT VFR BLD AUTO: 30.4 % (ref 35–47)
HGB BLD-MCNC: 8.8 G/DL (ref 11.7–15.7)
MCH RBC QN AUTO: 25.4 PG (ref 26.5–33)
MCHC RBC AUTO-ENTMCNC: 28.9 G/DL (ref 31.5–36.5)
MCV RBC AUTO: 88 FL (ref 78–100)
PLATELET # BLD AUTO: 424 10E9/L (ref 150–450)
POTASSIUM SERPL-SCNC: 4.2 MMOL/L (ref 3.4–5.3)
RBC # BLD AUTO: 3.47 10E12/L (ref 3.8–5.2)
SODIUM SERPL-SCNC: 141 MMOL/L (ref 133–144)
WBC # BLD AUTO: 5.5 10E9/L (ref 4–11)

## 2017-05-02 ENCOUNTER — HOSPITAL LABORATORY (OUTPATIENT)
Dept: OTHER | Facility: CLINIC | Age: 77
End: 2017-05-02

## 2017-05-03 ENCOUNTER — DISCHARGE SUMMARY NURSING HOME (OUTPATIENT)
Dept: GERIATRICS | Facility: CLINIC | Age: 77
End: 2017-05-03
Payer: COMMERCIAL

## 2017-05-03 VITALS
RESPIRATION RATE: 18 BRPM | SYSTOLIC BLOOD PRESSURE: 113 MMHG | BODY MASS INDEX: 17.82 KG/M2 | WEIGHT: 110.9 LBS | OXYGEN SATURATION: 97 % | TEMPERATURE: 98.8 F | DIASTOLIC BLOOD PRESSURE: 73 MMHG | HEART RATE: 89 BPM | HEIGHT: 66 IN

## 2017-05-03 DIAGNOSIS — R53.81 PHYSICAL DECONDITIONING: ICD-10-CM

## 2017-05-03 DIAGNOSIS — R41.89 COGNITIVE IMPAIRMENT: ICD-10-CM

## 2017-05-03 DIAGNOSIS — M19.041 PRIMARY OSTEOARTHRITIS OF RIGHT HAND: ICD-10-CM

## 2017-05-03 DIAGNOSIS — J00 ACUTE NASOPHARYNGITIS: ICD-10-CM

## 2017-05-03 DIAGNOSIS — C18.2 MALIGNANT NEOPLASM OF ASCENDING COLON (H): Primary | ICD-10-CM

## 2017-05-03 DIAGNOSIS — D50.8 OTHER IRON DEFICIENCY ANEMIA: ICD-10-CM

## 2017-05-03 DIAGNOSIS — G14 POSTPOLIO SYNDROME (H): ICD-10-CM

## 2017-05-03 DIAGNOSIS — Z90.49 S/P RIGHT COLECTOMY: ICD-10-CM

## 2017-05-03 LAB
C DIFF TOX B STL QL: NORMAL
SPECIMEN SOURCE: NORMAL

## 2017-05-03 PROCEDURE — 99316 NF DSCHRG MGMT 30 MIN+: CPT | Performed by: NURSE PRACTITIONER

## 2017-05-03 PROCEDURE — 99207 ZZC CDG-CORRECTLY CODED, REVIEWED AND AGREE: CPT | Performed by: NURSE PRACTITIONER

## 2017-05-03 RX ORDER — LACTOSE-REDUCED FOOD
8 LIQUID (ML) ORAL 2 TIMES DAILY
COMMUNITY
End: 2017-05-09

## 2017-05-03 RX ORDER — HYDROCODONE BITARTRATE AND ACETAMINOPHEN 5; 325 MG/1; MG/1
1 TABLET ORAL EVERY 6 HOURS PRN
COMMUNITY
End: 2017-05-09

## 2017-05-03 RX ORDER — AMINO ACIDS/PROTEIN HYDROLYS 15G-100/30
1 LIQUID (ML) ORAL 2 TIMES DAILY
COMMUNITY
End: 2017-07-07

## 2017-05-03 NOTE — PROGRESS NOTES
Documentation of Face-to-Face and Certification for Home Health Services     Patient: Sherita Kenney   YOB: 1940  MR Number: [id number]  Today's Date: 5/3/2017    I certify that patient: Sherita Kenney is under my care and that I, or a nurse practitioner or physician's assistant working with me, had a face-to-face encounter that meets the physician face-to-face encounter requirements with this patient on: 5/3/2017.    This encounter with the patient was in whole, or in part, for the following medical condition, which is the primary reason for home health care: ***.    I certify that, based on my findings, the following services are medically necessary home health services: {Modalities- choose all that apply:021774}.    My clinical findings support the need for the above services because: {Homecare Options- Choose all that apply:739335}    Further, I certify that my clinical findings support that this patient is homebound (i.e. absences from home require considerable and taxing effort and are for medical reasons or Yaz[employer] services or infrequently or of short duration when for other reasons) because: {Homebound - Must include information about the medical condition and why it is a considerable and taxing effort for the patient to leave the home. Per CMS; diagnoses alone do not substantiate homebound.:[id number]}.    Based on the above findings. I certify that this patient is confined to the home and needs intermittent skilled nursing care, physical therapy and/or speech therapy.  The patient is under my care, and I have initiated the establishment of the plan of care.  This patient will be followed by a physician who will periodically review the plan of care.  Physician/Provider to provide follow up care: Latanya Martinez    Responsible Medicare certified PECOS Physician: Joe Zapata MD  Physician Signature: See electronic signature associated with these discharge orders.  Date: 5/3/2017

## 2017-05-03 NOTE — PROGRESS NOTES
[place] [facility] DISCHARGE SUMMARY    PATIENT'S NAME: Sherita Kenney  YOB: 1940  MEDICAL RECORD NUMBER:  [id number]    PRIMARY CARE PROVIDER AND CLINIC RESPONSIBLE AFTER TRANSFER: Latanya Martinez [facility] [address] [facility] [address] / [place] [address]    CODE STATUS/ADVANCE DIRECTIVES DISCUSSION:   CPR/Full code        Allergies   Allergen Reactions     Wool Fiber Unknown       TRANSFERRING PROVIDERS: WILLY Brewer CNP; Joe Zapata MD  DATE OF SNF ADMISSION:  April / 21 / 2017  DATE OF SNF (anticipated) DISCHARGE: May / 05 / 2017  DISCHARGE DISPOSITION: Assisted Living: [facility] ()   Nursing Facility: [facility] stay 4/13/17 to 4/20/17.     Condition on Discharge:  Improving.  Function:  Independent with activities, transfers, and ADLs  Cognitive Scores: SLUMS 23/30    Equipment: no aids      DISCHARGE DIAGNOSIS:   1. Malignant neoplasm of ascending colon (H)    2. S/P right colectomy    3. Other iron deficiency anemia    4. Acute nasopharyngitis    5. Postpolio syndrome    6. Primary osteoarthritis of right hand    7. Cognitive impairment    8. Physical deconditioning        [facility] Course:    Malignant neoplasm of ascending colon (H)  S/p right colectomy  S/p open right colectomy with takedown of ileostomy by Dr. Peter on 4/13. Noted to have diarrhea post-op, C diff negative. During exam, continues to have loose stools at times. States stools are becoming more formed. Patient admits to mild pain at rest, states pain increases to 5/10 with activity. Currently taking tylenol TID and has norco available prn for pain medications. Also taking lovenox qd for DVT prophylaxis, to complete total of 30 days of lovenox therapy. Course of lovenox injections to be completed 5/13. Patient will require assistance with lovenox injections daily when discharge from TCU. Oncology  following, patient to f/u with Dr. Lindo on 5/15. Colorectal surgery following, patient to f/u with Dr. Peter on 5/24.       Other iron deficiency anemia  Hgb baseline 8-9. Last Hgb was 8.8 on 5/1. Iron was 11 on 2/15. Currently taking ferrous sulfate 325mg bid. No active sx of bleeding. Denies fatigue, SOB.       Acute nasopharyngitis  Patient had cough and congestion x 2-3 weeks. Has been taking duonebs, and mucinex. During exam, states cough and congestion has resolved. Afebrile. Denies SOB, wheezing, feeling feverish, night sweats, chills.       Postpolio syndrome  Primary osteoarthritis of right hand  H/o polio during childhood with residual effects of loss of movement to left upper extremity. Uses a sling/brace at times. Currently taking flexeril TID prn. Admits to sensation to LUE.     Cognitive Impairment  Physical deconditioning  Referral to Neuropsychologist d/t cognitive impairment. Repeat SLUMS improving, last SLUMS was 23/30 in 04/2017. Patient to f/u with Neuropsychologist on 5/23. Patient is actively participating in therapy sessions. Patient is currently independent with activities, transfers, and ADLs. Plan for patient to transfer to [facility] on 5/5.             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 She also has no past medical history of Cerebral infarction (H); Congestive heart failure (H); Depressive  disorder; Diabetes (H); History of blood transfusion; Hypertension; or Uncomplicated asthma.    DISCHARGE MEDICATIONS:  Current Outpatient Prescriptions   Medication Sig Dispense Refill     Amino Acids-Protein Hydrolys (PRO-STAT AWC) LIQD Take 1 oz by mouth 2 times daily       Nutritional Supplements (ENSURE PLUS) LIQD Take 8 oz by mouth 2 times daily       HYDROcodone-acetaminophen (NORCO) 5-325 MG per tablet Take 1 tablet by mouth every 6 hours as needed for moderate to severe pain       enoxaparin (LOVENOX) 40 MG/0.4ML injection Inject 40 mg Subcutaneous daily       ferrous sulfate (IRON) 325 (65 FE) MG tablet Take 325 mg by mouth 2 times daily       CETAPHIL (CETAPHIL) lotion Apply topically 2 times daily Reported on 5/3/2017       Acetaminophen (TYLENOL PO) Take 650 mg by mouth every 4 hours as needed for mild pain or fever        CALCIUM-MAGNESIUM-ZINC PO Take 2 tablets by mouth daily       LEVOTHYROXINE SODIUM PO Take 75 mcg by mouth daily       Cyclobenzaprine HCl (FLEXERIL PO) Take 10 mg by mouth 3 times daily as needed for muscle spasms       OLANZapine zydis (ZYPREXA) 5 MG ODT tab Take 1 tablet (5 mg) by mouth At Bedtime       SUMATRIPTAN SUCCINATE PO Take 25 mg by mouth every 8 hours as needed for migraine       Travoprost (TRAVATAN Z) 0.004 % SOLN Place 1 drop into both eyes At Bedtime        B-Complex TABS Take 1 tablet by mouth daily.         MEDICATION CHANGES/RATIONALE:   Changed Acetaminophen to 650mg q4hs prn for pain or fever, do not exceed 4000mg/day.   Discontinued Diclofenac TID prn, patient did not use.  Discontinued Mucinex, cough/congestion resolved    Controlled medications sent with patient: Medication: Hydrocodone/APAP, 30 tabs given to patient at the time of discharge to take home         ROS:    10 point ROS of systems including Constitutional, Eyes, Respiratory, Cardiovascular, Gastroenterology, Genitourinary, Integumentary, Muscularskeletal, Psychiatric were all negative except for  "pertinent positives noted in my HPI.      Physical Exam:   Vitals: /73  Pulse 89  Temp 98.8  F (37.1  C)  Resp 18  Ht 5' 6\" (1.676 m)  Wt 110 lb 14.4 oz (50.3 kg)  SpO2 97%  Breastfeeding? Unknown  BMI 17.9 kg/m2  BMI= Body mass index is 17.9 kg/(m^2).  GENERAL APPEARANCE:  Alert, in no distress, appears healthy, oriented, cooperative  ENT:  Mouth and posterior oropharynx normal, moist mucous membranes, normal hearing acuity  EYES:  EOM, conjunctivae, lids, pupils and irises normal  NECK:  No adenopathy,masses or thyromegaly  RESP: Respiratory effort and palpation of chest normal, lungs clear to auscultation, diminished at bases, no respiratory distress  CV: Palpation and auscultation of heart done, regular rate and rhythm, no murmur, rub, or gallop, +2 pedal pulses, nonpitting edema  ABDOMEN: normal bowel sounds, soft, nontender, no hepatosplenomegaly or other masses, no guarding or rebound  M/S: No movement to LUE d/t h/o polio. Active ROM in all other extremities. Strong hand grasp to right hand. Gait and station normal.   SKIN: Inspection of skin and subcutaneous tissue baseline, Palpation of skin and subcutaneous tissue baseline. Arthritic changes noted in right hand. Midline abdominal incision: incision JUVENAL, appears to be healing well w/o sx of infection. Ileostomy closure site: small s/s drainage, loose moist to dry packing, gauze/tape CDI, incision appears to be healing well w/o sx of infection.  NEURO: Cranial nerves 2-12 are normal tested and grossly at patient's baseline, Examination of sensation by touch normal  PSYCH: Oriented x self, place, normal mood and affect, cognitive testing: SLUMS 23/30          DISCHARGE PLAN:  Registered Nurse and From:  [facility]  Patient instructed to follow-up with:  PCP in 7 days      Current [facility] scheduled appointments:  Future Appointments  Date Time Provider Department Center   5/15/2017 2:00 PM Susan Lindo MD [facility] "   5/23/2017 8:00 AM Lisa Man, PhD LP [facility]       [place] referral needed and placed by this provider: No      DISCHARGE INSTRUCTIONS:  1. Follow-up with PCP within 7 days of discharge from TCU  2. Continue Lovenox injections daily through 5/13  3. Continue daily abdominal dressing change - moist/dry loose packing to ileostomy takedown, cover with 4x4 gauze/tape. Change dressing daily.   4. Keep midline abdominal incision open to air, clean and dry  5. Follow-up with Oncology 5/15  6. Follow-up with Colorectal Surgery 5/24  7. Follow-up with Neuropsychologist 5/23          Pending labs: None  SNF labs   CBC RESULTS:   Recent Labs   Lab Test  05/01/17   0500  04/27/17   0500  04/24/17   0500   WBC  5.5   --   8.7   RBC  3.47*   --   3.28*   HGB  8.8*  7.9*  8.1*   HCT  30.4*   --   28.0*   MCV  88   --   85   MCH  25.4*   --   24.7*   MCHC  28.9*   --   28.9*   RDW  17.1*   --   16.9*   PLT  424   --   486*       Last Basic Metabolic Panel:  Recent Labs   Lab Test  05/01/17   0500  04/27/17   0500   NA  141  142   POTASSIUM  4.2  3.8   CHLORIDE  108  108   SARITHA  8.6  8.5   CO2  26  28   BUN  22  22   CR  0.63  0.60   GLC  78  84       Liver Function Studies -   Recent Labs   Lab Test  04/20/17   0650  04/19/17   0645   PROTTOTAL  5.7*  5.8*   ALBUMIN  2.4*  2.5*   BILITOTAL  0.3  0.2   ALKPHOS  102  109   AST  17  20   ALT  14  14       TSH   Date Value Ref Range Status   04/03/2017 2.30 0.40 - 4.00 mU/L Final   02/15/2017 4.05 (H) 0.40 - 4.00 mU/L Final     Lab Results   Component Value Date    A1C 4.7 04/13/2017    A1C 4.3 04/03/2017         TOTAL DISCHARGE TIME:   Greater than 30 minutes      Electronically signed by:  WILLY Brewer CNP

## 2017-05-09 ENCOUNTER — OFFICE VISIT (OUTPATIENT)
Dept: FAMILY MEDICINE | Facility: CLINIC | Age: 77
End: 2017-05-09
Payer: COMMERCIAL

## 2017-05-09 ENCOUNTER — CARE COORDINATION (OUTPATIENT)
Dept: CARE COORDINATION | Facility: CLINIC | Age: 77
End: 2017-05-09

## 2017-05-09 VITALS
SYSTOLIC BLOOD PRESSURE: 115 MMHG | WEIGHT: 113 LBS | BODY MASS INDEX: 18.16 KG/M2 | HEART RATE: 75 BPM | TEMPERATURE: 98.6 F | DIASTOLIC BLOOD PRESSURE: 75 MMHG | OXYGEN SATURATION: 99 % | HEIGHT: 66 IN

## 2017-05-09 DIAGNOSIS — M21.612 BILATERAL BUNIONS: ICD-10-CM

## 2017-05-09 DIAGNOSIS — C18.9 ADENOCARCINOMA OF COLON (H): Primary | ICD-10-CM

## 2017-05-09 DIAGNOSIS — M21.611 BILATERAL BUNIONS: ICD-10-CM

## 2017-05-09 PROCEDURE — 99214 OFFICE O/P EST MOD 30 MIN: CPT | Performed by: INTERNAL MEDICINE

## 2017-05-09 NOTE — MR AVS SNAPSHOT
After Visit Summary   5/9/2017    Sherita Kenney    MRN: [id number]           Patient Information     Date Of Birth          1940        Visit Information        Provider Department      5/9/2017 11:30 AM Edgar Rodriguez MD [facility]        Today's Diagnoses     Bilateral bunions    -  1      Care Instructions    Follow up with Podiatry.    Contact doctor if with concerns.        Follow-ups after your visit        Additional Services     PODIATRY/FOOT & ANKLE SURGERY REFERRAL       Your provider has referred you to: [facility]: [facility] [phone]   [email or web address]    Please be aware that coverage of these services is subject to the terms and limitations of your health insurance plan.  Call member services at your health plan with any benefit or coverage questions.      Please bring the following to your appointment:  >>   Any x-rays, CTs or MRIs which have been performed.  Contact the facility where they were done to arrange for  prior to your scheduled appointment.    >>   List of current medications   >>   This referral request   >>   Any documents/labs given to you for this referral                  Your next 10 appointments already scheduled     May 15, 2017  2:00 PM CDT   Return Visit with Susan Lindo MD   [facility] ([facility])    [facility]  [address]  [facility] [id number] [phone]            May 23, 2017  8:00 AM CDT   (Arrive by 7:45 AM)   New Patient Visit with Lisa Man, PhD [facility]ology ([facility] [place])    [address] [place] [address]  3rd [facility] [address] [phone]              Who to contact     If you have questions or need follow up information about today's clinic visit or your schedule please contact [facility] directly at [phone].  Normal or  "non-critical lab and imaging results will be communicated to you by MyChart, letter or phone within 4 business days after the clinic has received the results. If you do not hear from us within 7 days, please contact the clinic through Foodat or phone. If you have a critical or abnormal lab result, we will notify you by phone as soon as possible.  Submit refill requests through [employer] Watch or call your pharmacy and they will forward the refill request to us. Please allow 3 business days for your refill to be completed.          Additional Information About Your Visit        Radiation Watch Information     Radiation Watch lets you send messages to your doctor, view your test results, renew your prescriptions, schedule appointments and more. To sign up, go to [email or web address] . Click on \"Log in\" on the left side of the screen, which will take you to the Welcome page. Then click on \"Sign up Now\" on the right side of the page.     You will be asked to enter the access code listed below, as well as some personal information. Please follow the directions to create your username and password.     Your access code is: [email or web address]  Expires: 2017  1:06 PM     Your access code will  in 90 days. If you need help or a new code, please call your [place] clinic or [phone].        Care EveryWhere ID     This is your Care EveryWhere ID. This could be used by other organizations to access your [facility] medical records  [id number]        Your Vitals Were     Pulse Temperature Height Pulse Oximetry Breastfeeding? BMI (Body Mass Index)    75 98.6  F (37  C) 5' 6\" (1.676 m) 99% No 18.24 kg/m2       Blood Pressure from Last 3 Encounters:   17 115/75   17 113/73   17 113/71    Weight from Last 3 Encounters:   17 113 lb (51.3 kg)   17 110 lb 14.4 oz (50.3 kg)   17 114 lb 1.6 oz (51.8 kg)              We Performed the Following     PODIATRY/FOOT & ANKLE SURGERY REFERRAL          Today's Medication " Changes          These changes are accurate as of: 5/9/17 12:47 PM.  If you have any questions, ask your nurse or doctor.               Stop taking these medicines if you haven't already. Please contact your care team if you have questions.     ENSURE PLUS Liqd   Stopped by:  Edgar Rodriguez MD           HYDROcodone-acetaminophen 5-325 MG per tablet   Commonly known as:  NORCO   Stopped by:  Edgar Rodriguez MD           OLANZapine zydis 5 MG ODT tab   Commonly known as:  zyPREXA   Stopped by:  Edgar Rodriguez MD           SUMATRIPTAN SUCCINATE PO   Stopped by:  Edgar Rodriguez MD                    Primary Care Provider Office Phone # Fax #    Edgar Rodriguez [phone]       [facility] [address]  [facility] [address]        Thank you!     Thank you for choosing [facility]  for your care. Our goal is always to provide you with excellent care. Hearing back from our patients is one way we can continue to improve our services. Please take a few minutes to complete the written survey that you may receive in the mail after your visit with us. Thank you!             Your Updated Medication List - Protect others around you: Learn how to safely use, store and throw away your medicines at www.disposemymeds.org.          This list is accurate as of: 5/9/17 12:47 PM.  Always use your most recent med list.                   Brand Name Dispense Instructions for use    CALCIUM-MAGNESIUM-ZINC PO      Take 2 tablets by mouth daily       CETAPHIL lotion      Apply topically 2 times daily Reported on 5/3/2017       enoxaparin 40 MG/0.4ML injection    LOVENOX     Inject 40 mg Subcutaneous daily       ferrous sulfate 325 (65 FE) MG tablet    IRON     Take 325 mg by mouth 2 times daily       FLEXERIL PO      Take 10 mg by mouth 3 times daily as needed for muscle spasms       LEVOTHYROXINE SODIUM PO       Take 75 mcg by mouth daily       PRO-STAT AWC Liqd      Take 1 oz by mouth 2 times daily       TRAVATAN Z 0.004 % ophthalmic solution   Generic drug:  travoprost (BAK Free)      Place 1 drop into both eyes At Bedtime       TYLENOL PO      Take 650 mg by mouth every 4 hours as needed for mild pain or fever       vitamin B-Complex      Take 1 tablet by mouth daily.

## 2017-05-09 NOTE — PROGRESS NOTES
"Clinic Care Coordination Contact  OUTREACH    Referral Information:  Referral Source: IP/TCU Report  Reason for Contact: Request of pt's family to meet with pt and family about pt's concerns about her current living situation.  Care Conference: No     Universal Utilization:   ED Visits in last year: 0  Hospital visits in last year: 1  Last PCP appointment: 05/09/17  Missed Appointments: 0  Concerns:  (none- appropriate use)  Multiple Providers or Specialists: Yes    Clinical Concerns:  Current Medical Concerns: Per family, pt is to see her oncologist next week and will learn if chemo is going to be recommended.    Current Behavioral Concerns: None mentioned by pt. (Pt's sister, Nat, told writer privately that pt is \"deeply demented\" and has gotten lost in the past.)    Clinical Pathway Name: None    Functional Status:  Mobility Status: Dependent/Assisted by Another  Equipment Currently Used at Home: bath bench, wheelchair, walker, standard (na)     Psychosocial:  Current living arrangement: Pt resides in [facility]--in the \"pay by the day\" area as there are no current openings in [facility]'s regular assisted living.  Financial/Insurance: [employer]    Current Resources: Skilled Nursing Facility (na); Transportation Services (na)  PAS Number:  (na)  Senior Linkage Line Referral Placed:  (na)  Advanced Care Plans/Directives on file:: Yes  Referrals Placed:  (na)  Frequency of Care Coordination: as needed  Upcoming appointment: No new PCP visit scheduled at this time.    Resources and Interventions:  Writer met with pt, pt's sister, aNt, and pt's friend/health care agent, Dinora Garrett. Pt's niece/heatlh care agent, Sherita, was also present on speaker phone. Pt has been residing in a [facility]. Pt was hospitalized at [facility] in April, 2017 and went from the hospital to [facility] for rehab on 4/20/17. Per pt/family/Dinora, pt progressed in TCU " "and was moved to [facility]. In [facility], pt is in the \"by the day\" area, rather than the \"regular\" assisted living as there have been no openings in their regular assisted living area.    Pt said that she is not happy at [facility] as she feels the rules (where to sit in the dining room, etc.) are too rigid and the food is not good. Nat and Dinora said that [facility] tested pt's cognition and recommends 24 hour care for pt.     Writer provided pt with a senior housing book (which includes assisted living options) and www.careoptionsnetwork.org (which has a list of private pay home care agencies in the event pt wants to return to her condo and hire help). Pt is to meet with her oncologist next week and will find out then the treatment plan for her cancer--possibly chemo. Per discussion, pt and pt's family/friend feel that pt may want to explore other assisted living options now but should not make any changes to her living situation until pt has a better understanding of her cancer treatment plan.    Pt is working with a , Zoey Morillo from the [facility] Agency on Aging. (Phone: [phone], ext. [phone]; cell: [phone]). Pt (and Nat, maycol Magallon, and Dinora) gave writer permission to contact and coordinate with Zoey Morillo re: planning for pt.    Pt's [employer] advance directive (dated 2/21/17) is filed in pt's chart. In this document, pt's niece, Sherita Gutierres, is listed as pt's primary health care agent with pt's friend, Dinora Garrett, listed as pt's alternate health care agent. (In the document, the word \"alternate\" is crossed out and \"co-\" is substituted.)    Nat said that she and her   (pt's brother-in-law) are pt's financial Power of Attorneys. Nat's  takes care of managing pt's finances.      Pt appeared to respond appropriately to questions during the conversation. Nat later talked to writer briefly " and said that pt is very demented and has gotten lost in the past, etc. Nat said that pt did not always answer writer's questions accurately during the conversation, but Nat did not want to correct pt in front of others.      Plan: At this time, pt is residing in [facility] but is not happy there. Writer will follow, as indicated, for the need for further community resources for pt. Will contact [facility] Agency on Aging worker and coordinate information/resources with her.    Heather Angulo, [facility] Care Coordination  Clinic: [facility]   Email: [email or web address]  Tele: [phone]

## 2017-05-11 ENCOUNTER — CARE COORDINATION (OUTPATIENT)
Dept: CARE COORDINATION | Facility: CLINIC | Age: 77
End: 2017-05-11

## 2017-05-11 NOTE — PROGRESS NOTES
Clinic Care Coordination Contact  Care Team Conversations    Data: Upon chart review, it appeared that [facility] ([facility]) care coordination might be following pt. Writer called SHIV Anderson  yesterday and left a message with callback information.  Meli called writer this morning stating the pt is being followed by [facility] Care Coordination. Meli will contact the appropriate care coordinator at that clinic and ask the care coordinator to reach out to pt and pt's family, as well as call Emily [facility]otelo on Aging.     Writer called pt's friend/health care agent, Dinora Garrett ([phone]), this morning and left a message re: above.    Plan: As pt will be followed by the [facility] Care Coordinator, writer will change pt's status to Inactive. No further -CCC contact is planned.      RONN Olivera  [facility] Care Coordination  Clinic: [facility]   Email: [email or web address]  Tele: [phone]

## 2017-05-11 NOTE — PROGRESS NOTES
.Clinic Care Coordination Contact  Care Team Conversations    This writer received a voicemail from Heather LOZADA ask ing to be called back regarding a patient.     This writer spoke with Heather- she was confused about my note in the patient's chart, and was wondering if [facility] care coordination was already involved with the patient. I explained the [facility] does have care coordinators, but they do not document in Epic. I said we would take over assisting with the patient.   Heather said she would contact the patient health care agent, Dinora Garrett, and let her know. She the CC should speak with Dinora and the patient.     Staff message will be sent to CC Lo LOZADA. She will reach out to Dinora and the patient in the next couple days.    Meli Kwong

## 2017-05-13 ASSESSMENT — ENCOUNTER SYMPTOMS
ABDOMINAL PAIN: 0
VOMITING: 0
NAUSEA: 0
SHORTNESS OF BREATH: 0
COUGH: 0
FEVER: 0
PALPITATIONS: 0
HEADACHES: 0
DIARRHEA: 0
BLOOD IN STOOL: 0
CHILLS: 0
DIZZINESS: 0
DEPRESSION: 0

## 2017-05-13 NOTE — PROGRESS NOTES
"HPI Comments:   Patient was recently hospitalized and underwent open right colectomy with ileostomy due to metastatic colon cancer.  Patient discharged to TCU in stable condition.  Patient was then discharged from TCU and is being followed by home health care.    Since her discharge from the hospital, she has been doing fairly well.  Denies abdominal pain, nausea/vomiting, diarrhea.  No fever/chills.    She complains of chronic pain at her feet -- no redness or soft tissue swelling.      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 Negative for chills and fever.   Respiratory: Negative for cough and shortness of breath.    Cardiovascular: Negative for chest pain and palpitations.   Gastrointestinal: Negative for abdominal pain, blood in stool, diarrhea, melena, nausea and vomiting.   Neurological: Negative for dizziness and headaches.   Psychiatric/Behavioral: Negative for depression.       /75  Pulse 75  Temp 98.6  F (37  C)  Ht 5' 6\" (1.676 m)  Wt 113 lb (51.3 kg)  SpO2 99%  Breastfeeding? No  BMI 18.24 kg/m2      Physical Exam   Constitutional: No distress.   Cardiovascular: Normal rate, regular rhythm and normal heart sounds.    Pulmonary/Chest: " Effort normal and breath sounds normal. No respiratory distress.   Abdominal: Soft. There is no tenderness.   No signs of infection at ileostomy site   Neurological: She is alert. GCS score is 15.   Psychiatric: Mood and affect normal.   Vitals reviewed.        ICD-10-CM    1. Adenocarcinoma of colon (H) C18.9    2. Bilateral bunions M21.611 PODIATRY/FOOT & ANKLE SURGERY REFERRAL    M21.612        Patient Instructions   Follow up with Podiatry.    Contact doctor if with concerns.

## 2017-05-15 ENCOUNTER — ONCOLOGY VISIT (OUTPATIENT)
Dept: ONCOLOGY | Facility: CLINIC | Age: 77
End: 2017-05-15
Attending: INTERNAL MEDICINE
Payer: COMMERCIAL

## 2017-05-15 ENCOUNTER — HOSPITAL ENCOUNTER (OUTPATIENT)
Facility: CLINIC | Age: 77
Setting detail: SPECIMEN
Discharge: HOME | End: 2017-05-15
Attending: INTERNAL MEDICINE | Admitting: INTERNAL MEDICINE
Payer: MEDICARE

## 2017-05-15 VITALS
OXYGEN SATURATION: 99 % | WEIGHT: 117 LBS | TEMPERATURE: 97.4 F | RESPIRATION RATE: 16 BRPM | SYSTOLIC BLOOD PRESSURE: 115 MMHG | BODY MASS INDEX: 18.88 KG/M2 | DIASTOLIC BLOOD PRESSURE: 80 MMHG | HEART RATE: 102 BPM

## 2017-05-15 DIAGNOSIS — C18.9 ADENOCARCINOMA OF COLON (H): Primary | ICD-10-CM

## 2017-05-15 DIAGNOSIS — D50.0 IRON DEFICIENCY ANEMIA DUE TO CHRONIC BLOOD LOSS: ICD-10-CM

## 2017-05-15 LAB
ALBUMIN SERPL-MCNC: 3.6 G/DL (ref 3.4–5)
ALP SERPL-CCNC: 123 U/L (ref 40–150)
ALT SERPL W P-5'-P-CCNC: 44 U/L (ref 0–50)
AST SERPL W P-5'-P-CCNC: 42 U/L (ref 0–45)
BASOPHILS # BLD AUTO: 0 10E9/L (ref 0–0.2)
BASOPHILS NFR BLD AUTO: 0.4 %
BILIRUB DIRECT SERPL-MCNC: <0.1 MG/DL (ref 0–0.2)
BILIRUB SERPL-MCNC: 0.2 MG/DL (ref 0.2–1.3)
CEA SERPL-MCNC: 1.4 UG/L (ref 0–2.5)
DIFFERENTIAL METHOD BLD: ABNORMAL
EOSINOPHIL # BLD AUTO: 0.2 10E9/L (ref 0–0.7)
EOSINOPHIL NFR BLD AUTO: 2.1 %
ERYTHROCYTE [DISTWIDTH] IN BLOOD BY AUTOMATED COUNT: 17 % (ref 10–15)
FERRITIN SERPL-MCNC: 31 NG/ML (ref 8–252)
HCT VFR BLD AUTO: 37 % (ref 35–47)
HGB BLD-MCNC: 11 G/DL (ref 11.7–15.7)
IMM GRANULOCYTES # BLD: 0 10E9/L (ref 0–0.4)
IMM GRANULOCYTES NFR BLD: 0.3 %
IRON SATN MFR SERPL: 7 % (ref 15–46)
IRON SERPL-MCNC: 31 UG/DL (ref 35–180)
LYMPHOCYTES # BLD AUTO: 3.4 10E9/L (ref 0.8–5.3)
LYMPHOCYTES NFR BLD AUTO: 33.3 %
MCH RBC QN AUTO: 26.3 PG (ref 26.5–33)
MCHC RBC AUTO-ENTMCNC: 29.7 G/DL (ref 31.5–36.5)
MCV RBC AUTO: 89 FL (ref 78–100)
MONOCYTES # BLD AUTO: 0.7 10E9/L (ref 0–1.3)
MONOCYTES NFR BLD AUTO: 6.4 %
NEUTROPHILS # BLD AUTO: 5.8 10E9/L (ref 1.6–8.3)
NEUTROPHILS NFR BLD AUTO: 57.5 %
NRBC # BLD AUTO: 0 10*3/UL
NRBC BLD AUTO-RTO: 0 /100
PLATELET # BLD AUTO: 215 10E9/L (ref 150–450)
PROT SERPL-MCNC: 7.1 G/DL (ref 6.8–8.8)
RBC # BLD AUTO: 4.18 10E12/L (ref 3.8–5.2)
RETICS # AUTO: 98.6 10E9/L (ref 25–95)
RETICS/RBC NFR AUTO: 2.4 % (ref 0.5–2)
TIBC SERPL-MCNC: 434 UG/DL (ref 240–430)
WBC # BLD AUTO: 10.1 10E9/L (ref 4–11)

## 2017-05-15 PROCEDURE — 99215 OFFICE O/P EST HI 40 MIN: CPT | Performed by: INTERNAL MEDICINE

## 2017-05-15 PROCEDURE — 36415 COLL VENOUS BLD VENIPUNCTURE: CPT

## 2017-05-15 PROCEDURE — 83550 IRON BINDING TEST: CPT | Performed by: INTERNAL MEDICINE

## 2017-05-15 PROCEDURE — 82728 ASSAY OF FERRITIN: CPT | Performed by: INTERNAL MEDICINE

## 2017-05-15 PROCEDURE — 83540 ASSAY OF IRON: CPT | Performed by: INTERNAL MEDICINE

## 2017-05-15 PROCEDURE — 80076 HEPATIC FUNCTION PANEL: CPT | Performed by: INTERNAL MEDICINE

## 2017-05-15 PROCEDURE — 85025 COMPLETE CBC W/AUTO DIFF WBC: CPT | Performed by: INTERNAL MEDICINE

## 2017-05-15 PROCEDURE — 85045 AUTOMATED RETICULOCYTE COUNT: CPT | Performed by: INTERNAL MEDICINE

## 2017-05-15 PROCEDURE — 99212 OFFICE O/P EST SF 10 MIN: CPT

## 2017-05-15 PROCEDURE — 82378 CARCINOEMBRYONIC ANTIGEN: CPT | Performed by: INTERNAL MEDICINE

## 2017-05-15 ASSESSMENT — PAIN SCALES - GENERAL: PAINLEVEL: NO PAIN (0)

## 2017-05-15 NOTE — MR AVS SNAPSHOT
After Visit Summary   5/15/2017    Sherita Kenney    MRN: [id number]           Patient Information     Date Of Birth          1940        Visit Information        Provider Department      5/15/2017 2:00 PM Susan Lindo MD [facility]        Today's Diagnoses     Adenocarcinoma of colon (H)    -  1    Iron deficiency anemia due to chronic blood loss          Care Instructions    PET scan.  Labs.  Side-effect of xeloda.  Follow up after scan.          Follow-ups after your visit        Your next 10 appointments already scheduled     May 22, 2017 10:00 AM CDT   PE NPET ONCOLOGY (EYES TO THIGHS) with SHPETM1   [facility] PET CT ([facility])    [address] [place] [address] [phone]           Tell your doctor:   If there is any chance you may be pregnant or if you are breastfeeding.   If you have problems lying in small spaces (claustrophobia). If you do, your doctor may give you medicine to help you relax. If you have diabetes:   Have your exam early in the morning. Your blood glucose will go up as the day goes by.   Your glucose level must be 180 or less at the start of the exam. Please take any medicines you need to ensure this blood glucose level. 24 hours before your scan: Don t do any heavy exercise. (No jogging, aerobics or other workouts.) Exercise will make your pictures less accurate. 6 hours before your scan:   Stop all food and liquids (except water).   Do not chew gum or suck on mints.   If you need to take medicine with food, you may take it with a few crackers.  Please call your Imaging Department at your exam site with any questions.            May 23, 2017  8:00 AM CDT   (Arrive by 7:45 AM)   New Patient Visit with Lisa Man, PhD [facility] Neuro[facility]ology ([facility] [place])    [address] [facility]  3rd Floor  [facility] [address] [phone]            May 24, 2017  1:00 PM CDT  "  Return Visit with Susan Lindo MD   [facility] ([facility])    [facility]  [address]  [facility] [address] [phone]            May 25, 2017 11:15 AM CDT   New Visit with Valdo Glover DPM   [facility] ([facility])    [address] [facility] [address] [phone]              Future tests that were ordered for you today     Open Future Orders        Priority Expected Expires Ordered    PET Oncology (Eyes to Thighs) Routine  5/16/2018 5/15/2017            Who to contact     If you have questions or need follow up information about today's clinic visit or your schedule please contact [facility] directly at [phone].  Normal or non-critical lab and imaging results will be communicated to you by MyChart, letter or phone within 4 business days after the clinic has received the results. If you do not hear from us within 7 days, please contact the clinic through [facility] or phone. If you have a critical or abnormal lab result, we will notify you by phone as soon as possible.  Submit refill requests through TidalScale or call your pharmacy and they will forward the refill request to us. Please allow 3 business days for your refill to be completed.          Additional Information About Your Visit        MyChart Information     TidalScale lets you send messages to your doctor, view your test results, renew your prescriptions, schedule appointments and more. To sign up, go to [email or web address] . Click on \"Log in\" on the left side of the screen, which will take you to the Welcome page. Then click on \"Sign up Now\" on the right side of the page.     You will be asked to enter the access code listed below, as well as some personal information. Please follow the directions to create your username and password.     Your access code is: [email or web address]  Expires: 6/7/2017  1:06 PM     Your access code " will  in 90 days. If you need help or a new code, please call your [facility] clinic or [phone].        Care EveryWhere ID     This is your Care EveryWhere ID. This could be used by other organizations to access your [facility] medical records  [id number]        Your Vitals Were     Pulse Temperature Respirations Pulse Oximetry BMI (Body Mass Index)       102 97.4  F (36.3  C) (Oral) 16 99% 18.88 kg/m2        Blood Pressure from Last 3 Encounters:   05/15/17 115/80   17 115/75   17 113/73    Weight from Last 3 Encounters:   05/15/17 53.1 kg (117 lb)   17 51.3 kg (113 lb)   17 50.3 kg (110 lb 14.4 oz)              We Performed the Following     CBC with platelets differential     CEA     Ferritin     Hepatic panel (Albumin, ALT, AST, Bili, Alk Phos, TP)     Iron and iron binding capacity     Reticulocyte count        Primary Care Provider Office Phone # Fax #    Edgar Neno Rodriguez [phone]       [facility] [address]  [facility] [address]        Thank you!     Thank you for choosing [facility]  for your care. Our goal is always to provide you with excellent care. Hearing back from our patients is one way we can continue to improve our services. Please take a few minutes to complete the written survey that you may receive in the mail after your visit with us. Thank you!             Your Updated Medication List - Protect others around you: Learn how to safely use, store and throw away your medicines at www.disposemymeds.org.          This list is accurate as of: 5/15/17  3:42 PM.  Always use your most recent med list.                   Brand Name Dispense Instructions for use    CALCIUM-MAGNESIUM-ZINC PO      Take 2 tablets by mouth daily       CETAPHIL lotion      Apply topically 2 times daily Reported on 5/3/2017       ferrous sulfate 325 (65 FE) MG tablet    IRON     Take 325 mg by mouth 2 times daily       FLEXERIL  PO      Take 10 mg by mouth 3 times daily as needed for muscle spasms       LEVOTHYROXINE SODIUM PO      Take 75 mcg by mouth daily       PRO-STAT AWC Liqd      Take 1 oz by mouth 2 times daily       TRAVATAN Z 0.004 % ophthalmic solution   Generic drug:  travoprost (BAK Free)      Place 1 drop into both eyes At Bedtime       TYLENOL PO      Take 650 mg by mouth every 4 hours as needed for mild pain or fever       vitamin B-Complex      Take 1 tablet by mouth daily.

## 2017-05-15 NOTE — PROGRESS NOTES
"Oncology Rooming Note    May 15, 2017 2:04 PM   Sherita Kenney is a 76 year old female who presents for:    Chief Complaint   Patient presents with     Oncology Clinic Visit     Initial Vitals: /80 (BP Location: Right arm, Patient Position: Chair, Cuff Size: Adult Regular)  Pulse 102  Temp 97.4  F (36.3  C) (Oral)  Resp 16  Wt 53.1 kg (117 lb)  SpO2 99%  BMI 18.88 kg/m2 Estimated body mass index is 18.88 kg/(m^2) as calculated from the following:    Height as of 5/9/17: 1.676 m (5' 6\").    Weight as of this encounter: 53.1 kg (117 lb). Body surface area is 1.57 meters squared.  No Pain (0) Comment: Data Unavailable   No LMP recorded. Patient is not currently having periods (Reason: Perimenopausal).  Allergies reviewed: Yes  Medications reviewed: Yes    Medications: Medication refills not needed today.  Pharmacy name entered into [facility]: [facility] [address] [place] [address]    Clinical concerns: None     5 minutes for nursing intake (face to face time)     Shania Bravo CMA    INTRAVENOUS ACCESS/POST IV ACCESS ASSESSMENT:  Labs drawn via 23g Butterfly Needle in Pt's R AC then covered with 2\"x2\" gauze & coban.  Pt tolerated lab draw well.    Mary Vilchis RN        DISCHARGE PLAN:  Next appointments: See patient instruction section-- Gianna  Departure Mode: Ambulatory  Accompanied by: Family  10 minutes for nursing discharge (face to face time)   Mary Vilchis RN      PET scan.  Labs.  Side-effect of xeloda.  Follow up after scan.      5/22/2017 Mon 10:00 AM 10:00 A 45 SHPET [[id number]] SHPETM1 [[id number]] PE EYE/TH ONCOLOGY [[id number]] [facility] ORDER  [id number]  PET ONCOLOGY (EYES TO THIGHS) [[id number]]     5/24/2017 Wed  1:00 PM  1:00 P 30 [facility] [[id number]] OMER MAKI [[id number]] RETURN [657] Return Colon CA             "

## 2017-05-16 ENCOUNTER — TELEPHONE (OUTPATIENT)
Dept: PHARMACY | Facility: CLINIC | Age: 77
End: 2017-05-16

## 2017-05-16 NOTE — PROGRESS NOTES
ONCOLOGY HISTORY:  Ms. Sherita Kenney is a female with colon cancer.   1. The patient was brought to the ER on 02/15/2017 with altered mental status.    -CT brain on 02/15/2017 did not reveal any acute intracranial pathology.   -CT abdomen and pelvis on 02/15/2017 revealed large right iliopsoas abscess. Adjacent mass-like wall thickening of portion of right colon.   -CT-guided drainage of abscess was done. She also received antibiotics.  2. The patient was taken to OR on 03/01/2017.    -Colonoscopy revealed a large fungating mass in the right side of the colon.  Pathology revealed invasive poorly differentiated adenocarcinoma.  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   -Laparoscopic ileostomy was done.    3.  CEA on 02/16/2017 was 3.8.   4. The patient had right colectomy with takedown of ileostomy and primary anastomosis on 04/13/2017.    -Pathology  reveals poorly differentiated colonic adenocarcinoma. 15 of 24 lymph nodes are positive. Tumor invades into adjacent abdominal wall.  Lymphovascular invasion present. pT4b pN2b M0.     SUBJECTIVE:  Sherita Kenney is a 76-year-old female with colon cancer.  She is here for followup after surgery.      The patient had right colectomy with takedown of ileostomy and primary anastomosis on 04/13/2017.  Pathology  reveals poorly differentiated colonic adenocarcinoma.  Tumor was invading through the colon wall to resection margin.  Fifteen of 24 lymph nodes were positive.  The tumor measured 9.6 cm.  Tumor invades into adjacent abdominal wall.  Lymphovascular invasion present.  The patient has stage III (pT4b pN2b M0) disease.      The patient presents to the clinic with multiple family members including her sister and brother-in-law.  Also a friend was there.  On the phone was her niece who is a family physician.      The patient says that overall she is feeling good.  No headache.  No dizziness.  No chest pain.  No nausea  or vomiting.  Appetite is good.  No urinary or bowel complaints.  She is getting stronger. Niece mentioned that patient has some memory problem.  She is little more forgetful.      When patient was alone, she mentioned to me that she has felt a lump in the right side of the neck.  It is nonpainful.  She has not felt any other lump.      PHYSICAL EXAMINATION:   GENERAL:  She is alert and oriented x3.   VITAL SIGNS:  Reviewed.   EYES:  No icterus.   THROAT:  No ulcer or thrush.   NECK:  Supple.  There is a small lymph node in the right upper neck.  No supraclavicular lymphadenopathy.   LUNGS:  Good air entry bilaterally.  No crackles or wheezing.   HEART:  Regular.  No murmur.   ABDOMEN:  Soft and nontender.   EXTREMITIES:  No calf swelling or tenderness.   SKIN:  No petechiae   AXILLAE:  No lymphadenopathy.      ASSESSMENT/PLAN:   1.  A 76-year-old female with colon cancer, status post right hemicolectomy.  The patient has a stage III (pT4b pN2b M0) disease.   2.  Small lymphadenopathy in the right side of the neck.   3.  Chronic anemia.      PLAN:   1.  I had a long discussion with the patient and family members.  I reviewed the pathology report.  The patient has a stage III disease.  Discussed with her regarding getting a scan. I told the patient that I would like to get a PET scan to rule out metastatic disease.  She is agreeable for it.  PET scan will be done.     2.  Discussed with her regarding role of adjuvant chemotherapy (assuming that PET scan is negative for metastatic disease).  The patient is at high risk of recurrence.  Adjuvant chemotherapy will help to reduce the risk of recurrence. Different regimens were discussed.  It can be FOLFOX or XELOX.  Niece has some concerns.  The patient lives alone.  She is weak.  She also has some memory problem.  She is at high risk of complication.  After a long discussion, we decided to start with single-agent Xeloda.  If she tolerates it, we can add oxaliplatin. Side  effects of Xeloda were discussed.  It can cause hair loss, oral ulcer, oral thrush, nausea, vomiting, gastric ulcer, low white count, infection, low hemoglobin, low platelets, bleeding, liver dysfunction, kidney dysfunction, hand-foot syndrome and other side effects.  We will plan on starting Xeloda after the PET scan.   3.  The patient has been chronically anemic.  We will repeat some labs including CBC, iron studies and LFT and CEA.  If she still has significant iron deficiency anemia, we will consider IV iron.  The patient is currently on oral iron.     4.  Family had multiple questions, which were all answered.  I will see her back after the PET scan.         OMER MAKI MD             D: 05/15/2017 16:29   T: 2017 08:43   MT: FAZAL      Name:     SEVERO SWANN   MRN:      [id number]        Account:      [id number]   :      1940           Visit Date:   05/15/2017      Document: [id number]

## 2017-05-16 NOTE — ORAL ONC MGMT
"Oral Chemotherapy Monitoring Program    Primary Oncologist: Dr. Lindo  Primary Oncology Clinic: [facility]  Cancer Diagnosis: colon    Drug: Xeloda (Dose TBD)  Start Date: TBD after PET scan with BK  Dose is appropriate for patients: Will recheck after scan  Expected duration of therapy: Until disease progression or unacceptable toxicity    Drug Interaction Assessment: none    Lab Monitoring Plan  C1D1+   CMP, CBC C2D1+ Call, CMP, CBC C3D1+ Call, CMP, CBC C4D1+ Call, CMP, CBC C5D1+ Call, CMP, CBC C6D1+ Call, CMP, CBC   C1D8+ Call C2D8+  C3D8+  C4D8+  C5D8+  C6D8+    C1D15+ off week C2D15+ off week C3D15+ off week C4D15+ off week C5D15+ off week C6D15+ off week         Subjective/Objective:  Sherita Kenney is a 76 year old female seen in clinic for an initial visit for oral chemotherapy education.  Spoke with Thalia and her family to review Xeloda after appt with Dr. Lindo. Went over how to take the medication and possible side effects and provided them with the pharmacy contact info. Start date and dose will be determined after PET scan and appt with AMBER.    ORAL CHEMOTHERAPY 5/16/2017   Drug Name Xeloda (Capecitabine)   Current Schedule BID   Cycle Details 2 weeks on 1 week off       Last PHQ-2 Score on record: No flowsheet data found.    Patient does not report depression symptoms.      Vitals:  BP:   BP Readings from Last 1 Encounters:   05/15/17 115/80     Wt Readings from Last 1 Encounters:   05/15/17 53.1 kg (117 lb)     Estimated body surface area is 1.57 meters squared as calculated from the following:    Height as of 5/9/17: 1.676 m (5' 6\").    Weight as of 5/15/17: 53.1 kg (117 lb).      Labs:  Lab Results   Component Value Date     05/01/2017      Lab Results   Component Value Date    POTASSIUM 4.2 05/01/2017     Lab Results   Component Value Date    SARITHA 8.6 05/01/2017     Lab Results   Component Value Date    MAG 2.0 04/15/2017     Lab Results   Component Value Date    PHOS 3.0 04/18/2017     Lab " Results   Component Value Date    ALBUMIN 3.6 05/15/2017     Lab Results   Component Value Date    BUN 22 05/01/2017     Lab Results   Component Value Date    CR 0.63 05/01/2017       Lab Results   Component Value Date    AST 42 05/15/2017     Lab Results   Component Value Date    ALT 44 05/15/2017     Lab Results   Component Value Date    BILITOTAL 0.2 05/15/2017       Lab Results   Component Value Date    WBC 10.1 05/15/2017     Lab Results   Component Value Date    HGB 11.0 05/15/2017     Lab Results   Component Value Date     05/15/2017     Lab Results   Component Value Date    ANEU 5.8 05/15/2017           Assessment:  Patient is appropriate to start therapy.    Plan:  Basic chemotherapy teaching was reviewed with the patient and the patient's family including indication, start date of therapy, dose, administration, adverse effects, missed doses, food and drug interactions, monitoring, side effect management, office contact information, and safe handling. Written materials were provided and all questions answered.    Follow-Up:  5/24--Check and see plan/dose/start date for Xeandre Heath PharmD  May 16, 2017

## 2017-05-22 ENCOUNTER — HOSPITAL ENCOUNTER (OUTPATIENT)
Dept: PET IMAGING | Facility: CLINIC | Age: 77
Discharge: HOME | End: 2017-05-22
Attending: INTERNAL MEDICINE | Admitting: INTERNAL MEDICINE
Payer: MEDICARE

## 2017-05-22 DIAGNOSIS — C18.9 ADENOCARCINOMA OF COLON (H): ICD-10-CM

## 2017-05-22 LAB — GLUCOSE BLDC GLUCOMTR-MCNC: 86 MG/DL (ref 70–99)

## 2017-05-22 PROCEDURE — A9552 F18 FDG: HCPCS | Performed by: INTERNAL MEDICINE

## 2017-05-22 PROCEDURE — 34300033 ZZH RX 343: Performed by: INTERNAL MEDICINE

## 2017-05-22 PROCEDURE — 74177 CT ABD & PELVIS W/CONTRAST: CPT

## 2017-05-22 PROCEDURE — 82962 GLUCOSE BLOOD TEST: CPT

## 2017-05-22 PROCEDURE — 25000128 H RX IP 250 OP 636: Performed by: INTERNAL MEDICINE

## 2017-05-22 PROCEDURE — 71260 CT THORAX DX C+: CPT

## 2017-05-22 RX ORDER — IOPAMIDOL 755 MG/ML
20-135 INJECTION, SOLUTION INTRAVASCULAR ONCE
Status: COMPLETED | OUTPATIENT
Start: 2017-05-22 | End: 2017-05-22

## 2017-05-22 RX ADMIN — IOPAMIDOL 100 ML: 755 INJECTION, SOLUTION INTRAVENOUS at 11:00

## 2017-05-22 RX ADMIN — FLUDEOXYGLUCOSE F-18 12.58 MCI.: 500 INJECTION, SOLUTION INTRAVENOUS at 10:15

## 2017-05-23 ENCOUNTER — OFFICE VISIT (OUTPATIENT)
Dept: NEUROPSYCHOLOGY | Facility: CLINIC | Age: 77
End: 2017-05-23

## 2017-05-23 DIAGNOSIS — C18.9 MALIGNANT NEOPLASM OF COLON, UNSPECIFIED PART OF COLON (H): Primary | ICD-10-CM

## 2017-05-23 DIAGNOSIS — F09 MENTAL DISORDER DUE TO GENERAL MEDICAL CONDITION: ICD-10-CM

## 2017-05-23 NOTE — MR AVS SNAPSHOT
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           Follow-ups after your visit        Your next 10 appointments already scheduled     Jun 28, 2017  2:15 PM CDT   (Arrive by 2:00 PM)   Return Visit with Lisa Man, PhD SHELL    Health Neuropsychology ([facility])    [address]  3rd Floor  [place] [id number] [phone]            Jun 29, 2017  8:30 AM CDT   Level 6 with  INFUSION CHAIR 19   [facility] ([facility])    [facility]  [address]  [facility] [id number] [phone]            Jun 29, 2017  9:30 AM CDT   Return Visit with Susan Lindo MD   [facility] ([facility])    [facility]  [address]  [facility] [id number] [phone]              Who to contact     Please call your clinic at [phone] to:    Ask questions about your health    Make or cancel appointments    Discuss your medicines    Learn about your test results    Speak to your doctor   If you have compliments or concerns about an experience at your clinic, or if you wish to file a complaint, please contact [facility] Patient Relations at [phone] or email us at [email or web address]         Additional Information About Your Visit        MyChart Information     [employer] is an electronic gateway that provides easy, online access to your medical records. With [employer], you can request a clinic appointment, read your test  results, renew a prescription or communicate with your care team.     To sign up for MyChart visit the website at [email or web address]   You will be asked to enter the access code listed below, as well as some personal information. Please follow the directions to create your username and password.     Your access code is: [email or web address]  Expires: 2017  6:30 AM     Your access code will  in 90 days. If you need help or a new code, please contact your [facility] or call [phone] for assistance.        Care EveryWhere ID     This is your Care EveryWhere ID. This could be used by other organizations to access your [facility] medical records  [id number]         Blood Pressure from Last 3 Encounters:   06/15/17 125/64   06/15/17 125/64   17 125/83    Weight from Last 3 Encounters:   06/15/17 54.2 kg (119 lb 9.6 oz)   06/15/17 54.3 kg (119 lb 9.6 oz)   17 53.9 kg (118 lb 12.8 oz)              We Performed the Following     [id number] TESTING, PER HR/PSYCHOLOGIST     NEUROPSYCH TESTING BY [facility]        Primary Care Provider Office Phone # Fax #    Edgar Neno Rodriguez [phone]       [facility] [address] [facility] [address]  [facility] [address]        Thank you!     Thank you for choosing [facility]  for your care. Our goal is always to provide you with excellent care. Hearing back from our patients is one way we can continue to improve our services. Please take a few minutes to complete the written survey that you may receive in the mail after your visit with us. Thank you!             Your Updated Medication List - Protect others around you: Learn how to safely use, store and throw away your medicines at www.disposemymeds.org.          This list is accurate as of: 17 11:59 PM.  Always use your most recent med list.                   Brand Name Dispense Instructions for use    CETAPHIL lotion      Apply  topically 2 times daily Reported on 5/3/2017       FLEXERIL PO      Take 10 mg by mouth 3 times daily as needed for muscle spasms       PRO-STAT AWC Liqd      Take 1 oz by mouth 2 times daily       TRAVATAN Z 0.004 % ophthalmic solution   Generic drug:  travoprost (BAK Free)      Place 1 drop into both eyes At Bedtime

## 2017-05-23 NOTE — PROGRESS NOTES
The patient was seen for neuropsychological evaluation at the request of Latanya Martinez for the purpose of diagnostic clarification and treatment planning.  Two hours of face to face testing were provided by this writer.  Please see Dr. Lisa Man's report for a full interpretation of the findings.    Cherry Mcbride  Psychometrist

## 2017-05-24 ENCOUNTER — HOSPITAL ENCOUNTER (OUTPATIENT)
Facility: CLINIC | Age: 77
Setting detail: SPECIMEN
Discharge: HOME | End: 2017-05-24
Attending: INTERNAL MEDICINE | Admitting: INTERNAL MEDICINE
Payer: MEDICARE

## 2017-05-24 ENCOUNTER — DOCUMENTATION ONLY (OUTPATIENT)
Dept: PHARMACY | Facility: CLINIC | Age: 77
End: 2017-05-24

## 2017-05-24 ENCOUNTER — HOSPITAL ENCOUNTER (OUTPATIENT)
Facility: CLINIC | Age: 77
Setting detail: SPECIMEN
End: 2017-05-24
Attending: INTERNAL MEDICINE
Payer: MEDICARE

## 2017-05-24 ENCOUNTER — ONCOLOGY VISIT (OUTPATIENT)
Dept: ONCOLOGY | Facility: CLINIC | Age: 77
End: 2017-05-24
Attending: INTERNAL MEDICINE
Payer: COMMERCIAL

## 2017-05-24 VITALS
TEMPERATURE: 97.9 F | RESPIRATION RATE: 16 BRPM | WEIGHT: 117.2 LBS | HEART RATE: 92 BPM | SYSTOLIC BLOOD PRESSURE: 107 MMHG | BODY MASS INDEX: 18.92 KG/M2 | OXYGEN SATURATION: 95 % | DIASTOLIC BLOOD PRESSURE: 63 MMHG

## 2017-05-24 DIAGNOSIS — C18.9 ADENOCARCINOMA OF COLON (H): Primary | ICD-10-CM

## 2017-05-24 PROCEDURE — 40000803 ZZHCL STATISTIC DNA ISOL HIGH PURITY: Performed by: INTERNAL MEDICINE

## 2017-05-24 PROCEDURE — 99215 OFFICE O/P EST HI 40 MIN: CPT | Performed by: INTERNAL MEDICINE

## 2017-05-24 PROCEDURE — 81445 SO NEO GSAP 5-50DNA/DNA&RNA: CPT | Performed by: INTERNAL MEDICINE

## 2017-05-24 PROCEDURE — 99212 OFFICE O/P EST SF 10 MIN: CPT

## 2017-05-24 RX ORDER — EPINEPHRINE 1 MG/ML
0.3 INJECTION INTRAMUSCULAR; INTRAVENOUS; SUBCUTANEOUS EVERY 5 MIN PRN
Status: CANCELLED | OUTPATIENT
Start: 2017-06-01

## 2017-05-24 RX ORDER — LORAZEPAM 2 MG/ML
0.5 INJECTION INTRAMUSCULAR EVERY 4 HOURS PRN
Status: CANCELLED
Start: 2017-06-01

## 2017-05-24 RX ORDER — MEPERIDINE HYDROCHLORIDE 50 MG/ML
25 INJECTION INTRAMUSCULAR; INTRAVENOUS; SUBCUTANEOUS EVERY 30 MIN PRN
Status: CANCELLED | OUTPATIENT
Start: 2017-06-01

## 2017-05-24 RX ORDER — FLUOROURACIL 50 MG/ML
400 INJECTION, SOLUTION INTRAVENOUS ONCE
Status: CANCELLED | OUTPATIENT
Start: 2017-06-01

## 2017-05-24 RX ORDER — ALBUTEROL SULFATE 90 UG/1
1-2 AEROSOL, METERED RESPIRATORY (INHALATION)
Status: CANCELLED
Start: 2017-06-01

## 2017-05-24 RX ORDER — EPINEPHRINE 0.3 MG/.3ML
0.3 INJECTION SUBCUTANEOUS EVERY 5 MIN PRN
Status: CANCELLED | OUTPATIENT
Start: 2017-06-01

## 2017-05-24 RX ORDER — DIPHENHYDRAMINE HYDROCHLORIDE 50 MG/ML
50 INJECTION INTRAMUSCULAR; INTRAVENOUS
Status: CANCELLED
Start: 2017-06-01

## 2017-05-24 RX ORDER — METHYLPREDNISOLONE SODIUM SUCCINATE 125 MG/2ML
125 INJECTION, POWDER, LYOPHILIZED, FOR SOLUTION INTRAMUSCULAR; INTRAVENOUS
Status: CANCELLED
Start: 2017-06-01

## 2017-05-24 RX ORDER — ALBUTEROL SULFATE 0.83 MG/ML
2.5 SOLUTION RESPIRATORY (INHALATION)
Status: CANCELLED | OUTPATIENT
Start: 2017-06-01

## 2017-05-24 RX ORDER — SODIUM CHLORIDE 9 MG/ML
1000 INJECTION, SOLUTION INTRAVENOUS CONTINUOUS PRN
Status: CANCELLED
Start: 2017-06-01

## 2017-05-24 ASSESSMENT — PAIN SCALES - GENERAL: PAINLEVEL: NO PAIN (0)

## 2017-05-24 NOTE — PATIENT INSTRUCTIONS
Port-placement by IR.  Start chemotherapy with FOLFOX and avastin.  NGS panel (call pathology and have them do it on surgical specimen).  Follow up when she comes to start chemotherapy.        You are scheduled for a Port Placement on May 30,2017 at [facility]  Please check in at 9:30am for 11:00am procedure  Nothing to eat or drink after midnight  You will need a

## 2017-05-24 NOTE — PROGRESS NOTES
Oral Chemotherapy Monitoring Program.    Pharmacy signing off on patient monitoring while no longer on oral chemotherapy. Will continue to follow as needed/appropriate. Thank you for the opportunity to participate in this patient's care.    Patient will start IV chemo instead.    Christina Heath PharmD  May 24, 2017

## 2017-05-24 NOTE — PROGRESS NOTES
"Oncology Rooming Note    May 24, 2017 1:14 PM   Sherita Kenney is a 76 year old female who presents for:    Chief Complaint   Patient presents with     Oncology Clinic Visit     colon cancer     Initial Vitals: /63 (BP Location: Right arm, Patient Position: Chair, Cuff Size: Adult Regular)  Pulse 92  Temp 97.9  F (36.6  C) (Oral)  Resp 16  Wt 53.2 kg (117 lb 3.2 oz)  SpO2 95%  BMI 18.92 kg/m2 Estimated body mass index is 18.92 kg/(m^2) as calculated from the following:    Height as of 5/9/17: 1.676 m (5' 6\").    Weight as of this encounter: 53.2 kg (117 lb 3.2 oz). Body surface area is 1.57 meters squared.  No Pain (0) Comment: Data Unavailable   No LMP recorded. Patient is not currently having periods (Reason: Perimenopausal).  Allergies reviewed: Yes  Medications reviewed: Yes    Medications: Medication refills not needed today.  [facility] into [employer] [address] [place] [address] [address]    Clinical concerns: None         5 minutes for nursing intake (face to face time)     April Bazzi MA      DISCHARGE PLAN:    Port education done  Folfox and avastin education done by [place] in pharmacy  NGS panel- Order will be faxed to pathology  MD Follow up with Cycle 1 chemo to be scheduled by Front ( Advised to book out 6 hours)      Next appointments: See patient instruction section  Departure Mode: Ambulatory  Accompanied by: 3family members  15 minutes for nursing discharge (face to face time)   Di Mahmood RN          "

## 2017-05-24 NOTE — MR AVS SNAPSHOT
After Visit Summary   5/24/2017    Sherita Kenney    MRN: [id number]           Patient Information     Date Of Birth          1940        Visit Information        Provider Department      5/24/2017 1:00 PM Susan Lindo MD [facility]        Today's Diagnoses     Adenocarcinoma of colon (H)    -  1      Care Instructions    Port-placement by IR.  Start chemotherapy with FOLFOX and avastin.  NGS panel (call pathology and have them do it on surgical specimen).  Follow up when she comes to start chemotherapy.        You are scheduled for a Port Placement on May 30,2017 at [facility]  Please check in at 9:30am for 11:00am procedure  Nothing to eat or drink after midnight  You will need a            Follow-ups after your visit        Your next 10 appointments already scheduled     May 25, 2017 11:15 AM CDT   New Visit with Valdo Glover DPM   [facility]    [address] [facility] [address]   [phone]            Jun 01, 2017  9:30 AM CDT   Level 6 with  INFUSION CHAIR 10   [facility] ([facility])    [facility]  [address]  [facility] [id number] [phone]            Jun 01, 2017 10:00 AM CDT   Return Visit with Susan Lindo MD   [facility] ([facility])    [facility]  [address]  [facility] [address] [phone]              Future tests that were ordered for you today     Open Future Orders        Priority Expected Expires Ordered    IR Chest Port Placement > 5 Yrs of Age Routine  5/24/2018 5/24/2017            Who to contact     If you have questions or need follow up information about today's clinic visit or your schedule please contact [facility] directly at [phone].  Normal or non-critical lab and imaging results will be  "communicated to you by [employer], letter or phone within 4 business days after the clinic has received the results. If you do not hear from us within 7 days, please contact the clinic through [employer] or phone. If you have a critical or abnormal lab result, we will notify you by phone as soon as possible.  Submit refill requests through [employer] or call your pharmacy and they will forward the refill request to us. Please allow 3 business days for your refill to be completed.          Additional Information About Your Visit        [employer] Information     [employer] lets you send messages to your doctor, view your test results, renew your prescriptions, schedule appointments and more. To sign up, go to [email or web address] . Click on \"Log in\" on the left side of the screen, which will take you to the Welcome page. Then click on \"Sign up Now\" on the right side of the page.     You will be asked to enter the access code listed below, as well as some personal information. Please follow the directions to create your username and password.     Your access code is: [email or web address]  Expires: 2017  1:06 PM     Your access code will  in 90 days. If you need help or a new code, please call your [place] clinic or [phone].        Care EveryWhere ID     This is your Care EveryWhere ID. This could be used by other organizations to access your [place] medical records  [id number]        Your Vitals Were     Pulse Temperature Respirations Pulse Oximetry BMI (Body Mass Index)       92 97.9  F (36.6  C) (Oral) 16 95% 18.92 kg/m2        Blood Pressure from Last 3 Encounters:   17 107/63   05/15/17 115/80   17 115/75    Weight from Last 3 Encounters:   17 53.2 kg (117 lb 3.2 oz)   05/15/17 53.1 kg (117 lb)   17 51.3 kg (113 lb)              We Performed the Following     Next Generation Sequencing Oncology: Colorectal Cancer Panel        Primary Care Provider Office Phone # Fax #    Edgarpriyanka Lazcano " Enrrique Rodriguez -[phone]       [facility] [address] [facility] [address]  [facility] [address]        Thank you!     Thank you for choosing [facility]  for your care. Our goal is always to provide you with excellent care. Hearing back from our patients is one way we can continue to improve our services. Please take a few minutes to complete the written survey that you may receive in the mail after your visit with us. Thank you!             Your Updated Medication List - Protect others around you: Learn how to safely use, store and throw away your medicines at www.disposemymeds.org.          This list is accurate as of: 5/24/17  3:44 PM.  Always use your most recent med list.                   Brand Name Dispense Instructions for use    CALCIUM-MAGNESIUM-ZINC PO      Take 2 tablets by mouth daily       CETAPHIL lotion      Apply topically 2 times daily Reported on 5/3/2017       ferrous sulfate 325 (65 FE) MG tablet    IRON     Take 325 mg by mouth 2 times daily       FLEXERIL PO      Take 10 mg by mouth 3 times daily as needed for muscle spasms       LEVOTHYROXINE SODIUM PO      Take 75 mcg by mouth daily       PRO-STAT AWC Liqd      Take 1 oz by mouth 2 times daily       TRAVATAN Z 0.004 % ophthalmic solution   Generic drug:  travoprost (BAK Free)      Place 1 drop into both eyes At Bedtime       TYLENOL PO      Take 650 mg by mouth every 4 hours as needed for mild pain or fever       vitamin B-Complex      Take 1 tablet by mouth daily.

## 2017-05-25 ENCOUNTER — OFFICE VISIT (OUTPATIENT)
Dept: PODIATRY | Facility: CLINIC | Age: 77
End: 2017-05-25
Payer: COMMERCIAL

## 2017-05-25 ENCOUNTER — TELEPHONE (OUTPATIENT)
Dept: FAMILY MEDICINE | Facility: CLINIC | Age: 77
End: 2017-05-25

## 2017-05-25 ENCOUNTER — HOSPITAL ENCOUNTER (OUTPATIENT)
Dept: LAB | Facility: CLINIC | Age: 77
End: 2017-05-25
Attending: INTERNAL MEDICINE
Payer: MEDICARE

## 2017-05-25 VITALS — HEIGHT: 66 IN | HEART RATE: 86 BPM | WEIGHT: 117 LBS | RESPIRATION RATE: 16 BRPM | BODY MASS INDEX: 18.8 KG/M2

## 2017-05-25 DIAGNOSIS — M21.629 TAILOR'S BUNION: ICD-10-CM

## 2017-05-25 DIAGNOSIS — M21.619 BUNION: Primary | ICD-10-CM

## 2017-05-25 PROCEDURE — 99203 OFFICE O/P NEW LOW 30 MIN: CPT | Performed by: PODIATRIST

## 2017-05-25 NOTE — PROGRESS NOTES
ONCOLOGY HISTORY:  Ms. Sherita Kenney is a female with colon cancer.   1. The patient was brought to the ER on 02/15/2017 with altered mental status.    -CT brain on 02/15/2017 did not reveal any acute intracranial pathology.   -CT abdomen and pelvis on 02/15/2017 revealed large right iliopsoas abscess. Adjacent mass-like wall thickening of portion of right colon.   -CT-guided drainage of abscess was done.   2. The patient was taken to OR on 03/01/2017.    -Colonoscopy revealed a large fungating mass in the right side of the colon.  Pathology revealed invasive poorly differentiated adenocarcinoma.  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   -Laparoscopic ileostomy was done.    3.  CEA on 02/16/2017 was 3.8.   4. The patient had right colectomy with takedown of ileostomy and primary anastomosis on 04/13/2017.    -Pathology  reveals poorly differentiated colonic adenocarcinoma. 15 of 24 lymph nodes are positive. Tumor invades into adjacent abdominal wall.  Lymphovascular invasion present. pT4b pN2b M0.   5. PET scan on 05/22/2017 reveals metastatic disease.  There are multiple new hypermetabolic liver metastases and  hypermetabolic necrotic lymphadenopathy versus malignant implants right mesentery and right lower quadrant.    SUBJECTIVE:    Ms. Sherita Kenney is a 76-year-old female with colon cancer.  She is here for followup on the result of PET scan.  The patient had a right colectomy done on 04/13/2017.      Overall, her condition is stable.  The patient is currently in assisted living.  She is frustrated and wants to go back to her own condo.      PET scan was done on 05/22/2017.  Unfortunately, it reveals metastatic disease.  There are multiple new hypermetabolic metastases throughout the right and left lobe of the liver.  Multiple areas of hypermetabolic necrotic lymphadenopathy versus malignant implants right mesentery and right lower quadrant.  No evidence of  malignancy in the neck, chest or the bone.      ASSESSMENT:  A 76-year-old female with metastatic colon cancer with multiple liver metastases and lymph node metastasis.      PLAN:   1.  I had a long discussion with the patient.  Multiple family members were present.  I reviewed the result of the scan.  Pictures of PET scan were shown.  Unfortunately, it reveals metastatic disease in both the lobes of the liver and also to abdominal lymph nodes.  I explained to the patient that this is consistent with stage IV colon cancer.   2.  Discussed regarding prognosis.  Unfortunately, this is an incurable situation.  The aim of the treatment would be to control disease, prolong life and palliate symptoms.   3.  Discussed regarding treatment.  Treatment would be systemic chemotherapy.  In her case, I would favor starting with FOLFOX with Avastin.  The patient does have some dementia.  I think she will be able to tolerate FOLFOX with Avastin better than Xeloda with oxaliplatin and Avastin. She may not be compliant with xeloda.     Regimen was discussed.  Side effects including hair loss, oral ulcer, oral thrush, nausea, vomiting, gastric ulcer, low white count, infection, low hemoglobin, low platelets, bleeding, neuropathy, cold sensitivity and other side effects were all discussed. With Avastin, there is risk of proteinuria and elevated blood pressure and some thrombosis.         Discussed regarding Port-A-Cath placement.  She is agreeable for it. After ondina-cath placement, we will start her on chemotherapy with FOLFOX and Avastin.      4.  Family had multiple questions, which were all answered.  I will see her back in the clinic when she comes to start chemotherapy.  Next generation sequencing will be ordered on surgical pathology specimen.      Total time spent 45 minutes, most of time was spent in counseling.         OMER MAKI MD             D: 05/24/2017 14:43   T: 05/25/2017 09:09   MT: mc      Name:     HUE  SEVERO   MRN:      [id number]        Account:      [id number]   :      1940           Visit Date:   2017      Document: [id number]

## 2017-05-25 NOTE — NURSING NOTE
"Chief Complaint   Patient presents with     Foot Problems     BL bunions R>L 2 years, hard to find shoes that fit well, wears a toespacer currently       Initial Pulse 86  Resp 16  Ht 5' 6\" (1.676 m)  Wt 117 lb (53.1 kg)  BMI 18.88 kg/m2 Estimated body mass index is 18.88 kg/(m^2) as calculated from the following:    Height as of this encounter: 5' 6\" (1.676 m).    Weight as of this encounter: 117 lb (53.1 kg).  Medication Reconciliation: complete  "

## 2017-05-25 NOTE — PROGRESS NOTES
"Foot & Ankle Surgery  May 25, 2017    CC: lisa    I was asked to see Sherita Kenney regarding the chief complaint by:  Dr. MARI Rodriguez    HPI:  Pt is a 76 year old female who presents with above complaint.  Lisa bilateral R>L x 2 years.  Describes burning pain, deep ache, throbbing, 7/10 \"daily - when walking\".  She has been using a toe spacer so far.  The pain is very limiting for her.  She has \"cancer surgery\" in the near future.    ROS:   Pos for CC.  The patient denies current nausea, vomiting, chills, fevers, belly pain, calf pain, chest pain or SOB.  Complete remainder of ROS is otherwise neg.    VITALS:    Vitals:    05/25/17 1125   Pulse: 86   Resp: 16   Weight: 117 lb (53.1 kg)   Height: 5' 6\" (1.676 m)       PMH:    Past Medical History:   Diagnosis Date     Abnormal gait      Adenocarcinoma of colon (H)      Arthralgia of upper arm      Arthritis      Arthritis of right hand      Cancer (H)      Cancer of right colon (H)      Cognitive impairment      COPD (chronic obstructive pulmonary disease) (H)      DJD (degenerative joint disease)     knee and lumbar     Fibromatoses of muscle, ligament, and fascia      Flail joint      Generalized OA      Iliopsoas abscess on right (H)      Iron deficiency anemia      Late effects of poliomyelitis      Lumbago      Osteoarthritis of hip      Pain in limb      Postpolio syndrome      Primary osteoarthritis of right hand      Psychosis      Right-sided back pain      Scoliosis      Thyroid disease        SXHX:    Past Surgical History:   Procedure Laterality Date     COLECTOMY RIGHT N/A 4/13/2017    Procedure: COLECTOMY RIGHT;  Surgeon: Lindsay Peter MD;  Location:  OR     COLONOSCOPY N/A 3/1/2017    Procedure: COLONOSCOPY;  Surgeon: Lindsay Peter MD;  Location:  OR     I & D abdominal abscess       ILEOSTOMY N/A 3/1/2017    Procedure: ILEOSTOMY;  Surgeon: Lindsay Peter MD;  Location:  OR     ILEOSTOMY       LAPAROSCOPIC ILEOSTOMY " N/A 3/1/2017    Procedure: LAPAROSCOPIC ILEOSTOMY;  Surgeon: Lindsay Peter MD;  Location:  OR     LAPAROSCOPIC URETEROLYSIS  4/13/2017    Procedure: LAPAROSCOPIC URETEROLYSIS;  Surgeon: Jaycob Mari MD;  Location:  OR     severe protein-calorie malnutrition       SOFT TISSUE SURGERY       TAKEDOWN ILEOSTOMY N/A 4/13/2017    Procedure: TAKEDOWN ILEOSTOMY;  Surgeon: Lindsay Peter MD;  Location:  OR        MEDS:    Current Outpatient Prescriptions   Medication     Amino Acids-Protein Hydrolys (PRO-STAT AWC) LIQD     ferrous sulfate (IRON) 325 (65 FE) MG tablet     CETAPHIL (CETAPHIL) lotion     Acetaminophen (TYLENOL PO)     CALCIUM-MAGNESIUM-ZINC PO     LEVOTHYROXINE SODIUM PO     Cyclobenzaprine HCl (FLEXERIL PO)     Travoprost (TRAVATAN Z) 0.004 % SOLN     B-Complex TABS     No current facility-administered medications for this visit.        ALL:     Allergies   Allergen Reactions     Wool Fiber Unknown       FMH:    Family History   Problem Relation Age of Onset     Breast Cancer Maternal Grandmother        SocHx:    Social History     Social History     Marital status: Single     Spouse name: N/A     Number of children: N/A     Years of education: N/A     Occupational History     Not on file.     Social History Main Topics     Smoking status: Former Smoker     Packs/day: 1.50     Years: 15.00     Types: Cigarettes     Quit date: 1/4/1986     Smokeless tobacco: Not on file     Alcohol use No     Drug use: No     Sexual activity: Not Currently     Birth control/ protection: None     Other Topics Concern     Not on file     Social History Narrative           EXAMINATION:  Gen:   No apparent distress  Neuro:   A&Ox3, no deficits  Psych:    Answering questions appropriately for age and situation with normal affect  Head:    NCAT  Eye:    Visual scanning without deficit  Ear:    Response to auditory stimuli wnl  Lung:    Non-labored breathing on RA noted  Abd:    NTND per patient  report  Lymph:    Neg for pitting/non-pitting edema BLE  Vasc:    Pulses palpable, CFT minimally delayed  Neuro:    Light touch sensation intact to all sensory nerve distributions without paresthesias  Derm:    Neg for nodules, lesions or ulcerations  MSK:    Bunion/tailor's bunion bilateral R>L.    Calf:    Neg for redness, swelling or tenderness    Assessment:  76 year old female with bunion/tailor's bunion bilateral R>L      Plan:  Discussed etiologies and options  1.  Bunion/tailor's bunion bilateral R>L  -bunion handout  -discussed shoe gear recommendations  -discussed padding/insert options  -briefly discussed surgery; in light of upcoming cancer surgery, will defer on surgical management    Follow up:  Prn       Patient's medical history was reviewed today    Body mass index is 18.88 kg/(m^2).  Weight management plan Patient was referred to their PCP to discuss a diet and exercise plan.        Valdo Glover DPM   Podiatric Foot & Ankle Surgeon  [facility]  [phone]

## 2017-05-25 NOTE — LETTER
"  5/25/2017       RE: Sherita Kenney  [address]    [facility] [place] [address]           Dear Colleague,    Thank you for referring your patient, Sherita Kenney, to the [facility]. Please see a copy of my visit note below.    Foot & Ankle Surgery  May 25, 2017    CC: bunions    I was asked to see Sherita Kenney regarding the chief complaint by:  Dr. MARI Rodriguez    HPI:  Pt is a 76 year old female who presents with above complaint.  Lisa bilateral R>L x 2 years.  Describes burning pain, deep ache, throbbing, 7/10 \"daily - when walking\".  She has been using a toe spacer so far.  The pain is very limiting for her.  She has \"cancer surgery\" in the near future.    ROS:   Pos for CC.  The patient denies current nausea, vomiting, chills, fevers, belly pain, calf pain, chest pain or SOB.  Complete remainder of ROS is otherwise neg.    VITALS:    Vitals:    05/25/17 1125   Pulse: 86   Resp: 16   Weight: 117 lb (53.1 kg)   Height: 5' 6\" (1.676 m)       PMH:    Past Medical History:   Diagnosis Date     Abnormal gait      Adenocarcinoma of colon (H)      Arthralgia of upper arm      Arthritis      Arthritis of right hand      Cancer (H)      Cancer of right colon (H)      Cognitive impairment      COPD (chronic obstructive pulmonary disease) (H)      DJD (degenerative joint disease)     knee and lumbar     Fibromatoses of muscle, ligament, and fascia      Flail joint      Generalized OA      Iliopsoas abscess on right (H)      Iron deficiency anemia      Late effects of poliomyelitis      Lumbago      Osteoarthritis of hip      Pain in limb      Postpolio syndrome      Primary osteoarthritis of right hand      Psychosis      Right-sided back pain      Scoliosis      Thyroid disease        SXHX:    Past Surgical History:   Procedure Laterality Date     COLECTOMY RIGHT N/A 4/13/2017    Procedure: COLECTOMY RIGHT;  Surgeon: Lindsay Peter MD;  Location: [facility] OR     COLONOSCOPY N/A 3/1/2017    " Procedure: COLONOSCOPY;  Surgeon: Lindsay Peter MD;  Location:  OR     I & D abdominal abscess       ILEOSTOMY N/A 3/1/2017    Procedure: ILEOSTOMY;  Surgeon: Lindsay Peter MD;  Location:  OR     ILEOSTOMY       LAPAROSCOPIC ILEOSTOMY N/A 3/1/2017    Procedure: LAPAROSCOPIC ILEOSTOMY;  Surgeon: Lindsay Peter MD;  Location:  OR     LAPAROSCOPIC URETEROLYSIS  4/13/2017    Procedure: LAPAROSCOPIC URETEROLYSIS;  Surgeon: Jaycob Mari MD;  Location:  OR     severe protein-calorie malnutrition       SOFT TISSUE SURGERY       TAKEDOWN ILEOSTOMY N/A 4/13/2017    Procedure: TAKEDOWN ILEOSTOMY;  Surgeon: Lindsay Peter MD;  Location:  OR        MEDS:    Current Outpatient Prescriptions   Medication     Amino Acids-Protein Hydrolys (PRO-STAT AWC) LIQD     ferrous sulfate (IRON) 325 (65 FE) MG tablet     CETAPHIL (CETAPHIL) lotion     Acetaminophen (TYLENOL PO)     CALCIUM-MAGNESIUM-ZINC PO     LEVOTHYROXINE SODIUM PO     Cyclobenzaprine HCl (FLEXERIL PO)     Travoprost (TRAVATAN Z) 0.004 % SOLN     B-Complex TABS     No current facility-administered medications for this visit.        ALL:     Allergies   Allergen Reactions     Wool Fiber Unknown       FMH:    Family History   Problem Relation Age of Onset     Breast Cancer Maternal Grandmother        SocHx:    Social History     Social History     Marital status: Single     Spouse name: N/A     Number of children: N/A     Years of education: N/A     Occupational History     Not on file.     Social History Main Topics     Smoking status: Former Smoker     Packs/day: 1.50     Years: 15.00     Types: Cigarettes     Quit date: 1/4/1986     Smokeless tobacco: Not on file     Alcohol use No     Drug use: No     Sexual activity: Not Currently     Birth control/ protection: None     Other Topics Concern     Not on file     Social History Narrative           EXAMINATION:  Gen:   No apparent distress  Neuro:   A&Ox3, no deficits  Psych:     Answering questions appropriately for age and situation with normal affect  Head:    NCAT  Eye:    Visual scanning without deficit  Ear:    Response to auditory stimuli wnl  Lung:    Non-labored breathing on RA noted  Abd:    NTND per patient report  Lymph:    Neg for pitting/non-pitting edema BLE  Vasc:    Pulses palpable, CFT minimally delayed  Neuro:    Light touch sensation intact to all sensory nerve distributions without paresthesias  Derm:    Neg for nodules, lesions or ulcerations  MSK:    Bunion/tailor's bunion bilateral R>L.    Calf:    Neg for redness, swelling or tenderness    Assessment:  76 year old female with bunion/tailor's bunion bilateral R>L      Plan:  Discussed etiologies and options  1.  Bunion/tailor's bunion bilateral R>L  -bunion handout  -discussed shoe gear recommendations  -discussed padding/insert options  -briefly discussed surgery; in light of upcoming cancer surgery, will defer on surgical management    Follow up:  Prn       Patient's medical history was reviewed today    Body mass index is 18.88 kg/(m^2).  Weight management plan Patient was referred to their PCP to discuss a diet and exercise plan.        Valdo Glover DPM   Podiatric Foot & Ankle Surgeon  [facility]  [phone]      Again, thank you for allowing me to participate in the care of your patient.        Sincerely,              Valdo Glover DPM, SILAS

## 2017-05-25 NOTE — TELEPHONE ENCOUNTER
Reason for Call:  Cancer is Spreading     Detailed comments: Dinora Garrett (Power of ) is calling to let Dr. Rodriguez know that Sherita JUAN Kenney cancer has come back. Dinora does not need a call back unless there are questions.       Phone Number Patient can be reached at: Other phone number:  [phone]    Best Time: ASAP     Can we leave a detailed message on this number? YES    Call taken on 5/25/2017 at 12:55 PM by Ivette Abrams

## 2017-05-25 NOTE — MR AVS SNAPSHOT
After Visit Summary   5/25/2017    Sherita Kenney    MRN: [id number]           Patient Information     Date Of Birth          1940        Visit Information        Provider Department      5/25/2017 11:15 AM Valdo Yost DPM [facility]        Care Instructions      DR. YOST'S CLINIC LOCATIONS:       MONDAY - [place] TUESDAY - [place]   [address] [facility] [address] [facility] [address]   [place] [address] [place] [address]   [phone]       WEDNESDAY - SURGERY THURSDAY AM - [place]   [phone] [address] [facility] [address]    [place], [place] [address] [phone]       THURSDAY PM - [place] FRIDAY AM - [place]   [address] [facility] [address] [place] [address] [place] [address]   [phone]       APPT SCHEDULING: SEND FAXES TO:   [phone]     Follow Up: prn    BODY MASS INDEX (BMI)  Many things can cause foot and ankle problems. Foot structure, activity level, foot mechanics and injuries are common causes of pain.    One very important issue that often goes unmentioned, is body weight.  Extra weight can cause increased stress on muscles, ligaments, bones and tendons. Sometimes just a few extra pounds is all it takes to put one over her/his threshold. Without reducing that stress, it can be difficult to alleviate pain.      Some people are uncomfortable addressing this issue, but we feel it is important for you to think about it. As Foot & Ankle specialists, our job is addressing the lower extremity problem and possible causes.     Regarding extra body weight, we encourage patients to discuss diet and weight management plans with their primary care doctors. It is this team approach that gives you the best opportunity for pain relief and getting you back on your feet.        Shoe Recommendations:    Athletic Shoes Casual Shoes   Asics / New Balance  Rebel / Arslan Jarrett / Mimi Marshall / Carmine      BUNION (hallux abducto valgus)   A bunion is caused by muscle imbalance. The great toe is pulled toward the smaller toes. The metatarsal head is pushed outward creating a lump on the side of your foot. Imbalance is the result of foot structure and instability.   Bunions do not improve with time. They usually enlarge, however this is a fairly slow process. Shoes do not necessarily cause bunions, however, they can hasten development and definitely cause bunions to hurt.   Bunions often run in families. We inherit a certain foot structure, which may be predisposed to bunion development.   Bunion pain is likely a combination of shoes rubbing on the bump, nerve irritation, compression between the toes, joint misalignment, arthritis and altered gait.   SYMPTOMS   Bunions are usually termed mild, moderate or severe. Just because you have a bunion does not mean you have to have pain. There are some people with very severe bunions and no pain and people with mild bunions and a lot of pain.   1.  Pain on the inside of your foot at the big toe joint (1st MTPJ)   2.  Swelling on the inside of your foot at the big toe joint   3.  Redness on the inside of your foot at the big toe joint   4.  Numbness or burning in the big toe (hallux)   5.  Decreased motion at the big toe joint   6.  Painful bursa (fluid-filled sac) on the inside of your foot at the big toe joint   7.  Pain while wearing shoes -especially shoes too narrow or with high heels    8.  Pain during activities   9.  Corn in between the big toe and second toe   10.  Callous formation on the side or bottom of the big toe or big toe joint   11.  Callous under the second toe joint (2nd MTPJ)   12.  Pain in the second toe joint   TREATMENT  Conservative (non-surgical) treatment will not make the bunion go away, but it will hopefully decrease the signs and symptoms you have and help you get rid of the pain and get you back to your activities.   1.  Wider shoes or extra  depth shoes: Most bunion pain can be improved simply by wearing compatible shoes. People with bunions cannot choose footwear simply because they like the style. Your bunion should determine which shoes are to be worn. Wide shoes with nonirritating seams,soft leather and a square toe box are most compatible with a bunion. Shoes should fit appropriately right out of the box but may need to be professionally stretched and modified to accommodate the bump. Heels, dress shoes and shoes with pointed toes will not be comfortable.   2. NSAIDs   3.  Arch supports, custom inserts, padding, splints, toe spacers : Most bunion pain can be improved simply by wearing compatible shoes. People with bunions cannot choose footwear simply because they like the style. Your bunion should determine which shoes are to be worn. Wide shoes with nonirritating seams,soft leather and a square toe box are most compatible with a bunion. Shoes should fit appropriately right out of the box but may need to be professionally stretched and modified to accommodate the bump. Heels, dress shoes and shoes with pointed toes will not be comfortable.   4.  Change activities   5.  Physical therapy  SURGERY  Surgical treatment for bunions is sometimes needed. If you are limited by pain, cannot fit in shoes comfortably and are not able to do your daily activities then surgery may be a good option for you. There are many different surgical procedures to repair bunions. Your foot and ankle surgeon will review your foot exam findings, your x-rays, your age, your health, your lifestyle, your physical activity level and discuss with you which procedure he or she would recommend. Surgical procedures for bunions range from soft tissue repair to cutting and realigning the bones. It is not recommended that you have bunion surgery for cosmetic reasons (you do not like how your foot looks) or because you want to fit in a certain pair of shoes; There is the risk that even  after surgery, the bunion will reoccur 9-10% of the time.   Bunion surgery involves cutting and repositioning the bones surrounding the bunion. Pins and screws are used to hold the bones in place during the healing process. The goal of bunion surgery is to reduce the size of the bunion bump. Realignment of the toe and joint is attempted.     Some first toes cannot be forced back into normal alignment even with surgery. Surgery is helpful in most cases but does not necessarily create a normal foot.   Healing after surgery requires about six weeks of protection. This allows the bone to heal. Maximum recovery takes about one year. The scar tissue and jOint structures require this amount of time to finish the healing process. Expect stiffness, swelling and numbness during that time frame. Bunion surgery does involve side effects. Some side effects are predictable and others are less common but do occur. A scar will be visible and could be irritated by shoes. The shoe may rub on the screw or internal pin requiring surgical removal of these fixation devices. The screw and pin would likely be left in place for a full year. The first toe may loose motion after bunion surgery. The amount of stiffness is variable. Some people never regain normal motion of the first toe. This is due to scar tissue inherent to any surgery. The first toe may drift toward the second toe or away from the second toe. Spreading of the first and second toes is a rare occurrence after bunion surgery. This can be quite bothersome and would need to be surgically repaired. Toe drift toward the second toe could result in a recurrent bunion and revision surgery. Joint fusion is one option to correct an unstable, drifting toe. This procedure straightens the toe, however, no motion remains. Fusion may be necessary to correct complications of bunion surgery or as the original procedure in severe cases.   All surgical procedures involve risk of infection,  numbness, pain, delayed healing, osteotomy dislocation, blood clots, continued foot pain, etc. Bunion surgery is quite complex and should not be taken lightly.   Any skin incision can lead to infection. Deep infection might involve the bone and thus repeat surgery and six weeks of IV antibiotics. Scar tissue can cause nerve pain or numbness. This is generally temporary but can be permanent. We do not have treatments that cure nerve problems. Second toe pain could be related to altered mechanics and pressure transferred to the second toe. Most feet with bunions have pre-existing second toe problems. Delayed bone healing would lengthen the healing time. Some bones simply do not heal. This requires repeat surgery, electronic bone stimulation and/or extended protection. Smokers have an approximate 20% chance of poor bone healing. This is double that of a non-smoker. The bone cut may displace. This may need to be repaired with a second operation. Displacement can cause jOint malalignment. Immobility after surgery can cause blood clots in the legs and lungs. This could result in death.   Foot pain is complex. Most feet hurt for more than one reason. Fixing the bunion would not necessarily create a pain free foot. Appropriate shoes, healthy body weight, avoidance of bare foot walking and moderation of activity will always be necessary to enjoy foot comfort. Your bunion may involve arthritis, which is incurable even with surgery. Long standing bunions often involve chronic irritation to the surrounding nerves. Nerve pain may not resolve even with reducing the bunion bump since permanent nerve damage may be present   Bunion surgery is nevertheless quite successful. Most surgical patients are pleased with their foot following bunion surgery. Many of the issues described above can be controlled by taking proper care of your foot during the healing process.   Your surgeon would be happy to fully describe any of the above  issues. You should pursue a full understanding of the operation,recovery process and any potential problems that could develop.   PREVENTION  1.  Do not wear high heels if there is a family history of bunions.  2.  Wear shoes that have enough width and depth in the toe box  Here are exercises that may benefit people with bunions:   Toe stretches. Stretching out your toes can help keep them limber and offset foot pain. To stretch your toes, point your toes straight ahead for 5 seconds and then curl them under for 5 seconds. Repeat these stretches 10 times. These exercises can be especially beneficial if you also have hammertoes, or chronically bent toes, in addition to a bunion.   Toe flexing and fabiana. Press your toes against a hard surface such as a wall, to flex and stretch them; hold the position for 10 seconds and repeat three to four times. Then flex your toes in the opposite direction; hold the position for 10 seconds and repeat three to four times.   Stretching your big toe. Using your fingers to gently pull your big toe over into proper alignment can be helpful as well. Hold your toe in position for 10 seconds and repeat three to four times.   Resistance exercises. Wrap either a towel or belt around your big toe and use it to pull your big toe toward you while simultaneously pushing forward, against the towel, with your big toe.   Ball roll. To massage the bottom of your foot, sit down, place a golf ball on the floor under your foot, and roll it around under your foot for two minutes. This can help relieve foot strain and cramping.   Towel curls. You can strengthen your toes by spreading out a small towel on the floor, curling your toes around it, and pulling it toward you. Repeat five times. Gripping objects with your toes like this can help keep your foot flexible.   Picking up marbles. Another gripping exercise you can perform to keep your foot flexible is picking up marbles with your toes. Do this  by placing 20 marbles on the floor in front of you and use your foot to pick the marbles up one by one and place them in a bowl.   Walking along the beach. Whenever possible, spend time walking on sand. This can give you a gentle foot massage and also help strengthen your toes. This is especially beneficial for people who have arthritis associated with their bunions.             Follow-ups after your visit        Your next 10 appointments already scheduled     May 30, 2017 11:00 AM CDT   IR CHEST PORT PLACEMENT > 5 YRS OF AGE with SHIR1   [facility] Interventional Radiology ([facility])    [address] [place] [id number] [phone]           1. Your doctor will need to do a history and physical within 7 days before this procedure. 2. Your doctor will which medications should not be taken the morning of the exam. 3. Laboratory tests are to be obtained by your doctor prior to the exam (Basic Metabolic Panel, CBCP, PTT and INR) (No labs needed if you are having a tunneled catheter exchange or removal) 4. If you have allergies to x-ray contrast or iodine, contact your doctor or a Radiology nurse prior to the exam day for instructions. 5. Someone will need to drive you to and from the hospital. 6. If you are or may be pregnant, contact your doctor or a Radiology nurse prior to the day of the exam. 7. If you have diabetes, check with your doctor or a Radiology nurse to see if your insulin needs to be adjusted for the exam. 8. If you are taking a medication called Glucophage or Glucovance; these medications need to be held the day of the exam and for approximately 48 hours following. A blood sample must be drawn so your creatinine level can be checked before resuming this medication. 9. If you are taking Coumadin (to thin you blood) please contact your doctor or a Radiology nurse at least 3 days before the exam for special instructions. 10. You should not have received contrast  within 48 hours of this exam. 11. The day before your exam you may eat your regular diet and are encouraged to drink at least 2 quarts of clear liquids. Drink no alcoholic beverages for 24 hours prior to the exam. 12. If you have a colostomy you will need to irrigate it with tap water at 8PM the evening before and again at 6AM the morning of the exam. 13. Do not smoke for 24 hours prior to the procedure. 14. Birth to 4 years: - Breast feeding must be stopped 4 hours prior to exam - Solid food or formula must be stopped 6 hours prior to exam - Tube feedings must be stopped 6 hours prior to exam 15. 4-10 years old: - Nothing to eat or drink 6 hours prior to exam 16. 10+ years old: - Nothing to eat or drink 8 hours prior to exam 17. The morning of the exam you may brush your teeth and take medications as directed with a sip of water. 18. When discharged, you cannot drive until morning, and an adult must be with you until then. You should stay in the [facility] overnight. 19. Bring a list of all drugs you are taking; include supplements and over-the-counter medications. Wear comfortable clothes and leave your valuables at home.            Jun 01, 2017  9:30 AM CDT   Level 6 with  INFUSION CHAIR 10   [facility] ([facility])    [facility]  [address]  [facility] [address]   [phone]            Jun 01, 2017 10:00 AM CDT   Return Visit with Susan Lindo MD   [facility] ([facility])    [facility]  [address]  [facility] [address]   [phone]              Future tests that were ordered for you today     Open Future Orders        Priority Expected Expires Ordered    IR Chest Port Placement > 5 Yrs of Age Routine  5/24/2018 5/24/2017            Who to contact     If you have questions or need follow up information about today's clinic visit or your schedule please  "contact [facility] directly at [phone].  Normal or non-critical lab and imaging results will be communicated to you by MyChart, letter or phone within 4 business days after the clinic has received the results. If you do not hear from us within 7 days, please contact the clinic through MyChart or phone. If you have a critical or abnormal lab result, we will notify you by phone as soon as possible.  Submit refill requests through [employer] or call your pharmacy and they will forward the refill request to us. Please allow 3 business days for your refill to be completed.          Additional Information About Your Visit        [employer] Information     [employer] lets you send messages to your doctor, view your test results, renew your prescriptions, schedule appointments and more. To sign up, go to [email or web address] . Click on \"Log in\" on the left side of the screen, which will take you to the Welcome page. Then click on \"Sign up Now\" on the right side of the page.     You will be asked to enter the access code listed below, as well as some personal information. Please follow the directions to create your username and password.     Your access code is: [email or web address]  Expires: 2017  1:06 PM     Your access code will  in 90 days. If you need help or a new code, please call your [place] clinic or [phone].        Care EveryWhere ID     This is your Care EveryWhere ID. This could be used by other organizations to access your [facility] medical records  [id number]        Your Vitals Were     Pulse Respirations Height BMI (Body Mass Index)          86 16 5' 6\" (1.676 m) 18.88 kg/m2         Blood Pressure from Last 3 Encounters:   17 107/63   05/15/17 115/80   17 115/75    Weight from Last 3 Encounters:   17 117 lb (53.1 kg)   17 117 lb 3.2 oz (53.2 kg)   05/15/17 117 lb (53.1 kg)              Today, you had the following     No orders found for display       Primary Care " Provider Office Phone # Fax #    Edgar Neno Rodriguez [phone]       [facility] [address] [facility] [address]  [facility] [address]        Thank you!     Thank you for choosing [facility]  for your care. Our goal is always to provide you with excellent care. Hearing back from our patients is one way we can continue to improve our services. Please take a few minutes to complete the written survey that you may receive in the mail after your visit with us. Thank you!             Your Updated Medication List - Protect others around you: Learn how to safely use, store and throw away your medicines at www.disposemymeds.org.          This list is accurate as of: 5/25/17 11:44 AM.  Always use your most recent med list.                   Brand Name Dispense Instructions for use    CALCIUM-MAGNESIUM-ZINC PO      Take 2 tablets by mouth daily       CETAPHIL lotion      Apply topically 2 times daily Reported on 5/3/2017       ferrous sulfate 325 (65 FE) MG tablet    IRON     Take 325 mg by mouth 2 times daily       FLEXERIL PO      Take 10 mg by mouth 3 times daily as needed for muscle spasms       LEVOTHYROXINE SODIUM PO      Take 75 mcg by mouth daily       PRO-STAT AWC Liqd      Take 1 oz by mouth 2 times daily       TRAVATAN Z 0.004 % ophthalmic solution   Generic drug:  travoprost (BAK Free)      Place 1 drop into both eyes At Bedtime       TYLENOL PO      Take 650 mg by mouth every 4 hours as needed for mild pain or fever       vitamin B-Complex      Take 1 tablet by mouth daily.

## 2017-05-25 NOTE — PATIENT INSTRUCTIONS
DR. YOST'S CLINIC LOCATIONS:       MONDAY - [place]  TUESDAY - [place]   [address] [facility] [address] [facility] [address]   [place] [address] [place], [place] [id number] [phone]       WEDNESDAY - SURGERY THURSDAY AM - [place]   [phone] [address]    [place] [address] [phone]       THURSDAY PM - [place] FRIDAY AM - [place]   [address] [facility] [address] [place] [address] [place] [address]   [phone]       APPT SCHEDULING: SEND FAXES TO:   [phone]     Follow Up: prn    BODY MASS INDEX (BMI)  Many things can cause foot and ankle problems. Foot structure, activity level, foot mechanics and injuries are common causes of pain.    One very important issue that often goes unmentioned, is body weight.  Extra weight can cause increased stress on muscles, ligaments, bones and tendons. Sometimes just a few extra pounds is all it takes to put one over her/his threshold. Without reducing that stress, it can be difficult to alleviate pain.      Some people are uncomfortable addressing this issue, but we feel it is important for you to think about it. As Foot & Ankle specialists, our job is addressing the lower extremity problem and possible causes.     Regarding extra body weight, we encourage patients to discuss diet and weight management plans with their primary care doctors. It is this team approach that gives you the best opportunity for pain relief and getting you back on your feet.        Shoe Recommendations:    Athletic Shoes Casual Shoes   Asics / New Balance  Safernieony / Arslan Jarrett / Mimi Marshall / Carmine     BUNION (hallux abducto valgus)   A bunion is caused by muscle imbalance. The great toe is pulled toward the smaller toes. The metatarsal head is pushed outward creating a lump on the side of your foot. Imbalance is the result of foot structure and instability.   Bunions do not improve with time. They usually  enlarge, however this is a fairly slow process. Shoes do not necessarily cause bunions, however, they can hasten development and definitely cause bunions to hurt.   Bunions often run in families. We inherit a certain foot structure, which may be predisposed to bunion development.   Bunion pain is likely a combination of shoes rubbing on the bump, nerve irritation, compression between the toes, joint misalignment, arthritis and altered gait.   SYMPTOMS   Bunions are usually termed mild, moderate or severe. Just because you have a bunion does not mean you have to have pain. There are some people with very severe bunions and no pain and people with mild bunions and a lot of pain.   1.  Pain on the inside of your foot at the big toe joint (1st MTPJ)   2.  Swelling on the inside of your foot at the big toe joint   3.  Redness on the inside of your foot at the big toe joint   4.  Numbness or burning in the big toe (hallux)   5.  Decreased motion at the big toe joint   6.  Painful bursa (fluid-filled sac) on the inside of your foot at the big toe joint   7.  Pain while wearing shoes -especially shoes too narrow or with high heels    8.  Pain during activities   9.  Corn in between the big toe and second toe   10.  Callous formation on the side or bottom of the big toe or big toe joint   11.  Callous under the second toe joint (2nd MTPJ)   12.  Pain in the second toe joint   TREATMENT  Conservative (non-surgical) treatment will not make the bunion go away, but it will hopefully decrease the signs and symptoms you have and help you get rid of the pain and get you back to your activities.   1.  Wider shoes or extra depth shoes: Most bunion pain can be improved simply by wearing compatible shoes. People with bunions cannot choose footwear simply because they like the style. Your bunion should determine which shoes are to be worn. Wide shoes with nonirritating seams,soft leather and a square toe box are most compatible with a  bunion. Shoes should fit appropriately right out of the box but may need to be professionally stretched and modified to accommodate the bump. Heels, dress shoes and shoes with pointed toes will not be comfortable.   2. NSAIDs   3.  Arch supports, custom inserts, padding, splints, toe spacers : Most bunion pain can be improved simply by wearing compatible shoes. People with bunions cannot choose footwear simply because they like the style. Your bunion should determine which shoes are to be worn. Wide shoes with nonirritating seams,soft leather and a square toe box are most compatible with a bunion. Shoes should fit appropriately right out of the box but may need to be professionally stretched and modified to accommodate the bump. Heels, dress shoes and shoes with pointed toes will not be comfortable.   4.  Change activities   5.  Physical therapy  SURGERY  Surgical treatment for bunions is sometimes needed. If you are limited by pain, cannot fit in shoes comfortably and are not able to do your daily activities then surgery may be a good option for you. There are many different surgical procedures to repair bunions. Your foot and ankle surgeon will review your foot exam findings, your x-rays, your age, your health, your lifestyle, your physical activity level and discuss with you which procedure he or she would recommend. Surgical procedures for bunions range from soft tissue repair to cutting and realigning the bones. It is not recommended that you have bunion surgery for cosmetic reasons (you do not like how your foot looks) or because you want to fit in a certain pair of shoes; There is the risk that even after surgery, the bunion will reoccur 9-10% of the time.   Bunion surgery involves cutting and repositioning the bones surrounding the bunion. Pins and screws are used to hold the bones in place during the healing process. The goal of bunion surgery is to reduce the size of the bunion bump. Realignment of the toe  and joint is attempted.     Some first toes cannot be forced back into normal alignment even with surgery. Surgery is helpful in most cases but does not necessarily create a normal foot.   Healing after surgery requires about six weeks of protection. This allows the bone to heal. Maximum recovery takes about one year. The scar tissue and jOint structures require this amount of time to finish the healing process. Expect stiffness, swelling and numbness during that time frame. Bunion surgery does involve side effects. Some side effects are predictable and others are less common but do occur. A scar will be visible and could be irritated by shoes. The shoe may rub on the screw or internal pin requiring surgical removal of these fixation devices. The screw and pin would likely be left in place for a full year. The first toe may loose motion after bunion surgery. The amount of stiffness is variable. Some people never regain normal motion of the first toe. This is due to scar tissue inherent to any surgery. The first toe may drift toward the second toe or away from the second toe. Spreading of the first and second toes is a rare occurrence after bunion surgery. This can be quite bothersome and would need to be surgically repaired. Toe drift toward the second toe could result in a recurrent bunion and revision surgery. Joint fusion is one option to correct an unstable, drifting toe. This procedure straightens the toe, however, no motion remains. Fusion may be necessary to correct complications of bunion surgery or as the original procedure in severe cases.   All surgical procedures involve risk of infection, numbness, pain, delayed healing, osteotomy dislocation, blood clots, continued foot pain, etc. Bunion surgery is quite complex and should not be taken lightly.   Any skin incision can lead to infection. Deep infection might involve the bone and thus repeat surgery and six weeks of IV antibiotics. Scar tissue can cause  nerve pain or numbness. This is generally temporary but can be permanent. We do not have treatments that cure nerve problems. Second toe pain could be related to altered mechanics and pressure transferred to the second toe. Most feet with bunions have pre-existing second toe problems. Delayed bone healing would lengthen the healing time. Some bones simply do not heal. This requires repeat surgery, electronic bone stimulation and/or extended protection. Smokers have an approximate 20% chance of poor bone healing. This is double that of a non-smoker. The bone cut may displace. This may need to be repaired with a second operation. Displacement can cause jOint malalignment. Immobility after surgery can cause blood clots in the legs and lungs. This could result in death.   Foot pain is complex. Most feet hurt for more than one reason. Fixing the bunion would not necessarily create a pain free foot. Appropriate shoes, healthy body weight, avoidance of bare foot walking and moderation of activity will always be necessary to enjoy foot comfort. Your bunion may involve arthritis, which is incurable even with surgery. Long standing bunions often involve chronic irritation to the surrounding nerves. Nerve pain may not resolve even with reducing the bunion bump since permanent nerve damage may be present   Bunion surgery is nevertheless quite successful. Most surgical patients are pleased with their foot following bunion surgery. Many of the issues described above can be controlled by taking proper care of your foot during the healing process.   Your surgeon would be happy to fully describe any of the above issues. You should pursue a full understanding of the operation,recovery process and any potential problems that could develop.   PREVENTION  1.  Do not wear high heels if there is a family history of bunions.  2.  Wear shoes that have enough width and depth in the toe box  Here are exercises that may benefit people with  bunions:   Toe stretches. Stretching out your toes can help keep them limber and offset foot pain. To stretch your toes, point your toes straight ahead for 5 seconds and then curl them under for 5 seconds. Repeat these stretches 10 times. These exercises can be especially beneficial if you also have hammertoes, or chronically bent toes, in addition to a bunion.   Toe flexing and fabiana. Press your toes against a hard surface such as a wall, to flex and stretch them; hold the position for 10 seconds and repeat three to four times. Then flex your toes in the opposite direction; hold the position for 10 seconds and repeat three to four times.   Stretching your big toe. Using your fingers to gently pull your big toe over into proper alignment can be helpful as well. Hold your toe in position for 10 seconds and repeat three to four times.   Resistance exercises. Wrap either a towel or belt around your big toe and use it to pull your big toe toward you while simultaneously pushing forward, against the towel, with your big toe.   Ball roll. To massage the bottom of your foot, sit down, place a golf ball on the floor under your foot, and roll it around under your foot for two minutes. This can help relieve foot strain and cramping.   Towel curls. You can strengthen your toes by spreading out a small towel on the floor, curling your toes around it, and pulling it toward you. Repeat five times. Gripping objects with your toes like this can help keep your foot flexible.   Picking up marbles. Another gripping exercise you can perform to keep your foot flexible is picking up marbles with your toes. Do this by placing 20 marbles on the floor in front of you and use your foot to pick the marbles up one by one and place them in a bowl.   Walking along the beach. Whenever possible, spend time walking on sand. This can give you a gentle foot massage and also help strengthen your toes. This is especially beneficial for people who  have arthritis associated with their bunions.

## 2017-05-25 NOTE — TELEPHONE ENCOUNTER
I am covering for Dr Rodriguez, I will leave this message in his inbasket so he is aware.  Dr. Lindo communicated with Dr. Rodriguez as well

## 2017-05-26 ENCOUNTER — CARE COORDINATION (OUTPATIENT)
Dept: CARE COORDINATION | Facility: CLINIC | Age: 77
End: 2017-05-26

## 2017-05-30 ENCOUNTER — HOSPITAL ENCOUNTER (OUTPATIENT)
Facility: CLINIC | Age: 77
Discharge: HOME | End: 2017-05-30
Attending: RADIOLOGY | Admitting: RADIOLOGY
Payer: MEDICARE

## 2017-05-30 ENCOUNTER — APPOINTMENT (OUTPATIENT)
Dept: INTERVENTIONAL RADIOLOGY/VASCULAR | Facility: CLINIC | Age: 77
End: 2017-05-30
Attending: INTERNAL MEDICINE
Payer: MEDICARE

## 2017-05-30 VITALS
HEIGHT: 64 IN | RESPIRATION RATE: 14 BRPM | OXYGEN SATURATION: 99 % | SYSTOLIC BLOOD PRESSURE: 96 MMHG | TEMPERATURE: 98.1 F | HEART RATE: 71 BPM | WEIGHT: 230 LBS | DIASTOLIC BLOOD PRESSURE: 58 MMHG | BODY MASS INDEX: 39.27 KG/M2

## 2017-05-30 DIAGNOSIS — C18.9 ADENOCARCINOMA OF COLON (H): ICD-10-CM

## 2017-05-30 LAB — INR PPP: 0.92 (ref 0.86–1.14)

## 2017-05-30 PROCEDURE — 40000863 ZZH STATISTIC RADIOLOGY XRAY, US, CT, MAR, NM

## 2017-05-30 PROCEDURE — C1788 PORT, INDWELLING, IMP: HCPCS

## 2017-05-30 PROCEDURE — 25000125 ZZHC RX 250: Performed by: NURSE PRACTITIONER

## 2017-05-30 PROCEDURE — 27210886 ZZH ACCESSORY CR5

## 2017-05-30 PROCEDURE — 27210905 ZZH KIT CR7

## 2017-05-30 PROCEDURE — 27211193 ZZ H WOUND GLUE CR1

## 2017-05-30 PROCEDURE — 25000125 ZZHC RX 250

## 2017-05-30 PROCEDURE — 25000128 H RX IP 250 OP 636

## 2017-05-30 PROCEDURE — 25000128 H RX IP 250 OP 636: Performed by: RADIOLOGY

## 2017-05-30 PROCEDURE — 36561 INSERT TUNNELED CV CATH: CPT

## 2017-05-30 PROCEDURE — 85610 PROTHROMBIN TIME: CPT | Performed by: RADIOLOGY

## 2017-05-30 PROCEDURE — C1769 GUIDE WIRE: HCPCS

## 2017-05-30 PROCEDURE — 36415 COLL VENOUS BLD VENIPUNCTURE: CPT | Performed by: RADIOLOGY

## 2017-05-30 RX ORDER — FENTANYL CITRATE 50 UG/ML
25-50 INJECTION, SOLUTION INTRAMUSCULAR; INTRAVENOUS EVERY 5 MIN PRN
Status: DISCONTINUED | OUTPATIENT
Start: 2017-05-30 | End: 2017-05-30 | Stop reason: HOSPADM

## 2017-05-30 RX ORDER — NALOXONE HYDROCHLORIDE 0.4 MG/ML
.1-.4 INJECTION, SOLUTION INTRAMUSCULAR; INTRAVENOUS; SUBCUTANEOUS
Status: DISCONTINUED | OUTPATIENT
Start: 2017-05-30 | End: 2017-05-30 | Stop reason: HOSPADM

## 2017-05-30 RX ORDER — ACETAMINOPHEN 325 MG/1
650-975 TABLET ORAL
Status: DISCONTINUED | OUTPATIENT
Start: 2017-05-30 | End: 2017-05-30 | Stop reason: HOSPADM

## 2017-05-30 RX ORDER — LIDOCAINE 40 MG/G
CREAM TOPICAL
Status: DISCONTINUED | OUTPATIENT
Start: 2017-05-30 | End: 2017-05-30 | Stop reason: HOSPADM

## 2017-05-30 RX ORDER — CEFAZOLIN SODIUM 2 G/100ML
2 INJECTION, SOLUTION INTRAVENOUS
Status: COMPLETED | OUTPATIENT
Start: 2017-05-30 | End: 2017-05-30

## 2017-05-30 RX ORDER — HEPARIN SODIUM (PORCINE) LOCK FLUSH IV SOLN 100 UNIT/ML 100 UNIT/ML
500 SOLUTION INTRAVENOUS ONCE
Status: COMPLETED | OUTPATIENT
Start: 2017-05-30 | End: 2017-05-30

## 2017-05-30 RX ORDER — HEPARIN SODIUM,PORCINE 10 UNIT/ML
5 VIAL (ML) INTRAVENOUS EVERY 24 HOURS
Status: DISCONTINUED | OUTPATIENT
Start: 2017-05-30 | End: 2017-05-30 | Stop reason: HOSPADM

## 2017-05-30 RX ORDER — FLUMAZENIL 0.1 MG/ML
0.2 INJECTION, SOLUTION INTRAVENOUS
Status: DISCONTINUED | OUTPATIENT
Start: 2017-05-30 | End: 2017-05-30 | Stop reason: HOSPADM

## 2017-05-30 RX ORDER — HEPARIN SODIUM (PORCINE) LOCK FLUSH IV SOLN 100 UNIT/ML 100 UNIT/ML
5 SOLUTION INTRAVENOUS
Status: DISCONTINUED | OUTPATIENT
Start: 2017-05-30 | End: 2017-05-30 | Stop reason: HOSPADM

## 2017-05-30 RX ORDER — LIDOCAINE HYDROCHLORIDE 10 MG/ML
1-30 INJECTION, SOLUTION EPIDURAL; INFILTRATION; INTRACAUDAL; PERINEURAL
Status: COMPLETED | OUTPATIENT
Start: 2017-05-30 | End: 2017-05-30

## 2017-05-30 RX ORDER — HEPARIN SODIUM 1000 [USP'U]/ML
INJECTION, SOLUTION INTRAVENOUS; SUBCUTANEOUS
Status: DISCONTINUED
Start: 2017-05-30 | End: 2017-05-30 | Stop reason: HOSPADM

## 2017-05-30 RX ORDER — FENTANYL CITRATE 50 UG/ML
INJECTION, SOLUTION INTRAMUSCULAR; INTRAVENOUS
Status: COMPLETED
Start: 2017-05-30 | End: 2017-05-30

## 2017-05-30 RX ADMIN — FENTANYL CITRATE 50 MCG: 50 INJECTION, SOLUTION INTRAMUSCULAR; INTRAVENOUS at 10:30

## 2017-05-30 RX ADMIN — MIDAZOLAM HYDROCHLORIDE 1 MG: 1 INJECTION, SOLUTION INTRAMUSCULAR; INTRAVENOUS at 10:30

## 2017-05-30 RX ADMIN — FENTANYL CITRATE 50 MCG: 50 INJECTION, SOLUTION INTRAMUSCULAR; INTRAVENOUS at 10:46

## 2017-05-30 RX ADMIN — CEFAZOLIN SODIUM 2 G: 2 INJECTION, SOLUTION INTRAVENOUS at 10:10

## 2017-05-30 RX ADMIN — LIDOCAINE HYDROCHLORIDE 180 MG: 10 INJECTION, SOLUTION EPIDURAL; INFILTRATION; INTRACAUDAL; PERINEURAL at 10:49

## 2017-05-30 RX ADMIN — HEPARIN SODIUM (PORCINE) LOCK FLUSH IV SOLN 100 UNIT/ML 500 UNITS: 100 SOLUTION at 10:50

## 2017-05-30 NOTE — PROCEDURES
RADIOLOGY PROCEDURE NOTE  Patient name: Sherita Kenney  MRN: [id number]  : 1940    Pre-procedure diagnosis: IV access  Post-procedure diagnosis: Same    Procedure Date/Time: May 30, 2017  11:21 AM  Procedure: RIght IJ port and catheter with tip at RA/SVC junction.  Heparinized.  Ready for immediate use.  No complications.  Estimated blood loss: 5 ml  Specimen(s) collected with description: None  The patient tolerated the procedure well with no immediate complications.  Significant findings:  Please see above.    See imaging dictation for procedural details.    Provider name: Wayne Matos  Assistant(s):None

## 2017-05-30 NOTE — PROGRESS NOTES
Care Suites Discharge Summary    Post Port Insertion    Discharge Criteria:   Discharge Criteria met per MD orders: Yes.   Vital signs stable.     Pt demonstrates ability to ambulate safely: Yes.  (See discharge questionnaire for additional information)    Discharge instructions & education:   Discharge instructions reviewed with patient and sister. Patient verbalizes understanding.   Additional patient education provided: Post Port Insertion    Medications:   Patient will be discharging on new medications- No. Patient verbalizes reason for use, start date, and side effects NA.    Items returned to patient:   Home and hospital acquired medications returned to patient NA   Listed belongings gathered and returned to patient: Yes    Patient discharged to home with sister.     Noble Randall

## 2017-05-30 NOTE — PROGRESS NOTES
Interventional Radiology Pre-Procedure Sedation Assessment   Time of Assessment: 10:00 AM    Expected Level: Moderate Sedation    Indication: Sedation is required for the following type of Procedure: Port a catheter placement    Sedation and procedural consent: Risks, benefits and alternatives were discussed with Patient and Relative Sister,  Jaqueline    CARLA Intake: Appropriately NPO for procedure    ASA Class: Class 2 - MILD SYSTEMIC DISEASE, NO ACUTE PROBLEMS, NO FUNCTIONAL LIMITATIONS.    Mallampati: Grade 2:  Soft palate, base of uvula, tonsillar pillars, and portion of posterior pharyngeal wall visible    Lungs: Lungs Clear with good breath sounds bilaterally    Heart: Normal heart sounds and rate    History and physical reviewed and no updates needed. I have reviewed the lab findings, diagnostic data, medications, and the plan for sedation. I have determined this patient to be an appropriate candidate for the planned sedation and procedure and have reassessed the patient IMMEDIATELY PRIOR to sedation and procedure.    Christie Shannon, WILLY CNP

## 2017-05-30 NOTE — IP AVS SNAPSHOT
[place] [address]    [place] [address]    Phone:  [phone]                                       After Visit Summary   5/30/2017    Sherita Kenney    MRN: [id number]           After Visit Summary Signature Page     I have received my discharge instructions, and my questions have been answered. I have discussed any challenges I see with this plan with the nurse or doctor.    ..........................................................................................................................................  Patient/Patient Representative Signature      ..........................................................................................................................................  Patient Representative Print Name and Relationship to Patient    ..................................................               ................................................  Date                                            Time    ..........................................................................................................................................  Reviewed by Signature/Title    ...................................................              ..............................................  Date                                                            Time

## 2017-05-30 NOTE — IP AVS SNAPSHOT
MRN:[id number]                      After Visit Summary   5/30/2017    Sherita Kenney    MRN: [id number]           Visit Information        Department      5/30/2017  8:35 AM [facility]          Review of your medicines      UNREVIEWED medicines. Ask your doctor about these medicines        Dose / Directions    CALCIUM-MAGNESIUM-ZINC PO        Dose:  2 tablet   Take 2 tablets by mouth daily   Refills:  0       CETAPHIL lotion        Apply topically 2 times daily Reported on 5/3/2017   Refills:  0       ferrous sulfate 325 (65 FE) MG tablet   Commonly known as:  IRON        Dose:  325 mg   Take 325 mg by mouth 2 times daily   Refills:  0       FLEXERIL PO        Dose:  10 mg   Take 10 mg by mouth 3 times daily as needed for muscle spasms   Refills:  0       LEVOTHYROXINE SODIUM PO        Dose:  75 mcg   Take 75 mcg by mouth daily   Refills:  0       PRO-STAT AWC Liqd        Dose:  1 oz   Take 1 oz by mouth 2 times daily   Refills:  0       TRAVATAN Z 0.004 % ophthalmic solution   Generic drug:  travoprost (BAK Free)        Dose:  1 drop   Place 1 drop into both eyes At Bedtime   Refills:  0       TYLENOL PO        Dose:  650 mg   Take 650 mg by mouth every 4 hours as needed for mild pain or fever   Refills:  0       vitamin B-Complex        Dose:  1 tablet   Take 1 tablet by mouth daily.   Refills:  0                Protect others around you: Learn how to safely use, store and throw away your medicines at www.disposemymeds.org.         Follow-ups after your visit        Your next 10 appointments already scheduled     May 30, 2017 10:00 AM CDT   IR CHEST PORT PLACEMENT > 5 YRS OF AGE with SHIR1   [facility] Interventional Radiology ([facility])    [address] [place] [address]   [phone]           1. Your doctor will need to do a history and physical within 7 days before this procedure. 2. Your doctor will which medications should not  be taken the morning of the exam. 3. Laboratory tests are to be obtained by your doctor prior to the exam (Basic Metabolic Panel, CBCP, PTT and INR) (No labs needed if you are having a tunneled catheter exchange or removal) 4. If you have allergies to x-ray contrast or iodine, contact your doctor or a Radiology nurse prior to the exam day for instructions. 5. Someone will need to drive you to and from the hospital. 6. If you are or may be pregnant, contact your doctor or a Radiology nurse prior to the day of the exam. 7. If you have diabetes, check with your doctor or a Radiology nurse to see if your insulin needs to be adjusted for the exam. 8. If you are taking a medication called Glucophage or Glucovance; these medications need to be held the day of the exam and for approximately 48 hours following. A blood sample must be drawn so your creatinine level can be checked before resuming this medication. 9. If you are taking Coumadin (to thin you blood) please contact your doctor or a Radiology nurse at least 3 days before the exam for special instructions. 10. You should not have received contrast within 48 hours of this exam. 11. The day before your exam you may eat your regular diet and are encouraged to drink at least 2 quarts of clear liquids. Drink no alcoholic beverages for 24 hours prior to the exam. 12. If you have a colostomy you will need to irrigate it with tap water at 8PM the evening before and again at 6AM the morning of the exam. 13. Do not smoke for 24 hours prior to the procedure. 14. Birth to 4 years: - Breast feeding must be stopped 4 hours prior to exam - Solid food or formula must be stopped 6 hours prior to exam - Tube feedings must be stopped 6 hours prior to exam 15. 4-10 years old: - Nothing to eat or drink 6 hours prior to exam 16. 10+ years old: - Nothing to eat or drink 8 hours prior to exam 17. The morning of the exam you may brush your teeth and take medications as directed with a sip of  water. 18. When discharged, you cannot drive until morning, and an adult must be with you until then. You should stay in the [facility] overnight. 19. Bring a list of all drugs you are taking; include supplements and over-the-counter medications. Wear comfortable clothes and leave your valuables at home.            Jun 01, 2017  9:30 AM CDT   Level 6 with  INFUSION CHAIR 10   [facility] ([facility])    [facility] [place]  [address]  [place] [address]   [phone]            Jun 01, 2017 10:00 AM CDT   Return Visit with Susan Lindo MD   [facility] ([facility])    [facility] [place]  [address]  [place] [address]   [phone]               Care Instructions        Further instructions from your care team         Port Insertion Discharge Instructions     After you go home:      Have an adult stay with you for the first 6 hours    You may resume your normal diet       For 24 hours - due to the sedation you received:    Relax and take it easy    Do NOT make any important or legal decisions    Do NOT drive or operate machines at home or at work    Do NOT drink alcohol    Care of Puncture Sites:      Keep the dressings on your sites clean & dry for 3 days. Change it only if it gets wet or dirty.    You may shower after the dressing comes off in 3 days    Do not remove the small white strips of tape, if present. Allow them to fall off on their own.     You may cover the wound with a bandaid after the dressing is removed if needed for comfort      Activity       Avoid heavy lifting (greater than 10 pounds) or the overuse of your shoulder for 3 days    Bleeding:      If you start bleeding from the incision sites in your chest or neck - or have swelling in your neck, sit down and press on the site for 5-10 minutes.     If bleeding has not stopped after 10 minutes, call your  "provider.        Call 911 right away if you have heavy bleeding or bleeding that does not stop.      Medicines:      You may resume all medications    Resume your Warfarin/Coumadin at your regular dose today. Follow up with your provider to have your INR rechecked    Resume your Platelet Inhibitors and Aspirin tomorrow at your regular dose    For minor pain, you may take Acetaminophen (Tylenol) or Ibuprofen (Advil)    Call the provider who ordered this procedure if:      You have swelling in your neck or over your port site    The incision area is red, swollen, hot or tender    You have chills or a fever greater than 101 F (38 C)    Any questions or concerns    Call  911 or go to the Emergency Room if you have:      Severe chest pain or trouble breathing    Bleeding that you cannot control    Additional Information:      Your port may be accessed right away.     You will need to have your port flushed every 30 days or after each use.      If you have questions call:          [facility] Radiology Dept @ [phone]             Additional Information About Your Visit        [email or web address] Information     [employer] lets you send messages to your doctor, view your test results, renew your prescriptions, schedule appointments and more. To sign up, go to [email or web address] . Click on \"Log in\" on the left side of the screen, which will take you to the Welcome page. Then click on \"Sign up Now\" on the right side of the page.     You will be asked to enter the access code listed below, as well as some personal information. Please follow the directions to create your username and password.     Your access code is: [email or web address]  Expires: 2017  1:06 PM     Your access code will  in 90 days. If you need help or a new code, please call your [place] [place] clinic or [phone].        Care EveryWhere ID     This is your Care EveryWhere ID. This could be used by other organizations to access your [facility] medical " "records  [id number]        Your Vitals Were     Blood Pressure Pulse Temperature Respirations Height Weight    140/69 (BP Location: Left arm) 71 98.1  F (36.7  C) (Oral) 18 1.626 m (5' 4\") 104.3 kg (230 lb)    Pulse Oximetry BMI (Body Mass Index)                98% 39.48 kg/m2           Primary Care Provider Office Phone # Fax #    Edgar Neno Rodriguez [phone]      Thank you!     Thank you for choosing [facility] for your care. Our goal is always to provide you with excellent care. Hearing back from our patients is one way we can continue to improve our services. Please take a few minutes to complete the written survey that you may receive in the mail after you visit with us. Thank you!             Medication List: This is a list of all your medications and when to take them. Check marks below indicate your daily home schedule. Keep this list as a reference.      Medications           Morning Afternoon Evening Bedtime As Needed    CALCIUM-MAGNESIUM-ZINC PO   Take 2 tablets by mouth daily                                CETAPHIL lotion   Apply topically 2 times daily Reported on 5/3/2017                                ferrous sulfate 325 (65 FE) MG tablet   Commonly known as:  IRON   Take 325 mg by mouth 2 times daily                                FLEXERIL PO   Take 10 mg by mouth 3 times daily as needed for muscle spasms                                LEVOTHYROXINE SODIUM PO   Take 75 mcg by mouth daily                                PRO-STAT AWC Liqd   Take 1 oz by mouth 2 times daily                                TRAVATAN Z 0.004 % ophthalmic solution   Place 1 drop into both eyes At Bedtime   Generic drug:  travoprost (BAK Free)                                TYLENOL PO   Take 650 mg by mouth every 4 hours as needed for mild pain or fever                                vitamin B-Complex   Take 1 tablet by mouth daily.                                  "

## 2017-05-30 NOTE — DISCHARGE INSTRUCTIONS
Port Insertion Discharge Instructions     After you go home:      Have an adult stay with you for the first 6 hours    You may resume your normal diet       For 24 hours - due to the sedation you received:    Relax and take it easy    Do NOT make any important or legal decisions    Do NOT drive or operate machines at home or at work    Do NOT drink alcohol    Care of Puncture Sites:      Keep the dressings on your sites clean & dry for 3 days. Change it only if it gets wet or dirty.    You may shower after the dressing comes off in 3 days    Do not remove the small white strips of tape, if present. Allow them to fall off on their own.     You may cover the wound with a bandaid after the dressing is removed if needed for comfort      Activity       Avoid heavy lifting (greater than 10 pounds) or the overuse of your shoulder for 3 days    Bleeding:      If you start bleeding from the incision sites in your chest or neck - or have swelling in your neck, sit down and press on the site for 5-10 minutes.     If bleeding has not stopped after 10 minutes, call your provider.        Call 911 right away if you have heavy bleeding or bleeding that does not stop.      Medicines:      You may resume all medications    Resume your Warfarin/Coumadin at your regular dose today. Follow up with your provider to have your INR rechecked    Resume your Platelet Inhibitors and Aspirin tomorrow at your regular dose    For minor pain, you may take Acetaminophen (Tylenol) or Ibuprofen (Advil)    Call the provider who ordered this procedure if:      You have swelling in your neck or over your port site    The incision area is red, swollen, hot or tender    You have chills or a fever greater than 101 F (38 C)    Any questions or concerns    Call  911 or go to the Emergency Room if you have:      Severe chest pain or trouble breathing    Bleeding that you cannot control    Additional Information:      Your port may be accessed right away.      You will need to have your port flushed every 30 days or after each use.      If you have questions call:          [facility] Radiology Dept @ [phone]

## 2017-05-30 NOTE — PROGRESS NOTES
1110  Patient returned from IR, s/p R chest port placement, site is WDL/dressings CDI.  Taking po food/fluids.  Denies pain.

## 2017-05-31 ENCOUNTER — NURSE TRIAGE (OUTPATIENT)
Dept: NURSING | Facility: CLINIC | Age: 77
End: 2017-05-31

## 2017-05-31 NOTE — PROGRESS NOTES
"Clinic Care Coordination Contact  Outreach    Data: Since SW-CCC documentation above, talked with pt's friend, Dinora, who said that she understood that pt does not want to continue with [facility] but wishes to have [facility] as her (pt's) primary clinic. Have attempted to call pt on a couple occasions--have not reached her. Per chart review, Dinora called the clinic on 5/25/17 to notify Dr. Rodriguez that pt's \"cancer is back\".  Plan: Will call pt again in 2-4 business days and--if pt wishes to continue with [facility]--with offer Care Coordination to pt.      Heather Angulo, [facility] Care Coordination  Clinic: [place]   Email: [email or web address]  Tele: [phone]              "

## 2017-06-01 ENCOUNTER — INFUSION THERAPY VISIT (OUTPATIENT)
Dept: INFUSION THERAPY | Facility: CLINIC | Age: 77
End: 2017-06-01
Attending: INTERNAL MEDICINE
Payer: MEDICARE

## 2017-06-01 ENCOUNTER — HOME INFUSION (PRE-WILLOW HOME INFUSION) (OUTPATIENT)
Dept: PHARMACY | Facility: CLINIC | Age: 77
End: 2017-06-01

## 2017-06-01 ENCOUNTER — ONCOLOGY VISIT (OUTPATIENT)
Dept: ONCOLOGY | Facility: CLINIC | Age: 77
End: 2017-06-01
Attending: INTERNAL MEDICINE
Payer: MEDICARE

## 2017-06-01 ENCOUNTER — HOSPITAL ENCOUNTER (OUTPATIENT)
Facility: CLINIC | Age: 77
Setting detail: SPECIMEN
Discharge: HOME | End: 2017-06-01
Attending: INTERNAL MEDICINE | Admitting: INTERNAL MEDICINE
Payer: MEDICARE

## 2017-06-01 VITALS
TEMPERATURE: 98.2 F | HEIGHT: 64 IN | HEART RATE: 87 BPM | WEIGHT: 118.8 LBS | DIASTOLIC BLOOD PRESSURE: 68 MMHG | BODY MASS INDEX: 20.28 KG/M2 | SYSTOLIC BLOOD PRESSURE: 129 MMHG

## 2017-06-01 VITALS — WEIGHT: 118.8 LBS | BODY MASS INDEX: 20.39 KG/M2

## 2017-06-01 DIAGNOSIS — C18.9 ADENOCARCINOMA OF COLON (H): ICD-10-CM

## 2017-06-01 DIAGNOSIS — C18.9 ADENOCARCINOMA OF COLON (H): Primary | ICD-10-CM

## 2017-06-01 LAB
ALBUMIN SERPL-MCNC: 3.3 G/DL (ref 3.4–5)
ALBUMIN UR-MCNC: 10 MG/DL
ALP SERPL-CCNC: 201 U/L (ref 40–150)
ALT SERPL W P-5'-P-CCNC: 42 U/L (ref 0–50)
ANION GAP SERPL CALCULATED.3IONS-SCNC: 7 MMOL/L (ref 3–14)
AST SERPL W P-5'-P-CCNC: 57 U/L (ref 0–45)
BASOPHILS # BLD AUTO: 0 10E9/L (ref 0–0.2)
BASOPHILS NFR BLD AUTO: 0.3 %
BILIRUB SERPL-MCNC: 0.5 MG/DL (ref 0.2–1.3)
BUN SERPL-MCNC: 15 MG/DL (ref 7–30)
CALCIUM SERPL-MCNC: 9.1 MG/DL (ref 8.5–10.1)
CEA SERPL-MCNC: 2.1 UG/L (ref 0–2.5)
CHLORIDE SERPL-SCNC: 106 MMOL/L (ref 94–109)
CO2 SERPL-SCNC: 27 MMOL/L (ref 20–32)
CREAT SERPL-MCNC: 0.62 MG/DL (ref 0.52–1.04)
DIFFERENTIAL METHOD BLD: ABNORMAL
EOSINOPHIL # BLD AUTO: 0.1 10E9/L (ref 0–0.7)
EOSINOPHIL NFR BLD AUTO: 1.1 %
ERYTHROCYTE [DISTWIDTH] IN BLOOD BY AUTOMATED COUNT: 16.4 % (ref 10–15)
GFR SERPL CREATININE-BSD FRML MDRD: ABNORMAL ML/MIN/1.7M2
GLUCOSE SERPL-MCNC: 88 MG/DL (ref 70–99)
HCT VFR BLD AUTO: 35.2 % (ref 35–47)
HGB BLD-MCNC: 10.5 G/DL (ref 11.7–15.7)
IMM GRANULOCYTES # BLD: 0 10E9/L (ref 0–0.4)
IMM GRANULOCYTES NFR BLD: 0.2 %
LYMPHOCYTES # BLD AUTO: 1.9 10E9/L (ref 0.8–5.3)
LYMPHOCYTES NFR BLD AUTO: 20 %
MCH RBC QN AUTO: 25.6 PG (ref 26.5–33)
MCHC RBC AUTO-ENTMCNC: 29.8 G/DL (ref 31.5–36.5)
MCV RBC AUTO: 86 FL (ref 78–100)
MONOCYTES # BLD AUTO: 0.8 10E9/L (ref 0–1.3)
MONOCYTES NFR BLD AUTO: 8 %
NEUTROPHILS # BLD AUTO: 6.6 10E9/L (ref 1.6–8.3)
NEUTROPHILS NFR BLD AUTO: 70.4 %
NRBC # BLD AUTO: 0 10*3/UL
NRBC BLD AUTO-RTO: 0 /100
PLATELET # BLD AUTO: 238 10E9/L (ref 150–450)
POTASSIUM SERPL-SCNC: 4 MMOL/L (ref 3.4–5.3)
PROT SERPL-MCNC: 6.8 G/DL (ref 6.8–8.8)
RBC # BLD AUTO: 4.1 10E12/L (ref 3.8–5.2)
SODIUM SERPL-SCNC: 140 MMOL/L (ref 133–144)
WBC # BLD AUTO: 9.3 10E9/L (ref 4–11)

## 2017-06-01 PROCEDURE — 96417 CHEMO IV INFUS EACH ADDL SEQ: CPT

## 2017-06-01 PROCEDURE — 25000125 ZZHC RX 250: Performed by: INTERNAL MEDICINE

## 2017-06-01 PROCEDURE — 81003 URINALYSIS AUTO W/O SCOPE: CPT | Performed by: INTERNAL MEDICINE

## 2017-06-01 PROCEDURE — 80053 COMPREHEN METABOLIC PANEL: CPT | Performed by: INTERNAL MEDICINE

## 2017-06-01 PROCEDURE — 99213 OFFICE O/P EST LOW 20 MIN: CPT | Performed by: INTERNAL MEDICINE

## 2017-06-01 PROCEDURE — 85025 COMPLETE CBC W/AUTO DIFF WBC: CPT | Performed by: INTERNAL MEDICINE

## 2017-06-01 PROCEDURE — 96413 CHEMO IV INFUSION 1 HR: CPT

## 2017-06-01 PROCEDURE — 97802 MEDICAL NUTRITION INDIV IN: CPT | Performed by: DIETITIAN, REGISTERED

## 2017-06-01 PROCEDURE — 96368 THER/DIAG CONCURRENT INF: CPT

## 2017-06-01 PROCEDURE — 96416 CHEMO PROLONG INFUSE W/PUMP: CPT

## 2017-06-01 PROCEDURE — 96411 CHEMO IV PUSH ADDL DRUG: CPT

## 2017-06-01 PROCEDURE — 25000128 H RX IP 250 OP 636: Performed by: INTERNAL MEDICINE

## 2017-06-01 PROCEDURE — 82378 CARCINOEMBRYONIC ANTIGEN: CPT | Performed by: INTERNAL MEDICINE

## 2017-06-01 PROCEDURE — 96367 TX/PROPH/DG ADDL SEQ IV INF: CPT

## 2017-06-01 PROCEDURE — 96415 CHEMO IV INFUSION ADDL HR: CPT

## 2017-06-01 RX ORDER — ONDANSETRON 8 MG/1
8 TABLET, FILM COATED ORAL EVERY 8 HOURS PRN
Qty: 10 TABLET | Refills: 2 | Status: SHIPPED | OUTPATIENT
Start: 2017-06-01 | End: 2017-01-01

## 2017-06-01 RX ORDER — LORAZEPAM 0.5 MG/1
0.5 TABLET ORAL EVERY 4 HOURS PRN
Qty: 30 TABLET | Refills: 2 | Status: SHIPPED | OUTPATIENT
Start: 2017-06-01 | End: 2017-01-01

## 2017-06-01 RX ORDER — FLUOROURACIL 50 MG/ML
400 INJECTION, SOLUTION INTRAVENOUS ONCE
Status: COMPLETED | OUTPATIENT
Start: 2017-06-01 | End: 2017-06-01

## 2017-06-01 RX ADMIN — FLUOROURACIL 620 MG: 50 INJECTION, SOLUTION INTRAVENOUS at 14:20

## 2017-06-01 RX ADMIN — DEXAMETHASONE SODIUM PHOSPHATE: 10 INJECTION, SOLUTION INTRAMUSCULAR; INTRAVENOUS at 11:08

## 2017-06-01 RX ADMIN — LEUCOVORIN CALCIUM 550 MG: 350 INJECTION, POWDER, LYOPHILIZED, FOR SOLUTION INTRAMUSCULAR; INTRAVENOUS at 12:13

## 2017-06-01 RX ADMIN — BEVACIZUMAB 260 MG: 100 INJECTION, SOLUTION INTRAVENOUS at 11:30

## 2017-06-01 RX ADMIN — SODIUM CHLORIDE 250 ML: 9 INJECTION, SOLUTION INTRAVENOUS at 11:07

## 2017-06-01 RX ADMIN — DEXTROSE MONOHYDRATE 250 ML: 50 INJECTION, SOLUTION INTRAVENOUS at 12:13

## 2017-06-01 RX ADMIN — OXALIPLATIN 132 MG: 5 INJECTION, SOLUTION, CONCENTRATE INTRAVENOUS at 12:14

## 2017-06-01 ASSESSMENT — PAIN SCALES - GENERAL
PAINLEVEL: NO PAIN (0)
PAINLEVEL: MODERATE PAIN (4)

## 2017-06-01 NOTE — TELEPHONE ENCOUNTER
Patient's brother in law, Renny Monson, who is also a doctor, is the caller. Pt having an infusion tomorrow at [facility].  Family asks what patient should be doing as far as diet and oral intake tonight and tomorrow morning?  They did not receive instructions. On-call Dr. Nieves paged @7:20pm via [employer] page  to call Renny back ( he is speaking on her behalf as pt has dementia) phone [phone]. Advised Renny to call back to [facility]  if they do not receive a call from on-call in next 20-30 minutes.  Lore Knutson RN/FNA

## 2017-06-01 NOTE — MR AVS SNAPSHOT
After Visit Summary   6/1/2017    Sherita Kenney    MRN: [id number]           Patient Information     Date Of Birth          1940        Visit Information        Provider Department      6/1/2017 10:00 AM Susan Lindo MD [facility]        Care Instructions    Start chemotherapy.  Follow with next chemotherapy.          Follow-ups after your visit        Your next 10 appointments already scheduled     Jun 01, 2017 11:30 AM CDT   New Visit with Jody Joseph RD, LD   [facility] ([facility])    [place] [id number]  [place] [address]   [phone]            Donnie 15, 2017  8:30 AM CDT   Level 6 with  INFUSION CHAIR 16   [facility] ([facility])    [place] [address]  [place] [address]   [phone]            Donnie 15, 2017  9:30 AM CDT   Return Visit with Susan Lindo MD   [facility] ([facility])    [facility]  [address]  [place] [address]   [phone]            Jun 29, 2017  8:30 AM CDT   Level 6 with  INFUSION CHAIR 19   [facility] ([facility])    [facility]  [address]  [place] [address]   [phone]            Jun 29, 2017  9:30 AM CDT   Return Visit with Susan Lindo MD   [facility] ([facility])    [place] [address]  [place] [address]   [phone]              Who to contact     If you have questions or need follow up information about today's clinic visit or your schedule please contact [facility] directly at [phone].  Normal or non-critical lab and imaging results will be communicated to you by MyChart, letter or phone within 4 business days  "after the clinic has received the results. If you do not hear from us within 7 days, please contact the clinic through [employer] or phone. If you have a critical or abnormal lab result, we will notify you by phone as soon as possible.  Submit refill requests through [employer] or call your pharmacy and they will forward the refill request to us. Please allow 3 business days for your refill to be completed.          Additional Information About Your Visit        [employer] Information     [employer] lets you send messages to your doctor, view your test results, renew your prescriptions, schedule appointments and more. To sign up, go to [email or web address] . Click on \"Log in\" on the left side of the screen, which will take you to the Welcome page. Then click on \"Sign up Now\" on the right side of the page.     You will be asked to enter the access code listed below, as well as some personal information. Please follow the directions to create your username and password.     Your access code is: [email or web address]  Expires: 2017  1:06 PM     Your access code will  in 90 days. If you need help or a new code, please call your [place] clinic or [phone].        Care EveryWhere ID     This is your Care EveryWhere ID. This could be used by other organizations to access your [place] medical records  [id number]        Your Vitals Were     BMI (Body Mass Index)                   20.39 kg/m2            Blood Pressure from Last 3 Encounters:   17 (P) 107/70   17 96/58   17 107/63    Weight from Last 3 Encounters:   17 53.9 kg (118 lb 12.8 oz)   17 53.9 kg (118 lb 12.8 oz)   17 104.3 kg (230 lb)              Today, you had the following     No orders found for display         Today's Medication Changes          These changes are accurate as of: 17 10:56 AM.  If you have any questions, ask your nurse or doctor.               Start taking these medicines.        Dose/Directions    " LORazepam 0.5 MG tablet   Commonly known as:  ATIVAN   Used for:  Adenocarcinoma of colon (H)        Dose:  0.5 mg   Take 1 tablet (0.5 mg) by mouth every 4 hours as needed (Anxiety, Nausea/Vomiting or Sleep)   Quantity:  30 tablet   Refills:  2       ondansetron 8 MG tablet   Commonly known as:  ZOFRAN   Used for:  Adenocarcinoma of colon (H)        Dose:  8 mg   Take 1 tablet (8 mg) by mouth every 8 hours as needed (Nausea/Vomiting)   Quantity:  10 tablet   Refills:  2            Where to get your medicines      These medications were sent to [facility] [place] - [address]  [address], [facility] [address]     Phone:  [phone]     ondansetron 8 MG tablet         Some of these will need a paper prescription and others can be bought over the counter.  Ask your nurse if you have questions.     Bring a paper prescription for each of these medications     LORazepam 0.5 MG tablet                Primary Care Provider Office Phone # Fax #    Edgar Neno Rodriguez [phone]       [facility] [address]  [facility] [address]        Thank you!     Thank you for choosing [facility]  for your care. Our goal is always to provide you with excellent care. Hearing back from our patients is one way we can continue to improve our services. Please take a few minutes to complete the written survey that you may receive in the mail after your visit with us. Thank you!             Your Updated Medication List - Protect others around you: Learn how to safely use, store and throw away your medicines at www.disposemymeds.org.          This list is accurate as of: 6/1/17 10:56 AM.  Always use your most recent med list.                   Brand Name Dispense Instructions for use    CALCIUM-MAGNESIUM-ZINC PO      Take 2 tablets by mouth daily       CETAPHIL lotion      Apply topically 2 times daily Reported on 5/3/2017       ferrous sulfate  325 (65 FE) MG tablet    IRON     Take 325 mg by mouth 2 times daily       FLEXERIL PO      Take 10 mg by mouth 3 times daily as needed for muscle spasms       LEVOTHYROXINE SODIUM PO      Take 75 mcg by mouth daily       LORazepam 0.5 MG tablet    ATIVAN    30 tablet    Take 1 tablet (0.5 mg) by mouth every 4 hours as needed (Anxiety, Nausea/Vomiting or Sleep)       ondansetron 8 MG tablet    ZOFRAN    10 tablet    Take 1 tablet (8 mg) by mouth every 8 hours as needed (Nausea/Vomiting)       PRO-STAT AWC Liqd      Take 1 oz by mouth 2 times daily       TRAVATAN Z 0.004 % ophthalmic solution   Generic drug:  travoprost (BAK Free)      Place 1 drop into both eyes At Bedtime       TYLENOL PO      Take 650 mg by mouth every 4 hours as needed for mild pain or fever       vitamin B-Complex      Take 1 tablet by mouth daily.

## 2017-06-01 NOTE — PROGRESS NOTES
Infusion Nursing Note:  Sherita Kenney presents today for C1D1  folfox/avastin.    Patient seen by provider today: Yes: Dr. Lindo   present during visit today: Not Applicable.    Note: N/A.    Intravenous Access:  Implanted Port.    Treatment Conditions:  Lab Results   Component Value Date    HGB 10.5 06/01/2017     Lab Results   Component Value Date    WBC 9.3 06/01/2017      Lab Results   Component Value Date    ANEU 6.6 06/01/2017     Lab Results   Component Value Date     06/01/2017      Results reviewed, labs MET treatment parameters, ok to proceed with treatment.  Urine 10-negative.      Post Infusion Assessment:  Patient tolerated infusion without incident.  Blood return noted pre and post infusion.  Blood return noted during administration every 2 cc.  Site patent and intact, free from redness, edema or discomfort.  No evidence of extravasations.    Discharge Plan:   Discharge instructions reviewed with: Patient and Family.  Patient and/or family verbalized understanding of discharge instructions and all questions answered.  Copy of AVS reviewed with patient and/or family.  Patient will return saurday 12:30am for pump disconnect.Patient discharged in stable condition accompanied by: sister and brother-in-law.  Departure Mode: Ambulatory.    Patrick Lyles RN

## 2017-06-01 NOTE — MR AVS SNAPSHOT
After Visit Summary   6/1/2017    Sherita Kenney    MRN: [id number]           Patient Information     Date Of Birth          1940        Visit Information        Provider Department      6/1/2017 9:30 AM  INFUSION CHAIR 10 [facility]        Today's Diagnoses     Adenocarcinoma of colon (H)    -  1       Follow-ups after your visit        Your next 10 appointments already scheduled     Jun 03, 2017 12:30 PM CDT   Level 1 with  INFUSION CHAIR 19   [facility] ([facility])    [facility] [place] [id number]  [place] [address]   [phone]            Donnie 15, 2017  8:30 AM CDT   Level 6 with  INFUSION CHAIR 16   [facility] ([facility])    [facility]  [address]  [place] [address]   [phone]            Donnie 15, 2017  9:30 AM CDT   Return Visit with Susan Lindo MD   [facility] ([facility])    [facility]  [address]  [place] [address]   [phone]            Jun 29, 2017  8:30 AM CDT   Level 6 with  INFUSION CHAIR 19   [facility] ([facility])    [facility]  [address]  [place] [address]   [phone]            Jun 29, 2017  9:30 AM CDT   Return Visit with Susan Lindo MD   [facility] ([facility])    [facility]  [address]  [place] [address]   [phone]              Who to contact     If you have questions or need follow up information about today's clinic visit or your schedule please contact [facility] [place] directly at [phone].  Normal or non-critical lab and imaging results will be communicated to you by MyChart, letter or phone  "within 4 business days after the clinic has received the results. If you do not hear from us within 7 days, please contact the clinic through Present[employer] or phone. If you have a critical or abnormal lab result, we will notify you by phone as soon as possible.  Submit refill requests through Present[employer] or call your pharmacy and they will forward the refill request to us. Please allow 3 business days for your refill to be completed.          Additional Information About Your Visit        Presentigo Information     Presentigo lets you send messages to your doctor, view your test results, renew your prescriptions, schedule appointments and more. To sign up, go to [email or web address] . Click on \"Log in\" on the left side of the screen, which will take you to the Welcome page. Then click on \"Sign up Now\" on the right side of the page.     You will be asked to enter the access code listed below, as well as some personal information. Please follow the directions to create your username and password.     Your access code is: [email or web address]  Expires: 2017  1:06 PM     Your access code will  in 90 days. If you need help or a new code, please call your [place] clinic or [phone].        Care EveryWhere ID     This is your Care EveryWhere ID. This could be used by other organizations to access your [place] medical records  [id number]        Your Vitals Were     Pulse Temperature Height BMI (Body Mass Index)          87 98.2  F (36.8  C) (Oral) 1.626 m (5' 4\") 20.39 kg/m2         Blood Pressure from Last 3 Encounters:   17 (P) 107/70   17 129/68   17 96/58    Weight from Last 3 Encounters:   17 53.9 kg (118 lb 12.8 oz)   17 53.9 kg (118 lb 12.8 oz)   17 104.3 kg (230 lb)              We Performed the Following     CBC with platelets differential     CEA     Comprehensive metabolic panel     Protein qualitative urine          Today's Medication Changes          These changes are accurate " as of: 6/1/17  4:01 PM.  If you have any questions, ask your nurse or doctor.               Start taking these medicines.        Dose/Directions    LORazepam 0.5 MG tablet   Commonly known as:  ATIVAN   Used for:  Adenocarcinoma of colon (H)        Dose:  0.5 mg   Take 1 tablet (0.5 mg) by mouth every 4 hours as needed (Anxiety, Nausea/Vomiting or Sleep)   Quantity:  30 tablet   Refills:  2       ondansetron 8 MG tablet   Commonly known as:  ZOFRAN   Used for:  Adenocarcinoma of colon (H)        Dose:  8 mg   Take 1 tablet (8 mg) by mouth every 8 hours as needed (Nausea/Vomiting)   Quantity:  10 tablet   Refills:  2            Where to get your medicines      These medications were sent to [facility] [place], [place] - [address]  [address], [facility] [address]     Phone:  [phone]     ondansetron 8 MG tablet         Some of these will need a paper prescription and others can be bought over the counter.  Ask your nurse if you have questions.     Bring a paper prescription for each of these medications     LORazepam 0.5 MG tablet                Primary Care Provider Office Phone # Fax #    Edgar Neno Rodriguez [phone]       [facility] [address]  [facility] [address]        Thank you!     Thank you for choosing [facility] AND [facility]  for your care. Our goal is always to provide you with excellent care. Hearing back from our patients is one way we can continue to improve our services. Please take a few minutes to complete the written survey that you may receive in the mail after your visit with us. Thank you!             Your Updated Medication List - Protect others around you: Learn how to safely use, store and throw away your medicines at www.disposemymeds.org.          This list is accurate as of: 6/1/17  4:01 PM.  Always use your most recent med list.                   Brand Name Dispense Instructions for  use    CALCIUM-MAGNESIUM-ZINC PO      Take 2 tablets by mouth daily       CETAPHIL lotion      Apply topically 2 times daily Reported on 5/3/2017       ferrous sulfate 325 (65 FE) MG tablet    IRON     Take 325 mg by mouth 2 times daily       FLEXERIL PO      Take 10 mg by mouth 3 times daily as needed for muscle spasms       LEVOTHYROXINE SODIUM PO      Take 75 mcg by mouth daily       LORazepam 0.5 MG tablet    ATIVAN    30 tablet    Take 1 tablet (0.5 mg) by mouth every 4 hours as needed (Anxiety, Nausea/Vomiting or Sleep)       ondansetron 8 MG tablet    ZOFRAN    10 tablet    Take 1 tablet (8 mg) by mouth every 8 hours as needed (Nausea/Vomiting)       PRO-STAT AWC Liqd      Take 1 oz by mouth 2 times daily       TRAVATAN Z 0.004 % ophthalmic solution   Generic drug:  travoprost (BAK Free)      Place 1 drop into both eyes At Bedtime       TYLENOL PO      Take 650 mg by mouth every 4 hours as needed for mild pain or fever       vitamin B-Complex      Take 1 tablet by mouth daily.

## 2017-06-01 NOTE — MR AVS SNAPSHOT
"                  MRN:[id number]                      After Visit Summary   6/1/2017    Sherita Kenney    MRN: [id number]           Visit Information        Provider Department      6/1/2017 11:30 AM Jody Mcgee RD, LD [facility]        Your next 10 appointments already scheduled     Jun 03, 2017 12:30 PM CDT   Level 1 with SH INFUSION CHAIR 19   [facility] ([facility])    [place] [address]  [place] [address]   [phone]            Donnie 15, 2017  8:30 AM CDT   Level 6 with SH INFUSION CHAIR 16   [facility] ([facility])    [place] [address]  [place] [address]   [phone]            Donnie 15, 2017  9:30 AM CDT   Return Visit with Susan Lindo MD   [facility] ([facility])    [place] [address]  [place] [address]   [phone]            Jun 29, 2017  8:30 AM CDT   Level 6 with SH INFUSION CHAIR 19   [facility] ([facility])    [place] [address]  [place] [address]   [phone]            Jun 29, 2017  9:30 AM CDT   Return Visit with Susan Lindo MD   [facility] ([facility])    [place] [address]  [place] [address]   [phone]              [employer] Information     [employer] lets you send messages to your doctor, view your test results, renew your prescriptions, schedule appointments and more. To sign up, go to [email or web address] . Click on \"Log in\" on the left side of the screen, which will take you to the Welcome page. Then click on \"Sign up Now\" on the right side of the page.     You will be asked to enter the access code listed below, as well as some " personal information. Please follow the directions to create your username and password.     Your access code is: [email or web address]  Expires: 2017  1:06 PM     Your access code will  in 90 days. If you need help or a new code, please call your [place] clinic or [phone].        Care EveryWhere ID     This is your Care EveryWhere ID. This could be used by other organizations to access your [place] medical records  [id number]

## 2017-06-01 NOTE — PROGRESS NOTES
ONCOLOGY HISTORY:  Ms. Sherita Kenney is a female with colon cancer.   1. The patient was brought to the ER on 02/15/2017 with altered mental status.    -CT brain on 02/15/2017 did not reveal any acute intracranial pathology.   -CT abdomen and pelvis on 02/15/2017 revealed large right iliopsoas abscess. Adjacent mass-like wall thickening of portion of right colon.   -CT-guided drainage of abscess was done.   2. The patient was taken to OR on 03/01/2017.    -Colonoscopy revealed a large fungating mass in the right side of the colon.  Pathology revealed invasive poorly differentiated adenocarcinoma.  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   -Laparoscopic ileostomy was done.    3.  CEA on 02/16/2017 was 3.8.   4. The patient had right colectomy with takedown of ileostomy and primary anastomosis on 04/13/2017.    -Pathology  reveals poorly differentiated colonic adenocarcinoma. 15 of 24 lymph nodes are positive. Tumor invades into adjacent abdominal wall.  Lymphovascular invasion present. pT4b pN2b M0.   5. PET scan on 05/22/2017 reveals metastatic disease.  There are multiple new hypermetabolic liver metastases and  hypermetabolic necrotic lymphadenopathy versus malignant implants right mesentery and right lower quadrant.    SUBJECTIVE:  Ms. Sherita Kenney is a 76-year-old female with stage IV colon cancer.  She is here to start chemotherapy with FOLFOX and Avastin.  She had a Port-A-Cath placed.      Overall, she is doing good.  She has no complaints.  No pain.  No headache.  No dizziness.  No chest pain or difficulty breathing.  No nausea or vomiting.  No bleeding.      Family came with her.  They are a little concern because of her forgetfulness.  The patient lives in assisted living.      PHYSICAL EXAMINATION:   GENERAL:  She is alert and oriented x3.   VITAL SIGNS:  Reviewed.   Rest of the system not examined.      LABORATORY DATA:  Reviewed.      ASSESSMENT:  A  76-year-old female with metastatic colon cancer with multiple liver metastases.      PLAN:   1.  I discussed with the patient regarding chemotherapy.  The rationale for chemotherapy was discussed.  I explained to her that she has metastatic colon cancer.  This is palliative chemotherapy to control disease and prolong life.  Side effects again reviewed. She will start modified FOLFOX-6 with Avastin.  Side effects including cold sensitivity were reviewed.      2.  Family will be keeping a close watch.  I encouraged them to call us with any questions.  I am hoping that she is able to tolerate the chemotherapy well.      Total time spent 20 minutes, most of time was spent in counseling.         OMER MAKI MD             D: 2017 11:07   T: 2017 11:48   MT: TYLER      Name:     SEVERO SWANN   MRN:      [id number]        Account:      [id number]   :      1940           Visit Date:   2017      Document: [id number]

## 2017-06-01 NOTE — PROGRESS NOTES
"Oncology Rooming Note    June 1, 2017 9:59 AM   Sherita Kenney is a 76 year old female who presents for:    Chief Complaint   Patient presents with     Oncology Clinic Visit     Colon cancer     Initial Vitals: BP (P) 107/70  Pulse (P) 80  Temp (P) 99.2  F (37.3  C) (Oral)  Resp (P) 18  Wt 53.9 kg (118 lb 12.8 oz)  SpO2 (P) 96%  BMI 20.39 kg/m2 Estimated body mass index is 20.39 kg/(m^2) as calculated from the following:    Height as of 5/30/17: 1.626 m (5' 4\").    Weight as of this encounter: 53.9 kg (118 lb 12.8 oz). Body surface area is 1.56 meters squared.  Data Unavailable Comment: Data Unavailable   No LMP recorded. Patient is not currently having periods (Reason: Perimenopausal).  Allergies reviewed: Yes  Medications reviewed: Yes    Medications: Medication refills not needed today.  Pharmacy name entered into [employer]:    [facility] [address] [place] [address] - [address] [facility] [place], [place] - [address]    Clinical concerns: None            5 minutes for nursing intake (face to face time)     April Bazzi MA            DISCHARGE PLAN:    1.) Reported to infusion RN RENÉE collins who will arrange pump disconnect for Saturday  Called \"RENÉE Christianson supervisor at her assisted living home  [phone] to explainsrosaline will have a chemo pump attached and there is nothing they need to do, it will be taken off Saturday here.  Explained she will be sent home with a chemo spill kit bag and phone numbers to call if something happens    Also told her that Sherita will be sent home with 2 new scripts for ativan and zofran. Per family they would like these pill bottles to be kept at the nurses station.  Advised them to assess her for nausea daily or even every shift while she is getting treatment and given medications as directed.    Sherita was interested in meeting with Jody/ Nutritionist today- This was done.  Next treatment and MD appt arranged for " 6/15/17    AVS copies given to sister and BRother in law as well as Sherita.      Next appointments: See patient instruction section  Departure Mode: Ambulatory  Accompanied by: sister/ Brother in law  25 minutes for nursing discharge (face to face time)   Di Mahmood RN

## 2017-06-02 DIAGNOSIS — G89.3 CANCER ASSOCIATED PAIN: Primary | ICD-10-CM

## 2017-06-02 RX ORDER — ACETAMINOPHEN 325 MG/1
650 TABLET ORAL EVERY 4 HOURS PRN
Qty: 60 TABLET | Refills: 1 | Status: ON HOLD | OUTPATIENT
Start: 2017-06-02 | End: 2018-01-01

## 2017-06-02 NOTE — PROGRESS NOTES
"CLINICAL NUTRITION SERVICES - ASSESSMENT NOTE      REASON FOR ASSESSMENT  Sherita Kenney is a 76 year old female seen by the dietitian for Medical Nutrition Therapy related to colon cancer per RN request.  Patient accompanied by sister and brother-in law.    NUTRITION HISTORY  Factors affecting nutrition intake include: none at this time   Patient states she is living at an assisted living and does not like the food.  Patient could not provide detailed information on her current intake.  Patient states she eats what is provided to her all and consumes most of her meals.  Patient states the portions are small.  Patient is not consuming supplements.  Patient's family members are willing to provide foods for patient in order for her to increase intake.      PHYSICAL FINDINGS  Observed  Muscle loss-hand region   Obtained from Chart/Interdisciplinary Team  None noted    ANTHROPOMETRICS  Height:  5'0\"  Weight:  118 lbs.  BMI: 20.3 kg/m2  IBW: 100 lbs +/-10%  % IBW: 118%  Weight History:   Wt Readings from Last 5 Encounters:   06/01/17 53.9 kg (118 lb 12.8 oz)   06/01/17 53.9 kg (118 lb 12.8 oz)   05/30/17 104.3 kg (230 lb)   05/25/17 53.1 kg (117 lb)   05/24/17 53.2 kg (117 lb 3.2 oz)     Dosing Weight: 53.6 Kg    LABS  Labs reviewed    MEDICATIONS/VITAMINS/MINERALS  Medications reviewed    PROCEDURES WITH NUTRITIONAL IMPLICATIONS  Chemotherapy (first round of chemotherapy) FOLFOX    ASSESSED NUTRITION NEEDS  Estimated Energy Needs: 3349-8771 kcals (30-35 Kcal/Kg)  Justification: repletion  Estimated Protein Needs: 54-68 protein (1.2-1.5 g pro/Kg)  Justification: Repletion  Estimated Fluid Needs: 8540-3636 mL (25-30 mL/kg)    ASSESSED MALNUTRITION STATUS  % Intake: Decreased intake does not meet criteria for malnutrition   % Weight Loss: None noted  Subcutaneous Fat Loss: None noted  Muscle Loss: Dorsal hand region mild depletion  Fluid/Edema: None noted  Weight Status: Normal BMI  Patient does not meet two of the above " criteria for malnutrition     NUTRITION DIAGNOSIS  Food and nutrition-related knowledge deficit related to lack of prior exposure as evidenced by no previous need for food and nutrition related recommendations    INTERVENTIONS  Recommendations/Nutrition Prescription  1.  3701-5635 calories per day  2.  54-68 gram protein    Implementation:  -Assessed learning needs and learning preferences  -Nutrition education provided:  --Discussed ways to maximize kcal and protein intake. Discussed calorie and protein needs for maintenance of weight and nutrition status. Advised pt to aim for 3451-9391 kcal and 54-68 g protein via 5-6 small frequent meals.   -Provided pt high kcal, high protein recipes to try (smoothies, mashed potatoes, muffins etc).  Discussed cold sensitivity and ways to manage food intake.  Discussed food options if has nausea.  Suggest nutritional supplements (Ensure Plus/Boost Plus etc) if appetite decreases.    Pt verbalizes understanding of materials provided during consult by stating will add snacks  Patient Understanding: good  Expected Compliance: good    Goals  1.  Aim for 5-6 small frequent meals  2.  Aim for 0137-4676 kcal and 54-68 g protein/day  3.  Weight maintenance throughout treatment    Follow-Up Plans:  Pt.encouraged to follow up if has further questions  RD name and number provided    Time spent with patient: 20 minutes.     Jody Joseph, Magdiel, RD, LD  Clinical Dietitian  [facility]  [phone] (direct)

## 2017-06-03 ENCOUNTER — INFUSION THERAPY VISIT (OUTPATIENT)
Dept: INFUSION THERAPY | Facility: CLINIC | Age: 77
End: 2017-06-03
Attending: INTERNAL MEDICINE
Payer: MEDICARE

## 2017-06-03 VITALS
TEMPERATURE: 97.9 F | SYSTOLIC BLOOD PRESSURE: 125 MMHG | HEART RATE: 76 BPM | DIASTOLIC BLOOD PRESSURE: 83 MMHG | RESPIRATION RATE: 16 BRPM

## 2017-06-03 DIAGNOSIS — Z95.828 PORT-A-CATH IN PLACE: Primary | ICD-10-CM

## 2017-06-03 PROCEDURE — 40000269 ZZH STATISTIC NO CHARGE FACILITY FEE

## 2017-06-03 PROCEDURE — 25000128 H RX IP 250 OP 636: Performed by: INTERNAL MEDICINE

## 2017-06-03 RX ORDER — HEPARIN SODIUM (PORCINE) LOCK FLUSH IV SOLN 100 UNIT/ML 100 UNIT/ML
500 SOLUTION INTRAVENOUS EVERY 8 HOURS
Status: CANCELLED
Start: 2017-06-03

## 2017-06-03 RX ORDER — HEPARIN SODIUM (PORCINE) LOCK FLUSH IV SOLN 100 UNIT/ML 100 UNIT/ML
500 SOLUTION INTRAVENOUS EVERY 8 HOURS
Status: DISCONTINUED | OUTPATIENT
Start: 2017-06-03 | End: 2017-06-03 | Stop reason: HOSPADM

## 2017-06-03 RX ADMIN — SODIUM CHLORIDE, PRESERVATIVE FREE 500 UNITS: 5 INJECTION INTRAVENOUS at 12:53

## 2017-06-03 ASSESSMENT — PAIN SCALES - GENERAL: PAINLEVEL: NO PAIN (0)

## 2017-06-03 NOTE — MR AVS SNAPSHOT
After Visit Summary   6/3/2017    Sherita Kenney    MRN: [id number]           Patient Information     Date Of Birth          1940        Visit Information        Provider Department      6/3/2017 12:30 PM  INFUSION CHAIR 19 [facility]        Today's Diagnoses     Port-a-cath in place    -  1       Follow-ups after your visit        Your next 10 appointments already scheduled     Donnie 15, 2017  8:30 AM CDT   Level 6 with  INFUSION CHAIR 16   [facility] ([facility])    [facility]  [address]  [place] [address]   [phone]            Donnie 15, 2017  9:30 AM CDT   Return Visit with Susan Lindo MD   [facility] ([facility])    [facility]  [address]  [place] [address]   [phone]            Jun 29, 2017  8:30 AM CDT   Level 6 with  INFUSION CHAIR 19   [facility] ([facility])    [facility]  [address]  [place] [address]   [phone]            Jun 29, 2017  9:30 AM CDT   Return Visit with Susan Lindo MD   [facility] ([facility])    [facility]  [address]  [place] [address]   [phone]              Who to contact     If you have questions or need follow up information about today's clinic visit or your schedule please contact [facility] directly at [phone].  Normal or non-critical lab and imaging results will be communicated to you by MyChart, letter or phone within 4 business days after the clinic has received the results. If you do not hear from us within 7 days, please contact the clinic through MyChart or phone. If you have a critical or abnormal lab result, we will notify you by phone as soon as  "possible.  Submit refill requests through [employer] or call your pharmacy and they will forward the refill request to us. Please allow 3 business days for your refill to be completed.          Additional Information About Your Visit        [employer] Information     [employer] lets you send messages to your doctor, view your test results, renew your prescriptions, schedule appointments and more. To sign up, go to [email or web address] . Click on \"Log in\" on the left side of the screen, which will take you to the Welcome page. Then click on \"Sign up Now\" on the right side of the page.     You will be asked to enter the access code listed below, as well as some personal information. Please follow the directions to create your username and password.     Your access code is: [email or web address]  Expires: 2017  1:06 PM     Your access code will  in 90 days. If you need help or a new code, please call your [place] clinic or [phone].        Care EveryWhere ID     This is your Care EveryWhere ID. This could be used by other organizations to access your [place] medical records  [id number]        Your Vitals Were     Pulse Temperature Respirations             76 97.9  F (36.6  C) (Oral) 16          Blood Pressure from Last 3 Encounters:   17 125/83   17 (P) 107/70   17 129/68    Weight from Last 3 Encounters:   17 53.9 kg (118 lb 12.8 oz)   17 53.9 kg (118 lb 12.8 oz)   17 104.3 kg (230 lb)              Today, you had the following     No orders found for display       Primary Care Provider Office Phone # Fax #    Edgar Neno Rodriguez [phone]       [facility] [address]  [facility] [address]        Thank you!     Thank you for choosing [facility]  for your care. Our goal is always to provide you with excellent care. Hearing back from our patients is one way we can continue to improve our services. " Please take a few minutes to complete the written survey that you may receive in the mail after your visit with us. Thank you!             Your Updated Medication List - Protect others around you: Learn how to safely use, store and throw away your medicines at www.disposemymeds.org.          This list is accurate as of: 6/3/17  1:05 PM.  Always use your most recent med list.                   Brand Name Dispense Instructions for use    acetaminophen 325 MG tablet    TYLENOL    60 tablet    Take 2 tablets (650 mg) by mouth every 4 hours as needed for mild pain or fever       CALCIUM-MAGNESIUM-ZINC PO      Take 2 tablets by mouth daily       CETAPHIL lotion      Apply topically 2 times daily Reported on 5/3/2017       ferrous sulfate 325 (65 FE) MG tablet    IRON     Take 325 mg by mouth 2 times daily       FLEXERIL PO      Take 10 mg by mouth 3 times daily as needed for muscle spasms       LEVOTHYROXINE SODIUM PO      Take 75 mcg by mouth daily       LORazepam 0.5 MG tablet    ATIVAN    30 tablet    Take 1 tablet (0.5 mg) by mouth every 4 hours as needed (Anxiety, Nausea/Vomiting or Sleep)       ondansetron 8 MG tablet    ZOFRAN    10 tablet    Take 1 tablet (8 mg) by mouth every 8 hours as needed (Nausea/Vomiting)       PRO-STAT AWC Liqd      Take 1 oz by mouth 2 times daily       TRAVATAN Z 0.004 % ophthalmic solution   Generic drug:  travoprost (BAK Free)      Place 1 drop into both eyes At Bedtime       vitamin B-Complex      Take 1 tablet by mouth daily.

## 2017-06-03 NOTE — PROGRESS NOTES
Infusion Nursing Note:  Sherita MARTINEZ Shilakrista presents today for chemo pump disconnect and port flush.    Patient seen by provider today: No   present during visit today: Not Applicable.    Note: No concerns or changes to report..    Intravenous Access:  Implanted Port.    Treatment Conditions:  Chemo pump empty and completed..      Post Infusion Assessment:  Patient tolerated infusion without incident.  Blood return noted pre and post infusion.  Site patent and intact, free from redness, edema or discomfort.  No evidence of extravasations.  Access discontinued per protocol.    Discharge Plan:   Discharge instructions reviewed with: Patient.  Patient and/or family verbalized understanding of discharge instructions and all questions answered.  Copy of AVS reviewed with patient and/or family.  Patient will return 2 weeks 6/15 for next appointment.  Patient discharged in stable condition accompanied by: sister.  Departure Mode: Ambulatory.    Yun Sweeney RN

## 2017-06-05 ENCOUNTER — DOCUMENTATION ONLY (OUTPATIENT)
Dept: OTHER | Facility: CLINIC | Age: 77
End: 2017-06-05

## 2017-06-05 PROBLEM — Z71.89 ACP (ADVANCE CARE PLANNING): Chronic | Status: ACTIVE | Noted: 2017-06-05

## 2017-06-06 ENCOUNTER — CARE COORDINATION (OUTPATIENT)
Dept: ONCOLOGY | Facility: CLINIC | Age: 77
End: 2017-06-06

## 2017-06-06 NOTE — PROGRESS NOTES
I attempted to reach patient regarding side effects and follow up of first infusion of FOLFOX-6 and the number was busy.    Will attempt again later today.

## 2017-06-12 ENCOUNTER — TELEPHONE (OUTPATIENT)
Dept: FAMILY MEDICINE | Facility: CLINIC | Age: 77
End: 2017-06-12

## 2017-06-12 RX ORDER — LORAZEPAM 2 MG/ML
0.5 INJECTION INTRAMUSCULAR EVERY 4 HOURS PRN
Status: CANCELLED
Start: 2017-06-15

## 2017-06-12 RX ORDER — MEPERIDINE HYDROCHLORIDE 50 MG/ML
25 INJECTION INTRAMUSCULAR; INTRAVENOUS; SUBCUTANEOUS EVERY 30 MIN PRN
Status: CANCELLED | OUTPATIENT
Start: 2017-06-15

## 2017-06-12 RX ORDER — EPINEPHRINE 1 MG/ML
0.3 INJECTION INTRAMUSCULAR; INTRAVENOUS; SUBCUTANEOUS EVERY 5 MIN PRN
Status: CANCELLED | OUTPATIENT
Start: 2017-06-15

## 2017-06-12 RX ORDER — ALBUTEROL SULFATE 90 UG/1
1-2 AEROSOL, METERED RESPIRATORY (INHALATION)
Status: CANCELLED
Start: 2017-06-15

## 2017-06-12 RX ORDER — EPINEPHRINE 0.3 MG/.3ML
0.3 INJECTION SUBCUTANEOUS EVERY 5 MIN PRN
Status: CANCELLED | OUTPATIENT
Start: 2017-06-15

## 2017-06-12 RX ORDER — FLUOROURACIL 50 MG/ML
400 INJECTION, SOLUTION INTRAVENOUS ONCE
Status: CANCELLED | OUTPATIENT
Start: 2017-06-15

## 2017-06-12 RX ORDER — SODIUM CHLORIDE 9 MG/ML
1000 INJECTION, SOLUTION INTRAVENOUS CONTINUOUS PRN
Status: CANCELLED
Start: 2017-06-15

## 2017-06-12 RX ORDER — ALBUTEROL SULFATE 0.83 MG/ML
2.5 SOLUTION RESPIRATORY (INHALATION)
Status: CANCELLED | OUTPATIENT
Start: 2017-06-15

## 2017-06-12 RX ORDER — DIPHENHYDRAMINE HYDROCHLORIDE 50 MG/ML
50 INJECTION INTRAMUSCULAR; INTRAVENOUS
Status: CANCELLED
Start: 2017-06-15

## 2017-06-12 RX ORDER — METHYLPREDNISOLONE SODIUM SUCCINATE 125 MG/2ML
125 INJECTION, POWDER, LYOPHILIZED, FOR SOLUTION INTRAMUSCULAR; INTRAVENOUS
Status: CANCELLED
Start: 2017-06-15

## 2017-06-12 NOTE — TELEPHONE ENCOUNTER
Per : (Epic Downtime)     Daughter Dinora called in to let us know that [facility] [place] is needing medication orders from PCP.   Dinora did not elaborate on what type of medications.     AL facility Nursing staff will need to be contacted.     Char Fortune RN

## 2017-06-12 NOTE — TELEPHONE ENCOUNTER
[phone]  Left message for alma hawkins - [facility] for [facility] - of below and to call back with needs or fax forms to be completed.  Lashonda Patino RN

## 2017-06-13 DIAGNOSIS — F29 PSYCHOSIS, UNSPECIFIED PSYCHOSIS TYPE (H): ICD-10-CM

## 2017-06-13 DIAGNOSIS — Z90.49 S/P COLECTOMY: Primary | ICD-10-CM

## 2017-06-13 DIAGNOSIS — E03.9 HYPOTHYROIDISM, UNSPECIFIED TYPE: ICD-10-CM

## 2017-06-13 NOTE — TELEPHONE ENCOUNTER
[facility]ax from  [facility]ills on 5 meds.  4 are on list historically, added Olanzapine, routing to Dr. Rodriguez.  Pended with quantities & refills requested on fax.  I

## 2017-06-13 NOTE — PROGRESS NOTES
This is a snapshot of the patient's [facility] medical record. For complete information or questions call [phone] or In [facility]  Home Infusion ([phone]).  [facility] Number:  [id number]

## 2017-06-14 LAB — COPATH REPORT: NORMAL

## 2017-06-14 RX ORDER — LEVOTHYROXINE SODIUM 75 UG/1
75 TABLET ORAL DAILY
Qty: 30 TABLET | Refills: 5 | Status: SHIPPED | OUTPATIENT
Start: 2017-06-14 | End: 2018-01-01

## 2017-06-14 RX ORDER — FERROUS SULFATE 325(65) MG
325 TABLET ORAL 2 TIMES DAILY
Qty: 60 TABLET | Refills: 11 | Status: SHIPPED | OUTPATIENT
Start: 2017-06-14 | End: 2017-08-01

## 2017-06-14 RX ORDER — ASCORBATE CALCIUM/BIOFLAVONOID 1000-200MG
2 TABLET, EXTENDED RELEASE ORAL DAILY
Qty: 60 TABLET | Refills: 11 | Status: SHIPPED | OUTPATIENT
Start: 2017-06-14 | End: 2017-08-01

## 2017-06-14 RX ORDER — OLANZAPINE 5 MG/1
5 TABLET ORAL DAILY
Qty: 30 TABLET | Refills: 11 | Status: SHIPPED | OUTPATIENT
Start: 2017-06-14 | End: 2017-08-01

## 2017-06-15 ENCOUNTER — HOSPITAL ENCOUNTER (OUTPATIENT)
Facility: CLINIC | Age: 77
Setting detail: SPECIMEN
Discharge: HOME | End: 2017-06-15
Attending: INTERNAL MEDICINE | Admitting: INTERNAL MEDICINE
Payer: MEDICARE

## 2017-06-15 ENCOUNTER — ONCOLOGY VISIT (OUTPATIENT)
Dept: ONCOLOGY | Facility: CLINIC | Age: 77
End: 2017-06-15
Attending: INTERNAL MEDICINE
Payer: MEDICARE

## 2017-06-15 ENCOUNTER — INFUSION THERAPY VISIT (OUTPATIENT)
Dept: INFUSION THERAPY | Facility: CLINIC | Age: 77
End: 2017-06-15
Attending: INTERNAL MEDICINE
Payer: MEDICARE

## 2017-06-15 VITALS
TEMPERATURE: 97.7 F | HEART RATE: 70 BPM | RESPIRATION RATE: 16 BRPM | DIASTOLIC BLOOD PRESSURE: 64 MMHG | HEIGHT: 64 IN | BODY MASS INDEX: 20.42 KG/M2 | WEIGHT: 119.6 LBS | SYSTOLIC BLOOD PRESSURE: 125 MMHG

## 2017-06-15 VITALS
BODY MASS INDEX: 20.42 KG/M2 | WEIGHT: 119.6 LBS | SYSTOLIC BLOOD PRESSURE: 110 MMHG | HEART RATE: 77 BPM | DIASTOLIC BLOOD PRESSURE: 70 MMHG | HEIGHT: 64 IN | RESPIRATION RATE: 16 BRPM | TEMPERATURE: 97.7 F

## 2017-06-15 DIAGNOSIS — C18.9 ADENOCARCINOMA OF COLON (H): Primary | ICD-10-CM

## 2017-06-15 LAB
ALBUMIN SERPL-MCNC: 3.4 G/DL (ref 3.4–5)
ALBUMIN UR-MCNC: 10 MG/DL
ALP SERPL-CCNC: 163 U/L (ref 40–150)
ALT SERPL W P-5'-P-CCNC: 35 U/L (ref 0–50)
ANION GAP SERPL CALCULATED.3IONS-SCNC: 7 MMOL/L (ref 3–14)
AST SERPL W P-5'-P-CCNC: 40 U/L (ref 0–45)
BASOPHILS # BLD AUTO: 0.1 10E9/L (ref 0–0.2)
BASOPHILS NFR BLD AUTO: 1.1 %
BILIRUB SERPL-MCNC: 0.2 MG/DL (ref 0.2–1.3)
BUN SERPL-MCNC: 22 MG/DL (ref 7–30)
CALCIUM SERPL-MCNC: 9.2 MG/DL (ref 8.5–10.1)
CHLORIDE SERPL-SCNC: 109 MMOL/L (ref 94–109)
CO2 SERPL-SCNC: 25 MMOL/L (ref 20–32)
CREAT SERPL-MCNC: 0.7 MG/DL (ref 0.52–1.04)
DIFFERENTIAL METHOD BLD: ABNORMAL
EOSINOPHIL # BLD AUTO: 0.1 10E9/L (ref 0–0.7)
EOSINOPHIL NFR BLD AUTO: 2.4 %
ERYTHROCYTE [DISTWIDTH] IN BLOOD BY AUTOMATED COUNT: 15.9 % (ref 10–15)
GFR SERPL CREATININE-BSD FRML MDRD: 82 ML/MIN/1.7M2
GLUCOSE SERPL-MCNC: 87 MG/DL (ref 70–99)
HCT VFR BLD AUTO: 36.6 % (ref 35–47)
HGB BLD-MCNC: 10.9 G/DL (ref 11.7–15.7)
IMM GRANULOCYTES # BLD: 0 10E9/L (ref 0–0.4)
IMM GRANULOCYTES NFR BLD: 0.2 %
LYMPHOCYTES # BLD AUTO: 2.4 10E9/L (ref 0.8–5.3)
LYMPHOCYTES NFR BLD AUTO: 43.6 %
MCH RBC QN AUTO: 25.8 PG (ref 26.5–33)
MCHC RBC AUTO-ENTMCNC: 29.8 G/DL (ref 31.5–36.5)
MCV RBC AUTO: 87 FL (ref 78–100)
MONOCYTES # BLD AUTO: 0.5 10E9/L (ref 0–1.3)
MONOCYTES NFR BLD AUTO: 8.9 %
NEUTROPHILS # BLD AUTO: 2.4 10E9/L (ref 1.6–8.3)
NEUTROPHILS NFR BLD AUTO: 43.8 %
NRBC # BLD AUTO: 0 10*3/UL
NRBC BLD AUTO-RTO: 0 /100
PLATELET # BLD AUTO: 175 10E9/L (ref 150–450)
POTASSIUM SERPL-SCNC: 4.5 MMOL/L (ref 3.4–5.3)
PROT SERPL-MCNC: 6.8 G/DL (ref 6.8–8.8)
RBC # BLD AUTO: 4.22 10E12/L (ref 3.8–5.2)
SODIUM SERPL-SCNC: 141 MMOL/L (ref 133–144)
WBC # BLD AUTO: 5.5 10E9/L (ref 4–11)

## 2017-06-15 PROCEDURE — 99214 OFFICE O/P EST MOD 30 MIN: CPT | Performed by: INTERNAL MEDICINE

## 2017-06-15 PROCEDURE — 96368 THER/DIAG CONCURRENT INF: CPT

## 2017-06-15 PROCEDURE — 96367 TX/PROPH/DG ADDL SEQ IV INF: CPT

## 2017-06-15 PROCEDURE — 96413 CHEMO IV INFUSION 1 HR: CPT

## 2017-06-15 PROCEDURE — 96416 CHEMO PROLONG INFUSE W/PUMP: CPT

## 2017-06-15 PROCEDURE — 85025 COMPLETE CBC W/AUTO DIFF WBC: CPT | Performed by: INTERNAL MEDICINE

## 2017-06-15 PROCEDURE — 96415 CHEMO IV INFUSION ADDL HR: CPT

## 2017-06-15 PROCEDURE — 96417 CHEMO IV INFUS EACH ADDL SEQ: CPT

## 2017-06-15 PROCEDURE — 25000128 H RX IP 250 OP 636: Performed by: INTERNAL MEDICINE

## 2017-06-15 PROCEDURE — 81003 URINALYSIS AUTO W/O SCOPE: CPT | Performed by: INTERNAL MEDICINE

## 2017-06-15 PROCEDURE — 96411 CHEMO IV PUSH ADDL DRUG: CPT

## 2017-06-15 PROCEDURE — 80053 COMPREHEN METABOLIC PANEL: CPT | Performed by: INTERNAL MEDICINE

## 2017-06-15 PROCEDURE — 25000125 ZZHC RX 250: Performed by: INTERNAL MEDICINE

## 2017-06-15 RX ORDER — EPINEPHRINE 1 MG/ML
0.3 INJECTION INTRAMUSCULAR; INTRAVENOUS; SUBCUTANEOUS EVERY 5 MIN PRN
Status: CANCELLED | OUTPATIENT
Start: 2017-06-29

## 2017-06-15 RX ORDER — EPINEPHRINE 0.3 MG/.3ML
0.3 INJECTION SUBCUTANEOUS EVERY 5 MIN PRN
Status: CANCELLED | OUTPATIENT
Start: 2017-06-29

## 2017-06-15 RX ORDER — METHYLPREDNISOLONE SODIUM SUCCINATE 125 MG/2ML
125 INJECTION, POWDER, LYOPHILIZED, FOR SOLUTION INTRAMUSCULAR; INTRAVENOUS
Status: CANCELLED
Start: 2017-06-29

## 2017-06-15 RX ORDER — FLUOROURACIL 50 MG/ML
400 INJECTION, SOLUTION INTRAVENOUS ONCE
Status: COMPLETED | OUTPATIENT
Start: 2017-06-15 | End: 2017-06-15

## 2017-06-15 RX ORDER — ALBUTEROL SULFATE 90 UG/1
1-2 AEROSOL, METERED RESPIRATORY (INHALATION)
Status: CANCELLED
Start: 2017-06-29

## 2017-06-15 RX ORDER — MEPERIDINE HYDROCHLORIDE 50 MG/ML
25 INJECTION INTRAMUSCULAR; INTRAVENOUS; SUBCUTANEOUS EVERY 30 MIN PRN
Status: CANCELLED | OUTPATIENT
Start: 2017-06-29

## 2017-06-15 RX ORDER — SODIUM CHLORIDE 9 MG/ML
1000 INJECTION, SOLUTION INTRAVENOUS CONTINUOUS PRN
Status: CANCELLED
Start: 2017-06-29

## 2017-06-15 RX ORDER — FLUOROURACIL 50 MG/ML
400 INJECTION, SOLUTION INTRAVENOUS ONCE
Status: CANCELLED | OUTPATIENT
Start: 2017-06-29

## 2017-06-15 RX ORDER — LORAZEPAM 2 MG/ML
0.5 INJECTION INTRAMUSCULAR EVERY 4 HOURS PRN
Status: CANCELLED
Start: 2017-06-29

## 2017-06-15 RX ORDER — ALBUTEROL SULFATE 0.83 MG/ML
2.5 SOLUTION RESPIRATORY (INHALATION)
Status: CANCELLED | OUTPATIENT
Start: 2017-06-29

## 2017-06-15 RX ORDER — DIPHENHYDRAMINE HYDROCHLORIDE 50 MG/ML
50 INJECTION INTRAMUSCULAR; INTRAVENOUS
Status: CANCELLED
Start: 2017-06-29

## 2017-06-15 RX ADMIN — LEUCOVORIN CALCIUM 550 MG: 200 INJECTION, POWDER, LYOPHILIZED, FOR SOLUTION INTRAMUSCULAR; INTRAVENOUS at 10:57

## 2017-06-15 RX ADMIN — OXALIPLATIN 132 MG: 5 INJECTION, SOLUTION, CONCENTRATE INTRAVENOUS at 10:57

## 2017-06-15 RX ADMIN — BEVACIZUMAB 260 MG: 100 INJECTION, SOLUTION INTRAVENOUS at 10:16

## 2017-06-15 RX ADMIN — FLUOROURACIL 620 MG: 50 INJECTION, SOLUTION INTRAVENOUS at 13:05

## 2017-06-15 RX ADMIN — SODIUM CHLORIDE 250 ML: 9 INJECTION, SOLUTION INTRAVENOUS at 09:49

## 2017-06-15 RX ADMIN — DEXAMETHASONE SODIUM PHOSPHATE: 10 INJECTION, SOLUTION INTRAMUSCULAR; INTRAVENOUS at 09:49

## 2017-06-15 RX ADMIN — DEXTROSE MONOHYDRATE 250 ML: 50 INJECTION, SOLUTION INTRAVENOUS at 10:57

## 2017-06-15 ASSESSMENT — PAIN SCALES - GENERAL: PAINLEVEL: NO PAIN (0)

## 2017-06-15 NOTE — PATIENT INSTRUCTIONS
Continue with chemotherapy.  Follow up in 2 weeks for next chemotherapy.    Future Appointments scheduled:    06/29/2017 8:30 am IT appointment    06/29/2017 9:30 am Dr. Lindo

## 2017-06-15 NOTE — PROGRESS NOTES
"Infusion Nursing Note:  Sherita Kenney presents today for C2D1 folfox/avastin.    Patient seen by provider today: Yes: Dr. Lindo   present during visit today: Not Applicable.    Note: N/A.    Intravenous Access:  Labs drawn without difficulty.  Implanted Port.    Treatment Conditions:  Lab Results   Component Value Date    HGB 10.9 06/15/2017     Lab Results   Component Value Date    WBC 5.5 06/15/2017      Lab Results   Component Value Date    ANEU 2.4 06/15/2017     Lab Results   Component Value Date     06/15/2017      Lab Results   Component Value Date     06/15/2017                   Lab Results   Component Value Date    POTASSIUM 4.5 06/15/2017           Lab Results   Component Value Date    MAG 2.0 04/15/2017            Lab Results   Component Value Date    CR 0.70 06/15/2017                   Lab Results   Component Value Date    SARITHA 9.2 06/15/2017                Lab Results   Component Value Date    BILITOTAL 0.2 06/15/2017           Lab Results   Component Value Date    ALBUMIN 3.4 06/15/2017                    Lab Results   Component Value Date    ALT 35 06/15/2017           Lab Results   Component Value Date    AST 40 06/15/2017     Results reviewed, labs MET treatment parameters, ok to proceed with treatment.  Urine protein 10.      Post Infusion Assessment:  Patient tolerated infusion without incident.  Blood return noted pre and post infusion.  Blood return noted during administration every 2 cc.  Site patent and intact, free from redness, edema or discomfort.  No evidence of extravasations.    Prior to discharge: Port is secured in place with tegaderm and flushed with 10cc NS with positive blood return noted.  Continuous home infusion CADD pump connected.    All connectors secured in place and clamps taped open.    Pump started, \"running\" noted on display (CADD): YES.  Patient instructed to call our clinic or [facility] with any questions or concerns at home.  " Patient verbalized understanding.    Patient set up for pump disconnect Saturday at 11:30am.         Discharge Plan:   Patient declined prescription refills.  Discharge instructions reviewed with: Patient.  Patient and/or family verbalized understanding of discharge instructions and all questions answered.  Copy of AVS reviewed with patient and/or family.  Patient will return 6/17/17 for next appointment.  Patient discharged in stable condition accompanied by: self.  Departure Mode: Ambulatory.    Sayda Watts RN

## 2017-06-15 NOTE — MR AVS SNAPSHOT
After Visit Summary   6/15/2017    Sherita Kenney    MRN: [id number]           Patient Information     Date Of Birth          1940        Visit Information        Provider Department      6/15/2017 8:30 AM  INFUSION CHAIR 16 [facility]        Today's Diagnoses     Adenocarcinoma of colon (H)    -  1       Follow-ups after your visit        Your next 10 appointments already scheduled     Jun 17, 2017 11:30 AM CDT   Level 1 with  INFUSION CHAIR 14   [facility] ([facility])    [facility] [place] [id number]  [place] [address]   [phone]            Jun 28, 2017  2:15 PM CDT   (Arrive by 2:00 PM)   Return Visit with Lisa Man, PhD [facility] ([facility])    [address] [address]   [phone]            Jun 29, 2017  8:30 AM CDT   Level 6 with  INFUSION CHAIR 19   [facility] ([facility])    [facility]  [address]  [place] [address]   [phone]            Jun 29, 2017  9:30 AM CDT   Return Visit with Susan Lindo MD   [facility] ([facility])    [facility]  [address]  [place] [address]   [phone]              Who to contact     If you have questions or need follow up information about today's clinic visit or your schedule please contact [facility][1] directly at [phone].  Normal or non-critical lab and imaging results will be communicated to you by MyChart, letter or phone within 4 business days after the clinic has received the results. If you do not hear from us within 7 days, please contact the clinic through MyChart or phone. If you have a critical or abnormal lab result, we will notify  "you by phone as soon as possible.  Submit refill requests through [employer] or call your pharmacy and they will forward the refill request to us. Please allow 3 business days for your refill to be completed.          Additional Information About Your Visit        JuicyCanvashart Information     [employer] lets you send messages to your doctor, view your test results, renew your prescriptions, schedule appointments and more. To sign up, go to [email or web address] . Click on \"Log in\" on the left side of the screen, which will take you to the Welcome page. Then click on \"Sign up Now\" on the right side of the page.     You will be asked to enter the access code listed below, as well as some personal information. Please follow the directions to create your username and password.     Your access code is: [email or web address]  Expires: 2017  6:30 AM     Your access code will  in 90 days. If you need help or a new code, please call your [place] clinic or [phone].        Care EveryWhere ID     This is your Care EveryWhere ID. This could be used by other organizations to access your [place] medical records  [id number]        Your Vitals Were     Pulse Temperature Respirations Height BMI (Body Mass Index)       77 97.7  F (36.5  C) (Oral) 16 1.626 m (5' 4\") 20.53 kg/m2        Blood Pressure from Last 3 Encounters:   06/15/17 125/64   06/15/17 110/70   17 125/83    Weight from Last 3 Encounters:   06/15/17 54.2 kg (119 lb 9.6 oz)   06/15/17 54.3 kg (119 lb 9.6 oz)   17 53.9 kg (118 lb 12.8 oz)              We Performed the Following     CBC with platelets differential     Comprehensive metabolic panel     Protein qualitative urine        Primary Care Provider Office Phone # Fax #    Edgar Neno Rodriguez [phone]       [facility] [place] [address]  [place] [address]        Thank you!     Thank you for choosing [facility] AND [facility]  for your " care. Our goal is always to provide you with excellent care. Hearing back from our patients is one way we can continue to improve our services. Please take a few minutes to complete the written survey that you may receive in the mail after your visit with us. Thank you!             Your Updated Medication List - Protect others around you: Learn how to safely use, store and throw away your medicines at www.disposemymeds.org.          This list is accurate as of: 6/15/17  1:33 PM.  Always use your most recent med list.                   Brand Name Dispense Instructions for use    acetaminophen 325 MG tablet    TYLENOL    60 tablet    Take 2 tablets (650 mg) by mouth every 4 hours as needed for mild pain or fever       Calcium-Magnesium-Zinc Tabs     60 tablet    Take 2 tablets by mouth daily       CETAPHIL lotion      Apply topically 2 times daily Reported on 5/3/2017       ferrous sulfate 325 (65 FE) MG tablet    IRON    60 tablet    Take 1 tablet (325 mg) by mouth 2 times daily       FLEXERIL PO      Take 10 mg by mouth 3 times daily as needed for muscle spasms       levothyroxine 75 MCG tablet    SYNTHROID/LEVOTHROID    30 tablet    Take 1 tablet (75 mcg) by mouth daily Recheck TSH in 6 months       LORazepam 0.5 MG tablet    ATIVAN    30 tablet    Take 1 tablet (0.5 mg) by mouth every 4 hours as needed (Anxiety, Nausea/Vomiting or Sleep)       OLANZapine 5 MG tablet    zyPREXA    30 tablet    Take 1 tablet (5 mg) by mouth daily       ondansetron 8 MG tablet    ZOFRAN    10 tablet    Take 1 tablet (8 mg) by mouth every 8 hours as needed (Nausea/Vomiting)       PRO-STAT AWC Liqd      Take 1 oz by mouth 2 times daily       TRAVATAN Z 0.004 % ophthalmic solution   Generic drug:  travoprost (BAK Free)      Place 1 drop into both eyes At Bedtime       vitamin B-Complex     30 tablet    Take 1 tablet by mouth daily

## 2017-06-15 NOTE — PROGRESS NOTES
"Oncology Rooming Note    Yun 15, 2017 9:04 AM   Sherita Kenney is a 76 year old female who presents for:    Chief Complaint   Patient presents with     Oncology Clinic Visit     Colon Cancer     Initial Vitals: /64  Pulse 70  Temp 97.7  F (36.5  C) (Oral)  Resp 16  Ht 1.626 m (5' 4.02\")  Wt 54.2 kg (119 lb 9.6 oz)  BMI 20.52 kg/m2 Estimated body mass index is 20.52 kg/(m^2) as calculated from the following:    Height as of this encounter: 1.626 m (5' 4.02\").    Weight as of this encounter: 54.2 kg (119 lb 9.6 oz). Body surface area is 1.56 meters squared.  Data Unavailable Comment: Data Unavailable   No LMP recorded. Patient is not currently having periods (Reason: Perimenopausal).  Allergies reviewed: Yes  Medications reviewed: Yes    Medications: Medication refills not needed today.  Pharmacy name entered into [facility]:    [facility] [address] [place] [address] [address] PHARMACY [place], [place] - [address] [facility] LONG TERM [facility] [address]    Clinical concerns: None           5 minutes for nursing intake (face to face time)     April Bazzi MA    DISCHARGE PLAN:  Next appointments: See patient instruction section  Brought the patient back to [facility], gave report to RENÉE Alfredo  Departure Mode: Ambulatory  Accompanied by: sister  5 minutes for nursing discharge (face to face time)   April Bazzi MA    "

## 2017-06-15 NOTE — MR AVS SNAPSHOT
After Visit Summary   6/15/2017    Sherita Kenney    MRN: [id number]           Patient Information     Date Of Birth          1940        Visit Information        Provider Department      6/15/2017 9:30 AM Susan Lindo MD [facility]        Today's Diagnoses     Adenocarcinoma of colon (H)    -  1      Care Instructions    Continue with chemotherapy.  Follow up in 2 weeks for next chemotherapy.    Future Appointments scheduled:    06/29/2017 8:30 am IT appointment    06/29/2017 9:30 am Dr. Lindo          Follow-ups after your visit        Your next 10 appointments already scheduled     Jun 28, 2017  2:15 PM CDT   (Arrive by 2:00 PM)   Return Visit with Lisa Man, PhD [facility]ology ([facility])    [address] [place] [address]   [phone]            Jun 29, 2017  8:30 AM CDT   Level 6 with  INFUSION CHAIR 19   [facility] ([facility])    [facility]  [address]  [facility] [address]   [phone]            Jun 29, 2017  9:30 AM CDT   Return Visit with Susan Lindo MD   [facility] ([facility])    [facility]  [address]  [facility] [address]   [phone]              Who to contact     If you have questions or need follow up information about today's clinic visit or your schedule please contact [facility] directly at [phone].  Normal or non-critical lab and imaging results will be communicated to you by MyChart, letter or phone within 4 business days after the clinic has received the results. If you do not hear from us within 7 days, please contact the clinic through MyChart or phone. If you have a critical or abnormal lab result, we will notify you by phone as soon as possible.  Submit refill requests through [employer] or call  "your pharmacy and they will forward the refill request to us. Please allow 3 business days for your refill to be completed.          Additional Information About Your Visit        MyChart Information     [employer] lets you send messages to your doctor, view your test results, renew your prescriptions, schedule appointments and more. To sign up, go to [email or web address] . Click on \"Log in\" on the left side of the screen, which will take you to the Welcome page. Then click on \"Sign up Now\" on the right side of the page.     You will be asked to enter the access code listed below, as well as some personal information. Please follow the directions to create your username and password.     Your access code is: [email or web address]  Expires: 2017  6:30 AM     Your access code will  in 90 days. If you need help or a new code, please call your [place] clinic or [phone].        Care EveryWhere ID     This is your [facility] EveryWhere ID. This could be used by other organizations to access your [place] medical records  [id number]        Your Vitals Were     Pulse Temperature Respirations Height BMI (Body Mass Index)       70 97.7  F (36.5  C) (Oral) 16 1.626 m (5' 4.02\") 20.52 kg/m2        Blood Pressure from Last 3 Encounters:   No data found for BP    Weight from Last 3 Encounters:   No data found for Wt              Today, you had the following     No orders found for display       Primary Care Provider Office Phone # Fax #    Edgar Neno Rodriguez [phone]       [facility] [address] [facility] [address] [facility] [address]  [facility] [address]        Equal Access to Services     FILEMON ALBA : Hadii mateo Nunes, waaxda rosas, qaybta dinh pemberton, ellis ren. So [facility] [phone].    ATENCIÓN: Si habla español, tiene a roldan disposición servicios gratuitos de asistencia lingüística. Llame al [phone].    We comply with applicable federal civil " rights laws and [place] laws. We do not discriminate on the basis of race, color, national origin, age, disability sex, sexual orientation or gender identity.            Thank you!     Thank you for choosing [facility]  for your care. Our goal is always to provide you with excellent care. Hearing back from our patients is one way we can continue to improve our services. Please take a few minutes to complete the written survey that you may receive in the mail after your visit with us. Thank you!             Your Updated Medication List - Protect others around you: Learn how to safely use, store and throw away your medicines at www.disposemymeds.org.          This list is accurate as of: 6/15/17 11:59 PM.  Always use your most recent med list.                   Brand Name Dispense Instructions for use Diagnosis    acetaminophen 325 MG tablet    TYLENOL    60 tablet    Take 2 tablets (650 mg) by mouth every 4 hours as needed for mild pain or fever    Cancer associated pain       Calcium-Magnesium-Zinc Tabs     60 tablet    Take 2 tablets by mouth daily    S/P colectomy       CETAPHIL lotion      Apply topically 2 times daily Reported on 5/3/2017        ferrous sulfate 325 (65 FE) MG tablet    IRON    60 tablet    Take 1 tablet (325 mg) by mouth 2 times daily    S/P colectomy       FLEXERIL PO      Take 10 mg by mouth 3 times daily as needed for muscle spasms        levothyroxine 75 MCG tablet    SYNTHROID/LEVOTHROID    30 tablet    Take 1 tablet (75 mcg) by mouth daily Recheck TSH in 6 months    Hypothyroidism, unspecified type       LORazepam 0.5 MG tablet    ATIVAN    30 tablet    Take 1 tablet (0.5 mg) by mouth every 4 hours as needed (Anxiety, Nausea/Vomiting or Sleep)    Adenocarcinoma of colon (H)       OLANZapine 5 MG tablet    zyPREXA    30 tablet    Take 1 tablet (5 mg) by mouth daily    Psychosis, unspecified psychosis type       ondansetron 8 MG tablet    ZOFRAN    10 tablet    Take 1 tablet  (8 mg) by mouth every 8 hours as needed (Nausea/Vomiting)    Adenocarcinoma of colon (H)       PRO-STAT AWC Liqd      Take 1 oz by mouth 2 times daily        TRAVATAN Z 0.004 % ophthalmic solution   Generic drug:  travoprost (BAK Free)      Place 1 drop into both eyes At Bedtime        vitamin B-Complex     30 tablet    Take 1 tablet by mouth daily    S/P colectomy
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
## 2017-06-16 NOTE — PROGRESS NOTES
ONCOLOGY HISTORY:  Ms. Sherita Kenney is a female with colon cancer.   1. The patient was brought to the ER on 02/15/2017 with altered mental status.    -CT brain on 02/15/2017 did not reveal any acute intracranial pathology.   -CT abdomen and pelvis on 02/15/2017 revealed large right iliopsoas abscess. Adjacent mass-like wall thickening of portion of right colon.   -CT-guided drainage of abscess was done.   2. The patient was taken to OR on 03/01/2017.    -Colonoscopy revealed a large fungating mass in the right side of the colon.  Pathology revealed invasive poorly differentiated adenocarcinoma.  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   -Laparoscopic ileostomy was done.    3.  CEA on 02/16/2017 was 3.8.   4. The patient had right colectomy with takedown of ileostomy and primary anastomosis on 04/13/2017.    -Pathology  reveals poorly differentiated colonic adenocarcinoma. 15 of 24 lymph nodes are positive. Tumor invades into adjacent abdominal wall.  Lymphovascular invasion present. pT4b pN2b M0.   5. PET scan on 05/22/2017 reveals metastatic disease.  There are multiple new hypermetabolic liver metastases and  hypermetabolic necrotic lymphadenopathy versus malignant implants right mesentery and right lower quadrant.  6. modified FOLFOX6 with Avastin started on 06/01/2017.    SUBJECTIVE:  Ms. Sherita Kenney is a 76-year-old female with metastatic colon cancer with liver metastasis.  She was started on modified FOLFOX6 with Avastin on 06/01/2017.  Overall, she tolerated the first cycle well.      The patient denies any problem.  No headache.  No dizziness.  No neck pain.  No chest pain or difficulty breathing.  No abdominal pain, nausea or vomiting.  No urinary or bowel complaints.  No bleeding.      PHYSICAL EXAMINATION:   Alert and oriented x 3.   VITAL SIGNS:  Reviewed.   EYES:  No icterus.   THROAT:  No ulcer or thrush.   NECK:  Supple. No lymphadenopathy.    LUNGS:  Good air entry bilaterally.  No wheezing.   HEART:  Regular.   ABDOMEN:  Soft and nontender.  No mass.   EXTREMITIES:  No edema.   SKIN:  No rash.      LABORATORY DATA:  Reviewed.      ASSESSMENT:   1.  A 76-year-old female with metastatic colon cancer with multiple liver metastases on modified FOLFOX6 with Avastin.  She tolerated cycle 1 well.   2.  Mild anemia.      PLAN:   1.  The patient tolerated the first cycle of chemotherapy well.  She will continue on FOLFOX with Avastin for metastatic colon cancer.  Side effects again reviewed.  Labs are reviewed.  The patient is mildly anemic, but overall the labs are good for treatment.   2.  She had a few questions which were all answered.  I will see her in 2 weeks' time.  After fourth cycle, we will plan on getting CT scan.  I advised her to return to clinic if she has fever, chills, recurrent vomiting, bleeding, worsening weakness or any other concerns.         OMER MAKI MD             D: 06/15/2017 14:48   T: 2017 03:57   MT: allen      Name:     SEVERO SWANN   MRN:      [id number]        Account:      [id number]   :      1940           Visit Date:   06/15/2017      Document: [id number]

## 2017-06-17 ENCOUNTER — INFUSION THERAPY VISIT (OUTPATIENT)
Dept: INFUSION THERAPY | Facility: CLINIC | Age: 77
End: 2017-06-17
Attending: INTERNAL MEDICINE
Payer: MEDICARE

## 2017-06-17 VITALS
SYSTOLIC BLOOD PRESSURE: 116 MMHG | RESPIRATION RATE: 16 BRPM | TEMPERATURE: 97.9 F | DIASTOLIC BLOOD PRESSURE: 75 MMHG | HEART RATE: 77 BPM

## 2017-06-17 DIAGNOSIS — Z95.828 PORT-A-CATH IN PLACE: Primary | ICD-10-CM

## 2017-06-17 PROCEDURE — 40000269 ZZH STATISTIC NO CHARGE FACILITY FEE

## 2017-06-17 PROCEDURE — 25000128 H RX IP 250 OP 636: Performed by: INTERNAL MEDICINE

## 2017-06-17 RX ORDER — HEPARIN SODIUM (PORCINE) LOCK FLUSH IV SOLN 100 UNIT/ML 100 UNIT/ML
500 SOLUTION INTRAVENOUS EVERY 8 HOURS
Status: DISCONTINUED | OUTPATIENT
Start: 2017-06-17 | End: 2017-06-17 | Stop reason: HOSPADM

## 2017-06-17 RX ORDER — HEPARIN SODIUM (PORCINE) LOCK FLUSH IV SOLN 100 UNIT/ML 100 UNIT/ML
500 SOLUTION INTRAVENOUS EVERY 8 HOURS
Status: CANCELLED
Start: 2017-06-17

## 2017-06-17 RX ADMIN — SODIUM CHLORIDE, PRESERVATIVE FREE 500 UNITS: 5 INJECTION INTRAVENOUS at 11:15

## 2017-06-17 NOTE — PROGRESS NOTES
Infusion Nursing Note:  Sherita Kenney presents today for unhook chemo pump.    Patient seen by provider today: No   present during visit today: Not Applicable.    Note: N/A.    Intravenous Access:  Implanted Port.    Treatment Conditions:  Not Applicable.      Post Infusion Assessment:  Patient tolerated infusion without incident.  Site patent and intact, free from redness, edema or discomfort.  No evidence of extravasations.  Access discontinued per protocol.    Discharge Plan:   Copy of AVS reviewed with patient and/or family.  Patient will return 6/29/17 for next appointment.  Patient discharged in stable condition accompanied by: sister.  Departure Mode: Ambulatory.    Pino Vargas RN

## 2017-06-17 NOTE — MR AVS SNAPSHOT
After Visit Summary   6/17/2017    Sherita Kenney    MRN: [id number]           Patient Information     Date Of Birth          1940        Visit Information        Provider Department      6/17/2017 11:30 AM  INFUSION CHAIR 14 [facility]        Today's Diagnoses     Port-a-cath in place    -  1       Follow-ups after your visit        Your next 10 appointments already scheduled     Jun 17, 2017 11:30 AM CDT   Level 1 with  INFUSION CHAIR 14   [facility] ([facility])    [facility] [place] [id number]  [place] [address]   [phone]            Jun 28, 2017  2:15 PM CDT   (Arrive by 2:00 PM)   Return Visit with Lisa Man, PhD [facility] ([facility])    [address] [place] [address]   [phone]            Jun 29, 2017  8:30 AM CDT   Level 6 with  INFUSION CHAIR 19   [facility] ([facility])    [facility]  [address]  [place] [address]   [phone]            Jun 29, 2017  9:30 AM CDT   Return Visit with Susan Lindo MD   [facility] ([facility])    [facility]  [address]  [place] [address]   [phone]              Who to contact     If you have questions or need follow up information about today's clinic visit or your schedule please contact [facility] directly at [phone].  Normal or non-critical lab and imaging results will be communicated to you by MyChart, letter or phone within 4 business days after the clinic has received the results. If you do not hear from us within 7 days, please contact the clinic through MyChart or phone. If you have a critical or abnormal lab result, we will notify you by  "phone as soon as possible.  Submit refill requests through [employer] or call your pharmacy and they will forward the refill request to us. Please allow 3 business days for your refill to be completed.          Additional Information About Your Visit        [employer] Information     [employer] lets you send messages to your doctor, view your test results, renew your prescriptions, schedule appointments and more. To sign up, go to [email or web address] . Click on \"Log in\" on the left side of the screen, which will take you to the Welcome page. Then click on \"Sign up Now\" on the right side of the page.     You will be asked to enter the access code listed below, as well as some personal information. Please follow the directions to create your username and password.     Your access code is: [email or web address]  Expires: 2017  6:30 AM     Your access code will  in 90 days. If you need help or a new code, please call your [facility] or [phone].        Care EveryWhere ID     This is your Care EveryWhere ID. This could be used by other organizations to access your [facility] medical records  [id number]         Blood Pressure from Last 3 Encounters:   06/15/17 125/64   06/15/17 110/70   17 125/83    Weight from Last 3 Encounters:   06/15/17 54.2 kg (119 lb 9.6 oz)   06/15/17 54.3 kg (119 lb 9.6 oz)   17 53.9 kg (118 lb 12.8 oz)              Today, you had the following     No orders found for display       Primary Care Provider Office Phone # Fax #    Edgar Neno Rodriguez [phone]       [facility] [place] [address]  [facility] [address]        Thank you!     Thank you for choosing [facility]  for your care. Our goal is always to provide you with excellent care. Hearing back from our patients is one way we can continue to improve our services. Please take a few minutes to complete the written survey that you may receive in the " mail after your visit with us. Thank you!             Your Updated Medication List - Protect others around you: Learn how to safely use, store and throw away your medicines at www.disposemymeds.org.          This list is accurate as of: 6/17/17 11:14 AM.  Always use your most recent med list.                   Brand Name Dispense Instructions for use    acetaminophen 325 MG tablet    TYLENOL    60 tablet    Take 2 tablets (650 mg) by mouth every 4 hours as needed for mild pain or fever       Calcium-Magnesium-Zinc Tabs     60 tablet    Take 2 tablets by mouth daily       CETAPHIL lotion      Apply topically 2 times daily Reported on 5/3/2017       ferrous sulfate 325 (65 FE) MG tablet    IRON    60 tablet    Take 1 tablet (325 mg) by mouth 2 times daily       FLEXERIL PO      Take 10 mg by mouth 3 times daily as needed for muscle spasms       levothyroxine 75 MCG tablet    SYNTHROID/LEVOTHROID    30 tablet    Take 1 tablet (75 mcg) by mouth daily Recheck TSH in 6 months       LORazepam 0.5 MG tablet    ATIVAN    30 tablet    Take 1 tablet (0.5 mg) by mouth every 4 hours as needed (Anxiety, Nausea/Vomiting or Sleep)       OLANZapine 5 MG tablet    zyPREXA    30 tablet    Take 1 tablet (5 mg) by mouth daily       ondansetron 8 MG tablet    ZOFRAN    10 tablet    Take 1 tablet (8 mg) by mouth every 8 hours as needed (Nausea/Vomiting)       PRO-STAT AWC Liqd      Take 1 oz by mouth 2 times daily       TRAVATAN Z 0.004 % ophthalmic solution   Generic drug:  travoprost (BAK Free)      Place 1 drop into both eyes At Bedtime       vitamin B-Complex     30 tablet    Take 1 tablet by mouth daily

## 2017-06-21 ENCOUNTER — TRANSFERRED RECORDS (OUTPATIENT)
Dept: HEALTH INFORMATION MANAGEMENT | Facility: CLINIC | Age: 77
End: 2017-06-21

## 2017-06-23 ENCOUNTER — TELEPHONE (OUTPATIENT)
Dept: FAMILY MEDICINE | Facility: CLINIC | Age: 77
End: 2017-06-23

## 2017-06-23 DIAGNOSIS — R19.7 DIARRHEA: Primary | ICD-10-CM

## 2017-06-23 RX ORDER — LOPERAMIDE HYDROCHLORIDE 2 MG/1
TABLET ORAL
Qty: 30 TABLET | Status: CANCELLED | OUTPATIENT
Start: 2017-06-23

## 2017-06-23 NOTE — TELEPHONE ENCOUNTER
Reason for Call:  Home Health Care    [facility] (reason for call):     Orders are needed for this patient.     Medication and sym: pt is still having loose stools and would like to  Know what the next step would be. They would like the order  Immodium please.       [facility] - [place], [place] - [address] [place] [address]      OT:     Skilled Nursing:     Pt Provider:     Phone Number Homecare Nurse can be reached at: [phone]    Can we leave a detailed message on this number? YES    Best Time: any    Call taken on 6/23/2017 at 10:51 AM by Chloe Bass

## 2017-06-23 NOTE — TELEPHONE ENCOUNTER
Unsure what dose to pend; pended pharmacy. Would you want to further triage this?  Elsa Ahn- JAMES

## 2017-06-28 ENCOUNTER — OFFICE VISIT (OUTPATIENT)
Dept: NEUROPSYCHOLOGY | Facility: CLINIC | Age: 77
End: 2017-06-28

## 2017-06-28 DIAGNOSIS — F09 MENTAL DISORDER DUE TO GENERAL MEDICAL CONDITION: ICD-10-CM

## 2017-06-28 DIAGNOSIS — C18.9 MALIGNANT NEOPLASM OF COLON, UNSPECIFIED PART OF COLON (H): Primary | ICD-10-CM

## 2017-06-28 NOTE — TELEPHONE ENCOUNTER
I called and spoke with Di at [facility]  Di explained that it is difficulty to do stool culture because they need to get the specimen to an off-site lab within 2 hours.   Patient had not provided specimen yet, but patient does have cup to gather specimen for once she is able to have BM  I advised that patient try and get sample before imodium can be prescribed.     [facility] already has a lab order from earlier. So no new order is needed.     FYI to PCP.     Radha Elmore RN

## 2017-06-28 NOTE — TELEPHONE ENCOUNTER
I will still need a stool culture performed to rule out infection prior to prescribing Imodium.  Form with stool culture order is at the outgoing fax bin

## 2017-06-28 NOTE — MR AVS SNAPSHOT
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           Follow-ups after your visit        Your next 10 appointments already scheduled     Jul 01, 2017 12:30 PM CDT   Level 1 with  INFUSION CHAIR 15   [facility] ([facility])    [facility]  [address]  [place] [address]   [phone]            Jul 13, 2017  9:00 AM CDT   Level 6 with  INFUSION CHAIR 8   [facility] ([facility])    [facility]  [address]  [place] [address]   [phone]            Jul 13, 2017  1:00 PM CDT   Return Visit with Susan Lindo MD   [facility])    [facility]  [address]  [place] [address]   [phone]            Jul 27, 2017  9:00 AM CDT   Level 6 with  INFUSION CHAIR 18   [facility] ([facility])    [facility]  [address]  [place] [address]   [phone]            Jul 27, 2017 10:15 AM CDT   Return Visit with Alma Nieves MD   [facility])    [facility]  [address]  [place] [address]   [phone]              Who to contact     Please call your clinic at [phone] to:    Ask questions about your health    Make or cancel appointments    Discuss your medicines    Learn about your test results    Speak to your doctor   If you have  compliments or concerns about an experience at your clinic, or if you wish to file a complaint, please contact [facility] Patient Relations at [phone] or email us at [email or web address]         Additional Information About Your Visit        [employer] Information     [employer] is an electronic gateway that provides easy, online access to your medical records. With [employer], you can request a clinic appointment, read your test results, renew a prescription or communicate with your care team.     To sign up for [employer] visit the website at [email or web address]   You will be asked to enter the access code listed below, as well as some personal information. Please follow the directions to create your username and password.     Your access code is: [email or web address]  Expires: 2017  6:30 AM     Your access code will  in 90 days. If you need help or a new code, please contact your [facility] or call [phone] for assistance.        Care EveryWhere ID     This is your Care EveryWhere ID. This could be used by other organizations to access your [place] medical records  [id number]         Blood Pressure from Last 3 Encounters:   17 113/69   17 103/60   17 116/75    Weight from Last 3 Encounters:   17 54.3 kg (119 lb 12.8 oz)   17 54.3 kg (119 lb 12.8 oz)   06/15/17 54.2 kg (119 lb 9.6 oz)              We Performed the Following     [id number] TESTING, PER HR/PSYCHOLOGIST        Primary Care Provider Office Phone # Fax #    Edgar Rodriguez [phone]       [facility] [place] [address]  [place] [address]        Equal Access to Services     ALEXIS ALBA : Hadii mateo gaticao Sogabriel, waaxda luqadaha, qaybta kaalmada adetedyayvon, ellis ren. So [facility] [phone].    ATENCIÓN: Si habla español, tiene a roldan disposición servicios  akash de asistencia lingüística. Malaika jimenez [phone].    We comply with applicable federal civil rights laws and [place] laws. We do not discriminate on the basis of race, color, national origin, age, disability sex, sexual orientation or gender identity.            Thank you!     Thank you for choosing [facility]  for your care. Our goal is always to provide you with excellent care. Hearing back from our patients is one way we can continue to improve our services. Please take a few minutes to complete the written survey that you may receive in the mail after your visit with us. Thank you!             Your Updated Medication List - Protect others around you: Learn how to safely use, store and throw away your medicines at www.disposemymeds.org.          This list is accurate as of: 6/28/17 11:59 PM.  Always use your most recent med list.                   Brand Name Dispense Instructions for use Diagnosis    acetaminophen 325 MG tablet    TYLENOL    60 tablet    Take 2 tablets (650 mg) by mouth every 4 hours as needed for mild pain or fever    Cancer associated pain       Calcium-Magnesium-Zinc Tabs     60 tablet    Take 2 tablets by mouth daily    S/P colectomy       ferrous sulfate 325 (65 FE) MG tablet    IRON    60 tablet    Take 1 tablet (325 mg) by mouth 2 times daily    S/P colectomy       levothyroxine 75 MCG tablet    SYNTHROID/LEVOTHROID    30 tablet    Take 1 tablet (75 mcg) by mouth daily Recheck TSH in 6 months    Hypothyroidism, unspecified type       LORazepam 0.5 MG tablet    ATIVAN    30 tablet    Take 1 tablet (0.5 mg) by mouth every 4 hours as needed (Anxiety, Nausea/Vomiting or Sleep)    Adenocarcinoma of colon (H)       OLANZapine 5 MG tablet    zyPREXA    30 tablet    Take 1 tablet (5 mg) by mouth daily    Psychosis, unspecified psychosis type       ondansetron 8 MG tablet    ZOFRAN    10 tablet    Take 1 tablet (8 mg) by mouth every 8 hours as needed (Nausea/Vomiting)     Adenocarcinoma of colon (H)       PRO-STAT AWC Liqd      Take 1 oz by mouth 2 times daily        TRAVATAN Z 0.004 % ophthalmic solution   Generic drug:  travoprost (FARHAT Free)      Place 1 drop into both eyes At Bedtime        vitamin B-Complex     30 tablet    Take 1 tablet by mouth daily    S/P colectomy

## 2017-06-29 ENCOUNTER — HOSPITAL ENCOUNTER (OUTPATIENT)
Facility: CLINIC | Age: 77
Setting detail: SPECIMEN
Discharge: HOME | End: 2017-06-29
Attending: INTERNAL MEDICINE | Admitting: INTERNAL MEDICINE
Payer: MEDICARE

## 2017-06-29 ENCOUNTER — ONCOLOGY VISIT (OUTPATIENT)
Dept: ONCOLOGY | Facility: CLINIC | Age: 77
End: 2017-06-29
Attending: INTERNAL MEDICINE
Payer: MEDICARE

## 2017-06-29 ENCOUNTER — INFUSION THERAPY VISIT (OUTPATIENT)
Dept: INFUSION THERAPY | Facility: CLINIC | Age: 77
End: 2017-06-29
Attending: INTERNAL MEDICINE
Payer: MEDICARE

## 2017-06-29 ENCOUNTER — HOSPITAL ENCOUNTER (OUTPATIENT)
Dept: LAB | Facility: CLINIC | Age: 77
End: 2017-06-29
Attending: INTERNAL MEDICINE
Payer: MEDICARE

## 2017-06-29 VITALS
DIASTOLIC BLOOD PRESSURE: 69 MMHG | HEART RATE: 80 BPM | RESPIRATION RATE: 16 BRPM | OXYGEN SATURATION: 96 % | SYSTOLIC BLOOD PRESSURE: 113 MMHG | WEIGHT: 119.8 LBS | BODY MASS INDEX: 20.55 KG/M2 | TEMPERATURE: 98.3 F

## 2017-06-29 VITALS
SYSTOLIC BLOOD PRESSURE: 103 MMHG | DIASTOLIC BLOOD PRESSURE: 60 MMHG | HEART RATE: 77 BPM | WEIGHT: 119.8 LBS | HEIGHT: 64 IN | BODY MASS INDEX: 20.45 KG/M2 | OXYGEN SATURATION: 96 % | RESPIRATION RATE: 16 BRPM | TEMPERATURE: 98.3 F

## 2017-06-29 DIAGNOSIS — C18.9 ADENOCARCINOMA OF COLON (H): Primary | ICD-10-CM

## 2017-06-29 LAB
ALBUMIN SERPL-MCNC: 3.5 G/DL (ref 3.4–5)
ALBUMIN UR-MCNC: 10 MG/DL
ALP SERPL-CCNC: 132 U/L (ref 40–150)
ALT SERPL W P-5'-P-CCNC: 39 U/L (ref 0–50)
ANION GAP SERPL CALCULATED.3IONS-SCNC: 7 MMOL/L (ref 3–14)
AST SERPL W P-5'-P-CCNC: 42 U/L (ref 0–45)
BASOPHILS # BLD AUTO: 0 10E9/L (ref 0–0.2)
BASOPHILS NFR BLD AUTO: 0.5 %
BILIRUB SERPL-MCNC: 0.4 MG/DL (ref 0.2–1.3)
BUN SERPL-MCNC: 18 MG/DL (ref 7–30)
CALCIUM SERPL-MCNC: 8.9 MG/DL (ref 8.5–10.1)
CEA SERPL-MCNC: 4.1 UG/L (ref 0–2.5)
CHLORIDE SERPL-SCNC: 105 MMOL/L (ref 94–109)
CO2 SERPL-SCNC: 27 MMOL/L (ref 20–32)
CREAT SERPL-MCNC: 0.74 MG/DL (ref 0.52–1.04)
DIFFERENTIAL METHOD BLD: ABNORMAL
EOSINOPHIL # BLD AUTO: 0.1 10E9/L (ref 0–0.7)
EOSINOPHIL NFR BLD AUTO: 0.8 %
ERYTHROCYTE [DISTWIDTH] IN BLOOD BY AUTOMATED COUNT: 16.7 % (ref 10–15)
GFR SERPL CREATININE-BSD FRML MDRD: 76 ML/MIN/1.7M2
GLUCOSE SERPL-MCNC: 91 MG/DL (ref 70–99)
HCT VFR BLD AUTO: 36.8 % (ref 35–47)
HGB BLD-MCNC: 11.1 G/DL (ref 11.7–15.7)
IMM GRANULOCYTES # BLD: 0 10E9/L (ref 0–0.4)
IMM GRANULOCYTES NFR BLD: 0.2 %
LYMPHOCYTES # BLD AUTO: 2 10E9/L (ref 0.8–5.3)
LYMPHOCYTES NFR BLD AUTO: 30.9 %
MCH RBC QN AUTO: 25.5 PG (ref 26.5–33)
MCHC RBC AUTO-ENTMCNC: 30.2 G/DL (ref 31.5–36.5)
MCV RBC AUTO: 84 FL (ref 78–100)
MONOCYTES # BLD AUTO: 0.6 10E9/L (ref 0–1.3)
MONOCYTES NFR BLD AUTO: 10 %
NEUTROPHILS # BLD AUTO: 3.7 10E9/L (ref 1.6–8.3)
NEUTROPHILS NFR BLD AUTO: 57.6 %
NRBC # BLD AUTO: 0 10*3/UL
NRBC BLD AUTO-RTO: 0 /100
PLATELET # BLD AUTO: 122 10E9/L (ref 150–450)
POTASSIUM SERPL-SCNC: 4.2 MMOL/L (ref 3.4–5.3)
PROT SERPL-MCNC: 6.9 G/DL (ref 6.8–8.8)
RBC # BLD AUTO: 4.36 10E12/L (ref 3.8–5.2)
SODIUM SERPL-SCNC: 139 MMOL/L (ref 133–144)
WBC # BLD AUTO: 6.4 10E9/L (ref 4–11)

## 2017-06-29 PROCEDURE — 96411 CHEMO IV PUSH ADDL DRUG: CPT

## 2017-06-29 PROCEDURE — 80053 COMPREHEN METABOLIC PANEL: CPT | Performed by: INTERNAL MEDICINE

## 2017-06-29 PROCEDURE — 96413 CHEMO IV INFUSION 1 HR: CPT

## 2017-06-29 PROCEDURE — 99211 OFF/OP EST MAY X REQ PHY/QHP: CPT | Mod: 25

## 2017-06-29 PROCEDURE — 96368 THER/DIAG CONCURRENT INF: CPT

## 2017-06-29 PROCEDURE — 25000128 H RX IP 250 OP 636: Performed by: INTERNAL MEDICINE

## 2017-06-29 PROCEDURE — 96415 CHEMO IV INFUSION ADDL HR: CPT

## 2017-06-29 PROCEDURE — 99214 OFFICE O/P EST MOD 30 MIN: CPT | Performed by: INTERNAL MEDICINE

## 2017-06-29 PROCEDURE — 81445 SO NEO GSAP 5-50DNA/DNA&RNA: CPT | Performed by: INTERNAL MEDICINE

## 2017-06-29 PROCEDURE — 81003 URINALYSIS AUTO W/O SCOPE: CPT | Performed by: INTERNAL MEDICINE

## 2017-06-29 PROCEDURE — 96416 CHEMO PROLONG INFUSE W/PUMP: CPT

## 2017-06-29 PROCEDURE — 96367 TX/PROPH/DG ADDL SEQ IV INF: CPT

## 2017-06-29 PROCEDURE — 96417 CHEMO IV INFUS EACH ADDL SEQ: CPT

## 2017-06-29 PROCEDURE — 85025 COMPLETE CBC W/AUTO DIFF WBC: CPT | Performed by: INTERNAL MEDICINE

## 2017-06-29 PROCEDURE — 82378 CARCINOEMBRYONIC ANTIGEN: CPT | Performed by: INTERNAL MEDICINE

## 2017-06-29 PROCEDURE — 25000125 ZZHC RX 250: Performed by: INTERNAL MEDICINE

## 2017-06-29 RX ORDER — FLUOROURACIL 50 MG/ML
400 INJECTION, SOLUTION INTRAVENOUS ONCE
Status: CANCELLED | OUTPATIENT
Start: 2017-06-29

## 2017-06-29 RX ORDER — FLUOROURACIL 50 MG/ML
400 INJECTION, SOLUTION INTRAVENOUS ONCE
Status: COMPLETED | OUTPATIENT
Start: 2017-06-29 | End: 2017-06-29

## 2017-06-29 RX ADMIN — SODIUM CHLORIDE 250 ML: 9 INJECTION, SOLUTION INTRAVENOUS at 10:54

## 2017-06-29 RX ADMIN — FLUOROURACIL 630 MG: 50 INJECTION, SOLUTION INTRAVENOUS at 14:07

## 2017-06-29 RX ADMIN — OXALIPLATIN 133 MG: 100 INJECTION, SOLUTION, CONCENTRATE INTRAVENOUS at 12:03

## 2017-06-29 RX ADMIN — DEXTROSE MONOHYDRATE 250 ML: 50 INJECTION, SOLUTION INTRAVENOUS at 11:56

## 2017-06-29 RX ADMIN — BEVACIZUMAB 270 MG: 100 INJECTION, SOLUTION INTRAVENOUS at 11:20

## 2017-06-29 RX ADMIN — DEXAMETHASONE SODIUM PHOSPHATE: 10 INJECTION, SOLUTION INTRAMUSCULAR; INTRAVENOUS at 10:54

## 2017-06-29 RX ADMIN — LEUCOVORIN CALCIUM 550 MG: 500 INJECTION, POWDER, LYOPHILIZED, FOR SOLUTION INTRAMUSCULAR; INTRAVENOUS at 12:02

## 2017-06-29 ASSESSMENT — PAIN SCALES - GENERAL: PAINLEVEL: NO PAIN (0)

## 2017-06-29 NOTE — PATIENT INSTRUCTIONS
Continue chemotherapy.  Follow up with next chemotherapy.  Done - Belle    AVS given to cass Odonnell

## 2017-06-29 NOTE — PROGRESS NOTES
ONCOLOGY HISTORY:  Ms. Sherita Kenney is a female with colon cancer.   1. The patient was brought to the ER on 02/15/2017 with altered mental status.    -CT brain on 02/15/2017 did not reveal any acute intracranial pathology.   -CT abdomen and pelvis on 02/15/2017 revealed large right iliopsoas abscess. Adjacent mass-like wall thickening of portion of right colon.   -CT-guided drainage of abscess was done.   2. The patient was taken to OR on 03/01/2017.    -Colonoscopy revealed a large fungating mass in the right side of the colon.  Pathology revealed invasive poorly differentiated adenocarcinoma.  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   -Laparoscopic ileostomy was done.    3.  CEA on 02/16/2017 was 3.8.   4. The patient had right colectomy with takedown of ileostomy and primary anastomosis on 04/13/2017.    -Pathology  reveals poorly differentiated colonic adenocarcinoma. 15 of 24 lymph nodes are positive. Tumor invades into adjacent abdominal wall.  Lymphovascular invasion present. pT4b pN2b M0.   5. PET scan on 05/22/2017 reveals metastatic disease.  There are multiple new hypermetabolic liver metastases and  hypermetabolic necrotic lymphadenopathy versus malignant implants right mesentery and right lower quadrant.  6. modified FOLFOX6 with Avastin started on 06/01/2017.     SUBJECTIVE:  Ms. Sherita Kenney is a 76-year-old female with metastatic colon cancer with liver metastasis.  She was started on modified FOLFOX6 with Avastin on 06/01/2017.  She is tolerating chemotherapy well.       Denies any headache.  No dizziness.  No neck pain.  No chest pain or difficulty breathing.  No abdominal pain, nausea or vomiting.  No urinary or bowel complaints.  No bleeding. She has some cold sensitivity. No numbness or tingling.      PHYSICAL EXAMINATION:   Alert and oriented x 3.   VITAL SIGNS:  Reviewed.   EYES:  No icterus.   THROAT:  No ulcer or thrush.   NECK:   Supple. No lymphadenopathy.   LUNGS:  Good air entry bilaterally.  No wheezing.   HEART:  Regular.   ABDOMEN:  Soft and nontender.  No mass.   EXTREMITIES:  No edema.   SKIN:  No rash.       LABORATORY DATA:  Reviewed.       ASSESSMENT:   1.  A 76-year-old female with metastatic colon cancer with multiple liver metastases on modified FOLFOX6 with Avastin.    2.  Mild anemia.   3. Thrombocytopenia.  4.  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      PLAN:   1.  The patient overall is tolerating chemotherapy well.  Patient has some cold sensitivity.  Counseled her to avoid cold for first 2-3 days with chemotherapy.  I told her that cold sensitivity and neuropathy will get worse with further chemotherapy.    Labs were all reviewed.  There are few abnormalities.  She is mildly anemic and thrombocytopenic.  Overall labs are good for treatment.  She'll get cycle 3 of chemotherapy with modified FOLFOX 6 and Avastin.    2.  Labs were reviewed.  I explained to her that she is becoming thrombocytopenic.  It can get worse.  In future we might have to do dose reduction of oxaliplatin and also discontinue bolus 5-FU.    3.  Discuss regarding scans.  We will schedule it after cycle four.    4.  Patient had a few questions which were all answered.  I'll see her with next treatment.  Advised her to return sooner if she she has fever, chills, infection, diarrhea, worsening weakness of any other concerns.  Advised her to apply a lot of moisturizing lotion.

## 2017-06-29 NOTE — MR AVS SNAPSHOT
After Visit Summary   6/29/2017    Sherita Kenney    MRN: [id number]           Patient Information     Date Of Birth          1940        Visit Information        Provider Department      6/29/2017 8:30 AM  INFUSION CHAIR 19 [facility] [place] [facility]        Today's Diagnoses     Adenocarcinoma of colon (H)    -  1       Follow-ups after your visit        Your next 10 appointments already scheduled     Jul 13, 2017  9:00 AM CDT   Level 6 with  INFUSION CHAIR 8   [facility] [place] [facility] ([facility] [place])    [facility] [place] [id number]  [place] [address]   [phone]            Jul 13, 2017  1:00 PM CDT   Return Visit with Susan Lindo MD   [facility] [place] ([facility] [place])    [facility]  [address]  [place] [address]   [phone]            Jul 27, 2017  9:00 AM CDT   Level 6 with  INFUSION CHAIR 18   [facility] [place] [facility] ([facility] [place])    [facility]  [address]  [place] [address]   [phone]            Jul 27, 2017 10:15 AM CDT   Return Visit with Alma Nieves MD   [facility] [place] ([facility] [place])    [facility]  [address]  [place] [address]   [phone]              Who to contact     If you have questions or need follow up information about today's clinic visit or your schedule please contact [facility] directly at [phone].  Normal or non-critical lab and imaging results will be communicated to you by MyChart, letter or phone within 4 business days after the clinic has received the results. If you do not hear from us within 7 days, please contact the clinic through MyChart or phone. If you have a critical or abnormal lab result, we will notify you by phone as soon as  "possible.  Submit refill requests through Qlue or call your pharmacy and they will forward the refill request to us. Please allow 3 business days for your refill to be completed.          Additional Information About Your Visit        Qlue Information     Qlue lets you send messages to your doctor, view your test results, renew your prescriptions, schedule appointments and more. To sign up, go to [email or web address] . Click on \"Log in\" on the left side of the screen, which will take you to the Welcome page. Then click on \"Sign up Now\" on the right side of the page.     You will be asked to enter the access code listed below, as well as some personal information. Please follow the directions to create your username and password.     Your access code is: [email or web address]  Expires: 2017  6:30 AM     Your access code will  in 90 days. If you need help or a new code, please call your [facility] clinic or [phone].        Care EveryWhere ID     This is your Care EveryWhere ID. This could be used by other organizations to access your [facility] medical records  [id number]        Your Vitals Were     Pulse Temperature Respirations Pulse Oximetry BMI (Body Mass Index)       80 98.3  F (36.8  C) (Oral) 16 96% 20.55 kg/m2        Blood Pressure from Last 3 Encounters:   17 113/69   17 113/69   17 116/75    Weight from Last 3 Encounters:   17 54.3 kg (119 lb 12.8 oz)   17 54.3 kg (119 lb 12.8 oz)   06/15/17 54.2 kg (119 lb 9.6 oz)              We Performed the Following     CBC with platelets differential     CEA     Comprehensive metabolic panel     Protein qualitative urine          Today's Medication Changes          These changes are accurate as of: 17 11:08 AM.  If you have any questions, ask your nurse or doctor.               Stop taking these medicines if you haven't already. Please contact your care team if you have questions.     CETAPHIL lotion   Stopped by:  " Susan Lindo MD           FLEXERIL PO   Stopped by:  Susan Lindo MD                    Primary Care Provider Office Phone # Fax #    Edgar Lazcano Enrrique Rodriguez [phone]       [facility] [address] [facility] [address]  [facility] [address]        Equal Access to Services     [facility]: Hadii aad ku hadasho Soomaali, waaxda luqadaha, qaybta kaalmada adeegyada, waxay sarahin hayaan adeeg khamericomichelle labaileyn . So [facility] [phone].    ATENCIÓN: Si habla espgorge, tiene a roldan disposición servicios gratuitos de asistencia lingüística. Ll[facility] [phone].    We comply with applicable federal civil rights laws and [place] laws. We do not discriminate on the basis of race, color, national origin, age, disability sex, sexual orientation or gender identity.            Thank you!     Thank you for choosing [facility] AND [facility]  for your care. Our goal is always to provide you with excellent care. Hearing back from our patients is one way we can continue to improve our services. Please take a few minutes to complete the written survey that you may receive in the mail after your visit with us. Thank you!             Your Updated Medication List - Protect others around you: Learn how to safely use, store and throw away your medicines at www.disposemymeds.org.          This list is accurate as of: 6/29/17 11:08 AM.  Always use your most recent med list.                   Brand Name Dispense Instructions for use Diagnosis    acetaminophen 325 MG tablet    TYLENOL    60 tablet    Take 2 tablets (650 mg) by mouth every 4 hours as needed for mild pain or fever    Cancer associated pain       Calcium-Magnesium-Zinc Tabs     60 tablet    Take 2 tablets by mouth daily    S/P colectomy       ferrous sulfate 325 (65 FE) MG tablet    IRON    60 tablet    Take 1 tablet (325 mg) by mouth 2 times daily    S/P colectomy       levothyroxine 75 MCG tablet    SYNTHROID/LEVOTHROID    30  tablet    Take 1 tablet (75 mcg) by mouth daily Recheck TSH in 6 months    Hypothyroidism, unspecified type       LORazepam 0.5 MG tablet    ATIVAN    30 tablet    Take 1 tablet (0.5 mg) by mouth every 4 hours as needed (Anxiety, Nausea/Vomiting or Sleep)    Adenocarcinoma of colon (H)       OLANZapine 5 MG tablet    zyPREXA    30 tablet    Take 1 tablet (5 mg) by mouth daily    Psychosis, unspecified psychosis type       ondansetron 8 MG tablet    ZOFRAN    10 tablet    Take 1 tablet (8 mg) by mouth every 8 hours as needed (Nausea/Vomiting)    Adenocarcinoma of colon (H)       PRO-STAT AWC Liqd      Take 1 oz by mouth 2 times daily        TRAVATAN Z 0.004 % ophthalmic solution   Generic drug:  travoprost (BAK Free)      Place 1 drop into both eyes At Bedtime        vitamin B-Complex     30 tablet    Take 1 tablet by mouth daily    S/P colectomy

## 2017-06-29 NOTE — PROGRESS NOTES
"Infusion Nursing Note:  Sherita Kenney presents today for C3D1 avastin, mod folfox 6  Patient seen by provider today: Yes: Dr. Lindo   present during visit today: Not Applicable.    Note: N/A.    Intravenous Access:  Labs drawn without difficulty.  Implanted Port.    Treatment Conditions:  Lab Results   Component Value Date    HGB 11.1 06/29/2017     Lab Results   Component Value Date    WBC 6.4 06/29/2017      Lab Results   Component Value Date    ANEU 3.7 06/29/2017     Lab Results   Component Value Date     06/29/2017      Lab Results   Component Value Date     06/29/2017                   Lab Results   Component Value Date    POTASSIUM 4.2 06/29/2017           Lab Results   Component Value Date    MAG 2.0 04/15/2017            Lab Results   Component Value Date    CR 0.74 06/29/2017                   Lab Results   Component Value Date    SARITHA 8.9 06/29/2017                Lab Results   Component Value Date    BILITOTAL 0.4 06/29/2017           Lab Results   Component Value Date    ALBUMIN 3.5 06/29/2017                    Lab Results   Component Value Date    ALT 39 06/29/2017           Lab Results   Component Value Date    AST 42 06/29/2017     Results reviewed, labs MET treatment parameters, ok to proceed with treatment.  Urine 10      Post Infusion Assessment:  Patient tolerated infusion without incident.  Blood return noted pre and post infusion.  Blood return noted during administration every 2-3 cc.  Site patent and intact, free from redness, edema or discomfort.  No evidence of extravasations.    Prior to discharge: Port is secured in place with tegaderm and flushed with 10cc NS with positive blood return noted.  Continuous home infusion CADD pump connected.    All connectors secured in place and clamps taped open.    Pump started, \"running\" noted on display (CADD): YES.  Patient instructed to call our clinic or [facility] with any questions or concerns at home.  " Patient verbalized understanding.    Patient set up for pump disconnect at our clinic on 7/1/17 at 12:30pm.            Discharge Plan:   Copy of AVS reviewed with patient and/or family.  Patient will return 7/1/17 for next appointment.  Patient discharged in stable condition accompanied by: sister.  Departure Mode: Ambulatory.    Pino Vargas RN

## 2017-06-29 NOTE — PROGRESS NOTES
"Oncology Rooming Note    June 29, 2017 9:10 AM   Sherita Kenney is a 76 year old female who presents for:    Chief Complaint   Patient presents with     Oncology Clinic Visit     Colon cancer      Initial Vitals: /69  Pulse 80  Temp 98.3  F (36.8  C) (Oral)  Resp 16  Wt 54.3 kg (119 lb 12.8 oz)  SpO2 96%  BMI 20.55 kg/m2 Estimated body mass index is 20.55 kg/(m^2) as calculated from the following:    Height as of 6/15/17: 1.626 m (5' 4.02\").    Weight as of this encounter: 54.3 kg (119 lb 12.8 oz). Body surface area is 1.57 meters squared.  No Pain (0) Comment: Data Unavailable   No LMP recorded. Patient is not currently having periods (Reason: Perimenopausal).  Allergies reviewed: Yes  Medications reviewed: Yes    Medications: Medication refills not needed today.  Pharmacy name entered into [facility]:    [facility] [id number] [place] [address] [address] PHARMACY [facility] 0732 [facility] - [address]    Clinical concerns: None             5 minutes for nursing intake (face to face time)     April Bazzi MA          DISCHARGE PLAN:    Continue chemo/ Reported to RENÉE Pringle/  Dates given to scheduling for C4 and C5 Folfox/ Avastin- 7/13 and 7/27/17 to see Dr. riggs as well.  Schedulers will bring her a copy when dates arranged       Next appointments: See patient instruction section  Departure Mode: Ambulatory  Accompanied by: sister  5 minutes for nursing discharge (face to face time)   Di Mahmood RN      "

## 2017-06-29 NOTE — MR AVS SNAPSHOT
After Visit Summary   6/29/2017    Sherita Kenney    MRN: [id number]           Patient Information     Date Of Birth          1940        Visit Information        Provider Department      6/29/2017 9:30 AM Susan Lindo MD [facility]        Today's Diagnoses     Adenocarcinoma of colon (H)    -  1      Care Instructions    Continue chemotherapy.  Follow up with next chemotherapy.  Done - Belle    AVS given to paitnet - Belle          Follow-ups after your visit        Your next 10 appointments already scheduled     Jul 13, 2017  9:00 AM CDT   Level 6 with  INFUSION CHAIR 8   [facility] ([facility])    [facility]  [address]  [place] [address]   [phone]            Jul 13, 2017  1:00 PM CDT   Return Visit with Susan Lindo MD   [facility] ([facility])    [facility]  [address]  [place] [address]   [phone]            Jul 27, 2017  9:00 AM CDT   Level 6 with  INFUSION CHAIR 18   [facility] ([facility])    [facility]  [address]  [place] [address]   [phone]            Jul 27, 2017 10:15 AM CDT   Return Visit with Alma Nieves MD   [facility] ([facility])    [facility]  [address]  [place] [address]   [phone]              Who to contact     If you have questions or need follow up information about today's clinic visit or your schedule please contact [facility] directly at [phone].  Normal or non-critical lab and imaging results will be communicated to you by MyChart, letter or phone within 4 business days after the clinic has received the results. If you do not hear from us within 7 days, please contact the clinic through MyChart or phone.  "If you have a critical or abnormal lab result, we will notify you by phone as soon as possible.  Submit refill requests through Bee Shield or call your pharmacy and they will forward the refill request to us. Please allow 3 business days for your refill to be completed.          Additional Information About Your Visit        Ciplexhart Information     Bee Shield lets you send messages to your doctor, view your test results, renew your prescriptions, schedule appointments and more. To sign up, go to [email or web address] . Click on \"Log in\" on the left side of the screen, which will take you to the Welcome page. Then click on \"Sign up Now\" on the right side of the page.     You will be asked to enter the access code listed below, as well as some personal information. Please follow the directions to create your username and password.     Your access code is: [email or web address]  Expires: 2017  6:30 AM     Your access code will  in 90 days. If you need help or a new code, please call your [facility] clinic or [phone].        Care EveryWhere ID     This is your Care EveryWhere ID. This could be used by other organizations to access your [facility] medical records  [id number]        Your Vitals Were     Pulse Temperature Respirations Pulse Oximetry BMI (Body Mass Index)       80 98.3  F (36.8  C) (Oral) 16 96% 20.55 kg/m2        Blood Pressure from Last 3 Encounters:   17 113/69   17 113/69   17 116/75    Weight from Last 3 Encounters:   17 54.3 kg (119 lb 12.8 oz)   17 54.3 kg (119 lb 12.8 oz)   06/15/17 54.2 kg (119 lb 9.6 oz)              We Performed the Following     Next Generation Sequencing Oncology: Colorectal Cancer Panel          Today's Medication Changes          These changes are accurate as of: 17  2:00 PM.  If you have any questions, ask your nurse or doctor.               Stop taking these medicines if you haven't already. Please contact your care team if you have " questions.     CETAPHIL lotion   Stopped by:  Susan Lindo MD           FLEXERIL PO   Stopped by:  Susan Lindo MD                    Primary Care Provider Office Phone # Fax #    Edgar Lazcano Enrrique Rodriguez [phone]       [facility] [address]  [place] [address]        Equal Access to Services     [facility]: Hadii aad ku hadasho Soomaali, waaxda luqadaha, qaybta kaalmada adeegyada, waxay idiin hayaan adeeg kharash la'aan . So [facility] [phone].    ATENCIÓN: Si rukhsana jovel, tiene a roldan disposición servicios gratuitos de asistencia lingüística. [facility] [phone].    We comply with applicable federal civil rights laws and [place] laws. We do not discriminate on the basis of race, color, national origin, age, disability sex, sexual orientation or gender identity.            Thank you!     Thank you for choosing [facility]  for your care. Our goal is always to provide you with excellent care. Hearing back from our patients is one way we can continue to improve our services. Please take a few minutes to complete the written survey that you may receive in the mail after your visit with us. Thank you!             Your Updated Medication List - Protect others around you: Learn how to safely use, store and throw away your medicines at www.disposemymeds.org.          This list is accurate as of: 6/29/17  2:00 PM.  Always use your most recent med list.                   Brand Name Dispense Instructions for use Diagnosis    acetaminophen 325 MG tablet    TYLENOL    60 tablet    Take 2 tablets (650 mg) by mouth every 4 hours as needed for mild pain or fever    Cancer associated pain       Calcium-Magnesium-Zinc Tabs     60 tablet    Take 2 tablets by mouth daily    S/P colectomy       ferrous sulfate 325 (65 FE) MG tablet    IRON    60 tablet    Take 1 tablet (325 mg) by mouth 2 times daily    S/P colectomy       levothyroxine 75 MCG tablet     SYNTHROID/LEVOTHROID    30 tablet    Take 1 tablet (75 mcg) by mouth daily Recheck TSH in 6 months    Hypothyroidism, unspecified type       LORazepam 0.5 MG tablet    ATIVAN    30 tablet    Take 1 tablet (0.5 mg) by mouth every 4 hours as needed (Anxiety, Nausea/Vomiting or Sleep)    Adenocarcinoma of colon (H)       OLANZapine 5 MG tablet    zyPREXA    30 tablet    Take 1 tablet (5 mg) by mouth daily    Psychosis, unspecified psychosis type       ondansetron 8 MG tablet    ZOFRAN    10 tablet    Take 1 tablet (8 mg) by mouth every 8 hours as needed (Nausea/Vomiting)    Adenocarcinoma of colon (H)       PRO-STAT AWC Liqd      Take 1 oz by mouth 2 times daily        TRAVATAN Z 0.004 % ophthalmic solution   Generic drug:  travoprost (BAK Free)      Place 1 drop into both eyes At Bedtime        vitamin B-Complex     30 tablet    Take 1 tablet by mouth daily    S/P colectomy

## 2017-06-30 ENCOUNTER — TELEPHONE (OUTPATIENT)
Dept: ONCOLOGY | Facility: CLINIC | Age: 77
End: 2017-06-30

## 2017-06-30 NOTE — PROGRESS NOTES
NAME: Sherita Kenney  MR#: [id number]  YOB: 1940  DATE OF EXAM: 6/28/2017    Neuropsychology Laboratory  [address], [facility] [address]  [place]  [address] [phone]    NEUROPSYCHOLOGICAL EVALUATION    RELEVANT HISTORY AND REASON FOR REFERRAL    Sherita Kenney is a 76 year old, right handed, retired  with a Ph.D. 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 She was found to have a right iliopsoas abscess for which she underwent IR drainage, and was ultimately diagnosed with adenocarcinoma of the colon, and underwent colonoscopy and laparoscopic ileostomy. She was noted to have no prior history of mental health diagnoses, but was found to be paranoid and agitated at home on the day of her admission on 2/15/2017. Over the course of her hospitalization it was determined that she did not have the capacity to make her own decisions. Her care coordinator had been assisting with efforts to establish a legal decision maker in her family, and her niece Thalia who is a family physician, and her friend Dinora were acting as her POA s. She was discharged from the hospital on 3/9/2017. She was hospitalized again from 4/13/2017 through 4/20/2017 in order to undergo open right colectomy with takedown of ileostomy. Her hospital course was unremarkable. She followed up with her internal medicine physician, Edgar Rodriguez M.D. on 5/9/2017. Dr. Rodriguez noted that Ms. Kenney had been discharged to a TCU and had been doing fairly well since her discharge. There have been some ongoing concerns regarding cognition. On 5/23/2017, she completed a neuropsychological evaluation the request of Latanya Patterson M.D. She returned today for interpretation of the findings, together with her friend Dinora, as well as her sister and brother-in-law. Her niece joined the meeting  via telephone.     Please refer to the report dated 6/28/2017 for full details regarding her history and the findings of the evaluation. Briefly, results indicated mild dementia, characterized by impairments in learning and memory, mild anomia, mild executive dysfunction, and impaired complex attentional processing. Basic expressive language, with the exception of confrontation naming, fell within normal limits, as did visual processing. She denied experiencing significant depressive symptomatology.    These findings were discussed in detail and at length with Ms. Kenney and her family and friend. They were particularly concerned about the level of supervision and assistance that Ms. Kenney was likely to require. She is currently living in assisted living, and she would very much like to return to her condominium as soon as possible. Given her cognitive difficulties, it is recommended that she have assistance with management of her instrumental activities of daily living and minimal supervision for her safety. If she were to return to her home, she would likely require someone to stay with her. As such, assisted living may provide greater independence while also allowing her to have the minimal supervision and assistance that she will likely require. She is unhappy with the assisted living center at which she is living now. Her family and friends have been working with her on finding a new assisted living center, and we discussed the possibility of her visiting a couple to see if she could find one where she would be more comfortable. She agreed to move forward but is clearly reluctant to move out of her condominium. We also discussed concerns regarding driving. She has not driven recently and does not have a car, but would like to start driving again. She was encouraged to undergo a formal  s assessment with Occupational Therapy before driving again. She and her family had many questions which were  answered in detail. The remainder of the recommendations, as described in the report, were also discussed.    Lisa Man, Ph.D., ABPP  Licensed Psychologist, LP [id number]  Board Certified in Clinical Neuropsychology    Time spent:  Two hours professional time, including interpretation (CPT 12786). ICD-10 diagnosis: C18.9; F06.8.

## 2017-06-30 NOTE — TELEPHONE ENCOUNTER
Received call from [facility] nurse Meghan stating that Pt is c/o severe tingling in her fingers.  Pt's chemo started yesterday & will be disconnected Sat am--(5FU pump).    Encouraged Meghan to try blankets or gloves/mittens to have Pt keep her hands warm as cold sensitivity is a known side effect of Oxaliplatin.  Nurse Meghan stated that Pt's hands are cold.    Will discuss this with Mayi & MD then call back with further advise.

## 2017-06-30 NOTE — TELEPHONE ENCOUNTER
Spoke with Mayi Joseph &  & both agreed that the tingling in Pt's fingers is likely neuropathy from the Oxaliplatin, that Pt should keep her hands warm--blankets, gloves/mittens & that the tingling should get better within few days.    Updated [facility] Nurse Meghan of this & she will report back if any worsening.

## 2017-07-01 ENCOUNTER — INFUSION THERAPY VISIT (OUTPATIENT)
Dept: INFUSION THERAPY | Facility: CLINIC | Age: 77
End: 2017-07-01
Attending: INTERNAL MEDICINE
Payer: MEDICARE

## 2017-07-01 VITALS
TEMPERATURE: 97.7 F | HEART RATE: 68 BPM | DIASTOLIC BLOOD PRESSURE: 61 MMHG | RESPIRATION RATE: 16 BRPM | SYSTOLIC BLOOD PRESSURE: 103 MMHG

## 2017-07-01 DIAGNOSIS — Z95.828 PORT-A-CATH IN PLACE: Primary | ICD-10-CM

## 2017-07-01 PROCEDURE — 25000128 H RX IP 250 OP 636: Performed by: INTERNAL MEDICINE

## 2017-07-01 PROCEDURE — 40000269 ZZH STATISTIC NO CHARGE FACILITY FEE

## 2017-07-01 RX ORDER — HEPARIN SODIUM (PORCINE) LOCK FLUSH IV SOLN 100 UNIT/ML 100 UNIT/ML
500 SOLUTION INTRAVENOUS EVERY 8 HOURS
Status: DISCONTINUED | OUTPATIENT
Start: 2017-07-01 | End: 2017-07-01 | Stop reason: HOSPADM

## 2017-07-01 RX ORDER — HEPARIN SODIUM (PORCINE) LOCK FLUSH IV SOLN 100 UNIT/ML 100 UNIT/ML
500 SOLUTION INTRAVENOUS EVERY 8 HOURS
Status: CANCELLED
Start: 2017-07-01

## 2017-07-01 RX ADMIN — SODIUM CHLORIDE, PRESERVATIVE FREE 500 UNITS: 5 INJECTION INTRAVENOUS at 12:43

## 2017-07-01 ASSESSMENT — PAIN SCALES - GENERAL: PAINLEVEL: MILD PAIN (3)

## 2017-07-01 NOTE — MR AVS SNAPSHOT
After Visit Summary   7/1/2017    Sherita Kenney    MRN: [id number]           Patient Information     Date Of Birth          1940        Visit Information        Provider Department      7/1/2017 12:30 PM  INFUSION CHAIR 15 [facility]        Today's Diagnoses     Port-a-cath in place    -  1       Follow-ups after your visit        Your next 10 appointments already scheduled     Jul 13, 2017  9:00 AM CDT   Level 6 with  INFUSION CHAIR 8   [facility] ([facility])    [facility]  [address]  [place] [address]   [phone]            Jul 13, 2017  1:00 PM CDT   Return Visit with Susan Lindo MD   [facility] ([facility])    [facility]  [address]  [place] [address]   [phone]            Jul 27, 2017  9:00 AM CDT   Level 6 with  INFUSION CHAIR 18   [facility] ([facility])    [facility]  [address]  [place] [address]   [phone]            Jul 27, 2017 10:15 AM CDT   Return Visit with Alma Nieves MD   [facility] ([facility])    [facility]  [address]  [place] [address]   [phone]              Who to contact     If you have questions or need follow up information about today's clinic visit or your schedule please contact [facility] directly at [phone].  Normal or non-critical lab and imaging results will be communicated to you by MyChart, letter or phone within 4 business days after the clinic has received the results. If you do not hear from us within 7 days, please contact the clinic through MyChart or phone. If you have a critical or abnormal lab result, we will notify you by phone as soon as  "possible.  Submit refill requests through [facility] or call your pharmacy and they will forward the refill request to us. Please allow 3 business days for your refill to be completed.          Additional Information About Your Visit        [employer] Information     [facility] lets you send messages to your doctor, view your test results, renew your prescriptions, schedule appointments and more. To sign up, go to [email or web address] . Click on \"Log in\" on the left side of the screen, which will take you to the Welcome page. Then click on \"Sign up Now\" on the right side of the page.     You will be asked to enter the access code listed below, as well as some personal information. Please follow the directions to create your username and password.     Your access code is: [email or web address]  Expires: 2017  6:30 AM     Your access code will  in 90 days. If you need help or a new code, please call your [facility] clinic or [phone].        Care EveryWhere ID     This is your Care EveryWhere ID. This could be used by other organizations to access your [facility] medical records  [id number]        Your Vitals Were     Pulse Temperature Respirations             68 97.7  F (36.5  C) (Oral) 16          Blood Pressure from Last 3 Encounters:   17 103/61   17 113/69   17 103/60    Weight from Last 3 Encounters:   17 54.3 kg (119 lb 12.8 oz)   17 54.3 kg (119 lb 12.8 oz)   06/15/17 54.2 kg (119 lb 9.6 oz)              Today, you had the following     No orders found for display       Primary Care Provider Office Phone # Fax #    Edgar Neno Rodriguez [phone]       [facility] [address]  [facility] [address]        Equal Access to Services     [facility] : Ruddy Nunes, waaxda luqadaha, qaybta kaalmayvon pemberton, ellis mooney . [facility] [phone].    ATENCIÓN: Si gloria mitchell " disposición servicios gratuitos de asistencia lingüística. Malaika jimenez [phone].    We comply with applicable federal civil rights laws and [place] laws. We do not discriminate on the basis of race, color, national origin, age, disability sex, sexual orientation or gender identity.            Thank you!     Thank you for choosing [facility]  for your care. Our goal is always to provide you with excellent care. Hearing back from our patients is one way we can continue to improve our services. Please take a few minutes to complete the written survey that you may receive in the mail after your visit with us. Thank you!             Your Updated Medication List - Protect others around you: Learn how to safely use, store and throw away your medicines at www.disposemymeds.org.          This list is accurate as of: 7/1/17 12:56 PM.  Always use your most recent med list.                   Brand Name Dispense Instructions for use Diagnosis    acetaminophen 325 MG tablet    TYLENOL    60 tablet    Take 2 tablets (650 mg) by mouth every 4 hours as needed for mild pain or fever    Cancer associated pain       Calcium-Magnesium-Zinc Tabs     60 tablet    Take 2 tablets by mouth daily    S/P colectomy       ferrous sulfate 325 (65 FE) MG tablet    IRON    60 tablet    Take 1 tablet (325 mg) by mouth 2 times daily    S/P colectomy       levothyroxine 75 MCG tablet    SYNTHROID/LEVOTHROID    30 tablet    Take 1 tablet (75 mcg) by mouth daily Recheck TSH in 6 months    Hypothyroidism, unspecified type       LORazepam 0.5 MG tablet    ATIVAN    30 tablet    Take 1 tablet (0.5 mg) by mouth every 4 hours as needed (Anxiety, Nausea/Vomiting or Sleep)    Adenocarcinoma of colon (H)       OLANZapine 5 MG tablet    zyPREXA    30 tablet    Take 1 tablet (5 mg) by mouth daily    Psychosis, unspecified psychosis type       ondansetron 8 MG tablet    ZOFRAN    10 tablet    Take 1 tablet (8 mg) by mouth every  8 hours as needed (Nausea/Vomiting)    Adenocarcinoma of colon (H)       PRO-STAT AWC Liqd      Take 1 oz by mouth 2 times daily        TRAVATAN Z 0.004 % ophthalmic solution   Generic drug:  travoprost (BAK Free)      Place 1 drop into both eyes At Bedtime        vitamin B-Complex     30 tablet    Take 1 tablet by mouth daily    S/P colectomy

## 2017-07-01 NOTE — PROGRESS NOTES
Infusion Nursing Note:  Sherita MARTINEZ Shilakrista presents today for pump disconnect.    Patient seen by provider today: No   present during visit today: Not Applicable.    Note: Pt c/o of pain in fingers; using cream to help with the pain per pt.     Intravenous Access:  Implanted Port.    Treatment Conditions:  Not Applicable.      Post Infusion Assessment:  Patient tolerated infusion without incident.  Blood return noted pre and post infusion.  Site patent and intact, free from redness, edema or discomfort.  No evidence of extravasations.  Access discontinued per protocol.    Discharge Plan:   Patient discharged in stable condition accompanied by: self.  Departure Mode: Ambulatory.    RENÉE Guerra RN    disconnection logged as 7/2/17, but was disconnected on 7/1/17. Patrick Lyles RN

## 2017-07-05 ENCOUNTER — TELEPHONE (OUTPATIENT)
Dept: FAMILY MEDICINE | Facility: CLINIC | Age: 77
End: 2017-07-05

## 2017-07-05 DIAGNOSIS — K52.9 CHRONIC DIARRHEA: Primary | ICD-10-CM

## 2017-07-05 RX ORDER — MENTHOL AND ZINC OXIDE .44; 20.625 G/100G; G/100G
OINTMENT TOPICAL 4 TIMES DAILY PRN
Qty: 1 TUBE | Refills: 3 | Status: SHIPPED | OUTPATIENT
Start: 2017-07-05 | End: 2018-01-01

## 2017-07-05 NOTE — TELEPHONE ENCOUNTER
ERx for calmoseptine sent to pharmacy.  Please follow with home care if they have already submitted stool sample for culture.

## 2017-07-05 NOTE — TELEPHONE ENCOUNTER
Please advise assisted living facility to prioritize this so that we could rule out infection and start the patient on Imodium.

## 2017-07-05 NOTE — TELEPHONE ENCOUNTER
Spoke with Neema at [facility] and she is aware of importance of this and will try and get a sample ASAP.   Elsa Ahn- CMA

## 2017-07-05 NOTE — TELEPHONE ENCOUNTER
Reason for Call:  Medication or medication refill:    Do you use a [facility]?  Name of the pharmacy and phone number for the current request: [facility] [phone]    Name of the medication requested: Calmoseptine    Other request: Mary (Nurse) with [facility] is calling on behalf of Sherita Kenney. Sherita JUAN Kenney has been having loose stool and essentially has a sore bottom because of it. Mary and Sherita Kenney are wondering if they can get some Calmoseptine.     Can we leave a detailed message on this number? YES    Phone number patient can be reached at: Other phone number:  [phone]    Best Time: ASAP     Call taken on 7/5/2017 at 9:55 AM by Ivette Abrams

## 2017-07-07 ENCOUNTER — ASSISTED LIVING VISIT (OUTPATIENT)
Dept: GERIATRICS | Facility: CLINIC | Age: 77
End: 2017-07-07
Payer: COMMERCIAL

## 2017-07-07 ENCOUNTER — TRANSFERRED RECORDS (OUTPATIENT)
Dept: HEALTH INFORMATION MANAGEMENT | Facility: CLINIC | Age: 77
End: 2017-07-07

## 2017-07-07 VITALS
DIASTOLIC BLOOD PRESSURE: 69 MMHG | OXYGEN SATURATION: 96 % | WEIGHT: 113.8 LBS | HEART RATE: 89 BPM | TEMPERATURE: 98.8 F | SYSTOLIC BLOOD PRESSURE: 102 MMHG | RESPIRATION RATE: 18 BRPM | BODY MASS INDEX: 19.52 KG/M2

## 2017-07-07 DIAGNOSIS — G14 POSTPOLIO SYNDROME (H): ICD-10-CM

## 2017-07-07 DIAGNOSIS — E43 SEVERE PROTEIN-CALORIE MALNUTRITION (H): ICD-10-CM

## 2017-07-07 DIAGNOSIS — R41.89 COGNITIVE IMPAIRMENT: ICD-10-CM

## 2017-07-07 DIAGNOSIS — C18.9 ADENOCARCINOMA OF COLON (H): Primary | ICD-10-CM

## 2017-07-07 DIAGNOSIS — R53.81 PHYSICAL DECONDITIONING: ICD-10-CM

## 2017-07-07 DIAGNOSIS — D50.8 OTHER IRON DEFICIENCY ANEMIA: ICD-10-CM

## 2017-07-07 DIAGNOSIS — F29 PSYCHOSIS, UNSPECIFIED PSYCHOSIS TYPE (H): ICD-10-CM

## 2017-07-07 DIAGNOSIS — Z71.89 ADVANCE CARE PLANNING: ICD-10-CM

## 2017-07-07 LAB — COPATH REPORT: NORMAL

## 2017-07-07 PROCEDURE — 99207 ZZC CDG-PROCEDURE CODE ADDED/ADJ: CPT | Performed by: NURSE PRACTITIONER

## 2017-07-07 RX ORDER — LACTOSE-REDUCED FOOD
LIQUID (ML) ORAL 2 TIMES DAILY
COMMUNITY
End: 2017-01-01

## 2017-07-07 RX ORDER — VIT E ACET/GLY/DIMETH/WATER
LOTION (ML) TOPICAL 2 TIMES DAILY
Status: ON HOLD | COMMUNITY
End: 2018-01-01

## 2017-07-07 RX ORDER — HYDROCODONE BITARTRATE AND ACETAMINOPHEN 5; 325 MG/1; MG/1
1 TABLET ORAL EVERY 6 HOURS PRN
COMMUNITY
End: 2017-01-01

## 2017-07-07 NOTE — PROGRESS NOTES
[facility]  PRIMARY CARE PROVIDER AND CLINIC:  Janessa Galvez [facility] [address] [facility] [address] / [place]AN *  Chief Complaint   Patient presents with     Establish Care       HPI:    Sherita Kenney is a 76 year old  (1940),admitted to the [facility] from Assisted Living  [facility] 7/7/17.  Admitted to this facility for  nursing care.  Current issues are:        Adenocarcinoma of colon (H)  She has a history of Colon cancer. She is followed by Oncology Dr Davis. CT scan of abdomen and pelvis on 2/15/17 revealed a large right iliopsoas abscess. Adjacent mass-like wall thickening of the portion of the right colon. CT guided drainage of the abscess completed. SHe was taken to the OP 3/1/17. Colonoscopy revealed a large fungating mass in the right side of the colon. Pathology revealed poorly differentiated adenocarcinoma. 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 CEA on 2/16/17 3.8. She has a right colectomy with takedown of ileostomy and primary anastomosis on 4/13/17. PET scan on 5/22/17 reveals metastatic disease. There are multiple new hypermetabolic liver metastases and hypermetabolic necrotic lymphadenopathy versus malignant implants right mesentary. 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 She is currently followed by Oncology for chemotherapy. She reports intermittent loose stools. Stool testing completed negative and she has been given Imodium with improvement.    Postpolio syndrome  She has a history of post polio syndrome as a small child. She has a flaccid left arm.    Physical deconditioning  She reports increased weakness with chemotherapy. She denies falls. She would like to work with Physical therapy. She would like to go home.    Other iron deficiency anemia  History of anemia. Hgb 6/29/17 11.1. Ferritin 31 and Iron 31, Iron saturation 7 5/15/17.She is currently managed on Ferrous Sulfate  325 mg bid. She denies fatigue.    Cognitive impairment  She currently resides in Assisted living with medication management services. Increased cognitive impairment noted with last hospitalization She would like to go home. Her POA is her niece Sherita, she is an MD.    Psychosis, unspecified psychosis type  She was hospitalized 2/15/17 with acute psychosis after a home welfare check. She was evaluated by Neuropsych. The results revealed mild dementia, characterized by impairments in learning and memory, mild anomia, mild executive dysfunction, and impaired complex attention processing. Recommendations for assistance with management of her instrumental activities of daily living and minimal supervision for her safety.    Advance Care Planning  Discussed POLST and advance care planning. POLST completed Full Code.    Last 3 BPs:103/61, 103/60, 113/69.  Admission Weight: 100lbs  Current Weight: 113lbs      CODE STATUS/ADVANCE DIRECTIVES DISCUSSION:   CPR/Full code   Patient's living condition: lives in an assisted living facility    ALLERGIES:Wool fiber  PAST MEDICAL HISTORY:  has a past medical history of Abnormal gait; Adenocarcinoma of colon (H); Arthralgia of upper arm; Arthritis; Arthritis of right hand; Cancer (H); Cancer of right colon (H); Cognitive impairment; COPD (chronic obstructive pulmonary disease) (H); DJD (degenerative joint disease); Fibromatoses of muscle, ligament, and fascia; Flail joint; Generalized OA; 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 She also has no past medical history of Cerebral infarction (H); Congestive heart failure (H); Depressive disorder; Diabetes (H); History of blood transfusion; Hypertension; or Uncomplicated asthma.  PAST SURGICAL HISTORY:  has a past surgical  history that includes Laparoscopic Ileostomy (N/A, 3/1/2017); Ileostomy (N/A, 3/1/2017); Soft tissue surgery; Colonoscopy (N/A, 3/1/2017); I & D abdominal abscess; Ileostomy; severe protein-calorie malnutrition; Colectomy right (N/A, 4/13/2017); Laparoscopic ureterolysis (4/13/2017); and Takedown ileostomy (N/A, 4/13/2017).  FAMILY HISTORY: family history includes Breast Cancer in her maternal grandmother.  SOCIAL HISTORY:  reports that she quit smoking about 31 years ago. Her smoking use included Cigarettes. She has a 22.50 pack-year smoking history. She does not have any smokeless tobacco history on file. She reports that she does not drink alcohol or use illicit drugs.    Post Discharge Medication Reconciliation Status: discharge medications reconciled, continue medications without change.  Current Outpatient Prescriptions   Medication Sig Dispense Refill     CETAPHIL (CETAPHIL) lotion Apply topically 2 times daily Also bid prn . To dry skin.       Nutritional Supplements (ENSURE PLUS) LIQD Take by mouth 2 times daily Admin 8oz twice daily for supplement       CYCLOBENZAPRINE HCL PO Take 10 mg by mouth 3 times daily as needed for muscle spasms       HYDROcodone-acetaminophen (NORCO) 5-325 MG per tablet Take 1 tablet by mouth every 6 hours as needed for moderate to severe pain       SUMATRIPTAN SUCCINATE PO Take 25 mg by mouth every 8 hours as needed for migraine       menthol-zinc oxide (CALMOSEPTINE) 0.44-20.625 % OINT ointment Apply topically 4 times daily as needed for skin protection 1 Tube 3     vitamin B-Complex Take 1 tablet by mouth daily 30 tablet 11     Multiple Minerals (CALCIUM-MAGNESIUM-ZINC) TABS Take 2 tablets by mouth daily 60 tablet 11     ferrous sulfate (IRON) 325 (65 FE) MG tablet Take 1 tablet (325 mg) by mouth 2 times daily 60 tablet 11     levothyroxine (SYNTHROID/LEVOTHROID) 75 MCG tablet Take 1 tablet (75 mcg) by mouth daily Recheck TSH in 6 months 30 tablet 5     OLANZapine (ZYPREXA) 5  MG tablet Take 1 tablet (5 mg) by mouth daily 30 tablet 11     acetaminophen (TYLENOL) 325 MG tablet Take 2 tablets (650 mg) by mouth every 4 hours as needed for mild pain or fever 60 tablet 1     LORazepam (ATIVAN) 0.5 MG tablet Take 1 tablet (0.5 mg) by mouth every 4 hours as needed (Anxiety, Nausea/Vomiting or Sleep) 30 tablet 2     ondansetron (ZOFRAN) 8 MG tablet Take 1 tablet (8 mg) by mouth every 8 hours as needed (Nausea/Vomiting) 10 tablet 2     Travoprost (TRAVATAN Z) 0.004 % SOLN Place 1 drop into both eyes At Bedtime          ROS:  10 point ROS of systems including Constitutional, Eyes, Respiratory, Cardiovascular, Gastroenterology, Genitourinary, Integumentary, Muscularskeletal, Psychiatric were all negative except for pertinent positives noted in my HPI.    Exam:  /69  Pulse 89  Temp 98.8  F (37.1  C)  Resp 18  Wt 113 lb 12.8 oz (51.6 kg)  SpO2 96%  BMI 19.52 kg/m2  GENERAL APPEARANCE:  Alert, in no distress  ENT:  Mouth and posterior oropharynx normal, moist mucous membranes, normal hearing acuity  EYES:  EOM, conjunctivae, lids, pupils and irises normal  NECK:  No adenopathy,masses or thyromegaly  RESP:  respiratory effort and palpation of chest normal, lungs clear to auscultation , no respiratory distress  CV:  Palpation and auscultation of heart done , regular rate and rhythm, no murmur, rub, or gallop, no edema  ABDOMEN:  normal bowel sounds, soft, nontender, no hepatosplenomegaly or other masses  M/S:   Gait and station normal  SKIN:  Inspection of skin and subcutaneous tissue baseline, Palpation of skin and subcutaneous tissue baseline  NEURO:   Cranial nerves 2-12 are normal tested and grossly at patient's baseline, Flacid left arm  PSYCH:  oriented X 3, affect and mood normal    Lab/Diagnostic data:     CBC RESULTS:   Recent Labs   Lab Test  06/29/17   0900  06/15/17   0832   WBC  6.4  5.5   RBC  4.36  4.22   HGB  11.1*  10.9*   HCT  36.8  36.6   MCV  84  87   MCH  25.5*  25.8*    MCHC  30.2*  29.8*   RDW  16.7*  15.9*   PLT  122*  175       Last Basic Metabolic Panel:  Recent Labs   Lab Test  06/29/17   0900  06/15/17   0832   NA  139  141   POTASSIUM  4.2  4.5   CHLORIDE  105  109   SARITHA  8.9  9.2   CO2  27  25   BUN  18  22   CR  0.74  0.70   GLC  91  87       Liver Function Studies -   Recent Labs   Lab Test  06/29/17   0900  06/15/17   0832   PROTTOTAL  6.9  6.8   ALBUMIN  3.5  3.4   BILITOTAL  0.4  0.2   ALKPHOS  132  163*   AST  42  40   ALT  39  35       TSH   Date Value Ref Range Status   04/03/2017 2.30 0.40 - 4.00 mU/L Final   02/15/2017 4.05 (H) 0.40 - 4.00 mU/L Final       Lab Results   Component Value Date    A1C 4.7 04/13/2017    A1C 4.3 04/03/2017         ASSESSMENT/PLAN:  Adenocarcinoma of colon (H)  Continued follow up with Oncology    Postpolio syndrome  Refer out patient PT/OT    Physical deconditioning  Refer out patient PT/OT    Other iron deficiency anemia  Continue to monitor  CBC, Continue Ferrous Sulfate    Severe protein-calorie malnutrition (H)  Continue to monitor weight. Continue Ensure BID    Cognitive impairment  OT to complete homoe safety evaluation    Psychosis, unspecified psychosis type  Continue to monitor mood. No current medication    Advance care planning  POLST completed Full Code       Orders:  Out patient PT/OT evaluate and treat. Requesting a home safety evaluation.    Information reviewed:  Medications, vital signs, orders, nursing notes, problem list, hospital information. Total time spent with patient visit was 35 min including patient visit, review of past records. Greater than 50% of total time spent with counseling and coordinating care.    Electronically signed by:  Janessa Galvez NP

## 2017-07-10 ENCOUNTER — TELEPHONE (OUTPATIENT)
Dept: ONCOLOGY | Facility: CLINIC | Age: 77
End: 2017-07-10

## 2017-07-10 RX ORDER — DIPHENHYDRAMINE HYDROCHLORIDE 50 MG/ML
50 INJECTION INTRAMUSCULAR; INTRAVENOUS
Status: CANCELLED
Start: 2017-07-13

## 2017-07-10 RX ORDER — EPINEPHRINE 0.3 MG/.3ML
0.3 INJECTION SUBCUTANEOUS EVERY 5 MIN PRN
Status: CANCELLED | OUTPATIENT
Start: 2017-07-13

## 2017-07-10 RX ORDER — SODIUM CHLORIDE 9 MG/ML
1000 INJECTION, SOLUTION INTRAVENOUS CONTINUOUS PRN
Status: CANCELLED
Start: 2017-07-13

## 2017-07-10 RX ORDER — METHYLPREDNISOLONE SODIUM SUCCINATE 125 MG/2ML
125 INJECTION, POWDER, LYOPHILIZED, FOR SOLUTION INTRAMUSCULAR; INTRAVENOUS
Status: CANCELLED
Start: 2017-07-13

## 2017-07-10 RX ORDER — ALBUTEROL SULFATE 90 UG/1
1-2 AEROSOL, METERED RESPIRATORY (INHALATION)
Status: CANCELLED
Start: 2017-07-13

## 2017-07-10 RX ORDER — FLUOROURACIL 50 MG/ML
400 INJECTION, SOLUTION INTRAVENOUS ONCE
Status: CANCELLED | OUTPATIENT
Start: 2017-07-13

## 2017-07-10 RX ORDER — MEPERIDINE HYDROCHLORIDE 50 MG/ML
25 INJECTION INTRAMUSCULAR; INTRAVENOUS; SUBCUTANEOUS EVERY 30 MIN PRN
Status: CANCELLED | OUTPATIENT
Start: 2017-07-13

## 2017-07-10 RX ORDER — EPINEPHRINE 1 MG/ML
0.3 INJECTION INTRAMUSCULAR; INTRAVENOUS; SUBCUTANEOUS EVERY 5 MIN PRN
Status: CANCELLED | OUTPATIENT
Start: 2017-07-13

## 2017-07-10 RX ORDER — LORAZEPAM 2 MG/ML
0.5 INJECTION INTRAMUSCULAR EVERY 4 HOURS PRN
Status: CANCELLED
Start: 2017-07-13

## 2017-07-10 RX ORDER — ALBUTEROL SULFATE 0.83 MG/ML
2.5 SOLUTION RESPIRATORY (INHALATION)
Status: CANCELLED | OUTPATIENT
Start: 2017-07-13

## 2017-07-10 NOTE — TELEPHONE ENCOUNTER
Received call from retired MD Dr.Dave Monson on Pt's behalf to update that Pt's right hand is raw.  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    Explained to Dr.Dave Monson that the right hand raw skin is most likely a side effect of her 5FU (Fluorouracil) chemo & to apply plenty of moisturizer (A&D ointment or similar product).    Pt will be re-evaluated when she is here in clinic on Thursday 7-13-17 or sooner if her symptom worsens.    Updated  & [facility] Christina of this.

## 2017-07-13 ENCOUNTER — DOCUMENTATION ONLY (OUTPATIENT)
Dept: INFUSION THERAPY | Facility: CLINIC | Age: 77
End: 2017-07-13

## 2017-07-13 ENCOUNTER — INFUSION THERAPY VISIT (OUTPATIENT)
Dept: INFUSION THERAPY | Facility: CLINIC | Age: 77
End: 2017-07-13
Attending: INTERNAL MEDICINE
Payer: MEDICARE

## 2017-07-13 ENCOUNTER — ONCOLOGY VISIT (OUTPATIENT)
Dept: ONCOLOGY | Facility: CLINIC | Age: 77
End: 2017-07-13
Attending: INTERNAL MEDICINE
Payer: MEDICARE

## 2017-07-13 ENCOUNTER — HOSPITAL ENCOUNTER (OUTPATIENT)
Facility: CLINIC | Age: 77
Setting detail: SPECIMEN
Discharge: HOME | End: 2017-07-13
Attending: INTERNAL MEDICINE | Admitting: INTERNAL MEDICINE
Payer: MEDICARE

## 2017-07-13 VITALS
SYSTOLIC BLOOD PRESSURE: 114 MMHG | OXYGEN SATURATION: 97 % | HEART RATE: 74 BPM | HEIGHT: 64 IN | TEMPERATURE: 98.1 F | BODY MASS INDEX: 20.08 KG/M2 | WEIGHT: 117.6 LBS | RESPIRATION RATE: 18 BRPM | DIASTOLIC BLOOD PRESSURE: 68 MMHG

## 2017-07-13 VITALS
OXYGEN SATURATION: 97 % | BODY MASS INDEX: 20.08 KG/M2 | HEART RATE: 87 BPM | DIASTOLIC BLOOD PRESSURE: 69 MMHG | WEIGHT: 117.6 LBS | HEIGHT: 64 IN | RESPIRATION RATE: 18 BRPM | TEMPERATURE: 97.9 F | SYSTOLIC BLOOD PRESSURE: 139 MMHG

## 2017-07-13 DIAGNOSIS — C18.9 ADENOCARCINOMA OF COLON (H): Primary | ICD-10-CM

## 2017-07-13 LAB
ALBUMIN SERPL-MCNC: 3.2 G/DL (ref 3.4–5)
ALBUMIN UR-MCNC: NEGATIVE MG/DL
ALP SERPL-CCNC: 193 U/L (ref 40–150)
ALT SERPL W P-5'-P-CCNC: 89 U/L (ref 0–50)
ANION GAP SERPL CALCULATED.3IONS-SCNC: 9 MMOL/L (ref 3–14)
AST SERPL W P-5'-P-CCNC: 80 U/L (ref 0–45)
BASOPHILS # BLD AUTO: 0.1 10E9/L (ref 0–0.2)
BASOPHILS NFR BLD AUTO: 1.7 %
BILIRUB SERPL-MCNC: 0.3 MG/DL (ref 0.2–1.3)
BUN SERPL-MCNC: 14 MG/DL (ref 7–30)
CALCIUM SERPL-MCNC: 9.2 MG/DL (ref 8.5–10.1)
CHLORIDE SERPL-SCNC: 106 MMOL/L (ref 94–109)
CO2 SERPL-SCNC: 27 MMOL/L (ref 20–32)
CREAT SERPL-MCNC: 0.67 MG/DL (ref 0.52–1.04)
DIFFERENTIAL METHOD BLD: ABNORMAL
EOSINOPHIL # BLD AUTO: 0.1 10E9/L (ref 0–0.7)
EOSINOPHIL NFR BLD AUTO: 1.2 %
ERYTHROCYTE [DISTWIDTH] IN BLOOD BY AUTOMATED COUNT: 16.9 % (ref 10–15)
GFR SERPL CREATININE-BSD FRML MDRD: 86 ML/MIN/1.7M2
GLUCOSE SERPL-MCNC: 96 MG/DL (ref 70–99)
HCT VFR BLD AUTO: 34.8 % (ref 35–47)
HGB BLD-MCNC: 10.7 G/DL (ref 11.7–15.7)
IMM GRANULOCYTES # BLD: 0 10E9/L (ref 0–0.4)
IMM GRANULOCYTES NFR BLD: 0 %
LYMPHOCYTES # BLD AUTO: 1.7 10E9/L (ref 0.8–5.3)
LYMPHOCYTES NFR BLD AUTO: 41.9 %
MCH RBC QN AUTO: 26 PG (ref 26.5–33)
MCHC RBC AUTO-ENTMCNC: 30.7 G/DL (ref 31.5–36.5)
MCV RBC AUTO: 85 FL (ref 78–100)
MONOCYTES # BLD AUTO: 0.5 10E9/L (ref 0–1.3)
MONOCYTES NFR BLD AUTO: 13.3 %
NEUTROPHILS # BLD AUTO: 1.7 10E9/L (ref 1.6–8.3)
NEUTROPHILS NFR BLD AUTO: 41.9 %
NRBC # BLD AUTO: 0 10*3/UL
NRBC BLD AUTO-RTO: 0 /100
PLATELET # BLD AUTO: 122 10E9/L (ref 150–450)
POTASSIUM SERPL-SCNC: 4.3 MMOL/L (ref 3.4–5.3)
PROT SERPL-MCNC: 6.9 G/DL (ref 6.8–8.8)
RBC # BLD AUTO: 4.12 10E12/L (ref 3.8–5.2)
SODIUM SERPL-SCNC: 142 MMOL/L (ref 133–144)
WBC # BLD AUTO: 4.1 10E9/L (ref 4–11)

## 2017-07-13 PROCEDURE — 96416 CHEMO PROLONG INFUSE W/PUMP: CPT

## 2017-07-13 PROCEDURE — 81003 URINALYSIS AUTO W/O SCOPE: CPT | Performed by: INTERNAL MEDICINE

## 2017-07-13 PROCEDURE — 85025 COMPLETE CBC W/AUTO DIFF WBC: CPT | Performed by: INTERNAL MEDICINE

## 2017-07-13 PROCEDURE — 96413 CHEMO IV INFUSION 1 HR: CPT

## 2017-07-13 PROCEDURE — 96375 TX/PRO/DX INJ NEW DRUG ADDON: CPT

## 2017-07-13 PROCEDURE — 96417 CHEMO IV INFUS EACH ADDL SEQ: CPT

## 2017-07-13 PROCEDURE — 25000128 H RX IP 250 OP 636: Performed by: INTERNAL MEDICINE

## 2017-07-13 PROCEDURE — 99215 OFFICE O/P EST HI 40 MIN: CPT | Performed by: INTERNAL MEDICINE

## 2017-07-13 PROCEDURE — 96415 CHEMO IV INFUSION ADDL HR: CPT

## 2017-07-13 PROCEDURE — 80053 COMPREHEN METABOLIC PANEL: CPT | Performed by: INTERNAL MEDICINE

## 2017-07-13 PROCEDURE — 99212 OFFICE O/P EST SF 10 MIN: CPT | Mod: 25

## 2017-07-13 PROCEDURE — 96368 THER/DIAG CONCURRENT INF: CPT

## 2017-07-13 RX ORDER — ALBUTEROL SULFATE 0.83 MG/ML
2.5 SOLUTION RESPIRATORY (INHALATION)
Status: CANCELLED | OUTPATIENT
Start: 2017-07-27

## 2017-07-13 RX ORDER — EPINEPHRINE 0.3 MG/.3ML
0.3 INJECTION SUBCUTANEOUS EVERY 5 MIN PRN
Status: CANCELLED | OUTPATIENT
Start: 2017-07-27

## 2017-07-13 RX ORDER — SODIUM CHLORIDE 9 MG/ML
1000 INJECTION, SOLUTION INTRAVENOUS CONTINUOUS PRN
Status: CANCELLED
Start: 2017-07-27

## 2017-07-13 RX ORDER — ALBUTEROL SULFATE 90 UG/1
1-2 AEROSOL, METERED RESPIRATORY (INHALATION)
Status: CANCELLED
Start: 2017-07-27

## 2017-07-13 RX ORDER — EPINEPHRINE 1 MG/ML
0.3 INJECTION INTRAMUSCULAR; INTRAVENOUS; SUBCUTANEOUS EVERY 5 MIN PRN
Status: CANCELLED | OUTPATIENT
Start: 2017-07-27

## 2017-07-13 RX ORDER — LORAZEPAM 2 MG/ML
0.5 INJECTION INTRAMUSCULAR EVERY 4 HOURS PRN
Status: CANCELLED
Start: 2017-07-27

## 2017-07-13 RX ORDER — MEPERIDINE HYDROCHLORIDE 50 MG/ML
25 INJECTION INTRAMUSCULAR; INTRAVENOUS; SUBCUTANEOUS EVERY 30 MIN PRN
Status: CANCELLED | OUTPATIENT
Start: 2017-07-27

## 2017-07-13 RX ORDER — METHYLPREDNISOLONE SODIUM SUCCINATE 125 MG/2ML
125 INJECTION, POWDER, LYOPHILIZED, FOR SOLUTION INTRAMUSCULAR; INTRAVENOUS
Status: CANCELLED
Start: 2017-07-27

## 2017-07-13 RX ORDER — DIPHENHYDRAMINE HYDROCHLORIDE 50 MG/ML
50 INJECTION INTRAMUSCULAR; INTRAVENOUS
Status: CANCELLED
Start: 2017-07-27

## 2017-07-13 RX ADMIN — DEXAMETHASONE SODIUM PHOSPHATE: 10 INJECTION, SOLUTION INTRAMUSCULAR; INTRAVENOUS at 11:05

## 2017-07-13 RX ADMIN — SODIUM CHLORIDE 100 ML: 9 INJECTION, SOLUTION INTRAVENOUS at 11:05

## 2017-07-13 RX ADMIN — LEUCOVORIN CALCIUM 550 MG: 350 INJECTION, POWDER, LYOPHILIZED, FOR SOLUTION INTRAMUSCULAR; INTRAVENOUS at 12:27

## 2017-07-13 RX ADMIN — BEVACIZUMAB 270 MG: 100 INJECTION, SOLUTION INTRAVENOUS at 11:49

## 2017-07-13 RX ADMIN — DEXTROSE MONOHYDRATE 250 ML: 50 INJECTION, SOLUTION INTRAVENOUS at 12:26

## 2017-07-13 RX ADMIN — OXALIPLATIN 133 MG: 100 INJECTION, SOLUTION, CONCENTRATE INTRAVENOUS at 12:27

## 2017-07-13 ASSESSMENT — PAIN SCALES - GENERAL: PAINLEVEL: NO PAIN (0)

## 2017-07-13 NOTE — PROGRESS NOTES
"Oncology Rooming Note    July 13, 2017 9:52 AM   Sherita Kenney is a 76 year old female who presents for:    Chief Complaint   Patient presents with     Oncology Clinic Visit     return colon ca     Initial Vitals: /68  Pulse 74  Temp 98.1  F (36.7  C) (Oral)  Resp 18  Ht 1.626 m (5' 4.02\")  Wt 53.3 kg (117 lb 9.6 oz)  SpO2 97%  BMI 20.18 kg/m2 Estimated body mass index is 20.18 kg/(m^2) as calculated from the following:    Height as of this encounter: 1.626 m (5' 4.02\").    Weight as of this encounter: 53.3 kg (117 lb 9.6 oz). Body surface area is 1.55 meters squared.  Data Unavailable Comment: Data Unavailable   No LMP recorded. Patient is not currently having periods (Reason: Perimenopausal).  Allergies reviewed: Yes  Medications reviewed: Yes    Medications: Medication refills not needed today.  Pharmacy name entered into [employer]:    [facility] [address] [place] [address] [address] PHARMACY [place], [place] - [address] [facility] [address] [facility] LONG TERM [facility] - [address]    Clinical concerns:Patient is unaware of her medications and what she takes on a daily basis. I gave her a copy of med list to go over with nurses and she will call back to let us know for sure what she takes. Guicho was notified.    10 minutes for nursing intake (face to face time)     Natali Perez MA          DISCHARGE PLAN:    Continue chemo- reported to RENÉE Mcintyre/ Infusion  CT scan to be arranged next week / Gave pt instructions to scheduling  Follow up w/ Next chemo- previously arranged for 7/27/17- will see Dr. Nieves this day   Urine labeled and sent to lab ( for Avastin)   Discontinue Bolus 5FU- Updated pharmacy and infusion RN      Next appointments: See patient instruction section  Departure Mode: Ambulatory  Accompanied by: sister  7 minutes for nursing discharge (face to face time)   Di Mahmood RN      "

## 2017-07-13 NOTE — PROGRESS NOTES
Received consult per RN.  Spoke with patient and sister, Suzanne.  Patient dislikes food where she is living but states she eats.  However, sister states patient is not eating.  Patient drinks 3 Ensure per day.  Suggest patient drink 350 calorie supplements to increase calorie intake.  Patient willing to choose higher calorie supplement.  Patient verbalized understanding of plan.    Magdiel Fried, RD, LD  Clinical Dietitian  [facility]  [phone] (direct)

## 2017-07-13 NOTE — PROGRESS NOTES
ONCOLOGY HISTORY:  Ms. Sherita Kenney is a female with colon cancer.   1. The patient was brought to the ER on 02/15/2017 with altered mental status.    -CT brain on 02/15/2017 did not reveal any acute intracranial pathology.   -CT abdomen and pelvis on 02/15/2017 revealed large right iliopsoas abscess. Adjacent mass-like wall thickening of portion of right colon.   -CT-guided drainage of abscess was done.   2. The patient was taken to OR on 03/01/2017.    -Colonoscopy revealed a large fungating mass in the right side of the colon.  Pathology revealed invasive poorly differentiated adenocarcinoma.  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   -Laparoscopic ileostomy was done.    3.  CEA on 02/16/2017 was 3.8.   4. The patient had right colectomy with takedown of ileostomy and primary anastomosis on 04/13/2017.    -Pathology  reveals poorly differentiated colonic adenocarcinoma. 15 of 24 lymph nodes are positive. Tumor invades into adjacent abdominal wall.  Lymphovascular invasion present. pT4b pN2b M0.   5. PET scan on 05/22/2017 reveals metastatic disease.  There are multiple new hypermetabolic liver metastases and  hypermetabolic necrotic lymphadenopathy versus malignant implants right mesentery and right lower quadrant.  6. modified FOLFOX6 with Avastin started on 06/01/2017.      SUBJECTIVE:    Ms. Sherita Kenney is a 76-year-old female with metastatic colon cancer with liver metastasis. She was started on modified FOLFOX6 with Avastin on 06/01/2017.  She is here for cycle 4.    Overall she has been tolerating chemotherapy well.  Now she is developing some hand-foot syndrome.  Patient has noticed that palms are more erythematous.  Some cracking of the skin at the tip of the fingers.  Also fingers are more sensitive.  She has been getting some cold sensitivity.  Mild numbness in the fingers.      Denies any headache.  No dizziness.  No neck pain.  No chest pain or  difficulty breathing.  No abdominal pain, nausea or vomiting.  No urinary or bowel complaints.  No bleeding.       PHYSICAL EXAMINATION:   Alert and oriented x 3.   VITAL SIGNS:  Reviewed.   EYES:  No icterus.   THROAT:  No ulcer or thrush.   NECK:  Supple. No lymphadenopathy.   LUNGS:  Good air entry bilaterally.  No wheezing.   HEART:  Regular.   ABDOMEN:  Soft and nontender.  No mass.   EXTREMITIES:  No edema.   SKIN:  No rash.  Examined the palm.  It is mildly erythematous.  Some cracking of the skin at the tips of the finger.      LABORATORY DATA:  Reviewed.       ASSESSMENT:   1.  A 76-year-old female with metastatic colon cancer with multiple liver metastases on modified FOLFOX6 with Avastin.    2.  Mild anemia.   3. Thrombocytopenia.  4.  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  5.  Hand-foot syndrome.      PLAN:   1.  The patient overall is tolerating chemotherapy well.  Patient has some cold sensitivity.  Counseled her to avoid cold for first 2-3 days with chemotherapy.  I told her that cold sensitivity and neuropathy will get worse with further chemotherapy.    Discuss regarding hand foot syndrome.  It is secondary to 5-FU.  It will get worse.  Patient is getting both bolus 5-FU and infusional 5-FU.  I'm going to discontinue bolus 5-FU.  Hopefully symptoms will improve.  Patient advised to apply a lot of moisturizing lotion.     Labs were all reviewed.  There are few abnormalities.  She is mildly anemic and thrombocytopenic.  Overall labs are good for treatment.  She'll get cycle 4 of chemotherapy with modified FOLFOX 6 and Avastin.     2. Discussed regarding scans.  We will schedule CT chest, abdomen and pelvis for next week.     3.  Patient and family had a few questions which were all answered.  She will be seen with next treatment.  Advised her to return sooner if she she has fever, chills, infection, diarrhea, worsening weakness of any other concerns.      Addendum:  After patient was done with  oxaliplatin, she had some abnormal sensation in the right hand fingers and some abnormal movement of the fingers.  I went and saw her in the infusion room.  Symptoms were improving.  I told her this is secondary to oxaliplatin.  Hopefully it will completely resolve in the next few hours.  I told her to call us if symptoms get worse.    Depending on CT scan next week, we may either discontinue oxaliplatin or reduce the dose by 50%.

## 2017-07-13 NOTE — MR AVS SNAPSHOT
After Visit Summary   7/13/2017    Sherita Kenney    MRN: [id number]           Patient Information     Date Of Birth          1940        Visit Information        Provider Department      7/13/2017 1:00 PM Susan Lindo MD [facility]        Today's Diagnoses     Adenocarcinoma of colon (H)    -  1      Care Instructions    Continue chemotherapy.  CT scan next week.  Follow up with next chemotherapy.  Discontinue Bolus 5FU          Follow-ups after your visit        Your next 10 appointments already scheduled     Jul 15, 2017  1:00 PM CDT   Level 1 with  INFUSION CHAIR 13   [facility] ([facility])    [facility]  [address]  [facility] [address] [phone]            Jul 19, 2017 11:30 AM CDT   CT CHEST ABDOMEN PELVIS W/O & W CONTRAST with SHCT1   [facility] CT ([facility])    [address] [facility] [id number] [phone]           Please bring any scans or X-rays taken at other hospitals, if similar tests were done. Also bring a list of your medicines, including vitamins, minerals and over-the-counter drugs. It is safest to leave personal items at home.  Be sure to tell your doctor:   If you have any allergies.   If there s any chance you are pregnant.   If you are breastfeeding.   If you have any special needs.  You may have contrast for this exam. To prepare:   Do not eat or drink for 2 hours before your exam. If you need to take medicine, you may take it with small sips of water. (We may ask you to take liquid medicine as well.)   The day before your exam, drink extra fluids at least six 8-ounce glasses (unless your doctor tells you to restrict your fluids).  Patients over 70 or patients with diabetes or kidney problems:   If you haven t had a blood test (creatinine test) within the last 30 days, go to your clinic or Diagnostic Imaging Department  for this test.  If you have diabetes:   If your kidney function is normal, continue taking your metformin (Avandamet, Glucophage, Glucovance, Metaglip) on the day of your exam.   If your kidney function is abnormal, wait 48 hours before restarting this medicine.  You will have oral contrast for this exam:   You will drink the contrast at home. Get this from your clinic or Diagnostic Imaging Department. Please follow the directions given.  Please wear loose clothing, such as a sweat suit or jogging clothes. Avoid snaps, zippers and other metal. We may ask you to undress and put on a hospital gown.  If you have any questions, please call the Imaging Department where you will have your exam.            Jul 27, 2017  9:00 AM CDT   Level 6 with  INFUSION CHAIR 18   [facility] ([facility])    [facility]  [address]  [place] [address]   [phone]            Jul 27, 2017 10:15 AM CDT   Return Visit with Alma Nieves MD   [facility] ([facility])    [facility]  [address]  [place] [address]   [phone]              Future tests that were ordered for you today     Open Future Orders        Priority Expected Expires Ordered    CT Chest Abdomen Pelvis w/o & w Contrast Routine 7/13/2017 7/13/2018 7/13/2017            Who to contact     If you have questions or need follow up information about today's clinic visit or your schedule please contact [facility] directly at [phone].  Normal or non-critical lab and imaging results will be communicated to you by MyChart, letter or phone within 4 business days after the clinic has received the results. If you do not hear from us within 7 days, please contact the clinic through MyChart or phone. If you have a critical or abnormal lab result, we will notify you by phone as soon as possible.  Submit refill requests  "through [employer] or call your pharmacy and they will forward the refill request to us. Please allow 3 business days for your refill to be completed.          Additional Information About Your Visit        [employer] Information     [employer] lets you send messages to your doctor, view your test results, renew your prescriptions, schedule appointments and more. To sign up, go to [email or web address] . Click on \"Log in\" on the left side of the screen, which will take you to the Welcome page. Then click on \"Sign up Now\" on the right side of the page.     You will be asked to enter the access code listed below, as well as some personal information. Please follow the directions to create your username and password.     Your access code is: [email or web address]  Expires: 2017  6:30 AM     Your access code will  in 90 days. If you need help or a new code, please call your [facility] clinic or [phone].        Care EveryWhere ID     This is your Care EveryWhere ID. This could be used by other organizations to access your [facility] medical records  [id number]        Your Vitals Were     Pulse Temperature Respirations Height Pulse Oximetry BMI (Body Mass Index)    74 98.1  F (36.7  C) (Oral) 18 1.626 m (5' 4.02\") 97% 20.18 kg/m2       Blood Pressure from Last 3 Encounters:   17 114/68   17 139/69   17 102/69    Weight from Last 3 Encounters:   17 53.3 kg (117 lb 9.6 oz)   17 53.3 kg (117 lb 9.6 oz)   17 51.6 kg (113 lb 12.8 oz)               Primary Care Provider Office Phone # Fax #    Janessa WILLY Edmonds [phone]       [facility] [address] [address]        Equal Access to Services     ALEXIS ALBA AH: Ruddy Nunes, laura pillai, ellis sultanan ah. [facility] [phone].    ATENCIÓN: Si habla español, tiene a roldan disposición servicios gratuitos de " asistencia lingüística. Malaika al [phone].    We comply with applicable federal civil rights laws and [place] laws. We do not discriminate on the basis of race, color, national origin, age, disability sex, sexual orientation or gender identity.            Thank you!     Thank you for choosing [facility]  for your care. Our goal is always to provide you with excellent care. Hearing back from our patients is one way we can continue to improve our services. Please take a few minutes to complete the written survey that you may receive in the mail after your visit with us. Thank you!             Your Updated Medication List - Protect others around you: Learn how to safely use, store and throw away your medicines at www.disposemymeds.org.          This list is accurate as of: 7/13/17  6:35 PM.  Always use your most recent med list.                   Brand Name Dispense Instructions for use Diagnosis    acetaminophen 325 MG tablet    TYLENOL    60 tablet    Take 2 tablets (650 mg) by mouth every 4 hours as needed for mild pain or fever    Cancer associated pain       Calcium-Magnesium-Zinc Tabs     60 tablet    Take 2 tablets by mouth daily    S/P colectomy       CETAPHIL lotion      Apply topically 2 times daily Also bid prn . To dry skin.        CYCLOBENZAPRINE HCL PO      Take 10 mg by mouth 3 times daily as needed for muscle spasms        ENSURE PLUS Liqd      Take by mouth 2 times daily Admin 8oz twice daily for supplement        ferrous sulfate 325 (65 FE) MG tablet    IRON    60 tablet    Take 1 tablet (325 mg) by mouth 2 times daily    S/P colectomy       HYDROcodone-acetaminophen 5-325 MG per tablet    NORCO     Take 1 tablet by mouth every 6 hours as needed for moderate to severe pain        levothyroxine 75 MCG tablet    SYNTHROID/LEVOTHROID    30 tablet    Take 1 tablet (75 mcg) by mouth daily Recheck TSH in 6 months    Hypothyroidism, unspecified type       LORazepam 0.5 MG tablet    ATIVAN     30 tablet    Take 1 tablet (0.5 mg) by mouth every 4 hours as needed (Anxiety, Nausea/Vomiting or Sleep)    Adenocarcinoma of colon (H)       menthol-zinc oxide 0.44-20.625 % Oint ointment    CALMOSEPTINE    1 Tube    Apply topically 4 times daily as needed for skin protection    Chronic diarrhea       OLANZapine 5 MG tablet    zyPREXA    30 tablet    Take 1 tablet (5 mg) by mouth daily    Psychosis, unspecified psychosis type       ondansetron 8 MG tablet    ZOFRAN    10 tablet    Take 1 tablet (8 mg) by mouth every 8 hours as needed (Nausea/Vomiting)    Adenocarcinoma of colon (H)       SUMATRIPTAN SUCCINATE PO      Take 25 mg by mouth every 8 hours as needed for migraine        TRAVATAN Z 0.004 % ophthalmic solution   Generic drug:  travoprost (BAK Free)      Place 1 drop into both eyes At Bedtime        vitamin B-Complex     30 tablet    Take 1 tablet by mouth daily    S/P colectomy

## 2017-07-13 NOTE — MR AVS SNAPSHOT
After Visit Summary   7/13/2017    Sherita Kenney    MRN: [id number]           Patient Information     Date Of Birth          1940        Visit Information        Provider Department      7/13/2017 9:00 AM  INFUSION CHAIR 8 [facility]        Today's Diagnoses     Adenocarcinoma of colon (H)    -  1       Follow-ups after your visit        Your next 10 appointments already scheduled     Jul 15, 2017  1:00 PM CDT   Level 1 with  INFUSION CHAIR 13   [facility] ([facility])    [facility]  [address]  [facility] [address]   [phone]            Jul 19, 2017 11:30 AM CDT   CT CHEST ABDOMEN PELVIS W/O & W CONTRAST with SHCT1   [facility] CT ([facility])    [address] [facility] [address]   [phone]           Please bring any scans or X-rays taken at other hospitals, if similar tests were done. Also bring a list of your medicines, including vitamins, minerals and over-the-counter drugs. It is safest to leave personal items at home.  Be sure to tell your doctor:   If you have any allergies.   If there s any chance you are pregnant.   If you are breastfeeding.   If you have any special needs.  You may have contrast for this exam. To prepare:   Do not eat or drink for 2 hours before your exam. If you need to take medicine, you may take it with small sips of water. (We may ask you to take liquid medicine as well.)   The day before your exam, drink extra fluids at least six 8-ounce glasses (unless your doctor tells you to restrict your fluids).  Patients over 70 or patients with diabetes or kidney problems:   If you haven t had a blood test (creatinine test) within the last 30 days, go to your clinic or Diagnostic Imaging Department for this test.  If you have diabetes:   If your kidney function is normal, continue taking your metformin  (Avandamet, Glucophage, Glucovance, Metaglip) on the day of your exam.   If your kidney function is abnormal, wait 48 hours before restarting this medicine.  You will have oral contrast for this exam:   You will drink the contrast at home. Get this from your clinic or Diagnostic Imaging Department. Please follow the directions given.  Please wear loose clothing, such as a sweat suit or jogging clothes. Avoid snaps, zippers and other metal. We may ask you to undress and put on a hospital gown.  If you have any questions, please call the Imaging Department where you will have your exam.            Jul 27, 2017  9:00 AM CDT   Level 6 with  INFUSION CHAIR 18   [facility] ([facility])    [facility]  [address]  [facility] [address]   [phone]            Jul 27, 2017 10:15 AM CDT   Return Visit with Alma Nieves MD   [facility] ([facility])    [facility]  [address]  [facility] [address]   [phone]              Future tests that were ordered for you today     Open Future Orders        Priority Expected Expires Ordered    CT Chest Abdomen Pelvis w/o & w Contrast Routine 7/13/2017 7/13/2018 7/13/2017            Who to contact     If you have questions or need follow up information about today's clinic visit or your schedule please contact [facility] directly at [phone].  Normal or non-critical lab and imaging results will be communicated to you by [employer], letter or phone within 4 business days after the clinic has received the results. If you do not hear from us within 7 days, please contact the clinic through [employer] or phone. If you have a critical or abnormal lab result, we will notify you by phone as soon as possible.  Submit refill requests through [employer] or call your pharmacy and they will forward the refill request to us.  "Please allow 3 business days for your refill to be completed.          Additional Information About Your Visit        MyChart Information     BlockSpringhart lets you send messages to your doctor, view your test results, renew your prescriptions, schedule appointments and more. To sign up, go to [email or web address] . Click on \"Log in\" on the left side of the screen, which will take you to the Welcome page. Then click on \"Sign up Now\" on the right side of the page.     You will be asked to enter the access code listed below, as well as some personal information. Please follow the directions to create your username and password.     Your access code is: [email or web address]  Expires: 2017  6:30 AM     Your access code will  in 90 days. If you need help or a new code, please call your [facility] clinic or [phone].        Care EveryWhere ID     This is your Care EveryWhere ID. This could be used by other organizations to access your [facility] medical records  [id number]        Your Vitals Were     Pulse Temperature Respirations Height Pulse Oximetry BMI (Body Mass Index)    74 98.1  F (36.7  C) (Oral) 18 1.626 m (5' 4\") 97% 20.19 kg/m2       Blood Pressure from Last 3 Encounters:   17 114/68   17 114/68   17 102/69    Weight from Last 3 Encounters:   17 53.3 kg (117 lb 9.6 oz)   17 53.3 kg (117 lb 9.6 oz)   17 51.6 kg (113 lb 12.8 oz)              We Performed the Following     CBC with platelets differential     Comprehensive metabolic panel     Protein qualitative urine        Primary Care Provider Office Phone # Fax #    Janessa WILLY Edmonds [phone]       [facility] [address] [address]        Equal Access to Services     ALEXIS ALBA : Ruddy Nunes, waaxda luqadaha, qaybta margaretalrubio pemberton, ellis mooney . [facility] [phone].    ATENCIÓN: Si gloria mitchell a roldan " disposición servicios gratuitos de asistencia lingüística. Malaika jimenez [phone].    We comply with applicable federal civil rights laws and [place] laws. We do not discriminate on the basis of race, color, national origin, age, disability sex, sexual orientation or gender identity.            Thank you!     Thank you for choosing [facility]  for your care. Our goal is always to provide you with excellent care. Hearing back from our patients is one way we can continue to improve our services. Please take a few minutes to complete the written survey that you may receive in the mail after your visit with us. Thank you!             Your Updated Medication List - Protect others around you: Learn how to safely use, store and throw away your medicines at www.disposemymeds.org.          This list is accurate as of: 7/13/17  3:08 PM.  Always use your most recent med list.                   Brand Name Dispense Instructions for use Diagnosis    acetaminophen 325 MG tablet    TYLENOL    60 tablet    Take 2 tablets (650 mg) by mouth every 4 hours as needed for mild pain or fever    Cancer associated pain       Calcium-Magnesium-Zinc Tabs     60 tablet    Take 2 tablets by mouth daily    S/P colectomy       CETAPHIL lotion      Apply topically 2 times daily Also bid prn . To dry skin.        CYCLOBENZAPRINE HCL PO      Take 10 mg by mouth 3 times daily as needed for muscle spasms        ENSURE PLUS Liqd      Take by mouth 2 times daily Admin 8oz twice daily for supplement        ferrous sulfate 325 (65 FE) MG tablet    IRON    60 tablet    Take 1 tablet (325 mg) by mouth 2 times daily    S/P colectomy       HYDROcodone-acetaminophen 5-325 MG per tablet    NORCO     Take 1 tablet by mouth every 6 hours as needed for moderate to severe pain        levothyroxine 75 MCG tablet    SYNTHROID/LEVOTHROID    30 tablet    Take 1 tablet (75 mcg) by mouth daily Recheck TSH in 6 months    Hypothyroidism,  unspecified type       LORazepam 0.5 MG tablet    ATIVAN    30 tablet    Take 1 tablet (0.5 mg) by mouth every 4 hours as needed (Anxiety, Nausea/Vomiting or Sleep)    Adenocarcinoma of colon (H)       menthol-zinc oxide 0.44-20.625 % Oint ointment    CALMOSEPTINE    1 Tube    Apply topically 4 times daily as needed for skin protection    Chronic diarrhea       OLANZapine 5 MG tablet    zyPREXA    30 tablet    Take 1 tablet (5 mg) by mouth daily    Psychosis, unspecified psychosis type       ondansetron 8 MG tablet    ZOFRAN    10 tablet    Take 1 tablet (8 mg) by mouth every 8 hours as needed (Nausea/Vomiting)    Adenocarcinoma of colon (H)       SUMATRIPTAN SUCCINATE PO      Take 25 mg by mouth every 8 hours as needed for migraine        TRAVATAN Z 0.004 % ophthalmic solution   Generic drug:  travoprost (BAK Free)      Place 1 drop into both eyes At Bedtime        vitamin B-Complex     30 tablet    Take 1 tablet by mouth daily    S/P colectomy

## 2017-07-13 NOTE — PROGRESS NOTES
Infusion Nursing Note:  Sherita Kenney presents today for folfox avastin.    Patient seen by provider today: Yes: Dr. Lindo   present during visit today: Not Applicable.    Note: Nutritional consult conducted today-second visit.    Intravenous Access:  Implanted Port.    Treatment Conditions:  Lab Results   Component Value Date    HGB 10.7 07/13/2017     Lab Results   Component Value Date    WBC 4.1 07/13/2017      Lab Results   Component Value Date    ANEU 1.7 07/13/2017     Lab Results   Component Value Date     07/13/2017      Lab Results   Component Value Date     07/13/2017                   Lab Results   Component Value Date    POTASSIUM 4.3 07/13/2017           Lab Results   Component Value Date    MAG 2.0 04/15/2017            Lab Results   Component Value Date    CR 0.67 07/13/2017                   Lab Results   Component Value Date    SARITHA 9.2 07/13/2017                Lab Results   Component Value Date    BILITOTAL 0.3 07/13/2017           Lab Results   Component Value Date    ALBUMIN 3.2 07/13/2017                    Lab Results   Component Value Date    ALT 89 07/13/2017           Lab Results   Component Value Date    AST 80 07/13/2017     Results reviewed, labs MET treatment parameters, ok to proceed with treatment.      Post Infusion Assessment:  Patient tolerated infusion without incident.  Blood return noted pre and post infusion.  Site patent and intact, free from redness, edema or discomfort.  No evidence of extravasations.    Discharge Plan:   Discharge instructions reviewed with: Patient and Family.  Patient and/or family verbalized understanding of discharge instructions and all questions answered.  Copy of AVS reviewed with patient and/or family.  Patient will return Sat 7/15/17 for next appointment.  Patient discharged in stable condition accompanied by: sister.  Departure Mode: Ambulatory.        Prior to discharge: Port is secured in place with tegaderm and flushed  "with 10cc NS with positive blood return noted.  Continuous home infusion CADD pump connected.    All connectors secured in place and clamps taped open.    Pump started, \"running\" noted on display (CADD): YES.  Patient instructed to call our clinic or [facility] with any questions or concerns at home.  Patient verbalized understanding.    Patient set up for pump disconnect at this clinic Saturday 1245    Dr. Lindo met with patient and sister in infusion area re: involuntary movement redness of right hand.  Dr. Lindo to dc oxaliplatin in next cycle of chemo.  Patient agreed to plan.    Patrick Lyles RN                        "

## 2017-07-13 NOTE — PATIENT INSTRUCTIONS
Continue chemotherapy.  CT scan next week.  Follow up with next chemotherapy.  Discontinue Bolus 5FU

## 2017-07-15 ENCOUNTER — INFUSION THERAPY VISIT (OUTPATIENT)
Dept: INFUSION THERAPY | Facility: CLINIC | Age: 77
End: 2017-07-15
Attending: INTERNAL MEDICINE
Payer: MEDICARE

## 2017-07-15 VITALS
HEART RATE: 83 BPM | DIASTOLIC BLOOD PRESSURE: 57 MMHG | SYSTOLIC BLOOD PRESSURE: 112 MMHG | RESPIRATION RATE: 16 BRPM | TEMPERATURE: 98 F

## 2017-07-15 DIAGNOSIS — Z95.828 PORT-A-CATH IN PLACE: Primary | ICD-10-CM

## 2017-07-15 PROCEDURE — 25000128 H RX IP 250 OP 636: Performed by: INTERNAL MEDICINE

## 2017-07-15 PROCEDURE — 40000269 ZZH STATISTIC NO CHARGE FACILITY FEE

## 2017-07-15 RX ORDER — HEPARIN SODIUM (PORCINE) LOCK FLUSH IV SOLN 100 UNIT/ML 100 UNIT/ML
500 SOLUTION INTRAVENOUS EVERY 8 HOURS
Status: CANCELLED
Start: 2017-07-15

## 2017-07-15 RX ORDER — HEPARIN SODIUM (PORCINE) LOCK FLUSH IV SOLN 100 UNIT/ML 100 UNIT/ML
500 SOLUTION INTRAVENOUS EVERY 8 HOURS
Status: DISCONTINUED | OUTPATIENT
Start: 2017-07-15 | End: 2017-07-15 | Stop reason: HOSPADM

## 2017-07-15 RX ADMIN — SODIUM CHLORIDE, PRESERVATIVE FREE 500 UNITS: 5 INJECTION INTRAVENOUS at 12:56

## 2017-07-15 NOTE — PROGRESS NOTES
Infusion Nursing Note:  Sherita Kenney presents today for chemo pump disconnect.    Patient seen by provider today: No   present during visit today: Not Applicable.    Note: No changes to report. Copy of med list sent with pt to confirm accuracy.    Intravenous Access:  Implanted Port.    Treatment Conditions:  Not Applicable.      Post Infusion Assessment:  Patient tolerated infusion without incident.  Blood return noted pre and post infusion.  Site patent and intact, free from redness, edema or discomfort.  No evidence of extravasations.  Access discontinued per protocol.Chemo infusion completed and pump disconnected.    Discharge Plan:   Discharge instructions reviewed with: Patient.  Patient and/or family verbalized understanding of discharge instructions and all questions answered.  Copy of AVS reviewed with patient and/or family.  Patient will return 7/27 for chemo for next appointment.  Patient discharged in stable condition accompanied by: sister.  Departure Mode: Ambulatory.    Yun Sweeney RN          Discharge instructions and future appointments reviewed with pt and family. Oral contrast for CT scan 7/19 sent with pt to give to nurses at her assisted living. Pt and family have many questions and require considerable reinforcement with schedule/instructions. Pt and family stated full understanding of all instructions reviewed today. Della Little RN

## 2017-07-15 NOTE — MR AVS SNAPSHOT
After Visit Summary   7/15/2017    Sherita Kenney    MRN: [id number]           Patient Information     Date Of Birth          1940        Visit Information        Provider Department      7/15/2017 1:00 PM  INFUSION CHAIR 13 [facility]        Today's Diagnoses     Port-a-cath in place    -  1       Follow-ups after your visit        Your next 10 appointments already scheduled     Jul 19, 2017 11:30 AM CDT   CT CHEST ABDOMEN PELVIS W/O & W CONTRAST with SHCT1   [facility] ([facility])    [address] [facility] [address]   [phone]           Please bring any scans or X-rays taken at other hospitals, if similar tests were done. Also bring a list of your medicines, including vitamins, minerals and over-the-counter drugs. It is safest to leave personal items at home.  Be sure to tell your doctor:   If you have any allergies.   If there s any chance you are pregnant.   If you are breastfeeding.   If you have any special needs.  You may have contrast for this exam. To prepare:   Do not eat or drink for 2 hours before your exam. If you need to take medicine, you may take it with small sips of water. (We may ask you to take liquid medicine as well.)   The day before your exam, drink extra fluids at least six 8-ounce glasses (unless your doctor tells you to restrict your fluids).  Patients over 70 or patients with diabetes or kidney problems:   If you haven t had a blood test (creatinine test) within the last 30 days, go to your clinic or Diagnostic Imaging Department for this test.  If you have diabetes:   If your kidney function is normal, continue taking your metformin (Avandamet, Glucophage, Glucovance, Metaglip) on the day of your exam.   If your kidney function is abnormal, wait 48 hours before restarting this medicine.  You will have oral contrast for this exam:   You will drink the contrast at home. Get this from your  "clinic or Diagnostic Imaging Department. Please follow the directions given.  Please wear loose clothing, such as a sweat suit or jogging clothes. Avoid snaps, zippers and other metal. We may ask you to undress and put on a hospital gown.  If you have any questions, please call the Imaging Department where you will have your exam.            Jul 27, 2017 10:00 AM CDT   Level 6 with  INFUSION CHAIR 18   [facility]    [facility]  [address] [facility] [address]  [facility] [address]   [phone]            Jul 27, 2017 10:15 AM CDT   Return Visit with Alma Nieves MD   [facility]    [facility]  [address]  [facility] [address]   [phone]              Who to contact     If you have questions or need follow up information about today's clinic visit or your schedule please contact [facility] directly at [phone].  Normal or non-critical lab and imaging results will be communicated to you by [employer], letter or phone within 4 business days after the clinic has received the results. If you do not hear from us within 7 days, please contact the clinic through [employer] or phone. If you have a critical or abnormal lab result, we will notify you by phone as soon as possible.  Submit refill requests through [employer] or call your pharmacy and they will forward the refill request to us. Please allow 3 business days for your refill to be completed.          Additional Information About Your Visit        [employer] Information     [employer] lets you send messages to your doctor, view your test results, renew your prescriptions, schedule appointments and more. To sign up, go to [email or web address] . Click on \"Log in\" on the left side of the screen, which will take you to the Welcome page. Then click on \"Sign up Now\" on the right side of the " page.     You will be asked to enter the access code listed below, as well as some personal information. Please follow the directions to create your username and password.     Your access code is: [email or web address]  Expires: 2017  6:30 AM     Your access code will  in 90 days. If you need help or a new code, please call your [place] clinic or [phone].        Care EveryWhere ID     This is your Care EveryWhere ID. This could be used by other organizations to access your [place] medical records  [id number]        Your Vitals Were     Pulse Temperature Respirations             83 98  F (36.7  C) (Oral) 16          Blood Pressure from Last 3 Encounters:   07/15/17 112/57   17 114/68   17 139/69    Weight from Last 3 Encounters:   17 53.3 kg (117 lb 9.6 oz)   17 53.3 kg (117 lb 9.6 oz)   17 51.6 kg (113 lb 12.8 oz)              Today, you had the following     No orders found for display       Primary Care Provider Office Phone # Fax #    Janessa Cintron Lenny, WILLY [phone]       [facility] [address] [address]        Equal Access to Services     [facility] Hospital[facility]ZEKIEL : Hadii aad ku hadasho Soomaali, waaxda luqadaha, qaybta kaalmada adeegyada, waxay idiin hayaan adele mooney . So [facility] [phone].    ATENCIÓN: Si habla español, tiene a roldan disposición servicios gratuitos de asistencia lingüística. Llame al [phone].    We comply with applicable federal civil rights laws and [place] laws. We do not discriminate on the basis of race, color, national origin, age, disability sex, sexual orientation or gender identity.            Thank you!     Thank you for choosing [facility]  for your care. Our goal is always to provide you with excellent care. Hearing back from our patients is one way we can continue to improve our services. Please take a few minutes to complete the written survey  that you may receive in the mail after your visit with us. Thank you!             Your Updated Medication List - Protect others around you: Learn how to safely use, store and throw away your medicines at www.disposemymeds.org.          This list is accurate as of: 7/15/17  1:12 PM.  Always use your most recent med list.                   Brand Name Dispense Instructions for use Diagnosis    acetaminophen 325 MG tablet    TYLENOL    60 tablet    Take 2 tablets (650 mg) by mouth every 4 hours as needed for mild pain or fever    Cancer associated pain       Calcium-Magnesium-Zinc Tabs     60 tablet    Take 2 tablets by mouth daily    S/P colectomy       CETAPHIL lotion      Apply topically 2 times daily Also bid prn . To dry skin.        CYCLOBENZAPRINE HCL PO      Take 10 mg by mouth 3 times daily as needed for muscle spasms        ENSURE PLUS Liqd      Take by mouth 2 times daily Admin 8oz twice daily for supplement        ferrous sulfate 325 (65 FE) MG tablet    IRON    60 tablet    Take 1 tablet (325 mg) by mouth 2 times daily    S/P colectomy       HYDROcodone-acetaminophen 5-325 MG per tablet    NORCO     Take 1 tablet by mouth every 6 hours as needed for moderate to severe pain        levothyroxine 75 MCG tablet    SYNTHROID/LEVOTHROID    30 tablet    Take 1 tablet (75 mcg) by mouth daily Recheck TSH in 6 months    Hypothyroidism, unspecified type       LORazepam 0.5 MG tablet    ATIVAN    30 tablet    Take 1 tablet (0.5 mg) by mouth every 4 hours as needed (Anxiety, Nausea/Vomiting or Sleep)    Adenocarcinoma of colon (H)       menthol-zinc oxide 0.44-20.625 % Oint ointment    CALMOSEPTINE    1 Tube    Apply topically 4 times daily as needed for skin protection    Chronic diarrhea       OLANZapine 5 MG tablet    zyPREXA    30 tablet    Take 1 tablet (5 mg) by mouth daily    Psychosis, unspecified psychosis type       ondansetron 8 MG tablet    ZOFRAN    10 tablet    Take 1 tablet (8 mg) by mouth every 8 hours  as needed (Nausea/Vomiting)    Adenocarcinoma of colon (H)       SUMATRIPTAN SUCCINATE PO      Take 25 mg by mouth every 8 hours as needed for migraine        TRAVATAN Z 0.004 % ophthalmic solution   Generic drug:  travoprost (BAK Free)      Place 1 drop into both eyes At Bedtime        vitamin B-Complex     30 tablet    Take 1 tablet by mouth daily    S/P colectomy

## 2017-07-19 ENCOUNTER — TELEPHONE (OUTPATIENT)
Dept: ONCOLOGY | Facility: CLINIC | Age: 77
End: 2017-07-19

## 2017-07-19 ENCOUNTER — HOSPITAL ENCOUNTER (OUTPATIENT)
Dept: CT IMAGING | Facility: CLINIC | Age: 77
Discharge: HOME | End: 2017-07-19
Attending: INTERNAL MEDICINE | Admitting: INTERNAL MEDICINE
Payer: MEDICARE

## 2017-07-19 DIAGNOSIS — C18.9 ADENOCARCINOMA OF COLON (H): ICD-10-CM

## 2017-07-19 DIAGNOSIS — I81 PORTAL VEIN THROMBOSIS: Primary | ICD-10-CM

## 2017-07-19 PROCEDURE — 74178 CT ABD&PLV WO CNTR FLWD CNTR: CPT

## 2017-07-19 PROCEDURE — 25000125 ZZHC RX 250: Performed by: INTERNAL MEDICINE

## 2017-07-19 PROCEDURE — 25000128 H RX IP 250 OP 636: Performed by: INTERNAL MEDICINE

## 2017-07-19 PROCEDURE — 71270 CT THORAX DX C-/C+: CPT

## 2017-07-19 RX ORDER — IOPAMIDOL 755 MG/ML
57 INJECTION, SOLUTION INTRAVASCULAR ONCE
Status: COMPLETED | OUTPATIENT
Start: 2017-07-19 | End: 2017-07-19

## 2017-07-19 RX ADMIN — IOPAMIDOL 57 ML: 755 INJECTION, SOLUTION INTRAVENOUS at 12:17

## 2017-07-19 RX ADMIN — SODIUM CHLORIDE 56 ML: 9 INJECTION, SOLUTION INTRAVENOUS at 12:18

## 2017-07-19 NOTE — PROGRESS NOTES
Spoke to patient.  Informed her of the result of CT scan.  Malignancy is better.  There is thrombosis involving peripheral branches of portal vein in the right lobe of liver.  We will get an ultrasound and see her after that to discuss regarding anticoagulation.  She is not having any abdominal pain.

## 2017-07-19 NOTE — TELEPHONE ENCOUNTER
I received an order for patient to have an abdominal US tomorrow and exam for lovenox teaching for portal vein thrombosis.         Attempted to reach patient's brother in law (?) [phone] per request of Dr. Lindo and had to [facility] requesting a return call. Vale Molina

## 2017-07-20 ENCOUNTER — ONCOLOGY VISIT (OUTPATIENT)
Dept: ONCOLOGY | Facility: CLINIC | Age: 77
End: 2017-07-20
Attending: INTERNAL MEDICINE
Payer: COMMERCIAL

## 2017-07-20 ENCOUNTER — HOSPITAL ENCOUNTER (OUTPATIENT)
Dept: ULTRASOUND IMAGING | Facility: CLINIC | Age: 77
Discharge: HOME | End: 2017-07-20
Attending: INTERNAL MEDICINE | Admitting: INTERNAL MEDICINE
Payer: MEDICARE

## 2017-07-20 VITALS
TEMPERATURE: 97.6 F | SYSTOLIC BLOOD PRESSURE: 105 MMHG | WEIGHT: 119 LBS | DIASTOLIC BLOOD PRESSURE: 69 MMHG | BODY MASS INDEX: 20.42 KG/M2 | HEART RATE: 80 BPM | RESPIRATION RATE: 16 BRPM | OXYGEN SATURATION: 98 %

## 2017-07-20 DIAGNOSIS — I81 PORTAL VEIN THROMBOSIS: ICD-10-CM

## 2017-07-20 DIAGNOSIS — C18.9 ADENOCARCINOMA OF COLON (H): ICD-10-CM

## 2017-07-20 DIAGNOSIS — I81 PORTAL VEIN THROMBOSIS: Primary | ICD-10-CM

## 2017-07-20 PROCEDURE — 93976 VASCULAR STUDY: CPT

## 2017-07-20 PROCEDURE — 99212 OFFICE O/P EST SF 10 MIN: CPT

## 2017-07-20 PROCEDURE — 99214 OFFICE O/P EST MOD 30 MIN: CPT | Performed by: INTERNAL MEDICINE

## 2017-07-20 RX ORDER — EPINEPHRINE 0.3 MG/.3ML
0.3 INJECTION SUBCUTANEOUS EVERY 5 MIN PRN
Status: CANCELLED | OUTPATIENT
Start: 2017-08-10

## 2017-07-20 RX ORDER — EPINEPHRINE 1 MG/ML
0.3 INJECTION INTRAMUSCULAR; INTRAVENOUS; SUBCUTANEOUS EVERY 5 MIN PRN
Status: CANCELLED | OUTPATIENT
Start: 2017-08-10

## 2017-07-20 RX ORDER — SODIUM CHLORIDE 9 MG/ML
1000 INJECTION, SOLUTION INTRAVENOUS CONTINUOUS PRN
Status: CANCELLED
Start: 2017-08-10

## 2017-07-20 RX ORDER — LORAZEPAM 2 MG/ML
0.5 INJECTION INTRAMUSCULAR EVERY 4 HOURS PRN
Status: CANCELLED
Start: 2017-08-10

## 2017-07-20 RX ORDER — DIPHENHYDRAMINE HYDROCHLORIDE 50 MG/ML
50 INJECTION INTRAMUSCULAR; INTRAVENOUS
Status: CANCELLED
Start: 2017-08-10

## 2017-07-20 RX ORDER — MEPERIDINE HYDROCHLORIDE 50 MG/ML
25 INJECTION INTRAMUSCULAR; INTRAVENOUS; SUBCUTANEOUS EVERY 30 MIN PRN
Status: CANCELLED | OUTPATIENT
Start: 2017-08-10

## 2017-07-20 RX ORDER — ALBUTEROL SULFATE 90 UG/1
1-2 AEROSOL, METERED RESPIRATORY (INHALATION)
Status: CANCELLED
Start: 2017-08-10

## 2017-07-20 RX ORDER — METHYLPREDNISOLONE SODIUM SUCCINATE 125 MG/2ML
125 INJECTION, POWDER, LYOPHILIZED, FOR SOLUTION INTRAMUSCULAR; INTRAVENOUS
Status: CANCELLED
Start: 2017-08-10

## 2017-07-20 RX ORDER — ALBUTEROL SULFATE 0.83 MG/ML
2.5 SOLUTION RESPIRATORY (INHALATION)
Status: CANCELLED | OUTPATIENT
Start: 2017-08-10

## 2017-07-20 RX ORDER — LOPERAMIDE HCL 2 MG
2 CAPSULE ORAL PRN
COMMUNITY
End: 2017-08-17

## 2017-07-20 ASSESSMENT — PAIN SCALES - GENERAL: PAINLEVEL: NO PAIN (0)

## 2017-07-20 NOTE — PATIENT INSTRUCTIONS
Continue chemotherapy.   Scheduled/gianna  Dose reduction of oxaliplatin by 25%.  Can have chemo next week without physician appointment.  See me in 4-5 weeks.   Scheduled/gianna      Spoke with patient on telephone, discussed appts and agreed to mail schedule to patient/Gianna

## 2017-07-20 NOTE — MR AVS SNAPSHOT
After Visit Summary   7/20/2017    Sherita Kenney    MRN: [id number]           Patient Information     Date Of Birth          1940        Visit Information        Provider Department      7/20/2017 3:00 PM Susan Lindo MD [facility]        Care Instructions    Continue chemotherapy.   Dose reduction of oxaliplatin by 25%.  Can have chemo next week without physician appointment.  See me in 4-5 weeks.          Follow-ups after your visit        Your next 10 appointments already scheduled     Jul 27, 2017 10:00 AM CDT   Level 6 with SH INFUSION CHAIR 18   [facility] ([facility])    [facility]  [address]  [place] [address]   [phone]            Jul 27, 2017 10:15 AM CDT   Return Visit with Alma Nieves MD   [facility] ([facility])    [facility]  [address]  [place] [address]   [phone]            Aug 10, 2017  9:30 AM CDT   Level 6 with  INFUSION CHAIR 12   [facility] ([facility])    [facility]  [address]  [place] [address]   [phone]            Aug 24, 2017  9:30 AM CDT   Level 6 with SH INFUSION CHAIR 3   [facility] ([facility])    [facility]  [address]  [place] [address]   [phone]            Aug 24, 2017 10:00 AM CDT   Return Visit with Susan Lindo MD   [facility])    [facility]  [address]  [place] [address]   [phone]              Who to contact     If you have questions or need follow up information about today's clinic visit or your schedule please contact [facility] directly at [phone].  Normal or non-critical lab and imaging  "results will be communicated to you by MyChart, letter or phone within 4 business days after the clinic has received the results. If you do not hear from us within 7 days, please contact the clinic through [employer] or phone. If you have a critical or abnormal lab result, we will notify you by phone as soon as possible.  Submit refill requests through [employer] or call your pharmacy and they will forward the refill request to us. Please allow 3 business days for your refill to be completed.          Additional Information About Your Visit        [employer] Information     [employer] lets you send messages to your doctor, view your test results, renew your prescriptions, schedule appointments and more. To sign up, go to [email or web address] . Click on \"Log in\" on the left side of the screen, which will take you to the Welcome page. Then click on \"Sign up Now\" on the right side of the page.     You will be asked to enter the access code listed below, as well as some personal information. Please follow the directions to create your username and password.     Your access code is: [email or web address]  Expires: 2017  6:30 AM     Your access code will  in 90 days. If you need help or a new code, please call your [place] clinic or [phone].        Care EveryWhere ID     This is your Care EveryWhere ID. This could be used by other organizations to access your [place] medical records  [id number]        Your Vitals Were     Pulse Temperature Respirations Pulse Oximetry BMI (Body Mass Index)       80 97.6  F (36.4  C) (Oral) 16 98% 20.42 kg/m2        Blood Pressure from Last 3 Encounters:   17 105/69   07/15/17 112/57   17 114/68    Weight from Last 3 Encounters:   17 54 kg (119 lb)   17 53.3 kg (117 lb 9.6 oz)   17 53.3 kg (117 lb 9.6 oz)              Today, you had the following     No orders found for display       Primary Care Provider Office Phone # Fax #    Susan Lindo MD " [phone]       [facility] [address] [facility] [address]  [facility] [address]        Equal Access to Services     ALEXIS ALBA : Ruddy dhaliwal miguel Nunes, laura pillai, selina pemberton, ellis gonzalez reyted vega vinicio ren. So [facility] [phone].    ATENCIÓN: Si habla español, tiene a roldan disposición servicios gratuitos de asistencia lingüística. Llame al [phone].    We comply with applicable federal civil rights laws and [place] laws. We do not discriminate on the basis of race, color, national origin, age, disability sex, sexual orientation or gender identity.            Thank you!     Thank you for choosing [facility]  for your care. Our goal is always to provide you with excellent care. Hearing back from our patients is one way we can continue to improve our services. Please take a few minutes to complete the written survey that you may receive in the mail after your visit with us. Thank you!             Your Updated Medication List - Protect others around you: Learn how to safely use, store and throw away your medicines at www.disposemymeds.org.          This list is accurate as of: 7/20/17 11:59 PM.  Always use your most recent med list.                   Brand Name Dispense Instructions for use Diagnosis    acetaminophen 325 MG tablet    TYLENOL    60 tablet    Take 2 tablets (650 mg) by mouth every 4 hours as needed for mild pain or fever    Cancer associated pain       Calcium-Magnesium-Zinc Tabs     60 tablet    Take 2 tablets by mouth daily    S/P colectomy       CETAPHIL lotion      Apply topically 2 times daily Also bid prn . To dry skin.        CYCLOBENZAPRINE HCL PO      Take 10 mg by mouth 3 times daily as needed for muscle spasms        ENSURE PLUS Liqd      Take by mouth 2 times daily Admin 8oz twice daily for supplement        ferrous sulfate 325 (65 FE) MG tablet    IRON    60 tablet    Take 1 tablet (325 mg) by mouth 2 times daily     S/P colectomy       HYDROcodone-acetaminophen 5-325 MG per tablet    NORCO     Take 1 tablet by mouth every 6 hours as needed for moderate to severe pain        IMODIUM A-D 2 MG capsule   Generic drug:  loperamide      Take 2 mg by mouth as needed for diarrhea        levothyroxine 75 MCG tablet    SYNTHROID/LEVOTHROID    30 tablet    Take 1 tablet (75 mcg) by mouth daily Recheck TSH in 6 months    Hypothyroidism, unspecified type       LORazepam 0.5 MG tablet    ATIVAN    30 tablet    Take 1 tablet (0.5 mg) by mouth every 4 hours as needed (Anxiety, Nausea/Vomiting or Sleep)    Adenocarcinoma of colon (H)       menthol-zinc oxide 0.44-20.625 % Oint ointment    CALMOSEPTINE    1 Tube    Apply topically 4 times daily as needed for skin protection    Chronic diarrhea       OLANZapine 5 MG tablet    zyPREXA    30 tablet    Take 1 tablet (5 mg) by mouth daily    Psychosis, unspecified psychosis type       ondansetron 8 MG tablet    ZOFRAN    10 tablet    Take 1 tablet (8 mg) by mouth every 8 hours as needed (Nausea/Vomiting)    Adenocarcinoma of colon (H)       SUMATRIPTAN SUCCINATE PO      Take 25 mg by mouth every 8 hours as needed for migraine        TRAVATAN Z 0.004 % ophthalmic solution   Generic drug:  travoprost (BAK Free)      Place 1 drop into both eyes At Bedtime        vitamin B-Complex     30 tablet    Take 1 tablet by mouth daily    S/P colectomy

## 2017-07-20 NOTE — PROGRESS NOTES
"Oncology Rooming Note    July 20, 2017 3:33 PM   Sherita Kenney is a 76 year old female who presents for:    No chief complaint on file.    Initial Vitals: There were no vitals taken for this visit. Estimated body mass index is 20.18 kg/(m^2) as calculated from the following:    Height as of 7/13/17: 1.626 m (5' 4.02\").    Weight as of 7/13/17: 53.3 kg (117 lb 9.6 oz). There is no height or weight on file to calculate BSA.  Data Unavailable Comment: Data Unavailable   No LMP recorded. Patient is not currently having periods (Reason: Perimenopausal).  Allergies reviewed: Yes  Medications reviewed: Yes    Medications: Medication refills not needed today.  Pharmacy name entered into [employer]:    [facility] [address] [facility] [address] PHARMACY [facility] [address] [facility] [address]    Clinical concerns: unknown dr was NOT notified.    8 minutes for nursing intake (face to face time)     Natali Perez MA    DISCHARGE PLAN:  1.) Pharmacy aware of 25 % dose reduction of Oxaliplatin.   2.) Patient to be scheduled for q 2 week Folfox/Avastin. And follow up with Dr. riggs 8/24/17.   Next appointments: See patient instruction section  Departure Mode: Ambulatory  Accompanied by:   8 minutes for nursing discharge (face to face time)   Ankita Sidhu RN              Dinora Garrett called (POA) requesting scan results from the the other day [phone]  Sherita had 2 visitors with her today ( POA)   Results reviewed with them    "

## 2017-07-24 NOTE — PROGRESS NOTES
ONCOLOGY HISTORY:  Ms. Sherita Kenney is a female with colon cancer.   1. The patient was brought to the ER on 02/15/2017 with altered mental status.    -CT brain on 02/15/2017 did not reveal any acute intracranial pathology.   -CT abdomen and pelvis on 02/15/2017 revealed large right iliopsoas abscess. Adjacent mass-like wall thickening of portion of right colon.   -CT-guided drainage of abscess was done.   2. The patient was taken to OR on 03/01/2017.    -Colonoscopy revealed a large fungating mass in the right side of the colon.  Pathology revealed invasive poorly differentiated adenocarcinoma.  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   -Laparoscopic ileostomy was done.    3.  CEA on 02/16/2017 was 3.8.   4. The patient had right colectomy with takedown of ileostomy and primary anastomosis on 04/13/2017.    -Pathology  reveals poorly differentiated colonic adenocarcinoma. 15 of 24 lymph nodes are positive. Tumor invades into adjacent abdominal wall.  Lymphovascular invasion present. pT4b pN2b M0.   5. PET scan on 05/22/2017 reveals metastatic disease.  There are multiple new hypermetabolic liver metastases and  hypermetabolic necrotic lymphadenopathy versus malignant implants right mesentery and right lower quadrant.  6. modified FOLFOX6 with Avastin started on 06/01/2017.      SUBJECTIVE:    Ms. Sherita Kenney is a 76-year-old female with metastatic colon cancer with liver metastasis. She was started on modified FOLFOX6 with Avastin on 06/01/2017.  She has received four cycles.  Bolus 5-FU discontinued with cycle four.     Patient is here to review the results of CT scan.  CT chest, abdomen and pelvis was done on 07/19/2017.  It reveals improved hepatic metastases.  Several peripheral branches of portal vein in right hepatic lobe of liver is thrombosed.  There is also thrombosis in branches of superior mesenteric vein in anterior abdomen.  Ultrasound on  07/20/2017 reveals occlusive thrombus in the posterior posterior branch and its two branches of the right portal vein.  Main portal vein and left portal vein are patent.    Overall she is doing good.  No abdominal pain.  No nausea.  No vomiting.  No chest pain or shortness of breath. No pain/swelling/redness in the extremities.    Last chemotherapy went fairly well.  She is developing neuropathic symptoms.      ASSESSMENT:   1.  A 76-year-old female with metastatic colon cancer with multiple liver metastases on modified FOLFOX6 with Avastin.  Disease is responding to chemotherapy.  2.  Thrombosis in peripheral branches of portal vein in the right hepatic lobe of liver.  Patient is symptomatic.  3.  Mild anemia.   4. Thrombocytopenia.  5.  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  6.  Hand-foot syndrome.      PLAN:   1.  CT scan was reviewed with the patient and her family.  They were happy to know that disease is responding.  Liver metastases have decreased in size.    Patient will be continued on chemotherapy.  She is on FOLFOX with Avastin.  Bolus 5-FU has been discontinued.  Because of neurotoxicity from oxaliplatin, we will decrease the dose of oxaliplatin by 25%.  Patient agreeable for it.    2.  Discuss regarding thromboses of peripheral branches of portal vein in the right hepatic lobe of the liver.  This was incidental finding.  Patient asymptomatic.  We discussed regarding different options.  One would be to start her on anticoagulation.  Other would be to just monitor her and to start anticoagulation if she is symptomatic.  Pros and cons of both approaches were discussed.  Family is very concerned that patient will have bleeding complications.  After long discussion, decision was made to observe her.  Patient and family are agreeable for this plan.  I told the patient to let us know if she has abdominal pain, nausea or vomiting or any other concerns.  In next few months we will plan on repeating  CT again.    3.  Patient and family had multiple questions which were all discussed.    Total time spent 30 minutes, most of time was spent counseling.

## 2017-07-25 ENCOUNTER — TELEPHONE (OUTPATIENT)
Dept: ONCOLOGY | Facility: CLINIC | Age: 77
End: 2017-07-25

## 2017-07-25 NOTE — TELEPHONE ENCOUNTER
Patient's friend and states POA Dinora called to review patient's results. She stated that she normally comes to the appointments and was not made aware of the appointment on 7/20. The communication with family has been poor and she informed that they had been  together and was made POA do to some family dynamics. I saw some information stating that as well scanned into media. Dinora wanted to confirm what Sherita stated which was that scan shows Improvement. I confirmed that Sherita was right with the scan. Dinora stated she would mail the paper work stating she is POA. I will watch for paper work to arrive and have it scanned in . [place]

## 2017-07-27 ENCOUNTER — HOSPITAL ENCOUNTER (OUTPATIENT)
Facility: CLINIC | Age: 77
Setting detail: SPECIMEN
Discharge: HOME | End: 2017-07-27
Attending: INTERNAL MEDICINE | Admitting: INTERNAL MEDICINE
Payer: MEDICARE

## 2017-07-27 ENCOUNTER — INFUSION THERAPY VISIT (OUTPATIENT)
Dept: INFUSION THERAPY | Facility: CLINIC | Age: 77
End: 2017-07-27
Attending: INTERNAL MEDICINE
Payer: MEDICARE

## 2017-07-27 VITALS
DIASTOLIC BLOOD PRESSURE: 54 MMHG | OXYGEN SATURATION: 96 % | HEART RATE: 73 BPM | WEIGHT: 119 LBS | BODY MASS INDEX: 20.32 KG/M2 | HEIGHT: 64 IN | TEMPERATURE: 97.6 F | SYSTOLIC BLOOD PRESSURE: 99 MMHG | RESPIRATION RATE: 16 BRPM

## 2017-07-27 DIAGNOSIS — C18.9 ADENOCARCINOMA OF COLON (H): Primary | ICD-10-CM

## 2017-07-27 LAB
ALBUMIN SERPL-MCNC: 2.9 G/DL (ref 3.4–5)
ALBUMIN UR-MCNC: 30 MG/DL
ALP SERPL-CCNC: 158 U/L (ref 40–150)
ALT SERPL W P-5'-P-CCNC: 23 U/L (ref 0–50)
ANION GAP SERPL CALCULATED.3IONS-SCNC: 10 MMOL/L (ref 3–14)
AST SERPL W P-5'-P-CCNC: 23 U/L (ref 0–45)
BASOPHILS # BLD AUTO: 0 10E9/L (ref 0–0.2)
BASOPHILS NFR BLD AUTO: 0.3 %
BILIRUB SERPL-MCNC: 0.4 MG/DL (ref 0.2–1.3)
BUN SERPL-MCNC: 15 MG/DL (ref 7–30)
CALCIUM SERPL-MCNC: 8.6 MG/DL (ref 8.5–10.1)
CHLORIDE SERPL-SCNC: 105 MMOL/L (ref 94–109)
CO2 SERPL-SCNC: 26 MMOL/L (ref 20–32)
CREAT SERPL-MCNC: 1.02 MG/DL (ref 0.52–1.04)
DIFFERENTIAL METHOD BLD: ABNORMAL
EOSINOPHIL # BLD AUTO: 0.1 10E9/L (ref 0–0.7)
EOSINOPHIL NFR BLD AUTO: 0.8 %
ERYTHROCYTE [DISTWIDTH] IN BLOOD BY AUTOMATED COUNT: 17.7 % (ref 10–15)
GFR SERPL CREATININE-BSD FRML MDRD: 53 ML/MIN/1.7M2
GLUCOSE SERPL-MCNC: 123 MG/DL (ref 70–99)
HCT VFR BLD AUTO: 32.8 % (ref 35–47)
HGB BLD-MCNC: 10.1 G/DL (ref 11.7–15.7)
IMM GRANULOCYTES # BLD: 0 10E9/L (ref 0–0.4)
IMM GRANULOCYTES NFR BLD: 0.3 %
LYMPHOCYTES # BLD AUTO: 1.6 10E9/L (ref 0.8–5.3)
LYMPHOCYTES NFR BLD AUTO: 23.8 %
MCH RBC QN AUTO: 26 PG (ref 26.5–33)
MCHC RBC AUTO-ENTMCNC: 30.8 G/DL (ref 31.5–36.5)
MCV RBC AUTO: 85 FL (ref 78–100)
MONOCYTES # BLD AUTO: 0.6 10E9/L (ref 0–1.3)
MONOCYTES NFR BLD AUTO: 9 %
NEUTROPHILS # BLD AUTO: 4.3 10E9/L (ref 1.6–8.3)
NEUTROPHILS NFR BLD AUTO: 65.8 %
NRBC # BLD AUTO: 0 10*3/UL
NRBC BLD AUTO-RTO: 0 /100
PLATELET # BLD AUTO: 143 10E9/L (ref 150–450)
POTASSIUM SERPL-SCNC: 4.2 MMOL/L (ref 3.4–5.3)
PROT SERPL-MCNC: 6.6 G/DL (ref 6.8–8.8)
RBC # BLD AUTO: 3.88 10E12/L (ref 3.8–5.2)
SODIUM SERPL-SCNC: 141 MMOL/L (ref 133–144)
WBC # BLD AUTO: 6.6 10E9/L (ref 4–11)

## 2017-07-27 PROCEDURE — 85025 COMPLETE CBC W/AUTO DIFF WBC: CPT | Performed by: INTERNAL MEDICINE

## 2017-07-27 PROCEDURE — 80053 COMPREHEN METABOLIC PANEL: CPT | Performed by: INTERNAL MEDICINE

## 2017-07-27 PROCEDURE — 96367 TX/PROPH/DG ADDL SEQ IV INF: CPT

## 2017-07-27 PROCEDURE — 96417 CHEMO IV INFUS EACH ADDL SEQ: CPT

## 2017-07-27 PROCEDURE — 25000128 H RX IP 250 OP 636

## 2017-07-27 PROCEDURE — 25000128 H RX IP 250 OP 636: Performed by: INTERNAL MEDICINE

## 2017-07-27 PROCEDURE — 81003 URINALYSIS AUTO W/O SCOPE: CPT | Performed by: INTERNAL MEDICINE

## 2017-07-27 PROCEDURE — 96416 CHEMO PROLONG INFUSE W/PUMP: CPT

## 2017-07-27 PROCEDURE — 96413 CHEMO IV INFUSION 1 HR: CPT

## 2017-07-27 PROCEDURE — 96415 CHEMO IV INFUSION ADDL HR: CPT

## 2017-07-27 PROCEDURE — 96368 THER/DIAG CONCURRENT INF: CPT

## 2017-07-27 RX ADMIN — BEVACIZUMAB 270 MG: 100 INJECTION, SOLUTION INTRAVENOUS at 11:28

## 2017-07-27 RX ADMIN — DEXTROSE MONOHYDRATE 250 ML: 50 INJECTION, SOLUTION INTRAVENOUS at 12:08

## 2017-07-27 RX ADMIN — OXALIPLATIN 100 MG: 5 INJECTION, SOLUTION, CONCENTRATE INTRAVENOUS at 12:09

## 2017-07-27 RX ADMIN — DEXAMETHASONE SODIUM PHOSPHATE: 10 INJECTION, SOLUTION INTRAMUSCULAR; INTRAVENOUS at 11:10

## 2017-07-27 RX ADMIN — SODIUM CHLORIDE 250 ML: 9 INJECTION, SOLUTION INTRAVENOUS at 11:10

## 2017-07-27 ASSESSMENT — PAIN SCALES - GENERAL: PAINLEVEL: NO PAIN (0)

## 2017-07-27 NOTE — PROGRESS NOTES
"Infusion Nursing Note:  Sherita Kenney presents today for C5D1 Folfox/Avastin  Patient seen by provider today: No   present during visit today: Not Applicable.    Note: N/A.    Intravenous Access:  Implanted Port.    Treatment Conditions:  Lab Results   Component Value Date    HGB 10.1 07/27/2017     Lab Results   Component Value Date    WBC 6.6 07/27/2017      Lab Results   Component Value Date    ANEU 4.3 07/27/2017     Lab Results   Component Value Date     07/27/2017      Lab Results   Component Value Date     07/27/2017                   Lab Results   Component Value Date    POTASSIUM 4.2 07/27/2017           Lab Results   Component Value Date    MAG 2.0 04/15/2017            Lab Results   Component Value Date    CR 1.02 07/27/2017                   Lab Results   Component Value Date    SARITHA 8.6 07/27/2017                Lab Results   Component Value Date    BILITOTAL 0.4 07/27/2017           Lab Results   Component Value Date    ALBUMIN 2.9 07/27/2017                    Lab Results   Component Value Date    ALT 23 07/27/2017           Lab Results   Component Value Date    AST 23 07/27/2017     Results reviewed, labs MET treatment parameters, ok to proceed with treatment.  Urine protein = 30.  Dr. Nieves notified of urine protein result-he stated no 24 hr urine at this time-reevaluate next visit      Prior to discharge: Port is secured in place with tegaderm and flushed with 10cc NS with positive blood return noted.  Continuous home infusion CADD pump connected.    All connectors secured in place and clamps taped open.    Pump started, \"running\" noted on display (CADD): YES.  Patient instructed to call our clinic or [facility] with any questions or concerns at home.  Patient verbalized understanding.    Patient set up for pump disconnect at our clinic on 7/29/17 1200.            Post Infusion Assessment:  Patient tolerated infusion without incident.  Site patent and intact, " free from redness, edema or discomfort.  No evidence of extravasations.    Discharge Plan:   Discharge instructions reviewed with: Patient.  Patient and/or family verbalized understanding of discharge instructions and all questions answered.  Copy of AVS reviewed with patient and/or family.  Patient will return 8/10 for next appointment.  Patient discharged in stable condition accompanied by: self.  Departure Mode: Ambulatory.    Della Little RN

## 2017-07-27 NOTE — MR AVS SNAPSHOT
After Visit Summary   7/27/2017    Sherita Kenney    MRN: [id number]           Patient Information     Date Of Birth          1940        Visit Information        Provider Department      7/27/2017 10:00 AM  INFUSION CHAIR 18 [facility]        Today's Diagnoses     Adenocarcinoma of colon (H)    -  1       Follow-ups after your visit        Your next 10 appointments already scheduled     Jul 29, 2017 12:00 PM CDT   Level 1 with  INFUSION CHAIR 14   [facility] ([facility])    [facility]  [address]  [place] [address]   [phone]            Aug 10, 2017  9:30 AM CDT   Level 6 with  INFUSION CHAIR 12   [facility] ([facility])    [facility]  [address]  [place] [address]   [phone]            Aug 24, 2017  9:30 AM CDT   Level 6 with  INFUSION CHAIR 3   [facility] ([facility])    [facility]  [address]  [place] [address]   [phone]            Aug 24, 2017 10:00 AM CDT   Return Visit with Susan Lindo MD   [facility] ([facility])    [facility]  [address]  [place] [address]   [phone]              Who to contact     If you have questions or need follow up information about today's clinic visit or your schedule please contact [facility] [place] directly at [phone].  Normal or non-critical lab and imaging results will be communicated to you by MyChart, letter or phone within 4 business days after the clinic has received the results. If you do not hear from us within 7 days, please contact the clinic through MyChart or phone. If you have a critical or abnormal lab result, we will notify you by  "phone as soon as possible.  Submit refill requests through [employer] or call your pharmacy and they will forward the refill request to us. Please allow 3 business days for your refill to be completed.          Additional Information About Your Visit        [employer] Information     [employer] lets you send messages to your doctor, view your test results, renew your prescriptions, schedule appointments and more. To sign up, go to [email or web address] . Click on \"Log in\" on the left side of the screen, which will take you to the Welcome page. Then click on \"Sign up Now\" on the right side of the page.     You will be asked to enter the access code listed below, as well as some personal information. Please follow the directions to create your username and password.     Your access code is: [email or web address]  Expires: 2017  6:30 AM     Your access code will  in 90 days. If you need help or a new code, please call your [place] clinic or [phone].        Care EveryWhere ID     This is your Care EveryWhere ID. This could be used by other organizations to access your [place] medical records  [id number]        Your Vitals Were     Pulse Temperature Respirations Height Pulse Oximetry BMI (Body Mass Index)    73 97.6  F (36.4  C) (Oral) 16 1.626 m (5' 4.02\") 96% 20.42 kg/m2       Blood Pressure from Last 3 Encounters:   17 99/54   17 105/69   07/15/17 112/57    Weight from Last 3 Encounters:   17 54 kg (119 lb)   17 54 kg (119 lb)   17 53.3 kg (117 lb 9.6 oz)              We Performed the Following     CBC with platelets differential     Comprehensive metabolic panel     Protein qualitative urine        Primary Care Provider Office Phone # Fax #    Sherita WILLY Juarez [phone]       [place] [facility] [address]  [facility] [address]        Equal Access to Services     ALEXIS ALBA : Hadii mateo Nunes, laura pillai, qaybta dinh " ellis pembertonted jonyalvarez matos'aan ah. [facility] [phone].    ATENCIÓN: Si rukhsana jovel, tiene a roldan disposición servicios gratuitos de asistencia lingüística. Malaika al [phone].    We comply with applicable federal civil rights laws and [place] laws. We do not discriminate on the basis of race, color, national origin, age, disability sex, sexual orientation or gender identity.            Thank you!     Thank you for choosing [facility] AND [facility]  for your care. Our goal is always to provide you with excellent care. Hearing back from our patients is one way we can continue to improve our services. Please take a few minutes to complete the written survey that you may receive in the mail after your visit with us. Thank you!             Your Updated Medication List - Protect others around you: Learn how to safely use, store and throw away your medicines at www.disposemymeds.org.          This list is accurate as of: 7/27/17  2:33 PM.  Always use your most recent med list.                   Brand Name Dispense Instructions for use Diagnosis    acetaminophen 325 MG tablet    TYLENOL    60 tablet    Take 2 tablets (650 mg) by mouth every 4 hours as needed for mild pain or fever    Cancer associated pain       Calcium-Magnesium-Zinc Tabs     60 tablet    Take 2 tablets by mouth daily    S/P colectomy       CETAPHIL lotion      Apply topically 2 times daily Also bid prn . To dry skin.        CYCLOBENZAPRINE HCL PO      Take 10 mg by mouth 3 times daily as needed for muscle spasms        ENSURE PLUS Liqd      Take by mouth 2 times daily Admin 8oz twice daily for supplement        ferrous sulfate 325 (65 FE) MG tablet    IRON    60 tablet    Take 1 tablet (325 mg) by mouth 2 times daily    S/P colectomy       HYDROcodone-acetaminophen 5-325 MG per tablet    NORCO     Take 1 tablet by mouth every 6 hours as needed for moderate to severe pain        IMODIUM A-D 2 MG capsule    Generic drug:  loperamide      Take 2 mg by mouth as needed for diarrhea        levothyroxine 75 MCG tablet    SYNTHROID/LEVOTHROID    30 tablet    Take 1 tablet (75 mcg) by mouth daily Recheck TSH in 6 months    Hypothyroidism, unspecified type       LORazepam 0.5 MG tablet    ATIVAN    30 tablet    Take 1 tablet (0.5 mg) by mouth every 4 hours as needed (Anxiety, Nausea/Vomiting or Sleep)    Adenocarcinoma of colon (H)       menthol-zinc oxide 0.44-20.625 % Oint ointment    CALMOSEPTINE    1 Tube    Apply topically 4 times daily as needed for skin protection    Chronic diarrhea       OLANZapine 5 MG tablet    zyPREXA    30 tablet    Take 1 tablet (5 mg) by mouth daily    Psychosis, unspecified psychosis type       ondansetron 8 MG tablet    ZOFRAN    10 tablet    Take 1 tablet (8 mg) by mouth every 8 hours as needed (Nausea/Vomiting)    Adenocarcinoma of colon (H)       SUMATRIPTAN SUCCINATE PO      Take 25 mg by mouth every 8 hours as needed for migraine        TRAVATAN Z 0.004 % ophthalmic solution   Generic drug:  travoprost (BAK Free)      Place 1 drop into both eyes At Bedtime        vitamin B-Complex     30 tablet    Take 1 tablet by mouth daily    S/P colectomy

## 2017-07-29 ENCOUNTER — INFUSION THERAPY VISIT (OUTPATIENT)
Dept: INFUSION THERAPY | Facility: CLINIC | Age: 77
End: 2017-07-29
Attending: INTERNAL MEDICINE
Payer: MEDICARE

## 2017-07-29 VITALS
TEMPERATURE: 97.8 F | RESPIRATION RATE: 16 BRPM | DIASTOLIC BLOOD PRESSURE: 69 MMHG | SYSTOLIC BLOOD PRESSURE: 108 MMHG | HEART RATE: 74 BPM

## 2017-07-29 DIAGNOSIS — Z95.828 PORT-A-CATH IN PLACE: Primary | ICD-10-CM

## 2017-07-29 PROCEDURE — 40000269 ZZH STATISTIC NO CHARGE FACILITY FEE

## 2017-07-29 PROCEDURE — 25000128 H RX IP 250 OP 636: Performed by: INTERNAL MEDICINE

## 2017-07-29 RX ORDER — HEPARIN SODIUM (PORCINE) LOCK FLUSH IV SOLN 100 UNIT/ML 100 UNIT/ML
500 SOLUTION INTRAVENOUS EVERY 8 HOURS
Status: DISCONTINUED | OUTPATIENT
Start: 2017-07-29 | End: 2017-07-29 | Stop reason: HOSPADM

## 2017-07-29 RX ORDER — HEPARIN SODIUM (PORCINE) LOCK FLUSH IV SOLN 100 UNIT/ML 100 UNIT/ML
500 SOLUTION INTRAVENOUS EVERY 8 HOURS
Status: CANCELLED
Start: 2017-07-29

## 2017-07-29 RX ADMIN — SODIUM CHLORIDE, PRESERVATIVE FREE 500 UNITS: 5 INJECTION INTRAVENOUS at 12:44

## 2017-07-29 ASSESSMENT — PAIN SCALES - GENERAL: PAINLEVEL: NO PAIN (0)

## 2017-07-29 NOTE — PROGRESS NOTES
Infusion Nursing Note:  Sherita Kenney presents today for chemo pump disconnect.    Patient seen by provider today: No   present during visit today: Not Applicable.    Note: No concerns to report just poor appetite. Is drinking fluids ok. No fevers.    Intravenous Access:  Implanted Port.    Treatment Conditions:  Chemo pump empty and all infused..      Post Infusion Assessment:  Patient tolerated infusion without incident.  Blood return noted pre and post infusion.  Site patent and intact, free from redness, edema or discomfort.  No evidence of extravasations.  Access discontinued per protocol.    Discharge Plan:   Discharge instructions reviewed with: Patient.  Patient and/or family verbalized understanding of discharge instructions and all questions answered.  Copy of AVS reviewed with patient and/or family.  Patient will return 8/10 for next appointment.  Patient discharged in stable condition accompanied by: brother-in-law.  Departure Mode: Ambulatory.    Yun Sweeney RN

## 2017-07-29 NOTE — MR AVS SNAPSHOT
After Visit Summary   7/29/2017    Sherita Kenney    MRN: [id number]           Patient Information     Date Of Birth          1940        Visit Information        Provider Department      7/29/2017 12:00 PM  INFUSION CHAIR 14 [facility]        Today's Diagnoses     Port-a-cath in place    -  1       Follow-ups after your visit        Your next 10 appointments already scheduled     Aug 10, 2017  9:30 AM CDT   Level 6 with  INFUSION CHAIR 12   [facility] ([facility])    [facility]  [address]  [place] [address]   [phone]            Aug 24, 2017  9:30 AM CDT   Level 6 with  INFUSION CHAIR 3   [facility] ([facility])    [facility]  [address]  [place] [address]   [phone]            Aug 24, 2017 10:00 AM CDT   Return Visit with Susan Lindo MD   [facility] ([facility])    [facility]  [address]  [place] [address]   [phone]              Who to contact     If you have questions or need follow up information about today's clinic visit or your schedule please contact [facility] directly at [phone].  Normal or non-critical lab and imaging results will be communicated to you by MyChart, letter or phone within 4 business days after the clinic has received the results. If you do not hear from us within 7 days, please contact the clinic through MyChart or phone. If you have a critical or abnormal lab result, we will notify you by phone as soon as possible.  Submit refill requests through [employer] or call your pharmacy and they will forward the refill request to us. Please allow 3 business days for your refill to be completed.          Additional Information About Your Visit       "  MyChart Information     [employer] lets you send messages to your doctor, view your test results, renew your prescriptions, schedule appointments and more. To sign up, go to [email or web address] . Click on \"Log in\" on the left side of the screen, which will take you to the Welcome page. Then click on \"Sign up Now\" on the right side of the page.     You will be asked to enter the access code listed below, as well as some personal information. Please follow the directions to create your username and password.     Your access code is: [email or web address]  Expires: 2017  6:30 AM     Your access code will  in 90 days. If you need help or a new code, please call your [place] clinic or [phone].        Care EveryWhere ID     This is your Care EveryWhere ID. This could be used by other organizations to access your [place] medical records  [id number]        Your Vitals Were     Pulse Temperature Respirations             74 97.8  F (36.6  C) (Oral) 16          Blood Pressure from Last 3 Encounters:   17 108/69   17 99/54   17 105/69    Weight from Last 3 Encounters:   17 54 kg (119 lb)   17 54 kg (119 lb)   17 53.3 kg (117 lb 9.6 oz)              Today, you had the following     No orders found for display       Primary Care Provider Office Phone # Fax #    Sherita Viveros Norbert, APRALEJANDRA [phone]       [place] [facility] [address] [address]        Equal Access to Services     [facility] : Hadii mateo Nunes, waaxda luqadaha, qaybta kaalmada nilay, ellis ren. [facility] [phone].    ATENCIÓN: Si habla español, tiene a roldan disposición servicios gratuitos de asistencia lingüística. Llame al [phone].    We comply with applicable federal civil rights laws and [place] laws. We do not discriminate on the basis of race, color, national origin, age, disability sex, sexual orientation or " gender identity.            Thank you!     Thank you for choosing [facility] AND [facility]  for your care. Our goal is always to provide you with excellent care. Hearing back from our patients is one way we can continue to improve our services. Please take a few minutes to complete the written survey that you may receive in the mail after your visit with us. Thank you!             Your Updated Medication List - Protect others around you: Learn how to safely use, store and throw away your medicines at www.disposemymeds.org.          This list is accurate as of: 7/29/17 12:50 PM.  Always use your most recent med list.                   Brand Name Dispense Instructions for use Diagnosis    acetaminophen 325 MG tablet    TYLENOL    60 tablet    Take 2 tablets (650 mg) by mouth every 4 hours as needed for mild pain or fever    Cancer associated pain       Calcium-Magnesium-Zinc Tabs     60 tablet    Take 2 tablets by mouth daily    S/P colectomy       CETAPHIL lotion      Apply topically 2 times daily Also bid prn . To dry skin.        CYCLOBENZAPRINE HCL PO      Take 10 mg by mouth 3 times daily as needed for muscle spasms        ENSURE PLUS Liqd      Take by mouth 2 times daily Admin 8oz twice daily for supplement        ferrous sulfate 325 (65 FE) MG tablet    IRON    60 tablet    Take 1 tablet (325 mg) by mouth 2 times daily    S/P colectomy       HYDROcodone-acetaminophen 5-325 MG per tablet    NORCO     Take 1 tablet by mouth every 6 hours as needed for moderate to severe pain        IMODIUM A-D 2 MG capsule   Generic drug:  loperamide      Take 2 mg by mouth as needed for diarrhea        levothyroxine 75 MCG tablet    SYNTHROID/LEVOTHROID    30 tablet    Take 1 tablet (75 mcg) by mouth daily Recheck TSH in 6 months    Hypothyroidism, unspecified type       LORazepam 0.5 MG tablet    ATIVAN    30 tablet    Take 1 tablet (0.5 mg) by mouth every 4 hours as needed (Anxiety, Nausea/Vomiting or Sleep)     Adenocarcinoma of colon (H)       menthol-zinc oxide 0.44-20.625 % Oint ointment    CALMOSEPTINE    1 Tube    Apply topically 4 times daily as needed for skin protection    Chronic diarrhea       OLANZapine 5 MG tablet    zyPREXA    30 tablet    Take 1 tablet (5 mg) by mouth daily    Psychosis, unspecified psychosis type       ondansetron 8 MG tablet    ZOFRAN    10 tablet    Take 1 tablet (8 mg) by mouth every 8 hours as needed (Nausea/Vomiting)    Adenocarcinoma of colon (H)       SUMATRIPTAN SUCCINATE PO      Take 25 mg by mouth every 8 hours as needed for migraine        TRAVATAN Z 0.004 % ophthalmic solution   Generic drug:  travoprost (BAK Free)      Place 1 drop into both eyes At Bedtime        vitamin B-Complex     30 tablet    Take 1 tablet by mouth daily    S/P colectomy

## 2017-08-01 ENCOUNTER — ASSISTED LIVING VISIT (OUTPATIENT)
Dept: GERIATRICS | Facility: CLINIC | Age: 77
End: 2017-08-01
Payer: COMMERCIAL

## 2017-08-01 VITALS
BODY MASS INDEX: 20.42 KG/M2 | WEIGHT: 119 LBS | SYSTOLIC BLOOD PRESSURE: 154 MMHG | TEMPERATURE: 97.6 F | DIASTOLIC BLOOD PRESSURE: 92 MMHG | HEART RATE: 80 BPM | RESPIRATION RATE: 15 BRPM | OXYGEN SATURATION: 97 %

## 2017-08-01 DIAGNOSIS — Z90.49 S/P RIGHT COLECTOMY: ICD-10-CM

## 2017-08-01 DIAGNOSIS — R19.7 DIARRHEA, UNSPECIFIED TYPE: Primary | ICD-10-CM

## 2017-08-01 DIAGNOSIS — C18.9 ADENOCARCINOMA OF COLON (H): ICD-10-CM

## 2017-08-01 DIAGNOSIS — D50.0 IRON DEFICIENCY ANEMIA DUE TO CHRONIC BLOOD LOSS: ICD-10-CM

## 2017-08-01 DIAGNOSIS — F23 BRIEF PSYCHOTIC DISORDER (H): ICD-10-CM

## 2017-08-01 DIAGNOSIS — E03.9 ACQUIRED HYPOTHYROIDISM: ICD-10-CM

## 2017-08-01 DIAGNOSIS — G14 POSTPOLIO SYNDROME (H): ICD-10-CM

## 2017-08-01 DIAGNOSIS — R41.89 COGNITIVE IMPAIRMENT: ICD-10-CM

## 2017-08-01 PROCEDURE — 99207 ZZC CDG-CORRECTLY CODED, REVIEWED AND AGREE: CPT | Performed by: INTERNAL MEDICINE

## 2017-08-01 RX ORDER — FERROUS SULFATE 325(65) MG
325 TABLET ORAL EVERY EVENING
COMMUNITY
End: 2017-08-17

## 2017-08-01 NOTE — PROGRESS NOTES
Sherita Kenney is a 76 year old female seen August 1, 2017 at [facility] where she has resided for 3 months (admit 5/2017) seen for initial visit.    Patient is seen in her apartment.   Her biggest complaint is diarrhea which has been persistent, causing weakness and fatigue.   She is eating okay, weight has been stable.     Patient was living independently until February 2017 when she was admitted to [facility] with psychosis consisting of paranoia and agitation.    She was found to be acutely ill, with profound anemia, right iliopsoas abscess and SBO secondary to cecal mass.    Patient had abx and laparscopic placement of ileostomy, then in April went back to surgery for open right colectomy and takedown of ileostomy with primary anastomosis.   Pathology revealed poorly differentiated adenocarcinoma with local invasion and 15/24 +LNs.      She has been seeing Dr Lindo, with liver and peritoneal mets found on PET scan in May.   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     She was seen by Psychiatry during the February hospitalization, and started on olanzapine.   Her mental status cleared considerably after acute illness resolved, but she has remained on HS olanzapine.   She is asking about discontinuing that today.     She has been calm since AL admit, no evidence of paranoia or delusional thinking.     Patient had polio as a child and has a flail left shoulder, unable to use LUE.     Patient had Neuropsych testing in May which showed mild dementia, impairments in learning and memory, anomia and mild executive dysfunction, with limited insight into her cognitive difficulties    She has remained in AL, but continues to express wishes to return to her condo and prior independence.       Past Medical History:    Diagnosis Date     Abnormal gait      Adenocarcinoma of colon (H)      Arthralgia of upper arm      Arthritis      Arthritis of right hand      Cancer (H)      Cancer of right colon (H)      Cognitive impairment      COPD (chronic obstructive pulmonary disease) (H)      DJD (degenerative joint disease)     knee and lumbar     Fibromatoses of muscle, ligament, and fascia      Flail joint      Generalized OA      Iliopsoas abscess on right (H)      Iron deficiency anemia      Late effects of poliomyelitis      Lumbago      Osteoarthritis of hip      Pain in limb      Postpolio syndrome      Primary osteoarthritis of right hand      Psychosis      Right-sided back pain      Scoliosis      Thyroid disease        Past Surgical History:   Procedure Laterality Date     COLECTOMY RIGHT N/A 4/13/2017    Procedure: COLECTOMY RIGHT;  Surgeon: Lindsay Peter MD;  Location:  OR     COLONOSCOPY N/A 3/1/2017    Procedure: COLONOSCOPY;  Surgeon: Lindsay Peter MD;  Location:  OR     I & D abdominal abscess       ILEOSTOMY N/A 3/1/2017    Procedure: ILEOSTOMY;  Surgeon: Lindsay Peter MD;  Location:  OR     ILEOSTOMY       LAPAROSCOPIC ILEOSTOMY N/A 3/1/2017    Procedure: LAPAROSCOPIC ILEOSTOMY;  Surgeon: Lindsay Peter MD;  Location:  OR     LAPAROSCOPIC URETEROLYSIS  4/13/2017    Procedure: LAPAROSCOPIC URETEROLYSIS;  Surgeon: Jaycob Mari MD;  Location:  OR     severe protein-calorie malnutrition       SOFT TISSUE SURGERY       TAKEDOWN ILEOSTOMY N/A 4/13/2017    Procedure: TAKEDOWN ILEOSTOMY;  Surgeon: Lindsay Peter MD;  Location:  OR     Family History   Problem Relation Age of Onset     Breast Cancer Maternal Grandmother      Social History   Substance Use Topics     Smoking status: Former Smoker     Packs/day: 1.50     Years: 15.00     Types: Cigarettes     Quit date: 1/4/1986     Smokeless tobacco: Not on file     Alcohol use No      SH:  Single    Lived alone,  [place]onna in [place], has lived there 20 years.     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     Neurologic: LUE weakness  Psychiatric: as above  Hematologic/Lymphatic/Immunologic: anemia  Endocrine: negative      GENERAL APPEARANCE: alert and no distress, thin  BP (!) 154/92  Pulse 80  Temp 97.6  F (36.4  C)  Resp 15  Wt 119 lb (54 kg)  SpO2 97%  BMI 20.42 kg/m2   HEENT: normocephalic, no lesion or abnormalities  NECK: no adenopathy, no asymmetry, masses, or scars and thyroid normal to palpation  RESP: lungs clear to auscultation - no rales, rhonchi or wheezes  CV: regular rate and rhythm, normal S1 S2  ABDOMEN:  soft, nontender, no HSM or masses and bowel sounds normal; incision well healed.     MS: extremities normal- no gross deformities noted, no evidence of inflammation in joints, FROM in all extremities.  Trace LE edema  SKIN: no suspicious lesions or rashes  NEURO: Normal strength and tone, sensory exam grossly normal, and speech normal  PSYCH: affect okay  LYMPHATICS: No cervical,  or supraclavicular nodes     Lab Results   Component Value Date    WBC 6.6 07/27/2017     Lab Results   Component Value Date    HGB 10.1 07/27/2017     Lab Results   Component Value Date    MCV 85 07/27/2017     Lab Results   Component Value Date     07/27/2017      Last Basic Metabolic Panel:  Lab Results   Component Value Date     07/27/2017      Lab Results   Component Value Date    POTASSIUM 4.2 07/27/2017     Lab Results   Component Value Date    CHLORIDE 105 07/27/2017     Lab Results   Component Value Date    SARITHA  8.6 07/27/2017     Lab Results   Component Value Date    CO2 26 07/27/2017     Lab Results   Component Value Date    BUN 15 07/27/2017     Lab Results   Component Value Date    CR 1.02 07/27/2017     Lab Results   Component Value Date     07/27/2017     TSH   Date Value Ref Range Status   04/03/2017 2.30 0.40 - 4.00 mU/L Final        IMP/PLAN:  (R19.7) Diarrhea, unspecified type  (primary encounter diagnosis)  Comment: multifactorial, and related to colectomy, colon CA and CTX, and current medications.     Plan: reviewed with patient    Will dry decreasing Fe to once daily and discontinuing supplements.   Prn Imodium is available.    Diarrhea may improve when she completes her CTX.       (C18.9) Adenocarcinoma of colon (H)  (Z90.49) S/P right colectomy  Comment: metastatic   Plan: follow up with Dr Lindo for ongoing tx        (D50.0) Iron deficiency anemia due to chronic blood loss  Comment: improved  Plan: decrease Fe dose as above.  Follow hgb    (G14) Postpolio syndrome  Comment: with flail left shoulder and LUE weakness of longstanding  Plan: supportive care       (F23) Brief psychotic disorder  Comment: occurred in face of acute illness, and no further symptoms.     Plan: decrease olanzapine to 2.5 mg/HS    If remains stable over next 3-4 weeks, can change to prn.      (R41.89) Cognitive impairment  Comment: reviewed Neuropsych testing results.    Plan: AL support for meds, meals, activity for now.  Will continue to review need for this level of assistance.      Hypothyroidism  Comment: on replacement  Plan: yearly TSH    Padmini Pham MD

## 2017-08-09 ENCOUNTER — CARE COORDINATION (OUTPATIENT)
Dept: CARE COORDINATION | Facility: CLINIC | Age: 77
End: 2017-08-09

## 2017-08-09 NOTE — PROGRESS NOTES
Clinic Care Coordination Contact  Care Team Conversations/Chart Review    Data: Per chart review, it appears that pt is no longer a patient at [facility] but is now followed by [facility].   Plan: Social Work Clinic Care Coordiantor will plan no further outreach. Have changed pt's status to Inactive.    RONN Olivera  [facility] Care Coordination  Clinic: [place]   Email: [email or web address]  Tele: [phone]

## 2017-08-10 ENCOUNTER — HOSPITAL ENCOUNTER (OUTPATIENT)
Facility: CLINIC | Age: 77
Setting detail: SPECIMEN
Discharge: HOME | End: 2017-08-10
Attending: INTERNAL MEDICINE | Admitting: INTERNAL MEDICINE
Payer: MEDICARE

## 2017-08-10 ENCOUNTER — INFUSION THERAPY VISIT (OUTPATIENT)
Dept: INFUSION THERAPY | Facility: CLINIC | Age: 77
End: 2017-08-10
Attending: INTERNAL MEDICINE
Payer: MEDICARE

## 2017-08-10 VITALS
DIASTOLIC BLOOD PRESSURE: 68 MMHG | SYSTOLIC BLOOD PRESSURE: 122 MMHG | HEIGHT: 64 IN | TEMPERATURE: 97.2 F | OXYGEN SATURATION: 98 % | WEIGHT: 119.2 LBS | RESPIRATION RATE: 20 BRPM | HEART RATE: 75 BPM | BODY MASS INDEX: 20.35 KG/M2

## 2017-08-10 DIAGNOSIS — C18.9 ADENOCARCINOMA OF COLON (H): Primary | ICD-10-CM

## 2017-08-10 LAB
ALBUMIN SERPL-MCNC: 3 G/DL (ref 3.4–5)
ALBUMIN UR-MCNC: 10 MG/DL
ALP SERPL-CCNC: 158 U/L (ref 40–150)
ALT SERPL W P-5'-P-CCNC: 27 U/L (ref 0–50)
ANION GAP SERPL CALCULATED.3IONS-SCNC: 9 MMOL/L (ref 3–14)
AST SERPL W P-5'-P-CCNC: 28 U/L (ref 0–45)
BASOPHILS # BLD AUTO: 0 10E9/L (ref 0–0.2)
BASOPHILS NFR BLD AUTO: 0.4 %
BILIRUB SERPL-MCNC: 0.3 MG/DL (ref 0.2–1.3)
BUN SERPL-MCNC: 15 MG/DL (ref 7–30)
CALCIUM SERPL-MCNC: 8.9 MG/DL (ref 8.5–10.1)
CEA SERPL-MCNC: 1.7 UG/L (ref 0–2.5)
CHLORIDE SERPL-SCNC: 107 MMOL/L (ref 94–109)
CO2 SERPL-SCNC: 26 MMOL/L (ref 20–32)
CREAT SERPL-MCNC: 0.72 MG/DL (ref 0.52–1.04)
DIFFERENTIAL METHOD BLD: ABNORMAL
EOSINOPHIL # BLD AUTO: 0 10E9/L (ref 0–0.7)
EOSINOPHIL NFR BLD AUTO: 0.7 %
ERYTHROCYTE [DISTWIDTH] IN BLOOD BY AUTOMATED COUNT: 17.9 % (ref 10–15)
GFR SERPL CREATININE-BSD FRML MDRD: 79 ML/MIN/1.7M2
GLUCOSE SERPL-MCNC: 89 MG/DL (ref 70–99)
HCT VFR BLD AUTO: 32.5 % (ref 35–47)
HGB BLD-MCNC: 10.1 G/DL (ref 11.7–15.7)
IMM GRANULOCYTES # BLD: 0 10E9/L (ref 0–0.4)
IMM GRANULOCYTES NFR BLD: 0.2 %
LYMPHOCYTES # BLD AUTO: 1.5 10E9/L (ref 0.8–5.3)
LYMPHOCYTES NFR BLD AUTO: 26.7 %
MCH RBC QN AUTO: 26.4 PG (ref 26.5–33)
MCHC RBC AUTO-ENTMCNC: 31.1 G/DL (ref 31.5–36.5)
MCV RBC AUTO: 85 FL (ref 78–100)
MONOCYTES # BLD AUTO: 0.7 10E9/L (ref 0–1.3)
MONOCYTES NFR BLD AUTO: 11.9 %
NEUTROPHILS # BLD AUTO: 3.3 10E9/L (ref 1.6–8.3)
NEUTROPHILS NFR BLD AUTO: 60.1 %
NRBC # BLD AUTO: 0 10*3/UL
NRBC BLD AUTO-RTO: 0 /100
PLATELET # BLD AUTO: 130 10E9/L (ref 150–450)
POTASSIUM SERPL-SCNC: 4.1 MMOL/L (ref 3.4–5.3)
PROT SERPL-MCNC: 6.8 G/DL (ref 6.8–8.8)
RBC # BLD AUTO: 3.82 10E12/L (ref 3.8–5.2)
SODIUM SERPL-SCNC: 142 MMOL/L (ref 133–144)
WBC # BLD AUTO: 5.5 10E9/L (ref 4–11)

## 2017-08-10 PROCEDURE — 96367 TX/PROPH/DG ADDL SEQ IV INF: CPT

## 2017-08-10 PROCEDURE — 82378 CARCINOEMBRYONIC ANTIGEN: CPT | Performed by: INTERNAL MEDICINE

## 2017-08-10 PROCEDURE — 80053 COMPREHEN METABOLIC PANEL: CPT | Performed by: INTERNAL MEDICINE

## 2017-08-10 PROCEDURE — 96368 THER/DIAG CONCURRENT INF: CPT

## 2017-08-10 PROCEDURE — 25000128 H RX IP 250 OP 636: Performed by: INTERNAL MEDICINE

## 2017-08-10 PROCEDURE — 96417 CHEMO IV INFUS EACH ADDL SEQ: CPT

## 2017-08-10 PROCEDURE — 81003 URINALYSIS AUTO W/O SCOPE: CPT | Performed by: INTERNAL MEDICINE

## 2017-08-10 PROCEDURE — 85025 COMPLETE CBC W/AUTO DIFF WBC: CPT | Performed by: INTERNAL MEDICINE

## 2017-08-10 PROCEDURE — 96413 CHEMO IV INFUSION 1 HR: CPT

## 2017-08-10 PROCEDURE — 96415 CHEMO IV INFUSION ADDL HR: CPT

## 2017-08-10 PROCEDURE — 96416 CHEMO PROLONG INFUSE W/PUMP: CPT

## 2017-08-10 RX ADMIN — OXALIPLATIN 100 MG: 100 INJECTION, SOLUTION, CONCENTRATE INTRAVENOUS at 11:49

## 2017-08-10 RX ADMIN — LEUCOVORIN CALCIUM 550 MG: 350 INJECTION, POWDER, LYOPHILIZED, FOR SOLUTION INTRAMUSCULAR; INTRAVENOUS at 11:52

## 2017-08-10 RX ADMIN — DEXAMETHASONE SODIUM PHOSPHATE: 10 INJECTION, SOLUTION INTRAMUSCULAR; INTRAVENOUS at 10:35

## 2017-08-10 RX ADMIN — DEXTROSE MONOHYDRATE 250 ML: 50 INJECTION, SOLUTION INTRAVENOUS at 11:49

## 2017-08-10 RX ADMIN — BEVACIZUMAB 270 MG: 100 INJECTION, SOLUTION INTRAVENOUS at 11:15

## 2017-08-10 RX ADMIN — SODIUM CHLORIDE 250 ML: 9 INJECTION, SOLUTION INTRAVENOUS at 10:35

## 2017-08-10 ASSESSMENT — PAIN SCALES - GENERAL: PAINLEVEL: NO PAIN (0)

## 2017-08-10 NOTE — PROGRESS NOTES
"Infusion Nursing Note:  Sherita Kenney presents today for Cycle 6 Day 1 Avastin, Leucovorin, Oxaliplatin and Fluorouracil pump connect.    Patient seen by provider today: No   present during visit today: Not Applicable.    Note: N/A.    Intravenous Access:  Implanted Port.    Treatment Conditions:  Lab Results   Component Value Date    HGB 10.1 08/10/2017     Lab Results   Component Value Date    WBC 5.5 08/10/2017      Lab Results   Component Value Date    ANEU 3.3 08/10/2017     Lab Results   Component Value Date     08/10/2017      Lab Results   Component Value Date     08/10/2017                   Lab Results   Component Value Date    POTASSIUM 4.1 08/10/2017           Lab Results   Component Value Date    MAG 2.0 04/15/2017            Lab Results   Component Value Date    CR 0.72 08/10/2017                   Lab Results   Component Value Date    SARITHA 8.9 08/10/2017                Lab Results   Component Value Date    BILITOTAL 0.3 08/10/2017           Lab Results   Component Value Date    ALBUMIN 3.0 08/10/2017                    Lab Results   Component Value Date    ALT 27 08/10/2017           Lab Results   Component Value Date    AST 28 08/10/2017     Results reviewed, labs MET treatment parameters, ok to proceed with treatment.  Urine Protein: 10.    Post Infusion Assessment:  Patient tolerated infusion without incident.  Blood return noted pre and post infusion.  Site patent and intact, free from redness, edema or discomfort.  No evidence of extravasations.    Prior to discharge: Port is secured in place with tegaderm and flushed with 10cc NS with positive blood return noted.  Continuous home infusion CADD pump connected.    All connectors secured in place and clamps taped open.    Pump started, \"running\" noted on display (CADD): YES.  Patient instructed to call our clinic or [facility] with any questions or concerns at home.  Patient verbalized understanding.    Patient " set up for pump disconnect at our clinic on 8/12/17 at 12:00.      Discharge Plan:   Patient declined prescription refills.  Discharge instructions reviewed with: Patient and Sister.  Patient and family verbalized understanding of discharge instructions and all questions answered.  Copy of AVS reviewed with patient and family.  Patient will return 8/12 for next appointment.  Patient discharged in stable condition accompanied by: self and sister.  Departure Mode: Ambulatory.    Mary Sequeira RN

## 2017-08-10 NOTE — MR AVS SNAPSHOT
After Visit Summary   8/10/2017    Sherita Kenney    MRN: [id number]           Patient Information     Date Of Birth          1940        Visit Information        Provider Department      8/10/2017 9:30 AM  INFUSION CHAIR 12 [facility]        Today's Diagnoses     Adenocarcinoma of colon (H)    -  1       Follow-ups after your visit        Your next 10 appointments already scheduled     Aug 12, 2017 12:00 PM CDT   Level 1 with  INFUSION CHAIR 14   [facility] ([facility])    [facility]  [address]  [place] [address]   [phone]            Aug 24, 2017  9:30 AM CDT   Level 6 with  INFUSION CHAIR 3   [facility] ([facility])    [facility] [place]  [address]  [place] [address]   [phone]            Aug 24, 2017 10:00 AM CDT   Return Visit with Susan Lindo MD   [facility] ([facility])    [facility]  [address]  [place] [address]   [phone]              Who to contact     If you have questions or need follow up information about today's clinic visit or your schedule please contact [facility] AND [facility] directly at [phone].  Normal or non-critical lab and imaging results will be communicated to you by MyChart, letter or phone within 4 business days after the clinic has received the results. If you do not hear from us within 7 days, please contact the clinic through MyChart or phone. If you have a critical or abnormal lab result, we will notify you by phone as soon as possible.  Submit refill requests through [employer] or call your pharmacy and they will forward the refill request to us. Please allow 3 business days for your refill to be completed.          Additional Information About Your  "Visit        DroneCasthart Information     [employer] lets you send messages to your doctor, view your test results, renew your prescriptions, schedule appointments and more. To sign up, go to [email or web address] . Click on \"Log in\" on the left side of the screen, which will take you to the Welcome page. Then click on \"Sign up Now\" on the right side of the page.     You will be asked to enter the access code listed below, as well as some personal information. Please follow the directions to create your username and password.     Your access code is: [email or web address]  Expires: 2017  6:30 AM     Your access code will  in 90 days. If you need help or a new code, please call your [place] clinic or [phone].        Care EveryWhere ID     This is your Care EveryWhere ID. This could be used by other organizations to access your [place] medical records  [id number]        Your Vitals Were     Pulse Temperature Respirations Height Pulse Oximetry BMI (Body Mass Index)    75 97.2  F (36.2  C) (Oral) 20 1.626 m (5' 4.02\") 98% 20.45 kg/m2       Blood Pressure from Last 3 Encounters:   08/10/17 122/68   17 (!) 154/92   17 108/69    Weight from Last 3 Encounters:   08/10/17 54.1 kg (119 lb 3.2 oz)   17 54 kg (119 lb)   17 54 kg (119 lb)              We Performed the Following     CBC with platelets differential     CEA     Comprehensive metabolic panel     Protein qualitative urine        Primary Care Provider Office Phone # Fax #    Sherita WILLY Juarez [phone]       [address] [address]        Equal Access to Services     ALEXIS ALBA : Ruddy Nunes, laura pillai, qaybta kaalmayvon pemberton, ellis ren. So [facility] [place] [facility] [phone].    ATENCIÓN: Si habla español, tiene a roldan disposición servicios gratuitos de asistencia lingüística. Llame al [phone].    We comply with applicable federal civil rights laws and " [place] laws. We do not discriminate on the basis of race, color, national origin, age, disability sex, sexual orientation or gender identity.            Thank you!     Thank you for choosing [facility] AND [facility]  for your care. Our goal is always to provide you with excellent care. Hearing back from our patients is one way we can continue to improve our services. Please take a few minutes to complete the written survey that you may receive in the mail after your visit with us. Thank you!             Your Updated Medication List - Protect others around you: Learn how to safely use, store and throw away your medicines at www.disposemymeds.org.          This list is accurate as of: 8/10/17  2:22 PM.  Always use your most recent med list.                   Brand Name Dispense Instructions for use Diagnosis    acetaminophen 325 MG tablet    TYLENOL    60 tablet    Take 2 tablets (650 mg) by mouth every 4 hours as needed for mild pain or fever    Cancer associated pain       CETAPHIL lotion      Apply topically 2 times daily Also bid prn . To dry skin.        CYCLOBENZAPRINE HCL PO      Take 10 mg by mouth 3 times daily as needed for muscle spasms        ENSURE PLUS Liqd      Take by mouth 2 times daily Admin 8oz twice daily for supplement        ferrous sulfate 325 (65 FE) MG tablet    IRON     Take 325 mg by mouth every evening        HYDROcodone-acetaminophen 5-325 MG per tablet    NORCO     Take 1 tablet by mouth every 6 hours as needed for moderate to severe pain        IMODIUM A-D 2 MG capsule   Generic drug:  loperamide      Take 2 mg by mouth as needed for diarrhea        levothyroxine 75 MCG tablet    SYNTHROID/LEVOTHROID    30 tablet    Take 1 tablet (75 mcg) by mouth daily Recheck TSH in 6 months    Hypothyroidism, unspecified type       LORazepam 0.5 MG tablet    ATIVAN    30 tablet    Take 1 tablet (0.5 mg) by mouth every 4 hours as needed (Anxiety, Nausea/Vomiting or Sleep)     Adenocarcinoma of colon (H)       menthol-zinc oxide 0.44-20.625 % Oint ointment    CALMOSEPTINE    1 Tube    Apply topically 4 times daily as needed for skin protection    Chronic diarrhea       OLANZAPINE PO      Take 2.5 mg by mouth At Bedtime        ondansetron 8 MG tablet    ZOFRAN    10 tablet    Take 1 tablet (8 mg) by mouth every 8 hours as needed (Nausea/Vomiting)    Adenocarcinoma of colon (H)       PREPARATION H 0.25-88.44 % per suppository   Generic drug:  phenylephrine-cocoa butter      Place 1 suppository rectally 2 times daily as needed for hemorrhoids or itching        SUMATRIPTAN SUCCINATE PO      Take 25 mg by mouth every 8 hours as needed for migraine        TRAVATAN Z 0.004 % ophthalmic solution   Generic drug:  travoprost (BAK Free)      Place 1 drop into both eyes At Bedtime        vitamin B-Complex     30 tablet    Take 1 tablet by mouth daily    S/P colectomy

## 2017-08-12 ENCOUNTER — INFUSION THERAPY VISIT (OUTPATIENT)
Dept: INFUSION THERAPY | Facility: CLINIC | Age: 77
End: 2017-08-12
Attending: INTERNAL MEDICINE
Payer: MEDICARE

## 2017-08-12 VITALS
SYSTOLIC BLOOD PRESSURE: 104 MMHG | DIASTOLIC BLOOD PRESSURE: 65 MMHG | RESPIRATION RATE: 18 BRPM | HEART RATE: 62 BPM | TEMPERATURE: 98.6 F

## 2017-08-12 DIAGNOSIS — Z95.828 PORT-A-CATH IN PLACE: Primary | ICD-10-CM

## 2017-08-12 PROCEDURE — 25000128 H RX IP 250 OP 636: Performed by: INTERNAL MEDICINE

## 2017-08-12 RX ORDER — HEPARIN SODIUM (PORCINE) LOCK FLUSH IV SOLN 100 UNIT/ML 100 UNIT/ML
500 SOLUTION INTRAVENOUS EVERY 8 HOURS
Status: DISCONTINUED | OUTPATIENT
Start: 2017-08-12 | End: 2017-08-12 | Stop reason: HOSPADM

## 2017-08-12 RX ORDER — HEPARIN SODIUM (PORCINE) LOCK FLUSH IV SOLN 100 UNIT/ML 100 UNIT/ML
500 SOLUTION INTRAVENOUS EVERY 8 HOURS
Status: CANCELLED
Start: 2017-08-12

## 2017-08-12 RX ADMIN — SODIUM CHLORIDE, PRESERVATIVE FREE 500 UNITS: 5 INJECTION INTRAVENOUS at 12:26

## 2017-08-12 ASSESSMENT — PAIN SCALES - GENERAL: PAINLEVEL: NO PAIN (0)

## 2017-08-12 NOTE — MR AVS SNAPSHOT
"              After Visit Summary   8/12/2017    Sherita Kenney    MRN: [id number]           Patient Information     Date Of Birth          1940        Visit Information        Provider Department      8/12/2017 12:00 PM  INFUSION CHAIR 14 [facility]        Today's Diagnoses     Port-a-cath in place    -  1       Follow-ups after your visit        Your next 10 appointments already scheduled     Aug 24, 2017  9:30 AM CDT   Level 6 with  INFUSION CHAIR 3   [facility] ([facility])    [facility]  [address]  [place] [address]   [phone]            Aug 24, 2017 10:00 AM CDT   Return Visit with Susan Lindo MD   [facility] ([facility])    [facility]  [address]  [place] [address] [phone]              Who to contact     If you have questions or need follow up information about today's clinic visit or your schedule please contact [facility] AND [facility] [place] directly at [phone].  Normal or non-critical lab and imaging results will be communicated to you by MyChart, letter or phone within 4 business days after the clinic has received the results. If you do not hear from us within 7 days, please contact the clinic through [employer] or phone. If you have a critical or abnormal lab result, we will notify you by phone as soon as possible.  Submit refill requests through [employer] or call your pharmacy and they will forward the refill request to us. Please allow 3 business days for your refill to be completed.          Additional Information About Your Visit        MyChart Information     [employer] lets you send messages to your doctor, view your test results, renew your prescriptions, schedule appointments and more. To sign up, go to [email or web address] . Click on \"Log in\" on the left side of the " "screen, which will take you to the Welcome page. Then click on \"Sign up Now\" on the right side of the page.     You will be asked to enter the access code listed below, as well as some personal information. Please follow the directions to create your username and password.     Your access code is: [email or web address]  Expires: 2017  6:30 AM     Your access code will  in 90 days. If you need help or a new code, please call your [facility] [place] or [phone].        Care EveryWhere ID     This is your Care EveryWhere ID. This could be used by other organizations to access your [place] medical records  [id number]        Your Vitals Were     Pulse Temperature Respirations             62 98.6  F (37  C) (Oral) 18          Blood Pressure from Last 3 Encounters:   17 104/65   08/10/17 122/68   17 (!) 154/92    Weight from Last 3 Encounters:   08/10/17 54.1 kg (119 lb 3.2 oz)   17 54 kg (119 lb)   17 54 kg (119 lb)              Today, you had the following     No orders found for display       Primary Care Provider Office Phone # Fax #    Sherita Toussaint, APRN [phone]       [address] [place] [address]        Equal Access to Services     FILEMON ALBA : Hadii aad ku hadasho Somonaali, waaxda luqadaha, qaybta kaalmada nilay, ellis mooney . So [facility] [phone].    ATENCIÓN: Si habla español, tiene a roldan disposición servicios gratuitos de asistencia lingüística. Malaika al [phone].    We comply with applicable federal civil rights laws and [place] laws. We do not discriminate on the basis of race, color, national origin, age, disability sex, sexual orientation or gender identity.            Thank you!     Thank you for choosing [facility] AND [facility][1]  for your care. Our goal is always to provide you with excellent care. Hearing back from our patients is one way we can continue to improve our services. " Please take a few minutes to complete the written survey that you may receive in the mail after your visit with us. Thank you!             Your Updated Medication List - Protect others around you: Learn how to safely use, store and throw away your medicines at www.disposemymeds.org.          This list is accurate as of: 8/12/17 12:25 PM.  Always use your most recent med list.                   Brand Name Dispense Instructions for use Diagnosis    acetaminophen 325 MG tablet    TYLENOL    60 tablet    Take 2 tablets (650 mg) by mouth every 4 hours as needed for mild pain or fever    Cancer associated pain       CETAPHIL lotion      Apply topically 2 times daily Also bid prn . To dry skin.        CYCLOBENZAPRINE HCL PO      Take 10 mg by mouth 3 times daily as needed for muscle spasms        ENSURE PLUS Liqd      Take by mouth 2 times daily Admin 8oz twice daily for supplement        ferrous sulfate 325 (65 FE) MG tablet    IRON     Take 325 mg by mouth every evening        HYDROcodone-acetaminophen 5-325 MG per tablet    NORCO     Take 1 tablet by mouth every 6 hours as needed for moderate to severe pain        IMODIUM A-D 2 MG capsule   Generic drug:  loperamide      Take 2 mg by mouth as needed for diarrhea        levothyroxine 75 MCG tablet    SYNTHROID/LEVOTHROID    30 tablet    Take 1 tablet (75 mcg) by mouth daily Recheck TSH in 6 months    Hypothyroidism, unspecified type       LORazepam 0.5 MG tablet    ATIVAN    30 tablet    Take 1 tablet (0.5 mg) by mouth every 4 hours as needed (Anxiety, Nausea/Vomiting or Sleep)    Adenocarcinoma of colon (H)       menthol-zinc oxide 0.44-20.625 % Oint ointment    CALMOSEPTINE    1 Tube    Apply topically 4 times daily as needed for skin protection    Chronic diarrhea       OLANZAPINE PO      Take 2.5 mg by mouth At Bedtime        ondansetron 8 MG tablet    ZOFRAN    10 tablet    Take 1 tablet (8 mg) by mouth every 8 hours as needed (Nausea/Vomiting)    Adenocarcinoma of  colon (H)       PREPARATION H 0.25-88.44 % per suppository   Generic drug:  phenylephrine-cocoa butter      Place 1 suppository rectally 2 times daily as needed for hemorrhoids or itching        SUMATRIPTAN SUCCINATE PO      Take 25 mg by mouth every 8 hours as needed for migraine        TRAVATAN Z 0.004 % ophthalmic solution   Generic drug:  travoprost (BAK Free)      Place 1 drop into both eyes At Bedtime        vitamin B-Complex     30 tablet    Take 1 tablet by mouth daily    S/P colectomy

## 2017-08-12 NOTE — PROGRESS NOTES
Infusion Nursing Note:  Sherita Kenney presents today for pump disconncect.    Patient seen by provider today: No   present during visit today: Not Applicable.    Note: N/A.    Intravenous Access:  Implanted Port.    Treatment Conditions:  Not Applicable.      Post Infusion Assessment:  Patient tolerated infusion without incident.  Blood return noted pre and post infusion.  Site patent and intact, free from redness, edema or discomfort.  No evidence of extravasations.  Access discontinued per protocol.    Discharge Plan:   Discharge instructions reviewed with: Patient and Family.  Patient and/or family verbalized understanding of discharge instructions and all questions answered.  Copy of AVS reviewed with patient and/or family.  Patient discharged in stable condition accompanied by: sister.  Departure Mode: Ambulatory.    Patrick Lyles RN

## 2017-08-17 DIAGNOSIS — R19.7 DIARRHEA, UNSPECIFIED TYPE: ICD-10-CM

## 2017-08-17 DIAGNOSIS — D50.0 IRON DEFICIENCY ANEMIA DUE TO CHRONIC BLOOD LOSS: Primary | ICD-10-CM

## 2017-08-17 DIAGNOSIS — F29 PSYCHOSIS, UNSPECIFIED PSYCHOSIS TYPE (H): ICD-10-CM

## 2017-08-17 RX ORDER — LOPERAMIDE HCL 2 MG
2 CAPSULE ORAL PRN
Qty: 20 CAPSULE | Status: CANCELLED | OUTPATIENT
Start: 2017-08-17

## 2017-08-17 RX ORDER — FERROUS SULFATE 325(65) MG
325 TABLET ORAL EVERY EVENING
Qty: 31 TABLET | Refills: 3 | Status: ON HOLD | OUTPATIENT
Start: 2017-08-17 | End: 2017-01-01

## 2017-08-17 RX ORDER — OLANZAPINE 2.5 MG/1
2.5 TABLET, FILM COATED ORAL DAILY
Qty: 31 TABLET | Refills: 3 | Status: SHIPPED | OUTPATIENT
Start: 2017-08-17 | End: 2017-01-01

## 2017-08-17 RX ORDER — FERROUS SULFATE 325(65) MG
325 TABLET ORAL EVERY EVENING
Qty: 100 TABLET | Status: CANCELLED | OUTPATIENT
Start: 2017-08-17

## 2017-08-17 RX ORDER — LOPERAMIDE HCL 2 MG
2 CAPSULE ORAL DAILY
Qty: 155 CAPSULE | Refills: 3 | Status: SHIPPED | OUTPATIENT
Start: 2017-08-17 | End: 2017-01-01

## 2017-08-18 RX ORDER — SODIUM CHLORIDE 9 MG/ML
1000 INJECTION, SOLUTION INTRAVENOUS CONTINUOUS PRN
Status: CANCELLED
Start: 2017-08-24

## 2017-08-18 RX ORDER — ALBUTEROL SULFATE 90 UG/1
1-2 AEROSOL, METERED RESPIRATORY (INHALATION)
Status: CANCELLED
Start: 2017-08-24

## 2017-08-18 RX ORDER — MEPERIDINE HYDROCHLORIDE 50 MG/ML
25 INJECTION INTRAMUSCULAR; INTRAVENOUS; SUBCUTANEOUS EVERY 30 MIN PRN
Status: CANCELLED | OUTPATIENT
Start: 2017-08-24

## 2017-08-18 RX ORDER — LORAZEPAM 2 MG/ML
0.5 INJECTION INTRAMUSCULAR EVERY 4 HOURS PRN
Status: CANCELLED
Start: 2017-08-24

## 2017-08-18 RX ORDER — DIPHENHYDRAMINE HYDROCHLORIDE 50 MG/ML
50 INJECTION INTRAMUSCULAR; INTRAVENOUS
Status: CANCELLED
Start: 2017-08-24

## 2017-08-18 RX ORDER — EPINEPHRINE 0.3 MG/.3ML
0.3 INJECTION SUBCUTANEOUS EVERY 5 MIN PRN
Status: CANCELLED | OUTPATIENT
Start: 2017-08-24

## 2017-08-18 RX ORDER — EPINEPHRINE 1 MG/ML
0.3 INJECTION INTRAMUSCULAR; INTRAVENOUS; SUBCUTANEOUS EVERY 5 MIN PRN
Status: CANCELLED | OUTPATIENT
Start: 2017-08-24

## 2017-08-18 RX ORDER — ALBUTEROL SULFATE 0.83 MG/ML
2.5 SOLUTION RESPIRATORY (INHALATION)
Status: CANCELLED | OUTPATIENT
Start: 2017-08-24

## 2017-08-18 RX ORDER — METHYLPREDNISOLONE SODIUM SUCCINATE 125 MG/2ML
125 INJECTION, POWDER, LYOPHILIZED, FOR SOLUTION INTRAMUSCULAR; INTRAVENOUS
Status: CANCELLED
Start: 2017-08-24

## 2017-08-24 ENCOUNTER — HOSPITAL ENCOUNTER (OUTPATIENT)
Facility: CLINIC | Age: 77
Setting detail: SPECIMEN
Discharge: HOME | End: 2017-08-24
Attending: INTERNAL MEDICINE | Admitting: INTERNAL MEDICINE
Payer: MEDICARE

## 2017-08-24 ENCOUNTER — ONCOLOGY VISIT (OUTPATIENT)
Dept: ONCOLOGY | Facility: CLINIC | Age: 77
End: 2017-08-24
Attending: INTERNAL MEDICINE
Payer: MEDICARE

## 2017-08-24 ENCOUNTER — INFUSION THERAPY VISIT (OUTPATIENT)
Dept: INFUSION THERAPY | Facility: CLINIC | Age: 77
End: 2017-08-24
Attending: INTERNAL MEDICINE
Payer: MEDICARE

## 2017-08-24 VITALS
OXYGEN SATURATION: 98 % | DIASTOLIC BLOOD PRESSURE: 79 MMHG | SYSTOLIC BLOOD PRESSURE: 133 MMHG | WEIGHT: 119.2 LBS | RESPIRATION RATE: 18 BRPM | HEART RATE: 69 BPM | BODY MASS INDEX: 20.35 KG/M2 | TEMPERATURE: 97.8 F | HEIGHT: 64 IN

## 2017-08-24 VITALS
DIASTOLIC BLOOD PRESSURE: 67 MMHG | TEMPERATURE: 97.8 F | RESPIRATION RATE: 20 BRPM | WEIGHT: 119.2 LBS | SYSTOLIC BLOOD PRESSURE: 128 MMHG | HEART RATE: 69 BPM | OXYGEN SATURATION: 98 % | BODY MASS INDEX: 20.45 KG/M2

## 2017-08-24 DIAGNOSIS — C18.9 ADENOCARCINOMA OF COLON (H): Primary | ICD-10-CM

## 2017-08-24 LAB
ALBUMIN SERPL-MCNC: 3.2 G/DL (ref 3.4–5)
ALBUMIN UR-MCNC: 10 MG/DL
ALP SERPL-CCNC: 123 U/L (ref 40–150)
ALT SERPL W P-5'-P-CCNC: 23 U/L (ref 0–50)
ANION GAP SERPL CALCULATED.3IONS-SCNC: 7 MMOL/L (ref 3–14)
AST SERPL W P-5'-P-CCNC: 27 U/L (ref 0–45)
BASOPHILS # BLD AUTO: 0 10E9/L (ref 0–0.2)
BASOPHILS NFR BLD AUTO: 0.8 %
BILIRUB SERPL-MCNC: 0.4 MG/DL (ref 0.2–1.3)
BUN SERPL-MCNC: 11 MG/DL (ref 7–30)
CALCIUM SERPL-MCNC: 9 MG/DL (ref 8.5–10.1)
CHLORIDE SERPL-SCNC: 108 MMOL/L (ref 94–109)
CO2 SERPL-SCNC: 28 MMOL/L (ref 20–32)
CREAT SERPL-MCNC: 0.76 MG/DL (ref 0.52–1.04)
DIFFERENTIAL METHOD BLD: ABNORMAL
EOSINOPHIL # BLD AUTO: 0 10E9/L (ref 0–0.7)
EOSINOPHIL NFR BLD AUTO: 0.8 %
ERYTHROCYTE [DISTWIDTH] IN BLOOD BY AUTOMATED COUNT: 19.5 % (ref 10–15)
GFR SERPL CREATININE-BSD FRML MDRD: 74 ML/MIN/1.7M2
GLUCOSE SERPL-MCNC: 82 MG/DL (ref 70–99)
HCT VFR BLD AUTO: 34.8 % (ref 35–47)
HGB BLD-MCNC: 10.8 G/DL (ref 11.7–15.7)
IMM GRANULOCYTES # BLD: 0 10E9/L (ref 0–0.4)
IMM GRANULOCYTES NFR BLD: 0.2 %
LYMPHOCYTES # BLD AUTO: 1.4 10E9/L (ref 0.8–5.3)
LYMPHOCYTES NFR BLD AUTO: 26.5 %
MCH RBC QN AUTO: 27.1 PG (ref 26.5–33)
MCHC RBC AUTO-ENTMCNC: 31 G/DL (ref 31.5–36.5)
MCV RBC AUTO: 87 FL (ref 78–100)
MONOCYTES # BLD AUTO: 0.8 10E9/L (ref 0–1.3)
MONOCYTES NFR BLD AUTO: 15.8 %
NEUTROPHILS # BLD AUTO: 3 10E9/L (ref 1.6–8.3)
NEUTROPHILS NFR BLD AUTO: 55.9 %
NRBC # BLD AUTO: 0 10*3/UL
NRBC BLD AUTO-RTO: 0 /100
PLATELET # BLD AUTO: 117 10E9/L (ref 150–450)
POTASSIUM SERPL-SCNC: 4 MMOL/L (ref 3.4–5.3)
PROT SERPL-MCNC: 6.7 G/DL (ref 6.8–8.8)
RBC # BLD AUTO: 3.99 10E12/L (ref 3.8–5.2)
SODIUM SERPL-SCNC: 143 MMOL/L (ref 133–144)
WBC # BLD AUTO: 5.3 10E9/L (ref 4–11)

## 2017-08-24 PROCEDURE — 99214 OFFICE O/P EST MOD 30 MIN: CPT | Performed by: INTERNAL MEDICINE

## 2017-08-24 PROCEDURE — 96368 THER/DIAG CONCURRENT INF: CPT

## 2017-08-24 PROCEDURE — 81003 URINALYSIS AUTO W/O SCOPE: CPT | Performed by: INTERNAL MEDICINE

## 2017-08-24 PROCEDURE — 85025 COMPLETE CBC W/AUTO DIFF WBC: CPT | Performed by: INTERNAL MEDICINE

## 2017-08-24 PROCEDURE — 96413 CHEMO IV INFUSION 1 HR: CPT

## 2017-08-24 PROCEDURE — 25000128 H RX IP 250 OP 636: Performed by: INTERNAL MEDICINE

## 2017-08-24 PROCEDURE — 96416 CHEMO PROLONG INFUSE W/PUMP: CPT

## 2017-08-24 PROCEDURE — 96417 CHEMO IV INFUS EACH ADDL SEQ: CPT

## 2017-08-24 PROCEDURE — 96367 TX/PROPH/DG ADDL SEQ IV INF: CPT

## 2017-08-24 PROCEDURE — 96415 CHEMO IV INFUSION ADDL HR: CPT

## 2017-08-24 PROCEDURE — 80053 COMPREHEN METABOLIC PANEL: CPT | Performed by: INTERNAL MEDICINE

## 2017-08-24 RX ORDER — METHYLPREDNISOLONE SODIUM SUCCINATE 125 MG/2ML
125 INJECTION, POWDER, LYOPHILIZED, FOR SOLUTION INTRAMUSCULAR; INTRAVENOUS
Status: CANCELLED
Start: 2017-09-08

## 2017-08-24 RX ORDER — ALBUTEROL SULFATE 0.83 MG/ML
2.5 SOLUTION RESPIRATORY (INHALATION)
Status: CANCELLED | OUTPATIENT
Start: 2017-09-08

## 2017-08-24 RX ORDER — LORAZEPAM 2 MG/ML
0.5 INJECTION INTRAMUSCULAR EVERY 4 HOURS PRN
Status: CANCELLED
Start: 2017-09-08

## 2017-08-24 RX ORDER — EPINEPHRINE 1 MG/ML
0.3 INJECTION INTRAMUSCULAR; INTRAVENOUS; SUBCUTANEOUS EVERY 5 MIN PRN
Status: CANCELLED | OUTPATIENT
Start: 2017-09-08

## 2017-08-24 RX ORDER — EPINEPHRINE 0.3 MG/.3ML
0.3 INJECTION SUBCUTANEOUS EVERY 5 MIN PRN
Status: CANCELLED | OUTPATIENT
Start: 2017-09-08

## 2017-08-24 RX ORDER — MEPERIDINE HYDROCHLORIDE 50 MG/ML
25 INJECTION INTRAMUSCULAR; INTRAVENOUS; SUBCUTANEOUS EVERY 30 MIN PRN
Status: CANCELLED | OUTPATIENT
Start: 2017-09-08

## 2017-08-24 RX ORDER — ALBUTEROL SULFATE 90 UG/1
1-2 AEROSOL, METERED RESPIRATORY (INHALATION)
Status: CANCELLED
Start: 2017-09-08

## 2017-08-24 RX ORDER — SODIUM CHLORIDE 9 MG/ML
1000 INJECTION, SOLUTION INTRAVENOUS CONTINUOUS PRN
Status: CANCELLED
Start: 2017-09-08

## 2017-08-24 RX ORDER — DIPHENHYDRAMINE HYDROCHLORIDE 50 MG/ML
50 INJECTION INTRAMUSCULAR; INTRAVENOUS
Status: CANCELLED
Start: 2017-09-08

## 2017-08-24 RX ADMIN — BEVACIZUMAB 270 MG: 100 INJECTION, SOLUTION INTRAVENOUS at 10:57

## 2017-08-24 RX ADMIN — DEXTROSE MONOHYDRATE 250 ML: 50 INJECTION, SOLUTION INTRAVENOUS at 11:26

## 2017-08-24 RX ADMIN — SODIUM CHLORIDE 250 ML: 9 INJECTION, SOLUTION INTRAVENOUS at 10:39

## 2017-08-24 RX ADMIN — OXALIPLATIN 100 MG: 100 INJECTION, SOLUTION, CONCENTRATE INTRAVENOUS at 11:30

## 2017-08-24 RX ADMIN — DEXAMETHASONE SODIUM PHOSPHATE: 10 INJECTION, SOLUTION INTRAMUSCULAR; INTRAVENOUS at 10:39

## 2017-08-24 RX ADMIN — LEUCOVORIN CALCIUM 550 MG: 350 INJECTION, POWDER, LYOPHILIZED, FOR SOLUTION INTRAMUSCULAR; INTRAVENOUS at 11:29

## 2017-08-24 ASSESSMENT — PAIN SCALES - GENERAL
PAINLEVEL: NO PAIN (0)
PAINLEVEL: NO PAIN (0)

## 2017-08-24 NOTE — MR AVS SNAPSHOT
After Visit Summary   8/24/2017    Sherita Kenney    MRN: [id number]           Patient Information     Date Of Birth          1940        Visit Information        Provider Department      8/24/2017 10:00 AM Susan Lindo MD [facility]        Today's Diagnoses     Adenocarcinoma of colon (H)    -  1      Care Instructions    Continue chemotherapy.  Scheduled/Meli  CT scan mid September. Scheduled/Meli  Follow up after scans. Scheduled/Meli      AVS printed & given to patient/meli          Follow-ups after your visit        Your next 10 appointments already scheduled     Aug 26, 2017 11:30 AM CDT   Level 1 with  INFUSION CHAIR 14   [facility] ([facility])    [facility]  [address]  [facility] [address]   [phone]            Sep 07, 2017  9:00 AM CDT   Level 6 with  INFUSION CHAIR 7   [facility] ([facility])    [facility]  [address]  [facility] [address]   [phone]            Sep 13, 2017 10:00 AM CDT   CT CHEST ABDOMEN PELVIS W/O & W CONTRAST with SHCT1   [facility] CT ([facility])    [address] [facility] [address]   [phone]           Please bring any scans or X-rays taken at other hospitals, if similar tests were done. Also bring a list of your medicines, including vitamins, minerals and over-the-counter drugs. It is safest to leave personal items at home.  Be sure to tell your doctor:   If you have any allergies.   If there s any chance you are pregnant.   If you are breastfeeding.   If you have any special needs.  You may have contrast for this exam. To prepare:   Do not eat or drink for 2 hours before your exam. If you need to take medicine, you may take it with small sips of water. (We may ask you to take liquid medicine as well.)   The day  before your exam, drink extra fluids at least six 8-ounce glasses (unless your doctor tells you to restrict your fluids).  Patients over 70 or patients with diabetes or kidney problems:   If you haven t had a blood test (creatinine test) within the last 30 days, go to your clinic or Diagnostic Imaging Department for this test.  If you have diabetes:   If your kidney function is normal, continue taking your metformin (Avandamet, Glucophage, Glucovance, Metaglip) on the day of your exam.   If your kidney function is abnormal, wait 48 hours before restarting this medicine.  You will have oral contrast for this exam:   You will drink the contrast at home. Get this from your clinic or Diagnostic Imaging Department. Please follow the directions given.  Please wear loose clothing, such as a sweat suit or jogging clothes. Avoid snaps, zippers and other metal. We may ask you to undress and put on a hospital gown.  If you have any questions, please call the Imaging Department where you will have your exam.            Sep 14, 2017 10:30 AM CDT   Return Visit with Susan Lindo MD   [facility] ([facility])    [facility]  [address]  [facility] [id number] [phone]              Future tests that were ordered for you today     Open Future Orders        Priority Expected Expires Ordered    CT Chest Abdomen Pelvis w/o & w Contrast Routine 9/15/2017 8/24/2018 8/24/2017            Who to contact     If you have questions or need follow up information about today's clinic visit or your schedule please contact [facility] directly at [phone].  Normal or non-critical lab and imaging results will be communicated to you by MyChart, letter or phone within 4 business days after the clinic has received the results. If you do not hear from us within 7 days, please contact the clinic through MyChart or phone. If you have a critical or abnormal lab result, we  "will notify you by phone as soon as possible.  Submit refill requests through [employer] or call your pharmacy and they will forward the refill request to us. Please allow 3 business days for your refill to be completed.          Additional Information About Your Visit        [employer] Information     [employer] lets you send messages to your doctor, view your test results, renew your prescriptions, schedule appointments and more. To sign up, go to [email or web address] . Click on \"Log in\" on the left side of the screen, which will take you to the Welcome page. Then click on \"Sign up Now\" on the right side of the page.     You will be asked to enter the access code listed below, as well as some personal information. Please follow the directions to create your username and password.     Your access code is: [email or web address]  Expires: 2017  6:30 AM     Your access code will  in 90 days. If you need help or a new code, please call your [place] clinic or [phone].        Care EveryWhere ID     This is your Care EveryWhere ID. This could be used by other organizations to access your [place] medical records  [id number]        Your Vitals Were     Pulse Temperature Respirations Pulse Oximetry BMI (Body Mass Index)       69 97.8  F (36.6  C) (Oral) 20 98% 20.45 kg/m2        Blood Pressure from Last 3 Encounters:   17 128/67   17 128/67   17 104/65    Weight from Last 3 Encounters:   17 54.1 kg (119 lb 3.2 oz)   17 54.1 kg (119 lb 3.2 oz)   08/10/17 54.1 kg (119 lb 3.2 oz)               Primary Care Provider Office Phone # Fax #    Sherita Viveros WILLY Toussaint [phone]       [address] [address]        Equal Access to Services     [facility] : Ruddy Nunes, waaxyvon luqadaha, qaybta dihn pemberton, ellis mooney . [facility] [phone].    ATENCIÓN: Si habla español, tiene a roldan disposición servicios " akash de asistencia lingüística. Malaika jimenez [phone].    We comply with applicable federal civil rights laws and [place] laws. We do not discriminate on the basis of race, color, national origin, age, disability sex, sexual orientation or gender identity.            Thank you!     Thank you for choosing [facility]  for your care. Our goal is always to provide you with excellent care. Hearing back from our patients is one way we can continue to improve our services. Please take a few minutes to complete the written survey that you may receive in the mail after your visit with us. Thank you!             Your Updated Medication List - Protect others around you: Learn how to safely use, store and throw away your medicines at www.disposemymeds.org.          This list is accurate as of: 8/24/17 12:12 PM.  Always use your most recent med list.                   Brand Name Dispense Instructions for use Diagnosis    acetaminophen 325 MG tablet    TYLENOL    60 tablet    Take 2 tablets (650 mg) by mouth every 4 hours as needed for mild pain or fever    Cancer associated pain       CETAPHIL lotion      Apply topically 2 times daily Also bid prn . To dry skin.        CYCLOBENZAPRINE HCL PO      Take 10 mg by mouth 3 times daily as needed for muscle spasms        ENSURE PLUS Liqd      Take by mouth 2 times daily Admin 8oz twice daily for supplement        ferrous sulfate 325 (65 FE) MG tablet    IRON    31 tablet    Take 1 tablet (325 mg) by mouth every evening    Iron deficiency anemia due to chronic blood loss       HYDROcodone-acetaminophen 5-325 MG per tablet    NORCO     Take 1 tablet by mouth every 6 hours as needed for moderate to severe pain        levothyroxine 75 MCG tablet    SYNTHROID/LEVOTHROID    30 tablet    Take 1 tablet (75 mcg) by mouth daily Recheck TSH in 6 months    Hypothyroidism, unspecified type       loperamide 2 MG capsule    IMODIUM A-D    155 capsule    Take 1 capsule (2 mg) by  mouth daily AND TAKE 1 CAPSULE EVERY 6 HOURS AS NEEDED FOR LOOSE STOOLS (MAX OF 16MG/24 HRS)    Diarrhea, unspecified type       LORazepam 0.5 MG tablet    ATIVAN    30 tablet    Take 1 tablet (0.5 mg) by mouth every 4 hours as needed (Anxiety, Nausea/Vomiting or Sleep)    Adenocarcinoma of colon (H)       menthol-zinc oxide 0.44-20.625 % Oint ointment    CALMOSEPTINE    1 Tube    Apply topically 4 times daily as needed for skin protection    Chronic diarrhea       OLANZapine 2.5 MG tablet    zyPREXA    31 tablet    Take 1 tablet (2.5 mg) by mouth daily    Psychosis, unspecified psychosis type       ondansetron 8 MG tablet    ZOFRAN    10 tablet    Take 1 tablet (8 mg) by mouth every 8 hours as needed (Nausea/Vomiting)    Adenocarcinoma of colon (H)       PREPARATION H 0.25-88.44 % per suppository   Generic drug:  phenylephrine-cocoa butter      Place 1 suppository rectally 2 times daily as needed for hemorrhoids or itching        SUMATRIPTAN SUCCINATE PO      Take 25 mg by mouth every 8 hours as needed for migraine        TRAVATAN Z 0.004 % ophthalmic solution   Generic drug:  travoprost (BAK Free)      Place 1 drop into both eyes At Bedtime        vitamin B-Complex     30 tablet    Take 1 tablet by mouth daily    S/P colectomy

## 2017-08-24 NOTE — MR AVS SNAPSHOT
After Visit Summary   8/24/2017    Sherita Kenney    MRN: [id number]           Patient Information     Date Of Birth          1940        Visit Information        Provider Department      8/24/2017 9:30 AM  INFUSION CHAIR 3 [facility]        Today's Diagnoses     Adenocarcinoma of colon (H)    -  1       Follow-ups after your visit        Your next 10 appointments already scheduled     Aug 26, 2017 11:30 AM CDT   Level 1 with  INFUSION CHAIR 14   [facility] ([facility])    [facility]  [address]  [facility] [address]   [phone]            Sep 07, 2017  9:00 AM CDT   Level 6 with  INFUSION CHAIR 7   [facility] ([facility])    [facility]  [address]  [place] [address]   [phone]            Sep 13, 2017 10:00 AM CDT   CT CHEST ABDOMEN PELVIS W/O & W CONTRAST with SHCT1   [facility])    [address] [facility] [address]   [phone]           Please bring any scans or X-rays taken at other hospitals, if similar tests were done. Also bring a list of your medicines, including vitamins, minerals and over-the-counter drugs. It is safest to leave personal items at home.  Be sure to tell your doctor:   If you have any allergies.   If there s any chance you are pregnant.   If you are breastfeeding.   If you have any special needs.  You may have contrast for this exam. To prepare:   Do not eat or drink for 2 hours before your exam. If you need to take medicine, you may take it with small sips of water. (We may ask you to take liquid medicine as well.)   The day before your exam, drink extra fluids at least six 8-ounce glasses (unless your doctor tells you to restrict your fluids).  Patients over 70 or patients with diabetes or kidney  problems:   If you haven t had a blood test (creatinine test) within the last 30 days, go to your clinic or Diagnostic Imaging Department for this test.  If you have diabetes:   If your kidney function is normal, continue taking your metformin (Avandamet, Glucophage, Glucovance, Metaglip) on the day of your exam.   If your kidney function is abnormal, wait 48 hours before restarting this medicine.  You will have oral contrast for this exam:   You will drink the contrast at home. Get this from your clinic or Diagnostic Imaging Department. Please follow the directions given.  Please wear loose clothing, such as a sweat suit or jogging clothes. Avoid snaps, zippers and other metal. We may ask you to undress and put on a hospital gown.  If you have any questions, please call the Imaging Department where you will have your exam.            Sep 14, 2017 10:30 AM CDT   Return Visit with Susan Lindo MD   [facility] ([facility])    [facility]  [address] [facility] [address]  [facility] [address] [phone]              Future tests that were ordered for you today     Open Future Orders        Priority Expected Expires Ordered    CT Chest Abdomen Pelvis w/o & w Contrast Routine 9/15/2017 8/24/2018 8/24/2017            Who to contact     If you have questions or need follow up information about today's clinic visit or your schedule please contact [facility] directly at [phone].  Normal or non-critical lab and imaging results will be communicated to you by MyChart, letter or phone within 4 business days after the clinic has received the results. If you do not hear from us within 7 days, please contact the clinic through [employer] or phone. If you have a critical or abnormal lab result, we will notify you by phone as soon as possible.  Submit refill requests through [employer] or call your pharmacy and they will forward the refill request to us.  "Please allow 3 business days for your refill to be completed.          Additional Information About Your Visit        MyChart Information     [employer] lets you send messages to your doctor, view your test results, renew your prescriptions, schedule appointments and more. To sign up, go to [email or web address] . Click on \"Log in\" on the left side of the screen, which will take you to the Welcome page. Then click on \"Sign up Now\" on the right side of the page.     You will be asked to enter the access code listed below, as well as some personal information. Please follow the directions to create your username and password.     Your access code is: [email or web address]  Expires: 2017  6:30 AM     Your access code will  in 90 days. If you need help or a new code, please call your [place] clinic or [phone].        Care EveryWhere ID     This is your Care EveryWhere ID. This could be used by other organizations to access your [place] medical records  [id number]        Your Vitals Were     Pulse Temperature Respirations Height Pulse Oximetry BMI (Body Mass Index)    69 97.8  F (36.6  C) (Oral) 18 1.626 m (5' 4.02\") 98% 20.45 kg/m2       Blood Pressure from Last 3 Encounters:   17 128/67   17 133/79   17 104/65    Weight from Last 3 Encounters:   17 54.1 kg (119 lb 3.2 oz)   17 54.1 kg (119 lb 3.2 oz)   08/10/17 54.1 kg (119 lb 3.2 oz)              We Performed the Following     CBC with platelets differential     Comprehensive metabolic panel     Protein qualitative urine        Primary Care Provider Office Phone # Fax #    Sherita WILLY Juarez [phone]       [address] [address]        Equal Access to Services     [facility] : Ruddy Nunes, wabisi pillai, qaybta kaalellis geronimo . [facility] [phone].    ATENCIÓN: Si barriela becka, tiene a roldan disposición servicios " akash de asistencia lingüística. Malaika jimenez [phone].    We comply with applicable federal civil rights laws and [place] laws. We do not discriminate on the basis of race, color, national origin, age, disability sex, sexual orientation or gender identity.            Thank you!     Thank you for choosing [facility]  for your care. Our goal is always to provide you with excellent care. Hearing back from our patients is one way we can continue to improve our services. Please take a few minutes to complete the written survey that you may receive in the mail after your visit with us. Thank you!             Your Updated Medication List - Protect others around you: Learn how to safely use, store and throw away your medicines at www.disposemymeds.org.          This list is accurate as of: 8/24/17  1:49 PM.  Always use your most recent med list.                   Brand Name Dispense Instructions for use Diagnosis    acetaminophen 325 MG tablet    TYLENOL    60 tablet    Take 2 tablets (650 mg) by mouth every 4 hours as needed for mild pain or fever    Cancer associated pain       CETAPHIL lotion      Apply topically 2 times daily Also bid prn . To dry skin.        CYCLOBENZAPRINE HCL PO      Take 10 mg by mouth 3 times daily as needed for muscle spasms        ENSURE PLUS Liqd      Take by mouth 2 times daily Admin 8oz twice daily for supplement        ferrous sulfate 325 (65 FE) MG tablet    IRON    31 tablet    Take 1 tablet (325 mg) by mouth every evening    Iron deficiency anemia due to chronic blood loss       HYDROcodone-acetaminophen 5-325 MG per tablet    NORCO     Take 1 tablet by mouth every 6 hours as needed for moderate to severe pain        levothyroxine 75 MCG tablet    SYNTHROID/LEVOTHROID    30 tablet    Take 1 tablet (75 mcg) by mouth daily Recheck TSH in 6 months    Hypothyroidism, unspecified type       loperamide 2 MG capsule    IMODIUM A-D    155 capsule    Take 1  capsule (2 mg) by mouth daily AND TAKE 1 CAPSULE EVERY 6 HOURS AS NEEDED FOR LOOSE STOOLS (MAX OF 16MG/24 HRS)    Diarrhea, unspecified type       LORazepam 0.5 MG tablet    ATIVAN    30 tablet    Take 1 tablet (0.5 mg) by mouth every 4 hours as needed (Anxiety, Nausea/Vomiting or Sleep)    Adenocarcinoma of colon (H)       menthol-zinc oxide 0.44-20.625 % Oint ointment    CALMOSEPTINE    1 Tube    Apply topically 4 times daily as needed for skin protection    Chronic diarrhea       OLANZapine 2.5 MG tablet    zyPREXA    31 tablet    Take 1 tablet (2.5 mg) by mouth daily    Psychosis, unspecified psychosis type       ondansetron 8 MG tablet    ZOFRAN    10 tablet    Take 1 tablet (8 mg) by mouth every 8 hours as needed (Nausea/Vomiting)    Adenocarcinoma of colon (H)       PREPARATION H 0.25-88.44 % per suppository   Generic drug:  phenylephrine-cocoa butter      Place 1 suppository rectally 2 times daily as needed for hemorrhoids or itching        SUMATRIPTAN SUCCINATE PO      Take 25 mg by mouth every 8 hours as needed for migraine        TRAVATAN Z 0.004 % ophthalmic solution   Generic drug:  travoprost (BAK Free)      Place 1 drop into both eyes At Bedtime        vitamin B-Complex     30 tablet    Take 1 tablet by mouth daily    S/P colectomy

## 2017-08-24 NOTE — PROGRESS NOTES
"Infusion Nursing Note:  Sherita Kenney presents today for Cycle 7 Day 1 Avastin, Oxaliplatin, Leucovorin and Fluorouracil pump connect.    Patient seen by provider today: Yes: Dr. Lindo   present during visit today: Not Applicable.    Note: N/A.    Intravenous Access:  Implanted Port.    Treatment Conditions:  Lab Results   Component Value Date    HGB 10.8 08/24/2017     Lab Results   Component Value Date    WBC 5.3 08/24/2017      Lab Results   Component Value Date    ANEU 3.0 08/24/2017     Lab Results   Component Value Date     08/24/2017      Lab Results   Component Value Date     08/24/2017                   Lab Results   Component Value Date    POTASSIUM 4.0 08/24/2017           Lab Results   Component Value Date    MAG 2.0 04/15/2017            Lab Results   Component Value Date    CR 0.76 08/24/2017                   Lab Results   Component Value Date    SARITHA 9.0 08/24/2017                Lab Results   Component Value Date    BILITOTAL 0.4 08/24/2017           Lab Results   Component Value Date    ALBUMIN 3.2 08/24/2017                    Lab Results   Component Value Date    ALT 23 08/24/2017           Lab Results   Component Value Date    AST 27 08/24/2017     Results reviewed, labs MET treatment parameters, ok to proceed with treatment.  Urine Protein: 10.    Post Infusion Assessment:  Patient tolerated infusion without incident.  Blood return noted pre and post infusion.  Site patent and intact, free from redness, edema or discomfort.    No evidence of extravasations.  Prior to discharge: Port is secured in place with tegaderm and flushed with 10cc NS with positive blood return noted.  Continuous home infusion CADD pump connected.    All connectors secured in place and clamps taped open.    Pump started, \"running\" noted on display (CADD): YES.  Patient instructed to call our clinic or [facility] with any questions or concerns at home.  Patient verbalized " understanding.    Patient set up for pump disconnect at our clinic on 8/26/17 at 11:30.      Discharge Plan:   Patient declined prescription refills.  Discharge instructions reviewed with: Patient and Family.  Patient and family verbalized understanding of discharge instructions and all questions answered.  Copy of AVS reviewed with patient and family.  Patient will return 9/7/17 for next appointment.  Patient discharged in stable condition accompanied by: self and sister.  Departure Mode: Ambulatory.    RENÉE Cui RN

## 2017-08-24 NOTE — PROGRESS NOTES
"Oncology Rooming Note    August 24, 2017 9:54 AM   Sherita Kenney is a 76 year old female who presents for:    Chief Complaint   Patient presents with     Oncology Clinic Visit     Colon cancer (H)     Initial Vitals: /67 (BP Location: Right arm)  Pulse 69  Temp 97.8  F (36.6  C) (Oral)  Resp 20  Wt 54.1 kg (119 lb 3.2 oz)  SpO2 98%  BMI 20.45 kg/m2 Estimated body mass index is 20.45 kg/(m^2) as calculated from the following:    Height as of 8/10/17: 1.626 m (5' 4.02\").    Weight as of this encounter: 54.1 kg (119 lb 3.2 oz). Body surface area is 1.56 meters squared.  Data Unavailable Comment: Data Unavailable   No LMP recorded. Patient is not currently having periods (Reason: Perimenopausal).  Allergies reviewed: Yes  Medications reviewed: Yes    Medications: Medication refills not needed today.  Pharmacy name entered into [employer]:    [facility] [address] [place] [address] [address] PHARMACY [facility] [address] [facility] LONG TERM [facility] [address]    Clinical concerns: none   4 minutes for nursing intake (face to face time)     Maya Rodrigez CMA      DISCHARGE PLAN:    Continue chemo/ Reported to [facility]/ INfusion  Next chemo to be arranged for 9/7- no MD appt with this..  CT scan the next week in Sept with an MD follow up after to review and plan for possible future treatments.  Gave pt instructions to Schedulers to arrange and bring to her when arranged.      Next appointments: See patient instruction section  Departure Mode: Ambulatory  Accompanied by: Brother in law and sister  5 minutes for nursing discharge (face to face time)   Di Mahmood RN      "

## 2017-08-24 NOTE — PATIENT INSTRUCTIONS
Continue chemotherapy.  Scheduled/Janice  CT scan mid September. Scheduled/Janice  Follow up after scans. Scheduled/Janice      AVS printed & given to patient/meli

## 2017-08-25 NOTE — PROGRESS NOTES
ONCOLOGY HISTORY:  Ms. Sherita Kenney is a female with colon cancer.   1. The patient was brought to the ER on 02/15/2017 with altered mental status.    -CT brain on 02/15/2017 did not reveal any acute intracranial pathology.   -CT abdomen and pelvis on 02/15/2017 revealed large right iliopsoas abscess. Adjacent mass-like wall thickening of portion of right colon.   -CT-guided drainage of abscess was done.   2. The patient was taken to OR on 03/01/2017.    -Colonoscopy revealed a large fungating mass in the right side of the colon.  Pathology revealed invasive poorly differentiated adenocarcinoma.  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   -Laparoscopic ileostomy was done.    3.  CEA on 02/16/2017 was 3.8.   4. The patient had right colectomy with takedown of ileostomy and primary anastomosis on 04/13/2017.    -Pathology  reveals poorly differentiated colonic adenocarcinoma. 15 of 24 lymph nodes are positive. Tumor invades into adjacent abdominal wall.  Lymphovascular invasion present. pT4b pN2b M0.   5. PET scan on 05/22/2017 reveals metastatic disease.  There are multiple new hypermetabolic liver metastases and  hypermetabolic necrotic lymphadenopathy versus malignant implants right mesentery and right lower quadrant.  6. modified FOLFOX6 with Avastin started on 06/01/2017. Bolus 5-FU discontinued with cycle 4.  Oxaliplatin reduced with cycle 5.     SUBJECTIVE:  Ms. Sherita Kenney is a 76-year-old female with metastatic colon cancer on chemotherapy.  She was started on modified FOLFOX-6 with Avastin on 06/01/2017.  Bolus 5-FU discontinued with cycle 4.  Oxaliplatin reduced with cycle 5.      Overall, she is tolerating treatment well.  She has some fatigue.  She is not in pain.  No headache.  No dizziness.  No chest pain or difficulty breathing.  No abdominal pain, nausea or vomiting.  No urinary complaints.  At times she gets some diarrhea.  No fever or chills.   She has some numbness in the fingers and toes. They are not there all the time.  She can do things with her hand.  Things are not falling out.  She has mild hand-foot syndrome.  She has some dryness of the skin.      PHYSICAL EXAMINATION:   Alert and oriented x 3.   VITAL SIGNS:  Reviewed.   EYES:  No icterus.   THROAT:  No ulcer or thrush.   NECK:  Supple. No lymphadenopathy.   LUNGS:  Good air entry bilaterally.  No wheezing.   HEART:  Regular.   ABDOMEN:  Soft and nontender.  No mass.   EXTREMITIES:  No edema.   SKIN:  No rash.  Palm is mildly erythematous.  Some cracking of the skin at the tips of the finger.     LABORATORY DATA:  Reviewed.      ASSESSMENT:   1.  A 76-year-old female with metastatic colon cancer on palliative chemotherapy.   2.  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     3.  Peripheral neuropathy.    4.  Hand-foot syndrome.    5.  Thrombocytopenia, stable.   6.  Anemia, stable.      PLAN:   1. Patient is doing well from colon cancer.  Overall, she is tolerating chemotherapy well.  She will continue on modified FOLFOX-6 without bolus 5-FU.  Oxaliplatin dose has been reduced.  Will continue on the same regimen.  No further dose reduction needed.   2.  After cycle 8, will get CT scan.  I will see her back after that.  At that time will discuss regarding maintenance treatment.  For convenience, we will plan on treating with Xeloda and Avastin.   3.  Family had multiple questions which were all answered.  I will see her after CT scan. Patient advised to return to clinic if she has fever, chills, recurrent vomiting, bleeding, worsening weakness, shortness of breath or any other concerns.         OMER MAKI MD             D: 2017 16:41   T: 2017 21:37   MT:       Name:     SEVERO SWANN   MRN:      [id number]        Account:      [id number]   :      1940           Visit Date:   2017      Document: [id number]

## 2017-08-26 ENCOUNTER — INFUSION THERAPY VISIT (OUTPATIENT)
Dept: INFUSION THERAPY | Facility: CLINIC | Age: 77
End: 2017-08-26
Attending: INTERNAL MEDICINE
Payer: MEDICARE

## 2017-08-26 VITALS
SYSTOLIC BLOOD PRESSURE: 128 MMHG | DIASTOLIC BLOOD PRESSURE: 76 MMHG | HEART RATE: 64 BPM | TEMPERATURE: 97.9 F | RESPIRATION RATE: 16 BRPM

## 2017-08-26 DIAGNOSIS — Z95.828 PORT-A-CATH IN PLACE: Primary | ICD-10-CM

## 2017-08-26 PROCEDURE — 25000128 H RX IP 250 OP 636: Performed by: INTERNAL MEDICINE

## 2017-08-26 RX ORDER — HEPARIN SODIUM (PORCINE) LOCK FLUSH IV SOLN 100 UNIT/ML 100 UNIT/ML
500 SOLUTION INTRAVENOUS EVERY 8 HOURS
Status: CANCELLED
Start: 2017-08-26

## 2017-08-26 RX ORDER — HEPARIN SODIUM (PORCINE) LOCK FLUSH IV SOLN 100 UNIT/ML 100 UNIT/ML
500 SOLUTION INTRAVENOUS EVERY 8 HOURS
Status: DISCONTINUED | OUTPATIENT
Start: 2017-08-26 | End: 2017-08-26 | Stop reason: HOSPADM

## 2017-08-26 RX ADMIN — SODIUM CHLORIDE, PRESERVATIVE FREE 500 UNITS: 5 INJECTION INTRAVENOUS at 11:54

## 2017-08-26 ASSESSMENT — PAIN SCALES - GENERAL: PAINLEVEL: NO PAIN (0)

## 2017-08-26 NOTE — PROGRESS NOTES
Infusion Nursing Note:  Sherita Kenney presents today for Chemo pump disconnect.    Patient seen by provider today: No   present during visit today: Not Applicable.    Note: No concerns to report. Chemo pump completed.    Intravenous Access:  Implanted Port.    Treatment Conditions:  Not Applicable.      Post Infusion Assessment:  Patient tolerated infusion without incident.  Blood return noted pre and post infusion.  Site patent and intact, free from redness, edema or discomfort.  No evidence of extravasations.  Access discontinued per protocol.    Discharge Plan:   Discharge instructions reviewed with: Patient.  Patient and/or family verbalized understanding of discharge instructions and all questions answered.  Copy of AVS reviewed with patient and/or family.  Patient will return 9/7 for next appointment.  Patient discharged in stable condition accompanied by: sister.  Departure Mode: Ambulatory.    Yun Sweeney RN

## 2017-08-26 NOTE — MR AVS SNAPSHOT
After Visit Summary   8/26/2017    Sherita Kenney    MRN: [id number]           Patient Information     Date Of Birth          1940        Visit Information        Provider Department      8/26/2017 11:30 AM  INFUSION CHAIR 14 [facility]        Today's Diagnoses     Port-a-cath in place    -  1       Follow-ups after your visit        Your next 10 appointments already scheduled     Sep 07, 2017  9:00 AM CDT   Level 6 with  INFUSION CHAIR 7   [facility] ([facility])    [facility]  [address]  [facility] [address]   [phone]            Sep 13, 2017 10:00 AM CDT   CT CHEST ABDOMEN PELVIS W/O & W CONTRAST with SHCT1   [facility] CT ([facility])    [address] [facility] [id number] [phone]           Please bring any scans or X-rays taken at other hospitals, if similar tests were done. Also bring a list of your medicines, including vitamins, minerals and over-the-counter drugs. It is safest to leave personal items at home.  Be sure to tell your doctor:   If you have any allergies.   If there s any chance you are pregnant.   If you are breastfeeding.   If you have any special needs.  You may have contrast for this exam. To prepare:   Do not eat or drink for 2 hours before your exam. If you need to take medicine, you may take it with small sips of water. (We may ask you to take liquid medicine as well.)   The day before your exam, drink extra fluids at least six 8-ounce glasses (unless your doctor tells you to restrict your fluids).  Patients over 70 or patients with diabetes or kidney problems:   If you haven t had a blood test (creatinine test) within the last 30 days, go to your clinic or Diagnostic Imaging Department for this test.  If you have diabetes:   If your kidney function is normal, continue taking your metformin (Avandamet,  "Glucophage, Glucovance, Metaglip) on the day of your exam.   If your kidney function is abnormal, wait 48 hours before restarting this medicine.  You will have oral contrast for this exam:   You will drink the contrast at home. Get this from your clinic or Diagnostic Imaging Department. Please follow the directions given.  Please wear loose clothing, such as a sweat suit or jogging clothes. Avoid snaps, zippers and other metal. We may ask you to undress and put on a hospital gown.  If you have any questions, please call the Imaging Department where you will have your exam.            Sep 14, 2017 10:30 AM CDT   Return Visit with Susan Lindo MD   [facility] ([facility])    [facility]  [address] [facility] [address]  [facility] [address] [phone]              Who to contact     If you have questions or need follow up information about today's clinic visit or your schedule please contact [facility] directly at [phone].  Normal or non-critical lab and imaging results will be communicated to you by Insticatorhart, letter or phone within 4 business days after the clinic has received the results. If you do not hear from us within 7 days, please contact the clinic through Insticatorhart or phone. If you have a critical or abnormal lab result, we will notify you by phone as soon as possible.  Submit refill requests through FutureAdvisor or call your pharmacy and they will forward the refill request to us. Please allow 3 business days for your refill to be completed.          Additional Information About Your Visit        FutureAdvisor Information     FutureAdvisor lets you send messages to your doctor, view your test results, renew your prescriptions, schedule appointments and more. To sign up, go to [email or web address] . Click on \"Log in\" on the left side of the screen, which will take you to the Welcome page. Then click on \"Sign up Now\" on the right side of " the page.     You will be asked to enter the access code listed below, as well as some personal information. Please follow the directions to create your username and password.     Your access code is: [email or web address]  Expires: 2017  6:30 AM     Your access code will  in 90 days. If you need help or a new code, please call your [facility] clinic or [phone].        Care EveryWhere ID     This is your Care EveryWhere ID. This could be used by other organizations to access your [facility] medical records  [id number]        Your Vitals Were     Pulse Temperature Respirations             64 97.9  F (36.6  C) (Oral) 16          Blood Pressure from Last 3 Encounters:   17 128/76   17 128/67   17 133/79    Weight from Last 3 Encounters:   17 54.1 kg (119 lb 3.2 oz)   17 54.1 kg (119 lb 3.2 oz)   08/10/17 54.1 kg (119 lb 3.2 oz)              Today, you had the following     No orders found for display       Primary Care Provider Office Phone # Fax #    Sherita Viveros Norbert, APRN [phone]       [address] [place] [address]        Equal Access to Services     ALEXIS ALBA : Hadii mateo ku hadasho Soomaali, waaxda luqadaha, qaybta kaalmada adeegyada, ellis vincent haygogo mooney . So [facility] [phone].    ATENCIÓN: Si habla español, tiene a roldan disposición servicios gratuitos de asistencia lingüística. Llame al [phone].    We comply with applicable federal civil rights laws and [place] laws. We do not discriminate on the basis of race, color, national origin, age, disability sex, sexual orientation or gender identity.            Thank you!     Thank you for choosing [facility] [place]  for your care. Our goal is always to provide you with excellent care. Hearing back from our patients is one way we can continue to improve our services. Please take a few minutes to complete the written survey that you may receive in the  mail after your visit with us. Thank you!             Your Updated Medication List - Protect others around you: Learn how to safely use, store and throw away your medicines at www.disposemymeds.org.          This list is accurate as of: 8/26/17 12:54 PM.  Always use your most recent med list.                   Brand Name Dispense Instructions for use Diagnosis    acetaminophen 325 MG tablet    TYLENOL    60 tablet    Take 2 tablets (650 mg) by mouth every 4 hours as needed for mild pain or fever    Cancer associated pain       CETAPHIL lotion      Apply topically 2 times daily Also bid prn . To dry skin.        CYCLOBENZAPRINE HCL PO      Take 10 mg by mouth 3 times daily as needed for muscle spasms        ENSURE PLUS Liqd      Take by mouth 2 times daily Admin 8oz twice daily for supplement        ferrous sulfate 325 (65 FE) MG tablet    IRON    31 tablet    Take 1 tablet (325 mg) by mouth every evening    Iron deficiency anemia due to chronic blood loss       HYDROcodone-acetaminophen 5-325 MG per tablet    NORCO     Take 1 tablet by mouth every 6 hours as needed for moderate to severe pain        levothyroxine 75 MCG tablet    SYNTHROID/LEVOTHROID    30 tablet    Take 1 tablet (75 mcg) by mouth daily Recheck TSH in 6 months    Hypothyroidism, unspecified type       loperamide 2 MG capsule    IMODIUM A-D    155 capsule    Take 1 capsule (2 mg) by mouth daily AND TAKE 1 CAPSULE EVERY 6 HOURS AS NEEDED FOR LOOSE STOOLS (MAX OF 16MG/24 HRS)    Diarrhea, unspecified type       LORazepam 0.5 MG tablet    ATIVAN    30 tablet    Take 1 tablet (0.5 mg) by mouth every 4 hours as needed (Anxiety, Nausea/Vomiting or Sleep)    Adenocarcinoma of colon (H)       menthol-zinc oxide 0.44-20.625 % Oint ointment    CALMOSEPTINE    1 Tube    Apply topically 4 times daily as needed for skin protection    Chronic diarrhea       OLANZapine 2.5 MG tablet    zyPREXA    31 tablet    Take 1 tablet (2.5 mg) by mouth daily    Psychosis,  unspecified psychosis type       ondansetron 8 MG tablet    ZOFRAN    10 tablet    Take 1 tablet (8 mg) by mouth every 8 hours as needed (Nausea/Vomiting)    Adenocarcinoma of colon (H)       PREPARATION H 0.25-88.44 % per suppository   Generic drug:  phenylephrine-cocoa butter      Place 1 suppository rectally 2 times daily as needed for hemorrhoids or itching        SUMATRIPTAN SUCCINATE PO      Take 25 mg by mouth every 8 hours as needed for migraine        TRAVATAN Z 0.004 % ophthalmic solution   Generic drug:  travoprost (BAK Free)      Place 1 drop into both eyes At Bedtime        vitamin B-Complex     30 tablet    Take 1 tablet by mouth daily    S/P colectomy

## 2017-09-08 ENCOUNTER — HOSPITAL ENCOUNTER (OUTPATIENT)
Facility: CLINIC | Age: 77
Setting detail: SPECIMEN
Discharge: HOME | End: 2017-09-08
Attending: INTERNAL MEDICINE | Admitting: INTERNAL MEDICINE
Payer: MEDICARE

## 2017-09-08 ENCOUNTER — INFUSION THERAPY VISIT (OUTPATIENT)
Dept: INFUSION THERAPY | Facility: CLINIC | Age: 77
End: 2017-09-08
Attending: INTERNAL MEDICINE
Payer: MEDICARE

## 2017-09-08 VITALS
RESPIRATION RATE: 16 BRPM | DIASTOLIC BLOOD PRESSURE: 78 MMHG | WEIGHT: 122.4 LBS | TEMPERATURE: 97.7 F | SYSTOLIC BLOOD PRESSURE: 132 MMHG | HEART RATE: 76 BPM | BODY MASS INDEX: 21 KG/M2

## 2017-09-08 DIAGNOSIS — C18.9 ADENOCARCINOMA OF COLON (H): Primary | ICD-10-CM

## 2017-09-08 LAB
ALBUMIN SERPL-MCNC: 3.4 G/DL (ref 3.4–5)
ALBUMIN UR-MCNC: 10 MG/DL
ALP SERPL-CCNC: 140 U/L (ref 40–150)
ALT SERPL W P-5'-P-CCNC: 62 U/L (ref 0–50)
ANION GAP SERPL CALCULATED.3IONS-SCNC: 9 MMOL/L (ref 3–14)
AST SERPL W P-5'-P-CCNC: 50 U/L (ref 0–45)
BASOPHILS # BLD AUTO: 0 10E9/L (ref 0–0.2)
BASOPHILS NFR BLD AUTO: 0.2 %
BILIRUB SERPL-MCNC: 0.7 MG/DL (ref 0.2–1.3)
BUN SERPL-MCNC: 16 MG/DL (ref 7–30)
CALCIUM SERPL-MCNC: 8.9 MG/DL (ref 8.5–10.1)
CEA SERPL-MCNC: 1 UG/L (ref 0–2.5)
CHLORIDE SERPL-SCNC: 106 MMOL/L (ref 94–109)
CO2 SERPL-SCNC: 25 MMOL/L (ref 20–32)
CREAT SERPL-MCNC: 0.76 MG/DL (ref 0.52–1.04)
DIFFERENTIAL METHOD BLD: ABNORMAL
EOSINOPHIL # BLD AUTO: 0 10E9/L (ref 0–0.7)
EOSINOPHIL NFR BLD AUTO: 0.5 %
ERYTHROCYTE [DISTWIDTH] IN BLOOD BY AUTOMATED COUNT: 20.1 % (ref 10–15)
GFR SERPL CREATININE-BSD FRML MDRD: 74 ML/MIN/1.7M2
GLUCOSE SERPL-MCNC: 118 MG/DL (ref 70–99)
HCT VFR BLD AUTO: 35.2 % (ref 35–47)
HGB BLD-MCNC: 11 G/DL (ref 11.7–15.7)
IMM GRANULOCYTES # BLD: 0 10E9/L (ref 0–0.4)
IMM GRANULOCYTES NFR BLD: 0.2 %
LYMPHOCYTES # BLD AUTO: 1.5 10E9/L (ref 0.8–5.3)
LYMPHOCYTES NFR BLD AUTO: 22.9 %
MCH RBC QN AUTO: 27.8 PG (ref 26.5–33)
MCHC RBC AUTO-ENTMCNC: 31.3 G/DL (ref 31.5–36.5)
MCV RBC AUTO: 89 FL (ref 78–100)
MONOCYTES # BLD AUTO: 0.7 10E9/L (ref 0–1.3)
MONOCYTES NFR BLD AUTO: 11.4 %
NEUTROPHILS # BLD AUTO: 4.2 10E9/L (ref 1.6–8.3)
NEUTROPHILS NFR BLD AUTO: 64.8 %
NRBC # BLD AUTO: 0 10*3/UL
NRBC BLD AUTO-RTO: 0 /100
PLATELET # BLD AUTO: 77 10E9/L (ref 150–450)
POTASSIUM SERPL-SCNC: 3.9 MMOL/L (ref 3.4–5.3)
PROT SERPL-MCNC: 6.8 G/DL (ref 6.8–8.8)
RBC # BLD AUTO: 3.96 10E12/L (ref 3.8–5.2)
SODIUM SERPL-SCNC: 140 MMOL/L (ref 133–144)
WBC # BLD AUTO: 6.5 10E9/L (ref 4–11)

## 2017-09-08 PROCEDURE — 80053 COMPREHEN METABOLIC PANEL: CPT | Performed by: INTERNAL MEDICINE

## 2017-09-08 PROCEDURE — 96417 CHEMO IV INFUS EACH ADDL SEQ: CPT

## 2017-09-08 PROCEDURE — 96368 THER/DIAG CONCURRENT INF: CPT

## 2017-09-08 PROCEDURE — 81003 URINALYSIS AUTO W/O SCOPE: CPT | Performed by: INTERNAL MEDICINE

## 2017-09-08 PROCEDURE — 25000128 H RX IP 250 OP 636: Performed by: INTERNAL MEDICINE

## 2017-09-08 PROCEDURE — 96367 TX/PROPH/DG ADDL SEQ IV INF: CPT

## 2017-09-08 PROCEDURE — 82378 CARCINOEMBRYONIC ANTIGEN: CPT | Performed by: INTERNAL MEDICINE

## 2017-09-08 PROCEDURE — 96416 CHEMO PROLONG INFUSE W/PUMP: CPT

## 2017-09-08 PROCEDURE — 96413 CHEMO IV INFUSION 1 HR: CPT

## 2017-09-08 PROCEDURE — 85025 COMPLETE CBC W/AUTO DIFF WBC: CPT | Performed by: INTERNAL MEDICINE

## 2017-09-08 PROCEDURE — 96415 CHEMO IV INFUSION ADDL HR: CPT

## 2017-09-08 RX ADMIN — SODIUM CHLORIDE 250 ML: 9 INJECTION, SOLUTION INTRAVENOUS at 11:19

## 2017-09-08 RX ADMIN — OXALIPLATIN 100 MG: 100 INJECTION, SOLUTION, CONCENTRATE INTRAVENOUS at 12:28

## 2017-09-08 RX ADMIN — DEXTROSE MONOHYDRATE 250 ML: 50 INJECTION, SOLUTION INTRAVENOUS at 12:24

## 2017-09-08 RX ADMIN — BEVACIZUMAB 270 MG: 100 INJECTION, SOLUTION INTRAVENOUS at 11:51

## 2017-09-08 RX ADMIN — LEUCOVORIN CALCIUM 550 MG: 350 INJECTION, POWDER, LYOPHILIZED, FOR SOLUTION INTRAMUSCULAR; INTRAVENOUS at 12:22

## 2017-09-08 RX ADMIN — DEXAMETHASONE SODIUM PHOSPHATE: 10 INJECTION, SOLUTION INTRAMUSCULAR; INTRAVENOUS at 11:19

## 2017-09-08 ASSESSMENT — PAIN SCALES - GENERAL: PAINLEVEL: MODERATE PAIN (4)

## 2017-09-08 NOTE — PROGRESS NOTES
"Infusion Nursing Note:  Sherita Kenney presents today for C8D1 Modified folfox.    Patient seen by provider today: No   present during visit today: Not Applicable.    Note: Prior to discharge: Port is secured in place with tegaderm and flushed with 10cc NS with positive blood return noted.  Continuous home infusion CADD pump connected.    All connectors secured in place and clamps taped open.    Pump started, \"running\" noted on display (CADD): YES.  Patient instructed to call our clinic or [facility] with any questions or concerns at home.  Patient verbalized understanding.    Patient set up for pump disconnect at our clinic on noon on 9/11/17.          Intravenous Access:  Implanted Port.    Treatment Conditions:  Lab Results   Component Value Date    HGB 11.0 09/08/2017     Lab Results   Component Value Date    WBC 6.5 09/08/2017      Lab Results   Component Value Date    ANEU 4.2 09/08/2017     Lab Results   Component Value Date    PLT 77 09/08/2017      Lab Results   Component Value Date     09/08/2017                   Lab Results   Component Value Date    POTASSIUM 3.9 09/08/2017           Lab Results   Component Value Date    MAG 2.0 04/15/2017            Lab Results   Component Value Date    CR 0.76 09/08/2017                   Lab Results   Component Value Date    SARITHA 8.9 09/08/2017                Lab Results   Component Value Date    BILITOTAL 0.7 09/08/2017           Lab Results   Component Value Date    ALBUMIN 3.4 09/08/2017                    Lab Results   Component Value Date    ALT 62 09/08/2017           Lab Results   Component Value Date    AST 50 09/08/2017     Results reviewed, labs MET treatment parameters, ok to proceed with treatment.  Urine 10.          Post Infusion Assessment:  Patient tolerated infusion without incident.  Site patent and intact, free from redness, edema or discomfort.  No evidence of extravasations.    Discharge Plan:   Patient and/or family " verbalized understanding of discharge instructions and all questions answered.  Copy of AVS reviewed with patient and/or family.  Patient will return 9/13/17 for CT appointment.  Patient discharged in stable condition accompanied by: sister.  Departure Mode: Ambulatory.    Sherita Lynne RN

## 2017-09-08 NOTE — MR AVS SNAPSHOT
After Visit Summary   9/8/2017    Sherita Kenney    MRN: [id number]           Patient Information     Date Of Birth          1940        Visit Information        Provider Department      9/8/2017 10:00 AM  INFUSION CHAIR 11 [facility] [place]        Today's Diagnoses     Adenocarcinoma of colon (H)    -  1       Follow-ups after your visit        Your next 10 appointments already scheduled     Sep 10, 2017 12:00 PM CDT   Level 1 with  INFUSION CHAIR 14   [facility] [place] ([facility])    n Medical [facility]tr [facility]  [address]  [facility] [address]   [phone]            Sep 13, 2017 10:00 AM CDT   CT CHEST ABDOMEN PELVIS W/O & W CONTRAST with SHCT1   [facility] CT ([facility])    [address] [facility] [address] [phone]           Please bring any scans or X-rays taken at other hospitals, if similar tests were done. Also bring a list of your medicines, including vitamins, minerals and over-the-counter drugs. It is safest to leave personal items at home.  Be sure to tell your doctor:   If you have any allergies.   If there s any chance you are pregnant.   If you are breastfeeding.   If you have any special needs.  You may have contrast for this exam. To prepare:   Do not eat or drink for 2 hours before your exam. If you need to take medicine, you may take it with small sips of water. (We may ask you to take liquid medicine as well.)   The day before your exam, drink extra fluids at least six 8-ounce glasses (unless your doctor tells you to restrict your fluids).  Patients over 70 or patients with diabetes or kidney problems:   If you haven t had a blood test (creatinine test) within the last 30 days, go to your clinic or Diagnostic Imaging Department for this test.  If you have diabetes:   If your kidney function is normal, continue taking your metformin  "(Avandamet, Glucophage, Glucovance, Metaglip) on the day of your exam.   If your kidney function is abnormal, wait 48 hours before restarting this medicine.  You will have oral contrast for this exam:   You will drink the contrast at home. Get this from your clinic or Diagnostic Imaging Department. Please follow the directions given.  Please wear loose clothing, such as a sweat suit or jogging clothes. Avoid snaps, zippers and other metal. We may ask you to undress and put on a hospital gown.  If you have any questions, please call the Imaging Department where you will have your exam.            Sep 14, 2017 10:30 AM CDT   Return Visit with uSsan Lindo MD   [facility] ([facility])    [facility]  [address]  [facility] [id number] [phone]              Who to contact     If you have questions or need follow up information about today's clinic visit or your schedule please contact [facility] AND [facility] directly at [phone].  Normal or non-critical lab and imaging results will be communicated to you by [employer], letter or phone within 4 business days after the clinic has received the results. If you do not hear from us within 7 days, please contact the clinic through [employer] or phone. If you have a critical or abnormal lab result, we will notify you by phone as soon as possible.  Submit refill requests through [employer] or call your pharmacy and they will forward the refill request to us. Please allow 3 business days for your refill to be completed.          Additional Information About Your Visit        Movie[employer] Information     Movie[employer] lets you send messages to your doctor, view your test results, renew your prescriptions, schedule appointments and more. To sign up, go to [email or web address] . Click on \"Log in\" on the left side of the screen, which will take you to the Welcome page. Then click on \"Sign up Now\" on the " right side of the page.     You will be asked to enter the access code listed below, as well as some personal information. Please follow the directions to create your username and password.     Your access code is: [email or web address]  Expires: 2017  6:30 AM     Your access code will  in 90 days. If you need help or a new code, please call your [facility] or [phone].        Care EveryWhere ID     This is your Care EveryWhere ID. This could be used by other organizations to access your [facility] medical records  [id number]        Your Vitals Were     Pulse Temperature Respirations BMI (Body Mass Index)          76 97.7  F (36.5  C) (Oral) 16 21 kg/m2         Blood Pressure from Last 3 Encounters:   17 132/78   17 128/76   17 128/67    Weight from Last 3 Encounters:   17 55.5 kg (122 lb 6.4 oz)   17 54.1 kg (119 lb 3.2 oz)   17 54.1 kg (119 lb 3.2 oz)              We Performed the Following     CBC with platelets differential     CEA     Comprehensive metabolic panel     Protein qualitative urine        Primary Care Provider Office Phone # Fax #    Sherita Toussaint, APRN [phone]       [address] [place] [address]        Equal Access to Services     FILEMON ALBA : Hadii aad ku hadasho Soomaali, waaxda luqadaha, qaybta kaalmada nilay, ellsi mooney . So [facility] [phone].    ATENCIÓN: Si habla español, tiene a roldan disposición servicios gratuitos de asistencia lingüística. Malaika al [phone].    We comply with applicable federal civil rights laws and [place] laws. We do not discriminate on the basis of race, color, national origin, age, disability sex, sexual orientation or gender identity.            Thank you!     Thank you for choosing [facility] AND [facility]  for your care. Our goal is always to provide you with excellent care. Hearing back from our patients is one way we can  continue to improve our services. Please take a few minutes to complete the written survey that you may receive in the mail after your visit with us. Thank you!             Your Updated Medication List - Protect others around you: Learn how to safely use, store and throw away your medicines at www.disposemymeds.org.          This list is accurate as of: 9/8/17  2:55 PM.  Always use your most recent med list.                   Brand Name Dispense Instructions for use Diagnosis    acetaminophen 325 MG tablet    TYLENOL    60 tablet    Take 2 tablets (650 mg) by mouth every 4 hours as needed for mild pain or fever    Cancer associated pain       CETAPHIL lotion      Apply topically 2 times daily Also bid prn . To dry skin.        CYCLOBENZAPRINE HCL PO      Take 10 mg by mouth 3 times daily as needed for muscle spasms        ENSURE PLUS Liqd      Take by mouth 2 times daily Admin 8oz twice daily for supplement        ferrous sulfate 325 (65 FE) MG tablet    IRON    31 tablet    Take 1 tablet (325 mg) by mouth every evening    Iron deficiency anemia due to chronic blood loss       HYDROcodone-acetaminophen 5-325 MG per tablet    NORCO     Take 1 tablet by mouth every 6 hours as needed for moderate to severe pain        levothyroxine 75 MCG tablet    SYNTHROID/LEVOTHROID    30 tablet    Take 1 tablet (75 mcg) by mouth daily Recheck TSH in 6 months    Hypothyroidism, unspecified type       loperamide 2 MG capsule    IMODIUM A-D    155 capsule    Take 1 capsule (2 mg) by mouth daily AND TAKE 1 CAPSULE EVERY 6 HOURS AS NEEDED FOR LOOSE STOOLS (MAX OF 16MG/24 HRS)    Diarrhea, unspecified type       LORazepam 0.5 MG tablet    ATIVAN    30 tablet    Take 1 tablet (0.5 mg) by mouth every 4 hours as needed (Anxiety, Nausea/Vomiting or Sleep)    Adenocarcinoma of colon (H)       menthol-zinc oxide 0.44-20.625 % Oint ointment    CALMOSEPTINE    1 Tube    Apply topically 4 times daily as needed for skin protection    Chronic  diarrhea       OLANZapine 2.5 MG tablet    zyPREXA    31 tablet    Take 1 tablet (2.5 mg) by mouth daily    Psychosis, unspecified psychosis type       ondansetron 8 MG tablet    ZOFRAN    10 tablet    Take 1 tablet (8 mg) by mouth every 8 hours as needed (Nausea/Vomiting)    Adenocarcinoma of colon (H)       PREPARATION H 0.25-88.44 % per suppository   Generic drug:  phenylephrine-cocoa butter      Place 1 suppository rectally 2 times daily as needed for hemorrhoids or itching        SUMATRIPTAN SUCCINATE PO      Take 25 mg by mouth every 8 hours as needed for migraine        TRAVATAN Z 0.004 % ophthalmic solution   Generic drug:  travoprost (BAK Free)      Place 1 drop into both eyes At Bedtime        vitamin B-Complex     30 tablet    Take 1 tablet by mouth daily    S/P colectomy

## 2017-09-10 ENCOUNTER — INFUSION THERAPY VISIT (OUTPATIENT)
Dept: INFUSION THERAPY | Facility: CLINIC | Age: 77
End: 2017-09-10
Attending: NURSE PRACTITIONER
Payer: MEDICARE

## 2017-09-10 VITALS
TEMPERATURE: 98.2 F | OXYGEN SATURATION: 98 % | SYSTOLIC BLOOD PRESSURE: 135 MMHG | DIASTOLIC BLOOD PRESSURE: 75 MMHG | HEART RATE: 69 BPM | RESPIRATION RATE: 16 BRPM

## 2017-09-10 DIAGNOSIS — Z95.828 PORT-A-CATH IN PLACE: Primary | ICD-10-CM

## 2017-09-10 PROCEDURE — 25000128 H RX IP 250 OP 636: Performed by: INTERNAL MEDICINE

## 2017-09-10 RX ORDER — HEPARIN SODIUM (PORCINE) LOCK FLUSH IV SOLN 100 UNIT/ML 100 UNIT/ML
500 SOLUTION INTRAVENOUS EVERY 8 HOURS
Status: CANCELLED
Start: 2017-09-10

## 2017-09-10 RX ORDER — HEPARIN SODIUM (PORCINE) LOCK FLUSH IV SOLN 100 UNIT/ML 100 UNIT/ML
500 SOLUTION INTRAVENOUS EVERY 8 HOURS
Status: DISCONTINUED | OUTPATIENT
Start: 2017-09-10 | End: 2017-09-10 | Stop reason: HOSPADM

## 2017-09-10 RX ADMIN — SODIUM CHLORIDE, PRESERVATIVE FREE 500 UNITS: 5 INJECTION INTRAVENOUS at 12:04

## 2017-09-10 NOTE — PROGRESS NOTES
Infusion Nursing Note:  Sherita Kenney presents today for Pump disconnect.    Patient seen by provider today: No   present during visit today: Not Applicable.    Note:N/A    Intravenous Access:  Implanted Port.    Treatment Conditions:  Not Applicable.      Post Infusion Assessment:  Patient tolerated home infusion without incident.  Blood return noted pre and post infusion.  Site patent and intact, free from redness, edema or discomfort.  No evidence of extravasations.  Access discontinued per protocol.    Discharge Plan:   Discharge instructions reviewed with: Patient.  Patient and/or family verbalized understanding of discharge instructions and all questions answered.  Copy of AVS reviewed with patient and/or family.  Patient will return as directed by MD for next appointment.  Patient discharged in stable condition accompanied by: self and friend.  Departure Mode: Ambulatory.    Jina Aguilar RN

## 2017-09-10 NOTE — MR AVS SNAPSHOT
After Visit Summary   9/10/2017    Sherita Kenney    MRN: [id number]           Patient Information     Date Of Birth          1940        Visit Information        Provider Department      9/10/2017 12:00 PM  INFUSION CHAIR 14 [facility]        Today's Diagnoses     Port-a-cath in place    -  1       Follow-ups after your visit        Your next 10 appointments already scheduled     Sep 13, 2017 10:00 AM CDT   CT CHEST ABDOMEN PELVIS W/O & W CONTRAST with SHCT1   [facility] ([facility])    [address] [facility] [address]   [phone]           Please bring any scans or X-rays taken at other hospitals, if similar tests were done. Also bring a list of your medicines, including vitamins, minerals and over-the-counter drugs. It is safest to leave personal items at home.  Be sure to tell your doctor:   If you have any allergies.   If there s any chance you are pregnant.   If you are breastfeeding.   If you have any special needs.  You may have contrast for this exam. To prepare:   Do not eat or drink for 2 hours before your exam. If you need to take medicine, you may take it with small sips of water. (We may ask you to take liquid medicine as well.)   The day before your exam, drink extra fluids at least six 8-ounce glasses (unless your doctor tells you to restrict your fluids).  Patients over 70 or patients with diabetes or kidney problems:   If you haven t had a blood test (creatinine test) within the last 30 days, go to your clinic or Diagnostic Imaging Department for this test.  If you have diabetes:   If your kidney function is normal, continue taking your metformin (Avandamet, Glucophage, Glucovance, Metaglip) on the day of your exam.   If your kidney function is abnormal, wait 48 hours before restarting this medicine.  You will have oral contrast for this exam:   You will drink the contrast at home. Get this from  "your clinic or Diagnostic Imaging Department. Please follow the directions given.  Please wear loose clothing, such as a sweat suit or jogging clothes. Avoid snaps, zippers and other metal. We may ask you to undress and put on a hospital gown.  If you have any questions, please call the Imaging Department where you will have your exam.            Sep 14, 2017 10:30 AM CDT   Return Visit with Susan Lindo MD   [facility] ([facility])    [facility]  [address]  [facility] [id number] [phone]              Who to contact     If you have questions or need follow up information about today's clinic visit or your schedule please contact [facility] AND [facility] directly at [phone].  Normal or non-critical lab and imaging results will be communicated to you by MyChart, letter or phone within 4 business days after the clinic has received the results. If you do not hear from us within 7 days, please contact the clinic through MyChart or phone. If you have a critical or abnormal lab result, we will notify you by phone as soon as possible.  Submit refill requests through [employer] or call your pharmacy and they will forward the refill request to us. Please allow 3 business days for your refill to be completed.          Additional Information About Your Visit        [employer] Information     [employer] lets you send messages to your doctor, view your test results, renew your prescriptions, schedule appointments and more. To sign up, go to [email or web address] . Click on \"Log in\" on the left side of the screen, which will take you to the Welcome page. Then click on \"Sign up Now\" on the right side of the page.     You will be asked to enter the access code listed below, as well as some personal information. Please follow the directions to create your username and password.     Your access code is: [email or web address]  Expires: 9/12/2017  6:30 AM   "   Your access code will  in 90 days. If you need help or a new code, please call your [facility] clinic or [phone].        Care EveryWhere ID     This is your Care EveryWhere ID. This could be used by other organizations to access your [facility] medical records  [id number]        Your Vitals Were     Pulse Temperature Respirations Pulse Oximetry          69 98.2  F (36.8  C) (Oral) 16 98%         Blood Pressure from Last 3 Encounters:   09/10/17 135/75   17 132/78   17 128/76    Weight from Last 3 Encounters:   17 55.5 kg (122 lb 6.4 oz)   17 54.1 kg (119 lb 3.2 oz)   17 54.1 kg (119 lb 3.2 oz)              Today, you had the following     No orders found for display       Primary Care Provider Office Phone # Fax #    Sherita Toussaint, APRN [phone]       [address] [place] [address]        Equal Access to Services     [facility]: Hadii aad ku hadasho Soomaali, waaxda luqadaha, qaybta kaalmada adeegyada, waxotis mooney . So [facility] [phone].    ATENCIÓN: Si habla español, tiene a roldan disposición servicios gratuitos de asistencia lingüística. Llame al [phone].    We comply with applicable federal civil rights laws and [place] laws. We do not discriminate on the basis of race, color, national origin, age, disability sex, sexual orientation or gender identity.            Thank you!     Thank you for choosing [facility] AND [facility]ENTER  for your care. Our goal is always to provide you with excellent care. Hearing back from our patients is one way we can continue to improve our services. Please take a few minutes to complete the written survey that you may receive in the mail after your visit with us. Thank you!             Your Updated Medication List - Protect others around you: Learn how to safely use, store and throw away your medicines at www.disposemymeds.org.          This list is accurate  as of: 9/10/17 12:10 PM.  Always use your most recent med list.                   Brand Name Dispense Instructions for use Diagnosis    acetaminophen 325 MG tablet    TYLENOL    60 tablet    Take 2 tablets (650 mg) by mouth every 4 hours as needed for mild pain or fever    Cancer associated pain       CETAPHIL lotion      Apply topically 2 times daily Also bid prn . To dry skin.        CYCLOBENZAPRINE HCL PO      Take 10 mg by mouth 3 times daily as needed for muscle spasms        ENSURE PLUS Liqd      Take by mouth 2 times daily Admin 8oz twice daily for supplement        ferrous sulfate 325 (65 FE) MG tablet    IRON    31 tablet    Take 1 tablet (325 mg) by mouth every evening    Iron deficiency anemia due to chronic blood loss       HYDROcodone-acetaminophen 5-325 MG per tablet    NORCO     Take 1 tablet by mouth every 6 hours as needed for moderate to severe pain        levothyroxine 75 MCG tablet    SYNTHROID/LEVOTHROID    30 tablet    Take 1 tablet (75 mcg) by mouth daily Recheck TSH in 6 months    Hypothyroidism, unspecified type       loperamide 2 MG capsule    IMODIUM A-D    155 capsule    Take 1 capsule (2 mg) by mouth daily AND TAKE 1 CAPSULE EVERY 6 HOURS AS NEEDED FOR LOOSE STOOLS (MAX OF 16MG/24 HRS)    Diarrhea, unspecified type       LORazepam 0.5 MG tablet    ATIVAN    30 tablet    Take 1 tablet (0.5 mg) by mouth every 4 hours as needed (Anxiety, Nausea/Vomiting or Sleep)    Adenocarcinoma of colon (H)       menthol-zinc oxide 0.44-20.625 % Oint ointment    CALMOSEPTINE    1 Tube    Apply topically 4 times daily as needed for skin protection    Chronic diarrhea       OLANZapine 2.5 MG tablet    zyPREXA    31 tablet    Take 1 tablet (2.5 mg) by mouth daily    Psychosis, unspecified psychosis type       ondansetron 8 MG tablet    ZOFRAN    10 tablet    Take 1 tablet (8 mg) by mouth every 8 hours as needed (Nausea/Vomiting)    Adenocarcinoma of colon (H)       PREPARATION H 0.25-88.44 % per suppository    Generic drug:  phenylephrine-cocoa butter      Place 1 suppository rectally 2 times daily as needed for hemorrhoids or itching        SUMATRIPTAN SUCCINATE PO      Take 25 mg by mouth every 8 hours as needed for migraine        TRAVATAN Z 0.004 % ophthalmic solution   Generic drug:  travoprost (BAK Free)      Place 1 drop into both eyes At Bedtime        vitamin B-Complex     30 tablet    Take 1 tablet by mouth daily    S/P colectomy

## 2017-09-14 NOTE — PROGRESS NOTES
"Oncology Rooming Note    September 14, 2017 10:30 AM   Sherita Kenney is a 76 year old female who presents for:    Chief Complaint   Patient presents with     Oncology Clinic Visit     Initial Vitals: /79 (BP Location: Right arm, Patient Position: Chair, Cuff Size: Adult Regular)  Pulse 73  Temp 98.5  F (36.9  C) (Oral)  Wt 53.6 kg (118 lb 3.2 oz)  SpO2 98%  BMI 20.28 kg/m2 Estimated body mass index is 20.28 kg/(m^2) as calculated from the following:    Height as of 8/24/17: 1.626 m (5' 4.02\").    Weight as of this encounter: 53.6 kg (118 lb 3.2 oz). Body surface area is 1.56 meters squared.  No Pain (0) Comment: Data Unavailable   No LMP recorded. Patient is not currently having periods (Reason: Perimenopausal).  Allergies reviewed: Yes  Medications reviewed: Yes    Medications: Medication refills not needed today.  Pharmacy name entered into [employer]:    [facility] [address] [place] [address] [address] PHARMACY [facility] [address] [facility] [address]    Clinical concerns: None     5 minutes for nursing intake (face to face time)     Shania Bravo CMA    DISCHARGE PLAN:  1.) Patient to be scheduled for C1D1 5FU/Avastin on 9/20/17 or 9/21/17  2.) Patient to be scheduled for q 2 week 5FU/Avastin and follow up with Dr. Lindo in 4-6 weeks.   3.) Patient and patient's family given information on 5FU/Avastin through [employer]  4.) Patient and patient's family given copy of her CT report.     Next appointments: See patient instruction section  Departure Mode: Ambulatory  Accompanied by: family  12 minutes for nursing discharge (face to face time)   Ankita Sidhu RN              "

## 2017-09-14 NOTE — PATIENT INSTRUCTIONS
Continue chemotherapy.  Scheduled/gianna  Follow up in 4-6 weeks.   Scheduled/gianna  Copy of CT to patient.    AVS printed & given to patient/Gianna

## 2017-09-14 NOTE — MR AVS SNAPSHOT
After Visit Summary   9/14/2017    Sherita Kenney    MRN: [id number]           Patient Information     Date Of Birth          1940        Visit Information        Provider Department      9/14/2017 10:30 AM Susan Lindo MD [facility]        Care Instructions    Continue chemotherapy.  Follow up in 4-6 weeks.  Copy of CT to patient.          Follow-ups after your visit        Your next 10 appointments already scheduled     Sep 21, 2017 10:30 AM CDT   Level 5 with SH INFUSION CHAIR 2   [facility] ([facility] [place])    [place] [id number]  [place] [address]   [phone]            Oct 05, 2017 10:30 AM CDT   Level 5 with SH INFUSION CHAIR 6   [facility] ([facility] [place])    [facility]  [address]  [place] [address]   [phone]            Oct 19, 2017 10:30 AM CDT   Level 5 with SH INFUSION CHAIR 15   [facility] ([facility] [place])    [facility]  [address]  [place] [address]   [phone]            Oct 19, 2017 11:15 AM CDT   Return Visit with Alma Nieves MD   [facility] ([facility] [place])    [facility]  [address]  [place] [address]   [phone]              Who to contact     If you have questions or need follow up information about today's clinic visit or your schedule please contact [facility] [place] directly at [phone].  Normal or non-critical lab and imaging results will be communicated to you by MyChart, letter or phone within 4 business days after the clinic has received the results. If you do not hear from us within 7 days, please contact the clinic through MyChart or phone. If you have a critical or abnormal lab result, we will notify you by phone as  "soon as possible.  Submit refill requests through [employer] or call your pharmacy and they will forward the refill request to us. Please allow 3 business days for your refill to be completed.          Additional Information About Your Visit        SquaredOutharRemoteReality Information     [employer] lets you send messages to your doctor, view your test results, renew your prescriptions, schedule appointments and more. To sign up, go to [email or web address] . Click on \"Log in\" on the left side of the screen, which will take you to the Welcome page. Then click on \"Sign up Now\" on the right side of the page.     You will be asked to enter the access code listed below, as well as some personal information. Please follow the directions to create your username and password.     Your access code is: [email or web address]  Expires: 2017 11:49 AM     Your access code will  in 90 days. If you need help or a new code, please call your [facility] clinic or [phone].        Care EveryWhere ID     This is your Care EveryWhere ID. This could be used by other organizations to access your [facility] medical records  [id number]        Your Vitals Were     Pulse Temperature Pulse Oximetry BMI (Body Mass Index)          73 98.5  F (36.9  C) (Oral) 98% 20.28 kg/m2         Blood Pressure from Last 3 Encounters:   17 131/79   09/10/17 135/75   17 132/78    Weight from Last 3 Encounters:   17 53.6 kg (118 lb 3.2 oz)   17 55.5 kg (122 lb 6.4 oz)   17 54.1 kg (119 lb 3.2 oz)              Today, you had the following     No orders found for display         Today's Medication Changes          These changes are accurate as of: 17 11:49 AM.  If you have any questions, ask your nurse or doctor.               Stop taking these medicines if you haven't already. Please contact your care team if you have questions.     LORazepam 0.5 MG tablet   Commonly known as:  ATIVAN           ondansetron 8 MG tablet   Commonly known as:  " ZOFRAN                    Primary Care Provider Office Phone # Fax #    Sherita Toussaint, APRN [phone]       [address] [place] [address]        Equal Access to Services     ALEXIS ALBA : Ruddy dhaliwal gavio Somonaali, waaxda luqadaha, qaybta kaalmada adeegyada, ellis greshamn adele vega vinicio ren. So [facility] [phone].    ATENCIÓN: Si habla español, tiene a roldan disposición servicios gratuitos de asistencia lingüística. Llame al [phone].    We comply with applicable federal civil rights laws and [place] laws. We do not discriminate on the basis of race, color, national origin, age, disability sex, sexual orientation or gender identity.            Thank you!     Thank you for choosing [facility]  for your care. Our goal is always to provide you with excellent care. Hearing back from our patients is one way we can continue to improve our services. Please take a few minutes to complete the written survey that you may receive in the mail after your visit with us. Thank you!             Your Updated Medication List - Protect others around you: Learn how to safely use, store and throw away your medicines at www.disposemymeds.org.          This list is accurate as of: 9/14/17 11:49 AM.  Always use your most recent med list.                   Brand Name Dispense Instructions for use Diagnosis    acetaminophen 325 MG tablet    TYLENOL    60 tablet    Take 2 tablets (650 mg) by mouth every 4 hours as needed for mild pain or fever    Cancer associated pain       CETAPHIL lotion      Apply topically 2 times daily Also bid prn . To dry skin.        CYCLOBENZAPRINE HCL PO      Take 10 mg by mouth 3 times daily as needed for muscle spasms        ENSURE PLUS Liqd      Take by mouth 2 times daily Admin 8oz twice daily for supplement        ferrous sulfate 325 (65 FE) MG tablet    IRON    31 tablet    Take 1 tablet (325 mg) by mouth every evening    Iron deficiency anemia due  to chronic blood loss       HYDROcodone-acetaminophen 5-325 MG per tablet    NORCO     Take 1 tablet by mouth every 6 hours as needed for moderate to severe pain        levothyroxine 75 MCG tablet    SYNTHROID/LEVOTHROID    30 tablet    Take 1 tablet (75 mcg) by mouth daily Recheck TSH in 6 months    Hypothyroidism, unspecified type       loperamide 2 MG capsule    IMODIUM A-D    155 capsule    Take 1 capsule (2 mg) by mouth daily AND TAKE 1 CAPSULE EVERY 6 HOURS AS NEEDED FOR LOOSE STOOLS (MAX OF 16MG/24 HRS)    Diarrhea, unspecified type       menthol-zinc oxide 0.44-20.625 % Oint ointment    CALMOSEPTINE    1 Tube    Apply topically 4 times daily as needed for skin protection    Chronic diarrhea       OLANZapine 2.5 MG tablet    zyPREXA    31 tablet    Take 1 tablet (2.5 mg) by mouth daily    Psychosis, unspecified psychosis type       PREPARATION H 0.25-88.44 % per suppository   Generic drug:  phenylephrine-cocoa butter      Place 1 suppository rectally 2 times daily as needed for hemorrhoids or itching        SUMATRIPTAN SUCCINATE PO      Take 25 mg by mouth every 8 hours as needed for migraine        TRAVATAN Z 0.004 % ophthalmic solution   Generic drug:  travoprost (BAK Free)      Place 1 drop into both eyes At Bedtime        vitamin B-Complex     30 tablet    Take 1 tablet by mouth daily    S/P colectomy

## 2017-09-15 NOTE — PROGRESS NOTES
ONCOLOGY HISTORY:  Ms. Sherita Kenney is a female with colon cancer.   1. The patient was brought to the ER on 02/15/2017 with altered mental status.    -CT brain on 02/15/2017 did not reveal any acute intracranial pathology.   -CT abdomen and pelvis on 02/15/2017 revealed large right iliopsoas abscess. Adjacent mass-like wall thickening of portion of right colon.   -CT-guided drainage of abscess was done.   2. The patient was taken to OR on 03/01/2017.    -Colonoscopy revealed a large fungating mass in the right side of the colon.  Pathology revealed invasive poorly differentiated adenocarcinoma.  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   -Laparoscopic ileostomy was done.    3.  CEA on 02/16/2017 was 3.8.   4. The patient had right colectomy with takedown of ileostomy and primary anastomosis on 04/13/2017.    -Pathology  reveals poorly differentiated colonic adenocarcinoma. 15 of 24 lymph nodes are positive. Tumor invades into adjacent abdominal wall.  Lymphovascular invasion present. pT4b pN2b M0.   5. PET scan on 05/22/2017 reveals metastatic disease.  There are multiple new hypermetabolic liver metastases and  hypermetabolic necrotic lymphadenopathy versus malignant implants right mesentery and right lower quadrant.  6. modified FOLFOX6 with Avastin started on 06/01/2017. Bolus 5-FU discontinued with cycle 4.  Oxaliplatin reduced with cycle 5.  -Oxaliplatin stopped after cycle 8. Cycle 8 completed on 09/08/2017.  7. CT chest, abdomen and pelvis on 09/13/2017 reveals improvement in disease.     SUBJECTIVE:    Ms. Sherita Kenney is a 76-year-old female with metastatic colon cancer.  She has received 8 cycles of chemotherapy with FOLFOX and Avastin.  Overall, she has been tolerating it well.  She does have some neuropathy.  Some numbness and tingling in her fingers and toes.      Denies any headache.  No dizziness.  No neck pain.  No chest pain or difficulty  breathing.  No abdominal pain, nausea or vomiting.  She has some diarrhea.  No blood in it.  No urinary complaints.  No fever or chills.      CT chest, abdomen and pelvis was done on 09/13/2017.  There is continued improvement in liver metastasis.  No new lesions.  There is a stable thrombus in peripheral branches of portal vein in the right lobe of the liver and branches of superior mesenteric vein.      ASSESSMENT:   1.  A 76-year-old female with metastatic colon cancer which is responding to chemotherapy.   2.  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   3.  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  It is stable. Patient not on any anticoagulation.      PLAN:   1.  I had a long discussion with the patient and her family members.  CT scan was reviewed.  They were very happy to know that her disease continues to improve.     2.  Discussed regarding further treatment. Patient has completed 8 cycles of modified FOLFOX-6 with Avastin. Patient does have some symptoms including peripheral neuropathy and diarrhea.  I explained to her that I would like to put her on maintenance treatment.      Different maintenance treatment regimen discussed.  We can give her 5-FU with Avastin.  Also discussed regarding Xeloda with Avastin.      After a long discussion, patient would like to continue on infusional 5-FU with Avastin.  Oxaliplatin will be discontinued.  It can be used in future again. She will get 5-FU and Avastin.  No bolus 5-FU.  No leucovorin.  Side effects reviewed      I told her that diarrhea and neuropathy should improve as that she will not be getting bolus 5-FU or oxaliplatin.       3.  I will see her in 4-6 weeks' time.  I advised her to return sooner if she has fever, chills, abdominal pain, recurrent vomiting, bleeding, worsening diarrhea or any other concerns.         OMER MAKI MD             D: 09/14/2017 17:30   T:  09/15/2017 01:54   MT:       Name:     SEVERO SWANN   MRN:      [id number]        Account:      [id number]   :      1940           Visit Date:   2017      Document: [id number]

## 2017-09-21 NOTE — PATIENT INSTRUCTIONS
Hand out for avastatin and Flourouracil provided to pt and sister. Copy will be fax to the facility for reference.

## 2017-09-21 NOTE — MR AVS SNAPSHOT
After Visit Summary   9/21/2017    Sherita Kenney    MRN: [id number]           Patient Information     Date Of Birth          1940        Visit Information        Provider Department      9/21/2017 10:30 AM SH INFUSION CHAIR 2 [facility]        Today's Diagnoses     Adenocarcinoma of colon (H)    -  1      Care Instructions    Hand out for avastatin and Flourouracil provided to pt and sister. Copy will be fax to the facility for reference.          Follow-ups after your visit        Your next 10 appointments already scheduled     Sep 23, 2017 11:00 AM CDT   Level 1 with SH INFUSION CHAIR 13   [facility] ([facility])    [facility]  [address]  [place] [address]   [phone]            Oct 05, 2017 10:30 AM CDT   Level 5 with SH INFUSION CHAIR 6   [facility] ([facility])    [facility]  [address]  [place] [address]   [phone]            Oct 07, 2017  8:30 AM CDT   Level 1 with SH INFUSION CHAIR 13   [facility] ([facility])    [facility]  [address]  [place] [address]   [phone]            Oct 19, 2017 10:30 AM CDT   Level 5 with SH INFUSION CHAIR 15   [facility] ([facility])    [facility]  [address]  [place] [address]   [phone]            Oct 19, 2017 11:15 AM CDT   Return Visit with Alma Nieves MD   [facility] ([facility])    [facility]  [address]  [place] [address]   [phone]            Oct 21, 2017  8:30 AM CDT   Level 1 with SH INFUSION CHAIR 15   [facility] ([facility])    [facility]  [address]  [place] [address] [phone]              Who to contact     If you have questions or need follow up information about today's clinic visit or your schedule please contact [facility] directly at [phone].  Normal or non-critical lab and imaging results will be communicated to you by MyChart, letter or phone within 4 business days after the clinic has received the results. If you do not hear from us within 7 days, please contact the clinic through MyChart or phone. If you have a critical or abnormal lab result, we will notify you by phone as soon as possible.  Submit refill requests through [employer] or call your pharmacy and they will forward the refill request to us. Please allow 3 business days for your refill to be completed.          Additional Information About Your Visit        TruckTrackhart Information     [employer] lets you send messages to your doctor, view your test results, renew your prescriptions, schedule appointments and more. To sign up, go to [email or web address] . Click on \"Log in\" on the left side of the screen, which will take you to the Welcome page. Then click on \"Sign up Now\" on the right side of the page.     You will be asked to enter the access code listed below, as well as some personal information. Please follow the directions to create your username and password.     Your access code is: [email or web address]  Expires: 2017 11:49 AM     Your access code will  in 90 days. If you need help or a new code, please call your [facility] clinic or [phone].        Care EveryWhere ID     This is your Care EveryWhere ID. This could be used by other organizations to access your [facility] medical records  [id number]        Your Vitals Were     Pulse Temperature Respirations Height Pulse Oximetry BMI (Body Mass Index)    66 97.6  F (36.4  C) (Oral) 18 1.626 m (5' 4.02\") 95% 21.02 kg/m2       Blood " Pressure from Last 3 Encounters:   09/21/17 142/83   09/14/17 131/79   09/10/17 135/75    Weight from Last 3 Encounters:   09/21/17 55.6 kg (122 lb 8 oz)   09/14/17 53.6 kg (118 lb 3.2 oz)   09/08/17 55.5 kg (122 lb 6.4 oz)              We Performed the Following     Bilirubin  total     CBC with platelets differential     Protein qualitative urine          Today's Medication Changes          These changes are accurate as of: 9/21/17  1:18 PM.  If you have any questions, ask your nurse or doctor.               Start taking these medicines.        Dose/Directions    LORazepam 0.5 MG tablet   Commonly known as:  ATIVAN   Used for:  Adenocarcinoma of colon (H)        Dose:  0.5 mg   Take 1 tablet (0.5 mg) by mouth every 4 hours as needed (Anxiety, Nausea/Vomiting or Sleep)   Quantity:  30 tablet   Refills:  2       prochlorperazine 10 MG tablet   Commonly known as:  COMPAZINE   Used for:  Adenocarcinoma of colon (H)        Dose:  10 mg   Take 1 tablet (10 mg) by mouth every 6 hours as needed (Nausea/Vomiting)   Quantity:  30 tablet   Refills:  2            Where to get your medicines      These medications were sent to [facility] [place], [place] - [address]  [address] [facility] [address], [facility] [address]     Phone:  [phone]     prochlorperazine 10 MG tablet         Some of these will need a paper prescription and others can be bought over the counter.  Ask your nurse if you have questions.     Bring a paper prescription for each of these medications     LORazepam 0.5 MG tablet                Primary Care Provider Office Phone # Fax #    Sherita Coronasaige Toussaint, WILLY GAXIOLA [phone]       [address]  [facility] [address]        Equal Access to Services     ALEXIS ALBA AH: Ruddy Nunes, walubada luqadaha, qaybta kaalmada adeegyada, ellis ren. So [facility] [phone].    ATENCIÓN: Si habla español, tiene a roldan disposición servicios gratuitos de  asistencia lingüística. Malaika al [phone].    We comply with applicable federal civil rights laws and [place] laws. We do not discriminate on the basis of race, color, national origin, age, disability sex, sexual orientation or gender identity.            Thank you!     Thank you for choosing [facility]  for your care. Our goal is always to provide you with excellent care. Hearing back from our patients is one way we can continue to improve our services. Please take a few minutes to complete the written survey that you may receive in the mail after your visit with us. Thank you!             Your Updated Medication List - Protect others around you: Learn how to safely use, store and throw away your medicines at www.disposemymeds.org.          This list is accurate as of: 9/21/17  1:18 PM.  Always use your most recent med list.                   Brand Name Dispense Instructions for use Diagnosis    acetaminophen 325 MG tablet    TYLENOL    60 tablet    Take 2 tablets (650 mg) by mouth every 4 hours as needed for mild pain or fever    Cancer associated pain       CETAPHIL lotion      Apply topically 2 times daily Also bid prn . To dry skin.        CYCLOBENZAPRINE HCL PO      Take 10 mg by mouth 3 times daily as needed for muscle spasms        ENSURE PLUS Liqd      Take by mouth 2 times daily Admin 8oz twice daily for supplement        ferrous sulfate 325 (65 FE) MG tablet    IRON    31 tablet    Take 1 tablet (325 mg) by mouth every evening    Iron deficiency anemia due to chronic blood loss       HYDROcodone-acetaminophen 5-325 MG per tablet    NORCO     Take 1 tablet by mouth every 6 hours as needed for moderate to severe pain        levothyroxine 75 MCG tablet    SYNTHROID/LEVOTHROID    30 tablet    Take 1 tablet (75 mcg) by mouth daily Recheck TSH in 6 months    Hypothyroidism, unspecified type       loperamide 2 MG capsule    IMODIUM A-D    155 capsule    Take 1 capsule (2  mg) by mouth daily AND TAKE 1 CAPSULE EVERY 6 HOURS AS NEEDED FOR LOOSE STOOLS (MAX OF 16MG/24 HRS)    Diarrhea, unspecified type       LORazepam 0.5 MG tablet    ATIVAN    30 tablet    Take 1 tablet (0.5 mg) by mouth every 4 hours as needed (Anxiety, Nausea/Vomiting or Sleep)    Adenocarcinoma of colon (H)       menthol-zinc oxide 0.44-20.625 % Oint ointment    CALMOSEPTINE    1 Tube    Apply topically 4 times daily as needed for skin protection    Chronic diarrhea       OLANZapine 2.5 MG tablet    zyPREXA    31 tablet    Take 1 tablet (2.5 mg) by mouth daily    Psychosis, unspecified psychosis type       PREPARATION H 0.25-88.44 % per suppository   Generic drug:  phenylephrine-cocoa butter      Place 1 suppository rectally 2 times daily as needed for hemorrhoids or itching        prochlorperazine 10 MG tablet    COMPAZINE    30 tablet    Take 1 tablet (10 mg) by mouth every 6 hours as needed (Nausea/Vomiting)    Adenocarcinoma of colon (H)       SUMATRIPTAN SUCCINATE PO      Take 25 mg by mouth every 8 hours as needed for migraine        TRAVATAN Z 0.004 % ophthalmic solution   Generic drug:  travoprost (BAK Free)      Place 1 drop into both eyes At Bedtime        vitamin B-Complex     30 tablet    Take 1 tablet by mouth daily    S/P colectomy

## 2017-09-21 NOTE — PROGRESS NOTES
Infusion Nursing Note:  Sherita Kenney presents today for C1D1 5FU/Avastin.    Patient seen by provider today: No   present during visit today: Not Applicable.    Note: 5 FU set up for 46 hours via port a cath. Good blood return noted prior to start of the infusion. Pt is scheduled to come to the clinic this Saturday to disconnect the pump.    Intravenous Access:  Labs drawn without difficulty.  Implanted Port.    Treatment Conditions:  Lab Results   Component Value Date    HGB 10.6 09/21/2017     Lab Results   Component Value Date    WBC 4.0 09/21/2017      Lab Results   Component Value Date    ANEU 2.1 09/21/2017     Lab Results   Component Value Date    PLT 94 09/21/2017      Lab Results   Component Value Date     09/08/2017                   Lab Results   Component Value Date    POTASSIUM 3.9 09/08/2017           Lab Results   Component Value Date    MAG 2.0 04/15/2017            Lab Results   Component Value Date    CR 0.76 09/08/2017                   Lab Results   Component Value Date    SARITHA 8.9 09/08/2017                Lab Results   Component Value Date    BILITOTAL 0.5 09/21/2017           Lab Results   Component Value Date    ALBUMIN 3.4 09/08/2017                    Lab Results   Component Value Date    ALT 62 09/08/2017           Lab Results   Component Value Date    AST 50 09/08/2017     Results reviewed, labs MET treatment parameters, ok to proceed with treatment.    Urine protein is 10. Met the parameters to start the scheduled chemotherapy.    Post Infusion Assessment:  Patient tolerated infusion without incident.  Blood return noted pre and post infusion.  Site patent and intact, free from redness, edema or discomfort.  No evidence of extravasations.  Prior to discharge: Port is secured in place with tegaderm and flushed with 10cc NS with positive blood return noted.  Continuous home infusion CADD pump connected.    All connectors secured in place and clamps taped open.    Pump  "started, \"running\" noted on display (CADD): YES.  Patient instructed to call our clinic or [facility] with any questions or concerns at home.  Patient verbalized understanding.    Patient set up for pump disconnect at our clinic on 11 am Saturday 9/23.            Discharge Plan:   Discharge instructions reviewed with: Patient.  Patient and/or family verbalized understanding of discharge instructions and all questions answered.  Copy of AVS reviewed with patient and/or family.  Patient will return 10/5/2017for next appointment.  Patient discharged in stable condition accompanied by: self and sister.  Departure Mode: Ambulatory.  Clinic RN let voice message to the health facility re: lorazepam and zofran ordered. Apparently per sister, pt was in the same medication when she was in a different chemotherapy medication in the past. No prescription refill send with pt and sister.    Andie Parra RN                        "

## 2017-09-23 NOTE — MR AVS SNAPSHOT
After Visit Summary   9/23/2017    Sherita Kenney    MRN: [id number]           Patient Information     Date Of Birth          1940        Visit Information        Provider Department      9/23/2017 11:00 AM  INFUSION CHAIR 13 [facility]        Today's Diagnoses     Port-a-cath in place    -  1       Follow-ups after your visit        Your next 10 appointments already scheduled     Oct 05, 2017 10:30 AM CDT   Level 5 with  INFUSION CHAIR 6   [facility] ([facility])    formerly [facility] [place] [id number]  [place] [address]   [phone]            Oct 07, 2017  8:30 AM CDT   Level 1 with  INFUSION CHAIR 13   [facility] ([facility])    formerly [facility]  [address]  [place] [address]   [phone]            Oct 19, 2017 10:30 AM CDT   Level 5 with  INFUSION CHAIR 15   [facility] ([facility])    [facility]  [address]  [place] [address]   [phone]            Oct 19, 2017 11:15 AM CDT   Return Visit with Alma Nieves MD   [facility] ([facility])    formerly [facility]  [address]  [place] [address]   [phone]            Oct 21, 2017  8:30 AM CDT   Level 1 with  INFUSION CHAIR 15   [facility] ([facility])    formerly [facility]  [address]  [place] [address]   [phone]              Who to contact     If you have questions or need follow up information about today's clinic visit or your schedule please contact [facility] directly at [phone].  Normal or non-critical lab and imaging results will be communicated to you by MyChart, letter or  "phone within 4 business days after the clinic has received the results. If you do not hear from us within 7 days, please contact the clinic through [facility] or phone. If you have a critical or abnormal lab result, we will notify you by phone as soon as possible.  Submit refill requests through [facility] or call your pharmacy and they will forward the refill request to us. Please allow 3 business days for your refill to be completed.          Additional Information About Your Visit        [facility] Information     [facility] lets you send messages to your doctor, view your test results, renew your prescriptions, schedule appointments and more. To sign up, go to [email or web address] . Click on \"Log in\" on the left side of the screen, which will take you to the Welcome page. Then click on \"Sign up Now\" on the right side of the page.     You will be asked to enter the access code listed below, as well as some personal information. Please follow the directions to create your username and password.     Your access code is: [email or web address]  Expires: 2017 11:49 AM     Your access code will  in 90 days. If you need help or a new code, please call your [facility] clinic or [phone].        Care EveryWhere ID     This is your Care EveryWhere ID. This could be used by other organizations to access your [facility] medical records  [id number]        Your Vitals Were     Pulse Temperature Respirations             63 98.3  F (36.8  C) (Oral) 18          Blood Pressure from Last 3 Encounters:   17 (!) 159/93   17 142/83   17 131/79    Weight from Last 3 Encounters:   17 55.6 kg (122 lb 8 oz)   17 53.6 kg (118 lb 3.2 oz)   17 55.5 kg (122 lb 6.4 oz)              Today, you had the following     No orders found for display       Primary Care Provider Office Phone # Fax #    Sherita WILLY Juarez [phone]       [address]  [facility] [address]        Equal Access to " Services     [facility]: Hadii aad ku hadestherkaylie Buffygabriel, waulbada luqadaha, qaybta kaalmayvon pemberton, ellis mooney . So [facility] [phone].    ATENCIÓN: Si barriela becka, tiene a roldan disposición servicios gratuitos de asistencia lingüística. Llame al [phone].    We comply with applicable federal civil rights laws and [place] laws. We do not discriminate on the basis of race, color, national origin, age, disability sex, sexual orientation or gender identity.            Thank you!     Thank you for choosing [facility]  for your care. Our goal is always to provide you with excellent care. Hearing back from our patients is one way we can continue to improve our services. Please take a few minutes to complete the written survey that you may receive in the mail after your visit with us. Thank you!             Your Updated Medication List - Protect others around you: Learn how to safely use, store and throw away your medicines at www.disposemymeds.org.          This list is accurate as of: 9/23/17  2:03 PM.  Always use your most recent med list.                   Brand Name Dispense Instructions for use Diagnosis    acetaminophen 325 MG tablet    TYLENOL    60 tablet    Take 2 tablets (650 mg) by mouth every 4 hours as needed for mild pain or fever    Cancer associated pain       CETAPHIL lotion      Apply topically 2 times daily Also bid prn . To dry skin.        CYCLOBENZAPRINE HCL PO      Take 10 mg by mouth 3 times daily as needed for muscle spasms        ENSURE PLUS Liqd      Take by mouth 2 times daily Admin 8oz twice daily for supplement        ferrous sulfate 325 (65 FE) MG tablet    IRON    31 tablet    Take 1 tablet (325 mg) by mouth every evening    Iron deficiency anemia due to chronic blood loss       HYDROcodone-acetaminophen 5-325 MG per tablet    NORCO     Take 1 tablet by mouth every 6 hours as needed for moderate to severe pain         levothyroxine 75 MCG tablet    SYNTHROID/LEVOTHROID    30 tablet    Take 1 tablet (75 mcg) by mouth daily Recheck TSH in 6 months    Hypothyroidism, unspecified type       loperamide 2 MG capsule    IMODIUM A-D    155 capsule    Take 1 capsule (2 mg) by mouth daily AND TAKE 1 CAPSULE EVERY 6 HOURS AS NEEDED FOR LOOSE STOOLS (MAX OF 16MG/24 HRS)    Diarrhea, unspecified type       LORazepam 0.5 MG tablet    ATIVAN    30 tablet    Take 1 tablet (0.5 mg) by mouth every 4 hours as needed (Anxiety, Nausea/Vomiting or Sleep)    Adenocarcinoma of colon (H)       menthol-zinc oxide 0.44-20.625 % Oint ointment    CALMOSEPTINE    1 Tube    Apply topically 4 times daily as needed for skin protection    Chronic diarrhea       OLANZapine 2.5 MG tablet    zyPREXA    31 tablet    Take 1 tablet (2.5 mg) by mouth daily    Psychosis, unspecified psychosis type       PREPARATION H 0.25-88.44 % per suppository   Generic drug:  phenylephrine-cocoa butter      Place 1 suppository rectally 2 times daily as needed for hemorrhoids or itching        prochlorperazine 10 MG tablet    COMPAZINE    30 tablet    Take 1 tablet (10 mg) by mouth every 6 hours as needed (Nausea/Vomiting)    Adenocarcinoma of colon (H)       SUMATRIPTAN SUCCINATE PO      Take 25 mg by mouth every 8 hours as needed for migraine        TRAVATAN Z 0.004 % ophthalmic solution   Generic drug:  travoprost (BAK Free)      Place 1 drop into both eyes At Bedtime        vitamin B-Complex     30 tablet    Take 1 tablet by mouth daily    S/P colectomy

## 2017-09-23 NOTE — PROGRESS NOTES
Infusion Nursing Note:  Sherita Kenney presents today for pump disconnect.    Patient seen by provider today: No   present during visit today: Not Applicable.    Note: N/A.    Intravenous Access:  Implanted Port already accessed for continous 46 hour infusion    Treatment Conditions:  Not Applicable.      Post Infusion Assessment:  Patient tolerated 46 hour infusion and disconnection  without incident.  Blood return noted pre and post infusion.  Site patent and intact, free from redness, edema or discomfort.  No evidence of extravasations.  Access discontinued per protocol.    Discharge Plan:   Discharge instructions reviewed with: Patient and Family.  Patient and/or family verbalized understanding of discharge instructions and all questions answered.  Copy of AVS reviewed with patient and/or family.  Patient will return 10/5/17 for next appointment.  Patient discharged in stable condition accompanied by: sister.  Departure Mode: Ambulatory.    Patrick Lyles RN

## 2017-09-26 NOTE — PROGRESS NOTES
"[facility]  Chief Complaint   Patient presents with     Annual Visit       HPI:    Sherita Kenney is a 76 year old  (1940) female, who is being seen today for an annual comprehensive visit at [facility]. She is currently undergoing treatment for metastatic adenocarcinoma of the colon.     HPI information obtained from: facility chart records, facility staff, patient report and [facility] Epic chart review.      Today's concerns are:  Adenocarcinoma of colon (H)  S/P right colectomy  Diagnosed in February 2017 (stage T4b, N2b, M1) underwent lap bowel resection w/ ilesotomy due to R iliopsoas abscess and adjacent mass.   In April 2017 she underwent an open R colectomy with ileostomy takedown and primary anastomosis. PET scan in May 2017 showed metastases to liver.  June 2017: started modified FOLFOX-6 (leucovorin, 5-FU, oxaliplatin) and avastin --> has now completed eight cycles.  SE: neuropathy and diarrhea (due to oxaliplatin and 5-FU bolus).  CT chest, abdomen and pelvis was done on 09/13/2017.  There is continued improvement in liver metastasis.  No new lesions.  There is a stable thrombus in peripheral branches of portal vein in the right lobe of the liver and branches of superior mesenteric vein.   Last visit with Dr. Lindo on 9/14/17, plan to move to maintenance treatment of 5-FU and Avastin w/o bolus 5-FU. She does not recall specifics of last oncology visit just that the cancer is \"receeding\" and she is not sure why her port is not currently accessed.     CEA 3.8 Feb 2017.   CEA 1.0 Sept 2017    Admission Weight: 100lbs,   Current Weight: 122, 118, 122 lbs.    Diarrhea, unspecified type  Loose stools \"some days\" with abrupt onset, feels stomach getting \"tight\" and has to \"run to the bathroom.\" Shows me red bag she carries \"everywhere I go\" - contains wipes, extra underwear, barrier cream etc. Wears pad in her underwear due to incontinent episodes. Avoid spicy foods. " "  No N/V. No abd pain. Appetite good \"if food is good.\"   Imodium helps, but is frustrated when staff report she can't have another dose due to q 6 hr Rx. Hasn't tried taking it before she goes out.   No blood in stools or black stools.     Iron deficiency anemia due to chronic blood loss  No hematochezia, melena, hematuria.  Hemoglobin   Date Value Ref Range Status   09/21/2017 10.6 (L) 11.7 - 15.7 g/dL Final   09/08/2017 11.0 (L) 11.7 - 15.7 g/dL Final       Memory loss  Psychosis, unspecified psychosis type  Hx of acute psychosis in February 2017 which ultimately led to her hospitalization and diagnosis of adenocarcinoma of the colon. Head CT w/o acute intracranial findings. She is currently on Zyprexa. Denies hallucinations, any persistent depression/anxiety, and feels she is thinking clearly.  Under went neuropsych testing Dr. Man on 5/9/17, the results were reviewed w/ the patient in June:  \"results indicated mild dementia, characterized by impairments in learning and memory, mild anomia, mild executive dysfunction, and impaired complex attentional processing. Basic expressive language, with the exception of confrontation naming, fell within normal limits, as did visual processing. She denied experiencing significant depressive symptomatology.\"  \"Given her cognitive difficulties, it is recommended that she have assistance with management of her instrumental activities of daily living and minimal supervision for her safety. If she were to return to her home, she would likely require someone to stay with her.\"  Sherita remain interested in living outside of AL setting and is frustrated by the lack of social opportunities available.    Osteoarthritis of spine with radiculopathy, lumbar region  Primary osteoarthritis of both knees  Flail joint  Postpolio syndrome  Denies pain or swelling in joints.  Patient had polio as a child and has a flail left shoulder, unable to use LUE.    Left arm is \"limp.\"  Not on pain " "medication.    Does not use assistive device to ambulate.   No recent falls.     Hypothyroidism, unspecified type  Currently on Levothyroxine 75 mcg daily.  TSH   Date Value Ref Range Status   04/03/2017 2.30 0.40 - 4.00 mU/L Final   02/15/2017 4.05 (H) 0.40 - 4.00 mU/L Final   No increased fatigue.    Unspecified glaucoma  No change in vision.   Wears glasses.   Requests refill of Travoprost drops.    Health care maintenance  HTN  Reviewed VS -  Last 3 BPs:159/93, 142/83, 131/79  HR Ranges: 63, 66, 73  Based on JNC-8 goals,  patients age of 76 year old, no presence of diabetes or CKD, and goals of care goal BP is  <140/90 mm Hg. patients BP is higher than goal and will adjust plan of care by rechecking BP in one week.  Depression  Depression screen done: PHQ-2, denies being down, depressed hopeless in last two weeks, denies anhedonia. Given screen score and clinical assessment patient is stable without any signs of depression and no futher interventions warrented at this time. Describes mood as \"up and down\" due to change in environment, which can feel \"isolating.\"    ACP  Per records during the time of her hospitalization niece Thalia and friend Dinora were assisting w/ decision making when she lacked capacity.   Her sister Nat is designated as her health care agent in legal documents created in 1997.  Sherita expresses wish to update her directives, as she has recently had some conflicts with her sister and brother in-law and feels she is not sure she cannot fully trust them.  Goal to maintain independence and spend time with friends, \"my friends are awesome.\"  Would like to \"die naturally\" - says she is \"very sure\" - discussed DNR/DNI w/ friends. Sister confirms these wishes via telephone.    ALLERGIES: Wool fiber  PROBLEM LIST:  Patient Active Problem List   Diagnosis     Medication monitoring encounter     OA (osteoarthritis) of knee     Pain in joint, lower leg     Abnormal gait     Lumbago     Osteoarthritis " of hip     DJD (degenerative joint disease) of knee     DJD (degenerative joint disease), lumbar     Hip pain, right     Psychosis     Iliopsoas abscess on right (H)     Adenocarcinoma of colon (H)     S/P right colectomy     S/P ileostomy (H)     Iron deficiency anemia     Postpolio syndrome     Severe protein-calorie malnutrition (H)     Physical deconditioning     Cognitive impairment     Primary osteoarthritis of right hand     Flail joint     Scoliosis     Rectal bleeding     Respiratory insufficiency     Colon cancer (H)     Acute nasopharyngitis     URI with cough and congestion     Port-a-cath in place     ACP (advance care planning)     PAST MEDICAL HISTORY:  has a past medical history of Abnormal gait; Adenocarcinoma of colon (H); Arthralgia of upper arm; Arthritis; Arthritis of right hand; Cancer (H); Cancer of right colon (H); Cognitive impairment; COPD (chronic obstructive pulmonary disease) (H); DJD (degenerative joint disease); Fibromatoses of muscle, ligament, and fascia; Flail joint; Generalized OA; 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 She also has no past medical history of Cerebral infarction (H); Congestive heart failure (H); Depressive disorder; Diabetes (H); History of blood transfusion; Hypertension; or Uncomplicated asthma.    PAST SURGICAL HISTORY:  has a past surgical history that includes Laparoscopic Ileostomy (N/A, 3/1/2017); Ileostomy (N/A, 3/1/2017); Soft tissue surgery; Colonoscopy (N/A, 3/1/2017); I & D abdominal abscess; Ileostomy; severe protein-calorie malnutrition; Colectomy right (N/A, 4/13/2017); Laparoscopic ureterolysis (4/13/2017); and Takedown ileostomy (N/A, 4/13/2017).    FAMILY HISTORY: family history includes Breast Cancer in her maternal  grandmother.    SOCIAL HISTORY:  reports that she quit smoking about 31 years ago. Her smoking use included Cigarettes. She has a 22.50 pack-year smoking history. She does not have any smokeless tobacco history on file. She reports that she does not drink alcohol or use illicit drugs.   retired  with a Ph.D    IMMUNIZATIONS:  Most Recent Immunizations   Administered Date(s) Administered     Influenza (High Dose) 3 valent vaccine 02/16/2017     Pneumococcal (PCV 13) 09/25/2015     Pneumococcal 23 valent 02/16/2017     Above immunizations pulled from [facility]. [facility] and facility records also reconciled. Outstanding information sent to  to update [facility].  Future immunizations needed:  TDAP, yearly influenza per facility protocol, Provider working with patient, first contact, and facility to administer immunizations. and will confer with onocology regarding appropriateness of vaccines at this time.   MEDICATIONS:  Current Outpatient Prescriptions   Medication Sig Dispense Refill     travoprost, BAK Free, (TRAVATAN Z) 0.004 % ophthalmic solution Place 1 drop into both eyes At Bedtime 5 mL 98     OLANZapine (ZYPREXA PO) Take 2.5 mg by mouth nightly as needed for agitation Hx of Psychosis x 2 wks. Then d/c, notify NP if administered       LORazepam (ATIVAN) 0.5 MG tablet Take 1 tablet (0.5 mg) by mouth every 4 hours as needed (Anxiety, Nausea/Vomiting or Sleep) 30 tablet 2     prochlorperazine (COMPAZINE) 10 MG tablet Take 1 tablet (10 mg) by mouth every 6 hours as needed (Nausea/Vomiting) 30 tablet 2     ferrous sulfate (IRON) 325 (65 FE) MG tablet Take 1 tablet (325 mg) by mouth every evening 31 tablet 3     loperamide (IMODIUM A-D) 2 MG capsule Take 1 capsule (2 mg) by mouth daily AND TAKE 1 CAPSULE EVERY 6 HOURS AS NEEDED FOR LOOSE STOOLS (MAX OF 16MG/24 HRS) 155 capsule 3     phenylephrine-cocoa butter (PREPARATION H) 0.25-88.44 % per suppository Place 1 suppository  rectally 2 times daily as needed for hemorrhoids or itching       CETAPHIL (CETAPHIL) lotion Apply topically 2 times daily Also bid prn . To dry skin.       Nutritional Supplements (ENSURE PLUS) LIQD Take by mouth 2 times daily Admin 8oz twice daily for supplement       CYCLOBENZAPRINE HCL PO Take 10 mg by mouth 3 times daily as needed for muscle spasms       HYDROcodone-acetaminophen (NORCO) 5-325 MG per tablet Take 1 tablet by mouth every 6 hours as needed for moderate to severe pain       SUMATRIPTAN SUCCINATE PO Take 25 mg by mouth every 8 hours as needed for migraine       menthol-zinc oxide (CALMOSEPTINE) 0.44-20.625 % OINT ointment Apply topically 4 times daily as needed for skin protection 1 Tube 3     vitamin B-Complex Take 1 tablet by mouth daily 30 tablet 11     levothyroxine (SYNTHROID/LEVOTHROID) 75 MCG tablet Take 1 tablet (75 mcg) by mouth daily Recheck TSH in 6 months 30 tablet 5     acetaminophen (TYLENOL) 325 MG tablet Take 2 tablets (650 mg) by mouth every 4 hours as needed for mild pain or fever 60 tablet 1     Medications reviewed:  Medications reconciled to facility chart and changes were made to reflect current medications as identified as above med list. Below are the changes that were made:   Medications stopped since last EPIC medication reconciliation:   There are no discontinued medications.    Medications started since last [facility] medication reconciliation:  No orders of the defined types were placed in this encounter.    Case Management:  I have reviewed the Assisted Living care plan, current immunizations and preventive care/cancer screening..Future cancer screening is not clinically indicated secondary to age/goals of care Current prognosis per sister is about one year due to mestatic colon cancer. Patient's desire to return to the community is present, but is not able due to care needs . Current Level of Care is appropriate. Did not pass OT home safety evaluation.   Due for TDap, will  "need to ask oncology if okay to given while on chemo regimen.    Advance Directive Discussion:    I reviewed the current advanced directives as reflected in EPIC, the POLST and the facility chart, and verified the congruency of orders - cod status updated in EPIC. I contacted the first party sister Nat and discussed the plan of Care.  I did review the advance directives with the resident.   New POLST created, changed from full code to DNR/DNI due to resident goals, she \"thought a long time about it\" - does not want to \"live like this\" (in [facility] setting) due to loss of independence.     Team Discussion:  I communicated with the appropriate disciplines involved with the Plan of Care:   Nursing:  ZURI Joshua, and nurse Mary Bell.    Patient Goal:  Patient's goal is pain control and comfort, as well as independence, quality time with friends  and family's/responsible party's goal for the patient is pain control and comfort.    Information reviewed:  Medications, vital signs, orders, and nursing notes.    ROS:  10 point ROS of systems including Constitutional, Eyes, Respiratory, Cardiovascular, Gastroenterology, Genitourinary, Integumentary, Muscularskeletal, Psychiatric were all negative except for pertinent positives noted in my HPI.    Exam:  BP (!) 150/92  Pulse 68  Temp 97.7  F (36.5  C)  Resp 22  Wt 122 lb 12.8 oz (55.7 kg)  SpO2 96%  BMI 21.07 kg/m2     GENERAL APPEARANCE:  Alert, in no distress, pleasant, cooperative  EYES:  EOM intact, lids normal, PERRL BL wearing glasses, sclera clear and conjunctiva normal, no discharge   ENT:  Mouth normal, moist mucous membranes, nose without drainage or crusting, external ears without lesions, hearing acuity grossly intact  NECK:  Nontender, supple, symmetrical; no cervical adenopathy, masses, trachea midline  RESP:  Non-labored breathing, palpation of chest normal, no chest wall tenderness, no respiratory distress, Lung sounds clear, patient is on " room air  CV:  Palpation - no murmur/non-displaced PMI, implant port site R chest well, non-tender Auscultation - rate and rhythm regular, no murmur, no rub or gallop.  VASCULAR: Carotid pulses 2 + BL w/o bruits. No JVD. No edema bilateral lower extremities. Pedal pulses 2+ PT BL.   ABDOMEN:  Well healed surgical scars, normal bowel sounds, soft, nontender, no grimacing or guarding with palpation. No appreciable masses.  M/S:   Gait and station ambulates independently. +kyphosis. Digits without clubbing, cyanosis, no swelling or tenderness, good  right, left arm flaccid  SKIN:  Inspection - no visible rashes/lesions/uclerations. Palpation- no increased warmth, skin is dry and non-tender.  NEURO: cranial nerves II-XII grossly intact, no facial asymmetry, follows simple commands, left arm is flaccid  PSYCH: awake and alert, speech fluent,  insight and judgement fair can express wishes regarding goals of care, oriented to person/place/time, memory impaired, without depressed or anxious affect, calm and cooperative.    Lab/Diagnostic data:    CBC RESULTS:   Recent Labs   Lab Test  09/21/17   1103  09/08/17   1015   WBC  4.0  6.5   RBC  3.73*  3.96   HGB  10.6*  11.0*   HCT  33.5*  35.2   MCV  90  89   MCH  28.4  27.8   MCHC  31.6  31.3*   RDW  19.3*  20.1*   PLT  94*  77*       Last Basic Metabolic Panel:  Recent Labs   Lab Test  09/08/17   1015  08/24/17   0938   NA  140  143   POTASSIUM  3.9  4.0   CHLORIDE  106  108   SARITHA  8.9  9.0   CO2  25  28   BUN  16  11   CR  0.76  0.76   GLC  118*  82       Liver Function Studies -   Recent Labs   Lab Test  09/21/17   1103  09/08/17   1015  08/24/17   0938   PROTTOTAL   --   6.8  6.7*   ALBUMIN   --   3.4  3.2*   BILITOTAL  0.5  0.7  0.4   ALKPHOS   --   140  123   AST   --   50*  27   ALT   --   62*  23       TSH   Date Value Ref Range Status   04/03/2017 2.30 0.40 - 4.00 mU/L Final   02/15/2017 4.05 (H) 0.40 - 4.00 mU/L Final     Lab Results   Component Value Date    A1C  4.7 04/13/2017    A1C 4.3 04/03/2017     ECHO 2/15/17  Interpretation Summary     The visual ejection fraction is estimated at 55-60%.  Left ventricular systolic function is normal.  The study was technically difficult.    ASSESSMENT/PLAN    (C18.9) Adenocarcinoma of colon (H)  (primary encounter diagnosis)  (Z90.49) S/P right colectomy  Comment:  Plan: deteriorated, although interval CT showed improvement in metasatic disease, sister reports prognosis of ~ 1 yr, also thromboses in portal vein system - not anticoagulated   Comment:  - Managed by oncology Dr. Lindo   - Continue to assess symptoms and QoL, see goals of care discussion below     (R19.7) Diarrhea, unspecified type  Comment: deteriorated, multifactorial, secondary to colon cancer, colectomy, and chemotherapy  Plan:   - Chemo regimen recently changed to mitigate diarrhea and neuropathy, reviewed this w/ patient   - PRN imodium  -  nursing to educate regarding skin care and medication education     (D50.0) Iron deficiency anemia due to chronic blood loss  Comment: stable, last Hgb 10-11 range  Plan:   - Continue iron supplement  - Interval CBCs monitored by oncology     (R41.3) Memory loss  (F29) Psychosis, unspecified psychosis type  Comment: mild dementia unchanged, delirium improved from hospital  Plan:   - d/c Zyprexa, will leave PRN in place x2 weeks then d/c if not used  - Per neuropsych eval in June resident is not recommended to live alone, despite wish to return to her condo, currently having conflict with sister who is her health care agent     (M47.26) Osteoarthritis of spine with radiculopathy, lumbar region  (M17.0) Primary osteoarthritis of both knees  (M25.20) Flail joint  (G14) Postpolio syndrome  Comment: stable, no pain or falls, ambulates w/o assistive device   Plan:   - continued monitoring and supports in [facility]    (E03.9) Hypothyroidism, unspecified type  Comment: stable, TSH WNL in April 2017  Plan:   - Continue current levothyroxine    - Yearly TSH     (H40.1130) Primary open angle glaucoma of both eyes, unspecified glaucoma stage  Comment: stable, no vision change   Plan:   - refill travoprost, FARHAT Free, (TRAVATAN Z) 0.004 % ophthalmic solution    (Z00.00) Health care maintenance  Comment: reviewed   Plan:   - ? TDap and Influenza vaccine while getting chemo, will verify with oncology     (Z71.89) Advance care planning  Comment: deteriorated, resident discussed changing health care agent today, but is not sure, in conflict with there sister  POLST updated to reflect DNR/DNI  Plan:   - refer to social work   - goals: independence and to spend time with friends, focus on comfort and QoL    Orders:  1. New POLST complete, resident to sign - health care agent updated   2. D/C Zyprexa  3. Zyprexa 2.5mg po QHS PRN for hx of psychosis x2 weeks then D/C, notify NP if administered.  4. SW referral - advance care planning assistance.   5. Nursing referral - diarrhea, skin care/diet/medication education   6. Refill travoprost, FARHAT Free, (TRAVATAN Z) 0.004 % ophthalmic solution; Place 1 drop into both eyes At Bedtime     Electronically signed by:  WILLY Chi CNP        Documentation of Face-to-Face and Certification for Home Health Services     Patient: Sherita Kenney   YOB: 1940  MR Number: [id number]  Today's Date: 9/26/2017    I certify that patient: Sherita Kenney is under my care and that I, or a nurse practitioner or physician's assistant working with me, had a face-to-face encounter that meets the physician face-to-face encounter requirements with this patient on: September 26, 2017.    This encounter with the patient was in whole, or in part, for the following medical condition, which is the primary reason for home health care: Adenocarcinoma of colon (H), S/P right colectomy, Diarrhea, unspecified type, Memory loss, Psychosis, unspecified psychosis type    I certify that, based on my findings, the following services are  medically necessary home health services: Nursing and Social Work.    My clinical findings support the need for the above services because: Nurse is needed: To provide assessment and oversight required in the home to assure adherence to the medical plan due to: memory impairment. and To teach and train about the disease and treatments for colon cancer illness and secondary diarrhea. SW is needed for advance care planning and familial support/education.     Further, I certify that my clinical findings support that this patient is homebound (i.e. absences from home require considerable and taxing effort and are for medical reasons or [employer] services or infrequently or of short duration when for other reasons) because: Mental health symptoms including: Patient has the potential to get lost or wander to due to cognitive impairments.. and Requires assistance of another person or specialized equipment to access medical services because patient: Is prone to wander/get lost without assistance. and Is unable to drive or get to medical appointments without assistance.     Based on the above findings. I certify that this patient is confined to the home and needs intermittent skilled nursing care, physical therapy and/or speech therapy.  The patient is under my care, and I have initiated the establishment of the plan of care.  This patient will be followed by a physician who will periodically review the plan of care.  Physician/Provider to provide follow up care: Sherita Toussaint    Responsible Medicare certified [facility] Physician: Dr. Padmini Pham MD  Physician Signature: See electronic signature associated with these discharge orders.  Date: 9/26/2017

## 2017-10-01 PROBLEM — J06.9 URI WITH COUGH AND CONGESTION: Status: RESOLVED | Noted: 2017-04-14 | Resolved: 2017-01-01

## 2017-10-01 PROBLEM — J00 ACUTE NASOPHARYNGITIS: Status: RESOLVED | Noted: 2017-04-14 | Resolved: 2017-01-01

## 2017-10-01 PROBLEM — R41.3 MEMORY LOSS: Status: ACTIVE | Noted: 2017-01-01

## 2017-10-02 NOTE — MR AVS SNAPSHOT
After Visit Summary   10/2/2017    Sherita Kenney    MRN: [id number]           Patient Information     Date Of Birth          1940        Visit Information        Provider Department      10/2/2017 10:30 AM Susan Lindo MD [facility]        Today's Diagnoses     Adenocarcinoma of colon (H)    -  1    Diarrhea due to malabsorption        Diarrhea, unspecified type          Care Instructions    Labs today.  IV fluid today.  Call the nurse and find out about imodium use.-Called [facility] [phone]  Decrease 5-FU by 25%.-Pharmacy aware  Change imodium to 2 tablets every 6 hours PRN.  Follow up as scheduled.          Follow-ups after your visit        Your next 10 appointments already scheduled     Oct 07, 2017 12:30 PM CDT   Level 1 with  INFUSION CHAIR 13   [facility] ([facility])    [facility]  [address]  [place] [address]   [phone]            Oct 19, 2017 10:30 AM CDT   Level 5 with  INFUSION CHAIR 15   [facility] ([facility])    [facility]  [address]  [place] [address] [phone]            Oct 19, 2017 11:15 AM CDT   Return Visit with Alma Nieves MD   [facility] ([facility])    [facility]  [address]  [place] [address]   [phone]            Oct 21, 2017  8:30 AM CDT   Level 1 with  INFUSION CHAIR 15   [facility] ([facility])    [facility]  [address]  [place] [address] [phone]              Who to contact     If you have questions or need follow up information about today's clinic visit or your schedule please contact [facility] directly at [phone].  Normal or non-critical lab and imaging  "results will be communicated to you by MyChart, letter or phone within 4 business days after the clinic has received the results. If you do not hear from us within 7 days, please contact the clinic through [employer] or phone. If you have a critical or abnormal lab result, we will notify you by phone as soon as possible.  Submit refill requests through [employer] or call your pharmacy and they will forward the refill request to us. Please allow 3 business days for your refill to be completed.          Additional Information About Your Visit        [employer] Information     [employer] lets you send messages to your doctor, view your test results, renew your prescriptions, schedule appointments and more. To sign up, go to [email or web address] . Click on \"Log in\" on the left side of the screen, which will take you to the Welcome page. Then click on \"Sign up Now\" on the right side of the page.     You will be asked to enter the access code listed below, as well as some personal information. Please follow the directions to create your username and password.     Your access code is: [email or web address]  Expires: 2017 11:49 AM     Your access code will  in 90 days. If you need help or a new code, please call your [facility] clinic or [phone].        Care EveryWhere ID     This is your Care EveryWhere ID. This could be used by other organizations to access your [facility] medical records  [id number]        Your Vitals Were     Pulse Temperature Respirations Pulse Oximetry          66 98.2  F (36.8  C) (Oral) 12 98%         Blood Pressure from Last 3 Encounters:   10/05/17 154/88   10/02/17 152/78   10/02/17 152/78    Weight from Last 3 Encounters:   10/05/17 55.6 kg (122 lb 9.6 oz)   17 55.7 kg (122 lb 12.8 oz)   17 55.6 kg (122 lb 8 oz)              We Performed the Following     Basic metabolic panel  (Ca, Cl, CO2, Creat, Gluc, K, Na, BUN)     CBC with platelets differential          Today's Medication Changes "          These changes are accurate as of: 10/2/17 11:59 PM.  If you have any questions, ask your nurse or doctor.               These medicines have changed or have updated prescriptions.        Dose/Directions    loperamide 2 MG capsule   Commonly known as:  IMODIUM A-D   This may have changed:    - how much to take  - how to take this  - when to take this  - additional instructions   Used for:  Diarrhea, unspecified type   Changed by:  Susan Lindo MD        AND TAKE 2 CAPSULE EVERY 6 HOURS AS NEEDED FOR LOOSE STOOLS (MAX OF 16MG/24 HRS)   Quantity:  155 capsule   Refills:  3            Where to get your medicines      These medications were sent to [facility] [place], [place] - [address]  [address] [address]     Phone:  [phone]     loperamide 2 MG capsule                Primary Care Provider Office Phone # Fax #    Sherita Toussaint, APRN -[phone]       [address] [place] [address]        Equal Access to Services     [facility] AH: Hadii mateo ku hadasho Soomaali, waaxda luqadaha, qaybta kaalmada adeegyada, waxay sarahin haygogo mooney . So [facility] [phone].    ATENCIÓN: Si habla español, tiene a roldan disposición servicios gratuitos de asistencia lingüística. [facility] [phone].    We comply with applicable federal civil rights laws and [place] laws. We do not discriminate on the basis of race, color, national origin, age, disability, sex, sexual orientation, or gender identity.            Thank you!     Thank you for choosing [facility]  for your care. Our goal is always to provide you with excellent care. Hearing back from our patients is one way we can continue to improve our services. Please take a few minutes to complete the written survey that you may receive in the mail after your visit with us. Thank you!             Your Updated Medication List - Protect others around you:  Learn how to safely use, store and throw away your medicines at www.disposemymeds.org.          This list is accurate as of: 10/2/17 11:59 PM.  Always use your most recent med list.                   Brand Name Dispense Instructions for use Diagnosis    acetaminophen 325 MG tablet    TYLENOL    60 tablet    Take 2 tablets (650 mg) by mouth every 4 hours as needed for mild pain or fever    Cancer associated pain       CETAPHIL lotion      Apply topically 2 times daily Also bid prn . To dry skin.        CYCLOBENZAPRINE HCL PO      Take 10 mg by mouth 3 times daily as needed for muscle spasms        ENSURE PLUS Liqd      Take by mouth 2 times daily Admin 8oz twice daily for supplement        ferrous sulfate 325 (65 FE) MG tablet    IRON    31 tablet    Take 1 tablet (325 mg) by mouth every evening    Iron deficiency anemia due to chronic blood loss       HYDROcodone-acetaminophen 5-325 MG per tablet    NORCO     Take 1 tablet by mouth every 6 hours as needed for moderate to severe pain        levothyroxine 75 MCG tablet    SYNTHROID/LEVOTHROID    30 tablet    Take 1 tablet (75 mcg) by mouth daily Recheck TSH in 6 months    Hypothyroidism, unspecified type       loperamide 2 MG capsule    IMODIUM A-D    155 capsule    AND TAKE 2 CAPSULE EVERY 6 HOURS AS NEEDED FOR LOOSE STOOLS (MAX OF 16MG/24 HRS)    Diarrhea, unspecified type       LORazepam 0.5 MG tablet    ATIVAN    30 tablet    Take 1 tablet (0.5 mg) by mouth every 4 hours as needed (Anxiety, Nausea/Vomiting or Sleep)    Adenocarcinoma of colon (H)       menthol-zinc oxide 0.44-20.625 % Oint ointment    CALMOSEPTINE    1 Tube    Apply topically 4 times daily as needed for skin protection    Chronic diarrhea       PREPARATION H 0.25-88.44 % per suppository   Generic drug:  phenylephrine-cocoa butter      Place 1 suppository rectally 2 times daily as needed for hemorrhoids or itching        prochlorperazine 10 MG tablet    COMPAZINE    30 tablet    Take 1 tablet (10  mg) by mouth every 6 hours as needed (Nausea/Vomiting)    Adenocarcinoma of colon (H)       SUMATRIPTAN SUCCINATE PO      Take 25 mg by mouth every 8 hours as needed for migraine        travoprost (BAK Free) 0.004 % ophthalmic solution    TRAVATAN Z    5 mL    Place 1 drop into both eyes At Bedtime    Primary open angle glaucoma of both eyes, unspecified glaucoma stage       vitamin B-Complex     30 tablet    Take 1 tablet by mouth daily    S/P colectomy       ZYPREXA PO      Take 2.5 mg by mouth nightly as needed for agitation Hx of Psychosis x 2 wks. Then d/c, notify NP if administered

## 2017-10-02 NOTE — PATIENT INSTRUCTIONS
Labs today.  IV fluid today.  Call the nurse and find out about imodium use.-Called [facility] [phone]  Decrease 5-FU by 25%.-Pharmacy aware  Change imodium to 2 tablets every 6 hours PRN.  Follow up as scheduled.

## 2017-10-02 NOTE — PROGRESS NOTES
ONCOLOGY HISTORY:  Ms. Sherita Kenney is a female with colon cancer.   1. Patient was brought to the ER on 02/15/2017 with altered mental status.    -CT brain on 02/15/2017 did not reveal any acute intracranial pathology.   -CT abdomen and pelvis on 02/15/2017 revealed large right iliopsoas abscess and mass-like wall thickening of portion of right colon.   -CT-guided drainage of abscess was done.   2. Patient was taken to OR on 03/01/2017.    -Colonoscopy revealed a large fungating mass in the right side of the colon.  Pathology revealed invasive poorly differentiated adenocarcinoma.  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   -Laparoscopic ileostomy was done.    3.  CEA on 02/16/2017 was 3.8.   4. Patient had right colectomy with takedown of ileostomy and primary anastomosis on 04/13/2017.    -Pathology  reveals poorly differentiated colonic adenocarcinoma. 15 of 24 lymph nodes are positive. Tumor invades into adjacent abdominal wall.  Lymphovascular invasion present. pT4b pN2b M0.   -BRAF mutation present. No KRAS mutation.  5. PET scan on 05/22/2017 revealed metastatic disease.  Multiple hypermetabolic liver metastases and  hypermetabolic necrotic lymphadenopathy in right lower quadrant.  6. modified FOLFOX6 with Avastin started on 06/01/2017.   -Bolus 5-FU discontinued with cycle 4.    -Oxaliplatin reduced with cycle 5.  -Oxaliplatin stopped after cycle 8. Cycle 8 completed on 09/08/2017.  7. CT chest, abdomen and pelvis on 07/19/2017 revealed improved hepatic metastases.  Several peripheral branches of portal vein in right hepatic lobe of liver is thrombosed.  There is also thrombosis in branches of superior mesenteric vein in anterior abdomen.    -Ultrasound on 07/20/2017 reveals occlusive thrombus in the posterior posterior branch and its two branches of the right portal vein.  Main portal vein and left portal vein are patent.  -Decision was made not to  ant-coagulate.  8. CT chest, abdomen and pelvis on 09/13/2017 reveals improvement in disease.   9.  Maintenance infusional 5-FU and Avastin started on 09/21/2017. No bolus 5-FU.    SUBJECTIVE:    Ms. Sherita Martinez is a 76-year-old female with metastatic colon cancer.  She is on maintenance 5-FU and Avastin, which was started on 09/21/2017.  She does not get bolus 5-FU.      Patient is being seen because of diarrhea.  She has multiple loose bowel movements a day.  She is supposed to be on Imodium. Patient has some confusion.  She says that she takes Imodium.  I am not sure regarding it.      Patient denies any headache.  No dizziness.  No chest pain.  No difficulty breathing.  No abdominal pain, nausea or vomiting.  No urinary complaints.  No fever, chills or night sweats.      Discussed regarding diarrhea.  She gets multiple bowel movements a day.  They are soft to loose.  She wears depends.  She has not noticed any blood.  No pain with the bowel movement.      PHYSICAL EXAMINATION:   Alert and oriented x 3.   VITAL SIGNS:  Reviewed.   EYES:  No icterus.   THROAT:  No ulcer or thrush.   NECK:  Supple. No lymphadenopathy.   LUNGS:  Good air entry bilaterally.  No wheezing.   HEART:  Regular.   ABDOMEN:  Soft and nontender.  No mass.   EXTREMITIES:  No edema.   SKIN:  No petechiae. Hand-foot syndrome present. Palm is mildly erythematous.  Some cracking of the skin at the tips of the finger.     LABORATORY DATA:  Reviewed.      ASSESSMENT:   1.  A 76-year-old female with metastatic colon cancer on maintenance 5-FU and Avastin.   2.  Diarrhea secondary to 5-FU.   3.  Peripheral neuropathy.    4.  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      PLAN:   1.  I discussed regarding diarrhea.  It is due to 5-FU. With next chemotherapy, will decrease the 5-FU by 25%.  She is only getting infusion 5-FU.  No bolus 5-FU.      Discussed regarding Imodium.  We will call her assisted living place  and find out how much Imodium she is taking. Patient advised to drink plenty of fluids.      2.  She will be seen with next treatment.  Advised her to return to clinic if she has fever, chills, recurrent vomiting, bleeding, or worsening diarrhea or any other concern.      ADDENDUM:    The nurse called her living place.  She has been getting 1 Imodium tablet every 4 hours p.r.n.  Will change it to 2 Imodium tablets every 6 hours p.r.n.  If that does not help, we will start her on Lomotil.         OMER MAKI MD             D: 10/02/2017 11:29   T: 10/02/2017 12:31   MT: ALEX      Name:     SEVERO SWANN   MRN:      [id number]        Account:      [id number]   :      1940           Visit Date:   10/02/2017      Document: [id number]

## 2017-10-02 NOTE — TELEPHONE ENCOUNTER
Patient called per Dr. Lindo regarding her diarrhea. Patient will be coming in for labs,IV fluids and to see Dr. Lindo today. Ankita Sidhu RN

## 2017-10-02 NOTE — PROGRESS NOTES
Infusion Nursing Note:  Sherita Kenney presents today for labs, IV fluids    Patient seen by provider today: Yes:    present during visit today: Not Applicable.    Note: NA.    Intravenous Access:  Implanted Port.    Treatment Conditions:  Lab Results   Component Value Date    HGB 11.0 10/02/2017     Lab Results   Component Value Date    WBC 5.2 10/02/2017      Lab Results   Component Value Date    ANEU 2.3 10/02/2017     Lab Results   Component Value Date     10/02/2017      Lab Results   Component Value Date     10/02/2017                   Lab Results   Component Value Date    POTASSIUM 3.8 10/02/2017           Lab Results   Component Value Date    MAG 2.0 04/15/2017            Lab Results   Component Value Date    CR 0.71 10/02/2017                   Lab Results   Component Value Date    SARITHA 8.8 10/02/2017                Lab Results   Component Value Date    BILITOTAL 0.5 09/21/2017           Lab Results   Component Value Date    ALBUMIN 3.4 09/08/2017                    Lab Results   Component Value Date    ALT 62 09/08/2017           Lab Results   Component Value Date    AST 50 09/08/2017             Post Infusion Assessment:  Site patent and intact, free from redness, edema or discomfort.  No evidence of extravasations.  Access discontinued per protocol.    Discharge Plan:   Discharge instructions reviewed with: Patient and Family.  Labs and Discharge instruction faxed to Chloé Sidhu RN

## 2017-10-02 NOTE — PROGRESS NOTES
DISCHARGE PLAN:  1.)Called Chloé Castellano regarding Imodium use. Change in Imodium dosage to 2 tablets every  6 hours. Called left message with Chloé Mahmood RN of change in dosage.   2.) Labs and discharge instructions faxed to [facility].  3.) Pharmacy informed of change in 5 FU dosing.   4.) Patient to follow up as previously scheduled.     Next appointments: See patient instruction section  Departure Mode: Ambulatory  Accompanied by: brother-in-law   15 minutes for nursing discharge (face to face time)   Ankita Sidhu RN

## 2017-10-02 NOTE — MR AVS SNAPSHOT
After Visit Summary   10/2/2017    Sherita Kenney    MRN: [id number]           Patient Information     Date Of Birth          1940        Visit Information        Provider Department      10/2/2017 11:00 AM  INFUSION CHAIR 4 [facility]        Today's Diagnoses     Colon cancer (H)    -  1       Follow-ups after your visit        Your next 10 appointments already scheduled     Oct 05, 2017 10:30 AM CDT   Level 5 with  INFUSION CHAIR 6   [facility] ([facility])    [facility] [place] [id number]  [place] [address]   [phone]            Oct 07, 2017  8:30 AM CDT   Level 1 with  INFUSION CHAIR 13   [facility] ([facility])    [facility] [employer]  [address]  [place] [address]   [phone]            Oct 19, 2017 10:30 AM CDT   Level 5 with  INFUSION CHAIR 15   [facility] ([facility])    [facility] [employer]  [address]  [place] [address]   [phone]            Oct 19, 2017 11:15 AM CDT   Return Visit with Alma Nieves MD   [facility] ([facility])    [facility] [employer]  [address]  [place] [address]   [phone]            Oct 21, 2017  8:30 AM CDT   Level 1 with  INFUSION CHAIR 15   [facility] ([facility])    [facility] [employer]  [address]  [place] [address]   [phone]              Who to contact     If you have questions or need follow up information about today's clinic visit or your schedule please contact [facility] directly at [phone].  Normal or non-critical lab and imaging results will be communicated to you by MyChart, letter or phone  "within 4 business days after the clinic has received the results. If you do not hear from us within 7 days, please contact the clinic through [employer] or phone. If you have a critical or abnormal lab result, we will notify you by phone as soon as possible.  Submit refill requests through [employer] or call your pharmacy and they will forward the refill request to us. Please allow 3 business days for your refill to be completed.          Additional Information About Your Visit        [employer] Information     [employer] lets you send messages to your doctor, view your test results, renew your prescriptions, schedule appointments and more. To sign up, go to [email or web address] . Click on \"Log in\" on the left side of the screen, which will take you to the Welcome page. Then click on \"Sign up Now\" on the right side of the page.     You will be asked to enter the access code listed below, as well as some personal information. Please follow the directions to create your username and password.     Your access code is: [email or web address]  Expires: 2017 11:49 AM     Your access code will  in 90 days. If you need help or a new code, please call your [place] clinic or [phone].        Care EveryWhere ID     This is your Care EveryWhere ID. This could be used by other organizations to access your [place] medical records  [id number]        Your Vitals Were     Pulse Temperature Respirations             66 98.2  F (36.8  C) 12          Blood Pressure from Last 3 Encounters:   10/02/17 152/78   10/02/17 152/78   17 (!) 150/92    Weight from Last 3 Encounters:   17 55.7 kg (122 lb 12.8 oz)   17 55.6 kg (122 lb 8 oz)   17 53.6 kg (118 lb 3.2 oz)              Today, you had the following     No orders found for display         Today's Medication Changes          These changes are accurate as of: 10/2/17 12:43 PM.  If you have any questions, ask your nurse or doctor.               These medicines have " changed or have updated prescriptions.        Dose/Directions    loperamide 2 MG capsule   Commonly known as:  IMODIUM A-D   This may have changed:    - how much to take  - how to take this  - when to take this  - additional instructions   Used for:  Diarrhea, unspecified type   Changed by:  Susan Lindo MD        AND TAKE 2 CAPSULE EVERY 6 HOURS AS NEEDED FOR LOOSE STOOLS (MAX OF 16MG/24 HRS)   Quantity:  155 capsule   Refills:  3            Where to get your medicines      These medications were sent to [facility] [address] [facility]  [address] [facility] [address]     Phone:  [phone]     loperamide 2 MG capsule                Primary Care Provider Office Phone # Fax #    Sherita Toussaint, APRN [phone]       [address] [place] [address]        Equal Access to Services     [facility] Angeles[facility]ZE[facility] : Hadii aad ku hadasho Sogabriel, waaxda luqadaha, qaybta kaalmada adeegyada, ellis mooney . So [facility] [phone].    ATENCIÓN: Si habla español, tiene a roldan disposición servicios gratuitos de asistencia lingüística. Malaika al [phone].    We comply with applicable federal civil rights laws and [place] laws. We do not discriminate on the basis of race, color, national origin, age, disability, sex, sexual orientation, or gender identity.            Thank you!     Thank you for choosing [facility]  for your care. Our goal is always to provide you with excellent care. Hearing back from our patients is one way we can continue to improve our services. Please take a few minutes to complete the written survey that you may receive in the mail after your visit with us. Thank you!             Your Updated Medication List - Protect others around you: Learn how to safely use, store and throw away your medicines at www.disposemymeds.org.          This list is accurate as of: 10/2/17  12:43 PM.  Always use your most recent med list.                   Brand Name Dispense Instructions for use Diagnosis    acetaminophen 325 MG tablet    TYLENOL    60 tablet    Take 2 tablets (650 mg) by mouth every 4 hours as needed for mild pain or fever    Cancer associated pain       CETAPHIL lotion      Apply topically 2 times daily Also bid prn . To dry skin.        CYCLOBENZAPRINE HCL PO      Take 10 mg by mouth 3 times daily as needed for muscle spasms        ENSURE PLUS Liqd      Take by mouth 2 times daily Admin 8oz twice daily for supplement        ferrous sulfate 325 (65 FE) MG tablet    IRON    31 tablet    Take 1 tablet (325 mg) by mouth every evening    Iron deficiency anemia due to chronic blood loss       HYDROcodone-acetaminophen 5-325 MG per tablet    NORCO     Take 1 tablet by mouth every 6 hours as needed for moderate to severe pain        levothyroxine 75 MCG tablet    SYNTHROID/LEVOTHROID    30 tablet    Take 1 tablet (75 mcg) by mouth daily Recheck TSH in 6 months    Hypothyroidism, unspecified type       loperamide 2 MG capsule    IMODIUM A-D    155 capsule    AND TAKE 2 CAPSULE EVERY 6 HOURS AS NEEDED FOR LOOSE STOOLS (MAX OF 16MG/24 HRS)    Diarrhea, unspecified type       LORazepam 0.5 MG tablet    ATIVAN    30 tablet    Take 1 tablet (0.5 mg) by mouth every 4 hours as needed (Anxiety, Nausea/Vomiting or Sleep)    Adenocarcinoma of colon (H)       menthol-zinc oxide 0.44-20.625 % Oint ointment    CALMOSEPTINE    1 Tube    Apply topically 4 times daily as needed for skin protection    Chronic diarrhea       PREPARATION H 0.25-88.44 % per suppository   Generic drug:  phenylephrine-cocoa butter      Place 1 suppository rectally 2 times daily as needed for hemorrhoids or itching        prochlorperazine 10 MG tablet    COMPAZINE    30 tablet    Take 1 tablet (10 mg) by mouth every 6 hours as needed (Nausea/Vomiting)    Adenocarcinoma of colon (H)       SUMATRIPTAN SUCCINATE PO      Take 25 mg  by mouth every 8 hours as needed for migraine        travoprost (BAK Free) 0.004 % ophthalmic solution    TRAVATAN Z    5 mL    Place 1 drop into both eyes At Bedtime    Primary open angle glaucoma of both eyes, unspecified glaucoma stage       vitamin B-Complex     30 tablet    Take 1 tablet by mouth daily    S/P colectomy       ZYPREXA PO      Take 2.5 mg by mouth nightly as needed for agitation Hx of Psychosis x 2 wks. Then d/c, notify NP if administered

## 2017-10-05 NOTE — MR AVS SNAPSHOT
After Visit Summary   10/5/2017    Sherita Kenney    MRN: [id number]           Patient Information     Date Of Birth          1940        Visit Information        Provider Department      10/5/2017 10:30 AM  INFUSION CHAIR 6 [facility]        Today's Diagnoses     Adenocarcinoma of colon (H)    -  1       Follow-ups after your visit        Your next 10 appointments already scheduled     Oct 07, 2017 12:30 PM CDT   Level 1 with  INFUSION CHAIR 13   [facility] ([facility])    [facility]  [address]  [place] [address]   [phone]            Oct 19, 2017 10:30 AM CDT   Level 5 with  INFUSION CHAIR 15   [facility] ([facility])    [facility]  [address]  [place] [address]   [phone]            Oct 19, 2017 11:15 AM CDT   Return Visit with Alma Nieves MD   [facility] ([facility])    [facility]  [address]  [place] [address]   [phone]            Oct 21, 2017  8:30 AM CDT   Level 1 with  INFUSION CHAIR 15   [facility] ([facility])    [facility]  [address]  [place] [address]   [phone]              Who to contact     If you have questions or need follow up information about today's clinic visit or your schedule please contact [facility] [place] directly at [phone].  Normal or non-critical lab and imaging results will be communicated to you by MyChart, letter or phone within 4 business days after the clinic has received the results. If you do not hear from us within 7 days, please contact the clinic through MyChart or phone. If you have a critical or abnormal lab result, we will notify you by  "phone as soon as possible.  Submit refill requests through [employer] or call your pharmacy and they will forward the refill request to us. Please allow 3 business days for your refill to be completed.          Additional Information About Your Visit        [employer] Information     [employer] lets you send messages to your doctor, view your test results, renew your prescriptions, schedule appointments and more. To sign up, go to [email or web address] [email or web address] . Click on \"Log in\" on the left side of the screen, which will take you to the Welcome page. Then click on \"Sign up Now\" on the right side of the page.     You will be asked to enter the access code listed below, as well as some personal information. Please follow the directions to create your username and password.     Your access code is: [email or web address]  Expires: 2017 11:49 AM     Your access code will  in 90 days. If you need help or a new code, please call your [place] clinic or [phone].        Care EveryWhere ID     This is your Care EveryWhere ID. This could be used by other organizations to access your [place] medical records  [id number]        Your Vitals Were     Pulse Temperature Respirations Height BMI (Body Mass Index)       76 97.8  F (36.6  C) (Oral) 18 1.626 m (5' 4\") 21.04 kg/m2        Blood Pressure from Last 3 Encounters:   10/05/17 154/88   10/02/17 152/78   10/02/17 152/78    Weight from Last 3 Encounters:   10/05/17 55.6 kg (122 lb 9.6 oz)   17 55.7 kg (122 lb 12.8 oz)   17 55.6 kg (122 lb 8 oz)              We Performed the Following     Bilirubin  total     CBC with platelets differential     Protein qualitative urine        Primary Care Provider Office Phone # Fax #    Sherita WILLY Juarez [phone]       [address] [address]        Equal Access to Services     ALEXIS ALBA AH: Ruddy Nunes, laura pillai, qaybta kaalmada adeegellis cruz" gary mooney ah. So [facility] [phone].    ATENCIÓN: Si rukhsana jovel, tiene a roldan disposición servicios gratuitos de asistencia lingüística. Malaika jimenez [phone].    We comply with applicable federal civil rights laws and [place] laws. We do not discriminate on the basis of race, color, national origin, age, disability, sex, sexual orientation, or gender identity.            Thank you!     Thank you for choosing [facility]  for your care. Our goal is always to provide you with excellent care. Hearing back from our patients is one way we can continue to improve our services. Please take a few minutes to complete the written survey that you may receive in the mail after your visit with us. Thank you!             Your Updated Medication List - Protect others around you: Learn how to safely use, store and throw away your medicines at www.disposemymeds.org.          This list is accurate as of: 10/5/17  3:14 PM.  Always use your most recent med list.                   Brand Name Dispense Instructions for use Diagnosis    acetaminophen 325 MG tablet    TYLENOL    60 tablet    Take 2 tablets (650 mg) by mouth every 4 hours as needed for mild pain or fever    Cancer associated pain       CETAPHIL lotion      Apply topically 2 times daily Also bid prn . To dry skin.        CYCLOBENZAPRINE HCL PO      Take 10 mg by mouth 3 times daily as needed for muscle spasms        ENSURE PLUS Liqd      Take by mouth 2 times daily Admin 8oz twice daily for supplement        ferrous sulfate 325 (65 FE) MG tablet    IRON    31 tablet    Take 1 tablet (325 mg) by mouth every evening    Iron deficiency anemia due to chronic blood loss       HYDROcodone-acetaminophen 5-325 MG per tablet    NORCO     Take 1 tablet by mouth every 6 hours as needed for moderate to severe pain        levothyroxine 75 MCG tablet    SYNTHROID/LEVOTHROID    30 tablet    Take 1 tablet (75 mcg) by mouth daily Recheck TSH in 6 months     Hypothyroidism, unspecified type       loperamide 2 MG capsule    IMODIUM A-D    155 capsule    AND TAKE 2 CAPSULE EVERY 6 HOURS AS NEEDED FOR LOOSE STOOLS (MAX OF 16MG/24 HRS)    Diarrhea, unspecified type       LORazepam 0.5 MG tablet    ATIVAN    30 tablet    Take 1 tablet (0.5 mg) by mouth every 4 hours as needed (Anxiety, Nausea/Vomiting or Sleep)    Adenocarcinoma of colon (H)       menthol-zinc oxide 0.44-20.625 % Oint ointment    CALMOSEPTINE    1 Tube    Apply topically 4 times daily as needed for skin protection    Chronic diarrhea       PREPARATION H 0.25-88.44 % per suppository   Generic drug:  phenylephrine-cocoa butter      Place 1 suppository rectally 2 times daily as needed for hemorrhoids or itching        prochlorperazine 10 MG tablet    COMPAZINE    30 tablet    Take 1 tablet (10 mg) by mouth every 6 hours as needed (Nausea/Vomiting)    Adenocarcinoma of colon (H)       SUMATRIPTAN SUCCINATE PO      Take 25 mg by mouth every 8 hours as needed for migraine        travoprost (BAK Free) 0.004 % ophthalmic solution    TRAVATAN Z    5 mL    Place 1 drop into both eyes At Bedtime    Primary open angle glaucoma of both eyes, unspecified glaucoma stage       vitamin B-Complex     30 tablet    Take 1 tablet by mouth daily    S/P colectomy       ZYPREXA PO      Take 2.5 mg by mouth nightly as needed for agitation Hx of Psychosis x 2 wks. Then d/c, notify NP if administered

## 2017-10-05 NOTE — PROGRESS NOTES
"Infusion Nursing Note:  Sherita Kenney presents today for fluorouracil/avastin.    Patient seen by provider today: No   present during visit today: Not Applicable.    Note: N/A.    Intravenous Access:  Peripheral IV placed.    Treatment Conditions:  Lab Results   Component Value Date    HGB 11.4 10/05/2017     Lab Results   Component Value Date    WBC 6.9 10/05/2017      Lab Results   Component Value Date    ANEU 4.0 10/05/2017     Lab Results   Component Value Date     10/05/2017      Results reviewed, labs MET treatment parameters, ok to proceed with treatment.  Urine protein 30.        Post Infusion Assessment:  Patient tolerated infusion without incident.  Blood return noted pre and post infusion.  Site patent and intact, free from redness, edema or discomfort.  No evidence of extravasations.    Discharge Plan:   Discharge instructions reviewed with: Patient and Family.  Patient and/or family verbalized understanding of discharge instructions and all questions answered.  Copy of AVS reviewed with patient and/or family.  Patient will return Saturday for pump disconnect. Patient discharged in stable condition accompanied by: brother-in-law.  Departure Mode: Ambulatory.    Patrick Lyles RN    Prior to discharge: Port is secured in place with tegaderm and flushed with 10cc NS with positive blood return noted.  Continuous home infusion CADD pump connected.    All connectors secured in place and clamps taped open.    Pump started, \"running\" noted on display (CADD): YES.  Patient instructed to call our clinic or [facility] with any questions or concerns at home.  Patient verbalized understanding.    Patient set up for pump disconnect at our clinic on Sat 107/2017  12:30pm.      Patrick Lyles RN                      "

## 2017-10-07 NOTE — PROGRESS NOTES
Infusion Nursing Note:  Sherita MARTINEZ Shilakrista presents today for pump disconnect    Patient seen by provider today: No   present during visit today: Not Applicable.    Note: N/A.    Intravenous Access:  Implanted Port.    Treatment Conditions:  Not Applicable.      Post Infusion Assessment:  Patient tolerated infusion without incident.  Blood return noted pre and post infusion.  Site patent and intact, free from redness, edema or discomfort.  No evidence of extravasations.  Access discontinued per protocol.    Discharge Plan:   Discharge instructions reviewed with: Patient and Family.  Patient and/or family verbalized understanding of discharge instructions and all questions answered.  Copy of AVS reviewed with patient and/or family.  Patient will return 10/19/2017 for next appointment.  Patient discharged in stable condition accompanied by: self and sister.  Departure Mode: Ambulatory.    Yun Sweeney RN

## 2017-10-07 NOTE — MR AVS SNAPSHOT
After Visit Summary   10/7/2017    Sherita Kenney    MRN: [id number]           Patient Information     Date Of Birth          1940        Visit Information        Provider Department      10/7/2017 12:30 PM  INFUSION CHAIR 13 [facility]        Today's Diagnoses     Port-a-cath in place    -  1       Follow-ups after your visit        Follow-up notes from your care team     Return in 12 days (on 10/19/2017).      Your next 10 appointments already scheduled     Oct 19, 2017 10:30 AM CDT   Level 5 with  INFUSION CHAIR 15   [facility] ([facility]    [facility]  [address]  [place] [address]   [phone]            Oct 19, 2017 11:15 AM CDT   Return Visit with Alam Nieves MD   [facility]    [facility]  [address]  [place] [address]   [phone]            Oct 21, 2017  8:30 AM CDT   Level 1 with  INFUSION CHAIR 15   [facility] ([facility]    [facility]  [address]  [place] [address]   [phone]              Who to contact     If you have questions or need follow up information about today's clinic visit or your schedule please contact [facility] directly at [phone].  Normal or non-critical lab and imaging results will be communicated to you by Affinaquesthart, letter or phone within 4 business days after the clinic has received the results. If you do not hear from us within 7 days, please contact the clinic through Affinaquesthart or phone. If you have a critical or abnormal lab result, we will notify you by phone as soon as possible.  Submit refill requests through ithinksport or call your pharmacy and they will forward the refill request to us. Please allow 3 business days for your  "refill to be completed.          Additional Information About Your Visit        [employer] Information     Balanced lets you send messages to your doctor, view your test results, renew your prescriptions, schedule appointments and more. To sign up, go to [email or web address] . Click on \"Log in\" on the left side of the screen, which will take you to the Welcome page. Then click on \"Sign up Now\" on the right side of the page.     You will be asked to enter the access code listed below, as well as some personal information. Please follow the directions to create your username and password.     Your access code is: [email or web address]  Expires: 2017 11:49 AM     Your access code will  in 90 days. If you need help or a new code, please call your [place] clinic or [phone].        Care EveryWhere ID     This is your Care EveryWhere ID. This could be used by other organizations to access your [place] medical records  [id number]        Your Vitals Were     Pulse Temperature Respirations             70 97.8  F (36.6  C) (Oral) 14          Blood Pressure from Last 3 Encounters:   10/07/17 (!) 151/98   10/05/17 154/88   10/02/17 152/78    Weight from Last 3 Encounters:   10/05/17 55.6 kg (122 lb 9.6 oz)   17 55.7 kg (122 lb 12.8 oz)   17 55.6 kg (122 lb 8 oz)              Today, you had the following     No orders found for display       Primary Care Provider Office Phone # Fax #    Sherita Felice Toussaint, WILLY [phone]       [address]  [place] [address]        Equal Access to Services     [facility] AH: Hadii mateo Nunes, waaxda luqadaha, qaybta kaalmada adetedyada, ellis ren. So [facility] [phone].    ATENCIÓN: Si habla español, tiene a roldan disposición servicios gratuitos de asistencia lingüística. Llame al [phone].    We comply with applicable federal civil rights laws and [place] laws. We do not discriminate on the basis of " race, color, national origin, age, disability, sex, sexual orientation, or gender identity.            Thank you!     Thank you for choosing [facility]  for your care. Our goal is always to provide you with excellent care. Hearing back from our patients is one way we can continue to improve our services. Please take a few minutes to complete the written survey that you may receive in the mail after your visit with us. Thank you!             Your Updated Medication List - Protect others around you: Learn how to safely use, store and throw away your medicines at www.disposemymeds.org.          This list is accurate as of: 10/7/17 12:56 PM.  Always use your most recent med list.                   Brand Name Dispense Instructions for use Diagnosis    acetaminophen 325 MG tablet    TYLENOL    60 tablet    Take 2 tablets (650 mg) by mouth every 4 hours as needed for mild pain or fever    Cancer associated pain       CETAPHIL lotion      Apply topically 2 times daily Also bid prn . To dry skin.        CYCLOBENZAPRINE HCL PO      Take 10 mg by mouth 3 times daily as needed for muscle spasms        ENSURE PLUS Liqd      Take by mouth 2 times daily Admin 8oz twice daily for supplement        ferrous sulfate 325 (65 FE) MG tablet    IRON    31 tablet    Take 1 tablet (325 mg) by mouth every evening    Iron deficiency anemia due to chronic blood loss       HYDROcodone-acetaminophen 5-325 MG per tablet    NORCO     Take 1 tablet by mouth every 6 hours as needed for moderate to severe pain        levothyroxine 75 MCG tablet    SYNTHROID/LEVOTHROID    30 tablet    Take 1 tablet (75 mcg) by mouth daily Recheck TSH in 6 months    Hypothyroidism, unspecified type       loperamide 2 MG capsule    IMODIUM A-D    155 capsule    AND TAKE 2 CAPSULE EVERY 6 HOURS AS NEEDED FOR LOOSE STOOLS (MAX OF 16MG/24 HRS)    Diarrhea, unspecified type       LORazepam 0.5 MG tablet    ATIVAN    30 tablet    Take 1  tablet (0.5 mg) by mouth every 4 hours as needed (Anxiety, Nausea/Vomiting or Sleep)    Adenocarcinoma of colon (H)       menthol-zinc oxide 0.44-20.625 % Oint ointment    CALMOSEPTINE    1 Tube    Apply topically 4 times daily as needed for skin protection    Chronic diarrhea       PREPARATION H 0.25-88.44 % per suppository   Generic drug:  phenylephrine-cocoa butter      Place 1 suppository rectally 2 times daily as needed for hemorrhoids or itching        prochlorperazine 10 MG tablet    COMPAZINE    30 tablet    Take 1 tablet (10 mg) by mouth every 6 hours as needed (Nausea/Vomiting)    Adenocarcinoma of colon (H)       SUMATRIPTAN SUCCINATE PO      Take 25 mg by mouth every 8 hours as needed for migraine        travoprost (BAK Free) 0.004 % ophthalmic solution    TRAVATAN Z    5 mL    Place 1 drop into both eyes At Bedtime    Primary open angle glaucoma of both eyes, unspecified glaucoma stage       vitamin B-Complex     30 tablet    Take 1 tablet by mouth daily    S/P colectomy       ZYPREXA PO      Take 2.5 mg by mouth nightly as needed for agitation Hx of Psychosis x 2 wks. Then d/c, notify NP if administered

## 2017-10-09 NOTE — TELEPHONE ENCOUNTER
"Sister (Nat) of Sherita Kenney called for update on resident status.  She is listed as HC agent on legal document in chart, however resident has spoken about want to change health care agent. Resident has cognitive impairment and is undergoing OT assessment.  Sister has no specific question, asks \"what's going on with her?\"  Initially does not recall we spoke a length about her sister's care two weeks ago.  At that time I ordered SW through [facility] to assist with psychosocial support for resident and family - Nat is unclear if  has met with resident.    When I am at facility tomorrow I will get up from nursing staff and Nat will call me back to review plan of care.    Call placed to [facility] - request update from social team.                 "

## 2017-10-12 NOTE — TELEPHONE ENCOUNTER
Called to discuss meeting with patient yesterday.  Plan for potential family meeting next week if Sherita is agreeable.

## 2017-10-12 NOTE — TELEPHONE ENCOUNTER
Nat, Sherita Kenney's sister, called at around 11:00 am this morning to discuss her sister's care and left a VM; I requested she call me on Tuesday afternoon when I am at [facility].  I returned her call at around 2:00 pm.  Her  answered and said Nat was at a doctor's appointment.  I asked him to have her return my call regarding Sherita's care.

## 2017-10-13 NOTE — PROGRESS NOTES
"[facility]    Chief Complaint   Patient presents with     RECHECK       HPI:    Sherita Kenney is a 76 year old  (1940), who is being seen today for an episodic care visit at [facility].      HPI information obtained from: facility chart records, facility staff, patient report, [facility] Epic chart review, family/first contact Friend Clara report and DON (Meghan), SW (Jennifer), sister and borther in-law..    Today's concern is:  ACP (advance care planning)  Meeting today for discussion of goals of care and to discuss changing Sherita's HC-agent to her friend Clara.  Met 1:1 prior to interdisciplinary team meeting to discuss resident goals for meeting. Sherita would like to change her HC-agent to her friend Clara. Has known Clara for 30 years; Clara's daughter had spinal cord injury and is skilled at navigating the health care system. Sherita feels Clara will be her advocate and ensure she participates in decision making as much as possible.    Memory loss  We discuss her neuropsych eval and limitations noted on this exam - she is aware she has some deficits in cognition and has trouble at times understanding treatment options and clinical status at times. She is afraid of \"asking dumb questions\" - feels Clara will make sure she gets her questions answered. She can name a healthcare agent, but will need assistance for complex medical decision making.   Privacy and independence are is important to Sherita, she feels Clara will honor these wishes.    Adenocarcinoma of colon (H)  S/P right colectomy  Diarrhea, unspecified type  Followed by Dr. Lindo. Getting palliative chemo. Per sister and brother in-law prognosis on the other of \"a couple years.\" Sherita is aware of this. Jokes \"you don't have a silver bullet?\"  Diarrhea is somewhat better on new chemo regimen.   She uses PRN imodium, now getting 4 mg instead of 2 mg PRN.  Chronic neuropathy - notes numbness from R lateral hip to knee, not " sure if this is new.  No back pain. No inc bowel or bladder. No weakness. No falls. Fair appetite. No N/V.    ALLERGIES: Wool fiber     Past Medical, Surgical, Family and Social History reviewed and updated in [employer].    Current Outpatient Prescriptions   Medication Sig Dispense Refill     ONDANSETRON PO Take 8 mg by mouth every 8 hours as needed for nausea       loperamide (IMODIUM A-D) 2 MG capsule AND TAKE 2 CAPSULE EVERY 6 HOURS AS NEEDED FOR LOOSE STOOLS (MAX OF 16MG/24 HRS) 155 capsule 3     travoprost, BAK Free, (TRAVATAN Z) 0.004 % ophthalmic solution Place 1 drop into both eyes At Bedtime 5 mL 98     LORazepam (ATIVAN) 0.5 MG tablet Take 1 tablet (0.5 mg) by mouth every 4 hours as needed (Anxiety, Nausea/Vomiting or Sleep) 30 tablet 2     prochlorperazine (COMPAZINE) 10 MG tablet Take 1 tablet (10 mg) by mouth every 6 hours as needed (Nausea/Vomiting) 30 tablet 2     ferrous sulfate (IRON) 325 (65 FE) MG tablet Take 1 tablet (325 mg) by mouth every evening 31 tablet 3     phenylephrine-cocoa butter (PREPARATION H) 0.25-88.44 % per suppository Place 1 suppository rectally 2 times daily as needed for hemorrhoids or itching       CETAPHIL (CETAPHIL) lotion Apply topically 2 times daily Also bid prn . To dry skin.       Nutritional Supplements (ENSURE PLUS) LIQD Take by mouth 2 times daily Admin 8oz twice daily for supplement       CYCLOBENZAPRINE HCL PO Take 10 mg by mouth 3 times daily as needed for muscle spasms       HYDROcodone-acetaminophen (NORCO) 5-325 MG per tablet Take 1 tablet by mouth every 6 hours as needed for moderate to severe pain       SUMATRIPTAN SUCCINATE PO Take 25 mg by mouth every 8 hours as needed for migraine       menthol-zinc oxide (CALMOSEPTINE) 0.44-20.625 % OINT ointment Apply topically 4 times daily as needed for skin protection 1 Tube 3     vitamin B-Complex Take 1 tablet by mouth daily 30 tablet 11     levothyroxine (SYNTHROID/LEVOTHROID) 75 MCG tablet Take 1 tablet (75 mcg) by  mouth daily Recheck TSH in 6 months 30 tablet 5     acetaminophen (TYLENOL) 325 MG tablet Take 2 tablets (650 mg) by mouth every 4 hours as needed for mild pain or fever 60 tablet 1     Medications reviewed:  Medications reconciled to facility chart and changes were made to reflect current medications as identified as above med list. Below are the changes that were made:   Medications stopped since last EPIC medication reconciliation:   There are no discontinued medications.    Medications started since last [facility] medication reconciliation:  No orders of the defined types were placed in this encounter.    REVIEW OF SYSTEMS:  4 point ROS including Respiratory, CV, GI and , other than that noted in the HPI,  is negative    Physical Exam:  /75  Pulse 74  Temp 96.8  F (36  C)  Resp 20  Wt 120 lb 1.9 oz (54.5 kg)  SpO2 96%  BMI 20.62 kg/m2    GENERAL APPEARANCE:  Alert, in no distress, pleasant, cooperative, smartly dressed  EYES:  wearing glasses, sclera clear and conjunctiva normal, no discharge   ENT:  Mouth normal, moist mucous membranes  RESP:  Non-labored breathing, palpation of chest normal, no chest wall tenderness, no respiratory distress, Lung sounds clear, patient is on room air  CV:  Palpation - no murmur/non-displaced PMI, implanted port site R chest wall, non-tender Auscultation - rate and rhythm regular, no murmur, no rub or gallop.  VASCULAR: No edema bilateral lower extremities.   ABDOMEN:  normal bowel sounds, soft, nontender, no grimacing or guarding with palpation.   M/S:   Gait and station ambulates independently. +kyphosis. Digits without clubbing, cyanosis, no swelling or tenderness, good  right, flaccid left arm.  SKIN:  Inspection - no visible rashes/lesions/uclerations. Palpation- no increased warmth, skin is dry and non-tender.  NEURO: cranial nerves II-XII grossly intact, no facial asymmetry, follows simple commands, left arm is flaccid  PSYCH: awake and alert, speech fluent,   insight and judgement fair can express wishes regarding goals of care, oriented to person/place/time, memory impaired, without depressed or anxious affect, calm and cooperative. + Anomia.     Recent Labs:    CBC RESULTS:   Recent Labs   Lab Test  10/05/17   1106  10/02/17   1043   WBC  6.9  5.2   RBC  3.87  3.82   HGB  11.4*  11.0*   HCT  35.8  35.0   MCV  93  92   MCH  29.5  28.8   MCHC  31.8  31.4*   RDW  18.5*  18.7*   PLT  117*  113*       Last Basic Metabolic Panel:  Recent Labs   Lab Test  10/02/17   1043  09/08/17   1015   NA  140  140   POTASSIUM  3.8  3.9   CHLORIDE  106  106   SARITHA  8.8  8.9   CO2  27  25   BUN  14  16   CR  0.71  0.76   GLC  93  118*       Liver Function Studies -   Recent Labs   Lab Test  10/05/17   1106  09/21/17   1103  09/08/17   1015  08/24/17   0938   PROTTOTAL   --    --   6.8  6.7*   ALBUMIN   --    --   3.4  3.2*   BILITOTAL  0.5  0.5  0.7  0.4   ALKPHOS   --    --   140  123   AST   --    --   50*  27   ALT   --    --   62*  23       TSH   Date Value Ref Range Status   04/03/2017 2.30 0.40 - 4.00 mU/L Final   02/15/2017 4.05 (H) 0.40 - 4.00 mU/L Final       Lab Results   Component Value Date    A1C 4.7 04/13/2017    A1C 4.3 04/03/2017     Assessment/Plan:  ACP (advance care planning)  Comment: SW assisted resident in making new AD in presence of Clara, Nat, and her brother in-law, as well as interdisciplinary team  Plan:  -  Sherita and Clara to get document notarized, Nat is second agent by default   - Nat and brother in-law to remain financial POA, Sherita is agreeable to this  - Team okay to share information only if ask Sherita first    Memory loss  Comment: reviewed neuropsych eval with resident, Clara, and sister/brother in-law, Sherita can name agent and participate in care decisions, but will need support for complex decision making and is not safe to live alone.  [place], involvement in decision making, and friends remain important to Sherita.  Plan:  - See new AD  "document  - Family working to find ZAKIA closer to her friends in [place]    Adenocarcinoma of colon (H)  S/P right colectomy  Diarrhea, unspecified type  Comment: continues on palliative chemo, diarrhea somewhat improved  Plan:  - Appreciate oncology care, will mention numbness in leg at follow-up this week    75 minute total visit, with > 50% (65 minutes) spent in counseling and coordination of care. 22 minutes, from 8:28 - 8:50 am, spent in one on one discussion with patient about prognosis, advance directive, and goals of care. Spent from  12:35-1:18 pm (43 min) in  interdisciplinary team meeting with SW, nursing, and family to change advance directive and health agent and review prognosis and patient goals of care.    Orders:  Nursing staff to ask Sherita if she would consent to TDap - message sent via [employer] and notify HC agent.  Per Dr. Lindo: \"She can get flu vaccination. Any inactivated vaccination is fine during chemotherapy. \"    Electronically signed by  WILLY Chi CNP                  "

## 2017-10-19 NOTE — PROGRESS NOTES
"Infusion Nursing Note:  Sherita Kenney presents today for C3D1 5FU/Avastin  Patient seen by provider today: Yes: exam with Dr. Nieves today   present during visit today: Not Applicable.    Note: N/A.    Intravenous Access:  Implanted Port.    Treatment Conditions:  Lab Results   Component Value Date    HGB 12.6 10/19/2017     Lab Results   Component Value Date    WBC 11.2 10/19/2017      Lab Results   Component Value Date    ANEU 7.1 10/19/2017     Lab Results   Component Value Date     10/19/2017      Results reviewed, labs MET treatment parameters, ok to proceed with treatment.  Urine protein 100.    Prior to discharge: Port is secured in place with tegaderm and flushed with 10cc NS with positive blood return noted.  Continuous home infusion CADD pump connected.    All connectors secured in place and clamps taped open.    Pump started, \"running\" noted on display (CADD): YES.  Patient instructed to call our clinic or [facility] with any questions or concerns at home.  Patient verbalized understanding.    Patient set up for pump disconnect at our clinic on 10/21/17.            Post Infusion Assessment:  Patient tolerated infusion without incident.  Site patent and intact, free from redness, edema or discomfort.  No evidence of extravasations.    Discharge Plan:   Discharge instructions reviewed with: Patient and Family.  Patient and/or family verbalized understanding of discharge instructions and all questions answered.  Copy of AVS reviewed with patient and/or family.  Patient will return 10/21 for next appointment.  Patient discharged in stable condition accompanied by: sister.  Departure Mode: Ambulatory.    Della Little RN                      "

## 2017-10-19 NOTE — MR AVS SNAPSHOT
After Visit Summary   10/19/2017    Sherita Kenney    MRN: [id number]           Patient Information     Date Of Birth          1940        Visit Information        Provider Department      10/19/2017 11:15 AM Alma Nieves MD [facility]        Today's Diagnoses     Adenocarcinoma of colon (H)    -  1      Care Instructions    1- Proceed with 5FU/Avastin today- continue every 2 weeks  2- See Dr. Lindo in 2 weeks  3- Lumbar spine Xray          Follow-ups after your visit        Your next 10 appointments already scheduled     Oct 21, 2017 11:00 AM CDT   Level 1 with  INFUSION CHAIR 15   [facility] ([facility])    [facility]  [address]  [facility] [address]   [phone]            Oct 21, 2017 12:00 PM CDT   XR LUMBAR SPINE 2/3 VIEWS with XR3   [facility] ([facility])    [facility] [address] [phone]           Please bring a list of your current medicines to your exam. (Include vitamins, minerals and over-thecounter medicines.) Leave your valuables at home.  Tell your doctor if there is a chance you may be pregnant.  You do not need to do anything special for this exam.            Nov 02, 2017  9:30 AM CDT   Level 5 with  INFUSION CHAIR 2   [facility] ([facility])    [facility]  [address]  [facility] [address]   [phone]            Nov 02, 2017 10:00 AM CDT   Return Visit with Susan Lindo MD   [facility] ([facility])    [facility]  [address]  [facility] [address]   [phone]            Nov 16, 2017 10:00 AM CST   Level 5 with  INFUSION CHAIR 15   [facility] ([facility])    [facility]  [address]  [address] [address]   [phone]              Future tests that were ordered for you today     Open Future Orders        Priority Expected Expires Ordered    XR Lumbar Spine 2/3 Views Routine 10/19/2017 10/19/2018 10/19/2017            Who to contact     If you have questions or need follow up information about today's clinic visit or your schedule please contact [facility] directly at [phone].  Normal or non-critical lab and imaging results will be communicated to you by VULCUNhart, letter or phone within 4 business days after the clinic has received the results. If you do not hear from us within 7 days, please contact the clinic through Eyepict or phone. If you have a critical or abnormal lab result, we will notify you by phone as soon as possible.  Submit refill requests through Dualog or call your pharmacy and they will forward the refill request to us. Please allow 3 business days for your refill to be completed.          Additional Information About Your Visit        Dualog Information     Dualog lets you send messages to your doctor, view your test results, renew your prescriptions, schedule appointments and more. To sign up, go to [email or web address] . Click on \"Log in\" on the left side of the screen, which will take you to the Welcome page. Then click on \"Sign up Now\" on the right side of the page.     You will be asked to enter the access code listed below, as well as some personal information. Please follow the directions to create your username and password.     Your access code is: [email or web address]  Expires: 2017 11:49 AM     Your access code will  in 90 days. If you need help or a new code, please call your [facility] clinic or [phone].        Care EveryWhere ID     This is your Care EveryWhere ID. This could be used by other organizations to access your [facility] medical records  [id number]        Your Vitals Were     Pulse Temperature Respirations Pulse " Oximetry BMI (Body Mass Index)       74 96.8  F (36  C) (Oral) 20 96% 20.15 kg/m2        Blood Pressure from Last 3 Encounters:   10/19/17 137/75   10/19/17 145/83   10/17/17 137/75    Weight from Last 3 Encounters:   10/19/17 53.3 kg (117 lb 6.4 oz)   10/19/17 53.3 kg (117 lb 6.4 oz)   10/17/17 54.5 kg (120 lb 1.9 oz)               Primary Care Provider Office Phone # Fax #    Sherita Toussaint, APRN [phone]       [address] [facility] [address]  [facility] [address]        Equal Access to Services     ALEXIS ALBA : Hadii mateo gaticao Somonaali, waaxda luqadaha, qaybta kaalmada adeegyada, ellis mooney . So [facility] [phone].    ATENCIÓN: Si habla español, tiene a roldan disposición servicios gratuitos de asistencia lingüística. Llame al [phone].    We comply with applicable federal civil rights laws and [place] laws. We do not discriminate on the basis of race, color, national origin, age, disability, sex, sexual orientation, or gender identity.            Thank you!     Thank you for choosing [facility]  for your care. Our goal is always to provide you with excellent care. Hearing back from our patients is one way we can continue to improve our services. Please take a few minutes to complete the written survey that you may receive in the mail after your visit with us. Thank you!             Your Updated Medication List - Protect others around you: Learn how to safely use, store and throw away your medicines at www.disposemymeds.org.          This list is accurate as of: 10/19/17 12:49 PM.  Always use your most recent med list.                   Brand Name Dispense Instructions for use Diagnosis    acetaminophen 325 MG tablet    TYLENOL    60 tablet    Take 2 tablets (650 mg) by mouth every 4 hours as needed for mild pain or fever    Cancer associated pain       CETAPHIL lotion      Apply topically 2 times daily Also bid prn . To dry skin.        CYCLOBENZAPRINE  HCL PO      Take 10 mg by mouth 3 times daily as needed for muscle spasms        ENSURE PLUS Liqd      Take by mouth 2 times daily Admin 8oz twice daily for supplement        ferrous sulfate 325 (65 FE) MG tablet    IRON    31 tablet    Take 1 tablet (325 mg) by mouth every evening    Iron deficiency anemia due to chronic blood loss       HYDROcodone-acetaminophen 5-325 MG per tablet    NORCO     Take 1 tablet by mouth every 6 hours as needed for moderate to severe pain        levothyroxine 75 MCG tablet    SYNTHROID/LEVOTHROID    30 tablet    Take 1 tablet (75 mcg) by mouth daily Recheck TSH in 6 months    Hypothyroidism, unspecified type       loperamide 2 MG capsule    IMODIUM A-D    155 capsule    AND TAKE 2 CAPSULE EVERY 6 HOURS AS NEEDED FOR LOOSE STOOLS (MAX OF 16MG/24 HRS)    Diarrhea, unspecified type       LORazepam 0.5 MG tablet    ATIVAN    30 tablet    Take 1 tablet (0.5 mg) by mouth every 4 hours as needed (Anxiety, Nausea/Vomiting or Sleep)    Adenocarcinoma of colon (H)       menthol-zinc oxide 0.44-20.625 % Oint ointment    CALMOSEPTINE    1 Tube    Apply topically 4 times daily as needed for skin protection    Chronic diarrhea       ONDANSETRON PO      Take 8 mg by mouth every 8 hours as needed for nausea        PREPARATION H 0.25-88.44 % per suppository   Generic drug:  phenylephrine-cocoa butter      Place 1 suppository rectally 2 times daily as needed for hemorrhoids or itching        prochlorperazine 10 MG tablet    COMPAZINE    30 tablet    Take 1 tablet (10 mg) by mouth every 6 hours as needed (Nausea/Vomiting)    Adenocarcinoma of colon (H)       SUMATRIPTAN SUCCINATE PO      Take 25 mg by mouth every 8 hours as needed for migraine        travoprost (BAK Free) 0.004 % ophthalmic solution    TRAVATAN Z    5 mL    Place 1 drop into both eyes At Bedtime    Primary open angle glaucoma of both eyes, unspecified glaucoma stage       vitamin B-Complex     30 tablet    Take 1 tablet by mouth daily     S/P colectomy

## 2017-10-19 NOTE — PROGRESS NOTES
"Oncology Rooming Note    October 19, 2017 11:15 AM   Sherita Kenney is a 76 year old female who presents for:    No chief complaint on file.    Initial Vitals: There were no vitals taken for this visit. Estimated body mass index is 20.15 kg/(m^2) as calculated from the following:    Height as of 10/5/17: 1.626 m (5' 4\").    Weight as of an earlier encounter on 10/19/17: 53.3 kg (117 lb 6.4 oz). There is no height or weight on file to calculate BSA.  Data Unavailable Comment: Data Unavailable   No LMP recorded. Patient is not currently having periods (Reason: Perimenopausal).  Allergies reviewed: Yes  Medications reviewed: Yes    Medications: Medication refills not needed today.  Pharmacy name entered into [email or web address]:    [facility] [id number] [place] [address] [address] & [facility] [address] [facility] [address]    Clinical concerns: none     4 minutes for nursing intake (face to face time)     Maya Rodrigez CMA      1- Proceed with 5FU/Avastin today- continue every 2 weeks  Scheduled/janice  2- See Dr. Lindo in 2 weeks   Scheduled/janice  3- Lumbar spine Xray   Scheduled/janice    AVS printed & given to patient/Janice            DISCHARGE PLAN:  Next appointments: See patient instruction section Patient given discharge instructions, report given to Aubrey ([facility]) and paper left in scheduling bin to schedule X-ray, chemo and follow up appointments.   Departure Mode: Ambulatory  Accompanied by: self  4 minutes for nursing discharge (face to face time)   aMya Rodrigez CMA      "

## 2017-10-19 NOTE — PROGRESS NOTES
[facility]  HEMATOLOGY ONCOLOGY    DATE OF SERVICE:  10/19/2017      DIAGNOSIS:  Stage IV colon cancer diagnosed in 02/2017, currently on palliative intent chemotherapy with 5-FU and Avastin.      TREATMENT:  Started 5-FU, Avastin 09/21/2017 every 2 weeks, recently 5-FU dose decreased by 25% on 10/02/2017.      SUBJECTIVE:  The patient was seen as a followup today.  She continues to tolerate chemotherapy well except for off and on issues with fatigue.  She also states that the lateral aspect of her right thigh is numb since last few weeks.  She does not have any significant back pain.     REVIEW OF SYSTEMS:  A complete review of systems was performed and found to be negative other than pertinent positives mentioned in history of present illness.      Past medical, social histories reviewed.     Meds- Reviewed.     PHYSICAL EXAMINATION:   VITAL SIGNS:  Her blood pressure is 137/75, pulse 74, respirations 20, temperature 96.8.  CONSTITUTIONAL: Appears tired.    HEENT: Pupils are equal. Oropharynx is clear.   NECK: No cervical or supraclavicular lymphadenopathy.   RESPIRATORY: Clear bilaterally.   CARD/VASC: S1, S2, regular.   GI: Soft, nontender, nondistended, no hepatosplenomegaly.   MUSKULOSKELETAL: Warm, well perfused.   NEUROLOGIC: Alert, awake.   INTEGUMENT: No rash. She has some dark discoloration of her hands, otherwise normal skin examination  LYMPHATICS: No edema.   PSYCH: Mood and affect was normal.        LABORATORY DATA AND IMAGING REVIEWED DURING THIS VISIT:  Recent Labs   Lab Test  10/02/17   1043  09/08/17   1015   04/18/17   0655  04/17/17   0530   04/15/17   0540   03/06/17   1105   NA  140  140   < >  142  141   < >  140   < >  140   POTASSIUM  3.8  3.9   < >  3.4  3.8   < >  3.7   < >  4.1   CHLORIDE  106  106   < >  108  108   < >  108   < >  106   CO2  27  25   < >  28  26   < >  25   < >  27   ANIONGAP  7  9   < >  6  7   < >  7   < >  7   BUN  14  16   < >  12  8   < >  8    < >  27   CR  0.71  0.76   < >  0.56  0.50*   < >  0.50*   < >  0.58   GLC  93  118*   < >  92  101*   < >  99   < >  104*   SARITHA  8.8  8.9   < >  8.1*  8.1*   < >  7.7*   < >  8.7   MAG   --    --    --    --    --    --   2.0   --   2.2   PHOS   --    --    --   3.0  2.1*   < >  1.7*   --   3.5    < > = values in this interval not displayed.     Recent Labs   Lab Test  10/19/17   1110  10/05/17   1106  10/02/17   1043   WBC  11.2*  6.9  5.2   HGB  12.6  11.4*  11.0*   PLT  150  117*  113*   MCV  93  93  92   NEUTROPHIL  63.1  57.3  43.8     Recent Labs   Lab Test  10/19/17   1110  10/05/17   1106  09/21/17   1103  09/08/17   1015  08/24/17   0938  08/10/17   0945   BILITOTAL  0.5  0.5  0.5  0.7  0.4  0.3   ALKPHOS   --    --    --   140  123  158*   ALT   --    --    --   62*  23  27   AST   --    --    --   50*  27  28   ALBUMIN   --    --    --   3.4  3.2*  3.0*         Results for orders placed or performed during the hospital encounter of 09/13/17   CT Chest/Abdomen/Pelvis w Contrast    Narrative    CT CHEST, ABDOMEN, AND PELVIS WITH CONTRAST 9/13/2017 10:34 AM     HISTORY: Metastatic colon cancer. Malignant neoplasm of colon,  unspecified.    COMPARISON: 7/19/2017    TECHNIQUE: Volumetric helical acquisition of CT images from the lung  apices through the symphysis pubis after the administration of 61 mL  Isovue-370 intravenous contrast. Radiation dose for this scan was  reduced using automated exposure control, adjustment of the mA and/or  kV according to patient size, or iterative reconstruction technique.    FINDINGS:     Chest: No pulmonary masses. Stable nodule on the right since 2004.  Stable fissural nodules on the left. Stable emphysema. No pleural or  pericardial effusion. No adenopathy in the chest. Stable granulomatous  changes.    Abdomen and pelvis: Liver metastases again evident. Representative  lesion in the right liver measures 1.8 cm today previously 2.4 cm. A  representative lesion in the  left lobe measures 1.7 cm today  previously 1.9 cm. Spleen is upper normal size without focal lesions.  Adrenal glands, kidneys, and pancreas demonstrate no worrisome focal  lesions. A few intrahepatic branches of the portal vein in the right  lobe and branches of the superior mesenteric vein anteriorly remain  thrombosed. No definite new thrombus. There are no dilated loops of  small intestine or large bowel to suggest ileus or obstruction. No  free fluid. No free air. Borderline prominent distal left  retroperitoneal lymph nodes measuring 8 mm in short axis noted, and  unchanged. No adenopathy by size criteria.    Survey of the visualized bony structures demonstrates no destructive  bony lesions.      Impression    IMPRESSION:  1. Continued improvement/decreased size of the liver metastases since  the comparison study.  2. 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    PHOENIX RODRIGUEZ MD          ASSESSMENT:  This is a 76-year-old lady with metastatic colon cancer who is undergoing palliative intent chemotherapy with 5-FU and Avastin.  After dose reduction of 5-FU on 10/02/2017 she has tolerated the last chemotherapy fairly well.  Today, she complains of numbness on the lateral aspect of her right thigh area, has minor back pain issues but does not appear to be clinically significant back pain.  This is in the area of L5 dermatome distribution.     I will order a lumbar spine x-ray to assess for L5 vertebral issues.  Otherwise, she will proceed with her chemotherapy today.      PLAN:   1.  Proceed with 5-FU, Avastin today, continue every 2 weeks.   2.  See Dr. Lindo in 2 weeks.   3.  Lumbar spine x-ray.         MONTSERRAT SALEH MD             D: 10/19/2017 12:16   T: 10/19/2017 12:52   MT: ARIEL      Name:     SEVERO SWANN   MRN:      [id number]        Account:      [id number]   :      1940           Visit Date:    10/19/2017      Document: [id number]       cc: Susan Lindo MD

## 2017-10-19 NOTE — MR AVS SNAPSHOT
After Visit Summary   10/19/2017    Sherita Kenney    MRN: [id number]           Patient Information     Date Of Birth          1940        Visit Information        Provider Department      10/19/2017 10:30 AM  INFUSION CHAIR 15 [facility]        Today's Diagnoses     Adenocarcinoma of colon (H)    -  1    Port-a-cath in place           Follow-ups after your visit        Your next 10 appointments already scheduled     Oct 21, 2017 11:00 AM CDT   Level 1 with  INFUSION CHAIR 15   [facility] ([facility])    [facility] [place] [id number]  [place] [address]   [phone]            Oct 21, 2017 12:00 PM CDT   XR LUMBAR SPINE 2/3 VIEWS with XR3   [facility])    [address] [facility] [address] [phone]           Please bring a list of your current medicines to your exam. (Include vitamins, minerals and over-thecounter medicines.) Leave your valuables at home.  Tell your doctor if there is a chance you may be pregnant.  You do not need to do anything special for this exam.            Nov 02, 2017  9:30 AM CDT   Level 5 with  INFUSION CHAIR 2   [facility] ([facility])    [facility]  [address]  [place] [address]   [phone]            Nov 02, 2017 10:00 AM CDT   Return Visit with Susan Lindo MD   [facility] ([facility])    [facility]  [address]  [place] [address]   [phone]            Nov 16, 2017 10:00 AM CST   Level 5 with  INFUSION CHAIR 15   [facility] ([facility])    [facility]  [address]  [place] [address]   [phone]              Future tests that were  "ordered for you today     Open Future Orders        Priority Expected Expires Ordered    XR Lumbar Spine 2/3 Views Routine 10/19/2017 10/19/2018 10/19/2017            Who to contact     If you have questions or need follow up information about today's clinic visit or your schedule please contact [facility] directly at [phone].  Normal or non-critical lab and imaging results will be communicated to you by [employer], letter or phone within 4 business days after the clinic has received the results. If you do not hear from us within 7 days, please contact the clinic through [employer] or phone. If you have a critical or abnormal lab result, we will notify you by phone as soon as possible.  Submit refill requests through [employer] or call your pharmacy and they will forward the refill request to us. Please allow 3 business days for your refill to be completed.          Additional Information About Your Visit        [employer] Information     [employer] lets you send messages to your doctor, view your test results, renew your prescriptions, schedule appointments and more. To sign up, go to [email or web address] . Click on \"Log in\" on the left side of the screen, which will take you to the Welcome page. Then click on \"Sign up Now\" on the right side of the page.     You will be asked to enter the access code listed below, as well as some personal information. Please follow the directions to create your username and password.     Your access code is: [email or web address]  Expires: 2017 11:49 AM     Your access code will  in 90 days. If you need help or a new code, please call your [place] clinic or [phone].        Care EveryWhere ID     This is your Care EveryWhere ID. This could be used by other organizations to access your [place] medical records  [id number]        Your Vitals Were     Temperature Respirations BMI (Body Mass Index)             97.7  F (36.5  C) (Oral) 16 20.15 kg/m2  "         Blood Pressure from Last 3 Encounters:   10/19/17 137/75   10/19/17 129/72   10/17/17 137/75    Weight from Last 3 Encounters:   10/19/17 53.3 kg (117 lb 6.4 oz)   10/19/17 53.3 kg (117 lb 6.4 oz)   10/17/17 54.5 kg (120 lb 1.9 oz)              We Performed the Following     Bilirubin  total     CBC with platelets differential     Protein qualitative urine        Primary Care Provider Office Phone # Fax #    Sherita Toussaint, APRN [phone]       [address]  [place] [address]        Equal Access to Services     [facility] HospitalEZEKIEL : Hadii aad ku hadasho Manju, waaxda luqadaha, qaybta kaalmada adetedyada, ellis mooney . So [facility] [phone].    ATENCIÓN: Si habla español, tiene a roldan disposición servicios gratuitos de asistencia lingüística. LlUniversity [facility] [phone].    We comply with applicable federal civil rights laws and [place] laws. We do not discriminate on the basis of race, color, national origin, age, disability, sex, sexual orientation, or gender identity.            Thank you!     Thank you for choosing [facility]  for your care. Our goal is always to provide you with excellent care. Hearing back from our patients is one way we can continue to improve our services. Please take a few minutes to complete the written survey that you may receive in the mail after your visit with us. Thank you!             Your Updated Medication List - Protect others around you: Learn how to safely use, store and throw away your medicines at www.disposemymeds.org.          This list is accurate as of: 10/19/17  1:53 PM.  Always use your most recent med list.                   Brand Name Dispense Instructions for use Diagnosis    acetaminophen 325 MG tablet    TYLENOL    60 tablet    Take 2 tablets (650 mg) by mouth every 4 hours as needed for mild pain or fever    Cancer associated pain       CETAPHIL lotion      Apply topically 2  times daily Also bid prn . To dry skin.        CYCLOBENZAPRINE HCL PO      Take 10 mg by mouth 3 times daily as needed for muscle spasms        ENSURE PLUS Liqd      Take by mouth 2 times daily Admin 8oz twice daily for supplement        ferrous sulfate 325 (65 FE) MG tablet    IRON    31 tablet    Take 1 tablet (325 mg) by mouth every evening    Iron deficiency anemia due to chronic blood loss       HYDROcodone-acetaminophen 5-325 MG per tablet    NORCO     Take 1 tablet by mouth every 6 hours as needed for moderate to severe pain        levothyroxine 75 MCG tablet    SYNTHROID/LEVOTHROID    30 tablet    Take 1 tablet (75 mcg) by mouth daily Recheck TSH in 6 months    Hypothyroidism, unspecified type       loperamide 2 MG capsule    IMODIUM A-D    155 capsule    AND TAKE 2 CAPSULE EVERY 6 HOURS AS NEEDED FOR LOOSE STOOLS (MAX OF 16MG/24 HRS)    Diarrhea, unspecified type       LORazepam 0.5 MG tablet    ATIVAN    30 tablet    Take 1 tablet (0.5 mg) by mouth every 4 hours as needed (Anxiety, Nausea/Vomiting or Sleep)    Adenocarcinoma of colon (H)       menthol-zinc oxide 0.44-20.625 % Oint ointment    CALMOSEPTINE    1 Tube    Apply topically 4 times daily as needed for skin protection    Chronic diarrhea       ONDANSETRON PO      Take 8 mg by mouth every 8 hours as needed for nausea        PREPARATION H 0.25-88.44 % per suppository   Generic drug:  phenylephrine-cocoa butter      Place 1 suppository rectally 2 times daily as needed for hemorrhoids or itching        prochlorperazine 10 MG tablet    COMPAZINE    30 tablet    Take 1 tablet (10 mg) by mouth every 6 hours as needed (Nausea/Vomiting)    Adenocarcinoma of colon (H)       SUMATRIPTAN SUCCINATE PO      Take 25 mg by mouth every 8 hours as needed for migraine        travoprost (BAK Free) 0.004 % ophthalmic solution    TRAVATAN Z    5 mL    Place 1 drop into both eyes At Bedtime    Primary open angle glaucoma of both eyes, unspecified glaucoma stage        vitamin B-Complex     30 tablet    Take 1 tablet by mouth daily    S/P colectomy

## 2017-10-21 NOTE — MR AVS SNAPSHOT
After Visit Summary   10/21/2017    Sherita Kenney    MRN: [id number]           Patient Information     Date Of Birth          1940        Visit Information        Provider Department      10/21/2017 11:00 AM  INFUSION CHAIR 15 [facility]        Today's Diagnoses     Port-a-cath in place    -  1       Follow-ups after your visit        Your next 10 appointments already scheduled     Oct 21, 2017 11:00 AM CDT   Level 1 with  INFUSION CHAIR 15   [facility] ([facility])    [facility] [place] [address]  [place] [address]   [phone]            Oct 21, 2017 12:00 PM CDT   XR LUMBAR SPINE 2/3 VIEWS with XR3   [facility])    [address] [facility] [address]   [phone]           Please bring a list of your current medicines to your exam. (Include vitamins, minerals and over-thecounter medicines.) Leave your valuables at home.  Tell your doctor if there is a chance you may be pregnant.  You do not need to do anything special for this exam.            Nov 02, 2017  9:30 AM CDT   Level 5 with  INFUSION CHAIR 2   [facility] ([facility])    [facility]  [address]  [place] [address]   [phone]            Nov 02, 2017 10:00 AM CDT   Return Visit with Susan Lindo MD   [facility])    [facility]  [address]  [place] [address]   [phone]            Nov 16, 2017 10:00 AM CST   Level 5 with  INFUSION CHAIR 15   [facility] ([facility])    [facility]  [address]  [place] [address]   [phone]              Who to contact     If you have questions or need follow up  "information about today's clinic visit or your schedule please contact [facility] directly at [phone].  Normal or non-critical lab and imaging results will be communicated to you by [employer], letter or phone within 4 business days after the clinic has received the results. If you do not hear from us within 7 days, please contact the clinic through [employer] or phone. If you have a critical or abnormal lab result, we will notify you by phone as soon as possible.  Submit refill requests through [employer] or call your pharmacy and they will forward the refill request to us. Please allow 3 business days for your refill to be completed.          Additional Information About Your Visit        [employer] Information     [employer] lets you send messages to your doctor, view your test results, renew your prescriptions, schedule appointments and more. To sign up, go to [email or web address] . Click on \"Log in\" on the left side of the screen, which will take you to the Welcome page. Then click on \"Sign up Now\" on the right side of the page.     You will be asked to enter the access code listed below, as well as some personal information. Please follow the directions to create your username and password.     Your access code is: [email or web address]  Expires: 2017 11:49 AM     Your access code will  in 90 days. If you need help or a new code, please call your [place] clinic or [phone].        Care EveryWhere ID     This is your Care EveryWhere ID. This could be used by other organizations to access your [place] medical records  [id number]        Your Vitals Were     Pulse Temperature Respirations Pulse Oximetry          73 98  F (36.7  C) (Oral) 20 97%         Blood Pressure from Last 3 Encounters:   10/21/17 150/89   10/19/17 137/75   10/19/17 129/72    Weight from Last 3 Encounters:   10/19/17 53.3 kg (117 lb 6.4 oz)   10/19/17 53.3 kg (117 lb 6.4 oz)   10/17/17 54.5 kg (120 lb 1.9 " oz)              Today, you had the following     No orders found for display       Primary Care Provider Office Phone # Fax #    WILLY Hannon [phone]       [address] [place] [address]        Equal Access to Services     ALEXIS ALBA : Hadii mateo ku hadesthero Soomaali, waaxda luqadaha, qaybta kaalmada adeegyada, waxotis vincent haycherrin adele zaidmichelle mooney . So [facility] [phone].    ATENCIÓN: Si habla español, tiene a roldan disposición servicios gratuitos de asistencia lingüística. Llame al [phone].    We comply with applicable federal civil rights laws and [place] laws. We do not discriminate on the basis of race, color, national origin, age, disability, sex, sexual orientation, or gender identity.            Thank you!     Thank you for choosing [facility] AND [facility]  for your care. Our goal is always to provide you with excellent care. Hearing back from our patients is one way we can continue to improve our services. Please take a few minutes to complete the written survey that you may receive in the mail after your visit with us. Thank you!             Your Updated Medication List - Protect others around you: Learn how to safely use, store and throw away your medicines at www.disposemymeds.org.          This list is accurate as of: 10/21/17  9:25 AM.  Always use your most recent med list.                   Brand Name Dispense Instructions for use Diagnosis    acetaminophen 325 MG tablet    TYLENOL    60 tablet    Take 2 tablets (650 mg) by mouth every 4 hours as needed for mild pain or fever    Cancer associated pain       CETAPHIL lotion      Apply topically 2 times daily Also bid prn . To dry skin.        CYCLOBENZAPRINE HCL PO      Take 10 mg by mouth 3 times daily as needed for muscle spasms        ENSURE PLUS Liqd      Take by mouth 2 times daily Admin 8oz twice daily for supplement        ferrous sulfate 325 (65 FE) MG tablet    IRON    31  tablet    Take 1 tablet (325 mg) by mouth every evening    Iron deficiency anemia due to chronic blood loss       HYDROcodone-acetaminophen 5-325 MG per tablet    NORCO     Take 1 tablet by mouth every 6 hours as needed for moderate to severe pain        levothyroxine 75 MCG tablet    SYNTHROID/LEVOTHROID    30 tablet    Take 1 tablet (75 mcg) by mouth daily Recheck TSH in 6 months    Hypothyroidism, unspecified type       loperamide 2 MG capsule    IMODIUM A-D    155 capsule    AND TAKE 2 CAPSULE EVERY 6 HOURS AS NEEDED FOR LOOSE STOOLS (MAX OF 16MG/24 HRS)    Diarrhea, unspecified type       LORazepam 0.5 MG tablet    ATIVAN    30 tablet    Take 1 tablet (0.5 mg) by mouth every 4 hours as needed (Anxiety, Nausea/Vomiting or Sleep)    Adenocarcinoma of colon (H)       menthol-zinc oxide 0.44-20.625 % Oint ointment    CALMOSEPTINE    1 Tube    Apply topically 4 times daily as needed for skin protection    Chronic diarrhea       ONDANSETRON PO      Take 8 mg by mouth every 8 hours as needed for nausea        PREPARATION H 0.25-88.44 % per suppository   Generic drug:  phenylephrine-cocoa butter      Place 1 suppository rectally 2 times daily as needed for hemorrhoids or itching        prochlorperazine 10 MG tablet    COMPAZINE    30 tablet    Take 1 tablet (10 mg) by mouth every 6 hours as needed (Nausea/Vomiting)    Adenocarcinoma of colon (H)       SUMATRIPTAN SUCCINATE PO      Take 25 mg by mouth every 8 hours as needed for migraine        travoprost (BAK Free) 0.004 % ophthalmic solution    TRAVATAN Z    5 mL    Place 1 drop into both eyes At Bedtime    Primary open angle glaucoma of both eyes, unspecified glaucoma stage       vitamin B-Complex     30 tablet    Take 1 tablet by mouth daily    S/P colectomy

## 2017-10-21 NOTE — PROGRESS NOTES
Infusion Nursing Note:  Sherita MARTINEZ Shilakrista presents today for pump d/c.    Patient seen by provider today: No   present during visit today: Not Applicable.    Note: Patient with appointment for Xray following infusion appointment. Patient accompanied over to Xray with [employer] volunteer.    Intravenous Access:  Implanted Port.    Treatment Conditions:  Not Applicable.      Post Infusion Assessment:  Blood return noted pre and post infusion.  Site patent and intact, free from redness, edema or discomfort.  No evidence of extravasations.  Access discontinued per protocol.    Discharge Plan:   Patient declined prescription refills.  Discharge instructions reviewed with: Patient.  Patient verbalized understanding of discharge instructions and all questions answered.  Copy of AVS reviewed with patient.  Patient will return 11/2/17 for next appointment.  Patient discharged in stable condition accompanied by: self.  Departure Mode: Ambulatory.    Mary Sequeira RN

## 2017-10-31 NOTE — PROGRESS NOTES
[place] [facility]    Chief Complaint   Patient presents with     RECHECK       HPI:    Sherita Kenney is a 76 year old  (1940), who is being seen today for an episodic care visit at [facility].      HPI information obtained from: facility chart records, facility staff, patient report, [facility] Epic chart review, family/first contact Friend Clara report and DON (Meghan), SW (Jennifer), sister and borther in-law.     Visit today to complete discharge to [facility] in [place].    Today's concern is:  Adenocarcinoma of colon (H)  S/P right colectomy  Diarrhea, unspecified type  Diagnosed in February 2017 (stage T4b, N2b, M1) underwent lap bowel resection w/ ilesotomy due to R iliopsoas abscess and adjacent mass.   In April 2017 open R colectomy with ileostomy takedown and primary anastomosis.   PET scan in May 2017 showed metastases to liver.  June 2017: started modified FOLFOX-6 (leucovorin, 5-FU, oxaliplatin) and avastin --> has now completed eight cycles.  SE: neuropathy and diarrhea (due to oxaliplatin and 5-FU bolus).  CT chest, abdomen and pelvis was done on 09/13/2017.  There is continued improvement in liver metastasis.  No new lesions.  There is a stable thrombus in peripheral branches of portal vein in the right lobe of the liver and branches of superior mesenteric vein.   Last visit with Dr. Lindo on 9/14/17, plan to move to maintenance treatment of 5-FU and Avastin w/o bolus 5-FU.  Continues to tolerate palliative chemo. Has R CW port-a-cath.  Guarded prognosis.  Diarrhea is somewhat better on new chemo regimen.   Uses PRN imodium. Carried bat with her for incontinent care.  Chronic neuropathy.  No back pain.  No weakness. No falls. Fair appetite. No N/V.    CEA 3.8 Feb 2017.   CEA 1.0 Sept 2017    Admission Weight: 100lbs,   Current Weight:   Wt Readings from Last 5 Encounters:   11/02/17 119 lb 3.2 oz (54.1 kg)   11/02/17 119 lb 3.2 oz (54.1 kg)   10/31/17 119 lb  "12.8 oz (54.3 kg)   10/19/17 117 lb 6.4 oz (53.3 kg)   10/19/17 117 lb 6.4 oz (53.3 kg)     Memory loss  Delirium during initial hospitalization in February. Weaned off Zyprexa last month.   No change in mood or behavior.  Completed neuropsych eval in May 2017 at [facility], Dr. Man.  \"This pattern of performance is worrisome for mesial temporal lobe involvement, with some indication of frontal system involvement as well. The etiology of her cognitive difficulties is not entirely clear based on neuropsychological evaluation alone. A neurodegenerative etiology such as Alzheimer s disease should be ruled out.\" - recommendation for neurology follow-up  Recommendation for assisted living and assistance w/ IADLs and medications.  \"She is currently living in assisted living, and she would very much like to return to her condominium as soon as possible. Given her cognitive difficulties, it is recommended that she have assistance with management of her instrumental activities of daily living and minimal supervision for her safety. If she were to return to her home, she would likely require someone to stay with her. As such, assisted living may provide greater independence while also allowing her to have the minimal supervision and assistance that she will likely require.\"  \"Specifically, she would likely require assistance with management of medications and finances.\"   Apparent plan at [place] is for her to move into independent living environment with limited alacart services.  There are no medication administration services that can accommodate PRN medications.  There is a fully kitchen with a working stove.  No assistance for bathing, which she has here.  She also is quite fatigued the day are her chemo treatments and may need more support during this time.    ACP (advance care planning)  Social [facility] has been involved at [facility].  Recently change HC agent to her friend Clara due to frustrations with her sister and brother " "in-law. SW assisted her in making a health care directive document.  Her sister is still her financial power of  and Sherita/Clara are having trouble communicating with her and coordinating the move.   \"Nat keeps canceling the movers!\" - they are unclear on exact discharge date and exact services Sherita will have at [place].    ALLERGIES: Wool fiber     Past Medical, Surgical, Family and Social History reviewed and [facility] in [facility].    Current Outpatient Prescriptions   Medication Sig Dispense Refill     ONDANSETRON PO Take 8 mg by mouth every 8 hours as needed for nausea       loperamide (IMODIUM A-D) 2 MG capsule AND TAKE 2 CAPSULE EVERY 6 HOURS AS NEEDED FOR LOOSE STOOLS (MAX OF 16MG/24 HRS) 155 capsule 3     travoprost, BAK Free, (TRAVATAN Z) 0.004 % ophthalmic solution Place 1 drop into both eyes At Bedtime 5 mL 98     LORazepam (ATIVAN) 0.5 MG tablet Take 1 tablet (0.5 mg) by mouth every 4 hours as needed (Anxiety, Nausea/Vomiting or Sleep) 30 tablet 2     ferrous sulfate (IRON) 325 (65 FE) MG tablet Take 1 tablet (325 mg) by mouth every evening 31 tablet 3     phenylephrine-cocoa butter (PREPARATION H) 0.25-88.44 % per suppository Place 1 suppository rectally 2 times daily as needed for hemorrhoids or itching       CETAPHIL (CETAPHIL) lotion Apply topically 2 times daily Also bid prn . To dry skin.       HYDROcodone-acetaminophen (NORCO) 5-325 MG per tablet Take 1 tablet by mouth every 6 hours as needed for moderate to severe pain       SUMATRIPTAN SUCCINATE PO Take 25 mg by mouth every 8 hours as needed for migraine       menthol-zinc oxide (CALMOSEPTINE) 0.44-20.625 % OINT ointment Apply topically 4 times daily as needed for skin protection 1 Tube 3     vitamin B-Complex Take 1 tablet by mouth daily 30 tablet 11     levothyroxine (SYNTHROID/LEVOTHROID) 75 MCG tablet Take 1 tablet (75 mcg) by mouth daily Recheck TSH in 6 months 30 tablet 5     acetaminophen (TYLENOL) 325 MG tablet Take 2 tablets " (650 mg) by mouth every 4 hours as needed for mild pain or fever 60 tablet 1     Nutritional Supplements (ENSURE PLUS) LIQD Take by mouth 2 times daily Admin 8oz twice daily for supplement       Medications reviewed:  Medications reconciled to facility chart and changes were made to reflect current medications as identified as above med list. Below are the changes that were made:   Medications stopped since last EPIC medication reconciliation:   There are no discontinued medications.    Medications started since last Psychiatric medication reconciliation:  No orders of the defined types were placed in this encounter.    REVIEW OF SYSTEMS:  4 point ROS including Respiratory, CV, GI and , other than that noted in the HPI,  is negative    Physical Exam:  BP (!) 122/107  Pulse 75  Temp 98.1  F (36.7  C)  Resp 20  Wt 119 lb 12.8 oz (54.3 kg)  SpO2 98%  BMI 20.56 kg/m2    GENERAL APPEARANCE:  Alert, in no distress, pleasant, cooperative, smartly dressed  EYES:  wearing glasses, sclera clear and conjunctiva normal, no discharge   ENT:  Mouth normal, moist mucous membranes  RESP:  Non-labored breathing, palpation of chest normal, no chest wall tenderness, no respiratory distress, Lung sounds clear, patient is on room air  CV:  Palpation - no murmur/non-displaced PMI, implanted port site R chest wall, non-tender Auscultation - rate and rhythm regular, no murmur, no rub or gallop.  VASCULAR: No edema bilateral lower extremities.   ABDOMEN:  normal bowel sounds, soft, nontender, no grimacing or guarding with palpation.   M/S:   Gait and station ambulates independently. +kyphosis. Digits without clubbing, cyanosis, no swelling or tenderness, good  right, flaccid left arm.  SKIN:  Inspection - no visible rashes/lesions/uclerations. Palpation- no increased warmth, skin is dry and non-tender.  NEURO: cranial nerves II-XII grossly intact, no facial asymmetry, follows simple commands, left arm is flaccid  PSYCH: awake and  alert, speech fluent,  insight and judgement fair can express wishes regarding goals of care, oriented to person/place/time, memory impaired, somewhat anxious today. + Anomia.     Recent Labs:    CBC RESULTS:   Recent Labs   Lab Test  10/05/17   1106  10/02/17   1043   WBC  6.9  5.2   RBC  3.87  3.82   HGB  11.4*  11.0*   HCT  35.8  35.0   MCV  93  92   MCH  29.5  28.8   MCHC  31.8  31.4*   RDW  18.5*  18.7*   PLT  117*  113*       Last Basic Metabolic Panel:  Recent Labs   Lab Test  10/02/17   1043  09/08/17   1015   NA  140  140   POTASSIUM  3.8  3.9   CHLORIDE  106  106   SARITHA  8.8  8.9   CO2  27  25   BUN  14  16   CR  0.71  0.76   GLC  93  118*       Liver Function Studies -   Recent Labs   Lab Test  10/05/17   1106  09/21/17   1103  09/08/17   1015  08/24/17   0938   PROTTOTAL   --    --   6.8  6.7*   ALBUMIN   --    --   3.4  3.2*   BILITOTAL  0.5  0.5  0.7  0.4   ALKPHOS   --    --   140  123   AST   --    --   50*  27   ALT   --    --   62*  23       TSH   Date Value Ref Range Status   04/03/2017 2.30 0.40 - 4.00 mU/L Final   02/15/2017 4.05 (H) 0.40 - 4.00 mU/L Final       Lab Results   Component Value Date    A1C 4.7 04/13/2017    A1C 4.3 04/03/2017     Assessment/Plan:  Adenocarcinoma of colon (H)  S/P right colectomy  Diarrhea, unspecified type  Comment: continues on palliative chemo, diarrhea somewhat improved, + fatigue day after chemo treatments  Plan:  - Appreciate oncology care with Dr. Lindo  - Resident would benefit from nursing services at [facility]   - [facility] unable to administer PRN medications, currently using ativan, Norco, and imodium for comfort/symptom mgmt.     Memory loss  Comment: reviewed neuropsych eval again with resident and Clara, recommendation for senior care and medication administration    Plan:  - [facility] to re-screen resident to make sure they are able to meet care needs  - Postpone d/c until appropriate services in place.     ACP (advance care planning)  Comment:  deteriorated, resident remains frustrated with her sister/borther in law who are her financial POA  Plan:  - Recommend continued SW supports on d/c       ~ 70 minutes total visit, with > 50% (58 minutes) spent in counseling and coordination of care. 40 minutes, from 10:30 - 11:10 am, spent in discussion with patient and health care agent Clara (friend) about specifics/logisitics of discharge plan and services that will need to be in place for a safe dischare. Clara and Sherita request I call facility to clarify their questions. Spent from  12:38-12:56 pm (18 min) in discussion about Sherita's care needs with Director of Resident Services  (Rochelle Lynn - nurse) at [facility] [phone]. All parties agree to postpone d/c until safe d/c plan in place.    Additional Orders:  Medication list reviewed with resident and nursing, d/c medications that are not being used:  - d/c prochlorperazine, Zofran in place, no nausea   - d/c cyclobenzaprine, not using       Electronically signed by  WILLY Chi CNP

## 2017-11-02 NOTE — TELEPHONE ENCOUNTER
Reason for Call:  Home Health Care    [place] with  Homecare called regarding (reason for call): orders    Orders are needed for this patient. [place] needs orders for PT OT to eval treat for abnormal gate safety and cognative eval.     PT:     OT:     Skilled Nursing:     Pt Provider:     Phone Number Homecare Nurse can be reached at:     Can we leave a detailed message on this number? YES    Phone number patient can be reached at: Other phone number:  [phone]    Best Time: any    Call taken on 11/2/2017 at 9:44 AM by Diego Valdes

## 2017-11-02 NOTE — PROGRESS NOTES
ONCOLOGY HISTORY:  Ms. Sherita Kenney is a female with colon cancer.   1. Patient was brought to the ER on 02/15/2017 with altered mental status.    -CT brain on 02/15/2017 did not reveal any acute intracranial pathology.   -CT abdomen and pelvis on 02/15/2017 revealed large right iliopsoas abscess and mass-like wall thickening of portion of right colon.   -CT-guided drainage of abscess was done.   2. Patient was taken to OR on 03/01/2017.    -Colonoscopy revealed a large fungating mass in the right side of the colon.  Pathology revealed invasive poorly differentiated adenocarcinoma.  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   -Laparoscopic ileostomy was done.    3.  CEA on 02/16/2017 was 3.8.   4. Patient had right colectomy with takedown of ileostomy and primary anastomosis on 04/13/2017.    -Pathology  reveals poorly differentiated colonic adenocarcinoma. 15 of 24 lymph nodes are positive. Tumor invades into adjacent abdominal wall.  Lymphovascular invasion present. pT4b pN2b M0.   -BRAF mutation present. No KRAS mutation.  5. PET scan on 05/22/2017 revealed metastatic disease.  Multiple hypermetabolic liver metastases and  hypermetabolic necrotic lymphadenopathy in right lower quadrant.  6. modified FOLFOX6 with Avastin started on 06/01/2017.   -Bolus 5-FU discontinued with cycle 4.    -Oxaliplatin reduced with cycle 5.  -Oxaliplatin stopped after cycle 8. Cycle 8 completed on 09/08/2017.  7. CT chest, abdomen and pelvis on 07/19/2017 revealed improved hepatic metastases.  Several peripheral branches of portal vein in right hepatic lobe of liver is thrombosed.  There is also thrombosis in branches of superior mesenteric vein in anterior abdomen.    -Ultrasound on 07/20/2017 reveals occlusive thrombus in the posterior posterior branch and its two branches of the right portal vein.  Main portal vein and left portal vein are patent.  -Decision was made not to  ant-coagulate.  8. CT chest, abdomen and pelvis on 09/13/2017 reveals improvement in disease.   9.  Maintenance infusional 5-FU and Avastin started on 09/21/2017. No bolus 5-FU.     SUBJECTIVE:    Ms. Sherita Martinez is a 76-year-old female with metastatic colon cancer.  She is on maintenance 5-FU and Avastin without bolus 5-FU.       Overall her condition is stable.  Her main problem is diarrhea.  She still has some diarrhea especially during daytime.  At night she has to wake up once.  She is not getting bolus 5-FU.  5-FU infusional dose has been reduced by 25%.  Diarrhea has improved but not resolved.  She takes Imodium.  No blood in the stool.        Patient denies any headache.  No dizziness.  No chest pain.  No difficulty breathing.  Sometimes she has some abdominal discomfort.  No nausea or vomiting.  No urinary complaints.  No fever, chills or night sweats. She has some peripheral neuropathy from previous oxaliplatin.  She has some numbness and tingling in the fingers and toes.  They are not getting worse.  Things are not falling out of her hand.        PHYSICAL EXAMINATION:   Alert and oriented x 3.   VITAL SIGNS:  Reviewed.   EYES:  No icterus.   THROAT:  No ulcer or thrush.   NECK:  Supple. No lymphadenopathy.   LUNGS:  Good air entry bilaterally.  No wheezing.   HEART:  Regular.   ABDOMEN:  Soft and nontender.  No mass.   EXTREMITIES:  No edema.   SKIN:  No petechiae. Mild hand-foot syndrome present. Palm is mildly erythematous.  Some cracking of the skin at the tips of the finger.      LABORATORY DATA:  Reviewed.       ASSESSMENT:   1.  A 76-year-old female with metastatic colon cancer on maintenance 5-FU and Avastin.   2.  Diarrhea secondary to 5-FU.   3.  Peripheral neuropathy.    4.  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   5.  Thrombocytopenia secondary to chemotherapy.      PLAN:    1.  Patient overall is doing well from metastatic colon cancer.  Overall she  is tolerating treatment well except for diarrhea.  Discussed with her regarding diarrhea.  This is secondary to 5-FU.  We will further decrease the dose of infusional 5-FU by another 25%.  She will be getting only 50% of the original dose.  She'll continue on Avastin at the same dose.  Hopefully her diarrhea gets better.  She'll continue on Imodium as needed.       2.  We will plan on getting CT scan in December.  If her disease is stable, we can consider discontinuing infusional 5-FU and continuing her on Avastin alone.  Further discussion after the CT scan.    3.  CBC was reviewed.  She is thrombocytopenic.  It is secondary to chemotherapy.  It should improve as 5-FU dose is being reduced.    4.  I'll see her with next treatment.  Advised her to return to see a physician if she has fever, chills, worsening weakness, shortness of breath, worsening diarrhea or any other concerns.

## 2017-11-02 NOTE — MR AVS SNAPSHOT
After Visit Summary   11/2/2017    Sherita Kenney    MRN: [id number]           Patient Information     Date Of Birth          1940        Visit Information        Provider Department      11/2/2017 10:00 AM Susan Lindo MD [facility]        Today's Diagnoses     Adenocarcinoma of colon (H)    -  1      Care Instructions    Continue chemotherapy. Decrease 5-FU by 50%.  Follow with next treatment.          Follow-ups after your visit        Your next 10 appointments already scheduled     Nov 16, 2017 10:00 AM CST   Level 5 with  INFUSION CHAIR 15   [facility] ([facility])    [facility]  [address]  [facility] [address]   [phone]            Nov 16, 2017 10:30 AM CST   Return Visit with Susan Lindo MD   [facility] ([facility])    [facility]  [address]  [facility] [address] [phone]              Who to contact     If you have questions or need follow up information about today's clinic visit or your schedule please contact [facility] directly at [phone].  Normal or non-critical lab and imaging results will be communicated to you by Front Desk HQhart, letter or phone within 4 business days after the clinic has received the results. If you do not hear from us within 7 days, please contact the clinic through Front Desk HQhart or phone. If you have a critical or abnormal lab result, we will notify you by phone as soon as possible.  Submit refill requests through [employer] or call your pharmacy and they will forward the refill request to us. Please allow 3 business days for your refill to be completed.          Additional Information About Your Visit        Front Desk HQharService Seeking Information     [employer] lets you send messages to your doctor, view your test results, renew your prescriptions, schedule appointments and more. To sign up, go to  [email or web address] . Click on \"Log in\" on the left side of the screen, which will take you to the Welcome page. Then click on \"Sign up Now\" on the right side of the page.     You will be asked to enter the access code listed below, as well as some personal information. Please follow the directions to create your username and password.     Your access code is: [email or web address]  Expires: 2017 11:49 AM     Your access code will  in 90 days. If you need help or a new code, please call your [place] clinic or [phone].        Care EveryWhere ID     This is your Care EveryWhere ID. This could be used by other organizations to access your [place] medical records  [id number]        Your Vitals Were     Pulse Temperature Respirations Pulse Oximetry BMI (Body Mass Index)       71 98  F (36.7  C) (Oral) 18 95% 20.46 kg/m2        Blood Pressure from Last 3 Encounters:   17 149/90   17 149/90   10/31/17 (!) 122/107    Weight from Last 3 Encounters:   17 54.1 kg (119 lb 3.2 oz)   17 54.1 kg (119 lb 3.2 oz)   10/31/17 54.3 kg (119 lb 12.8 oz)              Today, you had the following     No orders found for display       Primary Care Provider Office Phone # Fax #    Edgar Neno Rodriguez [phone] [address] [facility] [address]  [place] [address]        Equal Access to Services     [facility]: Hadii aad ku hadasho Soomaali, waaxda luqadaha, qaybta kaalmada nilay, ellis mooney . So [facility] [phone].    ATENCIÓN: Si habla español, tiene a roldan disposición servicios gratuitos de asistencia lingüística. Llame al [phone].    We comply with applicable federal civil rights laws and [place] laws. We do not discriminate on the basis of race, color, national origin, age, disability, sex, sexual orientation, or gender identity.            Thank you!     Thank you for choosing [facility]  for your care. Our goal is " always to provide you with excellent care. Hearing back from our patients is one way we can continue to improve our services. Please take a few minutes to complete the written survey that you may receive in the mail after your visit with us. Thank you!             Your Updated Medication List - Protect others around you: Learn how to safely use, store and throw away your medicines at www.disposemymeds.org.          This list is accurate as of: 11/2/17 10:58 AM.  Always use your most recent med list.                   Brand Name Dispense Instructions for use Diagnosis    acetaminophen 325 MG tablet    TYLENOL    60 tablet    Take 2 tablets (650 mg) by mouth every 4 hours as needed for mild pain or fever    Cancer associated pain       CETAPHIL lotion      Apply topically 2 times daily Also bid prn . To dry skin.        ENSURE PLUS Liqd      Take by mouth 2 times daily Admin 8oz twice daily for supplement        ferrous sulfate 325 (65 FE) MG tablet    IRON    31 tablet    Take 1 tablet (325 mg) by mouth every evening    Iron deficiency anemia due to chronic blood loss       HYDROcodone-acetaminophen 5-325 MG per tablet    NORCO     Take 1 tablet by mouth every 6 hours as needed for moderate to severe pain        levothyroxine 75 MCG tablet    SYNTHROID/LEVOTHROID    30 tablet    Take 1 tablet (75 mcg) by mouth daily Recheck TSH in 6 months    Hypothyroidism, unspecified type       loperamide 2 MG capsule    IMODIUM A-D    155 capsule    AND TAKE 2 CAPSULE EVERY 6 HOURS AS NEEDED FOR LOOSE STOOLS (MAX OF 16MG/24 HRS)    Diarrhea, unspecified type       LORazepam 0.5 MG tablet    ATIVAN    30 tablet    Take 1 tablet (0.5 mg) by mouth every 4 hours as needed (Anxiety, Nausea/Vomiting or Sleep)    Adenocarcinoma of colon (H)       menthol-zinc oxide 0.44-20.625 % Oint ointment    CALMOSEPTINE    1 Tube    Apply topically 4 times daily as needed for skin protection    Chronic diarrhea       ONDANSETRON PO      Take 8 mg  by mouth every 8 hours as needed for nausea        PREPARATION H 0.25-88.44 % per suppository   Generic drug:  phenylephrine-cocoa butter      Place 1 suppository rectally 2 times daily as needed for hemorrhoids or itching        SUMATRIPTAN SUCCINATE PO      Take 25 mg by mouth every 8 hours as needed for migraine        travoprost (BAK Free) 0.004 % ophthalmic solution    TRAVATAN Z    5 mL    Place 1 drop into both eyes At Bedtime    Primary open angle glaucoma of both eyes, unspecified glaucoma stage       vitamin B-Complex     30 tablet    Take 1 tablet by mouth daily    S/P colectomy

## 2017-11-02 NOTE — PROGRESS NOTES
"Oncology Rooming Note    November 2, 2017 10:12 AM   Sherita Kenney is a 76 year old female who presents for:    Chief Complaint   Patient presents with     Oncology Clinic Visit     Colon cancer      Initial Vitals: /90  Pulse 71  Temp 98  F (36.7  C) (Oral)  Resp 18  Wt 54.1 kg (119 lb 3.2 oz)  SpO2 95%  BMI 20.46 kg/m2 Estimated body mass index is 20.46 kg/(m^2) as calculated from the following:    Height as of 10/5/17: 1.626 m (5' 4\").    Weight as of this encounter: 54.1 kg (119 lb 3.2 oz). Body surface area is 1.56 meters squared.  No Pain (1) Comment: Data Unavailable   No LMP recorded. Patient is not currently having periods (Reason: Perimenopausal).  Allergies reviewed: Yes  Medications reviewed: Yes    Medications: Medication refills not needed today.  Pharmacy name entered into [facility]:    [facility] [address] [place] [address] - [address] PHARMACY [place], [place] - [address]  [facility] [address]    Clinical concerns: None                4 minutes for nursing intake (face to face time)     April Bazzi MA          Continue chemotherapy. Decrease 5-FU by 50%.  Follow with next treatment.  Scheduled - Belle    AVS given to patient - Belle              "

## 2017-11-02 NOTE — PROGRESS NOTES
"Infusion Nursing Note:  Sherita Kenney presents today for C4D1 Avastin/Flourouracil.    Patient seen by provider today: Yes: Dr. Lindo   present during visit today: Not Applicable.    Note: Have family concern while in the infusion clinic, pt needed a ride home. SW consulted.    Intravenous Access:  Labs drawn without difficulty.  Implanted Port.    Prior to discharge: Port is secured in place with tegaderm and flushed with 10cc NS with positive blood return noted.  Continuous home infusion CADD pump connected.    All connectors secured in place and clamps taped open.    Pump started, \"running\" noted on display (CADD): NO.  Patient instructed to call our clinic or [facility] with any questions or concerns at home.  Patient verbalized understanding.    Patient set up for pump disconnect at our clinic on 11/4/2017 at 1200H.        Treatment Conditions:  Lab Results   Component Value Date    HGB 12.0 11/02/2017     Lab Results   Component Value Date    WBC 6.6 11/02/2017      Lab Results   Component Value Date    ANEU 4.2 11/02/2017     Lab Results   Component Value Date    PLT 96 11/02/2017      Results reviewed, labs MET treatment parameters, ok to proceed with treatment.  Protein albumin urine: 30. Per Dr. Lindo no need for 24 hour urine collection. Dose reduction check.     Post Infusion Assessment:  Patient tolerated infusion without incident.  Blood return noted pre and post infusion.  Site patent and intact, free from redness, edema or discomfort.  No evidence of extravasations.    Discharge Plan:   Discharge instructions reviewed with: Patient.  Patient and/or family verbalized understanding of discharge instructions and all questions answered.  Copy of AVS reviewed with patient and/or family.  Patient will return 11/16/2017 for next appointment.  Patient discharged in stable condition accompanied by: self and friend.  Departure Mode: Ambulatory and RN walked with pt all the way to the " [facility] 2nd floor, where friend is waiting to give pt a ride home.    Andie Parra RN

## 2017-11-02 NOTE — MR AVS SNAPSHOT
After Visit Summary   11/2/2017    Sherita Kenney    MRN: [id number]           Patient Information     Date Of Birth          1940        Visit Information        Provider Department      11/2/2017 9:30 AM  INFUSION CHAIR 2 [facility]        Today's Diagnoses     Adenocarcinoma of colon (H)    -  1       Follow-ups after your visit        Your next 10 appointments already scheduled     Nov 04, 2017 12:00 PM CDT   Level 1 with  INFUSION CHAIR 19   [facility] ([facility])    [facility]  [address]  [place] [address]   [phone]            Nov 16, 2017 10:00 AM CST   Level 5 with  INFUSION CHAIR 15   [facility] ([facility])    [facility]  [address]  [place] [address]   [phone]            Nov 16, 2017 10:30 AM CST   Return Visit with Susan Lindo MD   [facility] ([facility])    [facility]  [address]  [place] [address]   [phone]              Who to contact     If you have questions or need follow up information about today's clinic visit or your schedule please contact [facility] directly at [phone].  Normal or non-critical lab and imaging results will be communicated to you by MyChart, letter or phone within 4 business days after the clinic has received the results. If you do not hear from us within 7 days, please contact the clinic through MyChart or phone. If you have a critical or abnormal lab result, we will notify you by phone as soon as possible.  Submit refill requests through [employer] or call your pharmacy and they will forward the refill request to us. Please allow 3 business days for your refill to be completed.          Additional Information About Your  "Visit        [employer] Information     playnik lets you send messages to your doctor, view your test results, renew your prescriptions, schedule appointments and more. To sign up, go to [email or web address] . Click on \"Log in\" on the left side of the screen, which will take you to the Welcome page. Then click on \"Sign up Now\" on the right side of the page.     You will be asked to enter the access code listed below, as well as some personal information. Please follow the directions to create your username and password.     Your access code is: [email or web address]  Expires: 2017 11:49 AM     Your access code will  in 90 days. If you need help or a new code, please call your [facility] clinic or [phone].        Care EveryWhere ID     This is your Care EveryWhere ID. This could be used by other organizations to access your [facility] medical records  [id number]        Your Vitals Were     Pulse Temperature Respirations Height Pulse Oximetry BMI (Body Mass Index)    71 98  F (36.7  C) (Oral) 18 1.626 m (5' 4\") 95% 20.46 kg/m2       Blood Pressure from Last 3 Encounters:   17 149/90   17 149/90   10/31/17 (!) 122/107    Weight from Last 3 Encounters:   17 54.1 kg (119 lb 3.2 oz)   17 54.1 kg (119 lb 3.2 oz)   10/31/17 54.3 kg (119 lb 12.8 oz)              We Performed the Following     Bilirubin  total     CBC with platelets differential     Protein qualitative urine        Primary Care Provider Office Phone # Fax #    Edgar Neno Rodriguez [phone] [address]  [facility] [address]        Equal Access to Services     FILEMON ALBA : Hadwhitley gaticao Sogabriel, waaxda luqadaha, qaybta kaalmada adeegyayvon, ellis ren. [facility] [phone].    ATENCIÓN: Si habla español, tiene a roldan disposición servicios gratuitos de asistencia lingüística. Llame al [phone].    We comply with applicable federal civil rights " laws and [place] laws. We do not discriminate on the basis of race, color, national origin, age, disability, sex, sexual orientation, or gender identity.            Thank you!     Thank you for choosing [facility] AND [facility]  for your care. Our goal is always to provide you with excellent care. Hearing back from our patients is one way we can continue to improve our services. Please take a few minutes to complete the written survey that you may receive in the mail after your visit with us. Thank you!             Your Updated Medication List - Protect others around you: Learn how to safely use, store and throw away your medicines at www.disposemymeds.org.          This list is accurate as of: 11/2/17  2:14 PM.  Always use your most recent med list.                   Brand Name Dispense Instructions for use Diagnosis    acetaminophen 325 MG tablet    TYLENOL    60 tablet    Take 2 tablets (650 mg) by mouth every 4 hours as needed for mild pain or fever    Cancer associated pain       CETAPHIL lotion      Apply topically 2 times daily Also bid prn . To dry skin.        ENSURE PLUS Liqd      Take by mouth 2 times daily Admin 8oz twice daily for supplement        ferrous sulfate 325 (65 FE) MG tablet    IRON    31 tablet    Take 1 tablet (325 mg) by mouth every evening    Iron deficiency anemia due to chronic blood loss       HYDROcodone-acetaminophen 5-325 MG per tablet    NORCO     Take 1 tablet by mouth every 6 hours as needed for moderate to severe pain        levothyroxine 75 MCG tablet    SYNTHROID/LEVOTHROID    30 tablet    Take 1 tablet (75 mcg) by mouth daily Recheck TSH in 6 months    Hypothyroidism, unspecified type       loperamide 2 MG capsule    IMODIUM A-D    155 capsule    AND TAKE 2 CAPSULE EVERY 6 HOURS AS NEEDED FOR LOOSE STOOLS (MAX OF 16MG/24 HRS)    Diarrhea, unspecified type       LORazepam 0.5 MG tablet    ATIVAN    30 tablet    Take 1 tablet (0.5 mg) by mouth every 4 hours  as needed (Anxiety, Nausea/Vomiting or Sleep)    Adenocarcinoma of colon (H)       menthol-zinc oxide 0.44-20.625 % Oint ointment    CALMOSEPTINE    1 Tube    Apply topically 4 times daily as needed for skin protection    Chronic diarrhea       ONDANSETRON PO      Take 8 mg by mouth every 8 hours as needed for nausea        PREPARATION H 0.25-88.44 % per suppository   Generic drug:  phenylephrine-cocoa butter      Place 1 suppository rectally 2 times daily as needed for hemorrhoids or itching        SUMATRIPTAN SUCCINATE PO      Take 25 mg by mouth every 8 hours as needed for migraine        travoprost (BAK Free) 0.004 % ophthalmic solution    TRAVATAN Z    5 mL    Place 1 drop into both eyes At Bedtime    Primary open angle glaucoma of both eyes, unspecified glaucoma stage       vitamin B-Complex     30 tablet    Take 1 tablet by mouth daily    S/P colectomy

## 2017-11-02 NOTE — LETTER
"    11/2/2017         RE: Sherita Luevano [facility]  [address]  [address] [place] [address]        Dear Colleague,    Thank you for referring your patient, Sherita Kenney, to the [facility]. Please see a copy of my visit note below.    Oncology Rooming Note    November 2, 2017 10:12 AM   Sherita Kenney is a 76 year old female who presents for:    Chief Complaint   Patient presents with     Oncology Clinic Visit     Colon cancer      Initial Vitals: /90  Pulse 71  Temp 98  F (36.7  C) (Oral)  Resp 18  Wt 54.1 kg (119 lb 3.2 oz)  SpO2 95%  BMI 20.46 kg/m2 Estimated body mass index is 20.46 kg/(m^2) as calculated from the following:    Height as of 10/5/17: 1.626 m (5' 4\").    Weight as of this encounter: 54.1 kg (119 lb 3.2 oz). Body surface area is 1.56 meters squared.  No Pain (1) Comment: Data Unavailable   No LMP recorded. Patient is not currently having periods (Reason: Perimenopausal).  Allergies reviewed: Yes  Medications reviewed: Yes    Medications: Medication refills not needed today.  Pharmacy name entered into [employer]:    [facility] [address] [place] [address] - [address] PHARMACY [place], [place] - [address] [facility] [address]    Clinical concerns: None                4 minutes for nursing intake (face to face time)     April Bazzi MA                        ONCOLOGY HISTORY:  Ms. Sherita Kenney is a female with colon cancer.   1. Patient was brought to the ER on 02/15/2017 with altered mental status.    -CT brain on 02/15/2017 did not reveal any acute intracranial pathology.   -CT abdomen and pelvis on 02/15/2017 revealed large right iliopsoas abscess and mass-like wall thickening of portion of right colon.   -CT-guided drainage of abscess was done.   2. Patient was taken to OR on 03/01/2017.    -Colonoscopy revealed a large fungating " mass in the right side of the colon.  Pathology revealed invasive poorly differentiated adenocarcinoma.  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   -Laparoscopic ileostomy was done.    3.  CEA on 02/16/2017 was 3.8.   4. Patient had right colectomy with takedown of ileostomy and primary anastomosis on 04/13/2017.    -Pathology  reveals poorly differentiated colonic adenocarcinoma. 15 of 24 lymph nodes are positive. Tumor invades into adjacent abdominal wall.  Lymphovascular invasion present. pT4b pN2b M0.   -BRAF mutation present. No KRAS mutation.  5. PET scan on 05/22/2017 revealed metastatic disease.  Multiple hypermetabolic liver metastases and  hypermetabolic necrotic lymphadenopathy in right lower quadrant.  6. modified FOLFOX6 with Avastin started on 06/01/2017.   -Bolus 5-FU discontinued with cycle 4.    -Oxaliplatin reduced with cycle 5.  -Oxaliplatin stopped after cycle 8. Cycle 8 completed on 09/08/2017.  7. CT chest, abdomen and pelvis on 07/19/2017 revealed improved hepatic metastases.  Several peripheral branches of portal vein in right hepatic lobe of liver is thrombosed.  There is also thrombosis in branches of superior mesenteric vein in anterior abdomen.    -Ultrasound on 07/20/2017 reveals occlusive thrombus in the posterior posterior branch and its two branches of the right portal vein.  Main portal vein and left portal vein are patent.  -Decision was made not to ant-coagulate.  8. CT chest, abdomen and pelvis on 09/13/2017 reveals improvement in disease.   9.  Maintenance infusional 5-FU and Avastin started on 09/21/2017. No bolus 5-FU.     SUBJECTIVE:    Ms. Sherita Martinez is a 76-year-old female with metastatic colon cancer.  She is on maintenance 5-FU and Avastin without bolus 5-FU.       Overall her condition is stable.  Her main problem is diarrhea.  She still has some diarrhea especially during daytime.  At night she has to wake up once.   She is not getting bolus 5-FU.  5-FU infusional dose has been reduced by 25%.  Diarrhea has improved but not resolved.  She takes Imodium.  No blood in the stool.        Patient denies any headache.  No dizziness.  No chest pain.  No difficulty breathing.  Sometimes she has some abdominal discomfort.  No nausea or vomiting.  No urinary complaints.  No fever, chills or night sweats. She has some peripheral neuropathy from previous oxaliplatin.  She has some numbness and tingling in the fingers and toes.  They are not getting worse.  Things are not falling out of her hand.        PHYSICAL EXAMINATION:   Alert and oriented x 3.   VITAL SIGNS:  Reviewed.   EYES:  No icterus.   THROAT:  No ulcer or thrush.   NECK:  Supple. No lymphadenopathy.   LUNGS:  Good air entry bilaterally.  No wheezing.   HEART:  Regular.   ABDOMEN:  Soft and nontender.  No mass.   EXTREMITIES:  No edema.   SKIN:  No petechiae. Mild hand-foot syndrome present. Palm is mildly erythematous.  Some cracking of the skin at the tips of the finger.      LABORATORY DATA:  Reviewed.       ASSESSMENT:   1.  A 76-year-old female with metastatic colon cancer on maintenance 5-FU and Avastin.   2.  Diarrhea secondary to 5-FU.   3.  Peripheral neuropathy.    4.  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   5.  Thrombocytopenia secondary to chemotherapy.      PLAN:    1.  Patient overall is doing well from metastatic colon cancer.  Overall she is tolerating treatment well except for diarrhea.  Discussed with her regarding diarrhea.  This is secondary to 5-FU.  We will further decrease the dose of infusional 5-FU by another 25%.  She will be getting only 50% of the original dose.  She'll continue on Avastin at the same dose.  Hopefully her diarrhea gets better.  She'll continue on Imodium as needed.       2.  We will plan on getting CT scan in December.  If her disease is stable, we can consider discontinuing infusional 5-FU and  continuing her on Avastin alone.  Further discussion after the CT scan.    3.  CBC was reviewed.  She is thrombocytopenic.  It is secondary to chemotherapy.  It should improve as 5-FU dose is being reduced.    4.  I'll see her with next treatment.  Advised her to return to see a physician if she has fever, chills, worsening weakness, shortness of breath, worsening diarrhea or any other concerns.          Again, thank you for allowing me to participate in the care of your patient.        Sincerely,        Susan Lindo MD

## 2017-11-02 NOTE — PATIENT INSTRUCTIONS
Continue chemotherapy. Decrease 5-FU by 50%.  Follow with next treatment.  Scheduled - Belle    AVS given to patient - Belle
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
## 2017-11-04 NOTE — PROGRESS NOTES
Infusion Nursing Note:  Sherita Yenmokrista presents today for chemo pump disconnect  Patient seen by provider today: No   present during visit today: Not Applicable.    Note: N/A.    Intravenous Access:  Implanted Port.    Treatment Conditions:  Not Applicable.      Post Infusion Assessment:  Patient tolerated infusion without incident.  Site patent and intact, free from redness, edema or discomfort.  No evidence of extravasations.  Access discontinued per protocol.    Discharge Plan:   Discharge instructions reviewed with: Patient and Family.  Patient and/or family verbalized understanding of discharge instructions and all questions answered.  Copy of AVS reviewed with patient and/or family.  Patient will return 11/16  for next appointment.  Patient discharged in stable condition accompanied by: friend.    Della Little RN

## 2017-11-04 NOTE — MR AVS SNAPSHOT
"              After Visit Summary   11/4/2017    Sherita Kenney    MRN: [id number]           Patient Information     Date Of Birth          1940        Visit Information        Provider Department      11/4/2017 12:00 PM  INFUSION CHAIR 19 [facility]        Today's Diagnoses     Port-a-cath in place    -  1       Follow-ups after your visit        Your next 10 appointments already scheduled     Nov 16, 2017 10:00 AM CST   Level 5 with  INFUSION CHAIR 15   [facility] ([facility])    [facility]  [address]  [place] [address]   [phone]            Nov 16, 2017 10:30 AM CST   Return Visit with Susan Lindo MD   [facility] ([facility])    [facility]  [address]  [place] [id number] [phone]              Who to contact     If you have questions or need follow up information about today's clinic visit or your schedule please contact [facility] directly at [phone].  Normal or non-critical lab and imaging results will be communicated to you by MyChart, letter or phone within 4 business days after the clinic has received the results. If you do not hear from us within 7 days, please contact the clinic through MyChart or phone. If you have a critical or abnormal lab result, we will notify you by phone as soon as possible.  Submit refill requests through [employer] or call your pharmacy and they will forward the refill request to us. Please allow 3 business days for your refill to be completed.          Additional Information About Your Visit        MyChart Information     [employer] lets you send messages to your doctor, view your test results, renew your prescriptions, schedule appointments and more. To sign up, go to [email or web address] . Click on \"Log in\" on the left side of the " "screen, which will take you to the Welcome page. Then click on \"Sign up Now\" on the right side of the page.     You will be asked to enter the access code listed below, as well as some personal information. Please follow the directions to create your username and password.     Your access code is: [email or web address]  Expires: 2017 11:49 AM     Your access code will  in 90 days. If you need help or a new code, please call your [facility] or [phone].        Care EveryWhere ID     This is your Care EveryWhere ID. This could be used by other organizations to access your [place] medical records  [id number]        Your Vitals Were     Pulse Temperature Respirations             75 97.6  F (36.4  C) (Oral) 16          Blood Pressure from Last 3 Encounters:   17 (!) 158/92   17 149/90   17 149/90    Weight from Last 3 Encounters:   17 54.1 kg (119 lb 3.2 oz)   17 54.1 kg (119 lb 3.2 oz)   10/31/17 54.3 kg (119 lb 12.8 oz)              Today, you had the following     No orders found for display       Primary Care Provider Office Phone # Fax #    Edgar Neno Rodriguez [phone] [address]  [place] [address]        Equal Access to Services     [facility]: Hadii aad ku hadasho Somonaali, waaxda luqadaha, qaybta kaalmada adetiffanie, ellis mooney . So [facility] [phone].    ATENCIÓN: Si habla español, tiene a roldan disposición servicios gratuitos de asistencia lingüística. Llame al [phone].    We comply with applicable federal civil rights laws and [place] laws. We do not discriminate on the basis of race, color, national origin, age, disability, sex, sexual orientation, or gender identity.            Thank you!     Thank you for choosing [facility]  for your care. Our goal is always to provide you with excellent care. Hearing back from our patients is one way we can " continue to improve our services. Please take a few minutes to complete the written survey that you may receive in the mail after your visit with us. Thank you!             Your Updated Medication List - Protect others around you: Learn how to safely use, store and throw away your medicines at www.disposemymeds.org.          This list is accurate as of: 11/4/17 12:29 PM.  Always use your most recent med list.                   Brand Name Dispense Instructions for use Diagnosis    acetaminophen 325 MG tablet    TYLENOL    60 tablet    Take 2 tablets (650 mg) by mouth every 4 hours as needed for mild pain or fever    Cancer associated pain       CETAPHIL lotion      Apply topically 2 times daily Also bid prn . To dry skin.        ENSURE PLUS Liqd      Take by mouth 2 times daily Admin 8oz twice daily for supplement        ferrous sulfate 325 (65 FE) MG tablet    IRON    31 tablet    Take 1 tablet (325 mg) by mouth every evening    Iron deficiency anemia due to chronic blood loss       HYDROcodone-acetaminophen 5-325 MG per tablet    NORCO     Take 1 tablet by mouth every 6 hours as needed for moderate to severe pain        levothyroxine 75 MCG tablet    SYNTHROID/LEVOTHROID    30 tablet    Take 1 tablet (75 mcg) by mouth daily Recheck TSH in 6 months    Hypothyroidism, unspecified type       loperamide 2 MG capsule    IMODIUM A-D    155 capsule    AND TAKE 2 CAPSULE EVERY 6 HOURS AS NEEDED FOR LOOSE STOOLS (MAX OF 16MG/24 HRS)    Diarrhea, unspecified type       LORazepam 0.5 MG tablet    ATIVAN    30 tablet    Take 1 tablet (0.5 mg) by mouth every 4 hours as needed (Anxiety, Nausea/Vomiting or Sleep)    Adenocarcinoma of colon (H)       menthol-zinc oxide 0.44-20.625 % Oint ointment    CALMOSEPTINE    1 Tube    Apply topically 4 times daily as needed for skin protection    Chronic diarrhea       ONDANSETRON PO      Take 8 mg by mouth every 8 hours as needed for nausea        PREPARATION H 0.25-88.44 % per suppository    Generic drug:  phenylephrine-cocoa butter      Place 1 suppository rectally 2 times daily as needed for hemorrhoids or itching        SUMATRIPTAN SUCCINATE PO      Take 25 mg by mouth every 8 hours as needed for migraine        travoprost (BAK Free) 0.004 % ophthalmic solution    TRAVATAN Z    5 mL    Place 1 drop into both eyes At Bedtime    Primary open angle glaucoma of both eyes, unspecified glaucoma stage       vitamin B-Complex     30 tablet    Take 1 tablet by mouth daily    S/P colectomy

## 2017-11-06 NOTE — TELEPHONE ENCOUNTER
Reason for Call:  Other     Detailed comments:  Jessica from [facility] is calling to check on status of request for orders below.     Phone Number Patient can be reached at: Other phone number:  [phone]    Best Time: any    Can we leave a detailed message on this number? YES secure voice mail    Call taken on 11/6/2017 at 9:24 AM by Yani Ahuja

## 2017-11-06 NOTE — TELEPHONE ENCOUNTER
Spoke with Jessica [facility]   Verbal approval given on below orders  Also informed her to let pt know she needs OV within 30 days     Yamilet ABREU RN

## 2017-11-06 NOTE — TELEPHONE ENCOUNTER
TO PCP:   Noted last OV with PCP was 5/9/17  Okay give verbal on below orders and inform home care pt needs OV with PCP within 30 days?     Yamilet ABREU RN

## 2017-11-13 NOTE — TELEPHONE ENCOUNTER
Spoke with Jessica ([facility])   Verbal given on below orders with note that pt needs OV with PCP within next 30 days   See prior telephone encounter same orders given but facility requested orders be given again    Yamilet ABREU RN

## 2017-11-13 NOTE — TELEPHONE ENCOUNTER
Reason for Call:  Home Health Care    Jessica with [facility] called regarding (reason for call):     Orders are needed for this patient.   Orders were requested and okayed earlier but patient just moved in this past weekend    PT: eval and treat    OT: eval and treat    Pt Provider: Michael    Phone Number Homecare Nurse can be reached at: [phone]    Can we leave a detailed message on this number? yes    Call taken on 11/13/2017 at 9:14 AM by Yani Ahuja

## 2017-11-14 NOTE — TELEPHONE ENCOUNTER
Reason for Call:  Home Health Care    Desire with [facility] called regarding (reason for call):     Orders are needed for this patient.     PT: 1 time a week for 3 weeks  For safety in new environment and education for a home exercise program      Pt Provider: Dr Rodriguez    Phone Number Homecare Nurse can be reached at: [phone]    Can we leave a detailed message on this number? YES    Phone number patient can be reached at:     Best Time: anytime    Call taken on 11/14/2017 at 10:59 AM by Natalie Shafer

## 2017-11-14 NOTE — TELEPHONE ENCOUNTER
Verbal approval given as noted per request below. Homecare/Hospice agency to fax orders for provider signature.  Lashonda Patino RN

## 2017-11-14 NOTE — TELEPHONE ENCOUNTER
TO PCP:   Last OV with PCP 5/9/17  Please advise - okay to give verbal on below orders and inform home care patient must be seen for OV within 30 days with PCP?    Yamilet ABREU RN

## 2017-11-14 NOTE — TELEPHONE ENCOUNTER
Verbal orders:  -- PT: 1 time a week for 3 weeks -- For safety in new environment and education for a home exercise program  -- Office visit within 30 days

## 2017-11-15 NOTE — TELEPHONE ENCOUNTER
Controlled Substance Refill Request for Pending Prescriptions:                       Disp   Refills    HYDROcodone-acetaminophen (NORCO) 5-325 M*30 tab*0            Sig: Take 1 tablet by mouth every 6 hours as needed           for moderate to severe pain      Problem List Complete:  No     PROVIDER TO CONSIDER COMPLETION OF PROBLEM LIST AND OVERVIEW/CONTROLLED SUBSTANCE AGREEMENT    Last Written Prescription Date:  Historical  Last Fill Quantity: -,   # refills: -    Last Office Visit with [facility] primary care provider: 5/9/17 Rodriguez    Future Office visit:   Next 5 appointments (look out 90 days)     Nov 16, 2017 10:30 AM CST   Return Visit with Susan Lindo MD   [facility] ([facility])    [facility]  [address] [facility] [address]  [facility] [id number] [phone]                  Controlled substance agreement on file: No.     Processing:       checked in past 6 months?  No, route to RN  Naty Vergara, RT(R)

## 2017-11-15 NOTE — TELEPHONE ENCOUNTER
Routing refill request to provider for review/approval because:  Drug not on the [facility] refill protocol     STEFANI Smith, RN, N  [facility] [phone]

## 2017-11-15 NOTE — TELEPHONE ENCOUNTER
Fax from [facility] seeking refill from Dr Rodriguez for Lorazepam.    I called [facility] and informed them that Dr Rodriguez does not write for this prescription, that it comes from her oncologist - Dr Lindo.    Technician that I spoke to said she does not see in patient's record where a refill was being requested.  Said to disregard request.    Sintia Shaw, RT (R)

## 2017-11-16 NOTE — PROGRESS NOTES
"Infusion Nursing Note:  Sherita Kenney presents today for Avastin/5FU C5D1.    Patient seen by provider today: Yes, Dr. Lindo   present during visit today: Not Applicable.    Note: Patient states she has been having explosive diarrhea.  Per Dr. Lindo; no 24 hour urine collection needed.     Intravenous Access:  Labs drawn without difficulty.  Implanted Port.    Treatment Conditions:  Lab Results   Component Value Date    HGB 12.1 11/16/2017     Lab Results   Component Value Date    WBC 7.5 11/16/2017      Lab Results   Component Value Date    ANEU 4.8 11/16/2017     Lab Results   Component Value Date     11/16/2017      Results reviewed, labs MET treatment parameters, ok to proceed with treatment.  Urine 100.    Post Infusion Assessment:  Patient tolerated infusion without incident.  Blood return noted pre and post infusion.  Site patent and intact, free from redness, edema or discomfort.  No evidence of extravasations.    Prior to discharge: Port is secured in place with tegaderm and flushed with 10cc NS with positive blood return noted.  Continuous home infusion CADD pump connected.    All connectors secured in place and clamps taped open.    Pump started, \"running\" noted on display (CADD): YES.  Patient instructed to call our clinic or [facility] with any questions or concerns at home.  Patient verbalized understanding.    Patient set up for pump disconnect at our clinic on Saturday 11/18 at 1230.    Discharge Plan:   Discharge instructions reviewed with: Patient.  Patient and/or family verbalized understanding of discharge instructions and all questions answered.  Copy of AVS reviewed with patient and/or family.  Patient will return 11/18/2017 for next appointment.  Patient discharged in stable condition accompanied by: self and MA. Patient being picked up by facility van at the ER entrance  Departure Mode: Ambulatory.    Jina Aguilar RN  "

## 2017-11-16 NOTE — PROGRESS NOTES
ONCOLOGY HISTORY:  Ms. Sherita Kenney is a female with colon cancer.   1. Patient was brought to the ER on 02/15/2017 with altered mental status.    -CT brain on 02/15/2017 did not reveal any acute intracranial pathology.   -CT abdomen and pelvis on 02/15/2017 revealed large right iliopsoas abscess and mass-like wall thickening of portion of right colon.   -CT-guided drainage of abscess was done.   2. Patient was taken to OR on 03/01/2017.    -Colonoscopy revealed a large fungating mass in the right side of the colon.  Pathology revealed invasive poorly differentiated adenocarcinoma.  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   -Laparoscopic ileostomy was done.    3.  CEA on 02/16/2017 was 3.8.   4. Patient had right colectomy with takedown of ileostomy and primary anastomosis on 04/13/2017.    -Pathology  reveals poorly differentiated colonic adenocarcinoma. 15 of 24 lymph nodes are positive. Tumor invades into adjacent abdominal wall.  Lymphovascular invasion present. pT4b pN2b M0.   -BRAF mutation present. No KRAS mutation.  5. PET scan on 05/22/2017 revealed metastatic disease.  Multiple hypermetabolic liver metastases and  hypermetabolic necrotic lymphadenopathy in right lower quadrant.  6. modified FOLFOX6 with Avastin started on 06/01/2017.   -Bolus 5-FU discontinued with cycle 4.    -Oxaliplatin reduced with cycle 5.  -Oxaliplatin stopped after cycle 8. Cycle 8 completed on 09/08/2017.  7. CT chest, abdomen and pelvis on 07/19/2017 revealed improved hepatic metastases.  Several peripheral branches of portal vein in right hepatic lobe of liver is thrombosed.  There is also thrombosis in branches of superior mesenteric vein in anterior abdomen.    -Ultrasound on 07/20/2017 reveals occlusive thrombus in the posterior posterior branch and its two branches of the right portal vein.  Main portal vein and left portal vein are patent.  -Decision was made not to  ant-coagulate.  8. CT chest, abdomen and pelvis on 09/13/2017 reveals improvement in disease.   9.  Maintenance infusional 5-FU and Avastin started on 09/21/2017. No bolus 5-FU.    SUBJECTIVE:    Ms. Sherita Kenney is a 76-year-old female with metastatic colon cancer with liver metastasis.  She is on maintenance 5-FU and Avastin.  No bolus 5-FU.  Dose of infusional 5-FU has been reduced by 50% because of diarrhea.        Patient says that her diarrhea has improved but she still has a few bowel movements at night.  She has not been taking any Imodium.        She has mild fatigue.  No headache.  No dizziness.  No chest pain.  No difficulty breathing.  No vomiting.  No urinary complaints.  No bleeding.  No fever, chills or night sweats. 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  It is not getting worse.  Walking is good.  Things are not falling out of her hand.      PHYSICAL EXAMINATION:   Alert and oriented x 3.   VITAL SIGNS:  Reviewed.   EYES:  No icterus.   THROAT:  No ulcer or thrush.   NECK:  Supple. No lymphadenopathy.   LUNGS:  Good air entry bilaterally.  No wheezing.   HEART:  Regular.   ABDOMEN:  Soft and nontender.  No mass.   EXTREMITIES:  No edema.   SKIN:  No petechiae. Mild hand-foot syndrome present. Palm is mildly erythematous.  Some cracking of the skin at the tips of the finger.     LABORATORY DATA:  Reviewed.  Platelet has improved to 117.      ASSESSMENT:   1.  A 76-year-old female with metastatic colon cancer on maintenance 5-FU and Avastin.   2.  Diarrhea secondary to 5-FU.   3.  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   4.  Thrombocytopenia which is improving.   5.  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  No anticoagulation given.      PLAN:   1.  I discussed with her regarding metastatic colon cancer.  Overall, she is doing good except for diarrhea.  It is from 5-FU.  We have already reduced the dose of  5-FU by 50%.      Patient says that her diarrhea is bothersome.  She has not been taking Imodium.  I told her to take Imodium.  That will help with diarrhea.      She will continue on infusional 5-FU.  Labs reviewed.  They are good for treatment.  Side effects of 5-FU and Avastin reviewed      2.  Patient will be scheduled for CT scan in the first week of December.  If her disease is stable, we will plan on discontinuing 5-FU and just continuing her on Avastin.  She is agreeable for this plan.      3.  Labs were all reviewed.  I told her thrombocytopenia is better.  It is secondary to chemotherapy.  We will continue to monitor it.      4.  She had a few questions, which were all answered.  She will see the nurse practitioner, Giuseppe, in 2 weeks.  I will see her in 4 weeks.  I advised her to see a physician sooner if she has fever, chills, infection, worsening weakness, shortness of breath, recurrent vomiting, bleeding or any other concerns.         OMER MAKI MD             D: 2017 10:56   T: 2017 11:50   MT: ALEX      Name:     SEVERO SWANN   MRN:      [id number]        Account:      [id number]   :      1940           Visit Date:   2017      Document: [id number]

## 2017-11-16 NOTE — PROGRESS NOTES
Social Work Progress Note      Data/Intervention:  Patient Name:  Sherita Kenney  /Age:  1940 (76 year old)    Reason for Follow-Up:  Sherita is a delightful 76-year-old woman who comes to [facility] today for visit with Dr. Lindo and maintenance chemotherapy as pt has a diagnosis of metastatic colon cancer. This clinician met with pt due to nurses' concern as to transportation home.     Intervention:   Sherita is a elena single woman who was never , no children. Sherita moved from [facility] to [facility] living over the past week. Sherita acknowledges that she appreciates this move as she has a good friend who lives in the same complex, and she has enjoyed connecting with other residents in card games. 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 Per medical records Sherita's health care proxy is her niece Sherita Angeles ([phone], lives in [place]) and her alternate is her friend Dinora Garrett ([phone], lives in [place]). Pt also reports close and caring friendship with Clara ([phone]), who is retired and supported pt with transportation needs at last clinic visit. Pt's sister Suzanne ([phone]) transports pt to and from clinic appointments, however has coped with more recent health concerns of pt's brother-in-law and has not been able to be as present as pt would like.     This clinician contacted Kerry Moore RN (main: [phone], mobile: [phone]) who reported that at [facility] pt now has access to free van for transportation as well as private car assistance at additional cost. Kerry reports that van will pick pt up today at ED entrance, and that she will review with pt when she returns options for transportation assistance. Pt in agreement and appreciative of plan. No other psychosocial concerns or needs reported or observed at this point in time.     Plan:  This clinician will  continue to be available for ongoing psychosocial support as pt continues to realize impacts of treatments. Pt has this clinician's contact information and knows to reach out with questions or concerns.     Please call or page if needs or concerns arise.     MARAL Hernandez, LICSW  Direct Phone: [phone]  Pager: [phone]

## 2017-11-16 NOTE — MR AVS SNAPSHOT
After Visit Summary   11/16/2017    Sherita Kenney    MRN: [id number]           Patient Information     Date Of Birth          1940        Visit Information        Provider Department      11/16/2017 10:00 AM  INFUSION CHAIR 15 [facility]        Today's Diagnoses     Adenocarcinoma of colon (H)    -  1       Follow-ups after your visit        Your next 10 appointments already scheduled     Nov 18, 2017 12:30 PM CST   Level 1 with  INFUSION CHAIR 14   [facility] ([facility])    [facility] [place] [id number]  [place] [address]   [phone]            Nov 30, 2017  9:30 AM CST   Level 5 with  INFUSION CHAIR 6   [facility] ([facility])    [facility]  [address]  [place] [address]   [phone]            Nov 30, 2017 10:00 AM CST   Return Visit with WILLY Valadez CNP   [facility] ([facility])    [facility]  [address]  [place] [address]   [phone]            Dec 05, 2017  9:00 AM CST   CT CHEST ABDOMEN PELVIS W/O & W CONTRAST with SHCT1   [facility] CT ([facility])    [address] [facility] [address]   [phone]           Please bring any scans or X-rays taken at other hospitals, if similar tests were done. Also bring a list of your medicines, including vitamins, minerals and over-the-counter drugs. It is safest to leave personal items at home.  Be sure to tell your doctor:   If you have any allergies.   If there s any chance you are pregnant.   If you are breastfeeding.   If you have any special needs.  You may have contrast for this exam. To prepare:   Do not eat or drink for 2 hours before your exam. If you need to take medicine, you may take it with small sips of water. (We  may ask you to take liquid medicine as well.)   The day before your exam, drink extra fluids at least six 8-ounce glasses (unless your doctor tells you to restrict your fluids).  Patients over 70 or patients with diabetes or kidney problems:   If you haven t had a blood test (creatinine test) within the last 30 days, go to your clinic or Diagnostic Imaging Department for this test.  If you have diabetes:   If your kidney function is normal, continue taking your metformin (Avandamet, Glucophage, Glucovance, Metaglip) on the day of your exam.   If your kidney function is abnormal, wait 48 hours before restarting this medicine.  You will have oral contrast for this exam:   You will drink the contrast at home. Get this from your clinic or Diagnostic Imaging Department. Please follow the directions given.  Please wear loose clothing, such as a sweat suit or jogging clothes. Avoid snaps, zippers and other metal. We may ask you to undress and put on a hospital gown.  If you have any questions, please call the Imaging Department where you will have your exam.            Dec 14, 2017 10:30 AM CST   Level 5 with  INFUSION CHAIR 15   [facility] ([facility])    [facility] [place] [id number]  [place] [address]   [phone]            Dec 14, 2017  2:00 PM CST   Return Visit with Susan Lindo MD   [facility] ([facility])    [facility]  [address]  [place] [address]   [phone]            Dec 16, 2017 12:00 PM CST   Level 1 with  INFUSION CHAIR 15   [facility] ([facility])    [facility] [place] [id number]  [place] [address]   [phone]              Future tests that were ordered for you today     Open Future Orders        Priority Expected Expires Ordered    CT Chest Abdomen Pelvis w/o & w  "Contrast Routine 2018            Who to contact     If you have questions or need follow up information about today's clinic visit or your schedule please contact [facility] directly at [phone].  Normal or non-critical lab and imaging results will be communicated to you by MyChart, letter or phone within 4 business days after the clinic has received the results. If you do not hear from us within 7 days, please contact the clinic through MyChart or phone. If you have a critical or abnormal lab result, we will notify you by phone as soon as possible.  Submit refill requests through nPario or call your pharmacy and they will forward the refill request to us. Please allow 3 business days for your refill to be completed.          Additional Information About Your Visit        MyChart Information     nPario lets you send messages to your doctor, view your test results, renew your prescriptions, schedule appointments and more. To sign up, go to [email or web address] . Click on \"Log in\" on the left side of the screen, which will take you to the Welcome page. Then click on \"Sign up Now\" on the right side of the page.     You will be asked to enter the access code listed below, as well as some personal information. Please follow the directions to create your username and password.     Your access code is: [email or web address]  Expires: 2017 10:49 AM     Your access code will  in 90 days. If you need help or a new code, please call your [facility] clinic or [phone].        Care EveryWhere ID     This is your Care EveryWhere ID. This could be used by other organizations to access your [facility] medical records  [id number]        Your Vitals Were     Pulse Respirations BMI (Body Mass Index)             67 20 20.94 kg/m2          Blood Pressure from Last 3 Encounters:   17 146/87   17 (!) 157/92   17 (!) 158/92    Weight from Last 3 " Encounters:   11/16/17 55.3 kg (122 lb)   11/16/17 55.3 kg (122 lb)   11/02/17 54.1 kg (119 lb 3.2 oz)              We Performed the Following     Bilirubin  total     CBC with platelets differential     Protein qualitative urine        Primary Care Provider Office Phone # Fax #    Edgar Neno Rodriguez [phone] [address] [facility] [address]  [place] [address]        Equal Access to Services     FILEMON ALBA : Hadii aad ku hadasho Soomaali, waaxda luqadaha, qaybta kaalmada adeegyada, waxay idiin hayaan adeeg kharash labharat . So [facility] [phone].    ATENCIÓN: Si habla español, tiene a roldan disposición servicios gratuitos de asistencia lingüística. [facility] [phone].    We comply with applicable federal civil rights laws and [place] laws. We do not discriminate on the basis of race, color, national origin, age, disability, sex, sexual orientation, or gender identity.            Thank you!     Thank you for choosing [facility]  for your care. Our goal is always to provide you with excellent care. Hearing back from our patients is one way we can continue to improve our services. Please take a few minutes to complete the written survey that you may receive in the mail after your visit with us. Thank you!             Your Updated Medication List - Protect others around you: Learn how to safely use, store and throw away your medicines at www.disposemymeds.org.          This list is accurate as of: 11/16/17  3:18 PM.  Always use your most recent med list.                   Brand Name Dispense Instructions for use Diagnosis    acetaminophen 325 MG tablet    TYLENOL    60 tablet    Take 2 tablets (650 mg) by mouth every 4 hours as needed for mild pain or fever    Cancer associated pain       CETAPHIL lotion      Apply topically 2 times daily Also bid prn . To dry skin.        ferrous sulfate 325 (65 FE) MG tablet    IRON    31 tablet    Take 1 tablet (325  mg) by mouth every evening    Iron deficiency anemia due to chronic blood loss       HYDROcodone-acetaminophen 5-325 MG per tablet    NORCO    30 tablet    Take 1 tablet by mouth every 6 hours as needed for moderate to severe pain    Other chronic pain       levothyroxine 75 MCG tablet    SYNTHROID/LEVOTHROID    30 tablet    Take 1 tablet (75 mcg) by mouth daily Recheck TSH in 6 months    Hypothyroidism, unspecified type       loperamide 2 MG capsule    IMODIUM A-D    155 capsule    AND TAKE 2 CAPSULE EVERY 6 HOURS AS NEEDED FOR LOOSE STOOLS (MAX OF 16MG/24 HRS)    Diarrhea, unspecified type       LORazepam 0.5 MG tablet    ATIVAN    30 tablet    Take 1 tablet (0.5 mg) by mouth every 4 hours as needed (Anxiety, Nausea/Vomiting or Sleep)    Adenocarcinoma of colon (H)       menthol-zinc oxide 0.44-20.625 % Oint ointment    CALMOSEPTINE    1 Tube    Apply topically 4 times daily as needed for skin protection    Chronic diarrhea       ONDANSETRON PO      Take 8 mg by mouth every 8 hours as needed for nausea        PREPARATION H 0.25-88.44 % per suppository   Generic drug:  phenylephrine-cocoa butter      Place 1 suppository rectally 2 times daily as needed for hemorrhoids or itching        SUMATRIPTAN SUCCINATE PO      Take 25 mg by mouth every 8 hours as needed for migraine        travoprost (BAK Free) 0.004 % ophthalmic solution    TRAVATAN Z    5 mL    Place 1 drop into both eyes At Bedtime    Primary open angle glaucoma of both eyes, unspecified glaucoma stage       vitamin B-Complex     30 tablet    Take 1 tablet by mouth daily    S/P colectomy

## 2017-11-16 NOTE — PROGRESS NOTES
"Oncology Rooming Note    November 16, 2017 10:32 AM   Sherita Kenney is a 76 year old female who presents for:    Chief Complaint   Patient presents with     Oncology Clinic Visit     Initial Vitals: /87 (BP Location: Right arm, Patient Position: Chair, Cuff Size: Adult Regular)  Pulse 70  Temp 97.6  F (36.4  C) (Oral)  Wt 55.3 kg (122 lb)  SpO2 97%  BMI 20.94 kg/m2 Estimated body mass index is 20.94 kg/(m^2) as calculated from the following:    Height as of 11/2/17: 1.626 m (5' 4\").    Weight as of this encounter: 55.3 kg (122 lb). Body surface area is 1.58 meters squared.  Mild Pain (3) Comment: abdominal pain   No LMP recorded. Patient is not currently having periods (Reason: Perimenopausal).  Allergies reviewed: Yes  Medications reviewed: Yes    Medications: Medication refills not needed today.  Pharmacy name entered into [facility]:    [facility] [address] [place] [address] [address] PHARMACY [facility] [address]  [place] [facility] - [address]    Clinical concerns: None     5 minutes for nursing intake (face to face time)     Shania Bravo CMA              "

## 2017-11-16 NOTE — MR AVS SNAPSHOT
After Visit Summary   11/16/2017    Sherita Kenney    MRN: [id number]           Patient Information     Date Of Birth          1940        Visit Information        Provider Department      11/16/2017 10:30 AM Susan Lindo MD [facility]        Today's Diagnoses     Adenocarcinoma of colon (H)    -  1      Care Instructions    Continue chemotherapy.  Follow up with JESSICA Chin in 2 weeks for next treatment.  CT scan in first week of December.  See me in 4 weeks.  Take imodium as needed.          Follow-ups after your visit        Your next 10 appointments already scheduled     Nov 30, 2017  9:30 AM CST   Level 5 with  INFUSION CHAIR 6   [facility] ([facility])    [facility]  [address]  [facility] [address]   [phone]            Nov 30, 2017 10:00 AM CST   Return Visit with WILLY Valadez CNP   [facility] ([facility])    [facility]  [address]  [facility] [address]   [phone]            Dec 05, 2017  9:00 AM CST   CT CHEST ABDOMEN PELVIS W/O & W CONTRAST with SHCT1   [facility] ([facility])    [address] [facility] [address]   [phone]           Please bring any scans or X-rays taken at other hospitals, if similar tests were done. Also bring a list of your medicines, including vitamins, minerals and over-the-counter drugs. It is safest to leave personal items at home.  Be sure to tell your doctor:   If you have any allergies.   If there s any chance you are pregnant.   If you are breastfeeding.   If you have any special needs.  You may have contrast for this exam. To prepare:   Do not eat or drink for 2 hours before your exam. If you need to take medicine, you may take it with small sips of water. (We may ask you to take liquid medicine as well.)   The day before your exam, drink  extra fluids at least six 8-ounce glasses (unless your doctor tells you to restrict your fluids).  Patients over 70 or patients with diabetes or kidney problems:   If you haven t had a blood test (creatinine test) within the last 30 days, go to your clinic or Diagnostic Imaging Department for this test.  If you have diabetes:   If your kidney function is normal, continue taking your metformin (Avandamet, Glucophage, Glucovance, Metaglip) on the day of your exam.   If your kidney function is abnormal, wait 48 hours before restarting this medicine.  You will have oral contrast for this exam:   You will drink the contrast at home. Get this from your clinic or Diagnostic Imaging Department. Please follow the directions given.  Please wear loose clothing, such as a sweat suit or jogging clothes. Avoid snaps, zippers and other metal. We may ask you to undress and put on a hospital gown.  If you have any questions, please call the Imaging Department where you will have your exam.            Dec 14, 2017 10:30 AM CST   Level 5 with  INFUSION CHAIR 15   [facility] ([facility])    [facility]  [address]  [place] [address]   [phone]            Dec 14, 2017  2:00 PM CST   Return Visit with Susan Lindo MD   [facility] ([facility])    [facility]  [address]  [place] [address]   [phone]              Future tests that were ordered for you today     Open Future Orders        Priority Expected Expires Ordered    CT Chest Abdomen Pelvis w/o & w Contrast Routine 12/4/2017 11/16/2018 11/16/2017            Who to contact     If you have questions or need follow up information about today's clinic visit or your schedule please contact [facility] directly at [phone].  Normal or non-critical lab and imaging results will be communicated to you by [facility]  "letter or phone within 4 business days after the clinic has received the results. If you do not hear from us within 7 days, please contact the clinic through [employer] or phone. If you have a critical or abnormal lab result, we will notify you by phone as soon as possible.  Submit refill requests through [employer] or call your pharmacy and they will forward the refill request to us. Please allow 3 business days for your refill to be completed.          Additional Information About Your Visit        Pensqr Information     P[employer] lets you send messages to your doctor, view your test results, renew your prescriptions, schedule appointments and more. To sign up, go to [email or web address] . Click on \"Log in\" on the left side of the screen, which will take you to the Welcome page. Then click on \"Sign up Now\" on the right side of the page.     You will be asked to enter the access code listed below, as well as some personal information. Please follow the directions to create your username and password.     Your access code is: [email or web address]  Expires: 2017 10:49 AM     Your access code will  in 90 days. If you need help or a new code, please call your [facility] clinic or [phone].        Care EveryWhere ID     This is your Care EveryWhere ID. This could be used by other organizations to access your [facility] medical records  [id number]        Your Vitals Were     Pulse Temperature Pulse Oximetry BMI (Body Mass Index)          70 97.6  F (36.4  C) (Oral) 97% 20.94 kg/m2         Blood Pressure from Last 3 Encounters:   17 146/87   17 (!) 158/92   17 149/90    Weight from Last 3 Encounters:   17 55.3 kg (122 lb)   17 55.3 kg (122 lb)   17 54.1 kg (119 lb 3.2 oz)               Primary Care Provider Office Phone # Fax #    Edgar Neno Rodriguez [phone] [address]  [place] [address]        Equal Access to Services     ALEXIS ALBA" AH: Ruddy rivera Navidali, walubada luqadaha, qaybta kachantelle reytiffaniejonasotis melisa haygogo borgesamericomichelle ren. So [facility] [phone].    ATENCIÓN: Si barriela becka, tiene a roldan disposición servicios gratuitos de asistencia lingüística. Llame al [phone].    We comply with applicable federal civil rights laws and [place] laws. We do not discriminate on the basis of race, color, national origin, age, disability, sex, sexual orientation, or gender identity.            Thank you!     Thank you for choosing [facility]  for your care. Our goal is always to provide you with excellent care. Hearing back from our patients is one way we can continue to improve our services. Please take a few minutes to complete the written survey that you may receive in the mail after your visit with us. Thank you!             Your Updated Medication List - Protect others around you: Learn how to safely use, store and throw away your medicines at www.disposemymeds.org.          This list is accurate as of: 11/16/17 11:51 AM.  Always use your most recent med list.                   Brand Name Dispense Instructions for use Diagnosis    acetaminophen 325 MG tablet    TYLENOL    60 tablet    Take 2 tablets (650 mg) by mouth every 4 hours as needed for mild pain or fever    Cancer associated pain       CETAPHIL lotion      Apply topically 2 times daily Also bid prn . To dry skin.        ferrous sulfate 325 (65 FE) MG tablet    IRON    31 tablet    Take 1 tablet (325 mg) by mouth every evening    Iron deficiency anemia due to chronic blood loss       HYDROcodone-acetaminophen 5-325 MG per tablet    NORCO    30 tablet    Take 1 tablet by mouth every 6 hours as needed for moderate to severe pain    Other chronic pain       levothyroxine 75 MCG tablet    SYNTHROID/LEVOTHROID    30 tablet    Take 1 tablet (75 mcg) by mouth daily Recheck TSH in 6 months    Hypothyroidism, unspecified type       loperamide 2 MG capsule     IMODIUM A-D    155 capsule    AND TAKE 2 CAPSULE EVERY 6 HOURS AS NEEDED FOR LOOSE STOOLS (MAX OF 16MG/24 HRS)    Diarrhea, unspecified type       LORazepam 0.5 MG tablet    ATIVAN    30 tablet    Take 1 tablet (0.5 mg) by mouth every 4 hours as needed (Anxiety, Nausea/Vomiting or Sleep)    Adenocarcinoma of colon (H)       menthol-zinc oxide 0.44-20.625 % Oint ointment    CALMOSEPTINE    1 Tube    Apply topically 4 times daily as needed for skin protection    Chronic diarrhea       ONDANSETRON PO      Take 8 mg by mouth every 8 hours as needed for nausea        PREPARATION H 0.25-88.44 % per suppository   Generic drug:  phenylephrine-cocoa butter      Place 1 suppository rectally 2 times daily as needed for hemorrhoids or itching        SUMATRIPTAN SUCCINATE PO      Take 25 mg by mouth every 8 hours as needed for migraine        travoprost (BAK Free) 0.004 % ophthalmic solution    TRAVATAN Z    5 mL    Place 1 drop into both eyes At Bedtime    Primary open angle glaucoma of both eyes, unspecified glaucoma stage       vitamin B-Complex     30 tablet    Take 1 tablet by mouth daily    S/P colectomy

## 2017-11-16 NOTE — LETTER
"    11/16/2017         RE: [facility]  [address]  [address] [place] [id number]        Dear Colleague,    Thank you for referring your patient, Sherita Kenney, to the [facility]. Please see a copy of my visit note below.    Oncology Rooming Note    November 16, 2017 10:32 AM   Sherita Kenney is a 76 year old female who presents for:    Chief Complaint   Patient presents with     Oncology Clinic Visit     Initial Vitals: /87 (BP Location: Right arm, Patient Position: Chair, Cuff Size: Adult Regular)  Pulse 70  Temp 97.6  F (36.4  C) (Oral)  Wt 55.3 kg (122 lb)  SpO2 97%  BMI 20.94 kg/m2 Estimated body mass index is 20.94 kg/(m^2) as calculated from the following:    Height as of 11/2/17: 1.626 m (5' 4\").    Weight as of this encounter: 55.3 kg (122 lb). Body surface area is 1.58 meters squared.  Mild Pain (3) Comment: abdominal pain   No LMP recorded. Patient is not currently having periods (Reason: Perimenopausal).  Allergies reviewed: Yes  Medications reviewed: Yes    Medications: Medication refills not needed today.  Pharmacy name entered into [facility]:    [facility] [address] [place] [address] [address] PHARMACY [place], [place] - [address] [facility] [address]    Clinical concerns: None     5 minutes for nursing intake (face to face time)     Shania Bravo CMA                ONCOLOGY HISTORY:  Ms. Sherita Kenney is a female with colon cancer.   1. Patient was brought to the ER on 02/15/2017 with altered mental status.    -CT brain on 02/15/2017 did not reveal any acute intracranial pathology.   -CT abdomen and pelvis on 02/15/2017 revealed large right iliopsoas abscess and mass-like wall thickening of portion of right colon.   -CT-guided drainage of abscess " was done.   2. Patient was taken to OR on 03/01/2017.    -Colonoscopy revealed a large fungating mass in the right side of the colon.  Pathology revealed invasive poorly differentiated adenocarcinoma.  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   -Laparoscopic ileostomy was done.    3.  CEA on 02/16/2017 was 3.8.   4. Patient had right colectomy with takedown of ileostomy and primary anastomosis on 04/13/2017.    -Pathology  reveals poorly differentiated colonic adenocarcinoma. 15 of 24 lymph nodes are positive. Tumor invades into adjacent abdominal wall.  Lymphovascular invasion present. pT4b pN2b M0.   -BRAF mutation present. No KRAS mutation.  5. PET scan on 05/22/2017 revealed metastatic disease.  Multiple hypermetabolic liver metastases and  hypermetabolic necrotic lymphadenopathy in right lower quadrant.  6. modified FOLFOX6 with Avastin started on 06/01/2017.   -Bolus 5-FU discontinued with cycle 4.    -Oxaliplatin reduced with cycle 5.  -Oxaliplatin stopped after cycle 8. Cycle 8 completed on 09/08/2017.  7. CT chest, abdomen and pelvis on 07/19/2017 revealed improved hepatic metastases.  Several peripheral branches of portal vein in right hepatic lobe of liver is thrombosed.  There is also thrombosis in branches of superior mesenteric vein in anterior abdomen.    -Ultrasound on 07/20/2017 reveals occlusive thrombus in the posterior posterior branch and its two branches of the right portal vein.  Main portal vein and left portal vein are patent.  -Decision was made not to ant-coagulate.  8. CT chest, abdomen and pelvis on 09/13/2017 reveals improvement in disease.   9.  Maintenance infusional 5-FU and Avastin started on 09/21/2017. No bolus 5-FU.    SUBJECTIVE:    Ms. Sherita Kenney is a 76-year-old female with metastatic colon cancer with liver metastasis.  She is on maintenance 5-FU and Avastin.  No bolus 5-FU.  Dose of infusional 5-FU has been reduced by  50% because of diarrhea.        Patient says that her diarrhea has improved but she still has a few bowel movements at night.  She has not been taking any Imodium.        She has mild fatigue.  No headache.  No dizziness.  No chest pain.  No difficulty breathing.  No vomiting.  No urinary complaints.  No bleeding.  No fever, chills or night sweats. 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  It is not getting worse.  Walking is good.  Things are not falling out of her hand.      PHYSICAL EXAMINATION:   Alert and oriented x 3.   VITAL SIGNS:  Reviewed.   EYES:  No icterus.   THROAT:  No ulcer or thrush.   NECK:  Supple. No lymphadenopathy.   LUNGS:  Good air entry bilaterally.  No wheezing.   HEART:  Regular.   ABDOMEN:  Soft and nontender.  No mass.   EXTREMITIES:  No edema.   SKIN:  No petechiae. Mild hand-foot syndrome present. Palm is mildly erythematous.  Some cracking of the skin at the tips of the finger.     LABORATORY DATA:  Reviewed.  Platelet has improved to 117.      ASSESSMENT:   1.  A 76-year-old female with metastatic colon cancer on maintenance 5-FU and Avastin.   2.  Diarrhea secondary to 5-FU.   3.  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   4.  Thrombocytopenia which is improving.   5.  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  No anticoagulation given.      PLAN:   1.  I discussed with her regarding metastatic colon cancer.  Overall, she is doing good except for diarrhea.  It is from 5-FU.  We have already reduced the dose of 5-FU by 50%.      Patient says that her diarrhea is bothersome.  She has not been taking Imodium.  I told her to take Imodium.  That will help with diarrhea.      She will continue on infusional 5-FU.  Labs reviewed.  They are good for treatment.  Side effects of 5-FU and Avastin reviewed      2.  Patient will be scheduled for CT scan in the first week of December.  If her disease is  stable, we will plan on discontinuing 5-FU and just continuing her on Avastin.  She is agreeable for this plan.      3.  Labs were all reviewed.  I told her thrombocytopenia is better.  It is secondary to chemotherapy.  We will continue to monitor it.      4.  She had a few questions, which were all answered.  She will see the nurse practitioner, Giuseppe, in 2 weeks.  I will see her in 4 weeks.  I advised her to see a physician sooner if she has fever, chills, infection, worsening weakness, shortness of breath, recurrent vomiting, bleeding or any other concerns.         OMER MAKI MD             D: 2017 10:56   T: 2017 11:50   MT: ALEX      Name:     SEVERO SWANN   MRN:      [id number]        Account:      [id number]   :      1940           Visit Date:   2017      Document: [id number]        Again, thank you for allowing me to participate in the care of your patient.        Sincerely,        Omer Maki MD

## 2017-11-16 NOTE — MR AVS SNAPSHOT
After Visit Summary   11/16/2017    Sherita Kenney    MRN: [id number]           Patient Information     Date Of Birth          1940        Visit Information        Provider Department      11/16/2017 1:37 PM Sunni Zheng LICSW [facility]        Today's Diagnoses     Counseling NOS(V65.40)    -  1       Follow-ups after your visit        Your next 10 appointments already scheduled     Nov 18, 2017 12:30 PM CST   Level 1 with  INFUSION CHAIR 14   [facility] ([facility])    [facility]  [address]  [place] [address]   [phone]            Nov 30, 2017  9:30 AM CST   Level 5 with  INFUSION CHAIR 6   [facility] ([facility])    [facility]  [address]  [place] [address]   [phone]            Nov 30, 2017 10:00 AM CST   Return Visit with WILLY Valadez CNP   [facility] ([facility])    [facility]  [address]  [place] [address]   [phone]            Dec 05, 2017  9:00 AM CST   CT CHEST ABDOMEN PELVIS W/O & W CONTRAST with SHCT1   [facility] ([facility])    [address] [facility] [place] [address]   [phone]           Please bring any scans or X-rays taken at other hospitals, if similar tests were done. Also bring a list of your medicines, including vitamins, minerals and over-the-counter drugs. It is safest to leave personal items at home.  Be sure to tell your doctor:   If you have any allergies.   If there s any chance you are pregnant.   If you are breastfeeding.   If you have any special needs.  You may have contrast for this exam. To prepare:   Do not eat or drink for 2 hours before your exam. If you need to take medicine, you may take it with small sips of water. (We may ask you to take  liquid medicine as well.)   The day before your exam, drink extra fluids at least six 8-ounce glasses (unless your doctor tells you to restrict your fluids).  Patients over 70 or patients with diabetes or kidney problems:   If you haven t had a blood test (creatinine test) within the last 30 days, go to your clinic or Diagnostic Imaging Department for this test.  If you have diabetes:   If your kidney function is normal, continue taking your metformin (Avandamet, Glucophage, Glucovance, Metaglip) on the day of your exam.   If your kidney function is abnormal, wait 48 hours before restarting this medicine.  You will have oral contrast for this exam:   You will drink the contrast at home. Get this from your clinic or Diagnostic Imaging Department. Please follow the directions given.  Please wear loose clothing, such as a sweat suit or jogging clothes. Avoid snaps, zippers and other metal. We may ask you to undress and put on a hospital gown.  If you have any questions, please call the Imaging Department where you will have your exam.            Dec 14, 2017 10:30 AM CST   Level 5 with  INFUSION CHAIR 15   [facility] ([facility] [place])    [facility]  [address]  [place] [address]   [phone]            Dec 14, 2017  2:00 PM CST   Return Visit with Susan Lindo MD   [facility] ([facility] [place])    [facility]  [address]  [place] [address]   [phone]            Dec 16, 2017 12:00 PM CST   Level 1 with  INFUSION CHAIR 15   [facility] ([facility] [place])    [facility]  [address]  [place] [address]   [phone]              Future tests that were ordered for you today     Open Future Orders        Priority Expected Expires Ordered    CT Chest Abdomen Pelvis w/o & w Contrast Routine  "2018            Who to contact     If you have questions or need follow up information about today's clinic visit or your schedule please contact [facility] directly at [phone].  Normal or non-critical lab and imaging results will be communicated to you by [employer], letter or phone within 4 business days after the clinic has received the results. If you do not hear from us within 7 days, please contact the clinic through [employer] or phone. If you have a critical or abnormal lab result, we will notify you by phone as soon as possible.  Submit refill requests through [employer] or call your pharmacy and they will forward the refill request to us. Please allow 3 business days for your refill to be completed.          Additional Information About Your Visit        [employer] Information     [employer] lets you send messages to your doctor, view your test results, renew your prescriptions, schedule appointments and more. To sign up, go to [email or web address] . Click on \"Log in\" on the left side of the screen, which will take you to the Welcome page. Then click on \"Sign up Now\" on the right side of the page.     You will be asked to enter the access code listed below, as well as some personal information. Please follow the directions to create your username and password.     Your access code is: [email or web address]  Expires: 2017 10:49 AM     Your access code will  in 90 days. If you need help or a new code, please call your [place] clinic or [phone].        Care EveryWhere ID     This is your Care EveryWhere ID. This could be used by other organizations to access your [place] medical records  [id number]         Blood Pressure from Last 3 Encounters:   17 146/87   17 (!) 157/92   17 (!) 158/92    Weight from Last 3 Encounters:   17 55.3 kg (122 lb)   17 55.3 kg (122 lb)   17 54.1 kg (119 lb 3.2 oz)              Today, you had the " following     No orders found for display       Primary Care Provider Office Phone # Fax #    Edgar Neno Rodriguez [phone] [address] [address]        Equal Access to Services     MEGANFILEMON PARTH : Hadii aad ku hadesthero Soomaali, waaxda luqadaha, qaybta kaalmada adeegyada, waxotis idiin haycherrin adeted vega laIsmaelgogo . So [facility] [phone].    ATENCIÓN: Si habla español, tiene a roldan disposición servicios gratuitos de asistencia lingüística. Llame al [phone].    We comply with applicable federal civil rights laws and [place] laws. We do not discriminate on the basis of race, color, national origin, age, disability, sex, sexual orientation, or gender identity.            Thank you!     Thank you for choosing [facility]  for your care. Our goal is always to provide you with excellent care. Hearing back from our patients is one way we can continue to improve our services. Please take a few minutes to complete the written survey that you may receive in the mail after your visit with us. Thank you!             Your Updated Medication List - Protect others around you: Learn how to safely use, store and throw away your medicines at www.disposemymeds.org.          This list is accurate as of: 11/16/17  3:22 PM.  Always use your most recent med list.                   Brand Name Dispense Instructions for use Diagnosis    acetaminophen 325 MG tablet    TYLENOL    60 tablet    Take 2 tablets (650 mg) by mouth every 4 hours as needed for mild pain or fever    Cancer associated pain       CETAPHIL lotion      Apply topically 2 times daily Also bid prn . To dry skin.        ferrous sulfate 325 (65 FE) MG tablet    IRON    31 tablet    Take 1 tablet (325 mg) by mouth every evening    Iron deficiency anemia due to chronic blood loss       HYDROcodone-acetaminophen 5-325 MG per tablet    NORCO    30 tablet    Take 1 tablet by mouth every 6 hours as needed for  moderate to severe pain    Other chronic pain       levothyroxine 75 MCG tablet    SYNTHROID/LEVOTHROID    30 tablet    Take 1 tablet (75 mcg) by mouth daily Recheck TSH in 6 months    Hypothyroidism, unspecified type       loperamide 2 MG capsule    IMODIUM A-D    155 capsule    AND TAKE 2 CAPSULE EVERY 6 HOURS AS NEEDED FOR LOOSE STOOLS (MAX OF 16MG/24 HRS)    Diarrhea, unspecified type       LORazepam 0.5 MG tablet    ATIVAN    30 tablet    Take 1 tablet (0.5 mg) by mouth every 4 hours as needed (Anxiety, Nausea/Vomiting or Sleep)    Adenocarcinoma of colon (H)       menthol-zinc oxide 0.44-20.625 % Oint ointment    CALMOSEPTINE    1 Tube    Apply topically 4 times daily as needed for skin protection    Chronic diarrhea       ONDANSETRON PO      Take 8 mg by mouth every 8 hours as needed for nausea        PREPARATION H 0.25-88.44 % per suppository   Generic drug:  phenylephrine-cocoa butter      Place 1 suppository rectally 2 times daily as needed for hemorrhoids or itching        SUMATRIPTAN SUCCINATE PO      Take 25 mg by mouth every 8 hours as needed for migraine        travoprost (BAK Free) 0.004 % ophthalmic solution    TRAVATAN Z    5 mL    Place 1 drop into both eyes At Bedtime    Primary open angle glaucoma of both eyes, unspecified glaucoma stage       vitamin B-Complex     30 tablet    Take 1 tablet by mouth daily    S/P colectomy

## 2017-11-18 NOTE — PROGRESS NOTES
Infusion Nursing Note:  Sherita Kenney presents today for Chemo pump disconnect.    Patient seen by provider today: No   present during visit today: Not Applicable.    Note: BP high today, but patient states she is rushed, was late today, and needs to get home to meet the cable noemi at her home, so she is stressed. Otherwise feels ok and chemo pump empty and completed..    Intravenous Access:  Implanted Port.    Treatment Conditions:  Not Applicable.      Post Infusion Assessment:  Patient tolerated infusion without incident.  Blood return noted pre and post infusion.  Site patent and intact, free from redness, edema or discomfort.  No evidence of extravasations.  Access discontinued per protocol.    Discharge Plan:   Discharge instructions reviewed with: care attendant.  Patient and/or family verbalized understanding of discharge instructions and all questions answered.  Copy of AVS reviewed with patient and/or family.  Patient will return 11/30 for next appointment.  Patient discharged in stable condition accompanied by: attendant.  Departure Mode: Ambulatory with walker.    Yun Sweeney RN

## 2017-11-18 NOTE — MR AVS SNAPSHOT
After Visit Summary   11/18/2017    Sherita Kenney    MRN: [id number]           Patient Information     Date Of Birth          1940        Visit Information        Provider Department      11/18/2017 12:30 PM  INFUSION CHAIR 14 [facility]        Today's Diagnoses     Port-a-cath in place    -  1       Follow-ups after your visit        Your next 10 appointments already scheduled     Nov 30, 2017  9:30 AM CST   Level 5 with  INFUSION CHAIR 6   [facility] ([facility])    [facility]  [address]  [facility] [address]   [phone]            Nov 30, 2017 10:00 AM CST   Return Visit with WILLY Valadez CNP   [facility] ([facility])    [facility]  [address]  [facility] [address]   [phone]            Dec 05, 2017  9:00 AM CST   CT CHEST ABDOMEN PELVIS W/O & W CONTRAST with SHCT1   [facility] CT ([facility])    [address] [facility] [facility] [address]   [phone]           Please bring any scans or X-rays taken at other hospitals, if similar tests were done. Also bring a list of your medicines, including vitamins, minerals and over-the-counter drugs. It is safest to leave personal items at home.  Be sure to tell your doctor:   If you have any allergies.   If there s any chance you are pregnant.   If you are breastfeeding.   If you have any special needs.  You may have contrast for this exam. To prepare:   Do not eat or drink for 2 hours before your exam. If you need to take medicine, you may take it with small sips of water. (We may ask you to take liquid medicine as well.)   The day before your exam, drink extra fluids at least six 8-ounce glasses (unless your doctor tells you to restrict your fluids).  Patients over 70 or patients with diabetes or kidney problems:   If you  haven t had a blood test (creatinine test) within the last 30 days, go to your clinic or Diagnostic Imaging Department for this test.  If you have diabetes:   If your kidney function is normal, continue taking your metformin (Avandamet, Glucophage, Glucovance, Metaglip) on the day of your exam.   If your kidney function is abnormal, wait 48 hours before restarting this medicine.  You will have oral contrast for this exam:   You will drink the contrast at home. Get this from your clinic or Diagnostic Imaging Department. Please follow the directions given.  Please wear loose clothing, such as a sweat suit or jogging clothes. Avoid snaps, zippers and other metal. We may ask you to undress and put on a hospital gown.  If you have any questions, please call the Imaging Department where you will have your exam.            Dec 14, 2017 10:30 AM CST   Level 5 with SH INFUSION CHAIR 15   [facility] ([facility] [place])    [facility]  [address]  [facility] [address]   [phone]            Dec 14, 2017  2:00 PM CST   Return Visit with Susan Lindo MD   [facility] ([facility] [place])    [facility]  [address]  [facility] [address]   [phone]            Dec 16, 2017 12:00 PM CST   Level 1 with  INFUSION CHAIR 15   [facility] ([facility])    [facility] [place] [id number]  [facility] [address]   [phone]              Who to contact     If you have questions or need follow up information about today's clinic visit or your schedule please contact [facility] directly at [phone].  Normal or non-critical lab and imaging results will be communicated to you by MyChart, letter or phone within 4 business days after the clinic has received the results. If you do not hear from us  "within 7 days, please contact the clinic through [employer] or phone. If you have a critical or abnormal lab result, we will notify you by phone as soon as possible.  Submit refill requests through [employer] or call your pharmacy and they will forward the refill request to us. Please allow 3 business days for your refill to be completed.          Additional Information About Your Visit        [employer] Information     [employer] lets you send messages to your doctor, view your test results, renew your prescriptions, schedule appointments and more. To sign up, go to [email or web address] . Click on \"Log in\" on the left side of the screen, which will take you to the Welcome page. Then click on \"Sign up Now\" on the right side of the page.     You will be asked to enter the access code listed below, as well as some personal information. Please follow the directions to create your username and password.     Your access code is: [email or web address]  Expires: 2017 10:49 AM     Your access code will  in 90 days. If you need help or a new code, please call your [place] clinic or [phone].        Care EveryWhere ID     This is your Care EveryWhere ID. This could be used by other organizations to access your [place] medical records  [id number]        Your Vitals Were     Pulse Temperature Respirations             70 97.4  F (36.3  C) 16          Blood Pressure from Last 3 Encounters:   17 (!) 168/105   17 146/87   17 (!) 157/92    Weight from Last 3 Encounters:   17 55.3 kg (122 lb)   17 55.3 kg (122 lb)   17 54.1 kg (119 lb 3.2 oz)              Today, you had the following     No orders found for display       Primary Care Provider Office Phone # Fax #    Edgar Neno Rodriguez [phone] [address]  [place] [address]        Equal Access to Services     ALEXIS ALBA AH: Hadii mateo Nunes, walubada rosas, qaybta kaalmada " lelis pembertonted jonyalvarez matos'aan ah. Buffy [facility] [phone].    ATENCIÓN: Si rukhsana jovel, tiene a roldan disposición servicios gratuitos de asistencia lingüística. Malaika al [phone].    We comply with applicable federal civil rights laws and [place] laws. We do not discriminate on the basis of race, color, national origin, age, disability, sex, sexual orientation, or gender identity.            Thank you!     Thank you for choosing [facility]  for your care. Our goal is always to provide you with excellent care. Hearing back from our patients is one way we can continue to improve our services. Please take a few minutes to complete the written survey that you may receive in the mail after your visit with us. Thank you!             Your Updated Medication List - Protect others around you: Learn how to safely use, store and throw away your medicines at www.disposemymeds.org.          This list is accurate as of: 11/18/17  1:23 PM.  Always use your most recent med list.                   Brand Name Dispense Instructions for use Diagnosis    acetaminophen 325 MG tablet    TYLENOL    60 tablet    Take 2 tablets (650 mg) by mouth every 4 hours as needed for mild pain or fever    Cancer associated pain       CETAPHIL lotion      Apply topically 2 times daily Also bid prn . To dry skin.        ferrous sulfate 325 (65 FE) MG tablet    IRON    31 tablet    Take 1 tablet (325 mg) by mouth every evening    Iron deficiency anemia due to chronic blood loss       HYDROcodone-acetaminophen 5-325 MG per tablet    NORCO    30 tablet    Take 1 tablet by mouth every 6 hours as needed for moderate to severe pain    Other chronic pain       levothyroxine 75 MCG tablet    SYNTHROID/LEVOTHROID    30 tablet    Take 1 tablet (75 mcg) by mouth daily Recheck TSH in 6 months    Hypothyroidism, unspecified type       loperamide 2 MG capsule    IMODIUM A-D    155 capsule    AND TAKE 2 CAPSULE  EVERY 6 HOURS AS NEEDED FOR LOOSE STOOLS (MAX OF 16MG/24 HRS)    Diarrhea, unspecified type       LORazepam 0.5 MG tablet    ATIVAN    30 tablet    Take 1 tablet (0.5 mg) by mouth every 4 hours as needed (Anxiety, Nausea/Vomiting or Sleep)    Adenocarcinoma of colon (H)       menthol-zinc oxide 0.44-20.625 % Oint ointment    CALMOSEPTINE    1 Tube    Apply topically 4 times daily as needed for skin protection    Chronic diarrhea       ONDANSETRON PO      Take 8 mg by mouth every 8 hours as needed for nausea        PREPARATION H 0.25-88.44 % per suppository   Generic drug:  phenylephrine-cocoa butter      Place 1 suppository rectally 2 times daily as needed for hemorrhoids or itching        SUMATRIPTAN SUCCINATE PO      Take 25 mg by mouth every 8 hours as needed for migraine        travoprost (BAK Free) 0.004 % ophthalmic solution    TRAVATAN Z    5 mL    Place 1 drop into both eyes At Bedtime    Primary open angle glaucoma of both eyes, unspecified glaucoma stage       vitamin B-Complex     30 tablet    Take 1 tablet by mouth daily    S/P colectomy

## 2017-11-30 NOTE — PROGRESS NOTES
"Infusion Nursing Note:  Sherita Kenney presents today for C6D1 Avastin/5FU  Patient seen by provider today: Yes: exam with Giuseppe Norris NP   present during visit today: Not Applicable.    Note: pt states not feeling well today-excesive fatigue and abdominal discomfort/distension. Pt having much trouble giving urine sample for Avastin. .  Patient given supplies and instructions for completing a 24 hour urine before her next scheduled treatment.   Intravenous Access:  Implanted Port.    Treatment Conditions:  Lab Results   Component Value Date    HGB 12.3 11/30/2017     Lab Results   Component Value Date    WBC 6.8 11/30/2017      Lab Results   Component Value Date    ANEU 4.2 11/30/2017     Lab Results   Component Value Date     11/30/2017      Lab Results   Component Value Date     10/02/2017                   Lab Results   Component Value Date    POTASSIUM 3.8 10/02/2017           Lab Results   Component Value Date    MAG 2.0 04/15/2017            Lab Results   Component Value Date    CR 0.71 10/02/2017                   Lab Results   Component Value Date    SARITHA 8.8 10/02/2017                Lab Results   Component Value Date    BILITOTAL 0.5 11/30/2017           Lab Results   Component Value Date    ALBUMIN 3.4 09/08/2017                    Lab Results   Component Value Date    ALT 62 09/08/2017           Lab Results   Component Value Date    AST 50 09/08/2017     Results reviewed, labs MET treatment parameters, ok to proceed with treatment.  Urine protein 30.      Prior to discharge: Port is secured in place with tegaderm and flushed with 10cc NS with positive blood return noted.  Continuous home infusion CADD pump connected.    All connectors secured in place and clamps taped open.    Pump started, \"running\" noted on display (CADD): YES.  Patient instructed to call our clinic or [facility] with any questions or concerns at home.  Patient verbalized understanding.    Patient " set up for pump disconnect at our clinic on 12/2.            Post Infusion Assessment:  Patient tolerated infusion without incident.  Blood return noted pre and post infusion.  Site patent and intact, free from redness, edema or discomfort.  No evidence of extravasations.    Discharge Plan:   Discharge instructions reviewed with: Patient and Family.  Patient and/or family verbalized understanding of discharge instructions and all questions answered.  Copy of AVS reviewed with patient and/or family.  Patient will return Saturday 12/2/17 for next appointment.  Patient discharged in stable condition accompanied by: sister.  Departure Mode: Ambulatory.    Della Little RN

## 2017-11-30 NOTE — LETTER
"    11/30/2017         RE: [facility]  [address]  [address] [place] [id number]        Dear Colleague,    Thank you for referring your patient, Sherita Kenney, to the [facility]. Please see a copy of my visit note below.    Oncology Rooming Note    November 30, 2017 10:36 AM   Sherita Kenney is a 76 year old female who presents for:    Chief Complaint   Patient presents with     Oncology Clinic Visit     Colon cancer      Initial Vitals: /73  Pulse 77  Temp 98.6  F (37  C) (Oral)  Resp 16 Estimated body mass index is 20.94 kg/(m^2) as calculated from the following:    Height as of 11/2/17: 1.626 m (5' 4\").    Weight as of 11/16/17: 55.3 kg (122 lb). There is no height or weight on file to calculate BSA.  Data Unavailable Comment: Data Unavailable   No LMP recorded. Patient is not currently having periods (Reason: Perimenopausal).  Allergies reviewed: Yes  Medications reviewed: Yes    Medications: Medication refills not needed today.  Pharmacy name entered into [facility]:    [facility] [address] [place] [address] [address] PHARMACY [facility] [facility] [address]    Clinical concerns: None                0 minutes for nursing intake (face to face time)     April Bazzi MA              Oncology/Hematology Visit Note  Nov 30, 2017    Reason for Visit: follow up of colon cancer     History of Present Illness: Sherita Kenney is a 76 year old female with colon cancer. She is currently undergoing treatment with Maintenance infusional 5-FU and Avastin started on 09/21/2017. No bolus 5-FU. *. Please see previous notes for further details on the patient's history. She comes in today for routine follow up prior to cycle .    Interval History:  she is feeling " the same since she last saw Dr Lindo.  Diarrhea has  improved with imodium. She denies NVD, denies abdominal pain . Her appetite is stable she eats breakfast lunch and dinner. But she does feel a little dehydrated today. Her chronic neuropathy is stable . She tries to stay acive and takes small frequent walks.     Review of Systems:  Patient denies any of the following except if noted above: fevers, chills, difficulty with energy, vision or hearing changes, chest pain, dyspnea, abdominal pain, nausea, vomiting, diarrhea, constipation, urinary concerns, headaches, numbness, tingling, issues with sleep or mood. He also denies lumps, bumps, rashes or skin lesions, bleeding or bruising issues.    Current Outpatient Prescriptions   Medication Sig Dispense Refill     HYDROcodone-acetaminophen (NORCO) 5-325 MG per tablet Take 1 tablet by mouth every 6 hours as needed for moderate to severe pain 30 tablet 0     loperamide (IMODIUM A-D) 2 MG capsule AND TAKE 2 CAPSULE EVERY 6 HOURS AS NEEDED FOR LOOSE STOOLS (MAX OF 16MG/24 HRS) 155 capsule 3     ONDANSETRON PO Take 8 mg by mouth every 8 hours as needed for nausea       travoprost, BAK Free, (TRAVATAN Z) 0.004 % ophthalmic solution Place 1 drop into both eyes At Bedtime 5 mL 98     LORazepam (ATIVAN) 0.5 MG tablet Take 1 tablet (0.5 mg) by mouth every 4 hours as needed (Anxiety, Nausea/Vomiting or Sleep) 30 tablet 2     ferrous sulfate (IRON) 325 (65 FE) MG tablet Take 1 tablet (325 mg) by mouth every evening 31 tablet 3     phenylephrine-cocoa butter (PREPARATION H) 0.25-88.44 % per suppository Place 1 suppository rectally 2 times daily as needed for hemorrhoids or itching       CETAPHIL (CETAPHIL) lotion Apply topically 2 times daily Also bid prn . To dry skin.       SUMATRIPTAN SUCCINATE PO Take 25 mg by mouth every 8 hours as needed for migraine       menthol-zinc oxide (CALMOSEPTINE) 0.44-20.625 % OINT ointment Apply topically 4 times daily as needed for skin protection  1 Tube 3     vitamin B-Complex Take 1 tablet by mouth daily 30 tablet 11     levothyroxine (SYNTHROID/LEVOTHROID) 75 MCG tablet Take 1 tablet (75 mcg) by mouth daily Recheck TSH in 6 months 30 tablet 5     acetaminophen (TYLENOL) 325 MG tablet Take 2 tablets (650 mg) by mouth every 4 hours as needed for mild pain or fever 60 tablet 1       Physical Examination:  General: The patient is a pleasant female in no acute distress.  /73  Pulse 77  Temp 98.6  F (37  C) (Oral)  Resp 16  Wt Readings from Last 10 Encounters:   11/30/17 57.2 kg (126 lb 1.7 oz)   11/16/17 55.3 kg (122 lb)   11/16/17 55.3 kg (122 lb)   11/02/17 54.1 kg (119 lb 3.2 oz)   11/02/17 54.1 kg (119 lb 3.2 oz)   10/31/17 54.3 kg (119 lb 12.8 oz)   10/19/17 53.3 kg (117 lb 6.4 oz)   10/19/17 53.3 kg (117 lb 6.4 oz)   10/17/17 54.5 kg (120 lb 1.9 oz)   10/05/17 55.6 kg (122 lb 9.6 oz)     HEENT: EOMI, PERRL. Sclerae are anicteric. Oral mucosa is pink and moist with no lesions or thrush.   Lymph: Neck is supple with no lymphadenopathy in the cervical or supraclavicular areas.   Heart: Regular rate and rhythm.   Lungs: Clear to auscultation bilaterally.   Abdomen: Bowel sounds present, soft, nontender with no palpable hepatosplenomegaly or masses.   Extremities: No lower extremity edema noted bilaterally.     Skin: No rashes, petechiae, or bruising noted on exposed skin.    Laboratory Data:  Results for orders placed or performed in visit on 11/30/17 (from the past 24 hour(s))   CBC with platelets differential   Result Value Ref Range    WBC 6.8 4.0 - 11.0 10e9/L    RBC Count 4.10 3.8 - 5.2 10e12/L    Hemoglobin 12.3 11.7 - 15.7 g/dL    Hematocrit 39.2 35.0 - 47.0 %    MCV 96 78 - 100 fl    MCH 30.0 26.5 - 33.0 pg    MCHC 31.4 (L) 31.5 - 36.5 g/dL    RDW 15.7 (H) 10.0 - 15.0 %    Platelet Count 117 (L) 150 - 450 10e9/L    Diff Method Automated Method     % Neutrophils 61.9 %    % Lymphocytes 30.5 %    % Monocytes 5.6 %    % Eosinophils 1.5 %    %  Basophils 0.4 %    % Immature Granulocytes 0.1 %    Nucleated RBCs 0 0 /100    Absolute Neutrophil 4.2 1.6 - 8.3 10e9/L    Absolute Lymphocytes 2.1 0.8 - 5.3 10e9/L    Absolute Monocytes 0.4 0.0 - 1.3 10e9/L    Absolute Eosinophils 0.1 0.0 - 0.7 10e9/L    Absolute Basophils 0.0 0.0 - 0.2 10e9/L    Abs Immature Granulocytes 0.0 0 - 0.4 10e9/L    Absolute Nucleated RBC 0.0    Bilirubin  total   Result Value Ref Range    Bilirubin Total 0.5 0.2 - 1.3 mg/dL   Protein qualitative urine   Result Value Ref Range    Protein Albumin Urine 30 (A) NEG^Negative mg/dL         Assessment and Plan:    This is a 77 y/o female  with     metastatic colon cancer with liver metastasis.  She is She is currently undergoing treatment with Maintenance infusional 5-FU ( dose reduced by 50% due to diarrhea)  and Avastin started on 09/21/2017. No bolus 5-FU.  Per Dr Lindo -Patient will be scheduled for CT scan in the first week of December.  If her disease is stable, we will plan on discontinuing 5-FU and just continuing her on Avastin  Her urine shows urine protein 30 although it was 100 before  will get 24 HR urine. For dehydration today  will give 1 L saline bolus today .   She will proceed with planned treatment  today . 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 Monitor for now     WILLY Valadez CNP          Again, thank you for allowing me to participate in the care of your patient.        Sincerely,        WILLY Valadez CNP

## 2017-11-30 NOTE — MR AVS SNAPSHOT
After Visit Summary   11/30/2017    Sherita Kenney    MRN: [id number]           Patient Information     Date Of Birth          1940        Visit Information        Provider Department      11/30/2017 10:00 AM Giuseppe Norris APRN [facility]        Today's Diagnoses     Proteinuria, unspecified type    -  1    Adenocarcinoma of colon (H)          Care Instructions    Pt already schedule for infusion and f/o appointment with  Dr Lindo in 2 weeks           Follow-ups after your visit        Your next 10 appointments already scheduled     Dec 01, 2017 12:00 PM CST   Office Visit with Edgar Rodriguez MD   [facility] ([facility]    [address] [facility] [address] [phone]           Bring a current list of meds and any records pertaining to this visit. For Physicals, please bring immunization records and any forms needing to be filled out. Please arrive 10 minutes early to complete paperwork.            Dec 02, 2017 12:00 PM CST   (Arrive by 11:30 AM)   Level 1 with  INFUSION CHAIR 16   [facility] ([facility])    [facility]  [address]  [facility] [address]   [phone]            Dec 05, 2017  9:00 AM CST   CT CHEST ABDOMEN PELVIS W/O & W CONTRAST with SHCT1   [facility] ([facility])    [address] [facility] [address] [phone]           Please bring any scans or X-rays taken at other hospitals, if similar tests were done. Also bring a list of your medicines, including vitamins, minerals and over-the-counter drugs. It is safest to leave personal items at home.  Be sure to tell your doctor:   If you have any allergies.   If there s any chance you are pregnant.   If you are breastfeeding.   If you have any special needs.  You may have contrast for this exam. To prepare:   Do not eat or drink for 2  hours before your exam. If you need to take medicine, you may take it with small sips of water. (We may ask you to take liquid medicine as well.)   The day before your exam, drink extra fluids at least six 8-ounce glasses (unless your doctor tells you to restrict your fluids).  Patients over 70 or patients with diabetes or kidney problems:   If you haven t had a blood test (creatinine test) within the last 30 days, go to your clinic or Diagnostic Imaging Department for this test.  If you have diabetes:   If your kidney function is normal, continue taking your metformin (Avandamet, Glucophage, Glucovance, Metaglip) on the day of your exam.   If your kidney function is abnormal, wait 48 hours before restarting this medicine.  You will have oral contrast for this exam:   You will drink the contrast at home. Get this from your clinic or Diagnostic Imaging Department. Please follow the directions given.  Please wear loose clothing, such as a sweat suit or jogging clothes. Avoid snaps, zippers and other metal. We may ask you to undress and put on a hospital gown.  If you have any questions, please call the Imaging Department where you will have your exam.            Dec 14, 2017 10:30 AM CST   Level 5 with  INFUSION CHAIR 15   [facility] ([facility])    [facility]  [address]  [facility] [address]   [phone]            Dec 14, 2017  2:00 PM CST   Return Visit with Susan Lindo MD   [facility] ([facility])    [facility]  [address]  [facility] [address]   [phone]            Dec 16, 2017 12:00 PM CST   Level 1 with  INFUSION CHAIR 15   [facility] ([facility])    [facility]  [address]  [facility] [address]   [phone]              Future tests that were ordered for you today   "   Open Future Orders        Priority Expected Expires Ordered    Protein timed urine with Creat Ratio Routine  2018            Who to contact     If you have questions or need follow up information about today's clinic visit or your schedule please contact [facility] directly at [phone].  Normal or non-critical lab and imaging results will be communicated to you by MyChart, letter or phone within 4 business days after the clinic has received the results. If you do not hear from us within 7 days, please contact the clinic through [employer] or phone. If you have a critical or abnormal lab result, we will notify you by phone as soon as possible.  Submit refill requests through [employer] or call your pharmacy and they will forward the refill request to us. Please allow 3 business days for your refill to be completed.          Additional Information About Your Visit        MyChart Information     [employer] lets you send messages to your doctor, view your test results, renew your prescriptions, schedule appointments and more. To sign up, go to [email or web address] . Click on \"Log in\" on the left side of the screen, which will take you to the Welcome page. Then click on \"Sign up Now\" on the right side of the page.     You will be asked to enter the access code listed below, as well as some personal information. Please follow the directions to create your username and password.     Your access code is: [email or web address]  Expires: 2017 10:49 AM     Your access code will  in 90 days. If you need help or a new code, please call your [facility] clinic or [phone].        Care EveryWhere ID     This is your Care EveryWhere ID. This could be used by other organizations to access your [facility] medical records  [id number]        Your Vitals Were     Pulse Temperature Respirations             77 98.6  F (37  C) (Oral) 16          Blood Pressure from Last 3 Encounters:   17 126/73 "   11/30/17 126/73   11/18/17 (!) 168/105    Weight from Last 3 Encounters:   11/30/17 57.2 kg (126 lb 1.7 oz)   11/16/17 55.3 kg (122 lb)   11/16/17 55.3 kg (122 lb)               Primary Care Provider Office Phone # Fax #    Edgar Neno Rodriguez [phone] [address]  [facility] [address]        Equal Access to Services     AELXIS ALBA : Hadii aad ku hadasho Soomaali, waaxda luqadaha, qaybta kaalmada adeegyada, waxay idiin hayaan adeted mooney . So [facility] [phone].    ATENCIÓN: Si habla español, tiene a roldan disposición servicios gratuitos de asistencia lingüística. [facility] [phone].    We comply with applicable federal civil rights laws and [place] laws. We do not discriminate on the basis of race, color, national origin, age, disability, sex, sexual orientation, or gender identity.            Thank you!     Thank you for choosing [facility]  for your care. Our goal is always to provide you with excellent care. Hearing back from our patients is one way we can continue to improve our services. Please take a few minutes to complete the written survey that you may receive in the mail after your visit with us. Thank you!             Your Updated Medication List - Protect others around you: Learn how to safely use, store and throw away your medicines at www.disposemymeds.org.          This list is accurate as of: 11/30/17  3:18 PM.  Always use your most recent med list.                   Brand Name Dispense Instructions for use Diagnosis    acetaminophen 325 MG tablet    TYLENOL    60 tablet    Take 2 tablets (650 mg) by mouth every 4 hours as needed for mild pain or fever    Cancer associated pain       CETAPHIL lotion      Apply topically 2 times daily Also bid prn . To dry skin.        ferrous sulfate 325 (65 FE) MG tablet    IRON    31 tablet    Take 1 tablet (325 mg) by mouth every evening    Iron deficiency anemia due to chronic blood loss        HYDROcodone-acetaminophen 5-325 MG per tablet    NORCO    30 tablet    Take 1 tablet by mouth every 6 hours as needed for moderate to severe pain    Other chronic pain       levothyroxine 75 MCG tablet    SYNTHROID/LEVOTHROID    30 tablet    Take 1 tablet (75 mcg) by mouth daily Recheck TSH in 6 months    Hypothyroidism, unspecified type       loperamide 2 MG capsule    IMODIUM A-D    155 capsule    AND TAKE 2 CAPSULE EVERY 6 HOURS AS NEEDED FOR LOOSE STOOLS (MAX OF 16MG/24 HRS)    Diarrhea, unspecified type       LORazepam 0.5 MG tablet    ATIVAN    30 tablet    Take 1 tablet (0.5 mg) by mouth every 4 hours as needed (Anxiety, Nausea/Vomiting or Sleep)    Adenocarcinoma of colon (H)       menthol-zinc oxide 0.44-20.625 % Oint ointment    CALMOSEPTINE    1 Tube    Apply topically 4 times daily as needed for skin protection    Chronic diarrhea       ONDANSETRON PO      Take 8 mg by mouth every 8 hours as needed for nausea        PREPARATION H 0.25-88.44 % per suppository   Generic drug:  phenylephrine-cocoa butter      Place 1 suppository rectally 2 times daily as needed for hemorrhoids or itching        SUMATRIPTAN SUCCINATE PO      Take 25 mg by mouth every 8 hours as needed for migraine        travoprost (BAK Free) 0.004 % ophthalmic solution    TRAVATAN Z    5 mL    Place 1 drop into both eyes At Bedtime    Primary open angle glaucoma of both eyes, unspecified glaucoma stage       vitamin B-Complex     30 tablet    Take 1 tablet by mouth daily    S/P colectomy

## 2017-11-30 NOTE — PATIENT INSTRUCTIONS
"24-hour urine collection instructions:    First void of the day discard in toilet.  This is your start time for the 24 hour collection.    Lift toilet seats up.  Place \"Hat\" on toilet rim with hat toward front of toilet bowl.    Every time you urinate collect in \"Hat\" and pour into container x 24 hours.  Keep urine cool either in fridge or on ice.    After 24 hour collection bring container to this clinic and an RN will send it to the lab.    Example:  If your first urination is at 7am, void in toilet-do not save.                    Vel this time as your start time.                     Every time you urinate save in hat and pour into orange container.                     Your last urination will be on or before 7am the next morning.                     Bring to clinic some time that day when the 24 hour collection is completed.  "

## 2017-11-30 NOTE — PROGRESS NOTES
Oncology/Hematology Visit Note  Nov 30, 2017    Reason for Visit: follow up of colon cancer     History of Present Illness: Sherita Kenney is a 76 year old female with colon cancer. She is currently undergoing treatment with Maintenance infusional 5-FU and Avastin started on 09/21/2017. No bolus 5-FU. *. Please see previous notes for further details on the patient's history. She comes in today for routine follow up prior to cycle .    Interval History:  she is feeling the same since she last saw Dr Lindo.  Diarrhea has  improved with imodium. She denies NVD, denies abdominal pain . Her appetite is stable she eats breakfast lunch and dinner. But she does feel a little dehydrated today. Her chronic neuropathy is stable . She tries to stay acive and takes small frequent walks.     Review of Systems:  Patient denies any of the following except if noted above: fevers, chills, difficulty with energy, vision or hearing changes, chest pain, dyspnea, abdominal pain, nausea, vomiting, diarrhea, constipation, urinary concerns, headaches, numbness, tingling, issues with sleep or mood. He also denies lumps, bumps, rashes or skin lesions, bleeding or bruising issues.    Current Outpatient Prescriptions   Medication Sig Dispense Refill     HYDROcodone-acetaminophen (NORCO) 5-325 MG per tablet Take 1 tablet by mouth every 6 hours as needed for moderate to severe pain 30 tablet 0     loperamide (IMODIUM A-D) 2 MG capsule AND TAKE 2 CAPSULE EVERY 6 HOURS AS NEEDED FOR LOOSE STOOLS (MAX OF 16MG/24 HRS) 155 capsule 3     ONDANSETRON PO Take 8 mg by mouth every 8 hours as needed for nausea       travoprost, BAK Free, (TRAVATAN Z) 0.004 % ophthalmic solution Place 1 drop into both eyes At Bedtime 5 mL 98     LORazepam (ATIVAN) 0.5 MG tablet Take 1 tablet (0.5 mg) by mouth every 4 hours as needed (Anxiety, Nausea/Vomiting or Sleep) 30 tablet 2     ferrous sulfate (IRON) 325 (65 FE) MG tablet Take 1 tablet (325 mg) by mouth every evening  31 tablet 3     phenylephrine-cocoa butter (PREPARATION H) 0.25-88.44 % per suppository Place 1 suppository rectally 2 times daily as needed for hemorrhoids or itching       CETAPHIL (CETAPHIL) lotion Apply topically 2 times daily Also bid prn . To dry skin.       SUMATRIPTAN SUCCINATE PO Take 25 mg by mouth every 8 hours as needed for migraine       menthol-zinc oxide (CALMOSEPTINE) 0.44-20.625 % OINT ointment Apply topically 4 times daily as needed for skin protection 1 Tube 3     vitamin B-Complex Take 1 tablet by mouth daily 30 tablet 11     levothyroxine (SYNTHROID/LEVOTHROID) 75 MCG tablet Take 1 tablet (75 mcg) by mouth daily Recheck TSH in 6 months 30 tablet 5     acetaminophen (TYLENOL) 325 MG tablet Take 2 tablets (650 mg) by mouth every 4 hours as needed for mild pain or fever 60 tablet 1       Physical Examination:  General: The patient is a pleasant female in no acute distress.  /73  Pulse 77  Temp 98.6  F (37  C) (Oral)  Resp 16  Wt Readings from Last 10 Encounters:   11/30/17 57.2 kg (126 lb 1.7 oz)   11/16/17 55.3 kg (122 lb)   11/16/17 55.3 kg (122 lb)   11/02/17 54.1 kg (119 lb 3.2 oz)   11/02/17 54.1 kg (119 lb 3.2 oz)   10/31/17 54.3 kg (119 lb 12.8 oz)   10/19/17 53.3 kg (117 lb 6.4 oz)   10/19/17 53.3 kg (117 lb 6.4 oz)   10/17/17 54.5 kg (120 lb 1.9 oz)   10/05/17 55.6 kg (122 lb 9.6 oz)     HEENT: EOMI, PERRL. Sclerae are anicteric. Oral mucosa is pink and moist with no lesions or thrush.   Lymph: Neck is supple with no lymphadenopathy in the cervical or supraclavicular areas.   Heart: Regular rate and rhythm.   Lungs: Clear to auscultation bilaterally.   Abdomen: Bowel sounds present, soft, nontender with no palpable hepatosplenomegaly or masses.   Extremities: No lower extremity edema noted bilaterally.     Skin: No rashes, petechiae, or bruising noted on exposed skin.    Laboratory Data:  Results for orders placed or performed in visit on 11/30/17 (from the past 24 hour(s))   CBC  with platelets differential   Result Value Ref Range    WBC 6.8 4.0 - 11.0 10e9/L    RBC Count 4.10 3.8 - 5.2 10e12/L    Hemoglobin 12.3 11.7 - 15.7 g/dL    Hematocrit 39.2 35.0 - 47.0 %    MCV 96 78 - 100 fl    MCH 30.0 26.5 - 33.0 pg    MCHC 31.4 (L) 31.5 - 36.5 g/dL    RDW 15.7 (H) 10.0 - 15.0 %    Platelet Count 117 (L) 150 - 450 10e9/L    Diff Method Automated Method     % Neutrophils 61.9 %    % Lymphocytes 30.5 %    % Monocytes 5.6 %    % Eosinophils 1.5 %    % Basophils 0.4 %    % Immature Granulocytes 0.1 %    Nucleated RBCs 0 0 /100    Absolute Neutrophil 4.2 1.6 - 8.3 10e9/L    Absolute Lymphocytes 2.1 0.8 - 5.3 10e9/L    Absolute Monocytes 0.4 0.0 - 1.3 10e9/L    Absolute Eosinophils 0.1 0.0 - 0.7 10e9/L    Absolute Basophils 0.0 0.0 - 0.2 10e9/L    Abs Immature Granulocytes 0.0 0 - 0.4 10e9/L    Absolute Nucleated RBC 0.0    Bilirubin  total   Result Value Ref Range    Bilirubin Total 0.5 0.2 - 1.3 mg/dL   Protein qualitative urine   Result Value Ref Range    Protein Albumin Urine 30 (A) NEG^Negative mg/dL         Assessment and Plan:    This is a 75 y/o female  with     metastatic colon cancer with liver metastasis.  She is She is currently undergoing treatment with Maintenance infusional 5-FU ( dose reduced by 50% due to diarrhea)  and Avastin started on 09/21/2017. No bolus 5-FU.  Per Dr Lindo -Patient will be scheduled for CT scan in the first week of December.  If her disease is stable, we will plan on discontinuing 5-FU and just continuing her on Avastin  Her urine shows urine protein 30 although it was 100 before  will get 24 HR urine. For dehydration today  will give 1 L saline bolus today .   She will proceed with planned treatment  today . 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 Monitor for now     WILLY Valadez CNP

## 2017-11-30 NOTE — PROGRESS NOTES
"Oncology Rooming Note    November 30, 2017 10:36 AM   Sherita Kenney is a 76 year old female who presents for:    Chief Complaint   Patient presents with     Oncology Clinic Visit     Colon cancer      Initial Vitals: /73  Pulse 77  Temp 98.6  F (37  C) (Oral)  Resp 16 Estimated body mass index is 20.94 kg/(m^2) as calculated from the following:    Height as of 11/2/17: 1.626 m (5' 4\").    Weight as of 11/16/17: 55.3 kg (122 lb). There is no height or weight on file to calculate BSA.  Data Unavailable Comment: Data Unavailable   No LMP recorded. Patient is not currently having periods (Reason: Perimenopausal).  Allergies reviewed: Yes  Medications reviewed: Yes    Medications: Medication refills not needed today.  Pharmacy name entered into [facility]:    [facility] [address] [place] [address] - [address] PHARMACY [facility] [address] [facility] [address]    Clinical concerns: None                0 minutes for nursing intake (face to face time)     April Bazzi MA            "

## 2017-11-30 NOTE — MR AVS SNAPSHOT
"              After Visit Summary   11/30/2017    Sherita Kenney    MRN: [id number]           Patient Information     Date Of Birth          1940        Visit Information        Provider Department      11/30/2017 9:30 AM  INFUSION CHAIR 6 [facility]        Today's Diagnoses     Adenocarcinoma of colon (H)    -  1      Care Instructions    24-hour urine collection instructions:    First void of the day discard in toilet.  This is your start time for the 24 hour collection.    Lift toilet seats up.  Place \"Hat\" on toilet rim with hat toward front of toilet bowl.    Every time you urinate collect in \"Hat\" and pour into container x 24 hours.  Keep urine cool either in fridge or on ice.    After 24 hour collection bring container to this clinic and an RN will send it to the lab.    Example:  If your first urination is at 7am, void in toilet-do not save.                    Vel this time as your start time.                     Every time you urinate save in hat and pour into orange container.                     Your last urination will be on or before 7am the next morning.                     Bring to clinic some time that day when the 24 hour collection is completed.          Follow-ups after your visit        Your next 10 appointments already scheduled     Dec 02, 2017 12:00 PM CST   (Arrive by 11:30 AM)   Level 1 with  INFUSION CHAIR 16   [facility] ([facility])    n Medical [facility]  [address] [facility] [address]  [facility] [address]   [phone]            Dec 05, 2017  9:00 AM CST   CT CHEST ABDOMEN PELVIS W/O & W CONTRAST with SHCT1   [facility] CT ([facility])    [address] [facility] [address] [phone]           Please bring any scans or X-rays taken at other hospitals, if similar tests were done. Also bring a list of your medicines, including vitamins, minerals " and over-the-counter drugs. It is safest to leave personal items at home.  Be sure to tell your doctor:   If you have any allergies.   If there s any chance you are pregnant.   If you are breastfeeding.   If you have any special needs.  You may have contrast for this exam. To prepare:   Do not eat or drink for 2 hours before your exam. If you need to take medicine, you may take it with small sips of water. (We may ask you to take liquid medicine as well.)   The day before your exam, drink extra fluids at least six 8-ounce glasses (unless your doctor tells you to restrict your fluids).  Patients over 70 or patients with diabetes or kidney problems:   If you haven t had a blood test (creatinine test) within the last 30 days, go to your clinic or Diagnostic Imaging Department for this test.  If you have diabetes:   If your kidney function is normal, continue taking your metformin (Avandamet, Glucophage, Glucovance, Metaglip) on the day of your exam.   If your kidney function is abnormal, wait 48 hours before restarting this medicine.  You will have oral contrast for this exam:   You will drink the contrast at home. Get this from your clinic or Diagnostic Imaging Department. Please follow the directions given.  Please wear loose clothing, such as a sweat suit or jogging clothes. Avoid snaps, zippers and other metal. We may ask you to undress and put on a hospital gown.  If you have any questions, please call the Imaging Department where you will have your exam.            Dec 14, 2017 10:30 AM CST   Level 5 with  INFUSION CHAIR 15   [facility] ([facility])    [facility]  [address]  [facility] [address]   [phone]            Dec 14, 2017  2:00 PM CST   Return Visit with Susan Lindo MD   [facility] ([facility])    [facility]  [address]  [facility] [address]  "  [phone]            Dec 16, 2017 12:00 PM CST   Level 1 with  INFUSION CHAIR 15   [facility] ([facility])    [facility]  [address]  [place] [id number] [phone]              Future tests that were ordered for you today     Open Future Orders        Priority Expected Expires Ordered    Protein timed urine with Creat Ratio Routine  2018            Who to contact     If you have questions or need follow up information about today's clinic visit or your schedule please contact [facility] directly at [phone].  Normal or non-critical lab and imaging results will be communicated to you by Guide[employer], letter or phone within 4 business days after the clinic has received the results. If you do not hear from us within 7 days, please contact the clinic through Guidehart or phone. If you have a critical or abnormal lab result, we will notify you by phone as soon as possible.  Submit refill requests through [employer] or call your pharmacy and they will forward the refill request to us. Please allow 3 business days for your refill to be completed.          Additional Information About Your Visit        Guidehart Information     [employer] lets you send messages to your doctor, view your test results, renew your prescriptions, schedule appointments and more. To sign up, go to [email or web address] . Click on \"Log in\" on the left side of the screen, which will take you to the Welcome page. Then click on \"Sign up Now\" on the right side of the page.     You will be asked to enter the access code listed below, as well as some personal information. Please follow the directions to create your username and password.     Your access code is: [email or web address]  Expires: 2017 10:49 AM     Your access code will  in 90 days. If you need help or a new code, please call your [place] clinic or " "[phone].        Care EveryWhere ID     This is your Care EveryWhere ID. This could be used by other organizations to access your [facility] medical records  [id number]        Your Vitals Were     Pulse Temperature Respirations Height BMI (Body Mass Index)       77 98.6  F (37  C) (Oral) 16 1.626 m (5' 4.02\") 21.63 kg/m2        Blood Pressure from Last 3 Encounters:   11/30/17 126/73   11/30/17 126/73   11/18/17 (!) 168/105    Weight from Last 3 Encounters:   11/30/17 57.2 kg (126 lb 1.7 oz)   11/16/17 55.3 kg (122 lb)   11/16/17 55.3 kg (122 lb)              We Performed the Following     Bilirubin  total     CBC with platelets differential     Protein qualitative urine        Primary Care Provider Office Phone # Fax #    Edgar Neno Rodriguez [phone] [address]  [place] [address]        Equal Access to Services     [facility]: Hadii aad ku hadasho Soomaali, waaxda luqadaha, qaybta kaalmada adeegyada, waxay sarahin haygogo mooney . So [facility] [phone].    ATENCIÓN: Si habla español, tiene a roldan disposición servicios gratuitos de asistencia lingüística. LlCleveland Clinic Foundation [phone].    We comply with applicable federal civil rights laws and [place] laws. We do not discriminate on the basis of race, color, national origin, age, disability, sex, sexual orientation, or gender identity.            Thank you!     Thank you for choosing [facility]  for your care. Our goal is always to provide you with excellent care. Hearing back from our patients is one way we can continue to improve our services. Please take a few minutes to complete the written survey that you may receive in the mail after your visit with us. Thank you!             Your Updated Medication List - Protect others around you: Learn how to safely use, store and throw away your medicines at www.disposemymeds.org.          This list is accurate as of: 11/30/17  " 1:46 PM.  Always use your most recent med list.                   Brand Name Dispense Instructions for use Diagnosis    acetaminophen 325 MG tablet    TYLENOL    60 tablet    Take 2 tablets (650 mg) by mouth every 4 hours as needed for mild pain or fever    Cancer associated pain       CETAPHIL lotion      Apply topically 2 times daily Also bid prn . To dry skin.        ferrous sulfate 325 (65 FE) MG tablet    IRON    31 tablet    Take 1 tablet (325 mg) by mouth every evening    Iron deficiency anemia due to chronic blood loss       HYDROcodone-acetaminophen 5-325 MG per tablet    NORCO    30 tablet    Take 1 tablet by mouth every 6 hours as needed for moderate to severe pain    Other chronic pain       levothyroxine 75 MCG tablet    SYNTHROID/LEVOTHROID    30 tablet    Take 1 tablet (75 mcg) by mouth daily Recheck TSH in 6 months    Hypothyroidism, unspecified type       loperamide 2 MG capsule    IMODIUM A-D    155 capsule    AND TAKE 2 CAPSULE EVERY 6 HOURS AS NEEDED FOR LOOSE STOOLS (MAX OF 16MG/24 HRS)    Diarrhea, unspecified type       LORazepam 0.5 MG tablet    ATIVAN    30 tablet    Take 1 tablet (0.5 mg) by mouth every 4 hours as needed (Anxiety, Nausea/Vomiting or Sleep)    Adenocarcinoma of colon (H)       menthol-zinc oxide 0.44-20.625 % Oint ointment    CALMOSEPTINE    1 Tube    Apply topically 4 times daily as needed for skin protection    Chronic diarrhea       ONDANSETRON PO      Take 8 mg by mouth every 8 hours as needed for nausea        PREPARATION H 0.25-88.44 % per suppository   Generic drug:  phenylephrine-cocoa butter      Place 1 suppository rectally 2 times daily as needed for hemorrhoids or itching        SUMATRIPTAN SUCCINATE PO      Take 25 mg by mouth every 8 hours as needed for migraine        travoprost (BAK Free) 0.004 % ophthalmic solution    TRAVATAN Z    5 mL    Place 1 drop into both eyes At Bedtime    Primary open angle glaucoma of both eyes, unspecified glaucoma stage        vitamin B-Complex     30 tablet    Take 1 tablet by mouth daily    S/P colectomy

## 2017-12-01 NOTE — PROGRESS NOTES
"Clinic Care Coordination Contact  Outreach    Data: Pt's friend, Clara Hernandez, called writer this afternoon. Pt was present with Clara and also talked with writer on the phone. Pt had a clinic office visit today.    Pt said that she(pt) would like her sister to have medical information sent to her(sister), but pt does not want her(pt's) sister talking to medical staff about pt. Writer explained that pt could call Medical Records ([phone], ext [phone]) to request that medical information be sent out. In regard to sharing medical information verbally, writer discussed the \"Authorization to Discuss Protected Health Information\" form. Pt and Clara said that pt's sister handles pt's finances (paying bills, etc.) and also calls to schedule pt's appointments. Pt is OK with this level of contact. Explained that pt can indicate on the \"Authorization to Discuss Protected Health Information\" form that pt's sister can receive scheduling information only.      Pt said that she has a newer health care directive form. As writer understood it, Clara Hernandez is named as pt's health care agent on this form, and pt is in agreement with Clara Hernandez talking with medical personnel about pt. Requested that pt mail a copy of pt's most recent health care directive to the clinic so that it can be scanned into pt's chart. And writer told pt that pt should put Clara Hernandez on the \"Authorization to Discuss Protected Health Information\" form and indicate on the form that medical information can be shared with Clara, if this is what pt wants.    Per chart review, the most recent \"Authorization to Discuss Protected Health Information\" form on file (dated 7-5-17) does not give permission for anyone to receive health information. And, on the previous \"Authorization to Discuss Protected Health Information form on file (dated 6-24-14), pt did not list anyone to share information with.     Pt is now residing at [facility]. Pt gave her " "updated address (at [facility]) as [address], [address], [place] [address]. Writer updated this address in pt's chart.    Mailed \"Authorization to Discuss Protected Health Information\" forms to pt to complete if pt wishes.    Plan: Provided information as above. No further -[facility] contact is planned.    Heather Angulo, [facility] Care Coordination  Clinic: [place]   Email: [email or web address]  Tele: [phone]                Plan:   "

## 2017-12-01 NOTE — MR AVS SNAPSHOT
After Visit Summary   12/1/2017    Sherita Kenney    MRN: [id number]           Patient Information     Date Of Birth          1940        Visit Information        Provider Department      12/1/2017 12:00 PM Edgar Rodriguez MD [facility]        Today's Diagnoses     Primary osteoarthritis involving multiple joints    -  1    Hypothyroidism, unspecified type        History of colon cancer           Follow-ups after your visit        Your next 10 appointments already scheduled     Jan 11, 2018 10:00 AM CST   Level 3 with SH INFUSION CHAIR 4   [facility] ([facility])    [facility]  [address]  [place] [address]   [phone]            Jan 25, 2018 10:00 AM CST   Level 3 with SH INFUSION CHAIR 15   [facility] ([facility])    [facility]  [address]  [place] [address]   [phone]            Jan 25, 2018 10:30 AM CST   Return Visit with Susan Lindo MD   [facility] ([facility])    [facility]  [address]  [place] [address]   [phone]              Who to contact     If you have questions or need follow up information about today's clinic visit or your schedule please contact [facility] directly at [phone].  Normal or non-critical lab and imaging results will be communicated to you by MyChart, letter or phone within 4 business days after the clinic has received the results. If you do not hear from us within 7 days, please contact the clinic through MyChart or phone. If you have a critical or abnormal lab result, we will notify you by phone as soon as possible.  Submit refill requests through [employer] or call your pharmacy and they will forward the refill request to us. Please allow 3 business days for your  "refill to be completed.          Additional Information About Your Visit        [employer] Information     [employer] lets you send messages to your doctor, view your test results, renew your prescriptions, schedule appointments and more. To sign up, go to [email or web address] . Click on \"Log in\" on the left side of the screen, which will take you to the Welcome page. Then click on \"Sign up Now\" on the right side of the page.     You will be asked to enter the access code listed below, as well as some personal information. Please follow the directions to create your username and password.     Your access code is: [email or web address]  Expires: 3/13/2018  1:23 PM     Your access code will  in 90 days. If you need help or a new code, please call your [facility] clinic or [phone].        Care EveryWhere ID     This is your Care EveryWhere ID. This could be used by other organizations to access your [facility] medical records  [id number]        Your Vitals Were     Pulse Temperature Height Pulse Oximetry Breastfeeding? BMI (Body Mass Index)    70 97.4  F (36.3  C) (Oral) 5' 4\" (1.626 m) 96% No 21.11 kg/m2       Blood Pressure from Last 3 Encounters:   17 129/73   17 158/85   17 (!) 174/102    Weight from Last 3 Encounters:   17 121 lb 12.8 oz (55.2 kg)   17 121 lb 12.8 oz (55.2 kg)   17 120 lb 9.6 oz (54.7 kg)              Today, you had the following     No orders found for display       Primary Care Provider Office Phone # Fax #    Edgar Neno Rodriguez [phone] [address]  [place] [address]        Equal Access to Services     ALEXIS ALBA : Ruddy rivera Sogabriel, waaxda luqadaha, qaybta kaalmaellis lowry. So [facility] [phone].    ATENCIÓN: Si habla español, tiene a roldan disposición servicios gratuitos de asistencia lingüística. Llame al [phone].    We comply with applicable federal " civil rights laws and [place] laws. We do not discriminate on the basis of race, color, national origin, age, disability, sex, sexual orientation, or gender identity.            Thank you!     Thank you for choosing [facility]  for your care. Our goal is always to provide you with excellent care. Hearing back from our patients is one way we can continue to improve our services. Please take a few minutes to complete the written survey that you may receive in the mail after your visit with us. Thank you!             Your Updated Medication List - Protect others around you: Learn how to safely use, store and throw away your medicines at www.disposemymeds.org.          This list is accurate as of: 12/1/17 11:59 PM.  Always use your most recent med list.                   Brand Name Dispense Instructions for use Diagnosis    acetaminophen 325 MG tablet    TYLENOL    60 tablet    Take 2 tablets (650 mg) by mouth every 4 hours as needed for mild pain or fever    Cancer associated pain       CETAPHIL lotion      Apply topically 2 times daily Also bid prn . To dry skin.        HYDROcodone-acetaminophen 5-325 MG per tablet    NORCO    30 tablet    Take 1 tablet by mouth every 6 hours as needed for moderate to severe pain    Other chronic pain       levothyroxine 75 MCG tablet    SYNTHROID/LEVOTHROID    30 tablet    Take 1 tablet (75 mcg) by mouth daily Recheck TSH in 6 months    Hypothyroidism, unspecified type       loperamide 2 MG capsule    IMODIUM A-D    155 capsule    AND TAKE 2 CAPSULE EVERY 6 HOURS AS NEEDED FOR LOOSE STOOLS (MAX OF 16MG/24 HRS)    Diarrhea, unspecified type       LORazepam 0.5 MG tablet    ATIVAN    30 tablet    Take 1 tablet (0.5 mg) by mouth every 4 hours as needed (Anxiety, Nausea/Vomiting or Sleep)    Adenocarcinoma of colon (H)       menthol-zinc oxide 0.44-20.625 % Oint ointment    CALMOSEPTINE    1 Tube    Apply topically 4 times daily as needed for skin protection    Chronic  diarrhea       ONDANSETRON PO      Take 8 mg by mouth every 8 hours as needed for nausea        PREPARATION H 0.25-88.44 % per suppository   Generic drug:  phenylephrine-cocoa butter      Place 1 suppository rectally 2 times daily as needed for hemorrhoids or itching        SUMATRIPTAN SUCCINATE PO      Take 25 mg by mouth every 8 hours as needed for migraine        travoprost (BAK Free) 0.004 % ophthalmic solution    TRAVATAN Z    5 mL    Place 1 drop into both eyes At Bedtime    Primary open angle glaucoma of both eyes, unspecified glaucoma stage

## 2017-12-01 NOTE — TELEPHONE ENCOUNTER
Sherita Shilakrista is well known to this clinician from previous visits in clinic. This clinician attempted to reach out to pt by phone today as she had designated that she would like to have SW follow-up with her when she came to clinic 11/30/17. Pt has this clinician's contact information and knows to reach out to this clinician in the case of psychosocial distress or need.     Please page in the case of psychosocial distress or concern.     MARAL Hernandez, [facility]  Phone: [phone]  Pager: [phone]    Belpre Gonsalo: M, T  *every other Thursday, 8am-4:30pm  Mitzy Luo: W, F, *every other Thursday, 8am-4:30pm

## 2017-12-01 NOTE — NURSING NOTE
"Chief Complaint   Patient presents with     RECHECK     face to face encounter, residing @  [facility]        Initial /84 (Cuff Size: Adult Regular)  Pulse 70  Temp 97.4  F (36.3  C) (Oral)  Ht 5' 4\" (1.626 m)  Wt 123 lb (55.8 kg)  SpO2 96%  Breastfeeding? No  BMI 21.11 kg/m2 Estimated body mass index is 21.11 kg/(m^2) as calculated from the following:    Height as of this encounter: 5' 4\" (1.626 m).    Weight as of this encounter: 123 lb (55.8 kg).  Medication Reconciliation: complete -- per list of [facility]     "

## 2017-12-01 NOTE — PROGRESS NOTES
HPI      SUBJECTIVE:   Sherita Kenney is a 76 year old female who presents to clinic today for the following health issues:    Chief Complaint   Patient presents with     RECHECK     face to face encounter, residing @  [facility]        The patient is a resident at an assisted living facility and is here today for regular checkup.    She has a history of colon cancer for which she underwent colectomy.  She had metastatic disease for which she underwent chemotherapy.  Follows up closely with oncology. Takes loperamide as needed for diarrhea and Zofran as needed for nausea.  Otherwise denies abdominal pain or other gastrointestinal symptoms.    Also has degenerative joint disease involving her hands, lower back, hip, and knees which occasionally cause significant pain.  Takes Tylenol for mild pain and sparingly takes Norco for moderate to severe pain.    Has hypothyroidism for which she takes levothyroxine.  TSH on 2/15/17 was 4.05.      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   Respiratory: Negative for cough and shortness of breath.    Cardiovascular: Negative for chest pain and palpitations.   Gastrointestinal:  "Positive for diarrhea (occasional) and nausea (occasional). Negative for abdominal pain, blood in stool, melena and vomiting.   Musculoskeletal: Positive for joint pain. Negative for falls.   Neurological: Negative for dizziness, sensory change, focal weakness and headaches.       /84 (Cuff Size: Adult Regular)  Pulse 70  Temp 97.4  F (36.3  C) (Oral)  Ht 5' 4\" (1.626 m)  Wt 123 lb (55.8 kg)  SpO2 96%  Breastfeeding? No  BMI 21.11 kg/m2      Physical Exam   Constitutional: She is oriented to person, place, and time. No distress.   Neck: No thyromegaly present.   Cardiovascular: Normal rate, regular rhythm and normal heart sounds.    Pulmonary/Chest: Effort normal and breath sounds normal. No respiratory distress.   Abdominal: Soft. There is no tenderness.   Musculoskeletal: She exhibits no edema.   Neurological: She is alert and oriented to person, place, and time. Coordination normal. GCS score is 15.   Psychiatric: Mood and affect normal.   Nursing note and vitals reviewed.        ICD-10-CM    1. Primary osteoarthritis involving multiple joints M15.0  has adequate relief with current regimen for pain consisting of acetaminophen and as needed Norco   2. Hypothyroidism, unspecified type E03.9  continue levothyroxine 75 mcg daily   3. History of colon cancer Z85.038  follows up closely with oncology     **please refer to HPI for status of conditions          "

## 2017-12-02 NOTE — MR AVS SNAPSHOT
After Visit Summary   12/2/2017    Sherita Kenney    MRN: [id number]           Patient Information     Date Of Birth          1940        Visit Information        Provider Department      12/2/2017 12:00 PM  INFUSION CHAIR 16 [facility]        Today's Diagnoses     Port-a-cath in place    -  1       Follow-ups after your visit        Your next 10 appointments already scheduled     Dec 05, 2017  9:00 AM CST   CT CHEST ABDOMEN PELVIS W/O & W CONTRAST with SHCT1   [facility] CT ([facility])    [address] [address]   [phone]           Please bring any scans or X-rays taken at other hospitals, if similar tests were done. Also bring a list of your medicines, including vitamins, minerals and over-the-counter drugs. It is safest to leave personal items at home.  Be sure to tell your doctor:   If you have any allergies.   If there s any chance you are pregnant.   If you are breastfeeding.   If you have any special needs.  You may have contrast for this exam. To prepare:   Do not eat or drink for 2 hours before your exam. If you need to take medicine, you may take it with small sips of water. (We may ask you to take liquid medicine as well.)   The day before your exam, drink extra fluids at least six 8-ounce glasses (unless your doctor tells you to restrict your fluids).  Patients over 70 or patients with diabetes or kidney problems:   If you haven t had a blood test (creatinine test) within the last 30 days, go to your clinic or Diagnostic Imaging Department for this test.  If you have diabetes:   If your kidney function is normal, continue taking your metformin (Avandamet, Glucophage, Glucovance, Metaglip) on the day of your exam.   If your kidney function is abnormal, wait 48 hours before restarting this medicine.  You will have oral contrast for this exam:   You will drink the contrast at home. Get this from  your clinic or Diagnostic Imaging Department. Please follow the directions given.  Please wear loose clothing, such as a sweat suit or jogging clothes. Avoid snaps, zippers and other metal. We may ask you to undress and put on a hospital gown.  If you have any questions, please call the Imaging Department where you will have your exam.            Dec 14, 2017 10:30 AM CST   Level 5 with SH INFUSION CHAIR 15   [facility] ([facility])    [facility]  [address]  [place] [address]   [phone]            Dec 14, 2017  2:00 PM CST   Return Visit with Susan Lindo MD   [facility])    [facility]  [address]  [place] [address]   [phone]            Dec 16, 2017 12:00 PM CST   Level 1 with  INFUSION CHAIR 15   [facility] ([facility])    [facility]  [address]  [facility] [address]   [phone]              Who to contact     If you have questions or need follow up information about today's clinic visit or your schedule please contact [facility] directly at [phone].  Normal or non-critical lab and imaging results will be communicated to you by [employer], letter or phone within 4 business days after the clinic has received the results. If you do not hear from us within 7 days, please contact the clinic through [employer] or phone. If you have a critical or abnormal lab result, we will notify you by phone as soon as possible.  Submit refill requests through [employer] or call your pharmacy and they will forward the refill request to us. Please allow 3 business days for your refill to be completed.          Additional Information About Your Visit        [employer] Information     [employer] lets you send messages to your doctor, view your test  "results, renew your prescriptions, schedule appointments and more. To sign up, go to [email or web address] . Click on \"Log in\" on the left side of the screen, which will take you to the Welcome page. Then click on \"Sign up Now\" on the right side of the page.     You will be asked to enter the access code listed below, as well as some personal information. Please follow the directions to create your username and password.     Your access code is: [email or web address]  Expires: 2017 10:49 AM     Your access code will  in 90 days. If you need help or a new code, please call your [facility] clinic or [phone].        Care EveryWhere ID     This is your Care EveryWhere ID. This could be used by other organizations to access your [facility] medical records  [id number]        Your Vitals Were     Pulse Temperature Respirations             67 97.4  F (36.3  C) (Oral) 16          Blood Pressure from Last 3 Encounters:   17 (!) 174/102   17 169/84   17 126/73    Weight from Last 3 Encounters:   17 55.8 kg (123 lb)   17 57.2 kg (126 lb 1.7 oz)   17 55.3 kg (122 lb)              Today, you had the following     No orders found for display       Primary Care Provider Office Phone # Fax #    Edgar Neno Rodriguez [phone] [address] [facility] [address]  [place] [address]        Equal Access to Services     [facility]: Hadii aad ku hadasho Soomaali, waaxda luqadaha, qaybta kaalmada adeegnancy, ellis mooney . So [facility] [phone].    ATENCIÓN: Si habla español, tiene a roldan disposición servicios gratuitos de asistencia lingüística. Llame al [phone].    We comply with applicable federal civil rights laws and [place] laws. We do not discriminate on the basis of race, color, national origin, age, disability, sex, sexual orientation, or gender identity.            Thank you!     Thank you for choosing [facility]  for your care. Our goal is always to provide you with excellent care. Hearing back from our patients is one way we can continue to improve our services. Please take a few minutes to complete the written survey that you may receive in the mail after your visit with us. Thank you!             Your Updated Medication List - Protect others around you: Learn how to safely use, store and throw away your medicines at www.disposemymeds.org.          This list is accurate as of: 12/2/17 12:13 PM.  Always use your most recent med list.                   Brand Name Dispense Instructions for use Diagnosis    acetaminophen 325 MG tablet    TYLENOL    60 tablet    Take 2 tablets (650 mg) by mouth every 4 hours as needed for mild pain or fever    Cancer associated pain       CETAPHIL lotion      Apply topically 2 times daily Also bid prn . To dry skin.        ferrous sulfate 325 (65 FE) MG tablet    IRON    31 tablet    Take 1 tablet (325 mg) by mouth every evening    Iron deficiency anemia due to chronic blood loss       HYDROcodone-acetaminophen 5-325 MG per tablet    NORCO    30 tablet    Take 1 tablet by mouth every 6 hours as needed for moderate to severe pain    Other chronic pain       levothyroxine 75 MCG tablet    SYNTHROID/LEVOTHROID    30 tablet    Take 1 tablet (75 mcg) by mouth daily Recheck TSH in 6 months    Hypothyroidism, unspecified type       loperamide 2 MG capsule    IMODIUM A-D    155 capsule    AND TAKE 2 CAPSULE EVERY 6 HOURS AS NEEDED FOR LOOSE STOOLS (MAX OF 16MG/24 HRS)    Diarrhea, unspecified type       LORazepam 0.5 MG tablet    ATIVAN    30 tablet    Take 1 tablet (0.5 mg) by mouth every 4 hours as needed (Anxiety, Nausea/Vomiting or Sleep)    Adenocarcinoma of colon (H)       menthol-zinc oxide 0.44-20.625 % Oint ointment    CALMOSEPTINE    1 Tube    Apply topically 4 times daily as needed for skin protection    Chronic diarrhea       ONDANSETRON PO      Take 8 mg by mouth every 8  hours as needed for nausea        PREPARATION H 0.25-88.44 % per suppository   Generic drug:  phenylephrine-cocoa butter      Place 1 suppository rectally 2 times daily as needed for hemorrhoids or itching        SUMATRIPTAN SUCCINATE PO      Take 25 mg by mouth every 8 hours as needed for migraine        travoprost (BAK Free) 0.004 % ophthalmic solution    TRAVATAN Z    5 mL    Place 1 drop into both eyes At Bedtime    Primary open angle glaucoma of both eyes, unspecified glaucoma stage       vitamin B-Complex     30 tablet    Take 1 tablet by mouth daily    S/P colectomy

## 2017-12-02 NOTE — TELEPHONE ENCOUNTER
"  Additional Information    [1] Caller is not with the adult (patient) AND [2] reporting urgent symptoms     Moni On call nurse for [facility] assisted living calling regarding pt recent visit today at infusion clinic. \"Her blood pressure was up and she had a head ache and was nauseated.  She didn't come back with clear instructions.\" Caller is not with patient. Went over instructions per epic/MD: Patient's sister will discuss pain and nausea medications with the nurses at the assisted living when they return there. I instructed them to go to the ER if symptoms warrant.  I also advised to have the assisted living nurse call us with pt present for triage if needed.    Protocols used: INFORMATION ONLY CALL-ADULT-    "

## 2017-12-02 NOTE — PROGRESS NOTES
Infusion Nursing Note:  Sherita Kenney presents today for unhook chemo pump.    Patient seen by provider today: No   present during visit today: Not Applicable.    Note: Patient has had a headache and received medication at the assisted [facility] this am. Complaints of nausea also. Still remains with a headache and note hypertension. Patient's sister will discuss pain and nausea medications with the nurses at the assisted living when they return there. I instructed them to go to the ER if symptoms warrant.    Intravenous Access:  Implanted Port.    Treatment Conditions:  Not Applicable.      Post Infusion Assessment:  Site patent and intact, free from redness, edema or discomfort.  No evidence of extravasations.  Access discontinued per protocol.    Discharge Plan:   AVS to patient via MYCHART.  Patient will return as prev [facility] for next appointment.   Patient discharged in stable condition accompanied by: sister.  Departure Mode: wheelchair.    Pino Vargas RN

## 2017-12-02 NOTE — IP AVS SNAPSHOT
[facility]    [address] [place] [address]    Phone:  [phone]                                       After Visit Summary   12/2/2017    Sherita Kenney    MRN: [id number]           After Visit Summary Signature Page     I have received my discharge instructions, and my questions have been answered. I have discussed any challenges I see with this plan with the nurse or doctor.    ..........................................................................................................................................  Patient/Patient Representative Signature      ..........................................................................................................................................  Patient Representative Print Name and Relationship to Patient    ..................................................               ................................................  Date                                            Time    ..........................................................................................................................................  Reviewed by Signature/Title    ...................................................              ..............................................  Date                                                            Time

## 2017-12-02 NOTE — IP AVS SNAPSHOT
MRN:[id number]                      After Visit Summary   12/2/2017    Sherita Kenney    MRN: [id number]           Thank you!     Thank you for choosing [facility] for your care. Our goal is always to provide you with excellent care. Hearing back from our patients is one way we can continue to improve our services. Please take a few minutes to complete the written survey that you may receive in the mail after you visit with us. Thank you!        Patient Information     Date Of Birth          1940        About your hospital stay     You were admitted on:  December 3, 2017 [facility]    You were discharged on:  December 3, 2017        Reason for your hospital stay       You were admitted for evaluation of headache and elevated blood pressure. You had a complete work-up that was negative and showed now signs of infection. Your symptoms may have been related to your recent chemotherapy. Your blood pressure was likely elevated due to you feeling unwell. It has improved. You will not be started on medications. Follow up with your regular MD for recheck.                  Who to Call     For medical emergencies, please call 911.  For non-urgent questions about your medical care, please call your primary care provider or clinic, [phone]          Attending Provider     Provider Specialty    Anna Green MD Emergency Medicine    Jens Nova MD Internal Medicine       Primary Care Provider Office Phone # Fax #    Edgar Rodriguez [phone]      After Care Instructions     Activity       Your activity upon discharge: activity as tolerated            Diet       Follow this diet upon discharge: Orders Placed This Encounter      Regular Diet Adult                  Follow-up Appointments     Follow-up and recommended labs and tests        Follow up with primary care provider, Edgar Rodriguez,  within 7 days for hospital follow- up.  The following labs/tests are recommended: repeat blood pressure check.                  Your next 10 appointments already scheduled     Dec 05, 2017  9:00 AM CST   CT CHEST ABDOMEN PELVIS W/O & W CONTRAST with SHCT1   [facility] CT ([facility])    [address] [address]   [phone]           Please bring any scans or X-rays taken at other hospitals, if similar tests were done. Also bring a list of your medicines, including vitamins, minerals and over-the-counter drugs. It is safest to leave personal items at home.  Be sure to tell your doctor:   If you have any allergies.   If there s any chance you are pregnant.   If you are breastfeeding.   If you have any special needs.  You may have contrast for this exam. To prepare:   Do not eat or drink for 2 hours before your exam. If you need to take medicine, you may take it with small sips of water. (We may ask you to take liquid medicine as well.)   The day before your exam, drink extra fluids at least six 8-ounce glasses (unless your doctor tells you to restrict your fluids).  Patients over 70 or patients with diabetes or kidney problems:   If you haven t had a blood test (creatinine test) within the last 30 days, go to your clinic or Diagnostic Imaging Department for this test.  If you have diabetes:   If your kidney function is normal, continue taking your metformin (Avandamet, Glucophage, Glucovance, Metaglip) on the day of your exam.   If your kidney function is abnormal, wait 48 hours before restarting this medicine.  You will have oral contrast for this exam:   You will drink the contrast at home. Get this from your clinic or Diagnostic Imaging Department. Please follow the directions given.  Please wear loose clothing, such as a sweat suit or jogging clothes. Avoid snaps, zippers and other metal. We may ask you to undress and put on a hospital gown.  If you have any  "questions, please call the Imaging Department where you will have your exam.            Dec 14, 2017 10:30 AM CST   Level 5 with SH INFUSION CHAIR 15   [facility] ([facility])    [facility] [place]  [address]  [place] [address]   [phone]            Dec 14, 2017  2:00 PM CST   Return Visit with Susan Lindo MD   [facility]    [facility] [place]  [address]  [place] [address]   [phone]            Dec 16, 2017 12:00 PM CST   Level 1 with SH INFUSION CHAIR 15   [facility] ([facility])    [facility] [place]  [address]  [place] [address]   [phone]              Pending Results     No orders found for last 3 day(s).            Statement of Approval     Ordered          12/03/17 1136  I have reviewed and agree with all the recommendations and orders detailed in this document.  EFFECTIVE NOW     Approved and electronically signed by:  Neema Carpenter PA-C             Admission Information     Date & Time Provider Department Dept. Phone    12/2/2017 Jens Nova MD [facility] Observation Unit [phone]      Your Vitals Were     Blood Pressure Temperature Respirations Height Weight Pulse Oximetry    158/85 (BP Location: Left arm) 97  F (36.1  C) (Oral) 16 1.676 m (5' 6\") 54.7 kg (120 lb 9.6 oz) 99%    BMI (Body Mass Index)                   19.47 kg/m2           [facility] Information     [facility] lets you send messages to your doctor, view your test results, renew your prescriptions, schedule appointments and more. To sign up, go to [email or web address] . Click on \"Log in\" on the left side of the screen, which will take you to the Welcome page. Then click on \"Sign up Now\" on the right side of the page.     You will be asked to enter the access code listed below, " as well as some personal information. Please follow the directions to create your username and password.     Your access code is: [email or web address]  Expires: 2017 10:49 AM     Your access code will  in 90 days. If you need help or a new code, please call your [place] clinic or [phone].        Care EveryWhere ID     This is your Care EveryWhere ID. This could be used by other organizations to access your [place] medical records  [id number]        Equal Access to Services     ALEXIS ALBA : Hadii mateo dhaliwal hadasho Soomaali, waaxda luqadaha, qaybta kaalmada adetedyayvon, ellis mooney . So [facility] [phone].    ATENCIÓN: Si habla español, tiene a roldan disposición servicios gratuitos de asistencia lingüística. Llame al [phone].    We comply with applicable federal civil rights laws and [place] laws. We do not discriminate on the basis of race, color, national origin, age, disability, sex, sexual orientation, or gender identity.               Review of your medicines      CONTINUE these medicines which have NOT CHANGED        Dose / Directions    acetaminophen 325 MG tablet   Commonly known as:  TYLENOL   Used for:  Cancer associated pain        Dose:  650 mg   Take 2 tablets (650 mg) by mouth every 4 hours as needed for mild pain or fever   Quantity:  60 tablet   Refills:  1       CETAPHIL lotion   Indication:  Dry Skin        Apply topically 2 times daily Also bid prn . To dry skin.   Refills:  0       ferrous sulfate 325 (65 FE) MG tablet   Commonly known as:  IRON   Used for:  Iron deficiency anemia due to chronic blood loss        Dose:  325 mg   Take 1 tablet (325 mg) by mouth every evening   Quantity:  31 tablet   Refills:  3       HYDROcodone-acetaminophen 5-325 MG per tablet   Commonly known as:  NORCO   Used for:  Other chronic pain        Dose:  1 tablet   Take 1 tablet by mouth every 6 hours as needed for moderate to severe pain   Quantity:  30 tablet   Refills:  0        levothyroxine 75 MCG tablet   Commonly known as:  SYNTHROID/LEVOTHROID   Used for:  Hypothyroidism, unspecified type        Dose:  75 mcg   Take 1 tablet (75 mcg) by mouth daily Recheck TSH in 6 months   Quantity:  30 tablet   Refills:  5       loperamide 2 MG capsule   Commonly known as:  IMODIUM A-D   Used for:  Diarrhea, unspecified type        AND TAKE 2 CAPSULE EVERY 6 HOURS AS NEEDED FOR LOOSE STOOLS (MAX OF 16MG/24 HRS)   Quantity:  155 capsule   Refills:  3       LORazepam 0.5 MG tablet   Commonly known as:  ATIVAN   Used for:  Adenocarcinoma of colon (H)        Dose:  0.5 mg   Take 1 tablet (0.5 mg) by mouth every 4 hours as needed (Anxiety, Nausea/Vomiting or Sleep)   Quantity:  30 tablet   Refills:  2       menthol-zinc oxide 0.44-20.625 % Oint ointment   Commonly known as:  CALMOSEPTINE   Used for:  Chronic diarrhea        Apply topically 4 times daily as needed for skin protection   Quantity:  1 Tube   Refills:  3       ONDANSETRON PO        Dose:  8 mg   Take 8 mg by mouth every 8 hours as needed for nausea   Refills:  0       PREPARATION H 0.25-88.44 % per suppository   Generic drug:  phenylephrine-cocoa butter        Dose:  1 suppository   Place 1 suppository rectally 2 times daily as needed for hemorrhoids or itching   Refills:  0       SUMATRIPTAN SUCCINATE PO        Dose:  25 mg   Take 25 mg by mouth every 8 hours as needed for migraine   Refills:  0       travoprost (BAK Free) 0.004 % ophthalmic solution   Commonly known as:  TRAVATAN Z   Used for:  Primary open angle glaucoma of both eyes, unspecified glaucoma stage        Dose:  1 drop   Place 1 drop into both eyes At Bedtime   Quantity:  5 mL   Refills:  98       vitamin B-Complex   Used for:  S/P colectomy        Dose:  1 tablet   Take 1 tablet by mouth daily   Quantity:  30 tablet   Refills:  11                Protect others around you: Learn how to safely use, store and throw away your medicines at www.disposemymeds.org.              Medication List: This is a list of all your medications and when to take them. Check marks below indicate your daily home schedule. Keep this list as a reference.      Medications           Morning Afternoon Evening Bedtime As Needed    acetaminophen 325 MG tablet   Commonly known as:  TYLENOL   Take 2 tablets (650 mg) by mouth every 4 hours as needed for mild pain or fever   Last time this was given:  650 mg on 12/2/2017  8:56 PM                                CETAPHIL lotion   Apply topically 2 times daily Also bid prn . To dry skin.                                ferrous sulfate 325 (65 FE) MG tablet   Commonly known as:  IRON   Take 1 tablet (325 mg) by mouth every evening                                HYDROcodone-acetaminophen 5-325 MG per tablet   Commonly known as:  NORCO   Take 1 tablet by mouth every 6 hours as needed for moderate to severe pain   Last time this was given:  1 tablet on 12/3/2017 10:06 AM                                levothyroxine 75 MCG tablet   Commonly known as:  SYNTHROID/LEVOTHROID   Take 1 tablet (75 mcg) by mouth daily Recheck TSH in 6 months   Last time this was given:  75 mcg on 12/3/2017 10:06 AM                                loperamide 2 MG capsule   Commonly known as:  IMODIUM A-D   AND TAKE 2 CAPSULE EVERY 6 HOURS AS NEEDED FOR LOOSE STOOLS (MAX OF 16MG/24 HRS)                                LORazepam 0.5 MG tablet   Commonly known as:  ATIVAN   Take 1 tablet (0.5 mg) by mouth every 4 hours as needed (Anxiety, Nausea/Vomiting or Sleep)                                menthol-zinc oxide 0.44-20.625 % Oint ointment   Commonly known as:  CALMOSEPTINE   Apply topically 4 times daily as needed for skin protection                                ONDANSETRON PO   Take 8 mg by mouth every 8 hours as needed for nausea                                PREPARATION H 0.25-88.44 % per suppository   Place 1 suppository rectally 2 times daily as needed for hemorrhoids or itching   Generic  drug:  phenylephrine-cocoa butter                                SUMATRIPTAN SUCCINATE PO   Take 25 mg by mouth every 8 hours as needed for migraine                                travoprost (BAK Free) 0.004 % ophthalmic solution   Commonly known as:  TRAVATAN Z   Place 1 drop into both eyes At Bedtime                                vitamin B-Complex   Take 1 tablet by mouth daily

## 2017-12-03 PROBLEM — I10 HYPERTENSION: Status: ACTIVE | Noted: 2017-01-01

## 2017-12-03 NOTE — ED PROVIDER NOTES
"  History     Chief Complaint:  Hypertension    The history is provided by the patient and a relative.      Sherita Kenney is a 76 year old female on chemotherapy for metastatic colon cancer who presents with hypertension. The patient reports that she has been feeling unwell for the past day and a half after getting her most recent chemotherapy treatment two days ago. Notes general \"crummy\" feeling with a headache and loss of appetite. Headache is generalized and moderate-severe. It is non-radiating. Notes 1 episode of emesis since chemotherapy (yesterday). No exacerbating or alleviating factors including no relief with Vicodin taken approximately three hours ago. The patient does have a history of migraines but states she has not had one in many years and is unsure whether this feels similar or not. Patient and family are also concerned because she has elevated blood pressure. They note she has never had this problem in the past and has no history of or medications for hypertension. She denies chest pain or abdominal pain.    Allergies:  Wool fiber    Medications:    Norco  Imodium  Zofran  Ativan  Cetaphil  Sumatriptan succinate  Levothyroxine    Past Medical History:    Abnormal gait  Adenocarcinoma of colon  Arthralgia of upper arm  Arthritis  Cancer  Cognitive impairment  COPD  DJD  Fibromatoses of muscle, ligament, and fascia  Flail joint  Generalized OA  Iliopsoas abscess on right  Iron deficiency anemia  Late effects of poliomyelitis  Lumbago  Osteoarthritis of hip  Postpolio syndrome  Primary osteoarthritis of right hand  Psychosis  Right sided back pain  Scoliosis  Thyroid disease    Past Surgical History:    Colectomy right  Colonoscopy  I&D abdominal abscess  Ileostomy  Laparoscopic ileostomy  Laparoscopic ureterolysis  Soft tissue surgery  Take down ileostomy    Family History:    The patient denies any relevant family medical history.     Social History:  The patient was accompanied to the ED by her " "daughter and friend.  Lives alone  Smoking Status: Former  Smokeless Tobacco: No  Alcohol Use: No   Marital Status:  Single    Review of Systems   Constitutional: Positive for appetite change.   Cardiovascular: Negative for chest pain.   Gastrointestinal: Negative for abdominal pain.   Neurological: Positive for headaches.   All other systems reviewed and are negative.    Physical Exam   Vitals:  Patient Vitals for the past 24 hrs:   BP Temp Heart Rate Resp SpO2 Height Weight   12/02/17 2230 (!) 184/130 - 68 15 - - -   12/02/17 2215 - - 66 16 - - -   12/02/17 2200 (!) 187/135 - - - - - -   12/02/17 2145 - - 78 (!) 32 91 % - -   12/02/17 2100 - - 65 20 98 % - -   12/02/17 2045 - - 63 21 97 % - -   12/02/17 2030 (!) 177/92 - 63 21 98 % - -   12/02/17 2021 (!) 191/104 - - - - - -   12/02/17 2020 - - 64 10 97 % - -   12/02/17 2019 - - 68 12 100 % - -   12/02/17 1938 - - - - - 1.702 m (5' 7\") 51.3 kg (113 lb)   12/02/17 1937 163/79 97.7  F (36.5  C) - 18 99 % - -      Physical Exam  General: Well-developed and well-nourished; chronically ill appearing elderly  female; cooperative  Head:  Atraumatic  Eyes:  Extraocular movements intact; conjunctivae, lids, and sclerae are normal. PERRLA  ENT:    Normal nose; moist mucous membranes. Normal oropharyngeal exam  Neck:  Supple; normal range of motion  CV:  Regular rate and rhythm; normal heart sounds with no murmurs, rubs, or gallops detected  Resp:  No respiratory distress; clear to auscultation bilaterally without decreased breath sounds, wheezing, rales, or rhonchi  GI:  Soft; non-distended; non-tender    MS:  Normal ROM; no bilateral lower extremity edema. Right superior chest wall Port-a-cath.  Skin:  Warm; non-diaphoretic; no pallor  Neuro:  Awake; A&Ox3; normal strength  Psych: Normal mood and affect; normal speech  Vitals reviewed.    Emergency Department Course     EKG  Indication: Hypertension  Time: 2048  Rate 63 bpm. NJ interval 174. QRS duration 86. " QT/QTc 440/450.   Normal sinus rhythm, Cannot rule out anterior infarct, age undetermined.   No acute ST changes.  T waves more prominent in V2 than prior as compared to prior, dated 03/1/2017.     Imaging:  Radiology findings were communicated with the patient who voiced understanding of the findings.  CT head w/op contrast:  IMPRESSION:   1. No acute pathology. No bleed, mass, or acute infarcts are seen.  2. Age related changes including diffuse brain atrophy.  White matter  changes consistent with small vessel ischemic disease.  Reading per radiology.     Laboratory:  Laboratory findings were communicated with the patient who voiced understanding of the findings.  BNP: 816  Troponin (Collected 2040): <0.015   BMP: AWNL (Creatinine 0.65)  CBC: RDW: 15.4, PLT: 110(L), o/w  WNL (WBC 7.5, HGB 13.4)  UA: Specific gravity: 1.001(L), Albumin: 10     Interventions:  2054 Normal Saline 500 mL IV   2056 Tylenol 650 mg oral  2057 Reglan 10 mg IV  2057 Benadryl 25 mg IV  Toradol  Labetalol    Emergency Department Course:  Nursing notes and vitals reviewed.  I performed an exam of the patient as documented above.   IV was inserted and blood was drawn for laboratory testing, results above.   The patient was sent for a CT while in the emergency department, results above.      2305 I rechecked with the patient and she is amenable to admission given she has persistent headache and HTN. Family concerned about her going home tonight because she lives alone.    2320: Discussed with Dr. Nova, hospitalist, who accepts admission.    I discussed the treatment plan with the patient. She expressed understanding of this plan and consented to admission. I discussed the patient with Dr. Nova, who will admit the patient for further evaluation and treatment.     I personally reviewed the laboratory results with the patient and answered all related questions prior to admitted    Impression & Plan      Medical Decision Making:  Sherita  "Aramis is a 76-year-old female with metastatic colon cancer on chemotherapy who presents with headache and hypertension.  Patient's family notes she has never had hypertension before and has - up until now - been tolerating chemotherapy quite well.  Patient states she generally does not feel well and has a headache and anorexia.  She notes she has a history of migraines but has not had one in quite some time.   Her exam is unremarkable.  She is hypertensive to 191 systolic during my exam.    EKG is reassuring.  BNP is within normal limits.  Troponin is negative.  CBC and BMP are unremarkable with the exception of mild thrombocytopenia that appears to be a patient's baseline.  Patient was given IV fluid bolus as well as Benadryl, Reglan, and Tylenol for her headache. UA reassuring without evidence of infection.    Upon reevaluation patient states her headache is not improved.  Her blood pressure is elevated at 180s systolic.  I discussed possible discharge with the patient and her family.  However, patient's family is very concerned about discharge given she lives alone.  With patient's persistent headache and hypertension and is reasonable to keep her in observation for further pain control and to monitor blood pressure.  I will initiate treatment for hypertension with a dose of labetalol now as well as Toradol for persistent headache. Treatment of HTN may improve headache and vice versa. Patient is amenable to observation stay, continuing to state she feels \"crummy.\"  I discussed the patient's case with Dr. Nova, hospitalist, who accepts admission and has no further orders.  I answered all the patient and her family's questions and they verbalized understanding.  Amenable to admission.    Diagnosis:    ICD-10-CM    1. Acute intractable headache, unspecified headache type R51    2. Hypertension, unspecified type I10       Disposition:   Admitted    CMS Diagnoses: None     Scribe Disclosure:  Ronan HIDALGO " Delroy, linsey serving as a scribe at 8:20 PM on 12/2/2017 to document services personally performed by Anna Green MD, based on my observations and the provider's statements to me.    EMERGENCY DEPARTMENT       Anna Green MD  12/02/17 8973

## 2017-12-03 NOTE — PLAN OF CARE
vital signs normal or at patient baseline not met   -tolerating oral intake to maintain hydration  met  -adequate pain control on oral analgesics  met  -returns to baseline functional status  Not met  -safe disposition plan has been identified not met  Nurse to notify provider when observation goals have been met and patient is ready for discharge.    A/ox4, forgetful. C/o 4/10 headache, decreased with Norco. Up with SBA. Formed BM this morning. LUE flaccid, polio hx.

## 2017-12-03 NOTE — ED NOTES
[facility]  ED Nurse Handoff Report    ED Chief complaint: Hypertension      ED Diagnosis:   Final diagnoses:   Acute intractable headache, unspecified headache type   Hypertension, unspecified type       Code Status: DNR / DNI    Allergies:   Allergies   Allergen Reactions     Wool Fiber Unknown       Activity level - Baseline/Home:  Stand with Assist    Activity Level - Current:   Stand with Assist     Needed?: No    Isolation: No  Infection: Not Applicable    Bariatric?: No    Vital Signs:   Vitals:    12/02/17 2245 12/02/17 2300 12/02/17 2315 12/02/17 2330   BP:  (!) 161/96  138/81   Resp: 17 15 18 18   Temp:       SpO2:       Weight:       Height:           Cardiac Rhythm: ,        Pain level: 0-10 Pain Scale: 8    Is this patient confused?: Yes    Patient Report: Initial Complaint: Hypertension  Focused Assessment: Hypertension, headache  Tests Performed: Labs, EKG, CT  Abnormal Results: See lab report  Treatments provided: IV fluids, meds, cath ua    Family Comments: At bedside    OBS brochure/video discussed/provided to patient: Yes    ED Medications:   Medications   acetaminophen (TYLENOL) tablet 650 mg (650 mg Oral Given 12/2/17 2056)   metoclopramide (REGLAN) injection 10 mg (10 mg Intravenous Given 12/2/17 2057)   diphenhydrAMINE (BENADRYL) injection 25 mg (25 mg Intravenous Given 12/2/17 2057)   0.9% sodium chloride BOLUS (0 mLs Intravenous Stopped 12/2/17 2124)   ketorolac (TORADOL) injection 15 mg ( Intravenous Given 12/2/17 2319)   labetalol (NORMODYNE/TRANDATE) injection 20 mg (20 mg Intravenous Given 12/2/17 2320)       Drips infusing?:  No      ED NURSE PHONE NUMBER: *[phone]

## 2017-12-03 NOTE — H&P
[facility]    History and Physical  Hospitalist       Date of Admission:  12/2/2017  Date of Service (when I saw the patient): 12/03/17    Assessment & Plan   Sherita Kenney is a 76 year old female who presents with headache and hypertension    Hypertension  Headache  This is new, patient does not normally have hypertension. On presentation to the ED with BPs as high as 180's systolic. Unclear if headache causing hypertension or other way around. Improved in ED with treatment with labetalol, blood pressures now in the 130-140 range. CT head without acute pathology  - monitor overnight  - prn hydralazine and labetalol  - hold on starting antihypertensive as unclear if this is isolated (BPs recently in 120's systolic per [facility] records).   - symptomatic tx for headache    Metastatic colon cancer  Under the care of Dr. Lindo for this. Started chemo in ~7/2017 and will complete at end of December. Last dose of chemo 2 days ago  - no signs of infection on physical exam, history or imaging.   - vigilant for infection given recent chemo and the above issues.   - no antibiotics at this time necessary  - will courtesy consult/ ask for opinion from  oncology re: the above.     Diarrhea  Abdominal pain  Has been going on for awhile. Son in law (M.D.) states c diff probably checked in the past but was negative  - repeat c diff  - if c diff negative could use loperamide  - check lactate given abdominal pain (RLQ)    Weakness  Dementia  With some weakness as per sister. Also apparently has dementia.   - monitor the above  - ? Related to chemo  - have PT/ OT assess in am to ensure safe to go home     Hypothyroidism  On synthroid    Glaucoma  Continue outpatient eye gtts (travoprost).    DVT Prophylaxis: Pneumatic Compression Devices  Code Status: DNR / DNI    Disposition: Expected discharge in 1-2 days pending progress with headache and safe dispo.    Jens Nova MD  [phone] (P)  Text Page     Primary  "Care Physician   Dr. Edgar Rodriguez    Chief Complaint   Headache, hypertension    History is obtained from the patient and medical records    History of Present Illness   Sherita Kenney is a 76 year old female who presents with a headache and hypertension.  I'm not able to give much history from Mr. Kenney secondary to both cooperation likely dementia.  Sounds like the patient was in her usual state of health until today.  She apparently started to not feel well although she is not specific about her symptoms short of generalized fatigue and nausea.  She also had profuse diarrhea which has been going on for \"weeks \".  She denies any chest pain or shortness of breath.  This history was obtained from her sister who was bedside as the patient was not forthcoming with information also has underlying dementia.    Past Medical History    I have reviewed this patient's medical history and updated it with pertinent information if needed.   Past Medical History:   Diagnosis Date     Abnormal gait      Adenocarcinoma of colon (H)      Arthralgia of upper arm      Arthritis      Arthritis of right hand      Cancer (H)      Cancer of right colon (H)      Cognitive impairment      COPD (chronic obstructive pulmonary disease) (H)      DJD (degenerative joint disease)     knee and lumbar     Fibromatoses of muscle, ligament, and fascia      Flail joint      Generalized OA      Hypertension 12/3/2017     Iliopsoas abscess on right (H)      Iron deficiency anemia      Late effects of poliomyelitis      Lumbago      Osteoarthritis of hip      Pain in limb      Postpolio syndrome      Primary osteoarthritis of right hand      Psychosis      Right-sided back pain      Scoliosis      Thyroid disease        Past Surgical History   I have reviewed this patient's surgical history and updated it with pertinent information if needed.  Past Surgical History:   Procedure Laterality Date     COLECTOMY RIGHT N/A 4/13/2017    " Procedure: COLECTOMY RIGHT;  Surgeon: Lindsay Peter MD;  Location:  OR     COLONOSCOPY N/A 3/1/2017    Procedure: COLONOSCOPY;  Surgeon: Lindsay Peter MD;  Location:  OR     I & D abdominal abscess       ILEOSTOMY N/A 3/1/2017    Procedure: ILEOSTOMY;  Surgeon: Lindsay Peter MD;  Location: [facility] OR     ILEOSTOMY       LAPAROSCOPIC ILEOSTOMY N/A 3/1/2017    Procedure: LAPAROSCOPIC ILEOSTOMY;  Surgeon: Lindsay Peter MD;  Location:  OR     LAPAROSCOPIC URETEROLYSIS  4/13/2017    Procedure: LAPAROSCOPIC URETEROLYSIS;  Surgeon: Jaycob Mari MD;  Location:  OR     severe protein-calorie malnutrition       SOFT TISSUE SURGERY       TAKEDOWN ILEOSTOMY N/A 4/13/2017    Procedure: TAKEDOWN ILEOSTOMY;  Surgeon: Lindsay Peter MD;  Location:  OR       Prior to Admission Medications   Prior to Admission Medications   Prescriptions Last Dose Informant Patient Reported? Taking?   CETAPHIL (CETAPHIL) lotion   Yes No   Sig: Apply topically 2 times daily Also bid prn . To dry skin.   HYDROcodone-acetaminophen (NORCO) 5-325 MG per tablet   No No   Sig: Take 1 tablet by mouth every 6 hours as needed for moderate to severe pain   LORazepam (ATIVAN) 0.5 MG tablet   No No   Sig: Take 1 tablet (0.5 mg) by mouth every 4 hours as needed (Anxiety, Nausea/Vomiting or Sleep)   ONDANSETRON PO   Yes No   Sig: Take 8 mg by mouth every 8 hours as needed for nausea   SUMATRIPTAN SUCCINATE PO   Yes No   Sig: Take 25 mg by mouth every 8 hours as needed for migraine   acetaminophen (TYLENOL) 325 MG tablet   No No   Sig: Take 2 tablets (650 mg) by mouth every 4 hours as needed for mild pain or fever   ferrous sulfate (IRON) 325 (65 FE) MG tablet   No No   Sig: Take 1 tablet (325 mg) by mouth every evening   levothyroxine (SYNTHROID/LEVOTHROID) 75 MCG tablet   No No   Sig: Take 1 tablet (75 mcg) by mouth daily Recheck TSH in 6 months   loperamide (IMODIUM A-D) 2 MG capsule   No No   Sig: AND TAKE 2  CAPSULE EVERY 6 HOURS AS NEEDED FOR LOOSE STOOLS (MAX OF 16MG/24 HRS)   menthol-zinc oxide (CALMOSEPTINE) 0.44-20.625 % OINT ointment   No No   Sig: Apply topically 4 times daily as needed for skin protection   phenylephrine-cocoa butter (PREPARATION H) 0.25-88.44 % per suppository   Yes No   Sig: Place 1 suppository rectally 2 times daily as needed for hemorrhoids or itching   travoprost, BAK Free, (TRAVATAN Z) 0.004 % ophthalmic solution   No No   Sig: Place 1 drop into both eyes At Bedtime   vitamin B-Complex   No No   Sig: Take 1 tablet by mouth daily      Facility-Administered Medications: None     Allergies   Allergies   Allergen Reactions     Wool Fiber Unknown       Social History   I have reviewed this patient's social history and updated it with pertinent information if needed. Sherita Kenney  reports that she quit smoking about 31 years ago. Her smoking use included Cigarettes. She has a 22.50 pack-year smoking history. She has never used smokeless tobacco. She reports that she does not drink alcohol or use illicit drugs.    Family History   I have reviewed this patient's family history and updated it with pertinent information if needed.   Family History   Problem Relation Age of Onset     Breast Cancer Maternal Grandmother        Review of Systems   Largely unable to assess given dementia.    Physical Exam   Temp: 96.4  F (35.8  C) Temp src: Oral BP: 142/73   Heart Rate: 60 Resp: 16 SpO2: 97 % O2 Device: None (Room air)    Vital Signs with Ranges  120 lbs 9.6 oz    Constitutional: alert, eyes closed but does respond to questions  Eyes: EOMI, PERRL  HEENT: OP clear  Respiratory: CTA B without w/c  Cardiovascular: RRR without m/r/g  GI: soft, tender in RLQ  Lymph/Hematologic: no cervical LAD  Genitourinary: deferred  Skin: no rashes or lesions grossly  Musculoskeletal: no deformities or arthritis  Neurologic: CN II-XII, MALLORY, sensation grossly intact  Psychiatric: flat affect    Data   Data reviewed  today:  I personally reviewed the head CT image(s) showing no acute pathology.    Recent Labs  Lab 12/02/17  2040 11/30/17  1000   WBC 7.5 6.8   HGB 13.4 12.3   MCV 94 96   * 117*     --    POTASSIUM 3.5  --    CHLORIDE 101  --    CO2 30  --    BUN 15  --    CR 0.65  --    ANIONGAP 7  --    SARITHA 9.5  --    GLC 97  --    BILITOTAL  --  0.5   TROPI <0.015  --        Recent Results (from the past 24 hour(s))   CT Head w/o Contrast    Narrative    CT HEAD W/O CONTRAST   12/2/2017 9:29 PM     HISTORY: headache, colon CA, new HTN;     TECHNIQUE: Axial images of the head without IV contrast material.  Radiation dose for this scan was reduced using automated exposure  control, adjustment of the mA and/or kV according to patient size, or  iterative reconstruction technique.    COMPARISON: CT dated to/15/2017    FINDINGS:  There is generalized atrophy of the brain.  Areas of low  attenuation are present in the white matter of the cerebral  hemispheres that are consistent with small vessel ischemic disease in  this age patient. There is no evidence of intracranial hemorrhage,  mass, acute infarct or anomaly. The visualized portions of the sinuses  and mastoids appear normal. There is no evidence of trauma.      Impression    IMPRESSION:   1. No acute pathology. No bleed, mass, or acute infarcts are seen.  2. Age related changes including diffuse brain atrophy.  White matter  changes consistent with small vessel ischemic disease.    MOHINI LR MD

## 2017-12-03 NOTE — PLAN OF CARE
Problem: Patient Care Overview  Goal: Plan of Care/Patient Progress Review  OT: Order received, chart reviewed. Pt planning to return to snf today. Per discussion with PT, not skilled OT needs. Discontinuation of OT order.

## 2017-12-03 NOTE — PLAN OF CARE
Problem: Patient Care Overview  Goal: Plan of Care/Patient Progress Review  Outcome: No Change  vital signs normal or at patient baseline not met   -tolerating oral intake to maintain hydration  met  -adequate pain control on oral analgesics  met  -returns to baseline functional status  Not met  -safe disposition plan has been identified not met  Nurse to notify provider when observation goals have been met and patient is ready for discharge.    A&0x4 but forgetful and has history of dementia. Was st cath in the ER before coming to the floor . Up to the bathroom this am at 6;30 am and was unable to void and didn't have a BM. Need a stool sample sent to rule out C-diff. Patient repositions independently. She has steady gait to the bathroom. Limb alert to left arm due to polio to left arm; arm hangs  loosely and pt uses right arm to move limb around.

## 2017-12-03 NOTE — PLAN OF CARE
Problem: Patient Care Overview  Goal: Plan of Care/Patient Progress Review  PT: Orders received. Per discussion with nursing, pt moving SBA/independent. Per discussion with pt, she has no concerns with her mobility and feels she is at baseline. Pt states she has good support at home and is eager to return to her active lifestyle. No further PT/OT needs identified. Orders completed.

## 2017-12-03 NOTE — PLAN OF CARE
Problem: Patient Care Overview  Goal: Plan of Care/Patient Progress Review  Outcome: Adequate for Discharge Date Met: 12/03/17  Dc paper work reviewed with pt. Verbalizes understanding. Follow up aware. PIV removed. Family at bedside. Will take pt home

## 2017-12-03 NOTE — DISCHARGE SUMMARY
[facility]  Discharge Summary        Sherita Kenney MRN# [id number]   YOB: 1940 Age: 76 year old     Date of Admission:  12/2/2017  Date of Discharge:  12/3/2017  Admitting Physician:  Jens Nova MD  Discharge Physician:  Neema Carpenter PA-C  Discharging Service:  Hospitalist     Primary Provider: Edgar Rodriguez [phone]     DISCHARGE DIAGNOSES/PROBLEM ORIENTED HOSPITAL COURSE:  Sherita Kenney is a 76 year old female with a PMH of metastatic colon cancer on chemotherapy and dementia who presented to the ER 12/2/17 with complaints of headache. The details that led to the admission are largely unclear due patient's dementia; however, it appears she received chemo as scheduled on 12/1. On 12/2 she felt generally unwell with a headache and her blood pressure was noted to be elevated so she presented to the ER for evaluation. Work-up was largely unrevealing but she was admitted for observation. For additional details regarding HPI, please see H&P by Dr. Jens Nova.        Headache, non intractable: History of migraines but his was apparently different. Details difficult to obtain from patient. Head CT negative.   - Head resolved. May have been related to recent chemotherapy vs mild dehydration    Elevated Blood Pressure without history of HTN  This is new, patient does not normally have hypertension. On presentation to the ED with BPs as high as 180's systolic.  Improved in ED with treatment with labetalol.  - BP remained well controlled without further medications. Given no previous history of HTN will hold of start antihypertensives. She can follow up with her PCP     Metastatic colon cancer  Under the care of Dr. Lindo for this. Started chemo in ~7/2017 and will complete at end of December. Last dose of chemo 2 days ago  - no signs of infection on physical exam, history or imaging.   - vigilant for infection given recent chemo and the  above issues.   - no antibiotics at this time necessary    Weakness  Dementia  - Lives in [place] and ok to return. Ambulated with stand by assist.     CODE STATUS:  DNR / DNI    BRIEF HOSPITAL STAY SUMMARY (SENT HOME WITH PATIENT IN AVS):   Reason for your hospital stay       You were admitted for evaluation of headache and elevated blood pressure.   You had a complete work-up that was negative and showed now signs of   infection. Your symptoms may have been related to your recent   chemotherapy. Your blood pressure was likely elevated due to you feeling   unwell. It has improved. You will not be started on medications. Follow up   with your regular MD for recheck.                      PENDING RESULTS:  Unresulted Labs Ordered in the Past 30 Days of this Admission     No orders found for last 61 day(s).          DISCHARGE INSTRUCTIONS AND FOLLOW-UP:  Follow-up Appointments     Follow-up and recommended labs and tests        Follow up with primary care provider, Edgar Rodriguez, within   7 days for hospital follow- up.  The following labs/tests are recommended:   repeat blood pressure check.                    DISCHARGE DISPOSITION:  Discharged to home    DISCHARGE MEDICATIONS:  Current Discharge Medication List      CONTINUE these medications which have NOT CHANGED    Details   ferrous sulfate (IRON) 325 (65 FE) MG tablet Take 325 mg by mouth daily (with breakfast)      HYDROcodone-acetaminophen (NORCO) 5-325 MG per tablet Take 1 tablet by mouth every 6 hours as needed for moderate to severe pain  Qty: 30 tablet, Refills: 0    Associated Diagnoses: Other chronic pain      loperamide (IMODIUM A-D) 2 MG capsule AND TAKE 2 CAPSULE EVERY 6 HOURS AS NEEDED FOR LOOSE STOOLS (MAX OF 16MG/24 HRS)  Qty: 155 capsule, Refills: 3    Associated Diagnoses: Diarrhea, unspecified type      ONDANSETRON PO Take 8 mg by mouth every 8 hours as needed for nausea      travoprost, BAK Free, (TRAVATAN Z) 0.004 % ophthalmic solution  "Place 1 drop into both eyes At Bedtime  Qty: 5 mL, Refills: 98    Associated Diagnoses: Primary open angle glaucoma of both eyes, unspecified glaucoma stage      LORazepam (ATIVAN) 0.5 MG tablet Take 1 tablet (0.5 mg) by mouth every 4 hours as needed (Anxiety, Nausea/Vomiting or Sleep)  Qty: 30 tablet, Refills: 2    Associated Diagnoses: Adenocarcinoma of colon (H)      phenylephrine-cocoa butter (PREPARATION H) 0.25-88.44 % per suppository Place 1 suppository rectally 2 times daily as needed for hemorrhoids or itching      CETAPHIL (CETAPHIL) lotion Apply topically 2 times daily Also bid prn . To dry skin.      SUMATRIPTAN SUCCINATE PO Take 25 mg by mouth every 8 hours as needed for migraine      menthol-zinc oxide (CALMOSEPTINE) 0.44-20.625 % OINT ointment Apply topically 4 times daily as needed for skin protection  Qty: 1 Tube, Refills: 3    Associated Diagnoses: Chronic diarrhea      vitamin B-Complex Take 1 tablet by mouth daily  Qty: 30 tablet, Refills: 11    Associated Diagnoses: S/P colectomy      levothyroxine (SYNTHROID/LEVOTHROID) 75 MCG tablet Take 1 tablet (75 mcg) by mouth daily Recheck TSH in 6 months  Qty: 30 tablet, Refills: 5    Associated Diagnoses: Hypothyroidism, unspecified type      acetaminophen (TYLENOL) 325 MG tablet Take 2 tablets (650 mg) by mouth every 4 hours as needed for mild pain or fever  Qty: 60 tablet, Refills: 1    Associated Diagnoses: Cancer associated pain               CONSULTATIONS THIS HOSPITAL STAY:  None  CONDITION AND PHYSICAL EXAM ON DISCHARGE:  Discharge Condition: Stable    /85 (BP Location: Left arm)  Temp 97  F (36.1  C) (Oral)  Resp 16  Ht 1.676 m (5' 6\")  Wt 54.7 kg (120 lb 9.6 oz)  SpO2 99%  BMI 19.47 kg/m2  Gen: sitting in bed, alert, cooperative and in no acute distress  HEENT: normocephalic; oropharynx clear  Card: RRR, S1, S2, no murmurs  Resp: lungs clear to auscultation bilaterally  GI: abdomen soft, not-tender, non-distended, +BS  Ext: no LE " edema  Neuro: CX II-XII grossly in tact; ROM in all four extremities grossly in tact  Psych: alert and oriented x2; normal affect    DISCHARGE ORDERS FOR FACILITY:  After Care Instructions     Activity       Your activity upon discharge: activity as tolerated            Diet       Follow this diet upon discharge: Orders Placed This Encounter      Regular Diet Adult                          DISCHARGE TIME:  Greater than 30 minutes.    IMAGING RESULTS FROM THIS HOSPITAL STAY:  Results for orders placed or performed during the hospital encounter of 12/02/17   CT Head w/o Contrast    Narrative    CT HEAD W/O CONTRAST   12/2/2017 9:29 PM     HISTORY: headache, colon CA, new HTN;     TECHNIQUE: Axial images of the head without IV contrast material.  Radiation dose for this scan was reduced using automated exposure  control, adjustment of the mA and/or kV according to patient size, or  iterative reconstruction technique.    COMPARISON: CT dated to/15/2017    FINDINGS:  There is generalized atrophy of the brain.  Areas of low  attenuation are present in the white matter of the cerebral  hemispheres that are consistent with small vessel ischemic disease in  this age patient. There is no evidence of intracranial hemorrhage,  mass, acute infarct or anomaly. The visualized portions of the sinuses  and mastoids appear normal. There is no evidence of trauma.      Impression    IMPRESSION:   1. No acute pathology. No bleed, mass, or acute infarcts are seen.  2. Age related changes including diffuse brain atrophy.  White matter  changes consistent with small vessel ischemic disease.    MOHINI LR MD   XR Chest 2 Views    Narrative    CHEST TWO VIEWS 12/3/2017 8:41 AM     HISTORY: Weakness, recent chemotherapy and concern for infection.    COMPARISON: 4/13/2017     FINDINGS: Right chest port with tip in the mid to low SVC noted. There  are no acute infiltrates. The cardiac silhouette is not enlarged.  Pulmonary vasculature is  unremarkable.      Impression    IMPRESSION: No acute disease.    PHOENIX RODRIGUEZ MD       MOST RECENT LAB RESULTS:  Most Recent 3 CBC's:  Recent Labs   Lab Test  12/02/17 2040  11/30/17   1000  11/16/17   1015   WBC  7.5  6.8  7.5   HGB  13.4  12.3  12.1   MCV  94  96  95   PLT  110*  117*  117*      Most Recent 3 BMP's:  Recent Labs   Lab Test  12/02/17   2040  10/02/17   1043  09/08/17   1015   NA  138  140  140   POTASSIUM  3.5  3.8  3.9   CHLORIDE  101  106  106   CO2  30  27  25   BUN  15  14  16   CR  0.65  0.71  0.76   ANIONGAP  7  7  9   SARITHA  9.5  8.8  8.9   GLC  97  93  118*     Most Recent 3 Troponin's:  Recent Labs   Lab Test  12/02/17 2040   TROPI  <0.015     Most Recent 3 INR's:  Recent Labs   Lab Test  05/30/17   0930  04/20/17   0650  04/19/17   0645   INR  0.92  0.98  0.95     Most Recent 2 LFT's:  Recent Labs   Lab Test  11/30/17   1000  11/16/17   1015   09/08/17   1015  08/24/17   0938   AST   --    --    --   50*  27   ALT   --    --    --   62*  23   ALKPHOS   --    --    --   140  123   BILITOTAL  0.5  0.6   < >  0.7  0.4    < > = values in this interval not displayed.     Most Recent Cholesterol Panel:  Recent Labs   Lab Test  03/06/17   1105   TRIG  100     Most Recent 6 Bacteria Isolates From Any Culture (See EPIC Reports for Culture Details):  Recent Labs   Lab Test  02/15/17   1620  02/15/17   0218  09/07/10   1349   CULT  Moderate growth Enterobacter cloacae complex  Moderate growth Eikenella corrodens  Light growth Haemophilus parainfluenzae Susceptibility testing not routinely done  On day 2, isolated in broth only: Mixed anaerobes present No further   identification  *  >100,000 colonies/mL Klebsiella pneumoniae*  No Acid fast bacilli isolated after 7 weeks incubation  No anaerobes isolated  Culture negative after 4 weeks  No growth     Most Recent TSH, T4 and HgbA1c:   Recent Labs   Lab Test  04/13/17   1227  04/03/17   0500   TSH   --   2.30   T4   --   10.9   A1C  4.7  4.3

## 2017-12-03 NOTE — PLAN OF CARE
Problem: Patient Care Overview  Goal: Plan of Care/Patient Progress Review  Outcome: Adequate for Discharge Date Met: 12/03/17  VSS. Headache decreased with Norco. Up with SBA. Diet tolerated. Charge RN reviewed d/c paperwork with pt.

## 2017-12-12 NOTE — TELEPHONE ENCOUNTER
Reason for Call:  Home Health Care    Cha with [facility] called regarding (reason for call): orders    Orders are needed for this patient. Speech therapy  2 x week  For 4 weeks.    Phone Number Homecare Nurse can be reached at: [phone]    Can we leave a detailed message on this number? YES      Call taken on 12/12/2017 at 3:53 PM by Terra Whitfield    .

## 2017-12-12 NOTE — TELEPHONE ENCOUNTER
Detailed message left for Cha to give verbal on below request for speech therapy home care orders  Yamilet ABREU RN

## 2017-12-14 NOTE — PROGRESS NOTES
Infusion Nursing Note:  Sherita Kenney presents today for C1D1 Avastin  Patient seen by provider today: Yes: exam with Dr. Lindo today   present during visit today: Not Applicable.    Note: pt will receive only Avastin today. No 5FU. .    Intravenous Access:  Implanted Port.    Treatment Conditions:  Lab Results   Component Value Date    HGB 12.7 12/14/2017     Lab Results   Component Value Date    WBC 7.9 12/14/2017      Lab Results   Component Value Date    ANEU 4.9 12/14/2017     Lab Results   Component Value Date     12/14/2017      Lab Results   Component Value Date     12/14/2017                   Lab Results   Component Value Date    POTASSIUM 4.7 12/14/2017           Lab Results   Component Value Date    MAG 2.0 04/15/2017            Lab Results   Component Value Date    CR 0.92 12/14/2017                   Lab Results   Component Value Date    SARITHA 8.8 12/14/2017                Lab Results   Component Value Date    BILITOTAL 0.4 12/14/2017           Lab Results   Component Value Date    ALBUMIN 3.6 12/14/2017                    Lab Results   Component Value Date    ALT 46 12/14/2017           Lab Results   Component Value Date    AST 36 12/14/2017     Results reviewed, labs MET treatment parameters, ok to proceed with treatment.  Urine not required today. Pt brought in 24 hr urine sample.          Post Infusion Assessment:  Patient tolerated infusion without incident.  Blood return noted pre and post infusion.  Site patent and intact, free from redness, edema or discomfort.  No evidence of extravasations.  Access discontinued per protocol.    Discharge Plan:   Discharge instructions reviewed with: Patient and Family.  Patient and/or family verbalized understanding of discharge instructions and all questions answered.  Copy of AVS reviewed with patient and/or family.  Patient will return January 11 for next appointment.  Patient discharged in stable condition accompanied by:  sister.  Departure Mode: Ambulatory.    Della Little RN

## 2017-12-14 NOTE — MR AVS SNAPSHOT
After Visit Summary   12/14/2017    Sherita Kenney    MRN: [id number]           Patient Information     Date Of Birth          1940        Visit Information        Provider Department      12/14/2017 2:00 PM Susan Lindo MD [facility]        Today's Diagnoses     Adenocarcinoma of colon (H)    -  1      Care Instructions    Continue chemotherapy. Discontinue 5-FU. Continue avastin only.  Follow up in 4-6 weeks.          Follow-ups after your visit        Your next 10 appointments already scheduled     Dec 28, 2017 10:00 AM CST   Level 3 with SH INFUSION CHAIR 16   [facility] ([facility])    [facility]  [address]  [place] [address]   [phone]            Jan 11, 2018 10:00 AM CST   Level 3 with SH INFUSION CHAIR 4   [facility] ([facility])    [facility]  [address]  [place] [address]   [phone]            Jan 25, 2018 10:00 AM CST   Level 3 with SH INFUSION CHAIR 15   [facility] ([facility])    [facility]  [address]  [place] [address]   [phone]            Jan 25, 2018 10:30 AM CST   Return Visit with Susan Lindo MD   [facility] ([facility])    [facility]  [address]  [place] [address]   [phone]              Who to contact     If you have questions or need follow up information about today's clinic visit or your schedule please contact [facility] directly at [phone].  Normal or non-critical lab and imaging results will be communicated to you by MyChart, letter or phone within 4 business days after the clinic has received the results. If you do not hear from us within 7 days, please contact the clinic through ZenSuitet  "or phone. If you have a critical or abnormal lab result, we will notify you by phone as soon as possible.  Submit refill requests through Aubrey or call your pharmacy and they will forward the refill request to us. Please allow 3 business days for your refill to be completed.          Additional Information About Your Visit        KALhart Information     Aubrey lets you send messages to your doctor, view your test results, renew your prescriptions, schedule appointments and more. To sign up, go to [email or web address] . Click on \"Log in\" on the left side of the screen, which will take you to the Welcome page. Then click on \"Sign up Now\" on the right side of the page.     You will be asked to enter the access code listed below, as well as some personal information. Please follow the directions to create your username and password.     Your access code is: [email or web address]  Expires: 3/13/2018  1:23 PM     Your access code will  in 90 days. If you need help or a new code, please call your [facility] clinic or [phone].        Care EveryWhere ID     This is your Care EveryWhere ID. This could be used by other organizations to access your [facility] medical records  [id number]        Your Vitals Were     Pulse Temperature Respirations Pulse Oximetry BMI (Body Mass Index)       73 98.3  F (36.8  C) (Oral) 18 96% 19.66 kg/m2        Blood Pressure from Last 3 Encounters:   17 129/73   17 158/85   17 (!) 174/102    Weight from Last 3 Encounters:   17 55.2 kg (121 lb 12.8 oz)   17 55.2 kg (121 lb 12.8 oz)   17 54.7 kg (120 lb 9.6 oz)              We Performed the Following     Comprehensive metabolic panel          Today's Medication Changes          These changes are accurate as of: 17 11:59 PM.  If you have any questions, ask your nurse or doctor.               Stop taking these medicines if you haven't already. Please contact your care team if you have questions.     " LORazepam 0.5 MG tablet   Commonly known as:  ATIVAN   Stopped by:  Susan Lindo MD                    Primary Care Provider Office Phone # Fax #    Edgar Neno Enrrique Rodriguez [phone] [address] [address]        Equal Access to Services     [facility]: Hadii aad ku hadasho Soomaali, waaxda luqadaha, qaybta kaalmada adeegyada, waxay idiin hayaan adeeg khamericosh laIsmaelaan . So [facility] [phone].    ATENCIÓN: Si habla español, tiene a roldan disposición servicios gratuitos de asistencia lingüística. Llame al [phone].    We comply with applicable federal civil rights laws and [place] laws. We do not discriminate on the basis of race, color, national origin, age, disability, sex, sexual orientation, or gender identity.            Thank you!     Thank you for choosing [facility]  for your care. Our goal is always to provide you with excellent care. Hearing back from our patients is one way we can continue to improve our services. Please take a few minutes to complete the written survey that you may receive in the mail after your visit with us. Thank you!             Your Updated Medication List - Protect others around you: Learn how to safely use, store and throw away your medicines at www.disposemymeds.org.          This list is accurate as of: 12/14/17 11:59 PM.  Always use your most recent med list.                   Brand Name Dispense Instructions for use Diagnosis    acetaminophen 325 MG tablet    TYLENOL    60 tablet    Take 2 tablets (650 mg) by mouth every 4 hours as needed for mild pain or fever    Cancer associated pain       CETAPHIL lotion      Apply topically 2 times daily Also bid prn . To dry skin.        ferrous sulfate 325 (65 FE) MG tablet    IRON     Take 325 mg by mouth daily (with breakfast)        HYDROcodone-acetaminophen 5-325 MG per tablet    NORCO    30 tablet    Take 1 tablet by mouth every 6 hours as needed for moderate  to severe pain    Other chronic pain       levothyroxine 75 MCG tablet    SYNTHROID/LEVOTHROID    30 tablet    Take 1 tablet (75 mcg) by mouth daily Recheck TSH in 6 months    Hypothyroidism, unspecified type       loperamide 2 MG capsule    IMODIUM A-D    155 capsule    AND TAKE 2 CAPSULE EVERY 6 HOURS AS NEEDED FOR LOOSE STOOLS (MAX OF 16MG/24 HRS)    Diarrhea, unspecified type       menthol-zinc oxide 0.44-20.625 % Oint ointment    CALMOSEPTINE    1 Tube    Apply topically 4 times daily as needed for skin protection    Chronic diarrhea       ONDANSETRON PO      Take 8 mg by mouth every 8 hours as needed for nausea        PREPARATION H 0.25-88.44 % per suppository   Generic drug:  phenylephrine-cocoa butter      Place 1 suppository rectally 2 times daily as needed for hemorrhoids or itching        SUMATRIPTAN SUCCINATE PO      Take 25 mg by mouth every 8 hours as needed for migraine        travoprost (BAK Free) 0.004 % ophthalmic solution    TRAVATAN Z    5 mL    Place 1 drop into both eyes At Bedtime    Primary open angle glaucoma of both eyes, unspecified glaucoma stage

## 2017-12-14 NOTE — PROGRESS NOTES
"Oncology Rooming Note    December 14, 2017 11:07 AM   Sherita Kenney is a 76 year old female who presents for:    No chief complaint on file.    Initial Vitals: /73 (BP Location: Right arm, Patient Position: Chair, Cuff Size: Adult Regular)  Pulse 73  Temp 98.3  F (36.8  C) (Oral)  Resp 18  Wt 55.2 kg (121 lb 12.8 oz)  SpO2 96%  BMI 19.66 kg/m2 Estimated body mass index is 19.66 kg/(m^2) as calculated from the following:    Height as of 12/3/17: 1.676 m (5' 6\").    Weight as of this encounter: 55.2 kg (121 lb 12.8 oz). Body surface area is 1.6 meters squared.  No Pain (0) Comment: Data Unavailable   No LMP recorded. Patient is not currently having periods (Reason: Perimenopausal).  Allergies reviewed: Yes  Medications reviewed: Yes    Medications: Medication refills not needed today.  Pharmacy name entered into [facility]:    [email or web address] [address] [place] [address] - [address] PHARMACY [place], [place] - [address] [facility] - [address]    Clinical concerns: None     5 minutes for nursing intake (face to face time)     Shania Bravo [facility] [address]                "

## 2017-12-14 NOTE — MR AVS SNAPSHOT
After Visit Summary   12/14/2017    Sherita Kenney    MRN: [id number]           Patient Information     Date Of Birth          1940        Visit Information        Provider Department      12/14/2017 10:30 AM  INFUSION CHAIR 15 [facility]        Today's Diagnoses     Adenocarcinoma of colon (H)    -  1    Proteinuria, unspecified type        Port-a-cath in place           Follow-ups after your visit        Your next 10 appointments already scheduled     Dec 14, 2017  2:00 PM CST   Return Visit with Susan Lindo MD   [facility]    [facility]  [address]  [place] [address]   [phone]            Dec 28, 2017 10:00 AM CST   Level 3 with  INFUSION CHAIR 16   [facility]    [facility]  [address]  [place] [address]   [phone]            Jan 11, 2018 10:00 AM CST   Level 3 with  INFUSION CHAIR 4   [facility]    [facility]  [address]  [place] [address]   [phone]            Jan 25, 2018 10:00 AM CST   Level 3 with  INFUSION CHAIR 15   [facility]    [facility]  [address]  [place] [address]   [phone]            Jan 25, 2018 10:30 AM CST   Return Visit with Susan Lindo MD   [facility] [place]    [facility]  [address]  [place] [address]   [phone]              Who to contact     If you have questions or need follow up information about today's clinic visit or your schedule please contact [facility] directly at [phone].  Normal or non-critical lab and  "imaging results will be communicated to you by MyChart, letter or phone within 4 business days after the clinic has received the results. If you do not hear from us within 7 days, please contact the clinic through Reset Therapeuticst or phone. If you have a critical or abnormal lab result, we will notify you by phone as soon as possible.  Submit refill requests through DepotPoint or call your pharmacy and they will forward the refill request to us. Please allow 3 business days for your refill to be completed.          Additional Information About Your Visit        [employer] Technology[employer]Socialbakers Information     DepotPoint lets you send messages to your doctor, view your test results, renew your prescriptions, schedule appointments and more. To sign up, go to [email or web address] . Click on \"Log in\" on the left side of the screen, which will take you to the Welcome page. Then click on \"Sign up Now\" on the right side of the page.     You will be asked to enter the access code listed below, as well as some personal information. Please follow the directions to create your username and password.     Your access code is: [email or web address]  Expires: 3/13/2018  1:23 PM     Your access code will  in 90 days. If you need help or a new code, please call your [facility] clinic or [phone].        Care EveryWhere ID     This is your Care EveryWhere ID. This could be used by other organizations to access your [facility] medical records  [id number]        Your Vitals Were     BMI (Body Mass Index)                   19.66 kg/m2            Blood Pressure from Last 3 Encounters:   17 158/85   17 (!) 174/102   17 169/84    Weight from Last 3 Encounters:   17 55.2 kg (121 lb 12.8 oz)   17 54.7 kg (120 lb 9.6 oz)   17 55.8 kg (123 lb)              We Performed the Following     CBC with platelets differential     Creatinine timed urine     Protein timed urine with Creat Ratio          Today's Medication Changes          These changes " are accurate as of: 12/14/17  1:17 PM.  If you have any questions, ask your nurse or doctor.               Stop taking these medicines if you haven't already. Please contact your care team if you have questions.     LORazepam 0.5 MG tablet   Commonly known as:  ATIVAN   Stopped by:  Susan Lindo MD                    Primary Care Provider Office Phone # Fax #    Edgar Neno Rodriguez [phone] [address] [facility] [address]  [facility] [address]        Equal Access to Services     [facility]: Hadii aad ku hadasho Soomaali, waaxda luqadaha, qaybta kaalmada adeegyada, waxay sarahin haygoog mooney . So [facility] [phone].    ATENCIÓN: Si habla español, tiene a roldan disposición servicios gratuitos de asistencia lingüística. [facility] [phone].    We comply with applicable federal civil rights laws and [place] laws. We do not discriminate on the basis of race, color, national origin, age, disability, sex, sexual orientation, or gender identity.            Thank you!     Thank you for choosing [facility] AND [facility]  for your care. Our goal is always to provide you with excellent care. Hearing back from our patients is one way we can continue to improve our services. Please take a few minutes to complete the written survey that you may receive in the mail after your visit with us. Thank you!             Your Updated Medication List - Protect others around you: Learn how to safely use, store and throw away your medicines at www.disposemymeds.org.          This list is accurate as of: 12/14/17  1:17 PM.  Always use your most recent med list.                   Brand Name Dispense Instructions for use Diagnosis    acetaminophen 325 MG tablet    TYLENOL    60 tablet    Take 2 tablets (650 mg) by mouth every 4 hours as needed for mild pain or fever    Cancer associated pain       CETAPHIL lotion      Apply topically 2 times daily Also bid prn . To dry skin.         ferrous sulfate 325 (65 FE) MG tablet    IRON     Take 325 mg by mouth daily (with breakfast)        HYDROcodone-acetaminophen 5-325 MG per tablet    NORCO    30 tablet    Take 1 tablet by mouth every 6 hours as needed for moderate to severe pain    Other chronic pain       levothyroxine 75 MCG tablet    SYNTHROID/LEVOTHROID    30 tablet    Take 1 tablet (75 mcg) by mouth daily Recheck TSH in 6 months    Hypothyroidism, unspecified type       loperamide 2 MG capsule    IMODIUM A-D    155 capsule    AND TAKE 2 CAPSULE EVERY 6 HOURS AS NEEDED FOR LOOSE STOOLS (MAX OF 16MG/24 HRS)    Diarrhea, unspecified type       menthol-zinc oxide 0.44-20.625 % Oint ointment    CALMOSEPTINE    1 Tube    Apply topically 4 times daily as needed for skin protection    Chronic diarrhea       ONDANSETRON PO      Take 8 mg by mouth every 8 hours as needed for nausea        PREPARATION H 0.25-88.44 % per suppository   Generic drug:  phenylephrine-cocoa butter      Place 1 suppository rectally 2 times daily as needed for hemorrhoids or itching        SUMATRIPTAN SUCCINATE PO      Take 25 mg by mouth every 8 hours as needed for migraine        travoprost (BAK Free) 0.004 % ophthalmic solution    TRAVATAN Z    5 mL    Place 1 drop into both eyes At Bedtime    Primary open angle glaucoma of both eyes, unspecified glaucoma stage       vitamin B-Complex     30 tablet    Take 1 tablet by mouth daily    S/P colectomy

## 2017-12-14 NOTE — PROGRESS NOTES
ONCOLOGY HISTORY:  Ms. Sherita Kenney is a female with colon cancer.   1. Patient was brought to the ER on 02/15/2017 with altered mental status.    -CT brain on 02/15/2017 did not reveal any acute intracranial pathology.   -CT abdomen and pelvis on 02/15/2017 revealed large right iliopsoas abscess and mass-like wall thickening of portion of right colon.   -CT-guided drainage of abscess was done.   2. Patient was taken to OR on 03/01/2017.    -Colonoscopy revealed a large fungating mass in the right side of the colon.  Pathology revealed invasive poorly differentiated adenocarcinoma.  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   -Laparoscopic ileostomy was done.    3.  CEA on 02/16/2017 was 3.8.   4. Patient had right colectomy with takedown of ileostomy and primary anastomosis on 04/13/2017.    -Pathology  reveals poorly differentiated colonic adenocarcinoma. 15 of 24 lymph nodes are positive. Tumor invades into adjacent abdominal wall.  Lymphovascular invasion present. pT4b pN2b M0.   -BRAF mutation present. No KRAS mutation.  5. PET scan on 05/22/2017 revealed metastatic disease.  Multiple hypermetabolic liver metastases and  hypermetabolic necrotic lymphadenopathy in right lower quadrant.  6. modified FOLFOX6 with Avastin started on 06/01/2017.   -Bolus 5-FU discontinued with cycle 4.    -Oxaliplatin reduced with cycle 5.  -Oxaliplatin stopped after cycle 8. Cycle 8 completed on 09/08/2017.  7. CT chest, abdomen and pelvis on 07/19/2017 revealed improved hepatic metastases.  Several peripheral branches of portal vein in right hepatic lobe of liver is thrombosed.  There is also thrombosis in branches of superior mesenteric vein in anterior abdomen.    -Ultrasound on 07/20/2017 reveals occlusive thrombus in the posterior posterior branch and its two branches of the right portal vein.  Main portal vein and left portal vein are patent.  -Decision was made not to  ant-coagulate.  8. CT chest, abdomen and pelvis on 09/13/2017 reveals improvement in disease.   9.  Maintenance infusional 5-FU and Avastin started on 09/21/2017. No bolus 5-FU.  10. CT chest, abdomen and pelvis was done on 12/05/2017.  There is improvement in the liver metastasis.  No new sites of metastases.  There is unchanged thrombosis in peripheral branches of right port vein and branches of superior mesentery vein.     SUBJECTIVE:    Ms. Sherita Kenney is a 76-year-old female with metastatic colon cancer with liver metastases as described above.  The patient is on palliative chemotherapy.  She is currently on maintenance 5-FU and Avastin.  No bolus 5-FU is being given.  Infusion 5-FU dose has been reduced because of diarrhea.  The patient continues to have diarrhea.  That has improved but she still has a few bowel movements, especially at night.      Overall, her condition is about the same.  She is in assisted living.  No headache.  No dizziness.  No chest pain.  No difficulty breathing.  No nausea or vomiting.  No urinary complaints.  No bleeding.  Some days the patient is very tired.  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      CT chest, abdomen and pelvis was done on 12/05/2017.  There is improvement in the liver metastasis.  No new sites of metastases.  There is unchanged thrombosis in peripheral branches of right port vein and branches of superior mesentery vein.      PHYSICAL EXAMINATION:   GENERAL:  She is alert and oriented x3.   VITAL SIGNS:  Reviewed.   Rest of the system not examined.      LABORATORY DATA:  Reviewed.      ASSESSMENT:   1.  A 76-year-old female with metastatic colon cancer which is responding to 5-FU and Avastin.   2.  Diarrhea secondary to 5-FU.   3.  Fatigue.   4.  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   5.  Thrombocytopenia which has resolved.   6.  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  They are stable.  This is malignancy related.      PLAN:   1.  CT scan was reviewed with the patient and family.  They are happy to know that the liver metastases have improved.      Patient is responding to 5-FU and Avastin.  Unfortunately, she is having side effect of diarrhea. Diarhea has not resolved with dose reduction of 5-FU.     I explained to the patient that because of ongoing diarrhea, I would recommend stopping infusion 5-FU.  She will be continued on Avastin.  Patient is agreeable for it.  Side effects of Avastin reviewed.  Patient does have mild proteinuria.  It will be monitored.   2.  I will see her in 4-6 weeks' time.  The patient advised to return sooner if she has fever, chills, infection, worsening weakness, shortness of breath or any other concerns.  Family had a few questions, which were all answered.      Total face-to-face time spent 25 minutes, more than 50% of the time spent in counseling and coordination of care.         OMER MAKI MD             D: 2017 11:53   T: 2017 13:05   MT: PETRA      Name:     SEVERO SWANN   MRN:      [id number]        Account:      [id number]   :      1940           Visit Date:   2017      Document: [id number]

## 2017-12-14 NOTE — LETTER
12/14/2017         RE: Sherita Kenney  [address]  [facility] [address]        Dear Colleague,    Thank you for referring your patient, Sherita Kenney, to the [facility]. Please see a copy of my visit note below.    SUBJECTIVE:  Ms. Sherita Kenney is a 76-year-old female with metastatic colon cancer with liver metastases as described above.  The patient is on palliative chemotherapy.  She is currently on maintenance 5-FU and Avastin.  No bolus 5-FU is being given.  Infusion 5-FU dose has been reduced because of diarrhea.  The patient continues to have diarrhea.  That has improved but she still has a few bowel movements, especially at night.      Overall, her condition is about the same.  She is in assisted living.  No headache.  No dizziness.  No chest pain.  No difficulty breathing.  No nausea or vomiting.  No urinary complaints.  No bleeding.  Some days the patient is very tired.  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      CT chest, abdomen and pelvis was done on 12/05/2017.  There is improvement in the liver metastasis.  No new sites of metastases.  There is unchanged thrombosis in peripheral branches of right port vein and branches of superior mesentery.      PHYSICAL EXAMINATION:   GENERAL:  She is alert and oriented x3.   VITAL SIGNS:  Reviewed.   Rest of the system not examined.      LABORATORY DATA:  Reviewed.      ASSESSMENT:   1.  A 76-year-old female with metastatic colon cancer which is responding to 5-FU and Avastin.   2.  Diarrhea secondary to 5-FU.   3.  Fatigue.   4.  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   5.  Thrombocytopenia which has resolved.   6.  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  They are stable.  This is malignancy related.      PLAN:   1.  CT scan was reviewed with the patient and family.  They are happy to know that the liver metastases have improved.     "  The patient is responding to 5-FU and Avastin.  Unfortunately, she is having side effect of diarrhea.  Will reduce 5-FU dose by 50% as she still has diarrhea.        I explained to the patient that because of ongoing diarrhea, I would recommend stopping infusion 5-FU.  She will be continued on Avastin.  The patient is agreeable for it.  That she will be able to tolerate the treatment well.  She will be getting single agent Avastin every 2 weeks.      Side effects of Avastin reviewed.  The patient does have mild proteinuria.  Dose will be monitored.   2.  I will see her in 4-6 weeks' time.  The patient advised to return sooner if she has fever, chills, infection, worsening weakness, shortness of breath or any other concerns.  Family had a few questions, which were all answered.      Total face-to-face time spent 25 minutes, more than 50% of the time spent in counseling and coordination of care.         OMER MAKI MD             D: 2017 11:53   T: 2017 13:05   MT: PETRA      Name:     SHERITA SWANN   MRN:      [id number]        Account:      [id number]   :      1940           Visit Date:   2017      Document: [id number]      Oncology Rooming Note    2017 11:07 AM   Sherita Swann is a 76 year old female who presents for:    No chief complaint on file.    Initial Vitals: /73 (BP Location: Right arm, Patient Position: Chair, Cuff Size: Adult Regular)  Pulse 73  Temp 98.3  F (36.8  C) (Oral)  Resp 18  Wt 55.2 kg (121 lb 12.8 oz)  SpO2 96%  BMI 19.66 kg/m2 Estimated body mass index is 19.66 kg/(m^2) as calculated from the following:    Height as of 12/3/17: 1.676 m (5' 6\").    Weight as of this encounter: 55.2 kg (121 lb 12.8 oz). Body surface area is 1.6 meters squared.  No Pain (0) Comment: Data Unavailable   No LMP recorded. Patient is not currently having periods (Reason: Perimenopausal).  Allergies reviewed: Yes  Medications reviewed: Yes    Medications: " Medication refills not needed today.  Pharmacy name entered into [facility]:    [facility] [place], [place] - [address] AT [address] [place] [address] PHARMACY [facility] [address]  [place] [facility] [address]    Clinical concerns: None     5 minutes for nursing intake (face to face time)     Shania Bravo CMA                  Again, thank you for allowing me to participate in the care of your patient.        Sincerely,        Susan Lindo MD

## 2017-12-19 NOTE — TELEPHONE ENCOUNTER
Rx was sent 6/14/17 #30 with 11 refills to [facility] pharmacy   Request for [facility] - per Rx sent previously, Rx approved to requested pharmacy  Yamilet ABREU RN

## 2017-12-21 NOTE — TELEPHONE ENCOUNTER
Home care referral to [facility] is current in [facility].  Ros Valencia at [facility].  Sukhdeep for SW evaluation.  Natali Naidu RN

## 2017-12-21 NOTE — TELEPHONE ENCOUNTER
Reason for Call:  Home Health Care    Annie with [facility] called regarding (reason for call): Orders    Orders are needed for this patient.    Evaluation for social work      Phone Number Homecare Nurse can be reached at:     Annie from [facility]  ph. [phone]  ok    Can we leave a detailed message on this number? YES    Best Time: Anytime    Call taken on 12/21/2017 at 1:05 PM by Crystal Wilkes

## 2017-12-28 PROBLEM — E03.9 HYPOTHYROIDISM, UNSPECIFIED TYPE: Status: ACTIVE | Noted: 2017-01-01

## 2017-12-29 NOTE — TELEPHONE ENCOUNTER
Please advisepatient to come in for a nurse only visit to recheck her blood pressure as it was elevated during her previous clinic visit.  On the same day, also schedule a lab only visit to recheck her TSH.

## 2018-01-01 ENCOUNTER — TELEPHONE (OUTPATIENT)
Dept: ONCOLOGY | Facility: CLINIC | Age: 78
End: 2018-01-01

## 2018-01-01 ENCOUNTER — TELEPHONE (OUTPATIENT)
Dept: FAMILY MEDICINE | Facility: CLINIC | Age: 78
End: 2018-01-01

## 2018-01-01 ENCOUNTER — MEDICAL CORRESPONDENCE (OUTPATIENT)
Dept: HEALTH INFORMATION MANAGEMENT | Facility: CLINIC | Age: 78
End: 2018-01-01

## 2018-01-01 ENCOUNTER — ONCOLOGY VISIT (OUTPATIENT)
Dept: ONCOLOGY | Facility: CLINIC | Age: 78
End: 2018-01-01
Attending: INTERNAL MEDICINE
Payer: MEDICARE

## 2018-01-01 ENCOUNTER — APPOINTMENT (OUTPATIENT)
Dept: CT IMAGING | Facility: CLINIC | Age: 78
DRG: 435 | End: 2018-01-01
Attending: EMERGENCY MEDICINE
Payer: MEDICARE

## 2018-01-01 ENCOUNTER — HOSPITAL ENCOUNTER (OUTPATIENT)
Dept: CT IMAGING | Facility: CLINIC | Age: 78
End: 2018-03-15
Attending: INTERNAL MEDICINE | Admitting: INTERNAL MEDICINE
Payer: MEDICARE

## 2018-01-01 ENCOUNTER — HOSPITAL ENCOUNTER (OUTPATIENT)
Facility: CLINIC | Age: 78
Setting detail: SPECIMEN
End: 2018-03-29
Attending: INTERNAL MEDICINE
Payer: MEDICARE

## 2018-01-01 ENCOUNTER — HOSPITAL ENCOUNTER (OUTPATIENT)
Facility: CLINIC | Age: 78
Discharge: HOME | End: 2018-03-15
Attending: INTERNAL MEDICINE | Admitting: INTERNAL MEDICINE
Payer: MEDICARE

## 2018-01-01 ENCOUNTER — INFUSION THERAPY VISIT (OUTPATIENT)
Dept: INFUSION THERAPY | Facility: CLINIC | Age: 78
End: 2018-01-01
Attending: INTERNAL MEDICINE
Payer: MEDICARE

## 2018-01-01 ENCOUNTER — DOCUMENTATION ONLY (OUTPATIENT)
Dept: ONCOLOGY | Facility: CLINIC | Age: 78
End: 2018-01-01

## 2018-01-01 ENCOUNTER — HOSPITAL ENCOUNTER (OUTPATIENT)
Facility: CLINIC | Age: 78
Setting detail: SPECIMEN
Discharge: HOME | End: 2018-03-22
Attending: INTERNAL MEDICINE | Admitting: INTERNAL MEDICINE
Payer: MEDICARE

## 2018-01-01 ENCOUNTER — HOSPITAL ENCOUNTER (OUTPATIENT)
Facility: CLINIC | Age: 78
Setting detail: SPECIMEN
Discharge: HOME | End: 2018-06-12
Attending: NURSE PRACTITIONER | Admitting: NURSE PRACTITIONER
Payer: MEDICARE

## 2018-01-01 ENCOUNTER — HOSPITAL ENCOUNTER (INPATIENT)
Facility: CLINIC | Age: 78
LOS: 4 days | Discharge: HOSPICE | DRG: 435 | End: 2018-09-07
Attending: EMERGENCY MEDICINE | Admitting: HOSPITALIST
Payer: MEDICARE

## 2018-01-01 ENCOUNTER — NURSE TRIAGE (OUTPATIENT)
Dept: NURSING | Facility: CLINIC | Age: 78
End: 2018-01-01

## 2018-01-01 ENCOUNTER — APPOINTMENT (OUTPATIENT)
Dept: CT IMAGING | Facility: CLINIC | Age: 78
End: 2018-01-01
Attending: EMERGENCY MEDICINE
Payer: MEDICARE

## 2018-01-01 ENCOUNTER — HOSPITAL ENCOUNTER (OUTPATIENT)
Facility: CLINIC | Age: 78
End: 2018-01-01
Attending: INTERNAL MEDICINE | Admitting: INTERNAL MEDICINE

## 2018-01-01 ENCOUNTER — HOSPITAL ENCOUNTER (OUTPATIENT)
Facility: CLINIC | Age: 78
Setting detail: SPECIMEN
Discharge: HOME | End: 2018-03-08
Attending: INTERNAL MEDICINE | Admitting: INTERNAL MEDICINE
Payer: MEDICARE

## 2018-01-01 ENCOUNTER — ONCOLOGY VISIT (OUTPATIENT)
Dept: ONCOLOGY | Facility: CLINIC | Age: 78
End: 2018-01-01
Attending: INTERNAL MEDICINE
Payer: COMMERCIAL

## 2018-01-01 ENCOUNTER — HOSPITAL ENCOUNTER (OUTPATIENT)
Facility: CLINIC | Age: 78
Setting detail: SPECIMEN
Discharge: HOME | End: 2018-02-22
Attending: INTERNAL MEDICINE | Admitting: INTERNAL MEDICINE
Payer: MEDICARE

## 2018-01-01 ENCOUNTER — HOSPITAL ENCOUNTER (OUTPATIENT)
Facility: CLINIC | Age: 78
Setting detail: SPECIMEN
Discharge: HOME | End: 2018-05-29
Attending: INTERNAL MEDICINE | Admitting: INTERNAL MEDICINE
Payer: MEDICARE

## 2018-01-01 ENCOUNTER — HOSPITAL ENCOUNTER (OUTPATIENT)
Facility: CLINIC | Age: 78
Setting detail: SPECIMEN
Discharge: HOME | End: 2018-05-15
Attending: INTERNAL MEDICINE | Admitting: INTERNAL MEDICINE
Payer: MEDICARE

## 2018-01-01 ENCOUNTER — HOSPITAL ENCOUNTER (OUTPATIENT)
Facility: CLINIC | Age: 78
Setting detail: SPECIMEN
Discharge: HOME | End: 2018-04-26
Attending: INTERNAL MEDICINE | Admitting: INTERNAL MEDICINE
Payer: MEDICARE

## 2018-01-01 ENCOUNTER — HOME INFUSION (PRE-WILLOW HOME INFUSION) (OUTPATIENT)
Dept: PHARMACY | Facility: CLINIC | Age: 78
End: 2018-01-01

## 2018-01-01 ENCOUNTER — HOSPITAL ENCOUNTER (OUTPATIENT)
Facility: CLINIC | Age: 78
Setting detail: SPECIMEN
Discharge: HOME | End: 2018-01-25
Attending: INTERNAL MEDICINE | Admitting: INTERNAL MEDICINE
Payer: MEDICARE

## 2018-01-01 ENCOUNTER — HOSPITAL ENCOUNTER (OUTPATIENT)
Facility: CLINIC | Age: 78
Setting detail: SPECIMEN
Discharge: HOME | End: 2018-03-27
Attending: INTERNAL MEDICINE | Admitting: INTERNAL MEDICINE
Payer: MEDICARE

## 2018-01-01 ENCOUNTER — HOSPITAL ENCOUNTER (OUTPATIENT)
Facility: CLINIC | Age: 78
End: 2018-01-01
Admitting: INTERNAL MEDICINE
Payer: MEDICARE

## 2018-01-01 ENCOUNTER — HOSPITAL ENCOUNTER (OUTPATIENT)
Facility: CLINIC | Age: 78
Setting detail: SPECIMEN
Discharge: HOME | End: 2018-04-12
Attending: INTERNAL MEDICINE | Admitting: INTERNAL MEDICINE
Payer: MEDICARE

## 2018-01-01 ENCOUNTER — HOSPITAL ENCOUNTER (OUTPATIENT)
Facility: CLINIC | Age: 78
Setting detail: SPECIMEN
Discharge: HOME | End: 2018-06-14
Attending: INTERNAL MEDICINE | Admitting: INTERNAL MEDICINE
Payer: MEDICARE

## 2018-01-01 ENCOUNTER — HOSPITAL ENCOUNTER (OUTPATIENT)
Facility: CLINIC | Age: 78
Setting detail: SPECIMEN
Discharge: HOME | End: 2018-01-11
Attending: INTERNAL MEDICINE | Admitting: INTERNAL MEDICINE
Payer: MEDICARE

## 2018-01-01 ENCOUNTER — HOSPITAL ENCOUNTER (OUTPATIENT)
Facility: CLINIC | Age: 78
Setting detail: SPECIMEN
Discharge: HOME | End: 2018-06-26
Attending: INTERNAL MEDICINE | Admitting: INTERNAL MEDICINE
Payer: MEDICARE

## 2018-01-01 ENCOUNTER — TELEPHONE (OUTPATIENT)
Dept: INFUSION THERAPY | Facility: CLINIC | Age: 78
End: 2018-01-01

## 2018-01-01 ENCOUNTER — HOSPITAL ENCOUNTER (EMERGENCY)
Facility: CLINIC | Age: 78
Discharge: HOME | End: 2018-05-20
Attending: EMERGENCY MEDICINE | Admitting: EMERGENCY MEDICINE
Payer: MEDICARE

## 2018-01-01 ENCOUNTER — HOSPITAL ENCOUNTER (OUTPATIENT)
Facility: CLINIC | Age: 78
Setting detail: SPECIMEN
End: 2018-04-28
Attending: INTERNAL MEDICINE
Payer: MEDICARE

## 2018-01-01 ENCOUNTER — HOSPITAL ENCOUNTER (OUTPATIENT)
Facility: CLINIC | Age: 78
Setting detail: SPECIMEN
End: 2018-05-31
Attending: INTERNAL MEDICINE
Payer: MEDICARE

## 2018-01-01 ENCOUNTER — HOSPITAL ENCOUNTER (OUTPATIENT)
Dept: PHYSICAL THERAPY | Facility: CLINIC | Age: 78
Setting detail: THERAPIES SERIES
End: 2018-03-01
Attending: INTERNAL MEDICINE
Payer: MEDICARE

## 2018-01-01 ENCOUNTER — HOSPITAL ENCOUNTER (OUTPATIENT)
Facility: CLINIC | Age: 78
Setting detail: SPECIMEN
End: 2018-05-08
Attending: INTERNAL MEDICINE
Payer: MEDICARE

## 2018-01-01 ENCOUNTER — HOSPITAL ENCOUNTER (OUTPATIENT)
Facility: CLINIC | Age: 78
Discharge: HOME | End: 2018-07-19
Attending: INTERNAL MEDICINE | Admitting: INTERNAL MEDICINE
Payer: MEDICARE

## 2018-01-01 ENCOUNTER — ALLIED HEALTH/NURSE VISIT (OUTPATIENT)
Dept: ONCOLOGY | Facility: CLINIC | Age: 78
End: 2018-01-01

## 2018-01-01 ENCOUNTER — HOSPITAL ENCOUNTER (OUTPATIENT)
Facility: CLINIC | Age: 78
Setting detail: SPECIMEN
Discharge: HOME | End: 2018-05-22
Attending: INTERNAL MEDICINE | Admitting: INTERNAL MEDICINE
Payer: MEDICARE

## 2018-01-01 VITALS
WEIGHT: 122.4 LBS | RESPIRATION RATE: 16 BRPM | TEMPERATURE: 97.4 F | SYSTOLIC BLOOD PRESSURE: 136 MMHG | BODY MASS INDEX: 19.76 KG/M2 | HEART RATE: 75 BPM | DIASTOLIC BLOOD PRESSURE: 83 MMHG

## 2018-01-01 VITALS
RESPIRATION RATE: 16 BRPM | DIASTOLIC BLOOD PRESSURE: 72 MMHG | BODY MASS INDEX: 17.89 KG/M2 | HEIGHT: 64 IN | HEART RATE: 63 BPM | SYSTOLIC BLOOD PRESSURE: 117 MMHG | RESPIRATION RATE: 16 BRPM | OXYGEN SATURATION: 98 % | TEMPERATURE: 97.6 F | TEMPERATURE: 97.9 F | DIASTOLIC BLOOD PRESSURE: 86 MMHG | WEIGHT: 104.8 LBS | HEART RATE: 65 BPM | SYSTOLIC BLOOD PRESSURE: 133 MMHG

## 2018-01-01 VITALS
WEIGHT: 113.4 LBS | RESPIRATION RATE: 16 BRPM | SYSTOLIC BLOOD PRESSURE: 118 MMHG | TEMPERATURE: 98.7 F | HEIGHT: 66 IN | BODY MASS INDEX: 18.23 KG/M2 | DIASTOLIC BLOOD PRESSURE: 70 MMHG | OXYGEN SATURATION: 97 % | HEART RATE: 77 BPM

## 2018-01-01 VITALS
HEART RATE: 80 BPM | HEART RATE: 73 BPM | WEIGHT: 94 LBS | DIASTOLIC BLOOD PRESSURE: 80 MMHG | OXYGEN SATURATION: 96 % | TEMPERATURE: 98.3 F | SYSTOLIC BLOOD PRESSURE: 128 MMHG | TEMPERATURE: 98 F | DIASTOLIC BLOOD PRESSURE: 70 MMHG | BODY MASS INDEX: 16.13 KG/M2 | RESPIRATION RATE: 16 BRPM | RESPIRATION RATE: 16 BRPM | SYSTOLIC BLOOD PRESSURE: 117 MMHG

## 2018-01-01 VITALS
OXYGEN SATURATION: 99 % | DIASTOLIC BLOOD PRESSURE: 81 MMHG | WEIGHT: 102.6 LBS | BODY MASS INDEX: 17.52 KG/M2 | TEMPERATURE: 97.6 F | RESPIRATION RATE: 18 BRPM | SYSTOLIC BLOOD PRESSURE: 139 MMHG | HEART RATE: 82 BPM | HEIGHT: 64 IN

## 2018-01-01 VITALS
HEART RATE: 75 BPM | TEMPERATURE: 97.5 F | DIASTOLIC BLOOD PRESSURE: 63 MMHG | RESPIRATION RATE: 16 BRPM | SYSTOLIC BLOOD PRESSURE: 109 MMHG

## 2018-01-01 VITALS
HEART RATE: 77 BPM | RESPIRATION RATE: 16 BRPM | DIASTOLIC BLOOD PRESSURE: 68 MMHG | SYSTOLIC BLOOD PRESSURE: 127 MMHG | TEMPERATURE: 97.3 F

## 2018-01-01 VITALS
TEMPERATURE: 97.4 F | HEART RATE: 84 BPM | DIASTOLIC BLOOD PRESSURE: 71 MMHG | RESPIRATION RATE: 16 BRPM | HEART RATE: 63 BPM | TEMPERATURE: 97.9 F | SYSTOLIC BLOOD PRESSURE: 117 MMHG | RESPIRATION RATE: 16 BRPM | DIASTOLIC BLOOD PRESSURE: 72 MMHG | SYSTOLIC BLOOD PRESSURE: 106 MMHG | OXYGEN SATURATION: 97 %

## 2018-01-01 VITALS
HEIGHT: 66 IN | HEART RATE: 71 BPM | TEMPERATURE: 97.5 F | BODY MASS INDEX: 18 KG/M2 | SYSTOLIC BLOOD PRESSURE: 105 MMHG | WEIGHT: 112 LBS | OXYGEN SATURATION: 100 % | DIASTOLIC BLOOD PRESSURE: 59 MMHG | RESPIRATION RATE: 16 BRPM

## 2018-01-01 VITALS
WEIGHT: 104.6 LBS | HEIGHT: 66 IN | RESPIRATION RATE: 16 BRPM | HEART RATE: 78 BPM | OXYGEN SATURATION: 97 % | BODY MASS INDEX: 16.81 KG/M2 | TEMPERATURE: 98.5 F | DIASTOLIC BLOOD PRESSURE: 71 MMHG | SYSTOLIC BLOOD PRESSURE: 130 MMHG

## 2018-01-01 VITALS
TEMPERATURE: 98.5 F | DIASTOLIC BLOOD PRESSURE: 71 MMHG | SYSTOLIC BLOOD PRESSURE: 130 MMHG | HEART RATE: 78 BPM | HEIGHT: 66 IN | RESPIRATION RATE: 16 BRPM | WEIGHT: 104.6 LBS | BODY MASS INDEX: 16.81 KG/M2 | OXYGEN SATURATION: 97 %

## 2018-01-01 VITALS — SYSTOLIC BLOOD PRESSURE: 115 MMHG | RESPIRATION RATE: 16 BRPM | DIASTOLIC BLOOD PRESSURE: 48 MMHG | HEART RATE: 54 BPM

## 2018-01-01 VITALS
SYSTOLIC BLOOD PRESSURE: 124 MMHG | WEIGHT: 116.1 LBS | BODY MASS INDEX: 19.92 KG/M2 | TEMPERATURE: 97.2 F | OXYGEN SATURATION: 93 % | RESPIRATION RATE: 16 BRPM | DIASTOLIC BLOOD PRESSURE: 78 MMHG

## 2018-01-01 VITALS
TEMPERATURE: 97.5 F | RESPIRATION RATE: 14 BRPM | WEIGHT: 105.2 LBS | DIASTOLIC BLOOD PRESSURE: 72 MMHG | OXYGEN SATURATION: 92 % | SYSTOLIC BLOOD PRESSURE: 113 MMHG | HEART RATE: 66 BPM | BODY MASS INDEX: 18.06 KG/M2

## 2018-01-01 VITALS — SYSTOLIC BLOOD PRESSURE: 143 MMHG | RESPIRATION RATE: 16 BRPM | HEART RATE: 58 BPM | DIASTOLIC BLOOD PRESSURE: 85 MMHG

## 2018-01-01 VITALS
RESPIRATION RATE: 16 BRPM | DIASTOLIC BLOOD PRESSURE: 68 MMHG | TEMPERATURE: 98.1 F | BODY MASS INDEX: 17.55 KG/M2 | WEIGHT: 109.2 LBS | SYSTOLIC BLOOD PRESSURE: 103 MMHG | HEIGHT: 66 IN | HEART RATE: 66 BPM

## 2018-01-01 VITALS
HEART RATE: 67 BPM | DIASTOLIC BLOOD PRESSURE: 49 MMHG | TEMPERATURE: 98.3 F | SYSTOLIC BLOOD PRESSURE: 111 MMHG | RESPIRATION RATE: 18 BRPM

## 2018-01-01 VITALS
RESPIRATION RATE: 16 BRPM | SYSTOLIC BLOOD PRESSURE: 136 MMHG | OXYGEN SATURATION: 96 % | DIASTOLIC BLOOD PRESSURE: 65 MMHG | HEART RATE: 74 BPM | TEMPERATURE: 98 F

## 2018-01-01 VITALS
DIASTOLIC BLOOD PRESSURE: 55 MMHG | SYSTOLIC BLOOD PRESSURE: 101 MMHG | HEART RATE: 65 BPM | RESPIRATION RATE: 16 BRPM | TEMPERATURE: 98.2 F

## 2018-01-01 VITALS
WEIGHT: 108.6 LBS | RESPIRATION RATE: 16 BRPM | SYSTOLIC BLOOD PRESSURE: 114 MMHG | DIASTOLIC BLOOD PRESSURE: 47 MMHG | BODY MASS INDEX: 17.45 KG/M2 | TEMPERATURE: 97.9 F | HEART RATE: 63 BPM | HEIGHT: 66 IN

## 2018-01-01 VITALS
RESPIRATION RATE: 16 BRPM | WEIGHT: 120 LBS | HEART RATE: 68 BPM | OXYGEN SATURATION: 96 % | HEIGHT: 66 IN | SYSTOLIC BLOOD PRESSURE: 142 MMHG | TEMPERATURE: 97.8 F | BODY MASS INDEX: 19.29 KG/M2 | DIASTOLIC BLOOD PRESSURE: 87 MMHG

## 2018-01-01 VITALS
BODY MASS INDEX: 16.13 KG/M2 | HEART RATE: 80 BPM | OXYGEN SATURATION: 96 % | DIASTOLIC BLOOD PRESSURE: 70 MMHG | SYSTOLIC BLOOD PRESSURE: 117 MMHG | WEIGHT: 94 LBS | RESPIRATION RATE: 16 BRPM | TEMPERATURE: 98 F

## 2018-01-01 VITALS
RESPIRATION RATE: 16 BRPM | WEIGHT: 104.6 LBS | DIASTOLIC BLOOD PRESSURE: 56 MMHG | BODY MASS INDEX: 17.95 KG/M2 | OXYGEN SATURATION: 98 % | TEMPERATURE: 98.1 F | SYSTOLIC BLOOD PRESSURE: 99 MMHG | HEART RATE: 75 BPM

## 2018-01-01 VITALS
DIASTOLIC BLOOD PRESSURE: 60 MMHG | HEART RATE: 68 BPM | SYSTOLIC BLOOD PRESSURE: 95 MMHG | RESPIRATION RATE: 18 BRPM | TEMPERATURE: 97.9 F

## 2018-01-01 VITALS
BODY MASS INDEX: 17.89 KG/M2 | TEMPERATURE: 97.6 F | RESPIRATION RATE: 16 BRPM | WEIGHT: 104.8 LBS | HEART RATE: 65 BPM | HEIGHT: 64 IN | DIASTOLIC BLOOD PRESSURE: 86 MMHG | SYSTOLIC BLOOD PRESSURE: 133 MMHG | OXYGEN SATURATION: 98 %

## 2018-01-01 VITALS
TEMPERATURE: 97.4 F | WEIGHT: 120 LBS | HEART RATE: 74 BPM | DIASTOLIC BLOOD PRESSURE: 74 MMHG | BODY MASS INDEX: 19.37 KG/M2 | SYSTOLIC BLOOD PRESSURE: 120 MMHG | RESPIRATION RATE: 16 BRPM | OXYGEN SATURATION: 96 %

## 2018-01-01 VITALS
SYSTOLIC BLOOD PRESSURE: 112 MMHG | DIASTOLIC BLOOD PRESSURE: 71 MMHG | TEMPERATURE: 98.2 F | RESPIRATION RATE: 18 BRPM | HEART RATE: 62 BPM

## 2018-01-01 VITALS
RESPIRATION RATE: 20 BRPM | HEART RATE: 75 BPM | SYSTOLIC BLOOD PRESSURE: 108 MMHG | TEMPERATURE: 97.8 F | OXYGEN SATURATION: 99 % | DIASTOLIC BLOOD PRESSURE: 53 MMHG

## 2018-01-01 VITALS
TEMPERATURE: 96.8 F | HEART RATE: 64 BPM | SYSTOLIC BLOOD PRESSURE: 138 MMHG | DIASTOLIC BLOOD PRESSURE: 69 MMHG | RESPIRATION RATE: 16 BRPM

## 2018-01-01 DIAGNOSIS — C18.9 ADENOCARCINOMA OF COLON (H): Primary | ICD-10-CM

## 2018-01-01 DIAGNOSIS — D69.6 THROMBOCYTOPENIA (H): ICD-10-CM

## 2018-01-01 DIAGNOSIS — C18.9 ADENOCARCINOMA OF COLON (H): ICD-10-CM

## 2018-01-01 DIAGNOSIS — Z95.828 PORT-A-CATH IN PLACE: Primary | ICD-10-CM

## 2018-01-01 DIAGNOSIS — E03.9 HYPOTHYROIDISM, UNSPECIFIED TYPE: ICD-10-CM

## 2018-01-01 DIAGNOSIS — Z95.828 PORT-A-CATH IN PLACE: ICD-10-CM

## 2018-01-01 DIAGNOSIS — F41.9 ANXIETY: ICD-10-CM

## 2018-01-01 DIAGNOSIS — R63.4 WEIGHT LOSS: ICD-10-CM

## 2018-01-01 DIAGNOSIS — R10.9 ABDOMINAL PAIN: ICD-10-CM

## 2018-01-01 DIAGNOSIS — H40.1130 PRIMARY OPEN ANGLE GLAUCOMA OF BOTH EYES, UNSPECIFIED GLAUCOMA STAGE: ICD-10-CM

## 2018-01-01 DIAGNOSIS — K59.01 SLOW TRANSIT CONSTIPATION: ICD-10-CM

## 2018-01-01 DIAGNOSIS — E61.1 IRON DEFICIENCY: Primary | ICD-10-CM

## 2018-01-01 DIAGNOSIS — R19.7 DIARRHEA, UNSPECIFIED TYPE: ICD-10-CM

## 2018-01-01 DIAGNOSIS — M62.81 GENERALIZED MUSCLE WEAKNESS: ICD-10-CM

## 2018-01-01 DIAGNOSIS — R53.81 PHYSICAL DECONDITIONING: Primary | ICD-10-CM

## 2018-01-01 DIAGNOSIS — N30.00 ACUTE CYSTITIS WITHOUT HEMATURIA: Primary | ICD-10-CM

## 2018-01-01 DIAGNOSIS — Z53.9 DIAGNOSIS NOT YET DEFINED: Primary | ICD-10-CM

## 2018-01-01 DIAGNOSIS — E86.0 DEHYDRATION: ICD-10-CM

## 2018-01-01 DIAGNOSIS — R68.2 DRY MOUTH: ICD-10-CM

## 2018-01-01 DIAGNOSIS — M25.511 SHOULDER PAIN, RIGHT: ICD-10-CM

## 2018-01-01 DIAGNOSIS — E03.9 HYPOTHYROIDISM, UNSPECIFIED TYPE: Primary | ICD-10-CM

## 2018-01-01 DIAGNOSIS — Z90.49 S/P COLECTOMY: ICD-10-CM

## 2018-01-01 DIAGNOSIS — R63.0 POOR APPETITE: ICD-10-CM

## 2018-01-01 DIAGNOSIS — Z71.9 COUNSELING NOS(V65.40): Primary | ICD-10-CM

## 2018-01-01 DIAGNOSIS — C18.9 METASTATIC COLON CANCER IN FEMALE: ICD-10-CM

## 2018-01-01 LAB
ALBUMIN SERPL-MCNC: 2.4 G/DL (ref 3.4–5)
ALBUMIN SERPL-MCNC: 3.1 G/DL (ref 3.4–5)
ALBUMIN SERPL-MCNC: 3.2 G/DL (ref 3.4–5)
ALBUMIN SERPL-MCNC: 3.3 G/DL (ref 3.4–5)
ALBUMIN SERPL-MCNC: 3.4 G/DL (ref 3.4–5)
ALBUMIN SERPL-MCNC: 3.5 G/DL (ref 3.4–5)
ALBUMIN UR-MCNC: 10 MG/DL
ALBUMIN UR-MCNC: 100 MG/DL
ALBUMIN UR-MCNC: 30 MG/DL
ALP SERPL-CCNC: 331 U/L (ref 40–150)
ALP SERPL-CCNC: 334 U/L (ref 40–150)
ALP SERPL-CCNC: 345 U/L (ref 40–150)
ALP SERPL-CCNC: 367 U/L (ref 40–150)
ALP SERPL-CCNC: 385 U/L (ref 40–150)
ALP SERPL-CCNC: 394 U/L (ref 40–150)
ALP SERPL-CCNC: 399 U/L (ref 40–150)
ALP SERPL-CCNC: 423 U/L (ref 40–150)
ALP SERPL-CCNC: 476 U/L (ref 40–150)
ALP SERPL-CCNC: 987 U/L (ref 40–150)
ALT SERPL W P-5'-P-CCNC: 33 U/L (ref 0–50)
ALT SERPL W P-5'-P-CCNC: 39 U/L (ref 0–50)
ALT SERPL W P-5'-P-CCNC: 47 U/L (ref 0–50)
ALT SERPL W P-5'-P-CCNC: 49 U/L (ref 0–50)
ALT SERPL W P-5'-P-CCNC: 52 U/L (ref 0–50)
ALT SERPL W P-5'-P-CCNC: 56 U/L (ref 0–50)
ALT SERPL W P-5'-P-CCNC: 59 U/L (ref 0–50)
ALT SERPL W P-5'-P-CCNC: 65 U/L (ref 0–50)
ALT SERPL W P-5'-P-CCNC: 71 U/L (ref 0–50)
ALT SERPL W P-5'-P-CCNC: 74 U/L (ref 0–50)
ANION GAP SERPL CALCULATED.3IONS-SCNC: 10 MMOL/L (ref 3–14)
ANION GAP SERPL CALCULATED.3IONS-SCNC: 10 MMOL/L (ref 3–14)
ANION GAP SERPL CALCULATED.3IONS-SCNC: 5 MMOL/L (ref 3–14)
ANION GAP SERPL CALCULATED.3IONS-SCNC: 5 MMOL/L (ref 3–14)
ANION GAP SERPL CALCULATED.3IONS-SCNC: 6 MMOL/L (ref 3–14)
ANION GAP SERPL CALCULATED.3IONS-SCNC: 7 MMOL/L (ref 3–14)
ANION GAP SERPL CALCULATED.3IONS-SCNC: 8 MMOL/L (ref 3–14)
ANION GAP SERPL CALCULATED.3IONS-SCNC: 8 MMOL/L (ref 3–14)
ANION GAP SERPL CALCULATED.3IONS-SCNC: 9 MMOL/L (ref 3–14)
APPEARANCE UR: CLEAR
AST SERPL W P-5'-P-CCNC: 101 U/L (ref 0–45)
AST SERPL W P-5'-P-CCNC: 101 U/L (ref 0–45)
AST SERPL W P-5'-P-CCNC: 282 U/L (ref 0–45)
AST SERPL W P-5'-P-CCNC: 69 U/L (ref 0–45)
AST SERPL W P-5'-P-CCNC: 70 U/L (ref 0–45)
AST SERPL W P-5'-P-CCNC: 72 U/L (ref 0–45)
AST SERPL W P-5'-P-CCNC: 78 U/L (ref 0–45)
AST SERPL W P-5'-P-CCNC: 78 U/L (ref 0–45)
AST SERPL W P-5'-P-CCNC: 95 U/L (ref 0–45)
AST SERPL W P-5'-P-CCNC: 97 U/L (ref 0–45)
BACTERIA #/AREA URNS HPF: ABNORMAL /HPF
BACTERIA SPEC CULT: ABNORMAL
BACTERIA SPEC CULT: ABNORMAL
BASOPHILS # BLD AUTO: 0 10E9/L (ref 0–0.2)
BASOPHILS NFR BLD AUTO: 0.1 %
BASOPHILS NFR BLD AUTO: 0.3 %
BASOPHILS NFR BLD AUTO: 0.3 %
BASOPHILS NFR BLD AUTO: 0.4 %
BASOPHILS NFR BLD AUTO: 0.5 %
BASOPHILS NFR BLD AUTO: 0.6 %
BASOPHILS NFR BLD AUTO: 0.7 %
BASOPHILS NFR BLD AUTO: 1 %
BILIRUB SERPL-MCNC: 0.2 MG/DL (ref 0.2–1.3)
BILIRUB SERPL-MCNC: 0.4 MG/DL (ref 0.2–1.3)
BILIRUB SERPL-MCNC: 0.5 MG/DL (ref 0.2–1.3)
BILIRUB SERPL-MCNC: 0.6 MG/DL (ref 0.2–1.3)
BILIRUB SERPL-MCNC: 0.7 MG/DL (ref 0.2–1.3)
BILIRUB SERPL-MCNC: 0.7 MG/DL (ref 0.2–1.3)
BILIRUB SERPL-MCNC: 0.8 MG/DL (ref 0.2–1.3)
BILIRUB SERPL-MCNC: 1.2 MG/DL (ref 0.2–1.3)
BILIRUB UR QL STRIP: NEGATIVE
BUN SERPL-MCNC: 13 MG/DL (ref 7–30)
BUN SERPL-MCNC: 13 MG/DL (ref 7–30)
BUN SERPL-MCNC: 15 MG/DL (ref 7–30)
BUN SERPL-MCNC: 15 MG/DL (ref 7–30)
BUN SERPL-MCNC: 17 MG/DL (ref 7–30)
BUN SERPL-MCNC: 17 MG/DL (ref 7–30)
BUN SERPL-MCNC: 18 MG/DL (ref 7–30)
BUN SERPL-MCNC: 21 MG/DL (ref 7–30)
BUN SERPL-MCNC: 23 MG/DL (ref 7–30)
BUN SERPL-MCNC: 24 MG/DL (ref 7–30)
BUN SERPL-MCNC: 25 MG/DL (ref 7–30)
CALCIUM SERPL-MCNC: 8 MG/DL (ref 8.5–10.1)
CALCIUM SERPL-MCNC: 8.6 MG/DL (ref 8.5–10.1)
CALCIUM SERPL-MCNC: 8.8 MG/DL (ref 8.5–10.1)
CALCIUM SERPL-MCNC: 8.9 MG/DL (ref 8.5–10.1)
CALCIUM SERPL-MCNC: 9 MG/DL (ref 8.5–10.1)
CALCIUM SERPL-MCNC: 9.2 MG/DL (ref 8.5–10.1)
CALCIUM SERPL-MCNC: 9.3 MG/DL (ref 8.5–10.1)
CALCIUM SERPL-MCNC: 9.3 MG/DL (ref 8.5–10.1)
CALCIUM SERPL-MCNC: 9.5 MG/DL (ref 8.5–10.1)
CEA SERPL-MCNC: 11 UG/L (ref 0–2.5)
CHLORIDE SERPL-SCNC: 101 MMOL/L (ref 94–109)
CHLORIDE SERPL-SCNC: 102 MMOL/L (ref 94–109)
CHLORIDE SERPL-SCNC: 102 MMOL/L (ref 94–109)
CHLORIDE SERPL-SCNC: 103 MMOL/L (ref 94–109)
CHLORIDE SERPL-SCNC: 103 MMOL/L (ref 94–109)
CHLORIDE SERPL-SCNC: 104 MMOL/L (ref 94–109)
CHLORIDE SERPL-SCNC: 105 MMOL/L (ref 94–109)
CHLORIDE SERPL-SCNC: 106 MMOL/L (ref 94–109)
CHLORIDE SERPL-SCNC: 106 MMOL/L (ref 94–109)
CHLORIDE SERPL-SCNC: 108 MMOL/L (ref 94–109)
CHLORIDE SERPL-SCNC: 97 MMOL/L (ref 94–109)
CO2 SERPL-SCNC: 24 MMOL/L (ref 20–32)
CO2 SERPL-SCNC: 26 MMOL/L (ref 20–32)
CO2 SERPL-SCNC: 27 MMOL/L (ref 20–32)
CO2 SERPL-SCNC: 28 MMOL/L (ref 20–32)
CO2 SERPL-SCNC: 29 MMOL/L (ref 20–32)
COLLECT DURATION TIME UR: 24 H
COLOR UR AUTO: YELLOW
CREAT 24H UR-MRATE: 0.78 G/(24.H) (ref 0.8–1.8)
CREAT SERPL-MCNC: 0.56 MG/DL (ref 0.52–1.04)
CREAT SERPL-MCNC: 0.64 MG/DL (ref 0.52–1.04)
CREAT SERPL-MCNC: 0.78 MG/DL (ref 0.52–1.04)
CREAT SERPL-MCNC: 0.81 MG/DL (ref 0.52–1.04)
CREAT SERPL-MCNC: 0.84 MG/DL (ref 0.52–1.04)
CREAT SERPL-MCNC: 0.88 MG/DL (ref 0.52–1.04)
CREAT SERPL-MCNC: 0.88 MG/DL (ref 0.52–1.04)
CREAT SERPL-MCNC: 0.93 MG/DL (ref 0.52–1.04)
CREAT SERPL-MCNC: 0.96 MG/DL (ref 0.52–1.04)
CREAT SERPL-MCNC: 0.99 MG/DL (ref 0.52–1.04)
CREAT SERPL-MCNC: 1.08 MG/DL (ref 0.52–1.04)
CREAT UR-MCNC: 53 MG/DL
DIFFERENTIAL METHOD BLD: ABNORMAL
EOSINOPHIL # BLD AUTO: 0 10E9/L (ref 0–0.7)
EOSINOPHIL # BLD AUTO: 0.1 10E9/L (ref 0–0.7)
EOSINOPHIL NFR BLD AUTO: 0 %
EOSINOPHIL NFR BLD AUTO: 0.1 %
EOSINOPHIL NFR BLD AUTO: 0.3 %
EOSINOPHIL NFR BLD AUTO: 0.7 %
EOSINOPHIL NFR BLD AUTO: 0.8 %
EOSINOPHIL NFR BLD AUTO: 0.8 %
EOSINOPHIL NFR BLD AUTO: 0.9 %
EOSINOPHIL NFR BLD AUTO: 1 %
EOSINOPHIL NFR BLD AUTO: 1 %
EOSINOPHIL NFR BLD AUTO: 1.1 %
EOSINOPHIL NFR BLD AUTO: 1.4 %
EOSINOPHIL NFR BLD AUTO: 1.4 %
ERYTHROCYTE [DISTWIDTH] IN BLOOD BY AUTOMATED COUNT: 14.5 % (ref 10–15)
ERYTHROCYTE [DISTWIDTH] IN BLOOD BY AUTOMATED COUNT: 14.5 % (ref 10–15)
ERYTHROCYTE [DISTWIDTH] IN BLOOD BY AUTOMATED COUNT: 14.9 % (ref 10–15)
ERYTHROCYTE [DISTWIDTH] IN BLOOD BY AUTOMATED COUNT: 15.2 % (ref 10–15)
ERYTHROCYTE [DISTWIDTH] IN BLOOD BY AUTOMATED COUNT: 15.5 % (ref 10–15)
ERYTHROCYTE [DISTWIDTH] IN BLOOD BY AUTOMATED COUNT: 15.7 % (ref 10–15)
ERYTHROCYTE [DISTWIDTH] IN BLOOD BY AUTOMATED COUNT: 15.7 % (ref 10–15)
ERYTHROCYTE [DISTWIDTH] IN BLOOD BY AUTOMATED COUNT: 16.1 % (ref 10–15)
ERYTHROCYTE [DISTWIDTH] IN BLOOD BY AUTOMATED COUNT: 16.2 % (ref 10–15)
ERYTHROCYTE [DISTWIDTH] IN BLOOD BY AUTOMATED COUNT: 16.8 % (ref 10–15)
ERYTHROCYTE [DISTWIDTH] IN BLOOD BY AUTOMATED COUNT: 18 % (ref 10–15)
ERYTHROCYTE [DISTWIDTH] IN BLOOD BY AUTOMATED COUNT: 18.6 % (ref 10–15)
GFR SERPL CREATININE-BSD FRML MDRD: 49 ML/MIN/1.7M2
GFR SERPL CREATININE-BSD FRML MDRD: 54 ML/MIN/1.7M2
GFR SERPL CREATININE-BSD FRML MDRD: 57 ML/MIN/1.7M2
GFR SERPL CREATININE-BSD FRML MDRD: 59 ML/MIN/1.7M2
GFR SERPL CREATININE-BSD FRML MDRD: 62 ML/MIN/1.7M2
GFR SERPL CREATININE-BSD FRML MDRD: 63 ML/MIN/1.7M2
GFR SERPL CREATININE-BSD FRML MDRD: 66 ML/MIN/1.7M2
GFR SERPL CREATININE-BSD FRML MDRD: 69 ML/MIN/1.7M2
GFR SERPL CREATININE-BSD FRML MDRD: 72 ML/MIN/1.7M2
GFR SERPL CREATININE-BSD FRML MDRD: >90 ML/MIN/1.7M2
GFR SERPL CREATININE-BSD FRML MDRD: >90 ML/MIN/1.7M2
GLUCOSE SERPL-MCNC: 103 MG/DL (ref 70–99)
GLUCOSE SERPL-MCNC: 106 MG/DL (ref 70–99)
GLUCOSE SERPL-MCNC: 110 MG/DL (ref 70–99)
GLUCOSE SERPL-MCNC: 82 MG/DL (ref 70–99)
GLUCOSE SERPL-MCNC: 86 MG/DL (ref 70–99)
GLUCOSE SERPL-MCNC: 86 MG/DL (ref 70–99)
GLUCOSE SERPL-MCNC: 87 MG/DL (ref 70–99)
GLUCOSE SERPL-MCNC: 87 MG/DL (ref 70–99)
GLUCOSE SERPL-MCNC: 90 MG/DL (ref 70–99)
GLUCOSE SERPL-MCNC: 95 MG/DL (ref 70–99)
GLUCOSE SERPL-MCNC: 96 MG/DL (ref 70–99)
GLUCOSE UR STRIP-MCNC: NEGATIVE MG/DL
HCT VFR BLD AUTO: 30.2 % (ref 35–47)
HCT VFR BLD AUTO: 32.2 % (ref 35–47)
HCT VFR BLD AUTO: 32.6 % (ref 35–47)
HCT VFR BLD AUTO: 33.8 % (ref 35–47)
HCT VFR BLD AUTO: 34.7 % (ref 35–47)
HCT VFR BLD AUTO: 34.7 % (ref 35–47)
HCT VFR BLD AUTO: 34.8 % (ref 35–47)
HCT VFR BLD AUTO: 36 % (ref 35–47)
HCT VFR BLD AUTO: 36.3 % (ref 35–47)
HCT VFR BLD AUTO: 36.6 % (ref 35–47)
HCT VFR BLD AUTO: 36.6 % (ref 35–47)
HCT VFR BLD AUTO: 36.9 % (ref 35–47)
HGB BLD-MCNC: 10.2 G/DL (ref 11.7–15.7)
HGB BLD-MCNC: 10.4 G/DL (ref 11.7–15.7)
HGB BLD-MCNC: 10.6 G/DL (ref 11.7–15.7)
HGB BLD-MCNC: 10.8 G/DL (ref 11.7–15.7)
HGB BLD-MCNC: 10.8 G/DL (ref 11.7–15.7)
HGB BLD-MCNC: 11 G/DL (ref 11.7–15.7)
HGB BLD-MCNC: 11.3 G/DL (ref 11.7–15.7)
HGB BLD-MCNC: 11.3 G/DL (ref 11.7–15.7)
HGB BLD-MCNC: 11.4 G/DL (ref 11.7–15.7)
HGB BLD-MCNC: 11.5 G/DL (ref 11.7–15.7)
HGB BLD-MCNC: 11.8 G/DL (ref 11.7–15.7)
HGB BLD-MCNC: 9.6 G/DL (ref 11.7–15.7)
HGB UR QL STRIP: NEGATIVE
IMM GRANULOCYTES # BLD: 0 10E9/L (ref 0–0.4)
IMM GRANULOCYTES NFR BLD: 0 %
IMM GRANULOCYTES NFR BLD: 0 %
IMM GRANULOCYTES NFR BLD: 0.2 %
IMM GRANULOCYTES NFR BLD: 0.3 %
IMM GRANULOCYTES NFR BLD: 0.4 %
IMM GRANULOCYTES NFR BLD: 0.5 %
IMM GRANULOCYTES NFR BLD: 0.6 %
IMM GRANULOCYTES NFR BLD: 1.1 %
KETONES UR STRIP-MCNC: 10 MG/DL
LACTATE BLD-SCNC: 0.9 MMOL/L (ref 0.7–2)
LACTATE BLD-SCNC: 1.6 MMOL/L (ref 0.7–2)
LEUKOCYTE ESTERASE UR QL STRIP: NEGATIVE
LIPASE SERPL-CCNC: 114 U/L (ref 73–393)
LIPASE SERPL-CCNC: 133 U/L (ref 73–393)
LYMPHOCYTES # BLD AUTO: 0.8 10E9/L (ref 0.8–5.3)
LYMPHOCYTES # BLD AUTO: 1.2 10E9/L (ref 0.8–5.3)
LYMPHOCYTES # BLD AUTO: 1.3 10E9/L (ref 0.8–5.3)
LYMPHOCYTES # BLD AUTO: 1.3 10E9/L (ref 0.8–5.3)
LYMPHOCYTES # BLD AUTO: 1.4 10E9/L (ref 0.8–5.3)
LYMPHOCYTES # BLD AUTO: 1.7 10E9/L (ref 0.8–5.3)
LYMPHOCYTES # BLD AUTO: 1.9 10E9/L (ref 0.8–5.3)
LYMPHOCYTES # BLD AUTO: 1.9 10E9/L (ref 0.8–5.3)
LYMPHOCYTES # BLD AUTO: 2 10E9/L (ref 0.8–5.3)
LYMPHOCYTES # BLD AUTO: 2.4 10E9/L (ref 0.8–5.3)
LYMPHOCYTES NFR BLD AUTO: 12.6 %
LYMPHOCYTES NFR BLD AUTO: 16.3 %
LYMPHOCYTES NFR BLD AUTO: 20 %
LYMPHOCYTES NFR BLD AUTO: 25.8 %
LYMPHOCYTES NFR BLD AUTO: 29.8 %
LYMPHOCYTES NFR BLD AUTO: 30 %
LYMPHOCYTES NFR BLD AUTO: 31.1 %
LYMPHOCYTES NFR BLD AUTO: 32.2 %
LYMPHOCYTES NFR BLD AUTO: 35 %
LYMPHOCYTES NFR BLD AUTO: 38.4 %
LYMPHOCYTES NFR BLD AUTO: 39.2 %
LYMPHOCYTES NFR BLD AUTO: 41.1 %
Lab: ABNORMAL
MCH RBC QN AUTO: 27.8 PG (ref 26.5–33)
MCH RBC QN AUTO: 28 PG (ref 26.5–33)
MCH RBC QN AUTO: 28.2 PG (ref 26.5–33)
MCH RBC QN AUTO: 28.3 PG (ref 26.5–33)
MCH RBC QN AUTO: 28.3 PG (ref 26.5–33)
MCH RBC QN AUTO: 28.5 PG (ref 26.5–33)
MCH RBC QN AUTO: 28.6 PG (ref 26.5–33)
MCH RBC QN AUTO: 28.6 PG (ref 26.5–33)
MCH RBC QN AUTO: 29 PG (ref 26.5–33)
MCH RBC QN AUTO: 29.5 PG (ref 26.5–33)
MCH RBC QN AUTO: 29.8 PG (ref 26.5–33)
MCH RBC QN AUTO: 30 PG (ref 26.5–33)
MCHC RBC AUTO-ENTMCNC: 30.9 G/DL (ref 31.5–36.5)
MCHC RBC AUTO-ENTMCNC: 31 G/DL (ref 31.5–36.5)
MCHC RBC AUTO-ENTMCNC: 31.1 G/DL (ref 31.5–36.5)
MCHC RBC AUTO-ENTMCNC: 31.1 G/DL (ref 31.5–36.5)
MCHC RBC AUTO-ENTMCNC: 31.2 G/DL (ref 31.5–36.5)
MCHC RBC AUTO-ENTMCNC: 31.3 G/DL (ref 31.5–36.5)
MCHC RBC AUTO-ENTMCNC: 31.4 G/DL (ref 31.5–36.5)
MCHC RBC AUTO-ENTMCNC: 31.4 G/DL (ref 31.5–36.5)
MCHC RBC AUTO-ENTMCNC: 31.7 G/DL (ref 31.5–36.5)
MCHC RBC AUTO-ENTMCNC: 31.8 G/DL (ref 31.5–36.5)
MCHC RBC AUTO-ENTMCNC: 32.3 G/DL (ref 31.5–36.5)
MCHC RBC AUTO-ENTMCNC: 32.5 G/DL (ref 31.5–36.5)
MCV RBC AUTO: 88 FL (ref 78–100)
MCV RBC AUTO: 89 FL (ref 78–100)
MCV RBC AUTO: 89 FL (ref 78–100)
MCV RBC AUTO: 91 FL (ref 78–100)
MCV RBC AUTO: 91 FL (ref 78–100)
MCV RBC AUTO: 92 FL (ref 78–100)
MCV RBC AUTO: 93 FL (ref 78–100)
MCV RBC AUTO: 94 FL (ref 78–100)
MONOCYTES # BLD AUTO: 0.3 10E9/L (ref 0–1.3)
MONOCYTES # BLD AUTO: 0.4 10E9/L (ref 0–1.3)
MONOCYTES # BLD AUTO: 0.5 10E9/L (ref 0–1.3)
MONOCYTES # BLD AUTO: 0.6 10E9/L (ref 0–1.3)
MONOCYTES # BLD AUTO: 0.6 10E9/L (ref 0–1.3)
MONOCYTES # BLD AUTO: 0.7 10E9/L (ref 0–1.3)
MONOCYTES # BLD AUTO: 0.7 10E9/L (ref 0–1.3)
MONOCYTES # BLD AUTO: 0.8 10E9/L (ref 0–1.3)
MONOCYTES # BLD AUTO: 0.8 10E9/L (ref 0–1.3)
MONOCYTES NFR BLD AUTO: 11.1 %
MONOCYTES NFR BLD AUTO: 13.1 %
MONOCYTES NFR BLD AUTO: 14 %
MONOCYTES NFR BLD AUTO: 14.6 %
MONOCYTES NFR BLD AUTO: 15.8 %
MONOCYTES NFR BLD AUTO: 15.8 %
MONOCYTES NFR BLD AUTO: 6 %
MONOCYTES NFR BLD AUTO: 7.5 %
MONOCYTES NFR BLD AUTO: 8.1 %
MONOCYTES NFR BLD AUTO: 8.2 %
MONOCYTES NFR BLD AUTO: 8.5 %
MONOCYTES NFR BLD AUTO: 9.6 %
MUCOUS THREADS #/AREA URNS LPF: PRESENT /LPF
NEUTROPHILS # BLD AUTO: 1.5 10E9/L (ref 1.6–8.3)
NEUTROPHILS # BLD AUTO: 1.8 10E9/L (ref 1.6–8.3)
NEUTROPHILS # BLD AUTO: 2.1 10E9/L (ref 1.6–8.3)
NEUTROPHILS # BLD AUTO: 2.7 10E9/L (ref 1.6–8.3)
NEUTROPHILS # BLD AUTO: 2.8 10E9/L (ref 1.6–8.3)
NEUTROPHILS # BLD AUTO: 3 10E9/L (ref 1.6–8.3)
NEUTROPHILS # BLD AUTO: 3.1 10E9/L (ref 1.6–8.3)
NEUTROPHILS # BLD AUTO: 3.4 10E9/L (ref 1.6–8.3)
NEUTROPHILS # BLD AUTO: 3.8 10E9/L (ref 1.6–8.3)
NEUTROPHILS # BLD AUTO: 4.4 10E9/L (ref 1.6–8.3)
NEUTROPHILS # BLD AUTO: 4.9 10E9/L (ref 1.6–8.3)
NEUTROPHILS # BLD AUTO: 5.7 10E9/L (ref 1.6–8.3)
NEUTROPHILS NFR BLD AUTO: 42 %
NEUTROPHILS NFR BLD AUTO: 49.5 %
NEUTROPHILS NFR BLD AUTO: 50 %
NEUTROPHILS NFR BLD AUTO: 51.2 %
NEUTROPHILS NFR BLD AUTO: 54.2 %
NEUTROPHILS NFR BLD AUTO: 54.9 %
NEUTROPHILS NFR BLD AUTO: 56.8 %
NEUTROPHILS NFR BLD AUTO: 58.1 %
NEUTROPHILS NFR BLD AUTO: 61 %
NEUTROPHILS NFR BLD AUTO: 66.7 %
NEUTROPHILS NFR BLD AUTO: 75 %
NEUTROPHILS NFR BLD AUTO: 77.7 %
NITRATE UR QL: POSITIVE
NRBC # BLD AUTO: 0 10*3/UL
NRBC BLD AUTO-RTO: 0 /100
PH UR STRIP: 5.5 PH (ref 5–7)
PLATELET # BLD AUTO: 105 10E9/L (ref 150–450)
PLATELET # BLD AUTO: 112 10E9/L (ref 150–450)
PLATELET # BLD AUTO: 134 10E9/L (ref 150–450)
PLATELET # BLD AUTO: 135 10E9/L (ref 150–450)
PLATELET # BLD AUTO: 156 10E9/L (ref 150–450)
PLATELET # BLD AUTO: 177 10E9/L (ref 150–450)
PLATELET # BLD AUTO: 185 10E9/L (ref 150–450)
PLATELET # BLD AUTO: 66 10E9/L (ref 150–450)
PLATELET # BLD AUTO: 80 10E9/L (ref 150–450)
PLATELET # BLD AUTO: 83 10E9/L (ref 150–450)
PLATELET # BLD AUTO: 85 10E9/L (ref 150–450)
PLATELET # BLD AUTO: 91 10E9/L (ref 150–450)
PLATELET # BLD EST: ABNORMAL 10*3/UL
POTASSIUM SERPL-SCNC: 3.8 MMOL/L (ref 3.4–5.3)
POTASSIUM SERPL-SCNC: 4 MMOL/L (ref 3.4–5.3)
POTASSIUM SERPL-SCNC: 4 MMOL/L (ref 3.4–5.3)
POTASSIUM SERPL-SCNC: 4.2 MMOL/L (ref 3.4–5.3)
POTASSIUM SERPL-SCNC: 4.4 MMOL/L (ref 3.4–5.3)
POTASSIUM SERPL-SCNC: 4.5 MMOL/L (ref 3.4–5.3)
POTASSIUM SERPL-SCNC: 4.5 MMOL/L (ref 3.4–5.3)
POTASSIUM SERPL-SCNC: 4.6 MMOL/L (ref 3.4–5.3)
POTASSIUM SERPL-SCNC: 4.7 MMOL/L (ref 3.4–5.3)
POTASSIUM SERPL-SCNC: 4.8 MMOL/L (ref 3.4–5.3)
POTASSIUM SERPL-SCNC: 4.9 MMOL/L (ref 3.4–5.3)
PROT 24H UR-MRATE: 0.19 G/(24.H) (ref 0.04–0.23)
PROT SERPL-MCNC: 6.7 G/DL (ref 6.8–8.8)
PROT SERPL-MCNC: 6.7 G/DL (ref 6.8–8.8)
PROT SERPL-MCNC: 6.9 G/DL (ref 6.8–8.8)
PROT SERPL-MCNC: 7 G/DL (ref 6.8–8.8)
PROT SERPL-MCNC: 7.1 G/DL (ref 6.8–8.8)
PROT SERPL-MCNC: 7.3 G/DL (ref 6.8–8.8)
PROT SERPL-MCNC: 7.3 G/DL (ref 6.8–8.8)
PROT SERPL-MCNC: 7.5 G/DL (ref 6.8–8.8)
PROT UR-MCNC: 0.13 G/L
PROT/CREAT 24H UR: 0.25 G/G CR (ref 0–0.2)
RBC # BLD AUTO: 3.41 10E12/L (ref 3.8–5.2)
RBC # BLD AUTO: 3.46 10E12/L (ref 3.8–5.2)
RBC # BLD AUTO: 3.49 10E12/L (ref 3.8–5.2)
RBC # BLD AUTO: 3.72 10E12/L (ref 3.8–5.2)
RBC # BLD AUTO: 3.78 10E12/L (ref 3.8–5.2)
RBC # BLD AUTO: 3.78 10E12/L (ref 3.8–5.2)
RBC # BLD AUTO: 3.93 10E12/L (ref 3.8–5.2)
RBC # BLD AUTO: 3.95 10E12/L (ref 3.8–5.2)
RBC # BLD AUTO: 3.97 10E12/L (ref 3.8–5.2)
RBC # BLD AUTO: 3.99 10E12/L (ref 3.8–5.2)
RBC # BLD AUTO: 4.03 10E12/L (ref 3.8–5.2)
RBC # BLD AUTO: 4.03 10E12/L (ref 3.8–5.2)
RBC #/AREA URNS AUTO: 0 /HPF (ref 0–2)
RBC MORPH BLD: ABNORMAL
SODIUM SERPL-SCNC: 135 MMOL/L (ref 133–144)
SODIUM SERPL-SCNC: 136 MMOL/L (ref 133–144)
SODIUM SERPL-SCNC: 136 MMOL/L (ref 133–144)
SODIUM SERPL-SCNC: 137 MMOL/L (ref 133–144)
SODIUM SERPL-SCNC: 137 MMOL/L (ref 133–144)
SODIUM SERPL-SCNC: 138 MMOL/L (ref 133–144)
SODIUM SERPL-SCNC: 139 MMOL/L (ref 133–144)
SODIUM SERPL-SCNC: 139 MMOL/L (ref 133–144)
SODIUM SERPL-SCNC: 140 MMOL/L (ref 133–144)
SOURCE: ABNORMAL
SP GR UR STRIP: 1.05 (ref 1–1.03)
SPECIMEN SOURCE: ABNORMAL
SPECIMEN VOL UR: 1475 ML
SQUAMOUS #/AREA URNS AUTO: <1 /HPF (ref 0–1)
T4 FREE SERPL-MCNC: 1.35 NG/DL (ref 0.76–1.46)
TSH SERPL DL<=0.005 MIU/L-ACNC: 4.63 MU/L (ref 0.4–4)
UROBILINOGEN UR STRIP-MCNC: 2 MG/DL (ref 0–2)
WBC # BLD AUTO: 3.5 10E9/L (ref 4–11)
WBC # BLD AUTO: 3.5 10E9/L (ref 4–11)
WBC # BLD AUTO: 4.2 10E9/L (ref 4–11)
WBC # BLD AUTO: 4.8 10E9/L (ref 4–11)
WBC # BLD AUTO: 5.2 10E9/L (ref 4–11)
WBC # BLD AUTO: 5.2 10E9/L (ref 4–11)
WBC # BLD AUTO: 6.2 10E9/L (ref 4–11)
WBC # BLD AUTO: 6.3 10E9/L (ref 4–11)
WBC # BLD AUTO: 6.3 10E9/L (ref 4–11)
WBC # BLD AUTO: 6.4 10E9/L (ref 4–11)
WBC # BLD AUTO: 6.6 10E9/L (ref 4–11)
WBC # BLD AUTO: 7.5 10E9/L (ref 4–11)
WBC #/AREA URNS AUTO: 3 /HPF (ref 0–5)

## 2018-01-01 PROCEDURE — 96375 TX/PRO/DX INJ NEW DRUG ADDON: CPT

## 2018-01-01 PROCEDURE — 85025 COMPLETE CBC W/AUTO DIFF WBC: CPT | Performed by: PHYSICIAN ASSISTANT

## 2018-01-01 PROCEDURE — 96367 TX/PROPH/DG ADDL SEQ IV INF: CPT

## 2018-01-01 PROCEDURE — 83605 ASSAY OF LACTIC ACID: CPT | Performed by: EMERGENCY MEDICINE

## 2018-01-01 PROCEDURE — 96413 CHEMO IV INFUSION 1 HR: CPT

## 2018-01-01 PROCEDURE — 84439 ASSAY OF FREE THYROXINE: CPT | Performed by: INTERNAL MEDICINE

## 2018-01-01 PROCEDURE — 81003 URINALYSIS AUTO W/O SCOPE: CPT | Performed by: INTERNAL MEDICINE

## 2018-01-01 PROCEDURE — 96417 CHEMO IV INFUS EACH ADDL SEQ: CPT

## 2018-01-01 PROCEDURE — 25000128 H RX IP 250 OP 636: Mod: JW | Performed by: INTERNAL MEDICINE

## 2018-01-01 PROCEDURE — 25000128 H RX IP 250 OP 636: Performed by: PHYSICIAN ASSISTANT

## 2018-01-01 PROCEDURE — 40000863 ZZH STATISTIC RADIOLOGY XRAY, US, CT, MAR, NM

## 2018-01-01 PROCEDURE — 85025 COMPLETE CBC W/AUTO DIFF WBC: CPT | Performed by: INTERNAL MEDICINE

## 2018-01-01 PROCEDURE — A9270 NON-COVERED ITEM OR SERVICE: HCPCS | Mod: GY | Performed by: PHYSICIAN ASSISTANT

## 2018-01-01 PROCEDURE — 80053 COMPREHEN METABOLIC PANEL: CPT | Performed by: INTERNAL MEDICINE

## 2018-01-01 PROCEDURE — 25000132 ZZH RX MED GY IP 250 OP 250 PS 637: Mod: GY | Performed by: INTERNAL MEDICINE

## 2018-01-01 PROCEDURE — 99285 EMERGENCY DEPT VISIT HI MDM: CPT | Mod: 25

## 2018-01-01 PROCEDURE — 87186 SC STD MICRODIL/AGAR DIL: CPT | Performed by: HOSPITALIST

## 2018-01-01 PROCEDURE — 25000128 H RX IP 250 OP 636: Performed by: NURSE PRACTITIONER

## 2018-01-01 PROCEDURE — 25000128 H RX IP 250 OP 636: Performed by: INTERNAL MEDICINE

## 2018-01-01 PROCEDURE — G0498 CHEMO EXTEND IV INFUS W/PUMP: HCPCS

## 2018-01-01 PROCEDURE — 99214 OFFICE O/P EST MOD 30 MIN: CPT | Performed by: INTERNAL MEDICINE

## 2018-01-01 PROCEDURE — 99214 OFFICE O/P EST MOD 30 MIN: CPT | Performed by: NURSE PRACTITIONER

## 2018-01-01 PROCEDURE — 80053 COMPREHEN METABOLIC PANEL: CPT | Performed by: EMERGENCY MEDICINE

## 2018-01-01 PROCEDURE — 97110 THERAPEUTIC EXERCISES: CPT | Mod: GP | Performed by: PHYSICAL THERAPIST

## 2018-01-01 PROCEDURE — 96374 THER/PROPH/DIAG INJ IV PUSH: CPT

## 2018-01-01 PROCEDURE — 25000132 ZZH RX MED GY IP 250 OP 250 PS 637: Mod: GY | Performed by: PHYSICIAN ASSISTANT

## 2018-01-01 PROCEDURE — 83690 ASSAY OF LIPASE: CPT | Performed by: EMERGENCY MEDICINE

## 2018-01-01 PROCEDURE — 96415 CHEMO IV INFUSION ADDL HR: CPT

## 2018-01-01 PROCEDURE — 36591 DRAW BLOOD OFF VENOUS DEVICE: CPT

## 2018-01-01 PROCEDURE — 84156 ASSAY OF PROTEIN URINE: CPT | Performed by: INTERNAL MEDICINE

## 2018-01-01 PROCEDURE — 12000000 ZZH R&B MED SURG/OB

## 2018-01-01 PROCEDURE — A9270 NON-COVERED ITEM OR SERVICE: HCPCS | Mod: GY | Performed by: INTERNAL MEDICINE

## 2018-01-01 PROCEDURE — 81001 URINALYSIS AUTO W/SCOPE: CPT | Performed by: PHYSICIAN ASSISTANT

## 2018-01-01 PROCEDURE — 25000128 H RX IP 250 OP 636: Performed by: EMERGENCY MEDICINE

## 2018-01-01 PROCEDURE — 74177 CT ABD & PELVIS W/CONTRAST: CPT

## 2018-01-01 PROCEDURE — G0463 HOSPITAL OUTPT CLINIC VISIT: HCPCS

## 2018-01-01 PROCEDURE — 85025 COMPLETE CBC W/AUTO DIFF WBC: CPT | Performed by: NURSE PRACTITIONER

## 2018-01-01 PROCEDURE — 99221 1ST HOSP IP/OBS SF/LOW 40: CPT | Performed by: INTERNAL MEDICINE

## 2018-01-01 PROCEDURE — 99215 OFFICE O/P EST HI 40 MIN: CPT | Performed by: INTERNAL MEDICINE

## 2018-01-01 PROCEDURE — 85025 COMPLETE CBC W/AUTO DIFF WBC: CPT | Performed by: EMERGENCY MEDICINE

## 2018-01-01 PROCEDURE — 40000719 ZZHC STATISTIC PT DEPARTMENT NEURO VISIT: Performed by: PHYSICAL THERAPIST

## 2018-01-01 PROCEDURE — 40000893 ZZH STATISTIC PT IP EVAL DEFER

## 2018-01-01 PROCEDURE — 25000125 ZZHC RX 250: Performed by: EMERGENCY MEDICINE

## 2018-01-01 PROCEDURE — 36415 COLL VENOUS BLD VENIPUNCTURE: CPT | Performed by: PHYSICIAN ASSISTANT

## 2018-01-01 PROCEDURE — 25000132 ZZH RX MED GY IP 250 OP 250 PS 637: Mod: GY | Performed by: HOSPITALIST

## 2018-01-01 PROCEDURE — 99232 SBSQ HOSP IP/OBS MODERATE 35: CPT | Performed by: INTERNAL MEDICINE

## 2018-01-01 PROCEDURE — 80048 BASIC METABOLIC PNL TOTAL CA: CPT | Performed by: PHYSICIAN ASSISTANT

## 2018-01-01 PROCEDURE — 70491 CT SOFT TISSUE NECK W/DYE: CPT

## 2018-01-01 PROCEDURE — G8979 MOBILITY GOAL STATUS: HCPCS | Mod: GP,CJ | Performed by: PHYSICAL THERAPIST

## 2018-01-01 PROCEDURE — 99223 1ST HOSP IP/OBS HIGH 75: CPT | Mod: AI | Performed by: PHYSICIAN ASSISTANT

## 2018-01-01 PROCEDURE — 99207 C MD CERTIFICATION HHA PATIENT: CPT | Performed by: INTERNAL MEDICINE

## 2018-01-01 PROCEDURE — 87088 URINE BACTERIA CULTURE: CPT | Performed by: HOSPITALIST

## 2018-01-01 PROCEDURE — 74178 CT ABD&PLV WO CNTR FLWD CNTR: CPT

## 2018-01-01 PROCEDURE — G8978 MOBILITY CURRENT STATUS: HCPCS | Mod: GP,CJ | Performed by: PHYSICAL THERAPIST

## 2018-01-01 PROCEDURE — 96361 HYDRATE IV INFUSION ADD-ON: CPT

## 2018-01-01 PROCEDURE — 87086 URINE CULTURE/COLONY COUNT: CPT | Performed by: HOSPITALIST

## 2018-01-01 PROCEDURE — 40000859 ZZH STATISTIC IV OP-OVERFLOW

## 2018-01-01 PROCEDURE — G0463 HOSPITAL OUTPT CLINIC VISIT: HCPCS | Mod: 25

## 2018-01-01 PROCEDURE — 25000125 ZZHC RX 250: Performed by: INTERNAL MEDICINE

## 2018-01-01 PROCEDURE — 99223 1ST HOSP IP/OBS HIGH 75: CPT | Performed by: HOSPITALIST

## 2018-01-01 PROCEDURE — A9270 NON-COVERED ITEM OR SERVICE: HCPCS | Mod: GY | Performed by: HOSPITALIST

## 2018-01-01 PROCEDURE — 96365 THER/PROPH/DIAG IV INF INIT: CPT | Mod: 59

## 2018-01-01 PROCEDURE — 99207 ZZC CDG-MDM COMPONENT: MEETS MODERATE - UP CODED: CPT | Performed by: INTERNAL MEDICINE

## 2018-01-01 PROCEDURE — 84443 ASSAY THYROID STIM HORMONE: CPT | Performed by: INTERNAL MEDICINE

## 2018-01-01 PROCEDURE — 97140 MANUAL THERAPY 1/> REGIONS: CPT | Mod: GP | Performed by: PHYSICAL THERAPIST

## 2018-01-01 PROCEDURE — 81050 URINALYSIS VOLUME MEASURE: CPT | Performed by: INTERNAL MEDICINE

## 2018-01-01 PROCEDURE — 25000128 H RX IP 250 OP 636: Performed by: RADIOLOGY

## 2018-01-01 PROCEDURE — 81003 URINALYSIS AUTO W/O SCOPE: CPT | Performed by: NURSE PRACTITIONER

## 2018-01-01 PROCEDURE — 97161 PT EVAL LOW COMPLEX 20 MIN: CPT | Mod: GP | Performed by: PHYSICAL THERAPIST

## 2018-01-01 PROCEDURE — 96523 IRRIG DRUG DELIVERY DEVICE: CPT

## 2018-01-01 PROCEDURE — 99233 SBSQ HOSP IP/OBS HIGH 50: CPT | Performed by: HOSPITALIST

## 2018-01-01 PROCEDURE — 82378 CARCINOEMBRYONIC ANTIGEN: CPT | Performed by: INTERNAL MEDICINE

## 2018-01-01 PROCEDURE — 80053 COMPREHEN METABOLIC PANEL: CPT | Performed by: NURSE PRACTITIONER

## 2018-01-01 PROCEDURE — 99239 HOSP IP/OBS DSCHRG MGMT >30: CPT | Performed by: INTERNAL MEDICINE

## 2018-01-01 RX ORDER — MEPERIDINE HYDROCHLORIDE 25 MG/ML
25 INJECTION INTRAMUSCULAR; INTRAVENOUS; SUBCUTANEOUS EVERY 30 MIN PRN
Status: CANCELLED | OUTPATIENT
Start: 2018-01-01

## 2018-01-01 RX ORDER — LORAZEPAM 2 MG/ML
0.5 INJECTION INTRAMUSCULAR EVERY 4 HOURS PRN
Status: CANCELLED
Start: 2018-01-01

## 2018-01-01 RX ORDER — ONDANSETRON 2 MG/ML
8 INJECTION INTRAMUSCULAR; INTRAVENOUS ONCE
Status: COMPLETED | OUTPATIENT
Start: 2018-01-01 | End: 2018-01-01

## 2018-01-01 RX ORDER — HEPARIN SODIUM (PORCINE) LOCK FLUSH IV SOLN 100 UNIT/ML 100 UNIT/ML
500 SOLUTION INTRAVENOUS EVERY 8 HOURS
Status: DISCONTINUED | OUTPATIENT
Start: 2018-01-01 | End: 2018-01-01 | Stop reason: HOSPADM

## 2018-01-01 RX ORDER — SODIUM CHLORIDE 9 MG/ML
1000 INJECTION, SOLUTION INTRAVENOUS CONTINUOUS PRN
Status: CANCELLED
Start: 2018-01-01

## 2018-01-01 RX ORDER — LIDOCAINE 40 MG/G
CREAM TOPICAL
Status: DISCONTINUED | OUTPATIENT
Start: 2018-01-01 | End: 2018-01-01 | Stop reason: HOSPADM

## 2018-01-01 RX ORDER — LEVOTHYROXINE SODIUM 88 UG/1
88 TABLET ORAL DAILY
Status: DISCONTINUED | OUTPATIENT
Start: 2018-01-01 | End: 2018-01-01 | Stop reason: HOSPADM

## 2018-01-01 RX ORDER — HEPARIN SODIUM (PORCINE) LOCK FLUSH IV SOLN 100 UNIT/ML 100 UNIT/ML
500 SOLUTION INTRAVENOUS EVERY 8 HOURS
Status: CANCELLED
Start: 2018-01-01

## 2018-01-01 RX ORDER — DIPHENHYDRAMINE HYDROCHLORIDE 50 MG/ML
50 INJECTION INTRAMUSCULAR; INTRAVENOUS
Status: CANCELLED
Start: 2018-01-01

## 2018-01-01 RX ORDER — PROCHLORPERAZINE 25 MG
12.5 SUPPOSITORY, RECTAL RECTAL EVERY 12 HOURS PRN
Status: DISCONTINUED | OUTPATIENT
Start: 2018-01-01 | End: 2018-01-01 | Stop reason: HOSPADM

## 2018-01-01 RX ORDER — IOPAMIDOL 755 MG/ML
55 INJECTION, SOLUTION INTRAVASCULAR ONCE
Status: COMPLETED | OUTPATIENT
Start: 2018-01-01 | End: 2018-01-01

## 2018-01-01 RX ORDER — EPINEPHRINE 1 MG/ML
0.3 INJECTION, SOLUTION INTRAMUSCULAR; SUBCUTANEOUS EVERY 5 MIN PRN
Status: CANCELLED | OUTPATIENT
Start: 2018-01-01

## 2018-01-01 RX ORDER — ALBUTEROL SULFATE 0.83 MG/ML
2.5 SOLUTION RESPIRATORY (INHALATION)
Status: CANCELLED | OUTPATIENT
Start: 2018-01-01

## 2018-01-01 RX ORDER — ONDANSETRON 4 MG/1
4 TABLET, ORALLY DISINTEGRATING ORAL EVERY 6 HOURS PRN
Status: DISCONTINUED | OUTPATIENT
Start: 2018-01-01 | End: 2018-01-01 | Stop reason: HOSPADM

## 2018-01-01 RX ORDER — LEVOTHYROXINE SODIUM 88 UG/1
88 TABLET ORAL DAILY
Qty: 30 TABLET | Refills: 2 | Status: SHIPPED | OUTPATIENT
Start: 2018-01-01 | End: 2018-01-01

## 2018-01-01 RX ORDER — ATROPINE SULFATE 10 MG/ML
2-4 SOLUTION/ DROPS OPHTHALMIC
Qty: 5 ML | Refills: 1 | Status: SHIPPED | OUTPATIENT
Start: 2018-01-01

## 2018-01-01 RX ORDER — OXYCODONE HYDROCHLORIDE 5 MG/1
5-10 TABLET ORAL
Status: DISCONTINUED | OUTPATIENT
Start: 2018-01-01 | End: 2018-01-01

## 2018-01-01 RX ORDER — ONDANSETRON 2 MG/ML
8 INJECTION INTRAMUSCULAR; INTRAVENOUS ONCE
Status: CANCELLED
Start: 2018-01-01 | End: 2018-01-01

## 2018-01-01 RX ORDER — SODIUM CHLORIDE 9 MG/ML
INJECTION, SOLUTION INTRAVENOUS CONTINUOUS
Status: DISCONTINUED | OUTPATIENT
Start: 2018-01-01 | End: 2018-01-01

## 2018-01-01 RX ORDER — OXYCODONE HYDROCHLORIDE 5 MG/1
5 TABLET ORAL EVERY 6 HOURS PRN
Qty: 60 TABLET | Refills: 0 | Status: ON HOLD | OUTPATIENT
Start: 2018-01-01 | End: 2018-01-01

## 2018-01-01 RX ORDER — METHYLPREDNISOLONE SODIUM SUCCINATE 125 MG/2ML
125 INJECTION, POWDER, LYOPHILIZED, FOR SOLUTION INTRAMUSCULAR; INTRAVENOUS
Status: CANCELLED
Start: 2018-01-01

## 2018-01-01 RX ORDER — ALBUTEROL SULFATE 90 UG/1
1-2 AEROSOL, METERED RESPIRATORY (INHALATION)
Status: CANCELLED
Start: 2018-01-01

## 2018-01-01 RX ORDER — ONDANSETRON 2 MG/ML
4 INJECTION INTRAMUSCULAR; INTRAVENOUS EVERY 6 HOURS PRN
Status: DISCONTINUED | OUTPATIENT
Start: 2018-01-01 | End: 2018-01-01 | Stop reason: HOSPADM

## 2018-01-01 RX ORDER — MORPHINE SULFATE 15 MG/1
15 TABLET, FILM COATED, EXTENDED RELEASE ORAL EVERY 12 HOURS SCHEDULED
Status: DISCONTINUED | OUTPATIENT
Start: 2018-01-01 | End: 2018-01-01 | Stop reason: HOSPADM

## 2018-01-01 RX ORDER — ACETAMINOPHEN 325 MG/1
650 TABLET ORAL EVERY 4 HOURS PRN
Status: DISCONTINUED | OUTPATIENT
Start: 2018-01-01 | End: 2018-01-01

## 2018-01-01 RX ORDER — NALOXONE HYDROCHLORIDE 0.4 MG/ML
.1-.4 INJECTION, SOLUTION INTRAMUSCULAR; INTRAVENOUS; SUBCUTANEOUS
Status: DISCONTINUED | OUTPATIENT
Start: 2018-01-01 | End: 2018-01-01 | Stop reason: HOSPADM

## 2018-01-01 RX ORDER — SULFAMETHOXAZOLE/TRIMETHOPRIM 800-160 MG
1 TABLET ORAL 2 TIMES DAILY
Status: DISCONTINUED | OUTPATIENT
Start: 2018-01-01 | End: 2018-01-01 | Stop reason: HOSPADM

## 2018-01-01 RX ORDER — BISACODYL 10 MG
10 SUPPOSITORY, RECTAL RECTAL DAILY
Status: DISCONTINUED | OUTPATIENT
Start: 2018-01-01 | End: 2018-01-01 | Stop reason: HOSPADM

## 2018-01-01 RX ORDER — BISACODYL 10 MG
SUPPOSITORY, RECTAL RECTAL
Qty: 12 SUPPOSITORY | Refills: 1 | DISCHARGE
Start: 2018-01-01

## 2018-01-01 RX ORDER — SODIUM CHLORIDE 9 MG/ML
INJECTION, SOLUTION INTRAVENOUS CONTINUOUS
Status: DISCONTINUED | OUTPATIENT
Start: 2018-01-01 | End: 2018-01-01 | Stop reason: HOSPADM

## 2018-01-01 RX ORDER — ONDANSETRON 4 MG/1
4 TABLET, ORALLY DISINTEGRATING ORAL EVERY 6 HOURS PRN
Qty: 10 TABLET | Refills: 0 | Status: SHIPPED | OUTPATIENT
Start: 2018-01-01 | End: 2018-01-01

## 2018-01-01 RX ORDER — POLYETHYLENE GLYCOL 3350 17 G/17G
17 POWDER, FOR SOLUTION ORAL DAILY PRN
Status: DISCONTINUED | OUTPATIENT
Start: 2018-01-01 | End: 2018-01-01 | Stop reason: HOSPADM

## 2018-01-01 RX ORDER — POLYETHYLENE GLYCOL 3350 17 G/17G
1 POWDER, FOR SOLUTION ORAL DAILY
Qty: 510 G | Refills: 1 | Status: SHIPPED | OUTPATIENT
Start: 2018-01-01

## 2018-01-01 RX ORDER — LORAZEPAM 0.5 MG/1
.25-.5 TABLET ORAL EVERY 4 HOURS PRN
Qty: 30 TABLET | Refills: 1 | Status: SHIPPED | OUTPATIENT
Start: 2018-01-01

## 2018-01-01 RX ORDER — IOPAMIDOL 755 MG/ML
48 INJECTION, SOLUTION INTRAVASCULAR ONCE
Status: COMPLETED | OUTPATIENT
Start: 2018-01-01 | End: 2018-01-01

## 2018-01-01 RX ORDER — PROCHLORPERAZINE MALEATE 5 MG
5 TABLET ORAL EVERY 6 HOURS PRN
Status: DISCONTINUED | OUTPATIENT
Start: 2018-01-01 | End: 2018-01-01 | Stop reason: HOSPADM

## 2018-01-01 RX ORDER — LORAZEPAM 0.5 MG/1
0.5 TABLET ORAL EVERY 4 HOURS PRN
Qty: 30 TABLET | Refills: 2 | Status: ON HOLD | OUTPATIENT
Start: 2018-01-01 | End: 2018-01-01

## 2018-01-01 RX ORDER — HEPARIN SODIUM,PORCINE 10 UNIT/ML
5-10 VIAL (ML) INTRAVENOUS EVERY 24 HOURS
Status: DISCONTINUED | OUTPATIENT
Start: 2018-01-01 | End: 2018-01-01 | Stop reason: HOSPADM

## 2018-01-01 RX ORDER — HYDROMORPHONE HYDROCHLORIDE 1 MG/ML
.3-.5 INJECTION, SOLUTION INTRAMUSCULAR; INTRAVENOUS; SUBCUTANEOUS
Status: DISCONTINUED | OUTPATIENT
Start: 2018-01-01 | End: 2018-01-01 | Stop reason: HOSPADM

## 2018-01-01 RX ORDER — EPINEPHRINE 0.3 MG/.3ML
0.3 INJECTION SUBCUTANEOUS EVERY 5 MIN PRN
Status: CANCELLED | OUTPATIENT
Start: 2018-01-01

## 2018-01-01 RX ORDER — AMOXICILLIN 250 MG
1 CAPSULE ORAL 2 TIMES DAILY
Qty: 60 TABLET | Refills: 1 | Status: ON HOLD | OUTPATIENT
Start: 2018-01-01 | End: 2018-01-01

## 2018-01-01 RX ORDER — OXYCODONE HYDROCHLORIDE 5 MG/1
10 TABLET ORAL
Qty: 20 TABLET | Refills: 0 | Status: SHIPPED | OUTPATIENT
Start: 2018-01-01

## 2018-01-01 RX ORDER — FERROUS SULFATE 325(65) MG
325 TABLET ORAL
Qty: 30 TABLET | Refills: 11 | Status: ON HOLD | OUTPATIENT
Start: 2018-01-01 | End: 2018-01-01

## 2018-01-01 RX ORDER — HEPARIN SODIUM,PORCINE 10 UNIT/ML
5-10 VIAL (ML) INTRAVENOUS
Status: DISCONTINUED | OUTPATIENT
Start: 2018-01-01 | End: 2018-01-01 | Stop reason: HOSPADM

## 2018-01-01 RX ORDER — SULFAMETHOXAZOLE/TRIMETHOPRIM 800-160 MG
1 TABLET ORAL 2 TIMES DAILY
Refills: 0 | DISCHARGE
Start: 2018-01-01

## 2018-01-01 RX ORDER — OXYCODONE HYDROCHLORIDE 5 MG/1
5-10 TABLET ORAL
Status: DISCONTINUED | OUTPATIENT
Start: 2018-01-01 | End: 2018-01-01 | Stop reason: HOSPADM

## 2018-01-01 RX ORDER — ACETAMINOPHEN 650 MG/1
650 SUPPOSITORY RECTAL EVERY 4 HOURS PRN
Qty: 12 SUPPOSITORY | Refills: 1 | DISCHARGE
Start: 2018-01-01

## 2018-01-01 RX ORDER — AMOXICILLIN 250 MG
2 CAPSULE ORAL 2 TIMES DAILY PRN
Status: DISCONTINUED | OUTPATIENT
Start: 2018-01-01 | End: 2018-01-01 | Stop reason: HOSPADM

## 2018-01-01 RX ORDER — HALOPERIDOL 0.5 MG/1
.5-1 TABLET ORAL EVERY 6 HOURS PRN
Qty: 30 TABLET | Refills: 1 | Status: SHIPPED | OUTPATIENT
Start: 2018-01-01

## 2018-01-01 RX ORDER — AMOXICILLIN 250 MG
1 CAPSULE ORAL 2 TIMES DAILY PRN
Status: DISCONTINUED | OUTPATIENT
Start: 2018-01-01 | End: 2018-01-01 | Stop reason: HOSPADM

## 2018-01-01 RX ORDER — ONDANSETRON 2 MG/ML
4 INJECTION INTRAMUSCULAR; INTRAVENOUS EVERY 6 HOURS PRN
Status: CANCELLED
Start: 2018-01-01 | End: 2019-07-19

## 2018-01-01 RX ORDER — BISACODYL 10 MG
10 SUPPOSITORY, RECTAL RECTAL DAILY PRN
Status: DISCONTINUED | OUTPATIENT
Start: 2018-01-01 | End: 2018-01-01

## 2018-01-01 RX ORDER — ZINC OXIDE 216 MG/ML
8 LOTION TOPICAL 3 TIMES DAILY
Status: DISCONTINUED | OUTPATIENT
Start: 2018-01-01 | End: 2018-01-01

## 2018-01-01 RX ORDER — HEPARIN SODIUM (PORCINE) LOCK FLUSH IV SOLN 100 UNIT/ML 100 UNIT/ML
500 SOLUTION INTRAVENOUS ONCE
Status: COMPLETED | OUTPATIENT
Start: 2018-01-01 | End: 2018-01-01

## 2018-01-01 RX ORDER — HEPARIN SODIUM (PORCINE) LOCK FLUSH IV SOLN 100 UNIT/ML 100 UNIT/ML
5 SOLUTION INTRAVENOUS
Status: DISCONTINUED | OUTPATIENT
Start: 2018-01-01 | End: 2018-01-01 | Stop reason: HOSPADM

## 2018-01-01 RX ORDER — SENNOSIDES 8.6 MG
1-2 TABLET ORAL 2 TIMES DAILY
Qty: 100 TABLET | Refills: 1 | DISCHARGE
Start: 2018-01-01

## 2018-01-01 RX ORDER — ONDANSETRON 8 MG/1
8 TABLET, FILM COATED ORAL EVERY 8 HOURS PRN
Qty: 10 TABLET | Refills: 2 | Status: ON HOLD | OUTPATIENT
Start: 2018-01-01 | End: 2018-01-01

## 2018-01-01 RX ORDER — LORAZEPAM 0.5 MG/1
0.5 TABLET ORAL EVERY 4 HOURS PRN
Qty: 30 TABLET | Refills: 2 | Status: SHIPPED | OUTPATIENT
Start: 2018-01-01 | End: 2018-01-01

## 2018-01-01 RX ORDER — SULFAMETHOXAZOLE/TRIMETHOPRIM 800-160 MG
1 TABLET ORAL 2 TIMES DAILY
Refills: 0 | DISCHARGE
Start: 2018-01-01 | End: 2018-01-01

## 2018-01-01 RX ORDER — ACETAMINOPHEN 325 MG/1
TABLET ORAL
Status: DISCONTINUED
Start: 2018-01-01 | End: 2018-01-01 | Stop reason: WASHOUT

## 2018-01-01 RX ORDER — POLYETHYLENE GLYCOL 3350 17 G/17G
17 POWDER, FOR SOLUTION ORAL DAILY
Status: DISCONTINUED | OUTPATIENT
Start: 2018-01-01 | End: 2018-01-01 | Stop reason: HOSPADM

## 2018-01-01 RX ORDER — PROCHLORPERAZINE MALEATE 10 MG
10 TABLET ORAL EVERY 6 HOURS PRN
Status: DISCONTINUED | OUTPATIENT
Start: 2018-01-01 | End: 2018-01-01

## 2018-01-01 RX ORDER — ONDANSETRON 2 MG/ML
4 INJECTION INTRAMUSCULAR; INTRAVENOUS ONCE
Status: COMPLETED | OUTPATIENT
Start: 2018-01-01 | End: 2018-01-01

## 2018-01-01 RX ORDER — ZINC OXIDE 216 MG/ML
8 LOTION TOPICAL 3 TIMES DAILY
Qty: 720 ML | Status: SHIPPED | OUTPATIENT
Start: 2018-01-01

## 2018-01-01 RX ORDER — HYDROMORPHONE HYDROCHLORIDE 1 MG/ML
0.5 INJECTION, SOLUTION INTRAMUSCULAR; INTRAVENOUS; SUBCUTANEOUS
Status: DISCONTINUED | OUTPATIENT
Start: 2018-01-01 | End: 2018-01-01

## 2018-01-01 RX ORDER — TRAVOPROST OPHTHALMIC SOLUTION 0.04 MG/ML
1 SOLUTION OPHTHALMIC AT BEDTIME
Qty: 5 ML | Refills: 11 | Status: SHIPPED | OUTPATIENT
Start: 2018-01-01

## 2018-01-01 RX ORDER — SALIVA STIMULANT COMB. NO.3
2 SPRAY, NON-AEROSOL (ML) MUCOUS MEMBRANE
Status: DISCONTINUED | OUTPATIENT
Start: 2018-01-01 | End: 2018-01-01 | Stop reason: HOSPADM

## 2018-01-01 RX ORDER — LORAZEPAM 0.5 MG/1
0.5 TABLET ORAL EVERY 4 HOURS PRN
Status: DISCONTINUED | OUTPATIENT
Start: 2018-01-01 | End: 2018-01-01 | Stop reason: HOSPADM

## 2018-01-01 RX ORDER — IOPAMIDOL 755 MG/ML
100 INJECTION, SOLUTION INTRAVASCULAR ONCE
Status: COMPLETED | OUTPATIENT
Start: 2018-01-01 | End: 2018-01-01

## 2018-01-01 RX ORDER — LEVOTHYROXINE SODIUM 75 UG/1
75 TABLET ORAL DAILY
Qty: 30 TABLET | Refills: 3 | Status: SHIPPED | OUTPATIENT
Start: 2018-01-01 | End: 2018-01-01 | Stop reason: DRUGHIGH

## 2018-01-01 RX ORDER — LOPERAMIDE HCL 2 MG
CAPSULE ORAL
Qty: 155 CAPSULE | Refills: 3 | Status: ON HOLD | OUTPATIENT
Start: 2018-01-01 | End: 2018-01-01

## 2018-01-01 RX ORDER — LEVOTHYROXINE SODIUM 88 UG/1
88 TABLET ORAL DAILY
Qty: 30 TABLET | Refills: 2 | Status: SHIPPED | OUTPATIENT
Start: 2018-01-01

## 2018-01-01 RX ORDER — LEVOTHYROXINE SODIUM 75 UG/1
75 TABLET ORAL DAILY
Qty: 30 TABLET | Refills: 3 | Status: CANCELLED | OUTPATIENT
Start: 2018-01-01

## 2018-01-01 RX ORDER — ACETAMINOPHEN 650 MG/1
650 SUPPOSITORY RECTAL EVERY 4 HOURS PRN
Status: DISCONTINUED | OUTPATIENT
Start: 2018-01-01 | End: 2018-01-01

## 2018-01-01 RX ORDER — AMOXICILLIN 250 MG
2-4 CAPSULE ORAL 2 TIMES DAILY
Status: DISCONTINUED | OUTPATIENT
Start: 2018-01-01 | End: 2018-01-01

## 2018-01-01 RX ORDER — AMOXICILLIN 250 MG
1 CAPSULE ORAL 2 TIMES DAILY
Status: DISCONTINUED | OUTPATIENT
Start: 2018-01-01 | End: 2018-01-01

## 2018-01-01 RX ORDER — PROCHLORPERAZINE MALEATE 10 MG
10 TABLET ORAL EVERY 6 HOURS PRN
Qty: 30 TABLET | Refills: 2 | Status: ON HOLD | OUTPATIENT
Start: 2018-01-01 | End: 2018-01-01

## 2018-01-01 RX ORDER — MORPHINE SULFATE 15 MG/1
15 TABLET, FILM COATED, EXTENDED RELEASE ORAL EVERY 12 HOURS
Qty: 30 TABLET | Refills: 0 | Status: SHIPPED | OUTPATIENT
Start: 2018-01-01

## 2018-01-01 RX ORDER — TRAVOPROST OPHTHALMIC SOLUTION 0.04 MG/ML
1 SOLUTION OPHTHALMIC AT BEDTIME
Status: DISCONTINUED | OUTPATIENT
Start: 2018-01-01 | End: 2018-01-01 | Stop reason: HOSPADM

## 2018-01-01 RX ADMIN — OXALIPLATIN 100 MG: 100 INJECTION, SOLUTION, CONCENTRATE INTRAVENOUS at 12:24

## 2018-01-01 RX ADMIN — DEXTROSE MONOHYDRATE 250 ML: 50 INJECTION, SOLUTION INTRAVENOUS at 12:35

## 2018-01-01 RX ADMIN — BEVACIZUMAB 260 MG: 100 INJECTION, SOLUTION INTRAVENOUS at 10:00

## 2018-01-01 RX ADMIN — MORPHINE SULFATE 15 MG: 15 TABLET, EXTENDED RELEASE ORAL at 22:21

## 2018-01-01 RX ADMIN — SODIUM CHLORIDE 250 ML: 9 INJECTION, SOLUTION INTRAVENOUS at 11:37

## 2018-01-01 RX ADMIN — SODIUM CHLORIDE, PRESERVATIVE FREE: 5 INJECTION INTRAVENOUS at 00:16

## 2018-01-01 RX ADMIN — DEXAMETHASONE SODIUM PHOSPHATE: 10 INJECTION, SOLUTION INTRAMUSCULAR; INTRAVENOUS at 09:48

## 2018-01-01 RX ADMIN — TRAVOPROST 1 DROP: 0.04 SOLUTION/ DROPS OPHTHALMIC at 22:25

## 2018-01-01 RX ADMIN — OXYCODONE HYDROCHLORIDE 10 MG: 5 TABLET ORAL at 21:03

## 2018-01-01 RX ADMIN — BEVACIZUMAB 250 MG: 100 INJECTION, SOLUTION INTRAVENOUS at 12:03

## 2018-01-01 RX ADMIN — BEVACIZUMAB 250 MG: 100 INJECTION, SOLUTION INTRAVENOUS at 12:44

## 2018-01-01 RX ADMIN — SODIUM CHLORIDE: 9 INJECTION, SOLUTION INTRAVENOUS at 04:58

## 2018-01-01 RX ADMIN — BEVACIZUMAB 300 MG: 100 INJECTION, SOLUTION INTRAVENOUS at 11:37

## 2018-01-01 RX ADMIN — DEXAMETHASONE SODIUM PHOSPHATE: 10 INJECTION, SOLUTION INTRAMUSCULAR; INTRAVENOUS at 11:17

## 2018-01-01 RX ADMIN — POLYETHYLENE GLYCOL 400 AND PROPYLENE GLYCOL 1 DROP: 4; 3 SOLUTION/ DROPS OPHTHALMIC at 23:15

## 2018-01-01 RX ADMIN — SODIUM CHLORIDE, PRESERVATIVE FREE: 5 INJECTION INTRAVENOUS at 23:22

## 2018-01-01 RX ADMIN — POLYETHYLENE GLYCOL 400 AND PROPYLENE GLYCOL 1 DROP: 4; 3 SOLUTION/ DROPS OPHTHALMIC at 14:14

## 2018-01-01 RX ADMIN — POLYETHYLENE GLYCOL 400 AND PROPYLENE GLYCOL 1 DROP: 4; 3 SOLUTION/ DROPS OPHTHALMIC at 17:22

## 2018-01-01 RX ADMIN — SODIUM CHLORIDE 250 ML: 9 INJECTION, SOLUTION INTRAVENOUS at 09:43

## 2018-01-01 RX ADMIN — POLYETHYLENE GLYCOL 400 AND PROPYLENE GLYCOL 1 DROP: 4; 3 SOLUTION/ DROPS OPHTHALMIC at 09:09

## 2018-01-01 RX ADMIN — BEVACIZUMAB 300 MG: 100 INJECTION, SOLUTION INTRAVENOUS at 11:53

## 2018-01-01 RX ADMIN — ONDANSETRON 4 MG: 2 INJECTION INTRAMUSCULAR; INTRAVENOUS at 16:41

## 2018-01-01 RX ADMIN — BISACODYL 10 MG: 10 SUPPOSITORY RECTAL at 08:10

## 2018-01-01 RX ADMIN — BISACODYL 10 MG: 10 SUPPOSITORY RECTAL at 09:25

## 2018-01-01 RX ADMIN — HEPARIN 500 UNITS: 100 SYRINGE at 12:24

## 2018-01-01 RX ADMIN — OXYCODONE HYDROCHLORIDE 10 MG: 5 TABLET ORAL at 04:20

## 2018-01-01 RX ADMIN — HEPARIN 500 UNITS: 100 SYRINGE at 12:15

## 2018-01-01 RX ADMIN — DEXTROSE MONOHYDRATE 250 ML: 50 INJECTION, SOLUTION INTRAVENOUS at 10:35

## 2018-01-01 RX ADMIN — POLYETHYLENE GLYCOL 3350 17 G: 17 POWDER, FOR SOLUTION ORAL at 09:08

## 2018-01-01 RX ADMIN — POLYETHYLENE GLYCOL 3350 17 G: 17 POWDER, FOR SOLUTION ORAL at 08:29

## 2018-01-01 RX ADMIN — B-COMPLEX W/ C & FOLIC ACID TAB 1 TABLET: TAB at 09:08

## 2018-01-01 RX ADMIN — SODIUM CHLORIDE, PRESERVATIVE FREE: 5 INJECTION INTRAVENOUS at 14:19

## 2018-01-01 RX ADMIN — OXYCODONE HYDROCHLORIDE 5 MG: 5 TABLET ORAL at 05:21

## 2018-01-01 RX ADMIN — ONDANSETRON HYDROCHLORIDE 8 MG: 2 INJECTION, SOLUTION INTRAVENOUS at 09:43

## 2018-01-01 RX ADMIN — OXALIPLATIN 132 MG: 100 INJECTION, SOLUTION, CONCENTRATE INTRAVENOUS at 12:36

## 2018-01-01 RX ADMIN — SODIUM CHLORIDE, PRESERVATIVE FREE 500 UNITS: 5 INJECTION INTRAVENOUS at 11:00

## 2018-01-01 RX ADMIN — SODIUM CHLORIDE 250 ML: 9 INJECTION, SOLUTION INTRAVENOUS at 11:36

## 2018-01-01 RX ADMIN — TRAVOPROST 1 DROP: 0.04 SOLUTION/ DROPS OPHTHALMIC at 23:15

## 2018-01-01 RX ADMIN — IOPAMIDOL 100 ML: 755 INJECTION, SOLUTION INTRAVENOUS at 17:40

## 2018-01-01 RX ADMIN — POLYETHYLENE GLYCOL 3350 17 G: 17 POWDER, FOR SOLUTION ORAL at 09:26

## 2018-01-01 RX ADMIN — BEVACIZUMAB 250 MG: 100 INJECTION, SOLUTION INTRAVENOUS at 11:34

## 2018-01-01 RX ADMIN — SULFAMETHOXAZOLE AND TRIMETHOPRIM 1 TABLET: 800; 160 TABLET ORAL at 22:21

## 2018-01-01 RX ADMIN — OXYCODONE HYDROCHLORIDE 10 MG: 5 TABLET ORAL at 17:26

## 2018-01-01 RX ADMIN — OXYCODONE HYDROCHLORIDE 5 MG: 5 TABLET ORAL at 09:52

## 2018-01-01 RX ADMIN — OXYCODONE HYDROCHLORIDE 10 MG: 5 TABLET ORAL at 16:05

## 2018-01-01 RX ADMIN — LEVOTHYROXINE SODIUM 88 MCG: 88 TABLET ORAL at 09:08

## 2018-01-01 RX ADMIN — SODIUM CHLORIDE, PRESERVATIVE FREE 500 UNITS: 5 INJECTION INTRAVENOUS at 15:42

## 2018-01-01 RX ADMIN — DEXAMETHASONE SODIUM PHOSPHATE: 10 INJECTION, SOLUTION INTRAMUSCULAR; INTRAVENOUS at 11:31

## 2018-01-01 RX ADMIN — HEPARIN 500 UNITS: 100 SYRINGE at 12:40

## 2018-01-01 RX ADMIN — Medication 0.5 MG: at 21:16

## 2018-01-01 RX ADMIN — SODIUM CHLORIDE, PRESERVATIVE FREE: 5 INJECTION INTRAVENOUS at 02:32

## 2018-01-01 RX ADMIN — OXALIPLATIN 66 MG: 100 INJECTION, SOLUTION, CONCENTRATE INTRAVENOUS at 12:33

## 2018-01-01 RX ADMIN — OXYCODONE HYDROCHLORIDE 10 MG: 5 TABLET ORAL at 11:23

## 2018-01-01 RX ADMIN — BEVACIZUMAB 300 MG: 100 INJECTION, SOLUTION INTRAVENOUS at 11:38

## 2018-01-01 RX ADMIN — DEXTROSE MONOHYDRATE 250 ML: 50 INJECTION, SOLUTION INTRAVENOUS at 12:36

## 2018-01-01 RX ADMIN — HEPARIN 500 UNITS: 100 SYRINGE at 12:27

## 2018-01-01 RX ADMIN — IOPAMIDOL 48 ML: 755 INJECTION, SOLUTION INTRAVENOUS at 18:30

## 2018-01-01 RX ADMIN — SODIUM CHLORIDE 250 ML: 9 INJECTION, SOLUTION INTRAVENOUS at 11:53

## 2018-01-01 RX ADMIN — OXYCODONE HYDROCHLORIDE 10 MG: 5 TABLET ORAL at 13:06

## 2018-01-01 RX ADMIN — OXYCODONE HYDROCHLORIDE 5 MG: 5 TABLET ORAL at 10:46

## 2018-01-01 RX ADMIN — OXALIPLATIN 100 MG: 100 INJECTION, SOLUTION, CONCENTRATE INTRAVENOUS at 13:20

## 2018-01-01 RX ADMIN — Medication 5 ML: at 09:34

## 2018-01-01 RX ADMIN — BEVACIZUMAB 250 MG: 100 INJECTION, SOLUTION INTRAVENOUS at 11:46

## 2018-01-01 RX ADMIN — TRAVOPROST 1 DROP: 0.04 SOLUTION/ DROPS OPHTHALMIC at 23:23

## 2018-01-01 RX ADMIN — SENNOSIDES AND DOCUSATE SODIUM 2 TABLET: 8.6; 5 TABLET ORAL at 09:08

## 2018-01-01 RX ADMIN — FOLIC ACID: 5 INJECTION, SOLUTION INTRAMUSCULAR; INTRAVENOUS; SUBCUTANEOUS at 17:12

## 2018-01-01 RX ADMIN — SODIUM CHLORIDE 1000 ML: 9 INJECTION, SOLUTION INTRAVENOUS at 13:20

## 2018-01-01 RX ADMIN — SULFAMETHOXAZOLE AND TRIMETHOPRIM 1 TABLET: 800; 160 TABLET ORAL at 09:25

## 2018-01-01 RX ADMIN — SODIUM CHLORIDE 250 ML: 9 INJECTION, SOLUTION INTRAVENOUS at 11:44

## 2018-01-01 RX ADMIN — SULFAMETHOXAZOLE AND TRIMETHOPRIM 1 TABLET: 800; 160 TABLET ORAL at 13:15

## 2018-01-01 RX ADMIN — SULFAMETHOXAZOLE AND TRIMETHOPRIM 1 TABLET: 800; 160 TABLET ORAL at 08:10

## 2018-01-01 RX ADMIN — OXYCODONE HYDROCHLORIDE 10 MG: 5 TABLET ORAL at 09:23

## 2018-01-01 RX ADMIN — B-COMPLEX W/ C & FOLIC ACID TAB 1 TABLET: TAB at 08:10

## 2018-01-01 RX ADMIN — SULFAMETHOXAZOLE AND TRIMETHOPRIM 1 TABLET: 800; 160 TABLET ORAL at 20:24

## 2018-01-01 RX ADMIN — HEPARIN 500 UNITS: 100 SYRINGE at 12:34

## 2018-01-01 RX ADMIN — DEXAMETHASONE SODIUM PHOSPHATE: 10 INJECTION, SOLUTION INTRAMUSCULAR; INTRAVENOUS at 11:40

## 2018-01-01 RX ADMIN — LORAZEPAM 0.5 MG: 2 INJECTION INTRAMUSCULAR; INTRAVENOUS at 11:36

## 2018-01-01 RX ADMIN — OXYCODONE HYDROCHLORIDE 5 MG: 5 TABLET ORAL at 22:25

## 2018-01-01 RX ADMIN — LEVOTHYROXINE SODIUM 88 MCG: 88 TABLET ORAL at 09:25

## 2018-01-01 RX ADMIN — SODIUM CHLORIDE, PRESERVATIVE FREE 500 UNITS: 5 INJECTION INTRAVENOUS at 18:43

## 2018-01-01 RX ADMIN — BISACODYL 10 MG: 10 SUPPOSITORY RECTAL at 13:15

## 2018-01-01 RX ADMIN — POLYETHYLENE GLYCOL 3350 17 G: 17 POWDER, FOR SOLUTION ORAL at 08:11

## 2018-01-01 RX ADMIN — SODIUM CHLORIDE: 9 INJECTION, SOLUTION INTRAVENOUS at 15:59

## 2018-01-01 RX ADMIN — Medication 0.5 MG: at 18:11

## 2018-01-01 RX ADMIN — DEXTROSE MONOHYDRATE 250 ML: 50 INJECTION, SOLUTION INTRAVENOUS at 12:29

## 2018-01-01 RX ADMIN — SODIUM CHLORIDE 1000 ML: 9 INJECTION, SOLUTION INTRAVENOUS at 12:31

## 2018-01-01 RX ADMIN — LEVOTHYROXINE SODIUM 88 MCG: 88 TABLET ORAL at 08:11

## 2018-01-01 RX ADMIN — SODIUM CHLORIDE 250 ML: 9 INJECTION, SOLUTION INTRAVENOUS at 11:17

## 2018-01-01 RX ADMIN — SODIUM CHLORIDE 56 ML: 9 INJECTION, SOLUTION INTRAVENOUS at 12:44

## 2018-01-01 RX ADMIN — OXALIPLATIN 100 MG: 50 INJECTION, SOLUTION, CONCENTRATE INTRAVENOUS at 15:29

## 2018-01-01 RX ADMIN — SODIUM CHLORIDE, PRESERVATIVE FREE: 5 INJECTION INTRAVENOUS at 10:45

## 2018-01-01 RX ADMIN — DEXAMETHASONE SODIUM PHOSPHATE: 10 INJECTION, SOLUTION INTRAMUSCULAR; INTRAVENOUS at 14:06

## 2018-01-01 RX ADMIN — HEPARIN 500 UNITS: 100 SYRINGE at 10:41

## 2018-01-01 RX ADMIN — SODIUM CHLORIDE 250 ML: 9 INJECTION, SOLUTION INTRAVENOUS at 14:54

## 2018-01-01 RX ADMIN — SODIUM CHLORIDE 1000 ML: 9 INJECTION, SOLUTION INTRAVENOUS at 17:34

## 2018-01-01 RX ADMIN — SODIUM CHLORIDE 60 ML: 9 INJECTION, SOLUTION INTRAVENOUS at 17:40

## 2018-01-01 RX ADMIN — HEPARIN 500 UNITS: 100 SYRINGE at 14:16

## 2018-01-01 RX ADMIN — OXYCODONE HYDROCHLORIDE 5 MG: 5 TABLET ORAL at 18:50

## 2018-01-01 RX ADMIN — POLYETHYLENE GLYCOL 400 AND PROPYLENE GLYCOL 1 DROP: 4; 3 SOLUTION/ DROPS OPHTHALMIC at 17:45

## 2018-01-01 RX ADMIN — B-COMPLEX W/ C & FOLIC ACID TAB 1 TABLET: TAB at 09:25

## 2018-01-01 RX ADMIN — HEPARIN 500 UNITS: 100 SYRINGE at 13:11

## 2018-01-01 RX ADMIN — SENNOSIDES AND DOCUSATE SODIUM 2 TABLET: 8.6; 5 TABLET ORAL at 21:03

## 2018-01-01 RX ADMIN — DEXTROSE MONOHYDRATE 250 ML: 50 INJECTION, SOLUTION INTRAVENOUS at 13:19

## 2018-01-01 RX ADMIN — SODIUM CHLORIDE, PRESERVATIVE FREE 500 UNITS: 5 INJECTION INTRAVENOUS at 11:42

## 2018-01-01 RX ADMIN — ONDANSETRON 4 MG: 2 INJECTION INTRAMUSCULAR; INTRAVENOUS at 13:33

## 2018-01-01 RX ADMIN — DEXAMETHASONE SODIUM PHOSPHATE: 10 INJECTION, SOLUTION INTRAMUSCULAR; INTRAVENOUS at 12:23

## 2018-01-01 RX ADMIN — OXYCODONE HYDROCHLORIDE 10 MG: 5 TABLET ORAL at 13:25

## 2018-01-01 RX ADMIN — DEXAMETHASONE SODIUM PHOSPHATE: 10 INJECTION, SOLUTION INTRAMUSCULAR; INTRAVENOUS at 11:44

## 2018-01-01 RX ADMIN — SODIUM CHLORIDE 250 ML: 9 INJECTION, SOLUTION INTRAVENOUS at 11:31

## 2018-01-01 RX ADMIN — OXALIPLATIN 132 MG: 100 INJECTION, SOLUTION, CONCENTRATE INTRAVENOUS at 12:31

## 2018-01-01 RX ADMIN — SODIUM CHLORIDE, PRESERVATIVE FREE 500 UNITS: 5 INJECTION INTRAVENOUS at 14:06

## 2018-01-01 RX ADMIN — SODIUM CHLORIDE, PRESERVATIVE FREE 60 ML: 5 INJECTION INTRAVENOUS at 18:30

## 2018-01-01 RX ADMIN — MORPHINE SULFATE 15 MG: 15 TABLET, EXTENDED RELEASE ORAL at 08:10

## 2018-01-01 RX ADMIN — ONDANSETRON HYDROCHLORIDE 8 MG: 2 INJECTION, SOLUTION INTRAVENOUS at 11:35

## 2018-01-01 RX ADMIN — LEVOTHYROXINE SODIUM 88 MCG: 88 TABLET ORAL at 08:30

## 2018-01-01 RX ADMIN — BEVACIZUMAB 300 MG: 100 INJECTION, SOLUTION INTRAVENOUS at 11:50

## 2018-01-01 RX ADMIN — POLYETHYLENE GLYCOL 400 AND PROPYLENE GLYCOL 1 DROP: 4; 3 SOLUTION/ DROPS OPHTHALMIC at 22:24

## 2018-01-01 RX ADMIN — SODIUM CHLORIDE, PRESERVATIVE FREE 500 UNITS: 5 INJECTION INTRAVENOUS at 12:14

## 2018-01-01 RX ADMIN — OXALIPLATIN 132 MG: 100 INJECTION, SOLUTION, CONCENTRATE INTRAVENOUS at 10:37

## 2018-01-01 RX ADMIN — SENNOSIDES AND DOCUSATE SODIUM 1 TABLET: 8.6; 5 TABLET ORAL at 23:22

## 2018-01-01 RX ADMIN — MORPHINE SULFATE 15 MG: 15 TABLET, EXTENDED RELEASE ORAL at 20:24

## 2018-01-01 RX ADMIN — SODIUM CHLORIDE 250 ML: 9 INJECTION, SOLUTION INTRAVENOUS at 10:33

## 2018-01-01 RX ADMIN — SODIUM CHLORIDE, PRESERVATIVE FREE 500 UNITS: 5 INJECTION INTRAVENOUS at 12:46

## 2018-01-01 RX ADMIN — SENNOSIDES AND DOCUSATE SODIUM 1 TABLET: 8.6; 5 TABLET ORAL at 08:30

## 2018-01-01 RX ADMIN — SODIUM CHLORIDE 250 ML: 9 INJECTION, SOLUTION INTRAVENOUS at 12:25

## 2018-01-01 RX ADMIN — B-COMPLEX W/ C & FOLIC ACID TAB 1 TABLET: TAB at 08:30

## 2018-01-01 RX ADMIN — SODIUM CHLORIDE 250 ML: 9 INJECTION, SOLUTION INTRAVENOUS at 11:34

## 2018-01-01 RX ADMIN — BEVACIZUMAB 250 MG: 100 INJECTION, SOLUTION INTRAVENOUS at 14:54

## 2018-01-01 RX ADMIN — LORAZEPAM 0.5 MG: 0.5 TABLET ORAL at 17:54

## 2018-01-01 RX ADMIN — POLYETHYLENE GLYCOL 400 AND PROPYLENE GLYCOL 1 DROP: 4; 3 SOLUTION/ DROPS OPHTHALMIC at 23:23

## 2018-01-01 RX ADMIN — DEXTROSE MONOHYDRATE 250 ML: 50 INJECTION, SOLUTION INTRAVENOUS at 14:04

## 2018-01-01 RX ADMIN — SODIUM CHLORIDE, PRESERVATIVE FREE 5 ML: 5 INJECTION INTRAVENOUS at 12:45

## 2018-01-01 RX ADMIN — SODIUM CHLORIDE, PRESERVATIVE FREE: 5 INJECTION INTRAVENOUS at 13:51

## 2018-01-01 RX ADMIN — BEVACIZUMAB 255 MG: 100 INJECTION, SOLUTION INTRAVENOUS at 11:58

## 2018-01-01 RX ADMIN — ONDANSETRON HYDROCHLORIDE 8 MG: 2 INJECTION, SOLUTION INTRAVENOUS at 12:36

## 2018-01-01 RX ADMIN — IOPAMIDOL 55 ML: 755 INJECTION, SOLUTION INTRAVENOUS at 12:43

## 2018-01-01 RX ADMIN — DEXTROSE MONOHYDRATE 250 ML: 50 INJECTION, SOLUTION INTRAVENOUS at 13:24

## 2018-01-01 RX ADMIN — MORPHINE SULFATE 15 MG: 15 TABLET, EXTENDED RELEASE ORAL at 09:25

## 2018-01-01 ASSESSMENT — ACTIVITIES OF DAILY LIVING (ADL)
ADLS_ACUITY_SCORE: 15
ADLS_ACUITY_SCORE: 11
ADLS_ACUITY_SCORE: 15
ADLS_ACUITY_SCORE: 15
ADLS_ACUITY_SCORE: 17
ADLS_ACUITY_SCORE: 11
ADLS_ACUITY_SCORE: 15
ADLS_ACUITY_SCORE: 17
ADLS_ACUITY_SCORE: 15
ADLS_ACUITY_SCORE: 15
ADLS_ACUITY_SCORE: 11
ADLS_ACUITY_SCORE: 15
ADLS_ACUITY_SCORE: 15
ADLS_ACUITY_SCORE: 17
ADLS_ACUITY_SCORE: 15

## 2018-01-01 ASSESSMENT — PAIN SCALES - GENERAL
PAINLEVEL: NO PAIN (0)
PAINLEVEL: MILD PAIN (3)
PAINLEVEL: NO PAIN (0)
PAINLEVEL: MILD PAIN (2)
PAINLEVEL: NO PAIN (0)
PAINLEVEL: MILD PAIN (3)
PAINLEVEL: NO PAIN (0)

## 2018-01-01 ASSESSMENT — ENCOUNTER SYMPTOMS
APPETITE CHANGE: 1
UNEXPECTED WEIGHT CHANGE: 1
VOMITING: 1

## 2018-01-01 ASSESSMENT — PAIN DESCRIPTION - DESCRIPTORS
DESCRIPTORS: PATIENT UNABLE TO DESCRIBE

## 2018-01-01 ASSESSMENT — 6 MINUTE WALK TEST (6MWT): TOTAL DISTANCE WALKED (METERS): 304

## 2018-01-03 NOTE — TELEPHONE ENCOUNTER
Spoke to Pt and informed her of below. Pt will call back to schedule  Once she finds transportaion

## 2018-01-03 NOTE — TELEPHONE ENCOUNTER
Patient's sister in law called confused about a missing the 12/28 appointment stating that it was a mis understanding.     I clarified with Heather that she is on Avastin every 2 weeks and that the 5FU was discontinued on 12/14 because of the side effects of diarrhea. She appreciated the call back and will bring patient in on 1/11/2018 for her treatment.Vale Molina

## 2018-01-09 NOTE — TELEPHONE ENCOUNTER
"Requested Prescriptions   Pending Prescriptions Disp Refills     levothyroxine (SYNTHROID/LEVOTHROID) 75 MCG tablet 30 tablet 5     Sig: Take 1 tablet (75 mcg) by mouth daily Recheck TSH in 6 months    Thyroid Protocol Passed    1/8/2018  7:37 PM       Passed - Patient is 12 years or older       Passed - Recent or future visit with authorizing provider's specialty    Patient had office visit in the last year or has a visit in the next 30 days with authorizing provider.  See \"Patient Info\" tab in inbasket, or \"Choose Columns\" in Meds & Orders section of the refill encounter.              Passed - Normal TSH on file in past 12 months    Recent Labs   Lab Test  04/03/17   0500   TSH  2.30             Passed - No active pregnancy on record    If patient is pregnant or has had a positive pregnancy test, please check TSH.         Passed - No positive pregnancy test in past 12 months    If patient is pregnant or has had a positive pregnancy test, please check TSH.            "

## 2018-01-09 NOTE — TELEPHONE ENCOUNTER
Last Written Prescription Date:  6/14/17   Last Fill Quantity: 30 tablet,  # refills: 5   Last Office Visit with G, P or [facility] prescribing provider:  12/01/17 Rodriguez   Future Office Visit:    Next 5 appointments (look out 90 days)     Jan 25, 2018 10:30 AM CST   Return Visit with Susan Lindo MD   [facility] ([facility])    [facility]  [address]  [facility] [address]   [phone]

## 2018-01-10 NOTE — TELEPHONE ENCOUNTER
"Prescription approved per [facility] Refill Protocol.    Radha SALCEDO RN    Requested Prescriptions   Signed Prescriptions Disp Refills     levothyroxine (SYNTHROID/LEVOTHROID) 75 MCG tablet 30 tablet 3     Sig: Take 1 tablet (75 mcg) by mouth daily Recheck TSH in 6 months    Thyroid Protocol Passed    1/8/2018  7:38 PM       Passed - Patient is 12 years or older       Passed - Recent or future visit with authorizing provider's specialty    Patient had office visit in the last year or has a visit in the next 30 days with authorizing provider.  See \"Patient Info\" tab in inbasket, or \"Choose Columns\" in Meds & Orders section of the refill encounter.              Passed - Normal TSH on file in past 12 months    Recent Labs   Lab Test  04/03/17   0500   TSH  2.30             Passed - No active pregnancy on record    If patient is pregnant or has had a positive pregnancy test, please check TSH.         Passed - No positive pregnancy test in past 12 months    If patient is pregnant or has had a positive pregnancy test, please check TSH.            "

## 2018-01-11 NOTE — PROGRESS NOTES
Infusion Nursing Note:  Sherita Kenney presents today for C2D1 Avastin  Patient seen by provider today: No   present during visit today: Not Applicable.    Note: N/A.    Intravenous Access:  Implanted Port.    Treatment Conditions:  Urine protein 100-Dr. Lindo ordered to proceed with treatment today.      Post Infusion Assessment:  Patient tolerated infusion without incident.  Blood return noted pre and post infusion.  Site patent and intact, free from redness, edema or discomfort.  No evidence of extravasations.  Access discontinued per protocol.    Discharge Plan:   Discharge instructions reviewed with: Patient.  Patient and/or family verbalized understanding of discharge instructions and all questions answered.  Copy of AVS reviewed with patient and/or family.  Patient will return 1/25 for next appointment.  Patient discharged in stable condition accompanied by: self.  Departure Mode: Ambulatory.    Della Little RN

## 2018-01-11 NOTE — MR AVS SNAPSHOT
"              After Visit Summary   1/11/2018    Sherita Kenney    MRN: [id number]           Patient Information     Date Of Birth          1940        Visit Information        Provider Department      1/11/2018 10:00 AM  INFUSION CHAIR 4 [facility]        Today's Diagnoses     Adenocarcinoma of colon (H)    -  1       Follow-ups after your visit        Your next 10 appointments already scheduled     Jan 25, 2018 10:00 AM CST   Level 3 with  INFUSION CHAIR 15   [facility] ([facility])    [facility]  [address]  [facility] [address]   [phone]            Jan 25, 2018 10:30 AM CST   Return Visit with Susan Lindo MD   [facility] ([facility])    [facility]  [address]  [facility] [id number] [phone]              Who to contact     If you have questions or need follow up information about today's clinic visit or your schedule please contact [facility] directly at [phone].  Normal or non-critical lab and imaging results will be communicated to you by MyChart, letter or phone within 4 business days after the clinic has received the results. If you do not hear from us within 7 days, please contact the clinic through MyChart or phone. If you have a critical or abnormal lab result, we will notify you by phone as soon as possible.  Submit refill requests through [employer] or call your pharmacy and they will forward the refill request to us. Please allow 3 business days for your refill to be completed.          Additional Information About Your Visit        MyChart Information     Pharmalink lets you send messages to your doctor, view your test results, renew your prescriptions, schedule appointments and more. To sign up, go to [email or web address] . Click on \"Log in\" on the left side of " "the screen, which will take you to the Welcome page. Then click on \"Sign up Now\" on the right side of the page.     You will be asked to enter the access code listed below, as well as some personal information. Please follow the directions to create your username and password.     Your access code is: [email or web address]  Expires: 3/13/2018  1:23 PM     Your access code will  in 90 days. If you need help or a new code, please call your [facility] or [phone].        Care EveryWhere ID     This is your Care EveryWhere ID. This could be used by other organizations to access your [facility] medical records  [id number]        Your Vitals Were     Pulse Temperature Respirations BMI (Body Mass Index)          75 97.4  F (36.3  C) (Oral) 16 19.76 kg/m2         Blood Pressure from Last 3 Encounters:   18 136/83   17 129/73   17 158/85    Weight from Last 3 Encounters:   18 55.5 kg (122 lb 6.4 oz)   17 55.2 kg (121 lb 12.8 oz)   17 55.2 kg (121 lb 12.8 oz)              We Performed the Following     Protein qualitative urine        Primary Care Provider Office Phone # Fax #    Edgar Neno Rodriguez [phone] [address]  [place] [address]        Equal Access to Services     [facility]: Hadii mateo gaticao Sogabriel, waaxda luqadaha, qaybta kaalmada nilay, ellis mooney . So [facility] [phone].    ATENCIÓN: Si barriela becka, tiene a roldan disposición servicios gratuitos de asistencia lingüística. Malaika al [phone].    We comply with applicable federal civil rights laws and [place] laws. We do not discriminate on the basis of race, color, national origin, age, disability, sex, sexual orientation, or gender identity.            Thank you!     Thank you for choosing [facility] INF[facility]ION CENTER  for your care. Our goal is always to provide you with excellent care. Hearing back from our " patients is one way we can continue to improve our services. Please take a few minutes to complete the written survey that you may receive in the mail after your visit with us. Thank you!             Your Updated Medication List - Protect others around you: Learn how to safely use, store and throw away your medicines at www.disposemymeds.org.          This list is accurate as of: 1/11/18 11:53 AM.  Always use your most recent med list.                   Brand Name Dispense Instructions for use Diagnosis    acetaminophen 325 MG tablet    TYLENOL    60 tablet    Take 2 tablets (650 mg) by mouth every 4 hours as needed for mild pain or fever    Cancer associated pain       CETAPHIL lotion      Apply topically 2 times daily Also bid prn . To dry skin.        ferrous sulfate 325 (65 FE) MG tablet    IRON     Take 325 mg by mouth daily (with breakfast)        HYDROcodone-acetaminophen 5-325 MG per tablet    NORCO    30 tablet    Take 1 tablet by mouth every 6 hours as needed for moderate to severe pain    Other chronic pain       levothyroxine 75 MCG tablet    SYNTHROID/LEVOTHROID    30 tablet    Take 1 tablet (75 mcg) by mouth daily Recheck TSH in 6 months    Hypothyroidism, unspecified type       loperamide 2 MG capsule    IMODIUM A-D    155 capsule    AND TAKE 2 CAPSULE EVERY 6 HOURS AS NEEDED FOR LOOSE STOOLS (MAX OF 16MG/24 HRS)    Diarrhea, unspecified type       menthol-zinc oxide 0.44-20.625 % Oint ointment    CALMOSEPTINE    1 Tube    Apply topically 4 times daily as needed for skin protection    Chronic diarrhea       ONDANSETRON PO      Take 8 mg by mouth every 8 hours as needed for nausea        PREPARATION H 0.25-88.44 % per suppository   Generic drug:  phenylephrine-cocoa butter      Place 1 suppository rectally 2 times daily as needed for hemorrhoids or itching        SUMATRIPTAN SUCCINATE PO      Take 25 mg by mouth every 8 hours as needed for migraine        travoprost (BAK Free) 0.004 % ophthalmic  solution    TRAVATAN Z    5 mL    Place 1 drop into both eyes At Bedtime    Primary open angle glaucoma of both eyes, unspecified glaucoma stage       vitamin B-Complex     90 tablet    Take 1 tablet by mouth daily    S/P colectomy

## 2018-01-17 NOTE — TELEPHONE ENCOUNTER
Patient has changed from [facility] to [facility] and pharmacy has changed.  [facility] needing new Rx for     Travatan eyedrops, previously Rx'd by geriatrics.  Dr Rodriguez - are you ok with Rx'ing?      LOV 12-1-17 Michael    Next 5 appointments (look out 90 days)     Jan 25, 2018 10:30 AM CST   Return Visit with Susan Lindo MD   [facility] ([facility])    [facility]  [address] [facility] [address]  [facility] [address] [phone]                    Sintia Shaw, RT (R)

## 2018-01-25 NOTE — MR AVS SNAPSHOT
After Visit Summary   1/25/2018    Sherita Kenney    MRN: [id number]           Patient Information     Date Of Birth          1940        Visit Information        Provider Department      1/25/2018 10:30 AM Susan Lindo MD [facility]        Care Instructions    Continue chemo.  Follow up in 4 weeks.          Follow-ups after your visit        Your next 10 appointments already scheduled     Feb 08, 2018 10:00 AM CST   Level 3 with SH INFUSION CHAIR 6   [facility] ([facility])    [facility]  [address]  [place] [address]   [phone]            Feb 22, 2018  9:30 AM CST   Level 3 with SH INFUSION CHAIR 1   [facility])    [facility]  [address]  [place] [address]   [phone]            Feb 22, 2018 10:00 AM CST   Return Visit with Susan Lindo MD   [facility])    [facility]  [address]  [place] [address]   [phone]              Who to contact     If you have questions or need follow up information about today's clinic visit or your schedule please contact [facility] directly at [phone].  Normal or non-critical lab and imaging results will be communicated to you by MyChart, letter or phone within 4 business days after the clinic has received the results. If you do not hear from us within 7 days, please contact the clinic through [employer] or phone. If you have a critical or abnormal lab result, we will notify you by phone as soon as possible.  Submit refill requests through [employer]UL or call your pharmacy and they will forward the refill request to us. Please allow 3 business days for your refill to be completed.          Additional Information About Your Visit        MyChart Information      "[employer] lets you send messages to your doctor, view your test results, renew your prescriptions, schedule appointments and more. To sign up, go to [email or web address] . Click on \"Log in\" on the left side of the screen, which will take you to the Welcome page. Then click on \"Sign up Now\" on the right side of the page.     You will be asked to enter the access code listed below, as well as some personal information. Please follow the directions to create your username and password.     Your access code is: [email or web address]  Expires: 3/13/2018  1:23 PM     Your access code will  in 90 days. If you need help or a new code, please call your [facility] clinic or [phone].        Care EveryWhere ID     This is your Care EveryWhere ID. This could be used by other organizations to access your [facility] medical records  [id number]        Your Vitals Were     Pulse Temperature Respirations Pulse Oximetry          74 98  F (36.7  C) (Oral) 16 96%         Blood Pressure from Last 3 Encounters:   18 136/65   18 136/65   18 136/83    Weight from Last 3 Encounters:   18 54.4 kg (120 lb)   18 55.5 kg (122 lb 6.4 oz)   17 55.2 kg (121 lb 12.8 oz)              Today, you had the following     No orders found for display       Primary Care Provider Office Phone # Fax #    Edgar Neno Rodriguez [phone] [address]  [facility] [address]        Equal Access to Services     [facility] : Hadii mateo Nunes, waaxda rosas, qaybta margaretalellis geronimo. So [facility] [phone].    ATENCIÓN: Si habla español, tiene a roldan disposición servicios gratuitos de asistencia lingüística. Malaika al [phone].    We comply with applicable federal civil rights laws and [place] laws. We do not discriminate on the basis of race, color, national origin, age, disability, sex, sexual orientation, or gender " identity.            Thank you!     Thank you for choosing [facility]  for your care. Our goal is always to provide you with excellent care. Hearing back from our patients is one way we can continue to improve our services. Please take a few minutes to complete the written survey that you may receive in the mail after your visit with us. Thank you!             Your Updated Medication List - Protect others around you: Learn how to safely use, store and throw away your medicines at www.disposemymeds.org.          This list is accurate as of 1/25/18 11:27 AM.  Always use your most recent med list.                   Brand Name Dispense Instructions for use Diagnosis    acetaminophen 325 MG tablet    TYLENOL    60 tablet    Take 2 tablets (650 mg) by mouth every 4 hours as needed for mild pain or fever    Cancer associated pain       BLINK TEARS OP      Apply 1 drop to eye 3 times daily        CETAPHIL lotion      Apply topically 2 times daily Also bid prn . To dry skin.        ferrous sulfate 325 (65 FE) MG tablet    IRON     Take 325 mg by mouth daily (with breakfast)        HYDROcodone-acetaminophen 5-325 MG per tablet    NORCO    30 tablet    Take 1 tablet by mouth every 6 hours as needed for moderate to severe pain    Other chronic pain       levothyroxine 75 MCG tablet    SYNTHROID/LEVOTHROID    30 tablet    Take 1 tablet (75 mcg) by mouth daily Recheck TSH in 6 months    Hypothyroidism, unspecified type       loperamide 2 MG capsule    IMODIUM A-D    155 capsule    AND TAKE 2 CAPSULE EVERY 6 HOURS AS NEEDED FOR LOOSE STOOLS (MAX OF 16MG/24 HRS)    Diarrhea, unspecified type       menthol-zinc oxide 0.44-20.625 % Oint ointment    CALMOSEPTINE    1 Tube    Apply topically 4 times daily as needed for skin protection    Chronic diarrhea       ONDANSETRON PO      Take 8 mg by mouth every 8 hours as needed for nausea        PREPARATION H 0.25-88.44 % per suppository   Generic drug:  phenylephrine-cocoa  butter      Place 1 suppository rectally 2 times daily as needed for hemorrhoids or itching        SUMATRIPTAN SUCCINATE PO      Take 25 mg by mouth every 8 hours as needed for migraine        travoprost (BAK Free) 0.004 % ophthalmic solution    TRAVATAN Z    5 mL    Place 1 drop into both eyes At Bedtime    Primary open angle glaucoma of both eyes, unspecified glaucoma stage       vitamin B-Complex     90 tablet    Take 1 tablet by mouth daily    S/P colectomy

## 2018-01-25 NOTE — PATIENT INSTRUCTIONS
Continue chemo.  Scheduled - Belle  Follow up in 4 weeks.   Scheduled - Belle    AVS given to patient - Belle

## 2018-01-25 NOTE — LETTER
"    1/25/2018         RE: Sherita Kenney  [address]  [facility] [place] [facility] [address]        Dear Colleague,    Thank you for referring your patient, Sherita Kenney, to the [facility]. Please see a copy of my visit note below.    Oncology Rooming Note    January 25, 2018 10:16 AM   Sherita Kenney is a 77 year old female who presents for:    Chief Complaint   Patient presents with     Oncology Clinic Visit     Initial Vitals: /65  Pulse 74  Temp 98  F (36.7  C) (Oral)  Resp 16  SpO2 96% Estimated body mass index is 19.37 kg/(m^2) as calculated from the following:    Height as of an earlier encounter on 1/25/18: 1.676 m (5' 6\").    Weight as of an earlier encounter on 1/25/18: 54.4 kg (120 lb). There is no height or weight on file to calculate BSA.  Data Unavailable Comment: Data Unavailable   No LMP recorded. Patient is not currently having periods (Reason: Perimenopausal).  Allergies reviewed: Yes  Medications reviewed: Yes    Medications: Medication refills not needed today.  Pharmacy name entered into [facility]:    [facility], [place] - [address] [facility], [address]    Clinical concerns: no      5 minutes for nursing intake (face to face time)          Bess Garcia MA                  ONCOLOGY HISTORY:  Ms. Sherita Kenney is a female with colon cancer.   1. Patient was brought to the ER on 02/15/2017 with altered mental status.    -CT brain on 02/15/2017 did not reveal any acute intracranial pathology.   -CT abdomen and pelvis on 02/15/2017 revealed large right iliopsoas abscess and mass-like wall thickening of portion of right colon.   -CT-guided drainage of abscess was done.   2. Patient was taken to OR on 03/01/2017.    -Colonoscopy revealed a large fungating mass in the right side of the colon.  Pathology revealed invasive poorly differentiated adenocarcinoma.  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 MLHI promoter methylation present.   -Laparoscopic ileostomy was done.    3.  CEA on 02/16/2017 was 3.8.   4. Patient had right colectomy with takedown of ileostomy and primary anastomosis on 04/13/2017.    -Pathology  reveals poorly differentiated colonic adenocarcinoma. 15 of 24 lymph nodes are positive. Tumor invades into adjacent abdominal wall.  Lymphovascular invasion present. pT4b pN2b M0.   -BRAF mutation present. No KRAS mutation.  5. PET scan on 05/22/2017 revealed metastatic disease.  Multiple hypermetabolic liver metastases and  hypermetabolic necrotic lymphadenopathy in right lower quadrant.  6. modified FOLFOX6 with Avastin started on 06/01/2017.   -Bolus 5-FU discontinued with cycle 4.    -Oxaliplatin reduced with cycle 5.  -Oxaliplatin stopped after cycle 8. Cycle 8 completed on 09/08/2017.  7. CT chest, abdomen and pelvis on 07/19/2017 revealed improved hepatic metastases.  Several peripheral branches of portal vein in right hepatic lobe of liver is thrombosed.  There is also thrombosis in branches of superior mesenteric vein in anterior abdomen.    -Ultrasound on 07/20/2017 reveals occlusive thrombus in the posterior posterior branch and its two branches of the right portal vein.  Main portal vein and left portal vein are patent.  -Decision was made not to ant-coagulate.  8. CT chest, abdomen and pelvis on 09/13/2017 reveals improvement in disease.   9.  Maintenance infusional 5-FU and Avastin started on 09/21/2017. No bolus 5-FU.  10. CT chest, abdomen and pelvis was done on 12/05/2017.  There is improvement in the liver metastasis.  No new sites of metastases.  There is unchanged thrombosis in peripheral branches of right port vein and branches of superior mesentery vein.  11.  Infusional 5-FU discontinued after 11/30/2017. Single agent Avastin continued.     SUBJECTIVE:    Ms. Sherita Kenney is a 77 year-old female with metastatic colon cancer with liver metastases as described above.   Patient has been on palliative chemotherapy since June 2017.  Now she is on maintenance Avastin.  Last CT scan in December 2017 revealed improvement in liver metastasis.     Review of system:    Overall, her condition is stable. She is in assisted living.  She has fatigue.  No worsening of it. No headache.  No dizziness. No chest pain.  No difficulty breathing.  No nausea or vomiting.  No urinary complaints.  No bleeding. 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     Patient gets intermittent diarrhea.  Some days she does not have any bowel problems.  Other days she might have 2-3 episodes of diarrhea.  Along with that she might get mild abdominal discomfort.      PHYSICAL EXAMINATION:   Alert and oriented x 3.   VITAL SIGNS:  Reviewed.   EYES:  No icterus.   THROAT:  No ulcer or thrush.   NECK:  Supple. No lymphadenopathy.   LUNGS:  Good air entry bilaterally.  No wheezing.   HEART:  Regular.   ABDOMEN:  Soft and nontender.  No mass.   EXTREMITIES:  No edema.   SKIN:  No petechiae. Mild hand-foot syndrome present. Palm is mildly erythematous.       ASSESSMENT:   1.  A 77-year-old female with metastatic colon cancer on palliative chemotherapy.  2.    Intermittent diarrhea.   3.  Fatigue.   4.  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   5.  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  They are stable.  This is malignancy related.       PLAN:   1.  Discussed with patient and family regarding metastatic colon cancer.  Overall patient is stable.  She has no new symptoms.  She is on maintenance Avastin.  She is tolerating it well.  She will continue on it.    We will plan on getting CT scan in the next 2-3 months.    2.  Patient has fatigue.  This is due to multiple factors including her age, malignancy and treatment.  She also gets intermittent diarrhea.  Diarrhea has improved since 5-FU has been discontinued.    3.  I will see her in  4 weeks' time.   Patient advised to return sooner if she has fever, chills, infection, worsening weakness, shortness of breath, worsening diarrhea or any other concerns.  Family had a few questions, which were all answered.       Total face-to-face time spent 25 minutes, more than 50% of the time spent in counseling and coordination of care.     Again, thank you for allowing me to participate in the care of your patient.        Sincerely,        Susan Lindo MD

## 2018-01-25 NOTE — PROGRESS NOTES
Infusion Nursing Note:  Sherita Kenney presents today for D1 C3 Avastin.    Patient seen by provider today: Yes:    present during visit today: Not Applicable.    Note: No concerns or changes to report, just noted some stress between she and sister.    Intravenous Access:  Implanted Port.    Treatment Conditions:  Lab Results   Component Value Date    HGB 12.7 12/14/2017     Lab Results   Component Value Date    WBC 7.9 12/14/2017      Lab Results   Component Value Date    ANEU 4.9 12/14/2017     Lab Results   Component Value Date     12/14/2017      Lab Results   Component Value Date     12/14/2017                   Lab Results   Component Value Date    POTASSIUM 4.7 12/14/2017           Lab Results   Component Value Date    MAG 2.0 04/15/2017            Lab Results   Component Value Date    CR 0.92 12/14/2017                   Lab Results   Component Value Date    SARITHA 8.8 12/14/2017                Lab Results   Component Value Date    BILITOTAL 0.4 12/14/2017           Lab Results   Component Value Date    ALBUMIN 3.6 12/14/2017                    Lab Results   Component Value Date    ALT 46 12/14/2017           Lab Results   Component Value Date    AST 36 12/14/2017       Results reviewed, labs MET treatment parameters, ok to proceed with treatment.      Post Infusion Assessment:  Patient tolerated infusion without incident.  Blood return noted pre and post infusion.  Site patent and intact, free from redness, edema or discomfort.  No evidence of extravasations.  Access discontinued per protocol.  Port deaccessed.    Discharge Plan:   Discharge instructions reviewed with: Patient.  Patient and/or family verbalized understanding of discharge instructions and all questions answered.  Copy of AVS reviewed with patient and/or family.  Patient will return 2/8 for next appointment.  Patient discharged in stable condition accompanied by: sister.  Departure Mode: Ambulatory.    June B.  RENÉE Sweeney

## 2018-01-25 NOTE — MR AVS SNAPSHOT
After Visit Summary   1/25/2018    Sherita Kenney    MRN: [id number]           Patient Information     Date Of Birth          1940        Visit Information        Provider Department      1/25/2018 10:00 AM  INFUSION CHAIR 15 [facility]        Today's Diagnoses     Port-a-cath in place    -  1    Adenocarcinoma of colon (H)           Follow-ups after your visit        Your next 10 appointments already scheduled     Feb 08, 2018 10:00 AM CST   Level 3 with  INFUSION CHAIR 6   [facility] ([facility])    [facility]  [address]  [place] [address]   [phone]            Feb 22, 2018  9:30 AM CST   Level 3 with  INFUSION CHAIR 1   [facility] ([facility])    [facility]  [address]  [place] [address]   [phone]            Feb 22, 2018 10:00 AM CST   Return Visit with Susan Lindo MD   [facility] ([facility])    [facility]  [address]  [place] [address]   [phone]              Who to contact     If you have questions or need follow up information about today's clinic visit or your schedule please contact [facility] [place] directly at [phone].  Normal or non-critical lab and imaging results will be communicated to you by MyChart, letter or phone within 4 business days after the clinic has received the results. If you do not hear from us within 7 days, please contact the clinic through MyChart or phone. If you have a critical or abnormal lab result, we will notify you by phone as soon as possible.  Submit refill requests through [email or web address] or call your pharmacy and they will forward the refill request to us. Please allow 3 business days for your refill to be completed.          Additional  "Information About Your Visit        MyChart Information     FilesX lets you send messages to your doctor, view your test results, renew your prescriptions, schedule appointments and more. To sign up, go to [email or web address] . Click on \"Log in\" on the left side of the screen, which will take you to the Welcome page. Then click on \"Sign up Now\" on the right side of the page.     You will be asked to enter the access code listed below, as well as some personal information. Please follow the directions to create your username and password.     Your access code is: [email or web address]  Expires: 3/13/2018  1:23 PM     Your access code will  in 90 days. If you need help or a new code, please call your [facility] clinic or [phone].        Care EveryWhere ID     This is your Care EveryWhere ID. This could be used by other organizations to access your [facility] medical records  [id number]        Your Vitals Were     Pulse Temperature Respirations Height Pulse Oximetry BMI (Body Mass Index)    68 97.8  F (36.6  C) (Oral) 16 1.676 m (5' 6\") 96% 19.37 kg/m2       Blood Pressure from Last 3 Encounters:   18 136/65   18 142/87   18 136/83    Weight from Last 3 Encounters:   18 54.4 kg (120 lb)   18 55.5 kg (122 lb 6.4 oz)   17 55.2 kg (121 lb 12.8 oz)              We Performed the Following     Protein qualitative urine        Primary Care Provider Office Phone # Fax #    Edgar Neno Rodriguez [phone] [address]  [place] [address]        Equal Access to Services     ALEXIS ALBA : Hadii mateo Nunes, walubada rosas, qaybta kaalmada nilay, ellis ren. So [facility] [phone].    ATENCIÓN: Si habla español, tiene a roldan disposición servicios gratuitos de asistencia lingüística. Llame al [phone].    We comply with applicable federal civil rights laws and [place] laws. We do not discriminate " on the basis of race, color, national origin, age, disability, sex, sexual orientation, or gender identity.            Thank you!     Thank you for choosing [facility]  for your care. Our goal is always to provide you with excellent care. Hearing back from our patients is one way we can continue to improve our services. Please take a few minutes to complete the written survey that you may receive in the mail after your visit with us. Thank you!             Your Updated Medication List - Protect others around you: Learn how to safely use, store and throw away your medicines at www.disposemymeds.org.          This list is accurate as of 1/25/18 12:15 PM.  Always use your most recent med list.                   Brand Name Dispense Instructions for use Diagnosis    acetaminophen 325 MG tablet    TYLENOL    60 tablet    Take 2 tablets (650 mg) by mouth every 4 hours as needed for mild pain or fever    Cancer associated pain       BLINK TEARS OP      Apply 1 drop to eye 3 times daily        CETAPHIL lotion      Apply topically 2 times daily Also bid prn . To dry skin.        ferrous sulfate 325 (65 FE) MG tablet    IRON     Take 325 mg by mouth daily (with breakfast)        HYDROcodone-acetaminophen 5-325 MG per tablet    NORCO    30 tablet    Take 1 tablet by mouth every 6 hours as needed for moderate to severe pain    Other chronic pain       levothyroxine 75 MCG tablet    SYNTHROID/LEVOTHROID    30 tablet    Take 1 tablet (75 mcg) by mouth daily Recheck TSH in 6 months    Hypothyroidism, unspecified type       loperamide 2 MG capsule    IMODIUM A-D    155 capsule    AND TAKE 2 CAPSULE EVERY 6 HOURS AS NEEDED FOR LOOSE STOOLS (MAX OF 16MG/24 HRS)    Diarrhea, unspecified type       menthol-zinc oxide 0.44-20.625 % Oint ointment    CALMOSEPTINE    1 Tube    Apply topically 4 times daily as needed for skin protection    Chronic diarrhea       ONDANSETRON PO      Take 8 mg by mouth every 8  hours as needed for nausea        PREPARATION H 0.25-88.44 % per suppository   Generic drug:  phenylephrine-cocoa butter      Place 1 suppository rectally 2 times daily as needed for hemorrhoids or itching        SUMATRIPTAN SUCCINATE PO      Take 25 mg by mouth every 8 hours as needed for migraine        travoprost (BAK Free) 0.004 % ophthalmic solution    TRAVATAN Z    5 mL    Place 1 drop into both eyes At Bedtime    Primary open angle glaucoma of both eyes, unspecified glaucoma stage       vitamin B-Complex     90 tablet    Take 1 tablet by mouth daily    S/P colectomy

## 2018-01-25 NOTE — PROGRESS NOTES
ONCOLOGY HISTORY:  Ms. Sherita Kenney is a female with colon cancer.   1. Patient was brought to the ER on 02/15/2017 with altered mental status.    -CT brain on 02/15/2017 did not reveal any acute intracranial pathology.   -CT abdomen and pelvis on 02/15/2017 revealed large right iliopsoas abscess and mass-like wall thickening of portion of right colon.   -CT-guided drainage of abscess was done.   2. Patient was taken to OR on 03/01/2017.    -Colonoscopy revealed a large fungating mass in the right side of the colon.  Pathology revealed invasive poorly differentiated adenocarcinoma.  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   -Laparoscopic ileostomy was done.    3.  CEA on 02/16/2017 was 3.8.   4. Patient had right colectomy with takedown of ileostomy and primary anastomosis on 04/13/2017.    -Pathology  reveals poorly differentiated colonic adenocarcinoma. 15 of 24 lymph nodes are positive. Tumor invades into adjacent abdominal wall.  Lymphovascular invasion present. pT4b pN2b M0.   -BRAF mutation present. No KRAS mutation.  5. PET scan on 05/22/2017 revealed metastatic disease.  Multiple hypermetabolic liver metastases and  hypermetabolic necrotic lymphadenopathy in right lower quadrant.  6. modified FOLFOX6 with Avastin started on 06/01/2017.   -Bolus 5-FU discontinued with cycle 4.    -Oxaliplatin reduced with cycle 5.  -Oxaliplatin stopped after cycle 8. Cycle 8 completed on 09/08/2017.  7. CT chest, abdomen and pelvis on 07/19/2017 revealed improved hepatic metastases.  Several peripheral branches of portal vein in right hepatic lobe of liver is thrombosed.  There is also thrombosis in branches of superior mesenteric vein in anterior abdomen.    -Ultrasound on 07/20/2017 reveals occlusive thrombus in the posterior posterior branch and its two branches of the right portal vein.  Main portal vein and left portal vein are patent.  -Decision was made not to  ant-coagulate.  8. CT chest, abdomen and pelvis on 09/13/2017 reveals improvement in disease.   9.  Maintenance infusional 5-FU and Avastin started on 09/21/2017. No bolus 5-FU.  10. CT chest, abdomen and pelvis was done on 12/05/2017.  There is improvement in the liver metastasis.  No new sites of metastases.  There is unchanged thrombosis in peripheral branches of right port vein and branches of superior mesentery vein.  11.  Infusional 5-FU discontinued after 11/30/2017. Single agent Avastin continued.     SUBJECTIVE:    Ms. Sherita Kenney is a 77 year-old female with metastatic colon cancer with liver metastases as described above.  Patient has been on palliative chemotherapy since June 2017.  Now she is on maintenance Avastin.  Last CT scan in December 2017 revealed improvement in liver metastasis.     Review of system:    Overall, her condition is stable. She is in assisted living.  She has fatigue.  No worsening of it. No headache.  No dizziness. No chest pain.  No difficulty breathing.  No nausea or vomiting.  No urinary complaints.  No bleeding. 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     Patient gets intermittent diarrhea.  Some days she does not have any bowel problems.  Other days she might have 2-3 episodes of diarrhea.  Along with that she might get mild abdominal discomfort.      PHYSICAL EXAMINATION:   Alert and oriented x 3.   VITAL SIGNS:  Reviewed.   EYES:  No icterus.   THROAT:  No ulcer or thrush.   NECK:  Supple. No lymphadenopathy.   LUNGS:  Good air entry bilaterally.  No wheezing.   HEART:  Regular.   ABDOMEN:  Soft and nontender.  No mass.   EXTREMITIES:  No edema.   SKIN:  No petechiae. Mild hand-foot syndrome present. Palm is mildly erythematous.       ASSESSMENT:   1.  A 77-year-old female with metastatic colon cancer on palliative chemotherapy.  2.   Intermittent diarrhea.   3.  Fatigue.   4.  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   5.  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  They are stable.  This is malignancy related.       PLAN:   1.  Discussed with patient and family regarding metastatic colon cancer.  Overall patient is stable.  She has no new symptoms.  She is on maintenance Avastin.  She is tolerating it well.  She will continue on it.    We will plan on getting CT scan in the next 2-3 months.    2.  Patient has fatigue.  This is due to multiple factors including her age, malignancy and treatment.  She also gets intermittent diarrhea.  Diarrhea has improved since 5-FU has been discontinued.    3.  I will see her in 4 weeks' time.  Patient advised to return sooner if she has fever, chills, infection, worsening weakness, shortness of breath, worsening diarrhea or any other concerns.  Family had a few questions, which were all answered.       Total face-to-face time spent 25 minutes, more than 50% of the time spent in counseling and coordination of care.

## 2018-01-30 NOTE — TELEPHONE ENCOUNTER
"PCP:    Call received from [facility] nurse requesting Imodium rx.    The nurse states that the Patient has been having loose stools for \"quite some time\".  When the Patient was living at home she was taking Imodium as needed.    The nurse states that the testing for c-diff and wbc's are done and results were faxed to PCP yesterday.  The stool culture was not done as it was not received by lab within 2 hours.     Patient's primary diagnosis is cancer of the stomach, is currently on chemo.     Patient is having 3-5 stools daily. \"There is no rhyme or reason to when she has stools.\"    At first the Patient thought the stools were from drinking Ensure, so she stopped that, but that made no change in the stools so is drinking it again.   Denies fever, abdominal pain, dizziness  No blood noted in the stools.    PCP: The [facility] would like Imodium ordered.  Medication pended. Please review.   They also want to know if you still want the stool culture?     Please advise  Thank you    Lindsay Nava RN    " Wounds

## 2018-01-31 NOTE — TELEPHONE ENCOUNTER
ferrous sulfate (IRON) 325 (65 FE) MG tablet      Last Written Prescription Date:  Historical  Last Fill Quantity: -,   # refills: -  Last Office Visit: 12/1/17 Rodriguez  Future Office visit:    Next 5 appointments (look out 90 days)     Feb 22, 2018 10:00 AM CST   Return Visit with Susan Lindo MD   [facility] ([facility])    [facility]  [address] [facility] [address]  [facility] [address]   [phone]                   Routing refill request to provider for review/approval because:  Medication is reported/historical

## 2018-02-22 NOTE — MR AVS SNAPSHOT
After Visit Summary   2/22/2018    Sherita Kenney    MRN: [id number]           Patient Information     Date Of Birth          1940        Visit Information        Provider Department      2/22/2018 9:30 AM  INFUSION CHAIR 1 [facility]        Today's Diagnoses     Adenocarcinoma of colon (H)    -  1    Port-a-cath in place           Follow-ups after your visit        Your next 10 appointments already scheduled     Mar 07, 2018  9:00 AM CST   Cancer Rehab Eval with Michelle Valentin, PT   [facility] ([facility])    [address]  [address]  [facility] [address]   [phone]            Mar 08, 2018 10:00 AM CST   Level 3 with  INFUSION CHAIR 17   [facility] ([facility])    [facility]  [address]  [facility] [address]   [phone]            Mar 15, 2018 11:45 AM CDT   (Arrive by 11:30 AM)   CT CHEST ABDOMEN PELVIS W/O & W CONTRAST with SHCT1   [facility] CT ([facility])    [address] [facility] [address]   [phone]           Please bring any scans or X-rays taken at other hospitals, if similar tests were done. Also bring a list of your medicines, including vitamins, minerals and over-the-counter drugs. It is safest to leave personal items at home.  Be sure to tell your doctor:   If you have any allergies.   If there s any chance you are pregnant.   If you are breastfeeding.  You may have contrast for this exam. To prepare:   Do not eat or drink for 2 hours before your exam. If you need to take medicine, you may take it with small sips of water. (We may ask you to take liquid medicine as well.)   The day before your exam, drink extra fluids at least six 8-ounce glasses (unless your doctor tells you to restrict your fluids).   You will be given instructions on how to drink the contrast.  Patients over  70 or patients with diabetes or kidney problems:   If you haven t had a blood test (creatinine test) within the last 30 days, the Cardiologist/Radiologist may require you to get this test prior to your exam.  If you have diabetes:   Continue to take your metformin medication on the day of your exam  Please wear loose clothing, such as a sweat suit or jogging clothes. Avoid snaps, zippers and other metal. We may ask you to undress and put on a hospital gown.  If you have any questions, please call the Imaging Department where you will have your exam.            Mar 22, 2018  9:30 AM CDT   Level 3 with  INFUSION CHAIR 18   [facility] ([facility])    [facility]  [address]  [facility] [address]   [phone]            Mar 22, 2018 10:00 AM CDT   Return Visit with Susan Lindo MD   [facility] ([facility])    [facility] [place] [id number]  [facility] [address]   [phone]              Future tests that were ordered for you today     Open Future Orders        Priority Expected Expires Ordered    CT Chest Abdomen Pelvis w/o & w Contrast Routine 3/19/2018 2/22/2019 2/22/2018            Who to contact     If you have questions or need follow up information about today's clinic visit or your schedule please contact [facility] directly at [phone].  Normal or non-critical lab and imaging results will be communicated to you by MyChart, letter or phone within 4 business days after the clinic has received the results. If you do not hear from us within 7 days, please contact the clinic through [employer] or phone. If you have a critical or abnormal lab result, we will notify you by phone as soon as possible.  Submit refill requests through [employer] or call your pharmacy and they will forward the refill request to us. Please allow 3 business days for  "your refill to be completed.          Additional Information About Your Visit        [employer] Information     [employer] lets you send messages to your doctor, view your test results, renew your prescriptions, schedule appointments and more. To sign up, go to [email or web address] . Click on \"Log in\" on the left side of the screen, which will take you to the Welcome page. Then click on \"Sign up Now\" on the right side of the page.     You will be asked to enter the access code listed below, as well as some personal information. Please follow the directions to create your username and password.     Your access code is: [email or web address]  Expires: 3/13/2018  1:23 PM     Your access code will  in 90 days. If you need help or a new code, please call your [facility] clinic or [phone].        Care EveryWhere ID     This is your [facility] EveryWhere ID. This could be used by other organizations to access your [facility] medical records  [id number]        Your Vitals Were     Pulse Temperature Respirations             77 97.3  F (36.3  C) (Oral) 16          Blood Pressure from Last 3 Encounters:   18 120/74   18 127/68   18 136/65    Weight from Last 3 Encounters:   18 54.4 kg (120 lb)   18 54.4 kg (120 lb)   18 55.5 kg (122 lb 6.4 oz)              We Performed the Following     Protein qualitative urine          Today's Medication Changes          These changes are accurate as of 18 12:48 PM.  If you have any questions, ask your nurse or doctor.               Stop taking these medicines if you haven't already. Please contact your care team if you have questions.     PREPARATION H 0.25-88.44 % per suppository   Generic drug:  phenylephrine-cocoa butter   Stopped by:  Susan Lindo MD                    Primary Care Provider Office Phone # Fax #    Edgar Neno Rodriguez [phone] [address] [facility] [address]  [place] [address]        Equal Access to " Services     [facility]: Hadii aad ku hadestherkaylie Buffygbariel, walubada luqadaha, qaybta kaalmayvon pemberton, ellis mooney . So [facility] [phone].    ATENCIÓN: Si rukhsana jovel, tiene a roldan disposición servicios gratuitos de asistencia lingüística. Llame al [phone].    We comply with applicable federal civil rights laws and [place] laws. We do not discriminate on the basis of race, color, national origin, age, disability, sex, sexual orientation, or gender identity.            Thank you!     Thank you for choosing [facility] AND [facility] CENTER  for your care. Our goal is always to provide you with excellent care. Hearing back from our patients is one way we can continue to improve our services. Please take a few minutes to complete the written survey that you may receive in the mail after your visit with us. Thank you!             Your Updated Medication List - Protect others around you: Learn how to safely use, store and throw away your medicines at www.disposemymeds.org.          This list is accurate as of 2/22/18 12:48 PM.  Always use your most recent med list.                   Brand Name Dispense Instructions for use Diagnosis    acetaminophen 325 MG tablet    TYLENOL    60 tablet    Take 2 tablets (650 mg) by mouth every 4 hours as needed for mild pain or fever    Cancer associated pain       BLINK TEARS OP      Apply 1 drop to eye 3 times daily        CETAPHIL lotion      Apply topically 2 times daily Also bid prn . To dry skin.        ferrous sulfate 325 (65 FE) MG tablet    IRON    30 tablet    Take 1 tablet (325 mg) by mouth daily (with breakfast)    Iron deficiency       HYDROcodone-acetaminophen 5-325 MG per tablet    NORCO    30 tablet    Take 1 tablet by mouth every 6 hours as needed for moderate to severe pain    Other chronic pain       levothyroxine 75 MCG tablet    SYNTHROID/LEVOTHROID    30 tablet    Take 1 tablet (75 mcg) by mouth daily Recheck TSH in 6  months    Hypothyroidism, unspecified type       loperamide 2 MG capsule    IMODIUM A-D    155 capsule    AND TAKE 2 CAPSULE EVERY 6 HOURS AS NEEDED FOR LOOSE STOOLS (MAX OF 16MG/24 HRS)    Diarrhea, unspecified type       menthol-zinc oxide 0.44-20.625 % Oint ointment    CALMOSEPTINE    1 Tube    Apply topically 4 times daily as needed for skin protection    Chronic diarrhea       ONDANSETRON PO      Take 8 mg by mouth every 8 hours as needed for nausea        SUMATRIPTAN SUCCINATE PO      Take 25 mg by mouth every 8 hours as needed for migraine        travoprost (BAK Free) 0.004 % ophthalmic solution    TRAVATAN Z    5 mL    Place 1 drop into both eyes At Bedtime    Primary open angle glaucoma of both eyes, unspecified glaucoma stage       vitamin B-Complex     90 tablet    Take 1 tablet by mouth daily    S/P colectomy

## 2018-02-22 NOTE — PROGRESS NOTES
ONCOLOGY HISTORY:  Ms. Sherita Kenney is a female with metastatic colon cancer.   1. Patient was brought to the ER on 02/15/2017 with altered mental status.    -CT brain on 02/15/2017 did not reveal any acute intracranial pathology.   -CT abdomen and pelvis on 02/15/2017 revealed large right iliopsoas abscess and mass-like wall thickening of portion of right colon.   -CT-guided drainage of abscess was done.   2. Patient was taken to OR on 03/01/2017.    -Colonoscopy revealed a large fungating mass in the right side of the colon.  Pathology revealed invasive poorly differentiated adenocarcinoma.  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   -Laparoscopic ileostomy was done.    3.  CEA on 02/16/2017 was 3.8.   4. Patient had right colectomy with takedown of ileostomy and primary anastomosis on 04/13/2017.    -Pathology  reveals poorly differentiated colonic adenocarcinoma. 15 of 24 lymph nodes are positive. Tumor invades into adjacent abdominal wall.  Lymphovascular invasion present. pT4b pN2b M0.   -BRAF mutation present. No KRAS mutation.  5. PET scan on 05/22/2017 revealed metastatic disease.  Multiple hypermetabolic liver metastases and  hypermetabolic necrotic lymphadenopathy in right lower quadrant.  6. modified FOLFOX6 with Avastin started on 06/01/2017.   -Bolus 5-FU discontinued with cycle 4.    -Oxaliplatin reduced with cycle 5.  -Oxaliplatin stopped after cycle 8. Cycle 8 completed on 09/08/2017.  7. CT chest, abdomen and pelvis on 07/19/2017 revealed improved hepatic metastases.  Several peripheral branches of portal vein in right hepatic lobe of liver is thrombosed.  There is also thrombosis in branches of superior mesenteric vein in anterior abdomen.    -Ultrasound on 07/20/2017 reveals occlusive thrombus in the posterior posterior branch and its two branches of the right portal vein.  Main portal vein and left portal vein are patent.  -Decision was made  not to ant-coagulate.  8. CT chest, abdomen and pelvis on 09/13/2017 reveals improvement in disease.   9.  Maintenance infusional 5-FU and Avastin started on 09/21/2017. No bolus 5-FU.  10. CT chest, abdomen and pelvis was done on 12/05/2017.  There is improvement in the liver metastasis.  No new sites of metastases.  There is unchanged thrombosis in peripheral branches of right port vein and branches of superior mesentery vein.  11.  Infusional 5-FU discontinued after 11/30/2017. Single agent Avastin continued.      SUBJECTIVE:    Ms. Sherita Kenney is a 77 year-old female with metastatic colon cancer with liver metastases as described above.  Patient has been on palliative chemotherapy since June 2017.  She is on maintenance Avastin.  CT scan in December 2017 revealed improvement in liver metastasis.      Review of system:    Overall, her condition is stable. She has fatigue.  No worsening of it. No headache.  No dizziness. No chest pain.  No difficulty breathing.  No nausea or vomiting.  No urinary complaints.  No bleeding. 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      Patient gets intermittent diarrhea.  Diarrhea is not every day.  Some days her bowel movements are good.  Other days she might have few episodes of loose bowel movements.  Generally she does not get any bowel movements at night and she is able to sleep well.    Patient wants to do some therapy.  Patient said that most of times she just sits.  She wants to get some muscle strengthening exercise.      PHYSICAL EXAMINATION:   Alert and oriented x 3.   VITAL SIGNS:  Reviewed.   EYES:  No icterus.   THROAT:  No ulcer or thrush.   NECK:  Supple. No lymphadenopathy.   LUNGS:  Good air entry bilaterally.  No wheezing.   HEART:  Regular.   ABDOMEN:  Soft and nontender.  No mass.   EXTREMITIES:  No edema.  Reduced muscle mass.  SKIN:  No petechiae. Mild hand-foot syndrome present. Palm is mildly erythematous.       ASSESSMENT:   1.   A 77-year-old female with metastatic colon cancer on palliative chemotherapy.  2.  Intermittent diarrhea.   3.  Fatigue.   4.  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   5.  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  They are stable.  This is malignancy related.       PLAN:   1.    Patient overall is doing well from colon cancer.  No clinical suspicion of progression.  She will continue on Avastin.  She is tolerating it well.    2.  Discussed regarding scans.  We will schedule CT chest, abdomen and pelvis in the next 3-4 weeks.     3.  Discussed with her regarding fatigue.  Discussed regarding physical therapy.  I told her that physical therapy have a cancer rehabilitation program.  She is interested in that.  An appointment will be scheduled.    4.  Discussed with her regarding diarrhea.  She will take Imodium as needed.  It helps.     5.  I will see her in 4 weeks' time.  Patient advised to return sooner if she has fever, chills, infection, worsening weakness, shortness of breath, worsening diarrhea or any other concerns.

## 2018-02-22 NOTE — PROGRESS NOTES
Infusion Nursing Note:  Sherita MARTINEZ Kyалександр presents today for Avastin C4D1.    Patient seen by provider today: Yes: Dr. Lindo   present during visit today: Not Applicable.    Note: N/A.    Intravenous Access:  Implanted Port.    Treatment Conditions:  Urine 30.    Post Infusion Assessment:  Patient tolerated infusion without incident.  Blood return noted pre and post infusion.  Site patent and intact, free from redness, edema or discomfort.  No evidence of extravasations.  Access discontinued per protocol.    Discharge Plan:   Discharge instructions reviewed with: Patient.  Patient and/or family verbalized understanding of discharge instructions and all questions answered.  Copy of AVS reviewed with patient and/or family.  Patient will return 3/8/2018 for next appointment.  Patient discharged in stable condition accompanied by: self.  Departure Mode: Ambulatory.    Jina Aguilar RN

## 2018-02-22 NOTE — LETTER
"    2/22/2018         RE: Sherita Kenney  [address]   [place] [address]        Dear Colleague,    Thank you for referring your patient, Sherita Kenney, to the [facility]. Please see a copy of my visit note below.    Oncology Rooming Note    February 22, 2018 10:31 AM   Sherita Kenney is a 77 year old female who presents for:    Chief Complaint   Patient presents with     Oncology Clinic Visit     Adenocarcinoma of colon      Initial Vitals: /74 (BP Location: Right arm, Patient Position: Sitting, Cuff Size: Adult Regular)  Pulse 74  Temp 97.4  F (36.3  C) (Oral)  Resp 16  Wt 54.4 kg (120 lb)  SpO2 96%  BMI 19.37 kg/m2 Estimated body mass index is 19.37 kg/(m^2) as calculated from the following:    Height as of 1/25/18: 1.676 m (5' 6\").    Weight as of this encounter: 54.4 kg (120 lb). Body surface area is 1.59 meters squared.  No Pain (0) Comment: Data Unavailable   No LMP recorded. Patient is not currently having periods (Reason: Perimenopausal).  Allergies reviewed: Yes  Medications reviewed: Yes    Medications: Medication refills not needed today.  Pharmacy name entered into [facility]:    [facility] [place]  [place], [place] - [address]  [facility] [address]    Clinical concerns: None                      4 minutes for nursing intake (face to face time)     April Bazzi MA              ONCOLOGY HISTORY:  Ms. Sherita Kenney is a female with metastatic colon cancer.   1. Patient was brought to the ER on 02/15/2017 with altered mental status.    -CT brain on 02/15/2017 did not reveal any acute intracranial pathology.   -CT abdomen and pelvis on 02/15/2017 revealed large right iliopsoas abscess and mass-like wall thickening of portion of right colon.   -CT-guided drainage of abscess was done.   2. Patient was taken to OR on 03/01/2017.    -Colonoscopy revealed a large fungating mass in the right side of the " colon.  Pathology revealed invasive poorly differentiated adenocarcinoma.  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   -Laparoscopic ileostomy was done.    3.  CEA on 02/16/2017 was 3.8.   4. Patient had right colectomy with takedown of ileostomy and primary anastomosis on 04/13/2017.    -Pathology  reveals poorly differentiated colonic adenocarcinoma. 15 of 24 lymph nodes are positive. Tumor invades into adjacent abdominal wall.  Lymphovascular invasion present. pT4b pN2b M0.   -BRAF mutation present. No KRAS mutation.  5. PET scan on 05/22/2017 revealed metastatic disease.  Multiple hypermetabolic liver metastases and  hypermetabolic necrotic lymphadenopathy in right lower quadrant.  6. modified FOLFOX6 with Avastin started on 06/01/2017.   -Bolus 5-FU discontinued with cycle 4.    -Oxaliplatin reduced with cycle 5.  -Oxaliplatin stopped after cycle 8. Cycle 8 completed on 09/08/2017.  7. CT chest, abdomen and pelvis on 07/19/2017 revealed improved hepatic metastases.  Several peripheral branches of portal vein in right hepatic lobe of liver is thrombosed.  There is also thrombosis in branches of superior mesenteric vein in anterior abdomen.    -Ultrasound on 07/20/2017 reveals occlusive thrombus in the posterior posterior branch and its two branches of the right portal vein.  Main portal vein and left portal vein are patent.  -Decision was made not to ant-coagulate.  8. CT chest, abdomen and pelvis on 09/13/2017 reveals improvement in disease.   9.  Maintenance infusional 5-FU and Avastin started on 09/21/2017. No bolus 5-FU.  10. CT chest, abdomen and pelvis was done on 12/05/2017.  There is improvement in the liver metastasis.  No new sites of metastases.  There is unchanged thrombosis in peripheral branches of right port vein and branches of superior mesentery vein.  11.  Infusional 5-FU discontinued after 11/30/2017. Single agent Avastin  continued.      SUBJECTIVE:    Ms. Sherita Kenney is a 77 year-old female with metastatic colon cancer with liver metastases as described above.  Patient has been on palliative chemotherapy since June 2017.   She is on maintenance Avastin.  CT scan in December 2017 revealed improvement in liver metastasis.      Review of system:    Overall, her condition is stable. She has fatigue.  No worsening of it. No headache.  No dizziness. No chest pain.  No difficulty breathing.  No nausea or vomiting.  No urinary complaints.  No bleeding. 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      Patient gets intermittent diarrhea.  Diarrhea is not every day.  Some days her bowel movements are good.  Other days she might have few episodes of loose bowel movements.  Generally she does not get any bowel movements at night and she is able to sleep well.    Patient wants to do some therapy.  Patient said that most of times she just sits.  She wants to get some muscle strengthening exercise.      PHYSICAL EXAMINATION:   Alert and oriented x 3.   VITAL SIGNS:  Reviewed.   EYES:  No icterus.   THROAT:  No ulcer or thrush.   NECK:  Supple. No lymphadenopathy.   LUNGS:  Good air entry bilaterally.  No wheezing.   HEART:  Regular.   ABDOMEN:  Soft and nontender.  No mass.   EXTREMITIES:  No edema.  Reduced muscle mass.  SKIN:  No petechiae. Mild hand-foot syndrome present. Palm is mildly erythematous.       ASSESSMENT:   1.  A 77-year-old female with metastatic colon cancer on palliative chemotherapy.  2.  Intermittent diarrhea.   3.  Fatigue.   4.  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   5.  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  They are stable.  This is malignancy related.       PLAN:   1.     Patient overall is doing well from colon cancer.  No clinical suspicion of progression.  She will continue on Avastin.  She is tolerating it well.    2.   Discussed regarding scans.  We will schedule CT chest, abdomen and pelvis in the next 3-4 weeks.     3.  Discussed with her regarding fatigue.  Discussed regarding physical therapy.  I told her that physical therapy have a cancer rehabilitation program.  She is interested in that.  An appointment will be scheduled.    4.  Discussed with her regarding diarrhea.  She will take Imodium as needed.  It helps.     5.  I will see her in 4 weeks' time.  Patient advised to return sooner if she has fever, chills, infection, worsening weakness, shortness of breath, worsening diarrhea or any other concerns.        Again, thank you for allowing me to participate in the care of your patient.        Sincerely,        Susan Lindo MD

## 2018-02-22 NOTE — PATIENT INSTRUCTIONS
Continue avastin.   Scheduled - [place]  CT scan in 3 weeks.   Scheduled - [place]  Physical therapy referal for cancer rehab.   Scheduled - [place]  Follow up in 1 month.   Scheduled - [place]      AVS given to patient - [place]

## 2018-02-22 NOTE — PROGRESS NOTES
"Oncology Rooming Note    February 22, 2018 10:31 AM   Sherita Kenney is a 77 year old female who presents for:    Chief Complaint   Patient presents with     Oncology Clinic Visit     Adenocarcinoma of colon      Initial Vitals: /74 (BP Location: Right arm, Patient Position: Sitting, Cuff Size: Adult Regular)  Pulse 74  Temp 97.4  F (36.3  C) (Oral)  Resp 16  Wt 54.4 kg (120 lb)  SpO2 96%  BMI 19.37 kg/m2 Estimated body mass index is 19.37 kg/(m^2) as calculated from the following:    Height as of 1/25/18: 1.676 m (5' 6\").    Weight as of this encounter: 54.4 kg (120 lb). Body surface area is 1.59 meters squared.  No Pain (0) Comment: Data Unavailable   No LMP recorded. Patient is not currently having periods (Reason: Perimenopausal).  Allergies reviewed: Yes  Medications reviewed: Yes    Medications: Medication refills not needed today.  [facility] into [facility] [place] [facility] [address]  [facility] [address]    Clinical concerns: None                      4 minutes for nursing intake (face to face time)     April Bazzi MA            "

## 2018-02-22 NOTE — MR AVS SNAPSHOT
"              After Visit Summary   2/22/2018    Sherita Kenney    MRN: [id number]           Patient Information     Date Of Birth          1940        Visit Information        Provider Department      2/22/2018 10:00 AM Susan Lindo MD [facility]        Today's Diagnoses     Adenocarcinoma of colon (H)    -  1      Care Instructions    Continue avastin.  CT scan in 3 weeks.  Physical therapy referal for cancer rehab.  Follow up in 1 month.          Follow-ups after your visit        Additional Services     PHYSICAL THERAPY REFERRAL       *This therapy referral will be filtered to a centralized scheduling office at [facility] and the patient will receive a call to schedule an appointment at a [place] location most convenient for them. *     [facility] provides Physical Therapy evaluation and treatment and many specialty services across the [place] system.  If requesting a specialty program, please choose from the list below.    If you have not heard from the scheduling office within 2 business days, please call [phone] for all locations, with the exception of [place], please call [phone] and [facility], please call [phone]  Treatment: Evaluation & Treatment  Special Instructions/Modalities:   Special Programs: Cancer Rehabilitation (PT and OT Evaluate & Treat)    Please be aware that coverage of these services is subject to the terms and limitations of your health insurance plan.  Call member services at your health plan with any benefit or coverage questions.      **Note to Provider:  If you are referring outside of [place] for the therapy appointment, please list the name of the location in the \"special instructions\" above, print the referral and give to the patient to schedule the appointment.                  Your next 10 appointments already scheduled     Mar 07, 2018  9:00 AM CST   Cancer Rehab Eval with Michelle Valentin, PT "   [facility] ([facility])    [address]  [address]  [place] [address]   [phone]            Mar 08, 2018 10:00 AM CST   Level 3 with  INFUSION CHAIR 17   [facility] ([facility])    [facility]  [address]  [place] [address] [phone]            Mar 15, 2018 11:45 AM CDT   (Arrive by 11:30 AM)   CT CHEST ABDOMEN PELVIS W/O & W CONTRAST with SHCT1   [facility] ([facility])    [address]  [place] [address] [phone]           Please bring any scans or X-rays taken at other hospitals, if similar tests were done. Also bring a list of your medicines, including vitamins, minerals and over-the-counter drugs. It is safest to leave personal items at home.  Be sure to tell your doctor:   If you have any allergies.   If there s any chance you are pregnant.   If you are breastfeeding.  You may have contrast for this exam. To prepare:   Do not eat or drink for 2 hours before your exam. If you need to take medicine, you may take it with small sips of water. (We may ask you to take liquid medicine as well.)   The day before your exam, drink extra fluids at least six 8-ounce glasses (unless your doctor tells you to restrict your fluids).   You will be given instructions on how to drink the contrast.  Patients over 70 or patients with diabetes or kidney problems:   If you haven t had a blood test (creatinine test) within the last 30 days, the Cardiologist/Radiologist may require you to get this test prior to your exam.  If you have diabetes:   Continue to take your metformin medication on the day of your exam  Please wear loose clothing, such as a sweat suit or jogging clothes. Avoid snaps, zippers and other metal. We may ask you to undress and put on a hospital gown.  If you have any questions, please call the Imaging Department where you will  "have your exam.            Mar 22, 2018  9:30 AM CDT   Level 3 with  INFUSION CHAIR 18   [facility] ([facility])    [facility] [place] [address]  [place] [address] [phone]            Mar 22, 2018 10:00 AM CDT   Return Visit with Susan Lindo MD   [facility] ([facility])    [facility] [place]  [address]  [place] [address] [phone]              Future tests that were ordered for you today     Open Future Orders        Priority Expected Expires Ordered    CT Chest Abdomen Pelvis w/o & w Contrast Routine 3/19/2018 2/22/2019 2/22/2018            Who to contact     If you have questions or need follow up information about today's clinic visit or your schedule please contact [facility] directly at [phone].  Normal or non-critical lab and imaging results will be communicated to you by GlobeInhart, letter or phone within 4 business days after the clinic has received the results. If you do not hear from us within 7 days, please contact the clinic through Asker or phone. If you have a critical or abnormal lab result, we will notify you by phone as soon as possible.  Submit refill requests through Asker or call your pharmacy and they will forward the refill request to us. Please allow 3 business days for your refill to be completed.          Additional Information About Your Visit        Asker Information     Asker lets you send messages to your doctor, view your test results, renew your prescriptions, schedule appointments and more. To sign up, go to [email or web address] . Click on \"Log in\" on the left side of the screen, which will take you to the Welcome page. Then click on \"Sign up Now\" on the right side of the page.     You will be asked to enter the access code listed below, as well as some personal information. Please follow the directions " to create your username and password.     Your access code is: [email or web address]  Expires: 3/13/2018  1:23 PM     Your access code will  in 90 days. If you need help or a new code, please call your [place] clinic or [phone].        Care EveryWhere ID     This is your Care EveryWhere ID. This could be used by other organizations to access your [place] medical records  [id number]        Your Vitals Were     Pulse Temperature Respirations Pulse Oximetry BMI (Body Mass Index)       74 97.4  F (36.3  C) (Oral) 16 96% 19.37 kg/m2        Blood Pressure from Last 3 Encounters:   18 120/74   18 136/65   18 142/87    Weight from Last 3 Encounters:   18 54.4 kg (120 lb)   18 54.4 kg (120 lb)   18 55.5 kg (122 lb 6.4 oz)              We Performed the Following     PHYSICAL THERAPY REFERRAL          Today's Medication Changes          These changes are accurate as of 18 11:57 AM.  If you have any questions, ask your nurse or doctor.               Stop taking these medicines if you haven't already. Please contact your care team if you have questions.     PREPARATION H 0.25-88.44 % per suppository   Generic drug:  phenylephrine-cocoa butter   Stopped by:  Susan Lindo MD                    Primary Care Provider Office Phone # Fax #    [facility] Enrrique Rodriguez [phone] [address] [facility] [address]  [facility] [address]        Equal Access to Services     [facility]: Hadii aad madhuri hadasho Soomaali, waaxda luqadaha, qaybta kaalmada ellis pemberton . So [facility] [phone].    ATENCIÓN: Si habla español, tiene a roldan disposición servicios gratuitos de asistencia lingüística. Llame al [phone].    We comply with applicable federal civil rights laws and [place] laws. We do not discriminate on the basis of race, color, national origin, age, disability, sex, sexual orientation, or gender identity.            Thank you!      Thank you for choosing [facility]  for your care. Our goal is always to provide you with excellent care. Hearing back from our patients is one way we can continue to improve our services. Please take a few minutes to complete the written survey that you may receive in the mail after your visit with us. Thank you!             Your Updated Medication List - Protect others around you: Learn how to safely use, store and throw away your medicines at www.disposemymeds.org.          This list is accurate as of 2/22/18 11:57 AM.  Always use your most recent med list.                   Brand Name Dispense Instructions for use Diagnosis    acetaminophen 325 MG tablet    TYLENOL    60 tablet    Take 2 tablets (650 mg) by mouth every 4 hours as needed for mild pain or fever    Cancer associated pain       BLINK TEARS OP      Apply 1 drop to eye 3 times daily        CETAPHIL lotion      Apply topically 2 times daily Also bid prn . To dry skin.        ferrous sulfate 325 (65 FE) MG tablet    IRON    30 tablet    Take 1 tablet (325 mg) by mouth daily (with breakfast)    Iron deficiency       HYDROcodone-acetaminophen 5-325 MG per tablet    NORCO    30 tablet    Take 1 tablet by mouth every 6 hours as needed for moderate to severe pain    Other chronic pain       levothyroxine 75 MCG tablet    SYNTHROID/LEVOTHROID    30 tablet    Take 1 tablet (75 mcg) by mouth daily Recheck TSH in 6 months    Hypothyroidism, unspecified type       loperamide 2 MG capsule    IMODIUM A-D    155 capsule    AND TAKE 2 CAPSULE EVERY 6 HOURS AS NEEDED FOR LOOSE STOOLS (MAX OF 16MG/24 HRS)    Diarrhea, unspecified type       menthol-zinc oxide 0.44-20.625 % Oint ointment    CALMOSEPTINE    1 Tube    Apply topically 4 times daily as needed for skin protection    Chronic diarrhea       ONDANSETRON PO      Take 8 mg by mouth every 8 hours as needed for nausea        SUMATRIPTAN SUCCINATE PO      Take 25 mg by mouth every 8 hours as needed  for migraine        travoprost (FARHAT Free) 0.004 % ophthalmic solution    TRAVATAN Z    5 mL    Place 1 drop into both eyes At Bedtime    Primary open angle glaucoma of both eyes, unspecified glaucoma stage       vitamin B-Complex     90 tablet    Take 1 tablet by mouth daily    S/P colectomy

## 2018-03-01 NOTE — PROGRESS NOTES
03/01/18 0900   Quick Adds   Type of Visit Initial OP PT Evaluation   General Information   Start of Care Date 03/01/18   Referring Physician Dr. Susan Lindo   Orders Evaluate and Treat as Indicated   Additional Orders CA rehab   Order Date 02/22/18   Medical Diagnosis Adenocarcinoma of the colon   Onset of illness/injury or Date of Surgery 03/01/17   Surgical/Medical history reviewed Yes   Pertinent history of current problem (include personal factors and/or comorbidities that impact the POC) Pt admitted on 2/15/17 with AMS to ED; workout reveled adenocarcinoma of the colan with Liver mets. Continuing with chemo- one day every other week for chemo infusion. PHM: polio at age 5   Pertinent Visual History  wears glasses   Prior level of function comment family is very involved, not currently driving   Current Community Support Family/friend caregiver   Patient role/Employment history Retired   Living environment Apartment/condo   Home/Community Accessibility Comments Lives in assisted living and takes advantage of the meal prep- 3 meals/day.   Assistive Devices Comments no AD    Patient/Family Goals Statement I want to get stronger   General Information Comments Interested in joining the [employer] and using the [employer]. [place] has transportation available.    Fall Risk Screen   Fall screen completed by PT   Have you fallen 2 or more times in the past year? No   Have you fallen and had an injury in the past year? No   Is patient a fall risk? No   System Outcome Measures   FACIT Fatigue Subscale (score out of 52). The higher the score, the better the QOL. 46   Six Minute Walk (meters). An increase of 70 or more meters indicates statistically significant change. 304  (1000 feet)   Pain   Pain comments 3 week onset of shoulder pain-mid scapular pain   Cognitive Status Examination   Orientation orientation to person, place and time   Level of Consciousness alert   Follows Commands and Answers Questions 100% of the time  "  Personal Safety and Judgment intact   Memory intact   Cognitive Comment Family reports pt has dementia   Observation   Observation Flaccid right UE from childhood polio; pt wearing open toe sandal and socks due to curling toe nails and unable to put close toe shoes on.    Integumentary   Integumentary Comments Has port   Range of Motion (ROM)   ROM Comment R UE flaccid, all other WFL   Strength   Strength Comments grossly 4/5 assessed in sitting.   Bed Mobility   Bed Mobility Comments independent   Transfer Skills   Transfer Comments able to rise without UE assist   Gait   Gait Comments ambulates unaided into clinic, holding left hand to support shoulder.    Gait Special Tests Six Minute Walk Test   Comments pt invited sister and brother in law to come back to gym while she perfomed test and invited sister to join in while she was walking- sister was walking to opposite end as pt and they would meeet and high 5 at the mid point.    Balance Special Tests Sit to Stand Reps in 30 Seconds   Reps in 30 seconds 11   Height 18\"   Comments comes up to standing and knees stay in 15 degrees of flexion   Sensory Examination   Sensory Perception Comments denies N/T   Planned Therapy Interventions   Planned Therapy Interventions balance training;gait training;neuromuscular re-education;ROM;strengthening;stretching;transfer training   Clinical Impression   Criteria for Skilled Therapeutic Interventions Met yes, treatment indicated   PT Diagnosis decreased functional mobility   Influenced by the following impairments cognition impairment, decreased strength and activity tolerance, shoulder pain, post polio with flaccid right UE   Functional limitations due to impairments impaired safety/insight/memory due to dementia, needs pictures and simple instructions for follow through, limited by dependence on rides.    Clinical Presentation Stable/Uncomplicated   Clinical Presentation Rationale progressing as expected   Clinical Decision " Making (Complexity) Low complexity   Therapy Frequency 1 time/week   Predicted Duration of Therapy Intervention (days/wks) 60 days   Risk & Benefits of therapy have been explained Yes   Patient, Family & other staff in agreement with plan of care Yes   Education Assessment   Preferred Learning Style Pictures/video   Barriers to Learning Cognitive   Goal 1   Goal Identifier 6MWT   Goal Description 1. Patient will improve distance ambulated on the 6MWT by greater than 150 feet to indicate an improvement in activity tolerance and gait speed for community mobility.   Target Date 04/30/18   Goal 2   Goal Identifier sit<>stand   Goal Description 2. Patient will improve sit<>stand reps by 2, to 13 in 30s to score within age norms for LE strength.   Target Date 04/30/18   Total Evaluation Time   Total Evaluation Time (Minutes) 35   Therapy Certification   Certification date from 03/01/18   Certification date to 04/30/18   Medical Diagnosis Adenocarcinoma of the colon

## 2018-03-01 NOTE — PROGRESS NOTES
[facility]        OUTPATIENT PHYSICAL THERAPY FUNCTIONAL EVALUATION  PLAN OF TREATMENT FOR OUTPATIENT REHABILITATION  (COMPLETE FOR INITIAL CLAIMS ONLY)  Patient's Last Name, First Name, M.I.  YOB: 1940  Sherita Kenney     Provider's Name   [facility]   Medical Record No.  [id number]     Start of Care Date:  03/01/18   Onset Date:  03/01/17   Type:     _X__PT   ____OT  ____SLP Medical Diagnosis:  Adenocarcinoma of the colon     PT Diagnosis:  decreased functional mobility Visits from SOC:  1                              __________________________________________________________________________________  Plan of Treatment/Functional Goals:  balance training, gait training, neuromuscular re-education, ROM, strengthening, stretching, transfer training           GOALS  6MWT  1. Patient will improve distance ambulated on the 6MWT by greater than 150 feet to indicate an improvement in activity tolerance and gait speed for community mobility.  04/30/18    sit<>stand  2. Patient will improve sit<>stand reps by 2, to 13 in 30s to score within age norms for LE strength.  04/30/18     Therapy Frequency:  1 time/week   Predicted Duration of Therapy Intervention:  60 days    Michelle Valentin, PT                                    I CERTIFY THE NEED FOR THESE SERVICES FURNISHED UNDER        THIS PLAN OF TREATMENT AND WHILE UNDER MY CARE     (Physician co-signature of this document indicates review and certification of the therapy plan).                Certification Date From:  03/01/18   Certification Date To:  04/30/18    Referring Provider:  Dr. Susan Lindo    Initial Assessment  See Epic Evaluation- Start of Care Date: 03/01/18

## 2018-03-07 NOTE — TELEPHONE ENCOUNTER
Suzanne, Sherita's sister transports Sherita to her appt. She called wanting to know when Sherita's next two appts are.  I gave Suzanne this information.  Clare EUBANKS RN [facility]

## 2018-03-08 NOTE — MR AVS SNAPSHOT
After Visit Summary   3/8/2018    Sherita Kenney    MRN: [id number]           Patient Information     Date Of Birth          1940        Visit Information        Provider Department      3/8/2018 10:00 AM  INFUSION CHAIR 17 [facility]        Today's Diagnoses     Adenocarcinoma of colon (H)    -  1    Port-a-cath in place           Follow-ups after your visit        Your next 10 appointments already scheduled     Mar 15, 2018 11:45 AM CDT   (Arrive by 11:30 AM)   CT CHEST ABDOMEN PELVIS W/O & W CONTRAST with SHCT1   [facility] CT ([facility])    [address] [facility] [address] [phone]           Please bring any scans or X-rays taken at other hospitals, if similar tests were done. Also bring a list of your medicines, including vitamins, minerals and over-the-counter drugs. It is safest to leave personal items at home.  Be sure to tell your doctor:   If you have any allergies.   If there s any chance you are pregnant.   If you are breastfeeding.  You may have contrast for this exam. To prepare:   Do not eat or drink for 2 hours before your exam. If you need to take medicine, you may take it with small sips of water. (We may ask you to take liquid medicine as well.)   The day before your exam, drink extra fluids at least six 8-ounce glasses (unless your doctor tells you to restrict your fluids).   You will be given instructions on how to drink the contrast.  Patients over 70 or patients with diabetes or kidney problems:   If you haven t had a blood test (creatinine test) within the last 30 days, the Cardiologist/Radiologist may require you to get this test prior to your exam.  If you have diabetes:   Continue to take your metformin medication on the day of your exam  Please wear loose clothing, such as a sweat suit or jogging clothes. Avoid snaps, zippers and other metal. We may ask you to undress and put on a  [facility] [place].  If you have any questions, please call the Imaging Department where you will have your exam.            Mar 19, 2018 11:00 AM CDT   Cancer Rehab Treatment with Michelle Valentin, PT   [facility] ([facility])    [address]  [address]  [place] [address]   [phone]            Mar 22, 2018  9:30 AM CDT   Level 3 with  INFUSION CHAIR 18   [facility] ([facility])    [facility]  [address]  [place] [address]   [phone]            Mar 22, 2018 10:00 AM CDT   Return Visit with Susan Lindo MD   [facility] ([facility])    [facility] [place]  [address]  [place] [address]   [phone]            Mar 29, 2018 10:15 AM CDT   Cancer Rehab Treatment with Michelle Valentin, PT   [facility] ([facility])    [address]  [address]  [place] [address]   [phone]            Apr 05, 2018 10:15 AM CDT   Cancer Rehab Treatment with Michelle Valentin, PT   [facility] ([facility])    [address]  [address]  [place] [address]   [phone]            Apr 12, 2018 10:15 AM CDT   Cancer Rehab Treatment with Michelle Valentin, PT   [facility] ([facility])    [address]  [address]  [place] [address]   [phone]            Apr 19, 2018  1:00 PM CDT   Cancer Rehab Treatment with Michelle Valentin, PT   [facility] ([facility])    [address] [place] [address]  [address]  [place] [address]   [phone]              Who to contact     If you have questions or need follow up information about today's clinic visit or your schedule please contact [facility] directly at [phone].  Normal or non-critical lab and imaging results will be communicated to you by  "MyChart, letter or phone within 4 business days after the clinic has received the results. If you do not hear from us within 7 days, please contact the clinic through [employer] or phone. If you have a critical or abnormal lab result, we will notify you by phone as soon as possible.  Submit refill requests through [facility] or call your pharmacy and they will forward the refill request to us. Please allow 3 business days for your refill to be completed.          Additional Information About Your Visit        [employer] Information     [facility] lets you send messages to your doctor, view your test results, renew your prescriptions, schedule appointments and more. To sign up, go to [email or web address] . Click on \"Log in\" on the left side of the screen, which will take you to the Welcome page. Then click on \"Sign up Now\" on the right side of the page.     You will be asked to enter the access code listed below, as well as some personal information. Please follow the directions to create your username and password.     Your access code is: [email or web address]  Expires: 3/13/2018  1:23 PM     Your access code will  in 90 days. If you need help or a new code, please call your [place] clinic or [phone].        Care EveryWhere ID     This is your Care EveryWhere ID. This could be used by other organizations to access your [place] medical records  [id number]        Your Vitals Were     Pulse Temperature Respirations Height Pulse Oximetry BMI (Body Mass Index)    77 98.7  F (37.1  C) (Oral) 16 1.676 m (5' 5.98\") 97% 18.31 kg/m2       Blood Pressure from Last 3 Encounters:   18 118/70   18 120/74   18 127/68    Weight from Last 3 Encounters:   18 51.4 kg (113 lb 6.4 oz)   18 54.4 kg (120 lb)   18 54.4 kg (120 lb)              We Performed the Following     CBC with platelets differential     CEA     Comprehensive metabolic panel     Protein qualitative urine        Primary Care Provider " Office Phone # Fax #    Edgar Neno Rodriguez [phone] [address] [facility] [address]  [facility] [address]        Equal Access to Services     ALEXIS ALBA : Ruddy mateo dhaliwal gavio Somonaali, waaxda luqadaha, qaybta kaalmada adetedyada, ellis vega laIsmaelgogo ren. So [facility] [phone].    ATENCIÓN: Si habla español, tiene a roldan disposición servicios gratuitos de asistencia lingüística. Llame al [phone].    We comply with applicable federal civil rights laws and [place] laws. We do not discriminate on the basis of race, color, national origin, age, disability, sex, sexual orientation, or gender identity.            Thank you!     Thank you for choosing [facility]  for your care. Our goal is always to provide you with excellent care. Hearing back from our patients is one way we can continue to improve our services. Please take a few minutes to complete the written survey that you may receive in the mail after your visit with us. Thank you!             Your Updated Medication List - Protect others around you: Learn how to safely use, store and throw away your medicines at www.disposemymeds.org.          This list is accurate as of 3/8/18 12:30 PM.  Always use your most recent med list.                   Brand Name Dispense Instructions for use Diagnosis    acetaminophen 325 MG tablet    TYLENOL    60 tablet    Take 2 tablets (650 mg) by mouth every 4 hours as needed for mild pain or fever    Cancer associated pain       BLINK TEARS OP      Apply 1 drop to eye 3 times daily        CETAPHIL lotion      Apply topically 2 times daily Also bid prn . To dry skin.        ferrous sulfate 325 (65 FE) MG tablet    IRON    30 tablet    Take 1 tablet (325 mg) by mouth daily (with breakfast)    Iron deficiency       HYDROcodone-acetaminophen 5-325 MG per tablet    NORCO    30 tablet    Take 1 tablet by mouth every 6 hours as needed for moderate to severe  pain    Other chronic pain       levothyroxine 75 MCG tablet    SYNTHROID/LEVOTHROID    30 tablet    Take 1 tablet (75 mcg) by mouth daily Recheck TSH in 6 months    Hypothyroidism, unspecified type       loperamide 2 MG capsule    IMODIUM A-D    155 capsule    AND TAKE 2 CAPSULE EVERY 6 HOURS AS NEEDED FOR LOOSE STOOLS (MAX OF 16MG/24 HRS)    Diarrhea, unspecified type       menthol-zinc oxide 0.44-20.625 % Oint ointment    CALMOSEPTINE    1 Tube    Apply topically 4 times daily as needed for skin protection    Chronic diarrhea       ONDANSETRON PO      Take 8 mg by mouth every 8 hours as needed for nausea        SUMATRIPTAN SUCCINATE PO      Take 25 mg by mouth every 8 hours as needed for migraine        travoprost (BAK Free) 0.004 % ophthalmic solution    TRAVATAN Z    5 mL    Place 1 drop into both eyes At Bedtime    Primary open angle glaucoma of both eyes, unspecified glaucoma stage       vitamin B-Complex     90 tablet    Take 1 tablet by mouth daily    S/P colectomy

## 2018-03-08 NOTE — PROGRESS NOTES
Let her know that CEA and couple of liver blood tests are higher. Will wait for the CT scan.    Susan Lindo

## 2018-03-08 NOTE — PROGRESS NOTES
Infusion Nursing Note:  Sherita Kenney presents today for Cycle 5 Day 1 Avastin.    Patient seen by provider today: No   present during visit today: Not Applicable.    Note: N/A.    Intravenous Access:  Peripheral IV placed.    Treatment Conditions:  Lab Results   Component Value Date    HGB 11.5 03/08/2018     Lab Results   Component Value Date    WBC 6.3 03/08/2018      Lab Results   Component Value Date    ANEU 3.8 03/08/2018     Lab Results   Component Value Date     03/08/2018      Lab Results   Component Value Date     03/08/2018                   Lab Results   Component Value Date    POTASSIUM 4.7 03/08/2018           Lab Results   Component Value Date    MAG 2.0 04/15/2017            Lab Results   Component Value Date    CR 0.99 03/08/2018                   Lab Results   Component Value Date    SARITHA 9.0 03/08/2018                Lab Results   Component Value Date    BILITOTAL 0.5 03/08/2018           Lab Results   Component Value Date    ALBUMIN 3.4 03/08/2018                    Lab Results   Component Value Date    ALT 33 03/08/2018           Lab Results   Component Value Date    AST 70 03/08/2018       Results reviewed, labs MET treatment parameters, ok to proceed with treatment.  Urine 10.      Post Infusion Assessment:  Patient tolerated infusion without incident.  Blood return noted pre and post infusion.  Site patent and intact, free from redness, edema or discomfort.  No evidence of extravasations.  Access discontinued per protocol.    Discharge Plan:   Patient declined prescription refills.  Discharge instructions reviewed with: Patient and sister.  Patient and family verbalized understanding of discharge instructions and all questions answered.  Copy of AVS reviewed with patient and family.  Patient will return 3/22/18 for next appointment.  Patient discharged in stable condition accompanied by: self and sister.  Departure Mode: Ambulatory.    RENÉE Cui  RENÉE Aguilar (discharge)

## 2018-03-22 NOTE — PROGRESS NOTES
"Oncology Rooming Note    March 22, 2018 9:55 AM   Sherita Kenney is a 77 year old female who presents for:    Chief Complaint   Patient presents with     Oncology Clinic Visit     Adenocarcinoma of colon      Initial Vitals: /85 (BP Location: Right arm, Patient Position: Sitting, Cuff Size: Adult Regular)  Pulse 58  Resp 16 Estimated body mass index is 18.31 kg/(m^2) as calculated from the following:    Height as of 3/8/18: 1.676 m (5' 5.98\").    Weight as of 3/8/18: 51.4 kg (113 lb 6.4 oz). There is no height or weight on file to calculate BSA.  No Pain (0) Comment: Data Unavailable   No LMP recorded. Patient is not currently having periods (Reason: Perimenopausal).  Allergies reviewed: Yes  Medications reviewed: Yes    Medications: Medication refills not needed today.  Pharmacy name entered into [facility]:    [facility] [place] - [address]  [facility], [address]    Clinical concerns: None                       4 minutes for nursing intake (face to face time)     April Bazzi MA            "

## 2018-03-22 NOTE — PROGRESS NOTES
Visit Date:   03/22/2018     ONCOLOGY HISTORY:  Ms. Sherita Kenney is a female with metastatic colon cancer.   1. Patient was brought to the ER on 02/15/2017 with altered mental status.    -CT brain on 02/15/2017 did not reveal any acute intracranial pathology.   -CT abdomen and pelvis on 02/15/2017 revealed large right iliopsoas abscess and mass-like wall thickening of portion of right colon.     2. Patient was taken to OR on 03/01/2017.    -Colonoscopy revealed a large fungating mass in the right side of the colon.  Pathology revealed invasive poorly differentiated adenocarcinoma.  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   -Laparoscopic ileostomy was done.      3. Patient had right colectomy with takedown of ileostomy and primary anastomosis on 04/13/2017.    -Pathology  reveals poorly differentiated colonic adenocarcinoma. 15 of 24 lymph nodes are positive. Tumor invades into adjacent abdominal wall.  Lymphovascular invasion present. pT4b pN2b M0.   -BRAF mutation present. No KRAS mutation.    4. PET scan on 05/22/2017 revealed metastatic disease.  Multiple hypermetabolic liver metastases and  hypermetabolic necrotic lymphadenopathy in right lower quadrant.    6. modified FOLFOX6 with Avastin started on 06/01/2017.   -Bolus 5-FU discontinued with cycle 4.    -Oxaliplatin reduced with cycle 5.  -Oxaliplatin stopped after cycle 8. Cycle 8 completed on 09/08/2017.  -Maintenance infusional 5-FU and Avastin started on 09/21/2017. No bolus 5-FU.  -Infusional 5-FU discontinued after 11/30/2017. Single agent Avastin continued.    7.  CT chest, abdomen and pelvis on 03/15/2018 reveals progression of disease.    SUBJECTIVE:    Ms. Kenney is a 77-year-old female with metastatic colon cancer with multiple liver metastases as described above.  She is currently on single-agent Avastin.      Patient overall is doing good.  She has some fatigue.  No worsening of fatigue.  No  headache or dizziness.  No chest pain or difficulty breathing.  No abdominal pain, nausea or vomiting.  Appetite has been good.  No urinary complaint.  Intermittently she gets some diarrhea.  No fevers or chills.      Patient had a CT chest, abdomen and pelvis done on 03/15/2018.  It reveals worsening liver metastasis.  There is worsening lymphadenopathy in the mesentery and portocaval region.  There is soft tissue nodule in the midline anteriorly.      PHYSICAL EXAMINATION:   GENERAL:  She is alert and oriented x 3.   VITAL SIGNS:     ABDOMEN:  A nodule is felt above the umbilicus. Skin over it is normal.      ASSESSMENT:   1.  A 77-year-old female with metastatic colon cancer which is progressing.   2.  Intermittent diarrhea.   3.  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      PLAN:   1. CT scan was reviewed with the patient and her family.  I explained to them that the disease is progressing. Liver metastasis has increased in size.  No lymphadenopathy.     Discussed regarding further treatment.  Discussed regarding comfort care with hospice.  Discussed regarding adding chemotherapy to Avastin.  These were all discussed in detail.      Patient at this time feels good.  She wants to take chemotherapy. Discussed regarding different options.  Initially she was started on FOLFOX.  She was responding to it.  Oxaliplatin was stopped after 8 cycles.  She was continued on 5-FU and Avastin.  5-FU was stopped in 11/2017, mainly because of worsening diarrhea. Patient was responding to oxaliplatin and 5-FU.        I told the patient that one option would be to go back on FOLFOX with Avastin.  I am trying to avoid irinotecan as the patient already gets some intermittent diarrhea.      We will start her on FOLFOX with Avastin.  No bolus 5-FU.  No leucovorin.  Side effects of 5-FU, Avastin and oxaliplatin reviewed.  Advised her to avoid cold exposure.      2.  Her nutrition  has not been good. We will have her see a nutritionist.      3.  Family had multiple questions, which were all answered.  I explained to them that this is an incurable situation. Aim of the treatment is to control disease and prolong life and palliate symptoms.  Patient so far has been tolerating treatment well.  One side effect has been diarrhea.  I told her to continue taking Imodium as needed.  Plan is to start chemotherapy next week.  I will see her in the clinic when she comes for cycle 2.      Total face-to-face time spent 30 minutes, most of time was spent in counseling and coordination of care.         OMER MAKI MD             D: 2018   T: 2018   MT: DIONICIO      Name:     SEVERO SWANN   MRN:      [id number]        Account:      [id number]   :      1940           Visit Date:   2018      Document: [id number]

## 2018-03-22 NOTE — PROGRESS NOTES
Asked to see patient by RN and  due to patient's request.  Informed by patient's sister, Suzanne, that patient feels she has lactose intolerance as has had explosive diarrhea.  Sister states patient will be upset if knows she has spoken with RD.  Met with patient, sister and brother in law.  Patient states she was informed by RN that she has lactose intolerance.  Patient states when she stopped consuming lactose containing foods her diarrhea improved.  Patient's sister does not agree with that statement from sister.  Explained to patient that colon cancer and chemotherapy can cause diarrhea.  Also explained that there are tests that can be done for lactose intolerance.  Spoke with RN, Cristela, that patient wants to be tested for lactose intolerance.  RN to follow up with patient next week.  Provided [employer] lactose nutrition therapy handout and protein lists for patient.  Encouraged patient to lower fiber content in diet when having diarrhea.  Suggest white rice, banana, applesauce and white toast.  Patient verbalized understanding.  RD will follow up with patient next week when in clinic on Thursday.    Magdiel Fried, RD, LD  Clinical Dietitian  [facility]  [phone] (direct)

## 2018-03-22 NOTE — MR AVS SNAPSHOT
After Visit Summary   3/22/2018    Sherita Kenney    MRN: [id number]           Patient Information     Date Of Birth          1940        Visit Information        Provider Department      3/22/2018 10:00 AM Susan Lindo MD [facility]        Care Instructions    Start FOLFOX with avastin next week.  Scheduled/meli  Follow up with cycle 2.  Scheduled/meli  Nutrition consult. Patient spoke with Jody salinas      AVS printed & given to patient/meli          Follow-ups after your visit        Your next 10 appointments already scheduled     Mar 27, 2018  7:30 AM CDT   Level 7 with SH INFUSION CHAIR 2   [facility] ([facility])    [facility]  [address]  [place] [address]   [phone]            Mar 29, 2018 10:30 AM CDT   Level 1 with SH INFUSION CHAIR 4   [facility] [place] and Infusion Center ([facility])    [facility]  [address]  [place] [address]   [phone]            Apr 12, 2018  8:00 AM CDT   Level 7 with SH INFUSION CHAIR 5   [facility] ([facility])    [facility]  [address]  [place] [address]   [phone]            Apr 12, 2018  8:30 AM CDT   Return Visit with Susan Lindo MD   [facility] ([facility])    [facility]  [address]  [place] [address]   [phone]            Apr 14, 2018 11:30 AM CDT   Level 1 with SH INFUSION CHAIR 6   [facility] [place] ([facility])    [facility]  [address]  [place] [address]   [phone]              Who to contact     If you have questions or need follow up information about today's clinic visit or your schedule please contact [facility]  "CLINIC directly at [phone].  Normal or non-critical lab and imaging results will be communicated to you by MyChart, letter or phone within 4 business days after the clinic has received the results. If you do not hear from us within 7 days, please contact the clinic through MyChart or phone. If you have a critical or abnormal lab result, we will notify you by phone as soon as possible.  Submit refill requests through [employer] or call your pharmacy and they will forward the refill request to us. Please allow 3 business days for your refill to be completed.          Additional Information About Your Visit        lifecakehart Information     [employer] lets you send messages to your doctor, view your test results, renew your prescriptions, schedule appointments and more. To sign up, go to [email or web address] . Click on \"Log in\" on the left side of the screen, which will take you to the Welcome page. Then click on \"Sign up Now\" on the right side of the page.     You will be asked to enter the access code listed below, as well as some personal information. Please follow the directions to create your username and password.     Your access code is: [email or web address]  Expires: 2018 11:01 AM     Your access code will  in 90 days. If you need help or a new code, please call your [place] clinic or [phone].        Care EveryWhere ID     This is your Care EveryWhere ID. This could be used by other organizations to access your [place] medical records  [id number]        Your Vitals Were     Pulse Respirations                58 16           Blood Pressure from Last 3 Encounters:   18 143/85   18 118/70   18 120/74    Weight from Last 3 Encounters:   18 51.4 kg (113 lb 6.4 oz)   18 54.4 kg (120 lb)   18 54.4 kg (120 lb)              Today, you had the following     No orders found for display       Primary Care Provider Office Phone # Fax #    Edgar Neno Rodriguez MD " [phone]       [address] [facility] [address]  [facility] [address]        Equal Access to Services     ALEXIS ALBA : Hadii mateo dhaliwal miguel Nunes, laura pillai, selina kachantelle pemberton, ellis saldanarico gela. So [facility] [phone].    ATENCIÓN: Si habla español, tiene a roldan disposición servicios gratuitos de asistencia lingüística. Llame al [phone].    We comply with applicable federal civil rights laws and [place] laws. We do not discriminate on the basis of race, color, national origin, age, disability, sex, sexual orientation, or gender identity.            Thank you!     Thank you for choosing [facility]  for your care. Our goal is always to provide you with excellent care. Hearing back from our patients is one way we can continue to improve our services. Please take a few minutes to complete the written survey that you may receive in the mail after your visit with us. Thank you!             Your Updated Medication List - Protect others around you: Learn how to safely use, store and throw away your medicines at www.disposemymeds.org.          This list is accurate as of 3/22/18 11:20 AM.  Always use your most recent med list.                   Brand Name Dispense Instructions for use Diagnosis    acetaminophen 325 MG tablet    TYLENOL    60 tablet    Take 2 tablets (650 mg) by mouth every 4 hours as needed for mild pain or fever    Cancer associated pain       BLINK TEARS OP      Apply 1 drop to eye 3 times daily        CETAPHIL lotion      Apply topically 2 times daily Also bid prn . To dry skin.        ferrous sulfate 325 (65 FE) MG tablet    IRON    30 tablet    Take 1 tablet (325 mg) by mouth daily (with breakfast)    Iron deficiency       HYDROcodone-acetaminophen 5-325 MG per tablet    NORCO    30 tablet    Take 1 tablet by mouth every 6 hours as needed for moderate to severe pain    Other chronic pain       levothyroxine 75 MCG tablet     SYNTHROID/LEVOTHROID    30 tablet    Take 1 tablet (75 mcg) by mouth daily Recheck TSH in 6 months    Hypothyroidism, unspecified type       loperamide 2 MG capsule    IMODIUM A-D    155 capsule    AND TAKE 2 CAPSULE EVERY 6 HOURS AS NEEDED FOR LOOSE STOOLS (MAX OF 16MG/24 HRS)    Diarrhea, unspecified type       menthol-zinc oxide 0.44-20.625 % Oint ointment    CALMOSEPTINE    1 Tube    Apply topically 4 times daily as needed for skin protection    Chronic diarrhea       ONDANSETRON PO      Take 8 mg by mouth every 8 hours as needed for nausea        SUMATRIPTAN SUCCINATE PO      Take 25 mg by mouth every 8 hours as needed for migraine        travoprost (BAK Free) 0.004 % ophthalmic solution    TRAVATAN Z    5 mL    Place 1 drop into both eyes At Bedtime    Primary open angle glaucoma of both eyes, unspecified glaucoma stage       vitamin B-Complex     90 tablet    Take 1 tablet by mouth daily    S/P colectomy

## 2018-03-22 NOTE — LETTER
[facility]  [address] [facility]  [address]  [place]  [address]  Tel: [phone]    March 22, 2018    Sherita MARTINEZ Pablito  [address]   [place] [address]        Dear Ms. Kenney,    Good day to you Sherita.    Your TSH is mildly elevated but your circulating virus hormone (T4 Free) is normal. This is a condition which we call subclinical hypothyroidism and the treatment is based on presence of symptoms.    Please let us know if you're experiencing any symptoms of hypothyroidism such as fatigue, constipation, leg/ankle swelling, cold intolerance, excessively dry skin so that we can consider increasing your levothyroxine dose..    Please call if you have questions or concerns.    Dr. Michael Ngo          Enclosure: Lab Results

## 2018-03-22 NOTE — PROGRESS NOTES
Infusion Nursing Note:  Sherita Kenney presents today for No chemo today; Chemo regimen changed and she will start next week.    Patient seen by provider today: Yes: Dr. Lindo   present during visit today: Not Applicable.    Note: N/A.    Intravenous Access:  Implanted Port.    Treatment Conditions:  Not Applicable.    Post Infusion Assessment:  Blood return noted.  Site patent and intact, free from redness, edema or discomfort.  No evidence of extravasations.  Access discontinued per protocol.    Discharge Plan:   Discharge instructions reviewed with: Patient and Family.  Patient and/or family verbalized understanding of discharge instructions and all questions answered.  Copy of AVS reviewed with patient and/or family.  Patient will return 3/27/2018 for next appointment.  Patient discharged in stable condition accompanied by: self and sister and brother-in-law  Departure Mode: Ambulatory.    Jina Aguilar RN

## 2018-03-22 NOTE — LETTER
"    3/22/2018         RE: Sherita Kenney  [address]   [place] [address]        Dear Colleague,    Thank you for referring your patient, Sherita Kenney, to the [facility]. Please see a copy of my visit note below.    Oncology Rooming Note    March 22, 2018 9:55 AM   Sherita Kenney is a 77 year old female who presents for:    Chief Complaint   Patient presents with     Oncology Clinic Visit     Adenocarcinoma of colon      Initial Vitals: /85 (BP Location: Right arm, Patient Position: Sitting, Cuff Size: Adult Regular)  Pulse 58  Resp 16 Estimated body mass index is 18.31 kg/(m^2) as calculated from the following:    Height as of 3/8/18: 1.676 m (5' 5.98\").    Weight as of 3/8/18: 51.4 kg (113 lb 6.4 oz). There is no height or weight on file to calculate BSA.  No Pain (0) Comment: Data Unavailable   No LMP recorded. Patient is not currently having periods (Reason: Perimenopausal).  Allergies reviewed: Yes  Medications reviewed: Yes    Medications: Medication refills not needed today.  Pharmacy name entered into [facility]:    [facility], [place] - [address]  [facility] [address]    Clinical concerns: None                       4 minutes for nursing intake (face to face time)     April Bazzi MA              Visit Date:   03/22/2018      SUBJECTIVE:  Ms. Kenney is a 77-year-old female with metastatic colon cancer with multiple liver metastases as described above.  She is currently on single-agent Avastin.      The patient overall is doing good.  She has some fatigue.  No worsening of fatigue.  No headache or dizziness.  No chest pain, difficulty breathing.  No abdominal pain, nausea or vomiting.  Appetite has been good.  No urinary complaint.  Intermittently she gets some diarrhea.  No fevers or chills.      The patient had a CT chest, abdomen and pelvis done on 03/15/2018.  It reveals worsening " liver metastasis.  There is worsening lymphadenopathy in the mesentery and portocaval region.  There is soft tissue nodule in the midline anteriorly.      PHYSICAL EXAMINATION:   GENERAL:  She is alert and oriented x 3.   VITAL SIGNS:     ABDOMEN:  A nodule is felt above the umbilicus.  The skin over it is normal.      ASSESSMENT:   1.  A 77-year-old female with metastatic colon cancer which is progressing.   2.  Intermittent diarrhea.   3.  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      PLAN:   1.  A CT scan was reviewed with the patient and her family.  I explained to them that the disease is progressing.  The liver metastasis increased in size.  No lymphadenopathy.   2.  Discussed regarding further treatment.  Discussed regarding comfort care with hospice.  Discussed regarding adding chemotherapy to Avastin.  These were all discussed in detail.      The patient at this time feels good.  She wants to take chemotherapy.      Discussed regarding different options.  Initially she was started on FOLFOX.  She was responding to it.  Oxaliplatin was stopped after 8 cycles.  She was continued on 5-FU and Avastin.  5-FU was stopped in 11/2017, mainly because of worsening diarrhea.  The patient was responding to oxaliplatin 5.        I told the patient that one option would be to just go back on FOLFOX with Avastin.  I am trying to avoid *** as the patient already gets some intermittent diarrhea.      We will start her on FOLFOX with Avastin.  No bolus 5-FU.  No leucovorin.  Side effects of 5-FU, Avastin and oxaliplatin reviewed.  Advised her to avoid cold exposure.      2.  We will have her see a nutritionist.    3.  Family had multiple questions, which were all answered.  I explained to them that this is an incurable situation.  The aim of the treatment is to control disease and prolong life and palliate symptoms.  The patient so far has been tolerating treatment well.   One side effect has been diarrhea.  I told her to continue taking Imodium as needed.  Plan is to start chemotherapy next week.  I will see her in the clinic.  She comes for cycle 2.      Total face-to-face time spent 30 minutes, most of time was spent in counseling and coordination of care.         OMER MAKI MD             D: 2018   T: 2018   MT: DIONICIO      Name:     SEVERO SWANN   MRN:      [id number]        Account:      [id number]   :      1940           Visit Date:   2018      Document: [id number]       Again, thank you for allowing me to participate in the care of your patient.        Sincerely,        Omer Maki MD

## 2018-03-22 NOTE — PATIENT INSTRUCTIONS
Start FOLFOX with avastin next week.  Scheduled/meli  Follow up with cycle 2.  Scheduled/meli  Nutrition consult. Patient spoke with Jody salinas      AVS printed & given to patient/meli

## 2018-03-22 NOTE — MR AVS SNAPSHOT
After Visit Summary   3/22/2018    Sherita Kenney    MRN: [id number]           Patient Information     Date Of Birth          1940        Visit Information        Provider Department      3/22/2018 9:30 AM  INFUSION CHAIR 18 [facility]fusion Center        Today's Diagnoses     Hypothyroidism, unspecified type    -  1    Port-a-cath in place           Follow-ups after your visit        Your next 10 appointments already scheduled     Mar 27, 2018  7:30 AM CDT   Level 7 with  INFUSION CHAIR 2   [facility]fusion Center ([facility])    [facility]  [address]  [place] [address]   [phone]            Apr 12, 2018  8:00 AM CDT   Level 7 with  INFUSION CHAIR 5   [facility] ([facility])    [facility]  [address]  [place] [address]   [phone]            Apr 12, 2018  8:30 AM CDT   Return Visit with Susan Lindo MD   [facility] ([facility])    [facility]  [address]  [place] [address]   [phone]              Who to contact     If you have questions or need follow up information about today's clinic visit or your schedule please contact [facility] INF[facility]ION CENTER directly at [phone].  Normal or non-critical lab and imaging results will be communicated to you by MyChart, letter or phone within 4 business days after the clinic has received the results. If you do not hear from us within 7 days, please contact the clinic through MyChart or phone. If you have a critical or abnormal lab result, we will notify you by phone as soon as possible.  Submit refill requests through [employer] or call your pharmacy and they will forward the refill request to us. Please allow 3 business days for your refill to be completed.           "Additional Information About Your Visit        MyChart Information     [employer] lets you send messages to your doctor, view your test results, renew your prescriptions, schedule appointments and more. To sign up, go to [email or web address] . Click on \"Log in\" on the left side of the screen, which will take you to the Welcome page. Then click on \"Sign up Now\" on the right side of the page.     You will be asked to enter the access code listed below, as well as some personal information. Please follow the directions to create your username and password.     Your access code is: [email or web address]  Expires: 2018 11:01 AM     Your access code will  in 90 days. If you need help or a new code, please call your [facility] clinic or [phone].        Care EveryWhere ID     This is your Care EveryWhere ID. This could be used by other organizations to access your [facility] medical records  [id number]         Blood Pressure from Last 3 Encounters:   18 143/85   18 118/70   18 120/74    Weight from Last 3 Encounters:   18 51.4 kg (113 lb 6.4 oz)   18 54.4 kg (120 lb)   18 54.4 kg (120 lb)              We Performed the Following     TSH with free T4 reflex        Primary Care Provider Office Phone # Fax #    Edgar Neno Rodriguez [phone] [address] [facility] [address]  [facility] [address]        Equal Access to Services     FILEMON ALBA : Hadii mateo dhaliwal hadasho Sogabriel, waaxda luqadaha, qaybta kaalmada nilay, ellis mooney . So [facility] [phone].    ATENCIÓN: Si habla español, tiene a roldan disposición servicios gratuitos de asistencia lingüística. Llame al [phone].    We comply with applicable federal civil rights laws and [place] laws. We do not discriminate on the basis of race, color, national origin, age, disability, sex, sexual orientation, or gender identity.            Thank you!     Thank you for choosing [facility]  for your care. Our goal is always to provide you with excellent care. Hearing back from our patients is one way we can continue to improve our services. Please take a few minutes to complete the written survey that you may receive in the mail after your visit with us. Thank you!             Your Updated Medication List - Protect others around you: Learn how to safely use, store and throw away your medicines at www.disposemymeds.org.          This list is accurate as of 3/22/18 11:01 AM.  Always use your most recent med list.                   Brand Name Dispense Instructions for use Diagnosis    acetaminophen 325 MG tablet    TYLENOL    60 tablet    Take 2 tablets (650 mg) by mouth every 4 hours as needed for mild pain or fever    Cancer associated pain       BLINK TEARS OP      Apply 1 drop to eye 3 times daily        CETAPHIL lotion      Apply topically 2 times daily Also bid prn . To dry skin.        ferrous sulfate 325 (65 FE) MG tablet    IRON    30 tablet    Take 1 tablet (325 mg) by mouth daily (with breakfast)    Iron deficiency       HYDROcodone-acetaminophen 5-325 MG per tablet    NORCO    30 tablet    Take 1 tablet by mouth every 6 hours as needed for moderate to severe pain    Other chronic pain       levothyroxine 75 MCG tablet    SYNTHROID/LEVOTHROID    30 tablet    Take 1 tablet (75 mcg) by mouth daily Recheck TSH in 6 months    Hypothyroidism, unspecified type       loperamide 2 MG capsule    IMODIUM A-D    155 capsule    AND TAKE 2 CAPSULE EVERY 6 HOURS AS NEEDED FOR LOOSE STOOLS (MAX OF 16MG/24 HRS)    Diarrhea, unspecified type       menthol-zinc oxide 0.44-20.625 % Oint ointment    CALMOSEPTINE    1 Tube    Apply topically 4 times daily as needed for skin protection    Chronic diarrhea       ONDANSETRON PO      Take 8 mg by mouth every 8 hours as needed for nausea        SUMATRIPTAN SUCCINATE PO      Take 25 mg by mouth every 8 hours as needed for migraine         travoprost (FARHAT Free) 0.004 % ophthalmic solution    TRAVATAN Z    5 mL    Place 1 drop into both eyes At Bedtime    Primary open angle glaucoma of both eyes, unspecified glaucoma stage       vitamin B-Complex     90 tablet    Take 1 tablet by mouth daily    S/P colectomy

## 2018-03-27 NOTE — PROGRESS NOTES
"Infusion Nursing Note:  Sherita Kenney presents today for C1 D1 Avastin, Oxaliplatin, Fluorouracil pump connect   Patient seen by provider today: No   present during visit today: Not Applicable.    Note: Patient states no new concerns since exam with Dr. Lindo on 3/22/18. Reviewed labs from today with Dr. Lindo, ok to proceed with chemo today with elevated alk phos, ALT, AST and creatinine.    New chemotherapy regimen today. Chemotherapy teaching, side effects, and schedule reviewed with patient. General and individual chemotherapy side effects reviewed with patient including fatigue, immunosuppression, alopecia, nausea/vomiting, constipation/diarrhea, peripheral neuropathy and cold sensitivity. States she previously received printed info on chemo drugs from clinic. Pt instructed to call care coordinator, triage (or MD on call if after hours/weekends) with chills/temp >=100.5, questions/concerns. Pt stated understanding of plan.     Prior to discharge: Port is secured in place with tegaderm and flushed with 10cc NS with positive blood return noted.  Continuous home infusion CADD pump connected.    All connectors secured in place and clamps taped open.    Pump started, \"running\" noted on display (CADD): YES. CADD pump pre-set to run at 5ml/hr for 46 hours.  Patient instructed to call our clinic or [facility] with any questions or concerns at home.  Given new patient folder from [facility]. Reviewed with patient and given number for [facility] if any problems arise with pump. Patient verbalized understanding.    Patient set up for pump disconnect at our clinic on 3/29/18 at 10:30am.        Intravenous Access:  Labs drawn without difficulty.  Implanted Port.    Treatment Conditions:  Lab Results   Component Value Date    HGB 11.3 03/27/2018     Lab Results   Component Value Date    WBC 6.6 03/27/2018      Lab Results   Component Value Date    ANEU 4.4 03/27/2018     Lab Results   Component Value Date    PLT " 185 03/27/2018      Lab Results   Component Value Date     03/27/2018                   Lab Results   Component Value Date    POTASSIUM 4.4 03/27/2018           Lab Results   Component Value Date    CR 1.08 03/27/2018                   Lab Results   Component Value Date    SARITHA 9.3 03/27/2018                Lab Results   Component Value Date    BILITOTAL 0.7 03/27/2018           Lab Results   Component Value Date    ALBUMIN 3.5 03/27/2018                    Lab Results   Component Value Date    ALT 52 03/27/2018           Lab Results   Component Value Date     03/27/2018       Results reviewed, labs MET treatment parameters, ok to proceed with treatment.  Urine 10mg/dL. BP: 105/59 .      Post Infusion Assessment:  Patient tolerated infusion without incident.  Blood return noted pre and post infusion.  Site patent and intact, free from redness, edema or discomfort.  No evidence of extravasations.    Discharge Plan:   Prescription refills given for: ativan, compazine and zofran. Meds reviewed with patient by pharmacy staff.  Discharge instructions reviewed with: Patient.  Patient and/or family verbalized understanding of discharge instructions and all questions answered.  Copy of AVS reviewed with patient and/or family.  Patient will return 3/29 for next appointment.  Patient discharged in stable condition accompanied by: self.  Departure Mode: Ambulatory.  Face to Face time: 20 minutes.    Sarah Ridley RN

## 2018-03-27 NOTE — MR AVS SNAPSHOT
After Visit Summary   3/27/2018    Sherita Kenney    MRN: [id number]           Patient Information     Date Of Birth          1940        Visit Information        Provider Department      3/27/2018 7:30 AM  INFUSION CHAIR 2 [facility]        Today's Diagnoses     Adenocarcinoma of colon (H)    -  1       Follow-ups after your visit        Your next 10 appointments already scheduled     Mar 29, 2018 10:30 AM CDT   Level 1 with  INFUSION CHAIR 4   [facility] ([facility])    [facility]  [address]  [place] [address]   [phone]            Apr 12, 2018  8:00 AM CDT   Level 7 with  INFUSION CHAIR 5   [facility] ([facility])    [facility] [place] [facility]  [address]  [place] [address]   [phone]            Apr 12, 2018  8:30 AM CDT   Return Visit with Susan Lindo MD   [facility] ([facility])    [facility]  [address]  [place] [address]   [phone]            Apr 14, 2018 11:30 AM CDT   Level 1 with  INFUSION CHAIR 6   [facility] ([facility])    [facility] [place] [facility]  [address]  [place] [address]   [phone]              Who to contact     If you have questions or need follow up information about today's clinic visit or your schedule please contact [facility] directly at [phone].  Normal or non-critical lab and imaging results will be communicated to you by MyChart, letter or phone within 4 business days after the clinic has received the results. If you do not hear from us within 7 days, please contact the clinic through MyChart or phone. If you have a critical or abnormal lab result, we will notify you by  "phone as soon as possible.  Submit refill requests through [employer] or call your pharmacy and they will forward the refill request to us. Please allow 3 business days for your refill to be completed.          Additional Information About Your Visit        [employer] Information     [employer] lets you send messages to your doctor, view your test results, renew your prescriptions, schedule appointments and more. To sign up, go to [email or web address] . Click on \"Log in\" on the left side of the screen, which will take you to the Welcome page. Then click on \"Sign up Now\" on the right side of the page.     You will be asked to enter the access code listed below, as well as some personal information. Please follow the directions to create your username and password.     Your access code is: [email or web address]  Expires: 2018 11:01 AM     Your access code will  in 90 days. If you need help or a new code, please call your [place] clinic or [phone].        Care EveryWhere ID     This is your Care EveryWhere ID. This could be used by other organizations to access your [place] medical records  [id number]        Your Vitals Were     Pulse Temperature Respirations Height Pulse Oximetry BMI (Body Mass Index)    71 97.5  F (36.4  C) (Oral) 16 1.676 m (5' 5.98\") 100% 18.09 kg/m2       Blood Pressure from Last 3 Encounters:   18 105/59   18 143/85   18 118/70    Weight from Last 3 Encounters:   18 50.8 kg (112 lb)   18 51.4 kg (113 lb 6.4 oz)   18 54.4 kg (120 lb)              We Performed the Following     CBC with platelets differential     Comprehensive metabolic panel     Protein qualitative urine     Treatment Conditions 2          Today's Medication Changes          These changes are accurate as of 3/27/18  1:01 PM.  If you have any questions, ask your nurse or doctor.               Start taking these medicines.        Dose/Directions    LORazepam 0.5 MG tablet   Commonly known as: "  ATIVAN   Used for:  Adenocarcinoma of colon (H)        Dose:  0.5 mg   Take 1 tablet (0.5 mg) by mouth every 4 hours as needed (Anxiety, Nausea/Vomiting or Sleep)   Quantity:  30 tablet   Refills:  2       ondansetron 8 MG tablet   Commonly known as:  ZOFRAN   Used for:  Adenocarcinoma of colon (H)        Dose:  8 mg   Take 1 tablet (8 mg) by mouth every 8 hours as needed (Nausea/Vomiting)   Quantity:  10 tablet   Refills:  2       prochlorperazine 10 MG tablet   Commonly known as:  COMPAZINE   Used for:  Adenocarcinoma of colon (H)        Dose:  10 mg   Take 1 tablet (10 mg) by mouth every 6 hours as needed (Nausea/Vomiting)   Quantity:  30 tablet   Refills:  2            Where to get your medicines      These medications were sent to [facility] [place], [place] - [address]  [address], [place] [address]     Phone:  [phone]     ondansetron 8 MG tablet    prochlorperazine 10 MG tablet         Some of these will need a paper prescription and others can be bought over the counter.  Ask your nurse if you have questions.     Bring a paper prescription for each of these medications     LORazepam 0.5 MG tablet                Primary Care Provider Office Phone # Fax #    Edgar Neno Rodriguez [phone] [address]  [facility] [address]        Equal Access to Services     ALEXIS ALBA AH: Hadii mateo gaticao Sogabriel, waaxda luqadaha, qaybta kaalmada nilay, ellis mooney . So [facility] [phone].    ATENCIÓN: Si habla español, tiene a roldan disposición servicios gratuitos de asistencia lingüística. Malaika al [phone].    We comply with applicable federal civil rights laws and [place] laws. We do not discriminate on the basis of race, color, national origin, age, disability, sex, sexual orientation, or gender identity.            Thank you!     Thank you for choosing [facility] AND [facility]   for your care. Our goal is always to provide you with excellent care. Hearing back from our patients is one way we can continue to improve our services. Please take a few minutes to complete the written survey that you may receive in the mail after your visit with us. Thank you!             Your Updated Medication List - Protect others around you: Learn how to safely use, store and throw away your medicines at www.disposemymeds.org.          This list is accurate as of 3/27/18  1:01 PM.  Always use your most recent med list.                   Brand Name Dispense Instructions for use Diagnosis    acetaminophen 325 MG tablet    TYLENOL    60 tablet    Take 2 tablets (650 mg) by mouth every 4 hours as needed for mild pain or fever    Cancer associated pain       BLINK TEARS OP      Apply 1 drop to eye 3 times daily        CETAPHIL lotion      Apply topically 2 times daily Also bid prn . To dry skin.        ferrous sulfate 325 (65 FE) MG tablet    IRON    30 tablet    Take 1 tablet (325 mg) by mouth daily (with breakfast)    Iron deficiency       HYDROcodone-acetaminophen 5-325 MG per tablet    NORCO    30 tablet    Take 1 tablet by mouth every 6 hours as needed for moderate to severe pain    Other chronic pain       levothyroxine 75 MCG tablet    SYNTHROID/LEVOTHROID    30 tablet    Take 1 tablet (75 mcg) by mouth daily Recheck TSH in 6 months    Hypothyroidism, unspecified type       loperamide 2 MG capsule    IMODIUM A-D    155 capsule    AND TAKE 2 CAPSULE EVERY 6 HOURS AS NEEDED FOR LOOSE STOOLS (MAX OF 16MG/24 HRS)    Diarrhea, unspecified type       LORazepam 0.5 MG tablet    ATIVAN    30 tablet    Take 1 tablet (0.5 mg) by mouth every 4 hours as needed (Anxiety, Nausea/Vomiting or Sleep)    Adenocarcinoma of colon (H)       ondansetron 8 MG tablet    ZOFRAN    10 tablet    Take 1 tablet (8 mg) by mouth every 8 hours as needed (Nausea/Vomiting)    Adenocarcinoma of colon (H)       prochlorperazine 10 MG tablet     COMPAZINE    30 tablet    Take 1 tablet (10 mg) by mouth every 6 hours as needed (Nausea/Vomiting)    Adenocarcinoma of colon (H)       SUMATRIPTAN SUCCINATE PO      Take 25 mg by mouth every 8 hours as needed for migraine        travoprost (BAK Free) 0.004 % ophthalmic solution    TRAVATAN Z    5 mL    Place 1 drop into both eyes At Bedtime    Primary open angle glaucoma of both eyes, unspecified glaucoma stage       vitamin B-Complex     90 tablet    Take 1 tablet by mouth daily    S/P colectomy

## 2018-03-27 NOTE — PROGRESS NOTES
Therapy: 5fu  Insurance: Medicare and [employer]   Medicare covers 80%  [employer] covers 20%  Pt must be homebound for nursing or would cost $344 per visit.    In reference to referral made on 3/27/18 to check 5fu and home disconnect coverage    Please contact Intake with any questions, [phone] or In Basket pool, [facility] ([id number]).

## 2018-03-27 NOTE — TELEPHONE ENCOUNTER
I received a message from Joe Pruitt [phone] stating that he has found home infusion services that would be able to go to patient's home ( assisted living facility). I called and LVM that paitent was connected to pump at 12:50. Vale Molina

## 2018-03-28 NOTE — PROGRESS NOTES
This is a recent snapshot of the patient's [facility] medical record.  For current drug dose and complete information and questions, call [phone] or In Basket pool, [facility] ([id number])  CSN Number:  [id number]

## 2018-03-29 NOTE — MR AVS SNAPSHOT
After Visit Summary   3/29/2018    Sherita Kenney    MRN: [id number]           Patient Information     Date Of Birth          1940        Visit Information        Provider Department      3/29/2018 10:30 AM  INFUSION CHAIR 4 [facility]        Today's Diagnoses     Port-a-cath in place    -  1      Care Instructions    Pt reports vomiting and diarrhea today.    Advised taking anti-nausea medications for today and tomorrow.  Take zofran every 8 hours through Friday, or until nausea subsides.    Advised taking imodium for diarrhea, repeat after each loose stool.  Max of 16mg in 24 hours.          Follow-ups after your visit        Your next 10 appointments already scheduled     Apr 12, 2018  8:00 AM CDT   Level 7 with  INFUSION CHAIR 5   [facility] ([facility])    [place] [id number]  [place] [address]   [phone]            Apr 12, 2018  8:30 AM CDT   Return Visit with Susan Lindo MD   [facility] ([facility])    [facility]  [address]  [place] [address]   [phone]            Apr 14, 2018 11:30 AM CDT   Level 1 with  INFUSION CHAIR 6   [facility] ([facility])    [place] [address]  [place] [address]   [phone]              Who to contact     If you have questions or need follow up information about today's clinic visit or your schedule please contact [facility]ION CENTER directly at [phone].  Normal or non-critical lab and imaging results will be communicated to you by MyChart, letter or phone within 4 business days after the clinic has received the results. If you do not hear from us within 7 days, please contact the clinic through MyChart or phone. If you have a critical  "or abnormal lab result, we will notify you by phone as soon as possible.  Submit refill requests through [employer] or call your pharmacy and they will forward the refill request to us. Please allow 3 business days for your refill to be completed.          Additional Information About Your Visit        [employer] Information     [employer] lets you send messages to your doctor, view your test results, renew your prescriptions, schedule appointments and more. To sign up, go to [email or web address] . Click on \"Log in\" on the left side of the screen, which will take you to the Welcome page. Then click on \"Sign up Now\" on the right side of the page.     You will be asked to enter the access code listed below, as well as some personal information. Please follow the directions to create your username and password.     Your access code is: [email or web address]  Expires: 2018 11:01 AM     Your access code will  in 90 days. If you need help or a new code, please call your [place] clinic or [phone].        Care EveryWhere ID     This is your Care EveryWhere ID. This could be used by other organizations to access your [place] medical records  [id number]        Your Vitals Were     Pulse Temperature Respirations             75 97.5  F (36.4  C) (Oral) 16          Blood Pressure from Last 3 Encounters:   18 109/63   18 105/59   18 143/85    Weight from Last 3 Encounters:   18 50.8 kg (112 lb)   18 51.4 kg (113 lb 6.4 oz)   18 54.4 kg (120 lb)              Today, you had the following     No orders found for display       Primary Care Provider Office Phone # Fax #    Edgar Neno Rodriguez [phone] [address] [place] [address]        Equal Access to Services     [facility] Hospital[facility]EL : Ruddy Nunes, waaxda luqadaha, qaybta kaalmada nilay, ellis mooney . So [facility] [phone].    ATENCIÓN: Si rukhsana " español, tiene a roldan disposición servicios gratuitos de asistencia lingüística. Malaika jimenez [phone].    We comply with applicable federal civil rights laws and [place] laws. We do not discriminate on the basis of race, color, national origin, age, disability, sex, sexual orientation, or gender identity.            Thank you!     Thank you for choosing [facility]  for your care. Our goal is always to provide you with excellent care. Hearing back from our patients is one way we can continue to improve our services. Please take a few minutes to complete the written survey that you may receive in the mail after your visit with us. Thank you!             Your Updated Medication List - Protect others around you: Learn how to safely use, store and throw away your medicines at www.disposemymeds.org.          This list is accurate as of 3/29/18 11:03 AM.  Always use your most recent med list.                   Brand Name Dispense Instructions for use Diagnosis    acetaminophen 325 MG tablet    TYLENOL    60 tablet    Take 2 tablets (650 mg) by mouth every 4 hours as needed for mild pain or fever    Cancer associated pain       BLINK TEARS OP      Apply 1 drop to eye 3 times daily        CETAPHIL lotion      Apply topically 2 times daily Also bid prn . To dry skin.        ferrous sulfate 325 (65 FE) MG tablet    IRON    30 tablet    Take 1 tablet (325 mg) by mouth daily (with breakfast)    Iron deficiency       HYDROcodone-acetaminophen 5-325 MG per tablet    NORCO    30 tablet    Take 1 tablet by mouth every 6 hours as needed for moderate to severe pain    Other chronic pain       levothyroxine 75 MCG tablet    SYNTHROID/LEVOTHROID    30 tablet    Take 1 tablet (75 mcg) by mouth daily Recheck TSH in 6 months    Hypothyroidism, unspecified type       loperamide 2 MG capsule    IMODIUM A-D    155 capsule    AND TAKE 2 CAPSULE EVERY 6 HOURS AS NEEDED FOR LOOSE STOOLS (MAX OF 16MG/24 HRS)     Diarrhea, unspecified type       LORazepam 0.5 MG tablet    ATIVAN    30 tablet    Take 1 tablet (0.5 mg) by mouth every 4 hours as needed (Anxiety, Nausea/Vomiting or Sleep)    Adenocarcinoma of colon (H)       ondansetron 8 MG tablet    ZOFRAN    10 tablet    Take 1 tablet (8 mg) by mouth every 8 hours as needed (Nausea/Vomiting)    Adenocarcinoma of colon (H)       prochlorperazine 10 MG tablet    COMPAZINE    30 tablet    Take 1 tablet (10 mg) by mouth every 6 hours as needed (Nausea/Vomiting)    Adenocarcinoma of colon (H)       SUMATRIPTAN SUCCINATE PO      Take 25 mg by mouth every 8 hours as needed for migraine        travoprost (BAK Free) 0.004 % ophthalmic solution    TRAVATAN Z    5 mL    Place 1 drop into both eyes At Bedtime    Primary open angle glaucoma of both eyes, unspecified glaucoma stage       vitamin B-Complex     90 tablet    Take 1 tablet by mouth daily    S/P colectomy

## 2018-03-29 NOTE — PATIENT INSTRUCTIONS
Pt reports vomiting and diarrhea today.    Advised taking anti-nausea medications for today and tomorrow.  Take zofran every 8 hours through Friday, or until nausea subsides.    Advised taking imodium for diarrhea, repeat after each loose stool.  Max of 16mg in 24 hours.

## 2018-03-29 NOTE — PROGRESS NOTES
Infusion Nursing Note:  Sherita Kenney presents today for pump disconnect.    Patient seen by provider today: No   present during visit today: Not Applicable.    Note: Pt reports diarrhea and vomiting today.  Pt lives at a care facility and they distribute her medications for nausea and diarrhea.    Intravenous Access:  Implanted Port.    Treatment Conditions:  Not Applicable.      Post Infusion Assessment:  Patient tolerated infusion without incident.  Blood return noted pre and post infusion.  Site patent and intact, free from redness, edema or discomfort.  No evidence of extravasations.  Access discontinued per protocol.    Discharge Plan:   Patient declined prescription refills.  Discharge instructions reviewed with: Patient.  Patient and/or family verbalized understanding of discharge instructions and all questions answered.  Copy of AVS reviewed with patient and/or family.  Patient will return 4/12/18 for next appointment.  Patient discharged in stable condition accompanied by: self and sister.  Departure Mode: Ambulatory.    Sayda Watts RN

## 2018-04-12 NOTE — Clinical Note
4/12/2018         RE: Sherita Kenney  [address]   [place] [address]        Dear Colleague,    Thank you for referring your patient, Sherita Kenney, to the [facility]. Please see a copy of my visit note below.    Visit Date:   04/12/2018     ONCOLOGY HISTORY:  Ms. Sherita Kenney is a female with metastatic colon cancer.   1. Patient was brought to the ER on 02/15/2017 with altered mental status.    -CT brain on 02/15/2017 did not reveal any acute intracranial pathology.   -CT abdomen and pelvis on 02/15/2017 revealed large right iliopsoas abscess and mass-like wall thickening of portion of right colon.      2. Patient was taken to OR on 03/01/2017.    -Colonoscopy revealed a large fungating mass in the right side of the colon.  Pathology revealed invasive poorly differentiated adenocarcinoma.  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   -Laparoscopic ileostomy was done.       3. Patient had right colectomy with takedown of ileostomy and primary anastomosis on 04/13/2017.    -Pathology  reveals poorly differentiated colonic adenocarcinoma. 15 of 24 lymph nodes are positive. Tumor invades into adjacent abdominal wall.  Lymphovascular invasion present. pT4b pN2b M0.   -BRAF mutation present. No KRAS mutation.     4. PET scan on 05/22/2017 revealed metastatic disease.  Multiple hypermetabolic liver metastases and  hypermetabolic necrotic lymphadenopathy in right lower quadrant.     6. modified FOLFOX6 with Avastin started on 06/01/2017.   -Bolus 5-FU discontinued with cycle 4.    -Oxaliplatin reduced with cycle 5.  -Oxaliplatin stopped after cycle 8. Cycle 8 completed on 09/08/2017.  -Maintenance infusional 5-FU and Avastin started on 09/21/2017. No bolus 5-FU.  -Infusional 5-FU discontinued after 11/30/2017. Single agent Avastin continued.     7.  CT chest, abdomen and pelvis on 03/15/2018 reveals progression of disease.    8.  mFOLFOX with Avastin (without bolus 5-FU and leucovorin) started on 2018.      SUBJECTIVE:    Ms. Swann is a 77-year-old female with metastatic colon cancer.  She is on treatment with FOLFOX with Avastin.  No bolus 5-FU and leucovorin.  It was started on 2018.  She tolerated it well.        She has some fatigue.  No worsening of it.  No headache.  No dizziness.  No chest pain.  No difficulty breathing.  No abdominal pain, nausea or vomiting.  No urinary complaints.  She has chronic diarrhea.  No worsening of it.  She has been taking Imodium which helps.  No numbness or tingling in extremities.      PHYSICAL EXAMINATION:   Alert and oriented x 3.   VITAL SIGNS:  Reviewed.   EYES:  No icterus.   THROAT:  No ulcer or thrush.   NECK:  Supple. No lymphadenopathy.   LUNGS:  Good air entry bilaterally.  No wheezing.   HEART:  Regular.   ABDOMEN:  Soft and nontender.  No mass.   EXTREMITIES:  No edema.  Reduced muscle mass.  SKIN:  No petechiae. Mild hand-foot syndrome present.     LABORATORY DATA:  Reviewed.      ASSESSMENT:   1.  A 77-year-old female with metastatic colon cancer on palliative chemotherapy.   2.  Anemia.   3.  Thrombocytopenia.   4.  Diarrhea from chemotherapy.      PLAN:   1.  Overall, patient tolerated first cycle of FOLFOX and Avastin well.  She will continue on that.  Side effects reviewed.   2.  Labs were reviewed.  There are a few abnormalities.  She is more anemic and thrombocytopenic.  This is secondary to chemotherapy.  We will monitor it.     3.  I will see her in a month.  She will see the nurse practitioner in between.  I advised her to see a physician if she has fever, chills, worsening weakness, shortness of breath, recurrent vomiting, bleeding or any other concerns.         OMER MAKI MD             D: 2018   T: 2018   MT: SHELL      Name:     SEVERO SWANN   MRN:      [id number]        Account:      [id number]   :      1940           Visit Date:    "04/12/2018      Document: [id number]        Oncology Rooming Note    April 12, 2018 10:50 AM   Sherita Kenney is a 77 year old female who presents for:    Chief Complaint   Patient presents with     Oncology Clinic Visit     Adenocarcinoma of colon      Initial Vitals: /72  Pulse 63  Temp 97.9  F (36.6  C) (Oral)  Resp 16 Estimated body mass index is 18.09 kg/(m^2) as calculated from the following:    Height as of 3/27/18: 1.676 m (5' 5.98\").    Weight as of 3/27/18: 50.8 kg (112 lb). There is no height or weight on file to calculate BSA.  No Pain (0) Comment: Data Unavailable   No LMP recorded. Patient is not currently having periods (Reason: Perimenopausal).  Allergies reviewed: Yes  Medications reviewed: Yes    Medications: Medication refills not needed today.  Pharmacy name entered into [facility]:    [facility] [address]  [facility] [address]    Clinical concerns: None                     4 minutes for nursing intake (face to face time)     April Bazzi MA              Again, thank you for allowing me to participate in the care of your patient.        Sincerely,        Susan Lindo MD  "

## 2018-04-12 NOTE — PROGRESS NOTES
"Oncology Rooming Note    April 12, 2018 10:50 AM   Sherita Kenney is a 77 year old female who presents for:    Chief Complaint   Patient presents with     Oncology Clinic Visit     Adenocarcinoma of colon      Initial Vitals: /72  Pulse 63  Temp 97.9  F (36.6  C) (Oral)  Resp 16 Estimated body mass index is 18.09 kg/(m^2) as calculated from the following:    Height as of 3/27/18: 1.676 m (5' 5.98\").    Weight as of 3/27/18: 50.8 kg (112 lb). There is no height or weight on file to calculate BSA.  No Pain (0) Comment: Data Unavailable   No LMP recorded. Patient is not currently having periods (Reason: Perimenopausal).  Allergies reviewed: Yes  Medications reviewed: Yes    Medications: Medication refills not needed today.  Pharmacy name entered into [facility]:    [facility] [place] [address]  [facility], [address]    Clinical concerns: None                     4 minutes for nursing intake (face to face time)     April Bazzi MA            "

## 2018-04-12 NOTE — MR AVS SNAPSHOT
After Visit Summary   4/12/2018    Sherita Kenney    MRN: [id number]           Patient Information     Date Of Birth          1940        Visit Information        Provider Department      4/12/2018 1:00 PM Susan Lindo MD [facility]        Today's Diagnoses     Adenocarcinoma of colon (H)    -  1      Care Instructions    Continue chemotherapy. Scheduled/sapna  See Giuseppe in 2 weeks. Scheduled/sapna  Follow up in 1 month with me. Scheduled/sapna    AVS given to patient/Sapna          Follow-ups after your visit        Your next 10 appointments already scheduled     Apr 24, 2018 10:00 AM CDT   Level 5 with  INFUSION CHAIR 14   [facility] ([facility])    [facility] [place]  [address]  [place] [address]   [phone]            Apr 24, 2018 10:30 AM CDT   Return Visit with WILLY Valadez CNP   [facility] ([facility])    [facility] [place] [id number]  [place] [address]   [phone]            Apr 26, 2018 12:30 PM CDT   Level 1 with  INFUSION CHAIR 9   [facility] ([facility])    [facility]  [address]  [place] [address]   [phone]            May 08, 2018 10:00 AM CDT   Level 5 with  INFUSION CHAIR 15   [facility] ([facility])    [facility]  [address]  [place] [address]   [phone]            May 08, 2018 10:40 AM CDT   Return Visit with Susan Lindo MD   [facility] ([facility])    [facility] [place]  [address]  [place] [address]   [phone]            May 10, 2018 12:00 PM CDT   Level 1 with  INFUSION CHAIR 13   [facility] ([facility])     "[facility] [place]  [address]  [place] [address] [phone]              Who to contact     If you have questions or need follow up information about today's clinic visit or your schedule please contact [facility] directly at [phone].  Normal or non-critical lab and imaging results will be communicated to you by MyChart, letter or phone within 4 business days after the clinic has received the results. If you do not hear from us within 7 days, please contact the clinic through MyChart or phone. If you have a critical or abnormal lab result, we will notify you by phone as soon as possible.  Submit refill requests through Ultora or call your pharmacy and they will forward the refill request to us. Please allow 3 business days for your refill to be completed.          Additional Information About Your Visit        AppDirecthart Information     Ultora lets you send messages to your doctor, view your test results, renew your prescriptions, schedule appointments and more. To sign up, go to [email or web address] . Click on \"Log in\" on the left side of the screen, which will take you to the Welcome page. Then click on \"Sign up Now\" on the right side of the page.     You will be asked to enter the access code listed below, as well as some personal information. Please follow the directions to create your username and password.     Your access code is: [email or web address]  Expires: 2018 11:01 AM     Your access code will  in 90 days. If you need help or a new code, please call your [place] clinic or [phone].        Care EveryWhere ID     This is your Care EveryWhere ID. This could be used by other organizations to access your [place] medical records  [id number]        Your Vitals Were     Pulse Temperature Respirations             63 97.9  F (36.6  C) (Oral) 16          Blood Pressure from Last 3 Encounters:   No data found for BP    Weight from Last 3 Encounters: "   No data found for Wt              Today, you had the following     No orders found for display       Primary Care Provider Office Phone # Fax #    Edgar Neno Rodriguez [phone] [address] [place] [address]        Equal Access to Services     [facility] : Hadii aad ku hadasho Soomaali, waaxda luqadaha, qaybta kaalmada adeegyada, waxay idiin haycherrin adeted gary labharat . So [facility] [place] [facility] [phone].    ATENCIÓN: Si habla español, tiene a roldan disposición servicios gratuitos de asistencia lingüística. [facility] [phone].    We comply with applicable federal civil rights laws and [place] laws. We do not discriminate on the basis of race, color, national origin, age, disability, sex, sexual orientation, or gender identity.            Thank you!     Thank you for choosing [facility]  for your care. Our goal is always to provide you with excellent care. Hearing back from our patients is one way we can continue to improve our services. Please take a few minutes to complete the written survey that you may receive in the mail after your visit with us. Thank you!             Your Updated Medication List - Protect others around you: Learn how to safely use, store and throw away your medicines at www.disposemymeds.org.          This list is accurate as of 4/12/18 11:59 PM.  Always use your most recent med list.                   Brand Name Dispense Instructions for use Diagnosis    acetaminophen 325 MG tablet    TYLENOL    60 tablet    Take 2 tablets (650 mg) by mouth every 4 hours as needed for mild pain or fever    Cancer associated pain       BLINK TEARS OP      Apply 1 drop to eye 3 times daily        CETAPHIL lotion      Apply topically 2 times daily Also bid prn . To dry skin.        ferrous sulfate 325 (65 Fe) MG tablet    IRON    30 tablet    Take 1 tablet (325 mg) by mouth daily (with breakfast)    Iron deficiency       HYDROcodone-acetaminophen 5-325 MG  per tablet    NORCO    30 tablet    Take 1 tablet by mouth every 6 hours as needed for moderate to severe pain    Other chronic pain       levothyroxine 75 MCG tablet    SYNTHROID/LEVOTHROID    30 tablet    Take 1 tablet (75 mcg) by mouth daily Recheck TSH in 6 months    Hypothyroidism, unspecified type       loperamide 2 MG capsule    IMODIUM A-D    155 capsule    AND TAKE 2 CAPSULE EVERY 6 HOURS AS NEEDED FOR LOOSE STOOLS (MAX OF 16MG/24 HRS)    Diarrhea, unspecified type       LORazepam 0.5 MG tablet    ATIVAN    30 tablet    Take 1 tablet (0.5 mg) by mouth every 4 hours as needed (Anxiety, Nausea/Vomiting or Sleep)    Adenocarcinoma of colon (H)       NEW MED           ondansetron 8 MG tablet    ZOFRAN    10 tablet    Take 1 tablet (8 mg) by mouth every 8 hours as needed (Nausea/Vomiting)    Adenocarcinoma of colon (H)       prochlorperazine 10 MG tablet    COMPAZINE    30 tablet    Take 1 tablet (10 mg) by mouth every 6 hours as needed (Nausea/Vomiting)    Adenocarcinoma of colon (H)       SUMATRIPTAN SUCCINATE PO      Take 25 mg by mouth every 8 hours as needed for migraine        travoprost (BAK Free) 0.004 % ophthalmic solution    TRAVATAN Z    5 mL    Place 1 drop into both eyes At Bedtime    Primary open angle glaucoma of both eyes, unspecified glaucoma stage       vitamin B-Complex     90 tablet    Take 1 tablet by mouth daily    S/P colectomy

## 2018-04-12 NOTE — PROGRESS NOTES
Visit Date:   04/12/2018     ONCOLOGY HISTORY:  Ms. Sherita Kenney is a female with metastatic colon cancer.   1. Patient was brought to the ER on 02/15/2017 with altered mental status.    -CT brain on 02/15/2017 did not reveal any acute intracranial pathology.   -CT abdomen and pelvis on 02/15/2017 revealed large right iliopsoas abscess and mass-like wall thickening of portion of right colon.      2. Patient was taken to OR on 03/01/2017.    -Colonoscopy revealed a large fungating mass in the right side of the colon.  Pathology revealed invasive poorly differentiated adenocarcinoma.  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   -Laparoscopic ileostomy was done.       3. Patient had right colectomy with takedown of ileostomy and primary anastomosis on 04/13/2017.    -Pathology  reveals poorly differentiated colonic adenocarcinoma. 15 of 24 lymph nodes are positive. Tumor invades into adjacent abdominal wall.  Lymphovascular invasion present. pT4b pN2b M0.   -BRAF mutation present. No KRAS mutation.     4. PET scan on 05/22/2017 revealed metastatic disease.  Multiple hypermetabolic liver metastases and  hypermetabolic necrotic lymphadenopathy in right lower quadrant.     6. modified FOLFOX6 with Avastin started on 06/01/2017.   -Bolus 5-FU discontinued with cycle 4.    -Oxaliplatin reduced with cycle 5.  -Oxaliplatin stopped after cycle 8. Cycle 8 completed on 09/08/2017.  -Maintenance infusional 5-FU and Avastin started on 09/21/2017. No bolus 5-FU.  -Infusional 5-FU discontinued after 11/30/2017. Single agent Avastin continued.     7.  CT chest, abdomen and pelvis on 03/15/2018 reveals progression of disease.    8. mFOLFOX with Avastin (without bolus 5-FU and leucovorin) started on 03/27/2018.      SUBJECTIVE:    Ms. Kenney is a 77-year-old female with metastatic colon cancer.  She is on treatment with FOLFOX with Avastin.  No bolus 5-FU and leucovorin.  It was  started on 2018.  She tolerated it well.        She has some fatigue.  No worsening of it.  No headache.  No dizziness.  No chest pain.  No difficulty breathing.  No abdominal pain, nausea or vomiting.  No urinary complaints.  She has chronic diarrhea.  No worsening of it.  She has been taking Imodium which helps.  No numbness or tingling in extremities.      PHYSICAL EXAMINATION:   Alert and oriented x 3.   VITAL SIGNS:  Reviewed.   EYES:  No icterus.   THROAT:  No ulcer or thrush.   NECK:  Supple. No lymphadenopathy.   LUNGS:  Good air entry bilaterally.  No wheezing.   HEART:  Regular.   ABDOMEN:  Soft and nontender.  No mass.   EXTREMITIES:  No edema.  Reduced muscle mass.  SKIN:  No petechiae. Mild hand-foot syndrome present.     LABORATORY DATA:  Reviewed.      ASSESSMENT:   1.  A 77-year-old female with metastatic colon cancer on palliative chemotherapy.   2.  Anemia.   3.  Thrombocytopenia.   4.  Diarrhea from chemotherapy.      PLAN:   1.  Overall, patient tolerated first cycle of FOLFOX and Avastin well.  She will continue on that.  Side effects reviewed.   2.  Labs were reviewed.  There are a few abnormalities.  She is more anemic and thrombocytopenic.  This is secondary to chemotherapy.  We will monitor it.     3.  I will see her in a month.  She will see the nurse practitioner in between.  I advised her to see a physician if she has fever, chills, worsening weakness, shortness of breath, recurrent vomiting, bleeding or any other concerns.         OMER MAKI MD             D: 2018   T: 2018   MT: SHELL      Name:     SEVERO SWANN   MRN:      [id number]        Account:      [id number]   :      1940           Visit Date:   2018      Document: [id number]

## 2018-04-12 NOTE — PATIENT INSTRUCTIONS
Continue chemotherapy. Scheduled/jesus  See Giuseppe in 2 weeks. Scheduled/jesus  Follow up in 1 month with me. Scheduled/jesus    AVS given to patient/Jesus

## 2018-04-12 NOTE — MR AVS SNAPSHOT
After Visit Summary   4/12/2018    Sherita Kenney    MRN: [id number]           Patient Information     Date Of Birth          1940        Visit Information        Provider Department      4/12/2018 10:30 AM SH INFUSION CHAIR 8 [facility]        Today's Diagnoses     Adenocarcinoma of colon (H)    -  1       Follow-ups after your visit        Your next 10 appointments already scheduled     Apr 14, 2018 12:00 PM CDT   Level 1 with SH INFUSION CHAIR 8   [facility] ([facility])    [facility]  [address]  [place] [address]   [phone]            Apr 24, 2018  9:30 AM CDT   Level 5 with SH INFUSION CHAIR 15   [facility] ([facility])    [facility]  [address]  [place] [address]   [phone]            Apr 24, 2018 10:00 AM CDT   Return Visit with WILLY Valadez CNP   [facility] ([facility])    [facility]  [address]  [place] [address]   [phone]            Apr 26, 2018 11:30 AM CDT   Level 1 with SH INFUSION CHAIR 9   [facility] ([facility])    [facility]  [address]  [place] [address]   [phone]            May 08, 2018  9:30 AM CDT   Level 5 with SH INFUSION CHAIR 12   [facility] ([facility])    [facility]  [address]  [place] [address]   [phone]            May 08, 2018 10:00 AM CDT   Return Visit with Susan Lindo MD   [facility] ([facility])    [facility]  [address]  [place] [address]   [phone]            May 08, 2018 11:30 AM CDT   Level 1 with SH INFUSION CHAIR  "19   [facility] ([facility])    [facility] [place]  [address]  [place] [address] [phone]              Who to contact     If you have questions or need follow up information about today's clinic visit or your schedule please contact [facility] directly at [phone].  Normal or non-critical lab and imaging results will be communicated to you by WallStriphart, letter or phone within 4 business days after the clinic has received the results. If you do not hear from us within 7 days, please contact the clinic through Naikut or phone. If you have a critical or abnormal lab result, we will notify you by phone as soon as possible.  Submit refill requests through GlycoMimetics or call your pharmacy and they will forward the refill request to us. Please allow 3 business days for your refill to be completed.          Additional Information About Your Visit        GlycoMimetics Information     GlycoMimetics lets you send messages to your doctor, view your test results, renew your prescriptions, schedule appointments and more. To sign up, go to [email or web address] . Click on \"Log in\" on the left side of the screen, which will take you to the Welcome page. Then click on \"Sign up Now\" on the right side of the page.     You will be asked to enter the access code listed below, as well as some personal information. Please follow the directions to create your username and password.     Your access code is: [email or web address]  Expires: 2018 11:01 AM     Your access code will  in 90 days. If you need help or a new code, please call your [place] clinic or [phone].        Care EveryWhere ID     This is your Care EveryWhere ID. This could be used by other organizations to access your [place] medical records  [id number]        Your Vitals Were     Pulse Temperature Respirations             63 97.9  F (36.6  C) (Oral) 16    "       Blood Pressure from Last 3 Encounters:   04/12/18 117/72   04/12/18 117/72   03/29/18 109/63    Weight from Last 3 Encounters:   03/27/18 50.8 kg (112 lb)   03/08/18 51.4 kg (113 lb 6.4 oz)   02/22/18 54.4 kg (120 lb)              We Performed the Following     CBC with platelets differential     Comprehensive metabolic panel     Protein qualitative urine        Primary Care Provider Office Phone # Fax #    Edgar Neno Rodriguez [phone] [address]  [place] [address]        Equal Access to Services     [facility]: Hadii aad madhuri Nunes, waaxda rosas, qaybta kaalmada nilay, ellis mooney . So [facility] [phone].    ATENCIÓN: Si habla español, tiene a roldan disposición servicios gratuitos de asistencia lingüística. [facility] [phone].    We comply with applicable federal civil rights laws and [place] laws. We do not discriminate on the basis of race, color, national origin, age, disability, sex, sexual orientation, or gender identity.            Thank you!     Thank you for choosing [facility] AND [facility]  for your care. Our goal is always to provide you with excellent care. Hearing back from our patients is one way we can continue to improve our services. Please take a few minutes to complete the written survey that you may receive in the mail after your visit with us. Thank you!             Your Updated Medication List - Protect others around you: Learn how to safely use, store and throw away your medicines at www.disposemymeds.org.          This list is accurate as of 4/12/18  1:28 PM.  Always use your most recent med list.                   Brand Name Dispense Instructions for use Diagnosis    acetaminophen 325 MG tablet    TYLENOL    60 tablet    Take 2 tablets (650 mg) by mouth every 4 hours as needed for mild pain or fever    Cancer associated pain       BLINK TEARS OP      Apply 1 drop to  eye 3 times daily        CETAPHIL lotion      Apply topically 2 times daily Also bid prn . To dry skin.        ferrous sulfate 325 (65 Fe) MG tablet    IRON    30 tablet    Take 1 tablet (325 mg) by mouth daily (with breakfast)    Iron deficiency       HYDROcodone-acetaminophen 5-325 MG per tablet    NORCO    30 tablet    Take 1 tablet by mouth every 6 hours as needed for moderate to severe pain    Other chronic pain       levothyroxine 75 MCG tablet    SYNTHROID/LEVOTHROID    30 tablet    Take 1 tablet (75 mcg) by mouth daily Recheck TSH in 6 months    Hypothyroidism, unspecified type       loperamide 2 MG capsule    IMODIUM A-D    155 capsule    AND TAKE 2 CAPSULE EVERY 6 HOURS AS NEEDED FOR LOOSE STOOLS (MAX OF 16MG/24 HRS)    Diarrhea, unspecified type       LORazepam 0.5 MG tablet    ATIVAN    30 tablet    Take 1 tablet (0.5 mg) by mouth every 4 hours as needed (Anxiety, Nausea/Vomiting or Sleep)    Adenocarcinoma of colon (H)       NEW MED           ondansetron 8 MG tablet    ZOFRAN    10 tablet    Take 1 tablet (8 mg) by mouth every 8 hours as needed (Nausea/Vomiting)    Adenocarcinoma of colon (H)       prochlorperazine 10 MG tablet    COMPAZINE    30 tablet    Take 1 tablet (10 mg) by mouth every 6 hours as needed (Nausea/Vomiting)    Adenocarcinoma of colon (H)       SUMATRIPTAN SUCCINATE PO      Take 25 mg by mouth every 8 hours as needed for migraine        travoprost (BAK Free) 0.004 % ophthalmic solution    TRAVATAN Z    5 mL    Place 1 drop into both eyes At Bedtime    Primary open angle glaucoma of both eyes, unspecified glaucoma stage       vitamin B-Complex     90 tablet    Take 1 tablet by mouth daily    S/P colectomy

## 2018-04-12 NOTE — PROGRESS NOTES
"Infusion Nursing Note:  Sherita Kenney presents today for FOLFOX/Avastin C2D1.    Patient seen by provider today: Yes: Dr. Lindo   present during visit today: Not Applicable.    Note: Per Dr. Lindo, do not need to do 24hr urine collection.    Intravenous Access:  Implanted Port.    Treatment Conditions:  Lab Results   Component Value Date    HGB 10.8 04/12/2018     Lab Results   Component Value Date    WBC 5.2 04/12/2018      Lab Results   Component Value Date    ANEU 3.0 04/12/2018     Lab Results   Component Value Date     04/12/2018      Results reviewed, labs MET treatment parameters, ok to proceed with treatment.  Urine 30.    Post Infusion Assessment:  Patient tolerated infusion without incident.  Blood return noted pre and post infusion.  Site patent and intact, free from redness, edema or discomfort.  No evidence of extravasations.    Prior to discharge: Port is secured in place with tegaderm and flushed with 10cc NS with positive blood return noted.  Continuous home infusion CADD pump connected.    All connectors secured in place and clamps taped open.    Pump started, \"running\" noted on display (CADD): YES.  Patient instructed to call our clinic or [facility] with any questions or concerns at home.  Patient verbalized understanding.    Patient set up for pump disconnect at our clinic on 4/14/2018 at 1200.      Discharge Plan:   Discharge instructions reviewed with: Patient.  Patient and/or family verbalized understanding of discharge instructions and all questions answered.  Copy of AVS reviewed with patient and/or family.  Patient will return 4/14/2018 for next appointment.  Patient discharged in stable condition accompanied by: self.  Departure Mode: Ambulatory.    Jina Aguilar RN  "

## 2018-04-14 NOTE — MR AVS SNAPSHOT
After Visit Summary   4/14/2018    Sherita Kenney    MRN: [id number]           Patient Information     Date Of Birth          1940        Visit Information        Provider Department      4/14/2018 12:00 PM SH INFUSION CHAIR 8 [facility]        Today's Diagnoses     Port-a-cath in place    -  1       Follow-ups after your visit        Your next 10 appointments already scheduled     Apr 14, 2018 12:00 PM CDT   Level 1 with SH INFUSION CHAIR 8   [facility] ([facility])    [facility]  [address]  [place] [address]   [phone]            Apr 24, 2018 10:00 AM CDT   Level 5 with SH INFUSION CHAIR 14   [facility] ([facility])    [facility]  [address]  [place] [address]   [phone]            Apr 24, 2018 10:30 AM CDT   Return Visit with WILLY Valadez CNP   [facility] ([facility])    [facility]  [address]  [place] [address]   [phone]            Apr 26, 2018 12:30 PM CDT   Level 1 with  INFUSION CHAIR 9   [facility] ([facility])    [facility]  [address]  [place] [address]   [phone]            May 08, 2018 10:00 AM CDT   Level 5 with SH INFUSION CHAIR 15   [facility] ([facility])    [facility]  [address]  [place] [address]   [phone]            May 08, 2018 10:40 AM CDT   Return Visit with Susan Lindo MD   [facility] ([facility])    [facility]  [address]  [place] [address]   [phone]            May 10, 2018 12:00 PM CDT   Level 1 with  INFUSION CHAIR 13  "  [facility] ([facility])    [facility] [place]  [address]  [place] [address] [phone]              Who to contact     If you have questions or need follow up information about today's clinic visit or your schedule please contact [facility] directly at [phone].  Normal or non-critical lab and imaging results will be communicated to you by Zokoshart, letter or phone within 4 business days after the clinic has received the results. If you do not hear from us within 7 days, please contact the clinic through Zokoshart or phone. If you have a critical or abnormal lab result, we will notify you by phone as soon as possible.  Submit refill requests through OrthoScan or call your pharmacy and they will forward the refill request to us. Please allow 3 business days for your refill to be completed.          Additional Information About Your Visit        OrthoScan Information     OrthoScan lets you send messages to your doctor, view your test results, renew your prescriptions, schedule appointments and more. To sign up, go to [email or web address] . Click on \"Log in\" on the left side of the screen, which will take you to the Welcome page. Then click on \"Sign up Now\" on the right side of the page.     You will be asked to enter the access code listed below, as well as some personal information. Please follow the directions to create your username and password.     Your access code is: [email or web address]  Expires: 2018 11:01 AM     Your access code will  in 90 days. If you need help or a new code, please call your [facility] clinic or [phone].        Care EveryWhere ID     This is your Care EveryWhere ID. This could be used by other organizations to access your [facility] medical records  [id number]        Your Vitals Were     Pulse Temperature Respirations             73 98.3  F (36.8  C) (Oral) 16       "    Blood Pressure from Last 3 Encounters:   04/14/18 128/80   04/12/18 117/72   04/12/18 117/72    Weight from Last 3 Encounters:   03/27/18 50.8 kg (112 lb)   03/08/18 51.4 kg (113 lb 6.4 oz)   02/22/18 54.4 kg (120 lb)              Today, you had the following     No orders found for display       Primary Care Provider Office Phone # Fax #    Edgar Neno Rodriguez [phone] [address]  [place] [address]        Equal Access to Services     [facility]: Hadii aad ku hadasho Sogabriel, waaxda lumateoadaha, qaybta kaalmada adeleyayvon, ellis mooney . So [facility] [phone].    ATENCIÓN: Si habla español, tiene a roldan disposición servicios gratuitos de asistencia lingüística. [facility] [phone].    We comply with applicable federal civil rights laws and [place] laws. We do not discriminate on the basis of race, color, national origin, age, disability, sex, sexual orientation, or gender identity.            Thank you!     Thank you for choosing [facility] AND [facility]  for your care. Our goal is always to provide you with excellent care. Hearing back from our patients is one way we can continue to improve our services. Please take a few minutes to complete the written survey that you may receive in the mail after your visit with us. Thank you!             Your Updated Medication List - Protect others around you: Learn how to safely use, store and throw away your medicines at www.disposemymeds.org.          This list is accurate as of 4/14/18 11:49 AM.  Always use your most recent med list.                   Brand Name Dispense Instructions for use Diagnosis    acetaminophen 325 MG tablet    TYLENOL    60 tablet    Take 2 tablets (650 mg) by mouth every 4 hours as needed for mild pain or fever    Cancer associated pain       BLINK TEARS OP      Apply 1 drop to eye 3 times daily        CETAPHIL lotion      Apply topically 2 times  daily Also bid prn . To dry skin.        ferrous sulfate 325 (65 Fe) MG tablet    IRON    30 tablet    Take 1 tablet (325 mg) by mouth daily (with breakfast)    Iron deficiency       HYDROcodone-acetaminophen 5-325 MG per tablet    NORCO    30 tablet    Take 1 tablet by mouth every 6 hours as needed for moderate to severe pain    Other chronic pain       levothyroxine 75 MCG tablet    SYNTHROID/LEVOTHROID    30 tablet    Take 1 tablet (75 mcg) by mouth daily Recheck TSH in 6 months    Hypothyroidism, unspecified type       loperamide 2 MG capsule    IMODIUM A-D    155 capsule    AND TAKE 2 CAPSULE EVERY 6 HOURS AS NEEDED FOR LOOSE STOOLS (MAX OF 16MG/24 HRS)    Diarrhea, unspecified type       LORazepam 0.5 MG tablet    ATIVAN    30 tablet    Take 1 tablet (0.5 mg) by mouth every 4 hours as needed (Anxiety, Nausea/Vomiting or Sleep)    Adenocarcinoma of colon (H)       NEW MED           ondansetron 8 MG tablet    ZOFRAN    10 tablet    Take 1 tablet (8 mg) by mouth every 8 hours as needed (Nausea/Vomiting)    Adenocarcinoma of colon (H)       prochlorperazine 10 MG tablet    COMPAZINE    30 tablet    Take 1 tablet (10 mg) by mouth every 6 hours as needed (Nausea/Vomiting)    Adenocarcinoma of colon (H)       SUMATRIPTAN SUCCINATE PO      Take 25 mg by mouth every 8 hours as needed for migraine        travoprost (BAK Free) 0.004 % ophthalmic solution    TRAVATAN Z    5 mL    Place 1 drop into both eyes At Bedtime    Primary open angle glaucoma of both eyes, unspecified glaucoma stage       vitamin B-Complex     90 tablet    Take 1 tablet by mouth daily    S/P colectomy

## 2018-04-14 NOTE — PROGRESS NOTES
Infusion Nursing Note:  Sherita Kenney presents today for pump disconnect only.    Patient seen by provider today: No   present during visit today: Not Applicable.    Note: N/A.    Intravenous Access:  Implanted Port.    Treatment Conditions:  Not Applicable.      Post Infusion Assessment:  Patient tolerated infusion without incident.  Site patent and intact, free from redness, edema or discomfort.  No evidence of extravasations.  Access discontinued per protocol.    Discharge Plan:   Patient and/or family verbalized understanding of discharge instructions and all questions answered.  Copy of AVS reviewed with patient and/or family.  Patient will return 4/24 for next appointment.  Patient discharged in stable condition accompanied by: self.  Departure Mode: Ambulatory.    Sherita Lynne RN

## 2018-04-26 NOTE — MR AVS SNAPSHOT
After Visit Summary   4/26/2018    Sherita Kenney    MRN: [id number]           Patient Information     Date Of Birth          1940        Visit Information        Provider Department      4/26/2018 10:00 AM  INFUSION CHAIR 4 [facility]        Today's Diagnoses     Adenocarcinoma of colon (H)    -  1      Care Instructions    Your chemo Pump will infuse for 46 hours. If at any time the pump should malfunction or you have pump concerns, please contact [facility] after hours at [phone].You have an appointment for the pump disconnect in our infusion area on Saturday 4/28/18  at 12:30pm.          Follow-ups after your visit        Your next 10 appointments already scheduled     Apr 28, 2018 12:30 PM CDT   Level 1 with  INFUSION CHAIR 6   [facility] ([facility])    [facility]  [address]  [place] [address]   [phone]            May 08, 2018 10:00 AM CDT   Level 5 with  INFUSION CHAIR 15   [facility] ([facility])    [facility]  [address]  [place] [address]   [phone]            May 08, 2018 10:40 AM CDT   Return Visit with Susan Lindo MD   [facility] ([facility])    [facility]  [address]  [place] [address]   [phone]            May 10, 2018 12:00 PM CDT   Level 1 with  INFUSION CHAIR 13   [facility] ([facility])    [facility]  [address]  [place] [address] [phone]              Who to contact     If you have questions or need follow up information about today's clinic visit or your schedule please contact [facility] directly at [phone].  Normal or non-critical  "lab and imaging results will be communicated to you by MyChart, letter or phone within 4 business days after the clinic has received the results. If you do not hear from us within 7 days, please contact the clinic through Suja Juicet or phone. If you have a critical or abnormal lab result, we will notify you by phone as soon as possible.  Submit refill requests through Seedpost & Seedpaper or call your pharmacy and they will forward the refill request to us. Please allow 3 business days for your refill to be completed.          Additional Information About Your Visit        [employer] Information     Seedpost & Seedpaper lets you send messages to your doctor, view your test results, renew your prescriptions, schedule appointments and more. To sign up, go to [email or web address] Seedpaper . Click on \"Log in\" on the left side of the screen, which will take you to the Welcome page. Then click on \"Sign up Now\" on the right side of the page.     You will be asked to enter the access code listed below, as well as some personal information. Please follow the directions to create your username and password.     Your access code is: [email or web address]  Expires: 2018 11:01 AM     Your access code will  in 90 days. If you need help or a new code, please call your [facility] clinic or [phone].        Care EveryWhere ID     This is your Care EveryWhere ID. This could be used by other organizations to access your [facility] medical records  [id number]        Your Vitals Were     Pulse Temperature Respirations Height BMI (Body Mass Index)       63 97.9  F (36.6  C) (Oral) 16 1.676 m (5' 6\") 17.53 kg/m2        Blood Pressure from Last 3 Encounters:   18 114/47   18 128/80   18 117/72    Weight from Last 3 Encounters:   18 49.3 kg (108 lb 9.6 oz)   18 50.8 kg (112 lb)   18 51.4 kg (113 lb 6.4 oz)              We Performed the Following     CBC with platelets differential     Comprehensive metabolic panel     Protein qualitative " urine        Primary Care Provider Office Phone # Fax #    Edgar Neno Rodriguez [phone] [address] [address]        Equal Access to Services     ALEXIS ALBA : Hadii aad ku gavio Somonaali, waaxda luqadaha, qaybta kaalmada adetresada, ellis greshamn adele martigogo ren. So [facility] [phone].    ATENCIÓN: Si habla español, tiene a roldan disposición servicios gratuitos de asistencia lingüística. Llame al [phone].    We comply with applicable federal civil rights laws and [place] laws. We do not discriminate on the basis of race, color, national origin, age, disability, sex, sexual orientation, or gender identity.            Thank you!     Thank you for choosing [facility] AND [facility]  for your care. Our goal is always to provide you with excellent care. Hearing back from our patients is one way we can continue to improve our services. Please take a few minutes to complete the written survey that you may receive in the mail after your visit with us. Thank you!             Your Updated Medication List - Protect others around you: Learn how to safely use, store and throw away your medicines at www.disposemymeds.org.          This list is accurate as of 4/26/18  2:48 PM.  Always use your most recent med list.                   Brand Name Dispense Instructions for use Diagnosis    acetaminophen 325 MG tablet    TYLENOL    60 tablet    Take 2 tablets (650 mg) by mouth every 4 hours as needed for mild pain or fever    Cancer associated pain       BLINK TEARS OP      Apply 1 drop to eye 3 times daily        CETAPHIL lotion      Apply topically 2 times daily Also bid prn . To dry skin.        ferrous sulfate 325 (65 Fe) MG tablet    IRON    30 tablet    Take 1 tablet (325 mg) by mouth daily (with breakfast)    Iron deficiency       HYDROcodone-acetaminophen 5-325 MG per tablet    NORCO    30 tablet    Take 1 tablet by mouth every 6  hours as needed for moderate to severe pain    Other chronic pain       levothyroxine 75 MCG tablet    SYNTHROID/LEVOTHROID    30 tablet    Take 1 tablet (75 mcg) by mouth daily Recheck TSH in 6 months    Hypothyroidism, unspecified type       loperamide 2 MG capsule    IMODIUM A-D    155 capsule    AND TAKE 2 CAPSULE EVERY 6 HOURS AS NEEDED FOR LOOSE STOOLS (MAX OF 16MG/24 HRS)    Diarrhea, unspecified type       LORazepam 0.5 MG tablet    ATIVAN    30 tablet    Take 1 tablet (0.5 mg) by mouth every 4 hours as needed (Anxiety, Nausea/Vomiting or Sleep)    Adenocarcinoma of colon (H)       NEW MED           ondansetron 8 MG tablet    ZOFRAN    10 tablet    Take 1 tablet (8 mg) by mouth every 8 hours as needed (Nausea/Vomiting)    Adenocarcinoma of colon (H)       prochlorperazine 10 MG tablet    COMPAZINE    30 tablet    Take 1 tablet (10 mg) by mouth every 6 hours as needed (Nausea/Vomiting)    Adenocarcinoma of colon (H)       SUMATRIPTAN SUCCINATE PO      Take 25 mg by mouth every 8 hours as needed for migraine        travoprost (BAK Free) 0.004 % ophthalmic solution    TRAVATAN Z    5 mL    Place 1 drop into both eyes At Bedtime    Primary open angle glaucoma of both eyes, unspecified glaucoma stage       vitamin B-Complex     90 tablet    Take 1 tablet by mouth daily    S/P colectomy

## 2018-04-26 NOTE — PROGRESS NOTES
"Infusion Nursing Note:  Sherita Kenney presents today for folfox/avastin.    Patient seen by provider today: No   present during visit today: Not Applicable.    Note: N/A.    Intravenous Access:  Implanted Port.    Treatment Conditions:  Lab Results   Component Value Date    HGB 11.4 04/26/2018     Lab Results   Component Value Date    WBC 6.3 04/26/2018      Lab Results   Component Value Date    ANEU 3.4 04/26/2018     Lab Results   Component Value Date    PLT 91 04/26/2018      Lab Results   Component Value Date     04/26/2018                   Lab Results   Component Value Date    POTASSIUM 4.6 04/26/2018           Lab Results   Component Value Date    MAG 2.0 04/15/2017            Lab Results   Component Value Date    CR 0.96 04/26/2018                   Lab Results   Component Value Date    SARITHA 9.5 04/26/2018                Lab Results   Component Value Date    BILITOTAL 0.4 04/26/2018           Lab Results   Component Value Date    ALBUMIN 3.5 04/26/2018                    Lab Results   Component Value Date    ALT 49 04/26/2018           Lab Results   Component Value Date    AST 69 04/26/2018       Results reviewed, labs MET treatment parameters, ok to proceed with treatment.  Urine Protein  10(A)    Post Infusion Assessment:  Patient tolerated infusion without incident.  Blood return noted pre and post infusion.  Site patent and intact, free from redness, edema or discomfort.  No evidence of extravasations.  Prior to discharge: Port is secured in place with tegaderm and flushed with 10cc NS with positive blood return noted.  Continuous home infusion CADD pump connected.    All connectors secured in place and clamps taped open.    Pump started, \"running\" noted on display (CADD): YES.  Patient instructed to call our clinic or [facility] with any questions or concerns at home.  Patient verbalized understanding.    Patient set up for pump disconnect at our clinic on 4/28/18  " 12:30pm.        Discharge Plan:   Discharge instructions reviewed with: Patient.  Patient and/or family verbalized understanding of discharge instructions and all questions answered.  Copy of AVS reviewed with patient and/or family.  Patient will return 4/28/18 for next appointment.  Patient discharged in stable condition accompanied by: self.  Departure Mode: Ambulatory.    Patrick Lyles RN

## 2018-04-26 NOTE — PATIENT INSTRUCTIONS
Your chemo Pump will infuse for 46 hours. If at any time the pump should malfunction or you have pump concerns, please contact [facility] after hours at [phone].You have an appointment for the pump disconnect in our infusion area on Saturday 4/28/18  at 12:30pm.

## 2018-04-28 NOTE — MR AVS SNAPSHOT
After Visit Summary   4/28/2018    Sherita Kenney    MRN: [id number]           Patient Information     Date Of Birth          1940        Visit Information        Provider Department      4/28/2018 12:30 PM  INFUSION CHAIR 6 [facility]        Today's Diagnoses     Port-a-cath in place    -  1       Follow-ups after your visit        Your next 10 appointments already scheduled     May 08, 2018 10:00 AM CDT   Level 5 with  INFUSION CHAIR 15   [facility] ([facility])    [facility]  [address]  [place] [address]   [phone]            May 08, 2018 10:40 AM CDT   Return Visit with Susan Lindo MD   [facility] ([facility])    [facility]  [address]  [place] [address]   [phone]            May 10, 2018 12:00 PM CDT   Level 1 with  INFUSION CHAIR 13   [facility] ([facility])    [facility]  [address]  [place] [address]   [phone]              Who to contact     If you have questions or need follow up information about today's clinic visit or your schedule please contact [facility] directly at [phone].  Normal or non-critical lab and imaging results will be communicated to you by MyChart, letter or phone within 4 business days after the clinic has received the results. If you do not hear from us within 7 days, please contact the clinic through MyChart or phone. If you have a critical or abnormal lab result, we will notify you by phone as soon as possible.  Submit refill requests through Wizz[employer] or call your pharmacy and they will forward the refill request to us. Please allow 3 business days for your refill to be completed.          Additional Information About Your Visit       "  MyChart Information     Localocracy lets you send messages to your doctor, view your test results, renew your prescriptions, schedule appointments and more. To sign up, go to [email or web address] . Click on \"Log in\" on the left side of the screen, which will take you to the Welcome page. Then click on \"Sign up Now\" on the right side of the page.     You will be asked to enter the access code listed below, as well as some personal information. Please follow the directions to create your username and password.     Your access code is: [email or web address]  Expires: 2018 11:01 AM     Your access code will  in 90 days. If you need help or a new code, please call your [place] clinic or [phone].        Care EveryWhere ID     This is your Care EveryWhere ID. This could be used by other organizations to access your [place] medical records  [id number]        Your Vitals Were     Pulse Temperature Respirations Height BMI (Body Mass Index)       66 98.1  F (36.7  C) (Oral) 16 1.676 m (5' 6\") 17.63 kg/m2        Blood Pressure from Last 3 Encounters:   18 103/68   18 114/47   18 128/80    Weight from Last 3 Encounters:   18 49.5 kg (109 lb 3.2 oz)   18 49.3 kg (108 lb 9.6 oz)   18 50.8 kg (112 lb)              Today, you had the following     No orders found for display       Primary Care Provider Office Phone # Fax #    Edgar Neno Rodriguez [phone] [address]  [place] [address]        Equal Access to Services     ALEXIS ALBA : Ruddy Nunes, laura pillai, selina kaalmada nilay, ellis ren. So [facility] [phone].    ATENCIÓN: Si habla español, tiene a roldan disposición servicios gratuitos de asistencia lingüística. Llame al [phone].    We comply with applicable federal civil rights laws and [place] laws. We do not discriminate on the basis of race, color, national origin, " age, disability, sex, sexual orientation, or gender identity.            Thank you!     Thank you for choosing [facility]  for your care. Our goal is always to provide you with excellent care. Hearing back from our patients is one way we can continue to improve our services. Please take a few minutes to complete the written survey that you may receive in the mail after your visit with us. Thank you!             Your Updated Medication List - Protect others around you: Learn how to safely use, store and throw away your medicines at www.disposemymeds.org.          This list is accurate as of 4/28/18  1:19 PM.  Always use your most recent med list.                   Brand Name Dispense Instructions for use Diagnosis    acetaminophen 325 MG tablet    TYLENOL    60 tablet    Take 2 tablets (650 mg) by mouth every 4 hours as needed for mild pain or fever    Cancer associated pain       BLINK TEARS OP      Apply 1 drop to eye 3 times daily        CETAPHIL lotion      Apply topically 2 times daily Also bid prn . To dry skin.        ferrous sulfate 325 (65 Fe) MG tablet    IRON    30 tablet    Take 1 tablet (325 mg) by mouth daily (with breakfast)    Iron deficiency       HYDROcodone-acetaminophen 5-325 MG per tablet    NORCO    30 tablet    Take 1 tablet by mouth every 6 hours as needed for moderate to severe pain    Other chronic pain       levothyroxine 75 MCG tablet    SYNTHROID/LEVOTHROID    30 tablet    Take 1 tablet (75 mcg) by mouth daily Recheck TSH in 6 months    Hypothyroidism, unspecified type       loperamide 2 MG capsule    IMODIUM A-D    155 capsule    AND TAKE 2 CAPSULE EVERY 6 HOURS AS NEEDED FOR LOOSE STOOLS (MAX OF 16MG/24 HRS)    Diarrhea, unspecified type       LORazepam 0.5 MG tablet    ATIVAN    30 tablet    Take 1 tablet (0.5 mg) by mouth every 4 hours as needed (Anxiety, Nausea/Vomiting or Sleep)    Adenocarcinoma of colon (H)       NEW MED           ondansetron 8 MG  tablet    ZOFRAN    10 tablet    Take 1 tablet (8 mg) by mouth every 8 hours as needed (Nausea/Vomiting)    Adenocarcinoma of colon (H)       prochlorperazine 10 MG tablet    COMPAZINE    30 tablet    Take 1 tablet (10 mg) by mouth every 6 hours as needed (Nausea/Vomiting)    Adenocarcinoma of colon (H)       SUMATRIPTAN SUCCINATE PO      Take 25 mg by mouth every 8 hours as needed for migraine        travoprost (BAK Free) 0.004 % ophthalmic solution    TRAVATAN Z    5 mL    Place 1 drop into both eyes At Bedtime    Primary open angle glaucoma of both eyes, unspecified glaucoma stage       vitamin B-Complex     90 tablet    Take 1 tablet by mouth daily    S/P colectomy

## 2018-04-28 NOTE — PROGRESS NOTES
Infusion Nursing Note:  Sherita MARTINEZ Shilakrista presents today for pump disconnect.    Patient seen by provider today: No   present during visit today: Not Applicable.    Note: Pt arrived at 12:18, pump not completed yet, pt will wait here until pump is complete.  Pt c/o tingling in hands, feet and calves today.    Intravenous Access:  Implanted Port.    Treatment Conditions:  Not Applicable.      Post Infusion Assessment:  Patient tolerated infusion without incident.  Blood return noted pre and post infusion.  Site patent and intact, free from redness, edema or discomfort.  No evidence of extravasations.  Access discontinued per protocol.    Discharge Plan:   Patient declined prescription refills.  Discharge instructions reviewed with: Patient.  Patient and/or family verbalized understanding of discharge instructions and all questions answered.  Copy of AVS reviewed with patient and/or family.  Patient will return 5/8/18 for next appointment.  Patient discharged in stable condition accompanied by: self, sister, brother in law.  Departure Mode: Ambulatory.    Sayda Watts RN

## 2018-05-07 NOTE — TELEPHONE ENCOUNTER
Left voicemail message for patient requesting a return call to reschedule appointment from 5/8/18 to 5/15/18 per Dr Lindo

## 2018-05-15 NOTE — LETTER
"    5/15/2018         RE: Sherita Kenney  [address]   [place] [address]        Dear Colleague,    Thank you for referring your patient, Sherita Kenney, to the [facility]. Please see a copy of my visit note below.    Oncology Rooming Note    May 15, 2018 10:11 AM   Sherita Kenney is a 77 year old female who presents for:    Chief Complaint   Patient presents with     Oncology Clinic Visit     Initial Vitals: /71  Pulse 78  Temp 98.5  F (36.9  C) (Oral)  Resp 16  Ht 1.676 m (5' 5.98\")  Wt 47.4 kg (104 lb 9.6 oz)  SpO2 97%  BMI 16.89 kg/m2 Estimated body mass index is 16.89 kg/(m^2) as calculated from the following:    Height as of this encounter: 1.676 m (5' 5.98\").    Weight as of this encounter: 47.4 kg (104 lb 9.6 oz). Body surface area is 1.49 meters squared.  Data Unavailable Comment: Data Unavailable   No LMP recorded. Patient is not currently having periods (Reason: Perimenopausal).  Allergies reviewed: Yes  Medications reviewed: Yes    Medications: Medication refills not needed today.  Pharmacy name entered into [facility]:    [facility] [place]  [place], [place] - [address]  [facility], [address]    Clinical concerns: None     5 minutes for nursing intake (face to face time)     Shania Bravo [facility]                Visit Date:   05/15/2018      SUBJECTIVE:  Ms. Kenney is a 77-year-old female with metastatic colon cancer.  She is on treatment with FOLFOX and Avastin.  No bolus 5-FU.      Overall, she is tolerating treatment well.  Some fatigue.  She has some neuropathy.  Some numbness in the tips of the fingers.  She has some cold sensitivity.      No headache.  No dizziness.  No chest pain, difficulty breathing.  No abdominal pain, nausea or vomiting.  No urinary complaints.  She takes Imodium twice a day.  That helps her diarrhea.  No fever, chills or night sweats.      PHYSICAL EXAMINATION:  " Unchanged.      LABORATORY DATA:  Reviewed.      ASSESSMENT:  A 77-year-old female with:     1.  Metastatic colon cancer, on palliative chemotherapy with FOLFOX and Avastin.   2.  Pancytopenia.   3.  Diarrhea, under control on Imodium.      PLAN:   1.  The patient overall is stable from metastatic colon cancer.  She does not have any new symptoms.  Overall, she is tolerating treatment with FOLFOX and Avastin well.     Labs were reviewed.  She is pancytopenic.  Because of that, I am going to modify the dose.  Will reduce oxaliplatin by 50%.  Will reduce 5-FU by 20%.  No dose reduction of Avastin.  No bolus 5-FU.  Hopefully, with this modification her labs will remain stable.  She will get her treatment today with this pancytopenia.  She is not neutropenic.   2.  We will recheck a CBC next week.  If there is worsening thrombocytopenia, we may have to consider transfusion.   3.  The patient has neuropathy.  Dose reduction of oxaliplatin help with neuropathy.  Advised her to avoid cold.   4.  We will schedule her for CT scan end of next week.  I will see her after the CT scan.  Advised her to see a physician sooner if she has fever, chills, recurrent vomiting, bleeding, worsening weakness, shortness of breath or any other concerns.         OMER MAKI MD             D: 05/15/2018   T: 05/15/2018   MT: DOROTHY      Name:     SEVERO SWANN   MRN:      [id number]        Account:      [id number]   :      1940           Visit Date:   05/15/2018      Document: [id number]       Again, thank you for allowing me to participate in the care of your patient.        Sincerely,        Omer Maki MD

## 2018-05-15 NOTE — PATIENT INSTRUCTIONS
Continue chemotherapy with dose reduction.  CBC next week.   Scheduled - [facility]  CT scan end of next week.    Scheduled   [facility]  Follow up with chemo.  Scheduled - [facility]

## 2018-05-15 NOTE — MR AVS SNAPSHOT
After Visit Summary   5/15/2018    Sherita Kenney    MRN: [id number]           Patient Information     Date Of Birth          1940        Visit Information        Provider Department      5/15/2018 10:40 AM Susan Lindo MD [facility]        Today's Diagnoses     Adenocarcinoma of colon (H)    -  1      Care Instructions    Continue chemotherapy with dose reduction.  CBC next week.   Scheduled - [facility]  CT scan end of next week.    Scheduled   [facility]  Follow up with chemo.  Scheduled - [facility]          Follow-ups after your visit        Your next 10 appointments already scheduled     May 17, 2018 12:30 PM CDT   Level 1 with  INFUSION CHAIR 14   [facility] ([facility])    [facility]  [address]  [place] [address]   [phone]            May 22, 2018  1:30 PM CDT   Level 1 with  INFUSION CHAIR 5   [facility] ([facility])    [facility]  [address]  [place] [address]   [phone]            May 25, 2018 11:30 AM CDT   CT CHEST ABDOMEN PELVIS W/O & W CONTRAST with SHCT1   [facility] ([facility])    [address] [address]   [phone]           Please bring any scans or X-rays taken at other hospitals, if similar tests were done. Also bring a list of your medicines, including vitamins, minerals and over-the-counter drugs. It is safest to leave personal items at home.  Be sure to tell your doctor:   If you have any allergies.   If there s any chance you are pregnant.   If you are breastfeeding.  How to prepare:   Do not eat or drink for 2 hours before your exam. If you need to take medicine, you may take it with small sips of water. (We may ask you to take liquid medicine as well.)   Please wear loose clothing, such as a sweat suit or jogging clothes. Avoid  snaps, zippers and other metal. We may ask you to undress and put on a hospital gown.  Please arrive 30 minutes early for your CT. Once in the department you might be asked to drink water 15-20 minutes prior to your exam.  If indicated you may be asked to drink an oral contrast in advance of your CT.  If this is the case, the imaging team will let you know or be in contact with you prior to your appointment  Patients over 70 or patients with diabetes or kidney problems:   If you haven t had a blood test (creatinine test) within the last 30 days, the Cardiologist/Radiologist may require you to get this test prior to your exam.  If you have diabetes:   Continue to take your metformin medication on the day of your exam  If you have any questions, please call the Imaging Department where you will have your exam.            May 25, 2018 12:00 PM CDT   CT SOFT TISSUE NECK W CONTRAST with SHCT1   [facility] ([facility])    [address] [place] [address] [phone]           Please bring any scans or X-rays taken at other hospitals, if similar tests were done. Also bring a list of your medicines, including vitamins, minerals and over-the-counter drugs. It is safest to leave personal items at home.  Be sure to tell your doctor:   If you have any allergies.   If there s any chance you are pregnant.   If you are breastfeeding.    If you have diabetes as your medication may need to be adjusted for this exam.  You will have contrast for this exam. To prepare:   Do not eat or drink for 2 hours before your exam. If you need to take medicine, you may take it with small sips of water. (We may ask you to take liquid medicine as well.)   The day before your exam, drink extra fluids at least six 8-ounce glasses (unless your doctor tells you to restrict your fluids).  Patients over 70 or patients with diabetes or kidney problems:   If you haven t had a blood test (creatinine test) within  the last 30 days, the Cardiologist/Radiologist may require you to get this test prior to your exam.  Please wear loose clothing, such as a sweat suit or jogging clothes. Avoid snaps, zippers and other metal. We may ask you to undress and put on a hospital gown.  If you have any questions, please call the Imaging Department where you will have your exam.            May 29, 2018 10:30 AM CDT   Level 5 with  INFUSION CHAIR 8   [facility] ([facility])    [facility]  [address]  [place] [address]   [phone]            May 29, 2018 11:00 AM CDT   Return Visit with Susan Lindo MD   [facility])    [facility]  [address]  [place] [address]   [phone]            May 31, 2018 12:00 PM CDT   Level 1 with  INFUSION CHAIR 5   [facility] ([facility])    [facility]  [address]  [place] [address]   [phone]              Future tests that were ordered for you today     Open Future Orders        Priority Expected Expires Ordered    CBC with platelets differential Routine 5/22/2018 5/25/2018 5/15/2018    CT Soft Tissue Neck w Contrast Routine 5/24/2018 5/15/2019 5/15/2018    CT Chest Abdomen Pelvis w/o & w Contrast Routine 5/24/2018 5/15/2019 5/15/2018            Who to contact     If you have questions or need follow up information about today's clinic visit or your schedule please contact [facility] directly at [phone].  Normal or non-critical lab and imaging results will be communicated to you by MyChart, letter or phone within 4 business days after the clinic has received the results. If you do not hear from us within 7 days, please contact the clinic through MyChart or phone. If you have a critical or abnormal lab result, we will notify you by  "phone as soon as possible.  Submit refill requests through [employer] or call your pharmacy and they will forward the refill request to us. Please allow 3 business days for your refill to be completed.          Additional Information About Your Visit        FRM Study [employer] Information     C[employer]efast lets you send messages to your doctor, view your test results, renew your prescriptions, schedule appointments and more. To sign up, go to [email or web address] . Click on \"Log in\" on the left side of the screen, which will take you to the Welcome page. Then click on \"Sign up Now\" on the right side of the page.     You will be asked to enter the access code listed below, as well as some personal information. Please follow the directions to create your username and password.     Your access code is: [email or web address]  Expires: 2018 11:01 AM     Your access code will  in 90 days. If you need help or a new code, please call your [place] clinic or [phone].        Care EveryWhere ID     This is your Care EveryWhere ID. This could be used by other organizations to access your [place] medical records  [id number]        Your Vitals Were     Pulse Temperature Respirations Height Pulse Oximetry BMI (Body Mass Index)    78 98.5  F (36.9  C) (Oral) 16 1.676 m (5' 5.98\") 97% 16.89 kg/m2       Blood Pressure from Last 3 Encounters:   05/15/18 130/71   05/15/18 130/71   18 103/68    Weight from Last 3 Encounters:   05/15/18 47.4 kg (104 lb 9.6 oz)   05/15/18 47.4 kg (104 lb 9.6 oz)   18 49.5 kg (109 lb 3.2 oz)               Primary Care Provider Office Phone # Fax #    Edgar Neno Rodriguez [phone] [address]  [place] [address]        Equal Access to Services     ALEXIS ALBA AH: Hadii mateo Nunes, laura pillai, qaybta kaalmada adeegyaellis sanz. [facility] [phone].    ATENCIÓN: Si habla español, tiene a roldan disposición " servicios gratuitos de asistencia lingüística. Malaika jimenez [phone].    We comply with applicable federal civil rights laws and [place] laws. We do not discriminate on the basis of race, color, national origin, age, disability, sex, sexual orientation, or gender identity.            Thank you!     Thank you for choosing [facility]  for your care. Our goal is always to provide you with excellent care. Hearing back from our patients is one way we can continue to improve our services. Please take a few minutes to complete the written survey that you may receive in the mail after your visit with us. Thank you!             Your Updated Medication List - Protect others around you: Learn how to safely use, store and throw away your medicines at www.disposemymeds.org.          This list is accurate as of 5/15/18  2:59 PM.  Always use your most recent med list.                   Brand Name Dispense Instructions for use Diagnosis    acetaminophen 325 MG tablet    TYLENOL    60 tablet    Take 2 tablets (650 mg) by mouth every 4 hours as needed for mild pain or fever    Cancer associated pain       BLINK TEARS OP      Apply 1 drop to eye 3 times daily        CETAPHIL lotion      Apply topically 2 times daily Also bid prn . To dry skin.        ferrous sulfate 325 (65 Fe) MG tablet    IRON    30 tablet    Take 1 tablet (325 mg) by mouth daily (with breakfast)    Iron deficiency       HYDROcodone-acetaminophen 5-325 MG per tablet    NORCO    30 tablet    Take 1 tablet by mouth every 6 hours as needed for moderate to severe pain    Other chronic pain       levothyroxine 75 MCG tablet    SYNTHROID/LEVOTHROID    30 tablet    Take 1 tablet (75 mcg) by mouth daily Recheck TSH in 6 months    Hypothyroidism, unspecified type       loperamide 2 MG capsule    IMODIUM A-D    155 capsule    AND TAKE 2 CAPSULE EVERY 6 HOURS AS NEEDED FOR LOOSE STOOLS (MAX OF 16MG/24 HRS)    Diarrhea, unspecified type       LORazepam 0.5 MG  tablet    ATIVAN    30 tablet    Take 1 tablet (0.5 mg) by mouth every 4 hours as needed (Anxiety, Nausea/Vomiting or Sleep)    Adenocarcinoma of colon (H)       NEW MED           ondansetron 8 MG tablet    ZOFRAN    10 tablet    Take 1 tablet (8 mg) by mouth every 8 hours as needed (Nausea/Vomiting)    Adenocarcinoma of colon (H)       prochlorperazine 10 MG tablet    COMPAZINE    30 tablet    Take 1 tablet (10 mg) by mouth every 6 hours as needed (Nausea/Vomiting)    Adenocarcinoma of colon (H)       SUMATRIPTAN SUCCINATE PO      Take 25 mg by mouth every 8 hours as needed for migraine        travoprost (BAK Free) 0.004 % ophthalmic solution    TRAVATAN Z    5 mL    Place 1 drop into both eyes At Bedtime    Primary open angle glaucoma of both eyes, unspecified glaucoma stage       vitamin B-Complex     90 tablet    Take 1 tablet by mouth daily    S/P colectomy

## 2018-05-15 NOTE — PROGRESS NOTES
Visit Date:   05/15/2018     ONCOLOGY HISTORY:  Ms. Sherita Kenney is a female with metastatic colon cancer.   1. CT abdomen and pelvis on 02/15/2017 revealed large right iliopsoas abscess and mass-like wall thickening of portion of right colon.       2. Patient was taken to OR on 03/01/2017.    -Colonoscopy revealed a large fungating mass in the right side of the colon.  Pathology revealed invasive poorly differentiated adenocarcinoma.  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   -Laparoscopic ileostomy was done.        3. Patient had right colectomy with takedown of ileostomy and primary anastomosis on 04/13/2017.    -Pathology  reveals poorly differentiated colonic adenocarcinoma. 15 of 24 lymph nodes are positive. Tumor invades into adjacent abdominal wall.  Lymphovascular invasion present. pT4b pN2b M0.   -BRAF mutation present. No KRAS mutation.      4. PET scan on 05/22/2017 revealed metastatic disease.  Multiple hypermetabolic liver metastases and  hypermetabolic necrotic lymphadenopathy in right lower quadrant.      6. modified FOLFOX6 with Avastin started on 06/01/2017.   -Bolus 5-FU discontinued with cycle 4.    -Oxaliplatin reduced with cycle 5.  -Oxaliplatin stopped after cycle 8. Cycle 8 completed on 09/08/2017.  -Maintenance infusional 5-FU and Avastin started on 09/21/2017. No bolus 5-FU.  -Infusional 5-FU discontinued after 11/30/2017. Single agent Avastin continued.      7.  CT chest, abdomen and pelvis on 03/15/2018 reveals progression of disease.     8. mFOLFOX with Avastin (without bolus 5-FU and leucovorin) started on 03/27/2018.      SUBJECTIVE:    Ms. Kenney is a 77-year-old female with metastatic colon cancer.  She is on treatment with FOLFOX and Avastin.  No bolus 5-FU.      Overall, she is tolerating treatment well.  Some fatigue.  She has some neuropathy.  Some numbness in the tips of the fingers.  She has some cold sensitivity.      No  headache.  No dizziness.  No chest pain or difficulty breathing.  No abdominal pain, nausea or vomiting.  No urinary complaints.  She takes Imodium twice a day.  That helps her diarrhea.  No fever, chills or night sweats.      PHYSICAL EXAMINATION:   Alert and oriented x 3.   VITAL SIGNS:  Reviewed.   EYES:  No icterus.   THROAT:  No ulcer or thrush.   NECK:  Supple. No lymphadenopathy.   LUNGS:  Good air entry bilaterally.  No wheezing.   HEART:  Regular.   ABDOMEN:  Soft and nontender.  No mass.   EXTREMITIES:  No edema.  Reduced muscle mass.  SKIN:  No petechiae. Mild hand-foot syndrome.     LABORATORY DATA:  Reviewed.      ASSESSMENT:    1.  A 77-year-old female with metastatic colon cancer, on palliative chemotherapy with FOLFOX and Avastin.   2.  Pancytopenia.   3.  Diarrhea, under control on Imodium.      PLAN:   1.  Patient overall is stable from metastatic colon cancer.  She does not have any new symptoms.  Overall, she is tolerating treatment with FOLFOX and Avastin well.     Labs were reviewed.  She is pancytopenic.  Because of that, I am going to modify the dose.  Will reduce oxaliplatin by 50%.  Will reduce 5-FU by 20%.  No dose reduction of Avastin.  No bolus 5-FU.  Hopefully, with this modification her labs will remain stable.  She will get her treatment today with this pancytopenia.  She is not neutropenic.   2.  We will recheck a CBC next week.  If there is worsening thrombocytopenia, we may have to consider transfusion.   3.  Patient has neuropathy.  Dose reduction of oxaliplatin help with neuropathy.  Advised her to avoid cold.   4.  We will schedule her for CT scan end of next week.  I will see her after the CT scan.  Advised her to see a physician sooner if she has fever, chills, recurrent vomiting, bleeding, worsening weakness, shortness of breath or any other concerns.         OMER MAKI MD             D: 05/15/2018   T: 05/15/2018   MT: DOROTHY      Name:     SEVERO SWANN   MRN:       [id number]        Account:      [id number]   :      1940           Visit Date:   05/15/2018      Document: [id number]

## 2018-05-15 NOTE — PROGRESS NOTES
"Oncology Rooming Note    May 15, 2018 10:11 AM   Sherita Kenney is a 77 year old female who presents for:    Chief Complaint   Patient presents with     Oncology Clinic Visit     Initial Vitals: /71  Pulse 78  Temp 98.5  F (36.9  C) (Oral)  Resp 16  Ht 1.676 m (5' 5.98\")  Wt 47.4 kg (104 lb 9.6 oz)  SpO2 97%  BMI 16.89 kg/m2 Estimated body mass index is 16.89 kg/(m^2) as calculated from the following:    Height as of this encounter: 1.676 m (5' 5.98\").    Weight as of this encounter: 47.4 kg (104 lb 9.6 oz). Body surface area is 1.49 meters squared.  Data Unavailable Comment: Data Unavailable   No LMP recorded. Patient is not currently having periods (Reason: Perimenopausal).  Allergies reviewed: Yes  Medications reviewed: Yes    Medications: Medication refills not needed today.  Pharmacy name entered into [facility]:    [facility] [place] - [address]  [facility], [address]    Clinical concerns: None     5 minutes for nursing intake (face to face time)     Shania Bravo [facility]              "

## 2018-05-15 NOTE — PROGRESS NOTES
"Infusion Nursing Note:  Sherita eKnney presents today for C4 D1 Avastin, Oxaliplatin, Fluorouracil pump connect.    Patient seen by provider today: Yes: Dr. Lindo   present during visit today: Not Applicable.    Note: Reviewed labs from today with Dr. Lindo, orders as follows:    Ok to proceed with chemo today with plts 66,000, oxaliplatin and fluorouracil dose reduced  Ok to proceed with chemo today with elevated alk phos and AST  Recheck CBC in one week  No need for patient to collect 24 hour urine protein, may proceed with avastin    Reviewed above with patient, agreeable to plan. Scheduled for port labs on 5/22.    Prior to discharge: Port is secured in place with tegaderm and flushed with 10cc NS with positive blood return noted.  Continuous home infusion CADD pump connected.    All connectors secured in place and clamps taped open.    Pump started, \"running\" noted on display (CADD): YES. Pump preset to run at 5ml/hr for 46 hours.  Patient instructed to call our clinic or [facility] with any questions or concerns at home.  Patient verbalized understanding.    Patient set up for pump disconnect at our clinic on 5/17 at 12:30pm.      Intravenous Access:  Labs drawn without difficulty.  Implanted Port.    Treatment Conditions:  Lab Results   Component Value Date    HGB 11.3 05/15/2018     Lab Results   Component Value Date    WBC 3.5 05/15/2018      Lab Results   Component Value Date    ANEU 1.8 05/15/2018     Lab Results   Component Value Date    PLT 66 05/15/2018      Lab Results   Component Value Date     05/15/2018                   Lab Results   Component Value Date    POTASSIUM 4.2 05/15/2018           Lab Results   Component Value Date    CR 0.88 05/15/2018                   Lab Results   Component Value Date    SARITHA 9.3 05/15/2018                Lab Results   Component Value Date    BILITOTAL 0.8 05/15/2018           Lab Results   Component Value Date    ALBUMIN 3.5 05/15/2018 "                    Lab Results   Component Value Date    ALT 47 05/15/2018           Lab Results   Component Value Date    AST 78 05/15/2018     Results reviewed, labs did NOT meet treatment parameters: see above note.  Urine protein: 30mg/dL, BP: 130/71      Post Infusion Assessment:  Patient tolerated infusion without incident.  Blood return noted pre and post infusion.  Site patent and intact, free from redness, edema or discomfort.  No evidence of extravasations.    Discharge Plan:   Discharge instructions reviewed with: Patient.  Patient and/or family verbalized understanding of discharge instructions and all questions answered.  Copy of AVS reviewed with patient and/or family.  Patient will return 5/17 for next appointment.  Patient discharged in stable condition accompanied by: brother.  Departure Mode: Ambulatory.  Face to Face time: 20 minutes.    Sarah Ridley RN

## 2018-05-15 NOTE — MR AVS SNAPSHOT
After Visit Summary   5/15/2018    Sherita Kenney    MRN: [id number]           Patient Information     Date Of Birth          1940        Visit Information        Provider Department      5/15/2018 10:00 AM  INFUSION CHAIR 15 [facility]        Today's Diagnoses     Adenocarcinoma of colon (H)    -  1    Thrombocytopenia (H)           Follow-ups after your visit        Your next 10 appointments already scheduled     May 17, 2018 12:30 PM CDT   Level 1 with  INFUSION CHAIR 14   [facility] ([facility])    [facility]  [address]  [place] [address]   [phone]            May 22, 2018  1:30 PM CDT   Level 1 with  INFUSION CHAIR 5   [facility] ([facility])    [facility]  [address]  [place] [address]   [phone]            May 25, 2018 11:30 AM CDT   CT CHEST ABDOMEN PELVIS W/O & W CONTRAST with SHCT1   [facility] CT ([facility])    [address] [facility] [address]   [phone]           Please bring any scans or X-rays taken at other hospitals, if similar tests were done. Also bring a list of your medicines, including vitamins, minerals and over-the-counter drugs. It is safest to leave personal items at home.  Be sure to tell your doctor:   If you have any allergies.   If there s any chance you are pregnant.   If you are breastfeeding.  How to prepare:   Do not eat or drink for 2 hours before your exam. If you need to take medicine, you may take it with small sips of water. (We may ask you to take liquid medicine as well.)   Please wear loose clothing, such as a sweat suit or jogging clothes. Avoid snaps, zippers and other metal. We may ask you to undress and put on a hospital gown.  Please arrive 30 minutes early for your CT. Once in the  department you might be asked to drink water 15-20 minutes prior to your exam.  If indicated you may be asked to drink an oral contrast in advance of your CT.  If this is the case, the imaging team will let you know or be in contact with you prior to your appointment  Patients over 70 or patients with diabetes or kidney problems:   If you haven t had a blood test (creatinine test) within the last 30 days, the Cardiologist/Radiologist may require you to get this test prior to your exam.  If you have diabetes:   Continue to take your metformin medication on the day of your exam  If you have any questions, please call the Imaging Department where you will have your exam.            May 25, 2018 12:00 PM CDT   CT SOFT TISSUE NECK W CONTRAST with SHCT1   [facility] ([facility])    [address] [place] [address] [phone]           Please bring any scans or X-rays taken at other hospitals, if similar tests were done. Also bring a list of your medicines, including vitamins, minerals and over-the-counter drugs. It is safest to leave personal items at home.  Be sure to tell your doctor:   If you have any allergies.   If there s any chance you are pregnant.   If you are breastfeeding.    If you have diabetes as your medication may need to be adjusted for this exam.  You will have contrast for this exam. To prepare:   Do not eat or drink for 2 hours before your exam. If you need to take medicine, you may take it with small sips of water. (We may ask you to take liquid medicine as well.)   The day before your exam, drink extra fluids at least six 8-ounce glasses (unless your doctor tells you to restrict your fluids).  Patients over 70 or patients with diabetes or kidney problems:   If you haven t had a blood test (creatinine test) within the last 30 days, the Cardiologist/Radiologist may require you to get this test prior to your exam.  Please wear loose clothing, such as a sweat  suit or jogging clothes. Avoid snaps, zippers and other metal. We may ask you to undress and put on a hospital gown.  If you have any questions, please call the Imaging Department where you will have your exam.            May 29, 2018  9:00 AM CDT   Level 5 with  INFUSION CHAIR 18   [facility] ([facility])    [facility]  [address]  [place] [address]   [phone]            May 29, 2018 10:00 AM CDT   Return Visit with Susan Lindo MD   [facility] ([facility])    [facility]  [address]  [place] [address]   [phone]            May 31, 2018 12:00 PM CDT   Level 1 with  INFUSION CHAIR 5   [facility] ([facility])    [facility]  [address]  [place] [address] [phone]              Future tests that were ordered for you today     Open Future Orders        Priority Expected Expires Ordered    CBC with platelets differential Routine 5/22/2018 5/25/2018 5/15/2018    CT Soft Tissue Neck w Contrast Routine 5/24/2018 5/15/2019 5/15/2018    CT Chest Abdomen Pelvis w/o & w Contrast Routine 5/24/2018 5/15/2019 5/15/2018            Who to contact     If you have questions or need follow up information about today's clinic visit or your schedule please contact [facility] [place] directly at [phone].  Normal or non-critical lab and imaging results will be communicated to you by MyChart, letter or phone within 4 business days after the clinic has received the results. If you do not hear from us within 7 days, please contact the clinic through MyChart or phone. If you have a critical or abnormal lab result, we will notify you by phone as soon as possible.  Submit refill requests through [employer] or call your pharmacy and they will forward the refill  "request to us. Please allow 3 business days for your refill to be completed.          Additional Information About Your Visit        MyChart Information     [facility] lets you send messages to your doctor, view your test results, renew your prescriptions, schedule appointments and more. To sign up, go to [email or web address] . Click on \"Log in\" on the left side of the screen, which will take you to the Welcome page. Then click on \"Sign up Now\" on the right side of the page.     You will be asked to enter the access code listed below, as well as some personal information. Please follow the directions to create your username and password.     Your access code is: [email or web address]  Expires: 2018 11:01 AM     Your access code will  in 90 days. If you need help or a new code, please call your [facility] clinic or [phone].        Care EveryWhere ID     This is your Care EveryWhere ID. This could be used by other organizations to access your [facility] medical records  [id number]        Your Vitals Were     Pulse Temperature Respirations Height Pulse Oximetry BMI (Body Mass Index)    78 98.5  F (36.9  C) (Oral) 16 1.676 m (5' 5.98\") 97% 16.89 kg/m2       Blood Pressure from Last 3 Encounters:   05/15/18 130/71   05/15/18 130/71   18 103/68    Weight from Last 3 Encounters:   05/15/18 47.4 kg (104 lb 9.6 oz)   05/15/18 47.4 kg (104 lb 9.6 oz)   18 49.5 kg (109 lb 3.2 oz)              We Performed the Following     CBC with platelets differential     Comprehensive metabolic panel     Protein qualitative urine        Primary Care Provider Office Phone # Fax #    Edgar Neno Rodriguez [phone] [address]  [facility] [address]        Equal Access to Services     ALEXIS ALBA : Ruddy Nunes, waaxda luqadaha, qaybta kaalmayvon pemberton, ellis ren. [facility] [phone].    ATENCIÓN: Si gloria mitchell a roldan " disposición servicios gratuitos de asistencia lingüística. Malaika jimenez [phone].    We comply with applicable federal civil rights laws and [place] laws. We do not discriminate on the basis of race, color, national origin, age, disability, sex, sexual orientation, or gender identity.            Thank you!     Thank you for choosing [facility]  for your care. Our goal is always to provide you with excellent care. Hearing back from our patients is one way we can continue to improve our services. Please take a few minutes to complete the written survey that you may receive in the mail after your visit with us. Thank you!             Your Updated Medication List - Protect others around you: Learn how to safely use, store and throw away your medicines at www.disposemymeds.org.          This list is accurate as of 5/15/18  1:22 PM.  Always use your most recent med list.                   Brand Name Dispense Instructions for use Diagnosis    acetaminophen 325 MG tablet    TYLENOL    60 tablet    Take 2 tablets (650 mg) by mouth every 4 hours as needed for mild pain or fever    Cancer associated pain       BLINK TEARS OP      Apply 1 drop to eye 3 times daily        CETAPHIL lotion      Apply topically 2 times daily Also bid prn . To dry skin.        ferrous sulfate 325 (65 Fe) MG tablet    IRON    30 tablet    Take 1 tablet (325 mg) by mouth daily (with breakfast)    Iron deficiency       HYDROcodone-acetaminophen 5-325 MG per tablet    NORCO    30 tablet    Take 1 tablet by mouth every 6 hours as needed for moderate to severe pain    Other chronic pain       levothyroxine 75 MCG tablet    SYNTHROID/LEVOTHROID    30 tablet    Take 1 tablet (75 mcg) by mouth daily Recheck TSH in 6 months    Hypothyroidism, unspecified type       loperamide 2 MG capsule    IMODIUM A-D    155 capsule    AND TAKE 2 CAPSULE EVERY 6 HOURS AS NEEDED FOR LOOSE STOOLS (MAX OF 16MG/24 HRS)    Diarrhea, unspecified  type       LORazepam 0.5 MG tablet    ATIVAN    30 tablet    Take 1 tablet (0.5 mg) by mouth every 4 hours as needed (Anxiety, Nausea/Vomiting or Sleep)    Adenocarcinoma of colon (H)       NEW MED           ondansetron 8 MG tablet    ZOFRAN    10 tablet    Take 1 tablet (8 mg) by mouth every 8 hours as needed (Nausea/Vomiting)    Adenocarcinoma of colon (H)       prochlorperazine 10 MG tablet    COMPAZINE    30 tablet    Take 1 tablet (10 mg) by mouth every 6 hours as needed (Nausea/Vomiting)    Adenocarcinoma of colon (H)       SUMATRIPTAN SUCCINATE PO      Take 25 mg by mouth every 8 hours as needed for migraine        travoprost (BAK Free) 0.004 % ophthalmic solution    TRAVATAN Z    5 mL    Place 1 drop into both eyes At Bedtime    Primary open angle glaucoma of both eyes, unspecified glaucoma stage       vitamin B-Complex     90 tablet    Take 1 tablet by mouth daily    S/P colectomy

## 2018-05-17 NOTE — PROGRESS NOTES
Patient here for pump dissconnect  Labs drawn:  none   Port right chest gauge   NS flush per protocol SD--IVAD Deaccess--Flush with 20cc NS and 500 units Heparin.      Denies concerns or issues that need to be addressed prior to appt with MD Patrick Lyles

## 2018-05-17 NOTE — MR AVS SNAPSHOT
After Visit Summary   5/17/2018    Sherita Kenney    MRN: [id number]           Patient Information     Date Of Birth          1940        Visit Information        Provider Department      5/17/2018 12:30 PM  INFUSION CHAIR 14 [facility]        Today's Diagnoses     Port-a-cath in place    -  1       Follow-ups after your visit        Your next 10 appointments already scheduled     May 22, 2018  1:30 PM CDT   Level 1 with  INFUSION CHAIR 5   [facility] ([facility])    [facility]  [address]  [facility] [address]   [phone]            May 25, 2018 11:30 AM CDT   CT CHEST ABDOMEN PELVIS W/O & W CONTRAST with SHCT1   [facility] CT ([facility])    [address] [place] [id number] [phone]           Please bring any scans or X-rays taken at other hospitals, if similar tests were done. Also bring a list of your medicines, including vitamins, minerals and over-the-counter drugs. It is safest to leave personal items at home.  Be sure to tell your doctor:   If you have any allergies.   If there s any chance you are pregnant.   If you are breastfeeding.  How to prepare:   Do not eat or drink for 2 hours before your exam. If you need to take medicine, you may take it with small sips of water. (We may ask you to take liquid medicine as well.)   Please wear loose clothing, such as a sweat suit or jogging clothes. Avoid snaps, zippers and other metal. We may ask you to undress and put on a hospital gown.  Please arrive 30 minutes early for your CT. Once in the department you might be asked to drink water 15-20 minutes prior to your exam.  If indicated you may be asked to drink an oral contrast in advance of your CT.  If this is the case, the imaging team will let you know or be in contact with you prior to your appointment  Patients over 70 or  patients with diabetes or kidney problems:   If you haven t had a blood test (creatinine test) within the last 30 days, the Cardiologist/Radiologist may require you to get this test prior to your exam.  If you have diabetes:   Continue to take your metformin medication on the day of your exam  If you have any questions, please call the Imaging Department where you will have your exam.            May 25, 2018 12:00 PM CDT   CT SOFT TISSUE NECK W CONTRAST with SHCT1   [facility] ([facility])    [address] [place] [address] [phone]           Please bring any scans or X-rays taken at other hospitals, if similar tests were done. Also bring a list of your medicines, including vitamins, minerals and over-the-counter drugs. It is safest to leave personal items at home.  Be sure to tell your doctor:   If you have any allergies.   If there s any chance you are pregnant.   If you are breastfeeding.    If you have diabetes as your medication may need to be adjusted for this exam.  You will have contrast for this exam. To prepare:   Do not eat or drink for 2 hours before your exam. If you need to take medicine, you may take it with small sips of water. (We may ask you to take liquid medicine as well.)   The day before your exam, drink extra fluids at least six 8-ounce glasses (unless your doctor tells you to restrict your fluids).  Patients over 70 or patients with diabetes or kidney problems:   If you haven t had a blood test (creatinine test) within the last 30 days, the Cardiologist/Radiologist may require you to get this test prior to your exam.  Please wear loose clothing, such as a sweat suit or jogging clothes. Avoid snaps, zippers and other metal. We may ask you to undress and put on a hospital gown.  If you have any questions, please call the Imaging Department where you will have your exam.            May 29, 2018 10:30 AM CDT   Level 5 with  INFUSION CHAIR 8  "  [facility] ([facility])    [facility]  [address]  [place] [address]   [phone]            May 29, 2018 11:00 AM CDT   Return Visit with Susan Lindo MD   [facility] ([facility])    [facility]  [address]  [place] [address]   [phone]            May 31, 2018 12:00 PM CDT   Level 1 with  INFUSION CHAIR 5   [facility] ([facility])    [place] [id number]  [place] [id number] [phone]              Who to contact     If you have questions or need follow up information about today's clinic visit or your schedule please contact [facility] directly at [phone].  Normal or non-critical lab and imaging results will be communicated to you by [employer], letter or phone within 4 business days after the clinic has received the results. If you do not hear from us within 7 days, please contact the clinic through [employer] or phone. If you have a critical or abnormal lab result, we will notify you by phone as soon as possible.  Submit refill requests through [employer] or call your pharmacy and they will forward the refill request to us. Please allow 3 business days for your refill to be completed.          Additional Information About Your Visit        [employer] Information     [employer] lets you send messages to your doctor, view your test results, renew your prescriptions, schedule appointments and more. To sign up, go to [email or web address] . Click on \"Log in\" on the left side of the screen, which will take you to the Welcome page. Then click on \"Sign up Now\" on the right side of the page.     You will be asked to enter the access code listed below, as well as some personal information. Please follow the directions to create your username " and password.     Your access code is: [email or web address]  Expires: 2018 11:01 AM     Your access code will  in 90 days. If you need help or a new code, please call your [facility] or [phone].        Care EveryWhere ID     This is your Care EveryWhere ID. This could be used by other organizations to access your [facility] medical records  [id number]        Your Vitals Were     Pulse Temperature Respirations             68 97.9  F (36.6  C) (Oral) 18          Blood Pressure from Last 3 Encounters:   18 95/60   05/15/18 130/71   05/15/18 130/71    Weight from Last 3 Encounters:   05/15/18 47.4 kg (104 lb 9.6 oz)   05/15/18 47.4 kg (104 lb 9.6 oz)   18 49.5 kg (109 lb 3.2 oz)              Today, you had the following     No orders found for display       Primary Care Provider Office Phone # Fax #    Edgar Neno Rodriguez [phone] [address] [facility] [address]  [place] [address]        Equal Access to Services     [facility]: Hadii aad ku hadesthero Sogabriel, waaxda luqadaha, qaybta kaalmada adeegyada, ellis mooney . So [facility] [phone].    ATENCIÓN: Si habla español, tiene a roldan disposición servicios gratuitos de asistencia lingüística. [facility] [phone].    We comply with applicable federal civil rights laws and [place] laws. We do not discriminate on the basis of race, color, national origin, age, disability, sex, sexual orientation, or gender identity.            Thank you!     Thank you for choosing [facility]  for your care. Our goal is always to provide you with excellent care. Hearing back from our patients is one way we can continue to improve our services. Please take a few minutes to complete the written survey that you may receive in the mail after your visit with us. Thank you!             Your Updated Medication List - Protect others around you: Learn how to safely use, store and throw  away your medicines at www.disposemymeds.org.          This list is accurate as of 5/17/18  1:17 PM.  Always use your most recent med list.                   Brand Name Dispense Instructions for use Diagnosis    acetaminophen 325 MG tablet    TYLENOL    60 tablet    Take 2 tablets (650 mg) by mouth every 4 hours as needed for mild pain or fever    Cancer associated pain       BLINK TEARS OP      Apply 1 drop to eye 3 times daily        CETAPHIL lotion      Apply topically 2 times daily Also bid prn . To dry skin.        ferrous sulfate 325 (65 Fe) MG tablet    IRON    30 tablet    Take 1 tablet (325 mg) by mouth daily (with breakfast)    Iron deficiency       HYDROcodone-acetaminophen 5-325 MG per tablet    NORCO    30 tablet    Take 1 tablet by mouth every 6 hours as needed for moderate to severe pain    Other chronic pain       levothyroxine 75 MCG tablet    SYNTHROID/LEVOTHROID    30 tablet    Take 1 tablet (75 mcg) by mouth daily Recheck TSH in 6 months    Hypothyroidism, unspecified type       loperamide 2 MG capsule    IMODIUM A-D    155 capsule    AND TAKE 2 CAPSULE EVERY 6 HOURS AS NEEDED FOR LOOSE STOOLS (MAX OF 16MG/24 HRS)    Diarrhea, unspecified type       LORazepam 0.5 MG tablet    ATIVAN    30 tablet    Take 1 tablet (0.5 mg) by mouth every 4 hours as needed (Anxiety, Nausea/Vomiting or Sleep)    Adenocarcinoma of colon (H)       NEW MED           ondansetron 8 MG tablet    ZOFRAN    10 tablet    Take 1 tablet (8 mg) by mouth every 8 hours as needed (Nausea/Vomiting)    Adenocarcinoma of colon (H)       prochlorperazine 10 MG tablet    COMPAZINE    30 tablet    Take 1 tablet (10 mg) by mouth every 6 hours as needed (Nausea/Vomiting)    Adenocarcinoma of colon (H)       SUMATRIPTAN SUCCINATE PO      Take 25 mg by mouth every 8 hours as needed for migraine        travoprost (BAK Free) 0.004 % ophthalmic solution    TRAVATAN Z    5 mL    Place 1 drop into both eyes At Bedtime    Primary open angle  glaucoma of both eyes, unspecified glaucoma stage       vitamin B-Complex     90 tablet    Take 1 tablet by mouth daily    S/P colectomy

## 2018-05-20 NOTE — ED AVS SNAPSHOT
Emergency Department    [address] [place] [address]    Phone:  [phone]    Fax:  [phone]                                       Sherita Kenney   MRN: [id number]    Department:   Emergency Department   Date of Visit:  5/20/2018           After Visit Summary Signature Page     I have received my discharge instructions, and my questions have been answered. I have discussed any challenges I see with this plan with the nurse or doctor.    ..........................................................................................................................................  Patient/Patient Representative Signature      ..........................................................................................................................................  Patient Representative Print Name and Relationship to Patient    ..................................................               ................................................  Date                                            Time    ..........................................................................................................................................  Reviewed by Signature/Title    ...................................................              ..............................................  Date                                                            Time

## 2018-05-20 NOTE — DISCHARGE INSTRUCTIONS
It is important to do the best that you can to maintain adequate nutrition and hydration.  Going out to eat with family may help this as well as ordering food in when family is unavailable if the food options at your facility are unappealing.  [employer] is a service that may help with this and her family will help you figure out how to use this.      For the next 2 days (5/21 and 5/22), try taking 4 mg of Zofran, and antinausea medication, 30 minutes prior to eating and see if this helps your appetite.  Taking this all the time may cause constipation, but if it helps discuss it with your oncologist at your next visit.

## 2018-05-20 NOTE — ED AVS SNAPSHOT
Emergency Department    [address] [facility] [address]    Phone:  [phone]    Fax:  [phone]                                       Sherita Kenney   MRN: [id number]    Department:   Emergency Department   Date of Visit:  5/20/2018           Patient Information     Date Of Birth          1940        Your diagnoses for this visit were:     Dehydration     Weight loss     Poor appetite        You were seen by Ely Philip MD.      Follow-up Information     Follow up with Susan Lindo MD. Schedule an appointment as soon as possible for a visit in 2 days.    Specialty:  Hematology & Oncology    Why:  As needed    Contact information:    [facility]  [address]  [facility] [id number] [phone]          Follow up with  Emergency Department.    Specialty:  EMERGENCY MEDICINE    Why:  As needed, If symptoms worsen    Contact information:    [address] [facility] [address] [phone]        Discharge Instructions       It is important to do the best that you can to maintain adequate nutrition and hydration.  Going out to eat with family may help this as well as ordering food in when family is unavailable if the food options at your facility are unappealing.  [employer] is a service that may help with this and her family will help you figure out how to use this.      For the next 2 days (5/21 and 5/22), try taking 4 mg of Zofran, and antinausea medication, 30 minutes prior to eating and see if this helps your appetite.  Taking this all the time may cause constipation, but if it helps discuss it with your oncologist at your next visit.    Your next 10 appointments already scheduled     May 22, 2018  1:30 PM CDT   Level 1 with  INFUSION CHAIR 5   [facility] ([facility])    [facility]  [address] [facility] [address]  [facility] [id number] [phone]            May  25, 2018 11:30 AM CDT   CT CHEST ABDOMEN PELVIS W/O & W CONTRAST with SHCT1   [facility] CT ([facility])    [address] [facility] [address]   [phone]           Please bring any scans or X-rays taken at other hospitals, if similar tests were done. Also bring a list of your medicines, including vitamins, minerals and over-the-counter drugs. It is safest to leave personal items at home.  Be sure to tell your doctor:   If you have any allergies.   If there s any chance you are pregnant.   If you are breastfeeding.  How to prepare:   Do not eat or drink for 2 hours before your exam. If you need to take medicine, you may take it with small sips of water. (We may ask you to take liquid medicine as well.)   Please wear loose clothing, such as a sweat suit or jogging clothes. Avoid snaps, zippers and other metal. We may ask you to undress and put on a hospital gown.  Please arrive 30 minutes early for your CT. Once in the department you might be asked to drink water 15-20 minutes prior to your exam.  If indicated you may be asked to drink an oral contrast in advance of your CT.  If this is the case, the imaging team will let you know or be in contact with you prior to your appointment  Patients over 70 or patients with diabetes or kidney problems:   If you haven t had a blood test (creatinine test) within the last 30 days, the Cardiologist/Radiologist may require you to get this test prior to your exam.  If you have diabetes:   Continue to take your metformin medication on the day of your exam  If you have any questions, please call the Imaging Department where you will have your exam.            May 25, 2018 12:00 PM CDT   CT SOFT TISSUE NECK W CONTRAST with SHCT1   [facility] CT ([facility])    [address] [facility] [address]   [phone]           Please bring any scans or X-rays taken at other hospitals, if similar tests were done. Also  bring a list of your medicines, including vitamins, minerals and over-the-counter drugs. It is safest to leave personal items at home.  Be sure to tell your doctor:   If you have any allergies.   If there s any chance you are pregnant.   If you are breastfeeding.    If you have diabetes as your medication may need to be adjusted for this exam.  You will have contrast for this exam. To prepare:   Do not eat or drink for 2 hours before your exam. If you need to take medicine, you may take it with small sips of water. (We may ask you to take liquid medicine as well.)   The day before your exam, drink extra fluids at least six 8-ounce glasses (unless your doctor tells you to restrict your fluids).  Patients over 70 or patients with diabetes or kidney problems:   If you haven t had a blood test (creatinine test) within the last 30 days, the Cardiologist/Radiologist may require you to get this test prior to your exam.  Please wear loose clothing, such as a sweat suit or jogging clothes. Avoid snaps, zippers and other metal. We may ask you to undress and put on a hospital gown.  If you have any questions, please call the Imaging Department where you will have your exam.            May 29, 2018 10:30 AM CDT   Level 5 with  INFUSION CHAIR 8   [facility] ([facility])    [facility]  [address]  [facility] [address]   [phone]            May 29, 2018 11:00 AM CDT   Return Visit with Susan Lindo MD   [facility] ([facility])    [facility]  [address]  [facility] [address]   [phone]            May 31, 2018 12:00 PM CDT   Level 1 with  INFUSION CHAIR 5   [facility] ([facility])    [facility]  [address]  [facility] [address]   [phone]              24 Hour Appointment Hotline        To make an appointment at any [facility] clinic, call [phone] ([phone]). If you don't have a family doctor or clinic, we will help you find one. [facility] clinics are conveniently located to serve the needs of you and your family.             Review of your medicines      START taking        Dose / Directions Last dose taken    ondansetron 4 MG ODT tab   Commonly known as:  ZOFRAN ODT   Dose:  4 mg   Quantity:  10 tablet        Take 1 tablet (4 mg) by mouth every 6 hours as needed for nausea or other (take 30 min before eating 2 times daily)   Refills:  0          Our records show that you are taking the medicines listed below. If these are incorrect, please call your family doctor or clinic.        Dose / Directions Last dose taken    acetaminophen 325 MG tablet   Commonly known as:  TYLENOL   Dose:  650 mg   Quantity:  60 tablet        Take 2 tablets (650 mg) by mouth every 4 hours as needed for mild pain or fever   Refills:  1        BLINK TEARS OP   Dose:  1 drop        Apply 1 drop to eye 3 times daily   Refills:  0        CETAPHIL lotion   Indication:  Dry Skin        Apply topically 2 times daily Also bid prn . To dry skin.   Refills:  0        ferrous sulfate 325 (65 Fe) MG tablet   Commonly known as:  IRON   Dose:  325 mg   Quantity:  30 tablet        Take 1 tablet (325 mg) by mouth daily (with breakfast)   Refills:  11        HYDROcodone-acetaminophen 5-325 MG per tablet   Commonly known as:  NORCO   Dose:  1 tablet   Quantity:  30 tablet        Take 1 tablet by mouth every 6 hours as needed for moderate to severe pain   Refills:  0        levothyroxine 75 MCG tablet   Commonly known as:  SYNTHROID/LEVOTHROID   Dose:  75 mcg   Quantity:  30 tablet        Take 1 tablet (75 mcg) by mouth daily Recheck TSH in 6 months   Refills:  3        loperamide 2 MG capsule   Commonly known as:  IMODIUM A-D   Quantity:  155 capsule        AND TAKE 2 CAPSULE EVERY 6 HOURS AS NEEDED FOR LOOSE STOOLS (MAX OF  16MG/24 HRS)   Refills:  3        LORazepam 0.5 MG tablet   Commonly known as:  ATIVAN   Dose:  0.5 mg   Quantity:  30 tablet        Take 1 tablet (0.5 mg) by mouth every 4 hours as needed (Anxiety, Nausea/Vomiting or Sleep)   Refills:  2        NEW MED   Indication:  1 drop each eye tid   dry eye drops        Refills:  0        ondansetron 8 MG tablet   Commonly known as:  ZOFRAN   Dose:  8 mg   Quantity:  10 tablet        Take 1 tablet (8 mg) by mouth every 8 hours as needed (Nausea/Vomiting)   Refills:  2        prochlorperazine 10 MG tablet   Commonly known as:  COMPAZINE   Dose:  10 mg   Quantity:  30 tablet        Take 1 tablet (10 mg) by mouth every 6 hours as needed (Nausea/Vomiting)   Refills:  2        SUMATRIPTAN SUCCINATE PO   Dose:  25 mg        Take 25 mg by mouth every 8 hours as needed for migraine   Refills:  0        travoprost (BAK Free) 0.004 % ophthalmic solution   Commonly known as:  TRAVATAN Z   Dose:  1 drop   Quantity:  5 mL        Place 1 drop into both eyes At Bedtime   Refills:  11        vitamin B-Complex   Dose:  1 tablet   Quantity:  90 tablet        Take 1 tablet by mouth daily   Refills:  1                Prescriptions were sent or printed at these locations (1 Prescription)                   Other Prescriptions                Printed at Department/Unit printer (1 of 1)         ondansetron (ZOFRAN ODT) 4 MG ODT tab                Procedures and tests performed during your visit     CBC with platelets differential    CT Chest/Abdomen/Pelvis w Contrast    Comprehensive metabolic panel    Lactic acid whole blood    Lipase    Soft tissue neck CT w contrast      Orders Needing Specimen Collection     None      Pending Results     No orders found from 5/18/2018 to 5/21/2018.            Pending Culture Results     No orders found from 5/18/2018 to 5/21/2018.            Pending Results Instructions     If you had any lab results that were not finalized at the time of your Discharge, you can  call the ED Lab Result RN at [phone]. You will be contacted by this team for any positive Lab results or changes in treatment. The nurses are available 7 days a week from 10A to 6:30P.  You can leave a message 24 hours per day and they will return your call.        Test Results From Your Hospital Stay        5/20/2018  4:41 PM      Component Results     Component Value Ref Range & Units Status    WBC 4.2 4.0 - 11.0 10e9/L Final    RBC Count 3.95 3.8 - 5.2 10e12/L Final    Hemoglobin 11.0 (L) 11.7 - 15.7 g/dL Final    Hematocrit 34.7 (L) 35.0 - 47.0 % Final    MCV 88 78 - 100 fl Final    MCH 27.8 26.5 - 33.0 pg Final    MCHC 31.7 31.5 - 36.5 g/dL Final    RDW 15.5 (H) 10.0 - 15.0 % Final    Platelet Count 80 (L) 150 - 450 10e9/L Final    Diff Method Automated Method  Final    % Neutrophils 51.2 % Final    % Lymphocytes 31.1 % Final    % Monocytes 15.8 % Final    % Eosinophils 0.7 % Final    % Basophils 0.7 % Final    % Immature Granulocytes 0.5 % Final    Nucleated RBCs 0 0 /100 Final    Absolute Neutrophil 2.1 1.6 - 8.3 10e9/L Final    Absolute Lymphocytes 1.3 0.8 - 5.3 10e9/L Final    Absolute Monocytes 0.7 0.0 - 1.3 10e9/L Final    Absolute Eosinophils 0.0 0.0 - 0.7 10e9/L Final    Absolute Basophils 0.0 0.0 - 0.2 10e9/L Final    Abs Immature Granulocytes 0.0 0 - 0.4 10e9/L Final    Absolute Nucleated RBC 0.0  Final         5/20/2018  5:13 PM      Component Results     Component Value Ref Range & Units Status    Sodium 138 133 - 144 mmol/L Final    Potassium 3.8 3.4 - 5.3 mmol/L Final    Chloride 103 94 - 109 mmol/L Final    Carbon Dioxide 26 20 - 32 mmol/L Final    Anion Gap 9 3 - 14 mmol/L Final    Glucose 106 (H) 70 - 99 mg/dL Final    Urea Nitrogen 15 7 - 30 mg/dL Final    Creatinine 0.81 0.52 - 1.04 mg/dL Final    GFR Estimate 69 >60 mL/min/1.7m2 Final    Non  GFR Calc    GFR Estimate If Black 84 >60 mL/min/1.7m2 Final    African American GFR Calc    Calcium 8.8 8.5 - 10.1 mg/dL Final     Bilirubin Total 0.7 0.2 - 1.3 mg/dL Final    Albumin 3.1 (L) 3.4 - 5.0 g/dL Final    Protein Total 7.0 6.8 - 8.8 g/dL Final    Alkaline Phosphatase 345 (H) 40 - 150 U/L Final    ALT 56 (H) 0 - 50 U/L Final    AST 78 (H) 0 - 45 U/L Final         5/20/2018  5:11 PM      Component Results     Component Value Ref Range & Units Status    Lipase 114 73 - 393 U/L Final         5/20/2018  5:07 PM      Component Results     Component Value Ref Range & Units Status    Lactic Acid 0.9 0.7 - 2.0 mmol/L Final               5/20/2018  6:22 PM      Narrative     CT SCAN OF THE NECK WITH CONTRAST  5/20/2018 5:41 PM     HISTORY: Cancer.     TECHNIQUE:  Axial images and coronal reformations. 40 mL Isovue-370  IV. Radiation dose for this scan was reduced using automated exposure  control, adjustment of the mA and/or kV according to patient size, or  iterative reconstruction technique.    COMPARISON: None.    FINDINGS:  Visualized sinuses, nasopharynx and orbits: Normal.      Tongue, oral cavity and oropharynx:  Normal.      Hypopharynx: Normal.      Larynx and trachea: Normal.      Thyroid: Normal.    Submandibular glands: Normal.      Parotid glands: Normal.        Lymph nodes: Normal.      Vasculature: Normal.      Upper mediastinum and lungs: Normal.      Bones: Degenerative changes noted in spine.        Impression     IMPRESSION: No evidence for metastatic disease in the neck.    BIANCA ALEX MD         5/20/2018  6:01 PM      Narrative     CT CHEST, ABDOMEN, AND PELVIS WITH CONTRAST  5/20/2018 5:40 PM    HISTORY: Abdominal pain, dehydration, cancer.     COMPARISON: A CT on 12/5/2017.    TECHNIQUE: Routine transverse CT imaging of the chest, abdomen, and  pelvis was performed following the uneventful administration of 60 mL  Isovue-370 intravenous contrast. Radiation dose for this scan was  reduced using automated exposure control, adjustment of the mA and/or  kV according to patient size, or iterative reconstruction  technique.    FINDINGS:     Chest: The heart size is normal. Again seen are calcified mediastinal  and hilar lymph nodes. No other enlarged lymph node or abnormal  mediastinal mass is demonstrated. There is calcification within the  thoracic aorta. The vascular structures are otherwise unremarkable.  Again seen are a few tiny indeterminate lung nodules. These are  unchanged. The lungs are otherwise clear. No pneumothorax is  demonstrated. No pleural effusion is identified. There are  degenerative changes in the spine. No other osseous abnormality is  demonstrated. No chest wall pathology is seen.     Abdomen and pelvis: Again seen is extensive diffuse metastatic  involvement of the liver. This does not appear significantly changed.  The spleen, pancreas, gallbladder, adrenal glands, kidneys, and  bladder remain unremarkable. Again seen is moderately prominent  lymphadenopathy in the bowel mesentery and portacaval region. Again  seen is a 1.8 cm nodule in the subcutaneous tissues of the anterior  abdominal wall just to the left of midline. This is unchanged. No  other abnormal mass is seen. No free fluid is seen. No free  intraperitoneal gas is identified. There is scattered diverticula of  the colon with no evidence of diverticulitis. Again seen are surgical  changes in the region of the cecum. No other gastrointestinal tract  abnormality is noted. There appears to have been an appendectomy.  There is prominent calcification in the vascular structures. There are  degenerative changes in the spine. No other osseous abnormality is  seen. No abdominal or pelvic wall pathology is demonstrated.         Impression     IMPRESSION: I see no significant change since a CT on 12/3/2017. This  includes metastatic disease with prominent involvement of the liver  and mesenteric and portacaval adenopathy. A left anterior subcutaneous  abdominal mass is also unchanged.    ANTHONY BARGER MD                Clinical Quality  Measure: Blood Pressure Screening     Your blood pressure was checked while you were in the emergency department today. The last reading we obtained was  BP: 139/81 . Please read the guidelines below about what these numbers mean and what you should do about them.  If your systolic blood pressure (the top number) is less than 120 and your diastolic blood pressure (the bottom number) is less than 80, then your blood pressure is normal. There is nothing more that you need to do about it.  If your systolic blood pressure (the top number) is 120-139 or your diastolic blood pressure (the bottom number) is 80-89, your blood pressure may be higher than it should be. You should have your blood pressure rechecked within a year by a primary care provider.  If your systolic blood pressure (the top number) is 140 or greater or your diastolic blood pressure (the bottom number) is 90 or greater, you may have high blood pressure. High blood pressure is treatable, but if left untreated over time it can put you at risk for heart attack, stroke, or kidney failure. You should have your blood pressure rechecked by a primary care provider within the next 4 weeks.  If your provider in the emergency department today gave you specific instructions to follow-up with your doctor or provider even sooner than that, you should follow that instruction and not wait for up to 4 weeks for your follow-up visit.        Thank you for choosing [facility]       Thank you for choosing [facility] for your care. Our goal is always to provide you with excellent care. Hearing back from our patients is one way we can continue to improve our services. Please take a few minutes to complete the written survey that you may receive in the mail after you visit with us. Thank you!        [employer] Information     [employer] lets you send messages to your doctor, view your test results, renew your prescriptions, schedule appointments and more. To sign up, go to  [email or web address] . Click on \"Log in\" on the left side of the screen, which will take you to the Welcome page. Then click on \"Sign up Now\" on the right side of the page.     You will be asked to enter the access code listed below, as well as some personal information. Please follow the directions to create your username and password.     Your access code is: [email or web address]  Expires: 2018 11:01 AM     Your access code will  in 90 days. If you need help or a new code, please call your [facility] clinic or [phone].        Care EveryWhere ID     This is your Care EveryWhere ID. This could be used by other organizations to access your [facility] medical records  [id number]        Equal Access to Services     ALEXIS ALBA : Ruddy Nunes, laura pillai, selina roblesalrubio pemberton, ellis mooney . So [facility] [phone].    ATENCIÓN: Si habla español, tiene a roldan disposición servicios gratuitos de asistencia lingüística. Malaika al [phone].    We comply with applicable federal civil rights laws and [place] laws. We do not discriminate on the basis of race, color, national origin, age, disability, sex, sexual orientation, or gender identity.            After Visit Summary       This is your record. Keep this with you and show to your community pharmacist(s) and doctor(s) at your next visit.                  "

## 2018-05-20 NOTE — ED PROVIDER NOTES
History     Chief Complaint:  Dehydration    HPI   Sherita Kenney is a 77 year old female with a history of stage 4 colon cancer who lives in an assisted living facility and who presents to the emergency department today for evaluation of dehydration. The patient's sister reports that she called to check on the patient this morning and the patient informed her that she has been experiencing a loss of appetite, a dry mouth with plaque on her teeth, and emesis for the last two days. She notes that the patient only had one meal today and was unable to keep it down. The patient's sister reports that this prompted her to bring the patient in to the ED out of concern for dehydration. Here in the ED the patient reports that she has been forcing herself to drink and that consuming flavored liquid such as juice helps. In addition to these symptoms, the patient reports that reports a weight loss of 30 pounds and attributes some of this to the food at her facility being less than appetizing. She notes chronic pain, often caused by constipation and relieved with bowel movements. The patient states that her second round of chemo starts on 5/22/18. She states that her goal of her chemo is to extend her lifetime and to alleviate some of her pain.    Allergies:  Wool fiber     Medications:    Cetaphil  Norco  Synthroid  Imodium  Ativan  Blink Tears  Compazine  Sumatriptan Succinate  Travatan  Tylenol    Past Medical History:    Abnormal gait   Adenocarcinoma of colon    Arthralgia of upper arm   Arthritis   Cancer of right colon   Cognitive impairment   Colon cancer   COPD    DJD   Fibromatoses of muscle, ligament, and fascia   Flail joint   Generalized OA   Hypertension   Iliopsoas abscess on right   Iron deficiency anemia   Late effects of poliomyelitis   Lumbago   Osteoarthritis of hip   Pain in limb   Postpolio syndrome   Primary osteoarthritis of right hand   Psychosis   Respiratory insufficiency   Right-sided back  "pain   S/P ileostomy   S/P right colectomy   Scoliosis   Thyroid disease    Past Surgical History:    Colectomy right  I & D abdominal abscess  Ileostomy  Laparoscopic Ureterolysis  Soft tissue surgery  Takedown Ileostomy    Family History:    Breast cancer    Social History:  The patient was accompanied to the ED by her sister and brother in law.  Smoking Status: Former Smoker   Packs/day: 1.5   Years: 15   Types: Cigarettes   Quit Date: 1/4/86  Smokeless Tobacco: Never Used  Alcohol Use: Negative  Marital Status:  Single     Review of Systems   Constitutional: Positive for appetite change and unexpected weight change.        Chronic pain   HENT:        Dry mouth   Gastrointestinal: Positive for vomiting.   10 point review of systems performed and is negative except as above and in HPI.    Physical Exam     Patient Vitals for the past 24 hrs:   BP Temp Temp src Heart Rate Resp SpO2 Height Weight   05/20/18 1600 103/59 97.6  F (36.4  C) Oral 85 16 98 % 1.626 m (5' 4\") 46.5 kg (102 lb 9.6 oz)     Physical Exam  General: Sitting in chair, appears comfortable, cachectic  Head:  The scalp, face, and head appear normal  Mouth/Throat: Mucus membranes are moist  CV:  Regular rate    Normal S1 and S2  No pathological murmur   Resp:  Breath sounds clear and equal bilaterally    Non-labored, no retractions or accessory muscle use    No coarseness    No wheezing   GI:  Abdomen is soft, no rigidity    Soft but diffusely tender with several palpable firm masses    No guarding. No rebound.  MS:  Normal motor assessment of all extremities.    Good capillary refill noted.  Skin:  No rash or lesions noted.  Neuro: Speech is normal and fluent. No apparent deficit.  Psych:  Awake. Alert.  Normal affect.      Appropriate interactions.    Emergency Department Course     Imaging:  Radiology findings were communicated with the patient who voiced understanding of the findings.    Soft tissue neck CT w contrast  No evidence for metastatic " disease in the neck.  BIANCA ALEX MD  Reading per radiology    CT Chest/Abdomen/Pelvis w Contrast  I see no significant change since a CT on 12/3/2017. This  includes metastatic disease with prominent involvement of the liver  and mesenteric and portacaval adenopathy. A left anterior subcutaneous  abdominal mass is also unchanged.  ANTHONY BARGER MD  Reading per radiology    Laboratory:  Laboratory findings were communicated with the patient who voiced understanding of the findings.    CBC: WBC 4.2, HGB 11.0 (L), PLT 80 (L)  CMP: Glucose 106 (H), ALT 56 (H), Albumin 3.1 (L), AST 78 (H), Alkphos 345 (H) o/w WNL (Creatinine 0.81)  Lipase: 114  Lactic Acid (Resulted: 1707): 0.9    Interventions:  1641 Zofran 4 mg IV  1712 NS 1000 ml IV    Emergency Department Course:    1600 Nursing notes and vitals reviewed.    1610 I performed an exam of the patient as documented above.     1633 IV was inserted and blood was drawn for laboratory testing, results above.    1715 The patient was sent to CT while in the emergency department, results above.     1630 I personally reviewed the laboratory and imaging results with the patient and her family and answered all related questions prior to discharge.    Impression & Plan      Medical Decision Making:  Sherita Kenney is a 77 year old female who presents to the emergency department today for evaluation of weight loss, dehydration, and poor oral intake.  The patient has had increasing episodes of nausea, occasional vomiting, and abdominal pain relieved by bowel movements.  She is scheduled to have CT imaging performed in 2 days.  As she is symptomatic with increased nausea and vomiting with known intra-abdominal malignancy, I did obtain imaging today. As these are contrasted studies I did obtain all imaging requested by her oncologist so as to avoid unnecessary renal injury from repeat contrast loads so close in time.  Her imaging was unchanged from prior imaging.  Her labs  were largely unremarkable except for thrombocytopenia.  The patient is scheduled to see her oncologist on Tuesday and will discuss these results further with him at that time.  We did spend quite a bit of time discussing the food at her facility and discussing ways to increase her oral intake.  She will attempt to go out to eat more with family, will attempt to order in more food, as she finds this appealing, and will attempt to use nausea medication as although she denies nausea overtly, reports that she often vomits with eating and that food appears unappealing and seems to question regularly what nausea actually means.  We also discussed her goals of care, which she will discuss further with her oncologist.  At this time I do not see benefit from hospitalization and the patient will be discharged to follow-up this week with her oncologist.    Diagnosis:    ICD-10-CM    1. Dehydration E86.0    2. Weight loss R63.4    3. Poor appetite R63.0      Disposition:   The patient is discharged to home.    Discharge Medications:  New Prescriptions    ONDANSETRON (ZOFRAN ODT) 4 MG ODT TAB    Take 1 tablet (4 mg) by mouth every 6 hours as needed for nausea or other (take 30 min before eating 2 times daily)     Scribe Disclosure:  I, Kristin Contreras, am serving as a scribe at 4:03 PM on 5/20/2018 to document services personally performed by Ely Philip MD based on my observations and the provider's statements to me.     EMERGENCY DEPARTMENT       Ely Philip MD  05/21/18 0844

## 2018-05-20 NOTE — ED NOTES
Bed: ED29  Expected date:   Expected time:   Means of arrival:   Comments[facility]  [facility]

## 2018-05-22 NOTE — MR AVS SNAPSHOT
After Visit Summary   5/22/2018    Sherita Kenney    MRN: [id number]           Patient Information     Date Of Birth          1940        Visit Information        Provider Department      5/22/2018 1:30 PM  INFUSION CHAIR 5 [facility]        Today's Diagnoses     Adenocarcinoma of colon (H)    -  1    Thrombocytopenia (H)        Port-a-cath in place           Follow-ups after your visit        Your next 10 appointments already scheduled     May 29, 2018 10:30 AM CDT   Level 5 with  INFUSION CHAIR 8   [facility] ([facility]    [facility]  [address]  [place] [address]   [phone]            May 29, 2018 11:00 AM CDT   Return Visit with Susan Lindo MD   [facility]    [facility]  [address]  [place] [address]   [phone]            May 31, 2018 12:00 PM CDT   Level 1 with  INFUSION CHAIR 5   [facility] ([facility])    [facility]  [address]  [place] [address]   [phone]              Who to contact     If you have questions or need follow up information about today's clinic visit or your schedule please contact [facility] directly at [phone].  Normal or non-critical lab and imaging results will be communicated to you by MyChart, letter or phone within 4 business days after the clinic has received the results. If you do not hear from us within 7 days, please contact the clinic through MyChart or phone. If you have a critical or abnormal lab result, we will notify you by phone as soon as possible.  Submit refill requests through [employer] or call your pharmacy and they will forward the refill request to us. Please allow 3 business days for your refill to be  "completed.          Additional Information About Your Visit        [facility] Information     [employer] lets you send messages to your doctor, view your test results, renew your prescriptions, schedule appointments and more. To sign up, go to [email or web address] . Click on \"Log in\" on the left side of the screen, which will take you to the Welcome page. Then click on \"Sign up Now\" on the right side of the page.     You will be asked to enter the access code listed below, as well as some personal information. Please follow the directions to create your username and password.     Your access code is: [email or web address]  Expires: 2018 11:01 AM     Your access code will  in 90 days. If you need help or a new code, please call your [place] clinic or [phone].        Care EveryWhere ID     This is your Care EveryWhere ID. This could be used by other organizations to access your [place] medical records  [id number]         Blood Pressure from Last 3 Encounters:   18 139/81   18 95/60   05/15/18 130/71    Weight from Last 3 Encounters:   18 46.5 kg (102 lb 9.6 oz)   05/15/18 47.4 kg (104 lb 9.6 oz)   05/15/18 47.4 kg (104 lb 9.6 oz)              We Performed the Following     CBC with platelets differential        Primary Care Provider Office Phone # Fax #    Edgar Neno Rodriguez [phone] [address]  [place] [address]        Equal Access to Services     [facility]: Hadii aad ku hadasho Soomaali, waaxda luqadaha, qaybta kaalmada nilay, ellis mooney . So [facility] [phone].    ATENCIÓN: Si habla español, tiene a roldan disposición servicios gratuitos de asistencia lingüística. Llame al [phone].    We comply with applicable federal civil rights laws and [place] laws. We do not discriminate on the basis of race, color, national origin, age, disability, sex, sexual orientation, or gender identity.            Thank " you!     Thank you for choosing [facility] AND [facility]  for your care. Our goal is always to provide you with excellent care. Hearing back from our patients is one way we can continue to improve our services. Please take a few minutes to complete the written survey that you may receive in the mail after your visit with us. Thank you!             Your Updated Medication List - Protect others around you: Learn how to safely use, store and throw away your medicines at www.disposemymeds.org.          This list is accurate as of 5/22/18  3:02 PM.  Always use your most recent med list.                   Brand Name Dispense Instructions for use Diagnosis    acetaminophen 325 MG tablet    TYLENOL    60 tablet    Take 2 tablets (650 mg) by mouth every 4 hours as needed for mild pain or fever    Cancer associated pain       BLINK TEARS OP      Apply 1 drop to eye 3 times daily        CETAPHIL lotion      Apply topically 2 times daily Also bid prn . To dry skin.        ferrous sulfate 325 (65 Fe) MG tablet    IRON    30 tablet    Take 1 tablet (325 mg) by mouth daily (with breakfast)    Iron deficiency       HYDROcodone-acetaminophen 5-325 MG per tablet    NORCO    30 tablet    Take 1 tablet by mouth every 6 hours as needed for moderate to severe pain    Other chronic pain       levothyroxine 75 MCG tablet    SYNTHROID/LEVOTHROID    30 tablet    Take 1 tablet (75 mcg) by mouth daily Recheck TSH in 6 months    Hypothyroidism, unspecified type       loperamide 2 MG capsule    IMODIUM A-D    155 capsule    AND TAKE 2 CAPSULE EVERY 6 HOURS AS NEEDED FOR LOOSE STOOLS (MAX OF 16MG/24 HRS)    Diarrhea, unspecified type       LORazepam 0.5 MG tablet    ATIVAN    30 tablet    Take 1 tablet (0.5 mg) by mouth every 4 hours as needed (Anxiety, Nausea/Vomiting or Sleep)    Adenocarcinoma of colon (H)       NEW MED           ondansetron 4 MG ODT tab    ZOFRAN ODT    10 tablet    Take 1 tablet (4 mg) by mouth every 6  hours as needed for nausea or other (take 30 min before eating 2 times daily)        ondansetron 8 MG tablet    ZOFRAN    10 tablet    Take 1 tablet (8 mg) by mouth every 8 hours as needed (Nausea/Vomiting)    Adenocarcinoma of colon (H)       prochlorperazine 10 MG tablet    COMPAZINE    30 tablet    Take 1 tablet (10 mg) by mouth every 6 hours as needed (Nausea/Vomiting)    Adenocarcinoma of colon (H)       SUMATRIPTAN SUCCINATE PO      Take 25 mg by mouth every 8 hours as needed for migraine        travoprost (BAK Free) 0.004 % ophthalmic solution    TRAVATAN Z    5 mL    Place 1 drop into both eyes At Bedtime    Primary open angle glaucoma of both eyes, unspecified glaucoma stage       vitamin B-Complex     90 tablet    Take 1 tablet by mouth daily    S/P colectomy

## 2018-05-22 NOTE — PROGRESS NOTES
"Patient here for port labs  Labs drawn:   CBC/diff/PLT  Port left chest gauge 20 needle type gripper size 3/4\"  NS flush per protocol    Denies concerns or issues that need to be addressed prior to appt with MD  Sister, Brother-in-law and patient meeting with Dr. Lindo today after port lab draw.     Patrick Lyles      "

## 2018-05-29 NOTE — PROGRESS NOTES
"Oncology Rooming Note    May 29, 2018 11:33 AM   Sherita Kenney is a 77 year old female who presents for:    Chief Complaint   Patient presents with     Oncology Clinic Visit     Adenocarcinoma of colon      Initial Vitals: BP (!) 106/39 (BP Location: Right arm, Patient Position: Sitting, Cuff Size: Adult Regular)  Pulse 50  Temp 97.5  F (36.4  C) (Oral)  Resp 14  Wt 47.7 kg (105 lb 3.2 oz)  SpO2 92%  BMI 18.06 kg/m2 Estimated body mass index is 18.06 kg/(m^2) as calculated from the following:    Height as of 5/20/18: 1.626 m (5' 4\").    Weight as of this encounter: 47.7 kg (105 lb 3.2 oz). Body surface area is 1.47 meters squared.  No Pain (0) Comment: Data Unavailable   No LMP recorded. Patient is not currently having periods (Reason: Perimenopausal).  Allergies reviewed: Yes  Medications reviewed: Yes    Medications: Medication refills not needed today.  Pharmacy name entered into [facility]:    [facility] [place], [place] - [address]  [facility], [address]    Clinical concerns: None                       4 minutes for nursing intake (face to face time)     April Bazzi MA            "

## 2018-05-29 NOTE — PROGRESS NOTES
"Infusion Nursing Note:  Sherita Kenney presents today for Avastin/Folfox C5D1  Patient seen by provider today: Yes: Dr. Lindo   present during visit today: Not Applicable.    Note: N/A.    Intravenous Access:  Implanted Port.    Treatment Conditions:  Lab Results   Component Value Date    HGB 10.6 05/29/2018     Lab Results   Component Value Date    WBC 5.2 05/29/2018      Lab Results   Component Value Date    ANEU 2.8 05/29/2018     Lab Results   Component Value Date     05/29/2018   Results reviewed, labs MET treatment parameters, ok to proceed with treatment.  Urine 10.    Post Infusion Assessment:  Patient tolerated infusion without incident.  Blood return noted pre and post infusion.  Site patent and intact, free from redness, edema or discomfort.  No evidence of extravasations.    Prior to discharge: Port is secured in place with tegaderm and flushed with 10cc NS with positive blood return noted.  Continuous home infusion CADD pump connected.    All connectors secured in place and clamps taped open.    Pump started, \"running\" noted on display (CADD): YES.  Patient instructed to call our clinic or [facility] with any questions or concerns at home.  Patient verbalized understanding.    Patient set up for pump disconnect at our clinic on 5/31/2018.      Discharge Plan:   Discharge instructions reviewed with: Patient.  Patient and/or family verbalized understanding of discharge instructions and all questions answered.  Copy of AVS reviewed with patient and/or family.  Patient will return 5/31/2018 for next appointment.  Patient discharged in stable condition accompanied by: self.  Departure Mode: Ambulatory.    Jina Aguilar RN  "

## 2018-05-29 NOTE — LETTER
"    5/29/2018         RE: Sherita Kenney  [address]  [place] [address]        Dear Colleague,    Thank you for referring your patient, Sherita Kenney, to the [facility]. Please see a copy of my visit note below.    Oncology Rooming Note    May 29, 2018 11:33 AM   Sherita Kenney is a 77 year old female who presents for:    Chief Complaint   Patient presents with     Oncology Clinic Visit     Adenocarcinoma of colon      Initial Vitals: BP (!) 106/39 (BP Location: Right arm, Patient Position: Sitting, Cuff Size: Adult Regular)  Pulse 50  Temp 97.5  F (36.4  C) (Oral)  Resp 14  Wt 47.7 kg (105 lb 3.2 oz)  SpO2 92%  BMI 18.06 kg/m2 Estimated body mass index is 18.06 kg/(m^2) as calculated from the following:    Height as of 5/20/18: 1.626 m (5' 4\").    Weight as of this encounter: 47.7 kg (105 lb 3.2 oz). Body surface area is 1.47 meters squared.  No Pain (0) Comment: Data Unavailable   No LMP recorded. Patient is not currently having periods (Reason: Perimenopausal).  Allergies reviewed: Yes  Medications reviewed: Yes    Medications: Medication refills not needed today.  Pharmacy name entered into [facility]:    [facility] [place], [place] - [address]  [facility] [address]    Clinical concerns: None                       4 minutes for nursing intake (face to face time)     April Bazzi MA              Visit Date:   05/29/2018     ONCOLOGY HISTORY:  Ms. Sherita Kenney is a female with metastatic colon cancer.   1. CT abdomen and pelvis on 02/15/2017 revealed large right iliopsoas abscess and mass-like wall thickening of portion of right colon.       2. Patient was taken to OR on 03/01/2017.    -Colonoscopy revealed a large fungating mass in the right side of the colon.  Pathology revealed invasive poorly differentiated adenocarcinoma.  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 MLHI promoter " methylation present.   -Laparoscopic ileostomy was done.        3. Patient had right colectomy with takedown of ileostomy and primary anastomosis on 04/13/2017.    -Pathology  reveals poorly differentiated colonic adenocarcinoma. 15 of 24 lymph nodes are positive. Tumor invades into adjacent abdominal wall.  Lymphovascular invasion present. pT4b pN2b M0.   -BRAF mutation present. No KRAS mutation.      4. PET scan on 05/22/2017 revealed metastatic disease.  Multiple hypermetabolic liver metastases and  hypermetabolic necrotic lymphadenopathy in right lower quadrant.      6. modified FOLFOX6 with Avastin started on 06/01/2017.   -Bolus 5-FU discontinued with cycle 4.    -Oxaliplatin reduced with cycle 5.  -Oxaliplatin stopped after cycle 8. Cycle 8 completed on 09/08/2017.  -Maintenance infusional 5-FU and Avastin started on 09/21/2017. No bolus 5-FU.  -Infusional 5-FU discontinued after 11/30/2017. Single agent Avastin continued.      7.  CT chest, abdomen and pelvis on 03/15/2018 reveals progression of disease.      8. mFOLFOX with Avastin (without bolus 5-FU and leucovorin) started on 03/27/2018.     9. CT scan on 05/20/2018 revealed stable disease.     SUBJECTIVE:    Ms. Kenney is a 77-year-old female with metastatic colon cancer.  She is currently on treatment with FOLFOX with Avastin.  No bolus 5-FU or leucovorin being given.      CT scan on 05/20/2018 revealed stable disease.  No new area of malignancy.      Patient overall is doing good.  She has some diarrhea.  No worsening of it.  No headache.  No dizziness.  No chest pain or difficulty breathing.  No abdominal pain, nausea or vomiting.  No urinary complaints.  She has mild neuropathy, especially in the fingers.      PHYSICAL EXAMINATION:   GENERAL:  She was alert, oriented x 3.   VITAL SIGNS:  Reviewed.    Rest of the systems not examined.      LABORATORY DATA:  Reviewed.      ASSESSMENT:   1.  A 77-year-old female with metastatic colon cancer on  palliative chemotherapy.  Disease is stable.   2.  Peripheral neuropathy.   3.  Thrombocytopenia, which is improved.   4.  Anemia, which is stable.   5.  Elevated LFTs secondary to metastatic disease to the liver.      PLAN:   1.  CT was reviewed with the patient.  She was happy to know that disease is overall stable.   2.  Discussed regarding treatment.  We will continue her on infusional 5-FU, oxaliplatin and Avastin.  Because of thrombocytopenia, will decrease both oxaliplatin and 5-FU by 20%.  She is agreeable for it.   When there is progression of disease, will plan on treating her with immunotherapy.   3.  Patient has elevated LFTs.  This is due to liver involvement by malignancy.  We will continue to monitor LFT.   4.  Patient has chronic diarrhea.  Advised her to continue taking Imodium.   5.  She had a few questions, which were all answered.  I will see her in a month.      TOTAL FACE-TO-FACE TIME SPENT: 25 minutes, most of time was spent in counseling and coordination of care.         OMER MAKI MD             D: 2018   T: 2018   MT:       Name:     SEVERO SWANN   MRN:      [id number]        Account:      [id number]   :      1940           Visit Date:   2018      Document: [id number]        Again, thank you for allowing me to participate in the care of your patient.        Sincerely,        Omer Maki MD

## 2018-05-29 NOTE — MR AVS SNAPSHOT
After Visit Summary   5/29/2018    Sherita Kenney    MRN: [id number]           Patient Information     Date Of Birth          1940        Visit Information        Provider Department      5/29/2018 10:30 AM  INFUSION CHAIR 8 [facility]        Today's Diagnoses     Adenocarcinoma of colon (H)    -  1       Follow-ups after your visit        Your next 10 appointments already scheduled     May 31, 2018  1:00 PM CDT   Level 1 with  INFUSION CHAIR 5   [facility] ([facility])    [facility] [place] [id number]  [place] [address]   [phone]            Jun 12, 2018 11:30 AM CDT   Level 5 with  INFUSION CHAIR 6   [facility] ([facility])    [facility]  [address]  [place] [address]   [phone]            Jun 12, 2018 12:30 PM CDT   Return Visit with WILLY Valadez CNP   [facility] ([facility])    [facility]  [address]  [place] [address]   [phone]            Jun 26, 2018 10:00 AM CDT   Level 5 with  INFUSION CHAIR 10   [facility] ([facility])    [facility]  [address]  [place] [address]   [phone]            Jun 26, 2018 10:40 AM CDT   Return Visit with Susan Lindo MD   [facility] ([facility])    [facility]  [address]  [place] [address]   [phone]              Who to contact     If you have questions or need follow up information about today's clinic visit or your schedule please contact [facility] [place] directly at [phone].  Normal or non-critical lab and imaging results will be communicated to you by MyChart, letter or phone  within 4 business days after the clinic has received the results. If you do not hear from us within 7 days, please contact the clinic through [employer] or phone. If you have a critical or abnormal lab result, we will notify you by phone as soon as possible.  Submit refill requests through [employer] or call your pharmacy and they will forward the refill request to us. Please allow 3 business days for your refill to be completed.          Additional Information About Your Visit        Care EveryWhere ID     This is your Care EveryWhere ID. This could be used by other organizations to access your [place] medical records  [id number]         Blood Pressure from Last 3 Encounters:   05/29/18 113/72   05/20/18 139/81   05/17/18 95/60    Weight from Last 3 Encounters:   05/29/18 47.7 kg (105 lb 3.2 oz)   05/20/18 46.5 kg (102 lb 9.6 oz)   05/15/18 47.4 kg (104 lb 9.6 oz)              We Performed the Following     CBC with platelets differential     Comprehensive metabolic panel     Protein qualitative urine        Primary Care Provider Office Phone # Fax #    Edgar Neno Rodriguez [phone] [address]  [place] [address]        Equal Access to Services     ALEXIS ALBA : Hadii aad ku hadasho Soomaali, waaxda luqadaha, qaybta kaalmada adeegyada, ellis ren. So [facility] [phone].    ATENCIÓN: Si habla español, tiene a roldan disposición servicios gratuitos de asistencia lingüística. Llame al [phone].    We comply with applicable federal civil rights laws and [place] laws. We do not discriminate on the basis of race, color, national origin, age, disability, sex, sexual orientation, or gender identity.            Thank you!     Thank you for choosing [facility] AND [facility]  for your care. Our goal is always to provide you with excellent care. Hearing back from our patients is one way we can continue to improve our services.  Please take a few minutes to complete the written survey that you may receive in the mail after your visit with us. Thank you!             Your Updated Medication List - Protect others around you: Learn how to safely use, store and throw away your medicines at www.disposemymeds.org.          This list is accurate as of 5/29/18  3:11 PM.  Always use your most recent med list.                   Brand Name Dispense Instructions for use Diagnosis    acetaminophen 325 MG tablet    TYLENOL    60 tablet    Take 2 tablets (650 mg) by mouth every 4 hours as needed for mild pain or fever    Cancer associated pain       BLINK TEARS OP      Apply 1 drop to eye 3 times daily        CETAPHIL lotion      Apply topically 2 times daily Also bid prn . To dry skin.        ferrous sulfate 325 (65 Fe) MG tablet    IRON    30 tablet    Take 1 tablet (325 mg) by mouth daily (with breakfast)    Iron deficiency       HYDROcodone-acetaminophen 5-325 MG per tablet    NORCO    30 tablet    Take 1 tablet by mouth every 6 hours as needed for moderate to severe pain    Other chronic pain       levothyroxine 75 MCG tablet    SYNTHROID/LEVOTHROID    30 tablet    Take 1 tablet (75 mcg) by mouth daily Recheck TSH in 6 months    Hypothyroidism, unspecified type       loperamide 2 MG capsule    IMODIUM A-D    155 capsule    AND TAKE 2 CAPSULE EVERY 6 HOURS AS NEEDED FOR LOOSE STOOLS (MAX OF 16MG/24 HRS)    Diarrhea, unspecified type       LORazepam 0.5 MG tablet    ATIVAN    30 tablet    Take 1 tablet (0.5 mg) by mouth every 4 hours as needed (Anxiety, Nausea/Vomiting or Sleep)    Adenocarcinoma of colon (H)       NEW MED           ondansetron 8 MG tablet    ZOFRAN    10 tablet    Take 1 tablet (8 mg) by mouth every 8 hours as needed (Nausea/Vomiting)    Adenocarcinoma of colon (H)       prochlorperazine 10 MG tablet    COMPAZINE    30 tablet    Take 1 tablet (10 mg) by mouth every 6 hours as needed (Nausea/Vomiting)    Adenocarcinoma of colon (H)        SUMATRIPTAN SUCCINATE PO      Take 25 mg by mouth every 8 hours as needed for migraine        travoprost (BAK Free) 0.004 % ophthalmic solution    TRAVATAN Z    5 mL    Place 1 drop into both eyes At Bedtime    Primary open angle glaucoma of both eyes, unspecified glaucoma stage       vitamin B-Complex     90 tablet    Take 1 tablet by mouth daily    S/P colectomy

## 2018-05-29 NOTE — MR AVS SNAPSHOT
After Visit Summary   5/29/2018    Sherita Kenney    MRN: [id number]           Patient Information     Date Of Birth          1940        Visit Information        Provider Department      5/29/2018 11:00 AM Susan Lindo MD [facility]        Today's Diagnoses     Adenocarcinoma of colon (H)    -  1      Care Instructions    Continue chemotherapy.  See Giuseppe in 2 weeks.  See me in 4 weeks.  Recheck BP.- Done LW          Follow-ups after your visit        Your next 10 appointments already scheduled     May 31, 2018 12:00 PM CDT   Level 1 with SH INFUSION CHAIR 5   [facility] ([facility])    [facility]  [address]  [place] [address]   [phone]            Jun 12, 2018 11:30 AM CDT   Level 5 with SH INFUSION CHAIR 6   [facility] ([facility])    [facility]  [address]  [place] [address]   [phone]            Jun 12, 2018 12:30 PM CDT   Return Visit with WILLY Valadez CNP   [facility] ([facility])    [facility]  [address]  [place] [address]   [phone]            Jun 26, 2018 10:00 AM CDT   Level 5 with SH INFUSION CHAIR 10   [facility] ([facility])    [facility]  [address]  [place] [address]   [phone]            Jun 26, 2018 10:40 AM CDT   Return Visit with Susan Lindo MD   [facility] ([facility])    [facility]  [address]  [place] [address]   [phone]              Who to contact     If you have questions or need follow up information about today's clinic visit or your schedule please contact [facility] directly at [phone].  Normal or non-critical lab  and imaging results will be communicated to you by MyChart, letter or phone within 4 business days after the clinic has received the results. If you do not hear from us within 7 days, please contact the clinic through MyChart or phone. If you have a critical or abnormal lab result, we will notify you by phone as soon as possible.  Submit refill requests through [employer] or call your pharmacy and they will forward the refill request to us. Please allow 3 business days for your refill to be completed.          Additional Information About Your Visit        Care EveryWhere ID     This is your Care EveryWhere ID. This could be used by other organizations to access your [place] medical records  [id number]        Your Vitals Were     Pulse Temperature Respirations Pulse Oximetry BMI (Body Mass Index)       66 97.5  F (36.4  C) (Oral) 14 92% 18.06 kg/m2        Blood Pressure from Last 3 Encounters:   05/29/18 113/72   05/20/18 139/81   05/17/18 95/60    Weight from Last 3 Encounters:   05/29/18 47.7 kg (105 lb 3.2 oz)   05/20/18 46.5 kg (102 lb 9.6 oz)   05/15/18 47.4 kg (104 lb 9.6 oz)              Today, you had the following     No orders found for display       Primary Care Provider Office Phone # Fax #    Edgar Neno Rodriguez [phone] [address] [facility] [address]  [place] [address]        Equal Access to Services     [facility]: Hadii aad ku gavio Manju, waaxda rosas, qaybta kaalrubio pemberton, ellis ren. So [facility] [phone].    ATENCIÓN: Si habla español, tiene a roldan disposición servicios gratuitos de asistencia lingüística. Llame al [phone].    We comply with applicable federal civil rights laws and [place] laws. We do not discriminate on the basis of race, color, national origin, age, disability, sex, sexual orientation, or gender identity.            Thank you!     Thank you for choosing [facility]  for your care. Our  goal is always to provide you with excellent care. Hearing back from our patients is one way we can continue to improve our services. Please take a few minutes to complete the written survey that you may receive in the mail after your visit with us. Thank you!             Your Updated Medication List - Protect others around you: Learn how to safely use, store and throw away your medicines at www.disposemymeds.org.          This list is accurate as of 5/29/18 12:56 PM.  Always use your most recent med list.                   Brand Name Dispense Instructions for use Diagnosis    acetaminophen 325 MG tablet    TYLENOL    60 tablet    Take 2 tablets (650 mg) by mouth every 4 hours as needed for mild pain or fever    Cancer associated pain       BLINK TEARS OP      Apply 1 drop to eye 3 times daily        CETAPHIL lotion      Apply topically 2 times daily Also bid prn . To dry skin.        ferrous sulfate 325 (65 Fe) MG tablet    IRON    30 tablet    Take 1 tablet (325 mg) by mouth daily (with breakfast)    Iron deficiency       HYDROcodone-acetaminophen 5-325 MG per tablet    NORCO    30 tablet    Take 1 tablet by mouth every 6 hours as needed for moderate to severe pain    Other chronic pain       levothyroxine 75 MCG tablet    SYNTHROID/LEVOTHROID    30 tablet    Take 1 tablet (75 mcg) by mouth daily Recheck TSH in 6 months    Hypothyroidism, unspecified type       loperamide 2 MG capsule    IMODIUM A-D    155 capsule    AND TAKE 2 CAPSULE EVERY 6 HOURS AS NEEDED FOR LOOSE STOOLS (MAX OF 16MG/24 HRS)    Diarrhea, unspecified type       LORazepam 0.5 MG tablet    ATIVAN    30 tablet    Take 1 tablet (0.5 mg) by mouth every 4 hours as needed (Anxiety, Nausea/Vomiting or Sleep)    Adenocarcinoma of colon (H)       NEW MED           ondansetron 8 MG tablet    ZOFRAN    10 tablet    Take 1 tablet (8 mg) by mouth every 8 hours as needed (Nausea/Vomiting)    Adenocarcinoma of colon (H)       prochlorperazine 10 MG tablet     COMPAZINE    30 tablet    Take 1 tablet (10 mg) by mouth every 6 hours as needed (Nausea/Vomiting)    Adenocarcinoma of colon (H)       SUMATRIPTAN SUCCINATE PO      Take 25 mg by mouth every 8 hours as needed for migraine        travoprost (BAK Free) 0.004 % ophthalmic solution    TRAVATAN Z    5 mL    Place 1 drop into both eyes At Bedtime    Primary open angle glaucoma of both eyes, unspecified glaucoma stage       vitamin B-Complex     90 tablet    Take 1 tablet by mouth daily    S/P colectomy

## 2018-05-30 NOTE — TELEPHONE ENCOUNTER
I called and spoke with patient.   Patient did not receive letter in the mail. Would like new letter placed in mail.     She does report of dry skin, fatigue, and does go back and forth between constipation and diarrhea. No swelling in legs/ankles    Has not had any Levothyroxine for 2 weeks since she ran out of prescription.    Dr. Rodriguez:   1.) Would you like patient to come in for lab work since she has not had Levothyroxine for 2 + weeks?  2.) What does of Levothyroxine would you like patient to take?  3.) RN will have to place new letter in the mail regarding her lab results from March as patient did not receive the letter.    Radha Elmore RN

## 2018-05-30 NOTE — TELEPHONE ENCOUNTER
Please call and inform patient of siOPTICA message I sent back in March.  Please ask if patient has any of the symptoms mentioned and route back to me.      Notes Recorded by Edgar Rodriguez MD on 3/22/2018 at 1:44 PM  Please mail the patient's test results along with the following message:    Good day to you Sherita.    Your TSH is mildly elevated but your circulating virus hormone (T4 Free) is normal. This is a condition which we call subclinical hypothyroidism and the treatment is based on presence of symptoms.    Please let us know if you're experiencing any symptoms of hypothyroidism such as fatigue, constipation, leg/ankle swelling, cold intolerance, excessively dry skin so that we can consider increasing your levothyroxine dose..    Please call if you have questions or concerns.    Best,  Dr. Rodriguez

## 2018-05-30 NOTE — PROGRESS NOTES
Visit Date:   05/29/2018     ONCOLOGY HISTORY:  Ms. Sherita Kenney is a female with metastatic colon cancer.   1. CT abdomen and pelvis on 02/15/2017 revealed large right iliopsoas abscess and mass-like wall thickening of portion of right colon.       2. Patient was taken to OR on 03/01/2017.    -Colonoscopy revealed a large fungating mass in the right side of the colon.  Pathology revealed invasive poorly differentiated adenocarcinoma.  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   -Laparoscopic ileostomy was done.        3. Patient had right colectomy with takedown of ileostomy and primary anastomosis on 04/13/2017.    -Pathology  reveals poorly differentiated colonic adenocarcinoma. 15 of 24 lymph nodes are positive. Tumor invades into adjacent abdominal wall.  Lymphovascular invasion present. pT4b pN2b M0.   -BRAF mutation present. No KRAS mutation.      4. PET scan on 05/22/2017 revealed metastatic disease.  Multiple hypermetabolic liver metastases and  hypermetabolic necrotic lymphadenopathy in right lower quadrant.      6. modified FOLFOX6 with Avastin started on 06/01/2017.   -Bolus 5-FU discontinued with cycle 4.    -Oxaliplatin reduced with cycle 5.  -Oxaliplatin stopped after cycle 8. Cycle 8 completed on 09/08/2017.  -Maintenance infusional 5-FU and Avastin started on 09/21/2017. No bolus 5-FU.  -Infusional 5-FU discontinued after 11/30/2017. Single agent Avastin continued.      7.  CT chest, abdomen and pelvis on 03/15/2018 reveals progression of disease.      8. mFOLFOX with Avastin (without bolus 5-FU and leucovorin) started on 03/27/2018.     9. CT scan on 05/20/2018 revealed stable disease.     SUBJECTIVE:    Ms. Kenney is a 77-year-old female with metastatic colon cancer.  She is currently on treatment with FOLFOX with Avastin.  No bolus 5-FU or leucovorin being given.      CT scan on 05/20/2018 revealed stable disease.  No new area of malignancy.       Patient overall is doing good.  She has some diarrhea.  No worsening of it.  No headache.  No dizziness.  No chest pain or difficulty breathing.  No abdominal pain, nausea or vomiting.  No urinary complaints.  She has mild neuropathy, especially in the fingers.      PHYSICAL EXAMINATION:   GENERAL:  She was alert, oriented x 3.   VITAL SIGNS:  Reviewed.    Rest of the systems not examined.      LABORATORY DATA:  Reviewed.      ASSESSMENT:   1.  A 77-year-old female with metastatic colon cancer on palliative chemotherapy.  Disease is stable.   2.  Peripheral neuropathy.   3.  Thrombocytopenia, which is improved.   4.  Anemia, which is stable.   5.  Elevated LFTs secondary to metastatic disease to the liver.      PLAN:   1.  CT was reviewed with the patient.  She was happy to know that disease is overall stable.   2.  Discussed regarding treatment.  We will continue her on infusional 5-FU, oxaliplatin and Avastin.  Because of thrombocytopenia, will decrease both oxaliplatin and 5-FU by 20%.  She is agreeable for it.   When there is progression of disease, will plan on treating her with immunotherapy.   3.  Patient has elevated LFTs.  This is due to liver involvement by malignancy.  We will continue to monitor LFT.   4.  Patient has chronic diarrhea.  Advised her to continue taking Imodium.   5.  She had a few questions, which were all answered.  I will see her in a month.      TOTAL FACE-TO-FACE TIME SPENT: 25 minutes, most of time was spent in counseling and coordination of care.         OMER MAKI MD             D: 2018   T: 2018   MT:       Name:     SEVERO SWANN   MRN:      [id number]        Account:      [id number]   :      1940           Visit Date:   2018      Document: [id number]

## 2018-05-30 NOTE — TELEPHONE ENCOUNTER
I called patient and spoke with her.   Relayed message below.  Prescription sent to [facility].   Scheduled patient for 2 month follow up with Dr. Rodriguez  Placed letter from March in the mail to patient at her request.     Radha Elmore RN

## 2018-05-30 NOTE — TELEPHONE ENCOUNTER
Routing refill request to provider for review/approval because:  Labs out of range:  TSH  Mary OATES RN

## 2018-05-30 NOTE — TELEPHONE ENCOUNTER
Krupa  From [place] called to check on status of this prescription.  Patient has been out of medications for a week.    Please send script to [facility] asap!    Krupa can be reached at: [phone]  Ok to leave detailed message at this number.    Spyder Lynk phone number:  [phone]

## 2018-05-31 NOTE — PROGRESS NOTES
Infusion Nursing Note:  Sherita Kenney presents today for pump disconnect.    Patient seen by provider today: No   present during visit today: Not Applicable.    Note: Pump appears empty upon arrival and is noted to be beeping. Patient has no new concerns or issues today and reports feeling great.     Intravenous Access:  Implanted Port.    Treatment Conditions:  Not Applicable.      Post Infusion Assessment:  Patient tolerated infusion without incident.  Blood return noted pre and post infusion.  Site patent and intact, free from redness, edema or discomfort.  No evidence of extravasations.  Access discontinued per protocol.    Discharge Plan:   Patient declined prescription refills.  Discharge instructions reviewed with: Patient.  Patient verbalized understanding of discharge instructions and all questions answered.  Copy of AVS reviewed with patient and/or family.  Patient will return 6/12/18 for next appointment.  Patient discharged in stable condition accompanied by: self.  Departure Mode: Ambulatory.    Mary Sequeira RN

## 2018-05-31 NOTE — MR AVS SNAPSHOT
After Visit Summary   5/31/2018    Sherita Kenney    MRN: [id number]           Patient Information     Date Of Birth          1940        Visit Information        Provider Department      5/31/2018 1:00 PM  INFUSION CHAIR 5 [facility]        Today's Diagnoses     Port-a-cath in place    -  1       Follow-ups after your visit        Your next 10 appointments already scheduled     May 31, 2018  1:00 PM CDT   Level 1 with  INFUSION CHAIR 5   [facility] ([facility])    [place] [id number]  [place] [address]   [phone]            Jun 12, 2018 11:30 AM CDT   Level 5 with  INFUSION CHAIR 6   [facility] ([facility])    [facility]  [address]  [place] [address]   [phone]            Jun 12, 2018 12:30 PM CDT   Return Visit with WILLY Valadez CNP   [facility] ([facility])    [facility]  [address]  [place] [address]   [phone]            Jun 26, 2018 10:00 AM CDT   Level 5 with  INFUSION CHAIR 10   [facility] ([facility])    [facility]  [address]  [place] [address]   [phone]            Jun 26, 2018 10:40 AM CDT   Return Visit with Susan Lindo MD   [facility] ([facility])    [facility]  [address]  [place] [address]   [phone]            Jul 31, 2018  1:30 PM CDT   Office Visit with Edgar Rodriguez MD   [facility]    [address]  [place] [address]   [phone]           Bring a current list of meds and any records pertaining to this visit. For Physicals, please bring immunization  records and any forms needing to be filled out. Please arrive 10 minutes early to complete paperwork.              Who to contact     If you have questions or need follow up information about today's clinic visit or your schedule please contact [facility] directly at [phone].  Normal or non-critical lab and imaging results will be communicated to you by MyChart, letter or phone within 4 business days after the clinic has received the results. If you do not hear from us within 7 days, please contact the clinic through MyChart or phone. If you have a critical or abnormal lab result, we will notify you by phone as soon as possible.  Submit refill requests through [employer] or call your pharmacy and they will forward the refill request to us. Please allow 3 business days for your refill to be completed.          Additional Information About Your Visit        Care EveryWhere ID     This is your Care EveryWhere ID. This could be used by other organizations to access your [place] medical records  [id number]        Your Vitals Were     Pulse Temperature Respirations Pulse Oximetry          75 97.8  F (36.6  C) (Oral) 20 99%         Blood Pressure from Last 3 Encounters:   05/31/18 108/53   05/29/18 113/72   05/29/18 115/48    Weight from Last 3 Encounters:   05/29/18 47.7 kg (105 lb 3.2 oz)   05/20/18 46.5 kg (102 lb 9.6 oz)   05/15/18 47.4 kg (104 lb 9.6 oz)              Today, you had the following     No orders found for display       Primary Care Provider Office Phone # Fax #    Edgar Neno Rodriguez [phone] [address]  [place] [address]        Equal Access to Services     [facility]: Hadii aad madhuri rivera Sogabriel, waaxda luqadaha, qaybta kaalmada nilay, ellis ren. So [facility] [phone].    ATENCIÓN: Si habla español, tiene a roldan disposición servicios gratuitos de asistencia lingüística. Llame al  [phone].    We comply with applicable federal civil rights laws and [place] laws. We do not discriminate on the basis of race, color, national origin, age, disability, sex, sexual orientation, or gender identity.            Thank you!     Thank you for choosing [facility] AND [facility]  for your care. Our goal is always to provide you with excellent care. Hearing back from our patients is one way we can continue to improve our services. Please take a few minutes to complete the written survey that you may receive in the mail after your visit with us. Thank you!             Your Updated Medication List - Protect others around you: Learn how to safely use, store and throw away your medicines at www.disposemymeds.org.          This list is accurate as of 5/31/18 12:56 PM.  Always use your most recent med list.                   Brand Name Dispense Instructions for use Diagnosis    acetaminophen 325 MG tablet    TYLENOL    60 tablet    Take 2 tablets (650 mg) by mouth every 4 hours as needed for mild pain or fever    Cancer associated pain       BLINK TEARS OP      Apply 1 drop to eye 3 times daily        CETAPHIL lotion      Apply topically 2 times daily Also bid prn . To dry skin.        ferrous sulfate 325 (65 Fe) MG tablet    IRON    30 tablet    Take 1 tablet (325 mg) by mouth daily (with breakfast)    Iron deficiency       HYDROcodone-acetaminophen 5-325 MG per tablet    NORCO    30 tablet    Take 1 tablet by mouth every 6 hours as needed for moderate to severe pain    Other chronic pain       levothyroxine 88 MCG tablet    SYNTHROID/LEVOTHROID    30 tablet    Take 1 tablet (88 mcg) by mouth daily    Hypothyroidism, unspecified type       loperamide 2 MG capsule    IMODIUM A-D    155 capsule    AND TAKE 2 CAPSULE EVERY 6 HOURS AS NEEDED FOR LOOSE STOOLS (MAX OF 16MG/24 HRS)    Diarrhea, unspecified type       LORazepam 0.5 MG tablet    ATIVAN    30 tablet    Take 1 tablet (0.5 mg) by  mouth every 4 hours as needed (Anxiety, Nausea/Vomiting or Sleep)    Adenocarcinoma of colon (H)       NEW MED           ondansetron 8 MG tablet    ZOFRAN    10 tablet    Take 1 tablet (8 mg) by mouth every 8 hours as needed (Nausea/Vomiting)    Adenocarcinoma of colon (H)       prochlorperazine 10 MG tablet    COMPAZINE    30 tablet    Take 1 tablet (10 mg) by mouth every 6 hours as needed (Nausea/Vomiting)    Adenocarcinoma of colon (H)       SUMATRIPTAN SUCCINATE PO      Take 25 mg by mouth every 8 hours as needed for migraine        travoprost (BAK Free) 0.004 % ophthalmic solution    TRAVATAN Z    5 mL    Place 1 drop into both eyes At Bedtime    Primary open angle glaucoma of both eyes, unspecified glaucoma stage       vitamin B-Complex     90 tablet    Take 1 tablet by mouth daily    S/P colectomy

## 2018-06-04 NOTE — TELEPHONE ENCOUNTER
Reason for Call:  Home Health Care    [facility] signed order for levothyroxine (SYNTHROID/LEVOTHROID) 88 MCG tablet    Needing a signed order due to change in dosage    Fax to:  [phone]  Attention: Riaz      Phone Number Homecare Nurse can be reached at: [phone] extension #[phone]    Can we leave a detailed message on this number? YES      Best Time: asap    Call taken on 6/4/2018 at 1:43 PM by Terra Whitfield  .

## 2018-06-12 NOTE — PROGRESS NOTES
Oncology/Hematology Visit Note  Jun 12, 2018    Reason for Visit: follow up of colon cancer     History of Present Illness: Sherita Kenney is a 77 year old female with colon cancer. She is currently undergoing treatment with Maintenance infusional 5-FU and Avastin started on 09/21/2017. No bolus 5-FU.   Infusional 5-FU discontinued single agent Avastin continued.  March 15, 2018 CT shows progression of disease  M FOLFOX with Avastin started on 03/27/2018  CT scan of May 20, 2018 reveals stable disease  Patient comes today for routine follow-up prior chemotherapy      Interval History:  Chronic diarrhea continues to be the same-no worsening of symptoms.  She denies nausea,vomiting denies blood in stool.  Denies abdominal pain she tolerates p.o. Well.  She denies fever chills or sweats, denies cough or shortness of breath, overall her energy and appetite are okay    Review of Systems:  14 point ROS of systems including Constitutional, Eyes, Respiratory, Cardiovascular, Gastroenterology, Genitourinary, Integumentary, Muscularskeletal, Psychiatric were all negative except for pertinent positives noted in my HPI.      Current Outpatient Prescriptions   Medication Sig Dispense Refill     acetaminophen (TYLENOL) 325 MG tablet Take 2 tablets (650 mg) by mouth every 4 hours as needed for mild pain or fever 60 tablet 1     CETAPHIL (CETAPHIL) lotion Apply topically 2 times daily Also bid prn . To dry skin.       ferrous sulfate (IRON) 325 (65 FE) MG tablet Take 1 tablet (325 mg) by mouth daily (with breakfast) 30 tablet 11     HYDROcodone-acetaminophen (NORCO) 5-325 MG per tablet Take 1 tablet by mouth every 6 hours as needed for moderate to severe pain 30 tablet 0     levothyroxine (SYNTHROID/LEVOTHROID) 88 MCG tablet Take 1 tablet (88 mcg) by mouth daily 30 tablet 2     loperamide (IMODIUM A-D) 2 MG capsule AND TAKE 2 CAPSULE EVERY 6 HOURS AS NEEDED FOR LOOSE STOOLS (MAX OF 16MG/24 HRS) 155 capsule 3     LORazepam  (ATIVAN) 0.5 MG tablet Take 1 tablet (0.5 mg) by mouth every 4 hours as needed (Anxiety, Nausea/Vomiting or Sleep) 30 tablet 2     NEW MED        ondansetron (ZOFRAN) 8 MG tablet Take 1 tablet (8 mg) by mouth every 8 hours as needed (Nausea/Vomiting) 10 tablet 2     Polyethylene Glycol 400 (BLINK TEARS OP) Apply 1 drop to eye 3 times daily       prochlorperazine (COMPAZINE) 10 MG tablet Take 1 tablet (10 mg) by mouth every 6 hours as needed (Nausea/Vomiting) 30 tablet 2     SUMATRIPTAN SUCCINATE PO Take 25 mg by mouth every 8 hours as needed for migraine       travoprost, BAK Free, (TRAVATAN Z) 0.004 % ophthalmic solution Place 1 drop into both eyes At Bedtime 5 mL 11     vitamin B-Complex Take 1 tablet by mouth daily 90 tablet 1       Physical Examination:  General: The patient is a pleasant female in no acute distress.  BP 99/56 (BP Location: Right arm, Patient Position: Sitting, Cuff Size: Adult Regular)  Pulse 75  Temp 98.1  F (36.7  C) (Oral)  Resp 16  Wt 47.4 kg (104 lb 9.6 oz)  SpO2 98%  BMI 17.95 kg/m2  HEENT: EOMI, PERRL. Sclerae are anicteric. Oral mucosa is pink and moist with no lesions or thrush.   Lymph: Neck is supple with no lymphadenopathy in the cervical or supraclavicular areas.   Heart: Regular rate and rhythm.   Lungs: Clear to auscultation bilaterally.   Abdomen: Bowel sounds present, soft, nontender with no palpable hepatosplenomegaly or masses.   Extremities: No lower extremity edema noted bilaterally.   Skin: No rashes, petechiae, or bruising noted on exposed skin.    Laboratory Data:  Results for orders placed or performed in visit on 06/12/18 (from the past 24 hour(s))   CBC with platelets differential   Result Value Ref Range    WBC 6.2 4.0 - 11.0 10e9/L    RBC Count 3.78 (L) 3.8 - 5.2 10e12/L    Hemoglobin 10.8 (L) 11.7 - 15.7 g/dL    Hematocrit 34.8 (L) 35.0 - 47.0 %    MCV 92 78 - 100 fl    MCH 28.6 26.5 - 33.0 pg    MCHC 31.0 (L) 31.5 - 36.5 g/dL    RDW 18.0 (H) 10.0 - 15.0 %     Platelet Count 105 (L) 150 - 450 10e9/L    Diff Method Automated Method     % Neutrophils 49.5 %    % Lymphocytes 39.2 %    % Monocytes 9.6 %    % Eosinophils 1.1 %    % Basophils 0.6 %    % Immature Granulocytes 0.0 %    Nucleated RBCs 0 0 /100    Absolute Neutrophil 3.1 1.6 - 8.3 10e9/L    Absolute Lymphocytes 2.4 0.8 - 5.3 10e9/L    Absolute Monocytes 0.6 0.0 - 1.3 10e9/L    Absolute Eosinophils 0.1 0.0 - 0.7 10e9/L    Absolute Basophils 0.0 0.0 - 0.2 10e9/L    Abs Immature Granulocytes 0.0 0 - 0.4 10e9/L    Absolute Nucleated RBC 0.0    Comprehensive metabolic panel   Result Value Ref Range    Sodium 137 133 - 144 mmol/L    Potassium 4.9 3.4 - 5.3 mmol/L    Chloride 102 94 - 109 mmol/L    Carbon Dioxide 28 20 - 32 mmol/L    Anion Gap 7 3 - 14 mmol/L    Glucose 82 70 - 99 mg/dL    Urea Nitrogen 21 7 - 30 mg/dL    Creatinine 0.93 0.52 - 1.04 mg/dL    GFR Estimate 59 (L) >60 mL/min/1.7m2    GFR Estimate If Black 71 >60 mL/min/1.7m2    Calcium 9.2 8.5 - 10.1 mg/dL    Bilirubin Total 0.6 0.2 - 1.3 mg/dL    Albumin 3.5 3.4 - 5.0 g/dL    Protein Total 7.3 6.8 - 8.8 g/dL    Alkaline Phosphatase 423 (H) 40 - 150 U/L    ALT 71 (H) 0 - 50 U/L     (H) 0 - 45 U/L   Protein qualitative urine   Result Value Ref Range    Protein Albumin Urine 30 (A) NEG^Negative mg/dL         Assessment and Plan:    This is a 77 y/o female  with     metastatic colon cancer with liver metastasis.  Diagnosed in April 2017.  Metastasis to liver and lymphadenopathy in the right lower quadrant  Previously treated with modified FOLFOX 6 with Avastin and maintenance 5-FU and Avastin. March 15, 2018 CT shows progression of disease  M FOLFOX with Avastin started on 03/27/2018  CT scan of May 20, 2018 reveals stable disease  Patient comes today for routine follow-up prior chemotherapy.-Due to thrombocytopenia oxaliplatin and 5-FU dose was decreased by 20%  -Patient tolerates chemotherapy well labs reviewed with patient.  Overall stable  counts  Proceed with scheduled chemotherapy  Patient is scheduled to see Dr. Lindo in 2 weeks-    Elevated LFTs secondary to metastasis to the liver.  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 Monitor for now     Diarrhea-chronic no changes in symptoms.  I asked patient to call if worsening of diarrhea  She will continue with Imodium      Patient is asked to call the event of fever chills sweats, cough shortness of breath chest pain.  Worsening of diarrhea nausea vomiting abdominal pain, abdominal bloating any changes in health condition.  Patient verbalized understanding    WILLY Valadez CNP

## 2018-06-12 NOTE — PROGRESS NOTES
"Oncology Rooming Note    June 12, 2018 12:18 PM   Sherita Kenney is a 77 year old female who presents for:    Chief Complaint   Patient presents with     Oncology Clinic Visit     Adenocarcinoma of colon      Initial Vitals: BP 99/56 (BP Location: Right arm, Patient Position: Sitting, Cuff Size: Adult Regular)  Pulse 75  Temp 98.1  F (36.7  C) (Oral)  Resp 16  Wt 47.4 kg (104 lb 9.6 oz)  SpO2 98%  BMI 17.95 kg/m2 Estimated body mass index is 17.95 kg/(m^2) as calculated from the following:    Height as of 5/20/18: 1.626 m (5' 4\").    Weight as of this encounter: 47.4 kg (104 lb 9.6 oz). Body surface area is 1.46 meters squared.  No Pain (0) Comment: Data Unavailable   No LMP recorded. Patient is not currently having periods (Reason: Perimenopausal).  Allergies reviewed: Yes  Medications reviewed: Yes    Medications: Medication refills not needed today.  Pharmacy name entered into [facility]:    [facility] [place], [place] - [address]  [facility], [address]    Clinical concerns: None             4 minutes for nursing intake (face to face time)     April Bazzi MA            "

## 2018-06-12 NOTE — MR AVS SNAPSHOT
After Visit Summary   6/12/2018    Sherita Kenney    MRN: [id number]           Patient Information     Date Of Birth          1940        Visit Information        Provider Department      6/12/2018 12:30 PM Giuseppe Norris APRN CNP [facility]        Today's Diagnoses     Adenocarcinoma of colon (H)    -  1      Care Instructions        Ok to proceed with planned chemo     Keep future appointments           Follow-ups after your visit        Your next 10 appointments already scheduled     Jun 26, 2018 10:00 AM CDT   Level 5 with  INFUSION CHAIR 10   [facility] ([facility])    [facility]  [address]  [place] [address]   [phone]            Jun 26, 2018 10:40 AM CDT   Return Visit with Susan Lindo MD   [facility] ([facility])    [facility]  [address]  [place] [address] [phone]            Jul 31, 2018  1:30 PM CDT   Office Visit with Edgar Rodriguez MD   [facility]    [address] [facility] [id number] [phone]           Bring a current list of meds and any records pertaining to this visit. For Physicals, please bring immunization records and any forms needing to be filled out. Please arrive 10 minutes early to complete paperwork.              Who to contact     If you have questions or need follow up information about today's clinic visit or your schedule please contact [facility] directly at [phone].  Normal or non-critical lab and imaging results will be communicated to you by MyChart, letter or phone within 4 business days after the clinic has received the results. If you do not hear from us within 7 days, please contact the clinic through MyChart or phone. If you have a critical or abnormal lab result, we will notify you by phone  as soon as possible.  Submit refill requests through [employer] or call your pharmacy and they will forward the refill request to us. Please allow 3 business days for your refill to be completed.          Additional Information About Your Visit        Care EveryWhere ID     This is your Care EveryWhere ID. This could be used by other organizations to access your [place] medical records  [id number]        Your Vitals Were     Pulse Temperature Respirations Pulse Oximetry BMI (Body Mass Index)       75 98.1  F (36.7  C) (Oral) 16 98% 17.95 kg/m2        Blood Pressure from Last 3 Encounters:   06/12/18 99/56   05/31/18 108/53   05/29/18 113/72    Weight from Last 3 Encounters:   06/12/18 47.4 kg (104 lb 9.6 oz)   05/29/18 47.7 kg (105 lb 3.2 oz)   05/20/18 46.5 kg (102 lb 9.6 oz)              Today, you had the following     No orders found for display       Primary Care Provider Office Phone # Fax #    Edgar Neno Rodriguez [phone] [address]  [place] [address]        Equal Access to Services     [facility]: Hadii aad ku hadasho Somonaali, waaxda luqadaha, qaybta kaalmada adetedyada, ellis mooney . So [facility] [phone].    ATENCIÓN: Si habla español, tiene a roldan disposición servicios gratuitos de asistencia lingüística. [facility] [phone].    We comply with applicable federal civil rights laws and [place] laws. We do not discriminate on the basis of race, color, national origin, age, disability, sex, sexual orientation, or gender identity.            Thank you!     Thank you for choosing [facility]  for your care. Our goal is always to provide you with excellent care. Hearing back from our patients is one way we can continue to improve our services. Please take a few minutes to complete the written survey that you may receive in the mail after your visit with us. Thank you!             Your Updated Medication List - Protect  others around you: Learn how to safely use, store and throw away your medicines at www.disposemymeds.org.          This list is accurate as of 6/12/18  2:45 PM.  Always use your most recent med list.                   Brand Name Dispense Instructions for use Diagnosis    acetaminophen 325 MG tablet    TYLENOL    60 tablet    Take 2 tablets (650 mg) by mouth every 4 hours as needed for mild pain or fever    Cancer associated pain       BLINK TEARS OP      Apply 1 drop to eye 3 times daily        CETAPHIL lotion      Apply topically 2 times daily Also bid prn . To dry skin.        ferrous sulfate 325 (65 Fe) MG tablet    IRON    30 tablet    Take 1 tablet (325 mg) by mouth daily (with breakfast)    Iron deficiency       HYDROcodone-acetaminophen 5-325 MG per tablet    NORCO    30 tablet    Take 1 tablet by mouth every 6 hours as needed for moderate to severe pain    Other chronic pain       levothyroxine 88 MCG tablet    SYNTHROID/LEVOTHROID    30 tablet    Take 1 tablet (88 mcg) by mouth daily    Hypothyroidism, unspecified type       loperamide 2 MG capsule    IMODIUM A-D    155 capsule    AND TAKE 2 CAPSULE EVERY 6 HOURS AS NEEDED FOR LOOSE STOOLS (MAX OF 16MG/24 HRS)    Diarrhea, unspecified type       LORazepam 0.5 MG tablet    ATIVAN    30 tablet    Take 1 tablet (0.5 mg) by mouth every 4 hours as needed (Anxiety, Nausea/Vomiting or Sleep)    Adenocarcinoma of colon (H)       NEW MED           ondansetron 8 MG tablet    ZOFRAN    10 tablet    Take 1 tablet (8 mg) by mouth every 8 hours as needed (Nausea/Vomiting)    Adenocarcinoma of colon (H)       prochlorperazine 10 MG tablet    COMPAZINE    30 tablet    Take 1 tablet (10 mg) by mouth every 6 hours as needed (Nausea/Vomiting)    Adenocarcinoma of colon (H)       SUMATRIPTAN SUCCINATE PO      Take 25 mg by mouth every 8 hours as needed for migraine        travoprost (BAK Free) 0.004 % ophthalmic solution    TRAVATAN Z    5 mL    Place 1 drop into both eyes At  Bedtime    Primary open angle glaucoma of both eyes, unspecified glaucoma stage       vitamin B-Complex     90 tablet    Take 1 tablet by mouth daily    S/P colectomy

## 2018-06-12 NOTE — MR AVS SNAPSHOT
After Visit Summary   6/12/2018    Sherita Kenney    MRN: [id number]           Patient Information     Date Of Birth          1940        Visit Information        Provider Department      6/12/2018 11:30 AM  INFUSION CHAIR 6 [facility]        Today's Diagnoses     Adenocarcinoma of colon (H)    -  1      Care Instructions    Return to infusion on Thursday June 14, 2018 at 3pm for pump disconnect.    Collect urine for 24 hours starting morning of Wednesday, June 13.  First void of the day discard in toilet; THEN save every void into the red container and keep cool in fridge.   HALIE container with time started.    Last void will be on Thursday morning 24 hours from the day before.      Bring red container back to infusion when you come for the pump disconnect.    Your chemo Pump will infuse for 46 hours. If at any time the pump should malfunction or you have pump concerns, please contact [facility] after hours at [phone].You will need an appointment for the pump disconnect in our clinic or with [facility].          Follow-ups after your visit        Your next 10 appointments already scheduled     Jun 14, 2018  3:00 PM CDT   Level 1 with  INFUSION CHAIR 3   [facility] ([facility])    [facility]  [address]  [facility] [address]   [phone]            Jun 26, 2018 10:00 AM CDT   Level 5 with  INFUSION CHAIR 10   [facility] ([facility])    [facility]  [address]  [place] [address]   [phone]            Jun 26, 2018 10:40 AM CDT   Return Visit with Susan Lindo MD   [facility] ([facility])    [facility]  [address]  [place] [address]   [phone]            Jul 31, 2018  1:30 PM CDT    [facility]   [facility]    [address] [facility]  [place] [address] [phone]           Bring a current list of meds and any records pertaining to this visit. For Physicals, please bring immunization records and any forms needing to be filled out. Please arrive 10 minutes early to complete paperwork.              Who to contact     If you have questions or need follow up information about today's clinic visit or your schedule please contact [facility] directly at [phone].  Normal or non-critical lab and imaging results will be communicated to you by MyChart, letter or phone within 4 business days after the clinic has received the results. If you do not hear from us within 7 days, please contact the clinic through MyChart or phone. If you have a critical or abnormal lab result, we will notify you by phone as soon as possible.  Submit refill requests through "[employer] or call your pharmacy and they will forward the refill request to us. Please allow 3 business days for your refill to be completed.          Additional Information About Your Visit        Care EveryWhere ID     This is your Care EveryWhere ID. This could be used by other organizations to access your [place] medical records  [id number]         Blood Pressure from Last 3 Encounters:   06/12/18 99/56   05/31/18 108/53   05/29/18 113/72    Weight from Last 3 Encounters:   06/12/18 47.4 kg (104 lb 9.6 oz)   05/29/18 47.7 kg (105 lb 3.2 oz)   05/20/18 46.5 kg (102 lb 9.6 oz)              We Performed the Following     CBC with platelets differential     Comprehensive metabolic panel     Protein qualitative urine        Primary Care Provider Office Phone # Fax #    Edgar Rodriguez [phone] [address] [address]        Equal Access to Services     ALEXIS ALBA : Ruddy dhaliwal  miguel Nunes, walubada lumateokatie, qastanislawta kachantelle pemberton, ellis sarahin hayaan reyted jonyalvarez laIsmaelgogo gela. So [facility] [phone].    ATENCIÓN: Si rukhsana jovel, tiene a roldan disposición servicios gratuitos de asistencia lingüística. Malaika al [phone].    We comply with applicable federal civil rights laws and [place] laws. We do not discriminate on the basis of race, color, national origin, age, disability, sex, sexual orientation, or gender identity.            Thank you!     Thank you for choosing [facility]  for your care. Our goal is always to provide you with excellent care. Hearing back from our patients is one way we can continue to improve our services. Please take a few minutes to complete the written survey that you may receive in the mail after your visit with us. Thank you!             Your Updated Medication List - Protect others around you: Learn how to safely use, store and throw away your medicines at www.disposemymeds.org.          This list is accurate as of 6/12/18  5:19 PM.  Always use your most recent med list.                   Brand Name Dispense Instructions for use Diagnosis    acetaminophen 325 MG tablet    TYLENOL    60 tablet    Take 2 tablets (650 mg) by mouth every 4 hours as needed for mild pain or fever    Cancer associated pain       BLINK TEARS OP      Apply 1 drop to eye 3 times daily        CETAPHIL lotion      Apply topically 2 times daily Also bid prn . To dry skin.        ferrous sulfate 325 (65 Fe) MG tablet    IRON    30 tablet    Take 1 tablet (325 mg) by mouth daily (with breakfast)    Iron deficiency       HYDROcodone-acetaminophen 5-325 MG per tablet    NORCO    30 tablet    Take 1 tablet by mouth every 6 hours as needed for moderate to severe pain    Other chronic pain       levothyroxine 88 MCG tablet    SYNTHROID/LEVOTHROID    30 tablet    Take 1 tablet (88 mcg) by mouth daily    Hypothyroidism, unspecified type       loperamide 2 MG  capsule    IMODIUM A-D    155 capsule    AND TAKE 2 CAPSULE EVERY 6 HOURS AS NEEDED FOR LOOSE STOOLS (MAX OF 16MG/24 HRS)    Diarrhea, unspecified type       LORazepam 0.5 MG tablet    ATIVAN    30 tablet    Take 1 tablet (0.5 mg) by mouth every 4 hours as needed (Anxiety, Nausea/Vomiting or Sleep)    Adenocarcinoma of colon (H)       NEW MED           ondansetron 8 MG tablet    ZOFRAN    10 tablet    Take 1 tablet (8 mg) by mouth every 8 hours as needed (Nausea/Vomiting)    Adenocarcinoma of colon (H)       prochlorperazine 10 MG tablet    COMPAZINE    30 tablet    Take 1 tablet (10 mg) by mouth every 6 hours as needed (Nausea/Vomiting)    Adenocarcinoma of colon (H)       SUMATRIPTAN SUCCINATE PO      Take 25 mg by mouth every 8 hours as needed for migraine        travoprost (BAK Free) 0.004 % ophthalmic solution    TRAVATAN Z    5 mL    Place 1 drop into both eyes At Bedtime    Primary open angle glaucoma of both eyes, unspecified glaucoma stage       vitamin B-Complex     90 tablet    Take 1 tablet by mouth daily    S/P colectomy

## 2018-06-12 NOTE — LETTER
"    6/12/2018         RE: Sherita Kenney  [address]  [place] [address]        Dear Colleague,    Thank you for referring your patient, Sherita Kenney, to the [facility]. Please see a copy of my visit note below.    Oncology Rooming Note    June 12, 2018 12:18 PM   Sherita Kenney is a 77 year old female who presents for:    Chief Complaint   Patient presents with     Oncology Clinic Visit     Adenocarcinoma of colon      Initial Vitals: BP 99/56 (BP Location: Right arm, Patient Position: Sitting, Cuff Size: Adult Regular)  Pulse 75  Temp 98.1  F (36.7  C) (Oral)  Resp 16  Wt 47.4 kg (104 lb 9.6 oz)  SpO2 98%  BMI 17.95 kg/m2 Estimated body mass index is 17.95 kg/(m^2) as calculated from the following:    Height as of 5/20/18: 1.626 m (5' 4\").    Weight as of this encounter: 47.4 kg (104 lb 9.6 oz). Body surface area is 1.46 meters squared.  No Pain (0) Comment: Data Unavailable   No LMP recorded. Patient is not currently having periods (Reason: Perimenopausal).  Allergies reviewed: Yes  Medications reviewed: Yes    Medications: Medication refills not needed today.  Pharmacy name entered into [facility]:    [facility] [place], [place] - [address]  [facility], [address]    Clinical concerns: None             4 minutes for nursing intake (face to face time)     April Bazzi MA              Oncology/Hematology Visit Note  Jun 12, 2018    Reason for Visit: follow up of colon cancer     History of Present Illness: Sherita Kenney is a 77 year old female with colon cancer. She is currently undergoing treatment with Maintenance infusional 5-FU and Avastin started on 09/21/2017. No bolus 5-FU.   Infusional 5-FU discontinued single agent Avastin continued.  March 15, 2018 CT shows progression of disease  M FOLFOX with Avastin started on 03/27/2018  CT scan of May 20, 2018 reveals stable disease  Patient comes today " for routine follow-up prior chemotherapy      Interval History:  Chronic diarrhea continues to be the same-no worsening of symptoms.  She denies nausea,vomiting denies blood in stool.  Denies abdominal pain she tolerates p.o. Well.  She denies fever chills or sweats, denies cough or shortness of breath, overall her energy and appetite are okay    Review of Systems:  14 point ROS of systems including Constitutional, Eyes, Respiratory, Cardiovascular, Gastroenterology, Genitourinary, Integumentary, Muscularskeletal, Psychiatric were all negative except for pertinent positives noted in my HPI.      Current Outpatient Prescriptions   Medication Sig Dispense Refill     acetaminophen (TYLENOL) 325 MG tablet Take 2 tablets (650 mg) by mouth every 4 hours as needed for mild pain or fever 60 tablet 1     CETAPHIL (CETAPHIL) lotion Apply topically 2 times daily Also bid prn . To dry skin.       ferrous sulfate (IRON) 325 (65 FE) MG tablet Take 1 tablet (325 mg) by mouth daily (with breakfast) 30 tablet 11     HYDROcodone-acetaminophen (NORCO) 5-325 MG per tablet Take 1 tablet by mouth every 6 hours as needed for moderate to severe pain 30 tablet 0     levothyroxine (SYNTHROID/LEVOTHROID) 88 MCG tablet Take 1 tablet (88 mcg) by mouth daily 30 tablet 2     loperamide (IMODIUM A-D) 2 MG capsule AND TAKE 2 CAPSULE EVERY 6 HOURS AS NEEDED FOR LOOSE STOOLS (MAX OF 16MG/24 HRS) 155 capsule 3     LORazepam (ATIVAN) 0.5 MG tablet Take 1 tablet (0.5 mg) by mouth every 4 hours as needed (Anxiety, Nausea/Vomiting or Sleep) 30 tablet 2     NEW MED        ondansetron (ZOFRAN) 8 MG tablet Take 1 tablet (8 mg) by mouth every 8 hours as needed (Nausea/Vomiting) 10 tablet 2     Polyethylene Glycol 400 (BLINK TEARS OP) Apply 1 drop to eye 3 times daily       prochlorperazine (COMPAZINE) 10 MG tablet Take 1 tablet (10 mg) by mouth every 6 hours as needed (Nausea/Vomiting) 30 tablet 2     SUMATRIPTAN SUCCINATE PO Take 25 mg by mouth every 8 hours  as needed for migraine       travoprost, BAK Free, (TRAVATAN Z) 0.004 % ophthalmic solution Place 1 drop into both eyes At Bedtime 5 mL 11     vitamin B-Complex Take 1 tablet by mouth daily 90 tablet 1       Physical Examination:  General: The patient is a pleasant female in no acute distress.  BP 99/56 (BP Location: Right arm, Patient Position: Sitting, Cuff Size: Adult Regular)  Pulse 75  Temp 98.1  F (36.7  C) (Oral)  Resp 16  Wt 47.4 kg (104 lb 9.6 oz)  SpO2 98%  BMI 17.95 kg/m2  HEENT: EOMI, PERRL. Sclerae are anicteric. Oral mucosa is pink and moist with no lesions or thrush.   Lymph: Neck is supple with no lymphadenopathy in the cervical or supraclavicular areas.   Heart: Regular rate and rhythm.   Lungs: Clear to auscultation bilaterally.   Abdomen: Bowel sounds present, soft, nontender with no palpable hepatosplenomegaly or masses.   Extremities: No lower extremity edema noted bilaterally.   Skin: No rashes, petechiae, or bruising noted on exposed skin.    Laboratory Data:  Results for orders placed or performed in visit on 06/12/18 (from the past 24 hour(s))   CBC with platelets differential   Result Value Ref Range    WBC 6.2 4.0 - 11.0 10e9/L    RBC Count 3.78 (L) 3.8 - 5.2 10e12/L    Hemoglobin 10.8 (L) 11.7 - 15.7 g/dL    Hematocrit 34.8 (L) 35.0 - 47.0 %    MCV 92 78 - 100 fl    MCH 28.6 26.5 - 33.0 pg    MCHC 31.0 (L) 31.5 - 36.5 g/dL    RDW 18.0 (H) 10.0 - 15.0 %    Platelet Count 105 (L) 150 - 450 10e9/L    Diff Method Automated Method     % Neutrophils 49.5 %    % Lymphocytes 39.2 %    % Monocytes 9.6 %    % Eosinophils 1.1 %    % Basophils 0.6 %    % Immature Granulocytes 0.0 %    Nucleated RBCs 0 0 /100    Absolute Neutrophil 3.1 1.6 - 8.3 10e9/L    Absolute Lymphocytes 2.4 0.8 - 5.3 10e9/L    Absolute Monocytes 0.6 0.0 - 1.3 10e9/L    Absolute Eosinophils 0.1 0.0 - 0.7 10e9/L    Absolute Basophils 0.0 0.0 - 0.2 10e9/L    Abs Immature Granulocytes 0.0 0 - 0.4 10e9/L    Absolute Nucleated  RBC 0.0    Comprehensive metabolic panel   Result Value Ref Range    Sodium 137 133 - 144 mmol/L    Potassium 4.9 3.4 - 5.3 mmol/L    Chloride 102 94 - 109 mmol/L    Carbon Dioxide 28 20 - 32 mmol/L    Anion Gap 7 3 - 14 mmol/L    Glucose 82 70 - 99 mg/dL    Urea Nitrogen 21 7 - 30 mg/dL    Creatinine 0.93 0.52 - 1.04 mg/dL    GFR Estimate 59 (L) >60 mL/min/1.7m2    GFR Estimate If Black 71 >60 mL/min/1.7m2    Calcium 9.2 8.5 - 10.1 mg/dL    Bilirubin Total 0.6 0.2 - 1.3 mg/dL    Albumin 3.5 3.4 - 5.0 g/dL    Protein Total 7.3 6.8 - 8.8 g/dL    Alkaline Phosphatase 423 (H) 40 - 150 U/L    ALT 71 (H) 0 - 50 U/L     (H) 0 - 45 U/L   Protein qualitative urine   Result Value Ref Range    Protein Albumin Urine 30 (A) NEG^Negative mg/dL         Assessment and Plan:    This is a 75 y/o female  with     metastatic colon cancer with liver metastasis.  Diagnosed in April 2017.  Metastasis to liver and lymphadenopathy in the right lower quadrant  Previously treated with modified FOLFOX 6 with Avastin and maintenance 5-FU and Avastin. March 15, 2018 CT shows progression of disease  M FOLFOX with Avastin started on 03/27/2018  CT scan of May 20, 2018 reveals stable disease  Patient comes today for routine follow-up prior chemotherapy.-Due to thrombocytopenia oxaliplatin and 5-FU dose was decreased by 20%  -Patient tolerates chemotherapy well labs reviewed with patient.  Overall stable counts  Proceed with scheduled chemotherapy  Patient is scheduled to see Dr. Lindo in 2 weeks-    Elevated LFTs secondary to metastasis to the liver.  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 Monitor for now     Diarrhea-chronic no changes in symptoms.  I asked patient to call if worsening of diarrhea  She will continue with Imodium      Patient is asked to call the event of fever chills sweats, cough shortness of breath chest pain.  Worsening of diarrhea nausea vomiting abdominal  pain, abdominal bloating any changes in health condition.  Patient verbalized understanding    WILLY Valadez CNP          Again, thank you for allowing me to participate in the care of your patient.        Sincerely,        WILLY Valadez CNP

## 2018-06-12 NOTE — PATIENT INSTRUCTIONS
Return to infusion on Thursday June 14, 2018 at 3pm for pump disconnect.    Collect urine for 24 hours starting morning of Wednesday, June 13.  First void of the day discard in toilet; THEN save every void into the red container and keep cool in fridge.   HALIE container with time started.    Last void will be on Thursday morning 24 hours from the day before.      Bring red container back to infusion when you come for the pump disconnect.    Your chemo Pump will infuse for 46 hours. If at any time the pump should malfunction or you have pump concerns, please contact [facility] after hours at [phone].You will need an appointment for the pump disconnect in our clinic or with [facility].

## 2018-06-12 NOTE — PROGRESS NOTES
Infusion Nursing Note:  Sherita Kenney presents today for folfox.    Patient seen by provider today: Yes: RAIMUNDO Norris   present during visit today: Not Applicable.    Note: N/A.    Intravenous Access:  Implanted Port.    Treatment Conditions:  Lab Results   Component Value Date    HGB 10.8 06/12/2018     Lab Results   Component Value Date    WBC 6.2 06/12/2018      Lab Results   Component Value Date    ANEU 3.1 06/12/2018     Lab Results   Component Value Date     06/12/2018      Lab Results   Component Value Date     06/12/2018                   Lab Results   Component Value Date    POTASSIUM 4.9 06/12/2018           Lab Results   Component Value Date    MAG 2.0 04/15/2017            Lab Results   Component Value Date    CR 0.93 06/12/2018                   Lab Results   Component Value Date    SARITHA 9.2 06/12/2018                Lab Results   Component Value Date    BILITOTAL 0.6 06/12/2018           Lab Results   Component Value Date    ALBUMIN 3.5 06/12/2018                    Lab Results   Component Value Date    ALT 71 06/12/2018           Lab Results   Component Value Date     06/12/2018       Results reviewed, labs MET treatment parameters, ok to proceed with treatment.      Post Infusion Assessment:  Patient tolerated infusion without incident.  Blood return noted pre and post infusion.  Site patent and intact, free from redness, edema or discomfort.  No evidence of extravasations.    Discharge Plan:   Discharge instructions reviewed with: Patient.  Patient and/or family verbalized understanding of discharge instructions and all questions answered.  Copy of AVS reviewed with patient and/or family.  Patient will return 6/14/18 for next appointment.  Patient discharged in stable condition accompanied by: self.  Departure Mode: Ambulatory to [facility].  Prior to discharge: Port is secured in place with tegaderm and flushed with 10cc NS with positive blood return noted.   "Continuous home infusion CADD pump connected.    All connectors secured in place and clamps taped open.    Pump started, \"running\" noted on display (CADD): YES.  Patient instructed to call our clinic or [facility] with any questions or concerns at home.  Patient verbalized understanding.    Patient set up for pump disconnect at our clinic on 6/14/18  3pm.        Patrick Lyles RN                        "

## 2018-06-14 NOTE — PROGRESS NOTES
"Social Work Progress Note      Data/Intervention:  Patient Name:  Sherita Kenney  /Age:  1940 (77 year old)    Reason for Follow-Up:  Sherita is a elena 77-year-old woman with a diagnosis of metastatic colon cancer who comes to clinic today for pump disconnect. This clinician met with pt today per infusion RN request for additional assistance given transportation concern at prior visit.     Intervention:   Sherita reports coping with significant fatigue today and \"pins and needles\" and cold sensitivity in right hand. Sherita reports that neuropathy has gotten worse. Encouraged pt to consider wearing glove when touching cold objects. Extensive discussion with pt today about transportation support to clinic appointments. Pt uses van through residence for transportation ([phone]), and they will transport pt from 10am-4pm.  has pt's upcoming appointments, and acknowledges that pt's upcoming appointments will work well with van schedule. Sherita reports that she is aware when van cannot  that she is responsible for coordinating own ride home, and has contact information for [employer] service and money in wallet.     Resources Provided:  [facility] programming  Living With Cancer Support Group    Plan:  1) Encouraged Sherita to go to [employer] to get assistance with setting up applications on iPhone that would support transportation and food delivery as needed  2) Encouraged Sherita to come to Living With Cancer Support Group which has a focus on neuropathy at next weeks meeting  3) Supported Sherita in signing up for [facility] programming, which she will attend 18.   4) Sherita to discuss neuropathy with Dr. Lindo at next appointment    Please call or page if needs or concerns arise.     MARAL Hernandez, [facility]  Direct Phone: [phone]  Pager: [phone]  "

## 2018-06-14 NOTE — MR AVS SNAPSHOT
After Visit Summary   6/14/2018    Sherita Kenney    MRN: [id number]           Patient Information     Date Of Birth          1940        Visit Information        Provider Department      6/14/2018 3:00 PM  INFUSION CHAIR 3 [facility]        Today's Diagnoses     Port-a-cath in place    -  1    Adenocarcinoma of colon (H)           Follow-ups after your visit        Your next 10 appointments already scheduled     Jun 26, 2018 10:00 AM CDT   Level 5 with  INFUSION CHAIR 10   [facility] ([facility])    [facility] [place] [id number]  [place] [address]   [phone]            Jun 26, 2018 10:40 AM CDT   Return Visit with Susan Lindo MD   [facility] ([facility])    [facility]  [address]  [place] [address]   [phone]            Jun 28, 2018 11:30 AM CDT   Level 1 with  INFUSION CHAIR 1   [facility] ([facility])    [facility]  [address]  [place] [address]   [phone]            Jul 31, 2018  1:30 PM CDT   Office Visit with Edgar Rodriguez MD   [facility] ([facility])    [address] [facility] [address]   [phone]           Bring a current list of meds and any records pertaining to this visit. For Physicals, please bring immunization records and any forms needing to be filled out. Please arrive 10 minutes early to complete paperwork.              Who to contact     If you have questions or need follow up information about today's clinic visit or your schedule please contact [facility] AND [facility] [place] directly at [phone].  Normal or non-critical lab and imaging results will be communicated to you by MyChart, letter or phone within 4 business  days after the clinic has received the results. If you do not hear from us within 7 days, please contact the clinic through [employer] or phone. If you have a critical or abnormal lab result, we will notify you by phone as soon as possible.  Submit refill requests through [employer] or call your pharmacy and they will forward the refill request to us. Please allow 3 business days for your refill to be completed.          Additional Information About Your Visit        Care EveryWhere ID     This is your Care EveryWhere ID. This could be used by other organizations to access your [place] medical records  [id number]        Your Vitals Were     Pulse Temperature Respirations             67 98.3  F (36.8  C) (Oral) 18          Blood Pressure from Last 3 Encounters:   06/14/18 111/49   06/12/18 99/56   06/12/18 112/71    Weight from Last 3 Encounters:   06/12/18 47.4 kg (104 lb 9.6 oz)   05/29/18 47.7 kg (105 lb 3.2 oz)   05/20/18 46.5 kg (102 lb 9.6 oz)              We Performed the Following     Protein timed urine with Creat Ratio        Primary Care Provider Office Phone # Fax #    Edgar Neno Rodriguez [phone] [address] [facility] [address]  [facility] [address]        Equal Access to Services     ALEXIS ALBA : Hadii aad ku hadasho Soomaali, waaxda luqadaha, qaybta kaalmada adeegyada, waxay idiin hayaan adele mooney . So [facility] [phone].    ATENCIÓN: Si habla español, tiene a roldan disposición servicios gratuitos de asistencia lingüística. Llame al [phone].    We comply with applicable federal civil rights laws and [place] laws. We do not discriminate on the basis of race, color, national origin, age, disability, sex, sexual orientation, or gender identity.            Thank you!     Thank you for choosing [facility]  for your care. Our goal is always to provide you with excellent care. Hearing back from our patients is one way we can continue to  improve our services. Please take a few minutes to complete the written survey that you may receive in the mail after your visit with us. Thank you!             Your Updated Medication List - Protect others around you: Learn how to safely use, store and throw away your medicines at www.disposemymeds.org.          This list is accurate as of 6/14/18  4:30 PM.  Always use your most recent med list.                   Brand Name Dispense Instructions for use Diagnosis    acetaminophen 325 MG tablet    TYLENOL    60 tablet    Take 2 tablets (650 mg) by mouth every 4 hours as needed for mild pain or fever    Cancer associated pain       BLINK TEARS OP      Apply 1 drop to eye 3 times daily        CETAPHIL lotion      Apply topically 2 times daily Also bid prn . To dry skin.        ferrous sulfate 325 (65 Fe) MG tablet    IRON    30 tablet    Take 1 tablet (325 mg) by mouth daily (with breakfast)    Iron deficiency       HYDROcodone-acetaminophen 5-325 MG per tablet    NORCO    30 tablet    Take 1 tablet by mouth every 6 hours as needed for moderate to severe pain    Other chronic pain       levothyroxine 88 MCG tablet    SYNTHROID/LEVOTHROID    30 tablet    Take 1 tablet (88 mcg) by mouth daily    Hypothyroidism, unspecified type       loperamide 2 MG capsule    IMODIUM A-D    155 capsule    AND TAKE 2 CAPSULE EVERY 6 HOURS AS NEEDED FOR LOOSE STOOLS (MAX OF 16MG/24 HRS)    Diarrhea, unspecified type       LORazepam 0.5 MG tablet    ATIVAN    30 tablet    Take 1 tablet (0.5 mg) by mouth every 4 hours as needed (Anxiety, Nausea/Vomiting or Sleep)    Adenocarcinoma of colon (H)       NEW MED           ondansetron 8 MG tablet    ZOFRAN    10 tablet    Take 1 tablet (8 mg) by mouth every 8 hours as needed (Nausea/Vomiting)    Adenocarcinoma of colon (H)       prochlorperazine 10 MG tablet    COMPAZINE    30 tablet    Take 1 tablet (10 mg) by mouth every 6 hours as needed (Nausea/Vomiting)    Adenocarcinoma of colon (H)        SUMATRIPTAN SUCCINATE PO      Take 25 mg by mouth every 8 hours as needed for migraine        travoprost (BAK Free) 0.004 % ophthalmic solution    TRAVATAN Z    5 mL    Place 1 drop into both eyes At Bedtime    Primary open angle glaucoma of both eyes, unspecified glaucoma stage       vitamin B-Complex     90 tablet    Take 1 tablet by mouth daily    S/P colectomy

## 2018-06-14 NOTE — MR AVS SNAPSHOT
After Visit Summary   6/14/2018    Sherita Kenney    MRN: [id number]           Patient Information     Date Of Birth          1940        Visit Information        Provider Department      6/14/2018 3:43 PM Sunni Zheng LICSW [facility] [place]        Today's Diagnoses     Counseling NOS(V65.40)    -  1       Follow-ups after your visit        Your next 10 appointments already scheduled     Jun 26, 2018 10:00 AM CDT   Level 5 with  INFUSION CHAIR 10   [facility] ([facility])    [facility]  [address]  [place] [address]   [phone]            Jun 26, 2018 10:40 AM CDT   Return Visit with Susan Lindo MD   [facility] ([facility])    [facility]  [address]  [place] [address]   [phone]            Jun 28, 2018 11:30 AM CDT   Level 1 with  INFUSION CHAIR 1   [facility] ([facility])    [facility]  [address]  [place] [address]   [phone]            Jul 31, 2018  1:30 PM CDT   Office Visit with Edgar Rodriguez MD   [facility]    [address] [facility] [place] [address]   [phone]           Bring a current list of meds and any records pertaining to this visit. For Physicals, please bring immunization records and any forms needing to be filled out. Please arrive 10 minutes early to complete paperwork.              Who to contact     If you have questions or need follow up information about today's clinic visit or your schedule please contact [facility] directly at [phone].  Normal or non-critical lab and imaging results will be communicated to you by MyChart, letter or phone within 4 business days after the clinic has received the results. If you do not hear from  us within 7 days, please contact the clinic through [employer] or phone. If you have a critical or abnormal lab result, we will notify you by phone as soon as possible.  Submit refill requests through [employer] or call your pharmacy and they will forward the refill request to us. Please allow 3 business days for your refill to be completed.          Additional Information About Your Visit        Care EveryWhere ID     This is your Care EveryWhere ID. This could be used by other organizations to access your [place] medical records  [id number]         Blood Pressure from Last 3 Encounters:   06/12/18 99/56   06/12/18 112/71   05/31/18 108/53    Weight from Last 3 Encounters:   06/12/18 47.4 kg (104 lb 9.6 oz)   05/29/18 47.7 kg (105 lb 3.2 oz)   05/20/18 46.5 kg (102 lb 9.6 oz)              Today, you had the following     No orders found for display       Primary Care Provider Office Phone # Fax #    Edgar Neno Rodriguez [phone] [address]  [place] [address]        Equal Access to Services     [facility]: Hadii aad ku hadasho Somonaali, waaxda luqadaha, qaybta kaalmada adetedyada, ellis mooney . So [facility] [phone].    ATENCIÓN: Si habla español, tiene a roldan disposición servicios gratuitos de asistencia lingüística. Ll[facility] [phone].    We comply with applicable federal civil rights laws and [place] laws. We do not discriminate on the basis of race, color, national origin, age, disability, sex, sexual orientation, or gender identity.            Thank you!     Thank you for choosing [facility]  for your care. Our goal is always to provide you with excellent care. Hearing back from our patients is one way we can continue to improve our services. Please take a few minutes to complete the written survey that you may receive in the mail after your visit with us. Thank you!             Your Updated Medication List - Protect  others around you: Learn how to safely use, store and throw away your medicines at www.disposemymeds.org.          This list is accurate as of 6/14/18  3:59 PM.  Always use your most recent med list.                   Brand Name Dispense Instructions for use Diagnosis    acetaminophen 325 MG tablet    TYLENOL    60 tablet    Take 2 tablets (650 mg) by mouth every 4 hours as needed for mild pain or fever    Cancer associated pain       BLINK TEARS OP      Apply 1 drop to eye 3 times daily        CETAPHIL lotion      Apply topically 2 times daily Also bid prn . To dry skin.        ferrous sulfate 325 (65 Fe) MG tablet    IRON    30 tablet    Take 1 tablet (325 mg) by mouth daily (with breakfast)    Iron deficiency       HYDROcodone-acetaminophen 5-325 MG per tablet    NORCO    30 tablet    Take 1 tablet by mouth every 6 hours as needed for moderate to severe pain    Other chronic pain       levothyroxine 88 MCG tablet    SYNTHROID/LEVOTHROID    30 tablet    Take 1 tablet (88 mcg) by mouth daily    Hypothyroidism, unspecified type       loperamide 2 MG capsule    IMODIUM A-D    155 capsule    AND TAKE 2 CAPSULE EVERY 6 HOURS AS NEEDED FOR LOOSE STOOLS (MAX OF 16MG/24 HRS)    Diarrhea, unspecified type       LORazepam 0.5 MG tablet    ATIVAN    30 tablet    Take 1 tablet (0.5 mg) by mouth every 4 hours as needed (Anxiety, Nausea/Vomiting or Sleep)    Adenocarcinoma of colon (H)       NEW MED           ondansetron 8 MG tablet    ZOFRAN    10 tablet    Take 1 tablet (8 mg) by mouth every 8 hours as needed (Nausea/Vomiting)    Adenocarcinoma of colon (H)       prochlorperazine 10 MG tablet    COMPAZINE    30 tablet    Take 1 tablet (10 mg) by mouth every 6 hours as needed (Nausea/Vomiting)    Adenocarcinoma of colon (H)       SUMATRIPTAN SUCCINATE PO      Take 25 mg by mouth every 8 hours as needed for migraine        travoprost (BAK Free) 0.004 % ophthalmic solution    TRAVATAN Z    5 mL    Place 1 drop into both eyes At  Bedtime    Primary open angle glaucoma of both eyes, unspecified glaucoma stage       vitamin B-Complex     90 tablet    Take 1 tablet by mouth daily    S/P colectomy

## 2018-06-14 NOTE — PROGRESS NOTES
Infusion Nursing Note:  Sherita MARTINEZ Pablito presents today for pump disconnect.    Patient seen by provider today: No   present during visit today: Not Applicable.    Note: NEVILLE Moeller met with patient today in the infusion area.    Pt c/o intermittent tingling cold sensation right arm.  Dr. Lindo aware as Sunni spoke to him about this and he stated he would speak with Sherita about this at her next appt.    Intravenous Access:  Implanted Port-dc'd per protocol.    Treatment Conditions:  Not Applicable.      Post Infusion Assessment:  Patient tolerated infusion without incident.  Blood return noted pre and post infusion.  Site patent and intact, free from redness, edema or discomfort.  No evidence of extravasations.  Access discontinued per protocol.    Discharge Plan:   Discharge instructions reviewed with: Patient.  Patient and/or family verbalized understanding of discharge instructions and all questions answered.  Copy of AVS reviewed with patient and/or family.  Patient will return 6/26/18 for next appointment.  Patient discharged in stable condition accompanied by: self.  Departure Mode: Ambulatory.    Patrick Lyles RN

## 2018-06-26 NOTE — PROGRESS NOTES
"Oncology Rooming Note    June 26, 2018 10:45 AM   Sherita Kenney is a 77 year old female who presents for:    Chief Complaint   Patient presents with     Oncology Clinic Visit     Adenocarcinoma of colon      Initial Vitals: /86  Pulse 65  Temp 97.6  F (36.4  C) (Oral)  Resp 16  Ht 1.626 m (5' 4.02\")  Wt 47.5 kg (104 lb 12.8 oz)  SpO2 98%  BMI 17.98 kg/m2 Estimated body mass index is 17.98 kg/(m^2) as calculated from the following:    Height as of this encounter: 1.626 m (5' 4.02\").    Weight as of this encounter: 47.5 kg (104 lb 12.8 oz). Body surface area is 1.46 meters squared.  Data Unavailable Comment: Data Unavailable   No LMP recorded. Patient is not currently having periods (Reason: Perimenopausal).  Allergies reviewed: Yes  Medications reviewed: Yes    Medications: Medication refills not needed today.  Pharmacy name entered into [facility]:    [facility] [place], [place] - [address]  [facility] [address]    Clinical concerns: None              4 minutes for nursing intake (face to face time)     April Bazzi MA            "

## 2018-06-26 NOTE — PROGRESS NOTES
"Infusion Nursing Note:  Sherita Kenney presents today for C7 D1 Avastin, Oxaliplatin, Fluoruracil pump connect .    Patient seen by provider today: Yes: Dr. Lindo   present during visit today: Not Applicable.    Note: Reviewed labs from today with Dr. Lindo, orders as follows:    Ok to proceed with chemo today with elevated alk phos, ALT and AST  Ok to proceed with avastin, no need for 24 hour urine collection  Continue to check urine protein with each treatment, but no need for any further 24 hour urine collections    Prior to discharge: Port is secured in place with tegaderm and flushed with 10cc NS with positive blood return noted.  Continuous home infusion CADD pump connected.    All connectors secured in place and clamps taped open.    Pump started, \"running\" noted on display (CADD): YES. Pump preset to run at 5ml/hr for 46 hours.  Patient instructed to call our clinic or [facility] with any questions or concerns at home.  Patient verbalized understanding.    Patient set up for pump disconnect at our clinic on 6/28/18 at 12:30pm.      Intravenous Access:  Labs drawn without difficulty.  Implanted Port.    Treatment Conditions:  Lab Results   Component Value Date    HGB 10.2 06/26/2018     Lab Results   Component Value Date    WBC 4.8 06/26/2018      Lab Results   Component Value Date    ANEU 2.7 06/26/2018     Lab Results   Component Value Date    PLT 85 06/26/2018      Lab Results   Component Value Date     06/26/2018                   Lab Results   Component Value Date    POTASSIUM 4.5 06/26/2018           Lab Results   Component Value Date    CR 0.78 06/26/2018                   Lab Results   Component Value Date    SARITHA 8.9 06/26/2018                Lab Results   Component Value Date    BILITOTAL 0.4 06/26/2018           Lab Results   Component Value Date    ALBUMIN 3.3 06/26/2018                    Lab Results   Component Value Date    ALT 65 06/26/2018           Lab Results "   Component Value Date    AST 97 06/26/2018       Results reviewed, labs MET treatment parameters, ok to proceed with treatment.  Urine 30mg/dL, BP: 133/86.      Post Infusion Assessment:  Patient tolerated infusion without incident.  Blood return noted pre and post infusion.  Site patent and intact, free from redness, edema or discomfort.  No evidence of extravasations.    Discharge Plan:   Discharge instructions reviewed with: Patient.  Patient and/or family verbalized understanding of discharge instructions and all questions answered.  Copy of AVS reviewed with patient and/or family.  Patient will return 6/28 for next appointment.  Patient discharged in stable condition accompanied by: self.  Departure Mode: Ambulatory.  Face to Face time: 15 minutes.    Sarah Ridley RN

## 2018-06-26 NOTE — MR AVS SNAPSHOT
After Visit Summary   6/26/2018    Sherita Kenney    MRN: [id number]           Patient Information     Date Of Birth          1940        Visit Information        Provider Department      6/26/2018 10:40 AM Susan Lindo MD [facility]        Today's Diagnoses     Adenocarcinoma of colon (H)    -  1      Care Instructions    Continue chemotherapy.  Scheduled - Belle  See JESSICA Chin in 2 weeks.   Scheduled - Belle  See me in 4 weeks.   Scheduled - Belle    AVS given to patient - Belle    The patient is in [place] [employer]-           Follow-ups after your visit        Your next 10 appointments already scheduled     Jun 28, 2018 12:30 PM CDT   Level 1 with SH INFUSION CHAIR 11   [facility]    [facility]  [address]  [place] [address]   [phone]            Jul 10, 2018  9:30 AM CDT   Level 5 with SH INFUSION CHAIR 2   [facility] ([facility])    [facility] [place] [id number]  [place] [address]   [phone]            Jul 10, 2018 10:00 AM CDT   Return Visit with WILLY Valadez CNP   [facility] ([facility])    [facility]  [address]  [place] [address]   [phone]            Jul 12, 2018 12:00 PM CDT   Level 1 with SH INFUSION CHAIR 11   [facility] ([facility])    [facility]  [address]  [place] [address]   [phone]            Jul 24, 2018 10:00 AM CDT   Level 5 with SH INFUSION CHAIR 6   [facility]    [facility]  [address]  [place] [address]   [phone]            Jul 24, 2018  2:00 PM CDT   Return Visit with Susan Lindo MD   [facility]  "Clinic ([facility] [place])    [facility]  [address]  [place] [address] [phone]            Jul 26, 2018 11:00 AM CDT   Level 1 with  INFUSION CHAIR 17   [facility] ([facility] [place])    [facility] [place]  [address]  [place] [address] [phone]            Jul 31, 2018  1:30 PM CDT   [facility] with [facility]   [facility]    [address] [facility] [id number] [phone]           Bring a current list of meds and any records pertaining to this visit. For Physicals, please bring immunization records and any forms needing to be filled out. Please arrive 10 minutes early to complete paperwork.              Who to contact     If you have questions or need follow up information about today's clinic visit or your schedule please contact [facility] directly at [phone].  Normal or non-critical lab and imaging results will be communicated to you by MyChart, letter or phone within 4 business days after the clinic has received the results. If you do not hear from us within 7 days, please contact the clinic through MyChart or phone. If you have a critical or abnormal lab result, we will notify you by phone as soon as possible.  Submit refill requests through [employer] or call your pharmacy and they will forward the refill request to us. Please allow 3 business days for your refill to be completed.          Additional Information About Your Visit        Care EveryWhere ID     This is your Care EveryWhere ID. This could be used by other organizations to access your [place] medical records  [id number]        Your Vitals Were     Pulse Temperature Respirations Height Pulse Oximetry BMI (Body Mass Index)    65 97.6  F (36.4  C) (Oral) 16 1.626 m (5' 4.02\") 98% 17.98 kg/m2       Blood Pressure from Last 3 " Encounters:   06/26/18 133/86   06/26/18 133/86   06/14/18 111/49    Weight from Last 3 Encounters:   06/26/18 47.5 kg (104 lb 12.8 oz)   06/26/18 47.5 kg (104 lb 12.8 oz)   06/12/18 47.4 kg (104 lb 9.6 oz)              Today, you had the following     No orders found for display       Primary Care Provider Office Phone # Fax #    Edgar Neno Rodriguez [phone] [address] [facility] [address]  [place] [address]        Equal Access to Services     [facility]: Hadii mateo Nunes, wabisi pillai, selina tuckermayvon pemberton, ellis mooney . So [facility] [phone].    ATENCIÓN: Si habla español, tiene a roldan disposición servicios gratuitos de asistencia lingüística. [facility] [phone].    We comply with applicable federal civil rights laws and [place] laws. We do not discriminate on the basis of race, color, national origin, age, disability, sex, sexual orientation, or gender identity.            Thank you!     Thank you for choosing [facility]  for your care. Our goal is always to provide you with excellent care. Hearing back from our patients is one way we can continue to improve our services. Please take a few minutes to complete the written survey that you may receive in the mail after your visit with us. Thank you!             Your Updated Medication List - Protect others around you: Learn how to safely use, store and throw away your medicines at www.disposemymeds.org.          This list is accurate as of 6/26/18  2:47 PM.  Always use your most recent med list.                   Brand Name Dispense Instructions for use Diagnosis    acetaminophen 325 MG tablet    TYLENOL    60 tablet    Take 2 tablets (650 mg) by mouth every 4 hours as needed for mild pain or fever    Cancer associated pain       BLINK TEARS OP      Apply 1 drop to eye 3 times daily        CETAPHIL lotion      Apply topically 2 times daily Also bid prn . To dry skin.         ferrous sulfate 325 (65 Fe) MG tablet    IRON    30 tablet    Take 1 tablet (325 mg) by mouth daily (with breakfast)    Iron deficiency       HYDROcodone-acetaminophen 5-325 MG per tablet    NORCO    30 tablet    Take 1 tablet by mouth every 6 hours as needed for moderate to severe pain    Other chronic pain       levothyroxine 88 MCG tablet    SYNTHROID/LEVOTHROID    30 tablet    Take 1 tablet (88 mcg) by mouth daily    Hypothyroidism, unspecified type       loperamide 2 MG capsule    IMODIUM A-D    155 capsule    AND TAKE 2 CAPSULE EVERY 6 HOURS AS NEEDED FOR LOOSE STOOLS (MAX OF 16MG/24 HRS)    Diarrhea, unspecified type       LORazepam 0.5 MG tablet    ATIVAN    30 tablet    Take 1 tablet (0.5 mg) by mouth every 4 hours as needed (Anxiety, Nausea/Vomiting or Sleep)    Adenocarcinoma of colon (H)       NEW MED           ondansetron 8 MG tablet    ZOFRAN    10 tablet    Take 1 tablet (8 mg) by mouth every 8 hours as needed (Nausea/Vomiting)    Adenocarcinoma of colon (H)       prochlorperazine 10 MG tablet    COMPAZINE    30 tablet    Take 1 tablet (10 mg) by mouth every 6 hours as needed (Nausea/Vomiting)    Adenocarcinoma of colon (H)       SUMATRIPTAN SUCCINATE PO      Take 25 mg by mouth every 8 hours as needed for migraine        travoprost (BAK Free) 0.004 % ophthalmic solution    TRAVATAN Z    5 mL    Place 1 drop into both eyes At Bedtime    Primary open angle glaucoma of both eyes, unspecified glaucoma stage       vitamin B-Complex     90 tablet    Take 1 tablet by mouth daily    S/P colectomy

## 2018-06-26 NOTE — PATIENT INSTRUCTIONS
Continue chemotherapy.  Scheduled - Belle  See JESSICA Chin in 2 weeks.   Scheduled - Belle  See me in 4 weeks.   Scheduled - Belle    AVS given to patient - Belle    The patient is in [place] [employer]- [place]

## 2018-06-26 NOTE — PROGRESS NOTES
Visit Date:   06/26/2018     ONCOLOGY HISTORY:  Ms. Sherita Kenney is a female with metastatic colon cancer.   1. CT abdomen and pelvis on 02/15/2017 revealed large right iliopsoas abscess and mass-like wall thickening of portion of right colon.       2. Patient was taken to OR on 03/01/2017.    -Colonoscopy revealed a large fungating mass in the right side of the colon.  Pathology revealed invasive poorly differentiated adenocarcinoma.  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   -Laparoscopic ileostomy was done.        3. Patient had right colectomy with takedown of ileostomy and primary anastomosis on 04/13/2017.    -Pathology  reveals poorly differentiated colonic adenocarcinoma. 15 of 24 lymph nodes are positive. Tumor invades into adjacent abdominal wall.  Lymphovascular invasion present. pT4b pN2b M0.   -BRAF mutation present. No KRAS mutation.      4. PET scan on 05/22/2017 revealed metastatic disease.  Multiple hypermetabolic liver metastases and  hypermetabolic necrotic lymphadenopathy in right lower quadrant.      6. modified FOLFOX6 with Avastin started on 06/01/2017.   -Bolus 5-FU discontinued with cycle 4.    -Oxaliplatin reduced with cycle 5.  -Oxaliplatin stopped after cycle 8. Cycle 8 completed on 09/08/2017.  -Maintenance infusional 5-FU and Avastin started on 09/21/2017. No bolus 5-FU.  -Infusional 5-FU discontinued after 11/30/2017. Single agent Avastin continued.      7.  CT chest, abdomen and pelvis on 03/15/2018 reveals progression of disease.      8. mFOLFOX with Avastin (without bolus 5-FU and leucovorin) started on 03/27/2018.      9. CT scan on 05/20/2018 revealed stable disease.     SUBJECTIVE:    Ms. Sherita Kenney is a 77-year-old female with metastatic colon cancer as described above.  She is currently on FOLFOX with Avastin.  She is not getting bolus 5-FU or leucovorin.  Dose of oxaliplatin and 5-FU has been reduced by 20%.  Overall,  she is tolerating chemotherapy well.      Patient came alone to the clinic.  Patient says that she gets alternating diarrhea or constipation.  Sometimes, she will have diarrhea and other times constipation.  When she has diarrhea, she takes Imodium, which helps.        She has fatigue.  No worsening of it.  No headache.  No dizziness.  She has not been falling.  No chest pain or difficulty breathing.  No nausea or vomiting.  No urinary complaints.  No fever or chills.  She has some neuropathy.  She has some numbness in the fingers.  They are not getting worse.      PHYSICAL EXAMINATION:   Alert and oriented x 3.   ECOG PS: 2.  VITAL SIGNS:  Reviewed.   EYES:  No icterus.   THROAT:  No ulcer or thrush.   NECK:  Supple. No lymphadenopathy.   LUNGS:  Good air entry bilaterally.  No wheezing.   HEART:  Regular.   ABDOMEN:  Soft and nontender.  No mass.   EXTREMITIES:  No edema.  Reduced muscle mass.  SKIN:  No petechiae. Mild hand-foot syndrome.     LABORATORY DATA:  Reviewed.      ASSESSMENT:   1.  Sherita Kenney is a 77-year-old female with metastatic colon cancer on treatment with FOLFOX and Avastin.   2.  Thrombocytopenia secondary to chemotherapy.   3.  Anemia secondary to chemotherapy.  Stable.   4.  Elevated LFT from metastatic disease to liver.   5.  Peripheral neuropathy which is stable.      PLAN:   1.  Patient's overall condition is stable.  She has some symptoms, including fatigue and alternating diarrhea and constipation.  They have not gotten worse.      Discussed regarding chemotherapy.  She wants to continue it.  She will continue on FOLFOX with dose modification.  She is tolerating it well. When there is progression of disease, we will plan on treating her with immunotherapy.      2.  Discussed regarding diarrhea or constipation.  If she has diarrhea, she will take Imodium.  I also advised her to drink plenty of fluids.  It is quite likely that her constipation is due to patient taking too much of  Imodium.  I advised her to keep a watch on Imodium intake.      3.  Patient has fatigue.  This is due to multiple factors including her age, anemia and malignancy.      4.  Patient had a few questions, which were all answered.  She will see JESSICA Chin, in 2 weeks.  I will see her in 4 weeks.  I advised her to see a physician sooner if she has fever, chills, recurrent vomiting, bleeding, worsening weakness or any other concerns.         OMER MAKI MD             D: 2018   T: 2018   MT: DAMARIS      Name:     SEVERO SWANN   MRN:      [id number]        Account:      [id number]   :      1940           Visit Date:   2018      Document: [id number]

## 2018-06-26 NOTE — MR AVS SNAPSHOT
After Visit Summary   6/26/2018    Sherita Kenney    MRN: [id number]           Patient Information     Date Of Birth          1940        Visit Information        Provider Department      6/26/2018 10:00 AM SH INFUSION CHAIR 10 [facility]        Today's Diagnoses     Adenocarcinoma of colon (H)    -  1       Follow-ups after your visit        Your next 10 appointments already scheduled     Jun 28, 2018 12:30 PM CDT   Level 1 with SH INFUSION CHAIR 11   [facility])    [facility] [place] [address]  [place] [address]   [phone]            Jul 10, 2018  9:30 AM CDT   Level 5 with SH INFUSION CHAIR 2   [facility])    [facility]  [address]  [place] [address]   [phone]            Jul 10, 2018 10:00 AM CDT   Return Visit with WILLY Valadez CNP   [facility])    [facility] [place] [address]  [place] [address]   [phone]            Jul 12, 2018 12:00 PM CDT   Level 1 with SH INFUSION CHAIR 11   [facility]    [facility]  [address]  [place] [address]   [phone]            Jul 24, 2018  8:00 AM CDT   Level 5 with SH INFUSION CHAIR 4   [facility])    [facility]  [address]  [place] [address]   [phone]            Jul 24, 2018  9:00 AM CDT   Return Visit with Susan Lindo MD   [facility]    [facility]  [address]  [place] [address]   [phone]            Jul 26, 2018 11:00 AM CDT   Level 1 with SH INFUSION CHAIR  "17   [facility] ([facility])    [facility]  [address]  [place] [address] [phone]            Jul 31, 2018  1:30 PM CDT   Office Visit with Edgar Rodriguez MD   [facility]    [address]  [facility] [address] [phone]           Bring a current list of meds and any records pertaining to this visit. For Physicals, please bring immunization records and any forms needing to be filled out. Please arrive 10 minutes early to complete paperwork.              Who to contact     If you have questions or need follow up information about today's clinic visit or your schedule please contact [facility] directly at [phone].  Normal or non-critical lab and imaging results will be communicated to you by MyChart, letter or phone within 4 business days after the clinic has received the results. If you do not hear from us within 7 days, please contact the clinic through MyChart or phone. If you have a critical or abnormal lab result, we will notify you by phone as soon as possible.  Submit refill requests through [employer] or call your pharmacy and they will forward the refill request to us. Please allow 3 business days for your refill to be completed.          Additional Information About Your Visit        Care EveryWhere ID     This is your Care EveryWhere ID. This could be used by other organizations to access your [place] medical records  [id number]        Your Vitals Were     Pulse Temperature Respirations Height Pulse Oximetry BMI (Body Mass Index)    65 97.6  F (36.4  C) (Oral) 16 1.626 m (5' 4.02\") 98% 17.98 kg/m2       Blood Pressure from Last 3 Encounters:   06/26/18 133/86   06/26/18 133/86   06/14/18 111/49    Weight from Last 3 Encounters:   06/26/18 47.5 kg (104 lb 12.8 oz)   06/26/18 47.5 kg (104 lb 12.8 oz)   06/12/18 47.4 " kg (104 lb 9.6 oz)              We Performed the Following     CBC with platelets differential     Comprehensive metabolic panel     Protein qualitative urine        Primary Care Provider Office Phone # Fax #    Edgar Neno Rodriguez [phone] [address]  [place] [address]        Equal Access to Services     [facility] : Hadii aad ku hadasho Soomaali, waaxda luqadaha, qaybta kaalmada adeegyada, waxay idiin hayaan adeeg kharash la'aan . So [facility] [phone].    ATENCIÓN: Si habla español, tiene a roldan disposición servicios gratuitos de asistencia lingüística. Llame al [phone].    We comply with applicable federal civil rights laws and [place] laws. We do not discriminate on the basis of race, color, national origin, age, disability, sex, sexual orientation, or gender identity.            Thank you!     Thank you for choosing [facility]  for your care. Our goal is always to provide you with excellent care. Hearing back from our patients is one way we can continue to improve our services. Please take a few minutes to complete the written survey that you may receive in the mail after your visit with us. Thank you!             Your Updated Medication List - Protect others around you: Learn how to safely use, store and throw away your medicines at www.disposemymeds.org.          This list is accurate as of 6/26/18  2:21 PM.  Always use your most recent med list.                   Brand Name Dispense Instructions for use Diagnosis    acetaminophen 325 MG tablet    TYLENOL    60 tablet    Take 2 tablets (650 mg) by mouth every 4 hours as needed for mild pain or fever    Cancer associated pain       BLINK TEARS OP      Apply 1 drop to eye 3 times daily        CETAPHIL lotion      Apply topically 2 times daily Also bid prn . To dry skin.        ferrous sulfate 325 (65 Fe) MG tablet    IRON    30 tablet    Take 1 tablet (325 mg) by mouth  daily (with breakfast)    Iron deficiency       HYDROcodone-acetaminophen 5-325 MG per tablet    NORCO    30 tablet    Take 1 tablet by mouth every 6 hours as needed for moderate to severe pain    Other chronic pain       levothyroxine 88 MCG tablet    SYNTHROID/LEVOTHROID    30 tablet    Take 1 tablet (88 mcg) by mouth daily    Hypothyroidism, unspecified type       loperamide 2 MG capsule    IMODIUM A-D    155 capsule    AND TAKE 2 CAPSULE EVERY 6 HOURS AS NEEDED FOR LOOSE STOOLS (MAX OF 16MG/24 HRS)    Diarrhea, unspecified type       LORazepam 0.5 MG tablet    ATIVAN    30 tablet    Take 1 tablet (0.5 mg) by mouth every 4 hours as needed (Anxiety, Nausea/Vomiting or Sleep)    Adenocarcinoma of colon (H)       NEW MED           ondansetron 8 MG tablet    ZOFRAN    10 tablet    Take 1 tablet (8 mg) by mouth every 8 hours as needed (Nausea/Vomiting)    Adenocarcinoma of colon (H)       prochlorperazine 10 MG tablet    COMPAZINE    30 tablet    Take 1 tablet (10 mg) by mouth every 6 hours as needed (Nausea/Vomiting)    Adenocarcinoma of colon (H)       SUMATRIPTAN SUCCINATE PO      Take 25 mg by mouth every 8 hours as needed for migraine        travoprost (BAK Free) 0.004 % ophthalmic solution    TRAVATAN Z    5 mL    Place 1 drop into both eyes At Bedtime    Primary open angle glaucoma of both eyes, unspecified glaucoma stage       vitamin B-Complex     90 tablet    Take 1 tablet by mouth daily    S/P colectomy

## 2018-06-26 NOTE — Clinical Note
6/26/2018         RE: Sherita Kenney  [address]  [place] [address]        Dear Colleague,    Thank you for referring your patient, Sherita Kenney, to the [facility]. Please see a copy of my visit note below.    No notes on file    Again, thank you for allowing me to participate in the care of your patient.        Sincerely,        Susan Lindo MD

## 2018-06-28 NOTE — MR AVS SNAPSHOT
After Visit Summary   6/28/2018    Sherita Kenney    MRN: [id number]           Patient Information     Date Of Birth          1940        Visit Information        Provider Department      6/28/2018 12:30 PM  INFUSION CHAIR 11 [facility]        Today's Diagnoses     Port-a-cath in place    -  1       Follow-ups after your visit        Your next 10 appointments already scheduled     Jul 10, 2018  9:30 AM CDT   Level 5 with SH INFUSION CHAIR 2   [facility] ([facility])    [place] [id number]  [place] [address]   [phone]            Jul 10, 2018 10:00 AM CDT   Return Visit with WILLY Valadez CNP   [facility] ([facility])    [facility]  [address]  [place] [address]   [phone]            Jul 12, 2018 12:00 PM CDT   Level 1 with  INFUSION CHAIR 11   [facility] ([facility])    [facility]  [address]  [place] [address]   [phone]            Jul 24, 2018 10:00 AM CDT   Level 5 with  INFUSION CHAIR 6   [facility] ([facility])    [facility]  [address]  [place] [address]   [phone]            Jul 24, 2018  2:00 PM CDT   Return Visit with Susan Lindo MD   [facility] ([facility])    [facility]  [address]  [place] [address]   [phone]            Jul 26, 2018 11:00 AM CDT   Level 1 with SH INFUSION CHAIR 6   [facility] ([facility])    [facility]  [address]  [place] [address]   [phone]            Jul 31, 2018  1:30 PM CDT   Office Visit with Edgar Lazcano  Enrrique Rodriguez MD   [facility]    [address] [facility] [address] [phone]           Bring a current list of meds and any records pertaining to this visit. For Physicals, please bring immunization records and any forms needing to be filled out. Please arrive 10 minutes early to complete paperwork.              Who to contact     If you have questions or need follow up information about today's clinic visit or your schedule please contact [facility] directly at [phone].  Normal or non-critical lab and imaging results will be communicated to you by MyChart, letter or phone within 4 business days after the clinic has received the results. If you do not hear from us within 7 days, please contact the clinic through MyChart or phone. If you have a critical or abnormal lab result, we will notify you by phone as soon as possible.  Submit refill requests through "[employer]" or call your pharmacy and they will forward the refill request to us. Please allow 3 business days for your refill to be completed.          Additional Information About Your Visit        Care EveryWhere ID     This is your Care EveryWhere ID. This could be used by other organizations to access your [facility] medical records  [id number]        Your Vitals Were     Pulse Temperature Respirations             65 98.2  F (36.8  C) (Oral) 16          Blood Pressure from Last 3 Encounters:   06/28/18 101/55   06/26/18 133/86   06/26/18 133/86    Weight from Last 3 Encounters:   06/26/18 47.5 kg (104 lb 12.8 oz)   06/26/18 47.5 kg (104 lb 12.8 oz)   06/12/18 47.4 kg (104 lb 9.6 oz)              Today, you had the following     No orders found for display       Primary Care Provider Office Phone # Fax #    Edgar Neno Rodriguez [phone] [address] [address]        Equal Access to Services     ALEXIS ALBA AH: Ruddy galindo  madhuri Nunes, wabisi neydakatie, qastanislawta kachantelle pemberton, ellis sarahin hayaarico leted jonyalvarez vinicio gela. So [facility] [phone].    ATENCIÓN: Si barriela becka, tiene a roldan disposición servicios gratuitos de asistencia lingüística. Malaika al [phone].    We comply with applicable federal civil rights laws and [place] laws. We do not discriminate on the basis of race, color, national origin, age, disability, sex, sexual orientation, or gender identity.            Thank you!     Thank you for choosing [facility]  for your care. Our goal is always to provide you with excellent care. Hearing back from our patients is one way we can continue to improve our services. Please take a few minutes to complete the written survey that you may receive in the mail after your visit with us. Thank you!             Your Updated Medication List - Protect others around you: Learn how to safely use, store and throw away your medicines at www.disposemymeds.org.          This list is accurate as of 6/28/18 12:39 PM.  Always use your most recent med list.                   Brand Name Dispense Instructions for use Diagnosis    acetaminophen 325 MG tablet    TYLENOL    60 tablet    Take 2 tablets (650 mg) by mouth every 4 hours as needed for mild pain or fever    Cancer associated pain       BLINK TEARS OP      Apply 1 drop to eye 3 times daily        CETAPHIL lotion      Apply topically 2 times daily Also bid prn . To dry skin.        ferrous sulfate 325 (65 Fe) MG tablet    IRON    30 tablet    Take 1 tablet (325 mg) by mouth daily (with breakfast)    Iron deficiency       HYDROcodone-acetaminophen 5-325 MG per tablet    NORCO    30 tablet    Take 1 tablet by mouth every 6 hours as needed for moderate to severe pain    Other chronic pain       levothyroxine 88 MCG tablet    SYNTHROID/LEVOTHROID    30 tablet    Take 1 tablet (88 mcg) by mouth daily    Hypothyroidism, unspecified type       loperamide 2  MG capsule    IMODIUM A-D    155 capsule    AND TAKE 2 CAPSULE EVERY 6 HOURS AS NEEDED FOR LOOSE STOOLS (MAX OF 16MG/24 HRS)    Diarrhea, unspecified type       LORazepam 0.5 MG tablet    ATIVAN    30 tablet    Take 1 tablet (0.5 mg) by mouth every 4 hours as needed (Anxiety, Nausea/Vomiting or Sleep)    Adenocarcinoma of colon (H)       NEW MED           ondansetron 8 MG tablet    ZOFRAN    10 tablet    Take 1 tablet (8 mg) by mouth every 8 hours as needed (Nausea/Vomiting)    Adenocarcinoma of colon (H)       prochlorperazine 10 MG tablet    COMPAZINE    30 tablet    Take 1 tablet (10 mg) by mouth every 6 hours as needed (Nausea/Vomiting)    Adenocarcinoma of colon (H)       SUMATRIPTAN SUCCINATE PO      Take 25 mg by mouth every 8 hours as needed for migraine        travoprost (BAK Free) 0.004 % ophthalmic solution    TRAVATAN Z    5 mL    Place 1 drop into both eyes At Bedtime    Primary open angle glaucoma of both eyes, unspecified glaucoma stage       vitamin B-Complex     90 tablet    Take 1 tablet by mouth daily    S/P colectomy

## 2018-06-28 NOTE — PROGRESS NOTES
Infusion Nursing Note:  Sherita MARTINEZ Pablito presents today for pump disconnect.    Patient seen by provider today: No   present during visit today: Not Applicable.    Note: N/A.    Intravenous Access:  Implanted Port.    Treatment Conditions:  Not Applicable.      Post Infusion Assessment:  Patient tolerated infusion without incident.  Blood return noted pre and post infusion.  Site patent and intact, free from redness, edema or discomfort.  No evidence of extravasations.  Access discontinued per protocol.    Discharge Plan:   Patient declined prescription refills.  Discharge instructions reviewed with: Patient.  Patient and/or family verbalized understanding of discharge instructions and all questions answered.  Copy of AVS reviewed with patient and/or family.  Patient will return 7/10/18 for next appointment.  Patient discharged in stable condition accompanied by: self.  Departure Mode: Ambulatory.    Sayda Watts RN

## 2018-07-02 NOTE — PROGRESS NOTES
" 02/17/17 1000   Quick Adds   Type of Visit Initial PT Evaluation   Living Environment   Lives With alone   Living Arrangements condominium   Home Accessibility no concerns   Number of Stairs to Enter Home 0   Number of Stairs Within Home 0   Transportation Available car;family or friend will provide   Living Environment Comment elevator access for her 3rd floor condo   Self-Care   Dominant Hand right   Usual Activity Tolerance good   Current Activity Tolerance fair   Functional Level Prior   Ambulation 0-->independent   Transferring 0-->independent   Toileting 0-->independent   Bathing 0-->independent   Dressing 0-->independent   Eating 0-->independent   Communication 0-->understands/communicates without difficulty   Swallowing 0-->swallows foods/liquids without difficulty   Cognition 2 - difficulty with organizing thoughts   Fall history within last six months yes   Number of times patient has fallen within last six months (Pt states \"a few times\")   General Information   Onset of Illness/Injury or Date of Surgery - Date 02/15/17   Referring Physician Lopez, Noble Altman MD   Patient/Family Goals Statement \"Go home\"   Pertinent History of Current Problem (include personal factors and/or comorbidities that impact the POC) 76 YOF admitted after a wellness check. Dx'd with a L lower abdominal mass and iliopsoas abcess. AMS with psychosis. Work-up pending. PMH: HTN   Precautions/Limitations fall precautions   Weight-Bearing Status - RUE full weight-bearing   Weight-Bearing Status - LLE full weight-bearing   Weight-Bearing Status - RLE full weight-bearing   General Observations Pleasant but confused   General Info Comments Activity: ambulate with assist   Cognitive Status Examination   Orientation person;place   Level of Consciousness alert;confused   Follows Commands and Answers Questions 75% of the time;able to follow single-step instructions   Personal Safety and Judgment impaired   Memory impaired   Cognitive " Celeste Cohen is a 58 year old female presenting with possible tick bite on back.  On mid back near [facility].  Pulled the tick off Thursday.  States that area is very red and itchy  Has been using neosporin     Denies known Latex allergy or symptoms of Latex sensitivity.  Medications reviewed and updated.  Preferred pharmacy loaded.  There are no preventive care reminders to display for this patient.      "Comment defer to OT   Pain Assessment   Patient Currently in Pain No   Posture    Posture Forward head position;Protracted shoulders   Range of Motion (ROM)   ROM Comment BLEs WFL   Strength   Strength Comments BLEs grossly 4/5 throughout   Bed Mobility   Bed Mobility Comments Supine<>sit with Garfield   Transfer Skills   Transfer Comments SIt<>stand from bed to FWW with RUE support with Garfield   Gait   Gait Comments Gait with pt using a FWW with her RUE only x 10' with Garfield for balance and walker management, decreased step length FEDE valentineacid due to polio.   Balance   Balance Comments Good sitting, requires UE support and Garfield for standing   Coordination   Coordination no deficits were identified   General Therapy Interventions   Planned Therapy Interventions balance training;bed mobility training;gait training;strengthening;transfer training;neuromuscular re-education   Clinical Impression   Criteria for Skilled Therapeutic Intervention yes, treatment indicated   PT Diagnosis Impaired gait   Influenced by the following impairments weakness, impaired balance, fatigue   Functional limitations due to impairments bed mobility, transfers, gait   Clinical Presentation Evolving/Changing   Clinical Presentation Rationale very confused, work-up pending, requires assist for all mobility, unsafe to return home alone   Clinical Decision Making (Complexity) Moderate complexity   Therapy Frequency` daily   Predicted Duration of Therapy Intervention (days/wks) 3 days   Anticipated Discharge Disposition Transitional Care Facility   Risk & Benefits of therapy have been explained Yes   Patient, Family & other staff in agreement with plan of care Yes   [facility] [employer] \"6 Clicks\"   2016, Trustees of [facility], under license to [employer].  All rights reserved.   6 Clicks Short Forms Basic Mobility Inpatient Short Form   [facility] AM-PAC  \"6 Clicks\" V.2 Basic Mobility Inpatient Short Form   1. Turning from " your back to your side while in a flat bed without using bedrails? 3 - A Little   2. Moving from lying on your back to sitting on the side of a flat bed without using bedrails? 3 - A Little   3. Moving to and from a bed to a chair (including a wheelchair)? 3 - A Little   4. Standing up from a chair using your arms (e.g., wheelchair, or bedside chair)? 3 - A Little   5. To walk in hospital room? 3 - A Little   6. Climbing 3-5 steps with a railing? 3 - A Little   Basic Mobility Raw Score (Score out of 24.Lower scores equate to lower levels of function) 18   Total Evaluation Time   Total Evaluation Time (Minutes) 15

## 2018-07-10 NOTE — TELEPHONE ENCOUNTER
I received a message from schedulers that patient called to cancel chemotherapy today because she was ill.     I called both paitent's home # and mobile # and unable to reach patient. I have LVM on mobile number requesting a return call.     Will wait for a return call. Vale Molina

## 2018-07-18 NOTE — TELEPHONE ENCOUNTER
Dr. Lindo is aware and recommended that patient be seen today or tomorrow. I called patient and she has a headache that Tylenol is not providing relief for. Her temperature was 99.0 and just over all not feeling well.     I have added her to Giuseppe's schedule at 10am tomorrow. Vale Molina

## 2018-07-19 NOTE — MR AVS SNAPSHOT
After Visit Summary   7/19/2018    Sherita Kenney    MRN: [id number]           Patient Information     Date Of Birth          1940        Visit Information        Provider Department      7/19/2018 10:00 AM Giuseppe Norris APRN CNP [facility]        Today's Diagnoses     Adenocarcinoma of colon (H)    -  1      Care Instructions    NS bolus hydartion 1 L and zofran IV - ORDERS HAVE BEEN PLACED     AG- calling care suites to set up infusion    Take pericolace twice daily   Take miralax dailty as needed     Increase fluid intake      Schedule for labs chemo and follow up appointment with Dr Lindo next week     I have called and spoke with Care suites nurse and reviewed the above instructions and appointment with Dr. Lindo.          Follow-ups after your visit        Your next 10 appointments already scheduled     Jul 25, 2018 10:30 AM CDT   Level 5 with  INFUSION CHAIR 9   [facility] ([facility])    [facility]  [address]  [facility] [address]   [phone]            Jul 25, 2018  2:00 PM CDT   Return Visit with Susan Lindo MD   [facility] ([facility] [place])    [facility]  [address]  [place] [address] [phone]            Jul 31, 2018  1:30 PM CDT   Office Visit with Edgar Rodriguez MD   [facility]    [address] [facility] [address]   [phone]           Bring a current list of meds and any records pertaining to this visit. For Physicals, please bring immunization records and any forms needing to be filled out. Please arrive 10 minutes early to complete paperwork.            Aug 01, 2018  1:00 PM CDT   Return Visit with Susan Lindo MD   [facility] ([facility] [place])    [facility]  [address]  [place]  [address]   [phone]            Aug 03, 2018 11:30 AM CDT   Level 1 with  INFUSION CHAIR 16   [facility] ([facility])    [facility] [place]  [address] [address]   [phone]              Future tests that were ordered for you today     Open Future Orders        Priority Expected Expires Ordered    CBC with platelets and differential Routine 7/20/2018 7/20/2019 7/20/2018    Comprehensive metabolic panel (BMP + Alb, Alk Phos, ALT, AST, Total. Bili, TP) Routine 7/20/2018 7/20/2019 7/20/2018            Who to contact     If you have questions or need follow up information about today's clinic visit or your schedule please contact [facility] directly at [phone].  Normal or non-critical lab and imaging results will be communicated to you by MyChart, letter or phone within 4 business days after the clinic has received the results. If you do not hear from us within 7 days, please contact the clinic through MyChart or phone. If you have a critical or abnormal lab result, we will notify you by phone as soon as possible.  Submit refill requests through [employer] or call your pharmacy and they will forward the refill request to us. Please allow 3 business days for your refill to be completed.          Additional Information About Your Visit        Care EveryWhere ID     This is your Care EveryWhere ID. This could be used by other organizations to access your [facility] medical records  [id number]        Your Vitals Were     Pulse Temperature Respirations Pulse Oximetry          84 97.4  F (36.3  C) (Oral) 16 97%         Blood Pressure from Last 3 Encounters:   07/19/18 138/69   07/19/18 106/71   06/28/18 101/55    Weight from Last 3 Encounters:   06/26/18 47.5 kg (104 lb 12.8 oz)   06/26/18 47.5 kg (104 lb 12.8 oz)   06/12/18 47.4 kg (104 lb 9.6 oz)                 Today's Medication Changes          These changes  are accurate as of 7/19/18 12:20 PM.  If you have any questions, ask your nurse or doctor.               Start taking these medicines.        Dose/Directions    polyethylene glycol powder   Commonly known as:  MIRALAX   Used for:  Adenocarcinoma of colon (H)        Dose:  1 capful   Take 17 g (1 capful) by mouth daily   Quantity:  510 g   Refills:  1       senna-docusate 8.6-50 MG per tablet   Commonly known as:  SENOKOT-S;PERICOLACE   Used for:  Adenocarcinoma of colon (H)        Dose:  1 tablet   Take 1 tablet by mouth 2 times daily   Quantity:  60 tablet   Refills:  1            Where to get your medicines      These medications were sent to [facility] - [place], [place] - [address]  [address], [place] [address]     Phone:  [phone]     polyethylene glycol powder    senna-docusate 8.6-50 MG per tablet                Primary Care Provider Office Phone # Fax #    Edgar Neno Rodriguez [phone] [address]  [facility] [address]        Equal Access to Services     FILEMON [facility] HospitalEZEKIEL : Hadii mateo ku hadasho Sogabriel, waaxda luqadaha, qaybta kaalmada nilay, ellis mooney . So [facility] [phone].    ATENCIÓN: Si habla español, tiene a roldan disposición servicios gratuitos de asistencia lingüística. [facility] [phone].    We comply with applicable federal civil rights laws and [place] laws. We do not discriminate on the basis of race, color, national origin, age, disability, sex, sexual orientation, or gender identity.            Thank you!     Thank you for choosing [facility]  for your care. Our goal is always to provide you with excellent care. Hearing back from our patients is one way we can continue to improve our services. Please take a few minutes to complete the written survey that you may receive in the mail after your visit with us. Thank you!             Your Updated Medication List - Protect  others around you: Learn how to safely use, store and throw away your medicines at www.disposemymeds.org.          This list is accurate as of 7/19/18 12:20 PM.  Always use your most recent med list.                   Brand Name Dispense Instructions for use Diagnosis    acetaminophen 325 MG tablet    TYLENOL    60 tablet    Take 2 tablets (650 mg) by mouth every 4 hours as needed for mild pain or fever    Cancer associated pain       BLINK TEARS OP      Apply 1 drop to eye 3 times daily        levothyroxine 88 MCG tablet    SYNTHROID/LEVOTHROID    30 tablet    Take 1 tablet (88 mcg) by mouth daily    Hypothyroidism, unspecified type       loperamide 2 MG capsule    IMODIUM A-D    155 capsule    AND TAKE 2 CAPSULE EVERY 6 HOURS AS NEEDED FOR LOOSE STOOLS (MAX OF 16MG/24 HRS)    Diarrhea, unspecified type       LORazepam 0.5 MG tablet    ATIVAN    30 tablet    Take 1 tablet (0.5 mg) by mouth every 4 hours as needed (Anxiety, Nausea/Vomiting or Sleep)    Adenocarcinoma of colon (H)       NEW MED           ondansetron 8 MG tablet    ZOFRAN    10 tablet    Take 1 tablet (8 mg) by mouth every 8 hours as needed (Nausea/Vomiting)    Adenocarcinoma of colon (H)       polyethylene glycol powder    MIRALAX    510 g    Take 17 g (1 capful) by mouth daily    Adenocarcinoma of colon (H)       prochlorperazine 10 MG tablet    COMPAZINE    30 tablet    Take 1 tablet (10 mg) by mouth every 6 hours as needed (Nausea/Vomiting)    Adenocarcinoma of colon (H)       senna-docusate 8.6-50 MG per tablet    SENOKOT-S;PERICOLACE    60 tablet    Take 1 tablet by mouth 2 times daily    Adenocarcinoma of colon (H)       SUMATRIPTAN SUCCINATE PO      Take 25 mg by mouth every 8 hours as needed for migraine        travoprost (BAK Free) 0.004 % ophthalmic solution    TRAVATAN Z    5 mL    Place 1 drop into both eyes At Bedtime    Primary open angle glaucoma of both eyes, unspecified glaucoma stage       vitamin B-Complex     90 tablet    Take 1  tablet by mouth daily    S/P colectomy

## 2018-07-19 NOTE — LETTER
"    7/19/2018         RE: Sherita Kenney  [address]  [place] [address]        Dear Colleague,    Thank you for referring your patient, Sherita Kenney, to the [facility]. Please see a copy of my visit note below.    Oncology Rooming Note    July 19, 2018 10:50 AM   Sherita Kenney is a 77 year old female who presents for:    Chief Complaint   Patient presents with     Oncology Clinic Visit     Initial Vitals: /71 (BP Location: Right arm, Patient Position: Chair, Cuff Size: Adult Regular)  Pulse 84  Temp 97.4  F (36.3  C) (Oral)  Resp 16  SpO2 97% Estimated body mass index is 17.98 kg/(m^2) as calculated from the following:    Height as of 6/26/18: 1.626 m (5' 4.02\").    Weight as of 6/26/18: 47.5 kg (104 lb 12.8 oz). There is no height or weight on file to calculate BSA.  Data Unavailable Comment: Data Unavailable   No LMP recorded. Patient is not currently having periods (Reason: Perimenopausal).  Allergies reviewed: Yes  Medications reviewed: Yes    Medications: Medication refills not needed today.  Pharmacy name entered into [facility]:    [facility] [place]  [place], [place] - [address]  [facility], [address]    Clinical concerns: None    5 minutes for nursing intake (face to face time)     Shania Bravo [facility]                Oncology/Hematology Visit Note  Jul 19, 2018    Reason for Visit: follow up of colon cancer     History of Present Illness: Sherita Kenney is a 77 year old female with colon cancer. She is currently undergoing treatment with Maintenance infusional 5-FU and Avastin started on 09/21/2017. No bolus 5-FU.   Infusional 5-FU discontinued single agent Avastin continued.  March 15, 2018 CT shows progression of disease  M FOLFOX with Avastin started on 03/27/2018  CT scan of May 20, 2018 reveals stable disease  Patient comes today for  follow-up of lower abdominal pain      Interval " History:  Patient tells me she has had lower normalized abdominal pain for 2 days.  She has some mild distention.  She had one small bowel movement today and the stool was really hard she feels constipated.  She denies nausea vomiting.  She tolerates p.o. and drinks lots of fluids.  She denies fever chills sweats denies cough denies shortness of breath.  She had headache 2 days ago but this resolved now.  She denies headache today denies dizziness denies seizures denies changes in vision.    Review of Systems:  14 point ROS of systems including Constitutional, Eyes, Respiratory, Cardiovascular, Gastroenterology, Genitourinary, Integumentary, Muscularskeletal, Psychiatric were all negative except for pertinent positives noted in my HPI.      Current Outpatient Prescriptions   Medication Sig Dispense Refill     polyethylene glycol (MIRALAX) powder Take 17 g (1 capful) by mouth daily 510 g 1     senna-docusate (SENOKOT-S;PERICOLACE) 8.6-50 MG per tablet Take 1 tablet by mouth 2 times daily 60 tablet 1     acetaminophen (TYLENOL) 325 MG tablet Take 2 tablets (650 mg) by mouth every 4 hours as needed for mild pain or fever 60 tablet 1     levothyroxine (SYNTHROID/LEVOTHROID) 88 MCG tablet Take 1 tablet (88 mcg) by mouth daily 30 tablet 2     loperamide (IMODIUM A-D) 2 MG capsule AND TAKE 2 CAPSULE EVERY 6 HOURS AS NEEDED FOR LOOSE STOOLS (MAX OF 16MG/24 HRS) 155 capsule 3     LORazepam (ATIVAN) 0.5 MG tablet Take 1 tablet (0.5 mg) by mouth every 4 hours as needed (Anxiety, Nausea/Vomiting or Sleep) 30 tablet 2     NEW MED        ondansetron (ZOFRAN) 8 MG tablet Take 1 tablet (8 mg) by mouth every 8 hours as needed (Nausea/Vomiting) 10 tablet 2     Polyethylene Glycol 400 (BLINK TEARS OP) Apply 1 drop to eye 3 times daily       prochlorperazine (COMPAZINE) 10 MG tablet Take 1 tablet (10 mg) by mouth every 6 hours as needed (Nausea/Vomiting) 30 tablet 2     SUMATRIPTAN SUCCINATE PO Take 25 mg by mouth every 8 hours as  needed for migraine       travoprost, FARHAT Free, (TRAVATAN Z) 0.004 % ophthalmic solution Place 1 drop into both eyes At Bedtime 5 mL 11     vitamin B-Complex Take 1 tablet by mouth daily 90 tablet 1       Physical Examination:  General: The patient is a pleasant female in no acute distress.  /71 (BP Location: Right arm, Patient Position: Chair, Cuff Size: Adult Regular)  Pulse 84  Temp 97.4  F (36.3  C) (Oral)  Resp 16  SpO2 97%  HEENT: EOMI, PERRL. Sclerae are anicteric. Oral mucosa is pink and moist with no lesions or thrush.   Lymph: Neck is supple with no lymphadenopathy in the cervical or supraclavicular areas.   Heart: Regular rate and rhythm.   Lungs: Clear to auscultation bilaterally.   Abdomen: Bowel sounds present, soft, nontender with no palpable hepatosplenomegaly or masses.   Extremities: No lower extremity edema noted bilaterally.   Skin: No rashes, petechiae, or bruising noted on exposed skin.    Laboratory Data:  No results found for this or any previous visit (from the past 24 hour(s)).      Assessment and Plan:    This is a 78 y/o female  with     metastatic colon cancer with liver metastasis.  Diagnosed in April 2017.  Metastasis to liver and lymphadenopathy in the right lower quadrant  Previously treated with modified FOLFOX 6 with Avastin and maintenance 5-FU and Avastin. March 15, 2018 CT shows progression of disease  M FOLFOX with Avastin started on 03/27/2018  CT scan of May 20, 2018 reveals stable disease  Patient comes today for routine follow-up prior chemotherapy.-Due to thrombocytopenia oxaliplatin and 5-FU dose was decreased by 20%  -She was scheduled for chemotherapy yesterday but she did not feel well and that was canceled  Patient is scheduled to see Dr. Lindo next week and and possible chemo      Elevated LFTs secondary to metastasis to the liver.  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 Monitor for now      Diarrhea-today she is constipated  chronic no changes in symptoms.  Hold Imodium for now since she is constipated    Generalized abdominal pain secondary to constipation  Patient has history of diarrhea and was taking Imodium around-the-clock.  Hold Imodium for now  I will start La-Colace bid and MiraLAX.  High-fiber foods discussed  Increase fluid intake  Give NS bolus hydration ×1    Patient is asked to call the event of fever chills sweats, cough shortness of breath chest pain.  Worsening of abdominal pain, abdominal bloating any changes in health condition.  Patient verbalized understanding    WILLY Valadez CNP          Again, thank you for allowing me to participate in the care of your patient.        Sincerely,        WILLY Valadez CNP

## 2018-07-19 NOTE — DISCHARGE SUMMARY
Patient discharged to TCU by wheelchair at 3:30 PM 07/19/18.  Medication regimen discussed with patient and patient verbalizes understanding.  Diet and activity discussed with patient.  Upcoming appointments reviewed.  No questions at this time. Patient received 1000 mL normal saline and one dose Zofran.

## 2018-07-19 NOTE — IP AVS SNAPSHOT
MRN:[id number]                      After Visit Summary   7/19/2018    Sherita Kenney    MRN: [id number]           Visit Information        Department      7/19/2018 12:20 PM [facility]          Review of your medicines      UNREVIEWED medicines. Ask your doctor about these medicines        Dose / Directions    acetaminophen 325 MG tablet   Commonly known as:  TYLENOL   Used for:  Cancer associated pain        Dose:  650 mg   Take 2 tablets (650 mg) by mouth every 4 hours as needed for mild pain or fever   Quantity:  60 tablet   Refills:  1       BLINK TEARS OP        Dose:  1 drop   Apply 1 drop to eye 3 times daily   Refills:  0       levothyroxine 88 MCG tablet   Commonly known as:  SYNTHROID/LEVOTHROID   Used for:  Hypothyroidism, unspecified type        Dose:  88 mcg   Take 1 tablet (88 mcg) by mouth daily   Quantity:  30 tablet   Refills:  2       loperamide 2 MG capsule   Commonly known as:  IMODIUM A-D   Used for:  Diarrhea, unspecified type        AND TAKE 2 CAPSULE EVERY 6 HOURS AS NEEDED FOR LOOSE STOOLS (MAX OF 16MG/24 HRS)   Quantity:  155 capsule   Refills:  3       LORazepam 0.5 MG tablet   Commonly known as:  ATIVAN   Used for:  Adenocarcinoma of colon (H)        Dose:  0.5 mg   Take 1 tablet (0.5 mg) by mouth every 4 hours as needed (Anxiety, Nausea/Vomiting or Sleep)   Quantity:  30 tablet   Refills:  2       NEW MED   Indication:  1 drop each eye tid   dry eye drops        Refills:  0       ondansetron 8 MG tablet   Commonly known as:  ZOFRAN   Used for:  Adenocarcinoma of colon (H)        Dose:  8 mg   Take 1 tablet (8 mg) by mouth every 8 hours as needed (Nausea/Vomiting)   Quantity:  10 tablet   Refills:  2       polyethylene glycol powder   Commonly known as:  MIRALAX   Used for:  Adenocarcinoma of colon (H)        Dose:  1 capful   Take 17 g (1 capful) by mouth daily   Quantity:  510 g   Refills:  1       prochlorperazine 10 MG tablet   Commonly known  as:  COMPAZINE   Used for:  Adenocarcinoma of colon (H)        Dose:  10 mg   Take 1 tablet (10 mg) by mouth every 6 hours as needed (Nausea/Vomiting)   Quantity:  30 tablet   Refills:  2       senna-docusate 8.6-50 MG per tablet   Commonly known as:  SENOKOT-S;PERICOLACE   Used for:  Adenocarcinoma of colon (H)        Dose:  1 tablet   Take 1 tablet by mouth 2 times daily   Quantity:  60 tablet   Refills:  1       SUMATRIPTAN SUCCINATE PO        Dose:  25 mg   Take 25 mg by mouth every 8 hours as needed for migraine   Refills:  0       travoprost (BAK Free) 0.004 % ophthalmic solution   Commonly known as:  TRAVATAN Z   Used for:  Primary open angle glaucoma of both eyes, unspecified glaucoma stage        Dose:  1 drop   Place 1 drop into both eyes At Bedtime   Quantity:  5 mL   Refills:  11       vitamin B-Complex   Used for:  S/P colectomy        Dose:  1 tablet   Take 1 tablet by mouth daily   Quantity:  90 tablet   Refills:  1                Protect others around you: Learn how to safely use, store and throw away your medicines at www.disposemymeds.org.         Follow-ups after your visit        Your next 10 appointments already scheduled     Jul 25, 2018 10:30 AM CDT   Level 5 with  INFUSION CHAIR 9   [facility] ([facility])    [facility]  [address]  [facility] [place] [address]   [phone]            Jul 25, 2018  2:00 PM CDT   Return Visit with Susan Lindo MD   [facility] ([facility])    [facility]  [address]  [facility] [address]   [phone]            Jul 31, 2018  1:30 PM CDT   Office Visit with Edgar Rodriguez MD   [facility] ([facility])    [address] [facility] [address] [phone]           Bring a current list of meds and any records pertaining to this visit. For Physicals, please bring  immunization records and any forms needing to be filled out. Please arrive 10 minutes early to complete paperwork.            Aug 01, 2018  1:00 PM CDT   Return Visit with Susan Lindo MD   [facility] ([facility])    [facility] [place]  [address]  [place] [address]   [phone]            Aug 03, 2018 11:30 AM CDT   Level 1 with  INFUSION CHAIR 16   [facility] ([facility])    [facility] [place] [address]  [place] [address]   [phone]               Care Instructions         Additional Information About Your Visit        Care EveryWhere ID     This is your Care EveryWhere ID. This could be used by other organizations to access your [facility] medical records  [id number]        Your Vitals Were     Blood Pressure Pulse Temperature Respirations          138/69 64 96.8  F (36  C) (Axillary) 16         Primary Care Provider Office Phone # Fax #    Edgar Neno Rodriguez [phone]      Equal Access to Services     [facility]: Hadii aad ku hadasho Soomaali, waaxda luqadaha, qaybta kaalmada nialy, ellis mooney . So [facility] [phone].    ATENCIÓN: Si habla español, tiene a roldan disposición servicios gratuitos de asistencia lingüística. [facility] [phone].    We comply with applicable federal civil rights laws and [place] laws. We do not discriminate on the basis of race, color, national origin, age, disability, sex, sexual orientation, or gender identity.            Thank you!     Thank you for choosing [facility] for your care. Our goal is always to provide you with excellent care. Hearing back from our patients is one way we can continue to improve our services. Please take a few minutes to complete the written survey that you may receive in the mail after you visit with us. Thank you!             Medication  List: This is a list of all your medications and when to take them. Check marks below indicate your daily home schedule. Keep this list as a reference.      Medications           Morning Afternoon Evening Bedtime As Needed    acetaminophen 325 MG tablet   Commonly known as:  TYLENOL   Take 2 tablets (650 mg) by mouth every 4 hours as needed for mild pain or fever                                BLINK TEARS OP   Apply 1 drop to eye 3 times daily                                levothyroxine 88 MCG tablet   Commonly known as:  SYNTHROID/LEVOTHROID   Take 1 tablet (88 mcg) by mouth daily                                loperamide 2 MG capsule   Commonly known as:  IMODIUM A-D   AND TAKE 2 CAPSULE EVERY 6 HOURS AS NEEDED FOR LOOSE STOOLS (MAX OF 16MG/24 HRS)                                LORazepam 0.5 MG tablet   Commonly known as:  ATIVAN   Take 1 tablet (0.5 mg) by mouth every 4 hours as needed (Anxiety, Nausea/Vomiting or Sleep)                                NEW MED                                ondansetron 8 MG tablet   Commonly known as:  ZOFRAN   Take 1 tablet (8 mg) by mouth every 8 hours as needed (Nausea/Vomiting)                                polyethylene glycol powder   Commonly known as:  MIRALAX   Take 17 g (1 capful) by mouth daily                                prochlorperazine 10 MG tablet   Commonly known as:  COMPAZINE   Take 1 tablet (10 mg) by mouth every 6 hours as needed (Nausea/Vomiting)                                senna-docusate 8.6-50 MG per tablet   Commonly known as:  SENOKOT-S;PERICOLACE   Take 1 tablet by mouth 2 times daily                                SUMATRIPTAN SUCCINATE PO   Take 25 mg by mouth every 8 hours as needed for migraine                                travoprost (BAK Free) 0.004 % ophthalmic solution   Commonly known as:  TRAVATAN Z   Place 1 drop into both eyes At Bedtime                                vitamin B-Complex   Take 1 tablet by mouth daily

## 2018-07-19 NOTE — PATIENT INSTRUCTIONS
NS bolus hydartion 1 L and zofran IV - ORDERS HAVE BEEN PLACED     AG- calling care suites to set up infusion    Take pericolace twice daily   Take miralax dailty as needed     Increase fluid intake      Schedule for labs chemo and follow up appointment with Dr Lindo next week     I have called and spoke with [facility] nurse and reviewed the above instructions and appointment with Dr. Lindo.

## 2018-07-19 NOTE — PROGRESS NOTES
PT is alert and oriented X4. Up with SBA, pivot to bed - wheelchair.  Patient admitted for IV fluid and Zofran.

## 2018-07-19 NOTE — PROGRESS NOTES
"Oncology Rooming Note    July 19, 2018 10:50 AM   Sherita Kenney is a 77 year old female who presents for:    Chief Complaint   Patient presents with     Oncology Clinic Visit     Initial Vitals: /71 (BP Location: Right arm, Patient Position: Chair, Cuff Size: Adult Regular)  Pulse 84  Temp 97.4  F (36.3  C) (Oral)  Resp 16  SpO2 97% Estimated body mass index is 17.98 kg/(m^2) as calculated from the following:    Height as of 6/26/18: 1.626 m (5' 4.02\").    Weight as of 6/26/18: 47.5 kg (104 lb 12.8 oz). There is no height or weight on file to calculate BSA.  Data Unavailable Comment: Data Unavailable   No LMP recorded. Patient is not currently having periods (Reason: Perimenopausal).  Allergies reviewed: Yes  Medications reviewed: Yes    Medications: Medication refills not needed today.  Pharmacy name entered into [facility]:    [facility], [place] - [address]  [facility], [address]    Clinical concerns: None    5 minutes for nursing intake (face to face time)     Shania Bravo [facility]              "

## 2018-07-19 NOTE — IP AVS SNAPSHOT
[place] [address]    [place] [address]    Phone:  [phone]                                       After Visit Summary   7/19/2018    Sherita Kenney    MRN: [id number]           After Visit Summary Signature Page     I have received my discharge instructions, and my questions have been answered. I have discussed any challenges I see with this plan with the nurse or doctor.    ..........................................................................................................................................  Patient/Patient Representative Signature      ..........................................................................................................................................  Patient Representative Print Name and Relationship to Patient    ..................................................               ................................................  Date                                            Time    ..........................................................................................................................................  Reviewed by Signature/Title    ...................................................              ..............................................  Date                                                            Time

## 2018-07-20 NOTE — PROGRESS NOTES
Oncology/Hematology Visit Note  Jul 19, 2018    Reason for Visit: follow up of colon cancer     History of Present Illness: Sherita Kenney is a 77 year old female with colon cancer. She is currently undergoing treatment with Maintenance infusional 5-FU and Avastin started on 09/21/2017. No bolus 5-FU.   Infusional 5-FU discontinued single agent Avastin continued.  March 15, 2018 CT shows progression of disease  M FOLFOX with Avastin started on 03/27/2018  CT scan of May 20, 2018 reveals stable disease  Patient comes today for  follow-up of lower abdominal pain      Interval History:  Patient tells me she has had lower normalized abdominal pain for 2 days.  She has some mild distention.  She had one small bowel movement today and the stool was really hard she feels constipated.  She denies nausea vomiting.  She tolerates p.o. and drinks lots of fluids.  She denies fever chills sweats denies cough denies shortness of breath.  She had headache 2 days ago but this resolved now.  She denies headache today denies dizziness denies seizures denies changes in vision.    Review of Systems:  14 point ROS of systems including Constitutional, Eyes, Respiratory, Cardiovascular, Gastroenterology, Genitourinary, Integumentary, Muscularskeletal, Psychiatric were all negative except for pertinent positives noted in my HPI.      Current Outpatient Prescriptions   Medication Sig Dispense Refill     polyethylene glycol (MIRALAX) powder Take 17 g (1 capful) by mouth daily 510 g 1     senna-docusate (SENOKOT-S;PERICOLACE) 8.6-50 MG per tablet Take 1 tablet by mouth 2 times daily 60 tablet 1     acetaminophen (TYLENOL) 325 MG tablet Take 2 tablets (650 mg) by mouth every 4 hours as needed for mild pain or fever 60 tablet 1     levothyroxine (SYNTHROID/LEVOTHROID) 88 MCG tablet Take 1 tablet (88 mcg) by mouth daily 30 tablet 2     loperamide (IMODIUM A-D) 2 MG capsule AND TAKE 2 CAPSULE EVERY 6 HOURS AS NEEDED FOR LOOSE STOOLS (MAX OF  16MG/24 HRS) 155 capsule 3     LORazepam (ATIVAN) 0.5 MG tablet Take 1 tablet (0.5 mg) by mouth every 4 hours as needed (Anxiety, Nausea/Vomiting or Sleep) 30 tablet 2     NEW MED        ondansetron (ZOFRAN) 8 MG tablet Take 1 tablet (8 mg) by mouth every 8 hours as needed (Nausea/Vomiting) 10 tablet 2     Polyethylene Glycol 400 (BLINK TEARS OP) Apply 1 drop to eye 3 times daily       prochlorperazine (COMPAZINE) 10 MG tablet Take 1 tablet (10 mg) by mouth every 6 hours as needed (Nausea/Vomiting) 30 tablet 2     SUMATRIPTAN SUCCINATE PO Take 25 mg by mouth every 8 hours as needed for migraine       travoprost, BAK Free, (TRAVATAN Z) 0.004 % ophthalmic solution Place 1 drop into both eyes At Bedtime 5 mL 11     vitamin B-Complex Take 1 tablet by mouth daily 90 tablet 1       Physical Examination:  General: The patient is a pleasant female in no acute distress.  /71 (BP Location: Right arm, Patient Position: Chair, Cuff Size: Adult Regular)  Pulse 84  Temp 97.4  F (36.3  C) (Oral)  Resp 16  SpO2 97%  HEENT: EOMI, PERRL. Sclerae are anicteric. Oral mucosa is pink and moist with no lesions or thrush.   Lymph: Neck is supple with no lymphadenopathy in the cervical or supraclavicular areas.   Heart: Regular rate and rhythm.   Lungs: Clear to auscultation bilaterally.   Abdomen: Bowel sounds present, soft, nontender with no palpable hepatosplenomegaly or masses.   Extremities: No lower extremity edema noted bilaterally.   Skin: No rashes, petechiae, or bruising noted on exposed skin.    Laboratory Data:  No results found for this or any previous visit (from the past 24 hour(s)).      Assessment and Plan:    This is a 78 y/o female  with     metastatic colon cancer with liver metastasis.  Diagnosed in April 2017.  Metastasis to liver and lymphadenopathy in the right lower quadrant  Previously treated with modified FOLFOX 6 with Avastin and maintenance 5-FU and Avastin. March 15, 2018 CT shows progression of  disease  M FOLFOX with Avastin started on 03/27/2018  CT scan of May 20, 2018 reveals stable disease  Patient comes today for routine follow-up prior chemotherapy.-Due to thrombocytopenia oxaliplatin and 5-FU dose was decreased by 20%  -She was scheduled for chemotherapy yesterday but she did not feel well and that was canceled  Patient is scheduled to see Dr. Lindo next week and and possible chemo      Elevated LFTs secondary to metastasis to the liver.  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 Monitor for now     Diarrhea-today she is constipated  chronic no changes in symptoms.  Hold Imodium for now since she is constipated    Generalized abdominal pain secondary to constipation  Patient has history of diarrhea and was taking Imodium around-the-clock.  Hold Imodium for now  I will start La-Colace bid and MiraLAX.  High-fiber foods discussed  Increase fluid intake  Give NS bolus hydration ×1    Patient is asked to call the event of fever chills sweats, cough shortness of breath chest pain.  Worsening of abdominal pain, abdominal bloating any changes in health condition.  Patient verbalized understanding    WILLY Vaaldez CNP

## 2018-07-24 NOTE — TELEPHONE ENCOUNTER
Reason for Call:  Home Health Care    [place] with [facility] called regarding (reason for call):  homecare orders    Orders are needed for this patient.  [place] needs orders for     PT:  Eval and treat for weakness and muscle loss     OT:     Skilled Nursing:     Pt Provider:     Phone Number Homecare Nurse can be reached at: [phone]    Can we leave a detailed message on this number? YES    Phone number patient can be reached at: Home number on file [phone] (home)    Best Time: any    Call taken on 7/24/2018 at 9:12 AM by Diego Valdes

## 2018-07-24 NOTE — TELEPHONE ENCOUNTER
Called Jessica to provide verbal orders for PT, OT, and skilled nursing.    Wellington HARRINGTON RN

## 2018-07-25 NOTE — TELEPHONE ENCOUNTER
"Patient was called today after not showing up for treatment. She has no specific complaints of just feeling \"lousy\" and is feeling forgetful with body aches and pains. She denies fever, diarrhea, vomiting. She is eating and drinking ok but verbalizes it could be better.    Patient encouraged to drink water rest, and come in tomorrow to be evaluated. Patient agreed to plan.      Will see patient tomorrow at 2pm for IVF's and labs with exam. Vale Molina    "

## 2018-07-26 NOTE — TELEPHONE ENCOUNTER
Requested Prescriptions   Pending Prescriptions Disp Refills     vitamin B-Complex 90 tablet 1     Sig: Take 1 tablet by mouth daily    There is no refill protocol information for this order        vitamin B-Complex      Last Written Prescription Date:  12/19/17  Last Fill Quantity: 90 tablet,   # refills: 1  Last Office Visit: 12/01/2017 (Michael)  Future Office visit:    Next 5 appointments (look out 90 days)     Jul 26, 2018  2:00 PM CDT   Return Visit with Susan Lindo MD   [facility] ([facility]    [facility]  [address]  [place] [address]   [phone]            Jul 31, 2018  1:30 PM CDT   Office Visit with Edgar Rodriguez MD   [facility]    [address] [address]   [phone]            Aug 01, 2018  1:00 PM CDT   Return Visit with Susan Lindo MD   [facility] ([facility])    [facility]  [address]  [place] [address]   [phone]                   Routing refill request to provider for review/approval because:  Drug not on the [facility], [facility] or [facility] refill protocol or controlled substance

## 2018-07-26 NOTE — TELEPHONE ENCOUNTER
Prescription approved per [facility] Refill Protocol.  Bhakti Amor RN

## 2018-07-26 NOTE — TELEPHONE ENCOUNTER
"I called and spoke with patient today after being notified that she did not come to today's appointment. She is eating fair and drinking well denies nausea, vomiting or diarrhea. But verbalizes feeling \"lousy\" again very not specific. She states she is cold needing blankets. I asked if she had a fever or checked her temperature recently and she informed me that her thermometer does not work, but would call the \"staff\" to have one of them  her temperature and return my call.       I called patient back because she did not return my call and she informed me that the staff didn't understand what she was asking . There is a language barrier.     She will call with any concerns or questions. I will follow up with patient on Monday. Vale Molina    "

## 2018-07-27 NOTE — TELEPHONE ENCOUNTER
Called Jens with [facility] to provide verbal order for PT.    Wellington HARRINGTON RN

## 2018-07-27 NOTE — TELEPHONE ENCOUNTER
Reason for Call:  Home Health Care    [facility] with [facility] called regarding (reason for call): PT Order    Orders are needed for this patient. PT Order     PT: 1 x 4, Mostly Balance     Pt Provider: Jens    Phone Number Homecare Nurse can be reached at: Jens [phone]    Can we leave a detailed message on this number? YES    Best Time: Anytime    Call taken on 7/27/2018 at 11:29 AM by Jess Gustafson

## 2018-08-01 NOTE — TELEPHONE ENCOUNTER
I called patient to follow up with her No Show to her treatment appointment.  I was able to speak with naina and she describes feeling lousy. She has been having diarrhea, abdominal pain, and denies fever. Patient hasn't been taking anything as she was not sure what she should take. I reviewed medication list and patient has have a prescription for Imodium.    I spoke with Dr Lindo and he ordered for patient to be seen and approved patient to take her Imodium.    Patient has been added on to Dr. Lindo's and infusions scheduled.

## 2018-08-02 NOTE — TELEPHONE ENCOUNTER
Leanne from [facility] called requesting a call back at [phone]. Message routed to CC.

## 2018-08-03 NOTE — TELEPHONE ENCOUNTER
Received call from RN manager, Andreina Montano, who reported that her perception of Sherita's missed visit was that she was not feeling well. Andreina reviewed that Sherita continues to make own appointments and organize own schedule. Andreina will follow-up with Sherita about engaging with additional resources at [facility] for emotional support, and will support pt getting newspaper. Per Andreina and chart review, pt's POA is brother-in-law.     Please call in the case of psychosocial distress or concern.     MARAL Hernandez, [facility] Center[facility]  Phone: [phone]  Pager: [phone]    [facility]: M, T  *every other Thursday, 8am-4:30pm  [facility]: W, F, *every other Thursday, 8am-4:30pm

## 2018-08-03 NOTE — TELEPHONE ENCOUNTER
"Social Work Progress Note      Data/Intervention:  Patient Name:  Sherita Kenney  /Age:  1940 (77 year old)    Reason for Follow-Up:  Sherita is a 77-year-old woman with a diagnosis of metastatic colon cancer. This clinician received referral from [facility] for emotional support.     Intervention:   Sherita reports today that she has been feeling \"lousy,\" having been \"in bed for days.\" Sherita reports that she had not been eating much, has headaches, lack of energy, and reports that she \"can't remember things.\" Sherita reports that due to changes in energy and lack of food that she enjoys eating at residence she has been feeling \"more depressed.\" This clinician expressed concern about Sherita's coping at home and difficulty remembering information like clinic appointments. Sherita reported that she would be open to friend Clara (HCP)'s involvement in care, and indicated that she would be open to this clinician calling Clara and informing her of next visit with Dr. Lindo on 18. Sherita reports that due to changes in her memory, she has noticed that she is having a more difficult time managing her finances. Sherita reports that she wishes that she were able to receive the newspaper daily, but that she has not been able to manage this independently. Provided general information on benefit of completing legal paperwork of durable power of  for finances. Sherita reports that she would want friend Clara to be durable power of  for finances, not her sister. Sherita reported that she would like this clinician to give Clara information on legal support for durable power of . Provided emotional support to Sherita surrounding understandable frustration about changes in cognition and physical health, and offered outpatient group support as well as exploring what avenues for in-person therapeutic support might be available to pt at residence. Sherita open to this clinician speaking with floor RN regarding upcoming " appointment and therapeutic support.     This clinician called resident care coordinator and LVM, requesting return call at earliest convenience. This clinician called Clara and LVM, requesting return call at earliest convenience.     Assessment:  Sherita is artistic and thoughtful woman with many close and supportive friends. Sherita appears to be struggling with cognitive and physical changes, and asked this clinician my name and role 5 different times throughout phone encounter. This clinician feels that Sherita may benefit from engaging her friends more closely in her care and needs at residence, as it appears she could benefit from additional emotional and practical support. Sherita is open to group support, but does not feel well enough to receive this at current time.     Plan:  1) This clinician will attempt to reach [facility] care coordinator and friend Clara again on 8/6/18 if they do not return this clinician's call prior. This clinician will continue to follow for ongoing psychosocial support as Sherita continues to adjust to treatment and realize its impacts  2) This clinician will continue to collaborate with pt's ongoing care team regarding concerns and needs.     Please call or page if needs or concerns arise.     MARAL Hernandez, [facility] Center[facility]  Direct Phone: [phone]  Pager: [phone]

## 2018-08-03 NOTE — TELEPHONE ENCOUNTER
"I called and spoke with Leanne and she states that patient has seemed weak, less interested in eating, and over all \"down\". They are aware of her complaints of diarrhea and offer the imodium. They have encouraged her to make it to these appointments or go to Urgent care and she declines.      I have contact our  to offer supportive services that might benefit the patient.    Patient is scheduled for 8/7. Vale Molina    "

## 2018-08-07 NOTE — TELEPHONE ENCOUNTER
Spoke with Sherita who stated this appointment today was not on her calendar.  I asked her how she was feeling and she stated she had intermittent diarrhea, this is not a new thing but that she feels weak, too weak to make it to a lunch date with a friend today.    I asked her about transportation and if that is an obstacle in keeping appointments and she stated no, she can take a cab or the assisted living facility she lives in has transportation from 10a-3pm that needs to be booked in advance.  I asked how I can help her remember her appointments and how we can help her feel better, she stated she realizes she needs to be seen and requested to re-schedule the appointment.     Appointment rs to Tuesday 8/14/18.   Writer called her assisted living facilty and spoke with RENÉE العلي who stated Sherita is refusing visitors, she does eat and sips fluids.  She declined a visit from her sister last week and is not as social as she used to be.  I did ask Severiano to help remind Sherita of her appointment , she stated she will.    Dr. Lindo updated.    Will route to [facility] for further care coordination.  [facility] [phone]  Nurses- Leanne 6a-2p  and Severiano 1p-10pm

## 2018-08-08 NOTE — TELEPHONE ENCOUNTER
"I called and spoke with jose to follow up her missed appointments and she states today she is doing well. She ate a great lunch of fish with \"crispies\", no diarrhea episodes for over 24 hours. She is drinking some water although from discussion I don't gather it's much but says her urin is yellow and not dark in color. Her brother and sister in law will be taking her to her appointment that now has been rescheduled for 8/14 at 10am. She states she has written it down on her calendar    Vale Molina    "

## 2018-08-09 NOTE — TELEPHONE ENCOUNTER
Verbal approval given per request below.Homecare/hospice agency to fax orders for provider signature.     Radha SALCEDO RN

## 2018-08-09 NOTE — TELEPHONE ENCOUNTER
Reason for Call:  Home Health Care    Jens with [facility] called regarding (reason for call): Verbal Orders    Orders are needed for this patient. PT    PT: 1x week for 2 weeks, an additional two weeks      OT:     Skilled Nursing:     Pt Provider:     Phone Number Homecare Nurse can be reached at: [phone]    Can we leave a detailed message on this number? YES    Phone number patient can be reached at: Home number on file [phone] (home)    Best Time: anytime     Call taken on 8/9/2018 at 11:55 AM by Michelle Lynne

## 2018-08-14 NOTE — TELEPHONE ENCOUNTER
Rochelle from [facility] is calling back requesting an H and P   Fax: [phone]  . [phone]

## 2018-08-14 NOTE — TELEPHONE ENCOUNTER
Reason for Call:  Home Health Care    Rochelle with [facility] called regarding (reason for call):     Orders are needed for this patient.     Hospice: Eval and treat     Fax: [phone]    Pt Provider: Dr. Rodriguez    Phone Number Homecare Nurse can be reached at: [phone]    Can we leave a detailed message on this number? YES      Call taken on 8/14/2018 at 11:03 AM by Mojgan Flores

## 2018-08-14 NOTE — TELEPHONE ENCOUNTER
Called and spoke with a nurse at [facility]. Order approved for [facility] to evaluate and treat. They will fax orders for  to sign.

## 2018-08-17 NOTE — TELEPHONE ENCOUNTER
"I called to talk with Sherita to follow up on the several missed appointments. The sound of her voice had good energy. She verbalized appreciation in my reachnig out to her. We discussed the most recent missed appointment and she states she just couldn't do it stating her sister just takes over the appointment. I stated that her sister hasn't always come tot the appointment an does not have to come that she has control over that. I also  Inquired if patient just doesn't want treatment, her response was \"absolutely that was not the case\". She has been eating fair explaining that the food is always the same and it does get very boring and she would love to eat out. She has an appetite to want to eat and is drinking water. She declines wanting further support services from our .       I have sent a a request for schedulers to reach out to patient to coordinate treatment and exam. Per patient she would like to go forward with treatment. Vale Molina      "

## 2018-08-17 NOTE — TELEPHONE ENCOUNTER
"Fax from pharmacy requesting new Rx for Norco 5-325.  Medication was stopped 7/19/18 for \"med rec clean up\" at hospital visit.    Dr Rodriguez - are you still managing care for this patient?  Or should this request perhaps be forwarded to Oncology?    LOV 12-1-17 Michael, patient NO SHOW 7-31-18.    Patient is residing at [facility].  Rx would need to be mailed to pharmacy ([facility] [place]) (fax first is OK).    Sintia Shaw, RT (R)    "

## 2018-08-17 NOTE — TELEPHONE ENCOUNTER
It looks like Oncology has been managing this medication. I'm willing to take over the prescription; however, the patient will need to do a clinic visit so we can discuss chronic opioid use and have her sign a controlled substance agreement. I have not seen her since December of last year.

## 2018-08-20 NOTE — TELEPHONE ENCOUNTER
Called patient at both Home and Cell numbers: No answer, left VM to return call to clinic.     Radha SALCEDO RN

## 2018-08-22 NOTE — TELEPHONE ENCOUNTER
Called Riaz and provided verbal orders for PT.    PCP: DME wheelchair pended, please review and authorize if appropriate.    Thank you,  Wellington HARRINGTON RN

## 2018-08-22 NOTE — TELEPHONE ENCOUNTER
Reason for Call:  Home Health Care    Riaz with [facility] called regarding (reason for call): Orders    Orders are needed for this patient.  PT     PT: Vani    Also, needs a Order for a Wheel Chair cause she's been Weak and can't walk     Pt Provider:     Phone Number Homecare Nurse can be reached at: [phone]  Fax # [phone]    Can we leave a detailed message on this number? YES        Call taken on 8/22/2018 at 4:02 PM by Brad Thorne

## 2018-08-23 NOTE — TELEPHONE ENCOUNTER
"Last Written Prescription Date:  5/30/18  Last Fill Quantity: 30 tablet,  # refills: 2   Last office visit: 12/1/2017 with prescribing provider:  Michael   Future Office Visit:   Next 5 appointments (look out 90 days)     Aug 29, 2018  1:00 PM CDT   Return Visit with Susan Lindo MD   [facility] ([facility])    [facility]  [address]  [place] [address] [phone]                 Requested Prescriptions   Pending Prescriptions Disp Refills     levothyroxine (SYNTHROID/LEVOTHROID) 88 MCG tablet 30 tablet 2     Sig: Take 1 tablet (88 mcg) by mouth daily    Thyroid Protocol Failed    8/23/2018  9:23 AM       Failed - Normal TSH on file in past 12 months    Recent Labs   Lab Test  03/22/18   0948   TSH  4.63*             Passed - Patient is 12 years or older       Passed - Recent (12 mo) or future (30 days) visit within the authorizing provider's specialty    Patient had office visit in the last 12 months or has a visit in the next 30 days with authorizing provider or within the authorizing provider's specialty.  See \"Patient Info\" tab in inbasket, or \"Choose Columns\" in Meds & Orders section of the refill encounter.           Passed - No active pregnancy on record    If patient is pregnant or has had a positive pregnancy test, please check TSH.         Passed - No positive pregnancy test in past 12 months    If patient is pregnant or has had a positive pregnancy test, please check TSH.            "

## 2018-08-24 NOTE — TELEPHONE ENCOUNTER
In May 26 th phone encounter patient was told to f/u in 2 months with OV for symptoms and TSH receck.  RECEPTION: you can make appt and then route back for refill.     Left message for patient to call triage back    Zenaida Ford RN- Triage [employer]

## 2018-08-29 NOTE — MR AVS SNAPSHOT
After Visit Summary   8/29/2018    Sherita Kenney    MRN: [id number]           Patient Information     Date Of Birth          1940        Visit Information        Provider Department      8/29/2018 1:00 PM Susan Lindo MD [facility]        Today's Diagnoses     Adenocarcinoma of colon (H)    -  1      Care Instructions    Discontinue chemotherapy.[facility]  IV NS 1 litre today. [facility]  Start oxycodone as needed for pain.sent to pharmacy  Take lorazepam as needed. sent to pharmacy  Hospice.  Follow up as needed.          Follow-ups after your visit        Additional Services     HOSPICE REFERRAL       **Order classes of: [facility], [facility] and [facility] will route to the Home Care and Hospice Referral Pool.  Home Care or Hospice will then contact the patient to schedule their appointment.**    Hospice eligibility overview: prognosis of 6 months or less, end stage disease, patient goals are comfort only, patient is not seeking curative treatment.    If you do not hear from Hospice, or you would like to call to schedule, please call the referring place of service that your provider has listed below.  ______________________________________________________________________    Your provider has referred you to: [facility]: [facility] [phone]   [email or web address]    Anticipated discharge date 8/29/2018. Homecare to begin within 48 hours of discharge.  PLEASE Schedule Hospice consult for 24 - 48 hours.  Please call if there is need for a variance to this timeline.    REASON FOR REFERRAL: Hospice Diagnosis: adenocarcinoma of the colon    ADDITIONAL SERVICES NEEDED: to be assessed    OTHER PERTINENT INFORMATION: Patient was last seen by provider on 8/29/18 for for oncology needs.  Factors that indicate prognosis of less than 6 months:  Functional decline: End stage disease:  cancer    Current Outpatient Prescriptions:  LORazepam (ATIVAN) 0.5 MG tablet, Take 1 tablet (0.5 mg) by mouth every 4 hours as needed (Anxiety, Nausea/Vomiting or Sleep), Disp: 30 tablet, Rfl: 2  oxyCODONE IR (ROXICODONE) 5 MG tablet, Take 1 tablet (5 mg) by mouth every 6 hours as needed for severe pain, Disp: 60 tablet, Rfl: 0  acetaminophen (TYLENOL) 325 MG tablet, Take 2 tablets (650 mg) by mouth every 4 hours as needed for mild pain or fever, Disp: 60 tablet, Rfl: 1  levothyroxine (SYNTHROID/LEVOTHROID) 88 MCG tablet, Take 1 tablet (88 mcg) by mouth daily, Disp: 30 tablet, Rfl: 2  loperamide (IMODIUM A-D) 2 MG capsule, AND TAKE 2 CAPSULE EVERY 6 HOURS AS NEEDED FOR LOOSE STOOLS (MAX OF 16MG/24 HRS), Disp: 155 capsule, Rfl: 3  NEW MED, , Disp: , Rfl:   ondansetron (ZOFRAN) 8 MG tablet, Take 1 tablet (8 mg) by mouth every 8 hours as needed (Nausea/Vomiting), Disp: 10 tablet, Rfl: 2  order for DME, Equipment being ordered: Wheelchair  Please fax to [phone]., Disp: 1 Units, Rfl: 0  polyethylene glycol (MIRALAX) powder, Take 17 g (1 capful) by mouth daily, Disp: 510 g, Rfl: 1  Polyethylene Glycol 400 (BLINK TEARS OP), Apply 1 drop to eye 3 times daily, Disp: , Rfl:   prochlorperazine (COMPAZINE) 10 MG tablet, Take 1 tablet (10 mg) by mouth every 6 hours as needed (Nausea/Vomiting), Disp: 30 tablet, Rfl: 2  senna-docusate (SENOKOT-S;PERICOLACE) 8.6-50 MG per tablet, Take 1 tablet by mouth 2 times daily, Disp: 60 tablet, Rfl: 1  SUMATRIPTAN SUCCINATE PO, Take 25 mg by mouth every 8 hours as needed for migraine, Disp: , Rfl:   travoprost, BAK Free, (TRAVATAN Z) 0.004 % ophthalmic solution, Place 1 drop into both eyes At Bedtime, Disp: 5 mL, Rfl: 11  vitamin B-Complex, Take 1 tablet by mouth daily, Disp: 90 tablet, Rfl: 1      Patient Active Problem List:     Abnormal gait     Lumbago     Osteoarthritis of hip     DJD (degenerative joint disease) of knee     DJD (degenerative joint disease), lumbar      Adenocarcinoma of colon (H)     Iron deficiency anemia     Postpolio syndrome     Severe protein-calorie malnutrition (H)     Physical deconditioning     Cognitive impairment     Primary osteoarthritis of right hand     Flail joint     Scoliosis     Port-a-cath in place     ACP (advance care planning)     Memory loss     Hypertension     Hypothyroidism, unspecified type    This patient is under my care, and I have initiated the establishment of the plan of care. This patient will be followed by a physician who will periodically review the plan of care.    Physician/Provider to provide follow up care: Edgar Rodriguez    [facility] certified Physician at time of discharge: Dr. Susan Lindo    Please be aware that coverage of these services is subject to the terms and limitations of your health insurance plan.  Call member services at your health plan with any benefit or coverage questions.                  Who to contact     If you have questions or need follow up information about today's clinic visit or your schedule please contact [facility] directly at [phone].  Normal or non-critical lab and imaging results will be communicated to you by MyChart, letter or phone within 4 business days after the clinic has received the results. If you do not hear from us within 7 days, please contact the clinic through MyChart or phone. If you have a critical or abnormal lab result, we will notify you by phone as soon as possible.  Submit refill requests through [employer] or call your pharmacy and they will forward the refill request to us. Please allow 3 business days for your refill to be completed.          Additional Information About Your Visit        Care EveryWhere ID     This is your Care EveryWhere ID. This could be used by other organizations to access your [place] medical records  [id number]        Your Vitals Were     Pulse Temperature Respirations Pulse Oximetry BMI (Body  Mass Index)       80 98  F (36.7  C) (Oral) 16 96% 16.13 kg/m2        Blood Pressure from Last 3 Encounters:   No data found for BP    Weight from Last 3 Encounters:   No data found for Wt              Today, you had the following     No orders found for display         Today's Medication Changes          These changes are accurate as of 8/29/18 11:59 PM.  If you have any questions, ask your nurse or doctor.               Start taking these medicines.        Dose/Directions    NUTRITIONAL SUPPLEMENT PLUS Liqd   Used for:  Physical deconditioning   Started by:  Edgar Rodriguez MD        Dose:  8 oz   Take 8 oz by mouth 3 times daily Chocolate Ensure   Quantity:  720 mL   Refills:  PRN       oxyCODONE IR 5 MG tablet   Commonly known as:  ROXICODONE   Used for:  Adenocarcinoma of colon (H)   Started by:  Susan Lindo MD        Dose:  5 mg   Take 1 tablet (5 mg) by mouth every 6 hours as needed for severe pain   Quantity:  60 tablet   Refills:  0            Where to get your medicines      These medications were sent to [facility]  [place] - [address]  [address], [facility] [address]     Phone:  [phone]     NUTRITIONAL SUPPLEMENT PLUS Liqd         Some of these will need a paper prescription and others can be bought over the counter.  Ask your nurse if you have questions.     Bring a paper prescription for each of these medications     LORazepam 0.5 MG tablet    oxyCODONE IR 5 MG tablet                Primary Care Provider Office Phone # Fax #    Edgar Rodriguez [phone] [address]  [facility] [address]        Equal Access to Services     [facility] CenterEZEKIEL : Hadii aad ku hadasho Soomaali, waaxda luqadaha, qaybta kaalmada adeegyayvon, ellis ren. So [facility] [phone].    ATENCIÓN: Si habla español, tiene a roldan disposición servicios gratuitos de asistencia lingüística. Llame al  [phone].    We comply with applicable federal civil rights laws and [place] laws. We do not discriminate on the basis of race, color, national origin, age, disability, sex, sexual orientation, or gender identity.            Thank you!     Thank you for choosing [facility]  for your care. Our goal is always to provide you with excellent care. Hearing back from our patients is one way we can continue to improve our services. Please take a few minutes to complete the written survey that you may receive in the mail after your visit with us. Thank you!             Your Updated Medication List - Protect others around you: Learn how to safely use, store and throw away your medicines at www.disposemymeds.org.          This list is accurate as of 8/29/18 11:59 PM.  Always use your most recent med list.                   Brand Name Dispense Instructions for use Diagnosis    acetaminophen 325 MG tablet    TYLENOL    60 tablet    Take 2 tablets (650 mg) by mouth every 4 hours as needed for mild pain or fever    Cancer associated pain       BLINK TEARS OP      Apply 1 drop to eye 3 times daily        levothyroxine 88 MCG tablet    SYNTHROID/LEVOTHROID    30 tablet    Take 1 tablet (88 mcg) by mouth daily    Hypothyroidism, unspecified type       loperamide 2 MG capsule    IMODIUM A-D    155 capsule    AND TAKE 2 CAPSULE EVERY 6 HOURS AS NEEDED FOR LOOSE STOOLS (MAX OF 16MG/24 HRS)    Diarrhea, unspecified type       LORazepam 0.5 MG tablet    ATIVAN    30 tablet    Take 1 tablet (0.5 mg) by mouth every 4 hours as needed (Anxiety, Nausea/Vomiting or Sleep)    Adenocarcinoma of colon (H)       NEW MED           NUTRITIONAL SUPPLEMENT PLUS Liqd     720 mL    Take 8 oz by mouth 3 times daily Chocolate Ensure    Physical deconditioning       ondansetron 8 MG tablet    ZOFRAN    10 tablet    Take 1 tablet (8 mg) by mouth every 8 hours as needed (Nausea/Vomiting)    Adenocarcinoma of colon (H)       order for DME      1 Units    Equipment being ordered: Wheelchair Please fax to [phone].    Physical deconditioning, Adenocarcinoma of colon (H), Generalized muscle weakness       oxyCODONE IR 5 MG tablet    ROXICODONE    60 tablet    Take 1 tablet (5 mg) by mouth every 6 hours as needed for severe pain    Adenocarcinoma of colon (H)       polyethylene glycol powder    MIRALAX    510 g    Take 17 g (1 capful) by mouth daily    Adenocarcinoma of colon (H)       prochlorperazine 10 MG tablet    COMPAZINE    30 tablet    Take 1 tablet (10 mg) by mouth every 6 hours as needed (Nausea/Vomiting)    Adenocarcinoma of colon (H)       senna-docusate 8.6-50 MG per tablet    SENOKOT-S;PERICOLACE    60 tablet    Take 1 tablet by mouth 2 times daily    Adenocarcinoma of colon (H)       SUMATRIPTAN SUCCINATE PO      Take 25 mg by mouth every 8 hours as needed for migraine        travoprost (BAK Free) 0.004 % ophthalmic solution    TRAVATAN Z    5 mL    Place 1 drop into both eyes At Bedtime    Primary open angle glaucoma of both eyes, unspecified glaucoma stage       vitamin B-Complex     90 tablet    Take 1 tablet by mouth daily    S/P colectomy

## 2018-08-29 NOTE — MR AVS SNAPSHOT
After Visit Summary   8/29/2018    Sherita Kenney    MRN: [id number]           Patient Information     Date Of Birth          1940        Visit Information        Provider Department      8/29/2018 11:30 AM  [facility] [place]        Today's Diagnoses     Adenocarcinoma of colon (H)    -  1       Follow-ups after your visit        Who to contact     If you have questions or need follow up information about today's clinic visit or your schedule please contact [facility] directly at [phone].  Normal or non-critical lab and imaging results will be communicated to you by MyChart, letter or phone within 4 business days after the clinic has received the results. If you do not hear from us within 7 days, please contact the clinic through MyChart or phone. If you have a critical or abnormal lab result, we will notify you by phone as soon as possible.  Submit refill requests through [employer] or call your pharmacy and they will forward the refill request to us. Please allow 3 business days for your refill to be completed.          Additional Information About Your Visit        Care EveryWhere ID     This is your Care EveryWhere ID. This could be used by other organizations to access your [place] medical records  [id number]        Your Vitals Were     Pulse Temperature Respirations Pulse Oximetry BMI (Body Mass Index)       80 98  F (36.7  C) (Oral) 16 96% 16.13 kg/m2        Blood Pressure from Last 3 Encounters:   08/29/18 117/70   08/29/18 117/70   07/19/18 138/69    Weight from Last 3 Encounters:   08/29/18 42.6 kg (94 lb)   08/29/18 42.6 kg (94 lb)   06/26/18 47.5 kg (104 lb 12.8 oz)              Today, you had the following     No orders found for display         Today's Medication Changes          These changes are accurate as of 8/29/18  2:17 PM.  If you have any questions, ask your nurse or doctor.                Start taking these medicines.        Dose/Directions    oxyCODONE IR 5 MG tablet   Commonly known as:  ROXICODONE   Used for:  Adenocarcinoma of colon (H)   Started by:  Susan Lindo MD        Dose:  5 mg   Take 1 tablet (5 mg) by mouth every 6 hours as needed for severe pain   Quantity:  60 tablet   Refills:  0            Where to get your medicines      Some of these will need a paper prescription and others can be bought over the counter.  Ask your nurse if you have questions.     Bring a paper prescription for each of these medications     LORazepam 0.5 MG tablet    oxyCODONE IR 5 MG tablet                Primary Care Provider Office Phone # Fax #    Edgar Neno Rodriguez [phone] [address] [address]        Equal Access to Services     ALEXIS ALBA : Hadii mateo gaticao Sogabriel, waaxda luqadaha, qaybta kaalmada adetedyayvon, ellis mooney . So [facility] [phone].    ATENCIÓN: Si habla español, tiene a roldan disposición servicios gratuitos de asistencia lingüística. Llame al [phone].    We comply with applicable federal civil rights laws and [place] laws. We do not discriminate on the basis of race, color, national origin, age, disability, sex, sexual orientation, or gender identity.            Thank you!     Thank you for choosing [facility]  for your care. Our goal is always to provide you with excellent care. Hearing back from our patients is one way we can continue to improve our services. Please take a few minutes to complete the written survey that you may receive in the mail after your visit with us. Thank you!             Your Updated Medication List - Protect others around you: Learn how to safely use, store and throw away your medicines at www.disposemymeds.org.          This list is accurate as of 8/29/18  2:17 PM.  Always use your most recent med list.                   Brand  Name Dispense Instructions for use Diagnosis    acetaminophen 325 MG tablet    TYLENOL    60 tablet    Take 2 tablets (650 mg) by mouth every 4 hours as needed for mild pain or fever    Cancer associated pain       BLINK TEARS OP      Apply 1 drop to eye 3 times daily        levothyroxine 88 MCG tablet    SYNTHROID/LEVOTHROID    30 tablet    Take 1 tablet (88 mcg) by mouth daily    Hypothyroidism, unspecified type       loperamide 2 MG capsule    IMODIUM A-D    155 capsule    AND TAKE 2 CAPSULE EVERY 6 HOURS AS NEEDED FOR LOOSE STOOLS (MAX OF 16MG/24 HRS)    Diarrhea, unspecified type       LORazepam 0.5 MG tablet    ATIVAN    30 tablet    Take 1 tablet (0.5 mg) by mouth every 4 hours as needed (Anxiety, Nausea/Vomiting or Sleep)    Adenocarcinoma of colon (H)       NEW MED           ondansetron 8 MG tablet    ZOFRAN    10 tablet    Take 1 tablet (8 mg) by mouth every 8 hours as needed (Nausea/Vomiting)    Adenocarcinoma of colon (H)       order for DME     1 Units    Equipment being ordered: Wheelchair Please fax to [phone].    Physical deconditioning, Adenocarcinoma of colon (H), Generalized muscle weakness       oxyCODONE IR 5 MG tablet    ROXICODONE    60 tablet    Take 1 tablet (5 mg) by mouth every 6 hours as needed for severe pain    Adenocarcinoma of colon (H)       polyethylene glycol powder    MIRALAX    510 g    Take 17 g (1 capful) by mouth daily    Adenocarcinoma of colon (H)       prochlorperazine 10 MG tablet    COMPAZINE    30 tablet    Take 1 tablet (10 mg) by mouth every 6 hours as needed (Nausea/Vomiting)    Adenocarcinoma of colon (H)       senna-docusate 8.6-50 MG per tablet    SENOKOT-S;PERICOLACE    60 tablet    Take 1 tablet by mouth 2 times daily    Adenocarcinoma of colon (H)       SUMATRIPTAN SUCCINATE PO      Take 25 mg by mouth every 8 hours as needed for migraine        travoprost (BAK Free) 0.004 % ophthalmic solution    TRAVATAN Z    5 mL    Place 1 drop into both eyes At Bedtime     Primary open angle glaucoma of both eyes, unspecified glaucoma stage       vitamin B-Complex     90 tablet    Take 1 tablet by mouth daily    S/P colectomy

## 2018-08-29 NOTE — TELEPHONE ENCOUNTER
Called pt she is in bad shape she said. She states that she can not walk with out holding on to something and is very lethargic.  Transferred to Triage.

## 2018-08-29 NOTE — TELEPHONE ENCOUNTER
Reason for Call: Request for an written orders fax to [phone].    Order or referral being requested: Need written order for adminstrating the rx just given to patient of oxycodine please fax to [phone].  Also need Chocolate Ensure TID also need orders for per patient.    Date needed: Today    Has the patient been seen by the PCP for this problem? YES    Additional comments: Patient ws seen today and bought the rx of oxycodine back with her to the [facility] without any written orders, so will be waiting to administer until fax is received from physician.    Phone number Nurse at Home care facility can be reached at:  Riaz   [phone]   Fax is [phone]    Best Time:  Today    Can we leave a detailed message on this number?  YES    Call taken on 8/29/2018 at 3:07 PM by Seema Garrett

## 2018-08-29 NOTE — TELEPHONE ENCOUNTER
Just called back [facility] and asked the nurse to look at the bottle to see if it was Dr Rodriguez that rx the drug.  She looked and stated it was Dr Lindo, I asked her to call and ask the dr who rx the drug to write the orders and not Dr Duke.  But could Dr Duke write the orders for the Ensure please.

## 2018-08-29 NOTE — TELEPHONE ENCOUNTER
Rx instructions faxed.    PCP,   Pt requesting chocolate ensure TID, needs order per [place] staff.  Pharm pended.    Thank you,  Wellington HARRINGTON RN

## 2018-08-29 NOTE — Clinical Note
"    8/29/2018         RE: Sherita Kenney  [address]  [place] [address]        Dear Colleague,    Thank you for referring your patient, Sherita Kenney, to the [facility]. Please see a copy of my visit note below.    Oncology Rooming Note    August 29, 2018 11:51 AM   Sherita Kenney is a 77 year old female who presents for:    Chief Complaint   Patient presents with     Oncology Clinic Visit     Adenocarcinoma of colon      Initial Vitals: /70  Pulse 80  Temp 98  F (36.7  C) (Oral)  Resp 16  Wt 42.6 kg (94 lb)  SpO2 96%  BMI 16.13 kg/m2 Estimated body mass index is 16.13 kg/(m^2) as calculated from the following:    Height as of 6/26/18: 1.626 m (5' 4.02\").    Weight as of this encounter: 42.6 kg (94 lb). Body surface area is 1.39 meters squared.  Data Unavailable Comment: Data Unavailable   No LMP recorded. Patient is not currently having periods (Reason: Perimenopausal).  Allergies reviewed: Yes  Medications reviewed: Yes    Medications: Medication refills not needed today.  Pharmacy name entered into [facility]:    [facility]  [place] - [address]  [facility] [address]    Clinical concerns: None                       4 minutes for nursing intake (face to face time)     April Bazzi MA              Visit Date:   08/29/2018      SUBJECTIVE:  Ms. Kenney is a 77-year-old female with metastatic colon cancer.  She has been on palliative chemotherapy.  Last treatment was on 07/26/06/2018 with 5-FU, oxaliplatin and Avastin.  Since then, the patient has missed multiple appointments.      The patient was brought to the clinic by a friend.  Overall, patient's condition has been deteriorating.  She has been more weak.  Appetite is decreased.  She is losing weight.  She does not feel like doing anything.      REVIEW OF SYSTEMS:  No headache.  Some dizziness.  No chest pain.  She gets some discomfort in " the right side of the abdomen and some also in the right side of the back.  No abdominal distention.  No nausea.  Appetite is decreased.  Does not feel like eating.  No bleeding.      PHYSICAL EXAMINATION:   GENERAL:  She is alert, oriented x 3.   VITAL SIGNS:  Reviewed.     ECOG PS of 3.   The rest of the system not examined.      ASSESSMENT:  A 77-year-old female with metastatic colon cancer.  This is progressing.  The patient has worsening weakness, decreased appetite and weight loss, all indicative of progression of disease.      PLAN:   1.  I discussed with her regarding colon cancer.  I told her clinically it is progressing.  Her ECOG PS is 3.  I would not recommend any further chemotherapy.  She is MSI high.  One option would be to treat her with immunotherapy.  This was discussed.      After discussion, patient does not want any further treatment.  She wants comfort care, which is very reasonable in her case.      We discussed regarding hospice.  The patient wants hospice.  A hospice consult will be obtained.      2.  We will give her IV fluids as her overall appetite has not been good.   3.  The patient has right-sided abdominal pain.  This is likely from liver metastasis.  I gave her a prescription of oxycodone to be taken as needed.  She also wanted lorazepam refill which was given.   4.  The patient advised to call us with any questions.  No return appointment being made as she is going on hospice care.      Total face-to-face time spent 45 minutes, most of time spent in counseling and coordination of care.         OMER MAKI MD             D: 2018   T: 2018   MT: DOROTHY      Name:     SEVERO SWANN   MRN:      [id number]        Account:      [id number]   :      1940           Visit Date:   2018      Document: [id number]       Again, thank you for allowing me to participate in the care of your patient.        Sincerely,        Omer Maki MD

## 2018-08-29 NOTE — TELEPHONE ENCOUNTER
"Patient transferred to triage   Not feeling well this morning   Weakness - is standing up but needs to hold on to wall   States this is not new, gradually worsening over past 1-2 months   A little winded with activity    No chest pain   Just sleeping and sleeping and sleeping and body has \"gone downhill\" because so bored in a \"little room\" and there is \"nothing to do\"   A little dizzy with standing   No headache   Has chemo/cancer   Is staying hydrated   A little appetite is eating   Speech normal   [facility] - has nurses there that check on her periodically   Nurse was in the room 20 minutes ago   Worried about falling, balance is not good  States \"I need a whip to get me moving so I can get back in shape\"   Someone taught a balance class at her facility a couple of weeks ago and states this was helpful     Discussed if patient is feeling extremely weak/dizzy would advise ER. Patient states it's been gradual and is not that severe but is worried she is getting less and less out of shape. Discussed encouraging her to participate in exercise/balance classes at her facility and asking the nurses there to assist her with ambulating until she is strong enough to walk on her own again. Patient states she is not feeling well enough to come in for OV/labs to see PCP at this time.     Please advise   Yamilet ABREU RN        "

## 2018-08-29 NOTE — TELEPHONE ENCOUNTER
Called pt and emergency contact (Clara) as Antonia states they are the one who  the prescriptions. Called to determine which pharmacy they prefer.  Chocolate Ensure Rx partially completed, pended.  Waiting for either pt or Clara to call back.    Wellington HARRINGTON RN

## 2018-08-29 NOTE — PROGRESS NOTES
Infusion Nursing Note:  Sherita Kenney presents today for IVF.    Patient seen by provider today: Yes: Dr. Lindo   present during visit today: Not Applicable.    Note: N/A.    Intravenous Access:  Implanted Port.    Treatment Conditions:  Not Applicable.      Post Infusion Assessment:  Patient tolerated infusion without incident.  Site patent and intact, free from redness, edema or discomfort.  No evidence of extravasations.  Access discontinued per protocol.    Discharge Plan:   AVS to patient via MYCHART.  Patient will return prn for next appointment.   Patient discharged in stable condition accompanied by: friend.  Departure Mode: Ambulatory.    Pino Vargas RN

## 2018-08-29 NOTE — PROGRESS NOTES
"Oncology Rooming Note    August 29, 2018 11:51 AM   Sherita Kenney is a 77 year old female who presents for:    Chief Complaint   Patient presents with     Oncology Clinic Visit     Adenocarcinoma of colon      Initial Vitals: /70  Pulse 80  Temp 98  F (36.7  C) (Oral)  Resp 16  Wt 42.6 kg (94 lb)  SpO2 96%  BMI 16.13 kg/m2 Estimated body mass index is 16.13 kg/(m^2) as calculated from the following:    Height as of 6/26/18: 1.626 m (5' 4.02\").    Weight as of this encounter: 42.6 kg (94 lb). Body surface area is 1.39 meters squared.  Data Unavailable Comment: Data Unavailable   No LMP recorded. Patient is not currently having periods (Reason: Perimenopausal).  Allergies reviewed: Yes  Medications reviewed: Yes    Medications: Medication refills not needed today.  Pharmacy name entered into Psychiatric:    [facility] [place] [facility] [address]  [facility] [address]    Clinical concerns: None                       4 minutes for nursing intake (face to face time)     April Bazzi MA            "

## 2018-08-31 NOTE — PROGRESS NOTES
Visit Date:   08/29/2018     ONCOLOGY HISTORY:  Ms. Sherita Kenney is a female with metastatic colon cancer.   1. CT abdomen and pelvis on 02/15/2017 revealed large right iliopsoas abscess and mass-like wall thickening of portion of right colon.       2. Patient was taken to OR on 03/01/2017.    -Colonoscopy revealed a large fungating mass in the right side of the colon.  Pathology revealed invasive poorly differentiated adenocarcinoma.  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   -Laparoscopic ileostomy was done.        3. Patient had right colectomy with takedown of ileostomy and primary anastomosis on 04/13/2017.    -Pathology  reveals poorly differentiated colonic adenocarcinoma. 15 of 24 lymph nodes are positive. Tumor invades into adjacent abdominal wall.  Lymphovascular invasion present. pT4b pN2b M0.   -BRAF mutation present. No KRAS mutation.      4. PET scan on 05/22/2017 revealed metastatic disease.  Multiple hypermetabolic liver metastases and  hypermetabolic necrotic lymphadenopathy in right lower quadrant.      6. modified FOLFOX6 with Avastin started on 06/01/2017.   -Bolus 5-FU discontinued with cycle 4.    -Oxaliplatin reduced with cycle 5.  -Oxaliplatin stopped after cycle 8. Cycle 8 completed on 09/08/2017.  -Maintenance infusional 5-FU and Avastin started on 09/21/2017. No bolus 5-FU.  -Infusional 5-FU discontinued after 11/30/2017. Single agent Avastin continued.      7.  CT chest, abdomen and pelvis on 03/15/2018 reveals progression of disease.      8. mFOLFOX with Avastin (without bolus 5-FU and leucovorin) started on 03/27/2018.       9. CT scan on 05/20/2018 revealed stable disease.     SUBJECTIVE:  Ms. Kenney is a 77-year-old female with metastatic colon cancer.  She has been on palliative chemotherapy.  Last treatment was on 06/26/2018 with 5-FU, oxaliplatin and Avastin.  Since then, the patient has missed multiple appointments.       Patient was brought to the clinic by a friend.  Overall, patient's condition has been deteriorating.  She has been more weak.  Appetite is decreased.  She is losing weight.  She does not feel like doing anything.      REVIEW OF SYSTEMS:    No headache.  Some dizziness.  No chest pain.  She gets some discomfort in the right side of the abdomen and some also in the right side of the back.  No abdominal distention.  No nausea.  Appetite is decreased.  She does not feel like eating.  No bleeding.      PHYSICAL EXAMINATION:   GENERAL:  She is alert and oriented x 3.   VITAL SIGNS:  Reviewed.     ECOG PS of 3.   Rest of the system not examined.      ASSESSMENT:  A 77-year-old female with metastatic colon cancer.  This is progressing.  Patient has worsening weakness, decreased appetite and weight loss. This is all indicative of progression of disease.      PLAN:   1.  I discussed with her regarding colon cancer.  I told her clinically it is progressing.  Her ECOG PS is 3.  I would not recommend any further chemotherapy.  She is MSI high.  One option would be to treat her with immunotherapy.  This was discussed.      After discussion, patient does not want any further treatment.  She wants comfort care, which is very reasonable in her case.      We discussed regarding hospice. Patient wants hospice.  A hospice consult will be obtained.      2.  We will give her IV fluids as her overall appetite has not been good.     3. Patient has right-sided abdominal pain.  This is likely from liver metastasis.  I gave her a prescription of oxycodone to be taken as needed.  She also wanted lorazepam refill which was given.     4. Patient advised to call us with any questions.  No return appointment being made as she is going on hospice care.      Total face-to-face time spent 45 minutes, most of time spent in counseling and coordination of care.         OMER MAKI MD             D: 08/30/2018   T: 08/31/2018   MT: DOROTHY      Name:      SEVERO SWNAN   MRN:      [id number]        Account:      [id number]   :      1940           Visit Date:   2018      Document: [id number]

## 2018-09-03 PROBLEM — R52 INTRACTABLE PAIN: Status: ACTIVE | Noted: 2018-01-01

## 2018-09-03 NOTE — IP AVS SNAPSHOT
` ` Patient Information     Patient Name Sex     Yovanamokrista Sherita J ([id number]) Female 1940       Room Bed    [address] [place] [address]      Patient Demographics     Address Phone E-mail Address    [address] [place] [address] [phone] (Home) *Preferred*  [phone] (Mobile) [email or web address]      Patient Ethnicity & Race     Ethnic Group Patient Race    American White      Emergency Contact(s)     Name Relation Home Work Mobile    Clara Hernandez ([facility]) Friend   [phone]    Dr. Sherita Gutierres (niece) Relative   [phone]    Dinora Garrett Friend   [phone]    Dr Renny Monson  [phone]    IonaNat Sister [phone]    Liz Friend [phone]        Documents on File        Status Date Received Description       Documents for the Patient    External Medication Information Consent Patient Refused () 11     Consent for Services - Hospital/Clinic Received () 11     Consent for Services - Hospital/Clinic Received () 13     Consent to Communicate Received 13 E-mail    [facility] Specified Other       Privacy Notice - [place] Received 16     Consent for Services - Hospital/Clinic Received () 14     Consent to Communicate Received 14 PHI    Patient ID Received 01/26/15     Consent for Services - Hospital/Clinic Received () 01/26/15     Consent for EHR Access Received 01/26/15     Waiver - Payment - Patient Received 02/02/15 occupational therapy waiver    Consent for Services/Privacy Notice - Hospital/Clinic Received () 16     Insurance Card Received 16 medicare    Insurance Card Received 16 [facility]    Advance Directives and Living Will Not Received  Validation of AD 1997     Advance Directives and Living Will Received 17 Health Care Directive 1997-    Advance Directives and Living Will Not Received  Validation of AD  1997     Advance Directives and Living Will Received 17 Health Care Directive 1997-    Advance Directives and Living Will Not Received  Validation of AD  17     Advance Directives and Living Will Received 17 Health Care Directive  17     Power of  Not Received  Power of  1997    HIM ALFONZO Authorization  17 [place] [facility]/F[facility]    Advance Directives and Living Will Received 03/15/17 POLST 03/10/2017     Consent for Services/Privacy Notice - Hospital/Clinic Received () 17     Insurance Card Received 17     Patient ID Received 17 MNDL 19    External Medication Information Consent Accepted 17     Consent for Services - [facility]       Business/Insurance/Care Coordination/Health Form - Patient Received 17 [facility] Patient Enrollment Form    Consent for Services - Geriatrics Received 17     External Medication Information Consent Accepted 17     Consent to Communicate Received 17     Consent for EHR Access Received 17     Insurance Card Received 17 [facility]    Business/Insurance/Care Coordination/Health Form - Patient Received 17  [facility] Patient Health History Form    Advance Directives and Living Will Received 17 POLST 17     Business/Insurance/Care Coordination/Health Form - Patient  17 LETTER FROM KEM URIOSTEGUI-2013    Advance Directives and Living Will Received 10/10/17 POLST 17    Care Everywhere Prospective Auth Received 10/19/17     HIM ALFONZO Authorization  17 [place] / [facility]    [facility] ALFONZO Authorization  17 [place] / [facility]    Advance Directives and Living Will Received 17 Health Care Directive 10/17/2017    Advance Directives and Living Will Not Received  Validation of AD 10/17/2017    HIM ALFONZO Authorization  18 [place] / [facility]    Consent to Communicate   01/25/18 AUTHORIZATION TO DISCUSS PROTECTED  HEALTH INFORMATION 12/5/17    Physical Therapy Certification Received 03/05/18 3/1-4/30/18    Consent to Communicate  03/08/18 AUTHORIZATION TO DISCUSS PROTECTED HEALTH INFORMATION - 03/01/18    Business/Insurance/Care Coordination/Health Form - Patient  03/20/18 ADULT REHABILITATION ATTENDANCE POLICY 3/1/18    Consent for Services/Privacy Notice - Hospital/Clinic Received 03/29/18     Consent for Services - Hospital and Clinic Received 04/26/18     HIE Auth Received 04/26/18     Privacy Notice - [place] Received (Deleted) 12/26/06     Insurance Card  (Deleted) 12/26/06     Waiver - Payment  (Deleted) 12/26/06     Patient ID Received (Deleted) 10/25/13     Insurance Card Received (Deleted) 12/21/11     Insurance Card Received (Deleted) 01/03/13     Insurance Card Received (Deleted) 01/03/13 Medicare    Consent for EHR Access  (Deleted) 03/13/13 Copied from existing Consent for services - C/HOD collected on 01/03/2013    Waiver - Payment Received (Deleted) 11/19/13     Insurance Card Received (Deleted) 06/26/14 HP    Insurance Card Received (Deleted) 01/26/15 hp    Insurance Card Received (Deleted) 01/26/15 medicare    Advance Directives and Living Will Received (Deleted) 02/20/17     Advance Directives and Living Will Received (Deleted) 02/20/17     Advance Directives and Living Will Received (Deleted) 02/20/17 Health Care Directive 07-    Advance Directives and Living Will Not Received (Deleted)  Validation of AD 07-    Advance Directives and Living Will Not Received (Deleted)      Patient Photo   Photo of Patient       Documents for the Encounter    CMS IM for Patient Signature Received 09/04/18     EMS/Ambulance Record  09/05/18 PREHOSPITAL CARE REPORT SUMMARY      Admission Information     Attending Provider Admitting Provider Admission Type Admission Date/Time    Vladimir Long MD Aronson, Christopher D, MD Emergency 09/03/18  0940     Discharge Date Hospital Service Auth/Cert Status Service Area     Hospitalist Incomplete [facility]    Unit Room/Bed Admission Status        88 ONCOLOGY 8803/8803-01 Admission (Confirmed)       Admission     Complaint    Intractable pain      Hospital Account     Name Acct ID Class Status Primary Coverage    Sherita Kenney [id number] Inpatient Open MEDICARE - MEDICARE FOR HB SUPPLEMENT            Guarantor Account (for Hospital Account #[id number])     Name Relation to Pt Service Area Active? Acct Type    Sherita Kenney Self FCS Yes Personal/Family    Address Phone          [address] [place] [address] [phone](H)  none(O)              Coverage Information (for Hospital Account #[id number])     1. MEDICARE/MEDICARE FOR HB SUPPLEMENT     F/O Payor/Plan Precert #    MEDICARE/MEDICARE FOR HB SUPPLEMENT     Subscriber Subscriber Venecia YenmokristaSherita [id number]    Address Phone    ATT[employer] CLAIMS  [address]  [place] [address] [phone]          2. HEALTHPARTNERS/HEALTH[employer]NERS FREEDOM PLAN     F/O Payor/Plan Precert #    HEALTHPARTNERS/HEALTHPARTNERS FREEDOM PLAN     Subscriber Subscriber #    Sherita Kenney [id number]    Address Phone    [address]  [place] [address] [phone]
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
## 2018-09-03 NOTE — IP AVS SNAPSHOT
[address], [address]    [facility] [address]    Phone:  [phone]                                       After Visit Summary   9/3/2018    Sherita Kenney    MRN: [id number]           After Visit Summary Signature Page     I have received my discharge instructions, and my questions have been answered. I have discussed any challenges I see with this plan with the nurse or doctor.    ..........................................................................................................................................  Patient/Patient Representative Signature      ..........................................................................................................................................  Patient Representative Print Name and Relationship to Patient    ..................................................               ................................................  Date                                            Time    ..........................................................................................................................................  Reviewed by Signature/Title    ...................................................              ..............................................  Date                                                            Time          22EPIC Rev 08/18

## 2018-09-03 NOTE — ED PROVIDER NOTES
History     Chief Complaint:  Abdominal pain    HPI   Sherita Kenney is a 77 year old female with a history of metastatic colon cancer with metastasis to the liver who presents with Right sided abdominal pain.  She was recently told by her doctor that her cancer is spreading in spite of the chemotherapy, so it was recommended to stop the chemo and try immunotherapy.  She was prescribed Oxycodone for her RUQ abd pain last week, because the pain is worsening.  She arrives today because the pain became severe this evening.  The pain is in the RUQ.  No vomiting, fever/chills, diarrhea, blood in stool or melena.  She has not had a BM for over 1 week.  She denies chest pain or SOB, denies leg swelling.    Allergies:  Wool Fiber     Medications:    levothyroxine (SYNTHROID/LEVOTHROID) 88 MCG tablet  loperamide (IMODIUM A-D) 2 MG capsule  LORazepam (ATIVAN) 0.5 MG tablet  ondansetron (ZOFRAN) 8 MG tablet  oxyCODONE IR (ROXICODONE) 5 MG tablet  prochlorperazine (COMPAZINE) 10 MG tablet  senna-docusate (SENOKOT-S;PERICOLACE) 8.6-50 MG per tablet  SUMATRIPTAN SUCCINATE PO    Past Medical History:    hypothyroidism  Hypertension  osteoarthritis  Cognitive impairment  iron deficiency anemia  Adenocarcinoma of colon  Postpolio syndrome  DJD  lumbago  Severe protein-calorie malnutrition  Scoliosis  COPD  physical deconditioning     Past Surgical History:    Colectomy, right  Ileostomy and takedown  Port-a-cath placement    Family History:    Ca, breast    Social History:  Former smoker, 7.5pack year history, quit 1986. Non-drinkr.  Marital Status:  Single [1]     Review of Systems   All other systems reviewed and are negative.  See HPI    Physical Exam     Patient Vitals for the past 24 hrs:   BP Temp Temp src Heart Rate Resp SpO2   09/03/18 1809 135/71 - - 87 16 95 %   09/03/18 1732 - 97.1  F (36.2  C) Oral 62 16 96 %      Physical Exam  Nursing note and vitals reviewed.  Constitutional:  Appears well-developed and  well-nourished.   HENT:   Head:    Atraumatic.   Mouth/Throat:   Oropharynx is clear, but dry mucous membranes. No oropharyngeal exudate.   Eyes:    Pupils are equal, round, and reactive to light.   Neck:    Normal range of motion. Neck supple.      No tracheal deviation present. No thyromegaly present.   Cardiovascular:  Normal rate, regular rhythm, no murmur   Pulmonary/Chest: Breath sounds are clear and equal without wheezes or crackles.  Abdominal:   Tenderness to the right upper quadrant with guarding, but no rebound.  Firm umbilical mass.  Soft. Bowel sounds are normal. Exhibits no distension.    Musculoskeletal:  Exhibits no edema.   Lymphadenopathy:  No cervical adenopathy.   Neurological:   Alert and oriented to person, place, and time.   Skin:    Skin is warm and dry. No rash noted. No pallor.        Emergency Department Course     Imaging:  Radiographic findings were communicated with the patient who voiced understanding of the findings.  CT Abdomen Pelvis w Contrast  IMPRESSION:  1. Progression of malignancy evidenced by significant increased  metastatic disease throughout the liver and significantly larger  adenopathy within the abdomen.  2. Increasing size of an abdominal wall mass extending through the  midline abdominal musculature. Adjacent the omental malignant implant.  3. New indeterminate nodular prominence of the right pleura. Trace  bibasilar pleural fluid. Cannot exclude pleural metastatic disease.  4. Small ascites. Diffuse anasarca.    Reading per radiology.     Laboratory:  Laboratory findings were communicated with the patient who voiced understanding of the findings.  CBC: RDW: 16.1(H) o/w WNL (WBC 7.5, HGB 11.8, )  CMP: Glucose: 110(H), Albumin: 2.4(L), Protein total: 6.7(l), Alkphos: 987(H), ALT: 74(H), AST: 282(H) o/w WNL (Creatinine 0.64)  Lipase: 133  Lactic Acid (Collected at 1715): 1.6     Interventions:  1811 Dilaudid 0.5mg IV injection   1811 Normal Saline 1000 mL IV    Medications   iohexol (OMNIPAQUE) solution 25 mL (not administered)   HYDROmorphone (PF) (DILAUDID) injection 0.5 mg (0.5 mg Intravenous Given 9/3/18 1811)   0.9% sodium chloride BOLUS (0 mLs Intravenous Stopped 9/3/18 1851)   iopamidol (ISOVUE-370) solution 48 mL (48 mLs Intravenous Given 9/3/18 1830)   sodium chloride 0.9 % bag 500mL for CT scan flush use (60 mLs Intravenous Given 9/3/18 1830)       Emergency Department Course:  Past medical records, nursing notes, and vitals reviewed.   I performed an exam of the patient as documented above.     Peripheral IV access established. Blood drawn and sent.    I discussed the treatment plan with the patient. She expressed understanding of this plan and admission..    Impression & Plan        Medical Decision Making:  Sherita Kenney is a 77 year old female having intractable pain from her metastatic cancer, which has worsened in spite of chemotherapy.  There is no sign of perforation, bowel obstruction or infection.  She was admitted to the Oncology floor for further evaluation and pain control under the care of the Hospitalist Dr. Long.    Diagnosis:    ICD-10-CM    1. Abdominal pain R10.9    2. Metastatic colon cancer in female (H) C78.5        Disposition:   Admitted to Oncology    Scribe Disclosure:  I, Gianfranco Quiroga, am serving as a scribe at 4:59 PM on 9/3/2018 to document services personally performed by Jo-Ann Spivey MD based on my observations and the provider's statements to me.    Gianfranco Quiroga  9/3/2018   EMERGENCY DEPARTMENT      Jo-Ann Spivey MD  09/03/18 3718

## 2018-09-03 NOTE — IP AVS SNAPSHOT
MRN:[id number]                      After Visit Summary   9/3/2018    Sherita Kenney    MRN: [id number]           Thank you!     Thank you for choosing [facility] for your care. Our goal is always to provide you with excellent care. Hearing back from our patients is one way we can continue to improve our services. Please take a few minutes to complete the written survey that you may receive in the mail after you visit with us. Thank you!        Patient Information     Date Of Birth          1940        Designated Caregiver       Most Recent Value    Caregiver    Will someone help with your care after discharge? yes    Name of designated caregiver Clara    Phone number of caregiver [phone]    Caregiver address [place]      About your hospital stay     You were admitted on:  September 3, 2018 You last received care in the:  [place] [address] [facility]    You were discharged on:  September 7, 2018       Who to Call     For medical emergencies, please call 911.  For non-urgent questions about your medical care, please call your primary care provider or clinic, [phone]          Attending Provider     Provider Specialty    Jo-Ann Spivey MD Emergency Medicine    [facility]       Primary Care Provider Office Phone # Fax #    Edgar Neno Rodriguez [phone]      After Care Instructions     Activity - Up with nursing assistance           Advance Diet as Tolerated       Follow this diet upon discharge: Orders Placed This Encounter      Snacks/Supplements Adult: Boost Shake; With Meals      Room Service      Combination Diet Regular Diet Adult                  Follow-up Appointments     Follow Up and recommended labs and tests       Hospice to assume the care                  Pending Results     No orders found from 9/1/2018 to 9/4/2018.            Statement of Approval     Ordered          09/07/18 0843  I have  reviewed and agree with all the recommendations and orders detailed in this document.  EFFECTIVE NOW     Approved and electronically signed by:  Hardeep Valentine MD             Admission Information     Date & Time Provider Department Dept. Phone    9/3/2018 Vladimir Long MD [place] [address] [facility] [phone]      Your Vitals Were     Blood Pressure Temperature Respirations Weight Pulse Oximetry BMI (Body Mass Index)    124/78 (BP Location: Right arm) 97.2  F (36.2  C) (Oral) 16 52.7 kg (116 lb 1.6 oz) 93% 19.92 kg/m2      Care EveryWhere ID     This is your Care EveryWhere ID. This could be used by other organizations to access your [facility] medical records  [id number]        Equal Access to Services     ALEXIS ALBA AH: Ruddy gaticao Sogabriel, waaxda luqadaha, qaybta kaalmada adetedyayvon, ellis ren. So [facility] [phone].    ATENCIÓN: Si habla español, tiene a roldan disposición servicios gratuitos de asistencia lingüística. Llame al [phone].    We comply with applicable federal civil rights laws and [place] laws. We do not discriminate on the basis of race, color, national origin, age, disability, sex, sexual orientation, or gender identity.               Review of your medicines      START taking        Dose / Directions    acetaminophen 650 MG Suppository   Commonly known as:  TYLENOL   Used for:  Metastatic colon cancer in female (H)   Replaces:  acetaminophen 325 MG tablet        Dose:  650 mg   Place 1 suppository (650 mg) rectally every 4 hours as needed for fever or mild pain (Do not exceed 4000 mg total acetaminophen per day.) Unwrap prior to insertion.   Quantity:  12 suppository   Refills:  1       atropine 1 % ophthalmic solution   Used for:  Dry mouth        Dose:  2-4 drop   Take 2-4 drops by mouth, place under tongue or place inside cheek every 2 hours as needed for other (terminal respiratory secretions) Not for ophthalmic use.   Quantity:   5 mL   Refills:  1       bisacodyl 10 MG Suppository   Commonly known as:  DULCOLAX   Used for:  Slow transit constipation        Unwrap and insert 1 suppository rectally twice daily as needed for constipation.   Quantity:  12 suppository   Refills:  1       haloperidol 0.5 MG tablet   Commonly known as:  HALDOL   Used for:  Metastatic colon cancer in female (H)        Dose:  0.5-1 mg   Take 1-2 tablets (0.5-1 mg) by mouth, place under tongue or insert rectally every 6 hours as needed for agitation (nausea)   Quantity:  30 tablet   Refills:  1       MEDICATION INSTRUCTION   Used for:  Metastatic colon cancer in female (H)        If care facility cannot accept or use ranges, facility is instructed to use lower end of dosing range   Refills:  0       morphine 15 MG 12 hr tablet   Commonly known as:  MS CONTIN   Used for:  Metastatic colon cancer in female (H)        Dose:  15 mg   Take 1 tablet (15 mg) by mouth every 12 hours   Quantity:  30 tablet   Refills:  0       sennosides 8.6 MG tablet   Commonly known as:  SENOKOT   Used for:  Slow transit constipation        Dose:  1-2 tablet   Take 1-2 tablets by mouth 2 times daily   Quantity:  100 tablet   Refills:  1       sulfamethoxazole-trimethoprim 800-160 MG per tablet   Commonly known as:  BACTRIM DS/SEPTRA DS   Indication:  Urinary Tract Infection   Used for:  Acute cystitis without hematuria        Dose:  1 tablet   Take 1 tablet by mouth 2 times daily Take 1 tablet by mouth 2 times daily for 1 day   Refills:  0         CONTINUE these medicines which may have CHANGED, or have new prescriptions. If we are uncertain of the size of tablets/capsules you have at home, strength may be listed as something that might have changed.        Dose / Directions    LORazepam 0.5 MG tablet   Commonly known as:  ATIVAN   This may have changed:    - how much to take  - how to take this  - reasons to take this   Used for:  Anxiety        Dose:  0.25-0.5 mg   Take 0.5-1 tablets  (0.25-0.5 mg) by mouth, place under tongue or insert rectally every 4 hours as needed for anxiety (restlessness)   Quantity:  30 tablet   Refills:  1       oxyCODONE IR 5 MG tablet   Commonly known as:  ROXICODONE   This may have changed:    - how much to take  - when to take this  - reasons to take this   Used for:  Metastatic colon cancer in female (H)        Dose:  10 mg   Take 2 tablets (10 mg) by mouth every 2 hours as needed for breakthrough pain or severe pain   Quantity:  20 tablet   Refills:  0         CONTINUE these medicines which have NOT CHANGED        Dose / Directions    levothyroxine 88 MCG tablet   Commonly known as:  SYNTHROID/LEVOTHROID   Used for:  Hypothyroidism, unspecified type        Dose:  88 mcg   Take 1 tablet (88 mcg) by mouth daily   Quantity:  30 tablet   Refills:  2       NUTRITIONAL SUPPLEMENT PLUS Liqd   Used for:  Physical deconditioning        Dose:  8 oz   Take 8 oz by mouth 3 times daily Chocolate Ensure   Quantity:  720 mL   Refills:  PRN       order for DME   Used for:  Physical deconditioning, Adenocarcinoma of colon (H), Generalized muscle weakness        Equipment being ordered: Wheelchair Please fax to [phone].   Quantity:  1 Units   Refills:  0       polyethylene glycol powder   Commonly known as:  MIRALAX   Used for:  Adenocarcinoma of colon (H)        Dose:  1 capful   Take 17 g (1 capful) by mouth daily   Quantity:  510 g   Refills:  1       SUMATRIPTAN SUCCINATE PO        Dose:  25 mg   Take 25 mg by mouth every 8 hours as needed for migraine   Refills:  0       travoprost (BAK Free) 0.004 % ophthalmic solution   Commonly known as:  TRAVATAN Z   Used for:  Primary open angle glaucoma of both eyes, unspecified glaucoma stage        Dose:  1 drop   Place 1 drop into both eyes At Bedtime   Quantity:  5 mL   Refills:  11         STOP taking     acetaminophen 325 MG tablet   Commonly known as:  TYLENOL   Replaced by:  acetaminophen 650 MG Suppository           BLINK  TEARS OP           loperamide 2 MG capsule   Commonly known as:  IMODIUM A-D           NEW MED           ondansetron 8 MG tablet   Commonly known as:  ZOFRAN           prochlorperazine 10 MG tablet   Commonly known as:  COMPAZINE           senna-docusate 8.6-50 MG per tablet   Commonly known as:  SENOKOT-S;PERICOLACE           vitamin B-Complex                Where to get your medicines      Some of these will need a paper prescription and others can be bought over the counter. Ask your nurse if you have questions.     Bring a paper prescription for each of these medications     atropine 1 % ophthalmic solution    haloperidol 0.5 MG tablet    LORazepam 0.5 MG tablet    morphine 15 MG 12 hr tablet    oxyCODONE IR 5 MG tablet       You don't need a prescription for these medications     acetaminophen 650 MG Suppository    bisacodyl 10 MG Suppository    MEDICATION INSTRUCTION    sennosides 8.6 MG tablet    sulfamethoxazole-trimethoprim 800-160 MG per tablet                Protect others around you: Learn how to safely use, store and throw away your medicines at www.disposemymeds.org.        ANTIBIOTIC INSTRUCTION     You've Been Prescribed an Antibiotic - Now What?  Your healthcare team thinks that you or your loved one might have an infection. Some infections can be treated with antibiotics, which are powerful, life-saving drugs. Like all medications, antibiotics have side effects and should only be used when necessary. There are some important things you should know about your antibiotic treatment.      Your healthcare team may run tests before you start taking an antibiotic.    Your team may take samples (e.g., from your blood, urine or other areas) to run tests to look for bacteria. These test can be important to determine if you need an antibiotic at all and, if you do, which antibiotic will work best.      Within a few days, your healthcare team might change or even stop your antibiotic.    Your team may start  you on an antibiotic while they are working to find out what is making you sick.    Your team might change your antibiotic because test results show that a different antibiotic would be better to treat your infection.    In some cases, once your team has more information, they learn that you do not need an antibiotic at all. They may find out that you don't have an infection, or that the antibiotic you're taking won't work against your infection. For example, an infection caused by a virus can't be treated with antibiotics. Staying on an antibiotic when you don't need it is more likely to be harmful than helpful.      You may experience side effects from your antibiotic.    Like all medications, antibiotics have side effects. Some of these can be serious.    Let you healthcare team know if you have any known allergies when you are admitted to the hospital.    One significant side effect of nearly all antibiotics is the risk of severe and sometimes deadly diarrhea caused by Clostridium difficile (C. Difficile). This occurs when a person takes antibiotics because some good germs are destroyed. Antibiotic use allows C. diificile to take over, putting patients at high risk for this serious infection.    As a patient or caregiver, it is important to understand your or your loved one's antibiotic treatment. It is especially important for caregivers to speak up when patients can't speak for themselves. Here are some important questions to ask your healthcare team.    What infection is this antibiotic treating and how do you know I have that infection?    What side effects might occur from this antibiotic?    How long will I need to take this antibiotic?    Is it safe to take this antibiotic with other medications or supplements (e.g., vitamins) that I am taking?     Are there any special directions I need to know about taking this antibiotic? For example, should I take it with food?    How will I be monitored to know  whether my infection is responding to the antibiotic?    What tests may help to make sure the right antibiotic is prescribed for me?      Information provided by:  www.cdc.gov/getsmart  U.S. Department of Health and Human Services  Centers for disease Control and Prevention  National Center for Emerging and Zoonotic Infectious Diseases  Division of Healthcare Quality Promotion        Information about OPIOIDS     PRESCRIPTION OPIOIDS: WHAT YOU NEED TO KNOW   We gave you an opioid (narcotic) pain medicine. It is important to manage your pain, but opioids are not always the best choice. You should first try all the other options your care team gave you. Take this medicine for as short a time (and as few doses) as possible.    Some activities can increase your pain, such as bandage changes or therapy sessions. It may help to take your pain medicine 30 to 60 minutes before these activities. Reduce your stress by getting enough sleep, working on hobbies you enjoy and practicing relaxation or meditation. Talk to your care team about ways to manage your pain beyond prescription opioids.    These medicines have risks:    DO NOT drive when on new or higher doses of pain medicine. These medicines can affect your alertness and reaction times, and you could be arrested for driving under the influence (DUI). If you need to use opioids long-term, talk to your care team about driving.    DO NOT operate heavy machinery    DO NOT do any other dangerous activities while taking these medicines.    DO NOT drink any alcohol while taking these medicines.     If the opioid prescribed includes acetaminophen, DO NOT take with any other medicines that contain acetaminophen. Read all labels carefully. Look for the word  acetaminophen  or  Tylenol.  Ask your pharmacist if you have questions or are unsure.    You can get addicted to pain medicines, especially if you have a history of addiction (chemical, alcohol or substance dependence). Talk  to your care team about ways to reduce this risk.    All opioids tend to cause constipation. Drink plenty of water and eat foods that have a lot of fiber, such as fruits, vegetables, prune juice, apple juice and high-fiber cereal. Take a laxative (Miralax, milk of magnesia, Colace, Senna) if you don t move your bowels at least every other day. Other side effects include upset stomach, sleepiness, dizziness, throwing up, tolerance (needing more of the medicine to have the same effect), physical dependence and slowed breathing.    Store your pills in a secure place, locked if possible. We will not replace any lost or stolen medicine. If you don t finish your medicine, please throw away (dispose) as directed by your pharmacist. The [place] Pollution Control Agency has more information about safe disposal: https://www.pca.[facility].[place].us/living-green/managing-unwanted-medications             Medication List: This is a list of all your medications and when to take them. Check marks below indicate your daily home schedule. Keep this list as a reference.      Medications           Morning Afternoon Evening Bedtime As Needed    acetaminophen 650 MG Suppository   Commonly known as:  TYLENOL   Place 1 suppository (650 mg) rectally every 4 hours as needed for fever or mild pain (Do not exceed 4000 mg total acetaminophen per day.) Unwrap prior to insertion.                                atropine 1 % ophthalmic solution   Take 2-4 drops by mouth, place under tongue or place inside cheek every 2 hours as needed for other (terminal respiratory secretions) Not for ophthalmic use.                                bisacodyl 10 MG Suppository   Commonly known as:  DULCOLAX   Unwrap and insert 1 suppository rectally twice daily as needed for constipation.   Last time this was given:  10 mg on 9/7/2018  9:25 AM                                haloperidol 0.5 MG tablet   Commonly known as:  HALDOL   Take 1-2 tablets (0.5-1 mg) by mouth,  place under tongue or insert rectally every 6 hours as needed for agitation (nausea)                                levothyroxine 88 MCG tablet   Commonly known as:  SYNTHROID/LEVOTHROID   Take 1 tablet (88 mcg) by mouth daily   Last time this was given:  88 mcg on 9/7/2018  9:25 AM                                LORazepam 0.5 MG tablet   Commonly known as:  ATIVAN   Take 0.5-1 tablets (0.25-0.5 mg) by mouth, place under tongue or insert rectally every 4 hours as needed for anxiety (restlessness)   Last time this was given:  0.5 mg on 9/5/2018  5:54 PM                                MEDICATION INSTRUCTION   If care facility cannot accept or use ranges, facility is instructed to use lower end of dosing range                                morphine 15 MG 12 hr tablet   Commonly known as:  MS CONTIN   Take 1 tablet (15 mg) by mouth every 12 hours   Last time this was given:  15 mg on 9/7/2018  9:25 AM                                NUTRITIONAL SUPPLEMENT PLUS Liqd   Take 8 oz by mouth 3 times daily Chocolate Ensure                                order for DME   Equipment being ordered: Wheelchair Please fax to [phone].                                oxyCODONE IR 5 MG tablet   Commonly known as:  ROXICODONE   Take 2 tablets (10 mg) by mouth every 2 hours as needed for breakthrough pain or severe pain   Last time this was given:  5 mg on 9/5/2018  6:50 PM                                polyethylene glycol powder   Commonly known as:  MIRALAX   Take 17 g (1 capful) by mouth daily                                sennosides 8.6 MG tablet   Commonly known as:  SENOKOT   Take 1-2 tablets by mouth 2 times daily                                sulfamethoxazole-trimethoprim 800-160 MG per tablet   Commonly known as:  BACTRIM DS/SEPTRA DS   Take 1 tablet by mouth 2 times daily Take 1 tablet by mouth 2 times daily for 1 day   Last time this was given:  1 tablet on 9/7/2018  9:25 AM                                SUMATRIPTAN  SUCCINATE PO   Take 25 mg by mouth every 8 hours as needed for migraine                                travoprost (BAK Free) 0.004 % ophthalmic solution   Commonly known as:  TRAVATAN Z   Place 1 drop into both eyes At Bedtime   Last time this was given:  1 drop on 9/6/2018 11:15 PM

## 2018-09-03 NOTE — IP AVS SNAPSHOT
` `           [place] [address] ONCOLOGY: [phone]                 INTERAGENCY TRANSFER FORM - NOTES (H&P, Discharge Summary, Consults, Procedures, Therapies)   9/3/2018                    Hospital Admission Date: 9/3/2018  SHERITA SWANN   : 1940  Sex: Female        Patient PCP Information     Provider PCP Type    Edgar Rodriguez MD General         History & Physicals      H&P signed by Joselin Caruso PA-C at 9/3/2018  9:45 PM  Also signed by Vladimir Long MD at 9/3/2018 10:46 PM      Author:  Joselin Caruso PA-C Service:  Hospitalist Author Type:  Physician Assistant - C    Filed:  9/3/2018  9:45 PM Date of Service:  9/3/2018  8:49 PM Creation Time:  9/3/2018  9:22 PM    Status:  Attested :  Joselin Caruso PA-C (Physician Assistant - C)    Cosigner:  Vladimir Long MD at 9/3/2018 10:46 PM        Attestation signed by Vladimir Long MD at 9/3/2018 10:46 PM        Physician Attestation   I, Vladimir Long, have reviewed and discussed with the advanced practice provider their history, physical and plan for Sherita Swann. I did not participate in a shared visit by interviewing or examining the patient and this should be billed as an advanced practice provider only visit.    Vladimir Long  Date of Service (when I saw the patient): I did not personally see this patient today.                               Admitted:     2018      PRIMARY CARE PHYSICIAN:  Edgar Rodriguez MD       ONCOLOGIST:  Dr. Lindo.      CHIEF COMPLAINT:  Abdominal pain.      History is obtained from patient.  The patient's friend, Dinora, accompanies her at bedside and contributes some to history.  Chart also reviewed extensively.      HISTORY OF PRESENT ILLNESS:  Sherita Swann is a 77-year-old female with past medical history of metastatic colon cancer, hypothyroidism and glaucoma who presents with  "intractable abdominal pain.  The patient is followed with Dr. Lindo of [facility].  She last saw Dr. Lindo in clinic on 8/30/18.  She has been receiving palliative chemotherapy with her last treatment being on 07/26/2018 with 5-FU, oxaliplatin and Avastin.  Since then, the patient had missed multiple appointments.  At this appointment, the patient noted that she has been more weak.  Her appetite has decreased.  She is losing weight.  Per Dr. Lindo's note discussion was had with her that she is clinically progressing and that he would not recommend any further chemotherapy.  One option would be to treat her with immunotherapy.  They had also discussed hospice at that time which the patient had been more interested in at that time. Hospice consult was placed at that time.  She was given a prescription oxycodone for her pain and was given IV fluids for overall appetite.      She presented to the ER today with ongoing abdominal pain.  She was seen by Dr. Spivey who obtained basic labs and imaging which revealed an alkaline phosphatase of 987, ALT of 74, AST of 282.  Lactic acid 1.6.  Lipase 133.  CBC unremarkable.  CT abdomen and pelvis showed progression of malignancy evidenced by significant increased metastatic disease throughout the liver and significantly larger adenopathy within the abdomen.  Increasing size of an abdominal wall mass extending through the midline abdominal musculature, adjacent the omental malignant implants.  New indeterminate nodular prominence of the right pleura.  Trace bibasilar pleural fluid, cannot exclude pleural metastatic disease.  Small amount of ascites.  Diffuse anasarca.  The patient was given a 1 liter bolus and a dose of Dilaudid and ultimately admission was requested for pain control.      On my interview, I discussed the most recent Oncology visit with the patient.  The patient's goal is to \"still fight this.\"  It is unclear if she truly understands the progression of her " disease. I reviewed the CT findings with her.  She notes that now her pain is controlled after receiving a dose of IV Dilaudid.  I asked about the discussion regarding hospice, and patient prefers admission for pain control.  She is agreeable and open to a Palliative Care consult and talking with Oncology again about her prognosis and options.      REVIEW OF SYSTEMS:  A 10-point review of systems was performed and is otherwise negative unless specified in the HPI.      PAST MEDICAL HISTORY:   1.  Metastatic colon cancer, receiving palliative chemotherapy, followed by Dr. Lindo.   2.  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   3.  Hypothyroidism.   4.  Glaucoma.   5.  Osteoarthritis.      MEDICATIONS:[KG1.1]    Prescriptions Prior to Admission   Medication Sig Dispense Refill Last Dose     acetaminophen (TYLENOL) 325 MG tablet Take 2 tablets (650 mg) by mouth every 4 hours as needed for mild pain or fever 60 tablet 1 Past Week at Unknown time     levothyroxine (SYNTHROID/LEVOTHROID) 88 MCG tablet Take 1 tablet (88 mcg) by mouth daily 30 tablet 2 9/3/2018 at Unknown time     loperamide (IMODIUM A-D) 2 MG capsule AND TAKE 2 CAPSULE EVERY 6 HOURS AS NEEDED FOR LOOSE STOOLS (MAX OF 16MG/24 HRS) 155 capsule 3 Unknown at Unknown time     LORazepam (ATIVAN) 0.5 MG tablet Take 1 tablet (0.5 mg) by mouth every 4 hours as needed (Anxiety, Nausea/Vomiting or Sleep) 30 tablet 2      Nutritional Supplements (NUTRITIONAL SUPPLEMENT PLUS) LIQD Take 8 oz by mouth 3 times daily Chocolate Ensure 720 mL PRN      ondansetron (ZOFRAN) 8 MG tablet Take 1 tablet (8 mg) by mouth every 8 hours as needed (Nausea/Vomiting) 10 tablet 2 Past Week at Unknown time     oxyCODONE IR (ROXICODONE) 5 MG tablet Take 1 tablet (5 mg) by mouth every 6 hours as needed for severe pain 60 tablet 0      polyethylene glycol (MIRALAX) powder Take 17 g (1 capful) by mouth daily 510 g 1 9/3/2018 at Unknown time     Polyethylene Glycol 400  (BLINK TEARS OP) Apply 1 drop to eye 3 times daily   9/3/2018 at Unknown time     prochlorperazine (COMPAZINE) 10 MG tablet Take 1 tablet (10 mg) by mouth every 6 hours as needed (Nausea/Vomiting) 30 tablet 2 Taking     senna-docusate (SENOKOT-S;PERICOLACE) 8.6-50 MG per tablet Take 1 tablet by mouth 2 times daily 60 tablet 1 9/3/2018 at Unknown time     SUMATRIPTAN SUCCINATE PO Take 25 mg by mouth every 8 hours as needed for migraine   Past Week at Unknown time     travoprost, BAK Free, (TRAVATAN Z) 0.004 % ophthalmic solution Place 1 drop into both eyes At Bedtime 5 mL 11 9/2/2018 at Unknown time     vitamin B-Complex Take 1 tablet by mouth daily 90 tablet 1 9/3/2018 at Unknown time     NEW MED    Unknown at Unknown time     order for DME Equipment being ordered: Wheelchair  Please fax to [phone]. 1 Units 0[KG1.2]       ALLERGIES:[KG1.1]       Allergies   Allergen Reactions     Wool Fiber Unknown[KG1.2]      SOCIAL HISTORY:[KG1.1]    Social History     Social History     Marital status: Single     Spouse name: N/A     Number of children: N/A     Years of education: N/A     Occupational History     Not on file.     Social History Main Topics     Smoking status: Former Smoker     Packs/day: 1.50     Years: 15.00     Types: Cigarettes     Quit date: 1/4/1986     Smokeless tobacco: Never Used     Alcohol use No     Drug use: No     Sexual activity: Not Currently     Birth control/ protection: None     Other Topics Concern     Not on file     Social History Narrative[KG1.2]     Pt's healthcare agent is her friend, Clara, who, unfortunately is out of town at present caring for another ill family member.     PAST SURGICAL HISTORY:[KG1.1]    Past Surgical History:   Procedure Laterality Date     COLECTOMY RIGHT N/A 4/13/2017    Procedure: COLECTOMY RIGHT;  Surgeon: Lindsay Peter MD;  Location:  OR     COLONOSCOPY N/A 3/1/2017    Procedure: COLONOSCOPY;  Surgeon: Lindsay Peter MD;  Location:  OR      I & D abdominal abscess       ILEOSTOMY N/A 3/1/2017    Procedure: ILEOSTOMY;  Surgeon: Lindsay Peter MD;  Location:  OR     ILEOSTOMY       LAPAROSCOPIC ILEOSTOMY N/A 3/1/2017    Procedure: LAPAROSCOPIC ILEOSTOMY;  Surgeon: Lindsay Peter MD;  Location:  OR     LAPAROSCOPIC URETEROLYSIS  4/13/2017    Procedure: LAPAROSCOPIC URETEROLYSIS;  Surgeon: Jaycob Mari MD;  Location:  OR     severe protein-calorie malnutrition       SOFT TISSUE SURGERY       TAKEDOWN ILEOSTOMY N/A 4/13/2017    Procedure: TAKEDOWN ILEOSTOMY;  Surgeon: Lindsay Peter MD;  Location:  OR[KG1.2]      FAMILY HISTORY:[KG1.1]   Family History   Problem Relation Age of Onset     Breast Cancer Maternal Grandmother[KG1.3]       LABORATORY DATA:  Reviewed per Epic and noted in the HPI.      IMAGING:  Noted in the HPI.      VITAL SIGNS:  T 97.1, P 87, /62, SpO2 97% on room air.      CONSTITUTIONAL: Frail, ill-appearing pt laying in bed, dressed in hospital garb. Cooperative with interview. Accompanied by friend, Dinora, at bedside.   HEENT: Normocephalic, atraumatic. Negative for conjunctival redness or scleral icterus.  Oral mucosa pale and dry.   CARDIOVASCULAR: RRR, no murmurs, rubs, or extra heart sounds appreciated. Pulses +2/4 and regular in upper and lower extremities, bilaterally.   RESPIRATORY: No increased work of breathing.  CTA, bilat; no wheezes, rales, or rhonchi appreciated.  GASTROINTESTINAL:  Abdomen soft, non-distended. BS hypoactive in all four quadrants. Tenderness upon palpation of umbilicus. No masses or organomegaly noted.  MUSCULOSKELETAL: No gross deformities noted. Normal muscle tone.   HEMATOLOGIC/LYMPHATIC/IMMUNOLOGIC: Negative for lower extremity edema, bilaterally.  NEUROLOGIC: Alert and oriented to person, place, and time.  strength intact.   SKIN: Warm, dry, intact. No jaundice noted. Negative for suspicious lesions, rashes, bruising, open sores or abrasions.     "  ASSESSMENT AND PLAN:  Sherita Kenney is a 77-year-old female with past medical history of metastatic colon cancer, polio with left upper extremity weakness, hypothyroidism and glaucoma who presents to the Emergency Department with intractable abdominal pain related to her metastatic colon cancer.  Admit under inpatient status for pain control.  Vitals currently within normal limits.  The patient currently stable.     Intractable pain related to metastatic colon cancer:  Patient followed by Dr. Lindo of [facility].  Last clinic appointment was 8/30/18.  At this appointment, discussion was had about progression of cancer with recommendation of options including immunotherapy versus a comfort-based approach.  Per documentation, it appears that patient wanted a more hospice-based approach.  However, on my interview, the patient is unclear about her wishes, wanting to \"fight this\" and \"live.\"  She received a dose of IV Dilaudid in the ER.   -- Admit under inpatient status  -- Analgesic regimen to include Tylenol, p.r.n. oxycodone and IV Dilaudid.   -- Palliative Care consulted for goals of care discussion and symptom control, appreciate assistance greatly    -- [facility] consulted, appreciate assistance.   -- IV fluids at 75 mL per hour.   -- CBC and BMP in the a.m.   -- SW consulted per patient request.     Hypothyroidism:  Continue PTA Synthroid.     Glaucoma:  Continue PTA eyedrops.     Deep venous thrombosis prophylaxis:  PCDs ordered.      CODE STATUS:  Full code at this time.  Again, upon discussion, the patient is very unclear about what she wants.  I do note that she was previously DNR/DNI and encouraged the patient to have ongoing discussion with family/friends and care team in the AM, but, at this time, we will place a full code order in per patient wishes.      The patient was seen and assessed with Dr. Long of the Hospitalist Service who agrees with the plan as outlined above.       "   MISTY HUDSON MD       As dictated by JOSELIN GUERRERO PA-C            D: 2018   T: 2018   MT: DOROTHY      Name:     SEVERO SWANN   MRN:      [id number]        Account:      [id number]   :      1940        Admitted:     2018                   Document: [id number][KG1.1]         Revision History        User Key Date/Time User Provider Type Action    > KG1.1 9/3/2018  9:45 PM Grausam, Joselin Forde PA-C Physician Assistant - GENA Sign     KG1.3 9/3/2018  9:41 PM Grausam, Joselin Forde PA-C Physician Assistant Keesha AVERY      KG1.2 9/3/2018  9:40 PM Bitausam, Joselin Forde PA-C Physician Assistant - C      [N/A] 9/3/2018  9:22 PM Zuly, Joselin Forde PA-C Physician Assistant Keesha AVERY Edit                  Discharge Summaries     No notes of this type exist for this encounter.         Consult Notes      Consults by Desmond Purvis LSW at 2018  8:41 AM     Author:  Desmond Purvis LSW Service:  Social Work Author Type:      Filed:  2018  8:52 AM Date of Service:  2018  8:41 AM Creation Time:  2018  8:40 AM    Status:  Addendum :  Desmond Purvis LSW ()     Consult Orders:    1. Social Work IP Consult [[id number]] ordered by Leanna Abbott MD at 18 1153                 Consult Note:    D/I:  SW acknowledges consult for hospice/end of life decision making.  MD spoke with  Hospice Liaison who has been in contact with patient and family regarding [facility].  SW will continue to assist as needed with discharge planning.[AH1.1]    Addendum:  Care Transition Initial Assessment - SW  Reason For Consult: end of life/hospice  Met with: Chart Review  Active Problems:    Intractable pain       DATA  Lives With: alone  Living Arrangements: assisted living  Description of Support System: Supportive, Involved  Who is your support system?: Other (specify), Sibling(s) (Friend)  Support  Assessment: Adequate family and caregiver support, Adequate social supports.   Identified issues/concerns regarding health management: Per social service protocol for discharge planning, patient was admitted on 9/3/18 with a primary diagnosis of intractable pain.  Reviewed chart for hospice consult/end of life discussion and [facility] is currently involved with patient and has met with patient and family.  Patient lives in assisted living and would like to discharge to [facility] with [facility] services.  See hospice liaison note.       Quality Of Family Relationships: supportive, helpful, involved     ASSESSMENT  Cognitive Status:  alert and oriented  Concerns to be addressed: Discharge planning.     PLAN  Financial costs for the patient includes: Room and board Private pay for [facility].  Patient given options and choices for discharge: Per hospice note, patient would like to go to [facility] with  Hospice.   Patient/family is agreeable to the plan?  Yes  Patient Goals and Preferences: [facility] with [facility].  Patient anticipates discharging to:  [facility] with [facility].[AH1.2]    MARAL Gamboa, RICHARD[AH1.1]         Revision History        User Key Date/Time User Provider Type Action    > AH1.2 9/6/2018  8:52 AM Desmond Purvis LSW  Addend     AH1.1 9/6/2018  8:41 AM Desmond Purvis LSW  Sign            Consults by Lavinia Miller RD, LD at 9/5/2018  1:37 PM     Author:  Lavinia Miller RD, LD Service:  Nutrition Author Type:  Registered Dietitian    Filed:  9/5/2018  1:37 PM Date of Service:  9/5/2018  1:37 PM Creation Time:  9/5/2018  1:32 PM    Status:  Signed :  Lavinia Miller RD, LD (Registered Dietitian)     Consult Orders:    1. Nutrition Services Adult IP Consult [[id number]] ordered by Brock Angel MD at 09/04/18 1046                Nutrition Admission Screen - Unintentional weight loss of 10# or more in  "past 2 months, Reduced oral intake over the last month  MD consult - Malnutrition    Chart reviewed  [facility]    Diet: Regular + Boost shake with meals  Eating small amounts    Ht: 5'4\"  9/4: 42 kg  BMI: 15.8[SJ1.1]    Wt Readings from Last 10 Encounters:   09/05/18 42.4 kg (93 lb 6.4 oz)   08/29/18 42.6 kg (94 lb)   08/29/18 42.6 kg (94 lb)   06/26/18 47.5 kg (104 lb 12.8 oz)   06/26/18 47.5 kg (104 lb 12.8 oz)   06/12/18 47.4 kg (104 lb 9.6 oz)   05/29/18 47.7 kg (105 lb 3.2 oz)   05/20/18 46.5 kg (102 lb 9.6 oz)   05/15/18 47.4 kg (104 lb 9.6 oz)   05/15/18 47.4 kg (104 lb 9.6 oz)[SJ1.2]         Anticipate pt with malnutrition (9% wt loss past few months, decreased po intake), however, aggressive nutrition intervention not consistent with POC  Will continue with Boost shakes and provide po intake as desires    Lavinia Miller RD, LD  Clinical Dietitian - [facility]  Pager - [phone][SJ1.1]       Revision History        User Key Date/Time User Provider Type Action    > SJ1.2 9/5/2018  1:37 PM Lavinia Miller RD, LD Registered Dietitian Sign     SJ1.1 9/5/2018  1:32 PM Lavinia Miller RD, LD Registered Dietitian             Consults by Leanna Abbott MD at 9/4/2018  9:45 AM     Author:  Leanna Abbott MD Service:  Palliative Author Type:  Physician    Filed:  9/4/2018  1:34 PM Date of Service:  9/4/2018  9:45 AM Creation Time:  9/4/2018  9:27 AM    Status:  Signed :  Leanna Abbott MD (Physician)         [facility]    Palliative Care Consultation Note    Patient: Sherita Kenney  Date of Admission:  9/3/2018    Requesting provider/team: Joselin Caruso PA-C, hospitalist  Reason for consult: Pain management  Goals of care    Recommendations:  Please see assessment below for rationale.[AN1.1]    Changed code status to DNR/DNI which is consistent with previous choice, her health care directive " and POLST. Confirmed by Sherita today with Clara George on the phone    Sherita and Clara will meet with Beverly from [facility] for an informational, possibly intake meeting.     Sherita is not ready to transition to comfort measures only at this time      Increase oxycodone 5-10mg to q2h prn    Increase Senna to 2 tabs bid    Artificial saliva prn[AN1.2]    These recommendations have been discussed with[AN1.1] Beverly Cooney ([facility]), informed Dr Angel[AN1.2].    Thank you for the opportunity to participate in the care of this patient and family. Our team will follow. Please feel free to contact the on-call Palliative provider with any urgent needs.[AN1.1]     Leanna Abbott[AN1.3]  Pager: [phone]  [facility] Inpatient Team Consult pager [phone] (M-F 8-4:30)  After-hours Answering Service [phone]   Palliative Clinic: [phone]       Assessment:  Sherita Kenney is a 77 year old female with metastatic colon cancer   - diagnosed April 2017   - metastatic to liver, omentum, abd wall   - on chemotherapy until mid-July, missed appointments since   - follows with Dr Lindo, last visit 8/29, noted general decline at that time. Not deemed candidate for further chemotherapy. Option of immunotherapy vs hospice was discussed.      SYMPTOMS:[AN1.1]  1. Abdominal pain: shifting in location, no clear triggers/alleviating factors. The pain seems to increase as we discuss it. She is uncertain whether the oxycodone has been helpful. Both, her bedside RN and Dinora, the friend at bedside today, agree that 5mg didn't provide adequate relief, but that she looked ok after getting another 5mg. She was comfortable at the beginning of my visit, started showing signs of discomfort about 2 hours after the oxycodone was given.   2. Constipation: longstanding issue, not controlled with miralax daily and senna 1 tab bid (PTA meds). Likely contributes to abdominal pain. Good bowel sounds on exam. Most recent CT abdomen not  "concerning for obstruction.[AN1.2]    Pain management was discussed with[AN1.1] Sherita[AN1.2] and the plan was created in a collaborative fashion.  The patient's response to the current recommendations:[AN1.1] agreeable[AN1.2].     SOCIAL:[AN1.1]         Living situation: in [place]. States that she is content with the arrangement there.          Support system: mostly through her friend Clara with support from other friends.   She does have a sister and brother-in-law, but has kept them at a distance lately. Today, she declines having anyone involved other than Clara         Functional status: limited. Feels the care she is getting at her apartment is adequate.[AN1.2]    COPING:[AN1.1] seems to be coping well with the notion of discontinuing disease-directed treatment. She states that she wasn't surprised when Dr Lindo told her that he doesn't recommend further chemo.    She does struggle with the loss of agency and control she has been experiencing as a patient, due to her functional limitations, and especially now in the hospital.[AN1.2]     SPIRITUAL/[employer]:[AN1.1]    She states this is not an important part of her life[AN1.2]    PROGNOSTIC INFORMATION:[AN1.1] limited prognosis considering her poor functional and nutritional status.   She appears accepting of the lack of further meaningful treatment options for her cancer when we talked today. However, per chart review she did make statements to other members of the team that she is looking to \"cure her cancer\".  I also discussed with her that Dr Lindo would be open to considering a trial of immunotherapy, which she stated she didn't feel was a good choice for her. She endorses comfort, minimizing admissions and doctors' visits and \"having fun\" as her goals more than once today.[AN1.2]      ADVANCE CARE PLANNING:        Decision making capacity:[AN1.1] mostly intact. She does acknowledge (and show signs of) mild cognitive impairment, mostly difficulties with " short-term memory. However, the goals she voiced to me today (see above) are consistent with previously stated and documented goals. Clara, who joined our conversation on the phone was in full agreement with Sherita's stated goals.[AN1.2]         Disease understanding:[AN1.1] adequate as above[AN1.2]         Goals of Care:[AN1.1] comfort, minimizing admissions, office visits[AN1.2]         Preferred way of decision making:[AN1.1] Sherita wants to be an active part of her health care choices[AN1.2]         Health care directive: dated 10/17/17. Also states DNR/DNI. Wants comfort at the end of life (hospital or home as appropriate).       Health care agent: Clara Hernandez (friend). No alternate designated.       Code Status:[AN1.1]  Full code. Changed to DNR/DNI today.[AN1.2]       POLST dated 9/26/17: DNR, DNI    Histo[AN1.1]ry of Present Illness[AN1.3]   Sources of History:[AN1.1]patient[AN1.2]  Introduced the scope of our practice to[AN1.1] Sherita[AN1.2]. Discussed our potential roles for symptom management, support/coping, and decisional support (aka goals of care).[AN1.1]    Sherita is resting comfortably when I enter. Her friend Dinora is at bedside, calls Clara Sherita's health care agent. We discuss her most recent visit with Dr Lindo, her treatment preferences, hospice. Sherita and Clara had planned to meet with hospice this week, which was postponed, since Sherita is here and Clara with a sick family member in [place].     I answer all their questions.[AN1.2]    ROS:  Palliative Symptom Review    A comprehensive ROS was negative except as stated above.        Past Medical History:[AN1.1]   Past Medical History:   Diagnosis Date     Abnormal gait      Adenocarcinoma of colon (H)      Arthralgia of upper arm      Arthritis      Arthritis of right hand      Cancer (H)      Cancer of right colon (H)      Cognitive impairment      Colon cancer (H) 4/14/2017     COPD (chronic obstructive pulmonary disease) (H)      DJD (degenerative  joint disease)     knee and lumbar     Fibromatoses of muscle, ligament, and fascia      Flail joint      Generalized OA      Hypertension 12/3/2017     Iliopsoas abscess on right (H)      Iron deficiency anemia      Late effects of poliomyelitis      Lumbago      Osteoarthritis of hip      Pain in limb      Postpolio syndrome      Primary osteoarthritis of right hand      Psychosis      Respiratory insufficiency 4/13/2017     Right-sided back pain      S/P ileostomy (H) 3/12/2017     S/P right colectomy 3/12/2017     Scoliosis      Thyroid disease[AN1.3]           Past Surgical History:[AN1.1]   Past Surgical History:   Procedure Laterality Date     COLECTOMY RIGHT N/A 4/13/2017    Procedure: COLECTOMY RIGHT;  Surgeon: Lindsay Peter MD;  Location:  OR     COLONOSCOPY N/A 3/1/2017    Procedure: COLONOSCOPY;  Surgeon: Lindsay Peter MD;  Location:  OR     I & D abdominal abscess       ILEOSTOMY N/A 3/1/2017    Procedure: ILEOSTOMY;  Surgeon: Lindsay Peter MD;  Location:  OR     ILEOSTOMY       LAPAROSCOPIC ILEOSTOMY N/A 3/1/2017    Procedure: LAPAROSCOPIC ILEOSTOMY;  Surgeon: Lindsay Peter MD;  Location:  OR     LAPAROSCOPIC URETEROLYSIS  4/13/2017    Procedure: LAPAROSCOPIC URETEROLYSIS;  Surgeon: Jaycob Mari MD;  Location:  OR     severe protein-calorie malnutrition       SOFT TISSUE SURGERY       TAKEDOWN ILEOSTOMY N/A 4/13/2017    Procedure: TAKEDOWN ILEOSTOMY;  Surgeon: Lindsay Peter MD;  Location:  OR[AN1.3]             Family History:[AN1.1]   Family History   Problem Relation Age of Onset     Breast Cancer Maternal Grandmother[AN1.3]             Allergies:[AN1.1]   Allergies   Allergen Reactions     Wool Fiber Unknown[AN1.3]            Medications:   I have reviewed this patient's medication profile and medications given in the past 24 hours.  Noted are:    acetaminophen prn - none  Oxycodone 5-10 q3h prn - 5mg x2  Hydromorphone 0.3-0.5 IV q2h prn  - 0.5mg x2    Lorazepam prn - none    Miralax daily, senna 1 bid           Physical Exam:   Vital Signs:[AN1.1] Temp: 97.1  F (36.2  C) Temp src: Oral BP: 112/57   Heart Rate: 80 Resp: 14 SpO2: 98 % O2 Device: None (Room air)[AN1.3]    Weight:[AN1.1] 92 lbs 9.6 oz[AN1.3]    Bowel Movements:[AN1.1] none since admission[AN1.2]    Physical Exam:  CONSTIT: awake, appears comfortable[AN1.1], cachectic[AN1.2]  EENT: MM[AN1.1] dry[AN1.2], EOMI, no icterus  RESP: reg,[AN1.1] nl[AN1.2] effort  GI:[AN1.1] soft, mild diffuse tenderness (R>L), good bowel sounds[AN1.2]  MSK:moves x4,[AN1.1] generalized weakness[AN1.2]  SKIN:  warm, no rash, no obvious lesions  NEURO: alert, oriented x3  PSYCH:[AN1.1] appropriate[AN1.2] affect, memory and thought process intact         Data reviewed:   Recent imaging reviewed, pertinent comments:   CT a/p 9/3: 1. Progression of malignancy evidenced by significant increased  metastatic disease throughout the liver and significantly larger  adenopathy within the abdomen.  2. Increasing size of an abdominal wall mass extending through the  midline abdominal musculature. Adjacent the omental malignant implant.  3. New indeterminate nodular prominence of the right pleura. Trace  bibasilar pleural fluid. Cannot exclude pleural metastatic disease.  4. Small ascites. Diffuse anasarca.      Recent lab data reviewed, pertinent comments:   GFR>90  Albumin 2.4[AN1.1]      Leanna Abbott[AN1.3]  Pager: [phone]  [facility] Inpatient Team Consult pager [phone] (M-F 8-4:30)  After-hours Answering Service [phone]  Palliative Clinic: [phone]     Total time spent was[AN1.1] 90[AN1.2] minutes,  >50% of time was spent counseling and/or coordination of care regarding[AN1.1] treatment preferences[AN1.2].[AN1.1]             Revision History        User Key Date/Time User Provider Type Action    > AN1.2 9/4/2018  1:34 PM Leanna Abbott MD Physician Sign     AN1.3 9/4/2018  9:28 AM Milena  Leanna Nguyen MD Physician      AN1.1 9/4/2018  9:27 AM Leanna Abbott MD Physician                      Progress Notes - Physician (Notes from 09/04/18 through 09/07/18)      Progress Notes by Kasey Herbert RN at 9/7/2018 10:10 AM     Author:  Kasey Herbert RN Service:  (none) Author Type:  Registered Nurse    Filed:  9/7/2018 10:10 AM Date of Service:  9/7/2018 10:10 AM Creation Time:  9/7/2018 10:10 AM    Status:  Signed :  Kasey Herbert RN (Registered Nurse)         Report given to Arianna at [facility][MB1.1]     Revision History        User Key Date/Time User Provider Type Action    > MB1.1 9/7/2018 10:10 AM Kasey Herbert RN Registered Nurse Sign            Progress Notes by Desmond Purvis LSW at 9/7/2018  8:55 AM     Author:  Desmond Purvis LSW Service:  Social Work Author Type:      Filed:  9/7/2018  9:03 AM Date of Service:  9/7/2018  8:55 AM Creation Time:  9/7/2018  8:55 AM    Status:  Signed :  Desmond Purvis LSW ()         SW Discharge Note:    D/I:  Patient has a bed available for today at [facility].  Patient is asking for transportation to be arranged.  SW placed call to [facility] for wheelchair transportation at 11:00 today.  Hospice Liaison updated patient and POA of discharge time.      P: SW will continue to assist as needed for discharge.    MARAL Gamboa, CHRIS[AH1.1]         Revision History        User Key Date/Time User Provider Type Action    > AH1.1 9/7/2018  9:03 AM Desmond Purvis LSW  Sign            Progress Notes by Desmond Tinsley RN at 9/6/2018  5:35 PM     Author:  Desmond Tinsley RN Service:  Nursing Author Type:  Registered Nurse    Filed:  9/6/2018  5:46 PM Date of Service:  9/6/2018  5:35 PM Creation Time:  9/6/2018  5:35 PM    Status:  Signed :  Laureano, Desmond Valerie, RN (Registered Nurse)         Had long conversation with sister and brother-in-law (Nat and Aren)  who are the financial POAs.  They want to know pt's disposition.  Patient relations was involved from a risk standpoint to decipher what information should be given out to Prakash.  Pt relations reached out to friend Clara who is the healthcare POA.  Clara is willing to talk to Prakash.  Staff will not give out any information at this time.  Nat and Aren are respectful of patients wishes and have not attempted to reach out to the patient.  Contact patient relations if there is any confusion and questions moving forward.[AF1.1]       Revision History        User Key Date/Time User Provider Type Action    > AF1.1 9/6/2018  5:46 PM Desmond Tinsley RN Registered Nurse Sign            Progress Notes by Hardeep Valentine MD at 9/6/2018  2:24 PM     Author:  Hardeep Valentine MD Service:  Hospitalist Author Type:  Physician    Filed:  9/6/2018  2:31 PM Date of Service:  9/6/2018  2:24 PM Creation Time:  9/6/2018  2:24 PM    Status:  Signed :  Hardeep Valentine MD (Physician)         [facility]    Hospitalist Progress Note    Assessment & Plan   Sherita Kenney is a 77-year-old female with past medical history notable for metastatic colon cancer, ADHD, glaucoma, 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 She was admitted for further management.      Metastatic colon cancer with progression and intractable abdominal pain:  -Patient with known history metastatic colon cancer   -Patient normally follows with Dr. Lindo of [facility]. She received palliative chemotherapy with her last treatment on 07/26/2018 with 5-FU, oxaliplatin and Avastin.   -CT abdomen/pelvis 09/03 showed progression of malignancy evidenced by significant increased metastatic disease throughout the liver and significantly larger  adenopathy within the abdomen. It also demonstrated increasing size of an abdominal wall mass extending through the midline abdominal musculature, new indeterminate nodular prominence of the right pleura, trace bibasilar pleural fluid, small ascites, and diffuse anasarca.    -Palliative Care consulted; code status changed to DNR/DNI, and patient decided to pursue hospice care. Met with hospice 09/05.   -Continue current medicines including IV fluids per patient/family wishes  -Patient to be discharged to [facility] when a bed is available.  Likely 9/7     UTI:  UA upon admission with positive nitrite, negative LE, 3 wbcs, and moderate bacteria. UC growing 50K-100K colonies/ml GNR.  -Started Septra on 09/05 for 3 days.     Physical deconditioning/malnutrition:  Due to advance colon cancer.      Hypothyroidism:    -Continue PTA Synthroid.      Chronic anemia:  Baseline Hg around 11 and stable.    Right coccyx stage I pressure ulcer, community acquired.  -Continue care per [facility] RN     Glaucoma:    -Continue PTA eyedrops.            # Pain Assessment:  Current Pain Score 9/6/2018   Patient currently in pain? denies   Pain score (0-10) -   Pain location -   Pain descriptors -   CPOT pain score -   Sherita corona pain is due to back and abdominal pain.  Please see orders for pain regimen.      D/W: RN  DVT Prophylaxis: Pneumatic Compression Devices  Code Status: DNR/DNI    Disposition: Expected discharge 9/7 to [facility]    Hardeep Valentine MD    Interval History   Patient denies new complaints.  Poor appetite.  Denies chest pain or dyspnea.    -Data reviewed today: I reviewed all new labs and imaging results over the last 24 hours. I personally reviewed no images or EKG's today.      Physical Exam   Temp: 97.1  F (36.2  C) Temp src: Oral BP: 126/70   Heart Rate: 88 Resp: 16 SpO2: 95 % O2 Device: None (Room air)    Vitals:    09/04/18 0500 09/05/18 0727   Weight: 42 kg (92 lb 9.6 oz) 42.4 kg (93 lb 6.4 oz)     Vital Signs with  Ranges  Temp:  [95.9  F (35.5  C)-97.1  F (36.2  C)] 97.1  F (36.2  C)  Heart Rate:  [82-90] 88  Resp:  [16-18] 16  BP: (104-141)/(64-73) 126/70  SpO2:  [93 %-96 %] 95 %  I/O last 3 completed shifts:  In: 2033 [P.O.:300; I.V.:1733]  Out: -     Constitutional: AA, answers simple questions appropriately  Respiratory:  CTA B/L, Normal WOB  Cardiovascular: RRR, No murmur  GI: Soft, central abdominal mass, nonspecific tenderness.  Skin/Integument: Warm and dry, no rashes  Neuro: CN- grossly intact        Medications           bisacodyl  10 mg Rectal Daily     heparin  5 mL Intracatheter Q28 Days     heparin lock flush  5-10 mL Intracatheter Q24H     levothyroxine  88 mcg Oral Daily     morphine  15 mg Oral Q12H ARMANDO     polyethylene glycol  17 g Oral Daily     polyethylene glycol 0.4%- propylene glycol 0.3%  1 drop Both Eyes TID     sulfamethoxazole-trimethoprim  1 tablet Oral BID     travoprost (FARHAT Free)  1 drop Both Eyes At Bedtime     vitamin B complex with vitamin C  1 tablet Oral Daily       Data       Recent Labs  Lab 09/04/18  0720 09/03/18  1715   WBC 6.4 7.5   HGB 10.4* 11.8   MCV 92 92   * 177    135   POTASSIUM 4.0 4.0   CHLORIDE 103 97   CO2 24 28   BUN 18 24   CR 0.56 0.64   ANIONGAP 10 10   SARITHA 8.0* 9.0   GLC 87 110*   ALBUMIN  --  2.4*   PROTTOTAL  --  6.7*   BILITOTAL  --  1.2   ALKPHOS  --  987*   ALT  --  74*   AST  --  282*   LIPASE  --  133       No results found for this or any previous visit (from the past 24 hour(s)).[NB1.1]       Revision History        User Key Date/Time User Provider Type Action    > NB1.1 9/6/2018  2:31 PM Hardeep Valentine MD Physician Sign            Progress Notes by Beverly Hung, RN at 9/6/2018 11:06 AM     Author:  Beverly Hung, RN Service:  Hospice Author Type:  Registered Nurse    Filed:  9/6/2018 11:12 AM Date of Service:  9/6/2018 11:06 AM Creation Time:  9/6/2018 11:06 AM    Status:  Signed :  Beverly Hung RN (Registered Nurse)         [facility]: Met with Sherita and her friend Dinora to sign hospice consent forms. Sherita very alert and engaged this am. Sherita signed the hospice consent forms for hospice services to begin on discharge. POLST completed and placed on front of chart for signature. Discharge meds have already been completed. [facility] currently does not have a bed but anticipate one any day per Arianna at [facility]. I will update patients sister when the patient is ready for discharge as she is the financial POA.  Thank you Beverly Hung RN, BSN  [facility] Admission nurse  [phone][HR1.1]     Revision History        User Key Date/Time User Provider Type Action    > HR1.1 9/6/2018 11:12 AM Beverly Hung, RN Registered Nurse Sign            Progress Notes by Doris Toussaint at 9/6/2018 10:49 AM     Author:  Doris Toussaint Service:  Spiritual Health Author Type:      Filed:  9/6/2018 10:50 AM Date of Service:  9/6/2018 10:49 AM Creation Time:  9/6/2018 10:49 AM    Status:  Signed :  Doris Toussaint ()         SPIRITUAL HEALTH SERVICES Progress Note  [facility]    Met with pt and her friend Dinora for follow-up visit.  Pt briefly talked about her wish for better food, and overall feelings of fatigue and exhaustion.  I left when Beverly from hospice arrived to visit with pt.  SH team available for support, per need or request.                                                                                                                                                 Doris Toussaint M.A.  Staff   Pager [phone]  Phone [phone][PL1.1]         Revision History        User Key Date/Time User Provider Type Action    > PL1.1 9/6/2018 10:50 AM Doris Toussaint  Sign            Progress Notes by Susan Lindo MD at 9/5/2018  4:55 PM     Author:  Susan Lindo MD Service:  Hem/Onc Author Type:  Physician    Filed:  9/5/2018  4:55 PM Date of Service:  9/5/2018  4:55 PM Creation Time:  9/5/2018  4:55 PM     Status:  Signed :  Susan Lindo MD (Physician)         Social visit. Patient going on hospice.[BK1.1]     Revision History        User Key Date/Time User Provider Type Action    > BK1.1 9/5/2018  4:55 PM Susan Lindo MD Physician Sign            Progress Notes by Brock Angel MD at 9/5/2018 12:23 PM     Author:  Brock Angel MD Service:  Hospitalist Author Type:  Physician    Filed:  9/5/2018  3:32 PM Date of Service:  9/5/2018 12:23 PM Creation Time:  9/5/2018 12:23 PM    Status:  Addendum :  Brock Angel MD (Physician)         [facility]  Hospitalist Progress Note  Brock Angel MD    Assessment & Plan   Sherita Kenney is a 77-year-old female with past medical history notable for metastatic colon cancer, ADHD, glaucoma, 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 She was admitted for further management.   [MA1.1]  Metastatic colon cancer with progression and intractable abdominal pain:  Patient had known history metastatic colon cancer (poorly differential adenocarcinoma) with liver involvement. Cancer was diagnosed initially in March 2017. She underwent laparoscopic ileostomy on 03/07/2017 and open right colectomy with takedown of ileostomy on 04/17/2018.  March 15, 2018 CT showed progression of disease.  Patient normally follows with Dr. Lindo of [facility]. She received palliative chemotherapy with her last treatment on 07/26/2018 with 5-FU, oxaliplatin and Avastin. Last clinic appointment on 8/30/18, discussion was had about progression of cancer with recommendation of options including immunotherapy versus a comfort-based approach. She presented with right sided abdominal pain.  CBC showed wbc of 7.5, Hgb of 11.8, and PLT of 177K. CMP with normal renal function, elevated  AST of 282, ALT of 74, TBil of 1.2, and ALKP of 987. U/A with positive nitrite, negative LE, 3 wbcs, and moderate bacteria. CT abdomen/pelvis 09/03 showed progression of malignancy evidenced by significant increased metastatic disease throughout the liver and significantly larger adenopathy within the abdomen. It also demonstrated increasing size of an abdominal wall mass extending through the midline abdominal musculature, new indeterminate nodular prominence of the right pleura, trace bibasilar pleural fluid, small ascites, and diffuse anasarca.  She received a dose of IV Dilaudid in the ER and started on morphine (MS Contin) 15 mg po bid as well as prn oxycodone. Palliative Care consulted; code status changed to DNR/DNI, and patient decided to transition to comfort care.[MA1.2] Met with hospice 09/05. Recommended:       Acetaminophen 650 mg supp VA every 4 hrs as needed for pain/fever     Bisacodyl 10 mg supp VA daily as needed for constipation      Atropine sulfate 1% 2 to 4 drops SL as needed every 2 hrs for terminal congestion/secretions      Haloperidol 0.5 mg tab 1 to 2 tabs po/sl every 6 hrs nausea, agitation     Lorazepam 0.5mg tab 1/2 to 1 tab po/sl every 4 hrs as needed for anxiety/restlessness[MA1.1]      Oxycodone 10 mg po every 2 hours prn[MA1.3]     Senna 8.6 mg 1 to 2 tabs po 2x day as needed constipation[MA1.1]     -Patient to be discharged to [facility] when a bed is available.[MA1.4]    UTI:  UA upon admission with positive nitrite, negative LE, 3 wbcs, and moderate bacteria. UC growing 50K-100K colonies/ml GNR.  -[MA1.1]Started[MA1.4] Septra[MA1.1] on 09/05[MA1.4] for 3 days.    Physical deconditioning/malnutrition:  Due to advance colon cancer.  -PT and nutrition service consulted     Hypothyroidism:    -Continue PTA Synthroid.     Chronic anemia:  Baseline Hg around 11 and stable.    Recent Labs  Lab 09/04/18  0720 09/03/18  1715   HGB 10.4* 11.8       Right coccyx stage I pressure ulcer,  community acquired.  -Continue care per [facility] RN    Glaucoma:    -Continue PTA eyedrops.      # Pain Assessment:  Current Pain Score 9/5/2018   Patient currently in pain? -   Pain score (0-10) 6   Pain location -   Pain descriptors -   CPOT pain score -   - Sherita is experiencing pain due to metastatic colon cancer. Pain management was discussed and the plan was created in a collaborative fashion.  Sherita's response to the current recommendations: engaged  - Please see the plan for pain management as documented above      Active Diet Order      Combination Diet Regular Diet Adult      DVT prophylaxis:  PCDs   CODE STATUS:[MA1.1]  DNR/DNI[MA1.5]  Disposition:[MA1.1] [facility] when a bed is available.[MA1.4] Appreciate help from palliative care and hospice service.[MA1.5]    D/W[MA1.1] Dr. Abbott of palliative care.[MA1.4]    Discharge summary and paper work prepared.[MA1.3]    Interval History   Patient continues to have intermittent abdominal pain. She reports no N/V, fever, cp,or sob. No new issues overnight.    Data reviewed today: I reviewed all new labs and imaging over the last 24 hours. I personally reviewed no images or EKG's today.    Physical Exam   Temp: 97.1  F (36.2  C) Temp src: Oral BP: 109/62   Heart Rate: 79 Resp: 18 SpO2: 97 % O2 Device: None (Room air)    Vitals:    09/04/18 0500 09/05/18 0727   Weight: 42 kg (92 lb 9.6 oz) 42.4 kg (93 lb 6.4 oz)     Vital Signs with Ranges  Temp:  [97.1  F (36.2  C)-97.9  F (36.6  C)] 97.1  F (36.2  C)  Heart Rate:  [79-87] 79  Resp:  [14-18] 18  BP: (109-134)/(62-70) 109/62  SpO2:  [95 %-97 %] 97 %  I/O's Last 24 hours  I/O last 3 completed shifts:  In: 2637 [P.O.:240; I.V.:2397]  Out: 100 [Urine:100]    Constitutional: Comfortable, frail and very weak appearing  HEENT: + conjunctival pallor.  Neurologic: Awake, alert, oriented x3    Medications   All medications were reviewed.    sodium chloride 75 mL/hr at 09/05/18 0016       heparin  5 mL  Intracatheter Q28 Days     heparin lock flush  5-10 mL Intracatheter Q24H     levothyroxine  88 mcg Oral Daily     polyethylene glycol  17 g Oral Daily     polyethylene glycol 0.4%- propylene glycol 0.3%  1 drop Both Eyes TID     senna-docusate  2-4 tablet Oral BID     travoprost (FARHAT Free)  1 drop Both Eyes At Bedtime     vitamin B complex with vitamin C  1 tablet Oral Daily        Data     Recent Labs  Lab 09/04/18  0720 09/03/18  1715   WBC 6.4 7.5   HGB 10.4* 11.8   MCV 92 92   * 177    135   POTASSIUM 4.0 4.0   CHLORIDE 103 97   CO2 24 28   BUN 18 24   CR 0.56 0.64   ANIONGAP 10 10   SARITHA 8.0* 9.0   GLC 87 110*   ALBUMIN  --  2.4*   PROTTOTAL  --  6.7*   BILITOTAL  --  1.2   ALKPHOS  --  987*   ALT  --  74*   AST  --  282*   LIPASE  --  133       No results found for this or any previous visit (from the past 24 hour(s)).[MA1.1]       Revision History        User Key Date/Time User Provider Type Action    > [N/A] 9/5/2018  3:32 PM Brock Angel MD Physician Addend     MA1.2 9/5/2018  1:28 PM Brock Angel MD Physician Addend     MA1.5 9/5/2018  1:08 PM Brock Angel MD Physician Addend     MA1.3 9/5/2018  1:04 PM Brock Angel MD Physician Addend     MA1.4 9/5/2018 12:48 PM Brock Angel MD Physician Sign     MA1.1 9/5/2018 12:23 PM Brock Angel MD Physician             ED Notes signed by Escobar Non-Provider at 9/5/2018  3:15 PM      Author:  Scan, Non-Provider Service:  (none) Author Type:  (none)    Filed:  9/5/2018  3:15 PM Date of Service:  9/5/2018  3:14 PM Creation Time:  9/5/2018  3:15 PM    Status:  Signed :  Escobar Non-Provider     Scan on 9/5/2018  3:15 PM by Escobar Non-Provider : PREHOSPITAL CARE REPORT SUMMARY 1          Revision History        User Key Date/Time User Provider Type Action    > [N/A] 9/5/2018  3:15 PM Scan, Non-Provider (none) Sign            Progress Notes by Leanna Abbott MD at 9/5/2018 11:50 AM     Author:  Milena  Leanna Nguyen MD Service:  Palliative Author Type:  Physician    Filed:  9/5/2018  1:05 PM Date of Service:  9/5/2018 11:50 AM Creation Time:  9/5/2018 11:50 AM    Status:  Signed :  Leanna Abbott MD (Physician)         [facility]    Palliative Care Progress Note    Patient: Sherita Kenney  Date of Admission:  9/3/2018    Recommendations:[AN1.1]    Add MS Contin 15mg q12h    Use bisacodyl supp daily     I stopped senna due to concern for increased abdominal pain after I increased her dose    Stopped acetaminophen. Hasn't been taking and increased LFTs    Awaiting bed at [facility][AN1.2]    These recommendations have been discussed with[AN1.1] Oly Bingham, RN[AN1.2].    Assessment  Sherita Kenney is a 77 year old female with metastatic colon cancer                        - diagnosed April 2017                        - metastatic to liver, omentum, abd wall                        - on chemotherapy until mid-July, missed appointments since                        - follows with Dr Lindo, last visit 8/29, noted general decline at that time. Not deemed candidate for further chemotherapy. Option of immunotherapy vs hospice was discussed.[AN1.1]  Patient and her health care agent have decided to discharge to hospice. She hasn't agreed to transition to comfort cares only at this point.[AN1.2]     SYMPTOMS:[AN1.1]  1. Abdominal pain: seems worse than yesterday. Sherita has difficulties describing the pain or time course. Per her RN it does improve markedly about 1 hour after 10mg oxycodone, but recurs about 2 hours after the dose was given. The pain seems to still be in the abdomen, generalized. Sherita isn't able to describe its nature, doesn't clearly endorse crampy pain. Very active bowel sounds on exam, no significant distention. No BM since admission, unclear if she is passing gas.   She didn't tolerate a heating pad.   She took 5 x10mg oxycodone in the last 24 hours. She is  somewhat less awake, but still oriented to the immediate situation and remembers talking to me yesterday. She has had difficulties with short-term memory, which she openly acknowledges. While she is vulnerable to cognitive side effects of opioids, I feel that with the current doses she is doing ok. I am hoping that we can improve her pain by alleviating her constipation.    2. Constipation: long-standing issue, likely associated with her cancer. Now pt essentially bed bound and taking more opioids. However, since she has very active bowel movements on exam, I wonder if she has some extent of obstruction, even though that was not seen on imaging on admission. Will discontinue senna, since I am concerned that the dose increase added to her discomfort. Discussed using suppository with RN. Continuing miralax.[AN1.2]    Pain management was discussed with[AN1.1] Sherita[AN1.2] and the plan was created in a collaborative fashion.  The patient's response to the current recommendations:[AN1.1] agreeable[AN1.2].     SOCIAL:[AN1.1]    Waiting for bed at [facility][AN1.2]     ADVANCE CARE PLANNING:[AN1.1]   She has difficulties remembering details of yesterday's conversation with myself and Beverly from  Hospice. However, she does confirm that she wants to focus on comfort and avoid coming back to the hospital. As stated before, this is fully consistent with previously voiced and documented wishes.    She has a short memory deficit, which does affect her ability to make decisions independently. However, since she has been consistent in her wish and supported by her healthcare agent throughout, I feel certain that the transfer to hospice is appropriate.[AN1.2]    Leanna Abbott  Pager: [phone]  [facility] Inpatient Team Consult pager [phone] (M-F 8-4:30)  After-hours Answering Service [phone]   Palliative Clinic: [phone]       Interval History:[AN1.1]       Sherita is more uncomfortable today with ongoing abdominal pain.      We briefly revisit yesterday's conversations. She doesn't have additional questions and confirms that she wants hospice services. She also agrees to placement at [facility].[AN1.2]        Medications:   I have reviewed this patient's medication profile and medications during this hospitalization  acetaminophen prn - none  Oxycodone 5-10 q3h prn - 10mg x5  Hydromorphone 0.3-0.5 IV q2h prn - none     Lorazepam prn - none     Miralax daily, senna 2 bid          Review of Systems:   Palliative Symptom Review    A comprehensive ROS was negative except as stated above.           Physical Exam:     Vital Signs: Temp: 97.1  F (36.2  C) Temp src: Oral BP: 109/62   Heart Rate: 79 Resp: 18 SpO2: 97 % O2 Device: None (Room air)  Weight: 93 lbs 6.4 oz    Bowel movements: none since admission    Physical Exam:  CONSTIT: awake, appears[AN1.1] moderately un[AN1.2]comfortable[AN1.1], cachectic[AN1.2]  EENT: MM[AN1.1] dry[AN1.2], EOMI, no icterus  RESP: reg,[AN1.1] nl[AN1.2] effort  GI:[AN1.1] soft, diffusely tender, no distention, active bowel sounds over all quadrants[AN1.2]  MSK: moves x4  SKIN:  warm, no rash, no obvious lesions  NEURO: alert, oriented[AN1.1] to self and situation[AN1.2]  PSYCH:[AN1.1] appropriate[AN1.2] affect, memory and thought process intact        Data Reviewed:    none hamzah Abbott  Pager: [phone]  [facility] Inpatient Team Consult pager [phone] (M-F 8-4:30)  After-hours Answering Service [phone]  Palliative Clinic: [phone]     Total time spent was[AN1.1] 40[AN1.2] minutes,  >50% of time was spent counseling and/or coordination of care regarding[AN1.1] symptom management[AN1.2].[AN1.1]             Revision History        User Key Date/Time User Provider Type Action    > AN1.2 9/5/2018  1:05 PM Leanna Abbott MD Physician Sign     AN1.1 9/5/2018 11:50 AM Leanna Abbott MD Physician             Progress Notes by Alexandria Orourke, RN at 9/4/2018  6:39 PM      Author:  Alexandria Orourke RN Service:  [facility] Nurse Author Type:  Enterstomal Therapist    Filed:  9/4/2018  6:48 PM Date of Service:  9/4/2018  6:39 PM Creation Time:  9/4/2018  6:39 PM    Status:  Signed :  Alexandria Orourke RN (Enterstomal Therapist)         Focus: Suspected coccyx  PI - community acquired  S: pt consulted for above focus. History progress notes and orders reveiwed       History per MD:        Ms. Kenney is a 77-year-old female with metastatic colon cancer.  She is well known to me.  Colonoscopy in 03/2017 had revealed a large fungating mass in right side of the colon.  This was perforated, causing abscess.  Laparoscopic colostomy was done in 03/2017.  She had right colectomy with takedown of her ileostomy in 04/2017.  Pathology had revealed poorly differentiated adenocarcinoma.  Fifteen of 24 lymph nodes were positive, beta mutation present.  PET scan in 05/2017 had revealed multiple liver metastases.       For metastatic colon cancer, the patient has been on multiple different treatments since 06/2017, including modified FOLFOX6 with Avastin, 5-FU and Avastin, single agent Avastin.  Last treatment was on 06/26/2018.       In the last few months, the patient's overall condition has been deteriorating.  She has been more weak.  She has been having right-sided abdominal pain.  This was all indicative of progression of disease.  I had met with the patient on 08/29/2018.  We had discussed regarding further palliative chemotherapy/immunotherapy versus comfort care.  After discussion, the plan was for hospice.     O:  Rt coccyx:  3.0cm x 1.5cm area of intact, non-blanchable erythema.          La-ulcer skin: Intact, no erythema.         Drainage: none         Odor: none          Pt with severe pain with repositioning on Lt.        Diaper for UIC/FIC.     A:  Rt coccyx: Stage I PI, community acquired. No local s/s infection. Pt moving towards hospice today    I/P:  Rt coccyx PI         -Change  dressing on even days and prn         -Mepilex sacral        PIP         -Microturn pt for comfort         -Incontinence protocol prn         - HOB below 30 degrees for pt comfort         -Continue full skin inspections BID and document         -Document Wilmer risk interventions         -Pain meds per EMR prior to activity    WOC will return weekly if pt remains in hospital[JP1.1]     Revision History        User Key Date/Time User Provider Type Action    > JP1.1 9/4/2018  6:48 PM Alexandria Orourke RN Enterstomal Therapist Sign            Progress Notes by Susan Lindo MD at 9/4/2018  2:53 PM     Author:  Susan Lindo MD Service:  Hem/Onc Author Type:  Physician    Filed:  9/4/2018  2:54 PM Date of Service:  9/4/2018  2:53 PM Creation Time:  9/4/2018  2:53 PM    Status:  Signed :  Susan Lindo MD (Physician)         Consult dictated.    77-year-old female with metastatic colon cancer with multiple liver metastasis admitted with right-sided abdominal pain.  CT scan reveals further progression of colon cancer.  Her pain is secondary to malignancy.  Patient's overall performance status is poor.  She is not a candidate for any further palliative chemotherapy or immunotherapy.    Plan:  -Continue pain medication.  -Hospice consult.[BK1.1]     Revision History        User Key Date/Time User Provider Type Action    > BK1.1 9/4/2018  2:54 PM Susan Lindo MD Physician Sign            Progress Notes by Beverly Hung RN at 9/4/2018  1:56 PM     Author:  Abhilash, Beverly, RN Service:  Hospice Author Type:  Registered Nurse    Filed:  9/4/2018  2:20 PM Date of Service:  9/4/2018  1:56 PM Creation Time:  9/4/2018  1:56 PM    Status:  Signed :  Beverly Hung RN (Registered Nurse)         [facility]: Met with patient, her friend Dinora and her HC Agent Clara on speaker phone. I provided the written hospice information sheet and hospice brochure for them to refer to after along with my phone number for  questions. Sherita is feeling a little overwhelmed with information today and also having quite a bit of pain. I am not sure Sherita could make decisions on her own. While having Clara on the speaker phone Sherita asked who we were talking with. Having difficulty remembering. I reviewed the medicare hospice benefit, services available for support, medication and equipment coverage. We discussed where hospice care would take place when she is ready for discharge. It was determined she would not do well going back to her AL facility for end of life care. [facility] and this is where she would like to go. They currently do not have a bed available but Arianna at [facility] states this could change in the next few days. No hospice consents were signed today. The plan would be to meet again with Sherita, Sherita's sister Dinora Johns and Clara on speaker phone when a bed is available at [facility] to sign hospice consent forms.  Upon our next meeting will complete a POLST.  Please order the following hospice comfort meds from the [facility] discharge pharmacy:  Acetaminophen 650 mg supp CA every 4 hrs as needed for pain/fever #2  bisacodyl 10 mg supp CA daily as needed for constipation #2,   atropine sulfate 1% 2 to 4 drops SL as needed every 2 hrs for terminal congestion/secretions # 5 ml,   haloperidol 0.5 mg tab 1 to 2 tabs po/sl every 6 hrs nausea, agitation # 15,   lorazepam 0.5mg tab 1/2 to 1 tab po/sl every 4 hrs as needed for anxiety/restlessness #30,   morphine sulfate 20 mg/ml give 5 to 10 mg or 0.25 to 0.5ml SL every 2 hrs pain/dyspnea,   senna 8.6 mg 1 to 2 tabs po 2x day as needed constipation #30.  Thank you for the referral. Beverly Hung RN, BSN   Hospice Admission nurse  [phone][HR1.1]     Revision History        User Key Date/Time User Provider Type Action    > HR1.1 9/4/2018  2:20 PM Beverly Hung, RN Registered Nurse Sign            Progress Notes by Brock Angel MD at 9/4/2018 10:00 AM      "Author:  Brock Angel MD Service:  Hospitalist Author Type:  Physician    Filed:  9/4/2018 10:46 AM Date of Service:  9/4/2018 10:00 AM Creation Time:  9/4/2018 10:00 AM    Status:  Signed :  Brock Angel MD (Physician)         [facility]  Hospitalist Progress Note  Brock Angel MD    Assessment & Plan   Sherita Kenney is a 77-year-old female with past medical history notable for metastatic colon cancer, ADHD, glaucoma, 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 She was admitted[MA1.1] for further management.[MA1.2]     Intractable abdominal pain related to progression of metastatic colon cancer:    Known history metastatic colon cancer[MA1.1] (poorly differential adenocarcinoma)[MA1.2] with liver involvement. Cancer was diagnosed initially in March 2017. She underwent laparoscopic ileostomy on 03/07/2017 and open right colectomy with takedown of ileostomy on 04/17/2018.  March 15, 2018 CT showed progression of disease. She his currently on palliative chemotherapy with her last treatment on 07/26/2018 with 5-FU, oxaliplatin and Avastin. Patient is followed by Dr. Lindo of [facility]. Last clinic appointment was 8/30/18.  At that appointment, discussion was had about progression of cancer with recommendation of options including immunotherapy versus a comfort-based approach.  Per documentation, it appears that patient wanted a more hospice-based approach. However, upon admission, the patient was unclear about her wishes, wanting to \"fight this\" and \"live.\"    She presented with Right sided abdominal pain.  CBC with wbc of 7.5, Hgb of 11.8, and PLT of 177K. CMP showed normal renal function, elevated AST of 282, ALT of 74, TBil of 1.2, and ALKP of 987. U/A with positive nitrite, negative LE, 3 wbcs, " and moderate bacteria. CT abdomen/pelvis 09/03 showed progression of malignancy evidenced by significant increased  metastatic disease throughout the liver and significantly larger adenopathy within the abdomen. It also demonstrated increasing size of an abdominal wall mass extending through the midline abdominal musculature, adjacent the omental malignant implant, new indeterminate nodular prominence of the right pleura., trace bibasilar pleural fluid, small ascites, and diffuse anasarca.    She received a dose of IV Dilaudid in the ER.     -Continue analgesic regimen including Tylenol,oxycodone, and IV Dilaudid.   -Palliative Care consulted for goals of care discussion and symptom control, appreciate assistance greatly    -[facility] consulted, appreciate assistance.   -IV fluids at 75 mL per hour.   -Continue bowel regimen[MA1.1]  -Follow up on U/C[MA1.3]  -SW consulted per patient request.[MA1.1]     Physical deconditioning/malnutrition:  -PT and nutrition service consult[MA1.2]     Hypothyroidism:    -Continue PTA Synthroid.     Chronic anemia:  Baseline Hg around 11 and stable.[MA1.1]    Recent Labs  Lab 09/04/18  0720 09/03/18  1715   HGB 10.4* 11.8[MA1.4]   '    Glaucoma:    -Continue PTA eyedrops.      # Pain Assessment:  Current Pain Score 9/4/2018   Patient currently in pain? -   Pain score (0-10) 7   Pain location -   Pain descriptors -   CPOT pain score -   - Sherita is experiencing pain due to metastatic colon cancer. Pain management was discussed and the plan was created in a collaborative fashion.  Sherita's response to the current recommendations: engaged  - Please see the plan for pain management as documented above      Active Diet Order      Combination Diet Regular Diet Adult      DVT prophylaxis:  PCDs   CODE STATUS:  Full code at this time.  Disposition: Pending ongoing management for pain control, anticipate 1-2+ days of inpatient management.[MA1.1]    D/W RN.[MA1.2]    Interval  History[MA1.1]   Patient continues to have intermittent abdominal pain. She reports no N/V, fever, cp,or sob. No new issues overnight.[MA1.2]    Data reviewed today: I reviewed all new labs and imaging over the last 24 hours. I personally reviewed[MA1.1] no images or EKG's today[MA1.2].    Physical Exam   Temp: 97.1  F (36.2  C) Temp src: Oral BP: 112/57   Heart Rate: 80 Resp: 14 SpO2: 98 % O2 Device: None (Room air)    Vitals:    09/04/18 0500   Weight: 42 kg (92 lb 9.6 oz)     Vital Signs with Ranges  Temp:  [95.3  F (35.2  C)-97.1  F (36.2  C)] 97.1  F (36.2  C)  Heart Rate:  [62-90] 80  Resp:  [14-16] 14  BP: (110-135)/(57-78) 112/57  SpO2:  [95 %-98 %] 98 %  I/O's Last 24 hours  I/O last 3 completed shifts:  In: 55 [P.O.:55]  Out: 100 [Urine:100]    Constitutional: Comfortable,[MA1.1] frail and very weak appearing[MA1.2]  HEENT:[MA1.1] +[MA1.2] conjunctival pallor.  Neurologic: Awake, alert[MA1.1], oriented x3[MA1.2]  Neck: Supple, no elevated JVP  Respiratory: Clear to auscultation  Cardiovascular: Normal S1 and S2. Regular rhythm and rate  GI: Abdomen soft,[MA1.1] firm[MA1.2], non distended  Extremities: No calf tenderness, no edema  Skin/integument: No acute rash, no cyanosis    Medications[MA1.1]   All medications were reviewed.[MA1.2]    sodium chloride 75 mL/hr at 09/03/18 2322       levothyroxine  88 mcg Oral Daily     polyethylene glycol  17 g Oral Daily     polyethylene glycol 0.4%- propylene glycol 0.3%  1 drop Both Eyes TID     senna-docusate  1 tablet Oral BID     travoprost (FARHAT Free)  1 drop Both Eyes At Bedtime     vitamin B complex with vitamin C  1 tablet Oral Daily        Data     Recent Labs  Lab 09/04/18  0720 09/03/18  1715   WBC 6.4 7.5   HGB 10.4* 11.8   MCV 92 92   * 177    135   POTASSIUM 4.0 4.0   CHLORIDE 103 97   CO2 24 28   BUN 18 24   CR 0.56 0.64   ANIONGAP 10 10   SARITHA 8.0* 9.0   GLC 87 110*   ALBUMIN  --  2.4*   PROTTOTAL  --  6.7*   BILITOTAL  --  1.2   ALKPHOS  --   987*   ALT  --  74*   AST  --  282*   LIPASE  --  133       Recent Results (from the past 24 hour(s))   CT Abdomen Pelvis w Contrast    Narrative    CT ABDOMEN PELVIS WITH CONTRAST   9/3/2018 6:34 PM     HISTORY: Severe right upper abdominal pain, no BM for 1 week and  history of metastatic colon cancer, check for bowel obstruction or  perforation.    TECHNIQUE:  CT abdomen and pelvis with 48 mL Isovue-370 IV. Radiation  dose for this scan was reduced using automated exposure control,  adjustment of the mA and/or kV according to patient size, or iterative  reconstruction technique.    COMPARISON: CT chest, abdomen, and pelvis 5/20/2018.    FINDINGS:  Interval significant progression of disseminated hepatic  metastatic disease. An example mass at the anterior right superior  liver is now 7.3 x 5.8 cm, previously 5.4 x 4 cm series 3 image 20.  There are multiple other examples of hepatic progression bilaterally.    Adrenals, spleen and kidneys do not show any significant  abnormalities.    The pancreas is poorly visualized and is surrounded by large necrotic  appearing malignant adenopathy. This adenopathy has progressed. For  example, adenopathy anterior to the pancreas and posterior to the left  liver is approximately 5.8 x 2.8 cm, previously 3.9 x 1.2 cm, series 3  image 35. Large portocaval region adenopathy is 5.9 x 2.9 cm,  previously 2.8 x 1 cm, series 3 image 36. There are numerous  additional adjacent examples. There is progression of adenopathy of  mesentery is well. An example at the midline mesentery measuring 2.4 x  2.3 cm, previously 1.7 x 1.9 cm series 3 image 51. There are multiple  additional examples in this area. A few mildly prominent  retroperitoneal lymph nodes are also noted. Progression of a midline  subcutaneous mass that extends through the abdominal wall musculature.  For example, this is 3 x 2.5 cm, previously 1.9 x 1.3 cm series 3  image 48.    Anasarca is present. There is small ascites  "in the abdomen or pelvis.  There is no bowel obstruction identified. In addition to the  aforementioned subcutaneous mass extending throughout dominant wall  there is an increasing omental region implant at the midline image 47.  Colonic diverticulosis. Vascular calcifications. Hip DJD. No  convincing focal bone lesions. Some mild nodular thickening of the  right pleura is newly seen and is considered indeterminate. Trace  bilateral pleural fluid.      Impression    IMPRESSION:  1. Progression of malignancy evidenced by significant increased  metastatic disease throughout the liver and significantly larger  adenopathy within the abdomen.  2. Increasing size of an abdominal wall mass extending through the  midline abdominal musculature. Adjacent the omental malignant implant.  3. New indeterminate nodular prominence of the right pleura. Trace  bibasilar pleural fluid. Cannot exclude pleural metastatic disease.  4. Small ascites. Diffuse anasarca.      JOHNSON FERNÁNDEZ MD[MA1.1]            Revision History        User Key Date/Time User Provider Type Action    > MA1.2 9/4/2018 10:46 AM Brock Angel MD Physician Sign     MA1.3 9/4/2018 10:35 AM Brock Angel MD Physician      MA1.4 9/4/2018 10:32 AM Brock Angel MD Physician      MA1.1 9/4/2018 10:00 AM Brock Angel MD Physician             Progress Notes by Doris Fernández at 9/4/2018 10:40 AM     Author:  Doris Fernández Service:  Spiritual Health Author Type:      Filed:  9/4/2018 10:43 AM Date of Service:  9/4/2018 10:40 AM Creation Time:  9/4/2018 10:40 AM    Status:  Signed :  Doris Fernández ()         SPIRITUAL HEALTH SERVICES Progress Note  [facility]      Met with pt and her friend Dinora who was at her bedside.    Pt briefly talked about the pain that was the reason for her hospitalization.  She was awaiting another dose of pain medication during my visit.  I told her about the palliative care team, and pt said, \"I " "don't need that because I'm not dying!\"  I explained that the team was likely consulted to assist with symptom management, and she was agreeable to meeting with the palliative physician who has been assigned to her.  Her friend Dinora was clearly in support of palliative team involvement.    Pt has a loose connection to a [employer].  She has involvement and support from various people, including friends, a niece and a sister, per her chart.      Pt stated no needs at the present time, other than to \"cure this cancer!\"  I will plan to follow and visit again in the next 1-2 days.                                                                                                                                           Doris Toussaint M.A.  Staff   Pager [phone]  Phone [phone][PL1.1]         Revision History        User Key Date/Time User Provider Type Action    > PL1.1 9/4/2018 10:43 AM Doris Toussaintin Sign            Progress Notes by Christina Grover RN at 9/3/2018 11:52 PM     Author:  Christina Grover RN Service:  Oncology Author Type:  Registered Nurse    Filed:  9/4/2018 12:01 AM Date of Service:  9/3/2018 11:52 PM Creation Time:  9/3/2018 11:52 PM    Status:  Addendum :  Christina Grover RN (Registered Nurse)         Arrived to unit 2100 from ED accompanied by friend Dinora.  Patient alert and oriented except to situation.  C/o severe abdominal pain, IV Dilaudid and PO Oxy given with some relief.  Lung sounds clear.  Skin is very pale, pre-existing wound to coccyx covered with Mepilex.  Wilmer Risk band placed.[NW1.1]  WOC RN consult placed.[NW1.2]  Up with A1 to bedside commode, did not void this shift, urine sample pending.  Port to R chest not accessed per pt request.  PIV infusing R arm 75NS.  L arm range of motion absent d/t childhood hx of polio- pt refused Limb alert wrist band.  Small tumor to abdomen exposed through skin, pink and dry.  Last BM unknown to patient.  Likes " "Chocolate Boost shakes.  Pt stated she feels unsafe d/t presence of her sister who \"steals her things\", SW consult placed.  Lives in Assisted Living facility.  Plan for[NW1.1] WO RN,[NW1.2] Palliative and Oncology consults tomorrow AM.[NW1.1]     Revision History        User Key Date/Time User Provider Type Action    > NW1.2 9/4/2018 12:01 AM Christina Grover RN Registered Nurse Addend     NW1.1 9/3/2018 11:59 PM Christina Grover RN Registered Nurse Sign                  Procedure Notes     No notes of this type exist for this encounter.      Progress Notes - Therapies (Notes from 09/04/18 through 09/07/18)     No notes of this type exist for this encounter.      "

## 2018-09-03 NOTE — IP AVS SNAPSHOT
"[place] [address] ONCOLOGY: [phone]                                              INTERAGENCY TRANSFER FORM - LAB / IMAGING / EKG / EMG RESULTS   9/3/2018                    Hospital Admission Date: 9/3/2018  SEVERO SWANN   : 1940  Sex: Female        Attending Provider: Vladimir Long MD     Allergies:  Wool Fiber    Infection:  None   Service:  HOSPITALIST    Ht:  1.626 m (5' 4.02\")   Wt:  52.7 kg (116 lb 1.6 oz)   Admission Wt:  42 kg (92 lb 9.6 oz)    BMI:  19.92 kg/m 2   BSA:  1.54 m 2            Patient PCP Information     Provider PCP Type    Edgar Rodriguez MD General         Lab Results - 3 Days      Urine Culture Aerobic Bacterial [[id number]] (Abnormal)  Resulted: 18, Result status: Final result    Ordering provider: Vladimir Long MD  18 0500 Resulting lab: [facility]    Specimen Information    Type Source Collected On   Midstream Urine  18 0500          Components       Value Reference Range Flag Lab   Specimen Description Midstream Urine      Special Requests Specimen received in preservative   75   Culture Micro --  A 75   Result:         50,000 to 100,000 colonies/mL  Escherichia coli     Culture Micro --  A 75   Result:         50,000 to 100,000 colonies/mL  Klebsiella pneumoniae              Basic metabolic panel [[id number]] (Abnormal)  Resulted: 18 0744, Result status: Final result    Ordering provider: Joselin Caruso PA-C  18 0000 Resulting lab: [facility]    Specimen Information    Type Source Collected On   Blood  18 0720          Components       Value Reference Range Flag Lab   Sodium 137 133 - 144 mmol/L  FrStHsLb   Potassium 4.0 3.4 - 5.3 mmol/L  FrStHsLb   Chloride 103 94 - 109 mmol/L  FrStHsLb   Carbon Dioxide 24 20 - 32 mmol/L  FrStHsLb   Anion Gap 10 3 - 14 mmol/L  FrStHsLb   Glucose 87 70 - 99 mg/dL  FrStHsLb   Urea Nitrogen " 18 7 - 30 mg/dL  FrStHsLb   Creatinine 0.56 0.52 - 1.04 mg/dL  FrStHsLb   GFR Estimate >90 >60 mL/min/1.7m2  FrStHsLb   Comment:  Non  GFR Calc   GFR Estimate If Black >90 >60 mL/min/1.7m2  FrStHsLb   Comment:  African American GFR Calc   Calcium 8.0 8.5 - 10.1 mg/dL L FrStHsLb            CBC with platelets differential [[id number]] (Abnormal)  Resulted: 09/04/18 0728, Result status: Final result    Ordering provider: Joselin Caruso PA-C  09/04/18 0000 Resulting lab: [facility]    Specimen Information    Type Source Collected On   Blood  09/04/18 0720          Components       Value Reference Range Flag Lab   WBC 6.4 4.0 - 11.0 10e9/L  FrStHsLb   RBC Count 3.49 3.8 - 5.2 10e12/L L FrStHsLb   Hemoglobin 10.4 11.7 - 15.7 g/dL L FrStHsLb   Hematocrit 32.2 35.0 - 47.0 % L FrStHsLb   MCV 92 78 - 100 fl  FrStHsLb   MCH 29.8 26.5 - 33.0 pg  FrStHsLb   MCHC 32.3 31.5 - 36.5 g/dL  FrStHsLb   RDW 16.2 10.0 - 15.0 % H FrStHsLb   Platelet Count 134 150 - 450 10e9/L L FrStHsLb   Diff Method Automated Method   FrStHsLb   % Neutrophils 77.7 %  FrStHsLb   % Lymphocytes 12.6 %  FrStHsLb   % Monocytes 8.5 %  FrStHsLb   % Eosinophils 0.3 %  FrStHsLb   % Basophils 0.3 %  FrStHsLb   % Immature Granulocytes 0.6 %  FrStHsLb   Nucleated RBCs 0 0 /100  FrStHsLb   Absolute Neutrophil 4.9 1.6 - 8.3 10e9/L  FrStHsLb   Absolute Lymphocytes 0.8 0.8 - 5.3 10e9/L  FrStHsLb   Absolute Monocytes 0.5 0.0 - 1.3 10e9/L  FrStHsLb   Absolute Eosinophils 0.0 0.0 - 0.7 10e9/L  FrStHsLb   Absolute Basophils 0.0 0.0 - 0.2 10e9/L  FrStHsLb   Abs Immature Granulocytes 0.0 0 - 0.4 10e9/L  FrStHsLb   Absolute Nucleated RBC 0.0   FrStHsLb            UA reflex to Microscopic and Culture [[id number]] (Abnormal)  Resulted: 09/04/18 0538, Result status: Edited Result - FINAL    Ordering provider: Jo-Ann Spivey MD  09/03/18 1719 Resulting lab: [facility]    Specimen Information    Type Source  Collected On   Midstream Urine Urine clean catch 09/04/18 0500          Components       Value Reference Range Flag Lab   Color Urine Yellow   FrStHsLb   Appearance Urine Clear   FrStHsLb   Glucose Urine Negative NEG^Negative mg/dL  FrStHsLb   Bilirubin Urine Negative NEG^Negative  FrStHsLb   Ketones Urine 10 NEG^Negative mg/dL A FrStHsLb   Specific [place] Urine 1.046 1.003 - 1.035 H FrStHsLb   Blood Urine Negative NEG^Negative  FrStHsLb   pH Urine 5.5 5.0 - 7.0 pH  FrStHsLb   Protein Albumin Urine 10 NEG^Negative mg/dL A FrStHsLb   Urobilinogen mg/dL 2.0 0.0 - 2.0 mg/dL  FrStHsLb   Nitrite Urine Positive NEG^Negative A FrStHsLb   Leukocyte Esterase Urine Negative NEG^Negative  FrStHsLb   Source Midstream Urine   FrStHsLb   RBC Urine 0 0 - 2 /HPF  FrStHsLb   WBC Urine 3 0 - 5 /HPF  FrStHsLb   Bacteria Urine Moderate NEG^Negative /HPF A FrStHsLb   Squamous Epithelial /HPF Urine <1 0 - 1 /HPF  FrStHsLb   Mucous Urine Present NEG^Negative /LPF A FrStHsLb            Testing Performed By     Lab - Abbreviation Name Director Address Valid Date Range    14 - FrStHsLb [facility] Unknown [address] [place] [address] 05/08/15 1057 - Present    75 - Unknown [facility] Unknown [address] [facility] [address] 01/15/15 1019 - Present            Unresulted Labs     None      Encounter-Level Documents:     There are no encounter-level documents.      Order-Level Documents:     There are no order-level documents.

## 2018-09-03 NOTE — IP AVS SNAPSHOT
` [place] [address] ONCOLOGY: [phone]            Medication Administration Report for Sherita Kenney as of 09/07/18 1052   Legend:    Given Hold Not Given Due Canceled Entry Other Actions    Time Time (Time) Time  Time-Action       Inactive    Active    Linked        Medications 09/01/18 09/02/18 09/03/18 09/04/18 09/05/18 09/06/18 09/07/18    artificial saliva (BIOTENE MT) solution 2 spray  Dose: 2 spray  Freq: EVERY 1 HOUR PRN Route: MT  PRN Reason: dry mouth  Start: 09/04/18 1116   Admin. Amount: 2 spray = 2 mL Conc: 1 spray/mL  Dispense Loc:  Main Pharmacy  Volume: 44.3 mL               bisacodyl (DULCOLAX) Suppository 10 mg  Dose: 10 mg  Freq: DAILY Route: RE  Start: 09/05/18 1245   Admin Instructions: Hold for loose stools.    Admin. Amount: 1 suppository (1 × 10 mg suppository)  Last Admin: 09/07/18 0925  Dispense Loc: [facility]         1315 (10 mg)-Given        0810 (10 mg)-Given        0925 (10 mg)-Given           heparin 100 UNIT/ML injection 5 mL  Dose: 5 mL  Freq: EVERY 28 DAYS Route: IK  Start: 09/04/18 1515   Admin Instructions: ONLY to de-access each port in dual implanted port.  Flush with 10 mL NS followed by 5 mL heparin (100 units/mL) at discharge and at least every 28 days.  MAX: 5 mL per port    Admin. Amount: 5 mL  Dispense Loc:  [facility]  Volume: 5 mL        (1507)-Not Given              heparin lock flush 10 UNIT/ML injection 5-10 mL  Dose: 5-10 mL  Freq: EVERY 1 HOUR PRN Route: IK  PRN Reason: other  PRN Comment: to lock each port in dual implanted port.  Start: 09/04/18 1505   Admin Instructions: MAX: 5 mL per port.    Admin. Amount: 5-10 mL  Last Admin: 09/07/18 0934  Dispense Loc:  [facility]  Volume: 10 mL           0934 (5 mL)-Given           heparin lock flush 10 UNIT/ML injection 5-10 mL  Dose: 5-10 mL  Freq: EVERY 24 HOURS Route: IK  Start: 09/04/18 1515   Admin Instructions: To lock each dormant port in dual implanted port.  Check PRN heparin flush order to see  when last dose of PRN heparin was given before administering.   MAX: 5 mL per port.    Admin. Amount: 5-10 mL  Dispense Loc:  [facility]  Volume: 10 mL        (1507)-Not Given         (0520)-Not Given        (0500)-Not Given           HYDROmorphone (PF) (DILAUDID) injection 0.3-0.5 mg  Dose: 0.3-0.5 mg  Freq: EVERY 2 HOURS PRN Route: IV  PRN Reason: other  PRN Comment: for pain control or improvement in physical function.  Hold dose for analgesic side effects.  Start: 09/03/18 2049   Admin Instructions: Start at the lowest dose.  May adjust dose by 0.1 mg every 2 hours as needed.  Notify provider to assess for uncontrolled pain or analgesic side effects. Hold while on PCA or with regular IV opioid dosing  For ordered doses up to 4 mg give IV Push undiluted. Administer each 2mg over 2-5 minutes.    Admin. Amount: 0.3-0.5 mg  Last Admin: 09/03/18 2116  Dispense Loc:  [facility] 88[facility]   Current Line: Peripheral IV 09/03/18 Right Upper forearm      2116 (0.5 mg)-Given               levothyroxine (SYNTHROID/LEVOTHROID) tablet 88 mcg  Dose: 88 mcg  Freq: DAILY Route: PO  Start: 09/04/18 0900   Admin Instructions: Separate oral administration of iron- or calcium-containing products and levothyroxine by at least 4 hours.    Admin. Amount: 1 tablet (1 × 88 mcg tablet)  Last Admin: 09/07/18 0925  Dispense Loc:  [facility] 88[facility]        0830 (88 mcg)-Given        0908 (88 mcg)-Given        0811 (88 mcg)-Given        0925 (88 mcg)-Given           LORazepam (ATIVAN) tablet 0.5 mg  Dose: 0.5 mg  Freq: EVERY 4 HOURS PRN Route: PO  PRN Comment: Anxiety, Nausea/Vomiting or Sleep  Start: 09/03/18 2049   Admin. Amount: 1 tablet (1 × 0.5 mg tablet)  Last Admin: 09/05/18 1754  Dispense Loc:  [facility]         1754 (0.5 mg)-Given             melatonin tablet 1 mg  Dose: 1 mg  Freq: AT BEDTIME PRN Route: PO  PRN Reason: sleep  Start: 09/03/18 2049   Admin Instructions: Do not give unless at least 6 hours of uninterrupted sleep is expected.    Admin.  Amount: 1 tablet (1 × 1 mg tablet)  Dispense Loc: [facility]               morphine (MS CONTIN) 12 hr tablet 15 mg  Dose: 15 mg  Freq: EVERY 12 HOURS SCHEDULED Route: PO  Start: 09/05/18 2000   Admin Instructions: DO NOT CRUSH.    Admin. Amount: 1 tablet (1 × 15 mg tablet)  Last Admin: 09/07/18 0925  Dispense Loc: [facility] 88[facility]         2221 (15 mg)-Given        0810 (15 mg)-Given       2024 (15 mg)-Given        0925 (15 mg)-Given       [ ] 2000           naloxone (NARCAN) injection 0.1-0.4 mg  Dose: 0.1-0.4 mg  Freq: EVERY 2 MIN PRN Route: IV  PRN Reason: opioid reversal  Start: 09/03/18 2049   Admin Instructions: For respiratory rate LESS than or EQUAL to 8.  Partial reversal dose:  0.1 mg titrated q 2 minutes for Analgesia Side Effects Monitoring Sedation Level of 3 (frequently drowsy, arousable, drifts to sleep during conversation).Full reversal dose:  0.4 mg bolus for Analgesia Side Effects Monitoring Sedation Level of 4 (somnolent, minimal or no response to stimulation).  For ordered doses up to 2mg give IVP. Give each 0.4mg over 15 seconds in emergency situations. For non-emergent situations further dilute in 9mL of NS to facilitate titration of response.    Admin. Amount: 0.1-0.4 mg = 0.25-1 mL Conc: 0.4 mg/mL  Dispense Loc:  [facility]  Volume: 1 mL               ondansetron (ZOFRAN-ODT) ODT tab 4 mg  Dose: 4 mg  Freq: EVERY 6 HOURS PRN Route: PO  PRN Reasons: nausea,vomiting  Start: 09/03/18 2049   Admin Instructions: This is Step 1 of nausea and vomiting management.  If nausea not resolved in 15 minutes, go to Step 2 prochlorperazine (COMPAZINE). Do not push through foil backing. Peel back foil and gently remove. Place on tongue immediately. Administration with liquid unnecessary  With dry hands, peel back foil backing and gently remove tablet; do not push oral disintegrating tablet through foil backing; administer immediately on tongue and oral disintegrating tablet dissolves in seconds; then swallow with  saliva; liquid not required.    Admin. Amount: 1 tablet (1 × 4 mg tablet)  Dispense Loc: [facility]              Or  ondansetron (ZOFRAN) injection 4 mg  Dose: 4 mg  Freq: EVERY 6 HOURS PRN Route: IV  PRN Reasons: nausea,vomiting  Start: 09/03/18 2049   Admin Instructions: This is Step 1 of nausea and vomiting management.  If nausea not resolved in 15 minutes, go to Step 2 prochlorperazine (COMPAZINE).  Irritant. For ordered doses up to 4 mg, give IV Push undiluted over 2-5 minutes.    Admin. Amount: 4 mg = 2 mL Conc: 4 mg/2 mL  Dispense Loc: [facility]  Infused Over: 2-5 Minutes  Volume: 2 mL               oxyCODONE IR (ROXICODONE) tablet 5-10 mg  Dose: 5-10 mg  Freq: EVERY 2 HOURS PRN Route: PO  PRN Reason: other  PRN Comment: pain control or improvement in physical function. Hold dose for analgesic side effects.  Start: 09/04/18 1230   Admin Instructions: Start with the lowest dose.  May adjust dose by 5 mg every 3 hours as needed. Notify provider to assess for uncontrolled pain or analgesic side effects. Hold while on PCA or with regular IV opioid dosing.    Admin. Amount: 1-2 tablet (1-2 × 5 mg tablet)  Last Admin: 09/05/18 1850  Dispense Loc: [facility]        1306 (10 mg)-Given       1726 (10 mg)-Given       2103 (10 mg)-Given        0420 (10 mg)-Given       0923 (10 mg)-Given       1123 (10 mg)-Given       1325 (10 mg)-Given       1605 (10 mg)-Given       1850 (5 mg)-Given             polyethylene glycol (MIRALAX/GLYCOLAX) Packet 17 g  Dose: 17 g  Freq: DAILY PRN Route: PO  PRN Reason: constipation  Start: 09/03/18 2049   Admin Instructions: Give in 8oz of  water, juice, or soda. Hold for loose stools.  This is the second step of a three step constipation treatment.  1 Packet = 17 grams. Mixed prescribed dose in 8 ounces of water. Follow with 8 oz. of water.    Admin. Amount: 17 g  Dispense Loc: [facility]               polyethylene glycol (MIRALAX/GLYCOLAX) Packet 17 g  Dose: 17 g  Freq: DAILY Route:  PO  Start: 09/04/18 0900   Admin Instructions: 1 Packet = 17 grams. Mixed prescribed dose in 8 ounces of water.  1 Packet = 17 grams. Mixed prescribed dose in 8 ounces of water. Follow with 8 oz. of water.    Admin. Amount: 17 g  Last Admin: 09/07/18 0926  Dispense Loc:  [facility] 88[facility]        0829 (17 g)-Given        0908 (17 g)-Given        0811 (17 g)-Given        0926 (17 g)-Given           polyethylene glycol 0.4%- propylene glycol 0.3% (SYSTANE ULTRA) ophthalmic solution 1 drop  Dose: 1 drop  Freq: 3 TIMES DAILY Route: Both Eyes  Start: 09/03/18 2200   Admin. Amount: 1 drop  Last Admin: 09/06/18 2315  Dispense Loc:  Main Pharmacy  Volume: 30 mL       2323 (1 drop)-Given        (1054)-Not Given       1722 (1 drop)-Given       (2112)-Not Given        0909 (1 drop)-Given       (1605)-Not Given       2224 (1 drop)-Given        1414 (1 drop)-Given       1745 (1 drop)-Given       2315 (1 drop)-Given        [ ] 0900       [ ] 1600       [ ] 2200           prochlorperazine (COMPAZINE) injection 5 mg  Dose: 5 mg  Freq: EVERY 6 HOURS PRN Route: IV  PRN Reasons: nausea,vomiting  Start: 09/03/18 2049   Admin Instructions: This is Step 2 of nausea and vomiting management. Give if nausea not resolved 15 minutes after giving ondansetron (ZOFRAN). If nausea not resolved in 15 minutes, go to Step 3 metoclopramide (REGLAN), if ordered.  For ordered doses up to 10 mg, give IV Push undiluted. Each 5mg over 1 minute.    Admin. Amount: 5 mg = 1 mL Conc: 5 mg/mL  Dispense Loc:  [facility]  Infused Over: 1-2 Minutes  Volume: 1 mL              Or  prochlorperazine (COMPAZINE) tablet 5 mg  Dose: 5 mg  Freq: EVERY 6 HOURS PRN Route: PO  PRN Reason: vomiting  Start: 09/03/18 2049   Admin Instructions: This is Step 2 of nausea and vomiting management. Give if nausea not resolved 15 minutes after giving ondansetron (ZOFRAN). If nausea not resolved in 15 minutes, go to Step 3 metoclopramide (REGLAN), if ordered.    Admin. Amount: 1 tablet (1 × 5  mg tablet)  Dispense Loc:  [facility]              Or  prochlorperazine (COMPAZINE) Suppository 12.5 mg  Dose: 12.5 mg  Freq: EVERY 12 HOURS PRN Route: RE  PRN Reasons: nausea,vomiting  Start: 09/03/18 2049   Admin Instructions: This is Step 2 of nausea and vomiting management. Give if nausea not resolved 15 minutes after giving ondansetron (ZOFRAN). If nausea not resolved in 15 minutes, go to Step 3 metoclopramide (REGLAN), if ordered.    Admin. Amount: 0.5 suppository (0.5 × 25 mg suppository)  Dispense Loc:  Main Pharmacy               senna-docusate (SENOKOT-S;PERICOLACE) 8.6-50 MG per tablet 1 tablet  Dose: 1 tablet  Freq: 2 TIMES DAILY PRN Route: PO  PRN Reason: constipation  Start: 09/03/18 2049   Admin Instructions: If no bowel movement in 24 hours, increase to 2 tablets PO.  Hold for loose stools.  This is the first step of a three step constipation treatment.    Admin. Amount: 1 tablet  Dispense Loc:  [facility]              Or  senna-docusate (SENOKOT-S;PERICOLACE) 8.6-50 MG per tablet 2 tablet  Dose: 2 tablet  Freq: 2 TIMES DAILY PRN Route: PO  PRN Reason: constipation  Start: 09/03/18 2049   Admin Instructions: Hold for loose stools.  This is the first step of a three step constipation treatment.    Admin. Amount: 2 tablet  Dispense Loc:  [facility]               sodium chloride (PF) 0.9% PF flush 10-20 mL  Dose: 10-20 mL  Freq: EVERY 1 HOUR PRN Route: IK  PRN Reasons: line flush,post meds or blood draw  Start: 09/04/18 1505   Admin Instructions: And Daily PRN, to de-access each port in dual implanted port.  Flush with 10 mL NS followed by 5 mL heparin (100 units/mL) at discharge and at least every 28 days.    Admin. Amount: 10-20 mL  Dispense Loc: On [facility] of [facility] Floor Stock  Volume: 20 mL               sodium chloride (PF) 0.9% PF flush 10-20 mL  Dose: 10-20 mL  Freq: EVERY 1 HOUR PRN Route: IK  PRN Reasons: line flush,post meds or blood draw  PRN Comment: To flush each CVC Implanted port.  Start: 09/04/18 6815    Admin Instructions: 10 mL post IV meds;   20 mL post blood draw.    Admin. Amount: 10-20 mL  Dispense Loc: [facility] Floor Stock  Volume: 20 mL               sodium chloride 0.9% infusion  Rate: 75 mL/hr   Freq: CONTINUOUS Route: IV  Start: 18 1545   Last Admin: 18 0458  Dispense Loc: [facility] Floor Stock  Volume: 1,000 mL          1559 ( )-New Bag        0458 ( )-New Bag           sulfamethoxazole-trimethoprim (BACTRIM DS/SEPTRA DS) 800-160 MG per tablet 1 tablet  Dose: 1 tablet  Freq: 2 TIMES DAILY Route: PO  Indications of Use: URINARY TRACT INFECTION  Start: 18 1245   End: 18 0859   Admin. Amount: 1 tablet  Last Admin: 18  Dispense Loc:  [facility]  Administrations Remainin         1315 (1 tablet)-Given       2221 (1 tablet)-Given        0810 (1 tablet)-Given       2024 (1 tablet)-Given        0925 (1 tablet)-Given       [ ] 2100           travoprost (FARHAT Free) (TRAVATAN Z) 0.004 % ophthalmic solution 1 drop  Dose: 1 drop  Freq: AT BEDTIME Route: Both Eyes  Start: 18   Admin. Amount: 1 drop  Last Admin: 18 231  Dispense Loc: [facility]  Volume: 2.5 mL       2323 (1 drop)-Given        ()-Not Given        2225 (1 drop)-Given        2315 (1 drop)-Given        [ ] 2200           vitamin B complex with vitamin C (STRESS TAB) tablet 1 tablet  Dose: 1 tablet  Freq: DAILY Route: PO  Start: 18 0900   Admin. Amount: 1 tablet  Last Admin: 18  Dispense Loc:  [facility]        0830 (1 tablet)-Given        0908 (1 tablet)-Given        0810 (1 tablet)-Given        0925 (1 tablet)-Given          Discontinued Medications  Medications 18         Start: 18 1857   End: 18 1901   Admin Instructions: Lizbeth Talavera : brendainet override  Maximum acetaminophen dose from all sources = 75 mg/kg/day not to exceed 4 grams/day.    Administrations Remainin1901-Med Discontinued            Dose: 650 mg  Freq: EVERY 4 HOURS PRN Route: RE  PRN Reason: mild pain  Start: 09/03/18 2049   End: 09/05/18 1241   Admin Instructions: Alternate ibuprofen (if ordered) with acetaminophen.  Maximum acetaminophen dose from all sources = 75 mg/kg/day not to exceed 4 grams/day.    Admin. Amount: 1 suppository (1 × 650 mg suppository)  Dispense Loc: [facility] 88B         1241-Med Discontinued           Dose: 650 mg  Freq: EVERY 4 HOURS PRN Route: PO  PRN Reason: mild pain  Start: 09/03/18 2049   End: 09/05/18 1241   Admin Instructions: Alternate ibuprofen (if ordered) with acetaminophen.  Maximum acetaminophen dose from all sources = 75 mg/kg/day not to exceed 4 grams/day.    Admin. Amount: 2 tablet (2 × 325 mg tablet)  Dispense Loc:  [facility]         1241-Med Discontinued           Dose: 10 mg  Freq: DAILY PRN Route: RE  PRN Reason: constipation  Start: 09/03/18 2049   End: 09/05/18 1241   Admin Instructions: Hold for loose stools.  This is the third step of a three step constipation treatment.    Admin. Amount: 1 suppository (1 × 10 mg suppository)  Dispense Loc:  [facility]         1241-Med Discontinued           Dose: 5-10 mg  Freq: EVERY 3 HOURS PRN Route: PO  PRN Reason: other  PRN Comment: pain control or improvement in physical function. Hold dose for analgesic side effects.  Start: 09/03/18 2049   End: 09/04/18 1229   Admin Instructions: Start with the lowest dose.  May adjust dose by 5 mg every 3 hours as needed. Notify provider to assess for uncontrolled pain or analgesic side effects. Hold while on PCA or with regular IV opioid dosing.    Admin. Amount: 1-2 tablet (1-2 × 5 mg tablet)  Last Admin: 09/04/18 1046  Dispense Loc:  [facility]       2225 (5 mg)-Given        0521 (5 mg)-Given       0952 (5 mg)-Given       1046 (5 mg)-Given       1229-Med Discontinued            Dose: 1 tablet  Freq: 2 TIMES DAILY Route: PO  Start: 09/03/18 2100   End: 09/04/18 1153   Admin. Amount: 1 tablet  Last Admin: 09/04/18  0830  Dispense Loc: [facility] 88B       2322 (1 tablet)-Given        0830 (1 tablet)-Given       1153-Med Discontinued            Dose: 2-4 tablet  Freq: 2 TIMES DAILY Route: PO  Start: 09/04/18 2100   End: 09/05/18 1241   Admin. Amount: 2-4 tablet  Last Admin: 09/05/18 0908  Dispense Loc: [facility] 88B        2103 (2 tablet)-Given        0908 (2 tablet)-Given       1241-Med Discontinued           Rate: 75 mL/hr   Freq: CONTINUOUS Route: IV  Start: 09/03/18 2100   End: 09/06/18 1245   Last Admin: 09/06/18 0232  Dispense Loc: [facility] Floor Stock  Volume: 1,000 mL   Current Line: Peripheral IV 09/03/18 Right Upper forearm      2322 ( )-New Bag        1045 ( )-New Bag       1351 ( )-New Bag        0016 ( )-New Bag       1419 ( )-New Bag [C]        0232 ( )-New Bag       1245-Med Discontinued     Medications 09/01/18 09/02/18 09/03/18 09/04/18 09/05/18 09/06/18 09/07/18

## 2018-09-03 NOTE — ED NOTES
Bed: ED07  Expected date:   Expected time:   Means of arrival:   Comments:  Louann 2 Abd pain 77 female

## 2018-09-03 NOTE — IP AVS SNAPSHOT
"[place] [address] ONCOLOGY: [phone]                                              INTERAGENCY TRANSFER FORM - PHYSICIAN ORDERS   9/3/2018                    Hospital Admission Date: 9/3/2018  SEVERO SWANN   : 1940  Sex: Female        Attending Provider: Vladimir Long MD     Allergies:  Wool Fiber    Infection:  None   Service:  HOSPITALIST    Ht:  1.626 m (5' 4.02\")   Wt:  52.7 kg (116 lb 1.6 oz)   Admission Wt:  42 kg (92 lb 9.6 oz)    BMI:  19.92 kg/m 2   BSA:  1.54 m 2            Patient PCP Information     Provider PCP Type    Edgar Rodrgiuez MD General      ED Clinical Impression     Diagnosis Description Comment Added By Time Added    Abdominal pain [R10.9] Abdominal pain [R10.9]  Umm Leonard 9/3/2018  7:21 PM    Metastatic colon cancer in female (H) [C78.5] Metastatic colon cancer in female (H) [C78.5]  Jo-Ann Spivey MD 9/3/2018 10:50 PM      Hospital Problems as of 2018              Priority Class Noted POA    Intractable pain Medium  9/3/2018 Yes      Non-Hospital Problems as of 2018              Priority Class Noted    Scoliosis Medium  2005    Flail joint Medium  2012    Abnormal gait Medium  1/3/2013    Lumbago Medium  2013    Osteoarthritis of hip Medium  2014    DJD (degenerative joint disease) of knee Medium  2014    DJD (degenerative joint disease), lumbar Medium  2014    Adenocarcinoma of colon (H) Medium  3/12/2017    Iron deficiency anemia Medium  3/12/2017    Postpolio syndrome Medium  3/12/2017    Severe protein-calorie malnutrition (H) Medium  3/12/2017    Physical deconditioning Medium  3/12/2017    Cognitive impairment Medium  3/16/2017    Primary osteoarthritis of right hand Medium  3/19/2017    Port-a-cath in place Medium  6/3/2017    Memory loss Medium  10/1/2017    Hypertension Medium  12/3/2017    Hypothyroidism, unspecified type Medium  2017      Code Status History     Date Active " Date Inactive Code Status Order ID Comments User Context    9/5/2018  1:01 PM 9/5/2018  1:08 PM DNR/DNI [id number]  Brock Angel MD Outpatient    9/4/2018 11:53 AM 9/5/2018  1:01 PM DNR/DNI [id number]  Leanna Abbott MD Inpatient    9/3/2018  8:49 PM 9/4/2018 11:53 AM Full Code [id number]  Joselin Caruso PA-C Inpatient    12/3/2017 12:21 AM 12/3/2017  3:26 PM DNR/DNI [id number]  Jens Nova MD Inpatient    10/1/2017  3:39 PM 12/3/2017 12:21 AM DNR/DNI [id number] Reviewed w/ pt and HC agent (sister) on 9/26 Sherita Toussaint APRN CNP Outpatient    4/18/2017  3:59 PM 10/1/2017  3:39 PM Full Code [id number]  Yessy Whalen PA-C Outpatient    4/14/2017  1:26 AM 4/18/2017  3:59 PM Full Code [id number]  Hernando Crockett MD Inpatient    4/13/2017  9:27 PM 4/14/2017  1:26 AM Full Code [id number]  Dao Delacruz MD Inpatient    3/8/2017  7:30 AM 4/13/2017  9:27 PM Full Code [id number]  Yessy Whalen PA-C Outpatient    2/15/2017  5:33 AM 3/8/2017  7:30 AM Full Code [id number]  Noble Lopez MD Inpatient         Medication Review      START taking        Dose / Directions Comments    acetaminophen 650 MG Suppository   Commonly known as:  TYLENOL   Used for:  Metastatic colon cancer in female (H)   Replaces:  acetaminophen 325 MG tablet        Dose:  650 mg   Place 1 suppository (650 mg) rectally every 4 hours as needed for fever or mild pain (Do not exceed 4000 mg total acetaminophen per day.) Unwrap prior to insertion.   Quantity:  12 suppository   Refills:  1        atropine 1 % ophthalmic solution   Used for:  Dry mouth        Dose:  2-4 drop   Take 2-4 drops by mouth, place under tongue or place inside cheek every 2 hours as needed for other (terminal respiratory secretions) Not for ophthalmic use.   Quantity:  5 mL   Refills:  1        bisacodyl 10 MG Suppository   Commonly known as:  DULCOLAX   Used for:  Slow transit constipation         Unwrap and insert 1 suppository rectally twice daily as needed for constipation.   Quantity:  12 suppository   Refills:  1        haloperidol 0.5 MG tablet   Commonly known as:  HALDOL   Used for:  Metastatic colon cancer in female (H)        Dose:  0.5-1 mg   Take 1-2 tablets (0.5-1 mg) by mouth, place under tongue or insert rectally every 6 hours as needed for agitation (nausea)   Quantity:  30 tablet   Refills:  1        MEDICATION INSTRUCTION   Used for:  Metastatic colon cancer in female (H)        If care facility cannot accept or use ranges, facility is instructed to use lower end of dosing range   Refills:  0        morphine 15 MG 12 hr tablet   Commonly known as:  MS CONTIN   Used for:  Metastatic colon cancer in female (H)        Dose:  15 mg   Take 1 tablet (15 mg) by mouth every 12 hours   Quantity:  30 tablet   Refills:  0        sennosides 8.6 MG tablet   Commonly known as:  SENOKOT   Used for:  Slow transit constipation        Dose:  1-2 tablet   Take 1-2 tablets by mouth 2 times daily   Quantity:  100 tablet   Refills:  1        sulfamethoxazole-trimethoprim 800-160 MG per tablet   Commonly known as:  BACTRIM DS/SEPTRA DS   Indication:  Urinary Tract Infection   Used for:  Acute cystitis without hematuria        Dose:  1 tablet   Take 1 tablet by mouth 2 times daily Take 1 tablet by mouth 2 times daily for 1 day   Refills:  0          CONTINUE these medications which may have CHANGED, or have new prescriptions. If we are uncertain of the size of tablets/capsules you have at home, strength may be listed as something that might have changed.        Dose / Directions Comments    LORazepam 0.5 MG tablet   Commonly known as:  ATIVAN   This may have changed:    - how much to take  - how to take this  - reasons to take this   Used for:  Anxiety        Dose:  0.25-0.5 mg   Take 0.5-1 tablets (0.25-0.5 mg) by mouth, place under tongue or insert rectally every 4 hours as needed for anxiety (restlessness)    Quantity:  30 tablet   Refills:  1        oxyCODONE IR 5 MG tablet   Commonly known as:  ROXICODONE   This may have changed:    - how much to take  - when to take this  - reasons to take this   Used for:  Metastatic colon cancer in female (H)        Dose:  10 mg   Take 2 tablets (10 mg) by mouth every 2 hours as needed for breakthrough pain or severe pain   Quantity:  20 tablet   Refills:  0          CONTINUE these medications which have NOT CHANGED        Dose / Directions Comments    levothyroxine 88 MCG tablet   Commonly known as:  SYNTHROID/LEVOTHROID   Used for:  Hypothyroidism, unspecified type        Dose:  88 mcg   Take 1 tablet (88 mcg) by mouth daily   Quantity:  30 tablet   Refills:  2        NUTRITIONAL SUPPLEMENT PLUS Liqd   Used for:  Physical deconditioning        Dose:  8 oz   Take 8 oz by mouth 3 times daily Chocolate Ensure   Quantity:  720 mL   Refills:  PRN        order for DME   Used for:  Physical deconditioning, Adenocarcinoma of colon (H), Generalized muscle weakness        Equipment being ordered: Wheelchair Please fax to [phone].   Quantity:  1 Units   Refills:  0        polyethylene glycol powder   Commonly known as:  MIRALAX   Used for:  Adenocarcinoma of colon (H)        Dose:  1 capful   Take 17 g (1 capful) by mouth daily   Quantity:  510 g   Refills:  1        SUMATRIPTAN SUCCINATE PO        Dose:  25 mg   Take 25 mg by mouth every 8 hours as needed for migraine   Refills:  0        travoprost (BAK Free) 0.004 % ophthalmic solution   Commonly known as:  TRAVATAN Z   Used for:  Primary open angle glaucoma of both eyes, unspecified glaucoma stage        Dose:  1 drop   Place 1 drop into both eyes At Bedtime   Quantity:  5 mL   Refills:  11          STOP taking     acetaminophen 325 MG tablet   Commonly known as:  TYLENOL   Replaced by:  acetaminophen 650 MG Suppository           BLINK TEARS OP           loperamide 2 MG capsule   Commonly known as:  IMODIUM A-D           NEW MED            ondansetron 8 MG tablet   Commonly known as:  ZOFRAN           prochlorperazine 10 MG tablet   Commonly known as:  COMPAZINE           senna-docusate 8.6-50 MG per tablet   Commonly known as:  SENOKOT-S;PERICOLACE           vitamin B-Complex                   After Care     Activity - Up with nursing assistance           Advance Diet as Tolerated       Follow this diet upon discharge: Orders Placed This Encounter      Snacks/Supplements Adult: Boost Shake; With Meals      Room Service      Combination Diet Regular Diet Adult             Follow-Up Appointment Instructions     Future Labs/Procedures    Follow Up and recommended labs and tests     Comments:    Hospice to assume the care      Follow-Up Appointment Instructions     Follow Up and recommended labs and tests       Hospice to assume the care             Statement of Approval     Ordered          09/07/18 0843  I have reviewed and agree with all the recommendations and orders detailed in this document.  EFFECTIVE NOW     Approved and electronically signed by:  Hardeep Valentine MD

## 2018-09-04 PROBLEM — Z71.89 ACP (ADVANCE CARE PLANNING): Chronic | Status: RESOLVED | Noted: 2017-06-05 | Resolved: 2018-01-01

## 2018-09-04 NOTE — PLAN OF CARE
Problem: Patient Care Overview  Goal: Plan of Care/Patient Progress Review  Outcome: No Change  Disoriented to time. VSS. C/o abdominal pain, prn Oxycodone given, effective. [place] nausea. Reg diet, fair appetite. Up with assist of 1, ambulates to BR. Voiding adequately in BR. C/o constipation, scheduled senna and Miralax given. Pressure ulcer on coccyx, cleaned with soap and water and Mepilex dressing applied, CDI. WOCN Consulted. Frequent Turn/repo, on pulsate mattress. Palliative saw today, plan to continue pain meds and bowel regimen. R. Port accessed today and infusing IVF. R. PIV SL. Plan to discharge with hospice 1-2 days pending pain management. Will continue to monitor.

## 2018-09-04 NOTE — CONSULTS
[facility]    Palliative Care Consultation Note    Patient: Sherita Kenney  Date of Admission:  9/3/2018    Requesting provider/team: Joselin Caruso PA-C, hospitalist  Reason for consult: Pain management  Goals of care    Recommendations:  Please see assessment below for rationale.    Changed code status to DNR/DNI which is consistent with previous choice, her health care directive and POLST. Confirmed by Sherita today with Clara George on the phone    Sherita and Clara will meet with Beverly from [facility] for an informational, possibly intake meeting.     Sherita is not ready to transition to comfort measures only at this time      Increase oxycodone 5-10mg to q2h prn    Increase Senna to 2 tabs bid    Artificial saliva prn    These recommendations have been discussed with Beverly Cooney ([facility]), informed Dr Angel.    Thank you for the opportunity to participate in the care of this patient and family. Our team will follow. Please feel free to contact the on-call Palliative provider with any urgent needs.     Leanna Abbott  Pager: [phone]  [facility] Inpatient Team Consult pager [phone] (M-F 8-4:30)  After-hours Answering Service [phone]   Palliative Clinic: [phone]       Assessment:  Sherita Kenney is a 77 year old female with metastatic colon cancer   - diagnosed April 2017   - metastatic to liver, omentum, abd wall   - on chemotherapy until mid-July, missed appointments since   - follows with Dr Lindo, last visit 8/29, noted general decline at that time. Not deemed candidate for further chemotherapy. Option of immunotherapy vs hospice was discussed.      SYMPTOMS:  1. Abdominal pain: shifting in location, no clear triggers/alleviating factors. The pain seems to increase as we discuss it. She is uncertain whether the oxycodone has been helpful. Both, her bedside RN and Dinora, the friend at bedside today, agree that 5mg didn't provide adequate relief, but that she  "looked ok after getting another 5mg. She was comfortable at the beginning of my visit, started showing signs of discomfort about 2 hours after the oxycodone was given.   2. Constipation: longstanding issue, not controlled with miralax daily and senna 1 tab bid (PTA meds). Likely contributes to abdominal pain. Good bowel sounds on exam. Most recent CT abdomen not concerning for obstruction.    Pain management was discussed with Sherita and the plan was created in a collaborative fashion.  The patient's response to the current recommendations: agreeable.     SOCIAL:         Living situation: in [place]. States that she is content with the arrangement there.          Support system: mostly through her friend Clara with support from other friends.   She does have a sister and brother-in-law, but has kept them at a distance lately. Today, she declines having anyone involved other than Clara         Functional status: limited. Feels the care she is getting at her apartment is adequate.    COPING: seems to be coping well with the notion of discontinuing disease-directed treatment. She states that she wasn't surprised when Dr Lindo told her that he doesn't recommend further chemo.    She does struggle with the loss of agency and control she has been experiencing as a patient, due to her functional limitations, and especially now in the hospital.     SPIRITUAL/Buddhist:    She states this is not an important part of her life    PROGNOSTIC INFORMATION: limited prognosis considering her poor functional and nutritional status.   She appears accepting of the lack of further meaningful treatment options for her cancer when we talked today. However, per chart review she did make statements to other members of the team that she is looking to \"cure her cancer\".  I also discussed with her that Dr Lindo would be open to considering a trial of immunotherapy, which she stated she didn't feel was a good choice for her. She endorses comfort, " "minimizing admissions and doctors' visits and \"having fun\" as her goals more than once today.      ADVANCE CARE PLANNING:        Decision making capacity: mostly intact. She does acknowledge (and show signs of) mild cognitive impairment, mostly difficulties with short-term memory. However, the goals she voiced to me today (see above) are consistent with previously stated and documented goals. Clara, who joined our conversation on the phone was in full agreement with Sherita's stated goals.         Disease understanding: adequate as above         Goals of Care: comfort, minimizing admissions, office visits         Preferred way of decision making: Sherita wants to be an active part of her health care choices         Health care directive: dated 10/17/17. Also states DNR/DNI. Wants comfort at the end of life (hospital or home as appropriate).       Health care agent: Clara Hernandez (friend). No alternate designated.       Code Status:  Full code. Changed to DNR/DNI today.       POLST dated 9/26/17: DNR, DNI    History of Present Illness   Sources of History:patient  Introduced the scope of our practice to Sherita. Discussed our potential roles for symptom management, support/coping, and decisional support (aka goals of care).    Sherita is resting comfortably when I enter. Her friend Dinora is at bedside, calls Clara, Sherita's health care agent. We discuss her most recent visit with Dr Lindo, her treatment preferences, hospice. Sherita and Clara had planned to meet with hospice this week, which was postponed, since Sherita is here and Clara with a sick family member in [place].     I answer all their questions.    ROS:  Palliative Symptom Review    A comprehensive ROS was negative except as stated above.        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)      Iron deficiency anemia      Late effects of poliomyelitis      Lumbago      Osteoarthritis of hip      Pain in limb      Postpolio syndrome      Primary osteoarthritis of right hand      Psychosis      Respiratory insufficiency 4/13/2017     Right-sided back pain      S/P ileostomy (H) 3/12/2017     S/P right colectomy 3/12/2017     Scoliosis      Thyroid disease           Past Surgical History:   Past Surgical History:   Procedure Laterality Date     COLECTOMY RIGHT N/A 4/13/2017    Procedure: COLECTOMY RIGHT;  Surgeon: Lindsay Peter MD;  Location:  OR     COLONOSCOPY N/A 3/1/2017    Procedure: COLONOSCOPY;  Surgeon: Lindsay Peter MD;  Location:  OR     I & D abdominal abscess       ILEOSTOMY N/A 3/1/2017    Procedure: ILEOSTOMY;  Surgeon: Lindsay Peter MD;  Location:  OR     ILEOSTOMY       LAPAROSCOPIC ILEOSTOMY N/A 3/1/2017    Procedure: LAPAROSCOPIC ILEOSTOMY;  Surgeon: Lindsay Peter MD;  Location:  OR     LAPAROSCOPIC URETEROLYSIS  4/13/2017    Procedure: LAPAROSCOPIC URETEROLYSIS;  Surgeon: Jaycob Mari MD;  Location:  OR     severe protein-calorie malnutrition       SOFT TISSUE SURGERY       TAKEDOWN ILEOSTOMY N/A 4/13/2017    Procedure: TAKEDOWN ILEOSTOMY;  Surgeon: Lindsay Peter MD;  Location:  OR             Family History:   Family History   Problem Relation Age of Onset     Breast Cancer Maternal Grandmother             Allergies:   Allergies   Allergen Reactions     Wool Fiber Unknown            Medications:   I have reviewed this patient's medication profile and medications given in the past 24 hours.  Noted  are:    acetaminophen prn - none  Oxycodone 5-10 q3h prn - 5mg x2  Hydromorphone 0.3-0.5 IV q2h prn - 0.5mg x2    Lorazepam prn - none    Miralax daily, senna 1 bid           Physical Exam:   Vital Signs: Temp: 97.1  F (36.2  C) Temp src: Oral BP: 112/57   Heart Rate: 80 Resp: 14 SpO2: 98 % O2 Device: None (Room air)    Weight: 92 lbs 9.6 oz    Bowel Movements: none since admission    Physical Exam:  CONSTIT: awake, appears comfortable, cachectic  EENT: MM dry, EOMI, no icterus  RESP: reg, nl effort  GI: soft, mild diffuse tenderness (R>L), good bowel sounds  MSK:moves x4, generalized weakness  SKIN:  warm, no rash, no obvious lesions  NEURO: alert, oriented x3  PSYCH: appropriate affect, memory and thought process intact         Data reviewed:   Recent imaging reviewed, pertinent comments:   CT a/p 9/3: 1. Progression of malignancy evidenced by significant increased  metastatic disease throughout the liver and significantly larger  adenopathy within the abdomen.  2. Increasing size of an abdominal wall mass extending through the  midline abdominal musculature. Adjacent the omental malignant implant.  3. New indeterminate nodular prominence of the right pleura. Trace  bibasilar pleural fluid. Cannot exclude pleural metastatic disease.  4. Small ascites. Diffuse anasarca.      Recent lab data reviewed, pertinent comments:   GFR>90  Albumin 2.4      Leanna Abbott  Pager: [phone]  [facility] Inpatient Team Consult pager [phone] (M-F 8-4:30)  After-hours Answering Service [phone]  Palliative Clinic: [phone]     Total time spent was 90 minutes,  >50% of time was spent counseling and/or coordination of care regarding treatment preferences.

## 2018-09-04 NOTE — PROGRESS NOTES
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 She was admitted for further management.     Intractable abdominal pain related to progression of metastatic colon cancer:    Known history metastatic colon cancer (poorly differential adenocarcinoma) with liver involvement. Cancer was diagnosed initially in March 2017. She underwent laparoscopic ileostomy on 03/07/2017 and open right colectomy with takedown of ileostomy on 04/17/2018.  March 15, 2018 CT showed progression of disease. She his currently on palliative chemotherapy with her last treatment on 07/26/2018 with 5-FU, oxaliplatin and Avastin. Patient is followed by Dr. Lindo of [facility]. Last clinic appointment was 8/30/18.  At that appointment, discussion was had about progression of cancer with recommendation of options including immunotherapy versus a comfort-based approach.  Per documentation, it appears that patient wanted a more hospice-based approach. However, upon admission, the patient was unclear about her wishes, wanting to \"fight this\" and \"live.\"    She presented with Right sided abdominal pain.  CBC with wbc of 7.5, Hgb of 11.8, and PLT of 177K. CMP showed normal renal function, elevated AST of 282, ALT of 74, TBil of 1.2, and ALKP of 987. U/A with positive nitrite, negative LE, 3 wbcs, and moderate bacteria. CT abdomen/pelvis 09/03 showed progression of malignancy evidenced by significant increased  metastatic disease throughout the liver and significantly larger adenopathy within the abdomen. It also demonstrated increasing size of an abdominal wall mass " extending through the midline abdominal musculature, adjacent the omental malignant implant, new indeterminate nodular prominence of the right pleura., trace bibasilar pleural fluid, small ascites, and diffuse anasarca.    She received a dose of IV Dilaudid in the ER.     -Continue analgesic regimen including Tylenol,oxycodone, and IV Dilaudid.   -Palliative Care consulted for goals of care discussion and symptom control, appreciate assistance greatly    -[facility] consulted, appreciate assistance.   -IV fluids at 75 mL per hour.   -Continue bowel regimen  -Follow up on U/C  -SW consulted per patient request.     Physical deconditioning/malnutrition:  -PT and nutrition service consult     Hypothyroidism:    -Continue PTA Synthroid.     Chronic anemia:  Baseline Hg around 11 and stable.    Recent Labs  Lab 09/04/18  0720 09/03/18  1715   HGB 10.4* 11.8   '    Glaucoma:    -Continue PTA eyedrops.      # Pain Assessment:  Current Pain Score 9/4/2018   Patient currently in pain? -   Pain score (0-10) 7   Pain location -   Pain descriptors -   CPOT pain score -   - Sherita is experiencing pain due to metastatic colon cancer. Pain management was discussed and the plan was created in a collaborative fashion.  Sherita's response to the current recommendations: engaged  - Please see the plan for pain management as documented above      Active Diet Order      Combination Diet Regular Diet Adult      DVT prophylaxis:  PCDs   CODE STATUS:  Full code at this time.  Disposition: Pending ongoing management for pain control, anticipate 1-2+ days of inpatient management.    D/W RN.    Interval History   Patient continues to have intermittent abdominal pain. She reports no N/V, fever, cp,or sob. No new issues overnight.    Data reviewed today: I reviewed all new labs and imaging over the last 24 hours. I personally reviewed no images or EKG's today.    Physical Exam   Temp: 97.1  F (36.2  C) Temp src: Oral BP: 112/57   Heart  Rate: 80 Resp: 14 SpO2: 98 % O2 Device: None (Room air)    Vitals:    09/04/18 0500   Weight: 42 kg (92 lb 9.6 oz)     Vital Signs with Ranges  Temp:  [95.3  F (35.2  C)-97.1  F (36.2  C)] 97.1  F (36.2  C)  Heart Rate:  [62-90] 80  Resp:  [14-16] 14  BP: (110-135)/(57-78) 112/57  SpO2:  [95 %-98 %] 98 %  I/O's Last 24 hours  I/O last 3 completed shifts:  In: 55 [P.O.:55]  Out: 100 [Urine:100]    Constitutional: Comfortable, frail and very weak appearing  HEENT: + conjunctival pallor.  Neurologic: Awake, alert, oriented x3  Neck: Supple, no elevated JVP  Respiratory: Clear to auscultation  Cardiovascular: Normal S1 and S2. Regular rhythm and rate  GI: Abdomen soft, firm, non distended  Extremities: No calf tenderness, no edema  Skin/integument: No acute rash, no cyanosis    Medications   All medications were reviewed.    sodium chloride 75 mL/hr at 09/03/18 2322       levothyroxine  88 mcg Oral Daily     polyethylene glycol  17 g Oral Daily     polyethylene glycol 0.4%- propylene glycol 0.3%  1 drop Both Eyes TID     senna-docusate  1 tablet Oral BID     travoprost (FARHAT Free)  1 drop Both Eyes At Bedtime     vitamin B complex with vitamin C  1 tablet Oral Daily        Data     Recent Labs  Lab 09/04/18  0720 09/03/18  1715   WBC 6.4 7.5   HGB 10.4* 11.8   MCV 92 92   * 177    135   POTASSIUM 4.0 4.0   CHLORIDE 103 97   CO2 24 28   BUN 18 24   CR 0.56 0.64   ANIONGAP 10 10   SARITHA 8.0* 9.0   GLC 87 110*   ALBUMIN  --  2.4*   PROTTOTAL  --  6.7*   BILITOTAL  --  1.2   ALKPHOS  --  987*   ALT  --  74*   AST  --  282*   LIPASE  --  133       Recent Results (from the past 24 hour(s))   CT Abdomen Pelvis w Contrast    Narrative    CT ABDOMEN PELVIS WITH CONTRAST   9/3/2018 6:34 PM     HISTORY: Severe right upper abdominal pain, no BM for 1 week and  history of metastatic colon cancer, check for bowel obstruction or  perforation.    TECHNIQUE:  CT abdomen and pelvis with 48 mL Isovue-370 IV. Radiation  dose for  this scan was reduced using automated exposure control,  adjustment of the mA and/or kV according to patient size, or iterative  reconstruction technique.    COMPARISON: CT chest, abdomen, and pelvis 5/20/2018.    FINDINGS:  Interval significant progression of disseminated hepatic  metastatic disease. An example mass at the anterior right superior  liver is now 7.3 x 5.8 cm, previously 5.4 x 4 cm series 3 image 20.  There are multiple other examples of hepatic progression bilaterally.    Adrenals, spleen and kidneys do not show any significant  abnormalities.    The pancreas is poorly visualized and is surrounded by large necrotic  appearing malignant adenopathy. This adenopathy has progressed. For  example, adenopathy anterior to the pancreas and posterior to the left  liver is approximately 5.8 x 2.8 cm, previously 3.9 x 1.2 cm, series 3  image 35. Large portocaval region adenopathy is 5.9 x 2.9 cm,  previously 2.8 x 1 cm, series 3 image 36. There are numerous  additional adjacent examples. There is progression of adenopathy of  mesentery is well. An example at the midline mesentery measuring 2.4 x  2.3 cm, previously 1.7 x 1.9 cm series 3 image 51. There are multiple  additional examples in this area. A few mildly prominent  retroperitoneal lymph nodes are also noted. Progression of a midline  subcutaneous mass that extends through the abdominal wall musculature.  For example, this is 3 x 2.5 cm, previously 1.9 x 1.3 cm series 3  image 48.    Anasarca is present. There is small ascites in the abdomen or pelvis.  There is no bowel obstruction identified. In addition to the  aforementioned subcutaneous mass extending throughout dominant wall  there is an increasing omental region implant at the midline image 47.  Colonic diverticulosis. Vascular calcifications. Hip DJD. No  convincing focal bone lesions. Some mild nodular thickening of the  right pleura is newly seen and is considered indeterminate.  Trace  bilateral pleural fluid.      Impression    IMPRESSION:  1. Progression of malignancy evidenced by significant increased  metastatic disease throughout the liver and significantly larger  adenopathy within the abdomen.  2. Increasing size of an abdominal wall mass extending through the  midline abdominal musculature. Adjacent the omental malignant implant.  3. New indeterminate nodular prominence of the right pleura. Trace  bibasilar pleural fluid. Cannot exclude pleural metastatic disease.  4. Small ascites. Diffuse anasarca.      JOHNSON FERNÁNDEZ MD

## 2018-09-04 NOTE — PLAN OF CARE
Problem: Patient Care Overview  Goal: Plan of Care/Patient Progress Review  PT: Orders received, chart reviewed. Attempted to see patient for PT eval but pt busy with MD for an extended amount of time.

## 2018-09-04 NOTE — CONSULTS
Consult Date:  09/04/2018      REQUESTING PHYSICIAN:  This consult has been requested by Joselin Caruso PA-C, for metastatic colon cancer.      HISTORY OF PRESENT ILLNESS:  Ms. Kenney is a 77-year-old female with metastatic colon cancer.  She is well known to me.  Colonoscopy in 03/2017 had revealed a large fungating mass in right side of the colon causing perforation and abscess.  Laparoscopic ileostomy was done in 03/2017.  She had right colectomy with takedown of her ileostomy in 04/2017.  Pathology had revealed poorly differentiated adenocarcinoma.  Fifteen of 24 lymph nodes were positive. BRAF mutation present.  PET scan in 05/2017 had revealed multiple liver metastases.      For metastatic colon cancer, patient has been on multiple different treatments since 06/2017 including modified FOLFOX6 with Avastin, 5-FU and Avastin and single agent Avastin.  Last treatment was on 06/26/2018.      In the last few months, the patient's overall condition has been deteriorating.  She has been more weak.  She has been having right-sided abdominal pain.  This was all indicative of progression of disease.  I had met with the patient on 08/29/2018.  We had discussed regarding further palliative chemotherapy/immunotherapy versus comfort care.  After discussion, the plan was for hospice.      Patient was brought to the emergency room on 09/03/2018 because of worsening right-sided abdominal pain.  In the ER, she had multiple investigations done:   -CBC reveals normal WBC, hemoglobin and platelets.   -CMP reveals elevated AST, ALT, alkaline phosphatase.   -UA does not reveal any infection.   -CT abdomen and pelvis reveals progression of metastatic colon cancer with worsening liver metastasis and also worsening abdominal lymphadenopathy.      REVIEW OF SYSTEMS: Patient's main problem is right-sided abdominal pain.  She is getting oxycodone which helps.  Her appetite is decreased.  She has been slowly losing weight.  No headache.   She has some dizziness.  No chest pain.  No recurrent vomiting.  She gets intermittent diarrhea.  No bleeding.  No fever or chills.  All other review of systems is negative.      ALLERGIES:  Reviewed.      MEDICATIONS:  Reviewed.      PAST MEDICAL HISTORY:   1.  Metastatic colon cancer.   2.  Polio with left upper extremity weakness.   3.  Hypothyroidism.   4.  Osteoarthritis.   5.  Glaucoma.   6.  Surgery for colon cancer.   7.  Abdominal abscess from perforated colon cancer.      PHYSICAL EXAMINATION:   GENERAL:  Alert and oriented x 3.  She answered all the questions appropriately.  She is not in any distress.  ECOG PS is 3.   Rest of the systems not examined.      LABORATORY DATA:  Reviewed.      ASSESSMENT:   1.  This is a 77-year-old female with metastatic colon cancer with multiple liver metastases.  Disease is progressing.   2.  Right-sided abdominal pain from metastatic colon cancer.   3.  Weakness from malignancy and anemia.      PLAN:   1.  I discussed with her regarding colon cancer.  I explained to her that CT scan reveals further progression of disease.      Discussed regarding metastatic colon cancer.  I told the patient that she is not a candidate for any further treatment.  She is very weak.  Her ECOG performance status is 3.  I would not recommend any further palliative chemotherapy or immunotherapy.      Discussed regarding hospice.  I had discussed regarding hospice in the clinic and had requested a consult.  Patient is open to hospice.  We will await further evaluation.      2.  Patient has pain from malignancy.  She is on oxycodone, which is helping the pain.  That will be continued.      3.  She had a few questions, which were all answered.  Discussed with Dr. Abbott from palliative care.      Thanks for the consult.      TOTAL TIME SPENT:  40 minutes.         OMER MAKI MD             D: 09/04/2018   T: 09/04/2018   MT: DAMARIS      Name:     SEVERO SWANN   MRN:      [id number]         Account:       [id number]   :      1940           Consult Date:  2018      Document: [id number]       cc: YOLA GUERRERO PA-C

## 2018-09-04 NOTE — PLAN OF CARE
"Problem: Patient Care Overview  Goal: Plan of Care/Patient Progress Review  PT: Orders received, chart reviewed, discussed with patient. Per chart, pt to go on hospice at [facility] once a bed becomes available. Pt declined PT while in the hospital and stated she wanted to \"just rest.\" Orders to be completed.      "

## 2018-09-04 NOTE — PROGRESS NOTES
"SPIRITUAL HEALTH SERVICES Progress Note  [facility]      Met with pt and her friend Dinora who was at her bedside.    Pt briefly talked about the pain that was the reason for her hospitalization.  She was awaiting another dose of pain medication during my visit.  I told her about the palliative care team, and pt said, \"I don't need that because I'm not dying!\"  I explained that the team was likely consulted to assist with symptom management, and she was agreeable to meeting with the palliative physician who has been assigned to her.  Her friend Dinora was clearly in support of palliative team involvement.    Pt has a loose connection to a Petsy.  She has involvement and support from various people, including friends, a niece and a sister, per her chart.      Pt stated no needs at the present time, other than to \"cure this cancer!\"  I will plan to follow and visit again in the next 1-2 days.                                                                                                                                           Doris Toussaint M.A.  Staff   Pager [phone]  Phone [phone]      "

## 2018-09-04 NOTE — PROGRESS NOTES
RECEIVING UNIT ED HANDOFF REVIEW    ED Nurse Handoff Report was reviewed by: Christina Grover on September 3, 2018 at 7:52 PM

## 2018-09-04 NOTE — PHARMACY-ADMISSION MEDICATION HISTORY
Admission medication history interview status for the 9/3/2018  admission is complete. See EPIC admission navigator for prior to admission medications     Medication history source reliability:Good    Actions taken by pharmacist (provider contacted, etc):None     Additional medication history information not noted on PTA med list :None    Medication reconciliation/reorder completed by provider prior to medication history? No    Time spent in this activity: 25min    Prior to Admission medications    Medication Sig Last Dose Taking? Auth Provider   acetaminophen (TYLENOL) 325 MG tablet Take 2 tablets (650 mg) by mouth every 4 hours as needed for mild pain or fever  Yes Edgar Rodriguez MD   levothyroxine (SYNTHROID/LEVOTHROID) 88 MCG tablet Take 1 tablet (88 mcg) by mouth daily 9/3/2018 at Unknown time Yes Mayo Cerda MD   loperamide (IMODIUM A-D) 2 MG capsule AND TAKE 2 CAPSULE EVERY 6 HOURS AS NEEDED FOR LOOSE STOOLS (MAX OF 16MG/24 HRS)  Yes Edgar Rodriguez MD   LORazepam (ATIVAN) 0.5 MG tablet Take 1 tablet (0.5 mg) by mouth every 4 hours as needed (Anxiety, Nausea/Vomiting or Sleep)  Yes Susan Lindo MD   Nutritional Supplements (NUTRITIONAL SUPPLEMENT PLUS) LIQD Take 8 oz by mouth 3 times daily Chocolate Ensure  Yes Edgar Rodriguez MD   ondansetron (ZOFRAN) 8 MG tablet Take 1 tablet (8 mg) by mouth every 8 hours as needed (Nausea/Vomiting)  Yes Susan Lindo MD   oxyCODONE IR (ROXICODONE) 5 MG tablet Take 1 tablet (5 mg) by mouth every 6 hours as needed for severe pain  Yes Susan Lindo MD   polyethylene glycol (MIRALAX) powder Take 17 g (1 capful) by mouth daily 9/3/2018 at Unknown time Yes Giuseppe Norris APRN CNP   Polyethylene Glycol 400 (BLINK TEARS OP) Apply 1 drop to eye 3 times daily 9/3/2018 at Unknown time Yes Reported, Patient   prochlorperazine (COMPAZINE) 10 MG tablet Take 1 tablet (10 mg) by mouth every 6 hours as needed  (Nausea/Vomiting)  Yes Susan Lindo MD   senna-docusate (SENOKOT-S;PERICOLACE) 8.6-50 MG per tablet Take 1 tablet by mouth 2 times daily 9/3/2018 at Unknown time Yes Giuseppe Norris APRN CNP   SUMATRIPTAN SUCCINATE PO Take 25 mg by mouth every 8 hours as needed for migraine  Yes Reported, Patient   travoprost, BAK Free, (TRAVATAN Z) 0.004 % ophthalmic solution Place 1 drop into both eyes At Bedtime 9/2/2018 at Unknown time Yes Edgar Rodriguez MD   vitamin B-Complex Take 1 tablet by mouth daily 9/3/2018 at Unknown time Yes Edgar Rodriguez MD   [facility] MED    Reported, Patient   order for DME Equipment being ordered: Wheelchair  Please fax to [phone].   Reny Payne APRN CNP

## 2018-09-04 NOTE — PROGRESS NOTES
Consult dictated.    77-year-old female with metastatic colon cancer with multiple liver metastasis admitted with right-sided abdominal pain.  CT scan reveals further progression of colon cancer.  Her pain is secondary to malignancy.  Patient's overall performance status is poor.  She is not a candidate for any further palliative chemotherapy or immunotherapy.    Plan:  -Continue pain medication.  -Hospice consult.   abd pain started this am  epigastric  I was nausea not any longer

## 2018-09-04 NOTE — PROGRESS NOTES
Hospice: Met with patient, her friend Dinora and her HC Agent Clara on speaker phone. I provided the written hospice information sheet and hospice brochure for them to refer to after along with my phone number for questions. Sherita is feeling a little overwhelmed with information today and also having quite a bit of pain. I am not sure Sherita could make decisions on her own. While having Clara on the speaker phone Sherita asked who we were talking with. Having difficulty remembering. I reviewed the medicare hospice benefit, services available for support, medication and equipment coverage. We discussed where hospice care would take place when she is ready for discharge. It was determined she would not do well going back to her AL facility for end of life care. We discussed [facility] and this is where she would like to go. They currently do not have a bed available but Arianna at [facility] states this could change in the next few days. No hospice consents were signed today. The plan would be to meet again with Sherita, Sherita's sister Dinora Johns and Clara on speaker phone when a bed is available at [facility] to sign hospice consent forms.  Upon our next meeting will complete a POLST.  Please order the following hospice comfort meds from the [facility] discharge pharmacy:  Acetaminophen 650 mg supp CO every 4 hrs as needed for pain/fever #2  bisacodyl 10 mg supp CO daily as needed for constipation #2,   atropine sulfate 1% 2 to 4 drops SL as needed every 2 hrs for terminal congestion/secretions # 5 ml,   haloperidol 0.5 mg tab 1 to 2 tabs po/sl every 6 hrs nausea, agitation # 15,   lorazepam 0.5mg tab 1/2 to 1 tab po/sl every 4 hrs as needed for anxiety/restlessness #30,   morphine sulfate 20 mg/ml give 5 to 10 mg or 0.25 to 0.5ml SL every 2 hrs pain/dyspnea,   senna 8.6 mg 1 to 2 tabs po 2x day as needed constipation #30.  Thank you for the referral. Beverly Hung RN, BSN   Hospice Admission nurse  [phone]

## 2018-09-04 NOTE — H&P
Admitted:     09/03/2018      PRIMARY CARE PHYSICIAN:  Edgar Rodriguez MD       ONCOLOGIST:  Dr. Lindo.      CHIEF COMPLAINT:  Abdominal pain.      History is obtained from patient.  The patient's friend, Dinora, accompanies her at bedside and contributes some to history.  Chart also reviewed extensively.      HISTORY OF PRESENT ILLNESS:  Sherita Kenney is a 77-year-old female with past medical history of metastatic colon cancer, hypothyroidism and glaucoma who presents with intractable abdominal pain.  The patient is followed with Dr. Lindo of [facility].  She last saw Dr. Lindo in clinic on 8/30/18.  She has been receiving palliative chemotherapy with her last treatment being on 07/26/2018 with 5-FU, oxaliplatin and Avastin.  Since then, the patient had missed multiple appointments.  At this appointment, the patient noted that she has been more weak.  Her appetite has decreased.  She is losing weight.  Per Dr. Lindo's note discussion was had with her that she is clinically progressing and that he would not recommend any further chemotherapy.  One option would be to treat her with immunotherapy.  They had also discussed hospice at that time which the patient had been more interested in at that time. Hospice consult was placed at that time.  She was given a prescription oxycodone for her pain and was given IV fluids for overall appetite.      She presented to the ER today with ongoing abdominal pain.  She was seen by Dr. Spivey who obtained basic labs and imaging which revealed an alkaline phosphatase of 987, ALT of 74, AST of 282.  Lactic acid 1.6.  Lipase 133.  CBC unremarkable.  CT abdomen and pelvis showed progression of malignancy evidenced by significant increased metastatic disease throughout the liver and significantly larger adenopathy within the abdomen.  Increasing size of an abdominal wall mass extending through the midline abdominal musculature, adjacent the omental malignant implants.  New  "indeterminate nodular prominence of the right pleura.  Trace bibasilar pleural fluid, cannot exclude pleural metastatic disease.  Small amount of ascites.  Diffuse anasarca.  The patient was given a 1 liter bolus and a dose of Dilaudid and ultimately admission was requested for pain control.      On my interview, I discussed the most recent Oncology visit with the patient.  The patient's goal is to \"still fight this.\"  It is unclear if she truly understands the progression of her disease. I reviewed the CT findings with her.  She notes that now her pain is controlled after receiving a dose of IV Dilaudid.  I asked about the discussion regarding hospice, and patient prefers admission for pain control.  She is agreeable and open to a Palliative Care consult and talking with Oncology again about her prognosis and options.      REVIEW OF SYSTEMS:  A 10-point review of systems was performed and is otherwise negative unless specified in the HPI.      PAST MEDICAL HISTORY:   1.  Metastatic colon cancer, receiving palliative chemotherapy, followed by Dr. Lindo.   2.  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   3.  Hypothyroidism.   4.  Glaucoma.   5.  Osteoarthritis.      MEDICATIONS:    Prescriptions Prior to Admission   Medication Sig Dispense Refill Last Dose     acetaminophen (TYLENOL) 325 MG tablet Take 2 tablets (650 mg) by mouth every 4 hours as needed for mild pain or fever 60 tablet 1 Past Week at Unknown time     levothyroxine (SYNTHROID/LEVOTHROID) 88 MCG tablet Take 1 tablet (88 mcg) by mouth daily 30 tablet 2 9/3/2018 at Unknown time     loperamide (IMODIUM A-D) 2 MG capsule AND TAKE 2 CAPSULE EVERY 6 HOURS AS NEEDED FOR LOOSE STOOLS (MAX OF 16MG/24 HRS) 155 capsule 3 Unknown at Unknown time     LORazepam (ATIVAN) 0.5 MG tablet Take 1 tablet (0.5 mg) by mouth every 4 hours as needed (Anxiety, Nausea/Vomiting or Sleep) 30 tablet 2      Nutritional Supplements (NUTRITIONAL SUPPLEMENT PLUS) " LIQD Take 8 oz by mouth 3 times daily Chocolate Ensure 720 mL PRN      ondansetron (ZOFRAN) 8 MG tablet Take 1 tablet (8 mg) by mouth every 8 hours as needed (Nausea/Vomiting) 10 tablet 2 Past Week at Unknown time     oxyCODONE IR (ROXICODONE) 5 MG tablet Take 1 tablet (5 mg) by mouth every 6 hours as needed for severe pain 60 tablet 0      polyethylene glycol (MIRALAX) powder Take 17 g (1 capful) by mouth daily 510 g 1 9/3/2018 at Unknown time     Polyethylene Glycol 400 (BLINK TEARS OP) Apply 1 drop to eye 3 times daily   9/3/2018 at Unknown time     prochlorperazine (COMPAZINE) 10 MG tablet Take 1 tablet (10 mg) by mouth every 6 hours as needed (Nausea/Vomiting) 30 tablet 2 Taking     senna-docusate (SENOKOT-S;PERICOLACE) 8.6-50 MG per tablet Take 1 tablet by mouth 2 times daily 60 tablet 1 9/3/2018 at Unknown time     SUMATRIPTAN SUCCINATE PO Take 25 mg by mouth every 8 hours as needed for migraine   Past Week at Unknown time     travoprost, BAK Free, (TRAVATAN Z) 0.004 % ophthalmic solution Place 1 drop into both eyes At Bedtime 5 mL 11 9/2/2018 at Unknown time     vitamin B-Complex Take 1 tablet by mouth daily 90 tablet 1 9/3/2018 at Unknown time     NEW MED    Unknown at Unknown time     order for DME Equipment being ordered: Wheelchair  Please fax to [phone]. 1 Units 0       ALLERGIES:       Allergies   Allergen Reactions     Wool Fiber Unknown      SOCIAL HISTORY:    Social History     Social History     Marital status: Single     Spouse name: N/A     Number of children: N/A     Years of education: N/A     Occupational History     Not on file.     Social History Main Topics     Smoking status: Former Smoker     Packs/day: 1.50     Years: 15.00     Types: Cigarettes     Quit date: 1/4/1986     Smokeless tobacco: Never Used     Alcohol use No     Drug use: No     Sexual activity: Not Currently     Birth control/ protection: None     Other Topics Concern     Not on file     Social History Narrative      Pt's healthcare agent is her friend, Clara, who, unfortunately is out of town at present caring for another ill family member.     PAST SURGICAL HISTORY:    Past Surgical History:   Procedure Laterality Date     COLECTOMY RIGHT N/A 4/13/2017    Procedure: COLECTOMY RIGHT;  Surgeon: Lindsay Peter MD;  Location:  OR     COLONOSCOPY N/A 3/1/2017    Procedure: COLONOSCOPY;  Surgeon: Lindsay Peter MD;  Location:  OR     I & D abdominal abscess       ILEOSTOMY N/A 3/1/2017    Procedure: ILEOSTOMY;  Surgeon: Lindsay Peter MD;  Location:  OR     ILEOSTOMY       LAPAROSCOPIC ILEOSTOMY N/A 3/1/2017    Procedure: LAPAROSCOPIC ILEOSTOMY;  Surgeon: Lindsay Peter MD;  Location:  OR     LAPAROSCOPIC URETEROLYSIS  4/13/2017    Procedure: LAPAROSCOPIC URETEROLYSIS;  Surgeon: Jaycob Mari MD;  Location:  OR     severe protein-calorie malnutrition       SOFT TISSUE SURGERY       TAKEDOWN ILEOSTOMY N/A 4/13/2017    Procedure: TAKEDOWN ILEOSTOMY;  Surgeon: Lindsay Peter MD;  Location:  OR      FAMILY HISTORY:   Family History   Problem Relation Age of Onset     Breast Cancer Maternal Grandmother       LABORATORY DATA:  Reviewed per Epic and noted in the HPI.      IMAGING:  Noted in the HPI.      VITAL SIGNS:  T 97.1, P 87, /62, SpO2 97% on room air.      CONSTITUTIONAL: Frail, ill-appearing pt laying in bed, dressed in hospital garb. Cooperative with interview. Accompanied by friend, Dinora, at bedside.   HEENT: Normocephalic, atraumatic. Negative for conjunctival redness or scleral icterus.  Oral mucosa pale and dry.   CARDIOVASCULAR: RRR, no murmurs, rubs, or extra heart sounds appreciated. Pulses +2/4 and regular in upper and lower extremities, bilaterally.   RESPIRATORY: No increased work of breathing.  CTA, bilat; no wheezes, rales, or rhonchi appreciated.  GASTROINTESTINAL:  Abdomen soft, non-distended. BS hypoactive in all four quadrants. Tenderness upon  "palpation of umbilicus. No masses or organomegaly noted.  MUSCULOSKELETAL: No gross deformities noted. Normal muscle tone.   HEMATOLOGIC/LYMPHATIC/IMMUNOLOGIC: Negative for lower extremity edema, bilaterally.  NEUROLOGIC: Alert and oriented to person, place, and time.  strength intact.   SKIN: Warm, dry, intact. No jaundice noted. Negative for suspicious lesions, rashes, bruising, open sores or abrasions.      ASSESSMENT AND PLAN:  Sherita Kenney is a 77-year-old female with past medical history of metastatic colon cancer, polio with left upper extremity weakness, hypothyroidism and glaucoma who presents to the Emergency Department with intractable abdominal pain related to her metastatic colon cancer.  Admit under inpatient status for pain control.  Vitals currently within normal limits.  The patient currently stable.     Intractable pain related to metastatic colon cancer:  Patient followed by Dr. Lindo of [facility].  Last clinic appointment was 8/30/18.  At this appointment, discussion was had about progression of cancer with recommendation of options including immunotherapy versus a comfort-based approach.  Per documentation, it appears that patient wanted a more hospice-based approach.  However, on my interview, the patient is unclear about her wishes, wanting to \"fight this\" and \"live.\"  She received a dose of IV Dilaudid in the ER.   -- Admit under inpatient status  -- Analgesic regimen to include Tylenol, p.r.n. oxycodone and IV Dilaudid.   -- Palliative Care consulted for goals of care discussion and symptom control, appreciate assistance greatly    -- [facility] consulted, appreciate assistance.   -- IV fluids at 75 mL per hour.   -- CBC and BMP in the a.m.   -- SW consulted per patient request.     Hypothyroidism:  Continue PTA Synthroid.     Glaucoma:  Continue PTA eyedrops.     Deep venous thrombosis prophylaxis:  PCDs ordered.      CODE STATUS:  Full code at this time.  Again, upon " discussion, the patient is very unclear about what she wants.  I do note that she was previously DNR/DNI and encouraged the patient to have ongoing discussion with family/friends and care team in the AM, but, at this time, we will place a full code order in per patient wishes.      The patient was seen and assessed with Dr. Long of the Hospitalist Service who agrees with the plan as outlined above.         MISTY LONG MD       As dictated by YOLA GUERRERO PA-C            D: 2018   T: 2018   MT: DOROTHY      Name:     SEVERO SWANN   MRN:      [id number]        Account:      [id number]   :      1940        Admitted:     2018                   Document: [id number]

## 2018-09-04 NOTE — PROGRESS NOTES
Focus: Suspected coccyx  PI - community acquired  S: pt consulted for above focus. History progress notes and orders reveiwed       History per MD:        Ms. Kenney is a 77-year-old female with metastatic colon cancer.  She is well known to me.  Colonoscopy in 03/2017 had revealed a large fungating mass in right side of the colon.  This was perforated, causing abscess.  Laparoscopic colostomy was done in 03/2017.  She had right colectomy with takedown of her ileostomy in 04/2017.  Pathology had revealed poorly differentiated adenocarcinoma.  Fifteen of 24 lymph nodes were positive, beta mutation present.  PET scan in 05/2017 had revealed multiple liver metastases.       For metastatic colon cancer, the patient has been on multiple different treatments since 06/2017, including modified FOLFOX6 with Avastin, 5-FU and Avastin, single agent Avastin.  Last treatment was on 06/26/2018.       In the last few months, the patient's overall condition has been deteriorating.  She has been more weak.  She has been having right-sided abdominal pain.  This was all indicative of progression of disease.  I had met with the patient on 08/29/2018.  We had discussed regarding further palliative chemotherapy/immunotherapy versus comfort care.  After discussion, the plan was for hospice.     O:  Rt coccyx:  3.0cm x 1.5cm area of intact, non-blanchable erythema.          La-ulcer skin: Intact, no erythema.         Drainage: none         Odor: none          Pt with severe pain with repositioning on Lt.        Diaper for UIC/FIC.     A:  Rt coccyx: Stage I PI, community acquired. No local s/s infection. Pt moving towards hospice today    I/P:  Rt coccyx PI         -Change dressing on even days and prn         -Mepilex sacral        PIP         -Microturn pt for comfort         -Incontinence protocol prn         - HOB below 30 degrees for pt comfort         -Continue full skin inspections BID and document         -Document Wilmer risk  interventions         -Pain meds per EMR prior to activity    WOC will return weekly if pt remains in hospital
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
## 2018-09-04 NOTE — PLAN OF CARE
Problem: Patient Care Overview  Goal: Plan of Care/Patient Progress Review  Outcome: No Change  Pt is disoriented to time and situation. C/o of abdominal pain, oxy x1 given. Up with assist of 1. VSS. Suspected pressure injury on coccyx, mepilex CDI, WOC consulted. R PIV infusing. L arm weakness- limb alert, but pt refuses arm band. Abdominal CT 9/3 (see results). Port not accessed per pt request- wants to be accessed during the day. UA clean catch send down to lab, results negative, UC pending. Palliative and WOC to see today. Will continue to monitor.

## 2018-09-04 NOTE — ED NOTES
[facility]  ED Nurse Handoff Report    ED Chief complaint: No chief complaint on file.      ED Diagnosis:   Final diagnoses:   Abdominal pain       Code Status: See H&P    Allergies:   Allergies   Allergen Reactions     Wool Fiber Unknown       Activity level - Baseline/Home:  Stand with Assist    Activity Level - Current:   Stand with Assist     Needed?: No    Isolation: No  Infection: Not Applicable  Bariatric?: No    Vital Signs:   Vitals:    09/03/18 1845 09/03/18 1900 09/03/18 1902 09/03/18 1903   BP:  124/62     Resp:       Temp:       TempSrc:       SpO2: 98%  96% 97%       Cardiac Rhythm: ,        Pain level: 0-10 Pain Scale: 6    Is this patient confused?: No   [place] - Suicide Severity Rating Scale Completed?  Yes  If yes, what color did the patient score?  White    Patient Report: Initial Complaint: increased abdominal pain  Focused Assessment: AOx3, AVSS. C/o abdominal pain, improved with dilaudid. NPO in ED. Has not voided in ED.   Tests Performed: labs, CT  Abnormal Results: elevated liver enzymes, see CT results  Treatments provided: 1000cc bolus, IV pain meds    Family Comments: sister at bedside    OBS brochure/video discussed/provided to patient: N/A    ED Medications:   Medications   iohexol (OMNIPAQUE) solution 25 mL (not administered)   HYDROmorphone (PF) (DILAUDID) injection 0.5 mg (0.5 mg Intravenous Given 9/3/18 1811)   0.9% sodium chloride BOLUS (0 mLs Intravenous Stopped 9/3/18 1851)   iopamidol (ISOVUE-370) solution 48 mL (48 mLs Intravenous Given 9/3/18 1830)   sodium chloride 0.9 % bag 500mL for CT scan flush use (60 mLs Intravenous Given 9/3/18 1830)       Drips infusing?:  Yes    For the majority of the shift this patient was Green.   Interventions performed were frequent rounding.    Severe Sepsis OR Septic Shock Diagnosis Present: No      ED NURSE PHONE NUMBER: *[phone]

## 2018-09-05 NOTE — PROGRESS NOTES
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 She was admitted for further management.     Metastatic colon cancer with progression and intractable abdominal pain:  Patient had known history metastatic colon cancer (poorly differential adenocarcinoma) with liver involvement. Cancer was diagnosed initially in March 2017. She underwent laparoscopic ileostomy on 03/07/2017 and open right colectomy with takedown of ileostomy on 04/17/2018.  March 15, 2018 CT showed progression of disease.  Patient normally follows with Dr. Lindo of [facility]. She received palliative chemotherapy with her last treatment on 07/26/2018 with 5-FU, oxaliplatin and Avastin. Last clinic appointment on 8/30/18, discussion was had about progression of cancer with recommendation of options including immunotherapy versus a comfort-based approach. She presented with right sided abdominal pain.  CBC showed wbc of 7.5, Hgb of 11.8, and PLT of 177K. CMP with normal renal function, elevated AST of 282, ALT of 74, TBil of 1.2, and ALKP of 987. U/A with positive nitrite, negative LE, 3 wbcs, and moderate bacteria. CT abdomen/pelvis 09/03 showed progression of malignancy evidenced by significant increased metastatic disease throughout the liver and significantly larger adenopathy within the abdomen. It also demonstrated increasing size of an abdominal wall mass extending through the midline abdominal musculature, new indeterminate nodular prominence of the right pleura, trace bibasilar pleural fluid, small ascites, and diffuse anasarca.  She received a dose of IV  Dilaudid in the ER and started on morphine (MS Contin) 15 mg po bid as well as prn oxycodone. Palliative Care consulted; code status changed to DNR/DNI, and patient decided to transition to comfort care. Met with hospice 09/05. Recommended:       Acetaminophen 650 mg supp NH every 4 hrs as needed for pain/fever     Bisacodyl 10 mg supp NH daily as needed for constipation      Atropine sulfate 1% 2 to 4 drops SL as needed every 2 hrs for terminal congestion/secretions      Haloperidol 0.5 mg tab 1 to 2 tabs po/sl every 6 hrs nausea, agitation     Lorazepam 0.5mg tab 1/2 to 1 tab po/sl every 4 hrs as needed for anxiety/restlessness      Oxycodone 10 mg po every 2 hours prn     Senna 8.6 mg 1 to 2 tabs po 2x day as needed constipation     -Patient to be discharged to [facility] when a bed is available.    UTI:  UA upon admission with positive nitrite, negative LE, 3 wbcs, and moderate bacteria. UC growing 50K-100K colonies/ml GNR.  -Started Septra on 09/05 for 3 days.    Physical deconditioning/malnutrition:  Due to advance colon cancer.  -PT and nutrition service consulted     Hypothyroidism:    -Continue PTA Synthroid.     Chronic anemia:  Baseline Hg around 11 and stable.    Recent Labs  Lab 09/04/18  0720 09/03/18  1715   HGB 10.4* 11.8       Right coccyx stage I pressure ulcer, community acquired.  -Continue care per WOC RN    Glaucoma:    -Continue PTA eyedrops.      # Pain Assessment:  Current Pain Score 9/5/2018   Patient currently in pain? -   Pain score (0-10) 6   Pain location -   Pain descriptors -   CPOT pain score -   - Sherita is experiencing pain due to metastatic colon cancer. Pain management was discussed and the plan was created in a collaborative fashion.  Sherita's response to the current recommendations: engaged  - Please see the plan for pain management as documented above      Active Diet Order      Combination Diet Regular Diet Adult      DVT prophylaxis:  PCDs   CODE STATUS:  DNR/DNI  Disposition:  [facility] when a bed is available. Appreciate help from palliative care and hospice service.    D/W Dr. Abbott of palliative care.    Discharge summary and paper work prepared.    Interval History   Patient continues to have intermittent abdominal pain. She reports no N/V, fever, cp,or sob. No new issues overnight.    Data reviewed today: I reviewed all new labs and imaging over the last 24 hours. I personally reviewed no images or EKG's today.    Physical Exam   Temp: 97.1  F (36.2  C) Temp src: Oral BP: 109/62   Heart Rate: 79 Resp: 18 SpO2: 97 % O2 Device: None (Room air)    Vitals:    09/04/18 0500 09/05/18 0727   Weight: 42 kg (92 lb 9.6 oz) 42.4 kg (93 lb 6.4 oz)     Vital Signs with Ranges  Temp:  [97.1  F (36.2  C)-97.9  F (36.6  C)] 97.1  F (36.2  C)  Heart Rate:  [79-87] 79  Resp:  [14-18] 18  BP: (109-134)/(62-70) 109/62  SpO2:  [95 %-97 %] 97 %  I/O's Last 24 hours  I/O last 3 completed shifts:  In: 2637 [P.O.:240; I.V.:2397]  Out: 100 [Urine:100]    Constitutional: Comfortable, frail and very weak appearing  HEENT: + conjunctival pallor.  Neurologic: Awake, alert, oriented x3    Medications   All medications were reviewed.    sodium chloride 75 mL/hr at 09/05/18 0016       heparin  5 mL Intracatheter Q28 Days     heparin lock flush  5-10 mL Intracatheter Q24H     levothyroxine  88 mcg Oral Daily     polyethylene glycol  17 g Oral Daily     polyethylene glycol 0.4%- propylene glycol 0.3%  1 drop Both Eyes TID     senna-docusate  2-4 tablet Oral BID     travoprost (FARHAT Free)  1 drop Both Eyes At Bedtime     vitamin B complex with vitamin C  1 tablet Oral Daily        Data     Recent Labs  Lab 09/04/18  0720 09/03/18  1715   WBC 6.4 7.5   HGB 10.4* 11.8   MCV 92 92   * 177    135   POTASSIUM 4.0 4.0   CHLORIDE 103 97   CO2 24 28   BUN 18 24   CR 0.56 0.64   ANIONGAP 10 10   SARITHA 8.0* 9.0   GLC 87 110*   ALBUMIN  --  2.4*   PROTTOTAL  --  6.7*   BILITOTAL  --  1.2   ALKPHOS   --  987*   ALT  --  74*   AST  --  282*   LIPASE  --  133       No results found for this or any previous visit (from the past 24 hour(s)).

## 2018-09-05 NOTE — PLAN OF CARE
Problem: Patient Care Overview  Goal: Plan of Care/Patient Progress Review  Outcome: No Change  Pt alert to self and place. VSS. C/o generalized pain, given Oxycodone x3, effective but short-lived. Mepilex on coccyx, CDI. Reg diet, fair appetite. C/o constipation, given suppository x1. Weight shifting provided q2h. Assist of 1 +GB. R Port infusing NS @ 75mL/hr.

## 2018-09-05 NOTE — DISCHARGE SUMMARY
[facility]  Discharge Summary        Sherita Kenney MRN# [id number]   YOB: 1940 Age: 77 year old     Date of Admission:  9/3/2018  Date of Discharge:  9/7/2018  Admitting Physician:  Vladimir Long MD  Discharge Physician:             Hardeep Valentine MD  September 7, 2018  Discharging Service:             Hospitalist     Primary Provider: Edgar Aldana  Primary Care Physician Phone Number: [phone]     Discharge Diagnoses:     Metastatic colon cancer with progression and intractable abdominal pain  Constipation  UTI  Physical deconditioning  Malnutrition, suspect severe  Right coccyx stage I pressure ulcer, community acquired.    Other/Chronic Medical Problems:      Glaucoma  Hypothyroidism  Chronic anemia  Post-polio syndrome  Osteoarthritis  ADHD    Problem Oriented Hospital Course   Ms. Sherita Kenney is a 77-year-old female with past medical history notable for metastatic colon cancer, ADHD, glaucoma, 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 She was admitted for further management.    For a detailed history and physical exam, please refer to the dictated admission note by Joselin Caruso PA-C on 09/03/2018.     Metastatic colon cancer with progression and intractable abdominal pain:  Patient had known history metastatic colon cancer (poorly differential adenocarcinoma) with liver involvement. Cancer was diagnosed initially in March 2017. She underwent laparoscopic ileostomy on 03/07/2017 and open right colectomy with takedown of ileostomy on 04/17/2018.  March 15, 2018 CT showed progression of disease.  Patient normally follows with Dr. Lindo of [facility]. She received palliative chemotherapy with her last treatment on 07/26/2018 with 5-FU, oxaliplatin and  Avastin. Last clinic appointment on 8/30/18, discussion was had about progression of cancer with recommendation of options including immunotherapy versus a comfort-based approach. She presented with right sided abdominal pain.  CBC showed wbc of 7.5, Hgb of 11.8, and PLT of 177K. CMP with normal renal function, elevated AST of 282, ALT of 74, TBil of 1.2, and ALKP of 987. U/A with positive nitrite, negative LE, 3 wbcs, and moderate bacteria. CT abdomen/pelvis 09/03 showed progression of malignancy evidenced by significant increased metastatic disease throughout the liver and significantly larger adenopathy within the abdomen. It also demonstrated increasing size of an abdominal wall mass extending through the midline abdominal musculature, new indeterminate nodular prominence of the right pleura, trace bibasilar pleural fluid, small ascites, and diffuse anasarca.  She received a dose of IV Dilaudid in the ER and started on morphine (MS Contin) 15 mg po bid as well as prn oxycodone. Palliative Care consulted; code status changed to DNR/DNI, and patient decided to transition to hospice care.  hospice saw the the patient on 09/05.  She will be discharged to [facility] for residential hospice.    UTI:  UA upon admission showed positive nitrite, negative LE, 3 wbcs, and moderate bacteria. UC grew  50K-100K colonies/ml GNR. Started on Septra 09/05 for 3 days.    Physical deconditioning/malnutrition:  Due to advance colon cancer. She continued with nutrition supplement.     Hypothyroidism:    She continued with PTA Synthroid.     Chronic anemia:  Baseline Hg around 11 and stable.    Right coccyx stage I pressure ulcer, community acquired.  Local care per [facility] RN recommendation.    Glaucoma:    She continued with PTA eyedrops.         Code Status:      DNR / DNI        Important Results:      Na 137, K 4, Cr 0.56, Hgb 10.4, PLT 134K , WBC 6.4.        Pending Results:        Unresulted Labs Ordered in the Past 30 Days of  this Admission     Date and Time Order Name Status Description    9/4/2018 0500 Urine Culture Aerobic Bacterial Preliminary               Discharge Instructions and Follow-Up:      Follow-up Appointments     Follow Up and recommended labs and tests       Hospice to assume the care                        Discharge Disposition:      Discharged to home        Discharge Medications:        Current Discharge Medication List      START taking these medications    Details   acetaminophen (TYLENOL) 650 MG Suppository Place 1 suppository (650 mg) rectally every 4 hours as needed for fever or mild pain (Do not exceed 4000 mg total acetaminophen per day.) Unwrap prior to insertion.  Qty: 12 suppository, Refills: 1    Associated Diagnoses: Metastatic colon cancer in female (H)      atropine 1 % ophthalmic solution Take 2-4 drops by mouth, place under tongue or place inside cheek every 2 hours as needed for other (terminal respiratory secretions) Not for ophthalmic use.  Qty: 5 mL, Refills: 1    Associated Diagnoses: Dry mouth      bisacodyl (DULCOLAX) 10 MG Suppository Unwrap and insert 1 suppository rectally twice daily as needed for constipation.  Qty: 12 suppository, Refills: 1    Associated Diagnoses: Slow transit constipation      haloperidol (HALDOL) 0.5 MG tablet Take 1-2 tablets (0.5-1 mg) by mouth, place under tongue or insert rectally every 6 hours as needed for agitation (nausea)  Qty: 30 tablet, Refills: 1    Associated Diagnoses: Metastatic colon cancer in female (H)      MEDICATION INSTRUCTION If care facility cannot accept or use ranges, facility is instructed to use lower end of dosing range    Associated Diagnoses: Metastatic colon cancer in female (H)      morphine (MS CONTIN) 15 MG 12 hr tablet Take 1 tablet (15 mg) by mouth every 12 hours  Qty: 30 tablet, Refills: 0    Associated Diagnoses: Metastatic colon cancer in female (H)      sennosides (SENOKOT) 8.6 MG tablet Take 1-2 tablets by mouth 2 times  daily  Qty: 100 tablet, Refills: 1    Associated Diagnoses: Slow transit constipation      sulfamethoxazole-trimethoprim (BACTRIM DS/SEPTRA DS) 800-160 MG per tablet Take 1 tablet by mouth 2 times daily for 3 days  Refills: 0    Associated Diagnoses: Acute cystitis without hematuria         CONTINUE these medications which have CHANGED    Details   LORazepam (ATIVAN) 0.5 MG tablet Take 0.5-1 tablets (0.25-0.5 mg) by mouth, place under tongue or insert rectally every 4 hours as needed for anxiety (restlessness)  Qty: 30 tablet, Refills: 1    Associated Diagnoses: Anxiety      oxyCODONE IR (ROXICODONE) 5 MG tablet Take 2 tablets (10 mg) by mouth every 2 hours as needed for breakthrough pain or severe pain  Qty: 20 tablet, Refills: 0    Associated Diagnoses: Metastatic colon cancer in female (H)         CONTINUE these medications which have NOT CHANGED    Details   levothyroxine (SYNTHROID/LEVOTHROID) 88 MCG tablet Take 1 tablet (88 mcg) by mouth daily  Qty: 30 tablet, Refills: 2    Associated Diagnoses: Hypothyroidism, unspecified type      Nutritional Supplements (NUTRITIONAL SUPPLEMENT PLUS) LIQD Take 8 oz by mouth 3 times daily Chocolate Ensure  Qty: 720 mL, Refills: PRN    Associated Diagnoses: Physical deconditioning      polyethylene glycol (MIRALAX) powder Take 17 g (1 capful) by mouth daily  Qty: 510 g, Refills: 1    Associated Diagnoses: Adenocarcinoma of colon (H)      SUMATRIPTAN SUCCINATE PO Take 25 mg by mouth every 8 hours as needed for migraine      travoprost, BAK Free, (TRAVATAN Z) 0.004 % ophthalmic solution Place 1 drop into both eyes At Bedtime  Qty: 5 mL, Refills: 11    Associated Diagnoses: Primary open angle glaucoma of both eyes, unspecified glaucoma stage      order for DME Equipment being ordered: Wheelchair  Please fax to [phone].  Qty: 1 Units, Refills: 0    Associated Diagnoses: Physical deconditioning; Adenocarcinoma of colon (H); Generalized muscle weakness         STOP taking these  medications       acetaminophen (TYLENOL) 325 MG tablet Comments:   Reason for Stopping:         loperamide (IMODIUM A-D) 2 MG capsule Comments:   Reason for Stopping:         NEW MED Comments:   Reason for Stopping:         ondansetron (ZOFRAN) 8 MG tablet Comments:   Reason for Stopping:         Polyethylene Glycol 400 (BLINK TEARS OP) Comments:   Reason for Stopping:         prochlorperazine (COMPAZINE) 10 MG tablet Comments:   Reason for Stopping:         senna-docusate (SENOKOT-S;PERICOLACE) 8.6-50 MG per tablet Comments:   Reason for Stopping:         vitamin B-Complex Comments:   Reason for Stopping:                   Allergies:         Allergies   Allergen Reactions     Wool Fiber Unknown           Consultations This Hospital Stay:      Palliative care  Hospice  Hem/Onc  Wound care service        Condition and Physical on Discharge:      Discharge condition: Stable   Vitals: Blood pressure 119/69, temperature 98.7  F (37.1  C), temperature source Oral, resp. rate 18, weight 42.4 kg (93 lb 6.4 oz), SpO2 95 %, not currently breastfeeding.   Gen: Awake, answering simple questions appropriately, appeared comfortable   Resp: CTA B/L, normal WOB  CVS- RRR, no murmur, no edema  Abd/GI: Soft, dominant mass palpable, no overt tenderness.  Neuro- CN- intact.            Diet and Activity:     After Care Instructions     Activity - Up with nursing assistance           Advance Diet as Tolerated       Follow this diet upon discharge: Orders Placed This Encounter      Snacks/Supplements Adult: Boost Shake; With Meals      Room Service      Combination Diet Regular Diet Adult                            Discharge Time:      Greater than 30 minutes.        Image Results From This Hospital Stay (For Non-EPIC Providers):        Results for orders placed or performed during the hospital encounter of 09/03/18   CT Abdomen Pelvis w Contrast    Narrative    CT ABDOMEN PELVIS WITH CONTRAST   9/3/2018 6:34 PM     HISTORY: Severe  right upper abdominal pain, no BM for 1 week and  history of metastatic colon cancer, check for bowel obstruction or  perforation.    TECHNIQUE:  CT abdomen and pelvis with 48 mL Isovue-370 IV. Radiation  dose for this scan was reduced using automated exposure control,  adjustment of the mA and/or kV according to patient size, or iterative  reconstruction technique.    COMPARISON: CT chest, abdomen, and pelvis 5/20/2018.    FINDINGS:  Interval significant progression of disseminated hepatic  metastatic disease. An example mass at the anterior right superior  liver is now 7.3 x 5.8 cm, previously 5.4 x 4 cm series 3 image 20.  There are multiple other examples of hepatic progression bilaterally.    Adrenals, spleen and kidneys do not show any significant  abnormalities.    The pancreas is poorly visualized and is surrounded by large necrotic  appearing malignant adenopathy. This adenopathy has progressed. For  example, adenopathy anterior to the pancreas and posterior to the left  liver is approximately 5.8 x 2.8 cm, previously 3.9 x 1.2 cm, series 3  image 35. Large portocaval region adenopathy is 5.9 x 2.9 cm,  previously 2.8 x 1 cm, series 3 image 36. There are numerous  additional adjacent examples. There is progression of adenopathy of  mesentery is well. An example at the midline mesentery measuring 2.4 x  2.3 cm, previously 1.7 x 1.9 cm series 3 image 51. There are multiple  additional examples in this area. A few mildly prominent  retroperitoneal lymph nodes are also noted. Progression of a midline  subcutaneous mass that extends through the abdominal wall musculature.  For example, this is 3 x 2.5 cm, previously 1.9 x 1.3 cm series 3  image 48.    Anasarca is present. There is small ascites in the abdomen or pelvis.  There is no bowel obstruction identified. In addition to the  aforementioned subcutaneous mass extending throughout dominant wall  there is an increasing omental region implant at the midline  image 47.  Colonic diverticulosis. Vascular calcifications. Hip DJD. No  convincing focal bone lesions. Some mild nodular thickening of the  right pleura is newly seen and is considered indeterminate. Trace  bilateral pleural fluid.      Impression    IMPRESSION:  1. Progression of malignancy evidenced by significant increased  metastatic disease throughout the liver and significantly larger  adenopathy within the abdomen.  2. Increasing size of an abdominal wall mass extending through the  midline abdominal musculature. Adjacent the omental malignant implant.  3. New indeterminate nodular prominence of the right pleura. Trace  bibasilar pleural fluid. Cannot exclude pleural metastatic disease.  4. Small ascites. Diffuse anasarca.      JOHNSON FERNÁNDEZ MD     No results found for this or any previous visit (from the past 4320 hour(s)).        Most Recent Lab Results In EPIC (For Non-EPIC Providers):    Most Recent 3 CBC's:  Recent Labs   Lab Test  09/04/18   0720  09/03/18   1715  06/26/18   1018   WBC  6.4  7.5  4.8   HGB  10.4*  11.8  10.2*   MCV  92  92  94   PLT  134*  177  85*      Most Recent 3 BMP's:  Recent Labs   Lab Test  09/04/18   0720  09/03/18   1715  06/26/18   1018   NA  137  135  140   POTASSIUM  4.0  4.0  4.5   CHLORIDE  103  97  106   CO2  24  28  29   BUN  18  24  15   CR  0.56  0.64  0.78   ANIONGAP  10  10  5   SARITHA  8.0*  9.0  8.9   GLC  87  110*  95     Most Recent 3 Troponin's:  Recent Labs   Lab Test  12/02/17   2040   TROPI  <0.015     Most Recent 3 INR's:  Recent Labs   Lab Test  05/30/17   0930  04/20/17   0650  04/19/17   0645   INR  0.92  0.98  0.95     Most Recent 2 LFT's:  Recent Labs   Lab Test  09/03/18   1715  06/26/18   1018   AST  282*  97*   ALT  74*  65*   ALKPHOS  987*  385*   BILITOTAL  1.2  0.4     Most Recent Cholesterol Panel:  Recent Labs   Lab Test  03/06/17   1105   TRIG  100     Most Recent 6 Bacteria Isolates From Any Culture (See EPIC Reports for Culture Details):  Recent  Labs   Lab Test  09/04/18   0500  02/15/17   1620  02/15/17   0218  09/07/10   1349   CULT  50,000 to 100,000 colonies/mL  Lactose fermenting gram negative rods  *  Culture in progress  Moderate growth Enterobacter cloacae complex  Moderate growth Eikenella corrodens  Light growth Haemophilus parainfluenzae Susceptibility testing not routinely done  On day 2, isolated in broth only: Mixed anaerobes present No further   identification  *  >100,000 colonies/mL Klebsiella pneumoniae*  No Acid fast bacilli isolated after 7 weeks incubation  No anaerobes isolated  Culture negative after 4 weeks  No growth     Most Recent TSH, T4 and HgbA1c:   Recent Labs   Lab Test  03/22/18   0948  04/13/17   1227   TSH  4.63*   --    T4  1.35   --    A1C   --   4.7

## 2018-09-05 NOTE — PLAN OF CARE
Problem: Patient Care Overview  Goal: Plan of Care/Patient Progress Review  Outcome: No Change  Pt is alert to self and place. VSS. C/o pain - generalized given Oxycodone x1. Mepilex on coccyx, CDI. Weight shifting provided q2h. Voids in bathroom - assist of 1. R Port infusing NS @ 75mL/hr. Rested well overnight.

## 2018-09-05 NOTE — PLAN OF CARE
Problem: Patient Care Overview  Goal: Plan of Care/Patient Progress Review   Pt disoriented to time/situation and seems to be increasingly confused this evening. Pt unable to describe or rate pain but pain 6-7/10 using painAD scale, oxycodone x2 with good effect. BS hypoactive, pt unsure of last BM, scheduled senna-s given. Mepilex to coccyx CDI, repositioning with micro turns as pt allows on pulsate mattress. Port infusing NS at 75ml/hr. Plan to discharge on hospice when bed available. Will continue to monitor.

## 2018-09-05 NOTE — CONSULTS
"Nutrition Admission Screen - Unintentional weight loss of 10# or more in past 2 months, Reduced oral intake over the last month  MD consult - Malnutrition    Chart reviewed  Note pt to transfer to [facility] for hospice    Diet: Regular + Boost shake with meals  Eating small amounts    Ht: 5'4\"  9/4: 42 kg  BMI: 15.8    Wt Readings from Last 10 Encounters:   09/05/18 42.4 kg (93 lb 6.4 oz)   08/29/18 42.6 kg (94 lb)   08/29/18 42.6 kg (94 lb)   06/26/18 47.5 kg (104 lb 12.8 oz)   06/26/18 47.5 kg (104 lb 12.8 oz)   06/12/18 47.4 kg (104 lb 9.6 oz)   05/29/18 47.7 kg (105 lb 3.2 oz)   05/20/18 46.5 kg (102 lb 9.6 oz)   05/15/18 47.4 kg (104 lb 9.6 oz)   05/15/18 47.4 kg (104 lb 9.6 oz)         Anticipate pt with malnutrition (9% wt loss past few months, decreased po intake), however, aggressive nutrition intervention not consistent with POC  Will continue with Boost shakes and provide po intake as desires    Lavinia Miller RD, LD  Clinical Dietitian - [facility]  Pager - [phone]    "

## 2018-09-05 NOTE — PROGRESS NOTES
[facility]    Palliative Care Progress Note    Patient: Sherita Kenney  Date of Admission:  9/3/2018    Recommendations:    Add MS Contin 15mg q12h    Use bisacodyl supp daily     I stopped senna due to concern for increased abdominal pain after I increased her dose    Stopped acetaminophen. Hasn't been taking and increased LFTs    Awaiting bed at [facility]    These recommendations have been discussed with Oly Bingham, RN.    Assessment  Sherita Kenney is a 77 year old female with metastatic colon cancer                        - diagnosed April 2017                        - metastatic to liver, omentum, abd wall                        - on chemotherapy until mid-July, missed appointments since                        - follows with Dr Lindo, last visit 8/29, noted general decline at that time. Not deemed candidate for further chemotherapy. Option of immunotherapy vs hospice was discussed.  Patient and her health care agent have decided to discharge to hospice. She hasn't agreed to transition to comfort cares only at this point.     SYMPTOMS:  1. Abdominal pain: seems worse than yesterday. Sherita has difficulties describing the pain or time course. Per her RN it does improve markedly about 1 hour after 10mg oxycodone, but recurs about 2 hours after the dose was given. The pain seems to still be in the abdomen, generalized. Sherita isn't able to describe its nature, doesn't clearly endorse crampy pain. Very active bowel sounds on exam, no significant distention. No BM since admission, unclear if she is passing gas.   She didn't tolerate a heating pad.   She took 5 x10mg oxycodone in the last 24 hours. She is somewhat less awake, but still oriented to the immediate situation and remembers talking to me yesterday. She has had difficulties with short-term memory, which she openly acknowledges. While she is vulnerable to cognitive side effects of opioids, I feel that with the current doses she is  doing ok. I am hoping that we can improve her pain by alleviating her constipation.    2. Constipation: long-standing issue, likely associated with her cancer. Now pt essentially bed bound and taking more opioids. However, since she has very active bowel movements on exam, I wonder if she has some extent of obstruction, even though that was not seen on imaging on admission. Will discontinue senna, since I am concerned that the dose increase added to her discomfort. Discussed using suppository with RN. Continuing miralax.    Pain management was discussed with Sherita and the plan was created in a collaborative fashion.  The patient's response to the current recommendations: agreeable.     SOCIAL:    Waiting for bed at [facility]     ADVANCE CARE PLANNING:   She has difficulties remembering details of yesterday's conversation with myself and Beverly from  Hospice. However, she does confirm that she wants to focus on comfort and avoid coming back to the hospital. As stated before, this is fully consistent with previously voiced and documented wishes.    She has a short memory deficit, which does affect her ability to make decisions independently. However, since she has been consistent in her wish and supported by her healthcare agent throughout, I feel certain that the transfer to hospice is appropriate.    Leanna Abbott  Pager: [phone]  [facility] Inpatient Team Consult pager [phone] (M-F 8-4:30)  After-hours Answering Service [phone]   Palliative Clinic: [phone]       Interval History:       Sherita is more uncomfortable today with ongoing abdominal pain.     We briefly revisit yesterday's conversations. She doesn't have additional questions and confirms that she wants hospice services. She also agrees to placement at [facility].        Medications:   I have reviewed this patient's medication profile and medications during this hospitalization  acetaminophen prn - none  Oxycodone 5-10 q3h prn - 10mg  x5  Hydromorphone 0.3-0.5 IV q2h prn - none     Lorazepam prn - none     Miralax daily, senna 2 bid          Review of Systems:   Palliative Symptom Review    A comprehensive ROS was negative except as stated above.           Physical Exam:     Vital Signs: Temp: 97.1  F (36.2  C) Temp src: Oral BP: 109/62   Heart Rate: 79 Resp: 18 SpO2: 97 % O2 Device: None (Room air)  Weight: 93 lbs 6.4 oz    Bowel movements: none since admission    Physical Exam:  CONSTIT: awake, appears moderately uncomfortable, cachectic  EENT: MM dry, EOMI, no icterus  RESP: reg, nl effort  GI: soft, diffusely tender, no distention, active bowel sounds over all quadrants  MSK: moves x4  SKIN:  warm, no rash, no obvious lesions  NEURO: alert, oriented to self and situation  PSYCH: appropriate affect, memory and thought process intact        Data Reviewed:    none hamzah Abbott  Pager: [phone]  [facility] Inpatient Team Consult pager [phone] (M-F 8-4:30)  After-hours Answering Service [phone]  Palliative Clinic: [phone]     Total time spent was 40 minutes,  >50% of time was spent counseling and/or coordination of care regarding symptom management.

## 2018-09-06 NOTE — PROVIDER NOTIFICATION
MD Notification    Notified Person: MD     Notified Person Name: Rico    Notification Date/Time: 9/6/18 0531    Notification Interaction: Call    Purpose of Notification: Urine culture results    Orders Received:    Comments:

## 2018-09-06 NOTE — PROGRESS NOTES
Hospice: Met with Sherita and her friend Dinora to sign hospice consent forms. Sherita very alert and engaged this am. Sherita signed the hospice consent forms for hospice services to begin on discharge. POLST completed and placed on front of chart for signature. Discharge meds have already been completed. [facility] currently does not have a bed but anticipate one any day per Arianna at [facility]. I will update patients sister when the patient is ready for discharge as she is the financial POA.  Thank you Beverly Hung RN, BSN   Hospice Admission nurse  [phone]

## 2018-09-06 NOTE — PLAN OF CARE
Problem: Patient Care Overview  Goal: Plan of Care/Patient Progress Review  Outcome: Declining  Patient is alert to self only. Frequently anxious, ativan given for comfort. Poor appetite. Up with assist of 1.  C/o pain with movement, pain medication given. Lungs diminished. Abd firm. Pain with palpation. Bowel sounds positive. Plan to DC to Hospice when bed available.

## 2018-09-06 NOTE — PROGRESS NOTES
SPIRITUAL HEALTH SERVICES Progress Note  [facility]    Met with pt and her friend Dinora for follow-up visit.  Pt briefly talked about her wish for better food, and overall feelings of fatigue and exhaustion.  I left when Beverly from hospice arrived to visit with pt.   team available for support, per need or request.                                                                                                                                                 Doris Toussaint M.A.  Staff   Pager [phone]  Phone [phone]

## 2018-09-06 NOTE — PROGRESS NOTES
Had long conversation with sister and brother-in-law (Nat and Aren) who are the financial POAs.  They want to know pt's disposition.  Patient relations was involved from a risk standpoint to decipher what information should be given out to Nat and Aren.  Pt relations reached out to friend Clara who is the healthcare POA.  Clara is willing to talk to Prakash.  Staff will not give out any information at this time.  Prakash are respectful of patients wishes and have not attempted to reach out to the patient.  Contact patient relations if there is any confusion and questions moving forward.

## 2018-09-06 NOTE — PLAN OF CARE
Problem: Patient Care Overview  Goal: Plan of Care/Patient Progress Review  Pt is alert to self only. VSS. No c/o pain overnight. Regular diet. A1+gb, voids in bathroom. Weight shifting q2h overnight. L arm paralysis. Mepilex on coccyx. Diminished lung sounds. R port infusing NS at 75mL/hr. R PIV SL. Urine cultures +, MD notified. Rested well overnight.

## 2018-09-06 NOTE — CONSULTS
SW Consult Note:    D/I:  SW acknowledges consult for hospice/end of life decision making.  MD spoke with  Hospice Liaison who has been in contact with patient and family regarding [facility].  SW will continue to assist as needed with discharge planning.    Addendum:  Care Transition Initial Assessment - NEVILLE  Reason For Consult: end of life/hospice  Met with: Chart Review  Active Problems:    Intractable pain       DATA  Lives With: alone  Living Arrangements: assisted living  Description of Support System: Supportive, Involved  Who is your support system?: Other (specify), Sibling(s) (Friend)  Support Assessment: Adequate family and caregiver support, Adequate social supports.   Identified issues/concerns regarding health management: Per social service protocol for discharge planning, patient was admitted on 9/3/18 with a primary diagnosis of intractable pain.  Reviewed chart for hospice consult/end of life discussion and [facility] is currently involved with patient and has met with patient and family.  Patient lives in assisted living and would like to discharge to [facility] with [facility] services.  See hospice liaison note.       Quality Of Family Relationships: supportive, helpful, involved     ASSESSMENT  Cognitive Status:  alert and oriented  Concerns to be addressed: Discharge planning.     PLAN  Financial costs for the patient includes: Room and board Private pay for [facility].  Patient given options and choices for discharge: Per hospice note, patient would like to go to [facility] with  Hospice.   Patient/family is agreeable to the plan?  Yes  Patient Goals and Preferences: [facility] with [facility].  Patient anticipates discharging to:  [facility] with [facility].    Desmond Purvis, MSW, LGSW

## 2018-09-06 NOTE — PROGRESS NOTES
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 She was admitted for further management.      Metastatic colon cancer with progression and intractable abdominal pain:  -Patient with known history metastatic colon cancer   -Patient normally follows with Dr. Lindo of [facility]. She received palliative chemotherapy with her last treatment on 07/26/2018 with 5-FU, oxaliplatin and Avastin.   -CT abdomen/pelvis 09/03 showed progression of malignancy evidenced by significant increased metastatic disease throughout the liver and significantly larger adenopathy within the abdomen. It also demonstrated increasing size of an abdominal wall mass extending through the midline abdominal musculature, new indeterminate nodular prominence of the right pleura, trace bibasilar pleural fluid, small ascites, and diffuse anasarca.    -Palliative Care consulted; code status changed to DNR/DNI, and patient decided to pursue hospice care. Met with hospice 09/05.   -Continue current medicines including IV fluids per patient/family wishes  -[facility] is available.  Likely 9/7     UTI:  UA upon admission with positive nitrite, negative LE, 3 wbcs, and moderate bacteria. UC growing 50K-100K colonies/ml GNR.  -Started Septra on 09/05 for 3 days.     Physical deconditioning/malnutrition:  Due to advance colon cancer.      Hypothyroidism:    -Continue PTA Synthroid.      Chronic anemia:  Baseline Hg around 11 and stable.    Right coccyx stage I pressure ulcer, community acquired.  -Continue care per WOC RN     Glaucoma:    -Continue PTA  eyedrops.            # Pain Assessment:  Current Pain Score 9/6/2018   Patient currently in pain? denies   Pain score (0-10) -   Pain location -   Pain descriptors -   CPOT pain score -   Sherita corona pain is due to back and abdominal pain.  Please see orders for pain regimen.      D/W: RN  DVT Prophylaxis: Pneumatic Compression Devices  Code Status: DNR/DNI    Disposition: Expected discharge 9/7 to [facility] Maria C Valentine MD    Interval History   Patient denies new complaints.  Poor appetite.  Denies chest pain or dyspnea.    -Data reviewed today: I reviewed all new labs and imaging results over the last 24 hours. I personally reviewed no images or EKG's today.      Physical Exam   Temp: 97.1  F (36.2  C) Temp src: Oral BP: 126/70   Heart Rate: 88 Resp: 16 SpO2: 95 % O2 Device: None (Room air)    Vitals:    09/04/18 0500 09/05/18 0727   Weight: 42 kg (92 lb 9.6 oz) 42.4 kg (93 lb 6.4 oz)     Vital Signs with Ranges  Temp:  [95.9  F (35.5  C)-97.1  F (36.2  C)] 97.1  F (36.2  C)  Heart Rate:  [82-90] 88  Resp:  [16-18] 16  BP: (104-141)/(64-73) 126/70  SpO2:  [93 %-96 %] 95 %  I/O last 3 completed shifts:  In: 2033 [P.O.:300; I.V.:1733]  Out: -     Constitutional: AA, answers simple questions appropriately  Respiratory:  CTA B/L, Normal WOB  Cardiovascular: RRR, No murmur  GI: Soft, central abdominal mass, nonspecific tenderness.  Skin/Integument: Warm and dry, no rashes  Neuro: CN- grossly intact        Medications           bisacodyl  10 mg Rectal Daily     heparin  5 mL Intracatheter Q28 Days     heparin lock flush  5-10 mL Intracatheter Q24H     levothyroxine  88 mcg Oral Daily     morphine  15 mg Oral Q12H ARMANDO     polyethylene glycol  17 g Oral Daily     polyethylene glycol 0.4%- propylene glycol 0.3%  1 drop Both Eyes TID     sulfamethoxazole-trimethoprim  1 tablet Oral BID     travoprost (FARHAT Free)  1 drop Both Eyes At Bedtime     vitamin B complex with vitamin C  1 tablet Oral Daily       Data       Recent  Labs  Lab 09/04/18  0720 09/03/18  1715   WBC 6.4 7.5   HGB 10.4* 11.8   MCV 92 92   * 177    135   POTASSIUM 4.0 4.0   CHLORIDE 103 97   CO2 24 28   BUN 18 24   CR 0.56 0.64   ANIONGAP 10 10   SARITHA 8.0* 9.0   GLC 87 110*   ALBUMIN  --  2.4*   PROTTOTAL  --  6.7*   BILITOTAL  --  1.2   ALKPHOS  --  987*   ALT  --  74*   AST  --  282*   LIPASE  --  133       No results found for this or any previous visit (from the past 24 hour(s)).

## 2018-09-06 NOTE — PLAN OF CARE
Problem: Patient Care Overview  Goal: Plan of Care/Patient Progress Review  Outcome: No Change   Pt Alert to self-able to answer questions appropriately intermittent disorganized thoughts. VSS c/o back pain- managed with scheduled OxyContin, PRN Dilaudid, Oxycodone heat packs. ABD firm- periumbilical mass. LS clear. Regular diet-poor appetite. Encourage turn/repo Q 2- allows weight shifting. Able to use BSC. Plan: DC to [facility] when bed available.

## 2018-09-07 NOTE — PLAN OF CARE
Problem: Gastrointestinal Condition Comorbidity  Goal: Gastrointestinal Condition  Patient comorbidity will be monitored for signs and symptoms of Gastrointestinal condition.  Problems will be absent, minimized or managed by discharge/transition of care.   Outcome: No Change  Patient is disoriented to time, place, and situation. VSS, Scheduled morphine given for abdomen pain. Edema +1 in LE. R Port infusing NS. Patient is an assist of 1, ambulated in hu today.

## 2018-09-07 NOTE — DISCHARGE SUMMARY
Pt discharged to [facility] via w/c transport and ambulance ride. Pt was given DC instructions and acknowledges understanding. Pt declined PRN pain intervention-denies pain. New RX will be delivered to facilty. Pt friend Dinora with pt.

## 2018-09-07 NOTE — PROVIDER NOTIFICATION
MD Notification    Notified Person: MD    Notified Person Name: Anastacia    Notification Date/Time: 9/7/18 8:33 a.m.    Notification Interaction: JOHN we have a bed at [facility] working on transport for discharge.    Purpose of Notification: asking for elec signature and POLST to be signed.     Orders Received:    Comments:

## 2018-09-07 NOTE — PLAN OF CARE
Problem: Patient Care Overview  Goal: Plan of Care/Patient Progress Review  Pt is alert to self only. VSS. No c/o pain overnight. L arm paralysis. Mepilex on coccyx. Weight shift assistance provided. R Port infusing NS at 75mL/hr. R PIV SL. A1+w+gb. Rested well overnight.

## 2018-09-07 NOTE — PROGRESS NOTES
SW Discharge Note:    D/I:  Patient has a bed available for today at [facility].  Patient is asking for transportation to be arranged.  SW placed call to [facility] for wheelchair transportation at 11:00 today.  Hospice Liaison updated patient and POA of discharge time.      P: SW will continue to assist as needed for discharge.    MARAL Gamboa, LGSW

## 2018-09-25 ENCOUNTER — CARE COORDINATION (OUTPATIENT)
Dept: ONCOLOGY | Facility: CLINIC | Age: 78
End: 2018-09-25

## 2018-10-01 NOTE — PROGRESS NOTES
COLON & RECTAL SURGERY  PROGRESS NOTE    April 17, 2017  Post-op Day # 4    SUBJECTIVE:  The patient is doing well. Pain controlled. Tolerating diet without nausea or vomiting. Voiding well. Having full bowel function. States she had a good bowel movement this morning and didn't see any blood.     OBJECTIVE:  Temp:  [97.3  F (36.3  C)-99.1  F (37.3  C)] 97.3  F (36.3  C)  Heart Rate:  [] 91  Resp:  [17-30] 20  BP: (117-138)/() 117/52  SpO2:  [92 %-99 %] 95 %    Intake/Output Summary (Last 24 hours) at 04/17/17 0809  Last data filed at 04/17/17 0656   Gross per 24 hour   Intake             7645 ml   Output              300 ml   Net             7345 ml       GENERAL:  Awake, alert, no acute distress,   HEAD - Nomocephalic atraumatic  SCLERA anicteric  EXTREMITIES warm and well perfused  ABDOMEN:  Soft, appropriately tender, non-distended,   INCISION:  C/d/i,     LABS:  Lab Results   Component Value Date    WBC 9.0 04/17/2017     Lab Results   Component Value Date    HGB 8.7 04/17/2017     Lab Results   Component Value Date    HCT 29.5 04/17/2017     Lab Results   Component Value Date     04/17/2017     Last Basic Metabolic Panel:  Lab Results   Component Value Date     04/17/2017      Lab Results   Component Value Date    POTASSIUM 3.8 04/17/2017     Lab Results   Component Value Date    CHLORIDE 108 04/17/2017     Lab Results   Component Value Date    SARITHA 8.1 04/17/2017     Lab Results   Component Value Date    CO2 26 04/17/2017     Lab Results   Component Value Date    BUN 8 04/17/2017     Lab Results   Component Value Date    CR 0.50 04/17/2017     Lab Results   Component Value Date     04/17/2017       ASSESSMENT/PLAN:POD#4 Open ileostomy takedown and right colectomy  1. Low residue diet  2. D/c PCA and PO PRN pain meds  3. D/c IVF, saline lock  4. OOB, ambulate  5. Lovenox for prophylaxis  6. Dressing changes to takedown site  7. PT, OT. Appreciate their input for d/c planning home  vs TCU     Yessy Whalen PA-C  [facility]  Phone: [phone]             Mucosal Advancement Flap Text: Given the location of the defect, shape of the defect and the proximity to free margins a mucosal advancement flap was deemed most appropriate. Incisions were made with a 15 blade scalpel in the appropriate fashion along the cutaneous vermilion border and the mucosal lip. The remaining actinically damaged mucosal tissue was excised.  The mucosal advancement flap was then elevated to the gingival sulcus with care taken to preserve the neurovascular structures and advanced into the primary defect. Care was taken to ensure that precise realignment of the vermilion border was achieved.

## 2019-06-02 ENCOUNTER — MEDICAL CORRESPONDENCE (OUTPATIENT)
Dept: HEALTH INFORMATION MANAGEMENT | Facility: CLINIC | Age: 79
End: 2019-06-02

## 2019-08-12 NOTE — PLAN OF CARE
"Problem: Goal Outcome Summary  Goal: Goal Outcome Summary  Outcome: Improving  Pt axo3 with VSS and CMS with 2-3+ LUE and 4+ BLE edema and 1+BLE pulses. On tele in SR. NG to LIS with moderate OP. ENRIKE in place, irrigates well. Up in room with 1, GB and walker to BSC. ABD soft, no c/o pain and 1 episode of small sool this shift. TPN increased to 55/hr at 2000. Anxious and upset at 0200 pulling apart ENRIKE and NG stating \"I found more pins\" and attempting to remove lines/tubes. PRN Zyprexa effective for agitation and reorientation provided. BG with no coverage needed. Plan for OR pending 3/1.       " Left second voicemail message for patient to call back.    Electronically Signed by:    Flower Urbina  08/12/19

## 2021-02-28 NOTE — TELEPHONE ENCOUNTER
"Dr. Lindo requested that writer call Dinora Garrett ([phone]) and tell her we received her letter she sent.   How ever per patient request are unable to share patient information with Dinora.  Dr. Lindo states this was verbalized by the patient to him at the last appointment.    When speaking with Dinora via phone about this she verbalized that she has \"concerns about her being manipulated.\"   She explained that in Feb 2017 Sherita had someone from [facility] assess her situation.    Writer found under Sherita's care team in chart TRACEE Capps, [facility] [phone].   I called her to explain situation, It was recommended from her and the clinic manager to call risk management.    Message left for Melia Ash at [phone] to return my call.    Sherita's next appointment is 7/27/17.  "
Rn Spoke with Thalia salinas in infusion and gave her a new copy of the consent to communicate form.  Explained to her that we have 1 on file that is blank.  If she chooses to leave this blank she can write it on the new one that she only wants people to communicate with her or to write exactly who she wants to have results and medical information on the new one.    She verbalized understanding of this.      
Writer spoke with Melia today, explained situation.   She advised that Dinora call [facility] common entry point at [phone] to report if she is concerned about Sherita being manipulated by family.  She also advised that Sherita fill out a new consent to communicate form .  Explained that the POA status is not a consent to communicate but rather of a financial nature, only to be used as a consent to communicate if the patient can not express her own wishes.      Writer spoke with Dinora, explained as mentioned above.    Will have Sherita fill out a new consent to communicate tomorrow and explain to family that we can ONLY communicate with who ever is on this sheet.    
CONSTITUTIONAL: Well appearing, well nourished, awake, alert, oriented to person, place, time/situation and in no apparent distress.  ENT: Airway patent, Nasal mucosa clear. Mouth with normal mucosa.  EYES: Clear bilaterally.  RESPIRATORY: Breathing comfortably with normal RR.  MSK: Range of motion is not limited, no deformities noted, some mild ttp over 3rd and 4th MCs.  Minimal edema.  +2 DP/PT pulses.   NEURO: Alert and oriented, no focal deficits.  SKIN: Skin normal color for race, warm, dry and intact. No evidence of rash.  PSYCH: Alert and oriented to person, place, time/situation. normal mood and affect. no apparent risk to self or others.

## 2022-05-05 NOTE — PLAN OF CARE
Problem: Goal Outcome Summary  Goal: Goal Outcome Summary  OT: Patient required CG A with FWW toilet transfer and standing ADL tasks at sink. Limited by decreased safety and balance. Improved cognition noted at today's session. Recommend discharge TCU.       no

## 2022-10-26 NOTE — TELEPHONE ENCOUNTER
loperamide (IMODIUM A-D) 2 MG capsule 155 capsule 3 11/8/2017         Last Written Prescription Date:  11/08/2017  Last Fill Quantity: 155,  # refills: 3   Last office visit: 12/1/2017 with prescribing provider:     Future Office Visit: Unknown  Next 5 appointments (look out 90 days)     Apr 12, 2018  1:00 PM CDT   Return Visit with Susan Lindo MD   [facility] ([facility])    [facility]  [address] [facility] [address]  [facility] [address]   [phone]                 Requested Prescriptions   Pending Prescriptions Disp Refills     loperamide (IMODIUM A-D) 2 MG capsule 155 capsule 3     Sig: AND TAKE 2 CAPSULE EVERY 6 HOURS AS NEEDED FOR LOOSE STOOLS (MAX OF 16MG/24 HRS)    There is no refill protocol information for this order           Application Tool (Optional): Liquid Nitrogen Sprayer Show Aperture Variable?: Yes Detail Level: Simple Render Note In Bullet Format When Appropriate: No Post-Care Instructions: I reviewed with the patient in detail post-care instructions. Patient is to wear sunprotection, and avoid picking at any of the treated lesions. Patient may apply Vaseline to crusted or scabbing areas. Patient understands to RTO 1 month if still present or recurs. Duration Of Freeze Thaw-Cycle (Seconds): 0 Consent: The patient's consent was obtained including but not limited to risks of crusting, scabbing, blistering, scarring, darker or lighter pigmentary change, recurrence, incomplete removal and infection. Spray Paint Text: The liquid nitrogen was applied to the skin utilizing a spray paint frosting technique. Post-Care Instructions: I reviewed with the patient in detail post-care instructions. Patient is to wear sunprotection, and avoid picking at any of the treated lesions. Pt may apply Vaseline to crusted or scabbing areas. Medical Necessity Clause: This procedure was medically necessary because the lesions that were treated were: Medical Necessity Information: It is in your best interest to select a reason for this procedure from the list below. All of these items fulfill various CMS LCD requirements except the new and changing color options.

## 2024-09-26 NOTE — PROGRESS NOTES
"Oncology Rooming Note    January 25, 2018 10:16 AM   Sherita Kenney is a 77 year old female who presents for:    Chief Complaint   Patient presents with     Oncology Clinic Visit     Initial Vitals: /65  Pulse 74  Temp 98  F (36.7  C) (Oral)  Resp 16  SpO2 96% Estimated body mass index is 19.37 kg/(m^2) as calculated from the following:    Height as of an earlier encounter on 1/25/18: 1.676 m (5' 6\").    Weight as of an earlier encounter on 1/25/18: 54.4 kg (120 lb). There is no height or weight on file to calculate BSA.  Data Unavailable Comment: Data Unavailable   No LMP recorded. Patient is not currently having periods (Reason: Perimenopausal).  Allergies reviewed: Yes  Medications reviewed: Yes    Medications: Medication refills not needed today.  Pharmacy name entered into [employer]:    [facility] [place]  [place] - [address]  [facility], [address]    Clinical concerns: no      5 minutes for nursing intake (face to face time)          Bess Garcia MA                " hide

## 2025-01-07 NOTE — PATIENT INSTRUCTIONS
25 0030   Maternal Information   Date of Referral 25   Person Making Referral lactation consultant  (courtesy)   Maternal Reason for Referral breastfeeding currently  (Mom nursed 1st child for 1 month and pumped exclusively for 4 months.  Infant currently latched well in cradle hold on left breast.  Infant appears scrunched at the breast but gape is wide, lips flanged, and mom states pain-free.  Discussed CC hold and)   Infant Reason for Referral  infant  (extending infant's body if struggling to latch.  Mom states understanding.  Breastfeeding education provided, information given.  Mom has new breast pump at home.)   Maternal Assessment   Breast Size Issue none   Breast Shape Bilateral:;round   Breast Density Bilateral:;soft   Nipples Bilateral:;everted   Left Nipple Symptoms intact;nontender   Right Nipple Symptoms intact;nontender   Maternal Infant Feeding   Maternal Emotional State receptive;relaxed   Infant Positioning cradle   Signs of Milk Transfer deep jaw excursions noted   Pain with Feeding no   Latch Assistance none needed   Support Person Involvement verbally supports mother   Milk Expression/Equipment   Breast Pump Type double electric, personal   Equipment for Home Use breast pump ordered through insurance        [facility]continue [facility] [place]  IV NS 1 litre today. [place] [facility] [place]  Start oxycodone as needed for pain.sent to pharmacy  Take lorazepam as needed. sent to pharmacy  Hospice.  Follow up as needed.

## (undated) DEVICE — SU VICRYL 0 CT-2 27" J334H

## (undated) DEVICE — ENDO TROCAR BLUNT 100MM W/THRD ANCHOR BLUNTPORT BPT12STS

## (undated) DEVICE — SU PROLENE 3-0 SHDA 36" 8522H

## (undated) DEVICE — SU PROLENE 2-0 V-7 36" 8977H

## (undated) DEVICE — STPL RELOAD 80 X 3.8MM GIA8038L

## (undated) DEVICE — GLOVE PROTEXIS BLUE W/NEU-THERA 6.5  2D73EB65

## (undated) DEVICE — GLOVE PROTEXIS W/NEU-THERA 6.0  2D73TE60

## (undated) DEVICE — LINEN TOWEL PACK X5 5464

## (undated) DEVICE — SU VICRYL 4-0 FS-1 27" J441H

## (undated) DEVICE — Device

## (undated) DEVICE — BLADE KNIFE SURG 10 371110

## (undated) DEVICE — DRSG GAUZE 4X4" 3033

## (undated) DEVICE — ESU GROUND PAD UNIVERSAL W/O CORD

## (undated) DEVICE — ENDO CANNULA 05MM VERSAONE UNIVERSAL UNVCA5STF

## (undated) DEVICE — SPONGE LAP 18X18" X8435

## (undated) DEVICE — SU VICRYL 2-0 TIE 12X18" J905T

## (undated) DEVICE — SU SILK 4-0 RB-1 CR 8X18" C054D

## (undated) DEVICE — STPL 80 X 3.8MM GIA8038S

## (undated) DEVICE — DRAPE SHEET REV FOLD 3/4 9349

## (undated) DEVICE — SU VICRYL 0 UR-6 27" J603H

## (undated) DEVICE — CLIP APPLIER 11" MED LIGACLIP MCM20

## (undated) DEVICE — ESU ELEC BLADE 6" COATED E1450-6

## (undated) DEVICE — SU VICRYL 3-0 SH CR 8X18" J774

## (undated) DEVICE — CATH TRAY FOLEY 16FR W/URINE METER STATLOCK 902916

## (undated) DEVICE — ESU CORD MONOPOLAR 10'  E0510

## (undated) DEVICE — SYSTEM CLEARIFY VISUALIZATION 21-345

## (undated) DEVICE — EVAC SYSTEM CLEAR FLOW SC082500

## (undated) DEVICE — SOL NACL 0.9% IRRIG 1000ML BOTTLE 07138-09

## (undated) DEVICE — ESU HOLDER LAP INST DISP PURPLE LONG 330MM H-PRO-330

## (undated) DEVICE — DRAPE LAP W/ARMBOARD 29410

## (undated) DEVICE — JELLY LUBRICATING SURGILUBE 4OZ TUBE

## (undated) DEVICE — GLOVE PROTEXIS MICRO 6.5  2D73PM65

## (undated) DEVICE — ENDO TROCAR 05MM VERSAONE BLADED W/STD FIX CANNULA B5STF

## (undated) DEVICE — SU VICRYL 3-0 SH 27" J316H

## (undated) DEVICE — SUCTION TIP YANKAUER STR K87

## (undated) DEVICE — SU VICRYL 2-0 REEL 54" J206G

## (undated) DEVICE — SU VICRYL 2-0 SH 27" J317H

## (undated) DEVICE — PACK AAA SBA15AAFS3

## (undated) DEVICE — TUBING SUCTION 12"X1/4" N612

## (undated) DEVICE — ESU PENCIL W/HOLSTER E2350H

## (undated) DEVICE — DRSG TEGADERM 4X10" 1627

## (undated) DEVICE — DRAPE IOBAN INCISE 23X17" 6650EZ

## (undated) DEVICE — SU PDS II 1 CT MONOFIL Z353H

## (undated) DEVICE — STPL LINEAR 90 X 3.5MM TA9035S

## (undated) DEVICE — SUCTION CANISTER MEDIVAC LINER 3000ML W/LID 65651-530

## (undated) DEVICE — SU VICRYL 3-0 TIE 12X18" J904T

## (undated) DEVICE — PACK LAP CHOLE SLC15LCFSD

## (undated) DEVICE — PREP CHLORAPREP 26ML TINTED ORANGE  260815

## (undated) DEVICE — SU DERMABOND MINI DHVM12

## (undated) DEVICE — ESU PENCIL W/SMOKE EVAC E2515HS

## (undated) DEVICE — SU MONOCRYL 4-0 PS-2 18" UND Y496G

## (undated) RX ORDER — HEPARIN SODIUM (PORCINE) LOCK FLUSH IV SOLN 100 UNIT/ML 100 UNIT/ML
SOLUTION INTRAVENOUS
Status: DISPENSED
Start: 2017-01-01

## (undated) RX ORDER — DEXAMETHASONE SODIUM PHOSPHATE 4 MG/ML
INJECTION, SOLUTION INTRA-ARTICULAR; INTRALESIONAL; INTRAMUSCULAR; INTRAVENOUS; SOFT TISSUE
Status: DISPENSED
Start: 2017-04-13

## (undated) RX ORDER — FENTANYL CITRATE 50 UG/ML
INJECTION, SOLUTION INTRAMUSCULAR; INTRAVENOUS
Status: DISPENSED
Start: 2017-04-13

## (undated) RX ORDER — CEFOTETAN DISODIUM 1 G/10ML
INJECTION, POWDER, FOR SOLUTION INTRAMUSCULAR; INTRAVENOUS
Status: DISPENSED
Start: 2017-04-13

## (undated) RX ORDER — NALOXONE HYDROCHLORIDE 0.4 MG/ML
INJECTION, SOLUTION INTRAMUSCULAR; INTRAVENOUS; SUBCUTANEOUS
Status: DISPENSED
Start: 2017-02-15

## (undated) RX ORDER — DEXAMETHASONE SODIUM PHOSPHATE 4 MG/ML
INJECTION, SOLUTION INTRA-ARTICULAR; INTRALESIONAL; INTRAMUSCULAR; INTRAVENOUS; SOFT TISSUE
Status: DISPENSED
Start: 2017-03-01

## (undated) RX ORDER — HYDROMORPHONE HYDROCHLORIDE 1 MG/ML
INJECTION, SOLUTION INTRAMUSCULAR; INTRAVENOUS; SUBCUTANEOUS
Status: DISPENSED
Start: 2017-04-13

## (undated) RX ORDER — FENTANYL CITRATE 50 UG/ML
INJECTION, SOLUTION INTRAMUSCULAR; INTRAVENOUS
Status: DISPENSED
Start: 2017-03-01

## (undated) RX ORDER — CEFAZOLIN SODIUM 2 G/100ML
INJECTION, SOLUTION INTRAVENOUS
Status: DISPENSED
Start: 2017-05-30

## (undated) RX ORDER — LIDOCAINE HYDROCHLORIDE 20 MG/ML
INJECTION, SOLUTION EPIDURAL; INFILTRATION; INTRACAUDAL; PERINEURAL
Status: DISPENSED
Start: 2017-04-13

## (undated) RX ORDER — HEPARIN SODIUM (PORCINE) LOCK FLUSH IV SOLN 100 UNIT/ML 100 UNIT/ML
SOLUTION INTRAVENOUS
Status: DISPENSED
Start: 2018-01-01

## (undated) RX ORDER — BUPIVACAINE HYDROCHLORIDE AND EPINEPHRINE 2.5; 5 MG/ML; UG/ML
INJECTION, SOLUTION EPIDURAL; INFILTRATION; INTRACAUDAL; PERINEURAL
Status: DISPENSED
Start: 2017-03-01

## (undated) RX ORDER — LIDOCAINE HYDROCHLORIDE 20 MG/ML
INJECTION, SOLUTION EPIDURAL; INFILTRATION; INTRACAUDAL; PERINEURAL
Status: DISPENSED
Start: 2017-03-01

## (undated) RX ORDER — FENTANYL CITRATE 50 UG/ML
INJECTION, SOLUTION INTRAMUSCULAR; INTRAVENOUS
Status: DISPENSED
Start: 2017-02-15

## (undated) RX ORDER — ACETAMINOPHEN 325 MG/1
TABLET ORAL
Status: DISPENSED
Start: 2017-04-13

## (undated) RX ORDER — PROPOFOL 10 MG/ML
INJECTION, EMULSION INTRAVENOUS
Status: DISPENSED
Start: 2017-04-13

## (undated) RX ORDER — HEPARIN SODIUM 5000 [USP'U]/.5ML
INJECTION, SOLUTION INTRAVENOUS; SUBCUTANEOUS
Status: DISPENSED
Start: 2017-04-13

## (undated) RX ORDER — FLUMAZENIL 0.1 MG/ML
INJECTION, SOLUTION INTRAVENOUS
Status: DISPENSED
Start: 2017-02-15

## (undated) RX ORDER — VECURONIUM BROMIDE 1 MG/ML
INJECTION, POWDER, LYOPHILIZED, FOR SOLUTION INTRAVENOUS
Status: DISPENSED
Start: 2017-04-13

## (undated) RX ORDER — PROPOFOL 10 MG/ML
INJECTION, EMULSION INTRAVENOUS
Status: DISPENSED
Start: 2017-03-01

## (undated) RX ORDER — CEFOTETAN DISODIUM 1 G/10ML
INJECTION, POWDER, FOR SOLUTION INTRAMUSCULAR; INTRAVENOUS
Status: DISPENSED
Start: 2017-03-01

## (undated) RX ORDER — ONDANSETRON 2 MG/ML
INJECTION INTRAMUSCULAR; INTRAVENOUS
Status: DISPENSED
Start: 2018-01-01

## (undated) RX ORDER — GLYCOPYRROLATE 0.2 MG/ML
INJECTION, SOLUTION INTRAMUSCULAR; INTRAVENOUS
Status: DISPENSED
Start: 2017-04-13

## (undated) RX ORDER — ACETAMINOPHEN 500 MG
TABLET ORAL
Status: DISPENSED
Start: 2017-03-01

## (undated) RX ORDER — ONDANSETRON 2 MG/ML
INJECTION INTRAMUSCULAR; INTRAVENOUS
Status: DISPENSED
Start: 2017-03-01

## (undated) RX ORDER — ONDANSETRON 2 MG/ML
INJECTION INTRAMUSCULAR; INTRAVENOUS
Status: DISPENSED
Start: 2017-04-13

## (undated) RX ORDER — ESMOLOL HYDROCHLORIDE 10 MG/ML
INJECTION INTRAVENOUS
Status: DISPENSED
Start: 2017-03-01